# Patient Record
Sex: FEMALE | Race: WHITE | HISPANIC OR LATINO | Employment: PART TIME | URBAN - METROPOLITAN AREA
[De-identification: names, ages, dates, MRNs, and addresses within clinical notes are randomized per-mention and may not be internally consistent; named-entity substitution may affect disease eponyms.]

---

## 2017-01-20 DIAGNOSIS — M47.817 LUMBOSACRAL SPONDYLOSIS WITHOUT MYELOPATHY: Primary | ICD-10-CM

## 2017-01-20 DIAGNOSIS — M53.3 SACROILIAC JOINT PAIN: ICD-10-CM

## 2017-01-23 ENCOUNTER — OFFICE VISIT (OUTPATIENT)
Dept: FAMILY MEDICINE | Facility: CLINIC | Age: 62
End: 2017-01-23

## 2017-01-23 VITALS
SYSTOLIC BLOOD PRESSURE: 112 MMHG | RESPIRATION RATE: 18 BRPM | HEART RATE: 83 BPM | DIASTOLIC BLOOD PRESSURE: 69 MMHG | TEMPERATURE: 98.6 F | OXYGEN SATURATION: 96 %

## 2017-01-23 DIAGNOSIS — M54.5 CHRONIC BILATERAL LOW BACK PAIN, WITH SCIATICA PRESENCE UNSPECIFIED: ICD-10-CM

## 2017-01-23 DIAGNOSIS — M54.2 CERVICALGIA: ICD-10-CM

## 2017-01-23 DIAGNOSIS — I87.2 VENOUS (PERIPHERAL) INSUFFICIENCY: Primary | ICD-10-CM

## 2017-01-23 DIAGNOSIS — G89.29 CHRONIC BILATERAL LOW BACK PAIN, WITH SCIATICA PRESENCE UNSPECIFIED: ICD-10-CM

## 2017-01-23 DIAGNOSIS — F51.5 BAD DREAMS: ICD-10-CM

## 2017-01-23 ASSESSMENT — ENCOUNTER SYMPTOMS
FEVER: 0
GASTROINTESTINAL NEGATIVE: 1
FATIGUE: 0
RESPIRATORY NEGATIVE: 1
SLEEP DISTURBANCE: 1
JOINT SWELLING: 1
UNEXPECTED WEIGHT CHANGE: 0
ARTHRALGIAS: 1
WEAKNESS: 0
BACK PAIN: 1
DYSPHORIC MOOD: 1
ACTIVITY CHANGE: 0
NERVOUS/ANXIOUS: 1
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
## 2017-01-23 NOTE — MR AVS SNAPSHOT
After Visit Summary   1/23/2017    Radha Baca    MRN: [id number]           Patient Information     Date Of Birth          1955        Visit Information        Provider Department      1/23/2017 10:00 AM Janna Moreno MD [facility]        Today's Diagnoses     Venous (peripheral) insufficiency    -  1     Chronic bilateral low back pain, with sciatica presence unspecified         Cervicalgia           Care Instructions    Here is the plan from today's visit    1. Venous (peripheral) insufficiency    Wear stockings during the day take them off at night    Elevate legs above heart for 1 hour a day  - order for DME; Equipment being ordered: Grade 1 (light) compression stockings, below the knee  Dispense: 1 Units; Refill: 1    2. Chronic bilateral low back pain, with sciatica presence unspecified    Weight loss would help with this back pain     No concerns with issues of the  discs  or stenosis    It's okay to go forward with Dr. Sheffield's plans       3. Cervicalgia      Please call or return to clinic if your symptoms don't go away.    Follow up plan  Please make a clinic appointment for follow up with me (JANNA MORENO) in  1 month  for follow up .    Thank you for coming to [facility] today.  Lab Testing:  **If you had lab testing today and your results are reassuring or normal they will be mailed to you or sent through [employer] within 7 days.   **If the lab tests need quick action we will call you with the results.  The phone number we will call with results is # [phone] (home) . If this is not the best number please call our clinic and change the number.  Medication Refills:  If you need any refills please call your pharmacy and they will contact us.   If you need to  your refill at a new pharmacy, please contact the new pharmacy directly. The new pharmacy will help you get your medications transferred faster.   Scheduling:  If you have any  concerns about today's visit or wish to schedule another appointment please call our office during normal business hours [phone] (8-5:00 M-F)  If a referral was made to a [facility] and you don't get a call from central scheduling please call [phone].  If a Mammogram was ordered for you at [facility] call [phone] to schedule or change your appointment.  If you had an XRay/CT/Ultrasound/MRI ordered the number is [phone] to schedule or change your radiology appointment.   Medical Concerns:  If you have urgent medical concerns please call [phone] at any time of the day.  If you have a medical emergency please call [phone].        Follow-ups after your visit        Your next 10 appointments already scheduled     Apr 10, 2017  8:00 AM   LAB with  LAB   [facility] ([facility])    [address] [place] [id number] [phone]           Patient must bring picture ID.  Patient should be prepared to give a urine specimen  Please do not eat 10-12 hours before your appointment if you are coming in fasting for labs on lipids, cholesterol, or glucose (sugar).  Pregnant women should follow their Care Team instructions. Water with medications is okay. Do not drink coffee or other fluids.   If you have concerns about taking  your medications, please ask at office or if scheduling via [employer], send a message by clicking on Secure Messaging, Message Your Care Team.            Apr 12, 2017  8:15 AM   (Arrive by 7:45 AM)   RETURN DIABETES with Demar Hickey MD   [facility] ([facility])    [address] [place] [id number] [phone]              Who to contact     Please call your clinic at [phone] to:    Ask questions about your health    Make or cancel appointments    Discuss your medicines    Learn about your test results    Speak to your  doctor   If you have compliments or concerns about an experience at your clinic, or if you wish to file a complaint, please contact [facility] Patient Relations at [phone] or email us at [email or web address]         Additional Information About Your Visit        MyChart Information     [employer] gives you secure access to your electronic health record. If you see a primary care provider, you can also send messages to your care team and make appointments. If you have questions, please call your primary care clinic.  If you do not have a primary care provider, please call [phone] and they will assist you.      [employer] is an electronic gateway that provides easy, online access to your medical records. With [employer], you can request a clinic appointment, read your test results, renew a prescription or communicate with your care team.     To access your existing account, please contact your [facility] or call [phone] for assistance.        Care EveryWhere ID     This is your Care EveryWhere ID. This could be used by other organizations to access your [place] medical records  [id number]        Your Vitals Were     Pulse Temperature Respirations Pulse Oximetry          83 98.6  F (37  C) (Oral) 18 96%         Blood Pressure from Last 3 Encounters:   01/23/17 112/69   12/08/16 129/74   10/18/16 108/70    Weight from Last 3 Encounters:   12/08/16 250 lb 1.6 oz (113.445 kg)   10/18/16 250 lb 9.6 oz (113.671 kg)   08/10/16 246 lb (111.585 kg)              Today, you had the following     No orders found for display         Today's Medication Changes          These changes are accurate as of: 1/23/17 11:08 AM.  If you have any questions, ask your nurse or doctor.               Start taking these medicines.        Dose/Directions    order for DME   Used for:  Venous (peripheral) insufficiency   Started by:  Mohan Moreno MD        Equipment  being ordered: Grade 1 (light) compression stockings, below the knee   Quantity:  1 Units   Refills:  1            Where to get your medicines      Some of these will need a paper prescription and others can be bought over the counter.  Ask your nurse if you have questions.     Bring a paper prescription for each of these medications    - order for DME             Primary Care Provider Office Phone # Fax #    Mohan Moreno [phone]       [facility] [address]   [facility] [address]        Thank you!     Thank you for choosing [facility]  for your care. Our goal is always to provide you with excellent care. Hearing back from our patients is one way we can continue to improve our services. Please take a few minutes to complete the written survey that you may receive in the mail after your visit with us. Thank you!             Your Updated Medication List - Protect others around you: Learn how to safely use, store and throw away your medicines at www.disposemymeds.org.          This list is accurate as of: 1/23/17 11:08 AM.  Always use your most recent med list.                   Brand Name Dispense Instructions for use    Albuterol Sulfate 108 (90 BASE) MCG/ACT Aepb     1 each    Inhale 2 puffs into the lungs every 6 hours as needed       aspirin 81 MG tablet     90 tablet    1 tab daily       atorvastatin 20 MG tablet    LIPITOR    90 tablet    Take 1 tablet (20 mg) by mouth daily       TIAGO CONTOUR test strip   Generic drug:  blood glucose monitoring     270 each    Use to test blood sugar 3 times daily or as directed.       benzonatate 100 MG capsule    TESSALON    42 capsule    Take 1 capsule (100 mg) by mouth 3 times daily as needed for cough       blood glucose calibration NORMAL solution     1 Bottle    Use to calibrate blood glucose monitor as needed as directed.       blood glucose monitoring lancets     6 Box    Test 4-6 times daily 90 day supply  refills x 3       calcium carbonate-vitamin D 500-400 MG-UNIT Tabs per tablet     90 tablet    Take 1 tablet by mouth daily       cholecalciferol 1000 UNIT tablet    vitamin D    100 tablet    Take 1 tablet by mouth daily.       EYE-ZAKIYA PLUS LUTEIN PO          insulin glargine 100 UNIT/ML injection    LANTUS SOLOSTAR    70 mL    Inject 70 Units Subcutaneous every morning       insulin pen needle 31G X 8 MM    B-D U/F    360 each    Use  4 daily short 31 G X 8MM       latanoprost 0.005 % ophthalmic solution    XALATAN     Place 1 drop into both eyes At Bedtime       liraglutide 18 MG/3ML soln    VICTOZA PEN    9 mL    Inject 1.8 mg Subcutaneous daily       LISINOPRIL PO      Take 20 mg by mouth       lisinopril-hydrochlorothiazide 20-12.5 MG per tablet    PRINZIDE/ZESTORETIC    90 tablet    Take 1 tablet by mouth daily       metFORMIN 500 MG tablet    GLUCOPHAGE    360 tablet    Take 2 tablets (1,000 mg) by mouth 2 times daily (with meals)       MULTIvitamin  S Caps     90 capsule    Take 1 daily       NovoLOG FLEXPEN 100 UNIT/ML injection   Generic drug:  insulin aspart     75 mL    Carb counting with meals approx 70-80 units daily       OMEGA-3 FISH OIL PO      Take 1,000 mg by mouth daily       order for DME     1 Units    Equipment being ordered: Grade 1 (light) compression stockings, below the knee       prednisoLONE acetate 1 % ophthalmic susp    PRED FORTE     Place 1 drop into both eyes 4 times daily       timolol 0.5 % ophthalmic solution    TIMOPTIC     1 drop 2 times daily       TYLENOL PO      Take by mouth as needed for mild pain or fever       vitamin  B Complex Caps      Take 1 tablet by mouth daily       VITAMIN C PO      Take 2,000 mg by mouth 2 times daily

## 2017-01-23 NOTE — PATIENT INSTRUCTIONS
Here is the plan from today's visit    1. Venous (peripheral) insufficiency    Wear stockings during the day take them off at night    Elevate legs above heart for 1 hour a day  - order for DME; Equipment being ordered: Grade 1 (light) compression stockings, below the knee  Dispense: 1 Units; Refill: 1    2. Chronic bilateral low back pain, with sciatica presence unspecified    Weight loss would help with this back pain     No concerns with issues of the  discs  or stenosis    It's okay to go forward with Dr. Sheffield's plans       3. Cervicalgia      Please call or return to clinic if your symptoms don't go away.    Follow up plan  Please make a clinic appointment for follow up with me (JANNA RANGEL) in  1 month  for follow up .    Thank you for coming to [facility] today.  Lab Testing:  **If you had lab testing today and your results are reassuring or normal they will be mailed to you or sent through [employer] within 7 days.   **If the lab tests need quick action we will call you with the results.  The phone number we will call with results is # [phone] (home) . If this is not the best number please call our clinic and change the number.  Medication Refills:  If you need any refills please call your pharmacy and they will contact us.   If you need to  your refill at a new pharmacy, please contact the new pharmacy directly. The new pharmacy will help you get your medications transferred faster.   Scheduling:  If you have any concerns about today's visit or wish to schedule another appointment please call our office during normal business hours [phone] (8-5:00 M-F)  If a referral was made to a [facility] and you don't get a call from central scheduling please call [phone].  If a Mammogram was ordered for you at [facility] call [phone] to schedule or change your appointment.  If you had an XRay/CT/Ultrasound/MRI ordered the number is [phone] to  schedule or change your radiology appointment.   Medical Concerns:  If you have urgent medical concerns please call [phone] at any time of the day.  If you have a medical emergency please call [phone].

## 2017-02-01 ENCOUNTER — TELEPHONE (OUTPATIENT)
Dept: FAMILY MEDICINE | Facility: CLINIC | Age: 62
End: 2017-02-01

## 2017-02-01 ENCOUNTER — OFFICE VISIT (OUTPATIENT)
Dept: FAMILY MEDICINE | Facility: CLINIC | Age: 62
End: 2017-02-01

## 2017-02-01 VITALS
TEMPERATURE: 98.9 F | RESPIRATION RATE: 20 BRPM | OXYGEN SATURATION: 96 % | HEART RATE: 84 BPM | DIASTOLIC BLOOD PRESSURE: 74 MMHG | SYSTOLIC BLOOD PRESSURE: 126 MMHG
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
## 2017-02-01 ASSESSMENT — ENCOUNTER SYMPTOMS
CONSTITUTIONAL NEGATIVE: 1
MUSCULOSKELETAL NEGATIVE: 1

## 2017-02-01 NOTE — TELEPHONE ENCOUNTER
[facility] phone call message- general phone call:    Reason for call: Patient would like nurse call regarding cat bite on index finger and top part of hand.  States they are a diabetic and finger is swollen and throbbing.    Return call needed: Yes    OK to leave a message on voice mail? Yes    Primary language: English      needed? No    Call taken on February 1, 2017 at 11:16 AM by Darlyn Masterson

## 2017-02-01 NOTE — PROGRESS NOTES
Preceptor Attestation:   Patient seen and discussed with the resident. Assessment and plan reviewed with resident and agreed upon.   Supervising Physician:  Kris Paz MD  [facility]

## 2017-02-01 NOTE — TELEPHONE ENCOUNTER
Returned call to patient, states she has appointment scheduled with Dr. Guan at 3:00. RN it was good patient had scheduled due to cat bite, symptoms, and history of diabetes. Patient's lat tetanus shot was in 2013. RN advised patient to bring in animal's immunization records if possible. Patient verbalized understanding and will arrive for scheduled appointment.    Candis Dyson RN

## 2017-02-01 NOTE — PROGRESS NOTES
HPI:       Radha Baca is a 61 year old who presents for the following  Patient presents with:  Cat Bite: left hand happened about 11am 4 puncture marks was bit by her sisters cat, cat is up to date on shots     patient had cat bite her right hand this morning around  11 AMD , 3 small bites that are now swollen, not hurting. patient is diabetic and was worried that it might cause infection. Cat's shots are up to date and has been seen by a vet regularly. There is no observation of cat acting crazy, the cat might have been provoked since it was sleeping time for the cat. patiet bled a little but then it stopped.right now minimally painful but not swllen. patient had her tetanus in 2013 as well some other shots that she cannot remember. Per EMR TDAP was done in 9/26/2013.    Problem, Medication and Allergy Lists were reviewed and are current.  Patient is an established patient of this clinic.         Review of Systems:   Review of Systems   Constitutional: Negative.    Musculoskeletal: Negative.              Physical Exam:     Immunization History   Administered Date(s) Administered     Influenza (H1N1) 12/15/2009     Influenza (High Dose) 3 valent vaccine 10/13/2015     Influenza (IIV3) 10/16/2006, 10/16/2007, 10/28/2008, 09/21/2009, 10/13/2010, 09/28/2011, 09/19/2012, 09/26/2013     Influenza Vaccine, 3 YRS +, IM (QUADRIVALENT W/PRESERVATIVES) 11/03/2014, 10/18/2016     Mantoux 08/26/2011, 04/08/2013     Pneumococcal 23 valent 11/16/2000, 10/16/2006     TD (ADULT, 7+) 10/22/2002     TDAP (BOOSTRIX AGES 10-64) 09/26/2013         Patient Vitals for the past 24 hrs:   BP Temp Temp src Pulse Resp SpO2 Weight   02/01/17 1511 126/74 mmHg 98.9  F (37.2  C) Oral 84 20 96 % -     There is no weight on file to calculate BMI.  Vitals were reviewed and were normal     Physical Exam   Constitutional: She appears well-developed and well-nourished.   Cardiovascular: Normal rate, regular rhythm and normal heart  sounds.    Pulmonary/Chest: Effort normal and breath sounds normal.   Musculoskeletal:        Arms:  Patient has minor scratches in the left hand that ae very superficial to the skin, no signs of inflammation such as redness, warmth or swelling, very minor pain with touch of the affected area of the hand. There are no neurological deficit noted at this stage, strength is 5/5 and fine and crude touch intact in both hands.          Results:      Results from the last 24 hoursNo results found for this or any previous visit (from the past 24 hour(s)).  Assessment and Plan     1. Cat bite, initial encounter  Patient has had DTAP and the scratches are very minor, but given her history of DM2 and last HGBA1C level, will treat her scratches with antibiotics to avoid any complications.  PLAN:  - amoxicillin-clavulanate (AUGMENTIN) 875-125 MG per tablet; Take 1 tablet by mouth 2 times daily  Dispense: 10 tablet; Refill: 0  - Probiotic recommended.   Patient advised to come back to the clinic if there is swelling or if she develops fever in the next two days, to be evalauted and treated accordingly.     There are no discontinued medications.  Options for treatment and follow-up care were reviewed with the patient. Radha Baca  engaged in the decision making process and verbalized understanding of the options discussed and agreed with the final plan.    Frances Guan MD  Family Medicine PGY-1

## 2017-02-27 ENCOUNTER — OFFICE VISIT (OUTPATIENT)
Dept: FAMILY MEDICINE | Facility: CLINIC | Age: 62
End: 2017-02-27

## 2017-02-27 VITALS
WEIGHT: 245.6 LBS | DIASTOLIC BLOOD PRESSURE: 62 MMHG | TEMPERATURE: 98.3 F | HEART RATE: 74 BPM | OXYGEN SATURATION: 99 % | BODY MASS INDEX: 42.16 KG/M2 | SYSTOLIC BLOOD PRESSURE: 116 MMHG | RESPIRATION RATE: 20 BRPM

## 2017-02-27 DIAGNOSIS — I10 ESSENTIAL HYPERTENSION: ICD-10-CM

## 2017-02-27 DIAGNOSIS — G89.29 CHRONIC BILATERAL LOW BACK PAIN WITH RIGHT-SIDED SCIATICA: ICD-10-CM

## 2017-02-27 DIAGNOSIS — M54.2 CERVICALGIA: ICD-10-CM

## 2017-02-27 DIAGNOSIS — I87.2 VENOUS (PERIPHERAL) INSUFFICIENCY: ICD-10-CM

## 2017-02-27 DIAGNOSIS — M54.41 CHRONIC BILATERAL LOW BACK PAIN WITH RIGHT-SIDED SCIATICA: ICD-10-CM

## 2017-02-27 DIAGNOSIS — E11.8 TYPE 2 DIABETES MELLITUS WITH COMPLICATION, WITH LONG-TERM CURRENT USE OF INSULIN (H): ICD-10-CM

## 2017-02-27 DIAGNOSIS — J06.9 VIRAL UPPER RESPIRATORY TRACT INFECTION: Primary | ICD-10-CM

## 2017-02-27 DIAGNOSIS — Z79.4 TYPE 2 DIABETES MELLITUS WITH COMPLICATION, WITH LONG-TERM CURRENT USE OF INSULIN (H): ICD-10-CM

## 2017-02-27 ASSESSMENT — ENCOUNTER SYMPTOMS
COUGH: 1
WHEEZING: 0
ARTHRALGIAS: 1
SHORTNESS OF BREATH: 0
JOINT SWELLING: 1
UNEXPECTED WEIGHT CHANGE: 0
GASTROINTESTINAL NEGATIVE: 1
BACK PAIN: 1
SLEEP DISTURBANCE: 1
ACTIVITY CHANGE: 0
FEVER: 0
WEAKNESS: 0
NERVOUS/ANXIOUS: 1
FATIGUE: 0
DYSPHORIC MOOD: 1
CHEST TIGHTNESS: 0

## 2017-02-27 NOTE — MR AVS SNAPSHOT
After Visit Summary   2/27/2017    Radha Baca    MRN: [id number]           Patient Information     Date Of Birth          1955        Visit Information        Provider Department      2/27/2017 10:20 AM Mohan Moreno MD [facility]        Today's Diagnoses     Viral upper respiratory tract infection    -  1    Venous (peripheral) insufficiency        Cervicalgia        Chronic bilateral low back pain with right-sided sciatica        Essential hypertension        Type 2 diabetes mellitus with complication, with long-term current use of insulin (H)          Care Instructions    1.  Use robitussin at night for cough as needed    2.  Keep up on fluids    3.  Continue exercises.    4.  Wear stockings as before.        Follow-ups after your visit        Follow-up notes from your care team     Return in about 3 months (around 5/27/2017).      Your next 10 appointments already scheduled     Apr 10, 2017  8:00 AM CDT   LAB with  LAB   [facility] ([facility])    [address] [place] [id number] [phone]           Patient must bring picture ID.  Patient should be prepared to give a urine specimen  Please do not eat 10-12 hours before your appointment if you are coming in fasting for labs on lipids, cholesterol, or glucose (sugar).  Pregnant women should follow their Care Team instructions. Water with medications is okay. Do not drink coffee or other fluids.   If you have concerns about taking  your medications, please ask at office or if scheduling via [employer]Ge[employer], send a message by clicking on Secure Messaging, Message Your Care Team.            Apr 12, 2017  8:15 AM CDT   (Arrive by 7:45 AM)   RETURN DIABETES with Demar Hickey MD   [facility] ([facility])    [address] [place] [id number] [phone]              Who to contact      Please call your clinic at [phone] to:    Ask questions about your health    Make or cancel appointments    Discuss your medicines    Learn about your test results    Speak to your doctor   If you have compliments or concerns about an experience at your clinic, or if you wish to file a complaint, please contact [facility] Patient Relations at [phone] or email us at [email or web address]         Additional Information About Your Visit        stiQRdhart Information     [employer] gives you secure access to your electronic health record. If you see a primary care provider, you can also send messages to your care team and make appointments. If you have questions, please call your primary care clinic.  If you do not have a primary care provider, please call [phone] and they will assist you.      Mesitis is an electronic gateway that provides easy, online access to your medical records. With [employer], you can request a clinic appointment, read your test results, renew a prescription or communicate with your care team.     To access your existing account, please contact your [facility] or call [phone] for assistance.        Care EveryWhere ID     This is your Care EveryWhere ID. This could be used by other organizations to access your [facility] medical records  [id number]        Your Vitals Were     Pulse Temperature Respirations Pulse Oximetry BMI (Body Mass Index)       74 98.3  F (36.8  C) (Oral) 20 99% 42.16 kg/m2        Blood Pressure from Last 3 Encounters:   02/27/17 116/62   02/01/17 126/74   01/23/17 112/69    Weight from Last 3 Encounters:   02/27/17 245 lb 9.6 oz (111.4 kg)   12/08/16 250 lb 1.6 oz (113.4 kg)   10/18/16 250 lb 9.6 oz (113.7 kg)              Today, you had the following     No orders found for display       Primary Care Provider Office Phone # Fax #    Mohan Moreno [phone]       [facility]  [address] [facility] [address]  [facility] [address]        Thank you!     Thank you for choosing [facility]  for your care. Our goal is always to provide you with excellent care. Hearing back from our patients is one way we can continue to improve our services. Please take a few minutes to complete the written survey that you may receive in the mail after your visit with us. Thank you!             Your Updated Medication List - Protect others around you: Learn how to safely use, store and throw away your medicines at www.disposemymeds.org.          This list is accurate as of: 2/27/17 10:53 AM.  Always use your most recent med list.                   Brand Name Dispense Instructions for use    Albuterol Sulfate 108 (90 BASE) MCG/ACT Aepb     1 each    Inhale 2 puffs into the lungs every 6 hours as needed       amoxicillin-clavulanate 875-125 MG per tablet    AUGMENTIN    10 tablet    Take 1 tablet by mouth 2 times daily       aspirin 81 MG tablet     90 tablet    1 tab daily       atorvastatin 20 MG tablet    LIPITOR    90 tablet    Take 1 tablet (20 mg) by mouth daily       TIAGO CONTOUR test strip   Generic drug:  blood glucose monitoring     270 each    Use to test blood sugar 3 times daily or as directed.       benzonatate 100 MG capsule    TESSALON    42 capsule    Take 1 capsule (100 mg) by mouth 3 times daily as needed for cough       blood glucose calibration NORMAL solution     1 Bottle    Use to calibrate blood glucose monitor as needed as directed.       blood glucose monitoring lancets     6 Box    Test 4-6 times daily 90 day supply refills x 3       calcium carbonate-vitamin D 500-400 MG-UNIT Tabs per tablet     90 tablet    Take 1 tablet by mouth daily       cholecalciferol 1000 UNIT tablet    vitamin D    100 tablet    Take 1 tablet by mouth daily.       EYE-ZAKIYA PLUS LUTEIN PO          insulin glargine 100 UNIT/ML injection    LANTUS SOLOSTAR    70 mL    Inject 70 Units Subcutaneous  every morning       insulin pen needle 31G X 8 MM    B-D U/F    360 each    Use  4 daily short 31 G X 8MM       latanoprost 0.005 % ophthalmic solution    XALATAN     Place 1 drop into both eyes At Bedtime       liraglutide 18 MG/3ML soln    VICTOZA PEN    9 mL    Inject 1.8 mg Subcutaneous daily       LISINOPRIL PO      Take 20 mg by mouth       lisinopril-hydrochlorothiazide 20-12.5 MG per tablet    PRINZIDE/ZESTORETIC    90 tablet    Take 1 tablet by mouth daily       metFORMIN 500 MG tablet    GLUCOPHAGE    360 tablet    Take 2 tablets (1,000 mg) by mouth 2 times daily (with meals)       MULTIvitamin  S Caps     90 capsule    Take 1 daily       NovoLOG FLEXPEN 100 UNIT/ML injection   Generic drug:  insulin aspart     75 mL    Carb counting with meals approx 70-80 units daily       OMEGA-3 FISH OIL PO      Take 1,000 mg by mouth daily       order for DME     1 Units    Equipment being ordered: Grade 1 (light) compression stockings, below the knee       prednisoLONE acetate 1 % ophthalmic susp    PRED FORTE     Place 1 drop into both eyes 4 times daily       timolol 0.5 % ophthalmic solution    TIMOPTIC     1 drop 2 times daily       TYLENOL PO      Take by mouth as needed for mild pain or fever       vitamin  B Complex Caps      Take 1 tablet by mouth daily       VITAMIN C PO      Take 2,000 mg by mouth 2 times daily

## 2017-02-27 NOTE — PROGRESS NOTES
"      HPI:       Radha Baca is a 61 year old who presents for the following  Patient presents with:  RECHECK: Follow up,bilaterial Knee pain,Pain increased behind knees    CC today is of a \"cold\" for the past week.  Dry cough, decreased hearing.  No throat pain or SOB.  Using robitussin at night to help her sleep.  No fever or chills.    Wearing compression stockings sporadically.  Trying to limit salt intake.  Thinks edema is about the same.    BGs less well controlled over the past week.  Due to see her new endocrinologist soon.    Continues with LBP radiating down lateral thigh to lateral R knee.  Unchanged.      Somewhat less stress than last visit.  But is concerned about a family court date looming in June.         Problem, Medication and Allergy Lists were reviewed and are current.  Patient is an established patient of this clinic.         Review of Systems:   Review of Systems   Constitutional: Negative for activity change, fatigue, fever and unexpected weight change.   HENT: Positive for congestion and hearing loss.    Respiratory: Positive for cough. Negative for chest tightness, shortness of breath and wheezing.    Cardiovascular: Positive for leg swelling. Negative for chest pain.   Gastrointestinal: Negative.    Musculoskeletal: Positive for arthralgias, back pain, gait problem (uses cane) and joint swelling (ankles, lower legs).   Neurological: Negative for weakness.   Psychiatric/Behavioral: Positive for dysphoric mood and sleep disturbance. The patient is nervous/anxious.              Physical Exam:   Patient Vitals for the past 24 hrs:   BP Temp Temp src Pulse Resp SpO2 Weight   02/27/17 1025 116/62 98.3  F (36.8  C) Oral 74 20 99 % 245 lb 9.6 oz (111.4 kg)     Body mass index is 42.16 kg/(m^2).  Vitals were reviewed and were normal     Physical Exam   Constitutional: No distress.   Obese   HENT:   Head: Normocephalic and atraumatic.   Right Ear: Tympanic membrane and external ear normal. "   Left Ear: Tympanic membrane and external ear normal.   Nose: Nose normal.   Mouth/Throat: Oropharynx is clear and moist. No oropharyngeal exudate.   Neck: Neck supple. No thyromegaly present.   Cardiovascular: Normal rate, regular rhythm, normal heart sounds and normal pulses.  Exam reveals no gallop.    No murmur heard.  Pulmonary/Chest: Effort normal. No respiratory distress. She has no wheezes. She has no rales.   Musculoskeletal: She exhibits no edema or tenderness.   Knees cool with bilateral midline well healed scars.     Lymphadenopathy:     She has no cervical adenopathy.   Neurological: She is alert.   Skin: She is not diaphoretic.   + ashley discoloration in stocking distribution   Psychiatric: Her behavior is normal.   Vitals reviewed.        Results:     Results from last visit:  Orders Only on 10/17/2016   Component Date Value Ref Range Status     Hemoglobin 10/17/2016 12.4  11.7 - 15.7 g/dL Final     Hemoglobin A1C 10/17/2016 9.8* 4.3 - 6.0 % Final     Glucose 10/17/2016 181* 70 - 99 mg/dL Final     Creatinine 10/17/2016 0.76  0.52 - 1.04 mg/dL Final     GFR Estimate 10/17/2016 77  >60 mL/min/1.7m2 Final    Non  GFR Calc     GFR Estimate If Black 10/17/2016   >60 mL/min/1.7m2 Final                    Value:>90   GFR Calc       TSH 10/17/2016 2.39  0.40 - 4.00 mU/L Final     ALT 10/17/2016 54* 0 - 50 U/L Final     CK Total 10/17/2016 109  30 - 225 U/L Final     Potassium 10/17/2016 4.5  3.4 - 5.3 mmol/L Final     Sodium 10/17/2016 137  133 - 144 mmol/L Final     LDL Cholesterol Direct 10/17/2016 75  <100 mg/dL Final    Desirable:       <100 mg/dl     Creatinine Urine 10/17/2016 88  mg/dL Final     Albumin Urine mg/L 10/17/2016 27  mg/L Final     Albumin Urine mg/g Cr 10/17/2016 30.87* 0 - 25 mg/g Cr Final     Vitamin B12 10/17/2016 1088* 193 - 986 pg/mL Final    Interp: 247-911 = Normal     Assessment and Plan     Radha was seen today for recheck.    Diagnoses and all  orders for this visit:    Viral upper respiratory tract infection - no sign of bacterial illness.  Conservative measures.    Venous (peripheral) insufficiency - under control.  Continue compression stockings and limit salt intake    Cervicalgia - gradual improvement.  Continue ROM exercises.    Chronic bilateral low back pain with right-sided sciatica - chronic and unchanged.  Continue exercises and pool therapy.    Essential hypertension - good control    Type 2 diabetes mellitus with complication, with long-term current use of insulin (H) - suboptimal control, managed in endocrinology.      There are no discontinued medications.  Options for treatment and follow-up care were reviewed with the patient. Radha Baca  engaged in the decision making process and verbalized understanding of the options discussed and agreed with the final plan.    Mohan Moreno MD

## 2017-03-14 DIAGNOSIS — Z96.60 S/P JOINT REPLACEMENT: Primary | ICD-10-CM

## 2017-03-14 RX ORDER — AMOXICILLIN 500 MG/1
CAPSULE ORAL
Qty: 4 CAPSULE | Refills: 3 | Status: SHIPPED | OUTPATIENT
Start: 2017-03-14 | End: 2018-01-10

## 2017-03-17 ENCOUNTER — TELEPHONE (OUTPATIENT)
Dept: FAMILY MEDICINE | Facility: CLINIC | Age: 62
End: 2017-03-17

## 2017-03-17 NOTE — TELEPHONE ENCOUNTER
"Returned call to patient, states she went on a knitting retreat last week on Friday Reports that all participants have some variation of flu like symptoms, one who has tested positive fr influenza. States she started with headache Saturday and now has \"head cold and cough.\" Reports taking OTC robitussin which is intermittently helpful.     RN advised that influenza test is a nasal swab with results within 5-10 minutes. Patient verbalized understanding.    Candis Dyson RN    "

## 2017-03-17 NOTE — TELEPHONE ENCOUNTER
[facility] phone call message- general phone call:    Reason for call: patient is requesting call from triage nurse. Patient is scheduled influenza test appt on Monday and patient would like to know what the test entails.     Return call needed: Yes    OK to leave a message on voice mail? Yes    Primary language: English      needed? No    Call taken on March 17, 2017 at 3:31 PM by Tresa Mcneil

## 2017-04-10 ENCOUNTER — OFFICE VISIT (OUTPATIENT)
Dept: FAMILY MEDICINE | Facility: CLINIC | Age: 62
End: 2017-04-10

## 2017-04-10 VITALS
WEIGHT: 245 LBS | TEMPERATURE: 98.9 F | DIASTOLIC BLOOD PRESSURE: 72 MMHG | SYSTOLIC BLOOD PRESSURE: 118 MMHG | BODY MASS INDEX: 42.05 KG/M2 | RESPIRATION RATE: 18 BRPM | HEART RATE: 90 BPM | OXYGEN SATURATION: 96 %

## 2017-04-10 DIAGNOSIS — K59.00 CONSTIPATION, UNSPECIFIED CONSTIPATION TYPE: ICD-10-CM

## 2017-04-10 DIAGNOSIS — I87.2 VENOUS (PERIPHERAL) INSUFFICIENCY: Primary | ICD-10-CM

## 2017-04-10 DIAGNOSIS — L03.032 ACUTE PARONYCHIA OF TOE OF LEFT FOOT: ICD-10-CM

## 2017-04-10 DIAGNOSIS — Z79.4 TYPE 2 DIABETES MELLITUS WITH COMPLICATION, WITH LONG-TERM CURRENT USE OF INSULIN (H): ICD-10-CM

## 2017-04-10 DIAGNOSIS — G89.29 CHRONIC BILATERAL LOW BACK PAIN WITH RIGHT-SIDED SCIATICA: ICD-10-CM

## 2017-04-10 DIAGNOSIS — I10 ESSENTIAL HYPERTENSION: ICD-10-CM

## 2017-04-10 DIAGNOSIS — E78.5 DYSLIPIDEMIA: ICD-10-CM

## 2017-04-10 DIAGNOSIS — M54.41 CHRONIC BILATERAL LOW BACK PAIN WITH RIGHT-SIDED SCIATICA: ICD-10-CM

## 2017-04-10 DIAGNOSIS — E11.8 TYPE 2 DIABETES MELLITUS WITH COMPLICATION, WITH LONG-TERM CURRENT USE OF INSULIN (H): ICD-10-CM

## 2017-04-10 DIAGNOSIS — M20.42 HAMMER TOE OF LEFT FOOT: ICD-10-CM

## 2017-04-10 LAB
ALT SERPL W P-5'-P-CCNC: 55 U/L (ref 0–50)
CALCIUM SERPL-MCNC: 9.4 MG/DL (ref 8.5–10.1)
CK SERPL-CCNC: 79 U/L (ref 30–225)
CREAT SERPL-MCNC: 0.62 MG/DL (ref 0.52–1.04)
CREAT UR-MCNC: 96 MG/DL
GFR SERPL CREATININE-BSD FRML MDRD: NORMAL ML/MIN/1.7M2
GLUCOSE SERPL-MCNC: 203 MG/DL (ref 70–99)
HBA1C MFR BLD: 10.2 % (ref 4.3–6)
HGB BLD-MCNC: 12.3 G/DL (ref 11.7–15.7)
LDLC SERPL DIRECT ASSAY-MCNC: 44 MG/DL
MICROALBUMIN UR-MCNC: 15 MG/L
MICROALBUMIN/CREAT UR: 15.48 MG/G CR (ref 0–25)
PHOSPHATE SERPL-MCNC: 3.4 MG/DL (ref 2.5–4.5)
POTASSIUM SERPL-SCNC: 4.6 MMOL/L (ref 3.4–5.3)
SODIUM SERPL-SCNC: 135 MMOL/L (ref 133–144)
TSH SERPL DL<=0.05 MIU/L-ACNC: 2.13 MU/L (ref 0.4–4)

## 2017-04-10 ASSESSMENT — ENCOUNTER SYMPTOMS
FEVER: 0
WEAKNESS: 0
RESPIRATORY NEGATIVE: 1
ACTIVITY CHANGE: 0
ARTHRALGIAS: 1
BACK PAIN: 1
GASTROINTESTINAL NEGATIVE: 1
JOINT SWELLING: 1
NERVOUS/ANXIOUS: 1
UNEXPECTED WEIGHT CHANGE: 0
FATIGUE: 0

## 2017-04-10 NOTE — PROGRESS NOTES
"      HPI:       Radha Baca is a 61 year old who presents for the following  Patient presents with:  RECHECK: leg pain        Concern: Follow up for leg pain   Description of the problem :Pt presents today with follow up for leg and knee pain     When did it start?: 5-6 years ago    Intensity: mild    Progression of Symptoms:  same    Therapies Tried Surgery    What has worked?  Compression socks, thinks that it has helped somewhat , but feels like weather has a lot to do with her pain    Also has experienced some L great toe bleeding. She trimmed nail a few weeks ago and then had \"extra skin\" along the lateral side of the nail.  She pulled it off and it bled a lot.  Since then she has kept it covered with a Bandaid.  But it is healing slowly.  No fever.    Patient also mentioned that she has experienced some digestion issues, with abdominal pain associated with mild constipation, following a soft stool or sometimes diarrhea. Has stated that she drinks plenty of water and has a well balanced diet with fiber. Has tried some OTC medications, with little relief.                Problem, Medication and Allergy Lists were reviewed and are current.  Patient is an established patient of this clinic.         Review of Systems:   Review of Systems   Constitutional: Negative for activity change, fatigue, fever and unexpected weight change.   Respiratory: Negative.    Cardiovascular: Positive for leg swelling. Negative for chest pain.   Gastrointestinal: Negative.    Musculoskeletal: Positive for arthralgias, back pain, gait problem (uses cane) and joint swelling (ankles, lower legs).   Neurological: Negative for weakness.   Psychiatric/Behavioral: The patient is nervous/anxious.              Physical Exam:   Patient Vitals for the past 24 hrs:   BP Temp Temp src Pulse Resp SpO2 Weight   04/10/17 1001 118/72 98.9  F (37.2  C) Oral 90 18 96 % 245 lb (111.1 kg)     Body mass index is 42.05 kg/(m^2).  Vitals were " reviewed and were normal     Physical Exam   Constitutional: She appears well-developed and well-nourished. No distress.   Neck: Normal range of motion. Neck supple. No thyromegaly present.   Cardiovascular: Normal rate, regular rhythm and normal heart sounds.    No murmur heard.  Pulmonary/Chest: Effort normal and breath sounds normal. She has no wheezes. She has no rales. She exhibits no tenderness.   Musculoskeletal:        Right knee: Tenderness found.   Lower extremeties trace edema, mild purple-jayant discoloration of left pre tibial and labial area. Dry skin and skin cracking. Left great toe there is onychomycosis, on the lateral left paronychial area there is a vertical skin fissure with scant necrotic tissue. 1+ pulsing on dorsalis pedis on the left, hair noted on great toe.  + hammer toe 2nd on L.   Lymphadenopathy:     She has no cervical adenopathy.         Results:       Assessment and Plan     Radha was seen today for recheck.    Diagnoses and all orders for this visit:    Venous (peripheral) insufficiency - improved on pressure stockings.  Continue    Acute paronychia of toe of left foot - healing slowly.  Pt instructed not to pick at the wound; soak BID in soapy water and keep covered with bandage while wearing shoes.  Referral to podiatry to manage this and onychomycosis.  -     PODIATRY/FOOT & ANKLE SURGERY REFERRAL - INTERNAL    Hammer toe of left foot - podiatry to evaluate  -     PODIATRY/FOOT & ANKLE SURGERY REFERRAL - INTERNAL    Essential hypertension - at goal.    Chronic bilateral low back pain with right-sided sciatica - chronic, stable.  Continue exercises.    Constipation, unspecified constipation type - increase fiber and fluid intake.  Pt does not desire fiber supplement at this time.      There are no discontinued medications.  Options for treatment and follow-up care were reviewed with the patient. Radha Baca  engaged in the decision making process and verbalized  understanding of the options discussed and agreed with the final plan.    Mohan Moreno MD

## 2017-04-10 NOTE — PATIENT INSTRUCTIONS
Here is the plan from today's visit    1. Venous (peripheral) insufficiency, Acute paronychia of toe of left foot, Hammer toe of left foot    - PODIATRY/FOOT & ANKLE SURGERY REFERRAL - INTERNAL      2. Essential hypertension      3. Chronic bilateral low back pain with right-sided sciatica    4. Constipation   - Increase fluid intake  - Recommend that you take more fiber everyday      Please call or return to clinic if your symptoms don't go away.    Follow up plan  Please make a clinic appointment for follow up with me (JANNA RANGEL) in 2  months for follow up.    Thank you for coming to [facility] today.  Lab Testing:  **If you had lab testing today and your results are reassuring or normal they will be mailed to you or sent through [employer] within 7 days.   **If the lab tests need quick action we will call you with the results.  The phone number we will call with results is # [phone] (home) . If this is not the best number please call our clinic and change the number.  Medication Refills:  If you need any refills please call your pharmacy and they will contact us.   If you need to  your refill at a new pharmacy, please contact the new pharmacy directly. The new pharmacy will help you get your medications transferred faster.   Scheduling:  If you have any concerns about today's visit or wish to schedule another appointment please call our office during normal business hours [phone] (8-5:00 M-F)  If a referral was made to a [facility] and you don't get a call from central scheduling please call [phone].  If a Mammogram was ordered for you at [facility] call [phone] to schedule or change your appointment.  If you had an XRay/CT/Ultrasound/MRI ordered the number is [phone] to schedule or change your radiology appointment.   Medical Concerns:  If you have urgent medical concerns please call [phone] at any time of the day.  If you have a medical  emergency please call 911.

## 2017-04-10 NOTE — MR AVS SNAPSHOT
After Visit Summary   4/10/2017    Radha Baca    MRN: [id number]           Patient Information     Date Of Birth          1955        Visit Information        Provider Department      4/10/2017 10:00 AM Janna Moreno MD [facility]        Today's Diagnoses     Venous (peripheral) insufficiency    -  1    Acute paronychia of toe of left foot        Hammer toe of left foot        Essential hypertension        Chronic bilateral low back pain with right-sided sciatica        Constipation, unspecified constipation type          Care Instructions    Here is the plan from today's visit    1. Venous (peripheral) insufficiency, Acute paronychia of toe of left foot, Hammer toe of left foot    - PODIATRY/FOOT & ANKLE SURGERY REFERRAL - INTERNAL      2. Essential hypertension      3. Chronic bilateral low back pain with right-sided sciatica    4. Constipation   - Increase fluid intake  - Recommend that you take more fiber everyday      Please call or return to clinic if your symptoms don't go away.    Follow up plan  Please make a clinic appointment for follow up with me (JANNA MORENO) in 2  months for follow up.    Thank you for coming to [facility] today.  Lab Testing:  **If you had lab testing today and your results are reassuring or normal they will be mailed to you or sent through [employer] within 7 days.   **If the lab tests need quick action we will call you with the results.  The phone number we will call with results is # [phone] (home) . If this is not the best number please call our clinic and change the number.  Medication Refills:  If you need any refills please call your pharmacy and they will contact us.   If you need to  your refill at a new pharmacy, please contact the new pharmacy directly. The new pharmacy will help you get your medications transferred faster.   Scheduling:  If you have any concerns about today's visit or wish to schedule  another appointment please call our office during normal business hours [phone] (8-5:00 M-F)  If a referral was made to a [facility] and you don't get a call from central scheduling please call [phone].  If a Mammogram was ordered for you at [facility] call [phone] to schedule or change your appointment.  If you had an XRay/CT/Ultrasound/MRI ordered the number is [phone] to schedule or change your radiology appointment.   Medical Concerns:  If you have urgent medical concerns please call [phone] at any time of the day.  If you have a medical emergency please call 002.          Follow-ups after your visit        Additional Services     PODIATRY/FOOT & ANKLE SURGERY REFERRAL - INTERNAL       Dr. Rivera    Your provider has referred you to: JUAN JOSEP: [facility]: [address] [place] [id number] Patient Scheduling Line: [phone] option 1 (scheduling and directions)    Please be aware that coverage of these services is subject to the terms and limitations of your health insurance plan.  Call member services at your health plan with any benefit or coverage questions.      Please bring the following to your appointment:  >>   Any x-rays, CTs or MRIs which have been performed.  Contact the facility where they were done to arrange for  prior to your scheduled appointment.    >>   List of current medications   >>   This referral request   >>   Any documents/labs given to you for this referral                  Your next 10 appointments already scheduled     Apr 12, 2017  8:15 AM CDT   (Arrive by 7:45 AM)   RETURN DIABETES with Demar Hickey MD   [facility] ([facility])    [address] [place] [address] [phone]              Who to contact     Please call your clinic at [phone] to:    Ask questions about your health    Make or cancel appointments    Discuss  your medicines    Learn about your test results    Speak to your doctor   If you have compliments or concerns about an experience at your clinic, or if you wish to file a complaint, please contact [facility] Patient Relations at [phone] or email us at [email or web address]         Additional Information About Your Visit        MyChart Information     [employer] gives you secure access to your electronic health record. If you see a primary care provider, you can also send messages to your care team and make appointments. If you have questions, please call your primary care clinic.  If you do not have a primary care provider, please call [phone] and they will assist you.      [employer] is an electronic gateway that provides easy, online access to your medical records. With [employer], you can request a clinic appointment, read your test results, renew a prescription or communicate with your care team.     To access your existing account, please contact your [facility] or call [phone] for assistance.        Care EveryWhere ID     This is your Care EveryWhere ID. This could be used by other organizations to access your [place] medical records  [id number]        Your Vitals Were     Pulse Temperature Respirations Pulse Oximetry Breastfeeding? BMI (Body Mass Index)    90 98.9  F (37.2  C) (Oral) 18 96% No 42.05 kg/m2       Blood Pressure from Last 3 Encounters:   04/10/17 118/72   02/27/17 116/62   02/01/17 126/74    Weight from Last 3 Encounters:   04/10/17 245 lb (111.1 kg)   02/27/17 245 lb 9.6 oz (111.4 kg)   12/08/16 250 lb 1.6 oz (113.4 kg)              We Performed the Following     PODIATRY/FOOT & ANKLE SURGERY REFERRAL - INTERNAL        Primary Care Provider Office Phone # Fax #    Mohan Moreno [phone]       [facility] [address]   [facility] [address]        Thank you!     Thank you for choosing  [facility]  for your care. Our goal is always to provide you with excellent care. Hearing back from our patients is one way we can continue to improve our services. Please take a few minutes to complete the written survey that you may receive in the mail after your visit with us. Thank you!             Your Updated Medication List - Protect others around you: Learn how to safely use, store and throw away your medicines at www.disposemymeds.org.          This list is accurate as of: 4/10/17 10:28 AM.  Always use your most recent med list.                   Brand Name Dispense Instructions for use    Albuterol Sulfate 108 (90 BASE) MCG/ACT Aepb     1 each    Inhale 2 puffs into the lungs every 6 hours as needed       amoxicillin 500 MG capsule    AMOXIL    4 capsule    Take 4 capsules (2 grams) 1 hour before dental work.       amoxicillin-clavulanate 875-125 MG per tablet    AUGMENTIN    10 tablet    Take 1 tablet by mouth 2 times daily       aspirin 81 MG tablet     90 tablet    1 tab daily       atorvastatin 20 MG tablet    LIPITOR    90 tablet    Take 1 tablet (20 mg) by mouth daily       TIAGO CONTOUR test strip   Generic drug:  blood glucose monitoring     270 each    Use to test blood sugar 3 times daily or as directed.       benzonatate 100 MG capsule    TESSALON    42 capsule    Take 1 capsule (100 mg) by mouth 3 times daily as needed for cough       blood glucose calibration NORMAL solution     1 Bottle    Use to calibrate blood glucose monitor as needed as directed.       blood glucose monitoring lancets     6 Box    Test 4-6 times daily 90 day supply refills x 3       calcium carbonate-vitamin D 500-400 MG-UNIT Tabs per tablet     90 tablet    Take 1 tablet by mouth daily       cholecalciferol 1000 UNIT tablet    vitamin D    100 tablet    Take 1 tablet by mouth daily.       EYE-ZAKIYA PLUS LUTEIN PO          insulin glargine 100 UNIT/ML injection    LANTUS SOLOSTAR    70 mL    Inject 70  Units Subcutaneous every morning       insulin pen needle 31G X 8 MM    B-D U/F    360 each    Use  4 daily short 31 G X 8MM       latanoprost 0.005 % ophthalmic solution    XALATAN     Place 1 drop into both eyes At Bedtime       liraglutide 18 MG/3ML soln    VICTOZA PEN    9 mL    Inject 1.8 mg Subcutaneous daily       LISINOPRIL PO      Take 20 mg by mouth       lisinopril-hydrochlorothiazide 20-12.5 MG per tablet    PRINZIDE/ZESTORETIC    90 tablet    Take 1 tablet by mouth daily       metFORMIN 500 MG tablet    GLUCOPHAGE    360 tablet    Take 2 tablets (1,000 mg) by mouth 2 times daily (with meals)       MULTIvitamin  S Caps     90 capsule    Take 1 daily       NovoLOG FLEXPEN 100 UNIT/ML injection   Generic drug:  insulin aspart     75 mL    Carb counting with meals approx 70-80 units daily       OMEGA-3 FISH OIL PO      Take 1,000 mg by mouth daily       order for DME     1 Units    Equipment being ordered: Grade 1 (light) compression stockings, below the knee       prednisoLONE acetate 1 % ophthalmic susp    PRED FORTE     Place 1 drop into both eyes 4 times daily       timolol 0.5 % ophthalmic solution    TIMOPTIC     1 drop 2 times daily       TYLENOL PO      Take by mouth as needed for mild pain or fever       vitamin  B Complex Caps      Take 1 tablet by mouth daily       VITAMIN C PO      Take 2,000 mg by mouth 2 times daily

## 2017-04-12 ENCOUNTER — OFFICE VISIT (OUTPATIENT)
Dept: ENDOCRINOLOGY | Facility: CLINIC | Age: 62
End: 2017-04-12

## 2017-04-12 VITALS
BODY MASS INDEX: 42.97 KG/M2 | HEIGHT: 64 IN | WEIGHT: 251.7 LBS | SYSTOLIC BLOOD PRESSURE: 122 MMHG | DIASTOLIC BLOOD PRESSURE: 70 MMHG | HEART RATE: 83 BPM

## 2017-04-12 DIAGNOSIS — E11.8 TYPE 2 DIABETES MELLITUS WITH COMPLICATION, WITH LONG-TERM CURRENT USE OF INSULIN (H): Primary | ICD-10-CM

## 2017-04-12 DIAGNOSIS — Z79.4 TYPE 2 DIABETES MELLITUS WITH COMPLICATION, WITH LONG-TERM CURRENT USE OF INSULIN (H): Primary | ICD-10-CM

## 2017-04-12 ASSESSMENT — PAIN SCALES - GENERAL: PAINLEVEL: NO PAIN (0)

## 2017-04-12 NOTE — NURSING NOTE
Chief Complaint   Patient presents with     RECHECK     F/U TYPE II DM     Daniela Gomez, [facility]  Endocrinology & Diabetes 3G

## 2017-04-12 NOTE — MR AVS SNAPSHOT
After Visit Summary   4/12/2017    Radha Baca    MRN: [id number]           Patient Information     Date Of Birth          1955        Visit Information        Provider Department      4/12/2017 8:15 AM Demar Hickey MD [facility]        Care Instructions    Foods such as: Bread, Pizza, Pasta, Rice, Flour based products, and potatoes have high carb in them - recommend reducing the portion of these foods and supplementing with vegetables and lean proteins - chicken, turkey, eggs.     Increasing regular exercise will help your weight loss effort.     Reconsider taking Trulicity.       To expedite your medication refill(s), please contact your pharmacy and have them fax a refill request to: [phone].  *Please allow 3 business days for routine medication refills.  *Please allow 5 business days for controlled substance medication refills.  --------------------  For scheduling appointments (including lab work), please request an appointment through [employer], or call: [phone].    For questions for your provider or the endocrine nurse, please send a [employer] message.  For after-hours urgent issues, please dial [phone], and ask to speak with the Endocrinologist On-Call.  --------------------  Please Note: If you are active on [employer], all future test results will be sent by [employer] message only and will no longer be sent by mail. You may also receive communication directly from your physician.          Follow-ups after your visit        Follow-up notes from your care team     Return in about 6 months (around 10/12/2017).      Your next 10 appointments already scheduled     Jul 19, 2017 11:00 AM CDT   (Arrive by 10:45 AM)   RETURN DIABETES with BRONSON Mann [facility] ([facility])    [address] [place] [id number] [phone]            Oct 18, 2017  1:00 PM CDT   (Arrive  "by 12:45 PM)   RETURN DIABETES with Demar Hickey MD   [facility] End[facility]in[facility] ([facility])    [address] [facility]  [address] [facility] [address] [phone]              Who to contact     Please call your clinic at [phone] to:    Ask questions about your health    Make or cancel appointments    Discuss your medicines    Learn about your test results    Speak to your doctor   If you have compliments or concerns about an experience at your clinic, or if you wish to file a complaint, please contact [facility] Patient Relations at [phone] or email us at [email or web address]         Additional Information About Your Visit        [employer] Information     [email or web address] gives you secure access to your electronic health record. If you see a primary care provider, you can also send messages to your care team and make appointments. If you have questions, please call your primary care clinic.  If you do not have a primary care provider, please call [phone] and they will assist you.      [email or web address] is an electronic gateway that provides easy, online access to your medical records. With [email or web address], you can request a clinic appointment, read your test results, renew a prescription or communicate with your care team.     To access your existing account, please contact your [facility] or call [phone] for assistance.        Care EveryWhere ID     This is your Care EveryWhere ID. This could be used by other organizations to access your [place] medical records  [id number]        Your Vitals Were     Pulse Height BMI (Body Mass Index)             83 1.626 m (5' 4\") 43.2 kg/m2          Blood Pressure from Last 3 Encounters:   04/12/17 122/70   04/10/17 118/72   02/27/17 116/62    Weight from Last 3 Encounters:   04/12/17 114.2 kg (251 lb 11.2 oz)   04/10/17 111.1 kg (245 lb)   02/27/17 111.4 kg (245 lb 9.6 " oz)              Today, you had the following     No orders found for display         Today's Medication Changes          These changes are accurate as of: 4/12/17  9:03 AM.  If you have any questions, ask your nurse or doctor.               Stop taking these medicines if you haven't already. Please contact your care team if you have questions.     amoxicillin-clavulanate 875-125 MG per tablet   Commonly known as:  AUGMENTIN   Stopped by:  Demar Hickey MD           benzonatate 100 MG capsule   Commonly known as:  TESSALON   Stopped by:  Demar Hickey MD           liraglutide 18 MG/3ML soln   Commonly known as:  VICTOZA PEN   Stopped by:  Demar Hickey MD           LISINOPRIL PO   Stopped by:  Demar Hickey MD           prednisoLONE acetate 1 % ophthalmic susp   Commonly known as:  PRED FORTE   Stopped by:  Demar Hickey MD                    Primary Care Provider Office Phone # Fax #    Mohan Moreno [phone]       [facility] [address] [address]        Thank you!     Thank you for choosing [facility]  for your care. Our goal is always to provide you with excellent care. Hearing back from our patients is one way we can continue to improve our services. Please take a few minutes to complete the written survey that you may receive in the mail after your visit with us. Thank you!             Your Updated Medication List - Protect others around you: Learn how to safely use, store and throw away your medicines at www.disposemymeds.org.          This list is accurate as of: 4/12/17  9:03 AM.  Always use your most recent med list.                   Brand Name Dispense Instructions for use    Albuterol Sulfate 108 (90 BASE) MCG/ACT Aepb     1 each    Inhale 2 puffs into the lungs every 6 hours as needed       amoxicillin 500 MG capsule    AMOXIL    4 capsule    Take 4 capsules (2  grams) 1 hour before dental work.       aspirin 81 MG tablet     90 tablet    1 tab daily       atorvastatin 20 MG tablet    LIPITOR    90 tablet    Take 1 tablet (20 mg) by mouth daily       TIAGO CONTOUR test strip   Generic drug:  blood glucose monitoring     270 each    Use to test blood sugar 3 times daily or as directed.       blood glucose calibration NORMAL solution     1 Bottle    Use to calibrate blood glucose monitor as needed as directed.       blood glucose monitoring lancets     6 Box    Test 4-6 times daily 90 day supply refills x 3       calcium carbonate-vitamin D 500-400 MG-UNIT Tabs per tablet     90 tablet    Take 1 tablet by mouth daily       cholecalciferol 1000 UNIT tablet    vitamin D    100 tablet    Take 1 tablet by mouth daily.       EYE-ZAKIYA PLUS LUTEIN PO          insulin glargine 100 UNIT/ML injection    LANTUS SOLOSTAR    70 mL    Inject 70 Units Subcutaneous every morning       insulin pen needle 31G X 8 MM    B-D U/F    360 each    Use  4 daily short 31 G X 8MM       latanoprost 0.005 % ophthalmic solution    XALATAN     Place 1 drop into both eyes At Bedtime       lisinopril-hydrochlorothiazide 20-12.5 MG per tablet    PRINZIDE/ZESTORETIC    90 tablet    Take 1 tablet by mouth daily       metFORMIN 500 MG tablet    GLUCOPHAGE    360 tablet    Take 2 tablets (1,000 mg) by mouth 2 times daily (with meals)       MULTIvitamin  S Caps     90 capsule    Take 1 daily       NovoLOG FLEXPEN 100 UNIT/ML injection   Generic drug:  insulin aspart     75 mL    Carb counting with meals approx 70-80 units daily       OMEGA-3 FISH OIL PO      Take 1,000 mg by mouth daily       order for DME     1 Units    Equipment being ordered: Grade 1 (light) compression stockings, below the knee       timolol 0.5 % ophthalmic solution    TIMOPTIC     Place 1 drop into both eyes 2 times daily       TYLENOL PO      Take by mouth as needed for mild pain or fever       vitamin  B Complex Caps      Take 1 tablet by  mouth daily       VITAMIN C PO      Take 2,000 mg by mouth 2 times daily

## 2017-04-12 NOTE — PATIENT INSTRUCTIONS
Foods such as: Bread, Pizza, Pasta, Rice, Flour based products, and potatoes have high carb in them - recommend reducing the portion of these foods and supplementing with vegetables and lean proteins - chicken, turkey, eggs.     Increasing regular exercise will help your weight loss effort.     Reconsider taking Trulicity.       To expedite your medication refill(s), please contact your pharmacy and have them fax a refill request to: [phone].  *Please allow 3 business days for routine medication refills.  *Please allow 5 business days for controlled substance medication refills.  --------------------  For scheduling appointments (including lab work), please request an appointment through [employer], or call: [phone].    For questions for your provider or the endocrine nurse, please send a [employer] message.  For after-hours urgent issues, please dial [phone], and ask to speak with the Endocrinologist On-Call.  --------------------  Please Note: If you are active on [employer], all future test results will be sent by [employer] message only and will no longer be sent by mail. You may also receive communication directly from your physician.

## 2017-04-12 NOTE — PROGRESS NOTES
Endocrinology Clinic Visit      April 12, 2017              Chief Complaint: RECHECK (F/U TYPE II DM)       Subjective:         HPI: Radha Baca is a 61 year old year old female who  has a past medical history of Arthritis; Arthritis of knee (4/4/2013); Arthritis of shoulder region, right (4/18/2014); Arthritis of shoulder region, right (4/18/2014); Finger pain (4/2/2015); Headache(784.0); Hypercholesteremia; Hypertension; Low back pain; Menarche (age 9+); Pain in knee joint; Right bundle branch block; SNHL (sensorineural hearing loss); and Umbilical hernia without mention of obstruction or gangrene (8/2014). who is here a follow up.     History of Diabetes  Type 2    Complications    1. Retinopathy: Advanced but stable retinopathy.    2.  Nephropathy:  Lab Results   Component Value Date    MICROL 15 04/10/2017     3. Neuropathy   Last monofilament testing: intact 10/2016    4. Hypoglycemia none.    5. Macrovascular:     Treatment  Diabetes Medication(s)     Biguanides Sig    metFORMIN (GLUCOPHAGE) 500 MG tablet Take 2 tablets (1,000 mg) by mouth 2 times daily (with meals)    Insulin Sig    insulin glargine (LANTUS SOLOSTAR) 100 UNIT/ML PEN Inject 70 Units Subcutaneous every morning    NOVOLOG FLEXPEN 100 UNIT/ML soln Carb counting with meals approx 70-80 units daily      NovoLog insulin before meals using 1 unit for each 5 grams of carbohydrate and typically taking 20-25 units.   .         Prevention  Lipid  LDL Cholesterol Calculated   Date Value Ref Range Status   04/18/2016 55 <100 mg/dL Final     Comment:     Desirable:       <100 mg/dl   01/09/2014 86 0 - 129 mg/dL Final     Comment:     LDL Cholesterol is the primary guide to therapy: LDL-cholesterol goal in high   risk patients is <100 mg/dL and in very high risk patients is <70 mg/dL.     LDL Cholesterol Direct   Date Value Ref Range Status   04/10/2017 44 <100 mg/dL Final     Comment:     Desirable:       <100 mg/dl   10/17/2016 75 <100 mg/dL  Final     Comment:     Desirable:       <100 mg/dl       Medications     HMG CoA Reductase Inhibitors    atorvastatin (LIPITOR) 20 MG tablet    Salicylates    aspirin 81 MG tablet          Renal  Medication (Note: This includes the hypertensive combination subclass to make sure to show all ACEI/ARB's.)     Antihypertensive Combinations Sig    lisinopril-hydrochlorothiazide (PRINZIDE,ZESTORETIC) 20-12.5 MG per tablet Take 1 tablet by mouth daily            Weight  Wt Readings from Last 4 Encounters:   04/12/17 114.2 kg (251 lb 11.2 oz)   04/10/17 111.1 kg (245 lb)   02/27/17 111.4 kg (245 lb 9.6 oz)   12/08/16 113.4 kg (250 lb 1.6 oz)       Meter Download Summary:  She tests blood sugars three times a day  Екатерина's morning readings is 187, before lunch 211 and before supper 274    Exercise nONE      Weight trend  Wt Readings from Last 4 Encounters:   04/12/17 114.2 kg (251 lb 11.2 oz)   04/10/17 111.1 kg (245 lb)   02/27/17 111.4 kg (245 lb 9.6 oz)   12/08/16 113.4 kg (250 lb 1.6 oz)       A1c today is   Lab Results   Component Value Date    A1C 10.2 04/10/2017    A1C 9.8 10/17/2016    A1C 10.6 04/18/2016    A1C 9.9 07/20/2015    A1C 9.4 01/09/2014           HABITS:  She does not use tobacco or alcohol.      SOCIAL HISTORY:  Dionicio is  and lives with her  in [place].  She is presently unemployed and has given up looking for a job.  She has major family issues in that her father is suing her mother.  This has been very stressful for Dionicio.        Allergies   Allergen Reactions     Nkda [No Known Drug Allergies]        Current Outpatient Prescriptions   Medication Sig Dispense Refill     order for DME Equipment being ordered: Grade 1 (light) compression stockings, below the knee 1 Units 1     TIAGO CONTOUR test strip Use to test blood sugar 3 times daily or as directed. 270 each 3     metFORMIN (GLUCOPHAGE) 500 MG tablet Take 2 tablets (1,000 mg) by mouth 2 times daily (with meals) 360 tablet 1      atorvastatin (LIPITOR) 20 MG tablet Take 1 tablet (20 mg) by mouth daily 90 tablet 3     insulin glargine (LANTUS SOLOSTAR) 100 UNIT/ML PEN Inject 70 Units Subcutaneous every morning 70 mL 3     NOVOLOG FLEXPEN 100 UNIT/ML soln Carb counting with meals approx 70-80 units daily 75 mL 3     Acetaminophen (TYLENOL PO) Take by mouth as needed for mild pain or fever       Omega-3 Fatty Acids (OMEGA-3 FISH OIL PO) Take 1,000 mg by mouth daily       Albuterol Sulfate 108 (90 BASE) MCG/ACT AEPB Inhale 2 puffs into the lungs every 6 hours as needed 1 each 0     lisinopril-hydrochlorothiazide (PRINZIDE,ZESTORETIC) 20-12.5 MG per tablet Take 1 tablet by mouth daily 90 tablet 3     blood glucose calibration (TIAGO CONTOUR) NORMAL solution Use to calibrate blood glucose monitor as needed as directed. 1 Bottle 3     Multiple Vitamins-Minerals (EYE-ZAKIYA PLUS LUTEIN PO)        insulin pen needle (B-D U/F) 31G X 8 MM Use  4 daily short 31 G X 8MM 360 each 3     blood glucose monitoring (TIAGO MICROLET) lancets Test 4-6 times daily 90 day supply refills x 3 6 Box 3     latanoprost (XALATAN) 0.005 % ophthalmic solution Place 1 drop into both eyes At Bedtime       aspirin 81 MG tablet 1 tab daily 90 tablet 3     Multiple Vitamin (MULTIVITAMIN  S) CAPS Take 1 daily 90 capsule 3     calcium carbonate-vitamin D 500-400 MG-UNIT TABS tablt Take 1 tablet by mouth daily 90 tablet 3     timolol (TIMOPTIC) 0.5 % ophthalmic solution Place 1 drop into both eyes 2 times daily        cholecalciferol (VITAMIN D) 1000 UNIT tablet Take 1 tablet by mouth daily. 100 tablet 3     Ascorbic Acid (VITAMIN C PO) Take 2,000 mg by mouth 2 times daily        B Complex Vitamins (VITAMIN  B COMPLEX) CAPS Take 1 tablet by mouth daily        amoxicillin (AMOXIL) 500 MG capsule Take 4 capsules (2 grams) 1 hour before dental work. (Patient not taking: Reported on 4/12/2017) 4 capsule 3       Review of Systems     Constitutional: Negative for fever and unexpected  weight change. Appetite nomal   Head: no headache   Eye: no vision change/ loss of peripheral vision.   ENT: No throat congestion.   Respiratory: no cough   Cardiovascular: no chest pain or tachycardia  Gastrointestinal: Negative for vomiting, abdominal pain and diarrhea.  Genitourinary: No bladder problems.  Musculoskeletal: Negative for myalgias. No weakness.  Neurological: Negative for seizures and headaches.  Psychiatric/Behavioral: Negative for behavioral problem and dysphoric mood.    Past Medical History:   Diagnosis Date     Arthritis      Arthritis of knee 4/4/2013     Arthritis of shoulder region, right 4/18/2014     Arthritis of shoulder region, right 4/18/2014     Finger pain 4/2/2015     Headache(784.0)     decreased with mouth guard use.     Hypercholesteremia      Hypertension      Low back pain      Menarche age 10+    cycles q mo x 4-5 d     Pain in knee joint     LEFT     Right bundle branch block     per H/P     SNHL (sensorineural hearing loss)      Umbilical hernia without mention of obstruction or gangrene 8/2014       Past Surgical History:   Procedure Laterality Date     ARTHROPLASTY KNEE  4/4/2013    Left Total Knee Arthroplasty;  Surgeon: Lou Byrne MD;  Location: [facility]     ARTHROPLASTY MINIMALLY INVASIVE KNEE  8/22/2011    R knee :CAITLYN JACOBO, Left age 14     DENTAL SURGERY      root canals, wisdom teeth     EXAM UNDER ANESTHESIA, MANIPULATE JOINT (LOCATION)  10/5/2012    Procedure: EXAM UNDER ANESTHESIA, MANIPULATE JOINT (LOCATION);  Right Knee Mini Open Lysis Of Adhesions, Left Knee Steroid Injection  ;  Surgeon: Lou Byrne MD;  Location: [facility]      OR OCULAR DEVICE INTRAOP DETACHED RETINA  2009    R     HC PHAKIC IOL - IMPLANT FROM SURGEON      right     KNEE SURGERY  1969    left     LASER YAG CAPSULOTOMY  12/2016    left     VITRECTOMY PARSPLANA  2010       Family History   Problem Relation Age of Onset     DIABETES Father 55     DM II     DIABETES Sister  45     DM II     DIABETES Brother 50     xs 2     Hypertension Sister 41     also B and M     CANCER Maternal Aunt      multiple myeloma     Thyroid Disease Sister 45     graves     Hypertension Brother      ? age     Hypertension Mother 79     CEREBROVASCULAR DISEASE No family hx of      Breast Cancer No family hx of      Cancer - colorectal No family hx of      Prostate Cancer No family hx of      Alcohol/Drug No family hx of        Social History     Social History     Marital status:      Spouse name: N/A     Number of children: N/A     Years of education: N/A     Occupational History     Human Resources      between jobs now     Social History Main Topics     Smoking status: Former Smoker     Smokeless tobacco: Never Used      Comment: quit 35 years ago     Alcohol use No     Drug use: No     Sexual activity: Yes     Partners: Male     Birth control/ protection: Post-menopausal     Other Topics Concern     Blood Transfusions Yes     2 units 2011     Caffeine Concern No     2s     Occupational Exposure No     Hobby Hazards No     Sleep Concern No     Stress Concern No     rehab for R knee after replacement     Weight Concern Yes     working on wt loss     Special Diet Yes     Diabetic     Back Care No     Exercise No     water aerobics 35-40' 3-4 d & strength 3d/wk     Bike Helmet No     Seat Belt No     Social History Narrative    How much exercise per week? 3-4x swimming, aerobics, treadmill, bicycle    How much calcium per day? Supplement       How much caffeine per day? 2 cups coffee    How much vitamin D per day? Supplement    Do you/your family wear seatbelts?  Yes    Do you/your family use safety helmets? No    Do you/your family use sunscreen? No    Do you/your family keep firearms in the home? No    Do you/your family have a smoke detector(s)? Yes    Do you feel safe in your home? Yes    Has anyone ever touched you in an unwanted manner? No     Explain     See LEA Berman 11/26/2014  "      Objective:   /70  Pulse 83  Ht 1.626 m (5' 4\")  Wt 114.2 kg (251 lb 11.2 oz)  BMI 43.2 kg/m2  Constitutional: Appears well-developed and well-nourished. Active.   EYES: anicteric, normal extra-ocular movements, no lid lag or retraction   HEENT: Mouth/Throat: Mucous membrane is moist. Oropharynx is clear. No adenopathy. Normal thyroid   Cardiovascular: RRR, S1, S2 normal. No m/g/r   Pulmonary/Chest: CTAB. No wheezing or rales   Abdominal: +BS. Nontender to palpation. No organomegaly present.  Neurological: Alert. Normal affect. CNII-XII intact. Muscle strength 5/5. Sensory is intact.  Extremities: No clubbing, cyanosis or inflammation   Skin: normal texture, color  Feet: normal monofilament 10/16.   Lymphatic: no cervical lymphadenopathy.  Psychological: appropriate mood     In House Labs:   Lab Results   Component Value Date    A1C 10.2 04/10/2017    A1C 9.8 10/17/2016    A1C 10.6 04/18/2016    A1C 9.9 07/20/2015    A1C 9.4 01/09/2014       TSH   Date Value Ref Range Status   04/10/2017 2.13 0.40 - 4.00 mU/L Final   10/17/2016 2.39 0.40 - 4.00 mU/L Final   09/02/2014 1.39 0.40 - 4.00 mU/L Final     Comment:     Effective 7/30/2014, the reference range for this assay has changed to reflect   new instrumentation/methodology.     01/09/2014 2.43 0.4 - 5.0 mU/L Final   05/14/2012 2.43 0.4 - 5.0 mU/L Final       Creatinine   Date Value Ref Range Status   04/10/2017 0.62 0.52 - 1.04 mg/dL Final   ]    Recent Labs   Lab Test  04/10/17   0834  10/17/16   0831  04/18/16   0943   01/09/14   0819  05/13/13   0756   CHOL   --    --   126   --   153  137   HDL   --    --   43*   --   37*  34*   LDL  44  75  55   < >  86  54   TRIG   --    --   140   --   152*  248*   CHOLHDLRATIO   --    --    --    --   4.2  4.0    < > = values in this interval not displayed.       No results found for: [id number], [id number], [id number]  Component      Latest Ref Rng & Units 4/10/2017   Creatinine Urine      mg/dL 96   Albumin " "Urine mg/L      mg/L 15   Albumin Urine mg/g Cr      0 - 25 mg/g Cr 15.48   Creatinine      0.52 - 1.04 mg/dL 0.62   GFR Estimate      >60 mL/min/1.7m2 >90 . . .   GFR Estimate If Black      >60 mL/min/1.7m2 >90 . . .   Hemoglobin A1C      4.3 - 6.0 % 10.2 (H)   Glucose      70 - 99 mg/dL 203 (H)   LDL Cholesterol Direct      <100 mg/dL 44   TSH      0.40 - 4.00 mU/L 2.13   Potassium      3.4 - 5.3 mmol/L 4.6   Sodium      133 - 144 mmol/L 135   ALT      0 - 50 U/L 55 (H)   CK Total      30 - 225 U/L 79   Hemoglobin      11.7 - 15.7 g/dL 12.3   Calcium      8.5 - 10.1 mg/dL 9.4   Phosphorus      2.5 - 4.5 mg/dL 3.4       Assessment/Treatment Plan:      Dionicio Baca is a 61-year-old woman seen in follow-up of type 2 diabetes mellitus and obesity.        IMPRESSION:   1.  Type 2 diabetes mellitus -uncontrolled.     a.  Advanced but stable retinopathy.     b.  Nephropathy.     c.  Peripheral neuropathy.     She was recommended to be started on trulicity but did not do so because of concerns for side effects (she has IBS like symptoms).  Detailed explanation of side effects and advantages, mechanism of action was discussed.  She was still a bit reluctant to use it as she wanted to use \"natural\" products only.       2.  Obesity with weight gain.   3.  Dyslipidemia: LDL improvement at 44  4.  Hypertension which is treated and controlled.   5.  Osteopenia.   6.  Glaucoma which is under treatment.   7.  Major life stress related to family issues.      PLAN:    Increase Lantus insulin to 70 units in the morning.      Add Trulicity 0.75 mg by weekly injection.   Continue atorvastatin 20 mg daily.      Patient Instructions   Foods such as: Bread, Pizza, Pasta, Rice, Flour based products, and potatoes have high carb in them - recommend reducing the portion of these foods and supplementing with vegetables and lean proteins - chicken, turkey, eggs.     Increasing regular exercise will help your weight loss effort.     Reconsider " taking Trulicity.       To expedite your medication refill(s), please contact your pharmacy and have them fax a refill request to: [phone].  *Please allow 3 business days for routine medication refills.  *Please allow 5 business days for controlled substance medication refills.  --------------------  For scheduling appointments (including lab work), please request an appointment through [employer], or call: [phone].    For questions for your provider or the endocrine nurse, please send a [employer] message.  For after-hours urgent issues, please dial [phone], and ask to speak with the Endocrinologist On-Call.  --------------------  Please Note: If you are active on [employer], all future test results will be sent by [employer] message only and will no longer be sent by mail. You may also receive communication directly from your physician.        3 month with PA, 6 month with me.     Further plans  Follow-up DEXA scan  Labs in six months    Demar Hickey MD  [id number]  Endocrinology Service

## 2017-04-12 NOTE — LETTER
4/12/2017       RE: Radha Baca  [address] [facility] [address]     Dear Colleague,    Thank you for referring your patient, Radha Baca, to the [facility] ENDOCRINOLOGY at [facility]. Please see a copy of my visit note below.    Endocrinology Clinic Visit      April 12, 2017              Chief Complaint: RECHECK (F/U TYPE II DM)       Subjective:         HPI: Radha Baca is a 61 year old year old female who  has a past medical history of Arthritis; Arthritis of knee (4/4/2013); Arthritis of shoulder region, right (4/18/2014); Arthritis of shoulder region, right (4/18/2014); Finger pain (4/2/2015); Headache(784.0); Hypercholesteremia; Hypertension; Low back pain; Menarche (age 9+); Pain in knee joint; Right bundle branch block; SNHL (sensorineural hearing loss); and Umbilical hernia without mention of obstruction or gangrene (8/2014). who is here a follow up.     History of Diabetes  Type 2    Complications    1. Retinopathy: Advanced but stable retinopathy.    2.  Nephropathy:  Lab Results   Component Value Date    MICROL 15 04/10/2017     3. Neuropathy   Last monofilament testing: intact 10/2016    4. Hypoglycemia none.    5. Macrovascular:     Treatment  Diabetes Medication(s)     Biguanides Sig    metFORMIN (GLUCOPHAGE) 500 MG tablet Take 2 tablets (1,000 mg) by mouth 2 times daily (with meals)    Insulin Sig    insulin glargine (LANTUS SOLOSTAR) 100 UNIT/ML PEN Inject 70 Units Subcutaneous every morning    NOVOLOG FLEXPEN 100 UNIT/ML soln Carb counting with meals approx 70-80 units daily      NovoLog insulin before meals using 1 unit for each 5 grams of carbohydrate and typically taking 20-25 units.   .         Prevention  Lipid  LDL Cholesterol Calculated   Date Value Ref Range Status   04/18/2016 55 <100 mg/dL Final     Comment:     Desirable:       <100 mg/dl   01/09/2014 86 0 - 129 mg/dL Final     Comment:     LDL Cholesterol is  the primary guide to therapy: LDL-cholesterol goal in high   risk patients is <100 mg/dL and in very high risk patients is <70 mg/dL.     LDL Cholesterol Direct   Date Value Ref Range Status   04/10/2017 44 <100 mg/dL Final     Comment:     Desirable:       <100 mg/dl   10/17/2016 75 <100 mg/dL Final     Comment:     Desirable:       <100 mg/dl       Medications     HMG CoA Reductase Inhibitors    atorvastatin (LIPITOR) 20 MG tablet    Salicylates    aspirin 81 MG tablet          Renal  Medication (Note: This includes the hypertensive combination subclass to make sure to show all ACEI/ARB's.)     Antihypertensive Combinations Sig    lisinopril-hydrochlorothiazide (PRINZIDE,ZESTORETIC) 20-12.5 MG per tablet Take 1 tablet by mouth daily            Weight  Wt Readings from Last 4 Encounters:   04/12/17 114.2 kg (251 lb 11.2 oz)   04/10/17 111.1 kg (245 lb)   02/27/17 111.4 kg (245 lb 9.6 oz)   12/08/16 113.4 kg (250 lb 1.6 oz)       Meter Download Summary:  She tests blood sugars three times a day  Екатерина's morning readings is 187, before lunch 211 and before supper 274    Exercise nONE      Weight trend  Wt Readings from Last 4 Encounters:   04/12/17 114.2 kg (251 lb 11.2 oz)   04/10/17 111.1 kg (245 lb)   02/27/17 111.4 kg (245 lb 9.6 oz)   12/08/16 113.4 kg (250 lb 1.6 oz)       A1c today is   Lab Results   Component Value Date    A1C 10.2 04/10/2017    A1C 9.8 10/17/2016    A1C 10.6 04/18/2016    A1C 9.9 07/20/2015    A1C 9.4 01/09/2014           HABITS:  She does not use tobacco or alcohol.      SOCIAL HISTORY:  Dionicio is  and lives with her  in [place].  She is presently unemployed and has given up looking for a job.  She has major family issues in that her father is suing her mother.  This has been very stressful for Dionicio.        Allergies   Allergen Reactions     Nkda [No Known Drug Allergies]        Current Outpatient Prescriptions   Medication Sig Dispense Refill     order for DME Equipment being  ordered: Grade 1 (light) compression stockings, below the knee 1 Units 1     TIAGO CONTOUR test strip Use to test blood sugar 3 times daily or as directed. 270 each 3     metFORMIN (GLUCOPHAGE) 500 MG tablet Take 2 tablets (1,000 mg) by mouth 2 times daily (with meals) 360 tablet 1     atorvastatin (LIPITOR) 20 MG tablet Take 1 tablet (20 mg) by mouth daily 90 tablet 3     insulin glargine (LANTUS SOLOSTAR) 100 UNIT/ML PEN Inject 70 Units Subcutaneous every morning 70 mL 3     NOVOLOG FLEXPEN 100 UNIT/ML soln Carb counting with meals approx 70-80 units daily 75 mL 3     Acetaminophen (TYLENOL PO) Take by mouth as needed for mild pain or fever       Omega-3 Fatty Acids (OMEGA-3 FISH OIL PO) Take 1,000 mg by mouth daily       Albuterol Sulfate 108 (90 BASE) MCG/ACT AEPB Inhale 2 puffs into the lungs every 6 hours as needed 1 each 0     lisinopril-hydrochlorothiazide (PRINZIDE,ZESTORETIC) 20-12.5 MG per tablet Take 1 tablet by mouth daily 90 tablet 3     blood glucose calibration (TIAGO CONTOUR) NORMAL solution Use to calibrate blood glucose monitor as needed as directed. 1 Bottle 3     Multiple Vitamins-Minerals (EYE-ZAKIYA PLUS LUTEIN PO)        insulin pen needle (B-D U/F) 31G X 8 MM Use  4 daily short 31 G X 8MM 360 each 3     blood glucose monitoring (TIAGO MICROLET) lancets Test 4-6 times daily 90 day supply refills x 3 6 Box 3     latanoprost (XALATAN) 0.005 % ophthalmic solution Place 1 drop into both eyes At Bedtime       aspirin 81 MG tablet 1 tab daily 90 tablet 3     Multiple Vitamin (MULTIVITAMIN  S) CAPS Take 1 daily 90 capsule 3     calcium carbonate-vitamin D 500-400 MG-UNIT TABS tablt Take 1 tablet by mouth daily 90 tablet 3     timolol (TIMOPTIC) 0.5 % ophthalmic solution Place 1 drop into both eyes 2 times daily        cholecalciferol (VITAMIN D) 1000 UNIT tablet Take 1 tablet by mouth daily. 100 tablet 3     Ascorbic Acid (VITAMIN C PO) Take 2,000 mg by mouth 2 times daily        B Complex Vitamins  (VITAMIN  B COMPLEX) CAPS Take 1 tablet by mouth daily        amoxicillin (AMOXIL) 500 MG capsule Take 4 capsules (2 grams) 1 hour before dental work. (Patient not taking: Reported on 4/12/2017) 4 capsule 3       Review of Systems     Constitutional: Negative for fever and unexpected weight change. Appetite nomal   Head: no headache   Eye: no vision change/ loss of peripheral vision.   ENT: No throat congestion.   Respiratory: no cough   Cardiovascular: no chest pain or tachycardia  Gastrointestinal: Negative for vomiting, abdominal pain and diarrhea.  Genitourinary: No bladder problems.  Musculoskeletal: Negative for myalgias. No weakness.  Neurological: Negative for seizures and headaches.  Psychiatric/Behavioral: Negative for behavioral problem and dysphoric mood.    Past Medical History:   Diagnosis Date     Arthritis      Arthritis of knee 4/4/2013     Arthritis of shoulder region, right 4/18/2014     Arthritis of shoulder region, right 4/18/2014     Finger pain 4/2/2015     Headache(784.0)     decreased with mouth guard use.     Hypercholesteremia      Hypertension      Low back pain      Menarche age 10+    cycles q mo x 4-5 d     Pain in knee joint     LEFT     Right bundle branch block     per H/P     SNHL (sensorineural hearing loss)      Umbilical hernia without mention of obstruction or gangrene 8/2014       Past Surgical History:   Procedure Laterality Date     ARTHROPLASTY KNEE  4/4/2013    Left Total Knee Arthroplasty;  Surgeon: Lou Byrne MD;  [facility]     ARTHROPLASTY MINIMALLY INVASIVE KNEE  8/22/2011    R knee :CAITLYN JACOBO, Left age 14     DENTAL SURGERY      root canals, wisdom teeth     EXAM UNDER ANESTHESIA, MANIPULATE JOINT (LOCATION)  10/5/2012    Procedure: EXAM UNDER ANESTHESIA, MANIPULATE JOINT (LOCATION);  Right Knee Mini Open Lysis Of Adhesions, Left Knee Steroid Injection  ;  Surgeon: Lou Byrne MD;  Location[facility]      OR OCULAR DEVICE INTRAOP  DETACHED RETINA  2009    R     HC PHAKIC IOL - IMPLANT FROM SURGEON      right     KNEE SURGERY  1969    left     LASER YAG CAPSULOTOMY  12/2016    left     VITRECTOMY PARSPLANA  2010       Family History   Problem Relation Age of Onset     DIABETES Father 55     DM II     DIABETES Sister 45     DM II     DIABETES Brother 50     xs 2     Hypertension Sister 41     also B and M     CANCER Maternal Aunt      multiple myeloma     Thyroid Disease Sister 45     graves     Hypertension Brother      ? age     Hypertension Mother 79     CEREBROVASCULAR DISEASE No family hx of      Breast Cancer No family hx of      Cancer - colorectal No family hx of      Prostate Cancer No family hx of      Alcohol/Drug No family hx of        Social History     Social History     Marital status:      Spouse name: N/A     Number of children: N/A     Years of education: N/A     Occupational History     Human Resources      between jobs now     Social History Main Topics     Smoking status: Former Smoker     Smokeless tobacco: Never Used      Comment: quit 35 years ago     Alcohol use No     Drug use: No     Sexual activity: Yes     Partners: Male     Birth control/ protection: Post-menopausal     Other Topics Concern     Blood Transfusions Yes     2 units 2011     Caffeine Concern No     2s     Occupational Exposure No     Hobby Hazards No     Sleep Concern No     Stress Concern No     rehab for R knee after replacement     Weight Concern Yes     working on wt loss     Special Diet Yes     Diabetic     Back Care No     Exercise No     water aerobics 35-40' 3-4 d & strength 3d/wk     Bike Helmet No     Seat Belt No     Social History Narrative    How much exercise per week? 3-4x swimming, aerobics, treadmill, bicycle    How much calcium per day? Supplement       How much caffeine per day? 2 cups coffee    How much vitamin D per day? Supplement    Do you/your family wear seatbelts?  Yes    Do you/your family use safety helmets? No    Do  "you/your family use sunscreen? No    Do you/your family keep firearms in the home? No    Do you/your family have a smoke detector(s)? Yes    Do you feel safe in your home? Yes    Has anyone ever touched you in an unwanted manner? No     Explain     See LEA Berman 11/26/2014       Objective:   /70  Pulse 83  Ht 1.626 m (5' 4\")  Wt 114.2 kg (251 lb 11.2 oz)  BMI 43.2 kg/m2  Constitutional: Appears well-developed and well-nourished. Active.   EYES: anicteric, normal extra-ocular movements, no lid lag or retraction   HEENT: Mouth/Throat: Mucous membrane is moist. Oropharynx is clear. No adenopathy. Normal thyroid   Cardiovascular: RRR, S1, S2 normal. No m/g/r   Pulmonary/Chest: CTAB. No wheezing or rales   Abdominal: +BS. Nontender to palpation. No organomegaly present.  Neurological: Alert. Normal affect. CNII-XII intact. Muscle strength 5/5. Sensory is intact.  Extremities: No clubbing, cyanosis or inflammation   Skin: normal texture, color  Feet: normal monofilament 10/16.   Lymphatic: no cervical lymphadenopathy.  Psychological: appropriate mood     In House Labs:   Lab Results   Component Value Date    A1C 10.2 04/10/2017    A1C 9.8 10/17/2016    A1C 10.6 04/18/2016    A1C 9.9 07/20/2015    A1C 9.4 01/09/2014       TSH   Date Value Ref Range Status   04/10/2017 2.13 0.40 - 4.00 mU/L Final   10/17/2016 2.39 0.40 - 4.00 mU/L Final   09/02/2014 1.39 0.40 - 4.00 mU/L Final     Comment:     Effective 7/30/2014, the reference range for this assay has changed to reflect   new instrumentation/methodology.     01/09/2014 2.43 0.4 - 5.0 mU/L Final   05/14/2012 2.43 0.4 - 5.0 mU/L Final       Creatinine   Date Value Ref Range Status   04/10/2017 0.62 0.52 - 1.04 mg/dL Final   ]    Recent Labs   Lab Test  04/10/17   0834  10/17/16   0831  04/18/16   0943   01/09/14   0819  05/13/13   0756   CHOL   --    --   126   --   153  137   HDL   --    --   43*   --   37*  34*   LDL  44  75  55   < >  86  54   TRIG   --    --   " "140   --   152*  248*   CHOLHDLRATIO   --    --    --    --   4.2  4.0    < > = values in this interval not displayed.       No results found for: [id number], [id number], [id number]  Component      Latest Ref Rng & Units 4/10/2017   Creatinine Urine      mg/dL 96   Albumin Urine mg/L      mg/L 15   Albumin Urine mg/g Cr      0 - 25 mg/g Cr 15.48   Creatinine      0.52 - 1.04 mg/dL 0.62   GFR Estimate      >60 mL/min/1.7m2 >90 . . .   GFR Estimate If Black      >60 mL/min/1.7m2 >90 . . .   Hemoglobin A1C      4.3 - 6.0 % 10.2 (H)   Glucose      70 - 99 mg/dL 203 (H)   LDL Cholesterol Direct      <100 mg/dL 44   TSH      0.40 - 4.00 mU/L 2.13   Potassium      3.4 - 5.3 mmol/L 4.6   Sodium      133 - 144 mmol/L 135   ALT      0 - 50 U/L 55 (H)   CK Total      30 - 225 U/L 79   Hemoglobin      11.7 - 15.7 g/dL 12.3   Calcium      8.5 - 10.1 mg/dL 9.4   Phosphorus      2.5 - 4.5 mg/dL 3.4       Assessment/Treatment Plan:      Dionicio Baca is a 61-year-old woman seen in follow-up of type 2 diabetes mellitus and obesity.        IMPRESSION:   1.  Type 2 diabetes mellitus -uncontrolled.     a.  Advanced but stable retinopathy.     b.  Nephropathy.     c.  Peripheral neuropathy.     She was recommended to be started on trulicity but did not do so because of concerns for side effects (she has IBS like symptoms).  Detailed explanation of side effects and advantages, mechanism of action was discussed.  She was still a bit reluctant to use it as she wanted to use \"natural\" products only.       2.  Obesity with weight gain.   3.  Dyslipidemia: LDL improvement at 44  4.  Hypertension which is treated and controlled.   5.  Osteopenia.   6.  Glaucoma which is under treatment.   7.  Major life stress related to family issues.      PLAN:    Increase Lantus insulin to 70 units in the morning.      Add Trulicity 0.75 mg by weekly injection.   Continue atorvastatin 20 mg daily.      Patient Instructions   Foods such as: Bread, Pizza, " Pasta, Rice, Flour based products, and potatoes have high carb in them - recommend reducing the portion of these foods and supplementing with vegetables and lean proteins - chicken, turkey, eggs.     Increasing regular exercise will help your weight loss effort.     Reconsider taking Trulicity.       To expedite your medication refill(s), please contact your pharmacy and have them fax a refill request to: [phone].  *Please allow 3 business days for routine medication refills.  *Please allow 5 business days for controlled substance medication refills.  --------------------  For scheduling appointments (including lab work), please request an appointment through [facility], or call: [phone].    For questions for your provider or the endocrine nurse, please send a [facility]rise message.  For after-hours urgent issues, please dial [phone], and ask to speak with the Endocrinologist On-Call.  --------------------  Please Note: If you are active on [facility]rise, all future test results will be sent by Sunrise message only and will no longer be sent by mail. You may also receive communication directly from your physician.        3 month with PA, 6 month with me.     Further plans  Follow-up DEXA scan  Labs in six months    Demar Hickey MD  1987  Endocrinology Service

## 2017-05-24 DIAGNOSIS — I10 ESSENTIAL HYPERTENSION: ICD-10-CM

## 2017-05-24 DIAGNOSIS — E11.9 DIABETES MELLITUS, TYPE 2 (H): ICD-10-CM

## 2017-05-24 RX ORDER — LISINOPRIL AND HYDROCHLOROTHIAZIDE 12.5; 2 MG/1; MG/1
1 TABLET ORAL DAILY
Qty: 90 TABLET | Refills: 3 | Status: SHIPPED | OUTPATIENT
Start: 2017-05-24 | End: 2018-06-01

## 2017-07-18 ASSESSMENT — ENCOUNTER SYMPTOMS
SYNCOPE: 0
POLYPHAGIA: 1
RECTAL BLEEDING: 1
NUMBNESS: 1
TASTE DISTURBANCE: 0
PALPITATIONS: 0
DECREASED APPETITE: 0
NIGHT SWEATS: 0
NECK PAIN: 1
SPEECH CHANGE: 0
SMELL DISTURBANCE: 0
WEIGHT GAIN: 1
ORTHOPNEA: 0
WEAKNESS: 0
DYSURIA: 0
TINGLING: 1
LIGHT-HEADEDNESS: 0
LEG SWELLING: 0
DIZZINESS: 0
PANIC: 0
DEPRESSION: 1
HEADACHES: 1
STIFFNESS: 1
HYPOTENSION: 0
HEMATURIA: 0
POLYDIPSIA: 0
INCREASED ENERGY: 0
BOWEL INCONTINENCE: 0
INSOMNIA: 0
TREMORS: 0
JAUNDICE: 0
MYALGIAS: 1
FATIGUE: 0
DISTURBANCES IN COORDINATION: 0
EYE REDNESS: 1
HOARSE VOICE: 0
CHILLS: 0
ALTERED TEMPERATURE REGULATION: 0
DIFFICULTY URINATING: 0
SINUS CONGESTION: 0
LOSS OF CONSCIOUSNESS: 0
JOINT SWELLING: 1
MUSCLE CRAMPS: 1
TACHYCARDIA: 0
BLOATING: 1
DECREASED CONCENTRATION: 1
TROUBLE SWALLOWING: 0
CONSTIPATION: 1
BACK PAIN: 1
EYE PAIN: 0
HEARTBURN: 0
NAUSEA: 1
SLEEP DISTURBANCES DUE TO BREATHING: 0
VOMITING: 1
HYPERTENSION: 0
EYE WATERING: 0
DIARRHEA: 1
SEIZURES: 0
ABDOMINAL PAIN: 1
NERVOUS/ANXIOUS: 1
SINUS PAIN: 0
NECK MASS: 0
HALLUCINATIONS: 0
LEG PAIN: 0
CLAUDICATION: 0
PARALYSIS: 0
EYE IRRITATION: 0
MUSCLE WEAKNESS: 1
WEIGHT LOSS: 0
FEVER: 0
EXERCISE INTOLERANCE: 0
ARTHRALGIAS: 1
DOUBLE VISION: 0
MEMORY LOSS: 0
FLANK PAIN: 0
RECTAL PAIN: 1

## 2017-07-19 ENCOUNTER — OFFICE VISIT (OUTPATIENT)
Dept: ENDOCRINOLOGY | Facility: CLINIC | Age: 62
End: 2017-07-19

## 2017-07-19 VITALS — HEIGHT: 64 IN | WEIGHT: 248 LBS | BODY MASS INDEX: 42.34 KG/M2

## 2017-07-19 DIAGNOSIS — Z79.4 TYPE 2 DIABETES MELLITUS WITH COMPLICATION, WITH LONG-TERM CURRENT USE OF INSULIN (H): ICD-10-CM

## 2017-07-19 DIAGNOSIS — E11.8 TYPE 2 DIABETES MELLITUS WITH COMPLICATION, WITH LONG-TERM CURRENT USE OF INSULIN (H): ICD-10-CM

## 2017-07-19 LAB — HBA1C MFR BLD: 9.7 % (ref 4.3–6)

## 2017-07-19 RX ORDER — CYCLOSPORINE 0.5 MG/ML
EMULSION OPHTHALMIC
COMMUNITY
Start: 2017-07-16

## 2017-07-19 ASSESSMENT — PAIN SCALES - GENERAL: PAINLEVEL: NO PAIN (0)

## 2017-07-19 NOTE — MR AVS SNAPSHOT
After Visit Summary   7/19/2017    Radha Baca    MRN: [id number]           Patient Information     Date Of Birth          1955        Visit Information        Provider Department      7/19/2017 11:00 AM Marcella Maria PA-C [facility] Endocrinology        Today's Diagnoses     Type 2 diabetes mellitus with complication, with long-term current use of insulin (H)           Follow-ups after your visit        Your next 10 appointments already scheduled     Oct 18, 2017  1:00 PM CDT   (Arrive by 12:45 PM)   RETURN DIABETES with MD MIKAYLA Singh [facility] Endocrinology ([facility] [place])    [address] [address] [phone]              Who to contact     Please call your clinic at [phone] to:    Ask questions about your health    Make or cancel appointments    Discuss your medicines    Learn about your test results    Speak to your doctor   If you have compliments or concerns about an experience at your clinic, or if you wish to file a complaint, please contact [facility] Patient Relations at [phone] or email us at [email or web address]         Additional Information About Your Visit        MyChart Information     [employer] gives you secure access to your electronic health record. If you see a primary care provider, you can also send messages to your care team and make appointments. If you have questions, please call your primary care clinic.  If you do not have a primary care provider, please call [phone] and they will assist you.      i-marker is an electronic gateway that provides easy, online access to your medical records. With i[employer]marker, you can request a clinic appointment, read your test results, renew a prescription or communicate with your care team.     To access your existing account, please contact your [facility] Clinic or call  "[phone] for assistance.        Care EveryWhere ID     This is your Care EveryWhere ID. This could be used by other organizations to access your [place] medical records  [id number]        Your Vitals Were     Height BMI (Body Mass Index)                1.626 m (5' 4\") 42.57 kg/m2           Blood Pressure from Last 3 Encounters:   07/19/17 (P) 113/71   04/12/17 122/70   04/10/17 118/72    Weight from Last 3 Encounters:   07/19/17 112.5 kg (248 lb)   04/12/17 114.2 kg (251 lb 11.2 oz)   04/10/17 111.1 kg (245 lb)              We Performed the Following     Hemoglobin A1c POCT          Today's Medication Changes          These changes are accurate as of: 7/19/17  3:10 PM.  If you have any questions, ask your nurse or doctor.               These medicines have changed or have updated prescriptions.        Dose/Directions    LANTUS SOLOSTAR 100 UNIT/ML injection   This may have changed:    - how much to take  - how to take this  - when to take this  - additional instructions   Used for:  Type 2 diabetes mellitus with complication, with long-term current use of insulin (H)   Generic drug:  insulin glargine   Changed by:  Marcella Maria PA-C        Inject 70 units SQ each am.   Quantity:  70 mL   Refills:  3                Primary Care Provider Office Phone # Fax #    Mohan Moreno [phone]       [facility] [address] [address]        Equal Access to Services     YESY NAJERA AH: Hadii mahogany koenigo Sohazel, waaxda luqadaha, qaybta kaalmada adeegyada, waxay idiin hayaan adekristine fam. So [facility] [phone].    ATENCIÓN: Si habla español, tiene a pitts disposición servicios gratuitos de asistencia lingüística. Llame al [phone].    We comply with applicable federal civil rights laws and [place] laws. We do not discriminate on the basis of race, color, national origin, age, disability sex, sexual orientation or gender identity.            Thank you!  "    Thank you for choosing [facility] ENDOCRINOLOGY  for your care. Our goal is always to provide you with excellent care. Hearing back from our patients is one way we can continue to improve our services. Please take a few minutes to complete the written survey that you may receive in the mail after your visit with us. Thank you!             Your Updated Medication List - Protect others around you: Learn how to safely use, store and throw away your medicines at www.disposemymeds.org.          This list is accurate as of: 7/19/17  3:10 PM.  Always use your most recent med list.                   Brand Name Dispense Instructions for use Diagnosis    Albuterol Sulfate 108 (90 BASE) MCG/ACT Aepb     1 each    Inhale 2 puffs into the lungs every 6 hours as needed    Viral URI with cough       amoxicillin 500 MG capsule    AMOXIL    4 capsule    Take 4 capsules (2 grams) 1 hour before dental work.    S/P joint replacement       aspirin 81 MG tablet     90 tablet    1 tab daily    Type 2 diabetes mellitus (H)       atorvastatin 20 MG tablet    LIPITOR    90 tablet    Take 1 tablet (20 mg) by mouth daily    Type 2 diabetes mellitus with complication, with long-term current use of insulin (H), Dyslipidemia       TIAGO CONTOUR test strip   Generic drug:  blood glucose monitoring     270 each    Use to test blood sugar 3 times daily or as directed.    Type 2 diabetes mellitus with complication (H)       blood glucose calibration NORMAL solution     1 Bottle    Use to calibrate blood glucose monitor as needed as directed.    DM type 2 (diabetes mellitus, type 2) (H)       blood glucose monitoring lancets     6 Box    Test 4-6 times daily 90 day supply refills x 3    Type 2 diabetes mellitus with complication (H)       calcium carbonate-vitamin D 500-400 MG-UNIT Tabs per tablet     90 tablet    Take 1 tablet by mouth daily    Type 2 diabetes mellitus (H)       cholecalciferol 1000 UNIT tablet    vitamin D    100 tablet    Take 1  tablet by mouth daily.        EYE-ZAKIYA PLUS LUTEIN PO           insulin pen needle 31G X 8 MM    B-D U/F    400 each    Use  4 daily short 31 G X 8MM    Diabetes mellitus, type 2 (H)       LANTUS SOLOSTAR 100 UNIT/ML injection   Generic drug:  insulin glargine     70 mL    Inject 70 units SQ each am.    Type 2 diabetes mellitus with complication, with long-term current use of insulin (H)       latanoprost 0.005 % ophthalmic solution    XALATAN     Place 1 drop into both eyes At Bedtime        lisinopril-hydrochlorothiazide 20-12.5 MG per tablet    PRINZIDE/ZESTORETIC    90 tablet    Take 1 tablet by mouth daily    Essential hypertension       metFORMIN 500 MG tablet    GLUCOPHAGE    360 tablet    Take 2 tablets (1,000 mg) by mouth 2 times daily (with meals)    Diabetes mellitus, type 2 (H)       MULTIvitamin  S Caps     90 capsule    Take 1 daily    Type 2 diabetes mellitus (H)       NovoLOG FLEXPEN 100 UNIT/ML injection   Generic drug:  insulin aspart     75 mL    Carb counting with meals approx 70-80 units daily    Type 2 diabetes mellitus with complication (H)       OMEGA-3 FISH OIL PO      Take 1,000 mg by mouth daily        order for DME     1 Units    Equipment being ordered: Grade 1 (light) compression stockings, below the knee    Venous (peripheral) insufficiency       RESTASIS 0.05 % ophthalmic emulsion   Generic drug:  cycloSPORINE           timolol 0.5 % ophthalmic solution    TIMOPTIC     Place 1 drop into both eyes 2 times daily        TYLENOL PO      Take by mouth as needed for mild pain or fever        vitamin  B Complex Caps      Take 1 tablet by mouth daily        VITAMIN C PO      Take 2,000 mg by mouth 2 times daily

## 2017-07-19 NOTE — NURSING NOTE
Chief Complaint   Patient presents with     RECHECK     F/U TYPE II DM     Daniela Gomez, CMA  Endocrinology & Diabetes 3G    Capillary Fingerstick performed for an Hemoglobin A1C test

## 2017-07-19 NOTE — LETTER
7/19/2017       RE: Radha Baca  [address] [id number]     Dear Colleague,    Thank you for referring your patient, Radha Baca, to the [facility] ENDOCRINOLOGY at [facility]. Please see a copy of my visit note below.    HPI  Radha Baca is a 61 year old female with type 2 diabetes mellitus here today for a follow up visit.  She was last seen in our clinic by Dr. Hickey in 4/2017.  Her diabetes mellitus is complicated by retinopathy, nephropathy and neuropathy.  Her hx is also significant for HTN, hyperlipidemia and obesity.  For her diabetes, she is currently taking Metformin 1000 mg BID, Lantus 60 units SQ each am and Novolog 1unit/5 gms CHO with meals.  Pt's A1C is 9.7 % today.  We downloaded her glucose meter today and her blood sugar values are in the high 100 - mid 200 range.  No hypoglycemia.  On ROS today, she reports family stress.  She is going to the gym 3 days per week and exercises in the pool.  She has been having right leg/knee pain.  She denies blurred vision, n/v, SOB at rest, cough, chest pain, abd pain or diarrhea.  No dysuria, hematuria or foot ulcers.  Some numbness in both feet.    Diabetes Care  Retinopathy:yes; pt seen within the past month.  Nephropathy:yes; urine microalbuminuria + in Oct 2016 and negative in 4/2017. She is taking Lisinopril/HCTZ daily.  Neuropathy:yes.  Foot Exam:no ulcers.  Taking aspirin:yes.  Lipids:LDL 44 in 4/2017.  Pt is taking Lipitor.    ROS  Please see under HPI.    Allergies  Allergies   Allergen Reactions     Nkda [No Known Drug Allergies]        Medications  Current Outpatient Prescriptions   Medication Sig Dispense Refill     cycloSPORINE (RESTASIS) 0.05 % ophthalmic emulsion        insulin glargine (LANTUS SOLOSTAR) 100 UNIT/ML injection Inject 70 units SQ each am. 70 mL 3     insulin pen needle (B-D U/F) 31G X 8 MM Use  4 daily short 31 G X 8MM 400 each 3     metFORMIN  (GLUCOPHAGE) 500 MG tablet Take 2 tablets (1,000 mg) by mouth 2 times daily (with meals) 360 tablet 1     lisinopril-hydrochlorothiazide (PRINZIDE/ZESTORETIC) 20-12.5 MG per tablet Take 1 tablet by mouth daily 90 tablet 3     amoxicillin (AMOXIL) 500 MG capsule Take 4 capsules (2 grams) 1 hour before dental work. 4 capsule 3     order for DME Equipment being ordered: Grade 1 (light) compression stockings, below the knee 1 Units 1     TIAGO CONTOUR test strip Use to test blood sugar 3 times daily or as directed. 270 each 3     atorvastatin (LIPITOR) 20 MG tablet Take 1 tablet (20 mg) by mouth daily 90 tablet 3     NOVOLOG FLEXPEN 100 UNIT/ML soln Carb counting with meals approx 70-80 units daily 75 mL 3     Acetaminophen (TYLENOL PO) Take by mouth as needed for mild pain or fever       Omega-3 Fatty Acids (OMEGA-3 FISH OIL PO) Take 1,000 mg by mouth daily       Albuterol Sulfate 108 (90 BASE) MCG/ACT AEPB Inhale 2 puffs into the lungs every 6 hours as needed 1 each 0     blood glucose calibration (TIAGO CONTOUR) NORMAL solution Use to calibrate blood glucose monitor as needed as directed. 1 Bottle 3     Multiple Vitamins-Minerals (EYE-ZAKIYA PLUS LUTEIN PO)        blood glucose monitoring (TIAGO MICROLET) lancets Test 4-6 times daily 90 day supply refills x 3 6 Box 3     latanoprost (XALATAN) 0.005 % ophthalmic solution Place 1 drop into both eyes At Bedtime       aspirin 81 MG tablet 1 tab daily 90 tablet 3     Multiple Vitamin (MULTIVITAMIN  S) CAPS Take 1 daily 90 capsule 3     calcium carbonate-vitamin D 500-400 MG-UNIT TABS tablt Take 1 tablet by mouth daily 90 tablet 3     timolol (TIMOPTIC) 0.5 % ophthalmic solution Place 1 drop into both eyes 2 times daily        cholecalciferol (VITAMIN D) 1000 UNIT tablet Take 1 tablet by mouth daily. 100 tablet 3     Ascorbic Acid (VITAMIN C PO) Take 2,000 mg by mouth 2 times daily        B Complex Vitamins (VITAMIN  B COMPLEX) CAPS Take 1 tablet by mouth daily         [DISCONTINUED] insulin glargine (LANTUS SOLOSTAR) 100 UNIT/ML PEN Inject 70 Units Subcutaneous every morning 70 mL 3       Family History  family history includes CANCER in her maternal aunt; DIABETES (age of onset: 45) in her sister; DIABETES (age of onset: 50) in her brother; DIABETES (age of onset: 55) in her father; Hypertension in her brother; Hypertension (age of onset: 41) in her sister; Hypertension (age of onset: 79) in her mother; Thyroid Disease (age of onset: 45) in her sister. There is no history of CEREBROVASCULAR DISEASE, Breast Cancer, Cancer - colorectal, Prostate Cancer, or Alcohol/Drug.    Social History  Smoke: none.  ETOH: rare.    Past Medical History  Past Medical History:   Diagnosis Date     Arthritis      Arthritis of knee 4/4/2013     Arthritis of shoulder region, right 4/18/2014     Arthritis of shoulder region, right 4/18/2014     Finger pain 4/2/2015     Headache(784.0)     decreased with mouth guard use.     Hypercholesteremia      Hypertension      Low back pain      Menarche age 10+    cycles q mo x 4-5 d     Pain in knee joint     LEFT     Right bundle branch block     per H/P     SNHL (sensorineural hearing loss)      Umbilical hernia without mention of obstruction or gangrene 8/2014     Past Surgical History:   Procedure Laterality Date     ARTHROPLASTY KNEE  4/4/2013    Left Total Knee Arthroplasty;  Surgeon: Lou Bryne MD;  [facility]     ARTHROPLASTY MINIMALLY INVASIVE KNEE  8/22/2011    R knee :ODALIS CAITLYN SILVERMAN, Left age 14     DENTAL SURGERY      root canals, wisdom teeth     EXAM UNDER ANESTHESIA, MANIPULATE JOINT (LOCATION)  10/5/2012    Procedure: EXAM UNDER ANESTHESIA, MANIPULATE JOINT (LOCATION);  Right Knee Mini Open Lysis Of Adhesions, Left Knee Steroid Injection  ;  Surgeon: Lou Byrne MD;  [facility]      OR OCULAR DEVICE INTRAOP DETACHED RETINA  2009    R     HC PHAKIC IOL - IMPLANT FROM SURGEON      right     KNEE SURGERY  1969     "left     LASER YAG CAPSULOTOMY  12/2016    left     VITRECTOMY PARSPLANA  2010       Physical Exam  BP (P) 113/71  Pulse (P) 81  Ht 1.626 m (5' 4\")  Wt 112.5 kg (248 lb)  BMI 42.57 kg/m2  Body mass index is 42.57 kg/(m^2).    GENERAL : In no apparent distress.  SKIN: No rash.  EYES: PERRLA.  Fundi not examined.  MOUTH: Moist.  NECK: No goiter.  RESP: Lungs clear to auscultation.  CARDIAC: RRR.  ABDOMEN: Nontender.      NEURO: awake, alert, responds appropriately to questions.     EXTREMITIES:Trace pretibial edema.  FEET:  No ulcers.    RESULTS  Creatinine   Date Value Ref Range Status   04/10/2017 0.62 0.52 - 1.04 mg/dL Final     GFR Estimate   Date Value Ref Range Status   04/10/2017 >90  Non  GFR Calc   >60 mL/min/1.7m2 Final     Hemoglobin A1C   Date Value Ref Range Status   04/10/2017 10.2 (H) 4.3 - 6.0 % Final     Potassium   Date Value Ref Range Status   04/10/2017 4.6 3.4 - 5.3 mmol/L Final     ALT   Date Value Ref Range Status   04/10/2017 55 (H) 0 - 50 U/L Final     AST   Date Value Ref Range Status   04/14/2008 39 0 - 45 U/L Final     TSH   Date Value Ref Range Status   04/10/2017 2.13 0.40 - 4.00 mU/L Final       Cholesterol   Date Value Ref Range Status   04/18/2016 126 <200 mg/dL Final   01/09/2014 153 0 - 200 mg/dL Final     Comment:     LDL Cholesterol is the primary guide to therapy.   The NCEP recommends further evaluation of: patients with cholesterol greater   than 200 mg/dL if additional risk factors are present, cholesterol greater   than   240 mg/dL, triglycerides greater than 150 mg/dL, or HDL less than 40 mg/dL.     HDL Cholesterol   Date Value Ref Range Status   04/18/2016 43 (L) >49 mg/dL Final   01/09/2014 37 (L) 50 - 110 mg/dL Final     LDL Cholesterol Calculated   Date Value Ref Range Status   04/18/2016 55 <100 mg/dL Final     Comment:     Desirable:       <100 mg/dl   01/09/2014 86 0 - 129 mg/dL Final     Comment:     LDL Cholesterol is the primary guide to therapy: " LDL-cholesterol goal in high   risk patients is <100 mg/dL and in very high risk patients is <70 mg/dL.     LDL Cholesterol Direct   Date Value Ref Range Status   04/10/2017 44 <100 mg/dL Final     Comment:     Desirable:       <100 mg/dl   10/17/2016 75 <100 mg/dL Final     Comment:     Desirable:       <100 mg/dl     Triglycerides   Date Value Ref Range Status   04/18/2016 140 <150 mg/dL Final   01/09/2014 152 (H) 0 - 150 mg/dL Final     Cholesterol/HDL Ratio   Date Value Ref Range Status   01/09/2014 4.2 0.0 - 5.0 Final   05/13/2013 4.0 0.0 - 5.0 Final       Lab Results   Component Value Date    A1C 10.2 04/10/2017    A1C 9.8 10/17/2016    A1C 10.6 04/18/2016    A1C 9.9 07/20/2015    A1C 9.4 01/09/2014     A1C 9.7 % today.    ASSESSMENT/PLAN:    1.  TYPE 2 DIABETES MELLITUS:  Uncontrolled type 2 diabetes mellitus complicated by retinopathy, nephropathy and neuropathy.  I asked Dionicio to increase her Lantus 70 units SQ each am and continue Novolog 1 unit/5 gms CHO with meals and current Metformin dose.  She had been taking her Novolog after meals and I asked her to be sure to take her Novolog at the onset of the meals.  She is taking ASA daily.    2.  RETINOPATHY: Pt seen by Oph within the past year.    3.  NEPHROPATHY: Pt's creat was 0.62 with GFR > 90 mL/min in 4/2017.  Pt is taking Lisinopril/HCTZ daily.    4.  NEUROPATHY: Stable.    5.  OBESITY: Pt is not interested in using a GLP-1 at this time.  She will continue to exercise and make healthy food choices.    6.  Return to Endocrine Clinic to see Dr. Lowery in oct 2017.  I asked pt to send me blood sugar readings via [employer] in a few weeks.    Again, thank you for allowing me to participate in the care of your patient.      Sincerely,    Marcella Maria PA-C

## 2017-07-19 NOTE — PROGRESS NOTES
HPI  Radha Baca is a 61 year old female with type 2 diabetes mellitus here today for a follow up visit.  She was last seen in our clinic by Dr. Hickey in 4/2017.  Her diabetes mellitus is complicated by retinopathy, nephropathy and neuropathy.  Her hx is also significant for HTN, hyperlipidemia and obesity.  For her diabetes, she is currently taking Metformin 1000 mg BID, Lantus 60 units SQ each am and Novolog 1unit/5 gms CHO with meals.  Pt's A1C is 9.7 % today.  We downloaded her glucose meter today and her blood sugar values are in the high 100 - mid 200 range.  No hypoglycemia.  On ROS today, she reports family stress.  She is going to the gym 3 days per week and exercises in the pool.  She has been having right leg/knee pain.  She denies blurred vision, n/v, SOB at rest, cough, chest pain, abd pain or diarrhea.  No dysuria, hematuria or foot ulcers.  Some numbness in both feet.    Diabetes Care  Retinopathy:yes; pt seen within the past month.  Nephropathy:yes; urine microalbuminuria + in Oct 2016 and negative in 4/2017. She is taking Lisinopril/HCTZ daily.  Neuropathy:yes.  Foot Exam:no ulcers.  Taking aspirin:yes.  Lipids:LDL 44 in 4/2017.  Pt is taking Lipitor.    ROS  Please see under HPI.    Allergies  Allergies   Allergen Reactions     Nkda [No Known Drug Allergies]        Medications  Current Outpatient Prescriptions   Medication Sig Dispense Refill     cycloSPORINE (RESTASIS) 0.05 % ophthalmic emulsion        insulin glargine (LANTUS SOLOSTAR) 100 UNIT/ML injection Inject 70 units SQ each am. 70 mL 3     insulin pen needle (B-D U/F) 31G X 8 MM Use  4 daily short 31 G X 8MM 400 each 3     metFORMIN (GLUCOPHAGE) 500 MG tablet Take 2 tablets (1,000 mg) by mouth 2 times daily (with meals) 360 tablet 1     lisinopril-hydrochlorothiazide (PRINZIDE/ZESTORETIC) 20-12.5 MG per tablet Take 1 tablet by mouth daily 90 tablet 3     amoxicillin (AMOXIL) 500 MG capsule Take 4 capsules (2 grams) 1 hour before  dental work. 4 capsule 3     order for DME Equipment being ordered: Grade 1 (light) compression stockings, below the knee 1 Units 1     TIAGO CONTOUR test strip Use to test blood sugar 3 times daily or as directed. 270 each 3     atorvastatin (LIPITOR) 20 MG tablet Take 1 tablet (20 mg) by mouth daily 90 tablet 3     NOVOLOG FLEXPEN 100 UNIT/ML soln Carb counting with meals approx 70-80 units daily 75 mL 3     Acetaminophen (TYLENOL PO) Take by mouth as needed for mild pain or fever       Omega-3 Fatty Acids (OMEGA-3 FISH OIL PO) Take 1,000 mg by mouth daily       Albuterol Sulfate 108 (90 BASE) MCG/ACT AEPB Inhale 2 puffs into the lungs every 6 hours as needed 1 each 0     blood glucose calibration (TIAGO CONTOUR) NORMAL solution Use to calibrate blood glucose monitor as needed as directed. 1 Bottle 3     Multiple Vitamins-Minerals (EYE-ZAKIYA PLUS LUTEIN PO)        blood glucose monitoring (TIAGO MICROLET) lancets Test 4-6 times daily 90 day supply refills x 3 6 Box 3     latanoprost (XALATAN) 0.005 % ophthalmic solution Place 1 drop into both eyes At Bedtime       aspirin 81 MG tablet 1 tab daily 90 tablet 3     Multiple Vitamin (MULTIVITAMIN  S) CAPS Take 1 daily 90 capsule 3     calcium carbonate-vitamin D 500-400 MG-UNIT TABS tablt Take 1 tablet by mouth daily 90 tablet 3     timolol (TIMOPTIC) 0.5 % ophthalmic solution Place 1 drop into both eyes 2 times daily        cholecalciferol (VITAMIN D) 1000 UNIT tablet Take 1 tablet by mouth daily. 100 tablet 3     Ascorbic Acid (VITAMIN C PO) Take 2,000 mg by mouth 2 times daily        B Complex Vitamins (VITAMIN  B COMPLEX) CAPS Take 1 tablet by mouth daily        [DISCONTINUED] insulin glargine (LANTUS SOLOSTAR) 100 UNIT/ML PEN Inject 70 Units Subcutaneous every morning 70 mL 3       Family History  family history includes CANCER in her maternal aunt; DIABETES (age of onset: 45) in her sister; DIABETES (age of onset: 50) in her brother; DIABETES (age of onset: 55)  "in her father; Hypertension in her brother; Hypertension (age of onset: 41) in her sister; Hypertension (age of onset: 79) in her mother; Thyroid Disease (age of onset: 45) in her sister. There is no history of CEREBROVASCULAR DISEASE, Breast Cancer, Cancer - colorectal, Prostate Cancer, or Alcohol/Drug.    Social History  Smoke: none.  ETOH: rare.    Past Medical History  Past Medical History:   Diagnosis Date     Arthritis      Arthritis of knee 4/4/2013     Arthritis of shoulder region, right 4/18/2014     Arthritis of shoulder region, right 4/18/2014     Finger pain 4/2/2015     Headache(784.0)     decreased with mouth guard use.     Hypercholesteremia      Hypertension      Low back pain      Menarche age 10+    cycles q mo x 4-5 d     Pain in knee joint     LEFT     Right bundle branch block     per H/P     SNHL (sensorineural hearing loss)      Umbilical hernia without mention of obstruction or gangrene 8/2014     Past Surgical History:   Procedure Laterality Date     ARTHROPLASTY KNEE  4/4/2013    Left Total Knee Arthroplasty;  Surgeon: Lou Byrne MD;  [facility]     ARTHROPLASTY MINIMALLY INVASIVE KNEE  8/22/2011    R knee :CAITLYN JACOBOT, Left age 14     DENTAL SURGERY      root canals, wisdom teeth     EXAM UNDER ANESTHESIA, MANIPULATE JOINT (LOCATION)  10/5/2012    Procedure: EXAM UNDER ANESTHESIA, MANIPULATE JOINT (LOCATION);  Right Knee Mini Open Lysis Of Adhesions, Left Knee Steroid Injection  ;  Surgeon: Lou Byrne MD;  [facility]      OR OCULAR DEVICE INTRAOP DETACHED RETINA  2009    R     HC PHAKIC IOL - IMPLANT FROM SURGEON      right     KNEE SURGERY  1969    left     LASER YAG CAPSULOTOMY  12/2016    left     VITRECTOMY PARSPLANA  2010       Physical Exam  BP (P) 113/71  Pulse (P) 81  Ht 1.626 m (5' 4\")  Wt 112.5 kg (248 lb)  BMI 42.57 kg/m2  Body mass index is 42.57 kg/(m^2).    GENERAL : In no apparent distress.  SKIN: No rash.  EYES: PERRLA.  Fundi not " examined.  MOUTH: Moist.  NECK: No goiter.  RESP: Lungs clear to auscultation.  CARDIAC: RRR.  ABDOMEN: Nontender.      NEURO: awake, alert, responds appropriately to questions.     EXTREMITIES:Trace pretibial edema.  FEET:  No ulcers.    RESULTS  Creatinine   Date Value Ref Range Status   04/10/2017 0.62 0.52 - 1.04 mg/dL Final     GFR Estimate   Date Value Ref Range Status   04/10/2017 >90  Non  GFR Calc   >60 mL/min/1.7m2 Final     Hemoglobin A1C   Date Value Ref Range Status   04/10/2017 10.2 (H) 4.3 - 6.0 % Final     Potassium   Date Value Ref Range Status   04/10/2017 4.6 3.4 - 5.3 mmol/L Final     ALT   Date Value Ref Range Status   04/10/2017 55 (H) 0 - 50 U/L Final     AST   Date Value Ref Range Status   04/14/2008 39 0 - 45 U/L Final     TSH   Date Value Ref Range Status   04/10/2017 2.13 0.40 - 4.00 mU/L Final       Cholesterol   Date Value Ref Range Status   04/18/2016 126 <200 mg/dL Final   01/09/2014 153 0 - 200 mg/dL Final     Comment:     LDL Cholesterol is the primary guide to therapy.   The NCEP recommends further evaluation of: patients with cholesterol greater   than 200 mg/dL if additional risk factors are present, cholesterol greater   than   240 mg/dL, triglycerides greater than 150 mg/dL, or HDL less than 40 mg/dL.     HDL Cholesterol   Date Value Ref Range Status   04/18/2016 43 (L) >49 mg/dL Final   01/09/2014 37 (L) 50 - 110 mg/dL Final     LDL Cholesterol Calculated   Date Value Ref Range Status   04/18/2016 55 <100 mg/dL Final     Comment:     Desirable:       <100 mg/dl   01/09/2014 86 0 - 129 mg/dL Final     Comment:     LDL Cholesterol is the primary guide to therapy: LDL-cholesterol goal in high   risk patients is <100 mg/dL and in very high risk patients is <70 mg/dL.     LDL Cholesterol Direct   Date Value Ref Range Status   04/10/2017 44 <100 mg/dL Final     Comment:     Desirable:       <100 mg/dl   10/17/2016 75 <100 mg/dL Final     Comment:     Desirable:        <100 mg/dl     Triglycerides   Date Value Ref Range Status   04/18/2016 140 <150 mg/dL Final   01/09/2014 152 (H) 0 - 150 mg/dL Final     Cholesterol/HDL Ratio   Date Value Ref Range Status   01/09/2014 4.2 0.0 - 5.0 Final   05/13/2013 4.0 0.0 - 5.0 Final       Lab Results   Component Value Date    A1C 10.2 04/10/2017    A1C 9.8 10/17/2016    A1C 10.6 04/18/2016    A1C 9.9 07/20/2015    A1C 9.4 01/09/2014     A1C 9.7 % today.    ASSESSMENT/PLAN:    1.  TYPE 2 DIABETES MELLITUS:  Uncontrolled type 2 diabetes mellitus complicated by retinopathy, nephropathy and neuropathy.  I asked Dionicio to increase her Lantus 70 units SQ each am and continue Novolog 1 unit/5 gms CHO with meals and current Metformin dose.  She had been taking her Novolog after meals and I asked her to be sure to take her Novolog at the onset of the meals.  She is taking ASA daily.    2.  RETINOPATHY: Pt seen by Oph within the past year.    3.  NEPHROPATHY: Pt's creat was 0.62 with GFR > 90 mL/min in 4/2017.  Pt is taking Lisinopril/HCTZ daily.    4.  NEUROPATHY: Stable.    5.  OBESITY: Pt is not interested in using a GLP-1 at this time.  She will continue to exercise and make healthy food choices.    6.  Return to Endocrine Clinic to see Dr. Lowery in oct 2017.  I asked pt to send me blood sugar readings via [employer] in a few weeks.

## 2017-07-28 ENCOUNTER — TELEPHONE (OUTPATIENT)
Dept: ORTHOPEDICS | Facility: CLINIC | Age: 62
End: 2017-07-28

## 2017-09-26 ENCOUNTER — TRANSFERRED RECORDS (OUTPATIENT)
Dept: HEALTH INFORMATION MANAGEMENT | Facility: CLINIC | Age: 62
End: 2017-09-26

## 2017-10-05 DIAGNOSIS — E11.9 DM TYPE 2 (DIABETES MELLITUS, TYPE 2) (H): ICD-10-CM

## 2017-10-16 DIAGNOSIS — E11.8 TYPE 2 DIABETES MELLITUS WITH COMPLICATION, WITH LONG-TERM CURRENT USE OF INSULIN (H): ICD-10-CM

## 2017-10-16 DIAGNOSIS — Z79.4 TYPE 2 DIABETES MELLITUS WITH COMPLICATION, WITH LONG-TERM CURRENT USE OF INSULIN (H): ICD-10-CM

## 2017-10-16 LAB
ALP SERPL-CCNC: 96 U/L (ref 40–150)
ALT SERPL W P-5'-P-CCNC: 49 U/L (ref 0–50)
ANION GAP SERPL CALCULATED.3IONS-SCNC: 4 MMOL/L (ref 3–14)
BUN SERPL-MCNC: 12 MG/DL (ref 7–30)
CALCIUM SERPL-MCNC: 9.2 MG/DL (ref 8.5–10.1)
CHLORIDE SERPL-SCNC: 101 MMOL/L (ref 94–109)
CK SERPL-CCNC: 107 U/L (ref 30–225)
CO2 SERPL-SCNC: 29 MMOL/L (ref 20–32)
CREAT SERPL-MCNC: 0.59 MG/DL (ref 0.52–1.04)
CREAT UR-MCNC: 89 MG/DL
ERYTHROCYTE [DISTWIDTH] IN BLOOD BY AUTOMATED COUNT: 14.1 % (ref 10–15)
GFR SERPL CREATININE-BSD FRML MDRD: >90 ML/MIN/1.7M2
GLUCOSE SERPL-MCNC: 221 MG/DL (ref 70–99)
HCT VFR BLD AUTO: 37.9 % (ref 35–47)
HGB BLD-MCNC: 12.1 G/DL (ref 11.7–15.7)
LDLC SERPL DIRECT ASSAY-MCNC: 61 MG/DL
MCH RBC QN AUTO: 29.5 PG (ref 26.5–33)
MCHC RBC AUTO-ENTMCNC: 31.9 G/DL (ref 31.5–36.5)
MCV RBC AUTO: 92 FL (ref 78–100)
MICROALBUMIN UR-MCNC: 20 MG/L
MICROALBUMIN/CREAT UR: 22.55 MG/G CR (ref 0–25)
PHOSPHATE SERPL-MCNC: 3.3 MG/DL (ref 2.5–4.5)
PLATELET # BLD AUTO: 258 10E9/L (ref 150–450)
POTASSIUM SERPL-SCNC: 4.9 MMOL/L (ref 3.4–5.3)
RBC # BLD AUTO: 4.1 10E12/L (ref 3.8–5.2)
SODIUM SERPL-SCNC: 134 MMOL/L (ref 133–144)
TSH SERPL DL<=0.005 MIU/L-ACNC: 3.05 MU/L (ref 0.4–4)
WBC # BLD AUTO: 9.9 10E9/L (ref 4–11)

## 2017-10-18 ENCOUNTER — OFFICE VISIT (OUTPATIENT)
Dept: ENDOCRINOLOGY | Facility: CLINIC | Age: 62
End: 2017-10-18

## 2017-10-18 VITALS
SYSTOLIC BLOOD PRESSURE: 118 MMHG | HEART RATE: 98 BPM | WEIGHT: 252.5 LBS | HEIGHT: 64 IN | BODY MASS INDEX: 43.11 KG/M2 | DIASTOLIC BLOOD PRESSURE: 72 MMHG

## 2017-10-18 DIAGNOSIS — Z79.4 TYPE 2 DIABETES MELLITUS WITH COMPLICATION, WITH LONG-TERM CURRENT USE OF INSULIN (H): ICD-10-CM

## 2017-10-18 DIAGNOSIS — E66.01 MORBID OBESITY (H): Primary | ICD-10-CM

## 2017-10-18 DIAGNOSIS — E11.8 TYPE 2 DIABETES MELLITUS WITH COMPLICATION, WITH LONG-TERM CURRENT USE OF INSULIN (H): ICD-10-CM

## 2017-10-18 LAB — HBA1C MFR BLD: 9.5 % (ref 4.3–6)

## 2017-10-18 ASSESSMENT — PAIN SCALES - GENERAL: PAINLEVEL: NO PAIN (0)

## 2017-10-18 NOTE — LETTER
10/18/2017       RE: Radha Baca  [address] [facility] [address]     Dear Colleague,    Thank you for referring your patient, Radha Baca, to the [facility] ENDOCRINOLOGY at [facility]. Please see a copy of my visit note below.    Endocrinology Clinic Visit  October 18, 2017     Chief Complaint: RECHECK (RETURN TYPE 2 )       Subjective:         HPI: Radha Baca is a 62 year old year old female who  has a past medical history of Arthritis; Arthritis of knee (4/4/2013); Arthritis of shoulder region, right (4/18/2014); Arthritis of shoulder region, right (4/18/2014); Finger pain (4/2/2015); Headache(784.0); Hypercholesteremia; Hypertension; Low back pain; Menarche (age 9+); Pain in knee joint; Right bundle branch block; SNHL (sensorineural hearing loss); and Umbilical hernia without mention of obstruction or gangrene (8/2014). who is here a follow up.     History of Diabetes  Type 2    Complications    1. Retinopathy: Advanced but stable retinopathy.    2.  Nephropathy:  Lab Results   Component Value Date    MICROL 15 04/10/2017     3. Neuropathy   Last monofilament testing: intact 10/2016    4. Hypoglycemia none.    5. Macrovascular:     Treatment  Diabetes Medication(s)     Biguanides Sig    metFORMIN (GLUCOPHAGE) 500 MG tablet Take 2 tablets (1,000 mg) by mouth 2 times daily (with meals)    Insulin Sig    insulin glargine (LANTUS SOLOSTAR) 100 UNIT/ML injection Inject 70 units SQ each am.    NOVOLOG FLEXPEN 100 UNIT/ML soln Carb counting with meals approx 70-80 units daily      NovoLog insulin before meals using 1 unit for each 5 grams of carbohydrate and typically taking 20-25 units.   .     Prevention  Lipid  LDL Cholesterol Calculated   Date Value Ref Range Status   04/18/2016 55 <100 mg/dL Final     Comment:     Desirable:       <100 mg/dl   01/09/2014 86 0 - 129 mg/dL Final     Comment:     LDL Cholesterol is the primary guide to  therapy: LDL-cholesterol goal in high   risk patients is <100 mg/dL and in very high risk patients is <70 mg/dL.     LDL Cholesterol Direct   Date Value Ref Range Status   10/16/2017 61 <100 mg/dL Final     Comment:     Desirable:       <100 mg/dl   04/10/2017 44 <100 mg/dL Final     Comment:     Desirable:       <100 mg/dl       Medications     HMG CoA Reductase Inhibitors    atorvastatin (LIPITOR) 20 MG tablet    Salicylates    aspirin 81 MG tablet          Renal  Medication (Note: This includes the hypertensive combination subclass to make sure to show all ACEI/ARB's.)     Antihypertensive Combinations Sig    lisinopril-hydrochlorothiazide (PRINZIDE/ZESTORETIC) 20-12.5 MG per tablet Take 1 tablet by mouth daily            Weight  Wt Readings from Last 4 Encounters:   10/18/17 114.5 kg (252 lb 8 oz)   07/19/17 112.5 kg (248 lb)   04/12/17 114.2 kg (251 lb 11.2 oz)   04/10/17 111.1 kg (245 lb)       Meter Download Summary:  She tests blood sugars three times a day  High BG before HS. Checks BID AM and HS. HS >250 on most occasions. Drops to high 100s in am.     Diet admits to being emotional eater.   Exercise nONE  Pain in right leg bothers her. Water exercises is limited.     Weight trend  Wt Readings from Last 4 Encounters:   10/18/17 114.5 kg (252 lb 8 oz)   07/19/17 112.5 kg (248 lb)   04/12/17 114.2 kg (251 lb 11.2 oz)   04/10/17 111.1 kg (245 lb)       A1c today is 9.5  Lab Results   Component Value Date    A1C 10.2 04/10/2017    A1C 9.8 10/17/2016    A1C 10.6 04/18/2016    A1C 9.9 07/20/2015    A1C 9.4 01/09/2014           HABITS:  She does not use tobacco or alcohol.      SOCIAL HISTORY:  Dionicio is  and lives with her  in [place].  She is presently unemployed and has given up looking for a job.  She has major family issues in that her father is suing her mother.  This has been very stressful for Dionicio.        Allergies   Allergen Reactions     Nkda [No Known Drug Allergies]        Current  Outpatient Prescriptions   Medication Sig Dispense Refill     blood glucose calibration (TIAGO CONTOUR) NORMAL solution Use to calibrate blood glucose monitor as needed as directed. 1 Bottle 11     cycloSPORINE (RESTASIS) 0.05 % ophthalmic emulsion        insulin glargine (LANTUS SOLOSTAR) 100 UNIT/ML injection Inject 70 units SQ each am. 70 mL 3     insulin pen needle (B-D U/F) 31G X 8 MM Use  4 daily short 31 G X 8MM 400 each 3     metFORMIN (GLUCOPHAGE) 500 MG tablet Take 2 tablets (1,000 mg) by mouth 2 times daily (with meals) 360 tablet 1     lisinopril-hydrochlorothiazide (PRINZIDE/ZESTORETIC) 20-12.5 MG per tablet Take 1 tablet by mouth daily 90 tablet 3     amoxicillin (AMOXIL) 500 MG capsule Take 4 capsules (2 grams) 1 hour before dental work. 4 capsule 3     order for DME Equipment being ordered: Grade 1 (light) compression stockings, below the knee 1 Units 1     TIAGO CONTOUR test strip Use to test blood sugar 3 times daily or as directed. 270 each 3     atorvastatin (LIPITOR) 20 MG tablet Take 1 tablet (20 mg) by mouth daily 90 tablet 3     NOVOLOG FLEXPEN 100 UNIT/ML soln Carb counting with meals approx 70-80 units daily 75 mL 3     Acetaminophen (TYLENOL PO) Take by mouth as needed for mild pain or fever       Omega-3 Fatty Acids (OMEGA-3 FISH OIL PO) Take 1,000 mg by mouth daily       Albuterol Sulfate 108 (90 BASE) MCG/ACT AEPB Inhale 2 puffs into the lungs every 6 hours as needed 1 each 0     Multiple Vitamins-Minerals (EYE-ZAKIYA PLUS LUTEIN PO)        blood glucose monitoring (TIAGO MICROLET) lancets Test 4-6 times daily 90 day supply refills x 3 6 Box 3     latanoprost (XALATAN) 0.005 % ophthalmic solution Place 1 drop into both eyes At Bedtime       aspirin 81 MG tablet 1 tab daily 90 tablet 3     Multiple Vitamin (MULTIVITAMIN  S) CAPS Take 1 daily 90 capsule 3     calcium carbonate-vitamin D 500-400 MG-UNIT TABS tablt Take 1 tablet by mouth daily 90 tablet 3     timolol (TIMOPTIC) 0.5 %  ophthalmic solution Place 1 drop into both eyes 2 times daily        cholecalciferol (VITAMIN D) 1000 UNIT tablet Take 1 tablet by mouth daily. 100 tablet 3     Ascorbic Acid (VITAMIN C PO) Take 2,000 mg by mouth 2 times daily        B Complex Vitamins (VITAMIN  B COMPLEX) CAPS Take 1 tablet by mouth daily          Review of Systems     Constitutional: Negative for fever and unexpected weight change. Appetite nomal   Head: no headache   Eye: no vision change/ loss of peripheral vision.   ENT: No throat congestion.   Respiratory: no cough   Cardiovascular: no chest pain or tachycardia  Gastrointestinal: Hemorrhoidal bleed and diarrhea, for which she made appt with her doctors.   Genitourinary: No bladder problems.  Musculoskeletal: Negative for myalgias. No weakness.  Neurological: Negative for seizures and headaches.  Psychiatric/Behavioral: Negative for behavioral problem and dysphoric mood.    Past Medical History:   Diagnosis Date     Arthritis      Arthritis of knee 4/4/2013     Arthritis of shoulder region, right 4/18/2014     Arthritis of shoulder region, right 4/18/2014     Finger pain 4/2/2015     Headache(784.0)     decreased with mouth guard use.     Hypercholesteremia      Hypertension      Low back pain      Menarche age 10+    cycles q mo x 4-5 d     Pain in knee joint     LEFT     Right bundle branch block     per H/P     SNHL (sensorineural hearing loss)      Umbilical hernia without mention of obstruction or gangrene 8/2014       Past Surgical History:   Procedure Laterality Date     ARTHROPLASTY KNEE  4/4/2013    Left Total Knee Arthroplasty;  Surgeon: Lou Byrne MD;  [facility]     ARTHROPLASTY MINIMALLY INVASIVE KNEE  8/22/2011    R knee :CAITLYN JACOBO, Left age 14     DENTAL SURGERY      root canals, wisdom teeth     EXAM UNDER ANESTHESIA, MANIPULATE JOINT (LOCATION)  10/5/2012    Procedure: EXAM UNDER ANESTHESIA, MANIPULATE JOINT (LOCATION);  Right Knee Mini Open Lysis Of  Adhesions, Left Knee Steroid Injection  ;  Surgeon: Lou Byrne MD;  Location: [facility]     [facility] OR OCULAR DEVICE INTRAOP DETACHED RETINA  2009    R     [facility] PHAKIC IOL - IMPLANT FROM SURGEON      right     KNEE SURGERY  1969    left     LASER YAG CAPSULOTOMY  12/2016    left     VITRECTOMY PARSPLANA  2010       Family History   Problem Relation Age of Onset     DIABETES Father 55     DM II     DIABETES Sister 45     DM II     DIABETES Brother 50     xs 2     Hypertension Sister 41     also B and M     CANCER Maternal Aunt      multiple myeloma     Thyroid Disease Sister 45     graves     Hypertension Brother      ? age     Hypertension Mother 79     CEREBROVASCULAR DISEASE No family hx of      Breast Cancer No family hx of      Cancer - colorectal No family hx of      Prostate Cancer No family hx of      Alcohol/Drug No family hx of        Social History     Social History     Marital status:      Spouse name: N/A     Number of children: N/A     Years of education: N/A     Occupational History     Human Resources      between jobs now     Social History Main Topics     Smoking status: Former Smoker     Smokeless tobacco: Never Used      Comment: quit 35 years ago     Alcohol use No     Drug use: No     Sexual activity: Yes     Partners: Male     Birth control/ protection: Post-menopausal     Other Topics Concern     Blood Transfusions Yes     2 units 2011     Caffeine Concern No     2s     Occupational Exposure No     Hobby Hazards No     Sleep Concern No     Stress Concern No     rehab for R knee after replacement     Weight Concern Yes     working on wt loss     Special Diet Yes     Diabetic     Back Care No     Exercise No     water aerobics 35-40' 3-4 d & strength 3d/wk     Bike Helmet No     Seat Belt No     Social History Narrative    How much exercise per week? 3-4x swimming, aerobics, treadmill, bicycle    How much calcium per day? Supplement       How much caffeine per day? 2 cups coffee    How  "much vitamin D per day? Supplement    Do you/your family wear seatbelts?  Yes    Do you/your family use safety helmets? No    Do you/your family use sunscreen? No    Do you/your family keep firearms in the home? No    Do you/your family have a smoke detector(s)? Yes    Do you feel safe in your home? Yes    Has anyone ever touched you in an unwanted manner? No     Explain     See LEA Berman 11/26/2014       Objective:   /72  Pulse 98  Ht 1.626 m (5' 4\")  Wt 114.5 kg (252 lb 8 oz)  BMI 43.34 kg/m2  Constitutional: Appears well-developed and well-nourished. Active.   EYES: anicteric, normal extra-ocular movements, no lid lag or retraction   HEENT: Mouth/Throat: Mucous membrane is moist. Oropharynx is clear. No adenopathy. Normal thyroid   Cardiovascular: RRR, S1, S2 normal. No m/g/r   Pulmonary/Chest: CTAB. No wheezing or rales   Abdominal: +BS. Nontender to palpation. No organomegaly present.  Neurological: Alert. Normal affect. CNII-XII intact. Muscle strength 5/5. Sensory is intact.  Extremities: No clubbing, cyanosis or inflammation   Skin: normal texture, color  Feet: normal monofilament 10/16.   Lymphatic: no cervical lymphadenopathy.  Psychological: appropriate mood     In House Labs:   Lab Results   Component Value Date    A1C 10.2 04/10/2017    A1C 9.8 10/17/2016    A1C 10.6 04/18/2016    A1C 9.9 07/20/2015    A1C 9.4 01/09/2014       TSH   Date Value Ref Range Status   10/16/2017 3.05 0.40 - 4.00 mU/L Final   04/10/2017 2.13 0.40 - 4.00 mU/L Final   10/17/2016 2.39 0.40 - 4.00 mU/L Final   09/02/2014 1.39 0.40 - 4.00 mU/L Final     Comment:     Effective 7/30/2014, the reference range for this assay has changed to reflect   new instrumentation/methodology.     01/09/2014 2.43 0.4 - 5.0 mU/L Final       Creatinine   Date Value Ref Range Status   10/16/2017 0.59 0.52 - 1.04 mg/dL Final   ]    Recent Labs   Lab Test  04/10/17   0834  10/17/16   0831  04/18/16   0943   01/09/14   0819  05/13/13   0756 "   CHOL   --    --   126   --   153  137   HDL   --    --   43*   --   37*  34*   LDL  44  75  55   < >  86  54   TRIG   --    --   140   --   152*  248*   CHOLHDLRATIO   --    --    --    --   4.2  4.0    < > = values in this interval not displayed.     ENDO DIABETES Latest Ref Rng & Units 10/16/2017   HEMOGLOBIN A1C 4.3 - 6.0 %    HEMOGLOBIN A1C, POC 4.3 - 6 %    HGB 11.7 - 15.7 g/dL 12.1   GLUCOSE 70 - 99 mg/dL 221 (H)   CHOLESTEROL <200 mg/dL    LDL CHOLESTEROL, CALCULATED <100 mg/dL    LDL CHOLESTEROL DIRECT <100 mg/dL 61   ALBUMIN URINE MG/L mg/L 20   ALBUMIN URINE MG/G CR 0 - 25 mg/g Cr 22.55   CREATININE 0.52 - 1.04 mg/dL 0.59   POTASSIUM 3.4 - 5.3 mmol/L 4.9   CK TOTAL 30 - 225 U/L 107   ALT 0 - 50 U/L 49   AST 0 - 45 U/L    WBC 4.0 - 11.0 10e9/L 9.9   RBC 3.8 - 5.2 10e12/L 4.10   HGB 11.7 - 15.7 g/dL 12.1   HCT 35.0 - 47.0 % 37.9    - 450 10e9/L 258     ENDO DIABETES Latest Ref Rng & Units 10/18/2017   HEMOGLOBIN A1C 4.3 - 6.0 %    HEMOGLOBIN A1C, POC 4.3 - 6 % 9.5 (A)       Assessment/Treatment Plan:      Dionicio Baca is a 62 year-old woman seen in follow-up of type 2 diabetes mellitus and obesity.    IMPRESSION:   1.  Type 2 diabetes mellitus -uncontrolled.     a.  Advanced but stable retinopathy.     b.  Nephropathy.     c.  Peripheral neuropathy.     She was recommended to be started on trulicity but did not do so because of concerns for side effects (she has IBS like symptoms).   Increase Novolog to 1:4 ratio  Continue Lantus 70 units daily      2.  Obesity with weight gain.   Weight management referral was made today      3.  Dyslipidemia: LDL improvement at 44  4.  Hypertension which is treated and controlled.   5.  Osteopenia. Follow-up DEXA scan  6.  Glaucoma which is under treatment.   7.  Major life stress related to family issues.             Patient Instructions   Increase Novolog to 1:4 gm of carb.     Continue Lantus 70 units SQ.     Labs before you next visit.     Dr. [place]  [facility] [phone]    To expedite your medication refill(s), please contact your pharmacy and have them fax a refill request to: [phone].  *Please allow 3 business days for routine medication refills.  *Please allow 5 business days for controlled substance medication refills.  --------------------  For scheduling appointments (including lab work), please request an appointment through [employer], or call: [phone].    For questions for your provider or the endocrine nurse, please send a [employer] message.  For after-hours urgent issues, please dial [phone], and ask to speak with the Endocrinologist On-Call.  --------------------  Please Note: If you are active on [employer], all future test results will be sent by [employer] message only and will no longer be sent by mail. You may also receive communication directly from your physician.        3 month with Mare, 6 month with me.       Demar Hickey MD  [phone]  Endocrinology Service

## 2017-10-18 NOTE — PATIENT INSTRUCTIONS
Increase Novolog to 1:4 gm of carb.     Continue Lantus 70 units SQ.     Labs before you next visit.     Dr. Vinnie Pastrana [facility] [phone]    To expedite your medication refill(s), please contact your pharmacy and have them fax a refill request to: [phone].  *Please allow 3 business days for routine medication refills.  *Please allow 5 business days for controlled substance medication refills.  --------------------  For scheduling appointments (including lab work), please request an appointment through [employer], or call: [phone].    For questions for your provider or the endocrine nurse, please send a [employer] message.  For after-hours urgent issues, please dial [phone], and ask to speak with the Endocrinologist On-Call.  --------------------  Please Note: If you are active on [employer], all future test results will be sent by [employer] message only and will no longer be sent by mail. You may also receive communication directly from your physician.

## 2017-10-18 NOTE — PROGRESS NOTES
Endocrinology Clinic Visit  October 18, 2017     Chief Complaint: RECHECK (RETURN TYPE 2 )       Subjective:         HPI: Radha Baca is a 62 year old year old female who  has a past medical history of Arthritis; Arthritis of knee (4/4/2013); Arthritis of shoulder region, right (4/18/2014); Arthritis of shoulder region, right (4/18/2014); Finger pain (4/2/2015); Headache(784.0); Hypercholesteremia; Hypertension; Low back pain; Menarche (age 9+); Pain in knee joint; Right bundle branch block; SNHL (sensorineural hearing loss); and Umbilical hernia without mention of obstruction or gangrene (8/2014). who is here a follow up.     History of Diabetes  Type 2    Complications    1. Retinopathy: Advanced but stable retinopathy.    2.  Nephropathy:  Lab Results   Component Value Date    MICROL 15 04/10/2017     3. Neuropathy   Last monofilament testing: intact 10/2016    4. Hypoglycemia none.    5. Macrovascular:     Treatment  Diabetes Medication(s)     Biguanides Sig    metFORMIN (GLUCOPHAGE) 500 MG tablet Take 2 tablets (1,000 mg) by mouth 2 times daily (with meals)    Insulin Sig    insulin glargine (LANTUS SOLOSTAR) 100 UNIT/ML injection Inject 70 units SQ each am.    NOVOLOG FLEXPEN 100 UNIT/ML soln Carb counting with meals approx 70-80 units daily      NovoLog insulin before meals using 1 unit for each 5 grams of carbohydrate and typically taking 20-25 units.   .     Prevention  Lipid  LDL Cholesterol Calculated   Date Value Ref Range Status   04/18/2016 55 <100 mg/dL Final     Comment:     Desirable:       <100 mg/dl   01/09/2014 86 0 - 129 mg/dL Final     Comment:     LDL Cholesterol is the primary guide to therapy: LDL-cholesterol goal in high   risk patients is <100 mg/dL and in very high risk patients is <70 mg/dL.     LDL Cholesterol Direct   Date Value Ref Range Status   10/16/2017 60 <100 mg/dL Final     Comment:     Desirable:       <100 mg/dl   04/10/2017 44 <100 mg/dL Final     Comment:      Desirable:       <100 mg/dl       Medications     HMG CoA Reductase Inhibitors    atorvastatin (LIPITOR) 20 MG tablet    Salicylates    aspirin 81 MG tablet          Renal  Medication (Note: This includes the hypertensive combination subclass to make sure to show all ACEI/ARB's.)     Antihypertensive Combinations Sig    lisinopril-hydrochlorothiazide (PRINZIDE/ZESTORETIC) 20-12.5 MG per tablet Take 1 tablet by mouth daily            Weight  Wt Readings from Last 4 Encounters:   10/18/17 114.5 kg (252 lb 8 oz)   07/19/17 112.5 kg (248 lb)   04/12/17 114.2 kg (251 lb 11.2 oz)   04/10/17 111.1 kg (245 lb)       Meter Download Summary:  She tests blood sugars three times a day  High BG before HS. Checks BID AM and HS. HS >250 on most occasions. Drops to high 100s in am.     Diet admits to being emotional eater.   Exercise nONE  Pain in right leg bothers her. Water exercises is limited.     Weight trend  Wt Readings from Last 4 Encounters:   10/18/17 114.5 kg (252 lb 8 oz)   07/19/17 112.5 kg (248 lb)   04/12/17 114.2 kg (251 lb 11.2 oz)   04/10/17 111.1 kg (245 lb)       A1c today is 9.5  Lab Results   Component Value Date    A1C 10.2 04/10/2017    A1C 9.8 10/17/2016    A1C 10.6 04/18/2016    A1C 9.9 07/20/2015    A1C 9.4 01/09/2014           HABITS:  She does not use tobacco or alcohol.      SOCIAL HISTORY:  Dionicio is  and lives with her  in [place].  She is presently unemployed and has given up looking for a job.  She has major family issues in that her father is suing her mother.  This has been very stressful for Dionicio.        Allergies   Allergen Reactions     Nkda [No Known Drug Allergies]        Current Outpatient Prescriptions   Medication Sig Dispense Refill     blood glucose calibration (TIAGO CONTOUR) NORMAL solution Use to calibrate blood glucose monitor as needed as directed. 1 Bottle 11     cycloSPORINE (RESTASIS) 0.05 % ophthalmic emulsion        insulin glargine (LANTUS SOLOSTAR) 100 UNIT/ML  injection Inject 70 units SQ each am. 70 mL 3     insulin pen needle (B-D U/F) 31G X 8 MM Use  4 daily short 31 G X 8MM 400 each 3     metFORMIN (GLUCOPHAGE) 500 MG tablet Take 2 tablets (1,000 mg) by mouth 2 times daily (with meals) 360 tablet 1     lisinopril-hydrochlorothiazide (PRINZIDE/ZESTORETIC) 20-12.5 MG per tablet Take 1 tablet by mouth daily 90 tablet 3     amoxicillin (AMOXIL) 500 MG capsule Take 4 capsules (2 grams) 1 hour before dental work. 4 capsule 3     order for DME Equipment being ordered: Grade 1 (light) compression stockings, below the knee 1 Units 1     TIAGO CONTOUR test strip Use to test blood sugar 3 times daily or as directed. 270 each 3     atorvastatin (LIPITOR) 20 MG tablet Take 1 tablet (20 mg) by mouth daily 90 tablet 3     NOVOLOG FLEXPEN 100 UNIT/ML soln Carb counting with meals approx 70-80 units daily 75 mL 3     Acetaminophen (TYLENOL PO) Take by mouth as needed for mild pain or fever       Omega-3 Fatty Acids (OMEGA-3 FISH OIL PO) Take 1,000 mg by mouth daily       Albuterol Sulfate 108 (90 BASE) MCG/ACT AEPB Inhale 2 puffs into the lungs every 6 hours as needed 1 each 0     Multiple Vitamins-Minerals (EYE-ZAKIYA PLUS LUTEIN PO)        blood glucose monitoring (TIAGO MICROLET) lancets Test 4-6 times daily 90 day supply refills x 3 6 Box 3     latanoprost (XALATAN) 0.005 % ophthalmic solution Place 1 drop into both eyes At Bedtime       aspirin 81 MG tablet 1 tab daily 90 tablet 3     Multiple Vitamin (MULTIVITAMIN  S) CAPS Take 1 daily 90 capsule 3     calcium carbonate-vitamin D 500-400 MG-UNIT TABS tablt Take 1 tablet by mouth daily 90 tablet 3     timolol (TIMOPTIC) 0.5 % ophthalmic solution Place 1 drop into both eyes 2 times daily        cholecalciferol (VITAMIN D) 1000 UNIT tablet Take 1 tablet by mouth daily. 100 tablet 3     Ascorbic Acid (VITAMIN C PO) Take 2,000 mg by mouth 2 times daily        B Complex Vitamins (VITAMIN  B COMPLEX) CAPS Take 1 tablet by mouth daily           Review of Systems     Constitutional: Negative for fever and unexpected weight change. Appetite nomal   Head: no headache   Eye: no vision change/ loss of peripheral vision.   ENT: No throat congestion.   Respiratory: no cough   Cardiovascular: no chest pain or tachycardia  Gastrointestinal: Hemorrhoidal bleed and diarrhea, for which she made appt with her doctors.   Genitourinary: No bladder problems.  Musculoskeletal: Negative for myalgias. No weakness.  Neurological: Negative for seizures and headaches.  Psychiatric/Behavioral: Negative for behavioral problem and dysphoric mood.    Past Medical History:   Diagnosis Date     Arthritis      Arthritis of knee 4/4/2013     Arthritis of shoulder region, right 4/18/2014     Arthritis of shoulder region, right 4/18/2014     Finger pain 4/2/2015     Headache(784.0)     decreased with mouth guard use.     Hypercholesteremia      Hypertension      Low back pain      Menarche age 10+    cycles q mo x 4-5 d     Pain in knee joint     LEFT     Right bundle branch block     per H/P     SNHL (sensorineural hearing loss)      Umbilical hernia without mention of obstruction or gangrene 8/2014       Past Surgical History:   Procedure Laterality Date     ARTHROPLASTY KNEE  4/4/2013    Left Total Knee Arthroplasty;  Surgeon: Lou Byrne MD;  [facility]     ARTHROPLASTY MINIMALLY INVASIVE KNEE  8/22/2011    R knee :JAMARIMAGUE CAITLYN SILVERMAN, Left age 14     DENTAL SURGERY      root canals, wisdom teeth     EXAM UNDER ANESTHESIA, MANIPULATE JOINT (LOCATION)  10/5/2012    Procedure: EXAM UNDER ANESTHESIA, MANIPULATE JOINT (LOCATION);  Right Knee Mini Open Lysis Of Adhesions, Left Knee Steroid Injection  ;  Surgeon: Lou Byrne MD;  Location[facility]      OR OCULAR DEVICE INTRAOP DETACHED RETINA  2009    R     HC PHAKIC IOL - IMPLANT FROM SURGEON      right     KNEE SURGERY  1969    left     LASER YAG CAPSULOTOMY  12/2016    left     VITRECTOMY PARSPLANA  2010        Family History   Problem Relation Age of Onset     DIABETES Father 55     DM II     DIABETES Sister 45     DM II     DIABETES Brother 50     xs 2     Hypertension Sister 41     also B and M     CANCER Maternal Aunt      multiple myeloma     Thyroid Disease Sister 45     graves     Hypertension Brother      ? age     Hypertension Mother 79     CEREBROVASCULAR DISEASE No family hx of      Breast Cancer No family hx of      Cancer - colorectal No family hx of      Prostate Cancer No family hx of      Alcohol/Drug No family hx of        Social History     Social History     Marital status:      Spouse name: N/A     Number of children: N/A     Years of education: N/A     Occupational History     Human Resources      between jobs now     Social History Main Topics     Smoking status: Former Smoker     Smokeless tobacco: Never Used      Comment: quit 35 years ago     Alcohol use No     Drug use: No     Sexual activity: Yes     Partners: Male     Birth control/ protection: Post-menopausal     Other Topics Concern     Blood Transfusions Yes     2 units 2011     Caffeine Concern No     2s     Occupational Exposure No     Hobby Hazards No     Sleep Concern No     Stress Concern No     rehab for R knee after replacement     Weight Concern Yes     working on wt loss     Special Diet Yes     Diabetic     Back Care No     Exercise No     water aerobics 35-40' 3-4 d & strength 3d/wk     Bike Helmet No     Seat Belt No     Social History Narrative    How much exercise per week? 3-4x swimming, aerobics, treadmill, bicycle    How much calcium per day? Supplement       How much caffeine per day? 2 cups coffee    How much vitamin D per day? Supplement    Do you/your family wear seatbelts?  Yes    Do you/your family use safety helmets? No    Do you/your family use sunscreen? No    Do you/your family keep firearms in the home? No    Do you/your family have a smoke detector(s)? Yes    Do you feel safe in your home? Yes    Has  "anyone ever touched you in an unwanted manner? No     Explain     See LEA Berman 11/26/2014       Objective:   /72  Pulse 98  Ht 1.626 m (5' 4\")  Wt 114.5 kg (252 lb 8 oz)  BMI 43.34 kg/m2  Constitutional: Appears well-developed and well-nourished. Active.   EYES: anicteric, normal extra-ocular movements, no lid lag or retraction   HEENT: Mouth/Throat: Mucous membrane is moist. Oropharynx is clear. No adenopathy. Normal thyroid   Cardiovascular: RRR, S1, S2 normal. No m/g/r   Pulmonary/Chest: CTAB. No wheezing or rales   Abdominal: +BS. Nontender to palpation. No organomegaly present.  Neurological: Alert. Normal affect. CNII-XII intact. Muscle strength 5/5. Sensory is intact.  Extremities: No clubbing, cyanosis or inflammation   Skin: normal texture, color  Feet: normal monofilament 10/16.   Lymphatic: no cervical lymphadenopathy.  Psychological: appropriate mood     In House Labs:   Lab Results   Component Value Date    A1C 10.2 04/10/2017    A1C 9.8 10/17/2016    A1C 10.6 04/18/2016    A1C 9.9 07/20/2015    A1C 9.4 01/09/2014       TSH   Date Value Ref Range Status   10/16/2017 3.05 0.40 - 4.00 mU/L Final   04/10/2017 2.13 0.40 - 4.00 mU/L Final   10/17/2016 2.39 0.40 - 4.00 mU/L Final   09/02/2014 1.39 0.40 - 4.00 mU/L Final     Comment:     Effective 7/30/2014, the reference range for this assay has changed to reflect   new instrumentation/methodology.     01/09/2014 2.43 0.4 - 5.0 mU/L Final       Creatinine   Date Value Ref Range Status   10/16/2017 0.59 0.52 - 1.04 mg/dL Final   ]    Recent Labs   Lab Test  04/10/17   0834  10/17/16   0831  04/18/16   0943   01/09/14   0819  05/13/13   0756   CHOL   --    --   126   --   153  137   HDL   --    --   43*   --   37*  34*   LDL  44  75  55   < >  86  54   TRIG   --    --   140   --   152*  248*   CHOLHDLRATIO   --    --    --    --   4.2  4.0    < > = values in this interval not displayed.     ENDO DIABETES Latest Ref Rng & Units 10/16/2017   HEMOGLOBIN " A1C 4.3 - 6.0 %    HEMOGLOBIN A1C, POC 4.3 - 6 %    HGB 11.7 - 15.7 g/dL 12.1   GLUCOSE 70 - 99 mg/dL 221 (H)   CHOLESTEROL <200 mg/dL    LDL CHOLESTEROL, CALCULATED <100 mg/dL    LDL CHOLESTEROL DIRECT <100 mg/dL 61   ALBUMIN URINE MG/L mg/L 20   ALBUMIN URINE MG/G CR 0 - 25 mg/g Cr 22.55   CREATININE 0.52 - 1.04 mg/dL 0.59   POTASSIUM 3.4 - 5.3 mmol/L 4.9   CK TOTAL 30 - 225 U/L 107   ALT 0 - 50 U/L 49   AST 0 - 45 U/L    WBC 4.0 - 11.0 10e9/L 9.9   RBC 3.8 - 5.2 10e12/L 4.10   HGB 11.7 - 15.7 g/dL 12.1   HCT 35.0 - 47.0 % 37.9    - 450 10e9/L 258     ENDO DIABETES Latest Ref Rng & Units 10/18/2017   HEMOGLOBIN A1C 4.3 - 6.0 %    HEMOGLOBIN A1C, POC 4.3 - 6 % 9.5 (A)       Assessment/Treatment Plan:      Dionicio Baca is a 62 year-old woman seen in follow-up of type 2 diabetes mellitus and obesity.    IMPRESSION:   1.  Type 2 diabetes mellitus -uncontrolled.     a.  Advanced but stable retinopathy.     b.  Nephropathy.     c.  Peripheral neuropathy.     She was recommended to be started on trulicity but did not do so because of concerns for side effects (she has IBS like symptoms).   Increase Novolog to 1:4 ratio  Continue Lantus 70 units daily      2.  Obesity with weight gain.   Weight management referral was made today      3.  Dyslipidemia: LDL improvement at 44  4.  Hypertension which is treated and controlled.   5.  Osteopenia. Follow-up DEXA scan  6.  Glaucoma which is under treatment.   7.  Major life stress related to family issues.             Patient Instructions   Increase Novolog to 1:4 gm of carb.     Continue Lantus 70 units SQ.     Labs before you next visit.     [facility] [phone]    To expedite your medication refill(s), please contact your pharmacy and have them fax a refill request to: [phone].  *Please allow 3 business days for routine medication refills.  *Please allow 5 business days for controlled substance medication  refills.  --------------------  For scheduling appointments (including lab work), please request an appointment through [employer], or call: [phone].    For questions for your provider or the endocrine nurse, please send a [employer] message.  For after-hours urgent issues, please dial [phone], and ask to speak with the Endocrinologist On-Call.  --------------------  Please Note: If you are active on [employer], all future test results will be sent by [employer] message only and will no longer be sent by mail. You may also receive communication directly from your physician.        3 month with Mare, 6 month with me.       Demar Hickey MD  [id number]  Endocrinology Service

## 2017-10-18 NOTE — MR AVS SNAPSHOT
After Visit Summary   10/18/2017    Radha Baca    MRN: [id number]           Patient Information     Date Of Birth          1955        Visit Information        Provider Department      10/18/2017 1:00 PM Demar Hickey MD [facility]        Today's Diagnoses     Morbid obesity (H)    -  1    Type 2 diabetes mellitus with complication, with long-term current use of insulin (H)          Care Instructions    Increase Novolog to 1:4 gm of carb.     Continue Lantus 70 units SQ.     Labs before you next visit.     Dr. Vinnie Pastr[facility] [phone]    To expedite your medication refill(s), please contact your pharmacy and have them fax a refill request to: [phone].  *Please allow 3 business days for routine medication refills.  *Please allow 5 business days for controlled substance medication refills.  --------------------  For scheduling appointments (including lab work), please request an appointment through [employer], or call: [phone].    For questions for your provider or the endocrine nurse, please send a [employer] message.  For after-hours urgent issues, please dial [phone], and ask to speak with the Endocrinologist On-Call.  --------------------  Please Note: If you are active on [employer], all future test results will be sent by [employer] message only and will no longer be sent by mail. You may also receive communication directly from your physician.            Follow-ups after your visit        Additional Services     BARIATRIC ADULT REFERRAL       Your provider has referred you to: PREFERRED PROVIDERS: VINNIE    Please be aware that coverage of these services is subject to the terms and limitations of your health insurance plan.  Call member services at your health plan with any benefit or coverage questions.      Please bring the following with you to your appointment:      (1) List of current medications   (2) This  referral request   (3) Any documents/labs given to you for this referral                  Follow-up notes from your care team     Return in about 6 months (around 4/18/2018).      Your next 10 appointments already scheduled     Jan 18, 2018 10:00 AM CST   (Arrive by 9:45 AM)   RETURN DIABETES with Marcella Maria PA-C   [facility] ([facility])    [address] [place] [id number] [phone]            Apr 18, 2018  1:00 PM CDT   (Arrive by 12:45 PM)   RETURN DIABETES with Demar Hickey MD   [facility] ([facility])    [address] [place] [id number] [phone]              Future tests that were ordered for you today     Open Future Orders        Priority Expected Expires Ordered    TSH Routine 4/8/2018 5/16/2018 10/18/2017    T4 free Routine 4/8/2018 5/16/2018 10/18/2017    Albumin Random Urine Quantitative with Creat Ratio Routine 4/16/2018 10/18/2018 10/18/2017    ALT Routine 4/16/2018 10/18/2018 10/18/2017    Hemoglobin A1c Routine 4/16/2018 10/18/2018 10/18/2017    Comprehensive metabolic panel Routine 4/16/2018 10/18/2018 10/18/2017    Hematocrit Routine 4/16/2018 10/18/2018 10/18/2017            Who to contact     Please call your clinic at [phone] to:    Ask questions about your health    Make or cancel appointments    Discuss your medicines    Learn about your test results    Speak to your doctor   If you have compliments or concerns about an experience at your clinic, or if you wish to file a complaint, please contact [facility] Patient Relations at [phone] or email us at [email or web address]         Additional Information About Your Visit        MyChart Information     [employer] gives you secure access to your electronic health record. If you see a primary care provider, you can also send messages to your  "care team and make appointments. If you have questions, please call your primary care clinic.  If you do not have a primary care provider, please call [phone] and they will assist you.      [employer] is an electronic gateway that provides easy, online access to your medical records. With [employer], you can request a clinic appointment, read your test results, renew a prescription or communicate with your care team.     To access your existing account, please contact your [facility] or call [phone] for assistance.        Care EveryWhere ID     This is your Care EveryWhere ID. This could be used by other organizations to access your [facility] medical records  [id number]        Your Vitals Were     Pulse Height BMI (Body Mass Index)             98 1.626 m (5' 4\") 43.34 kg/m2          Blood Pressure from Last 3 Encounters:   10/18/17 118/72   07/19/17 (P) 113/71   04/12/17 122/70    Weight from Last 3 Encounters:   10/18/17 114.5 kg (252 lb 8 oz)   07/19/17 112.5 kg (248 lb)   04/12/17 114.2 kg (251 lb 11.2 oz)              We Performed the Following     BARIATRIC ADULT REFERRAL        Primary Care Provider Office Phone # Fax #    Mohan Moreno [phone]       [address] [address]        Equal Access to Services     YESY NAJERA : Hadii mahogany koenigo Sohazel, waaxda luqadaha, qaybta kaalmada speedy, mindy fam. So [facility] [phone].    ATENCIÓN: Si habla español, tiene a pitts disposición servicios gratuitos de asistencia lingüística. Llame al [phone].    We comply with applicable federal civil rights laws and [place] laws. We do not discriminate on the basis of race, color, national origin, age, disability, sex, sexual orientation, or gender identity.            Thank you!     Thank you for choosing [facility]  for your care. Our goal is always to provide you with excellent care. Hearing " back from our patients is one way we can continue to improve our services. Please take a few minutes to complete the written survey that you may receive in the mail after your visit with us. Thank you!             Your Updated Medication List - Protect others around you: Learn how to safely use, store and throw away your medicines at www.disposemymeds.org.          This list is accurate as of: 10/18/17  1:53 PM.  Always use your most recent med list.                   Brand Name Dispense Instructions for use Diagnosis    Albuterol Sulfate 108 (90 BASE) MCG/ACT Aepb     1 each    Inhale 2 puffs into the lungs every 6 hours as needed    Viral URI with cough       amoxicillin 500 MG capsule    AMOXIL    4 capsule    Take 4 capsules (2 grams) 1 hour before dental work.    S/P joint replacement       aspirin 81 MG tablet     90 tablet    1 tab daily    Type 2 diabetes mellitus (H)       atorvastatin 20 MG tablet    LIPITOR    90 tablet    Take 1 tablet (20 mg) by mouth daily    Type 2 diabetes mellitus with complication, with long-term current use of insulin (H), Dyslipidemia       TIAGO CONTOUR test strip   Generic drug:  blood glucose monitoring     270 each    Use to test blood sugar 3 times daily or as directed.    Type 2 diabetes mellitus with complication (H)       blood glucose calibration NORMAL solution     1 Bottle    Use to calibrate blood glucose monitor as needed as directed.    DM type 2 (diabetes mellitus, type 2) (H)       blood glucose monitoring lancets     6 Box    Test 4-6 times daily 90 day supply refills x 3    Type 2 diabetes mellitus with complication (H)       calcium carbonate-vitamin D 500-400 MG-UNIT Tabs per tablet     90 tablet    Take 1 tablet by mouth daily    Type 2 diabetes mellitus (H)       cholecalciferol 1000 UNIT tablet    vitamin D    100 tablet    Take 1 tablet by mouth daily.        EYE-ZAKIYA PLUS LUTEIN PO           insulin pen needle 31G X 8 MM    B-D U/F    400 each    Use  4  daily short 31 G X 8MM    Diabetes mellitus, type 2 (H)       LANTUS SOLOSTAR 100 UNIT/ML injection   Generic drug:  insulin glargine     70 mL    Inject 70 units SQ each am.    Type 2 diabetes mellitus with complication, with long-term current use of insulin (H)       latanoprost 0.005 % ophthalmic solution    XALATAN     Place 1 drop into both eyes At Bedtime        lisinopril-hydrochlorothiazide 20-12.5 MG per tablet    PRINZIDE/ZESTORETIC    90 tablet    Take 1 tablet by mouth daily    Essential hypertension       metFORMIN 500 MG tablet    GLUCOPHAGE    360 tablet    Take 2 tablets (1,000 mg) by mouth 2 times daily (with meals)    Diabetes mellitus, type 2 (H)       MULTIvitamin  S Caps     90 capsule    Take 1 daily    Type 2 diabetes mellitus (H)       NovoLOG FLEXPEN 100 UNIT/ML injection   Generic drug:  insulin aspart     75 mL    Carb counting with meals approx 70-80 units daily    Type 2 diabetes mellitus with complication (H)       OMEGA-3 FISH OIL PO      Take 1,000 mg by mouth daily        order for DME     1 Units    Equipment being ordered: Grade 1 (light) compression stockings, below the knee    Venous (peripheral) insufficiency       RESTASIS 0.05 % ophthalmic emulsion   Generic drug:  cycloSPORINE           timolol 0.5 % ophthalmic solution    TIMOPTIC     Place 1 drop into both eyes 2 times daily        TYLENOL PO      Take by mouth as needed for mild pain or fever        vitamin  B Complex Caps      Take 1 tablet by mouth daily        VITAMIN C PO      Take 2,000 mg by mouth 2 times daily

## 2017-11-15 ENCOUNTER — OFFICE VISIT (OUTPATIENT)
Dept: FAMILY MEDICINE | Facility: CLINIC | Age: 62
End: 2017-11-15

## 2017-11-15 VITALS
DIASTOLIC BLOOD PRESSURE: 68 MMHG | OXYGEN SATURATION: 94 % | HEART RATE: 101 BPM | SYSTOLIC BLOOD PRESSURE: 125 MMHG | RESPIRATION RATE: 18 BRPM | TEMPERATURE: 98.5 F

## 2017-11-15 DIAGNOSIS — I10 ESSENTIAL HYPERTENSION: ICD-10-CM

## 2017-11-15 DIAGNOSIS — Z79.4 TYPE 2 DIABETES MELLITUS WITH COMPLICATION, WITH LONG-TERM CURRENT USE OF INSULIN (H): ICD-10-CM

## 2017-11-15 DIAGNOSIS — I87.2 VENOUS (PERIPHERAL) INSUFFICIENCY: Primary | ICD-10-CM

## 2017-11-15 DIAGNOSIS — R19.8 ALTERED BOWEL FUNCTION: ICD-10-CM

## 2017-11-15 DIAGNOSIS — E11.8 TYPE 2 DIABETES MELLITUS WITH COMPLICATION, WITH LONG-TERM CURRENT USE OF INSULIN (H): ICD-10-CM

## 2017-11-15 DIAGNOSIS — Z00.00 HEALTH CARE MAINTENANCE: ICD-10-CM

## 2017-11-15 ASSESSMENT — ENCOUNTER SYMPTOMS
BACK PAIN: 1
RESPIRATORY NEGATIVE: 1
UNEXPECTED WEIGHT CHANGE: 0
FEVER: 0
ARTHRALGIAS: 1
ACTIVITY CHANGE: 0
FATIGUE: 0
GASTROINTESTINAL NEGATIVE: 1
WEAKNESS: 0
NERVOUS/ANXIOUS: 1

## 2017-11-15 NOTE — MR AVS SNAPSHOT
After Visit Summary   11/15/2017    Radha Baca    MRN: [id number]           Patient Information     Date Of Birth          1955        Visit Information        Provider Department      11/15/2017 2:00 PM Mohan Moreno MD [facility]        Today's Diagnoses     Venous (peripheral) insufficiency    -  1    Health care maintenance        Essential hypertension        Type 2 diabetes mellitus with complication, with long-term current use of insulin (H)        Altered bowel function          Care Instructions    Try fiber supplement once/day.    Continue other medications.    Continue stockings -- GOOD JOB!          Follow-ups after your visit        Follow-up notes from your care team     Return in about 3 months (around 2/15/2018).      Your next 10 appointments already scheduled     Nov 22, 2017  9:30 AM CST   Annual Visit with EZEQUIEL [facility]   [facility] ([facility])    [facility] [address]  [address]  [address]  [facility] [id number] [phone]            Nov 22, 2017 10:00 AM CST   MA SCREENING DIGITAL BILATERAL with URBCMA1   [facility] ([facility])    [address] [place] [address], [address]  [facility] [id number] [phone]           Do not use any powder, lotion or deodorant under your arms or on your breast. If you do, we will ask you to remove it before your exam.  Wear comfortable, two-piece clothing.  If you have any allergies, tell your care team.  Bring any previous mammograms from other facilities or have them mailed to the breast center.            Jan 18, 2018 10:00 AM CST   (Arrive by 9:45 AM)   RETURN DIABETES with Marcella Maria PA-C   [facility] ([facility])    [address]  3rd Floor  [facility] [address] [phone]            Apr 18, 2018  1:00 PM CDT   (Arrive by 12:45 PM)   RETURN DIABETES  with Demar Hickey MD   [facility] ([facility])    [address] [address] [phone]              Who to contact     Please call your clinic at [phone] to:    Ask questions about your health    Make or cancel appointments    Discuss your medicines    Learn about your test results    Speak to your doctor   If you have compliments or concerns about an experience at your clinic, or if you wish to file a complaint, please contact [facility] Patient Relations at [phone] or email us at [email or web address]         Additional Information About Your Visit        [employer] Information     [employer] gives you secure access to your electronic health record. If you see a primary care provider, you can also send messages to your care team and make appointments. If you have questions, please call your primary care clinic.  If you do not have a primary care provider, please call [phone] and they will assist you.      [employer] is an electronic gateway that provides easy, online access to your medical records. With [employer], you can request a clinic appointment, read your test results, renew a prescription or communicate with your care team.     To access your existing account, please contact your [facility] Clinic or call [phone] for assistance.        Care EveryWhere ID     This is your Care EveryWhere ID. This could be used by other organizations to access your [place] medical records  [id number]        Your Vitals Were     Pulse Temperature Respirations Pulse Oximetry Breastfeeding?       101 98.5  F (36.9  C) (Oral) 18 94% No        Blood Pressure from Last 3 Encounters:   11/15/17 125/68   10/18/17 118/72   07/19/17 (P) 113/71    Weight from Last 3 Encounters:   10/18/17 252 lb 8 oz (114.5 kg)   07/19/17 248 lb (112.5 kg)   04/12/17 251 lb 11.2 oz (114.2 kg)               We Performed the Following     Hepatitis C antibody          Today's Medication Changes          These changes are accurate as of: 11/15/17  2:53 PM.  If you have any questions, ask your nurse or doctor.               Start taking these medicines.        Dose/Directions    zoster vaccine live (PF) injection   Commonly known as:  ZOSTAVAX   Used for:  Health care maintenance   Started by:  Mohan Moreno MD        Dose:  1 each   Inject 0.65 mLs Subcutaneous once for 1 dose   Quantity:  0.65 mL   Refills:  0            Where to get your medicines      These medications were sent to [facility] [id number] [facility] [address] [facility] [address] [facility]  [address], [facility] [address]     Phone:  [phone]     zoster vaccine live (PF) injection                Primary Care Provider Office Phone # Fax #    Mohan Moreno [phone]       [address] [email or web address] [address]  [facility] [address]        Equal Access to Services     YESY NAJERA AH: Hadii mahogany kingsley hadasho Soomaali, waaxda luqadaha, qaybta kaalmada adeegyada, waxay michellein hayfabion wong quan . So [facility] [phone].    ATENCIÓN: Si habla español, tiene a pitts disposición servicios gratuitos de asistencia lingüística. [facility] [phone].    We comply with applicable federal civil rights laws and [place] laws. We do not discriminate on the basis of race, color, national origin, age, disability, sex, sexual orientation, or gender identity.            Thank you!     Thank you for choosing [facility]  for your care. Our goal is always to provide you with excellent care. Hearing back from our patients is one way we can continue to improve our services. Please take a few minutes to complete the written survey that you may receive in the mail after your visit with us. Thank you!             Your Updated Medication List - Protect others around you: Learn how to safely use, store and  throw away your medicines at www.disposemymeds.org.          This list is accurate as of: 11/15/17  2:53 PM.  Always use your most recent med list.                   Brand Name Dispense Instructions for use Diagnosis    Albuterol Sulfate 108 (90 BASE) MCG/ACT Aepb     1 each    Inhale 2 puffs into the lungs every 6 hours as needed    Viral URI with cough       amoxicillin 500 MG capsule    AMOXIL    4 capsule    Take 4 capsules (2 grams) 1 hour before dental work.    S/P joint replacement       aspirin 81 MG tablet     90 tablet    1 tab daily    Type 2 diabetes mellitus (H)       atorvastatin 20 MG tablet    LIPITOR    90 tablet    Take 1 tablet (20 mg) by mouth daily    Type 2 diabetes mellitus with complication, with long-term current use of insulin (H), Dyslipidemia       TIAGO CONTOUR test strip   Generic drug:  blood glucose monitoring     270 each    Use to test blood sugar 3 times daily or as directed.    Type 2 diabetes mellitus with complication (H)       blood glucose calibration NORMAL solution     1 Bottle    Use to calibrate blood glucose monitor as needed as directed.    DM type 2 (diabetes mellitus, type 2) (H)       blood glucose monitoring lancets     6 Box    Test 4-6 times daily 90 day supply refills x 3    Type 2 diabetes mellitus with complication (H)       calcium carbonate-vitamin D 500-400 MG-UNIT Tabs per tablet     90 tablet    Take 1 tablet by mouth daily    Type 2 diabetes mellitus (H)       cholecalciferol 1000 UNIT tablet    vitamin D3    100 tablet    Take 1 tablet by mouth daily.        EYE-ZAKIYA PLUS LUTEIN PO           insulin pen needle 31G X 8 MM    B-D U/F    400 each    Use  4 daily short 31 G X 8MM    Diabetes mellitus, type 2 (H)       LANTUS SOLOSTAR 100 UNIT/ML injection   Generic drug:  insulin glargine     70 mL    Inject 70 units SQ each am.    Type 2 diabetes mellitus with complication, with long-term current use of insulin (H)       latanoprost 0.005 % ophthalmic  solution    XALATAN     Place 1 drop into both eyes At Bedtime        lisinopril-hydrochlorothiazide 20-12.5 MG per tablet    PRINZIDE/ZESTORETIC    90 tablet    Take 1 tablet by mouth daily    Essential hypertension       metFORMIN 500 MG tablet    GLUCOPHAGE    360 tablet    Take 2 tablets (1,000 mg) by mouth 2 times daily (with meals)    Diabetes mellitus, type 2 (H)       MULTIvitamin  S Caps     90 capsule    Take 1 daily    Type 2 diabetes mellitus (H)       NovoLOG FLEXPEN 100 UNIT/ML injection   Generic drug:  insulin aspart     75 mL    Carb counting with meals approx 70-80 units daily    Type 2 diabetes mellitus with complication (H)       OMEGA-3 FISH OIL PO      Take 1,000 mg by mouth daily        order for DME     1 Units    Equipment being ordered: Grade 1 (light) compression stockings, below the knee    Venous (peripheral) insufficiency       RESTASIS 0.05 % ophthalmic emulsion   Generic drug:  cycloSPORINE           timolol 0.5 % ophthalmic solution    TIMOPTIC     Place 1 drop into both eyes 2 times daily        TYLENOL PO      Take by mouth as needed for mild pain or fever        vitamin  B Complex Caps      Take 1 tablet by mouth daily        VITAMIN C PO      Take 2,000 mg by mouth 2 times daily        zoster vaccine live (PF) injection    ZOSTAVAX    0.65 mL    Inject 0.65 mLs Subcutaneous once for 1 dose    Health care maintenance

## 2017-11-15 NOTE — PROGRESS NOTES
HPI:       Radha Baca is a 62 year old who presents for the following  No chief complaint on file.    Lots of concerns.  Among them:    Legs - she is wearing compression stockings inconsistently.  They control swelling effectively.      Bowels - for several months has had alternating constipation and diarrhea.  No fever or bloody stool.  No pain.  Has h/o hemorrhoids but these have not been active.  She cannot pinpoint an exacerbating factor other than the ingestion of eggs.  She has been under a lot of stress lately.  No appetite change.  No vomiting.    Zoster vaccine - inquiring about it.             Problem, Medication and Allergy Lists were reviewed and are current.  Patient is an established patient of this clinic.         Review of Systems:   Review of Systems   Constitutional: Negative for activity change, fatigue, fever and unexpected weight change.   Respiratory: Negative.    Cardiovascular: Positive for leg swelling. Negative for chest pain.   Gastrointestinal: Negative.    Musculoskeletal: Positive for arthralgias, back pain and gait problem (uses cane).   Neurological: Negative for weakness.   Psychiatric/Behavioral: The patient is nervous/anxious.              Physical Exam:   Patient Vitals for the past 24 hrs:   BP Temp Temp src Pulse Resp SpO2   11/15/17 1414 125/68 98.5  F (36.9  C) Oral 101 18 94 %     There is no height or weight on file to calculate BMI.  Vitals were reviewed and were normal     Physical Exam   Constitutional: No distress.   Obese   HENT:   Head: Normocephalic and atraumatic.   Neck: Neck supple. No thyromegaly present.   Cardiovascular: Normal rate, regular rhythm, normal heart sounds and normal pulses.  Exam reveals no gallop.    No murmur heard.  Pulmonary/Chest: Effort normal. No respiratory distress. She has no wheezes. She has no rales.   Abdominal: Soft. Bowel sounds are normal. She exhibits no distension and no mass. There is no tenderness.    Musculoskeletal: She exhibits edema (trace bilaterally). She exhibits no tenderness.        Right ankle: She exhibits swelling.        Left ankle: She exhibits swelling.   Knees cool with bilateral midline well healed scars.     Lymphadenopathy:     She has no cervical adenopathy.   Neurological: She is alert.   Skin: She is not diaphoretic.   + ashley discoloration in stocking distribution   Psychiatric: She has a normal mood and affect. Her behavior is normal.   Vitals reviewed.        Results:     Results from last visit:  Office Visit on 10/18/2017   Component Date Value Ref Range Status     Hemoglobin A1C 10/18/2017 9.5* 4.3 - 6 % Final     Assessment and Plan     Diagnoses and all orders for this visit:    Venous (peripheral) insufficiency - doing well on compression stockings; continue    Health care maintenance  -     Hepatitis C antibody  -     zoster vaccine live, PF, (ZOSTAVAX) injection; Inject 0.65 mLs Subcutaneous once for 1 dose  -     ADMIN VACCINE, INITIAL  -     ZOSTER VACC LIVE SUBQ NJX    Essential hypertension - at goal    Type 2 diabetes mellitus with complication, with long-term current use of insulin (H) - managed by endocrinology    Altered bowel function - most likely functional secondary to stress.  No sign of infection or neoplasm.  Will try fiber first.      There are no discontinued medications.  Options for treatment and follow-up care were reviewed with the patient. Radha Baca  engaged in the decision making process and verbalized understanding of the options discussed and agreed with the final plan.    Mohan Moreno MD

## 2017-11-16 LAB — HCV AB SERPL QL IA: NONREACTIVE

## 2017-11-19 ASSESSMENT — ENCOUNTER SYMPTOMS
JOINT SWELLING: 0
PALPITATIONS: 0
BLOOD IN STOOL: 0
DECREASED CONCENTRATION: 0
BOWEL INCONTINENCE: 0
SLEEP DISTURBANCES DUE TO BREATHING: 0
ARTHRALGIAS: 1
FATIGUE: 1
HALLUCINATIONS: 0
STIFFNESS: 1
LIGHT-HEADEDNESS: 0
POOR WOUND HEALING: 0
ALTERED TEMPERATURE REGULATION: 1
MYALGIAS: 1
PANIC: 1
VOMITING: 0
NAUSEA: 0
POLYDIPSIA: 0
WEIGHT LOSS: 0
MUSCLE CRAMPS: 1
DIARRHEA: 1
RECTAL PAIN: 1
NECK PAIN: 1
CONSTIPATION: 1
EXERCISE INTOLERANCE: 0
MUSCLE WEAKNESS: 1
CHILLS: 0
BLOATING: 1
HEARTBURN: 1
NIGHT SWEATS: 0
SKIN CHANGES: 1
BACK PAIN: 1
POLYPHAGIA: 0
INCREASED ENERGY: 0
DECREASED APPETITE: 0
ORTHOPNEA: 0
LEG PAIN: 0
SYNCOPE: 0
HYPOTENSION: 0
FEVER: 0
ABDOMINAL PAIN: 1
INSOMNIA: 1
DEPRESSION: 1
NERVOUS/ANXIOUS: 1
HYPERTENSION: 1
WEIGHT GAIN: 1

## 2017-11-22 ENCOUNTER — RADIANT APPOINTMENT (OUTPATIENT)
Dept: MAMMOGRAPHY | Facility: CLINIC | Age: 62
End: 2017-11-22
Attending: FAMILY MEDICINE
Payer: COMMERCIAL

## 2017-11-22 ENCOUNTER — OFFICE VISIT (OUTPATIENT)
Dept: OBGYN | Facility: CLINIC | Age: 62
End: 2017-11-22
Attending: FAMILY MEDICINE
Payer: COMMERCIAL

## 2017-11-22 VITALS
WEIGHT: 251 LBS | BODY MASS INDEX: 42.85 KG/M2 | HEIGHT: 64 IN | SYSTOLIC BLOOD PRESSURE: 116 MMHG | DIASTOLIC BLOOD PRESSURE: 72 MMHG | HEART RATE: 78 BPM

## 2017-11-22 DIAGNOSIS — Z00.00 VISIT FOR PREVENTIVE HEALTH EXAMINATION: Primary | ICD-10-CM

## 2017-11-22 DIAGNOSIS — E11.9 DIABETES MELLITUS, TYPE 2 (H): ICD-10-CM

## 2017-11-22 DIAGNOSIS — Z12.31 VISIT FOR SCREENING MAMMOGRAM: ICD-10-CM

## 2017-11-22 PROCEDURE — 99212 OFFICE O/P EST SF 10 MIN: CPT | Mod: ZF

## 2017-11-22 PROCEDURE — G0202 SCR MAMMO BI INCL CAD: HCPCS

## 2017-11-22 NOTE — MR AVS SNAPSHOT
After Visit Summary   11/22/2017    Radha Baca    MRN: [id number]           Patient Information     Date Of Birth          1955        Visit Information        Provider Department      11/22/2017 9:30 AM Mayra Leon APRN CNP [facility]        Today's Diagnoses     Visit for preventive health examination    -  1      Care Instructions      PREVENTIVE HEALTH RECOMMENDATIONS:   Most women need a yearly breast and pelvic exam.    A PAP screen, a test done DURING a pelvic exam, is NO longer recommended yearly.    March 2013, screening guidelines recommended by ACOG for PAP screen are:    1) First pap at age 21.    2) Pap every 3 years until age 30.    3) After age 30, pap every 3 years or Pap with HR HPV screen every 5 years until age 65.  4) Women do NOT need a vaginal Pap screen after a hysterectomy (surgical removal of the uterus) when they have not had cancer.    Exceptions:  1) Yearly pap if HIV+ or immunosuppressed secondary to organ transplant  2) SUNNY II-III continue routine screening for 20 years.    I encourage you continue looking for opportunities to choose a healthy lifestyle:       * Choose to eat a heart healthy diet. Check out the FOOD PLATE guidelines at: http://www.choosemyplate.gov/ for helpful hints on weight and cholesterol management.  Balance your caloric intake with exercise to maintain a BMI in the 22 to 26 range. For bone health: Eat calcium-rich foods like yogurt, broccoli or take chewable calcium pills (500 to 600 mg) twice a day with food.       * Exercise for at least an average of 30 minutes a day, 5 days of the week. This will help you control your weight, release stress, and help prevent disease.      * Take a Vitamin D3 supplement daily fall through spring and during summer unless you [id number]' full body sun exposure to skin without sunscreen.      * DO wear sunscreen to prevent skin cancer after the first 15-30 minutes.       * Identify stressors in your life, find ways to release the stress, and, make time for yourself. PLEASE ask for help if mood changes last longer than two weeks.     * Limit alcohol to one drink per day.  No smoking.  Avoid second hand smoke. If you smoke, ask for help to stop.       *  If you are in a sexual relationship, talk with your partner about possible infection risks and take action to protect yourself from exposure to a sexual infection.    Please request an infection screen for STIs (sexually transmitted infections) if you are less than age 26 OR believe that you may be at risk.     Get a flu shot each year. Get a tetanus shot every 10 years. EVERYONE needs a pertussis (Whooping cough) booster.    See your dentist twice a year for an exam and preventive care cleaning.     Consider the following screen tests:    1) cholesterol test every 5 years.     2) yearly mammogram after age 40 unless you have identified risks.    3) colonoscopy every 10 years after age 50 unless you have identified risks.    4) diabetes blood test screening if you are at risk for diabetes.      Additional information that you may also find helpful:  The Internet now gives us access to LOTS of information -- some of it helpful, research documented and also plenty of harmful, anecdotal information that may not pertain to your situtaion. Consider visiting the following websites for accurate health information:    www.vitamindcouncil.org/ : Info and ongoing research re Vitamin D    www.fairview.org : Up to date and easily searchable information on multiple topics.    www.medlineplus.gov : medication info, interactive tutorials, watch real surgeries online    www.cdc.gov : public health info, travel advisories, epidemics (H1N1)    www.sarah/std.org: current research re diagnosis, treatment and prevention of sexually contacted infections.    www.health.[facility].[place].us : [place] dept of heatlh, public health issues in [place], N1N1    www.familydoctor.org  : good info from the Academy of Family Physicians                Follow-ups after your visit        Follow-up notes from your care team     Return in 1 year (on 11/22/2018) for Preventative Health Visit.      Your next 10 appointments already scheduled     Jan 18, 2018 10:00 AM CST   (Arrive by 9:45 AM)   RETURN DIABETES with BRONSON Mann [facility] ([facility])    [address] [place] [address] [phone]            Apr 18, 2018  1:00 PM CDT   (Arrive by 12:45 PM)   RETURN DIABETES with MD MIKAYLA Singh [facility] ([facility])    [address] [place] [address] [phone]              Who to contact     Please call your clinic at [phone] to:    Ask questions about your health    Make or cancel appointments    Discuss your medicines    Learn about your test results    Speak to your doctor   If you have compliments or concerns about an experience at your clinic, or if you wish to file a complaint, please contact [facility] Patient Relations at [phone] or email us at [email or web address]         Additional Information About Your Visit        [employer] Information     [employer] gives you secure access to your electronic health record. If you see a primary care provider, you can also send messages to your care team and make appointments. If you have questions, please call your primary care clinic.  If you do not have a primary care provider, please call [phone] and they will assist you.      [employer] is an electronic gateway that provides easy, online access to your medical records. With [employer], you can request a clinic appointment, read your test results, renew a prescription or communicate with your care team.     To access your existing account, please contact your [facility]  "Clinic or call [phone] for assistance.        Care EveryWhere ID     This is your Care EveryWhere ID. This could be used by other organizations to access your [place] medical records  [id number]        Your Vitals Were     Pulse Height BMI (Body Mass Index)             78 1.626 m (5' 4.02\") 43.06 kg/m2          Blood Pressure from Last 3 Encounters:   11/22/17 116/72   11/15/17 125/68   10/18/17 118/72    Weight from Last 3 Encounters:   11/22/17 113.9 kg (251 lb)   10/18/17 114.5 kg (252 lb 8 oz)   07/19/17 112.5 kg (248 lb)              Today, you had the following     No orders found for display       Primary Care Provider Office Phone # Fax #    Mohan Moreno [phone]       [address] [place] [address]        Equal Access to Services     YESY NAJERA : Hadii mahogany koenigo Sohazel, waaxda luqadaha, qaybta kaalmada adekristineyada, mindy quan . So [facility] [phone].    ATENCIÓN: Si habla español, tiene a pitts disposición servicios gratuitos de asistencia lingüística. Lavell al [phone].    We comply with applicable federal civil rights laws and [place] laws. We do not discriminate on the basis of race, color, national origin, age, disability, sex, sexual orientation, or gender identity.            Thank you!     Thank you for choosing [facility]  for your care. Our goal is always to provide you with excellent care. Hearing back from our patients is one way we can continue to improve our services. Please take a few minutes to complete the written survey that you may receive in the mail after your visit with us. Thank you!             Your Updated Medication List - Protect others around you: Learn how to safely use, store and throw away your medicines at www.disposemymeds.org.          This list is accurate as of: 11/22/17 11:59 PM.  Always use your most recent med list.                   Brand Name Dispense Instructions " for use Diagnosis    Albuterol Sulfate 108 (90 BASE) MCG/ACT Aepb     1 each    Inhale 2 puffs into the lungs every 6 hours as needed    Viral URI with cough       amoxicillin 500 MG capsule    AMOXIL    4 capsule    Take 4 capsules (2 grams) 1 hour before dental work.    S/P joint replacement       aspirin 81 MG tablet     90 tablet    1 tab daily    Type 2 diabetes mellitus (H)       atorvastatin 20 MG tablet    LIPITOR    90 tablet    Take 1 tablet (20 mg) by mouth daily    Type 2 diabetes mellitus with complication, with long-term current use of insulin (H), Dyslipidemia       TIAGO CONTOUR test strip   Generic drug:  blood glucose monitoring     270 each    Use to test blood sugar 3 times daily or as directed.    Type 2 diabetes mellitus with complication (H)       blood glucose calibration NORMAL solution     1 Bottle    Use to calibrate blood glucose monitor as needed as directed.    DM type 2 (diabetes mellitus, type 2) (H)       blood glucose monitoring lancets     6 Box    Test 4-6 times daily 90 day supply refills x 3    Type 2 diabetes mellitus with complication (H)       calcium carbonate-vitamin D 500-400 MG-UNIT Tabs per tablet     90 tablet    Take 1 tablet by mouth daily    Type 2 diabetes mellitus (H)       cholecalciferol 1000 UNIT tablet    vitamin D3    100 tablet    Take 1 tablet by mouth daily.        EYE-ZAKIYA PLUS LUTEIN PO           insulin pen needle 31G X 8 MM    B-D U/F    400 each    Use  4 daily short 31 G X 8MM    Diabetes mellitus, type 2 (H)       LANTUS SOLOSTAR 100 UNIT/ML injection   Generic drug:  insulin glargine     70 mL    Inject 70 units SQ each am.    Type 2 diabetes mellitus with complication, with long-term current use of insulin (H)       latanoprost 0.005 % ophthalmic solution    XALATAN     Place 1 drop into both eyes At Bedtime        lisinopril-hydrochlorothiazide 20-12.5 MG per tablet    PRINZIDE/ZESTORETIC    90 tablet    Take 1 tablet by mouth daily    Essential  hypertension       MULTIvitamin  S Caps     90 capsule    Take 1 daily    Type 2 diabetes mellitus (H)       NovoLOG FLEXPEN 100 UNIT/ML injection   Generic drug:  insulin aspart     75 mL    Carb counting with meals approx 70-80 units daily    Type 2 diabetes mellitus with complication (H)       OMEGA-3 FISH OIL PO      Take 1,000 mg by mouth daily        order for DME     1 Units    Equipment being ordered: Grade 1 (light) compression stockings, below the knee    Venous (peripheral) insufficiency       RESTASIS 0.05 % ophthalmic emulsion   Generic drug:  cycloSPORINE           timolol 0.5 % ophthalmic solution    TIMOPTIC     Place 1 drop into both eyes 2 times daily        TYLENOL PO      Take by mouth as needed for mild pain or fever        vitamin  B Complex Caps      Take 1 tablet by mouth daily        VITAMIN C PO      Take 2,000 mg by mouth 2 times daily
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
## 2017-11-24 DIAGNOSIS — E11.9 DIABETES MELLITUS, TYPE 2 (H): ICD-10-CM

## 2017-11-24 NOTE — TELEPHONE ENCOUNTER
metformin     Last Written Prescription Date:  5/24/17  Last Fill Quantity: 360,   # refills: 1  Last Office Visit : 10/18/17  Future Office visit:  1/18/18  Creatinine   Date Value Ref Range Status   10/16/2017 0.59 0.52 - 1.04 mg/dL Final   ]

## 2017-12-11 NOTE — PATIENT INSTRUCTIONS

## 2017-12-15 DIAGNOSIS — E11.8 TYPE 2 DIABETES MELLITUS WITH COMPLICATION (H): ICD-10-CM

## 2017-12-15 DIAGNOSIS — E11.8 TYPE 2 DIABETES MELLITUS WITH COMPLICATION, WITH LONG-TERM CURRENT USE OF INSULIN (H): ICD-10-CM

## 2017-12-15 DIAGNOSIS — Z79.4 TYPE 2 DIABETES MELLITUS WITH COMPLICATION, WITH LONG-TERM CURRENT USE OF INSULIN (H): ICD-10-CM

## 2017-12-15 RX ORDER — INSULIN ASPART 100 [IU]/ML
INJECTION, SOLUTION INTRAVENOUS; SUBCUTANEOUS
Qty: 75 ML | Refills: 3 | Status: SHIPPED | OUTPATIENT
Start: 2017-12-15 | End: 2019-02-21

## 2017-12-29 DIAGNOSIS — E11.8 TYPE 2 DIABETES MELLITUS WITH COMPLICATION (H): ICD-10-CM

## 2018-01-07 DIAGNOSIS — E78.5 DYSLIPIDEMIA: ICD-10-CM

## 2018-01-07 DIAGNOSIS — E11.8 TYPE 2 DIABETES MELLITUS WITH COMPLICATION, WITH LONG-TERM CURRENT USE OF INSULIN (H): ICD-10-CM

## 2018-01-07 DIAGNOSIS — Z79.4 TYPE 2 DIABETES MELLITUS WITH COMPLICATION, WITH LONG-TERM CURRENT USE OF INSULIN (H): ICD-10-CM

## 2018-01-07 RX ORDER — ATORVASTATIN CALCIUM 20 MG/1
20 TABLET, FILM COATED ORAL DAILY
Qty: 90 TABLET | Refills: 3 | Status: SHIPPED | OUTPATIENT
Start: 2018-01-07 | End: 2018-12-15

## 2018-01-10 DIAGNOSIS — Z96.60 S/P JOINT REPLACEMENT: ICD-10-CM

## 2018-01-10 RX ORDER — AMOXICILLIN 500 MG/1
CAPSULE ORAL
Qty: 8 CAPSULE | Refills: 3 | Status: SHIPPED | OUTPATIENT
Start: 2018-01-10 | End: 2018-12-20

## 2018-01-15 NOTE — TELEPHONE ENCOUNTER
APPT INFO    Date /Time: 1/30/18 4:30PM   Reason for Appt: L Hand Issues and R Thumb   Ref Provider/Clinic: self   Are there internal records? Yes/No?  IF YES, list clinic names: Yes -     [facility] -- Dr. Kramer  [facility] -- Dr. Rosas  Imaging in pacs   Are there outside records? Yes/No? no   Patient Contact (Y/N) & Call Details: No - internal records   Action: Chart reviewed

## 2018-01-18 ENCOUNTER — OFFICE VISIT (OUTPATIENT)
Dept: ENDOCRINOLOGY | Facility: CLINIC | Age: 63
End: 2018-01-18
Payer: COMMERCIAL

## 2018-01-18 VITALS
DIASTOLIC BLOOD PRESSURE: 73 MMHG | WEIGHT: 250.3 LBS | SYSTOLIC BLOOD PRESSURE: 119 MMHG | BODY MASS INDEX: 42.73 KG/M2 | HEART RATE: 76 BPM | HEIGHT: 64 IN

## 2018-01-18 DIAGNOSIS — Z79.4 TYPE 2 DIABETES MELLITUS WITH DIABETIC NEUROPATHY, WITH LONG-TERM CURRENT USE OF INSULIN (H): Primary | ICD-10-CM

## 2018-01-18 DIAGNOSIS — E11.40 TYPE 2 DIABETES MELLITUS WITH DIABETIC NEUROPATHY, WITH LONG-TERM CURRENT USE OF INSULIN (H): Primary | ICD-10-CM

## 2018-01-18 LAB — HBA1C MFR BLD: 9.6 % (ref 4.3–6)

## 2018-01-18 NOTE — PATIENT INSTRUCTIONS
[facility] [phone]      To expedite your medication refill(s), please contact your pharmacy and have them fax a refill request to: [phone].  *Please allow 3 business days for routine medication refills.  *Please allow 5 business days for controlled substance medication refills.  --------------------  For scheduling appointments (including lab work), please request an appointment through Trak, or call: [phone].    For questions for your provider or the endocrine nurse, please send a Trak message.  For after-hours urgent issues, please dial [phone], and ask to speak with the Endocrinologist On-Call.  --------------------  Please Note: If you are active on Trak, all future test results will be sent by Trak message only and will no longer be sent by mail. You may also receive communication directly from your physician.

## 2018-01-18 NOTE — NURSING NOTE
"Chief Complaint   Patient presents with     RECHECK     DIABETES TYPE 2 F/U        Initial /73 (BP Location: Right arm, Patient Position: Sitting, Cuff Size: Adult Large)  Pulse 76  Ht 1.626 m (5' 4\")  Wt 113.5 kg (250 lb 4.8 oz)  BMI 42.96 kg/m2 Estimated body mass index is 42.96 kg/(m^2) as calculated from the following:    Height as of this encounter: 1.626 m (5' 4\").    Weight as of this encounter: 113.5 kg (250 lb 4.8 oz).  Medication Reconciliation: complete         Performed A1C test - patient tolerated well.    Geetha Nice, [facility]       "

## 2018-01-18 NOTE — LETTER
1/18/2018     RE: Radha Baca  [address] [address]     Dear Colleague,    Thank you for referring your patient, Radha Baca, to the [facility] ENDOCRINOLOGY at [facility]. Please see a copy of my visit note below.    HPI  Radha Baca is a 62 year old female with type 2 diabetes mellitus here today for a follow up visit.  She was last seen in our clinic by Dr. Hickey in 10/2017.  Her diabetes mellitus is complicated by retinopathy, nephropathy and neuropathy.  Her hx is also significant for HTN, hyperlipidemia and obesity.  For her diabetes, she is currently taking Metformin 1000 mg BID, Lantus 70 units SQ each am and Novolog 1unit/4 gms CHO with meals.  Pt's A1C is 9.6 % today. Her previous A1C was 9.5 %.  Dionicio admits to missing insulin doses approximately every other day.  She has no interest in using a GLP-1 or any other diabetic medications.  We downloaded her glucose meter today and her blood sugar values are in the 134-225 range.  Her blood sugars later in the day have been in the 200-300 range.  No hypoglycemia.  On ROS today, she reports family stress.  Her mother is now living with her and her .  She is still going to the gym for swim therapy, but less.  Dionicio reports right hand pain and she state she will be seen for this next week.  She has been having right leg/knee pain.  She denies blurred vision, n/v, SOB at rest, cough, chest pain, abd pain or diarrhea.  No dysuria, hematuria or foot ulcers.  Some numbness in both feet.    Diabetes Care  Retinopathy:yes; pt seen last year.  Nephropathy:yes; urine microalbuminuria + in Oct 2016 and negative in 10/2017. She is taking Lisinopril/HCTZ daily.  Neuropathy:yes.  Foot Exam:no ulcers.  Taking aspirin:yes.  Lipids:LDL 61 in 10/2017.  Pt is taking Lipitor.    ROS  Please see under HPI.    Allergies  Allergies   Allergen Reactions     Nkda [No Known Drug Allergies]         Medications  Current Outpatient Prescriptions   Medication Sig Dispense Refill     amoxicillin (AMOXIL) 500 MG capsule Take 4 capsules (2 grams) 1 hour before dental work. 8 capsule 3     atorvastatin (LIPITOR) 20 MG tablet Take 1 tablet (20 mg) by mouth daily 90 tablet 3     TIAGO CONTOUR test strip Use to test blood sugar 3 times daily or as directed. 300 each 3     NOVOLOG FLEXPEN 100 UNIT/ML soln Carb counting with meals approx 70-80 units daily 75 mL 3     insulin glargine (LANTUS SOLOSTAR) 100 UNIT/ML injection Inject 70 units SQ each am. 70 mL 3     metFORMIN (GLUCOPHAGE) 500 MG tablet Take 2 tablets (1,000 mg) by mouth 2 times daily (with meals) 360 tablet 3     blood glucose calibration (TIAGO CONTOUR) NORMAL solution Use to calibrate blood glucose monitor as needed as directed. 1 Bottle 11     cycloSPORINE (RESTASIS) 0.05 % ophthalmic emulsion        insulin pen needle (B-D U/F) 31G X 8 MM Use  4 daily short 31 G X 8MM 400 each 3     lisinopril-hydrochlorothiazide (PRINZIDE/ZESTORETIC) 20-12.5 MG per tablet Take 1 tablet by mouth daily 90 tablet 3     order for DME Equipment being ordered: Grade 1 (light) compression stockings, below the knee 1 Units 1     Acetaminophen (TYLENOL PO) Take by mouth as needed for mild pain or fever       Omega-3 Fatty Acids (OMEGA-3 FISH OIL PO) Take 1,000 mg by mouth daily       Albuterol Sulfate 108 (90 BASE) MCG/ACT AEPB Inhale 2 puffs into the lungs every 6 hours as needed 1 each 0     Multiple Vitamins-Minerals (EYE-ZAKIYA PLUS LUTEIN PO)        blood glucose monitoring (TIAGO MICROLET) lancets Test 4-6 times daily 90 day supply refills x 3 6 Box 3     latanoprost (XALATAN) 0.005 % ophthalmic solution Place 1 drop into both eyes At Bedtime       aspirin 81 MG tablet 1 tab daily 90 tablet 3     Multiple Vitamin (MULTIVITAMIN  S) CAPS Take 1 daily 90 capsule 3     calcium carbonate-vitamin D 500-400 MG-UNIT TABS tablt Take 1 tablet by mouth daily 90 tablet 3      timolol (TIMOPTIC) 0.5 % ophthalmic solution Place 1 drop into both eyes 2 times daily        cholecalciferol (VITAMIN D) 1000 UNIT tablet Take 1 tablet by mouth daily. 100 tablet 3     Ascorbic Acid (VITAMIN C PO) Take 2,000 mg by mouth 2 times daily        B Complex Vitamins (VITAMIN  B COMPLEX) CAPS Take 1 tablet by mouth daily          Family History  family history includes CANCER in her maternal aunt; DIABETES (age of onset: 45) in her sister; DIABETES (age of onset: 50) in her brother; DIABETES (age of onset: 55) in her father; Hypertension in her brother; Hypertension (age of onset: 41) in her sister; Hypertension (age of onset: 79) in her mother; Thyroid Disease (age of onset: 45) in her sister. There is no history of CEREBROVASCULAR DISEASE, Breast Cancer, Cancer - colorectal, Prostate Cancer, or Alcohol/Drug.    Social History  Smoke: none.  ETOH: rare.    Past Medical History  Past Medical History:   Diagnosis Date     Arthritis      Arthritis of knee 4/4/2013     Arthritis of shoulder region, right 4/18/2014     Arthritis of shoulder region, right 4/18/2014     Finger pain 4/2/2015     Headache(784.0)     decreased with mouth guard use.     Hypercholesteremia      Hypertension      Low back pain      Menarche age 10+    cycles q mo x 4-5 d     Pain in knee joint     LEFT     Right bundle branch block     per H/P     SNHL (sensorineural hearing loss)      Umbilical hernia without mention of obstruction or gangrene 8/2014     Past Surgical History:   Procedure Laterality Date     ARTHROPLASTY KNEE  4/4/2013    Left Total Knee Arthroplasty;  Surgeon: Lou Byrne MD;  [facility]     ARTHROPLASTY MINIMALLY INVASIVE KNEE  8/22/2011    R knee :CAITLYN JACOBO, Left age 14     DENTAL SURGERY      root canals, wisdom teeth     EXAM UNDER ANESTHESIA, MANIPULATE JOINT (LOCATION)  10/5/2012    Procedure: EXAM UNDER ANESTHESIA, MANIPULATE JOINT (LOCATION);  Right Knee Mini Open Lysis Of Adhesions,  "Left Knee Steroid Injection  ;  Surgeon: Lou Byrne MD;  [facility]     [facility] OR OCULAR DEVICE INTRAOP DETACHED RETINA  2009    R     [facility] PHAKIC IOL - IMPLANT FROM SURGEON      right     KNEE SURGERY  1969    left     LASER YAG CAPSULOTOMY  12/2016    left     VITRECTOMY PARSPLANA  2010       Physical Exam  /73 (BP Location: Right arm, Patient Position: Sitting, Cuff Size: Adult Large)  Pulse 76  Ht 1.626 m (5' 4\")  Wt 113.5 kg (250 lb 4.8 oz)  BMI 42.96 kg/m2  Body mass index is 42.96 kg/(m^2).    GENERAL : In no apparent distress.  SKIN: No rash.  EYES: PERRLA.  Fundi not examined.  MOUTH: Moist.  NECK: No goiter.  RESP: Lungs clear to auscultation.  CARDIAC: RRR.  ABDOMEN: Large and nontender.      NEURO: awake, alert, responds appropriately to questions.     EXTREMITIES:Trace pretibial edema.  FEET:  No ulcers.    RESULTS  Creatinine   Date Value Ref Range Status   10/16/2017 0.59 0.52 - 1.04 mg/dL Final     GFR Estimate   Date Value Ref Range Status   10/16/2017 >90 >60 mL/min/1.7m2 Final     Comment:     Non  GFR Calc     Hemoglobin A1C   Date Value Ref Range Status   04/10/2017 10.2 (H) 4.3 - 6.0 % Final     Potassium   Date Value Ref Range Status   10/16/2017 4.9 3.4 - 5.3 mmol/L Final     ALT   Date Value Ref Range Status   10/16/2017 49 0 - 50 U/L Final     AST   Date Value Ref Range Status   04/14/2008 39 0 - 45 U/L Final     TSH   Date Value Ref Range Status   10/16/2017 3.05 0.40 - 4.00 mU/L Final       Cholesterol   Date Value Ref Range Status   04/18/2016 126 <200 mg/dL Final   01/09/2014 153 0 - 200 mg/dL Final     Comment:     LDL Cholesterol is the primary guide to therapy.   The NCEP recommends further evaluation of: patients with cholesterol greater   than 200 mg/dL if additional risk factors are present, cholesterol greater   than   240 mg/dL, triglycerides greater than 150 mg/dL, or HDL less than 40 mg/dL.     HDL Cholesterol   Date Value Ref Range Status "   04/18/2016 43 (L) >49 mg/dL Final   01/09/2014 37 (L) 50 - 110 mg/dL Final     LDL Cholesterol Calculated   Date Value Ref Range Status   04/18/2016 55 <100 mg/dL Final     Comment:     Desirable:       <100 mg/dl   01/09/2014 86 0 - 129 mg/dL Final     Comment:     LDL Cholesterol is the primary guide to therapy: LDL-cholesterol goal in high   risk patients is <100 mg/dL and in very high risk patients is <70 mg/dL.     LDL Cholesterol Direct   Date Value Ref Range Status   10/16/2017 61 <100 mg/dL Final     Comment:     Desirable:       <100 mg/dl   04/10/2017 44 <100 mg/dL Final     Comment:     Desirable:       <100 mg/dl     Triglycerides   Date Value Ref Range Status   04/18/2016 140 <150 mg/dL Final   01/09/2014 152 (H) 0 - 150 mg/dL Final     Cholesterol/HDL Ratio   Date Value Ref Range Status   01/09/2014 4.2 0.0 - 5.0 Final   05/13/2013 4.0 0.0 - 5.0 Final       Lab Results   Component Value Date    A1C 10.2 04/10/2017    A1C 9.8 10/17/2016    A1C 10.6 04/18/2016    A1C 9.9 07/20/2015    A1C 9.4 01/09/2014     A1C   9.5 %  10/18/2017  A1C   9.6 % today.      ASSESSMENT/PLAN:    1.  TYPE 2 DIABETES MELLITUS:  Uncontrolled type 2 diabetes mellitus complicated by retinopathy, nephropathy and neuropathy.  Again I discussed adding a GLP-1 such as Trulicity once a week with patient today, but she has no interest in taking a GLP-1 or any other new diabetic medications.  She also refused to have her insulin doses increased today.  She is willing to be seen in [facility] and we provided her with the [facility] phone number to schedule an appointment.  Reminded her to take her insulin as prescribed daily and to take her insulin at the time of the meal or snack.   be sure to take her Novolog at the onset of the meals.  She is taking ASA daily.    2.  RETINOPATHY: Pt seen by [facility] last year.    3.  NEPHROPATHY: Pt's creat was 0.59 with GFR > 90 mL/min in 10/2017.  Pt is taking Lisinopril/HCTZ daily.    4.  NEUROPATHY:  Farhan.    5.  OBESITY: Pt is not interested in using a GLP-1 at this time as above.  She plans to attend the gym more frequently, make healthy food choices and schedule an appointment with the [facility] staff.    6.  Return to Endocrine Clinic to see Dr. Lowery in April 2018.    Again, thank you for allowing me to participate in the care of your patient.      Sincerely,    Marcella Maria PA-C

## 2018-01-18 NOTE — MR AVS SNAPSHOT
After Visit Summary   1/18/2018    Radha Baca    MRN: [id number]           Patient Information     Date Of Birth          1955        Visit Information        Provider Department      1/18/2018 10:00 AM Marcella Maria PA-C [facility]        Care Instructions    Weight [facility] [phone]      To expedite your medication refill(s), please contact your pharmacy and have them fax a refill request to: [phone].  *Please allow 3 business days for routine medication refills.  *Please allow 5 business days for controlled substance medication refills.  --------------------  For scheduling appointments (including lab work), please request an appointment through [employer], or call: [phone].    For questions for your provider or the endocrine nurse, please send a [employer] message.  For after-hours urgent issues, please dial [phone], and ask to speak with the Endocrinologist On-Call.  --------------------  Please Note: If you are active on [employer], all future test results will be sent by [employer] message only and will no longer be sent by mail. You may also receive communication directly from your physician.            Follow-ups after your visit        Your next 10 appointments already scheduled     Jan 30, 2018  4:30 PM CST   (Arrive by 4:15 PM)   NEW HAND with Manisha Galicia MD   [facility] ([facility])    [address] [place] [address] [id number] [phone]            Mar 20, 2018  3:15 PM CDT   (Arrive by 3:00 PM)   RETURN KNEE with Lou Byrne MD   [facility] ([facility])    [address] [place] [address] [id number] [phone]            Apr 18, 2018  1:00 PM CDT   (Arrive by 12:45 PM)   RETURN DIABETES with Demar Hickey MD   [facility] ([facility])    [address] [place] [address]  "[address] [facility] [id number] [phone]              Who to contact     Please call your clinic at [phone] to:    Ask questions about your health    Make or cancel appointments    Discuss your medicines    Learn about your test results    Speak to your doctor   If you have compliments or concerns about an experience at your clinic, or if you wish to file a complaint, please contact [facility] Patient Relations at [phone] or email us at [email or web address]         Additional Information About Your Visit        L[employer]ty [employer] Information     [employer] gives you secure access to your electronic health record. If you see a primary care provider, you can also send messages to your care team and make appointments. If you have questions, please call your primary care clinic.  If you do not have a primary care provider, please call [phone] and they will assist you.      [employer] is an electronic gateway that provides easy, online access to your medical records. With [employer], you can request a clinic appointment, read your test results, renew a prescription or communicate with your care team.     To access your existing account, please contact your [facility] Clinic or call [phone] for assistance.        Care EveryWhere ID     This is your Care EveryWhere ID. This could be used by other organizations to access your [place] medical records  [id number]        Your Vitals Were     Pulse Height BMI (Body Mass Index)             76 1.626 m (5' 4\") 42.96 kg/m2          Blood Pressure from Last 3 Encounters:   01/18/18 119/73   11/22/17 116/72   11/15/17 125/68    Weight from Last 3 Encounters:   01/18/18 113.5 kg (250 lb 4.8 oz)   11/22/17 113.9 kg (251 lb)   10/18/17 114.5 kg (252 lb 8 oz)              Today, you had the following     No orders found for display       Primary Care Provider Office Phone # Fax #    Mohan Moreno MD " [phone]       [address]   [facility] [address]        Equal Access to Services     YESY NAJERA : Hadii aad ku hadgilivan Ceci, naida maddiemary joha, leland doryromelia penniebrody, mindy michellein hayaan penniekristine alves laAdiliadianelys fam. So [facility] [phone].    ATENCIÓN: Si habla español, tiene a pitts disposición servicios gratuitos de asistencia lingüística. Priceame al [phone].    We comply with applicable federal civil rights laws and [place] laws. We do not discriminate on the basis of race, color, national origin, age, disability, sex, sexual orientation, or gender identity.            Thank you!     Thank you for choosing [facility]  for your care. Our goal is always to provide you with excellent care. Hearing back from our patients is one way we can continue to improve our services. Please take a few minutes to complete the written survey that you may receive in the mail after your visit with us. Thank you!             Your Updated Medication List - Protect others around you: Learn how to safely use, store and throw away your medicines at www.disposemymeds.org.          This list is accurate as of: 1/18/18 10:22 AM.  Always use your most recent med list.                   Brand Name Dispense Instructions for use Diagnosis    Albuterol Sulfate 108 (90 BASE) MCG/ACT Aepb     1 each    Inhale 2 puffs into the lungs every 6 hours as needed    Viral URI with cough       amoxicillin 500 MG capsule    AMOXIL    8 capsule    Take 4 capsules (2 grams) 1 hour before dental work.    S/P joint replacement       aspirin 81 MG tablet     90 tablet    1 tab daily    Type 2 diabetes mellitus (H)       atorvastatin 20 MG tablet    LIPITOR    90 tablet    Take 1 tablet (20 mg) by mouth daily    Dyslipidemia       TIAGO CONTOUR test strip   Generic drug:  blood glucose monitoring     300 each    Use to test blood sugar 3 times daily or as directed.    Type 2 diabetes mellitus with complication (H)        blood glucose calibration NORMAL solution     1 Bottle    Use to calibrate blood glucose monitor as needed as directed.    DM type 2 (diabetes mellitus, type 2) (H)       blood glucose monitoring lancets     6 Box    Test 4-6 times daily 90 day supply refills x 3    Type 2 diabetes mellitus with complication (H)       calcium carbonate-vitamin D 500-400 MG-UNIT Tabs per tablet     90 tablet    Take 1 tablet by mouth daily    Type 2 diabetes mellitus (H)       cholecalciferol 1000 UNIT tablet    vitamin D3    100 tablet    Take 1 tablet by mouth daily.        EYE-ZAKIYA PLUS LUTEIN PO           insulin glargine 100 UNIT/ML injection    LANTUS SOLOSTAR    70 mL    Inject 70 units SQ each am.    Type 2 diabetes mellitus with complication, with long-term current use of insulin (H)       insulin pen needle 31G X 8 MM    B-D U/F    400 each    Use  4 daily short 31 G X 8MM    Diabetes mellitus, type 2 (H)       latanoprost 0.005 % ophthalmic solution    XALATAN     Place 1 drop into both eyes At Bedtime        lisinopril-hydrochlorothiazide 20-12.5 MG per tablet    PRINZIDE/ZESTORETIC    90 tablet    Take 1 tablet by mouth daily    Essential hypertension       metFORMIN 500 MG tablet    GLUCOPHAGE    360 tablet    Take 2 tablets (1,000 mg) by mouth 2 times daily (with meals)    Diabetes mellitus, type 2 (H)       MULTIvitamin  S Caps     90 capsule    Take 1 daily    Type 2 diabetes mellitus (H)       NovoLOG FLEXPEN 100 UNIT/ML injection   Generic drug:  insulin aspart     75 mL    Carb counting with meals approx 70-80 units daily    Type 2 diabetes mellitus with complication (H)       OMEGA-3 FISH OIL PO      Take 1,000 mg by mouth daily        order for DME     1 Units    Equipment being ordered: Grade 1 (light) compression stockings, below the knee    Venous (peripheral) insufficiency       RESTASIS 0.05 % ophthalmic emulsion   Generic drug:  cycloSPORINE           timolol 0.5 % ophthalmic solution    TIMOPTIC     Place  1 drop into both eyes 2 times daily        TYLENOL PO      Take by mouth as needed for mild pain or fever        vitamin  B Complex Caps      Take 1 tablet by mouth daily        VITAMIN C PO      Take 2,000 mg by mouth 2 times daily

## 2018-01-18 NOTE — PROGRESS NOTES
HPI  Radha Baca is a 62 year old female with type 2 diabetes mellitus here today for a follow up visit.  She was last seen in our clinic by Dr. Hickey in 10/2017.  Her diabetes mellitus is complicated by retinopathy, nephropathy and neuropathy.  Her hx is also significant for HTN, hyperlipidemia and obesity.  For her diabetes, she is currently taking Metformin 1000 mg BID, Lantus 70 units SQ each am and Novolog 1unit/4 gms CHO with meals.  Pt's A1C is 9.6 % today. Her previous A1C was 9.5 %.  Dionicio admits to missing insulin doses approximately every other day.  She has no interest in using a GLP-1 or any other diabetic medications.  We downloaded her glucose meter today and her blood sugar values are in the 134-225 range.  Her blood sugars later in the day have been in the 200-300 range.  No hypoglycemia.  On ROS today, she reports family stress.  Her mother is now living with her and her .  She is still going to the gym for swim therapy, but less.  Dionicio reports right hand pain and she state she will be seen for this next week.  She has been having right leg/knee pain.  She denies blurred vision, n/v, SOB at rest, cough, chest pain, abd pain or diarrhea.  No dysuria, hematuria or foot ulcers.  Some numbness in both feet.    Diabetes Care  Retinopathy:yes; pt seen last year.  Nephropathy:yes; urine microalbuminuria + in Oct 2016 and negative in 10/2017. She is taking Lisinopril/HCTZ daily.  Neuropathy:yes.  Foot Exam:no ulcers.  Taking aspirin:yes.  Lipids:LDL 61 in 10/2017.  Pt is taking Lipitor.    ROS  Please see under HPI.    Allergies  Allergies   Allergen Reactions     Nkda [No Known Drug Allergies]        Medications  Current Outpatient Prescriptions   Medication Sig Dispense Refill     amoxicillin (AMOXIL) 500 MG capsule Take 4 capsules (2 grams) 1 hour before dental work. 8 capsule 3     atorvastatin (LIPITOR) 20 MG tablet Take 1 tablet (20 mg) by mouth daily 90 tablet 3     TIAGO CONTOUR test  strip Use to test blood sugar 3 times daily or as directed. 300 each 3     NOVOLOG FLEXPEN 100 UNIT/ML soln Carb counting with meals approx 70-80 units daily 75 mL 3     insulin glargine (LANTUS SOLOSTAR) 100 UNIT/ML injection Inject 70 units SQ each am. 70 mL 3     metFORMIN (GLUCOPHAGE) 500 MG tablet Take 2 tablets (1,000 mg) by mouth 2 times daily (with meals) 360 tablet 3     blood glucose calibration (TIAGO CONTOUR) NORMAL solution Use to calibrate blood glucose monitor as needed as directed. 1 Bottle 11     cycloSPORINE (RESTASIS) 0.05 % ophthalmic emulsion        insulin pen needle (B-D U/F) 31G X 8 MM Use  4 daily short 31 G X 8MM 400 each 3     lisinopril-hydrochlorothiazide (PRINZIDE/ZESTORETIC) 20-12.5 MG per tablet Take 1 tablet by mouth daily 90 tablet 3     order for DME Equipment being ordered: Grade 1 (light) compression stockings, below the knee 1 Units 1     Acetaminophen (TYLENOL PO) Take by mouth as needed for mild pain or fever       Omega-3 Fatty Acids (OMEGA-3 FISH OIL PO) Take 1,000 mg by mouth daily       Albuterol Sulfate 108 (90 BASE) MCG/ACT AEPB Inhale 2 puffs into the lungs every 6 hours as needed 1 each 0     Multiple Vitamins-Minerals (EYE-ZAKIYA PLUS LUTEIN PO)        blood glucose monitoring (TIAGO MICROLET) lancets Test 4-6 times daily 90 day supply refills x 3 6 Box 3     latanoprost (XALATAN) 0.005 % ophthalmic solution Place 1 drop into both eyes At Bedtime       aspirin 81 MG tablet 1 tab daily 90 tablet 3     Multiple Vitamin (MULTIVITAMIN  S) CAPS Take 1 daily 90 capsule 3     calcium carbonate-vitamin D 500-400 MG-UNIT TABS tablt Take 1 tablet by mouth daily 90 tablet 3     timolol (TIMOPTIC) 0.5 % ophthalmic solution Place 1 drop into both eyes 2 times daily        cholecalciferol (VITAMIN D) 1000 UNIT tablet Take 1 tablet by mouth daily. 100 tablet 3     Ascorbic Acid (VITAMIN C PO) Take 2,000 mg by mouth 2 times daily        B Complex Vitamins (VITAMIN  B COMPLEX) CAPS Take  1 tablet by mouth daily          Family History  family history includes CANCER in her maternal aunt; DIABETES (age of onset: 45) in her sister; DIABETES (age of onset: 50) in her brother; DIABETES (age of onset: 55) in her father; Hypertension in her brother; Hypertension (age of onset: 41) in her sister; Hypertension (age of onset: 79) in her mother; Thyroid Disease (age of onset: 45) in her sister. There is no history of CEREBROVASCULAR DISEASE, Breast Cancer, Cancer - colorectal, Prostate Cancer, or Alcohol/Drug.    Social History  Smoke: none.  ETOH: rare.    Past Medical History  Past Medical History:   Diagnosis Date     Arthritis      Arthritis of knee 4/4/2013     Arthritis of shoulder region, right 4/18/2014     Arthritis of shoulder region, right 4/18/2014     Finger pain 4/2/2015     Headache(784.0)     decreased with mouth guard use.     Hypercholesteremia      Hypertension      Low back pain      Menarche age 10+    cycles q mo x 4-5 d     Pain in knee joint     LEFT     Right bundle branch block     per H/P     SNHL (sensorineural hearing loss)      Umbilical hernia without mention of obstruction or gangrene 8/2014     Past Surgical History:   Procedure Laterality Date     ARTHROPLASTY KNEE  4/4/2013    Left Total Knee Arthroplasty;  Surgeon: Lou Byrne MD;  [facility]     ARTHROPLASTY MINIMALLY INVASIVE KNEE  8/22/2011    R knee :CAITLYN JACOBO, Left age 14     DENTAL SURGERY      root canals, wisdom teeth     EXAM UNDER ANESTHESIA, MANIPULATE JOINT (LOCATION)  10/5/2012    Procedure: EXAM UNDER ANESTHESIA, MANIPULATE JOINT (LOCATION);  Right Knee Mini Open Lysis Of Adhesions, Left Knee Steroid Injection  ;  Surgeon: Lou Byrne MD;  [facility]      OR OCULAR DEVICE INTRAOP DETACHED RETINA  2009    R     HC PHAKIC IOL - IMPLANT FROM SURGEON      right     KNEE SURGERY  1969    left     LASER YAG CAPSULOTOMY  12/2016    left     VITRECTOMY PARSPLANA  2010  "      Physical Exam  /73 (BP Location: Right arm, Patient Position: Sitting, Cuff Size: Adult Large)  Pulse 76  Ht 1.626 m (5' 4\")  Wt 113.5 kg (250 lb 4.8 oz)  BMI 42.96 kg/m2  Body mass index is 42.96 kg/(m^2).    GENERAL : In no apparent distress.  SKIN: No rash.  EYES: PERRLA.  Fundi not examined.  MOUTH: Moist.  NECK: No goiter.  RESP: Lungs clear to auscultation.  CARDIAC: RRR.  ABDOMEN: Large and nontender.      NEURO: awake, alert, responds appropriately to questions.     EXTREMITIES:Trace pretibial edema.  FEET:  No ulcers.    RESULTS  Creatinine   Date Value Ref Range Status   10/16/2017 0.59 0.52 - 1.04 mg/dL Final     GFR Estimate   Date Value Ref Range Status   10/16/2017 >90 >60 mL/min/1.7m2 Final     Comment:     Non  GFR Calc     Hemoglobin A1C   Date Value Ref Range Status   04/10/2017 10.2 (H) 4.3 - 6.0 % Final     Potassium   Date Value Ref Range Status   10/16/2017 4.9 3.4 - 5.3 mmol/L Final     ALT   Date Value Ref Range Status   10/16/2017 49 0 - 50 U/L Final     AST   Date Value Ref Range Status   04/14/2008 39 0 - 45 U/L Final     TSH   Date Value Ref Range Status   10/16/2017 3.05 0.40 - 4.00 mU/L Final       Cholesterol   Date Value Ref Range Status   04/18/2016 126 <200 mg/dL Final   01/09/2014 153 0 - 200 mg/dL Final     Comment:     LDL Cholesterol is the primary guide to therapy.   The NCEP recommends further evaluation of: patients with cholesterol greater   than 200 mg/dL if additional risk factors are present, cholesterol greater   than   240 mg/dL, triglycerides greater than 150 mg/dL, or HDL less than 40 mg/dL.     HDL Cholesterol   Date Value Ref Range Status   04/18/2016 43 (L) >49 mg/dL Final   01/09/2014 37 (L) 50 - 110 mg/dL Final     LDL Cholesterol Calculated   Date Value Ref Range Status   04/18/2016 55 <100 mg/dL Final     Comment:     Desirable:       <100 mg/dl   01/09/2014 86 0 - 129 mg/dL Final     Comment:     LDL Cholesterol is the primary " guide to therapy: LDL-cholesterol goal in high   risk patients is <100 mg/dL and in very high risk patients is <70 mg/dL.     LDL Cholesterol Direct   Date Value Ref Range Status   10/16/2017 61 <100 mg/dL Final     Comment:     Desirable:       <100 mg/dl   04/10/2017 44 <100 mg/dL Final     Comment:     Desirable:       <100 mg/dl     Triglycerides   Date Value Ref Range Status   04/18/2016 140 <150 mg/dL Final   01/09/2014 152 (H) 0 - 150 mg/dL Final     Cholesterol/HDL Ratio   Date Value Ref Range Status   01/09/2014 4.2 0.0 - 5.0 Final   05/13/2013 4.0 0.0 - 5.0 Final       Lab Results   Component Value Date    A1C 10.2 04/10/2017    A1C 9.8 10/17/2016    A1C 10.6 04/18/2016    A1C 9.9 07/20/2015    A1C 9.4 01/09/2014     A1C   9.5 %  10/18/2017  A1C   9.6 % today.      ASSESSMENT/PLAN:    1.  TYPE 2 DIABETES MELLITUS:  Uncontrolled type 2 diabetes mellitus complicated by retinopathy, nephropathy and neuropathy.  Again I discussed adding a GLP-1 such as Trulicity once a week with patient today, but she has no interest in taking a GLP-1 or any other new diabetic medications.  She also refused to have her insulin doses increased today.  She is willing to be seen in [facility] and we provided her with the [facility] phone number to schedule an appointment.  Reminded her to take her insulin as prescribed daily and to take her insulin at the time of the meal or snack.   be sure to take her Novolog at the onset of the meals.  She is taking ASA daily.    2.  RETINOPATHY: Pt seen by [facility] last year.    3.  NEPHROPATHY: Pt's creat was 0.59 with GFR > 90 mL/min in 10/2017.  Pt is taking Lisinopril/HCTZ daily.    4.  NEUROPATHY: Stable.    5.  OBESITY: Pt is not interested in using a GLP-1 at this time as above.  She plans to attend the gym more frequently, make healthy food choices and schedule an appointment with the [facility] staff.    6.  Return to Endocrine Clinic to see Dr. Lowery in April 2018.

## 2018-01-23 DIAGNOSIS — M79.641 PAIN OF RIGHT HAND: Primary | ICD-10-CM

## 2018-01-23 ASSESSMENT — ENCOUNTER SYMPTOMS
BACK PAIN: 1
NIGHT SWEATS: 0
WEAKNESS: 0
SYNCOPE: 0
WEIGHT GAIN: 1
SLEEP DISTURBANCES DUE TO BREATHING: 0
NECK PAIN: 1
NUMBNESS: 1
LIGHT-HEADEDNESS: 0
BOWEL INCONTINENCE: 0
PALPITATIONS: 0
HOARSE VOICE: 0
POLYPHAGIA: 1
EYE IRRITATION: 1
CHILLS: 0
LOSS OF CONSCIOUSNESS: 0
PARALYSIS: 0
MUSCLE WEAKNESS: 1
DECREASED CONCENTRATION: 0
ORTHOPNEA: 0
BLOATING: 1
SINUS CONGESTION: 0
HYPOTENSION: 0
HALLUCINATIONS: 0
ALTERED TEMPERATURE REGULATION: 0
SPEECH CHANGE: 0
FATIGUE: 1
POOR WOUND HEALING: 0
SORE THROAT: 0
SINUS PAIN: 0
ARTHRALGIAS: 1
LEG PAIN: 0
SKIN CHANGES: 0
DIARRHEA: 1
FEVER: 0
CONSTIPATION: 1
NAIL CHANGES: 1
DISTURBANCES IN COORDINATION: 0
HYPERTENSION: 1
NAUSEA: 0
EYE REDNESS: 0
HEADACHES: 0
MUSCLE CRAMPS: 1
ABDOMINAL PAIN: 1
MEMORY LOSS: 0
TASTE DISTURBANCE: 0
SMELL DISTURBANCE: 0
TREMORS: 0
DIZZINESS: 1
POLYDIPSIA: 0
INCREASED ENERGY: 1
PANIC: 0
BLOOD IN STOOL: 0
DECREASED APPETITE: 0
TROUBLE SWALLOWING: 0
VOMITING: 0
DOUBLE VISION: 0
NERVOUS/ANXIOUS: 1
EYE PAIN: 0
EXERCISE INTOLERANCE: 0
DEPRESSION: 1
JAUNDICE: 0
HEARTBURN: 0
WEIGHT LOSS: 0
JOINT SWELLING: 1
MYALGIAS: 1
SEIZURES: 0
INSOMNIA: 1
STIFFNESS: 1
RECTAL PAIN: 0
NECK MASS: 0
EYE WATERING: 0
TINGLING: 1

## 2018-01-24 ENCOUNTER — RADIANT APPOINTMENT (OUTPATIENT)
Dept: GENERAL RADIOLOGY | Facility: CLINIC | Age: 63
End: 2018-01-24
Attending: ORTHOPAEDIC SURGERY
Payer: COMMERCIAL

## 2018-01-24 ENCOUNTER — OFFICE VISIT (OUTPATIENT)
Dept: ORTHOPEDICS | Facility: CLINIC | Age: 63
End: 2018-01-24
Payer: COMMERCIAL

## 2018-01-24 VITALS — HEIGHT: 64 IN | BODY MASS INDEX: 43.09 KG/M2 | WEIGHT: 252.4 LBS

## 2018-01-24 DIAGNOSIS — M19.049 CMC ARTHRITIS: Primary | ICD-10-CM

## 2018-01-24 DIAGNOSIS — M79.641 PAIN OF RIGHT HAND: ICD-10-CM

## 2018-01-24 PROBLEM — M18.11 PRIMARY OSTEOARTHRITIS OF FIRST CARPOMETACARPAL JOINT OF RIGHT HAND: Status: ACTIVE | Noted: 2018-01-24

## 2018-01-24 NOTE — NURSING NOTE
"Reason For Visit:   Chief Complaint   Patient presents with     Hand Pain     pt states she is having trouble grasping and squeezing and pinching things with her right hand pt states it's been happening for a couple of weeks.       Primary MD: Mohan Moreno  Ref. MD: Self    ?  No    Age: 62 year old    Occupation:None.  Currently working? No.  Work status?  unemployed.  Smoker: No  Request smoking cessation information: No      Ht 1.62 m (5' 3.78\")  Wt 114.5 kg (252 lb 6.4 oz)  BMI 43.62 kg/m2      Pain Assessment  Patient Currently in Pain: Yes  0-10 Pain Scale: 7  Primary Pain Location: Hand  Pain Orientation: Right    Hand Dominance Evaluation  Hand Dominance: Right          QuickDASH Assessment  QuickDASH Main 1/23/2018   1.Open a tight or new jar. Moderate difficulty   2. Do heavy household chores (e.g., wash walls, floors) Moderate difficulty   3. Carry a shopping bag or briefcase. Moderate difficulty   4. Wash your back. Moderate difficulty   5. Use a knife to cut food. Mild difficulty   6. Recreational activities in which you take some force or impact through your arm, shoulder or hand (e.g., golf, hammering, tennis, etc.). Moderate difficulty   7. During the past week, to what extent has your arm, shoulder or hand problem interfered with your normal social activities with family, friends, neighbours or groups? Moderately   8. During the past week, were you limited in your work or other regular daily activities as a result of your arm, shoulder or hand problem? Moderately limited   9. Arm, shoulder or hand pain. Moderate   10.Tingling (pins and needles) in your arm,shoulder or hand. Mild   11. During the past week, how much difficulty have you had sleeping because of the pain in your arm, shoulder or hand? ([place] number) Moderate difficulty   Quickdash Ability Score 45.45   1. Open a tight or new jar. -   2. Do heavy household chores (e.g., wash walls, floors). -   3. Carry a shopping bag or " briefcase. -   4. Wash your back. -   5. Use a knife to cut food. -   6. Recreational activities in which you take some force or impact through your arm, shoulder or hand (e.g., golf, hammering, tennis, etc.). -   7. During the past week, to what extent has your arm, shoulder or hand problem interfered with your normal social activities with family, friends, neighbours or groups? -   8. During the past week, were you limited in your work or other regular daily activities as a result of your arm, shoulder or hand problem? -   9. Arm, shoulder or hand pain. -   10. Tingling (pins and needles) in your arm,shoulder or hand. -   11. During the past week, how much difficulty have you had sleeping because of the pain in your arm, shoulder or hand? ([place] number) -   SUM: -          Allergies   Allergen Reactions     Nkda [No Known Drug Allergies]        Dennis Engel CMA

## 2018-01-24 NOTE — LETTER
1/24/2018       RE: Radha Baca  [address] [id number]     Dear Colleague,    Thank you for referring your patient, Radha Baca, to the [facility] at [facility]. Please see a copy of my visit note below.        [facility], Orthopaedic Surgery Consultation    Radha Baca MRN# [id number]   Age: 62 year old YOB: 1955     Requesting physician: Referred Self     Date of Service: Jan 24, 2018    Chief Complaint:   Chief Complaint   Patient presents with     Hand Pain     pt states she is having trouble grasping and squeezing and pinching things with her right hand pt states it's been happening for a couple of weeks.       History of Present Illness: Radha Baca is a 62 year old, right handed female who presents today for further evaluation of right basilar thumb pain. Patient reports pain began partially one-month ago after she was using her right upper extremity to cut a large amount of paper. Since that time she has had pain at the base of her thumb which has been progressive. She has been using ibuprofen as well as performing her own aquatic therapy for her thumb. She has been taking ibuprofen, which she finds helpful. She has difficulty with turning keys and opening jars. Patient denies any previous injury or discomfort.    Review of Systems: A 14-point review of systems was obtained on intake reviewed. It is included at the bottom of this note.     Past Medical History:   Diagnosis Date     Arthritis      Arthritis of knee 4/4/2013     Arthritis of shoulder region, right 4/18/2014     Arthritis of shoulder region, right 4/18/2014     Back injury      Depressive disorder      Diabetes (H)      Finger pain 4/2/2015     Headache(784.0)     decreased with mouth guard use.     Hypercholesteremia      Hypertension      Low back pain      Menarche age 10+    cycles q mo x 4-5 d     Neck  injuries      Pain in knee joint     LEFT     Right bundle branch block     per H/P     SNHL (sensorineural hearing loss)      Umbilical hernia without mention of obstruction or gangrene 8/2014         Past Surgical History:   Procedure Laterality Date     ARTHROPLASTY KNEE  4/4/2013    Left Total Knee Arthroplasty;  Surgeon: Lou Byrne MD;  [facility]     ARTHROPLASTY MINIMALLY INVASIVE KNEE  8/22/2011    R knee :CAITLYN JACOBO, Left age 14     DENTAL SURGERY      root canals, wisdom teeth     EXAM UNDER ANESTHESIA, MANIPULATE JOINT (LOCATION)  10/5/2012    Procedure: EXAM UNDER ANESTHESIA, MANIPULATE JOINT (LOCATION);  Right Knee Mini Open Lysis Of Adhesions, Left Knee Steroid Injection  ;  Surgeon: Lou Byrne MD;  [facility]     HC OR OCULAR DEVICE INTRAOP DETACHED RETINA  2009    R     HC PHAKIC IOL - IMPLANT FROM SURGEON      right     KNEE SURGERY  1969    left     LASER YAG CAPSULOTOMY  12/2016    left     VITRECTOMY PARSPLANA  2010         Current Outpatient Prescriptions:      amoxicillin (AMOXIL) 500 MG capsule, Take 4 capsules (2 grams) 1 hour before dental work., Disp: 8 capsule, Rfl: 3     atorvastatin (LIPITOR) 20 MG tablet, Take 1 tablet (20 mg) by mouth daily, Disp: 90 tablet, Rfl: 3     TIAGO CONTOUR test strip, Use to test blood sugar 3 times daily or as directed., Disp: 300 each, Rfl: 3     NOVOLOG FLEXPEN 100 UNIT/ML soln, Carb counting with meals approx 70-80 units daily, Disp: 75 mL, Rfl: 3     insulin glargine (LANTUS SOLOSTAR) 100 UNIT/ML injection, Inject 70 units SQ each am., Disp: 70 mL, Rfl: 3     metFORMIN (GLUCOPHAGE) 500 MG tablet, Take 2 tablets (1,000 mg) by mouth 2 times daily (with meals), Disp: 360 tablet, Rfl: 3     blood glucose calibration (TIAGO CONTOUR) NORMAL solution, Use to calibrate blood glucose monitor as needed as directed., Disp: 1 Bottle, Rfl: 11     cycloSPORINE (RESTASIS) 0.05 % ophthalmic emulsion, , Disp: , Rfl:      insulin pen  needle (B-D U/F) 31G X 8 MM, Use  4 daily short 31 G X 8MM, Disp: 400 each, Rfl: 3     lisinopril-hydrochlorothiazide (PRINZIDE/ZESTORETIC) 20-12.5 MG per tablet, Take 1 tablet by mouth daily, Disp: 90 tablet, Rfl: 3     order for DME, Equipment being ordered: Grade 1 (light) compression stockings, below the knee, Disp: 1 Units, Rfl: 1     Acetaminophen (TYLENOL PO), Take by mouth as needed for mild pain or fever, Disp: , Rfl:      Omega-3 Fatty Acids (OMEGA-3 FISH OIL PO), Take 1,000 mg by mouth daily, Disp: , Rfl:      Albuterol Sulfate 108 (90 BASE) MCG/ACT AEPB, Inhale 2 puffs into the lungs every 6 hours as needed, Disp: 1 each, Rfl: 0     Multiple Vitamins-Minerals (EYE-ZAKIYA PLUS LUTEIN PO), , Disp: , Rfl:      blood glucose monitoring ([employer] MICROLET) lancets, Test 4-6 times daily 90 day supply refills x 3, Disp: 6 Box, Rfl: 3     latanoprost (XALATAN) 0.005 % ophthalmic solution, Place 1 drop into both eyes At Bedtime, Disp: , Rfl:      aspirin 81 MG tablet, 1 tab daily, Disp: 90 tablet, Rfl: 3     Multiple Vitamin (MULTIVITAMIN  S) CAPS, Take 1 daily, Disp: 90 capsule, Rfl: 3     calcium carbonate-vitamin D 500-400 MG-UNIT TABS tablt, Take 1 tablet by mouth daily, Disp: 90 tablet, Rfl: 3     timolol (TIMOPTIC) 0.5 % ophthalmic solution, Place 1 drop into both eyes 2 times daily , Disp: , Rfl:      cholecalciferol (VITAMIN D) 1000 UNIT tablet, Take 1 tablet by mouth daily., Disp: 100 tablet, Rfl: 3     Ascorbic Acid (VITAMIN C PO), Take 2,000 mg by mouth 2 times daily , Disp: , Rfl:      B Complex Vitamins (VITAMIN  B COMPLEX) CAPS, Take 1 tablet by mouth daily , Disp: , Rfl:     Allergies   Allergen Reactions     Nkda [No Known Drug Allergies]        Social History     Social History     Marital status:      Spouse name: N/A     Number of children: N/A     Years of education: N/A     Occupational History     Human Resources      between jobs now     Social History Main Topics     Smoking status:  Former Smoker     Smokeless tobacco: Never Used      Comment: quit 35 years ago     Alcohol use No     Drug use: No     Sexual activity: Yes     Partners: Male     Birth control/ protection: Post-menopausal     Other Topics Concern     Blood Transfusions Yes     2 units 2011     Caffeine Concern No     2s     Occupational Exposure No     Hobby Hazards No     Sleep Concern No     Stress Concern No     rehab for R knee after replacement     Weight Concern Yes     working on wt loss     Special Diet Yes     Diabetic     Back Care No     Exercise No     water aerobics 35-40' 3-4 d & strength 3d/wk     Bike Helmet No     Seat Belt No     Social History Narrative    How much exercise per week? 3-4x swimming, aerobics    How much calcium per day? Supplement       How much caffeine per day? 2 cups coffee/ 1-2 can of diet soda    How much vitamin D per day? Supplement    Do you/your family wear seatbelts?  Yes    Do you/your family use safety helmets? No    Do you/your family use sunscreen? No    Do you/your family keep firearms in the home? No    Do you/your family have a smoke detector(s)? Yes    Do you feel safe in your home? Yes    Has anyone ever touched you in an unwanted manner? No     Explain     See LEA Berman 11/26/2014    Reviewed Raul DEGROOT MA 11/22/2017       Family History   Problem Relation Age of Onset     DIABETES Father 55     DM II     DIABETES Sister 45     DM II     DIABETES Brother 50     xs 2     Hypertension Sister 41     also B and M     CANCER Maternal Aunt      multiple myeloma     Thyroid Disease Sister 45     graves     Hypertension Brother      ? age     Hypertension Mother 79     DIABETES Brother      Hypertension Brother      DIABETES Brother      Hypertension Brother      Breast Cancer Cousin      Thyroid Disease Sister      CEREBROVASCULAR DISEASE No family hx of      Breast Cancer No family hx of      Cancer - colorectal No family hx of      Prostate Cancer No family hx of      Alcohol/Drug No  "family hx of        Physical examination:  Height 1.62 m (5' 3.78\"), weight 114.5 kg (252 lb 6.4 oz), not currently breastfeeding.  Well-developed, well-nourished and in no acute distress.  Alert and oriented to surroundings.  Right upper extremity is evaluated today. Patient has tenderness palpation over the base of the thumb at the CMC joint. Patient also has moderate tenderness palpation over the MP joint without instability or hyperextension. Patient has a positive CMC grind test. She does not have any tender to palpation over the radial styloid but has an equivocal Finkelstein's test. She has full range of motion in her hand and wrist otherwise. Distally CMS is intact firing EPL/FPL/Intrinsics/Flex/Ext. Fingers wwp. Normal sensation to light touch throughout.     Radiographs: Three views of the right hand were obtained and reviewed. These demonstrate mild-to-moderate degenerative changes at the first CMC joint including radial subluxation and radial osteophyte formation..     Assessment: 62 year old female with right first CMC degenerative arthritis    Plan:    Discussed with patient conservative versus surgical options. At this time recommend patient pursue conservative measures including continued over-the-counter anti-inflammatories with the addition of formal hand therapy as well as custom molded splints. Discussed with the patient that if these do not provide significant benefit she may consider a corticosteroid injection. Lastly patient may consider surgical intervention but we recommend full exploration of conservative measures first. Seen and discussed with Dr. Espinal who's in agreement with assessment and plan. She was referred to hand therapy.  AV Velez MD  PGY-4 Orthopaedic Surgery      I, Demetri Espinal, saw and evaluated this patient with the resident and agree with the resident s findings and plan of care as documented in the resident s note.       Again, thank you for allowing me to " participate in the care of your patient.      Sincerely,    Demetri Espinal MD

## 2018-01-24 NOTE — PROGRESS NOTES
[facility], Orthopaedic Surgery Consultation    Radha Baca MRN# [id number]   Age: 62 year old YOB: 1955     Requesting physician: Referred Self     Date of Service: Jan 24, 2018    Chief Complaint:   Chief Complaint   Patient presents with     Hand Pain     pt states she is having trouble grasping and squeezing and pinching things with her right hand pt states it's been happening for a couple of weeks.       History of Present Illness: Radha Baca is a 62 year old, right handed female who presents today for further evaluation of right basilar thumb pain. Patient reports pain began partially one-month ago after she was using her right upper extremity to cut a large amount of paper. Since that time she has had pain at the base of her thumb which has been progressive. She has been using ibuprofen as well as performing her own aquatic therapy for her thumb. She has been taking ibuprofen, which she finds helpful. She has difficulty with turning keys and opening jars. Patient denies any previous injury or discomfort.    Review of Systems: A 14-point review of systems was obtained on intake reviewed. It is included at the bottom of this note.     Past Medical History:   Diagnosis Date     Arthritis      Arthritis of knee 4/4/2013     Arthritis of shoulder region, right 4/18/2014     Arthritis of shoulder region, right 4/18/2014     Back injury      Depressive disorder      Diabetes (H)      Finger pain 4/2/2015     Headache(784.0)     decreased with mouth guard use.     Hypercholesteremia      Hypertension      Low back pain      Menarche age 9+    cycles q mo x 4-5 d     Neck injuries      Pain in knee joint     LEFT     Right bundle branch block     per H/P     SNHL (sensorineural hearing loss)      Umbilical hernia without mention of obstruction or gangrene 8/2014         Past Surgical History:   Procedure Laterality Date     ARTHROPLASTY KNEE  4/4/2013    Left Total Knee  Arthroplasty;  Surgeon: Lou Byrne MD;  [facility]     ARTHROPLASTY MINIMALLY INVASIVE KNEE  8/22/2011    R knee :CAITLYN JACOBO, Left age 14     DENTAL SURGERY      root canals, wisdom teeth     EXAM UNDER ANESTHESIA, MANIPULATE JOINT (LOCATION)  10/5/2012    Procedure: EXAM UNDER ANESTHESIA, MANIPULATE JOINT (LOCATION);  Right Knee Mini Open Lysis Of Adhesions, Left Knee Steroid Injection  ;  Surgeon: Lou Byrne MD;  Location: [facility]     [facility] OR OCULAR DEVICE INTRAOP DETACHED RETINA  2009    R     [facility] PHAKIC IOL - IMPLANT FROM SURGEON      right     KNEE SURGERY  1969    left     LASER YAG CAPSULOTOMY  12/2016    left     VITRECTOMY PARSPLANA  2010         Current Outpatient Prescriptions:      amoxicillin (AMOXIL) 500 MG capsule, Take 4 capsules (2 grams) 1 hour before dental work., Disp: 8 capsule, Rfl: 3     atorvastatin (LIPITOR) 20 MG tablet, Take 1 tablet (20 mg) by mouth daily, Disp: 90 tablet, Rfl: 3     TIAGO CONTOUR test strip, Use to test blood sugar 3 times daily or as directed., Disp: 300 each, Rfl: 3     NOVOLOG FLEXPEN 100 UNIT/ML soln, Carb counting with meals approx 70-80 units daily, Disp: 75 mL, Rfl: 3     insulin glargine (LANTUS SOLOSTAR) 100 UNIT/ML injection, Inject 70 units SQ each am., Disp: 70 mL, Rfl: 3     metFORMIN (GLUCOPHAGE) 500 MG tablet, Take 2 tablets (1,000 mg) by mouth 2 times daily (with meals), Disp: 360 tablet, Rfl: 3     blood glucose calibration (TIAGO CONTOUR) NORMAL solution, Use to calibrate blood glucose monitor as needed as directed., Disp: 1 Bottle, Rfl: 11     cycloSPORINE (RESTASIS) 0.05 % ophthalmic emulsion, , Disp: , Rfl:      insulin pen needle (B-D U/F) 31G X 8 MM, Use  4 daily short 31 G X 8MM, Disp: 400 each, Rfl: 3     lisinopril-hydrochlorothiazide (PRINZIDE/ZESTORETIC) 20-12.5 MG per tablet, Take 1 tablet by mouth daily, Disp: 90 tablet, Rfl: 3     order for DME, Equipment being ordered: Grade 1 (light) compression stockings,  below the knee, Disp: 1 Units, Rfl: 1     Acetaminophen (TYLENOL PO), Take by mouth as needed for mild pain or fever, Disp: , Rfl:      Omega-3 Fatty Acids (OMEGA-3 FISH OIL PO), Take 1,000 mg by mouth daily, Disp: , Rfl:      Albuterol Sulfate 108 (90 BASE) MCG/ACT AEPB, Inhale 2 puffs into the lungs every 6 hours as needed, Disp: 1 each, Rfl: 0     Multiple Vitamins-Minerals (EYE-ZAKIYA PLUS LUTEIN PO), , Disp: , Rfl:      blood glucose monitoring (LocalBanya MICROLET) lancets, Test 4-6 times daily 90 day supply refills x 3, Disp: 6 Box, Rfl: 3     latanoprost (XALATAN) 0.005 % ophthalmic solution, Place 1 drop into both eyes At Bedtime, Disp: , Rfl:      aspirin 81 MG tablet, 1 tab daily, Disp: 90 tablet, Rfl: 3     Multiple Vitamin (MULTIVITAMIN  S) CAPS, Take 1 daily, Disp: 90 capsule, Rfl: 3     calcium carbonate-vitamin D 500-400 MG-UNIT TABS tablt, Take 1 tablet by mouth daily, Disp: 90 tablet, Rfl: 3     timolol (TIMOPTIC) 0.5 % ophthalmic solution, Place 1 drop into both eyes 2 times daily , Disp: , Rfl:      cholecalciferol (VITAMIN D) 1000 UNIT tablet, Take 1 tablet by mouth daily., Disp: 100 tablet, Rfl: 3     Ascorbic Acid (VITAMIN C PO), Take 2,000 mg by mouth 2 times daily , Disp: , Rfl:      B Complex Vitamins (VITAMIN  B COMPLEX) CAPS, Take 1 tablet by mouth daily , Disp: , Rfl:     Allergies   Allergen Reactions     Nkda [No Known Drug Allergies]        Social History     Social History     Marital status:      Spouse name: N/A     Number of children: N/A     Years of education: N/A     Occupational History     Human Resources      between jobs now     Social History Main Topics     Smoking status: Former Smoker     Smokeless tobacco: Never Used      Comment: quit 35 years ago     Alcohol use No     Drug use: No     Sexual activity: Yes     Partners: Male     Birth control/ protection: Post-menopausal     Other Topics Concern     Blood Transfusions Yes     2 units 2011     Caffeine Concern No      "2s     Occupational Exposure No     Hobby Hazards No     Sleep Concern No     Stress Concern No     rehab for R knee after replacement     Weight Concern Yes     working on wt loss     Special Diet Yes     Diabetic     Back Care No     Exercise No     water aerobics 35-40' 3-4 d & strength 3d/wk     Bike Helmet No     Seat Belt No     Social History Narrative    How much exercise per week? 3-4x swimming, aerobics    How much calcium per day? Supplement       How much caffeine per day? 2 cups coffee/ 1-2 can of diet soda    How much vitamin D per day? Supplement    Do you/your family wear seatbelts?  Yes    Do you/your family use safety helmets? No    Do you/your family use sunscreen? No    Do you/your family keep firearms in the home? No    Do you/your family have a smoke detector(s)? Yes    Do you feel safe in your home? Yes    Has anyone ever touched you in an unwanted manner? No     Explain     See LEA Berman 11/26/2014    Reviewed Raul DEGROOT MA 11/22/2017       Family History   Problem Relation Age of Onset     DIABETES Father 55     DM II     DIABETES Sister 45     DM II     DIABETES Brother 50     xs 2     Hypertension Sister 41     also B and M     CANCER Maternal Aunt      multiple myeloma     Thyroid Disease Sister 45     graves     Hypertension Brother      ? age     Hypertension Mother 79     DIABETES Brother      Hypertension Brother      DIABETES Brother      Hypertension Brother      Breast Cancer Cousin      Thyroid Disease Sister      CEREBROVASCULAR DISEASE No family hx of      Breast Cancer No family hx of      Cancer - colorectal No family hx of      Prostate Cancer No family hx of      Alcohol/Drug No family hx of        Physical examination:  Height 1.62 m (5' 3.78\"), weight 114.5 kg (252 lb 6.4 oz), not currently breastfeeding.  Well-developed, well-nourished and in no acute distress.  Alert and oriented to surroundings.  Right upper extremity is evaluated today. Patient has tenderness palpation " over the base of the thumb at the CMC joint. Patient also has moderate tenderness palpation over the MP joint without instability or hyperextension. Patient has a positive CMC grind test. She does not have any tender to palpation over the radial styloid but has an equivocal Finkelstein's test. She has full range of motion in her hand and wrist otherwise. Distally CMS is intact firing EPL/FPL/Intrinsics/Flex/Ext. Fingers wwp. Normal sensation to light touch throughout.     Radiographs: Three views of the right hand were obtained and reviewed. These demonstrate mild-to-moderate degenerative changes at the first CMC joint including radial subluxation and radial osteophyte formation..     Assessment: 62 year old female with right first CMC degenerative arthritis    Plan:    Discussed with patient conservative versus surgical options. At this time recommend patient pursue conservative measures including continued over-the-counter anti-inflammatories with the addition of formal hand therapy as well as custom molded splints. Discussed with the patient that if these do not provide significant benefit she may consider a corticosteroid injection. Lastly patient may consider surgical intervention but we recommend full exploration of conservative measures first. Seen and discussed with Dr. Espinal who's in agreement with assessment and plan. She was referred to hand therapy.  AV Velez MD  PGY-4 Orthopaedic Surgery      I, Demetri Espinal, saw and evaluated this patient with the resident and agree with the resident s findings and plan of care as documented in the resident s note.       Answers for HPI/ROS submitted by the patient on 1/23/2018   General Symptoms: Yes  Skin Symptoms: Yes  HENT Symptoms: Yes  EYE SYMPTOMS: Yes  HEART SYMPTOMS: Yes  LUNG SYMPTOMS: No  INTESTINAL SYMPTOMS: Yes  URINARY SYMPTOMS: No  GYNECOLOGIC SYMPTOMS: No  BREAST SYMPTOMS: No  SKELETAL SYMPTOMS: Yes  BLOOD SYMPTOMS: No  NERVOUS SYSTEM  SYMPTOMS: Yes  MENTAL HEALTH SYMPTOMS: Yes  Fever: No  Loss of appetite: No  Weight loss: No  Weight gain: Yes  Fatigue: Yes  Night sweats: No  Chills: No  Increased stress: Yes  Excessive hunger: Yes  Excessive thirst: No  Feeling hot or cold when others believe the temperature is normal: No  Loss of height: Yes  Post-operative complications: No  Surgical site pain: Yes  Hallucinations: No  Change in or Loss of Energy: Yes  Hyperactivity: No  Confusion: No  Changes in hair: No  Changes in moles/birth marks: No  Itching: Yes  Rashes: No  Changes in nails: Yes  Acne: No  Hair in places you don't want it: No  Change in facial hair: Yes  Warts: No  Non-healing sores: No  Scarring: No  Flaking of skin: Yes  Color changes of hands/feet in cold : No  Sun sensitivity: No  Skin thickening: Yes  Ear pain: No  Ear discharge: No  Hearing loss: Yes  Tinnitus: Yes  Nosebleeds: No  Congestion: No  Sinus pain: No  Trouble swallowing: No   Voice hoarseness: No  Mouth sores: No  Sore throat: No  Tooth pain: Yes  Gum tenderness: Yes  Bleeding gums: Yes  Change in taste: No  Change in sense of smell: No  Dry mouth: Yes  Hearing aid used: No  Neck lump: No  Eye pain: No  Vision loss: No  Dry eyes: Yes  Watery eyes: No  Eye bulging: No  Double vision: No  Flashing of lights: Yes  Spots: Yes  Floaters: Yes  Redness: No  Crossed eyes: No  Tunnel Vision: No  Yellowing of eyes: No  Eye irritation: Yes  Chest pain or pressure: Yes  Fast or irregular heartbeat: No  Pain in legs with walking: No  Trouble breathing while lying down: No  Fingers or toes appear blue: No  High blood pressure: Yes  Low blood pressure: No  Fainting: No  Murmurs: No  Pacemaker: No  Varicose veins: No  Edema or swelling: Yes  Wake up at night with shortness of breath: No  Light-headedness: No  Exercise intolerance: No  Heart burn or indigestion: No  Nausea: No  Vomiting: No  Abdominal pain: Yes  Bloating: Yes  Constipation: Yes  Diarrhea: Yes  Blood in stool:  No  Black stools: No  Rectal or Anal pain: No  Fecal incontinence: No  Yellowing of skin or eyes: No  Vomit with blood: No  Change in stools: No  Back pain: Yes  Muscle aches: Yes  Neck pain: Yes  Swollen joints: Yes  Joint pain: Yes  Bone pain: Yes  Muscle cramps: Yes  Muscle weakness: Yes  Joint stiffness: Yes  Bone fracture: No  Trouble with coordination: No  Dizziness or trouble with balance: Yes  Fainting or black-out spells: No  Memory loss: No  Headache: No  Seizures: No  Speech problems: No  Tingling: Yes  Tremor: No  Weakness: No  Difficulty walking: Yes  Paralysis: No  Numbness: Yes  Nervous or Anxious: Yes  Depression: Yes  Trouble sleeping: Yes  Trouble thinking or concentrating: No  Mood changes: Yes  Panic attacks: No

## 2018-01-24 NOTE — MR AVS SNAPSHOT
After Visit Summary   1/24/2018    Radha Baca    MRN: [id number]           Patient Information     Date Of Birth          1955        Visit Information        Provider Department      1/24/2018 1:00 PM Demetri Espinal MD [facility]        Today's Diagnoses     CMC arthritis    -  1       Follow-ups after your visit        Additional Services     HAND THERAPY       Hand Therapy Referral    CMC arthritis protocol                  Your next 10 appointments already scheduled     Jan 25, 2018  9:00 AM CST   (Arrive by 8:45 AM)   ALINE Hand with Judi Avelar OT   [facility] ([facility])    [address] [place] [id number]   [phone]            Feb 02, 2018  2:30 PM CST   ALINE Hand with Judi Avelar OT   [facility] ([facility])    [address] [place] [id number]   [phone]            Mar 20, 2018  3:15 PM CDT   (Arrive by 3:00 PM)   RETURN KNEE with Lou Byrne MD   [facility] ([facility])    [address] [place] [id number] [phone]            Apr 18, 2018  1:00 PM CDT   (Arrive by 12:45 PM)   RETURN DIABETES with Demar Hickey MD   [facility]in[facility] ([facility])    [address] [place] [id number] [phone]              Who to contact     Please call your clinic at [phone] to:    Ask questions about your health    Make or cancel appointments    Discuss your medicines    Learn about your test results    Speak to your doctor   If you have compliments or concerns about an experience at your clinic, or if you wish to file a complaint, please contact [facility] Patient Relations at [phone] or email us at [email or web address]          "Additional Information About Your Visit        [facility] Information     [employer] gives you secure access to your electronic health record. If you see a primary care provider, you can also send messages to your care team and make appointments. If you have questions, please call your primary care clinic.  If you do not have a primary care provider, please call [phone] and they will assist you.      [employer] is an electronic gateway that provides easy, online access to your medical records. With [employer], you can request a clinic appointment, read your test results, renew a prescription or communicate with your care team.     To access your existing account, please contact your [facility] or call [phone] for assistance.        Care EveryWhere ID     This is your Care EveryWhere ID. This could be used by other organizations to access your [facility] medical records  [id number]        Your Vitals Were     Height BMI (Body Mass Index)                1.62 m (5' 3.78\") 43.62 kg/m2           Blood Pressure from Last 3 Encounters:   01/18/18 119/73   11/22/17 116/72   11/15/17 125/68    Weight from Last 3 Encounters:   01/24/18 114.5 kg (252 lb 6.4 oz)   01/18/18 113.5 kg (250 lb 4.8 oz)   11/22/17 113.9 kg (251 lb)              We Performed the Following     HAND THERAPY        Primary Care Provider Office Phone # Fax #    Mohan Moreno [phone]       [address] [address]        Equal Access to Services     YESY NAJERA : Hadii mahogany koenigo Sohazel, waaxda luqadaha, qaybta kaalmada speedy, waxhetal oanh quan . So [facility] [phone].    ATENCIÓN: Si habla español, tiene a pitts disposición servicios gratuitos de asistencia lingüística. Llame al [phone].    We comply with applicable federal civil rights laws and [place] laws. We do not discriminate on the basis of race, color, national origin, age, disability, sex, " sexual orientation, or gender identity.            Thank you!     Thank you for choosing [facility]  for your care. Our goal is always to provide you with excellent care. Hearing back from our patients is one way we can continue to improve our services. Please take a few minutes to complete the written survey that you may receive in the mail after your visit with us. Thank you!             Your Updated Medication List - Protect others around you: Learn how to safely use, store and throw away your medicines at www.disposemymeds.org.          This list is accurate as of 1/24/18  1:45 PM.  Always use your most recent med list.                   Brand Name Dispense Instructions for use Diagnosis    Albuterol Sulfate 108 (90 BASE) MCG/ACT Aepb     1 each    Inhale 2 puffs into the lungs every 6 hours as needed    Viral URI with cough       amoxicillin 500 MG capsule    AMOXIL    8 capsule    Take 4 capsules (2 grams) 1 hour before dental work.    S/P joint replacement       aspirin 81 MG tablet     90 tablet    1 tab daily    Type 2 diabetes mellitus (H)       atorvastatin 20 MG tablet    LIPITOR    90 tablet    Take 1 tablet (20 mg) by mouth daily    Dyslipidemia       TIAGO CONTOUR test strip   Generic drug:  blood glucose monitoring     300 each    Use to test blood sugar 3 times daily or as directed.    Type 2 diabetes mellitus with complication (H)       blood glucose calibration NORMAL solution     1 Bottle    Use to calibrate blood glucose monitor as needed as directed.    DM type 2 (diabetes mellitus, type 2) (H)       blood glucose monitoring lancets     6 Box    Test 4-6 times daily 90 day supply refills x 3    Type 2 diabetes mellitus with complication (H)       calcium carbonate-vitamin D 500-400 MG-UNIT Tabs per tablet     90 tablet    Take 1 tablet by mouth daily    Type 2 diabetes mellitus (H)       cholecalciferol 1000 UNIT tablet    vitamin D3    100 tablet    Take 1 tablet by mouth  daily.        EYE-ZAKIYA PLUS LUTEIN PO           insulin glargine 100 UNIT/ML injection    LANTUS SOLOSTAR    70 mL    Inject 70 units SQ each am.    Type 2 diabetes mellitus with complication, with long-term current use of insulin (H)       insulin pen needle 31G X 8 MM    B-D U/F    400 each    Use  4 daily short 31 G X 8MM    Diabetes mellitus, type 2 (H)       latanoprost 0.005 % ophthalmic solution    XALATAN     Place 1 drop into both eyes At Bedtime        lisinopril-hydrochlorothiazide 20-12.5 MG per tablet    PRINZIDE/ZESTORETIC    90 tablet    Take 1 tablet by mouth daily    Essential hypertension       metFORMIN 500 MG tablet    GLUCOPHAGE    360 tablet    Take 2 tablets (1,000 mg) by mouth 2 times daily (with meals)    Diabetes mellitus, type 2 (H)       MULTIvitamin  S Caps     90 capsule    Take 1 daily    Type 2 diabetes mellitus (H)       NovoLOG FLEXPEN 100 UNIT/ML injection   Generic drug:  insulin aspart     75 mL    Carb counting with meals approx 70-80 units daily    Type 2 diabetes mellitus with complication (H)       OMEGA-3 FISH OIL PO      Take 1,000 mg by mouth daily        order for DME     1 Units    Equipment being ordered: Grade 1 (light) compression stockings, below the knee    Venous (peripheral) insufficiency       RESTASIS 0.05 % ophthalmic emulsion   Generic drug:  cycloSPORINE           timolol 0.5 % ophthalmic solution    TIMOPTIC     Place 1 drop into both eyes 2 times daily        TYLENOL PO      Take by mouth as needed for mild pain or fever        vitamin  B Complex Caps      Take 1 tablet by mouth daily        VITAMIN C PO      Take 2,000 mg by mouth 2 times daily

## 2018-01-25 ENCOUNTER — THERAPY VISIT (OUTPATIENT)
Dept: OCCUPATIONAL THERAPY | Facility: CLINIC | Age: 63
End: 2018-01-25
Payer: COMMERCIAL

## 2018-01-25 DIAGNOSIS — M79.644 PAIN OF RIGHT THUMB: Primary | ICD-10-CM

## 2018-01-25 DIAGNOSIS — M18.11 PRIMARY OSTEOARTHRITIS OF FIRST CARPOMETACARPAL JOINT OF RIGHT HAND: ICD-10-CM

## 2018-01-25 PROBLEM — M18.51: Status: ACTIVE | Noted: 2018-01-25

## 2018-01-25 PROCEDURE — 97165 OT EVAL LOW COMPLEX 30 MIN: CPT | Mod: GO | Performed by: OCCUPATIONAL THERAPIST

## 2018-01-25 PROCEDURE — 97535 SELF CARE MNGMENT TRAINING: CPT | Mod: GO | Performed by: OCCUPATIONAL THERAPIST

## 2018-01-25 PROCEDURE — 97760 ORTHOTIC MGMT&TRAING 1ST ENC: CPT | Mod: GO | Performed by: OCCUPATIONAL THERAPIST

## 2018-01-25 PROCEDURE — 97140 MANUAL THERAPY 1/> REGIONS: CPT | Mod: GO | Performed by: OCCUPATIONAL THERAPIST

## 2018-01-25 NOTE — MR AVS SNAPSHOT
After Visit Summary   1/25/2018    Radha Baca    MRN: [id number]           Patient Information     Date Of Birth          1955        Visit Information        Provider Department      1/25/2018 9:00 AM Judi Avelar OT [facility]        Today's Diagnoses     Pain of right thumb    -  1    Primary osteoarthritis of first carpometacarpal joint of right hand           Follow-ups after your visit        Your next 10 appointments already scheduled     Feb 02, 2018 11:30 AM CST   ALINE Hand with Judi Avelar OT   [facility] ([facility])    [address] [place] [id number] [phone]            Feb 07, 2018 10:30 AM CST   ALINE Hand with Judi Avelar OT   [facility] ([facility])    [address] [place] [id number] [phone]            Feb 15, 2018  9:30 AM CST   ALINE Hand with Judi Avelar OT   [facility] ([facility])    [address] [place] [id number] [phone]            Mar 20, 2018  3:15 PM CDT   (Arrive by 3:00 PM)   RETURN KNEE with Lou Byrne MD   [facility] ([facility])    [address] [place] [id number] [phone]            Apr 18, 2018  1:00 PM CDT   (Arrive by 12:45 PM)   RETURN DIABETES with Demar Hickey MD   [facility] ([facility])    [address] [place] [id number] [phone]              Who to contact     If you have questions or need follow up information about today's clinic visit or your schedule please contact [facility] directly at [phone].  Normal or non-critical lab and imaging results will be communicated to you by MyChart, letter or phone within 4 business days after  the clinic has received the results. If you do not hear from us within 7 days, please contact the clinic through [employer] or phone. If you have a critical or abnormal lab result, we will notify you by phone as soon as possible.  Submit refill requests through In[employer] or call your pharmacy and they will forward the refill request to us. Please allow 3 business days for your refill to be completed.          Additional Information About Your Visit        [employer] Information     [employer] gives you secure access to your electronic health record. If you see a primary care provider, you can also send messages to your care team and make appointments. If you have questions, please call your primary care clinic.  If you do not have a primary care provider, please call [phone] and they will assist you.        Care EveryWhere ID     This is your Care EveryWhere ID. This could be used by other organizations to access your [place] medical records  [id number]         Blood Pressure from Last 3 Encounters:   01/18/18 119/73   11/22/17 116/72   11/15/17 125/68    Weight from Last 3 Encounters:   01/24/18 114.5 kg (252 lb 6.4 oz)   01/18/18 113.5 kg (250 lb 4.8 oz)   11/22/17 113.9 kg (251 lb)              We Performed the Following     HC OT EVAL, LOW COMPLEXITY     MANUAL THER TECH,1+REGIONS,EA 15 MIN     ORTHOTIC MGMT AND TRAINING, EACH 15 MIN     SELF CARE MNGMENT TRAINING        Primary Care Provider Office Phone # Fax #    Mohan Moreno [phone]       [address] [address]        Equal Access to Services     YESY NAJERA : Hadii aad ku hadasho Soomaali, waaxda luqadaha, qaybta kaalmada speedy, mindy fam. So [facility] [phone].    ATENCIÓN: Si habla español, tiene a pitts disposición servicios gratuitos de asistencia lingüística. Llame al [phone].    We comply with applicable federal civil rights laws and [place] laws. We do not discriminate  on the basis of race, color, national origin, age, disability, sex, sexual orientation, or gender identity.            Thank you!     Thank you for choosing [facility]  for your care. Our goal is always to provide you with excellent care. Hearing back from our patients is one way we can continue to improve our services. Please take a few minutes to complete the written survey that you may receive in the mail after your visit with us. Thank you!             Your Updated Medication List - Protect others around you: Learn how to safely use, store and throw away your medicines at www.disposemymeds.org.          This list is accurate as of 1/25/18 12:13 PM.  Always use your most recent med list.                   Brand Name Dispense Instructions for use Diagnosis    Albuterol Sulfate 108 (90 BASE) MCG/ACT Aepb     1 each    Inhale 2 puffs into the lungs every 6 hours as needed    Viral URI with cough       amoxicillin 500 MG capsule    AMOXIL    8 capsule    Take 4 capsules (2 grams) 1 hour before dental work.    S/P joint replacement       aspirin 81 MG tablet     90 tablet    1 tab daily    Type 2 diabetes mellitus (H)       atorvastatin 20 MG tablet    LIPITOR    90 tablet    Take 1 tablet (20 mg) by mouth daily    Dyslipidemia       TIAGO CONTOUR test strip   Generic drug:  blood glucose monitoring     300 each    Use to test blood sugar 3 times daily or as directed.    Type 2 diabetes mellitus with complication (H)       blood glucose calibration NORMAL solution     1 Bottle    Use to calibrate blood glucose monitor as needed as directed.    DM type 2 (diabetes mellitus, type 2) (H)       blood glucose monitoring lancets     6 Box    Test 4-6 times daily 90 day supply refills x 3    Type 2 diabetes mellitus with complication (H)       calcium carbonate-vitamin D 500-400 MG-UNIT Tabs per tablet     90 tablet    Take 1 tablet by mouth daily    Type 2 diabetes mellitus (H)       cholecalciferol 1000 UNIT  tablet    vitamin D3    100 tablet    Take 1 tablet by mouth daily.        EYE-ZAKIYA PLUS LUTEIN PO           insulin glargine 100 UNIT/ML injection    LANTUS SOLOSTAR    70 mL    Inject 70 units SQ each am.    Type 2 diabetes mellitus with complication, with long-term current use of insulin (H)       insulin pen needle 31G X 8 MM    B-D U/F    400 each    Use  4 daily short 31 G X 8MM    Diabetes mellitus, type 2 (H)       latanoprost 0.005 % ophthalmic solution    XALATAN     Place 1 drop into both eyes At Bedtime        lisinopril-hydrochlorothiazide 20-12.5 MG per tablet    PRINZIDE/ZESTORETIC    90 tablet    Take 1 tablet by mouth daily    Essential hypertension       metFORMIN 500 MG tablet    GLUCOPHAGE    360 tablet    Take 2 tablets (1,000 mg) by mouth 2 times daily (with meals)    Diabetes mellitus, type 2 (H)       MULTIvitamin  S Caps     90 capsule    Take 1 daily    Type 2 diabetes mellitus (H)       NovoLOG FLEXPEN 100 UNIT/ML injection   Generic drug:  insulin aspart     75 mL    Carb counting with meals approx 70-80 units daily    Type 2 diabetes mellitus with complication (H)       OMEGA-3 FISH OIL PO      Take 1,000 mg by mouth daily        order for DME     1 Units    Equipment being ordered: Grade 1 (light) compression stockings, below the knee    Venous (peripheral) insufficiency       RESTASIS 0.05 % ophthalmic emulsion   Generic drug:  cycloSPORINE           timolol 0.5 % ophthalmic solution    TIMOPTIC     Place 1 drop into both eyes 2 times daily        TYLENOL PO      Take by mouth as needed for mild pain or fever        vitamin  B Complex Caps      Take 1 tablet by mouth daily        VITAMIN C PO      Take 2,000 mg by mouth 2 times daily

## 2018-01-25 NOTE — PROGRESS NOTES
Hand Therapy Initial Evaluation    Current Date:  1/25/2018    Diagnosis:   Right thumb pain  DOI:  12/15/17  Referring MD:Demetri Espinal MD  Next MD visit: PRN      Initial Subjective:  Radha Baca is a 62 year old right hand dominant female.  Patient reports symptoms of pain, stiffness/loss of motion, weakness/loss of strength and edema of the right thumb which occurred due to was cutting paper with a long scissors, and noted some pain about the next day. It was noted more with opening jars, lifting the bags and now I am writing differently, even with my left hand. I can't hold things as strong. Since onset symptoms are Gradually getting worse..  Special tests:  x-ray.  Previous treatment: none.    General health as reported by patient is fair.  Pertinent medical history includes:diabetes, overweight, high blood pressure, depression , menopausal, pain at rest/night  Red flags:  none  Medical allergies:none.  Surgical history: See EMR  Medication history: anti-inflammatory, pain, high blood pressure, insulin.      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 Not able to put in eyedrops    Functional Outcome Measure:  See flowsheet    Objective:  PAIN 1/25/2018     Location  right thumb      Description aching, dull, stabbing, sharp, shooting, nagging and tender       Radiates Yes     Worse d/n daytime     Frequency activity dependant     Exacerbated by See above list     Relieves  heat, NSAIDs, otc medications, rest and the pool and stretching     At rest 0-10/10 3/10     On use 0-10/10 7/10     At worst.0-10/10 8/10     Sleep disruption? no     Progression Not getting better       ROM:  Thumb 2018   AROM(PROM) Right Left   MP 35 38   IP 70 70   RAbd 40 45   PAbd 42 45   Retropulsion 2 3   Kapandji Opposition Scale (0-10/10) 6+ 8     Thumb ROM IMD  Intermetacarpal Distance Measure in mm-digital caliper  2018 Rabd PABD    RIGHT LEFT RIGHT LEFT   IMD RATER #1 59.20 60.17 61.74 60.54   PAIN (NRS) #1 1  2        Thumb Observation/Appearance:  Key: + = present/ - = not observed    2018     Right Left   Shoulder deformity present over CMC -    Dorsal subluxation evident at CMC mild    Edema over the CMC joint +    Noted collapse of MP into hyperextension during pinch +    1st DI MMT (0-5/5) 4/5 4+/5          Radial Wrist Zone: Provocative Tests: 2018    Pain Report:  - none    + mild    ++ moderate    +++ severe  Right Left   CMC grind test +    CMC joint line palpation ++    Extension Stress Test +    ADDuction Stress Test -    CMC Joint AP laxity -    Finkelstein's Test -    Dequervain's: APL test -    EPB test:  -    Intersection: APL/EPB & ECRB/L test -    STT Test (OA of same) -          STRENGTH:   (Measured in pounds)  Pain Free /Pinch Test only   2018     Trials Right Left Right Left Right Left    1  2  3  Av 56           3 pt Pinch 2018    Trials Right Left Right Left   1  2  3  Av 11       Lateral Pinch 2018    Trials Right Left Right Left   1  2  3  Av 12         Assessment:  Patient presents with symptoms consistent with diagnosis of 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    Patient s limitations or Problem List includes:  Pain, Decreased ROM/motion, weakness, decreased stability of the of the right  thumb CMC joint, decreased  and pinch strength of the thumband   Pain, Decreased  ROM/motion, Decreased stability and Tightness in musculature which interferes with patients ability to perform  self care, dressing and home maintenance as compared to previous level of function.    Rehab Potential:  Good - Return to full activity, some limitations    Patient will benefit from skilled Occupational Therapy to increase ROM, flexibility,  strength, pinch strength and stability of thumb and decrease pain and edema to return to previous activity level and resume normal daily tasks and to reach their rehab potential.    Barriers to Learning:  No barrier    Communication Issues:  Patient appears to be able to clearly communicate and understand verbal and written communication and follow directions correctly.  Chart Review: Chart Review, Brief history including review of medical and/or therapy records relating to the presenting problem and Simple history review with patient    Identified Performance Deficits: dressing, feeding, care of others, home establishment and management, meal preparation and cleanup and leisure activities    Assessment of Occupational Performance:  5 or more Performance Deficits    Treatment Explanation:  The following has been discussed with the patient:  RX ordered/plan of care  Anticipated outcomes  Possible risks and side effects    Treatment Plan:    Frequency:  1 X week, once daily  Duration:  for 6 weeks    Modalities:  Paraffin  Therapeutic Exercise:  AROM, Place and Hold, Isotonics and Isometrics  Neuromuscular re-education:  Kinesthetic Training, Proprioceptive Training, Kinesiotaping and Stabilization  Manual Techniques:  Joint mobilization and Myofascial release    Education/Joint Protection: anatomy of CMC , joint protection principles, adaptive equipment as needed.  Static hand based thumb spica orthosis, IP free    Discharge Plan:  Achieve all LTG  [place] in home treatment program.  Reach maximal therapeutic benefit.  Please refer to the daily flowsheet for  treatment today and total treatment time.    Home Program:  CMC Thumb Stabilization Home Program Flow Chart:  X = date added to HEP  DATE added to HEP: 2018    Orthotics:  Counter force thumb spica orthotic (specify type) R HBTSS     Web space release w clip  Adductor mm release  Contract/relax Web space release w clip    Repositioning:    Self CMC Mobs:   Self Traction in line  On chest    Skull Rock, Jog,   Professor  position, etc.  Other     Self Traction in line          Muscles Re-Education:    C position  Place and Hold pinch     Strengtheninst Dorsal Interossei Isometric C  Hard C with index abduction     Piano Isometric     Avoid painful pinching and gripping     Warmth     Adaptive Equipment recommendations (specify)     Incorporate joint protection into daily activities       Next visit:   Progress thumb stability and move toward a CMC stab orthosis, with attention to MCP hyperextension

## 2018-01-27 ENCOUNTER — HEALTH MAINTENANCE LETTER (OUTPATIENT)
Age: 63
End: 2018-01-27

## 2018-01-30 ENCOUNTER — PRE VISIT (OUTPATIENT)
Dept: ORTHOPEDICS | Facility: CLINIC | Age: 63
End: 2018-01-30

## 2018-02-02 ENCOUNTER — THERAPY VISIT (OUTPATIENT)
Dept: OCCUPATIONAL THERAPY | Facility: CLINIC | Age: 63
End: 2018-02-02
Payer: COMMERCIAL

## 2018-02-02 DIAGNOSIS — M79.644 PAIN OF RIGHT THUMB: Primary | ICD-10-CM

## 2018-02-02 DIAGNOSIS — M18.11 PRIMARY OSTEOARTHRITIS OF FIRST CARPOMETACARPAL JOINT OF RIGHT HAND: ICD-10-CM

## 2018-02-02 PROCEDURE — 97763 ORTHC/PROSTC MGMT SBSQ ENC: CPT | Mod: GO | Performed by: OCCUPATIONAL THERAPIST

## 2018-02-02 PROCEDURE — 97140 MANUAL THERAPY 1/> REGIONS: CPT | Mod: GO | Performed by: OCCUPATIONAL THERAPIST

## 2018-02-02 PROCEDURE — 97110 THERAPEUTIC EXERCISES: CPT | Mod: GO | Performed by: OCCUPATIONAL THERAPIST

## 2018-02-07 ENCOUNTER — THERAPY VISIT (OUTPATIENT)
Dept: OCCUPATIONAL THERAPY | Facility: CLINIC | Age: 63
End: 2018-02-07
Payer: COMMERCIAL

## 2018-02-07 DIAGNOSIS — M18.11 PRIMARY OSTEOARTHRITIS OF FIRST CARPOMETACARPAL JOINT OF RIGHT HAND: ICD-10-CM

## 2018-02-07 DIAGNOSIS — M79.644 PAIN OF RIGHT THUMB: Primary | ICD-10-CM

## 2018-02-07 PROCEDURE — 97140 MANUAL THERAPY 1/> REGIONS: CPT | Mod: GO | Performed by: OCCUPATIONAL THERAPIST

## 2018-02-07 PROCEDURE — 97535 SELF CARE MNGMENT TRAINING: CPT | Mod: GO | Performed by: OCCUPATIONAL THERAPIST

## 2018-02-15 ENCOUNTER — THERAPY VISIT (OUTPATIENT)
Dept: OCCUPATIONAL THERAPY | Facility: CLINIC | Age: 63
End: 2018-02-15
Payer: COMMERCIAL

## 2018-02-15 DIAGNOSIS — M79.644 PAIN OF RIGHT THUMB: Primary | ICD-10-CM

## 2018-02-15 DIAGNOSIS — M18.11 PRIMARY OSTEOARTHRITIS OF FIRST CARPOMETACARPAL JOINT OF RIGHT HAND: ICD-10-CM

## 2018-02-15 PROCEDURE — 97018 PARAFFIN BATH THERAPY: CPT | Mod: GO | Performed by: OCCUPATIONAL THERAPIST

## 2018-02-15 PROCEDURE — 97110 THERAPEUTIC EXERCISES: CPT | Mod: GO | Performed by: OCCUPATIONAL THERAPIST

## 2018-02-15 PROCEDURE — 97140 MANUAL THERAPY 1/> REGIONS: CPT | Mod: GO | Performed by: OCCUPATIONAL THERAPIST

## 2018-03-12 ENCOUNTER — PRE VISIT (OUTPATIENT)
Dept: ORTHOPEDICS | Facility: CLINIC | Age: 63
End: 2018-03-12

## 2018-03-12 DIAGNOSIS — M25.561 CHRONIC PAIN OF BOTH KNEES: Primary | ICD-10-CM

## 2018-03-12 DIAGNOSIS — M25.562 CHRONIC PAIN OF BOTH KNEES: Primary | ICD-10-CM

## 2018-03-12 DIAGNOSIS — G89.29 CHRONIC PAIN OF BOTH KNEES: Primary | ICD-10-CM

## 2018-03-12 NOTE — TELEPHONE ENCOUNTER
Patient is s/p left TKA on 10/05/2012 and right open lysis of adhesion on 04/4/2013 and right TKA by Dr. Dashawn Mckeon on 08/2011.    Patient was last seen on 5/17/2016.    Patient is coming to clinic for further discussion regarding plan of care.      Orders are under review until the day of clinic visit as the MD needs to be consulted.     Patient visit type and questionnaires have been reviewed and are correct for this appointment? Yes    Aline Garcia ATC

## 2018-03-15 ENCOUNTER — THERAPY VISIT (OUTPATIENT)
Dept: OCCUPATIONAL THERAPY | Facility: CLINIC | Age: 63
End: 2018-03-15
Payer: COMMERCIAL

## 2018-03-15 DIAGNOSIS — M79.644 PAIN OF RIGHT THUMB: Primary | ICD-10-CM

## 2018-03-15 DIAGNOSIS — M18.11 PRIMARY OSTEOARTHRITIS OF FIRST CARPOMETACARPAL JOINT OF RIGHT HAND: ICD-10-CM

## 2018-03-15 PROCEDURE — 97110 THERAPEUTIC EXERCISES: CPT | Mod: GO | Performed by: OCCUPATIONAL THERAPIST

## 2018-03-15 PROCEDURE — 97140 MANUAL THERAPY 1/> REGIONS: CPT | Mod: GO | Performed by: OCCUPATIONAL THERAPIST

## 2018-03-15 NOTE — PROGRESS NOTES
Hand Therapy PROGRESS Note    Current Date:  3/15/2018  Reporting period: 1/25/18 to current date      Diagnosis:   Right thumb pain  DOI:  12/15/17  Referring MD:Demetri Espinal MD  Next MD visit: PRN      Initial Subjective:  Radha Baca is a 62 year old right hand dominant female.  Patient reports symptoms of pain, stiffness/loss of motion, weakness/loss of strength and edema of the right thumb which occurred due to was cutting paper with a long scissors, and noted some pain about the next day. It was noted more with opening jars, lifting the bags and now I am writing differently, even with my left hand. I can't hold things as strong.   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 Not able to put in eyedrops    S:  Subjective changes as noted by patient: less intense pain but still some pain in the thumb. It does not radiate like before. There still is some sharp pain.    Functional changes noted by patient: Improvement in Self Care Tasks (dressing, eating), Household Chores and putting in my mom's eye drops  Response to previous treatment:  good  Patient has noted adverse reaction to:   None        Functional Outcome Measure:  See flowsheet    Objective:  PAIN 1/25/2018 3/15/18    Location  right thumb      Description aching, dull, stabbing, sharp, shooting, nagging and tender   Pain in the thumb, but not radiating up my arm.    Radiates Yes     Worse d/n daytime     Frequency activity dependant     Exacerbated by See above list     Relieves heat,  NSAIDs, otc medications, rest and the pool and stretching Heat and cold at times    At rest 0-10/10 3/10 0/10    On use 0-10/10 7/10     At worst.0-10/10 8/10 5/10 but less often, only occasional    Sleep disruption? no     Progression Not getting better       ROM:  Thumb 2018 2018 3/15/18    AROM(PROM) Right Left right    MP 35 38     IP 70 70     RAbd 40 45 52    PAbd 42 45 52    Retropulsion 2 3     Kapandji Opposition Scale (0-10/10) 6+ 8       Thumb ROM IMD  Intermetacarpal Distance Measure in mm-digital caliper  2018 Rabd PABD    RIGHT LEFT RIGHT LEFT   IMD RATER #1 59.20 60.17 61.74 60.54   PAIN (NRS) #1 1  2    IMD Rater #1 62.11  62.67    Pain NRS #1 0  0             Patients  Global Impression of Change (PGIC) Scale  Date NEXT   1=No change    2=Almost the same    3=A little better    4=Somewhat better    5=Moderately better    6=Better    7=A great deal better              Thumb Observation/Appearance:  Key: + = present/ - = not observed    2018  3/15    Right Left right   Shoulder deformity present over CMC -     Dorsal subluxation evident at CMC mild     Edema over the CMC joint +     Noted collapse of MP into hyperextension during pinch +     1st DI MMT (0-5/5) 4/5 4+/5           Radial Wrist Zone: Provocative Tests: 2018 3/15/18   Pain Report:  - none    + mild    ++ moderate    +++ severe  Right right   CMC grind test +    CMC joint line palpation ++    Extension Stress Test +    ADDuction Stress Test -    CMC Joint AP laxity -    Finkelstein's Test -    Dequervain's: APL test -    EPB test:  -    Intersection: APL/EPB & ECRB/L test -    STT Test (OA of same) -          STRENGTH:   (Measured in pounds)  Pain Free /Pinch Test only   2018 3/15/18    Trials Right Left Right Left Right Left    1  2  3  Av 56 55          3 pt Pinch 2018 3/15   Trials Right Left Right Left   1  2  3  Av 11 8      Lateral Pinch 2018 3/15   Trials Right Left Right Left    1  2  3  Av 12 11        Assessment:  Response to therapy has been improvement to:  ROM of Thumb:  Radial Abduction and Palmar Abduction  Strength:   and pinch  Pain:  frequency is less, intensity of pain is decreased, duration of pain is decreased and less tender over affected area  Self Care Skills:  Improved, can now put eyedrops in Mom's eyes without pain to squeeze.     Overall Assessment:  Patient's symptoms are resolving.  Patient would benefit from continued therapy to achieve rehab potential  STG/LTG:  STGoals have been reviewed and progress or achievement has occurred;  see goal sheet for details and updates.  I have re-evaluated this patient and find that the nature, scope, duration and intensity of the therapy is appropriate for the medical condition of the patient.        Treatment Plan:  P:  Frequency/Duration:  Recommend continuing with the current treatment plan. 3 X a month, once daily  for 2 months    Recommendations for Continued Therapy  Treatment Plan:  Modalities:  Paraffin  Therapeutic Exercise:  AROM, Place and Hold, Isotonics and Isometrics  Neuromuscular re-education:  Kinesthetic Training, Proprioceptive Training, Kinesiotaping and Stabilization  Manual Techniques:  Joint mobilization and Myofascial release  Static hand based thumb spica orthosis, IP free, and CMC stabilization orthosis  Education/Joint Protection: anatomy of CMC , joint protection principles, adaptive equipment as needed.      Discharge Plan:  Achieve all LTG  [place] in home treatment program.  Reach maximal therapeutic benefit.  Please refer to the daily flowsheet for treatment today and total treatment time.    Home Program:  CMC Thumb Stabilization Home Program Flow Chart:  X = date added to HEP  DATE added to HEP: 2018 3/15   Orthotics:  Counter force thumb spica orthotic (specify type) R HBTSS  R HBTS at night and CMC for day use   Web space release w clip  Adductor mm release  Contract/relax Web  space release w clip cont   Repositioning:    Self CMC Mobs:   Self Traction in line  On chest    Skull Rock, Jog,   Professor  position, etc.  Other     Self Traction in line       Skull rock and self traction     Muscles Re-Education:    C position  Place and Hold pinch  C with RB    Strengtheninst Dorsal Interossei Isometric C  Hard C with index abduction  FDI with RB 3 sets of ten   Piano Isometric     Avoid painful pinching and gripping     Warmth     Adaptive Equipment recommendations (specify)  Got the ove' glove and some oxo ware   Incorporate joint protection into daily activities       Next visit:   Progress thumb stability and move toward a CMC stab orthosis, with attention to MCP hyperextension

## 2018-03-15 NOTE — MR AVS SNAPSHOT
After Visit Summary   3/15/2018    Radha Baca    MRN: [id number]           Patient Information     Date Of Birth          1955        Visit Information        Provider Department      3/15/2018 10:00 AM Judi Avelar OT [facility]        Today's Diagnoses     Pain of right thumb    -  1    Primary osteoarthritis of first carpometacarpal joint of right hand           Follow-ups after your visit        Your next 10 appointments already scheduled     Mar 20, 2018  3:15 PM CDT   (Arrive by 3:00 PM)   RETURN KNEE with Lou Byrne MD   [facility] ([facility])    [address] [place] [address]  [address] [facility] [address]   [phone]            Apr 04, 2018  1:30 PM CDT   ALINE Hand with Judi Avelar OT   [facility] ([facility])    [address] [place] [address] [phone]            Apr 18, 2018  1:00 PM CDT   (Arrive by 12:45 PM)   RETURN DIABETES with Demar Hickey MD   [facility] ([facility])    [address] [place] [address]  [address] [facility] [address] [phone]            Apr 25, 2018 10:00 AM CDT   ALINE Hand with Judi Avelar OT   [facility] ([facility])    [address] [place] [id number] [phone]              Who to contact     If you have questions or need follow up information about today's clinic visit or your schedule please contact [facility] directly at [phone].  Normal or non-critical lab and imaging results will be communicated to you by MyChart, letter or phone within 4 business days after the clinic has received the results. If you do not hear from us within 7 days, please contact the clinic through MyChart or phone. If you have a critical or abnormal lab result, we will notify you by phone as  soon as possible.  Submit refill requests through [employer] or call your pharmacy and they will forward the refill request to us. Please allow 3 business days for your refill to be completed.          Additional Information About Your Visit        Sponduuhart Information     S[employer] gives you secure access to your electronic health record. If you see a primary care provider, you can also send messages to your care team and make appointments. If you have questions, please call your primary care clinic.  If you do not have a primary care provider, please call [phone] and they will assist you.        Care EveryWhere ID     This is your Care EveryWhere ID. This could be used by other organizations to access your [facility] medical records  [id number]         Blood Pressure from Last 3 Encounters:   01/18/18 119/73   11/22/17 116/72   11/15/17 125/68    Weight from Last 3 Encounters:   01/24/18 114.5 kg (252 lb 6.4 oz)   01/18/18 113.5 kg (250 lb 4.8 oz)   11/22/17 113.9 kg (251 lb)              We Performed the Following     MANUAL THER TECH,1+REGIONS,EA 15 MIN     THERAPEUTIC EXERCISES        Primary Care Provider Office Phone # Fax #    Mohan Moreno [phone]       [address] [address]        Equal Access to Services     YESY NAJERA : Hadii mahogany kingsley hadasho Soomaali, waaxda luqadaha, qaybta kaalmada adeegyada, mindy fam. So [facility] [phone].    ATENCIÓN: Si habla español, tiene a pitts disposición servicios gratuitos de asistencia lingüística. Llame al [phone].    We comply with applicable federal civil rights laws and [place] laws. We do not discriminate on the basis of race, color, national origin, age, disability, sex, sexual orientation, or gender identity.            Thank you!     Thank you for choosing  [facility]  for your care. Our goal is always to provide you with excellent care. Hearing back from our patients is one  way we can continue to improve our services. Please take a few minutes to complete the written survey that you may receive in the mail after your visit with us. Thank you!             Your Updated Medication List - Protect others around you: Learn how to safely use, store and throw away your medicines at www.disposemymeds.org.          This list is accurate as of 3/15/18 11:01 AM.  Always use your most recent med list.                   Brand Name Dispense Instructions for use Diagnosis    Albuterol Sulfate 108 (90 BASE) MCG/ACT Aepb     1 each    Inhale 2 puffs into the lungs every 6 hours as needed    Viral URI with cough       amoxicillin 500 MG capsule    AMOXIL    8 capsule    Take 4 capsules (2 grams) 1 hour before dental work.    S/P joint replacement       aspirin 81 MG tablet     90 tablet    1 tab daily    Type 2 diabetes mellitus (H)       atorvastatin 20 MG tablet    LIPITOR    90 tablet    Take 1 tablet (20 mg) by mouth daily    Dyslipidemia       TIAGO CONTOUR test strip   Generic drug:  blood glucose monitoring     300 each    Use to test blood sugar 3 times daily or as directed.    Type 2 diabetes mellitus with complication (H)       blood glucose calibration NORMAL solution     1 Bottle    Use to calibrate blood glucose monitor as needed as directed.    DM type 2 (diabetes mellitus, type 2) (H)       blood glucose monitoring lancets     6 Box    Test 4-6 times daily 90 day supply refills x 3    Type 2 diabetes mellitus with complication (H)       calcium carbonate-vitamin D 500-400 MG-UNIT Tabs per tablet     90 tablet    Take 1 tablet by mouth daily    Type 2 diabetes mellitus (H)       cholecalciferol 1000 UNIT tablet    vitamin D3    100 tablet    Take 1 tablet by mouth daily.        EYE-ZAKIYA PLUS LUTEIN PO           insulin glargine 100 UNIT/ML injection    LANTUS SOLOSTAR    70 mL    Inject 70 units SQ each am.    Type 2 diabetes mellitus with complication, with long-term current use of insulin  (H)       insulin pen needle 31G X 8 MM    B-D U/F    400 each    Use  4 daily short 31 G X 8MM    Diabetes mellitus, type 2 (H)       latanoprost 0.005 % ophthalmic solution    XALATAN     Place 1 drop into both eyes At Bedtime        lisinopril-hydrochlorothiazide 20-12.5 MG per tablet    PRINZIDE/ZESTORETIC    90 tablet    Take 1 tablet by mouth daily    Essential hypertension       metFORMIN 500 MG tablet    GLUCOPHAGE    360 tablet    Take 2 tablets (1,000 mg) by mouth 2 times daily (with meals)    Diabetes mellitus, type 2 (H)       MULTIvitamin  S Caps     90 capsule    Take 1 daily    Type 2 diabetes mellitus (H)       NovoLOG FLEXPEN 100 UNIT/ML injection   Generic drug:  insulin aspart     75 mL    Carb counting with meals approx 70-80 units daily    Type 2 diabetes mellitus with complication (H)       OMEGA-3 FISH OIL PO      Take 1,000 mg by mouth daily        order for DME     1 Units    Equipment being ordered: Grade 1 (light) compression stockings, below the knee    Venous (peripheral) insufficiency       RESTASIS 0.05 % ophthalmic emulsion   Generic drug:  cycloSPORINE           timolol 0.5 % ophthalmic solution    TIMOPTIC     Place 1 drop into both eyes 2 times daily        TYLENOL PO      Take by mouth as needed for mild pain or fever        vitamin  B Complex Caps      Take 1 tablet by mouth daily        VITAMIN C PO      Take 2,000 mg by mouth 2 times daily

## 2018-03-20 ENCOUNTER — RADIANT APPOINTMENT (OUTPATIENT)
Dept: GENERAL RADIOLOGY | Facility: CLINIC | Age: 63
End: 2018-03-20
Attending: ORTHOPAEDIC SURGERY
Payer: COMMERCIAL

## 2018-03-20 ENCOUNTER — OFFICE VISIT (OUTPATIENT)
Dept: ORTHOPEDICS | Facility: CLINIC | Age: 63
End: 2018-03-20
Payer: COMMERCIAL

## 2018-03-20 DIAGNOSIS — G89.29 CHRONIC PAIN OF BOTH KNEES: ICD-10-CM

## 2018-03-20 DIAGNOSIS — Z98.890 S/P KNEE SURGERY: ICD-10-CM

## 2018-03-20 DIAGNOSIS — M25.562 CHRONIC PAIN OF BOTH KNEES: ICD-10-CM

## 2018-03-20 DIAGNOSIS — M25.561 CHRONIC PAIN OF BOTH KNEES: ICD-10-CM

## 2018-03-20 DIAGNOSIS — M54.32 LEFT SIDED SCIATICA: Primary | ICD-10-CM

## 2018-03-20 ASSESSMENT — ENCOUNTER SYMPTOMS
BLOOD IN STOOL: 0
VOMITING: 0
HOARSE VOICE: 0
EXERCISE INTOLERANCE: 1
NAUSEA: 0
TREMORS: 0
POLYDIPSIA: 0
HALLUCINATIONS: 0
HYPERTENSION: 1
ORTHOPNEA: 0
DISTURBANCES IN COORDINATION: 0
ABDOMINAL PAIN: 0
RECTAL PAIN: 1
LOSS OF CONSCIOUSNESS: 0
MUSCLE CRAMPS: 1
SYNCOPE: 0
POLYPHAGIA: 0
EYE WATERING: 0
INCREASED ENERGY: 1
LIGHT-HEADEDNESS: 0
ALTERED TEMPERATURE REGULATION: 0
DOUBLE VISION: 0
LEG PAIN: 1
HEARTBURN: 0
NUMBNESS: 0
PANIC: 1
MEMORY LOSS: 0
ARTHRALGIAS: 1
STIFFNESS: 1
DECREASED CONCENTRATION: 0
SEIZURES: 0
SINUS CONGESTION: 0
POOR WOUND HEALING: 0
PARALYSIS: 0
EYE PAIN: 1
SMELL DISTURBANCE: 0
CHILLS: 0
JAUNDICE: 0
BOWEL INCONTINENCE: 0
WEAKNESS: 1
SINUS PAIN: 0
NIGHT SWEATS: 0
FATIGUE: 1
NAIL CHANGES: 1
DIARRHEA: 1
DIZZINESS: 1
EYE IRRITATION: 0
PALPITATIONS: 0
NERVOUS/ANXIOUS: 1
TROUBLE SWALLOWING: 0
WEIGHT GAIN: 1
TASTE DISTURBANCE: 0
DEPRESSION: 1
JOINT SWELLING: 1
BLOATING: 0
SORE THROAT: 0
SKIN CHANGES: 0
EYE REDNESS: 0
MUSCLE WEAKNESS: 1
HEADACHES: 0
NECK MASS: 0
SLEEP DISTURBANCES DUE TO BREATHING: 0
HYPOTENSION: 0
DECREASED APPETITE: 0
NECK PAIN: 1
WEIGHT LOSS: 0
BACK PAIN: 1
INSOMNIA: 1
MYALGIAS: 1
FEVER: 0
TINGLING: 0

## 2018-03-20 NOTE — MR AVS SNAPSHOT
After Visit Summary   3/20/2018    Radha Baca    MRN: [id number]           Patient Information     Date Of Birth          1955        Visit Information        Provider Department      3/20/2018 3:15 PM Lou Byrne MD [facility]        Today's Diagnoses     Left sided sciatica    -  1    S/P knee surgery           Follow-ups after your visit        Additional Services     ORTHOPEDICS ADULT REFERRAL       Your provider has referred you to: Dr. Dubois  Left sided sciatica, she has an MRI 2014    Please be aware that coverage of these services is subject to the terms and limitations of your health insurance plan.  Call member services at your health plan with any benefit or coverage questions.      Please bring the following to your appointment:    >>   Any x-rays, CTs or MRIs which have been performed.  Contact the facility where they were done to arrange for  prior to your scheduled appointment.    >>   List of current medications   >>   This referral request   >>   Any documents/labs given to you for this referral                  Your next 10 appointments already scheduled     Apr 04, 2018  1:30 PM CDT   ALINE Hand with Judi Avelar OT   [facility] ([facility])    [address] [place] [address]   [phone]            Apr 06, 2018 10:00 AM CDT   (Arrive by 9:30 AM)   NEW SPINE with Juancarlos Dubois MD   [facility] ([facility])    [address] [place] [address] [phone]            Apr 18, 2018  1:00 PM CDT   (Arrive by 12:45 PM)   RETURN DIABETES with Demar Hickey MD   [facility] ([facility])    [address] [place] [address]   [phone]            Apr 25, 2018 10:00 AM CDT   ALINE Hand with Judi Avelar OT   [facility] ([facility] [place] [facility])    [address]  4th Floor  [facility] [address] [phone]              Who to contact     Please call your clinic at [phone] to:    Ask questions about your health    Make or cancel appointments    Discuss your medicines    Learn about your test results    Speak to your doctor            Additional Information About Your Visit        PostHelpersharFreshPay Information     [employer] gives you secure access to your electronic health record. If you see a primary care provider, you can also send messages to your care team and make appointments. If you have questions, please call your primary care clinic.  If you do not have a primary care provider, please call [phone] and they will assist you.      Axial is an electronic gateway that provides easy, online access to your medical records. With [employer], you can request a clinic appointment, read your test results, renew a prescription or communicate with your care team.     To access your existing account, please contact your [facility] or call [phone] for assistance.        Care EveryWhere ID     This is your Care EveryWhere ID. This could be used by other organizations to access your [facility] medical records  [id number]         Blood Pressure from Last 3 Encounters:   01/18/18 119/73   11/22/17 116/72   11/15/17 125/68    Weight from Last 3 Encounters:   01/24/18 114.5 kg (252 lb 6.4 oz)   01/18/18 113.5 kg (250 lb 4.8 oz)   11/22/17 113.9 kg (251 lb)              We Performed the Following     ORTHOPEDICS ADULT REFERRAL        Primary Care Provider Office Phone # Fax #    Mohan Moreno [phone]       [address] [address]        Equal Access to Services     YESY NAJERA : ernesto Cruz qaybta kaalmada adeegyada, waxay idiin hayaan adeeg kharash la'aan ah. So [facility] [phone].    ATENCIÓN: Si  rosalina quan, tiene a pitts disposición servicios gratuitos de asistencia lingüística. Lavell zamarripa [phone].    We comply with applicable federal civil rights laws and [place] laws. We do not discriminate on the basis of race, color, national origin, age, disability, sex, sexual orientation, or gender identity.            Thank you!     Thank you for choosing [facility]  for your care. Our goal is always to provide you with excellent care. Hearing back from our patients is one way we can continue to improve our services. Please take a few minutes to complete the written survey that you may receive in the mail after your visit with us. Thank you!             Your Updated Medication List - Protect others around you: Learn how to safely use, store and throw away your medicines at www.disposemymeds.org.          This list is accurate as of 3/20/18 11:59 PM.  Always use your most recent med list.                   Brand Name Dispense Instructions for use Diagnosis    Albuterol Sulfate 108 (90 BASE) MCG/ACT Aepb     1 each    Inhale 2 puffs into the lungs every 6 hours as needed    Viral URI with cough       amoxicillin 500 MG capsule    AMOXIL    8 capsule    Take 4 capsules (2 grams) 1 hour before dental work.    S/P joint replacement       aspirin 81 MG tablet     90 tablet    1 tab daily    Type 2 diabetes mellitus (H)       atorvastatin 20 MG tablet    LIPITOR    90 tablet    Take 1 tablet (20 mg) by mouth daily    Dyslipidemia       TIAGO CONTOUR test strip   Generic drug:  blood glucose monitoring     300 each    Use to test blood sugar 3 times daily or as directed.    Type 2 diabetes mellitus with complication (H)       blood glucose calibration NORMAL solution     1 Bottle    Use to calibrate blood glucose monitor as needed as directed.    DM type 2 (diabetes mellitus, type 2) (H)       blood glucose monitoring lancets     6 Box    Test 4-6 times daily 90 day supply refills x 3    Type 2  diabetes mellitus with complication (H)       calcium carbonate-vitamin D 500-400 MG-UNIT Tabs per tablet     90 tablet    Take 1 tablet by mouth daily    Type 2 diabetes mellitus (H)       cholecalciferol 1000 UNIT tablet    vitamin D3    100 tablet    Take 1 tablet by mouth daily.        EYE-ZAKIYA PLUS LUTEIN PO           insulin glargine 100 UNIT/ML injection    LANTUS SOLOSTAR    70 mL    Inject 70 units SQ each am.    Type 2 diabetes mellitus with complication, with long-term current use of insulin (H)       insulin pen needle 31G X 8 MM    B-D U/F    400 each    Use  4 daily short 31 G X 8MM    Diabetes mellitus, type 2 (H)       latanoprost 0.005 % ophthalmic solution    XALATAN     Place 1 drop into both eyes At Bedtime        lisinopril-hydrochlorothiazide 20-12.5 MG per tablet    PRINZIDE/ZESTORETIC    90 tablet    Take 1 tablet by mouth daily    Essential hypertension       metFORMIN 500 MG tablet    GLUCOPHAGE    360 tablet    Take 2 tablets (1,000 mg) by mouth 2 times daily (with meals)    Diabetes mellitus, type 2 (H)       MULTIvitamin  S Caps     90 capsule    Take 1 daily    Type 2 diabetes mellitus (H)       NovoLOG FLEXPEN 100 UNIT/ML injection   Generic drug:  insulin aspart     75 mL    Carb counting with meals approx 70-80 units daily    Type 2 diabetes mellitus with complication (H)       OMEGA-3 FISH OIL PO      Take 1,000 mg by mouth daily        order for DME     1 Units    Equipment being ordered: Grade 1 (light) compression stockings, below the knee    Venous (peripheral) insufficiency       RESTASIS 0.05 % ophthalmic emulsion   Generic drug:  cycloSPORINE           timolol 0.5 % ophthalmic solution    TIMOPTIC     Place 1 drop into both eyes 2 times daily        TYLENOL PO      Take by mouth as needed for mild pain or fever        vitamin  B Complex Caps      Take 1 tablet by mouth daily        VITAMIN C PO      Take 2,000 mg by mouth 2 times daily
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
## 2018-03-20 NOTE — NURSING NOTE
Reason For Visit:   Chief Complaint   Patient presents with     RECHECK     bilateral knee pain       Primary MD: Mohan Moreno  Referring MD: Self Referred    ?  No    Occupation: None.  Currently working? No.  Work status?  NA.    Date of injury: None  Type of injury: NA.    Date of surgery: 4/4/13  Type of surgery: Total knee arthoplasty left      Date of surgery: 10/5/12  Type of surgery:   1.  Right knee mini open lysis of adhesions.   2.  Right knee closed manipulation following #1.   3.  Left knee intra-articular steroid injection.     Date of surgery: 8/2011  Type of surgery: Right knee surgery Dr. Dashawn Mckeon    Smoker: No    There were no vitals taken for this visit.    Pain Assessment  Patient Currently in Pain: Yes  0-10 Pain Scale: 7  Primary Pain Location: Knee  Pain Orientation: Right, Left  Pain Descriptors: Aching  Alleviating Factors: Ice, Heat (Pool therapy)  Aggravating Factors:  (Any Activity)    Dominique Quinteros CMA  3/20/2018

## 2018-04-04 ENCOUNTER — TELEPHONE (OUTPATIENT)
Dept: ORTHOPEDICS | Facility: CLINIC | Age: 63
End: 2018-04-04

## 2018-04-04 NOTE — TELEPHONE ENCOUNTER
Patient upset about how an appointment was canceled today in [facility].  She arrived for her appt.  And it had been canceled.  The [facility] manager, Rosalba, will call her.

## 2018-04-05 DIAGNOSIS — M54.42 LEFT-SIDED LOW BACK PAIN WITH LEFT-SIDED SCIATICA: Primary | ICD-10-CM

## 2018-04-05 DIAGNOSIS — M54.50 LUMBAGO: Primary | ICD-10-CM

## 2018-04-11 ENCOUNTER — RADIANT APPOINTMENT (OUTPATIENT)
Dept: MRI IMAGING | Facility: CLINIC | Age: 63
End: 2018-04-11
Attending: ORTHOPAEDIC SURGERY
Payer: COMMERCIAL

## 2018-04-11 DIAGNOSIS — M54.42 LEFT-SIDED LOW BACK PAIN WITH LEFT-SIDED SCIATICA: ICD-10-CM

## 2018-04-12 DIAGNOSIS — M54.50 LUMBAGO: Primary | ICD-10-CM

## 2018-04-16 ASSESSMENT — ENCOUNTER SYMPTOMS
CONSTIPATION: 0
ARTHRALGIAS: 1
CHILLS: 0
INSOMNIA: 1
POOR WOUND HEALING: 0
VOMITING: 0
INCREASED ENERGY: 1
DECREASED CONCENTRATION: 0
BACK PAIN: 1
DECREASED APPETITE: 0
NUMBNESS: 0
NECK PAIN: 1
NIGHT SWEATS: 0
MUSCLE CRAMPS: 1
RECTAL PAIN: 0
ALTERED TEMPERATURE REGULATION: 0
MUSCLE WEAKNESS: 1
WEAKNESS: 0
MYALGIAS: 1
DISTURBANCES IN COORDINATION: 0
FEVER: 0
SPEECH CHANGE: 0
STIFFNESS: 1
POLYDIPSIA: 0
NERVOUS/ANXIOUS: 1
PANIC: 1
JOINT SWELLING: 0
BLOOD IN STOOL: 0
TREMORS: 0
WEIGHT LOSS: 0
PARALYSIS: 0
JAUNDICE: 0
HEARTBURN: 0
HALLUCINATIONS: 0
NAIL CHANGES: 1
LOSS OF CONSCIOUSNESS: 0
TINGLING: 0
DIARRHEA: 1
ABDOMINAL PAIN: 0
MEMORY LOSS: 0
BOWEL INCONTINENCE: 0
SKIN CHANGES: 0
NAUSEA: 0
FATIGUE: 1
HEADACHES: 0
DIZZINESS: 0
WEIGHT GAIN: 1
DEPRESSION: 1
POLYPHAGIA: 0

## 2018-04-17 ENCOUNTER — OFFICE VISIT (OUTPATIENT)
Dept: ORTHOPEDICS | Facility: CLINIC | Age: 63
End: 2018-04-17
Attending: ORTHOPAEDIC SURGERY
Payer: COMMERCIAL

## 2018-04-17 ENCOUNTER — RADIANT APPOINTMENT (OUTPATIENT)
Dept: GENERAL RADIOLOGY | Facility: CLINIC | Age: 63
End: 2018-04-17
Attending: ORTHOPAEDIC SURGERY
Payer: COMMERCIAL

## 2018-04-17 VITALS — HEIGHT: 64 IN | BODY MASS INDEX: 42.49 KG/M2 | WEIGHT: 248.9 LBS

## 2018-04-17 DIAGNOSIS — M54.42 CHRONIC LEFT-SIDED LOW BACK PAIN WITH LEFT-SIDED SCIATICA: Primary | ICD-10-CM

## 2018-04-17 DIAGNOSIS — M54.50 LUMBAGO: ICD-10-CM

## 2018-04-17 DIAGNOSIS — Z79.4 TYPE 2 DIABETES MELLITUS WITH COMPLICATION, WITH LONG-TERM CURRENT USE OF INSULIN (H): ICD-10-CM

## 2018-04-17 DIAGNOSIS — E11.8 TYPE 2 DIABETES MELLITUS WITH COMPLICATION, WITH LONG-TERM CURRENT USE OF INSULIN (H): ICD-10-CM

## 2018-04-17 DIAGNOSIS — G89.29 CHRONIC LEFT-SIDED LOW BACK PAIN WITH LEFT-SIDED SCIATICA: Primary | ICD-10-CM

## 2018-04-17 LAB
ALBUMIN SERPL-MCNC: 3.3 G/DL (ref 3.4–5)
ALP SERPL-CCNC: 91 U/L (ref 40–150)
ALT SERPL W P-5'-P-CCNC: 36 U/L (ref 0–50)
ANION GAP SERPL CALCULATED.3IONS-SCNC: 8 MMOL/L (ref 3–14)
AST SERPL W P-5'-P-CCNC: 22 U/L (ref 0–45)
BILIRUB SERPL-MCNC: 0.4 MG/DL (ref 0.2–1.3)
BUN SERPL-MCNC: 13 MG/DL (ref 7–30)
CALCIUM SERPL-MCNC: 9.1 MG/DL (ref 8.5–10.1)
CHLORIDE SERPL-SCNC: 103 MMOL/L (ref 94–109)
CO2 SERPL-SCNC: 26 MMOL/L (ref 20–32)
CREAT SERPL-MCNC: 0.61 MG/DL (ref 0.52–1.04)
CREAT UR-MCNC: 105 MG/DL
GFR SERPL CREATININE-BSD FRML MDRD: >90 ML/MIN/1.7M2
GLUCOSE SERPL-MCNC: 251 MG/DL (ref 70–99)
HBA1C MFR BLD: 9.9 % (ref 0–6.4)
HCT VFR BLD AUTO: 36.4 % (ref 35–47)
MICROALBUMIN UR-MCNC: 30 MG/L
MICROALBUMIN/CREAT UR: 29.05 MG/G CR (ref 0–25)
POTASSIUM SERPL-SCNC: 4.4 MMOL/L (ref 3.4–5.3)
PROT SERPL-MCNC: 7 G/DL (ref 6.8–8.8)
SODIUM SERPL-SCNC: 136 MMOL/L (ref 133–144)
T4 FREE SERPL-MCNC: 1.1 NG/DL (ref 0.76–1.46)
TSH SERPL DL<=0.005 MIU/L-ACNC: 1.99 MU/L (ref 0.4–4)

## 2018-04-17 RX ORDER — ERGOCALCIFEROL 1.25 MG/1
CAPSULE ORAL
COMMUNITY
End: 2018-06-12

## 2018-04-17 NOTE — NURSING NOTE
"Reason For Visit:   Chief Complaint   Patient presents with     Back Pain     pt states she was sent by , she has had bag issues for awhile, since her knee surgeries the pain has gotton worse in her right glut and the pain shoots down right leg and the pain on her left side of her back there is always pain.       Primary MD: Mohan Moreno  Ref. MD:     ?  No  Occupation:None.  Currently working? No.  Work status?  unemployed.  Smoker: No  Request smoking cessation information: No    Ht 1.626 m (5' 4\")  Wt 112.9 kg (248 lb 14.4 oz)  BMI 42.72 kg/m2    Pain Assessment  Patient Currently in Pain: Yes  0-10 Pain Scale: 8  Primary Pain Location: Back  Pain Orientation: Left, Lower  Alleviating Factors:  (No alleviating factors)  Aggravating Factors: Stairs, Walking, Sitting (long periods of sitting or walking or standing)    Oswestry (LELE) Questionnaire    OSWESTRY DISABILITY INDEX 4/16/2018   Count 9   Sum 21   Oswestry Score (%) 46.67   Some recent data might be hidden          Visual Analog Pain Scale  Back Pain Scale 0-10: 4  Right leg pain: 5.5  Left leg pain: 1.5    Promis 10 Assessment    PROMIS 10 3/20/2018   In general, would you say your health is: Fair   In general, would you say your quality of life is: Good   In general, how would you rate your physical health? Fair   In general, how would you rate your mental health, including your mood and your ability to think? Fair   In general, how would you rate your satisfaction with your social activities and relationships? Good   In general, please rate how well you carry out your usual social activities and roles Fair   To what extent are you able to carry out your everyday physical activities such as walking, climbing stairs, carrying groceries, or moving a chair? A little   How often have you been bothered by emotional problems such as feeling anxious, depressed or irritable? Often   How would you rate your fatigue on average? " Moderate   How would you rate your pain on average?   0 = No Pain  to  10 = Worst Imaginable Pain 7   In general, would you say your health is: 2   In general, would you say your quality of life is: 3   In general, how would you rate your physical health? 2   In general, how would you rate your mental health, including your mood and your ability to think? 2   In general, how would you rate your satisfaction with your social activities and relationships? 3   In general, please rate how well you carry out your usual social activities and roles. (This includes activities at home, at work and in your community, and responsibilities as a parent, child, spouse, employee, friend, etc.) 2   To what extent are you able to carry out your everyday physical activities such as walking, climbing stairs, carrying groceries, or moving a chair? 2   In the past 7 days, how often have you been bothered by emotional problems such as feeling anxious, depressed, or irritable? 4   In the past 7 days, how would you rate your fatigue on average? 3   In the past 7 days, how would you rate your pain on average, where 0 means no pain, and 10 means worst imaginable pain? 7   Global Mental Health Score 10   Global Physical Health Score 9   PROMIS TOTAL - SUBSCORES 19   Some recent data might be hidden                Dennis Engel CMA

## 2018-04-17 NOTE — MR AVS SNAPSHOT
After Visit Summary   4/17/2018    Radha Baca    MRN: [id number]           Patient Information     Date Of Birth          1955        Visit Information        Provider Department      4/17/2018 10:20 AM Juancarlos Dubois MD [facility]        Today's Diagnoses     Chronic left-sided low back pain with left-sided sciatica    -  1       Follow-ups after your visit        Additional Services     [facility]       Please call [phone] to make an appointment. Clinic is located: [facility] [address] [place] [address] [id number] [place] [address]      Please complete the following questions:    Procedure/Referral: Referral Only -  Comprehensive Evaluation and Management    What is your diagnosis for the patient's pain? Lumbar radiculopathy    What are your specific questions for the pain specialist? none    Are there any red flags that may impact the assessment or management of the patient? None            PHYSICAL THERAPY REFERRAL (External-Prints)       Physical Therapy Referral                  Your next 10 appointments already scheduled     Apr 18, 2018  1:00 PM CDT   (Arrive by 12:45 PM)   RETURN DIABETES with Demar Hickey MD   [facility] [place] [facility]    [address] [place] [address] [id number] [phone]            Apr 18, 2018  3:30 PM CDT   ALINE Hand with Judi Avelar OT   [facility] ([facility]    [address] [place] [id number] [phone]            Apr 25, 2018 10:00 AM CDT   ALINE Hand with Judi Avelar OT   [facility] ([facility]    [address] [place] [id number] [phone]              Who to contact     Please call your clinic at [phone] to:    Ask questions about your  "health    Make or cancel appointments    Discuss your medicines    Learn about your test results    Speak to your doctor            Additional Information About Your Visit        [employer] Information     [employer] gives you secure access to your electronic health record. If you see a primary care provider, you can also send messages to your care team and make appointments. If you have questions, please call your primary care clinic.  If you do not have a primary care provider, please call [phone] and they will assist you.      [employer] is an electronic gateway that provides easy, online access to your medical records. With [employer], you can request a clinic appointment, read your test results, renew a prescription or communicate with your care team.     To access your existing account, please contact your [facility] or call [phone] for assistance.        Care EveryWhere ID     This is your Care EveryWhere ID. This could be used by other organizations to access your [employer] medical records  [id number]        Your Vitals Were     Height BMI (Body Mass Index)                1.626 m (5' 4\") 42.72 kg/m2           Blood Pressure from Last 3 Encounters:   01/18/18 119/73   11/22/17 116/72   11/15/17 125/68    Weight from Last 3 Encounters:   04/17/18 112.9 kg (248 lb 14.4 oz)   01/24/18 114.5 kg (252 lb 6.4 oz)   01/18/18 113.5 kg (250 lb 4.8 oz)              We Performed the Following     [facility]ERRAL     PHYSICAL THERAPY REFERRAL (External-Prints)        Primary Care Provider Office Phone # Fax #    Mohan Moreno [phone]       [address] [address]        Equal Access to Services     YESY NAJERA : Richard Ornelas, ernesto peres, mindy mackey. So [facility] [phone].    ATENCIÓN: Si habla español, tiene a pitts disposición servicios " conner de asistencia lingüística. Lavell zamarripa [phone].    We comply with applicable federal civil rights laws and [place] laws. We do not discriminate on the basis of race, color, national origin, age, disability, sex, sexual orientation, or gender identity.            Thank you!     Thank you for choosing [facility]  for your care. Our goal is always to provide you with excellent care. Hearing back from our patients is one way we can continue to improve our services. Please take a few minutes to complete the written survey that you may receive in the mail after your visit with us. Thank you!             Your Updated Medication List - Protect others around you: Learn how to safely use, store and throw away your medicines at www.disposemymeds.org.          This list is accurate as of 4/17/18 11:14 AM.  Always use your most recent med list.                   Brand Name Dispense Instructions for use Diagnosis    Albuterol Sulfate 108 (90 Base) MCG/ACT Aepb     1 each    Inhale 2 puffs into the lungs every 6 hours as needed    Viral URI with cough       amoxicillin 500 MG capsule    AMOXIL    8 capsule    Take 4 capsules (2 grams) 1 hour before dental work.    S/P joint replacement       aspirin 81 MG tablet     90 tablet    1 tab daily    Type 2 diabetes mellitus (H)       atorvastatin 20 MG tablet    LIPITOR    90 tablet    Take 1 tablet (20 mg) by mouth daily    Dyslipidemia       TIAGO CONTOUR test strip   Generic drug:  blood glucose monitoring     300 each    Use to test blood sugar 3 times daily or as directed.    Type 2 diabetes mellitus with complication (H)       blood glucose calibration Normal solution     1 Bottle    Use to calibrate blood glucose monitor as needed as directed.    DM type 2 (diabetes mellitus, type 2) (H)       blood glucose monitoring lancets     6 Box    Test 4-6 times daily 90 day supply refills x 3    Type 2 diabetes mellitus with complication (H)       calcium  carbonate-vitamin D 500-400 MG-UNIT Tabs per tablet     90 tablet    Take 1 tablet by mouth daily    Type 2 diabetes mellitus (H)       cholecalciferol 1000 UNIT tablet    vitamin D3    100 tablet    Take 1 tablet by mouth daily.        ergocalciferol 17121 units capsule    ERGOCALCIFEROL          EYE-ZAKIYA PLUS LUTEIN PO           insulin glargine 100 UNIT/ML injection    LANTUS SOLOSTAR    70 mL    Inject 70 units SQ each am.    Type 2 diabetes mellitus with complication, with long-term current use of insulin (H)       insulin pen needle 31G X 8 MM    B-D U/F    400 each    Use  4 daily short 31 G X 8MM    Diabetes mellitus, type 2 (H)       latanoprost 0.005 % ophthalmic solution    XALATAN     Place 1 drop into both eyes At Bedtime        lisinopril-hydrochlorothiazide 20-12.5 MG per tablet    PRINZIDE/ZESTORETIC    90 tablet    Take 1 tablet by mouth daily    Essential hypertension       metFORMIN 500 MG tablet    GLUCOPHAGE    360 tablet    Take 2 tablets (1,000 mg) by mouth 2 times daily (with meals)    Diabetes mellitus, type 2 (H)       MULTIvitamin  S Caps     90 capsule    Take 1 daily    Type 2 diabetes mellitus (H)       NovoLOG FLEXPEN 100 UNIT/ML injection   Generic drug:  insulin aspart     75 mL    Carb counting with meals approx 70-80 units daily    Type 2 diabetes mellitus with complication (H)       OMEGA-3 FISH OIL PO      Take 1,000 mg by mouth daily        order for DME     1 Units    Equipment being ordered: Grade 1 (light) compression stockings, below the knee    Venous (peripheral) insufficiency       RESTASIS 0.05 % ophthalmic emulsion   Generic drug:  cycloSPORINE           timolol 0.5 % ophthalmic solution    TIMOPTIC     Place 1 drop into both eyes 2 times daily        TYLENOL PO      Take by mouth as needed for mild pain or fever        vitamin  B Complex Caps      Take 1 tablet by mouth daily        VITAMIN C PO      Take 2,000 mg by mouth 2 times daily

## 2018-04-17 NOTE — PROGRESS NOTES
Spine Surgery Consultation    REFERRING PHYSICIAN: Lou Byrne   PRIMARY CARE PHYSICIAN: Mohan Moreno           Chief Complaint:   Back Pain (pt states she was sent by , she has had bag issues for awhile, since her knee surgeries the pain has gotton worse in her right glut and the pain shoots down right leg and the pain on her left side of her back there is always pain.)      History of Present Illness:  Symptom Profile Including: location of symptoms, onset, severity, exacerbating/alleviating factors, previous treatments:        Radha Baca is a 62 year old female who presents for evaluation of back and claudicatory right greater than left radicular symptoms in an L5 and S1 distribution which have been present for a year but steadily worsened.  It is a burning and tingling type pain.  The symptoms are worse when she is up and walking they are improved somewhat if she rests or lies down.  He is previously tried physical therapy.  She did the therapy as a part of her overall lower extremity conditioning program after multiple lower extremity procedures.  She has been taking some Tylenol and anti-inflammatories and this helped somewhat but does not completely alleviate the symptoms.  She has not yet done injections and she has not tried dedicated lumbar physical therapy.         Past Medical History:     Past Medical History:   Diagnosis Date     Arthritis      Arthritis of knee 4/4/2013     Arthritis of shoulder region, right 4/18/2014     Arthritis of shoulder region, right 4/18/2014     Back injury      Depressive disorder      Diabetes (H)      Finger pain 4/2/2015     Headache(784.0)     decreased with mouth guard use.     Hypercholesteremia      Hypertension      Low back pain      Menarche age 9+    cycles q mo x 4-5 d     Neck injuries      Pain in knee joint     LEFT     Right bundle branch block     per H/P     SNHL (sensorineural hearing loss)      Umbilical hernia without mention  of obstruction or gangrene 8/2014            Past Surgical History:     Past Surgical History:   Procedure Laterality Date     ARTHROPLASTY KNEE  4/4/2013    Left Total Knee Arthroplasty;  Surgeon: Lou Byrne MD;  [facility]     ARTHROPLASTY MINIMALLY INVASIVE KNEE  8/22/2011    R knee :CAITLYN JACOBO, Left age 14     DENTAL SURGERY      root canals, wisdom teeth     EXAM UNDER ANESTHESIA, MANIPULATE JOINT (LOCATION)  10/5/2012    Procedure: EXAM UNDER ANESTHESIA, MANIPULATE JOINT (LOCATION);  Right Knee Mini Open Lysis Of Adhesions, Left Knee Steroid Injection  ;  Surgeon: Lou Byrne MD;  [facility]     [facility] OR OCULAR DEVICE INTRAOP DETACHED RETINA  2009    R     [facility] PHAKIC IOL - IMPLANT FROM SURGEON      right     KNEE SURGERY  1969    left     LASER YAG CAPSULOTOMY  12/2016    left     VITRECTOMY PARSPLANA  2010            Social History:     Social History   Substance Use Topics     Smoking status: Former Smoker     Smokeless tobacco: Never Used      Comment: quit 35 years ago     Alcohol use No            Family History:     Family History   Problem Relation Age of Onset     DIABETES Father 55     DM II     DIABETES Sister 45     DM II     DIABETES Brother 50     xs 2     Hypertension Sister 41     also B and M     CANCER Maternal Aunt      multiple myeloma     Thyroid Disease Sister 45     graves     Hypertension Brother      ? age     Hypertension Mother 79     DIABETES Brother      Hypertension Brother      DIABETES Brother      Hypertension Brother      Breast Cancer Cousin      Thyroid Disease Sister      CEREBROVASCULAR DISEASE No family hx of      Breast Cancer No family hx of      Cancer - colorectal No family hx of      Prostate Cancer No family hx of      Alcohol/Drug No family hx of             Allergies:     Allergies   Allergen Reactions     Nkda [No Known Drug Allergies]             Medications:     Current Outpatient Prescriptions   Medication     ergocalciferol  "(ERGOCALCIFEROL) 83487 units capsule     amoxicillin (AMOXIL) 500 MG capsule     atorvastatin (LIPITOR) 20 MG tablet     TIAGO CONTOUR test strip     NOVOLOG FLEXPEN 100 UNIT/ML soln     insulin glargine (LANTUS SOLOSTAR) 100 UNIT/ML injection     metFORMIN (GLUCOPHAGE) 500 MG tablet     blood glucose calibration (TIAGO CONTOUR) NORMAL solution     cycloSPORINE (RESTASIS) 0.05 % ophthalmic emulsion     insulin pen needle (B-D U/F) 31G X 8 MM     lisinopril-hydrochlorothiazide (PRINZIDE/ZESTORETIC) 20-12.5 MG per tablet     order for DME     Acetaminophen (TYLENOL PO)     Omega-3 Fatty Acids (OMEGA-3 FISH OIL PO)     Albuterol Sulfate 108 (90 BASE) MCG/ACT AEPB     Multiple Vitamins-Minerals (EYE-ZAKIYA PLUS LUTEIN PO)     blood glucose monitoring (TIAGO MICROLET) lancets     latanoprost (XALATAN) 0.005 % ophthalmic solution     aspirin 81 MG tablet     Multiple Vitamin (MULTIVITAMIN  S) CAPS     calcium carbonate-vitamin D 500-400 MG-UNIT TABS tablt     timolol (TIMOPTIC) 0.5 % ophthalmic solution     cholecalciferol (VITAMIN D) 1000 UNIT tablet     Ascorbic Acid (VITAMIN C PO)     B Complex Vitamins (VITAMIN  B COMPLEX) CAPS     No current facility-administered medications for this visit.              Review of Systems:     A 10 point ROS was performed and reviewed. Specific responses to these questions are noted at the end of the document.         Physical Exam:   Vitals: Ht 1.626 m (5' 4\")  Wt 112.9 kg (248 lb 14.4 oz)  BMI 42.72 kg/m2  Constitutional: awake, alert, cooperative, no apparent distress, appears stated age.    Eyes: The sclera are white.  Ears, Nose, Throat: The trachea is midline.  Psychiatric: The patient has a normal affect.  Respiratory: breathing non-labored  Cardiovascular: The extremities are warm and perfused.  Skin: no obvious rashes or lesions.  Musculoskeletal, Neurologic, and Spine:      Lumbar Spine:    Appearance - No gross stepoffs or deformities    Motor -     L2-3: Hip flexion 5/5 L " and 5/5 R strength          L3/4:  Knee extension L 5/5 and R 5/5 strength         L4/5:  Foot dorsiflexion R 5/5 L 5/5 and       EHL dorsiflexion R 4/5 L 4/5 strength         S1:  Plantarflexion/Peroneal Muscles  L 5/5 and R 5/5 strength    Sensation: intact to light touch L3-S1 distribution BLE      Neurologic:      REFLEXES Left Right                  Patella 1+ 1+   Ankle jerk 1+ 1+   Babinski No upgoing great toe No upgoing great toe   Clonus 0 beats 0 beats     Hip Exam:  No pain with hip log roll and no tenderness over the greater trochanters.    Alignment:  Patient stands with a neutral standing sagittal balance.         Imaging:   We ordered and independently reviewed new radiographs at this clinic visit. The results were discussed with the patient.  Findings include:    April 11, 2018 lumbar MRI: Moderate to severe lateral recess stenosis at L5-S1 due to severe spondylosis and broad-based disc bulge.  Left greater than right L5-S1 foraminal stenosis.  Small central disc herniation at L4-5 but no apparent severe neurologic compression.    April 17, 2018 AP lateral lumbar flexion-extension radiographs: Moderate diffuse sclerosis worst at L5-S1 where there is disc space height loss and bilateral foraminal stenosis.  No dynamic instability.             Assessment and Plan:   Assessment:  62 year old female with claudicatory back and right leg symptoms and an L5 and S1 distribution and L5-S1 lateral recess stenosis.     Plan:  1. We discussed conservative and surgical options for this problem.  From a surgical standpoint I think should be a candidate for an L5-S1 decompression.  However she is currently morbidly obese.  I explained that with morbid obesity the risk of wound complications is much higher than normal.  Furthermore, she feels like she is not yet exhausted conservative care.  2. I provided a referral to the pain clinic for eval and treat with the recommendation for an L5-S1 interlaminar epidural  steroid injection.  3. I am going to give her a physical therapy prescription to work on lower extremity strengthening core strengthening and attempted weight loss.  4. She is going to see how this goes.  If she does not have improvement I encouraged her to let me know.  Ideally if she could should try to lose some weight before any planned surgery and she understands that this would really help decrease her wound complication risk.  She is going to think about things and will follow-up on an as-needed basis depending on how it goes after the injections and therapy.    Respectfully,  Juancarlos Dubois MD  Spine Surgery  [facility]      Answers for HPI/ROS submitted by the patient on 4/16/2018   General Symptoms: Yes  Skin Symptoms: Yes  HENT Symptoms: No  EYE SYMPTOMS: No  HEART SYMPTOMS: No  LUNG SYMPTOMS: No  INTESTINAL SYMPTOMS: Yes  URINARY SYMPTOMS: No  GYNECOLOGIC SYMPTOMS: No  BREAST SYMPTOMS: No  SKELETAL SYMPTOMS: Yes  BLOOD SYMPTOMS: No  NERVOUS SYSTEM SYMPTOMS: Yes  MENTAL HEALTH SYMPTOMS: Yes  Fever: No  Loss of appetite: No  Weight loss: No  Weight gain: Yes  Fatigue: Yes  Night sweats: No  Chills: No  Increased stress: Yes  Excessive hunger: No  Excessive thirst: No  Feeling hot or cold when others believe the temperature is normal: No  Loss of height: Yes  Post-operative complications: No  Surgical site pain: No  Hallucinations: No  Change in or Loss of Energy: Yes  Hyperactivity: No  Confusion: No  Changes in hair: Yes  Changes in moles/birth marks: No  Itching: No  Rashes: No  Changes in nails: Yes  Acne: No  Hair in places you don't want it: No  Change in facial hair: No  Warts: No  Non-healing sores: No  Scarring: No  Flaking of skin: No  Color changes of hands/feet in cold : No  Sun sensitivity: No  Skin thickening: No  Heart burn or indigestion: No  Nausea: No  Vomiting: No  Abdominal pain: No  Constipation: No  Diarrhea: Yes  Blood in stool: No  Black stools: No  Rectal or  Anal pain: No  Fecal incontinence: No  Yellowing of skin or eyes: No  Vomit with blood: No  Change in stools: Yes  Back pain: Yes  Muscle aches: Yes  Neck pain: Yes  Swollen joints: No  Joint pain: Yes  Bone pain: Yes  Muscle cramps: Yes  Muscle weakness: Yes  Joint stiffness: Yes  Bone fracture: No  Trouble with coordination: No  Dizziness or trouble with balance: No  Fainting or black-out spells: No  Memory loss: No  Headache: No  Speech problems: No  Tingling: No  Tremor: No  Weakness: No  Difficulty walking: No  Paralysis: No  Numbness: No  Nervous or Anxious: Yes  Depression: Yes  Trouble sleeping: Yes  Trouble thinking or concentrating: No  Mood changes: No  Panic attacks: Yes

## 2018-04-17 NOTE — LETTER
4/17/2018       RE: Radha Baca  [address] [id number]     Dear Colleague,    Thank you for referring your patient, Radha Baca, to the [facility] at [facility]. Please see a copy of my visit note below.    Spine Surgery Consultation    REFERRING PHYSICIAN: Lou Byrne   PRIMARY CARE PHYSICIAN: Mohan Moreno           Chief Complaint:   Back Pain (pt states she was sent by , she has had bag issues for awhile, since her knee surgeries the pain has gotton worse in her right glut and the pain shoots down right leg and the pain on her left side of her back there is always pain.)      History of Present Illness:  Symptom Profile Including: location of symptoms, onset, severity, exacerbating/alleviating factors, previous treatments:        Radha Baca is a 62 year old female who presents for evaluation of back and claudicatory right greater than left radicular symptoms in an L5 and S1 distribution which have been present for a year but steadily worsened.  It is a burning and tingling type pain.  The symptoms are worse when she is up and walking they are improved somewhat if she rests or lies down.  He is previously tried physical therapy.  She did the therapy as a part of her overall lower extremity conditioning program after multiple lower extremity procedures.  She has been taking some Tylenol and anti-inflammatories and this helped somewhat but does not completely alleviate the symptoms.  She has not yet done injections and she has not tried dedicated lumbar physical therapy.         Past Medical History:     Past Medical History:   Diagnosis Date     Arthritis      Arthritis of knee 4/4/2013     Arthritis of shoulder region, right 4/18/2014     Arthritis of shoulder region, right 4/18/2014     Back injury      Depressive disorder      Diabetes (H)      Finger pain 4/2/2015     Headache(784.0)      decreased with mouth guard use.     Hypercholesteremia      Hypertension      Low back pain      Menarche age 9+    cycles q mo x 4-5 d     Neck injuries      Pain in knee joint     LEFT     Right bundle branch block     per H/P     SNHL (sensorineural hearing loss)      Umbilical hernia without mention of obstruction or gangrene 8/2014            Past Surgical History:     Past Surgical History:   Procedure Laterality Date     ARTHROPLASTY KNEE  4/4/2013    Left Total Knee Arthroplasty;  Surgeon: Lou Byrne MD;  [facility]     ARTHROPLASTY MINIMALLY INVASIVE KNEE  8/22/2011    R knee :CAITLYN JACOBO, Left age 14     DENTAL SURGERY      root canals, wisdom teeth     EXAM UNDER ANESTHESIA, MANIPULATE JOINT (LOCATION)  10/5/2012    Procedure: EXAM UNDER ANESTHESIA, MANIPULATE JOINT (LOCATION);  Right Knee Mini Open Lysis Of Adhesions, Left Knee Steroid Injection  ;  Surgeon: Lou Byrne MD;  [facility]     [facility] OR OCULAR DEVICE INTRAOP DETACHED RETINA  2009    R     HC PHAKIC IOL - IMPLANT FROM SURGEON      right     KNEE SURGERY  1969    left     LASER YAG CAPSULOTOMY  12/2016    left     VITRECTOMY PARSPLANA  2010            Social History:     Social History   Substance Use Topics     Smoking status: Former Smoker     Smokeless tobacco: Never Used      Comment: quit 35 years ago     Alcohol use No            Family History:     Family History   Problem Relation Age of Onset     DIABETES Father 55     DM II     DIABETES Sister 45     DM II     DIABETES Brother 50     xs 2     Hypertension Sister 41     also B and M     CANCER Maternal Aunt      multiple myeloma     Thyroid Disease Sister 45     graves     Hypertension Brother      ? age     Hypertension Mother 79     DIABETES Brother      Hypertension Brother      DIABETES Brother      Hypertension Brother      Breast Cancer Cousin      Thyroid Disease Sister      CEREBROVASCULAR DISEASE No family hx of      Breast Cancer No family  "hx of      Cancer - colorectal No family hx of      Prostate Cancer No family hx of      Alcohol/Drug No family hx of             Allergies:     Allergies   Allergen Reactions     Nkda [No Known Drug Allergies]             Medications:     Current Outpatient Prescriptions   Medication     ergocalciferol (ERGOCALCIFEROL) 24708 units capsule     amoxicillin (AMOXIL) 500 MG capsule     atorvastatin (LIPITOR) 20 MG tablet     TIAGO CONTOUR test strip     NOVOLOG FLEXPEN 100 UNIT/ML soln     insulin glargine (LANTUS SOLOSTAR) 100 UNIT/ML injection     metFORMIN (GLUCOPHAGE) 500 MG tablet     blood glucose calibration (TIAGO CONTOUR) NORMAL solution     cycloSPORINE (RESTASIS) 0.05 % ophthalmic emulsion     insulin pen needle (B-D U/F) 31G X 8 MM     lisinopril-hydrochlorothiazide (PRINZIDE/ZESTORETIC) 20-12.5 MG per tablet     order for DME     Acetaminophen (TYLENOL PO)     Omega-3 Fatty Acids (OMEGA-3 FISH OIL PO)     Albuterol Sulfate 108 (90 BASE) MCG/ACT AEPB     Multiple Vitamins-Minerals (EYE-ZAKIYA PLUS LUTEIN PO)     blood glucose monitoring (TIAGO MICROLET) lancets     latanoprost (XALATAN) 0.005 % ophthalmic solution     aspirin 81 MG tablet     Multiple Vitamin (MULTIVITAMIN  S) CAPS     calcium carbonate-vitamin D 500-400 MG-UNIT TABS tablt     timolol (TIMOPTIC) 0.5 % ophthalmic solution     cholecalciferol (VITAMIN D) 1000 UNIT tablet     Ascorbic Acid (VITAMIN C PO)     B Complex Vitamins (VITAMIN  B COMPLEX) CAPS     No current facility-administered medications for this visit.              Review of Systems:     A 10 point ROS was performed and reviewed. Specific responses to these questions are noted at the end of the document.         Physical Exam:   Vitals: Ht 1.626 m (5' 4\")  Wt 112.9 kg (248 lb 14.4 oz)  BMI 42.72 kg/m2  Constitutional: awake, alert, cooperative, no apparent distress, appears stated age.    Eyes: The sclera are white.  Ears, Nose, Throat: The trachea is midline.  Psychiatric: The " patient has a normal affect.  Respiratory: breathing non-labored  Cardiovascular: The extremities are warm and perfused.  Skin: no obvious rashes or lesions.  Musculoskeletal, Neurologic, and Spine:      Lumbar Spine:    Appearance - No gross stepoffs or deformities    Motor -     L2-3: Hip flexion 5/5 L and 5/5 R strength          L3/4:  Knee extension L 5/5 and R 5/5 strength         L4/5:  Foot dorsiflexion R 5/5 L 5/5 and       EHL dorsiflexion R 4/5 L 4/5 strength         S1:  Plantarflexion/Peroneal Muscles  L 5/5 and R 5/5 strength    Sensation: intact to light touch L3-S1 distribution BLE      Neurologic:      REFLEXES Left Right                  Patella 1+ 1+   Ankle jerk 1+ 1+   Babinski No upgoing great toe No upgoing great toe   Clonus 0 beats 0 beats     Hip Exam:  No pain with hip log roll and no tenderness over the greater trochanters.    Alignment:  Patient stands with a neutral standing sagittal balance.         Imaging:   We ordered and independently reviewed new radiographs at this clinic visit. The results were discussed with the patient.  Findings include:    April 11, 2018 lumbar MRI: Moderate to severe lateral recess stenosis at L5-S1 due to severe spondylosis and broad-based disc bulge.  Left greater than right L5-S1 foraminal stenosis.  Small central disc herniation at L4-5 but no apparent severe neurologic compression.    April 17, 2018 AP lateral lumbar flexion-extension radiographs: Moderate diffuse sclerosis worst at L5-S1 where there is disc space height loss and bilateral foraminal stenosis.  No dynamic instability.             Assessment and Plan:   Assessment:  62 year old female with claudicatory back and right leg symptoms and an L5 and S1 distribution and L5-S1 lateral recess stenosis.     Plan:  1. We discussed conservative and surgical options for this problem.  From a surgical standpoint I think should be a candidate for an L5-S1 decompression.  However she is currently  morbidly obese.  I explained that with morbid obesity the risk of wound complications is much higher than normal.  Furthermore, she feels like she is not yet exhausted conservative care.  2. I provided a referral to the pain clinic for eval and treat with the recommendation for an L5-S1 interlaminar epidural steroid injection.  3. I am going to give her a physical therapy prescription to work on lower extremity strengthening core strengthening and attempted weight loss.  4. She is going to see how this goes.  If she does not have improvement I encouraged her to let me know.  Ideally if she could should try to lose some weight before any planned surgery and she understands that this would really help decrease her wound complication risk.  She is going to think about things and will follow-up on an as-needed basis depending on how it goes after the injections and therapy.    Respectfully,  Juancarlos Dubois MD  Spine Surgery  [facility]

## 2018-04-18 ENCOUNTER — TELEPHONE (OUTPATIENT)
Dept: ENDOCRINOLOGY | Facility: CLINIC | Age: 63
End: 2018-04-18

## 2018-04-18 DIAGNOSIS — M85.80 OSTEOPENIA, UNSPECIFIED LOCATION: Primary | ICD-10-CM

## 2018-04-18 NOTE — TELEPHONE ENCOUNTER
Discussed with patient.     Having back pain   Comfort food.  Planning for steroid injection.     1. Continue Glargine 70 units daily.   2. Novolog sliding scale during the day time plus meal bolus.   3. Continue metformin.     2-3 month FU with Mare Maria. [ will ask our staff to call in AM - patient with her mother in a clinic today]     Demar Hickey MD  [id number]  Endocrinology Service

## 2018-04-23 ENCOUNTER — TELEPHONE (OUTPATIENT)
Dept: ENDOCRINOLOGY | Facility: CLINIC | Age: 63
End: 2018-04-23

## 2018-04-23 DIAGNOSIS — E11.8 TYPE 2 DIABETES MELLITUS WITH COMPLICATION (H): ICD-10-CM

## 2018-04-23 NOTE — TELEPHONE ENCOUNTER
Pt called stating she needs Rx for microlet lancets - Prefers [facility] [address]  On [facility]   Tests 4x daily, 600 90day supply sits in [facility] [employer] [facility] Contour lancet release device   Pt doesn't like MyChart and is going to cancel it

## 2018-04-25 ENCOUNTER — THERAPY VISIT (OUTPATIENT)
Dept: OCCUPATIONAL THERAPY | Facility: CLINIC | Age: 63
End: 2018-04-25
Payer: COMMERCIAL

## 2018-04-25 DIAGNOSIS — M79.644 PAIN OF RIGHT THUMB: Primary | ICD-10-CM

## 2018-04-25 DIAGNOSIS — M18.11 PRIMARY OSTEOARTHRITIS OF FIRST CARPOMETACARPAL JOINT OF RIGHT HAND: ICD-10-CM

## 2018-04-25 PROCEDURE — 97140 MANUAL THERAPY 1/> REGIONS: CPT | Mod: GO | Performed by: OCCUPATIONAL THERAPIST

## 2018-04-25 PROCEDURE — 97110 THERAPEUTIC EXERCISES: CPT | Mod: GO | Performed by: OCCUPATIONAL THERAPIST

## 2018-04-25 PROCEDURE — 97035 APP MDLTY 1+ULTRASOUND EA 15: CPT | Mod: GO | Performed by: OCCUPATIONAL THERAPIST

## 2018-04-25 NOTE — PROGRESS NOTES
Hand Therapy PROGRESS Note    Current Date:4/25/2018  Initial Visit: 1/25/18  Total Visits: 6          Diagnosis:   Right thumb pain  DOI:  12/15/17  Referring MD:Demetri Espinal MD  Next MD visit: PRN      Initial Subjective:  Radha Baca is a 62 year old right hand dominant female.  Patient reports symptoms of pain, stiffness/loss of motion, weakness/loss of strength and edema of the right thumb which occurred due to was cutting paper with a long scissors, and noted some pain about the next day. It was noted more with opening jars, lifting the bags and now I am writing differently, even with my left hand. I can't hold things as strong.   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 Not able to put in eyedrops    S:  Subjective changes as noted by patient: having to wear the splint at night, and the small splint wearing it most of the day.  I am back to writing without pain most the time, and knitting, and no pain with eating/cutting food. I can do all my household chores. I still wear the splint at night and wear the smaller splint for knitting and daily tasks  Functional changes noted by patient: Improvement in Self Care Tasks (dressing, eating), Recreational Activities and Household Chores  Response to previous treatment:  excellent  Patient has noted adverse reaction to:   None    4/25/2018  Upper Extremity Functional Index Score:  SCORE:   Column Totals: /80: 74   (A lower score indicates greater disability.)      Objective:  PAIN 1/25/2018  3/15/18    Location  right thumb      Description aching, dull, stabbing, sharp, shooting, nagging and tender   Pain in the thumb, but not radiating up my arm.    Radiates Yes     Worse d/n daytime     Frequency activity dependant     Exacerbated by See above list     Relieves heat, NSAIDs, otc medications, rest and the pool and stretching Heat and cold at times    At rest 0-10/10 3/10 0/10    On use 0-10/10 7/10     At worst.0-10/10 8/10 5/10 but less often, only occasional    Sleep disruption? no     Progression Not getting better       ROM:  Thumb 1/25/2018 1/25/2018 3/15/18 4/25   AROM(PROM) Right Left right    MP 35 38  50   IP 70 70  75   RAbd 40 45 52 55   PAbd 42 45 52 58   Retropulsion 2 3     Kapandji Opposition Scale (0-10/10) 6+ 8  10 mild pain     Thumb ROM IMD  Intermetacarpal Distance Measure in mm-digital caliper  1/25/2018 Rabd PABD    RIGHT LEFT RIGHT LEFT   IMD RATER #1 59.20 60.17 61.74 60.54   PAIN (NRS) #1 1  2    IMD Rater #1 62.11  62.67    Pain NRS #1 0  0             Patients  Global Impression of Change (PGIC) Scale  Date 4/25/2018     1=No change    2=Almost the same    3=A little better    4=Somewhat better    5=Moderately better    6=Better    7=A great deal better x             Thumb Observation/Appearance:  Key: + = present/ - = not observed    1/25/2018     Right Left   Shoulder deformity present over CMC -    Dorsal subluxation evident at CMC mild    Edema over the CMC joint +    Noted collapse of MP into hyperextension during pinch +    1st DI MMT (0-5/5) 4/5 4+/5          Radial Wrist Zone: Provocative Tests: 1/25/2018 4/25   Pain Report:  - none    + mild    ++ moderate    +++ severe  Right right   CMC grind test + + milder   CMC joint line palpation ++ + less than initially   Extension Stress Test +    ADDuction Stress Test -    CMC Joint AP laxity -    Finkelstein's Test -    Dequervain's: APL test -    EPB test:  -    Intersection: APL/EPB & ECRB/L test -    STT Test (OA of  same) -          STRENGTH:   (Measured in pounds)  Pain Free /Pinch Test only   2018 3/15/18 4/25   Trials Right Left Right Left Right Left    1  2  3  Av 56 55  50        3 pt Pinch 2018 3/15 4/25   Trials Right Left Right Left right   1  2  3  Av 11 8  10     Lateral Pinch 2018 3/15 4/25   Trials Right Left Right Left right   1  2  3  Av 12 11  10     Assessment:  Response to therapy has been improvement to:  ROM of Thumb:  Radial Abduction and Palmar Abduction  Strength:   and pinch  Pain:  frequency is less, intensity of pain is decreased, duration of pain is decreased and less tender over affected area    Overall Assessment:  Patient's symptoms are resolving.  Patient is becoming more independent in home exercise program  STG/LTG:  STGoals have been reviewed and progress or achievement has occurred;  see goal sheet for details and updates.  LTGoals have been reviewed and progress or achievement has occurred:  see goal sheet for details and updates.  I have re-evaluated this patient and find that the nature, scope, duration and intensity of the therapy is appropriate for the medical condition of the patient.        Treatment Plan:  P:  Discharge to home program.    Treatment Plan:  Modalities:  Paraffin  Therapeutic Exercise:  AROM, Place and Hold, Isotonics and Isometrics  Neuromuscular re-education:  Kinesthetic Training, Proprioceptive Training, Kinesiotaping and Stabilization  Manual Techniques:  Joint mobilization and Myofascial release  Static hand based thumb spica orthosis, IP free, and CMC stabilization orthosis  Education/Joint Protection: anatomy of CMC , joint protection principles, adaptive equipment as needed.      Discharge Plan:  Achieve all LTG  Simpson in home treatment program.  Reach maximal therapeutic benefit.  Please refer to the daily flowsheet for treatment today and total treatment time.    Home Program:  CMC Thumb Stabilization Home Program  Flow Chart:  X = date added to HEP  DATE added to HEP: 2018 3/15 4/25   Orthotics:  Counter force thumb spica orthotic (specify type) R HBTSS  R HBTS at night and CMC for day use same   Web space release w clip  Adductor mm release  Contract/relax Web space release w clip cont    Repositioning:    Self CMC Mobs:   Self Traction in line  On chest    Skull Rock, Jog,   Professor  position, etc.  Other     Self Traction in line       Skull rock and self traction   same   Muscles Re-Education:    C position  Place and Hold pinch  C with RB  same   Strengtheninst Dorsal Interossei Isometric C  Hard C with index abduction  FDI with RB 3 sets of ten same   Piano Isometric      Avoid painful pinching and gripping      Warmth      Adaptive Equipment recommendations (specify)  Got the ove' glove and some oxo ware Using tools as needed   Incorporate joint protection into daily activities

## 2018-04-25 NOTE — MR AVS SNAPSHOT
After Visit Summary   4/25/2018    Radha Baca    MRN: [id number]           Patient Information     Date Of Birth          1955        Visit Information        Provider Department      4/25/2018 10:00 AM Judi Avelar OT M [facility]        Today's Diagnoses     Pain of right thumb    -  1    Primary osteoarthritis of first carpometacarpal joint of right hand           Follow-ups after your visit        Who to contact     If you have questions or need follow up information about today's clinic visit or your schedule please contact [facility] directly at [phone].  Normal or non-critical lab and imaging results will be communicated to you by [employer], letter or phone within 4 business days after the clinic has received the results. If you do not hear from us within 7 days, please contact the clinic through [employer] or phone. If you have a critical or abnormal lab result, we will notify you by phone as soon as possible.  Submit refill requests through [facility] or call your pharmacy and they will forward the refill request to us. Please allow 3 business days for your refill to be completed.          Additional Information About Your Visit        MyChart Information     [facility] gives you secure access to your electronic health record. If you see a primary care provider, you can also send messages to your care team and make appointments. If you have questions, please call your primary care clinic.  If you do not have a primary care provider, please call [phone] and they will assist you.        Care EveryWhere ID     This is your Care EveryWhere ID. This could be used by other organizations to access your [place] medical records  [id number]         Blood Pressure from Last 3 Encounters:   01/18/18 119/73   11/22/17 116/72   11/15/17 125/68    Weight from Last 3 Encounters:   04/17/18 112.9 kg (248 lb 14.4 oz)   01/24/18 114.5 kg (252 lb 6.4 oz)   01/18/18  113.5 kg (250 lb 4.8 oz)              We Performed the Following     ALINE PROGRESS NOTES REPORT     MANUAL THER TECH,1+REGIONS,EA 15 MIN     THERAPEUTIC EXERCISES     ULTRASOUND THERAPY        Primary Care Provider Office Phone # Fax #    Mohan Moreno [phone]       [address] [address]        Equal Access to Services     YESY NAJERA : Hadii aad ku hadasho Soomaali, waaxda luqadaha, qaybta kaalmada adeegyada, waxay idiin hayaan adeeg kharash la'aan . So [facility] [phone].    ATENCIÓN: Si habla español, tiene a pitts disposición servicios gratuitos de asistencia lingüística. Llame al [phone].    We comply with applicable federal civil rights laws and [place] laws. We do not discriminate on the basis of race, color, national origin, age, disability, sex, sexual orientation, or gender identity.            Thank you!     Thank you for choosing [facility]  for your care. Our goal is always to provide you with excellent care. Hearing back from our patients is one way we can continue to improve our services. Please take a few minutes to complete the written survey that you may receive in the mail after your visit with us. Thank you!             Your Updated Medication List - Protect others around you: Learn how to safely use, store and throw away your medicines at www.disposemymeds.org.          This list is accurate as of 4/25/18 10:53 AM.  Always use your most recent med list.                   Brand Name Dispense Instructions for use Diagnosis    Albuterol Sulfate 108 (90 Base) MCG/ACT Aepb     1 each    Inhale 2 puffs into the lungs every 6 hours as needed    Viral URI with cough       amoxicillin 500 MG capsule    AMOXIL    8 capsule    Take 4 capsules (2 grams) 1 hour before dental work.    S/P joint replacement       aspirin 81 MG tablet     90 tablet    1 tab daily    Type 2 diabetes mellitus (H)       atorvastatin 20 MG tablet    LIPITOR    90 tablet     Take 1 tablet (20 mg) by mouth daily    Dyslipidemia       TIAGO CONTOUR test strip   Generic drug:  blood glucose monitoring     300 each    Use to test blood sugar 3 times daily or as directed.    Type 2 diabetes mellitus with complication (H)       blood glucose calibration Normal solution     1 Bottle    Use to calibrate blood glucose monitor as needed as directed.    DM type 2 (diabetes mellitus, type 2) (H)       blood glucose monitoring lancets     600 each    Test 4-6 times daily 90 day supply refills x 3    Type 2 diabetes mellitus with complication (H)       calcium carbonate-vitamin D 500-400 MG-UNIT Tabs per tablet     90 tablet    Take 1 tablet by mouth daily    Type 2 diabetes mellitus (H)       cholecalciferol 1000 UNIT tablet    vitamin D3    100 tablet    Take 1 tablet by mouth daily.        ergocalciferol 48307 units capsule    ERGOCALCIFEROL          EYE-ZAKIYA PLUS LUTEIN PO           insulin glargine 100 UNIT/ML injection    LANTUS SOLOSTAR    70 mL    Inject 70 units SQ each am.    Type 2 diabetes mellitus with complication, with long-term current use of insulin (H)       insulin pen needle 31G X 8 MM    B-D U/F    400 each    Use  4 daily short 31 G X 8MM    Diabetes mellitus, type 2 (H)       latanoprost 0.005 % ophthalmic solution    XALATAN     Place 1 drop into both eyes At Bedtime        lisinopril-hydrochlorothiazide 20-12.5 MG per tablet    PRINZIDE/ZESTORETIC    90 tablet    Take 1 tablet by mouth daily    Essential hypertension       metFORMIN 500 MG tablet    GLUCOPHAGE    360 tablet    Take 2 tablets (1,000 mg) by mouth 2 times daily (with meals)    Diabetes mellitus, type 2 (H)       MULTIvitamin  S Caps     90 capsule    Take 1 daily    Type 2 diabetes mellitus (H)       NovoLOG FLEXPEN 100 UNIT/ML injection   Generic drug:  insulin aspart     75 mL    Carb counting with meals approx 70-80 units daily    Type 2 diabetes mellitus with complication (H)       OMEGA-3 FISH OIL PO       Take 1,000 mg by mouth daily        order for DME     1 Units    Equipment being ordered: Grade 1 (light) compression stockings, below the knee    Venous (peripheral) insufficiency       RESTASIS 0.05 % ophthalmic emulsion   Generic drug:  cycloSPORINE           timolol 0.5 % ophthalmic solution    TIMOPTIC     Place 1 drop into both eyes 2 times daily        TYLENOL PO      Take by mouth as needed for mild pain or fever        vitamin  B Complex Caps      Take 1 tablet by mouth daily        VITAMIN C PO      Take 2,000 mg by mouth 2 times daily

## 2018-04-30 ENCOUNTER — TELEPHONE (OUTPATIENT)
Dept: ENDOCRINOLOGY | Facility: CLINIC | Age: 63
End: 2018-04-30

## 2018-04-30 ENCOUNTER — OFFICE VISIT (OUTPATIENT)
Dept: FAMILY MEDICINE | Facility: CLINIC | Age: 63
End: 2018-04-30
Payer: COMMERCIAL

## 2018-04-30 VITALS — WEIGHT: 248.8 LBS | BODY MASS INDEX: 42.71 KG/M2 | HEART RATE: 98 BPM | OXYGEN SATURATION: 94 % | TEMPERATURE: 100 F

## 2018-04-30 DIAGNOSIS — J06.9 VIRAL UPPER RESPIRATORY TRACT INFECTION: Primary | ICD-10-CM

## 2018-04-30 DIAGNOSIS — Z79.4 TYPE 2 DIABETES MELLITUS WITH COMPLICATION, WITH LONG-TERM CURRENT USE OF INSULIN (H): Primary | ICD-10-CM

## 2018-04-30 DIAGNOSIS — E11.8 TYPE 2 DIABETES MELLITUS WITH COMPLICATION, WITH LONG-TERM CURRENT USE OF INSULIN (H): Primary | ICD-10-CM

## 2018-04-30 RX ORDER — BENZONATATE 200 MG/1
200 CAPSULE ORAL 3 TIMES DAILY PRN
Qty: 21 CAPSULE | Refills: 0 | Status: SHIPPED | OUTPATIENT
Start: 2018-04-30 | End: 2018-06-12

## 2018-04-30 RX ORDER — OXYMETAZOLINE HYDROCHLORIDE 0.05 G/100ML
2-3 SPRAY NASAL 2 TIMES DAILY PRN
Qty: 1 BOTTLE | Refills: 0 | Status: SHIPPED | OUTPATIENT
Start: 2018-04-30 | End: 2018-06-12

## 2018-04-30 NOTE — PATIENT INSTRUCTIONS
Here is the plan from today's visit    1. Viral upper respiratory tract infection  - oxymetazoline (AFRIN NASAL SPRAY) 0.05 % spray; Spray 2-3 sprays into both nostrils 2 times daily as needed for congestion  Dispense: 1 Bottle; Refill: 0  - benzonatate (TESSALON) 200 MG capsule; Take 1 capsule (200 mg) by mouth 3 times daily as needed for cough  Dispense: 21 capsule; Refill: 0    Please call or return to clinic if your symptoms don't go away.    Follow up plan  Follow up if you are not improving in the next 3-4 days.    Thank you for coming to [facility] today.  Lab Testing:  **If you had lab testing today and your results are reassuring or normal they will be mailed to you or sent through [employer] within 7 days.   **If the lab tests need quick action we will call you with the results.  The phone number we will call with results is # [phone] (home) . If this is not the best number please call our clinic and change the number.  Medication Refills:  If you need any refills please call your pharmacy and they will contact us.   If you need to  your refill at a new pharmacy, please contact the new pharmacy directly. The new pharmacy will help you get your medications transferred faster.   Scheduling:  If you have any concerns about today's visit or wish to schedule another appointment please call our office during normal business hours [phone] (8-5:00 M-F)  If a referral was made to a [facility] and you don't get a call from central scheduling please call [phone].  If a Mammogram was ordered for you at [facility] call [phone] to schedule or change your appointment.  If you had an XRay/CT/Ultrasound/MRI ordered the number is [phone] to schedule or change your radiology appointment.   Medical Concerns:  If you have urgent medical concerns please call [phone] at any time of the day.    Diana Dave MD

## 2018-04-30 NOTE — MR AVS SNAPSHOT
After Visit Summary   4/30/2018    Radha Baca    MRN: [id number]           Patient Information     Date Of Birth          1955        Visit Information        Provider Department      4/30/2018 1:40 PM Diana Dave MD [facility]        Today's Diagnoses     Viral upper respiratory tract infection    -  1      Care Instructions    Here is the plan from today's visit    1. Viral upper respiratory tract infection  - oxymetazoline (AFRIN NASAL SPRAY) 0.05 % spray; Spray 2-3 sprays into both nostrils 2 times daily as needed for congestion  Dispense: 1 Bottle; Refill: 0  - benzonatate (TESSALON) 200 MG capsule; Take 1 capsule (200 mg) by mouth 3 times daily as needed for cough  Dispense: 21 capsule; Refill: 0    Please call or return to clinic if your symptoms don't go away.    Follow up plan  Follow up if you are not improving in the next 3-4 days.    Thank you for coming to [facility] today.  Lab Testing:  **If you had lab testing today and your results are reassuring or normal they will be mailed to you or sent through [facility] within 7 days.   **If the lab tests need quick action we will call you with the results.  The phone number we will call with results is # [phone] (home) . If this is not the best number please call our clinic and change the number.  Medication Refills:  If you need any refills please call your pharmacy and they will contact us.   If you need to  your refill at a new pharmacy, please contact the new pharmacy directly. The new pharmacy will help you get your medications transferred faster.   Scheduling:  If you have any concerns about today's visit or wish to schedule another appointment please call our office during normal business hours [phone] (8-5:00 M-F)  If a referral was made to a [facility] and you don't get a call from central scheduling please call [phone].  If a Mammogram was  ordered for you at [facility] call [phone] to schedule or change your appointment.  If you had an XRay/CT/Ultrasound/MRI ordered the number is [phone] to schedule or change your radiology appointment.   Medical Concerns:  If you have urgent medical concerns please call [phone] at any time of the day.    Diana Dave MD            Follow-ups after your visit        Who to contact     Please call your clinic at [phone] to:    Ask questions about your health    Make or cancel appointments    Discuss your medicines    Learn about your test results    Speak to your doctor            Additional Information About Your Visit        S B E Information     S B E gives you secure access to your electronic health record. If you see a primary care provider, you can also send messages to your care team and make appointments. If you have questions, please call your primary care clinic.  If you do not have a primary care provider, please call [phone] and they will assist you.      S B E is an electronic gateway that provides easy, online access to your medical records. With S B E, you can request a clinic appointment, read your test results, renew a prescription or communicate with your care team.     To access your existing account, please contact your [facility] or call [phone] for assistance.        Care EveryWhere ID     This is your Care EveryWhere ID. This could be used by other organizations to access your [facility] medical records  [id number]        Your Vitals Were     Pulse Temperature Pulse Oximetry BMI (Body Mass Index)          98 100  F (37.8  C) (Oral) 94% 42.71 kg/m2         Blood Pressure from Last 3 Encounters:   01/18/18 119/73   11/22/17 116/72   11/15/17 125/68    Weight from Last 3 Encounters:   04/30/18 248 lb 12.8 oz (112.9 kg)   04/17/18 248 lb 14.4 oz (112.9 kg)   01/24/18 252 lb 6.4 oz (114.5 kg)              Today, you had  the following     No orders found for display         Today's Medication Changes          These changes are accurate as of 4/30/18  2:36 PM.  If you have any questions, ask your nurse or doctor.               Start taking these medicines.        Dose/Directions    benzonatate 200 MG capsule   Commonly known as:  TESSALON   Used for:  Viral upper respiratory tract infection   Started by:  Diana Dave MD        Dose:  200 mg   Take 1 capsule (200 mg) by mouth 3 times daily as needed for cough   Quantity:  21 capsule   Refills:  0       oxymetazoline 0.05 % spray   Commonly known as:  AFRIN NASAL SPRAY   Used for:  Viral upper respiratory tract infection   Started by:  Diana Dave MD        Dose:  2-3 spray   Spray 2-3 sprays into both nostrils 2 times daily as needed for congestion   Quantity:  1 Bottle   Refills:  0            Where to get your medicines      These medications were sent to [facility] [address] [facility] [address] AT [facility] [address] [facility]  [address] [facility] [address]     Phone:  [phone]     benzonatate 200 MG capsule    oxymetazoline 0.05 % spray                Primary Care Provider Office Phone # Fax #    Mohan Moreno [phone]       [address] [address]        Equal Access to Services     YESY NAJERA AH: Richard koenigo Sostaciaali, waaxda luqadaha, qaybta kaalmada adeegyada, mindy fam. So [facility] [phone].    ATENCIÓN: Si habla español, tiene a pitts disposición servicios gratuitos de asistencia lingüística. Llame al [phone].    We comply with applicable federal civil rights laws and [place] laws. We do not discriminate on the basis of race, color, national origin, age, disability, sex, sexual orientation, or gender identity.            Thank you!     Thank you for choosing [facility]  for your care. Our goal is always to provide  you with excellent care. Hearing back from our patients is one way we can continue to improve our services. Please take a few minutes to complete the written survey that you may receive in the mail after your visit with us. Thank you!             Your Updated Medication List - Protect others around you: Learn how to safely use, store and throw away your medicines at www.disposemymeds.org.          This list is accurate as of 4/30/18  2:36 PM.  Always use your most recent med list.                   Brand Name Dispense Instructions for use Diagnosis    Albuterol Sulfate 108 (90 Base) MCG/ACT Aepb     1 each    Inhale 2 puffs into the lungs every 6 hours as needed    Viral URI with cough       amoxicillin 500 MG capsule    AMOXIL    8 capsule    Take 4 capsules (2 grams) 1 hour before dental work.    S/P joint replacement       aspirin 81 MG tablet     90 tablet    1 tab daily    Type 2 diabetes mellitus (H)       atorvastatin 20 MG tablet    LIPITOR    90 tablet    Take 1 tablet (20 mg) by mouth daily    Dyslipidemia       TIAGO CONTOUR test strip   Generic drug:  blood glucose monitoring     300 each    Use to test blood sugar 3 times daily or as directed.    Type 2 diabetes mellitus with complication (H)       benzonatate 200 MG capsule    TESSALON    21 capsule    Take 1 capsule (200 mg) by mouth 3 times daily as needed for cough    Viral upper respiratory tract infection       blood glucose calibration Normal solution     1 Bottle    Use to calibrate blood glucose monitor as needed as directed.    DM type 2 (diabetes mellitus, type 2) (H)       blood glucose monitoring lancets     600 each    Test 4-6 times daily 90 day supply refills x 3    Type 2 diabetes mellitus with complication (H)       calcium carbonate-vitamin D 500-400 MG-UNIT Tabs per tablet     90 tablet    Take 1 tablet by mouth daily    Type 2 diabetes mellitus (H)       cholecalciferol 1000 UNIT tablet    vitamin D3    100 tablet    Take 1 tablet  by mouth daily.        ergocalciferol 94407 units capsule    ERGOCALCIFEROL          EYE-ZAKIYA PLUS LUTEIN PO           insulin glargine 100 UNIT/ML injection    LANTUS SOLOSTAR    70 mL    Inject 70 units SQ each am.    Type 2 diabetes mellitus with complication, with long-term current use of insulin (H)       insulin pen needle 31G X 8 MM    B-D U/F    400 each    Use  4 daily short 31 G X 8MM    Diabetes mellitus, type 2 (H)       latanoprost 0.005 % ophthalmic solution    XALATAN     Place 1 drop into both eyes At Bedtime        lisinopril-hydrochlorothiazide 20-12.5 MG per tablet    PRINZIDE/ZESTORETIC    90 tablet    Take 1 tablet by mouth daily    Essential hypertension       metFORMIN 500 MG tablet    GLUCOPHAGE    360 tablet    Take 2 tablets (1,000 mg) by mouth 2 times daily (with meals)    Diabetes mellitus, type 2 (H)       MULTIvitamin  S Caps     90 capsule    Take 1 daily    Type 2 diabetes mellitus (H)       NovoLOG FLEXPEN 100 UNIT/ML injection   Generic drug:  insulin aspart     75 mL    Carb counting with meals approx 70-80 units daily    Type 2 diabetes mellitus with complication (H)       OMEGA-3 FISH OIL PO      Take 1,000 mg by mouth daily        order for DME     1 Units    Equipment being ordered: Grade 1 (light) compression stockings, below the knee    Venous (peripheral) insufficiency       oxymetazoline 0.05 % spray    AFRIN NASAL SPRAY    1 Bottle    Spray 2-3 sprays into both nostrils 2 times daily as needed for congestion    Viral upper respiratory tract infection       RESTASIS 0.05 % ophthalmic emulsion   Generic drug:  cycloSPORINE           timolol 0.5 % ophthalmic solution    TIMOPTIC     Place 1 drop into both eyes 2 times daily        TYLENOL PO      Take by mouth as needed for mild pain or fever        vitamin  B Complex Caps      Take 1 tablet by mouth daily        VITAMIN C PO      Take 2,000 mg by mouth 2 times daily

## 2018-04-30 NOTE — PROGRESS NOTES
HPI:       Radha Baca is a 62 year old who presents for the following  Patient presents with:  Cough: fever, chills, cold, achy x 3 days    Acute Illness   Concerns: Fever, sore throat, cough  When did it start? 4 days ago  Is it getting better, worse or staying the same? unchanged    Fatigue/Achiness?: YES has been using a cane since she feels weak    Fever?:  YES 102F on Saturday and on Sunday 100F, this morning no fever    Chills/Sweats?:  YES     Headache (location?): YES left frontal    Sinus Pressure?: YES left side by nose    Eye redness/Discharge?: No     Ear Pain?:  YES not pain but scratchy and noise in ear    Runny nose?: No     Congestion?:  YES and sneezing yellow/green mucous    Sore Throat?:  YES feels scratchy  Respiratory    Cough?:  YES-productive of yellow sputum, productive of green sputum    Wheeze?:  YES wheezing at night  GI/    Decreased Appetite?: Yes only eating soup and crackers    Nausea?:No     Vomiting?: No     Diarrhea?:  No     Dysuria/Frequency?.:No       Any Illness Exposure?:  YES  with similar symptoms that has resolved    Therapies Tried and outcome: Robitussin DM, Nyquil, Advil Cold and Sinus with no relief    Blood sugars have been slightly higher today they were 209 fasting this morning and usually 140-165 in the morning.     Problem, Medication and Allergy Lists were reviewed and are current.  Patient is   an established patient of this clinic.,   Past Medical History:   Diagnosis Date     Arthritis      Arthritis of knee 4/4/2013     Arthritis of shoulder region, right 4/18/2014     Arthritis of shoulder region, right 4/18/2014     Back injury      Depressive disorder      Diabetes (H)      Finger pain 4/2/2015     Headache(784.0)     decreased with mouth guard use.     Hypercholesteremia      Hypertension      Low back pain      Menarche age 9+    cycles q mo x 4-5 d     Neck injuries      Pain in knee joint     LEFT     Right bundle branch block      per H/P     SNHL (sensorineural hearing loss)      Umbilical hernia without mention of obstruction or gangrene 8/2014   ,   Family History     Problem (# of Occurrences) Relation (Name,Age of Onset)    Breast Cancer (1) Cousin (Tomina)    CANCER (1) Maternal Aunt: multiple myeloma    DIABETES (5) Father (Fernandez Sr., 55): DM II, Sister (45): DM II, Brother (50): xs 2, Brother (Bonilla), Brother (Fernandez Jr.)    Hypertension (5) Sister (Leora, 41): also B and M, Brother (Bonilla / Fernandez Mc): ? age, Mother (Callum, 79), Brother (Bonilla), Brother (Fernandez Jr)    Thyroid Disease (2) Sister (45): graves, Sister (Davin)       Negative family history of: CEREBROVASCULAR DISEASE, Breast Cancer, Cancer - colorectal, Prostate Cancer, Alcohol/Drug       and   Social History     Social History     Marital status:      Spouse name: N/A     Number of children: N/A     Years of education: N/A     Occupational History     Human Resources      between jobs now     Social History Main Topics     Smoking status: Former Smoker     Smokeless tobacco: Never Used      Comment: quit 35 years ago     Alcohol use No     Drug use: No     Sexual activity: Yes     Partners: Male     Birth control/ protection: Post-menopausal            Review of Systems:   Review of Systems   Please see HPI       Physical Exam:     Patient Vitals for the past 24 hrs:   Temp Temp src Pulse SpO2 Weight   04/30/18 1357 100  F (37.8  C) Oral 98 94 % 248 lb 12.8 oz (112.9 kg)     Body mass index is 42.71 kg/(m^2).  Vitals were reviewed and were normal     Physical Exam   Constitutional: She appears well-developed and well-nourished. No distress.   HENT:   Head: Normocephalic and atraumatic.   Right Ear: Tympanic membrane is not injected and not bulging. A middle ear effusion is present.   Left Ear: Tympanic membrane is not injected and not bulging. A middle ear effusion is present.   Mouth/Throat: Uvula is midline and mucous membranes are normal. Posterior  oropharyngeal erythema (mild) present. No oropharyngeal exudate.   Eyes: Conjunctivae are normal. Pupils are equal, round, and reactive to light. No scleral icterus.   Neck: Neck supple.   Cardiovascular: Normal rate, regular rhythm and normal heart sounds.    No murmur heard.  Pulmonary/Chest: Effort normal and breath sounds normal. No respiratory distress. She has no wheezes. She has no rales.   Abdominal: Soft. Bowel sounds are normal. She exhibits no distension. There is no tenderness.   Lymphadenopathy:     She has no cervical adenopathy.   Skin: She is not diaphoretic.   Psychiatric: She has a normal mood and affect. Her behavior is normal.       Results:      Results from the last 24 hoursNo results found for this or any previous visit (from the past 24 hour(s)).  Assessment and Plan     Radha was seen today for cough and finger.    Diagnoses and all orders for this visit:    Viral upper respiratory tract infection  Most likely upper respiratory infection and discussed supportive care with rest, hydration, tylenol and ibuprofen, afrin nasal spray and tessalon prn cough. Vital signs are stable and she is currently afebrile. Advised to return if no improvement in symptoms in 2-3 days or sooner for new or worsening symptoms. Please call or go to the ED if worsening fever, new onset of shortness of breath or chest discomfort.    -     oxymetazoline (AFRIN NASAL SPRAY) 0.05 % spray; Spray 2-3 sprays into both nostrils 2 times daily as needed for congestion  -     benzonatate (TESSALON) 200 MG capsule; Take 1 capsule (200 mg) by mouth 3 times daily as needed for cough    There are no discontinued medications.  Options for treatment and follow-up care were reviewed with the patient. Radha Baca  engaged in the decision making process and verbalized understanding of the options discussed and agreed with the final plan.    Diana Dave MD

## 2018-04-30 NOTE — PROGRESS NOTES
Preceptor Attestation:   Patient seen, evaluated and discussed with the resident. I have verified the content of the note, which accurately reflects my assessment of the patient and the plan of care.   Supervising Physician:  Krysta Stephenson MD

## 2018-05-01 NOTE — TELEPHONE ENCOUNTER
Radha called to ask regarding lantus dosing while acutely ill     She reports having an upper respiratory illness for the last 3 days with fevers, cough, congestion. She is eating less than normal.    Her blood sugars have been in good range, she denies any low blood sugars, and her highest was 210 this am. She reports AM blood sugars are the highest from coughing all night, and they improve as the day goes on. She does have a correction scale which she can use to help with high blood sugars.     Given her blood sugars are overall in good range without significant highs or lows, I advised her that she can continue to take her current insulin regimen including lantus and novolog carb coverage and correction. She is to call again if she has lows or highs that are not correcting with her scale. She understands the plan.     Etelvina Esqueda MD  Fellow in Diabetes, Endocrinology, and Metabolism  PGY4  Pager [phone]

## 2018-05-02 ENCOUNTER — APPOINTMENT (OUTPATIENT)
Dept: GENERAL RADIOLOGY | Facility: CLINIC | Age: 63
End: 2018-05-02
Attending: EMERGENCY MEDICINE
Payer: COMMERCIAL

## 2018-05-02 ENCOUNTER — HOSPITAL ENCOUNTER (EMERGENCY)
Facility: CLINIC | Age: 63
Discharge: HOME | End: 2018-05-02
Attending: EMERGENCY MEDICINE | Admitting: EMERGENCY MEDICINE
Payer: COMMERCIAL

## 2018-05-02 ENCOUNTER — TELEPHONE (OUTPATIENT)
Dept: FAMILY MEDICINE | Facility: CLINIC | Age: 63
End: 2018-05-02

## 2018-05-02 VITALS
SYSTOLIC BLOOD PRESSURE: 150 MMHG | WEIGHT: 246.31 LBS | RESPIRATION RATE: 16 BRPM | OXYGEN SATURATION: 96 % | BODY MASS INDEX: 42.28 KG/M2 | TEMPERATURE: 99.6 F | HEART RATE: 91 BPM | DIASTOLIC BLOOD PRESSURE: 65 MMHG

## 2018-05-02 DIAGNOSIS — J06.9 VIRAL UPPER RESPIRATORY TRACT INFECTION: ICD-10-CM

## 2018-05-02 DIAGNOSIS — H65.01 RIGHT ACUTE SEROUS OTITIS MEDIA, RECURRENCE NOT SPECIFIED: ICD-10-CM

## 2018-05-02 LAB
GLUCOSE BLDC GLUCOMTR-MCNC: 156 MG/DL (ref 70–99)
GLUCOSE BLDC GLUCOMTR-MCNC: 278 MG/DL (ref 70–99)

## 2018-05-02 PROCEDURE — 99283 EMERGENCY DEPT VISIT LOW MDM: CPT | Mod: Z6 | Performed by: EMERGENCY MEDICINE

## 2018-05-02 PROCEDURE — 00000146 ZZHCL STATISTIC GLUCOSE BY METER IP

## 2018-05-02 PROCEDURE — 71046 X-RAY EXAM CHEST 2 VIEWS: CPT

## 2018-05-02 PROCEDURE — 25000132 ZZH RX MED GY IP 250 OP 250 PS 637: Performed by: EMERGENCY MEDICINE

## 2018-05-02 PROCEDURE — 99283 EMERGENCY DEPT VISIT LOW MDM: CPT | Mod: 25 | Performed by: EMERGENCY MEDICINE

## 2018-05-02 RX ORDER — AMOXICILLIN 500 MG/1
500 CAPSULE ORAL 3 TIMES DAILY
Qty: 21 CAPSULE | Refills: 0 | Status: SHIPPED | OUTPATIENT
Start: 2018-05-02 | End: 2018-05-09

## 2018-05-02 RX ORDER — FLUCONAZOLE 150 MG/1
TABLET ORAL
Qty: 2 TABLET | Refills: 0 | Status: SHIPPED | OUTPATIENT
Start: 2018-05-02 | End: 2018-05-05

## 2018-05-02 RX ORDER — ACETAMINOPHEN 500 MG
1000 TABLET ORAL ONCE
Status: COMPLETED | OUTPATIENT
Start: 2018-05-02 | End: 2018-05-02

## 2018-05-02 RX ADMIN — ACETAMINOPHEN 1000 MG: 500 TABLET, FILM COATED ORAL at 14:13

## 2018-05-02 ASSESSMENT — ENCOUNTER SYMPTOMS
FEVER: 1
COUGH: 1
SHORTNESS OF BREATH: 1
MYALGIAS: 1
NAUSEA: 1
VOMITING: 0

## 2018-05-02 NOTE — TELEPHONE ENCOUNTER
Returned call to patient.  She reports having a fever 100 right now.  She has not improved at all with the antibiotic, she feels worse.  Reports still coughing up green/brown/yellow sputum.  She had no sleep last night due to the coughing.   Reports increased SOB and was clearly SOB while talking to me.  She also reports back pain.     BG this morning was 300.  She did call in to Endocrine to advise on insulin needs.  She is wondering what she should do, reports that Dr. Dave advised her that is she wasn't getting better or getting worse to go to the ED.  Advised patient to go to the ED.  She does have someone to drive her.    Va Dorsey RN

## 2018-05-02 NOTE — ED AVS SNAPSHOT
[facility], Emergency Department    [address]    [facility] [address]    Phone:  [phone]    Fax:  [phone]                                       Radha Baca   MRN: [id number]    Department:  [facility], Emergency Department   Date of Visit:  5/2/2018           Patient Information     Date Of Birth          1955        Your diagnoses for this visit were:     Viral upper respiratory tract infection     Right acute serous otitis media, recurrence not specified        You were seen by Lou Mishra MD.        Discharge Instructions         Otitis Media (Middle-Ear Infection) in Adults  Otitis media is another name for a middle-ear infection. It means an infection behind your eardrum. This kind of ear infection can happen after any condition that keeps fluid from draining from the middle ear. These conditions include allergies, a cold, a sore throat, or a respiratory infection.  Middle-ear infections are common in children, but they can also happen in adults. An ear infection in an adult may mean a more serious problem than in a child. So you may need additional tests. If you have an ear infection, you should see your health care provider for treatment.  What are the types of middle-ear infections?  Infections can affect the middle ear in several ways. They are:    Acute otitis media. This middle-ear infection occurs suddenly. It causes swelling and redness. Fluid and mucus become trapped inside the ear. You can have a fever and ear pain.    Otitis media with effusion. Fluid (effusion) and mucus build up in the middle ear after the infection goes away. You may feel like your middle ear is full. This can continue for months and may affect your hearing.    Chronic otitis media with effusion. Fluid (effusion) remains in the middle ear for a long time. Or it builds up again and again, even though there is no infection. This type of middle-ear infection may be hard to treat.  It may also affect your hearing.  Who is more likely to get a middle-ear infection?  You are more likely to get an ear infection if you:    Smoke or are around someone who smokes    Have seasonal or year-round allergy symptoms    Have a cold or other upper respiratory infection  What causes a middle-ear infection?  The middle ear connects to the throat by a canal called the eustachian tube. This tube helps even out the pressure between the outer ear and the inner ear. A cold or allergy can irritate the tube or cause the area around it to swell. This can keep fluid from draining from the middle ear. The fluid builds up behind the eardrum. Bacteria and viruses can grow in this fluid. The bacteria and viruses cause the middle-ear infection.  What are the symptoms of a middle-ear infection?  Common symptoms of a middle-ear infection in adults are:    Pain in 1 or both ears    Drainage from the ear    Muffled hearing    Sore throat   You may also have a fever. Rarely, your balance can be affected.  These symptoms may be the same as for other conditions. It s important to talk with your health care provider if you think you have a middle-ear infection. If you have a high fever, severe pain behind your ear, or paralysis in your face, see your provider as soon as you can.  How is a middle-ear infection diagnosed?  Your health care provider will take a medical history and do a physical exam. He or she will look at the outer ear and eardrum with an otoscope. The otoscope is a lighted tool that lets your provider see inside the ear. A pneumatic otoscope blows a puff of air into the ear to check how well your eardrum moves. If you eardrum doesn t move well, it may mean you have fluid behind it.  Your provider may also do a test called tympanometry. This test tells how well the middle ear is working. It can find any changes in pressure in the middle ear. Your provider may test your hearing with a tuning fork.  How is a  middle-ear infection treated?  A middle-ear infection may be treated with:    Antibiotics, taken by mouth or as ear drops    Medication for pain    Decongestants, antihistamines, or nasal steroids  Your health care provider may also have you try autoinsufflation. This helps adjust the air pressure in your ear. For this, you pinch your nose and gently exhale. This forces air back through the eustachian tube.  The exact treatment for your ear infection will depend on the type of infection you have. In general, if your symptoms don t get better in 48 to 72 hours, contact your health care provider.  Middle-ear infections can cause long-term problems if not treated. They can lead to:    Infection in other parts of the head    Permanent hearing loss    Paralysis of a nerve in your face  If you have a middle-ear infection that doesn t get better, you may need to see an ear, nose, and throat specialist (otolaryngologist). You may need a CT scan or MRI to check for head and neck cancer.  Ear tubes  Sometimes fluid stays in the middle ear even after you take antibiotics and the infection goes away. In this case, your health care provider may suggest that a small tube be placed in your ear. The tube is put at the opening of the eardrum. The tube keeps fluid from building up and relieves pressure in the middle ear. It can also help you hear better. This surgery is called myringotomy. It is not often done in adults.  The tubes usually fall out on their own after 6 months to a year.    5428-9827 [employer]. [address] [place] [address]. All rights reserved. This information is not intended as a substitute for professional medical care. Always follow your healthcare professional's instructions.          Viral Upper Respiratory Illness (Adult)  You have a viral upper respiratory illness (URI), which is another term for the common cold. This illness is contagious during the first few days. It is spread  through the air by coughing and sneezing. It may also be spread by direct contact (touching the sick person and then touching your own eyes, nose, or mouth). Frequent handwashing will decrease risk of spread. Most viral illnesses go away within 7 to 10 days with rest and simple home remedies. Sometimes the illness may last for several weeks. Antibiotics will not kill a virus, and they are generally not prescribed for this condition.    Home care    If symptoms are severe, rest at home for the first 2 to 3 days. When you resume activity, don't let yourself get too tired.    Avoid being exposed to cigarette smoke (yours or others ).    You may use acetaminophen or ibuprofen to control pain and fever, unless another medicine was prescribed. If you have chronic liver or kidney disease, have ever had a stomach ulcer or gastrointestinal bleeding, or are taking blood-thinning medicines, talk with your healthcare provider before using these medicines. Aspirin should never be given to anyone under 18 years of age who is ill with a viral infection or fever. It may cause severe liver or brain damage.    Your appetite may be poor, so a light diet is fine. Avoid dehydration by drinking 6 to 8 glasses of fluids per day (water, soft drinks, juices, tea, or soup). Extra fluids will help loosen secretions in the nose and lungs.    Over-the-counter cold medicines will not shorten the length of time you re sick, but they may be helpful for the following symptoms: cough, sore throat, and nasal and sinus congestion. (Note: Do not use decongestants if you have high blood pressure.)  Follow-up care  Follow up with your healthcare provider, or as advised.  When to seek medical advice  Call your healthcare provider right away if any of these occur:    Cough with lots of colored sputum (mucus)    Severe headache; face, neck, or ear pain    Difficulty swallowing due to throat pain    Fever of 100.4 F (38 C) or higher, or as directed by your  healthcare provider  Call 911  Call 911 if any of these occur:    Chest pain, shortness of breath, wheezing, or difficulty breathing    Coughing up blood    Inability to swallow due to throat pain  Date Last Reviewed: 9/13/2015 2000-2017 [employer]. [address] [place] [id number]. All rights reserved. This information is not intended as a substitute for professional medical care. Always follow your healthcare professional's instructions.      Please make an appointment to follow up with Your Primary Care Provider in 7 days if not improving, but get checked sooner if getting worse.      24 Hour Appointment Hotline       To make an appointment at any [facility], call [phone] ([phone]). If you don't have a family doctor or clinic, we will help you find one. [facility] are conveniently located to serve the needs of you and your family.             Review of your medicines      START taking        Dose / Directions Last dose taken    fluconazole 150 MG tablet   Commonly known as:  DIFLUCAN   Quantity:  2 tablet        Take one tablet now, and one tablet in three days   Refills:  0          CONTINUE these medicines which may have CHANGED, or have new prescriptions. If we are uncertain of the size of tablets/capsules you have at home, strength may be listed as something that might have changed.        Dose / Directions Last dose taken    * amoxicillin 500 MG capsule   Commonly known as:  AMOXIL   What changed:  Another medication with the same name was added. Make sure you understand how and when to take each.   Quantity:  8 capsule        Take 4 capsules (2 grams) 1 hour before dental work.   Refills:  3        * amoxicillin 500 MG capsule   Commonly known as:  AMOXIL   Dose:  500 mg   What changed:  You were already taking a medication with the same name, and this prescription was added. Make sure you understand how and when to take each.   Quantity:  21 capsule         Take 1 capsule (500 mg) by mouth 3 times daily for 7 days   Refills:  0        * Notice:  This list has 2 medication(s) that are the same as other medications prescribed for you. Read the directions carefully, and ask your doctor or other care provider to review them with you.      Our records show that you are taking the medicines listed below. If these are incorrect, please call your family doctor or clinic.        Dose / Directions Last dose taken    Albuterol Sulfate 108 (90 Base) MCG/ACT Aepb   Dose:  2 puff   Quantity:  1 each        Inhale 2 puffs into the lungs every 6 hours as needed   Refills:  0        aspirin 81 MG tablet   Quantity:  90 tablet        1 tab daily   Refills:  3        atorvastatin 20 MG tablet   Commonly known as:  LIPITOR   Dose:  20 mg   Quantity:  90 tablet        Take 1 tablet (20 mg) by mouth daily   Refills:  3        TIAGO CONTOUR test strip   Quantity:  300 each   Generic drug:  blood glucose monitoring        Use to test blood sugar 3 times daily or as directed.   Refills:  3        benzonatate 200 MG capsule   Commonly known as:  TESSALON   Dose:  200 mg   Quantity:  21 capsule        Take 1 capsule (200 mg) by mouth 3 times daily as needed for cough   Refills:  0        blood glucose calibration Normal solution   Quantity:  1 Bottle        Use to calibrate blood glucose monitor as needed as directed.   Refills:  11        blood glucose monitoring lancets   Quantity:  600 each        Test 4-6 times daily 90 day supply refills x 3   Refills:  3        calcium carbonate-vitamin D 500-400 MG-UNIT Tabs per tablet   Dose:  1 tablet   Quantity:  90 tablet        Take 1 tablet by mouth daily   Refills:  3        cholecalciferol 1000 UNIT tablet   Commonly known as:  vitamin D3   Dose:  1000 Units   Quantity:  100 tablet        Take 1 tablet by mouth daily.   Refills:  3        ergocalciferol 13129 units capsule   Commonly known as:  ERGOCALCIFEROL        Refills:  0        EYE-ZAKIYA  PLUS LUTEIN PO        Refills:  0        insulin glargine 100 UNIT/ML injection   Commonly known as:  LANTUS SOLOSTAR   Quantity:  70 mL        Inject 70 units SQ each am.   Refills:  3        insulin pen needle 31G X 8 MM   Commonly known as:  B-D U/F   Quantity:  400 each        Use  4 daily short 31 G X 8MM   Refills:  3        latanoprost 0.005 % ophthalmic solution   Commonly known as:  XALATAN   Dose:  1 drop        Place 1 drop into both eyes At Bedtime   Refills:  0        lisinopril-hydrochlorothiazide 20-12.5 MG per tablet   Commonly known as:  PRINZIDE/ZESTORETIC   Dose:  1 tablet   Quantity:  90 tablet        Take 1 tablet by mouth daily   Refills:  3        metFORMIN 500 MG tablet   Commonly known as:  GLUCOPHAGE   Dose:  1000 mg   Quantity:  360 tablet        Take 2 tablets (1,000 mg) by mouth 2 times daily (with meals)   Refills:  3        MULTIvitamin  S Caps   Quantity:  90 capsule        Take 1 daily   Refills:  3        NovoLOG FLEXPEN 100 UNIT/ML injection   Quantity:  75 mL   Generic drug:  insulin aspart        Carb counting with meals approx 70-80 units daily   Refills:  3        OMEGA-3 FISH OIL PO   Dose:  1000 mg        Take 1,000 mg by mouth daily   Refills:  0        order for DME   Quantity:  1 Units        Equipment being ordered: Grade 1 (light) compression stockings, below the knee   Refills:  1        oxymetazoline 0.05 % spray   Commonly known as:  AFRIN NASAL SPRAY   Dose:  2-3 spray   Quantity:  1 Bottle        Spray 2-3 sprays into both nostrils 2 times daily as needed for congestion   Refills:  0        RESTASIS 0.05 % ophthalmic emulsion   Generic drug:  cycloSPORINE        Refills:  0        timolol 0.5 % ophthalmic solution   Commonly known as:  TIMOPTIC   Dose:  1 drop        Place 1 drop into both eyes 2 times daily   Refills:  0        TYLENOL PO        Take by mouth as needed for mild pain or fever   Refills:  0        vitamin  B Complex Caps   Dose:  1 tablet        Take  1 tablet by mouth daily   Refills:  0        VITAMIN C PO   Dose:  2000 mg        Take 2,000 mg by mouth 2 times daily   Refills:  0                Prescriptions were sent or printed at these locations (2 Prescriptions)                   Other Prescriptions                Printed at Department/Unit printer (2 of 2)         amoxicillin (AMOXIL) 500 MG capsule               fluconazole (DIFLUCAN) 150 MG tablet                Procedures and tests performed during your visit     Procedure/Test Number of Times Performed    Glucose by meter 2    Glucose monitor nursing POCT 2    XR Chest 2 Views 1      Orders Needing Specimen Collection     None      Pending Results     No orders found from 4/30/2018 to 5/3/2018.            Pending Culture Results     No orders found from 4/30/2018 to 5/3/2018.            Pending Results Instructions     If you had any lab results that were not finalized at the time of your Discharge, you can call the ED Lab Result RN at [phone]. You will be contacted by this team for any positive Lab results or changes in treatment. The nurses are available 7 days a week from 10A to 6:30P.  You can leave a message 24 hours per day and they will return your call.        Thank you for choosing [facility]       Thank you for choosing [facility] for your care. Our goal is always to provide you with excellent care. Hearing back from our patients is one way we can continue to improve our services. Please take a few minutes to complete the written survey that you may receive in the mail after you visit with us. Thank you!        [facility] Information     [facility] gives you secure access to your electronic health record. If you see a primary care provider, you can also send messages to your care team and make appointments. If you have questions, please call your primary care clinic.  If you do not have a primary care provider, please call [phone] and they will assist you.        Care EveryWhere ID     This is  your Care EveryWhere ID. This could be used by other organizations to access your [facility] medical records  [id number]        Equal Access to Services     YESY NAJERA : Richard Ornelas, ernesto peres, leland ruff, mindy fam. So [facility] [phone].    ATENCIÓN: Si habla español, tiene a pitts disposición servicios gratuitos de asistencia lingüística. Llame al [phone].    We comply with applicable federal civil rights laws and [place] laws. We do not discriminate on the basis of race, color, national origin, age, disability, sex, sexual orientation, or gender identity.            After Visit Summary       This is your record. Keep this with you and show to your community pharmacist(s) and doctor(s) at your next visit.

## 2018-05-02 NOTE — ED PROVIDER NOTES
History     Chief Complaint   Patient presents with     Cough     cough, fever since Saturday, wheezing     HPI  Radha Baca is a 62 year old female with a history of diabetes, HTN, and chronic back pain who presents for evaluation of fevers, shortness of breath, and bilateral ear pain. Patient complains since Saturday, over the past four days, she has had low-grade fevers ranging between   F, cough, rhinorrhea, sore throat causing pain when swallowing, and bilateral ear pain. She also complains of generalized myalgias and shortness of breath that has been progressively worsening since her symptoms started to the point where she has had difficulty sleeping. She reports she has had nausea and states she had one episode of dry heaving on Saturday, but denies vomiting. She was seen and evaluated in her primary clinic on Monday, two days ago, at which time she was diagnosed with a URI and was prescribed Afrin and Tessalon Perles. She has been using these medications without improvement. She denies chest pain. She did receive the influenza and pneumonia immunizations this year. She notes her blood sugars have been running high compared to baseline, and she has been correcting for these.     I have reviewed the Medications, Allergies, Past Medical and Surgical History, and Social History in the [employer] system.  Past Medical History:   Diagnosis Date     Arthritis      Arthritis of knee 4/4/2013     Arthritis of shoulder region, right 4/18/2014     Arthritis of shoulder region, right 4/18/2014     Back injury      Depressive disorder      Diabetes (H)      Finger pain 4/2/2015     Headache(784.0)     decreased with mouth guard use.     Hypercholesteremia      Hypertension      Low back pain      Menarche age 9+    cycles q mo x 4-5 d     Neck injuries      Pain in knee joint     LEFT     Right bundle branch block     per H/P     SNHL (sensorineural hearing loss)      Umbilical hernia without mention of  obstruction or gangrene 8/2014       Past Surgical History:   Procedure Laterality Date     ARTHROPLASTY KNEE  4/4/2013    Left Total Knee Arthroplasty;  Surgeon: Lou Byrne MD;  [facility]     ARTHROPLASTY MINIMALLY INVASIVE KNEE  8/22/2011    R knee :CAITLYN JACOBO, Left age 14     DENTAL SURGERY      root canals, wisdom teeth     EXAM UNDER ANESTHESIA, MANIPULATE JOINT (LOCATION)  10/5/2012    Procedure: EXAM UNDER ANESTHESIA, MANIPULATE JOINT (LOCATION);  Right Knee Mini Open Lysis Of Adhesions, Left Knee Steroid Injection  ;  Surgeon: Lou Byrne MD;  Location: [facility]     HC OR OCULAR DEVICE INTRAOP DETACHED RETINA  2009    R     HC PHAKIC IOL - IMPLANT FROM SURGEON      right     KNEE SURGERY  1969    left     LASER YAG CAPSULOTOMY  12/2016    left     VITRECTOMY PARSPLANA  2010       Family History   Problem Relation Age of Onset     DIABETES Father 55     DM II     DIABETES Sister 45     DM II     DIABETES Brother 50     xs 2     Hypertension Sister 41     also B and M     CANCER Maternal Aunt      multiple myeloma     Thyroid Disease Sister 45     graves     Hypertension Brother      ? age     Hypertension Mother 79     DIABETES Brother      Hypertension Brother      DIABETES Brother      Hypertension Brother      Breast Cancer Cousin      Thyroid Disease Sister      CEREBROVASCULAR DISEASE No family hx of      Breast Cancer No family hx of      Cancer - colorectal No family hx of      Prostate Cancer No family hx of      Alcohol/Drug No family hx of        Social History   Substance Use Topics     Smoking status: Former Smoker     Smokeless tobacco: Never Used      Comment: quit 35 years ago     Alcohol use No       No current facility-administered medications for this encounter.      Current Outpatient Prescriptions   Medication     Acetaminophen (TYLENOL PO)     amoxicillin (AMOXIL) 500 MG capsule     Ascorbic Acid (VITAMIN C PO)     aspirin 81 MG tablet     atorvastatin  (LIPITOR) 20 MG tablet     B Complex Vitamins (VITAMIN  B COMPLEX) CAPS     benzonatate (TESSALON) 200 MG capsule     calcium carbonate-vitamin D 500-400 MG-UNIT TABS tablt     cholecalciferol (VITAMIN D) 1000 UNIT tablet     cycloSPORINE (RESTASIS) 0.05 % ophthalmic emulsion     fluconazole (DIFLUCAN) 150 MG tablet     insulin glargine (LANTUS SOLOSTAR) 100 UNIT/ML injection     latanoprost (XALATAN) 0.005 % ophthalmic solution     lisinopril-hydrochlorothiazide (PRINZIDE/ZESTORETIC) 20-12.5 MG per tablet     metFORMIN (GLUCOPHAGE) 500 MG tablet     Multiple Vitamin (MULTIVITAMIN  S) CAPS     Multiple Vitamins-Minerals (EYE-ZAKIYA PLUS LUTEIN PO)     NOVOLOG FLEXPEN 100 UNIT/ML soln     Omega-3 Fatty Acids (OMEGA-3 FISH OIL PO)     oxymetazoline (AFRIN NASAL SPRAY) 0.05 % spray     timolol (TIMOPTIC) 0.5 % ophthalmic solution     Albuterol Sulfate 108 (90 BASE) MCG/ACT AEPB     amoxicillin (AMOXIL) 500 MG capsule     TIAGO CONTOUR test strip     blood glucose calibration (TIAGO CONTOUR) NORMAL solution     blood glucose monitoring (TIAGO MICROLET) lancets     ergocalciferol (ERGOCALCIFEROL) 08185 units capsule     insulin pen needle (B-D U/F) 31G X 8 MM     order for DME        Allergies   Allergen Reactions     Nkda [No Known Drug Allergies]        Review of Systems   Constitutional: Positive for fever (low-grade between ).   Respiratory: Positive for cough and shortness of breath.    Cardiovascular: Negative for chest pain.   Gastrointestinal: Positive for nausea. Negative for vomiting.   Musculoskeletal: Positive for myalgias.   All other systems reviewed and are negative.      Physical Exam   BP: 150/65  Pulse: 91  Temp: 99.6  F (37.6  C)  Resp: 16  Weight: 111.7 kg (246 lb 5 oz)  SpO2: 96 %      Physical Exam  GEN:  Well developed, no acute distress  HEENT:  EOMI, Mucous membranes are moist. The right TM has erythema with bulging and fluid behind the TM.  The left TM has no erythema or bulging but appears  to have fluid.  Posterior pharynx has erythema but no exudate or swelling.  Uvula is midline.  Cardio:  RRR, no murmur, radial pulses equal bilaterally  PULM:  Lungs clear, good air movement, no wheezes, rales  Abd:  Soft, normal bowel sounds, no focal tenderness  Musculoskeletal:  normal range of motion, no lower extremity swelling or calf tenderness  Neuro:  Alert and oriented X3, Follows commands, moving all extremities spontaneously   Skin:  Warm, dry    ED Course     ED Course     Procedures       1:27 PM  The patient was seen and examined by Dr. Mishra in Room 18.       Critical Care time:  none  Chest x-ray was reviewed by me and results are shown here:  Results for orders placed or performed during the hospital encounter of 05/02/18   XR Chest 2 Views    Narrative    XR CHEST 2 VW 5/2/2018 2:37 PM    HISTORY: Cough.    COMPARISON: April 27, 2016.      Impression    IMPRESSION: The lungs are clear. No focal pulmonary opacities. Heart  and mediastinum are unremarkable. No acute cardiopulmonary  abnormalities.    LUCÍA STERLING MD        Patient's glucose was checked and was 278.  She treated with this with her own insulin and the recheck was 156.  She was given Tylenol for her discomfort in the ED.    Labs Ordered and Resulted from Time of ED Arrival Up to the Time of Departure from the ED   GLUCOSE BY METER - Abnormal; Notable for the following:        Result Value    Glucose 278 (*)     All other components within normal limits   GLUCOSE BY METER - Abnormal; Notable for the following:     Glucose 156 (*)     All other components within normal limits   GLUCOSE MONITOR NURSING POCT   GLUCOSE MONITOR NURSING POCT           Assessments & Plan (with Medical Decision Making)   Patient presents with URI symptoms with fever and ear pain.  Examination of the right ear does reveal findings suggestive of an acute otitis media.  Patient was given prescriptions for amoxicillin and preventative Diflucan.  She is concerned  about getting a yeast infection while on antibiotics.  Patient is reporting shortness of breath in the setting of having URI symptoms and fevers.  I doubt that this represents an acute PE.  There is no sign of acute coronary syndrome.  She was advised to drink plenty of fluids to take ibuprofen and Tylenol as needed take the full course of antibiotics and follow-up with her primary care physician if not feeling better in the next week.  She was advised to seek further medical evaluation if she has any worsening symptoms.    I have reviewed the nursing notes.    I have reviewed the findings, diagnosis, plan and need for follow up with the patient.    New Prescriptions    AMOXICILLIN (AMOXIL) 500 MG CAPSULE    Take 1 capsule (500 mg) by mouth 3 times daily for 7 days    FLUCONAZOLE (DIFLUCAN) 150 MG TABLET    Take one tablet now, and one tablet in three days       Final diagnoses:   Viral upper respiratory tract infection   Right acute serous otitis media, recurrence not specified   I, Estela Degroot, am serving as a trained medical scribe to document services personally performed by June Mishra MD, based on the provider's statements to me.   IJune MD, was physically present and have reviewed and verified the accuracy of this note documented by Estela Degroot.      5/2/2018   [facility], [place], EMERGENCY DEPARTMENT     Lou Mishra MD  05/02/18 1631

## 2018-05-02 NOTE — DISCHARGE INSTRUCTIONS
Otitis Media (Middle-Ear Infection) in Adults  Otitis media is another name for a middle-ear infection. It means an infection behind your eardrum. This kind of ear infection can happen after any condition that keeps fluid from draining from the middle ear. These conditions include allergies, a cold, a sore throat, or a respiratory infection.  Middle-ear infections are common in children, but they can also happen in adults. An ear infection in an adult may mean a more serious problem than in a child. So you may need additional tests. If you have an ear infection, you should see your health care provider for treatment.  What are the types of middle-ear infections?  Infections can affect the middle ear in several ways. They are:    Acute otitis media. This middle-ear infection occurs suddenly. It causes swelling and redness. Fluid and mucus become trapped inside the ear. You can have a fever and ear pain.    Otitis media with effusion. Fluid (effusion) and mucus build up in the middle ear after the infection goes away. You may feel like your middle ear is full. This can continue for months and may affect your hearing.    Chronic otitis media with effusion. Fluid (effusion) remains in the middle ear for a long time. Or it builds up again and again, even though there is no infection. This type of middle-ear infection may be hard to treat. It may also affect your hearing.  Who is more likely to get a middle-ear infection?  You are more likely to get an ear infection if you:    Smoke or are around someone who smokes    Have seasonal or year-round allergy symptoms    Have a cold or other upper respiratory infection  What causes a middle-ear infection?  The middle ear connects to the throat by a canal called the eustachian tube. This tube helps even out the pressure between the outer ear and the inner ear. A cold or allergy can irritate the tube or cause the area around it to swell. This can keep fluid from draining from  the middle ear. The fluid builds up behind the eardrum. Bacteria and viruses can grow in this fluid. The bacteria and viruses cause the middle-ear infection.  What are the symptoms of a middle-ear infection?  Common symptoms of a middle-ear infection in adults are:    Pain in 1 or both ears    Drainage from the ear    Muffled hearing    Sore throat   You may also have a fever. Rarely, your balance can be affected.  These symptoms may be the same as for other conditions. It s important to talk with your health care provider if you think you have a middle-ear infection. If you have a high fever, severe pain behind your ear, or paralysis in your face, see your provider as soon as you can.  How is a middle-ear infection diagnosed?  Your health care provider will take a medical history and do a physical exam. He or she will look at the outer ear and eardrum with an otoscope. The otoscope is a lighted tool that lets your provider see inside the ear. A pneumatic otoscope blows a puff of air into the ear to check how well your eardrum moves. If you eardrum doesn t move well, it may mean you have fluid behind it.  Your provider may also do a test called tympanometry. This test tells how well the middle ear is working. It can find any changes in pressure in the middle ear. Your provider may test your hearing with a tuning fork.  How is a middle-ear infection treated?  A middle-ear infection may be treated with:    Antibiotics, taken by mouth or as ear drops    Medication for pain    Decongestants, antihistamines, or nasal steroids  Your health care provider may also have you try autoinsufflation. This helps adjust the air pressure in your ear. For this, you pinch your nose and gently exhale. This forces air back through the eustachian tube.  The exact treatment for your ear infection will depend on the type of infection you have. In general, if your symptoms don t get better in 48 to 72 hours, contact your health care  provider.  Middle-ear infections can cause long-term problems if not treated. They can lead to:    Infection in other parts of the head    Permanent hearing loss    Paralysis of a nerve in your face  If you have a middle-ear infection that doesn t get better, you may need to see an ear, nose, and throat specialist (otolaryngologist). You may need a CT scan or MRI to check for head and neck cancer.  Ear tubes  Sometimes fluid stays in the middle ear even after you take antibiotics and the infection goes away. In this case, your health care provider may suggest that a small tube be placed in your ear. The tube is put at the opening of the eardrum. The tube keeps fluid from building up and relieves pressure in the middle ear. It can also help you hear better. This surgery is called myringotomy. It is not often done in adults.  The tubes usually fall out on their own after 6 months to a year.    6965-5955 [employer]. [address] [place] [address]. All rights reserved. This information is not intended as a substitute for professional medical care. Always follow your healthcare professional's instructions.          Viral Upper Respiratory Illness (Adult)  You have a viral upper respiratory illness (URI), which is another term for the common cold. This illness is contagious during the first few days. It is spread through the air by coughing and sneezing. It may also be spread by direct contact (touching the sick person and then touching your own eyes, nose, or mouth). Frequent handwashing will decrease risk of spread. Most viral illnesses go away within 7 to 10 days with rest and simple home remedies. Sometimes the illness may last for several weeks. Antibiotics will not kill a virus, and they are generally not prescribed for this condition.    Home care    If symptoms are severe, rest at home for the first 2 to 3 days. When you resume activity, don't let yourself get too tired.    Avoid being  exposed to cigarette smoke (yours or others ).    You may use acetaminophen or ibuprofen to control pain and fever, unless another medicine was prescribed. If you have chronic liver or kidney disease, have ever had a stomach ulcer or gastrointestinal bleeding, or are taking blood-thinning medicines, talk with your healthcare provider before using these medicines. Aspirin should never be given to anyone under 18 years of age who is ill with a viral infection or fever. It may cause severe liver or brain damage.    Your appetite may be poor, so a light diet is fine. Avoid dehydration by drinking 6 to 8 glasses of fluids per day (water, soft drinks, juices, tea, or soup). Extra fluids will help loosen secretions in the nose and lungs.    Over-the-counter cold medicines will not shorten the length of time you re sick, but they may be helpful for the following symptoms: cough, sore throat, and nasal and sinus congestion. (Note: Do not use decongestants if you have high blood pressure.)  Follow-up care  Follow up with your healthcare provider, or as advised.  When to seek medical advice  Call your healthcare provider right away if any of these occur:    Cough with lots of colored sputum (mucus)    Severe headache; face, neck, or ear pain    Difficulty swallowing due to throat pain    Fever of 100.4 F (38 C) or higher, or as directed by your healthcare provider  Call 911  Call 911 if any of these occur:    Chest pain, shortness of breath, wheezing, or difficulty breathing    Coughing up blood    Inability to swallow due to throat pain  Date Last Reviewed: 9/13/2015 2000-2017 [employer]. [address] [place] [address], [place] [address]. All rights reserved. This information is not intended as a substitute for professional medical care. Always follow your healthcare professional's instructions.      Please make an appointment to follow up with Your Primary Care Provider in 7 days if not improving, but get checked  sooner if getting worse.

## 2018-05-02 NOTE — ED AVS SNAPSHOT
[facility], [place], Emergency Department    [address]    [facility] [address]    Phone:  [phone]    Fax:  [phone]                                       Radha Baca   MRN: [id number]    Department:  [facility], Emergency Department   Date of Visit:  5/2/2018           After Visit Summary Signature Page     I have received my discharge instructions, and my questions have been answered. I have discussed any challenges I see with this plan with the nurse or doctor.    ..........................................................................................................................................  Patient/Patient Representative Signature      ..........................................................................................................................................  Patient Representative Print Name and Relationship to Patient    ..................................................               ................................................  Date                                            Time    ..........................................................................................................................................  Reviewed by Signature/Title    ...................................................              ..............................................  Date                                                            Time

## 2018-05-02 NOTE — TELEPHONE ENCOUNTER
[facility] phone call message-patient reporting a symptom:     Symptom: URI, cough, sore throat, ear pain and SOB     Same Day Visit Offered: Yes, declined    Additional comments: Pt states she was seen last Monday with Dr Dave and treated for her URI. Pt states she is not feeling any better. Still coughing, not sleeping has SOB when laying down, ear pain and sore throat. She would like to speak with a nurse or a Dr to know what to do next. Please call to discuss.    OK to leave message on voice mail? Yes    Primary language: English      needed? No    Call taken on May 2, 2018 at 10:06 AM by Janet Cuello

## 2018-05-09 ENCOUNTER — TELEPHONE (OUTPATIENT)
Dept: FAMILY MEDICINE | Facility: CLINIC | Age: 63
End: 2018-05-09

## 2018-05-09 NOTE — TELEPHONE ENCOUNTER
Called pt.  She has been sick since 4/28.  Still having cough with yellowish-orange sputum, persistent ear pain, fever to 99.4 degrees this afternoon, shortness of breath.  She is finishing up amoxicillin course that she has been on since 5/2.  Pt is a diabetic.    Given the lack of improvement I advised her to come in tomorrow for acute appt.  I will have  call her in the morning to set it up.  She will need re-exam, possible blood work (hasn't had any yet this illness), and perhaps re-imaging of her lungs.

## 2018-05-09 NOTE — TELEPHONE ENCOUNTER
The patient called with ongoing URI sx of fever, inner ear infection that she was seen in the ER for. She was given antibiotics to take but did not resolve the problems she was having.  Please call the patient as she only wants to speak with Dr Moreno.

## 2018-05-10 NOTE — TELEPHONE ENCOUNTER
I called the patient and offered a double book at 3pm today and she declined as she already has other plans for her day.  She is scheduled to see Micha borrego in the am tomorrow 05/11/18.  She was happy I could get her in and willing to see another faculty.

## 2018-05-11 ENCOUNTER — OFFICE VISIT (OUTPATIENT)
Dept: FAMILY MEDICINE | Facility: CLINIC | Age: 63
End: 2018-05-11
Payer: COMMERCIAL

## 2018-05-11 VITALS
TEMPERATURE: 98.8 F | WEIGHT: 241.2 LBS | SYSTOLIC BLOOD PRESSURE: 95 MMHG | OXYGEN SATURATION: 94 % | DIASTOLIC BLOOD PRESSURE: 61 MMHG | RESPIRATION RATE: 16 BRPM | BODY MASS INDEX: 41.4 KG/M2 | HEART RATE: 71 BPM

## 2018-05-11 DIAGNOSIS — J06.9 VIRAL URI WITH COUGH: ICD-10-CM

## 2018-05-11 RX ORDER — CODEINE PHOSPHATE AND GUAIFENESIN 10; 100 MG/5ML; MG/5ML
1 SOLUTION ORAL EVERY 4 HOURS PRN
Qty: 236 ML | Refills: 0 | Status: SHIPPED | OUTPATIENT
Start: 2018-05-11 | End: 2018-11-16

## 2018-05-11 NOTE — MR AVS SNAPSHOT
After Visit Summary   5/11/2018    Radha Baca    MRN: [id number]           Patient Information     Date Of Birth          1955        Visit Information        Provider Department      5/11/2018 10:20 AM Ruth Begum MD [facility]        Today's Diagnoses     Viral URI with cough           Follow-ups after your visit        Your next 10 appointments already scheduled     Jun 06, 2018  3:40 PM CDT   Return Visit with [facility] ([facility])    [address],  [address]  [place] [address]   [phone]              Who to contact     Please call your clinic at [phone] to:    Ask questions about your health    Make or cancel appointments    Discuss your medicines    Learn about your test results    Speak to your doctor            Additional Information About Your Visit        MyChart Information     [employer] gives you secure access to your electronic health record. If you see a primary care provider, you can also send messages to your care team and make appointments. If you have questions, please call your primary care clinic.  If you do not have a primary care provider, please call [phone] and they will assist you.      [employer] is an electronic gateway that provides easy, online access to your medical records. With [employer], you can request a clinic appointment, read your test results, renew a prescription or communicate with your care team.     To access your existing account, please contact your [facility] or call [phone] for assistance.        Care EveryWhere ID     This is your Care EveryWhere ID. This could be used by other organizations to access your [place] medical records  [id number]        Your Vitals Were     Pulse Temperature Respirations Pulse Oximetry Breastfeeding? BMI (Body Mass Index)    71 98.8  F (37.1  C) (Oral) 16 94% No 41.4 kg/m2        Blood Pressure from Last 3 Encounters:   05/11/18 95/61   05/02/18 150/65   01/18/18 119/73    Weight from Last 3 Encounters:   05/11/18 241 lb 3.2 oz (109.4 kg)   05/02/18 246 lb 5 oz (111.7 kg)   04/30/18 248 lb 12.8 oz (112.9 kg)              Today, you had the following     No orders found for display         Today's Medication Changes          These changes are accurate as of 5/11/18 11:15 AM.  If you have any questions, ask your nurse or doctor.               Start taking these medicines.        Dose/Directions    guaiFENesin-codeine 100-10 MG/5ML Soln solution   Commonly known as:  ROBITUSSIN AC   Used for:  Viral URI with cough   Started by:  Ruth Begum MD        Dose:  1 tsp.   Take 5 mLs by mouth every 4 hours as needed for cough   Quantity:  236 mL   Refills:  0            Where to get your medicines      These medications were sent to [facility] [address] [facility] [address] AT [facility] [address] [facility]  [address] [facility] [address]     Phone:  [phone]     Albuterol Sulfate 108 (90 Base) MCG/ACT Aepb         Some of these will need a paper prescription and others can be bought over the counter.  Ask your nurse if you have questions.     Bring a paper prescription for each of these medications     guaiFENesin-codeine 100-10 MG/5ML Soln solution                Primary Care Provider Office Phone # Fax #    Mohan Moreno [phone]       [address] [address]        Equal Access to Services     ROSALINE NAJERA AH: Hadliss avila Sohazel, waaxda luqadaha, qaybta kaalmindy banks. So [facility] [phone].    ATENCIÓN: Si habla español, tiene a pitts disposición servicios gratuitos de asistencia lingüística. Llame al [phone].    We comply with applicable federal civil rights laws and [place] laws. We do not discriminate on the basis of race, color, national  origin, age, disability, sex, sexual orientation, or gender identity.            Thank you!     Thank you for choosing [facility]  for your care. Our goal is always to provide you with excellent care. Hearing back from our patients is one way we can continue to improve our services. Please take a few minutes to complete the written survey that you may receive in the mail after your visit with us. Thank you!             Your Updated Medication List - Protect others around you: Learn how to safely use, store and throw away your medicines at www.disposemymeds.org.          This list is accurate as of 5/11/18 11:15 AM.  Always use your most recent med list.                   Brand Name Dispense Instructions for use Diagnosis    Albuterol Sulfate 108 (90 Base) MCG/ACT Aepb     1 each    Inhale 2 puffs into the lungs every 6 hours as needed    Viral URI with cough       amoxicillin 500 MG capsule    AMOXIL    8 capsule    Take 4 capsules (2 grams) 1 hour before dental work.    S/P joint replacement       aspirin 81 MG tablet     90 tablet    1 tab daily    Type 2 diabetes mellitus (H)       atorvastatin 20 MG tablet    LIPITOR    90 tablet    Take 1 tablet (20 mg) by mouth daily    Dyslipidemia       TIAGO CONTOUR test strip   Generic drug:  blood glucose monitoring     300 each    Use to test blood sugar 3 times daily or as directed.    Type 2 diabetes mellitus with complication (H)       benzonatate 200 MG capsule    TESSALON    21 capsule    Take 1 capsule (200 mg) by mouth 3 times daily as needed for cough    Viral upper respiratory tract infection       blood glucose calibration Normal solution     1 Bottle    Use to calibrate blood glucose monitor as needed as directed.    DM type 2 (diabetes mellitus, type 2) (H)       blood glucose monitoring lancets     600 each    Test 4-6 times daily 90 day supply refills x 3    Type 2 diabetes mellitus with complication (H)       calcium carbonate-vitamin  D 500-400 MG-UNIT Tabs per tablet     90 tablet    Take 1 tablet by mouth daily    Type 2 diabetes mellitus (H)       cholecalciferol 1000 UNIT tablet    vitamin D3    100 tablet    Take 1 tablet by mouth daily.        ergocalciferol 88640 units capsule    ERGOCALCIFEROL          EYE-ZAKIYA PLUS LUTEIN PO           guaiFENesin-codeine 100-10 MG/5ML Soln solution    ROBITUSSIN AC    236 mL    Take 5 mLs by mouth every 4 hours as needed for cough    Viral URI with cough       insulin glargine 100 UNIT/ML injection    LANTUS SOLOSTAR    70 mL    Inject 70 units SQ each am.    Type 2 diabetes mellitus with complication, with long-term current use of insulin (H)       insulin pen needle 31G X 8 MM    B-D U/F    400 each    Use  4 daily short 31 G X 8MM    Diabetes mellitus, type 2 (H)       latanoprost 0.005 % ophthalmic solution    XALATAN     Place 1 drop into both eyes At Bedtime        lisinopril-hydrochlorothiazide 20-12.5 MG per tablet    PRINZIDE/ZESTORETIC    90 tablet    Take 1 tablet by mouth daily    Essential hypertension       metFORMIN 500 MG tablet    GLUCOPHAGE    360 tablet    Take 2 tablets (1,000 mg) by mouth 2 times daily (with meals)    Diabetes mellitus, type 2 (H)       MULTIvitamin  S Caps     90 capsule    Take 1 daily    Type 2 diabetes mellitus (H)       NovoLOG FLEXPEN 100 UNIT/ML injection   Generic drug:  insulin aspart     75 mL    Carb counting with meals approx 70-80 units daily    Type 2 diabetes mellitus with complication (H)       OMEGA-3 FISH OIL PO      Take 1,000 mg by mouth daily        order for DME     1 Units    Equipment being ordered: Grade 1 (light) compression stockings, below the knee    Venous (peripheral) insufficiency       oxymetazoline 0.05 % spray    AFRIN NASAL SPRAY    1 Bottle    Spray 2-3 sprays into both nostrils 2 times daily as needed for congestion    Viral upper respiratory tract infection       RESTASIS 0.05 % ophthalmic emulsion   Generic drug:  cycloSPORINE            timolol 0.5 % ophthalmic solution    TIMOPTIC     Place 1 drop into both eyes 2 times daily        TYLENOL PO      Take by mouth as needed for mild pain or fever        vitamin  B Complex Caps      Take 1 tablet by mouth daily        VITAMIN C PO      Take 2,000 mg by mouth 2 times daily

## 2018-05-11 NOTE — PROGRESS NOTES
SUBJECTIVE:  The patient is here with probably about 2 weeks of illness.  She said it started on 04/28, and the main symptom has been coughing.  It started out light, then became darker and at one point was actually bright orange, and now it is becoming clearer, but still has occasional darker sputum with it.  She has been having trouble sleeping because of the cough, and she will feel short of breath at times.  Her throat is also very painful from all the counting, and especially she notices it when she is trying to eat.  She has also had some pain in her ears, and that has been continuing.  She has been seen first in our clinic about this, was prescribed some aspirin and Tessalon Perles.  Then she went to the emergency room where she was prescribed amoxicillin and Diflucan; this was on 05/02.  A chest x-ray was done, which was normal.  Amoxicillin was for a right otitis and Diflucan because she tends to get yeast infections from antibiotics.  She has also been trying Robitussin-DM and NyQuil for the cough and cough drops, which really have not been healing much.  She also had temperatures initially starting out in the 101 range, and the temperature has been slowly going down over time, so she is basically afebrile more recently, although the temperature tends to go a little bit higher when she is active.  Looking back in her chart, she had been given albuterol inhaler back in 2016 for a viral URI with cough that seemed to be prolonged, and from what she remembers, she thinks that was helpful.  She does have diabetes, and her sugars have been running higher throughout this illness.  She has her diabetes followed elsewhere, and she has been in touch with them about to be doing with these higher readings.  She mentioned that, in general, she has not had any trouble with her lungs except when she was postop from, I think she said, knee surgery, she ended up going on BiPAP for several days.  I think her main  frustration is that this has been going on so long and that she has been losing sleep over this.      OBJECTIVE:  On exam, the patient is in no acute distress.  Vital signs actually were stable.  Her O2 sat was 94%, which she has definitely had in the past.  Her TMs actually looked very good.  No sign of infection at all and good light reflex.  Excellent on the right, maybe a little dull on the left, so that is a big change from what had been seen at earlier exams.  Her throat is slightly erythematous, but no exudates.  There was no cervical adenopathy.  Heart had a regular rate and rhythm without murmurs.  Lungs were clear.  No E to A changes.  There were some scattered, very end-expiratory squeaks heard.      IMPRESSION:  Prolonged upper respiratory infection, most likely viral.      PLAN:  I discussed that she had amoxicillin that would have taken care of any bacterial infection such as there might have been in her ear, but I am not worried that there is any kind of resistant bacteria causing her problems.  Again, I think it is the virus that seems to be actually coming to an end.  I renewed her albuterol, which seems to have been helpful in the past for similar situations, and I think she probably does have some element of bronchospasm involved.  She can use that 2 puffs every 4 hours as needed.  Then I also prescribed Robitussin A-C to definitely use at night to get some sleep.  Depending on how drowsy it makes her, she can also try to use it during the daytime.  The patient seemed to feel comfortable with this plan.        Visit length was 25 minutes, more than 50% spent in counseling about symptoms and plan.

## 2018-05-31 ENCOUNTER — TELEPHONE (OUTPATIENT)
Dept: ENDOCRINOLOGY | Facility: CLINIC | Age: 63
End: 2018-05-31

## 2018-05-31 DIAGNOSIS — I10 ESSENTIAL HYPERTENSION: ICD-10-CM

## 2018-05-31 NOTE — TELEPHONE ENCOUNTER
[facility]    Phone Message    May a detailed message be left on voicemail: yes    Reason for Call: Medication Refill Request    Has the patient contacted the pharmacy for the refill? Yes   Name of medication being requested: lisinopril-hydrochlorothiazide (PRINZIDE/ZESTORETIC) 20-12.5 MG per tablet   Provider who prescribed the medication: TAMIA Hickey   Pharmacy:     [facility] [address] [place] [address] [address] AT [facility] [address]    Date medication is needed: ASAP patient will be out by Sunday       Action Taken: Message routed to:  [facility] BRINA

## 2018-06-01 RX ORDER — LISINOPRIL AND HYDROCHLOROTHIAZIDE 12.5; 2 MG/1; MG/1
1 TABLET ORAL DAILY
Qty: 90 TABLET | Refills: 3 | Status: SHIPPED | OUTPATIENT
Start: 2018-06-01 | End: 2019-05-20

## 2018-06-01 NOTE — TELEPHONE ENCOUNTER
[facility]    Phone Message    May a detailed message be left on voicemail: yes    Reason for Call: Other: Pt called again in regards to this medication. Pt will be out on Sunday. Pt stated she submitted this request before memorial day to the pharmacy.      Action Taken: Message routed to:  [facility] endo clinic

## 2018-06-06 ENCOUNTER — OFFICE VISIT (OUTPATIENT)
Dept: FAMILY MEDICINE | Facility: CLINIC | Age: 63
End: 2018-06-06
Payer: COMMERCIAL

## 2018-06-06 VITALS
RESPIRATION RATE: 18 BRPM | TEMPERATURE: 98.6 F | SYSTOLIC BLOOD PRESSURE: 110 MMHG | HEART RATE: 86 BPM | OXYGEN SATURATION: 97 % | DIASTOLIC BLOOD PRESSURE: 70 MMHG

## 2018-06-06 DIAGNOSIS — H66.90 ACUTE OTITIS MEDIA, UNSPECIFIED OTITIS MEDIA TYPE: ICD-10-CM

## 2018-06-06 DIAGNOSIS — J40 BRONCHITIS: Primary | ICD-10-CM

## 2018-06-06 DIAGNOSIS — I10 ESSENTIAL HYPERTENSION: ICD-10-CM

## 2018-06-06 DIAGNOSIS — I87.2 VENOUS (PERIPHERAL) INSUFFICIENCY: ICD-10-CM

## 2018-06-06 NOTE — MR AVS SNAPSHOT
After Visit Summary   6/6/2018    Radha Baca    MRN: [id number]           Patient Information     Date Of Birth          1955        Visit Information        Provider Department      6/6/2018 3:40 PM Mohan Moreno MD [facility]        Today's Diagnoses     Bronchitis    -  1    Venous (peripheral) insufficiency        Essential hypertension        Acute otitis media, unspecified otitis media type          Care Instructions    1.  Stop inhaler.    2.  Keep well hydrated.    3.  Stay on the regular dose of your usual medicines.    4.  Continue stockings.          Follow-ups after your visit        Who to contact     Please call your clinic at [phone] to:    Ask questions about your health    Make or cancel appointments    Discuss your medicines    Learn about your test results    Speak to your doctor            Additional Information About Your Visit        MyChart Information     [employer] gives you secure access to your electronic health record. If you see a primary care provider, you can also send messages to your care team and make appointments. If you have questions, please call your primary care clinic.  If you do not have a primary care provider, please call [phone] and they will assist you.      [employer] is an electronic gateway that provides easy, online access to your medical records. With [employer], you can request a clinic appointment, read your test results, renew a prescription or communicate with your care team.     To access your existing account, please contact your [facility] or call [phone] for assistance.        Care EveryWhere ID     This is your Care EveryWhere ID. This could be used by other organizations to access your [place] medical records  [id number]        Your Vitals Were     Pulse Temperature Respirations Pulse Oximetry          86 98.6  F (37  C) (Oral) 18 97%         Blood Pressure from Last  3 Encounters:   06/06/18 110/70   05/11/18 95/61   05/02/18 150/65    Weight from Last 3 Encounters:   05/11/18 241 lb 3.2 oz (109.4 kg)   05/02/18 246 lb 5 oz (111.7 kg)   04/30/18 248 lb 12.8 oz (112.9 kg)              Today, you had the following     No orders found for display       Primary Care Provider Office Phone # Fax #    Mohan Moreno [phone]       [address] [address]        Equal Access to Services     [facility]: Hadii aad ku hadasho Soomaali, waaxda luqadaha, qaybta kaalmada speedy, mindy quan . So [facility] [phone].    ATENCIÓN: Si habla español, tiene a pitts disposición servicios gratuitos de asistencia lingüística. [facility] [phone].    We comply with applicable federal civil rights laws and [place] laws. We do not discriminate on the basis of race, color, national origin, age, disability, sex, sexual orientation, or gender identity.            Thank you!     Thank you for choosing [facility]  for your care. Our goal is always to provide you with excellent care. Hearing back from our patients is one way we can continue to improve our services. Please take a few minutes to complete the written survey that you may receive in the mail after your visit with us. Thank you!             Your Updated Medication List - Protect others around you: Learn how to safely use, store and throw away your medicines at www.disposemymeds.org.          This list is accurate as of 6/6/18  4:47 PM.  Always use your most recent med list.                   Brand Name Dispense Instructions for use Diagnosis    Albuterol Sulfate 108 (90 Base) MCG/ACT Aepb     1 each    Inhale 2 puffs into the lungs every 6 hours as needed    Viral URI with cough       amoxicillin 500 MG capsule    AMOXIL    8 capsule    Take 4 capsules (2 grams) 1 hour before dental work.    S/P joint replacement       aspirin 81 MG tablet     90 tablet     1 tab daily    Type 2 diabetes mellitus (H)       atorvastatin 20 MG tablet    LIPITOR    90 tablet    Take 1 tablet (20 mg) by mouth daily    Dyslipidemia       TIAGO CONTOUR test strip   Generic drug:  blood glucose monitoring     300 each    Use to test blood sugar 3 times daily or as directed.    Type 2 diabetes mellitus with complication (H)       benzonatate 200 MG capsule    TESSALON    21 capsule    Take 1 capsule (200 mg) by mouth 3 times daily as needed for cough    Viral upper respiratory tract infection       blood glucose calibration Normal solution     1 Bottle    Use to calibrate blood glucose monitor as needed as directed.    DM type 2 (diabetes mellitus, type 2) (H)       blood glucose monitoring lancets     600 each    Test 4-6 times daily 90 day supply refills x 3    Type 2 diabetes mellitus with complication (H)       calcium carbonate-vitamin D 500-400 MG-UNIT Tabs per tablet     90 tablet    Take 1 tablet by mouth daily    Type 2 diabetes mellitus (H)       cholecalciferol 1000 UNIT tablet    vitamin D3    100 tablet    Take 1 tablet by mouth daily.        ergocalciferol 79577 units capsule    ERGOCALCIFEROL          EYE-ZAKIYA PLUS LUTEIN PO           guaiFENesin-codeine 100-10 MG/5ML Soln solution    ROBITUSSIN AC    236 mL    Take 5 mLs by mouth every 4 hours as needed for cough    Viral URI with cough       insulin glargine 100 UNIT/ML injection    LANTUS SOLOSTAR    70 mL    Inject 70 units SQ each am.    Type 2 diabetes mellitus with complication, with long-term current use of insulin (H)       insulin pen needle 31G X 8 MM    B-D U/F    400 each    Use  4 daily short 31 G X 8MM    Diabetes mellitus, type 2 (H)       latanoprost 0.005 % ophthalmic solution    XALATAN     Place 1 drop into both eyes At Bedtime        lisinopril-hydrochlorothiazide 20-12.5 MG per tablet    PRINZIDE/ZESTORETIC    90 tablet    Take 1 tablet by mouth daily    Essential hypertension       metFORMIN 500 MG  tablet    GLUCOPHAGE    360 tablet    Take 2 tablets (1,000 mg) by mouth 2 times daily (with meals)    Diabetes mellitus, type 2 (H)       MULTIvitamin  S Caps     90 capsule    Take 1 daily    Type 2 diabetes mellitus (H)       NovoLOG FLEXPEN 100 UNIT/ML injection   Generic drug:  insulin aspart     75 mL    Carb counting with meals approx 70-80 units daily    Type 2 diabetes mellitus with complication (H)       OMEGA-3 FISH OIL PO      Take 1,000 mg by mouth daily        order for DME     1 Units    Equipment being ordered: Grade 1 (light) compression stockings, below the knee    Venous (peripheral) insufficiency       oxymetazoline 0.05 % spray    AFRIN NASAL SPRAY    1 Bottle    Spray 2-3 sprays into both nostrils 2 times daily as needed for congestion    Viral upper respiratory tract infection       RESTASIS 0.05 % ophthalmic emulsion   Generic drug:  cycloSPORINE           timolol 0.5 % ophthalmic solution    TIMOPTIC     Place 1 drop into both eyes 2 times daily        TYLENOL PO      Take by mouth as needed for mild pain or fever        vitamin  B Complex Caps      Take 1 tablet by mouth daily        VITAMIN C PO      Take 2,000 mg by mouth 2 times daily

## 2018-06-06 NOTE — PROGRESS NOTES
"      HPI:       Radha Baca is a 62 year old who presents for the following  Patient presents with:  RECHECK: Upper Respratory Infection/ Leg   Ear Problem: Left ear/plugged/pain/intermit        Concern:      1 . Follow up -Upper Respratory        2. Leg Edema -        3. Ear- left ear pain/ plugged/intermit            {  :602566}   {+++++++  Additional Concerns  (FM):487790}  Problem, Medication and Allergy Lists were {Reviewed Lists:741355::\"reviewed and are current.\"}  Patient is {New/Established:446179::\"an established patient of this clinic.\"}         Review of Systems:   Review of Systems          Physical Exam:   Patient Vitals for the past 24 hrs:   BP Temp Temp src Pulse Resp SpO2 Weight   06/06/18 1602 110/70 98.6  F (37  C) Oral 86 18 97 % -     There is no height or weight on file to calculate BMI.  {SMI VITALS OPTIONS:910634::\"Vitals were reviewed and were normal\"}     Physical Exam  {  Detailed Ortho and mental status exam:769512}    Results:     {Result Choices:883400}  Assessment and Plan     {Assessment/Plan Pick List :456526}  There are no discontinued medications.  Options for treatment and follow-up care were reviewed with the patient. Radha Baca  engaged in the decision making process and verbalized understanding of the options discussed and agreed with the final plan.    Mohan Moreno MD      "

## 2018-06-06 NOTE — PATIENT INSTRUCTIONS
1.  Stop inhaler.    2.  Keep well hydrated.    3.  Stay on the regular dose of your usual medicines.    4.  Continue stockings.

## 2018-06-07 ASSESSMENT — ENCOUNTER SYMPTOMS
FEVER: 0
WHEEZING: 0
COUGH: 1
FATIGUE: 0
DIAPHORESIS: 0
CHEST TIGHTNESS: 0
SHORTNESS OF BREATH: 0

## 2018-06-07 NOTE — PROGRESS NOTES
HPI:       62 year old patient who presents with:    She presents for follow-up of persistent coughing paroxysms.  Overall she is much improved from her last clinic visit.  No fever.  Energy level is normal.  She has 1-2 episodes of coughing during the day.  Sleeping okay.  Other than the coughing she is essentially back to her baseline.  No ear pain or discharge.    Problem, Medication and Allergy Lists were reviewed and are current.  Patient is an established patient of this clinic.         Review of Systems:     Review of Systems   Constitutional: Negative for diaphoresis, fatigue and fever.   HENT: Negative for ear discharge and ear pain.    Respiratory: Positive for cough. Negative for chest tightness, shortness of breath and wheezing.    Cardiovascular: Negative for chest pain.          Physical Exam:     /70  Pulse 86  Temp 98.6  F (37  C) (Oral)  Resp 18  SpO2 97%    There is no height or weight on file to calculate BMI.  Vitals reviewed.    Physical Exam   Constitutional: She appears well-developed. No distress.   HENT:   Right Ear: Tympanic membrane and external ear normal.   Left Ear: Tympanic membrane and external ear normal.   Nose: Nose normal.   Mouth/Throat: No oropharyngeal exudate.   Cardiovascular: Normal rate, regular rhythm and normal heart sounds.    No murmur heard.  Pulmonary/Chest: Effort normal and breath sounds normal. No respiratory distress. She has no wheezes. She has no rales.           Results:       Assessment and Plan     Radha was seen today for recheck and ear problem.    Diagnoses and all orders for this visit:    Bronchitis -this illness appears to be a post viral bronchitis that is gradually resolving.  There is no evidence of atypical pneumonia or other active infection.  Patient was reassured.    Venous (peripheral) insufficiency -she was encouraged to continue with compression stockings during the day.    Essential hypertension -at goal.    Acute otitis  media, unspecified otitis media type -resolved.        Options for treatment and follow-up care were reviewed with the patient. Radha Baca engaged in the decision making process and verbalized understanding of the options discussed and agreed with the final plan.    Mohan Moreno MD

## 2018-06-11 ENCOUNTER — TELEPHONE (OUTPATIENT)
Dept: FAMILY MEDICINE | Facility: CLINIC | Age: 63
End: 2018-06-11

## 2018-06-11 NOTE — TELEPHONE ENCOUNTER
Returned call to patient.  She is wanting to update her medication list and reports discrepancies on her AVS.  She wants to correct her dose of Calcium,  She takes Caltrate 600 mg with 400 iu of Vitamin D.  She also wanted it noted that she takes a regular dose of Vitamin C of 2000 mg daily and then increases to 4000 mg during cold and flu season.   Corrected Calcium dose as patient reported and updated Vitamin C with a note.    Patient would like to have the following discontinued in her chart:  ergocalciferol (ERGOCALCIFEROL) 78821 units capsule (reports she have never taken this)  Tessalon  Oxymetazoline, nasal spray  Albuterol Sulfate (PCP told her to stop taking at last visit)    She states the only vitamin D she is taking is what is contained in her Caltrate and an additional D3 50 mcg per day, OTC.    Routed to PCP to advise.    Va Dorsey RN

## 2018-06-11 NOTE — TELEPHONE ENCOUNTER
[facility] phone call message- medication clarification/question:    Full Medication Name: ergocalciferol (ERGOCALCIFEROL) 50080 units capsule      Question: Patient would like to discuss about the above medication to be taken off from her chart since she never got it and never took it. Please give her a call to discuss about this.        OK to leave a message on voice mail? Yes    Primary language: English      needed? No    Call taken on June 11, 2018 at 10:43 AM by Janet Cuello

## 2018-07-13 ENCOUNTER — TELEPHONE (OUTPATIENT)
Dept: ORTHOPEDICS | Facility: CLINIC | Age: 63
End: 2018-07-13

## 2018-07-13 ENCOUNTER — TELEPHONE (OUTPATIENT)
Dept: ENDOCRINOLOGY | Facility: CLINIC | Age: 63
End: 2018-07-13

## 2018-07-13 DIAGNOSIS — Z79.4 TYPE 2 DIABETES MELLITUS WITH COMPLICATION, WITH LONG-TERM CURRENT USE OF INSULIN (H): Primary | ICD-10-CM

## 2018-07-13 DIAGNOSIS — E11.8 TYPE 2 DIABETES MELLITUS WITH COMPLICATION, WITH LONG-TERM CURRENT USE OF INSULIN (H): Primary | ICD-10-CM

## 2018-07-13 NOTE — TELEPHONE ENCOUNTER
[facility] Call Center    Phone Message    May a detailed message be left on voicemail: yes    Reason for Call: Other: Radha would like to continue the conversation she had with Dr. Arriola about her MRI completed here at [facility] on 4.11.18.  Specifically, she would like to discuss the referral to the [facility] clinic.  She was last seen on 4.17.18 in clinc with Dr. arriola.     Action Taken: Message routed to:  [facility] ump ortho

## 2018-07-13 NOTE — TELEPHONE ENCOUNTER
Health Call Center    Phone Message    May a detailed message be left on voicemail: yes    Reason for Call: Other: Wants to speak to the nurse regarding her upcoming appointment and then the doctor personally.     Action Taken: Other: Wants to speak to the nurse regarding her upcoming appointment and then the doctor personally.

## 2018-07-16 NOTE — TELEPHONE ENCOUNTER
Pt was phoned back regarding her medical records, appt with Pain Clinic, and [facility]  Pt was given the numbers for contacting the three different places, and she will call us back if she has any other questions or concerns.

## 2018-07-17 NOTE — TELEPHONE ENCOUNTER
" Radha is asking to be seen in August  due to high blood sugars. I offered her tomorrow  where there was a cancel but she cannot make that. Is it ok to use a KEIAR or new spot to get her in ? She is used to  Dr Willingham \"quote \"Being at my disposal\"  . I did inform her that  we cannot schedule like that and his care should not be expected by other providers. 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 Any spots we can offer in August ? Give me a couple for her to choose.    "

## 2018-07-18 ENCOUNTER — TELEPHONE (OUTPATIENT)
Dept: DERMATOLOGY | Facility: CLINIC | Age: 63
End: 2018-07-18

## 2018-07-18 NOTE — TELEPHONE ENCOUNTER
I spoke to Radha Baca and she is scheduled for 8/7/2018 with Dr. Swain. Patient understood and had no further questions or concerns.

## 2018-07-18 NOTE — TELEPHONE ENCOUNTER
Health Call Center    Phone Message    May a detailed message be left on voicemail: yes    Reason for Call: Other: Pt has questions about scheduling in derm to be seen for her fingertips fissure.  Pt was discussing with her hand therapist, Judi Burnett and they thought Pt should look into scheduling in Derm. Pt states that she is a diabetic. She has had these deep fissures on her finger tips for 1 year now and they are not healing well. Pt has tried everything to over the counter creams, gloves, oil, etc... Pt's finger is still cracking. Pt wants to know if the Derm clinic would be a good option for Pt to be seen for this. Pt can be reached tomorrow morning on her cell otherwise Pt will be out until Friday.    Action Taken: Message routed to:  [facility]

## 2018-07-20 ENCOUNTER — TELEPHONE (OUTPATIENT)
Dept: ENDOCRINOLOGY | Facility: CLINIC | Age: 63
End: 2018-07-20

## 2018-07-20 NOTE — TELEPHONE ENCOUNTER
Radha called again chente huston on cancels to see  Dr Hickey.  I offered her 2 days  next week which did not work with her  schedule. I will continue to watch  for cancels and offer her what is open. She has turned down three appointments within  1 week.

## 2018-08-06 ENCOUNTER — TELEPHONE (OUTPATIENT)
Dept: ENDOCRINOLOGY | Facility: CLINIC | Age: 63
End: 2018-08-06

## 2018-08-06 NOTE — TELEPHONE ENCOUNTER
Took only 14 units lantus this morning  And was going to get a new pen for the other 56 units but forgot. Should she take the rest now or wait till tomorrow and take full dose in the morning ?

## 2018-08-06 NOTE — TELEPHONE ENCOUNTER
[facility] Call Center    Phone Message    May a detailed message be left on voicemail: yes    Reason for Call: Other: Patient is calling in about her LANTIS. She said she took 14 units this morning, was going to get a new pen to do the rest, and forgot until just now. She is wondering if she should make up the other 56 units, or half of that, or some kind of amount... OR if she should wait until tomorrow morning. Please call back to discuss. Thank you!     Action Taken: Message routed to:  [facility] Endocrinology

## 2018-08-07 ENCOUNTER — OFFICE VISIT (OUTPATIENT)
Dept: DERMATOLOGY | Facility: CLINIC | Age: 63
End: 2018-08-07
Payer: COMMERCIAL

## 2018-08-07 DIAGNOSIS — L30.9 DERMATITIS: Primary | ICD-10-CM

## 2018-08-07 RX ORDER — TRIAMCINOLONE ACETONIDE 1 MG/G
OINTMENT TOPICAL 2 TIMES DAILY
Qty: 80 G | Refills: 0 | Status: SHIPPED | OUTPATIENT
Start: 2018-08-07 | End: 2018-08-21

## 2018-08-07 ASSESSMENT — PAIN SCALES - GENERAL: PAINLEVEL: NO PAIN (0)

## 2018-08-07 NOTE — PATIENT INSTRUCTIONS
Recommendations for dry skin and dermatitis   1. Bathe or shower daily in lukewarm water  2. Use a gentle non-soap detergent cleanser  - Soaps are alkaline (which can irritate sensitive skin) and remove natural moisturizing factors   - Recommended products, in no particular order, include:   - Bars:    - Aveeno Moisturizing Bar    - Cetaphil Gentle Cleansing Bar    - Dove Sensitive Skin Unscented Beauty Bar    - Olay Ultra Moisture Bar   - Liquid Cleansers:    - Aquanil Cleanser    - CeraVe Hydrating Cleanser    - Cetaphil Gentle Skin Cleanser  - Avoid scented soaps or bath additives unless your doctor tells you otherwise  - Focus on washing the face, underarms, and underwear areas; other sites usually do not need frequent washing  3. Rinse off thoroughly, then pat dry until skin is slightly damp  4. Apply moisturizer to damp skin within 3-5 minutes of exiting the bath/shower  - Recommended products, in no particular order, include:   - Lotions (thinner/lighter, but may be less effective)    - AmLactin Cerapeutic Restoring Body Lotion    - CeraVe Facial Moisturizing Lotion (AM and/or PM)    - Lubriderm Advanced Therapy Lotion   - Creams (thicker, likely the best balance of effectiveness and feel)    - AmLactin Ultra Hydrating Body Cream    - Aveeno Eczema Therapy Moisturizing Cream    - Aveeno Eczema Therapy Itch Relief Balm    - CeraVe Itch Relief Moisturizing Cream   - Ointments (thickest)    - Vaseline  5. If prescribed a topical steroid medication, this may be applied before or after the moisturizer (whichever order you prefer)  6. Reapply moisturizer one or two additional times throughout the day when dry skin is present; once this improves, reduce to daily or every other day as needed to prevent recurrence  7. If dry skin or dermatitis is present on the hands, keep moisturizer near the sink and apply after washing and drying your hands  8. A humidifier may be helpful during the winter months (when ambient  humidity is very low)

## 2018-08-07 NOTE — MR AVS SNAPSHOT
After Visit Summary   8/7/2018    Radha Baca    MRN: [id number]           Patient Information     Date Of Birth          1955        Visit Information        Provider Department      8/7/2018 1:40 PM Mingo Swain MD [facility] Dermatology        Today's Diagnoses     Dermatitis    -  1      Care Instructions    Recommendations for dry skin and dermatitis   1. Bathe or shower daily in lukewarm water  2. Use a gentle non-soap detergent cleanser  - Soaps are alkaline (which can irritate sensitive skin) and remove natural moisturizing factors   - Recommended products, in no particular order, include:   - Bars:    - Aveeno Moisturizing Bar    - Cetaphil Gentle Cleansing Bar    - Dove Sensitive Skin Unscented Beauty Bar    - Olay Ultra Moisture Bar   - Liquid Cleansers:    - Aquanil Cleanser    - CeraVe Hydrating Cleanser    - Cetaphil Gentle Skin Cleanser  - Avoid scented soaps or bath additives unless your doctor tells you otherwise  - Focus on washing the face, underarms, and underwear areas; other sites usually do not need frequent washing  3. Rinse off thoroughly, then pat dry until skin is slightly damp  4. Apply moisturizer to damp skin within 3-5 minutes of exiting the bath/shower  - Recommended products, in no particular order, include:   - Lotions (thinner/lighter, but may be less effective)    - AmLactin Cerapeutic Restoring Body Lotion    - CeraVe Facial Moisturizing Lotion (AM and/or PM)    - Lubriderm Advanced Therapy Lotion   - Creams (thicker, likely the best balance of effectiveness and feel)    - AmLactin Ultra Hydrating Body Cream    - Aveeno Eczema Therapy Moisturizing Cream    - Aveeno Eczema Therapy Itch Relief Balm    - CeraVe Itch Relief Moisturizing Cream   - Ointments (thickest)    - Vaseline  5. If prescribed a topical steroid medication, this may be applied before or after the moisturizer (whichever order you prefer)  6. Reapply moisturizer one or two  additional times throughout the day when dry skin is present; once this improves, reduce to daily or every other day as needed to prevent recurrence  7. If dry skin or dermatitis is present on the hands, keep moisturizer near the sink and apply after washing and drying your hands  8. A humidifier may be helpful during the winter months (when ambient humidity is very low)          Follow-ups after your visit        Follow-up notes from your care team     Return in about 3 months (around 11/7/2018).      Your next 10 appointments already scheduled     Oct 23, 2018  1:00 PM CDT   DX HIP/PELVIS/SPINE with UCDX1   [facility] [place] [facility] ([facility])    [address] [place] [id number] [phone]           Please do not take any of the following 24 hours prior to the day of your exam: vitamins, calcium tablets, antacids.  If possible, please wear clothes without metal (snaps, zippers). A sweatsuit works well.            Oct 31, 2018  2:00 PM CDT   (Arrive by 1:45 PM)   RETURN DIABETES with Demar Hickey MD   [facility] ([facility])    [address] [place] [address] [phone]            Nov 06, 2018  1:40 PM CST   (Arrive by 1:25 PM)   Return Visit with Mingo Swain MD   [facility] D[facility]at[facility] ([facility])    [address] [place] [id number] [phone]              Who to contact     Please call your clinic at [phone] to:    Ask questions about your health    Make or cancel appointments    Discuss your medicines    Learn about your test results    Speak to your doctor            Additional Information About Your Visit        MyChart Information     [employer] gives you secure access to your electronic health record. If you see a primary care provider, you can also send messages to your care team and make appointments.  If you have questions, please call your primary care clinic.  If you do not have a primary care provider, please call [phone] and they will assist you.      [employer] is an electronic gateway that provides easy, online access to your medical records. With [employer], you can request a clinic appointment, read your test results, renew a prescription or communicate with your care team.     To access your existing account, please contact your [facility] or call [phone] for assistance.        Care EveryWhere ID     This is your Care EveryWhere ID. This could be used by other organizations to access your [facility] medical records  [id number]         Blood Pressure from Last 3 Encounters:   06/06/18 110/70   05/11/18 95/61   05/02/18 150/65    Weight from Last 3 Encounters:   05/11/18 109.4 kg (241 lb 3.2 oz)   05/02/18 111.7 kg (246 lb 5 oz)   04/30/18 112.9 kg (248 lb 12.8 oz)              Today, you had the following     No orders found for display         Today's Medication Changes          These changes are accurate as of 8/7/18  2:26 PM.  If you have any questions, ask your nurse or doctor.               Start taking these medicines.        Dose/Directions    triamcinolone 0.1 % ointment   Commonly known as:  KENALOG   Used for:  Dermatitis   Started by:  Mingo Swain MD        Apply topically 2 times daily for 14 days   Quantity:  80 g   Refills:  0            Where to get your medicines      These medications were sent to [facility] [address] [facility] [address] [place], [place] [address]E [address] AT [facility] [address] [facility]  [address] [facility] [address]     Phone:  [phone]     triamcinolone 0.1 % ointment                Primary Care Provider Office Phone # Fax #    Mohan oMreno [phone]       [address]   [facility] [address]        Equal Access to Services     YESY NAJERA AH: ernesto Cruz,  leland doryromelia penniemindy skinner michellein hayaan adeeg kharash la'aan ah. So [facility] [phone].    ATENCIÓN: Si rosalina quan, tiene a pitts disposición servicios gratuitos de asistencia lingüística. Lavell al [phone].    We comply with applicable federal civil rights laws and [place] laws. We do not discriminate on the basis of race, color, national origin, age, disability, sex, sexual orientation, or gender identity.            Thank you!     Thank you for choosing [facility] DERMATOLOGY  for your care. Our goal is always to provide you with excellent care. Hearing back from our patients is one way we can continue to improve our services. Please take a few minutes to complete the written survey that you may receive in the mail after your visit with us. Thank you!             Your Updated Medication List - Protect others around you: Learn how to safely use, store and throw away your medicines at www.disposemymeds.org.          This list is accurate as of 8/7/18  2:26 PM.  Always use your most recent med list.                   Brand Name Dispense Instructions for use Diagnosis    amoxicillin 500 MG capsule    AMOXIL    8 capsule    Take 4 capsules (2 grams) 1 hour before dental work.    S/P joint replacement       aspirin 81 MG tablet     90 tablet    1 tab daily    Type 2 diabetes mellitus (H)       atorvastatin 20 MG tablet    LIPITOR    90 tablet    Take 1 tablet (20 mg) by mouth daily    Dyslipidemia       TIAGO CONTOUR test strip   Generic drug:  blood glucose monitoring     300 each    Use to test blood sugar 3 times daily or as directed.    Type 2 diabetes mellitus with complication (H)       blood glucose calibration Normal solution     1 Bottle    Use to calibrate blood glucose monitor as needed as directed.    DM type 2 (diabetes mellitus, type 2) (H)       blood glucose monitoring lancets     600 each    Test 4-6 times daily 90 day supply refills x 3    Type 2 diabetes mellitus with complication (H)        calcium-vitamin D 600-400 MG-UNIT per tablet    CALTRATE     Take 1 tablet by mouth 2 times daily        cholecalciferol 1000 UNIT tablet    vitamin D3    100 tablet    Take 1 tablet by mouth daily.        EYE-ZAKIYA PLUS LUTEIN PO           guaiFENesin-codeine 100-10 MG/5ML Soln solution    ROBITUSSIN AC    236 mL    Take 5 mLs by mouth every 4 hours as needed for cough    Viral URI with cough       insulin glargine 100 UNIT/ML injection    LANTUS SOLOSTAR    70 mL    Inject 70 units SQ each am.    Type 2 diabetes mellitus with complication, with long-term current use of insulin (H)       insulin pen needle 31G X 8 MM    B-D U/F    400 each    Use  4 daily short 31 G X 8MM    Diabetes mellitus, type 2 (H)       latanoprost 0.005 % ophthalmic solution    XALATAN     Place 1 drop into both eyes At Bedtime        lisinopril-hydrochlorothiazide 20-12.5 MG per tablet    PRINZIDE/ZESTORETIC    90 tablet    Take 1 tablet by mouth daily    Essential hypertension       metFORMIN 500 MG tablet    GLUCOPHAGE    360 tablet    Take 2 tablets (1,000 mg) by mouth 2 times daily (with meals)    Diabetes mellitus, type 2 (H)       MULTIvitamin  S Caps     90 capsule    Take 1 daily    Type 2 diabetes mellitus (H)       NovoLOG FLEXPEN 100 UNIT/ML injection   Generic drug:  insulin aspart     75 mL    Carb counting with meals approx 70-80 units daily    Type 2 diabetes mellitus with complication (H)       OMEGA-3 FISH OIL PO      Take 1,000 mg by mouth daily        order for DME     1 Units    Equipment being ordered: Grade 1 (light) compression stockings, below the knee    Venous (peripheral) insufficiency       RESTASIS 0.05 % ophthalmic emulsion   Generic drug:  cycloSPORINE           timolol 0.5 % ophthalmic solution    TIMOPTIC     Place 1 drop into both eyes 2 times daily        triamcinolone 0.1 % ointment    KENALOG    80 g    Apply topically 2 times daily for 14 days    Dermatitis       TYLENOL PO      Take by mouth as needed  for mild pain or fever        vitamin  B Complex Caps      Take 1 tablet by mouth daily        VITAMIN C PO      Take 2,000 mg by mouth 2 times daily

## 2018-08-07 NOTE — PROGRESS NOTES
[facility] Dermatology Note      Dermatology Problem List:  1. Fissures at the fingertips since 2017  2. Diabetes mellitus    Encounter Date: Aug 7, 2018    CC:   No chief complaint on file.        History of Present Illness:  Ms. Radha Baca is a 62 year old female who presents for evaluation of deep fissures of the fingertips.     She has had these deep fissures on her finger tips for 1 year now and they are not healing well. Pt has tried everything to over the counter creams, gloves, oil, Vicks Vapo-Rub.    She wears gloves when she washes dishes and cleans. She uses Dove hand soap and Ivory, Dial or Lever bath soaps. She uses Olson laundry detergents. She does not use bleach.    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 She takes a shower every day.    There are no symptoms, including burning, tingling, itch or pain.     Left middle finger, right first and second digits    She denies a family history.    She denies any blistering.     Past Medical History:   Patient Active Problem List   Diagnosis     Dyslipidemia     BMI >40     SNHL (sensorineural hearing loss)     Glaucoma     Umbilical hernia -- w/o symptoms->expectant mgt     Encounter for routine gynecological examination     Menopause present -- age 39     Health care maintenance     Type 2 diabetes mellitus with complication (H)     Essential hypertension     Venous (peripheral) insufficiency     Chronic bilateral low back pain with right-sided sciatica     Other secondary osteoarthritis of first carpometacarpal joint of right hand     Past Medical History:   Diagnosis Date     Arthritis      Arthritis of knee 4/4/2013     Arthritis of shoulder region, right 4/18/2014     Arthritis of shoulder region, right 4/18/2014     Back injury      Depressive disorder      Diabetes (H)      Finger pain 4/2/2015     Headache(784.0)     decreased with mouth guard use.     Hypercholesteremia       Hypertension      Low back pain      Menarche age 9+    cycles q mo x 4-5 d     Neck injuries      Pain in knee joint     LEFT     Right bundle branch block     per H/P     SNHL (sensorineural hearing loss)      Umbilical hernia without mention of obstruction or gangrene 8/2014     Past Surgical History:   Procedure Laterality Date     ARTHROPLASTY KNEE  4/4/2013    Left Total Knee Arthroplasty;  Surgeon: Lou Byrne MD;  [facility]: [facility]     ARTHROPLASTY MINIMALLY INVASIVE KNEE  8/22/2011    R knee :ODALIS CAITLYN SILVERMAN, Left age 14     DENTAL SURGERY      root canals, wisdom teeth     EXAM UNDER ANESTHESIA, MANIPULATE JOINT (LOCATION)  10/5/2012    Procedure: EXAM UNDER ANESTHESIA, MANIPULATE JOINT (LOCATION);  Right Knee Mini Open Lysis Of Adhesions, Left Knee Steroid Injection  ;  Surgeon: Lou Byrne MD;  Location: [facility]     [facility] OR OCULAR DEVICE INTRAOP DETACHED RETINA  2009    R     [facility] PHAKIC IOL - IMPLANT FROM SURGEON      right     KNEE SURGERY  1969    left     LASER YAG CAPSULOTOMY  12/2016    left     VITRECTOMY PARSPLANA  2010       Social History:  The patient {works:456696}. The patient {denies/admits to:909147} use of tanning beds.    Family History:  ***    Medications:  Current Outpatient Prescriptions   Medication Sig Dispense Refill     Acetaminophen (TYLENOL PO) Take by mouth as needed for mild pain or fever       amoxicillin (AMOXIL) 500 MG capsule Take 4 capsules (2 grams) 1 hour before dental work. (Patient not taking: Reported on 5/11/2018) 8 capsule 3     Ascorbic Acid (VITAMIN C PO) Take 2,000 mg by mouth 2 times daily        aspirin 81 MG tablet 1 tab daily 90 tablet 3     atorvastatin (LIPITOR) 20 MG tablet Take 1 tablet (20 mg) by mouth daily 90 tablet 3     B Complex Vitamins (VITAMIN  B COMPLEX) CAPS Take 1 tablet by mouth daily        TIAGO CONTOUR test strip Use to test blood sugar 3 times daily or as directed. 300 each 3     blood glucose calibration (TIAGO CONTOUR)  NORMAL solution Use to calibrate blood glucose monitor as needed as directed. 1 Bottle 11     blood glucose monitoring (TIAGO MICROLET) lancets Test 4-6 times daily 90 day supply refills x 3 600 each 3     calcium-vitamin D (CALTRATE) 600-400 MG-UNIT per tablet Take 1 tablet by mouth 2 times daily       cholecalciferol (VITAMIN D) 1000 UNIT tablet Take 1 tablet by mouth daily. 100 tablet 3     cycloSPORINE (RESTASIS) 0.05 % ophthalmic emulsion        guaiFENesin-codeine (ROBITUSSIN AC) 100-10 MG/5ML SOLN solution Take 5 mLs by mouth every 4 hours as needed for cough 236 mL 0     insulin glargine (LANTUS SOLOSTAR) 100 UNIT/ML injection Inject 70 units SQ each am. 70 mL 3     insulin pen needle (B-D U/F) 31G X 8 MM Use  4 daily short 31 G X 8MM 400 each 3     latanoprost (XALATAN) 0.005 % ophthalmic solution Place 1 drop into both eyes At Bedtime       lisinopril-hydrochlorothiazide (PRINZIDE/ZESTORETIC) 20-12.5 MG per tablet Take 1 tablet by mouth daily 90 tablet 3     metFORMIN (GLUCOPHAGE) 500 MG tablet Take 2 tablets (1,000 mg) by mouth 2 times daily (with meals) 360 tablet 3     Multiple Vitamin (MULTIVITAMIN  S) CAPS Take 1 daily 90 capsule 3     Multiple Vitamins-Minerals (EYE-ZAKIYA PLUS LUTEIN PO)        NOVOLOG FLEXPEN 100 UNIT/ML soln Carb counting with meals approx 70-80 units daily 75 mL 3     Omega-3 Fatty Acids (OMEGA-3 FISH OIL PO) Take 1,000 mg by mouth daily       order for DME Equipment being ordered: Grade 1 (light) compression stockings, below the knee 1 Units 1     timolol (TIMOPTIC) 0.5 % ophthalmic solution Place 1 drop into both eyes 2 times daily           Allergies   Allergen Reactions     Nkda [No Known Drug Allergies]          Review of Systems:  -{ROS:245385}  -Constitutional: The patient denies fatigue, fevers, chills, unintended weight loss, and night sweats.  -HEENT: Patient denies nonhealing oral sores.  -Skin: As above in HPI. No additional skin concerns.    Physical exam:  Vitals:  There were no vitals taken for this visit.  GEN: {RFGeneralAppearance:703425}   SKIN: {Skin Exam:027681}  -{Skin Exam Derm:188164}  -{Skin Exam Derm:535868}  -{Skin Exam Derm:347705}  -No other lesions of concern on areas examined.     Impression/Plan:  1. {Diagnosesderm:879298}    {rfplan:882217}    ***    2. {Diagnosesderm:215903}    {rfplan:124909}    ***    3. {Diagnosesderm:445307}    {rfplan:579995}    ***    4. {Diagnosesderm:230645}    ***    ***    CC  *** on close of this encounter.  Follow-up {rfmultiple:411406} {follow up in days/weeks/months:786715}.       *** staffed the patient.    Staff Involved:  Resident(Alexei Jacques)/Staff(as above)

## 2018-08-07 NOTE — TELEPHONE ENCOUNTER
Monitory blood sugar closely for next few days.   Very low carb : Essentially eliminate Bread pizza pasta rice or potatoes, wheat based products for next 3 days.     Demar Hickey MD  [id number]  Endocrinology Service

## 2018-08-07 NOTE — LETTER
8/7/2018       RE: Radha Baca  [address] [facility] [address]     Dear Colleague,    Thank you for referring your patient, Radha Baca, to the [facility] DERMATOLOGY at [facility]. Please see a copy of my visit note below.    [facility] Dermatology Note      Dermatology Problem List:  1. Fissures at the fingertips since 2017  2. Diabetes mellitus    Encounter Date: Aug 7, 2018    CC:   No chief complaint on file.  Fissures at the fingertips      History of Present Illness:  Ms. Radha Baca is a 62 year old female who presents for evaluation of deep fissures of the fingertips.  She has had these deep fissures on her finger tips for 1 year now and they are not healing well. She has tried an extensive list of over the counter creams, wearing gloves at night, coconut oil and Vicks Vapo-Rub. She wears gloves when she washes dishes and cleans. She uses Dove hand soap and Ivory, Dial or Lever bath soaps. She uses Olson laundry detergents. She does not use bleach. 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 She takes a shower every day. There are no symptoms, including burning, tingling, itch or pain. She tells me the worst involvement is of the fingertips of the left middle finger, right first and second digits. She denies any blistering. She also has some dry patches at the knuckles. There is no involvement of rash elsewhere of the body. The patient denies any constitutional symptoms, lymphadenopathy, unintentional weight loss or decreased appetite.    Past Medical History:   Patient Active Problem List   Diagnosis     Dyslipidemia     BMI >40     SNHL (sensorineural hearing loss)     Glaucoma     Umbilical hernia -- w/o symptoms->expectant mgt     Encounter for routine gynecological examination     Menopause present -- age 39     Health care maintenance     Type 2 diabetes mellitus with  complication (H)     Essential hypertension     Venous (peripheral) insufficiency     Chronic bilateral low back pain with right-sided sciatica     Other secondary osteoarthritis of first carpometacarpal joint of right hand     Past Medical History:   Diagnosis Date     Arthritis      Arthritis of knee 4/4/2013     Arthritis of shoulder region, right 4/18/2014     Arthritis of shoulder region, right 4/18/2014     Back injury      Depressive disorder      Diabetes (H)      Finger pain 4/2/2015     Headache(784.0)     decreased with mouth guard use.     Hypercholesteremia      Hypertension      Low back pain      Menarche age 9+    cycles q mo x 4-5 d     Neck injuries      Pain in knee joint     LEFT     Right bundle branch block     per H/P     SNHL (sensorineural hearing loss)      Umbilical hernia without mention of obstruction or gangrene 8/2014     Past Surgical History:   Procedure Laterality Date     ARTHROPLASTY KNEE  4/4/2013    Left Total Knee Arthroplasty;  Surgeon: Lou Byrne MD;  [facility]     ARTHROPLASTY MINIMALLY INVASIVE KNEE  8/22/2011    R knee :JAMARIMAGUE CAITLYN SILVERMAN, Left age 14     DENTAL SURGERY      root canals, wisdom teeth     EXAM UNDER ANESTHESIA, MANIPULATE JOINT (LOCATION)  10/5/2012    Procedure: EXAM UNDER ANESTHESIA, MANIPULATE JOINT (LOCATION);  Right Knee Mini Open Lysis Of Adhesions, Left Knee Steroid Injection  ;  Surgeon: Lou Byrne MD;  [facility]     [facility] OR OCULAR DEVICE INTRAOP DETACHED RETINA  2009    R     HC PHAKIC IOL - IMPLANT FROM SURGEON      right     KNEE SURGERY  1969    left     LASER YAG CAPSULOTOMY  12/2016    left     VITRECTOMY PARSPLANA  2010       Social History:  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 The patient denies use of tanning beds.    Family History:  She denies a family history of atopy or autoimmune disease.    Medications:  Current Outpatient Prescriptions   Medication Sig Dispense Refill      Acetaminophen (TYLENOL PO) Take by mouth as needed for mild pain or fever       amoxicillin (AMOXIL) 500 MG capsule Take 4 capsules (2 grams) 1 hour before dental work. (Patient not taking: Reported on 5/11/2018) 8 capsule 3     Ascorbic Acid (VITAMIN C PO) Take 2,000 mg by mouth 2 times daily        aspirin 81 MG tablet 1 tab daily 90 tablet 3     atorvastatin (LIPITOR) 20 MG tablet Take 1 tablet (20 mg) by mouth daily 90 tablet 3     B Complex Vitamins (VITAMIN  B COMPLEX) CAPS Take 1 tablet by mouth daily        TIAGO CONTOUR test strip Use to test blood sugar 3 times daily or as directed. 300 each 3     blood glucose calibration (TIAGO CONTOUR) NORMAL solution Use to calibrate blood glucose monitor as needed as directed. 1 Bottle 11     blood glucose monitoring (TIAGO MICROLET) lancets Test 4-6 times daily 90 day supply refills x 3 600 each 3     calcium-vitamin D (CALTRATE) 600-400 MG-UNIT per tablet Take 1 tablet by mouth 2 times daily       cholecalciferol (VITAMIN D) 1000 UNIT tablet Take 1 tablet by mouth daily. 100 tablet 3     cycloSPORINE (RESTASIS) 0.05 % ophthalmic emulsion        guaiFENesin-codeine (ROBITUSSIN AC) 100-10 MG/5ML SOLN solution Take 5 mLs by mouth every 4 hours as needed for cough 236 mL 0     insulin glargine (LANTUS SOLOSTAR) 100 UNIT/ML injection Inject 70 units SQ each am. 70 mL 3     insulin pen needle (B-D U/F) 31G X 8 MM Use  4 daily short 31 G X 8MM 400 each 3     latanoprost (XALATAN) 0.005 % ophthalmic solution Place 1 drop into both eyes At Bedtime       lisinopril-hydrochlorothiazide (PRINZIDE/ZESTORETIC) 20-12.5 MG per tablet Take 1 tablet by mouth daily 90 tablet 3     metFORMIN (GLUCOPHAGE) 500 MG tablet Take 2 tablets (1,000 mg) by mouth 2 times daily (with meals) 360 tablet 3     Multiple Vitamin (MULTIVITAMIN  S) CAPS Take 1 daily 90 capsule 3     Multiple Vitamins-Minerals (EYE-ZAKIYA PLUS LUTEIN PO)        NOVOLOG FLEXPEN 100 UNIT/ML soln Carb counting with meals  approx 70-80 units daily 75 mL 3     Omega-3 Fatty Acids (OMEGA-3 FISH OIL PO) Take 1,000 mg by mouth daily       order for DME Equipment being ordered: Grade 1 (light) compression stockings, below the knee 1 Units 1     timolol (TIMOPTIC) 0.5 % ophthalmic solution Place 1 drop into both eyes 2 times daily           Allergies   Allergen Reactions     Nkda [No Known Drug Allergies]      Physical exam:  Vitals: There were no vitals taken for this visit.  GEN: This is a well developed, well-nourished female in no acute distress, in a pleasant mood.    SKIN: Sun-exposed skin, which includes the head/face, neck, both arms, digits, and/or nails was examined.   -At the fingertips of the left middle finger, right first and second digits, there is some mild to moderate xerosis without erythema. No fissures are evident. There are small patches of xerosis at the extensor surface of the knuckles of the the second to fourth digits on both hands. There are no other rashes elsewhere on the body.  -No other lesions of concern on areas examined.     Impression/Plan:  1. History of fissures of the fingertips    The patient has at baseline xerosis and likely has a component of allergic contact dermatitis. She endorses an extensive list of products she has used in the past. Today, there are no clear fissures, but she does have evidence of xerosis involving the fingertips and knuckle pads. We will simplify her regimen in an effort to minimize potential irritants and allergens.    Bathe with lukewarm water    Use a gentle non-soap detergent cleanser, such as CeraVe hydrating cleanser or Cetaphil Gentle Skin Cleanser    Avoided scented products    Apply moisturizer within five minutes of exiting the bath/shower    Use thick creams to the hands or ointments, including Aveeno eczema therapy cream or vaseline    Use triamcinolone 0.1% ointment BID for the next 14 days for the roughest skin of the hands and knuckles    An informational hand  out was provided to the patient about products that she should use.    CC Dr. Moreno on close of this encounter.  Follow-up in 3 months, earlier for new or changing lesions.     Dr. Swain staffed the patient.    Staff Involved:  Resident(Alexei Jacques)/Staff(as above)    Staff Physician Comments:   I saw and evaluated the patient with the resident and I agree with the assessment and plan.  I was present for the examination.    Mingo Swain MD  Dermatology Staff Physician  , Department of Dermatology    Again, thank you for allowing me to participate in the care of your patient.      Sincerely,    Mingo Swain MD

## 2018-08-07 NOTE — NURSING NOTE
Dermatology Rooming Note    Radha Baca's goals for this visit include:   Chief Complaint   Patient presents with     Derm Problem     Stacie is visiting to disuss dry cracked skin on the tips of the fingers     Connie Chase LPN

## 2018-08-08 ENCOUNTER — TELEPHONE (OUTPATIENT)
Dept: ENDOCRINOLOGY | Facility: CLINIC | Age: 63
End: 2018-08-08

## 2018-08-08 DIAGNOSIS — R73.9 HYPERGLYCEMIA: Primary | ICD-10-CM

## 2018-08-08 NOTE — PROGRESS NOTES
[facility] Dermatology Note      Dermatology Problem List:  1. Fissures at the fingertips since 2017  2. Diabetes mellitus    Encounter Date: Aug 7, 2018    CC:   No chief complaint on file.  Fissures at the fingertips      History of Present Illness:  Ms. Radha Baca is a 62 year old female who presents for evaluation of deep fissures of the fingertips.  She has had these deep fissures on her finger tips for 1 year now and they are not healing well. She has tried an extensive list of over the counter creams, wearing gloves at night, coconut oil and Vicks Vapo-Rub. She wears gloves when she washes dishes and cleans. She uses Dove hand soap and Ivory, Dial or Lever bath soaps. She uses Olson laundry detergents. She does not use bleach. 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 She takes a shower every day. There are no symptoms, including burning, tingling, itch or pain. She tells me the worst involvement is of the fingertips of the left middle finger, right first and second digits. She denies any blistering. She also has some dry patches at the knuckles. There is no involvement of rash elsewhere of the body. The patient denies any constitutional symptoms, lymphadenopathy, unintentional weight loss or decreased appetite.    Past Medical History:   Patient Active Problem List   Diagnosis     Dyslipidemia     BMI >40     SNHL (sensorineural hearing loss)     Glaucoma     Umbilical hernia -- w/o symptoms->expectant mgt     Encounter for routine gynecological examination     Menopause present -- age 39     Health care maintenance     Type 2 diabetes mellitus with complication (H)     Essential hypertension     Venous (peripheral) insufficiency     Chronic bilateral low back pain with right-sided sciatica     Other secondary osteoarthritis of first carpometacarpal joint of right hand     Past Medical History:   Diagnosis Date     Arthritis      Arthritis of  knee 4/4/2013     Arthritis of shoulder region, right 4/18/2014     Arthritis of shoulder region, right 4/18/2014     Back injury      Depressive disorder      Diabetes (H)      Finger pain 4/2/2015     Headache(784.0)     decreased with mouth guard use.     Hypercholesteremia      Hypertension      Low back pain      Menarche age 9+    cycles q mo x 4-5 d     Neck injuries      Pain in knee joint     LEFT     Right bundle branch block     per H/P     SNHL (sensorineural hearing loss)      Umbilical hernia without mention of obstruction or gangrene 8/2014     Past Surgical History:   Procedure Laterality Date     ARTHROPLASTY KNEE  4/4/2013    Left Total Knee Arthroplasty;  Surgeon: Lou Byrne MD;  [facility]     ARTHROPLASTY MINIMALLY INVASIVE KNEE  8/22/2011    R knee :CAITLYN JACOBO, Left age 14     DENTAL SURGERY      root canals, wisdom teeth     EXAM UNDER ANESTHESIA, MANIPULATE JOINT (LOCATION)  10/5/2012    Procedure: EXAM UNDER ANESTHESIA, MANIPULATE JOINT (LOCATION);  Right Knee Mini Open Lysis Of Adhesions, Left Knee Steroid Injection  ;  Surgeon: Lou Byrne MD;  [facility]     [facility] OR OCULAR DEVICE INTRAOP DETACHED RETINA  2009    R     HC PHAKIC IOL - IMPLANT FROM SURGEON      right     KNEE SURGERY  1969    left     LASER YAG CAPSULOTOMY  12/2016    left     VITRECTOMY PARSPLANA  2010       Social History:  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 The patient denies use of tanning beds.    Family History:  She denies a family history of atopy or autoimmune disease.    Medications:  Current Outpatient Prescriptions   Medication Sig Dispense Refill     Acetaminophen (TYLENOL PO) Take by mouth as needed for mild pain or fever       amoxicillin (AMOXIL) 500 MG capsule Take 4 capsules (2 grams) 1 hour before dental work. (Patient not taking: Reported on 5/11/2018) 8 capsule 3     Ascorbic Acid (VITAMIN C PO) Take 2,000 mg by mouth 2 times daily         aspirin 81 MG tablet 1 tab daily 90 tablet 3     atorvastatin (LIPITOR) 20 MG tablet Take 1 tablet (20 mg) by mouth daily 90 tablet 3     B Complex Vitamins (VITAMIN  B COMPLEX) CAPS Take 1 tablet by mouth daily        TIAGO CONTOUR test strip Use to test blood sugar 3 times daily or as directed. 300 each 3     blood glucose calibration (TIAGO CONTOUR) NORMAL solution Use to calibrate blood glucose monitor as needed as directed. 1 Bottle 11     blood glucose monitoring (TIAGO MICROLET) lancets Test 4-6 times daily 90 day supply refills x 3 600 each 3     calcium-vitamin D (CALTRATE) 600-400 MG-UNIT per tablet Take 1 tablet by mouth 2 times daily       cholecalciferol (VITAMIN D) 1000 UNIT tablet Take 1 tablet by mouth daily. 100 tablet 3     cycloSPORINE (RESTASIS) 0.05 % ophthalmic emulsion        guaiFENesin-codeine (ROBITUSSIN AC) 100-10 MG/5ML SOLN solution Take 5 mLs by mouth every 4 hours as needed for cough 236 mL 0     insulin glargine (LANTUS SOLOSTAR) 100 UNIT/ML injection Inject 70 units SQ each am. 70 mL 3     insulin pen needle (B-D U/F) 31G X 8 MM Use  4 daily short 31 G X 8MM 400 each 3     latanoprost (XALATAN) 0.005 % ophthalmic solution Place 1 drop into both eyes At Bedtime       lisinopril-hydrochlorothiazide (PRINZIDE/ZESTORETIC) 20-12.5 MG per tablet Take 1 tablet by mouth daily 90 tablet 3     metFORMIN (GLUCOPHAGE) 500 MG tablet Take 2 tablets (1,000 mg) by mouth 2 times daily (with meals) 360 tablet 3     Multiple Vitamin (MULTIVITAMIN  S) CAPS Take 1 daily 90 capsule 3     Multiple Vitamins-Minerals (EYE-ZAKIYA PLUS LUTEIN PO)        NOVOLOG FLEXPEN 100 UNIT/ML soln Carb counting with meals approx 70-80 units daily 75 mL 3     Omega-3 Fatty Acids (OMEGA-3 FISH OIL PO) Take 1,000 mg by mouth daily       order for DME Equipment being ordered: Grade 1 (light) compression stockings, below the knee 1 Units 1     timolol (TIMOPTIC) 0.5 % ophthalmic solution Place 1 drop into both eyes 2 times daily            Allergies   Allergen Reactions     Nkda [No Known Drug Allergies]          Review of Systems:  -Skin/Heme New Pt: The patient denies frequent sun exposure. The patient denies excessive scarring or problems healing except as per HPI. The patient denies excessive bleeding.  -Constitutional: The patient denies fatigue, fevers, chills, unintended weight loss, and night sweats.  -HEENT: Patient denies nonhealing oral sores.  -Skin: As above in HPI. No additional skin concerns.    Physical exam:  Vitals: There were no vitals taken for this visit.  GEN: This is a well developed, well-nourished female in no acute distress, in a pleasant mood.    SKIN: Sun-exposed skin, which includes the head/face, neck, both arms, digits, and/or nails was examined.   -At the fingertips of the left middle finger, right first and second digits, there is some mild to moderate xerosis without erythema. No fissures are evident. There are small patches of xerosis at the extensor surface of the knuckles of the the second to fourth digits on both hands. There are no other rashes elsewhere on the body.  -No other lesions of concern on areas examined.     Impression/Plan:  1. History of fissures of the fingertips    The patient has at baseline xerosis and likely has a component of allergic contact dermatitis. She endorses an extensive list of products she has used in the past. Today, there are no clear fissures, but she does have evidence of xerosis involving the fingertips and knuckle pads. We will simplify her regimen in an effort to minimize potential irritants and allergens.    Bathe with lukewarm water    Use a gentle non-soap detergent cleanser, such as CeraVe hydrating cleanser or Cetaphil Gentle Skin Cleanser    Avoided scented products    Apply moisturizer within five minutes of exiting the bath/shower    Use thick creams to the hands or ointments, including Aveeno eczema therapy cream or vaseline    Use triamcinolone 0.1%  ointment BID for the next 14 days for the roughest skin of the hands and knuckles    An informational hand out was provided to the patient about products that she should use.    CC Dr. Moreno on close of this encounter.  Follow-up in 3 months, earlier for new or changing lesions.     Dr. Swain staffed the patient.    Staff Involved:  Resident(Alexei Jacques)/Staff(as above)    Staff Physician Comments:   I saw and evaluated the patient with the resident and I agree with the assessment and plan.  I was present for the examination.    Mingo Swain MD  Dermatology Staff Physician  , Department of Dermatology

## 2018-08-08 NOTE — TELEPHONE ENCOUNTER
[facility]    Phone Message    May a detailed message be left on voicemail: yes    Reason for Call: Other: Attn: Rasheeda Leroy PT is just wondering if you received her VM and would like a call back.  She did not give any other information.  Please follow up      Action Taken: Message routed to:  [facility] Endo

## 2018-08-08 NOTE — TELEPHONE ENCOUNTER
[facility]    Phone Message    May a detailed message be left on voicemail: yes    Reason for Call: Other: PT is upset that she has not receive a call back yet about her lantus and would like a manager or clinic coordinator to call her back.       Action Taken: Message routed to:  [facility] Endo

## 2018-08-08 NOTE — TELEPHONE ENCOUNTER
I did receive the VM and was waiting for Dr Mendenhall to reply  he has ordered labs for her that she can do now or wait until October  before her next visit. . If she  Needs to talk about  Uncontrolled BS before October  He is willing to  Do a telephone call  Visit and she will let us know if needed.  She had sent a message the other day  Forgetting to take her full insulin dose  but I  Didn't get back to her. Dr Hickey did not want her to  take the forgotten insulin but to watch her diet and cho intake and  start the next day with the  Written dose.  This is actually  what she did not hearing back  from us .  I did apologize  for the situation.She will call to schedule with diabetes Educator and also weight management . Referrals were placed

## 2018-08-08 NOTE — TELEPHONE ENCOUNTER
Called and spoke with patient regarding her concern of not hearing back from the clinic staff. I passed her concerns on to Dr. Hickey.    Daniela Gomez, [facility]  Patient Care Supervisor  Endocrinology & Diabetes   Neurology, Neurosurgery, PM&R

## 2018-08-20 ENCOUNTER — TELEPHONE (OUTPATIENT)
Dept: FAMILY MEDICINE | Facility: CLINIC | Age: 63
End: 2018-08-20

## 2018-08-20 NOTE — TELEPHONE ENCOUNTER
RN called pt to relay we do recommend getting the shingrix but it needs to be given at [facility] or [facility]. Pt verbalized understanding    Wanda Funez RN

## 2018-08-20 NOTE — TELEPHONE ENCOUNTER
[facility] phone call message- general phone call:    Reason for call:  Patient is wanting to know if she needs the new shingle vaccine that is a series of two shots.       Return call needed: Yes    OK to leave a message on voice mail? Yes    Primary language: English      needed? No    Call taken on August 20, 2018 at 2:02 PM by Halima Dyson

## 2018-09-04 NOTE — TELEPHONE ENCOUNTER
RN called and left detailed VM on secure line relaying again to go to [facility] or [facility] for two step vaccine    Wanda Funez RN

## 2018-09-04 NOTE — TELEPHONE ENCOUNTER
[facility] phone call message- patient requesting to speak directly with PCP or provider.    PCP: Mohan Moreno    Reason for Call: Shingles Shot    Additional Details: Patient has called requesting to speak with PCP only    Are you willing to speak with a nurse? No    Is a call back needed? Yes    Patient informed that it may take up to 2 business days to hear back from PCP:Yes    OK to leave a message on voice mail? Yes    Primary language: English      needed? No    Call taken on September 4, 2018 at 11:43 AM by Noemi Kaur route to PCP unless willing to speak with a nurse.

## 2018-09-04 NOTE — TELEPHONE ENCOUNTER
"Patient calling again to ask PCP if she needs the new 2 part version of the shingles shot.  She had the shingles shot in our clinic \"the older version\".  She said she had requested a call back when she called a few weeks ago but has not received a call back. Please call patient on her cell to let her know if she should/needs go somewhere to get the new shingles shot: [phone]  "

## 2018-09-12 DIAGNOSIS — I87.2 VENOUS (PERIPHERAL) INSUFFICIENCY: Primary | ICD-10-CM

## 2018-10-23 ENCOUNTER — RADIANT APPOINTMENT (OUTPATIENT)
Dept: BONE DENSITY | Facility: CLINIC | Age: 63
End: 2018-10-23
Attending: INTERNAL MEDICINE
Payer: COMMERCIAL

## 2018-10-23 DIAGNOSIS — M85.80 OSTEOPENIA, UNSPECIFIED LOCATION: ICD-10-CM

## 2018-10-24 ENCOUNTER — TELEPHONE (OUTPATIENT)
Dept: ENDOCRINOLOGY | Facility: CLINIC | Age: 63
End: 2018-10-24

## 2018-10-24 NOTE — TELEPHONE ENCOUNTER
[facility]    Phone Message    May a detailed message be left on voicemail: yes    Reason for Call: Other: Dionicio said she is trying to schedule an appt with the lab prior to her appt with Dr. Hickey next week.  The lab told her they didn't know if her orders were current or ... Please give pt a call back to discuss and schedule lab appts for next Monday 10/31/18    Action Taken: Message routed to:  [facility] Endocrinology

## 2018-10-27 NOTE — PROGRESS NOTES
Dear Dionicio,   The DEXA scan showed low bone density, There is bone loss at spine and some bone gain at hip joint. I do not see compelling reason to start treatment but we will discuss during our visit. Labs are also due for that visit.   Best regards,   Demar Hickey MD  [id number]  Endocrinology Service

## 2018-10-29 DIAGNOSIS — R73.9 HYPERGLYCEMIA: ICD-10-CM

## 2018-10-29 LAB
ALT SERPL W P-5'-P-CCNC: 48 U/L (ref 0–50)
ANION GAP SERPL CALCULATED.3IONS-SCNC: 7 MMOL/L (ref 3–14)
BUN SERPL-MCNC: 15 MG/DL (ref 7–30)
CALCIUM SERPL-MCNC: 9.2 MG/DL (ref 8.5–10.1)
CHLORIDE SERPL-SCNC: 100 MMOL/L (ref 94–109)
CHOLEST SERPL-MCNC: 109 MG/DL
CK SERPL-CCNC: 105 U/L (ref 30–225)
CO2 SERPL-SCNC: 29 MMOL/L (ref 20–32)
CREAT SERPL-MCNC: 0.69 MG/DL (ref 0.52–1.04)
CREAT UR-MCNC: 89 MG/DL
GFR SERPL CREATININE-BSD FRML MDRD: 86 ML/MIN/1.7M2
GLUCOSE SERPL-MCNC: 157 MG/DL (ref 70–99)
HBA1C MFR BLD: 10.3 % (ref 0–5.6)
HCT VFR BLD AUTO: 38.9 % (ref 35–47)
HDLC SERPL-MCNC: 38 MG/DL
LDLC SERPL CALC-MCNC: 42 MG/DL
MICROALBUMIN UR-MCNC: 24 MG/L
MICROALBUMIN/CREAT UR: 26.91 MG/G CR (ref 0–25)
NONHDLC SERPL-MCNC: 71 MG/DL
POTASSIUM SERPL-SCNC: 4.8 MMOL/L (ref 3.4–5.3)
SODIUM SERPL-SCNC: 136 MMOL/L (ref 133–144)
TRIGL SERPL-MCNC: 146 MG/DL
TSH SERPL DL<=0.005 MIU/L-ACNC: 3.04 MU/L (ref 0.4–4)

## 2018-10-30 ENCOUNTER — PATIENT OUTREACH (OUTPATIENT)
Dept: CARE COORDINATION | Facility: CLINIC | Age: 63
End: 2018-10-30

## 2018-10-31 ENCOUNTER — OFFICE VISIT (OUTPATIENT)
Dept: ENDOCRINOLOGY | Facility: CLINIC | Age: 63
End: 2018-10-31
Payer: COMMERCIAL

## 2018-10-31 ENCOUNTER — PATIENT OUTREACH (OUTPATIENT)
Dept: CARE COORDINATION | Facility: CLINIC | Age: 63
End: 2018-10-31

## 2018-10-31 VITALS
WEIGHT: 251.2 LBS | HEART RATE: 91 BPM | BODY MASS INDEX: 43.12 KG/M2 | SYSTOLIC BLOOD PRESSURE: 113 MMHG | DIASTOLIC BLOOD PRESSURE: 72 MMHG

## 2018-10-31 DIAGNOSIS — Z79.4 TYPE 2 DIABETES MELLITUS WITH COMPLICATION, WITH LONG-TERM CURRENT USE OF INSULIN (H): ICD-10-CM

## 2018-10-31 DIAGNOSIS — R25.2 CRAMP OF LIMB: Primary | ICD-10-CM

## 2018-10-31 DIAGNOSIS — E11.8 TYPE 2 DIABETES MELLITUS WITH COMPLICATION, WITH LONG-TERM CURRENT USE OF INSULIN (H): ICD-10-CM

## 2018-10-31 LAB — MAGNESIUM SERPL-MCNC: 2 MG/DL (ref 1.6–2.3)

## 2018-10-31 NOTE — MR AVS SNAPSHOT
After Visit Summary   10/31/2018    Radha Baca    MRN: [id number]           Patient Information     Date Of Birth          1955        Visit Information        Provider Department      10/31/2018 2:00 PM Demar Hickey MD [facility]        Today's Diagnoses     Cramp of limb    -  1    Type 2 diabetes mellitus with complication, with long-term current use of insulin (H)          Care Instructions    Start Trulicity, follow up with diabetic educator.     Blood sugar testing before meals.     Wt management referral.     Keep appt with M[facility].             Follow-ups after your visit        Additional Services     BARIATRIC ADULT REFERRAL       Your provider has referred you to: Wt management    Please be aware that coverage of these services is subject to the terms and limitations of your health insurance plan.  Call member services at your health plan with any benefit or coverage questions.      Please bring the following with you to your appointment:      (1) List of current medications   (2) This referral request   (3) Any documents/labs given to you for this referral                  Follow-up notes from your care team     Return in about 6 months (around 4/30/2019).      Your next 10 appointments already scheduled     Nov 13, 2018 10:55 AM CST   (Arrive by 10:40 AM)   Return Visit with Mingo Swain MD   [facility] ([facility])    [address] [place] [id number]   [phone]            Feb 01, 2019  1:00 PM CST   (Arrive by 12:45 PM)   RETURN DIABETES with Marcella Maria PA-C   [facility] ([facility])    [address] [place] [id number]   [phone]            Apr 24, 2019  3:30 PM CDT   (Arrive by 3:15 PM)   RETURN DIABETES with Demar Hickey MD   [facility] ([facility])     [address] [place] [id number] [phone]              Who to contact     Please call your clinic at [phone] to:    Ask questions about your health    Make or cancel appointments    Discuss your medicines    Learn about your test results    Speak to your doctor            Additional Information About Your Visit        MyChart Information     [employer] gives you secure access to your electronic health record. If you see a primary care provider, you can also send messages to your care team and make appointments. If you have questions, please call your primary care clinic.  If you do not have a primary care provider, please call [phone] and they will assist you.      [employer] is an electronic gateway that provides easy, online access to your medical records. With [employer], you can request a clinic appointment, read your test results, renew a prescription or communicate with your care team.     To access your existing account, please contact your [facility] or call [phone] for assistance.        Care EveryWhere ID     This is your Care EveryWhere ID. This could be used by other organizations to access your [place] medical records  [id number]        Your Vitals Were     Pulse BMI (Body Mass Index)                91 43.12 kg/m2           Blood Pressure from Last 3 Encounters:   10/31/18 113/72   06/06/18 110/70   05/11/18 95/61    Weight from Last 3 Encounters:   10/31/18 113.9 kg (251 lb 3.2 oz)   05/11/18 109.4 kg (241 lb 3.2 oz)   05/02/18 111.7 kg (246 lb 5 oz)              We Performed the Following     BARIATRIC ADULT REFERRAL          Today's Medication Changes          These changes are accurate as of 10/31/18  2:55 PM.  If you have any questions, ask your nurse or doctor.               Start taking these medicines.        Dose/Directions    dulaglutide 0.75 MG/0.5ML pen   Commonly known as:  TRULICITY   Used for:  Type 2 diabetes  mellitus with complication, with long-term current use of insulin (H)   Started by:  Demar Hickey MD        Dose:  0.75 mg   Inject 0.75 mg Subcutaneous every 7 days   Quantity:  2 mL   Refills:  1            Where to get your medicines      These medications were sent to [id number] - [place], [place] - [address] AT [facility] [address]  [address], [facility] [address]     Phone:  [phone]     dulaglutide 0.75 MG/0.5ML pen                Primary Care Provider Office Phone # Fax #    Mohan Moreno [phone]       [address]  [facility] [address]        Equal Access to Services     YESY NAJERA : Richard Ornelas, waaxda luqadaha, qaybta kaalmada adeegyada, mindy fam. So [facility] [phone].    ATENCIÓN: Si habla español, tiene a pitts disposición servicios gratuitos de asistencia lingüística. Ll[facility] [phone].    We comply with applicable federal civil rights laws and [place] laws. We do not discriminate on the basis of race, color, national origin, age, disability, sex, sexual orientation, or gender identity.            Thank you!     Thank you for choosing [facility]  for your care. Our goal is always to provide you with excellent care. Hearing back from our patients is one way we can continue to improve our services. Please take a few minutes to complete the written survey that you may receive in the mail after your visit with us. Thank you!             Your Updated Medication List - Protect others around you: Learn how to safely use, store and throw away your medicines at www.disposemymeds.org.          This list is accurate as of 10/31/18  2:55 PM.  Always use your most recent med list.                   Brand Name Dispense Instructions for use Diagnosis    amoxicillin 500 MG capsule    AMOXIL    8 capsule    Take 4 capsules (2 grams) 1 hour before dental work.     S/P joint replacement       aspirin 81 MG tablet     90 tablet    1 tab daily    Type 2 diabetes mellitus (H)       atorvastatin 20 MG tablet    LIPITOR    90 tablet    Take 1 tablet (20 mg) by mouth daily    Dyslipidemia       TIAGO CONTOUR test strip   Generic drug:  blood glucose monitoring     300 each    Use to test blood sugar 3 times daily or as directed.    Type 2 diabetes mellitus with complication (H)       blood glucose calibration Normal solution     1 Bottle    Use to calibrate blood glucose monitor as needed as directed.    DM type 2 (diabetes mellitus, type 2) (H)       blood glucose monitoring lancets     600 each    Test 4-6 times daily 90 day supply refills x 3    Type 2 diabetes mellitus with complication (H)       calcium carbonate 600 mg-vitamin D 400 units 600-400 MG-UNIT per tablet    CALTRATE     Take 1 tablet by mouth 2 times daily        cholecalciferol 1000 UNIT tablet    vitamin D3    100 tablet    Take 1 tablet by mouth daily.        dulaglutide 0.75 MG/0.5ML pen    TRULICITY    2 mL    Inject 0.75 mg Subcutaneous every 7 days    Type 2 diabetes mellitus with complication, with long-term current use of insulin (H)       EYE-ZAKIYA PLUS LUTEIN PO           guaiFENesin-codeine 100-10 MG/5ML Soln solution    ROBITUSSIN AC    236 mL    Take 5 mLs by mouth every 4 hours as needed for cough    Viral URI with cough       insulin glargine 100 UNIT/ML injection    LANTUS SOLOSTAR    70 mL    Inject 70 units SQ each am.    Type 2 diabetes mellitus with complication, with long-term current use of insulin (H)       insulin pen needle 31G X 8 MM    B-D U/F    400 each    Use  4 daily short 31 G X 8MM    Diabetes mellitus, type 2 (H)       latanoprost 0.005 % ophthalmic solution    XALATAN     Place 1 drop into both eyes At Bedtime        lisinopril-hydrochlorothiazide 20-12.5 MG per tablet    PRINZIDE/ZESTORETIC    90 tablet    Take 1 tablet by mouth daily    Essential hypertension       metFORMIN  500 MG tablet    GLUCOPHAGE    360 tablet    Take 2 tablets (1,000 mg) by mouth 2 times daily (with meals)    Diabetes mellitus, type 2 (H)       MULTIvitamin  S Caps     90 capsule    Take 1 daily    Type 2 diabetes mellitus (H)       NovoLOG FLEXPEN 100 UNIT/ML injection   Generic drug:  insulin aspart     75 mL    Carb counting with meals approx 70-80 units daily    Type 2 diabetes mellitus with complication (H)       OMEGA-3 FISH OIL PO      Take 1,000 mg by mouth daily        order for DME     1 Units    Equipment being ordered: Grade 1 (light) compression stockings, below the knee    Venous (peripheral) insufficiency       RESTASIS 0.05 % ophthalmic emulsion   Generic drug:  cycloSPORINE           timolol 0.5 % ophthalmic solution    TIMOPTIC     Place 1 drop into both eyes 2 times daily        TYLENOL PO      Take by mouth as needed for mild pain or fever        vitamin  B Complex Caps      Take 1 tablet by mouth daily        VITAMIN C PO      Take 2,000 mg by mouth 2 times daily

## 2018-10-31 NOTE — LETTER
10/31/2018       RE: Radha Baca  [address] [facility] [address]     Dear Colleague,    Thank you for referring your patient, Radha Baca, to the [facility] at [facility]. Please see a copy of my visit note below.    HPI  Flow presents today for follow-up visit.  She has type 2 diabetes mellitus that is complicated by retinopathy, nephropathy and neuropathy.  Her hx is also significant for HTN, hyperlipidemia and obesity.  For her diabetes, she is currently taking Metformin 1000 mg BID, Lantus 70 units SQ each am and Novolog 1unit/4 gms CHO with meals.  Pt's A1C is 10.7 % today. Her previous A1C was 9.7 %.  Dionicio admits to missing insulin doses approximately every other day.  She did not bring in her meter today.    On ROS today, she reports family stress.  Her mother is now living with her and her .  Her father filed a lawsuit and she is paying the court fees.  She is still going to the gym for swim therapy, but less.  This has been very helpful.  She had joint meditation classes.  She has been having right leg/knee pain, back pain.  She denies blurred vision, n/v, SOB at rest, cough, chest pain, abd pain or diarrhea.  No dysuria, hematuria or foot ulcers.  Some numbness in both feet.  Bowel movements are normal except when she eats eggs that leads to diarrhea    Diabetes Care  Retinopathy:yes; pt seen last year.  Nephropathy:yes; urine microalbuminuria + in Oct 2016 and negative in 10/2017. She is taking Lisinopril/HCTZ daily.  Neuropathy:yes.  Foot Exam:no ulcers.  Taking aspirin:yes.  Lipids:LDL 61 in 10/2017.  Pt is taking Lipitor.    ROS  Please see under HPI.    Allergies  Allergies   Allergen Reactions     Nkda [No Known Drug Allergies]        Medications  Current Outpatient Prescriptions   Medication Sig Dispense Refill     Acetaminophen (TYLENOL PO) Take by mouth as needed for mild pain or fever       Ascorbic Acid  (VITAMIN C PO) Take 2,000 mg by mouth 2 times daily        aspirin 81 MG tablet 1 tab daily 90 tablet 3     atorvastatin (LIPITOR) 20 MG tablet Take 1 tablet (20 mg) by mouth daily 90 tablet 3     B Complex Vitamins (VITAMIN  B COMPLEX) CAPS Take 1 tablet by mouth daily        calcium-vitamin D (CALTRATE) 600-400 MG-UNIT per tablet Take 1 tablet by mouth 2 times daily       cholecalciferol (VITAMIN D) 1000 UNIT tablet Take 1 tablet by mouth daily. 100 tablet 3     cycloSPORINE (RESTASIS) 0.05 % ophthalmic emulsion        insulin glargine (LANTUS SOLOSTAR) 100 UNIT/ML injection Inject 70 units SQ each am. 70 mL 3     latanoprost (XALATAN) 0.005 % ophthalmic solution Place 1 drop into both eyes At Bedtime       lisinopril-hydrochlorothiazide (PRINZIDE/ZESTORETIC) 20-12.5 MG per tablet Take 1 tablet by mouth daily 90 tablet 3     metFORMIN (GLUCOPHAGE) 500 MG tablet Take 2 tablets (1,000 mg) by mouth 2 times daily (with meals) 360 tablet 3     Multiple Vitamin (MULTIVITAMIN  S) CAPS Take 1 daily 90 capsule 3     Multiple Vitamins-Minerals (EYE-ZAKIYA PLUS LUTEIN PO)        NOVOLOG FLEXPEN 100 UNIT/ML soln Carb counting with meals approx 70-80 units daily 75 mL 3     Omega-3 Fatty Acids (OMEGA-3 FISH OIL PO) Take 1,000 mg by mouth daily       timolol (TIMOPTIC) 0.5 % ophthalmic solution Place 1 drop into both eyes 2 times daily        amoxicillin (AMOXIL) 500 MG capsule Take 4 capsules (2 grams) 1 hour before dental work. (Patient not taking: Reported on 5/11/2018) 8 capsule 3     TIAGO CONTOUR test strip Use to test blood sugar 3 times daily or as directed. 300 each 3     blood glucose calibration (TIAGO CONTOUR) NORMAL solution Use to calibrate blood glucose monitor as needed as directed. 1 Bottle 11     blood glucose monitoring (TIAGO MICROLET) lancets Test 4-6 times daily 90 day supply refills x 3 600 each 3     guaiFENesin-codeine (ROBITUSSIN AC) 100-10 MG/5ML SOLN solution Take 5 mLs by mouth every 4 hours as needed  for cough (Patient not taking: Reported on 10/31/2018) 236 mL 0     insulin pen needle (B-D U/F) 31G X 8 MM Use  4 daily short 31 G X 8MM 400 each 3     order for DME Equipment being ordered: Grade 1 (light) compression stockings, below the knee 1 Units 1       Family History  family history includes Breast Cancer in her cousin; Cancer in her maternal aunt; Diabetes in her brother and brother; Diabetes (age of onset: 45) in her sister; Diabetes (age of onset: 50) in her brother; Diabetes (age of onset: 55) in her father; Hypertension in her brother, brother, and brother; Hypertension (age of onset: 41) in her sister; Hypertension (age of onset: 79) in her mother; Thyroid Disease in her sister; Thyroid Disease (age of onset: 45) in her sister. There is no history of Cerebrovascular Disease, Cancer - colorectal, Prostate Cancer, Alcohol/Drug, Melanoma, or Skin Cancer.    Social History  Smoke: none.  ETOH: rare.    Past Medical History  Past Medical History:   Diagnosis Date     Arthritis      Arthritis of knee 4/4/2013     Arthritis of shoulder region, right 4/18/2014     Arthritis of shoulder region, right 4/18/2014     Back injury      Depressive disorder      Diabetes (H)      Finger pain 4/2/2015     Headache(784.0)     decreased with mouth guard use.     Hypercholesteremia      Hypertension      Low back pain      Menarche age 10+    cycles q mo x 4-5 d     Neck injuries      Pain in knee joint     LEFT     Right bundle branch block     per H/P     SNHL (sensorineural hearing loss)      Umbilical hernia without mention of obstruction or gangrene 8/2014     Past Surgical History:   Procedure Laterality Date     ARTHROPLASTY KNEE  4/4/2013    Left Total Knee Arthroplasty;  Surgeon: Lou Byrne MD;  Location: [facility]     ARTHROPLASTY MINIMALLY INVASIVE KNEE  8/22/2011    R knee :JAMARIMAGUE CAITLYN SILVERMAN, Left age 14     DENTAL SURGERY      root canals, wisdom teeth     EXAM UNDER ANESTHESIA, MANIPULATE JOINT  (LOCATION)  10/5/2012    Procedure: EXAM UNDER ANESTHESIA, MANIPULATE JOINT (LOCATION);  Right Knee Mini Open Lysis Of Adhesions, Left Knee Steroid Injection  ;  Surgeon: Lou Byrne MD;  Location: [facility]     [facility] OR OCULAR DEVICE INTRAOP DETACHED RETINA  2009    R     [facility] PHAKIC IOL - IMPLANT FROM SURGEON      right     KNEE SURGERY  1969    left     LASER YAG CAPSULOTOMY  12/2016    left     VITRECTOMY PARSPLANA  2010       Physical Exam  /72  Pulse 91  Wt 113.9 kg (251 lb 3.2 oz)  BMI 43.12 kg/m2  Body mass index is 43.12 kg/(m^2).    GENERAL : Obese female, anxious and tearful due to overall family situation  SKIN: No rash.  EYES: PERRLA.  Fundi not examined.  MOUTH: Moist.  NECK: No goiter.  RESP: Lungs clear to auscultation.  CARDIAC: RRR.  ABDOMEN: Large and nontender.      NEURO: awake, alert, responds appropriately to questions.     EXTREMITIES:Trace pretibial edema.  FEET:  No ulcers.    RESULTS  Creatinine   Date Value Ref Range Status   10/29/2018 0.69 0.52 - 1.04 mg/dL Final     GFR Estimate   Date Value Ref Range Status   10/29/2018 86 >60 mL/min/1.7m2 Final     Comment:     Non  GFR Calc     Hemoglobin A1C   Date Value Ref Range Status   10/29/2018 10.3 (H) 0 - 5.6 % Final     Comment:     Normal <5.7% Prediabetes 5.7-6.4%  Diabetes 6.5% or higher - adopted from ADA   consensus guidelines.       Potassium   Date Value Ref Range Status   10/29/2018 4.8 3.4 - 5.3 mmol/L Final     ALT   Date Value Ref Range Status   10/29/2018 48 0 - 50 U/L Final     AST   Date Value Ref Range Status   04/17/2018 22 0 - 45 U/L Final     TSH   Date Value Ref Range Status   10/29/2018 3.04 0.40 - 4.00 mU/L Final     T4 Free   Date Value Ref Range Status   04/17/2018 1.10 0.76 - 1.46 ng/dL Final       Cholesterol   Date Value Ref Range Status   10/29/2018 109 <200 mg/dL Final   04/18/2016 126 <200 mg/dL Final     HDL Cholesterol   Date Value Ref Range Status   10/29/2018 38 (L) >49 mg/dL Final    04/18/2016 43 (L) >49 mg/dL Final     LDL Cholesterol Calculated   Date Value Ref Range Status   10/29/2018 42 <100 mg/dL Final     Comment:     Desirable:       <100 mg/dl   04/18/2016 55 <100 mg/dL Final     Comment:     Desirable:       <100 mg/dl     LDL Cholesterol Direct   Date Value Ref Range Status   10/16/2017 61 <100 mg/dL Final     Comment:     Desirable:       <100 mg/dl   04/10/2017 44 <100 mg/dL Final     Comment:     Desirable:       <100 mg/dl     Triglycerides   Date Value Ref Range Status   10/29/2018 146 <150 mg/dL Final   04/18/2016 140 <150 mg/dL Final     Cholesterol/HDL Ratio   Date Value Ref Range Status   01/09/2014 4.2 0.0 - 5.0 Final   05/13/2013 4.0 0.0 - 5.0 Final       Lab Results   Component Value Date    A1C 10.2 04/10/2017    A1C 9.8 10/17/2016    A1C 10.6 04/18/2016    A1C 9.9 07/20/2015    A1C 9.4 01/09/2014     A1C   9.5 %  10/18/2017  A1C   9.6 % today.      ASSESSMENT/PLAN:    1. Uncontrolled type 2 diabetes mellitus complicated by retinopathy, nephropathy and neuropathy.  Again I discussed adding a GLP-1 such as Trulicity once a week with patient today.  She wants to try it but is worried about its cost.  We discussed side effects and contraindications.  She did not go to weight loss clinic because she thought it was a bariatric surgical clinic.  Significant social barriers including taking care of parents.  Recently her father filed a lawsuit and she is paying fees for the court.  It has been exceedingly stressful for her.    2.  RETINOPATHY: Pt seen by [facility] last year.    3.  NEPHROPATHY: Pt's creat was 0.59 with GFR > 90 mL/min in 10/2017.  Pt is taking Lisinopril/HCTZ daily.    4.  NEUROPATHY: Stable.    5.  OBESITY: GLP-1 agonist.  I asked her to find out if there are low cost options.    6.  Low bone density with fracture score of 8.1 for major fracture and 0.9 for hip fractures.  Continue calcium and vitamin D supplements.  No parental hip fractures, no fall risk, no  prior fragility fractures, no rheumatoid arthritis or steroid use.  I reviewed the bone density results with the patient    Demar Hickey MD  [id number]  Endocrinology Service

## 2018-10-31 NOTE — PATIENT INSTRUCTIONS
Start Trulicity, follow up with diabetic educator.     Blood sugar testing before meals.     Wt management referral.     Keep appt with Mare.

## 2018-10-31 NOTE — PROGRESS NOTES
HPI  Flow presents today for follow-up visit.  She has type 2 diabetes mellitus that is complicated by retinopathy, nephropathy and neuropathy.  Her hx is also significant for HTN, hyperlipidemia and obesity.  For her diabetes, she is currently taking Metformin 1000 mg BID, Lantus 70 units SQ each am and Novolog 1unit/4 gms CHO with meals.  Pt's A1C is 10.7 % today. Her previous A1C was 9.7 %.  Dionicio admits to missing insulin doses approximately every other day.  She did not bring in her meter today.    On ROS today, she reports family stress.  Her mother is now living with her and her .  Her father filed a lawsuit and she is paying the court fees.  She is still going to the gym for swim therapy, but less.  This has been very helpful.  She had joint meditation classes.  She has been having right leg/knee pain, back pain.  She denies blurred vision, n/v, SOB at rest, cough, chest pain, abd pain or diarrhea.  No dysuria, hematuria or foot ulcers.  Some numbness in both feet.  Bowel movements are normal except when she eats eggs that leads to diarrhea    Diabetes Care  Retinopathy:yes; pt seen last year.  Nephropathy:yes; urine microalbuminuria + in Oct 2016 and negative in 10/2017. She is taking Lisinopril/HCTZ daily.  Neuropathy:yes.  Foot Exam:no ulcers.  Taking aspirin:yes.  Lipids:LDL 61 in 10/2017.  Pt is taking Lipitor.    ROS  Please see under HPI.    Allergies  Allergies   Allergen Reactions     Nkda [No Known Drug Allergies]        Medications  Current Outpatient Prescriptions   Medication Sig Dispense Refill     Acetaminophen (TYLENOL PO) Take by mouth as needed for mild pain or fever       Ascorbic Acid (VITAMIN C PO) Take 2,000 mg by mouth 2 times daily        aspirin 81 MG tablet 1 tab daily 90 tablet 3     atorvastatin (LIPITOR) 20 MG tablet Take 1 tablet (20 mg) by mouth daily 90 tablet 3     B Complex Vitamins (VITAMIN  B COMPLEX) CAPS Take 1 tablet by mouth daily        calcium-vitamin D  (CALTRATE) 600-400 MG-UNIT per tablet Take 1 tablet by mouth 2 times daily       cholecalciferol (VITAMIN D) 1000 UNIT tablet Take 1 tablet by mouth daily. 100 tablet 3     cycloSPORINE (RESTASIS) 0.05 % ophthalmic emulsion        insulin glargine (LANTUS SOLOSTAR) 100 UNIT/ML injection Inject 70 units SQ each am. 70 mL 3     latanoprost (XALATAN) 0.005 % ophthalmic solution Place 1 drop into both eyes At Bedtime       lisinopril-hydrochlorothiazide (PRINZIDE/ZESTORETIC) 20-12.5 MG per tablet Take 1 tablet by mouth daily 90 tablet 3     metFORMIN (GLUCOPHAGE) 500 MG tablet Take 2 tablets (1,000 mg) by mouth 2 times daily (with meals) 360 tablet 3     Multiple Vitamin (MULTIVITAMIN  S) CAPS Take 1 daily 90 capsule 3     Multiple Vitamins-Minerals (EYE-ZAKIYA PLUS LUTEIN PO)        NOVOLOG FLEXPEN 100 UNIT/ML soln Carb counting with meals approx 70-80 units daily 75 mL 3     Omega-3 Fatty Acids (OMEGA-3 FISH OIL PO) Take 1,000 mg by mouth daily       timolol (TIMOPTIC) 0.5 % ophthalmic solution Place 1 drop into both eyes 2 times daily        amoxicillin (AMOXIL) 500 MG capsule Take 4 capsules (2 grams) 1 hour before dental work. (Patient not taking: Reported on 5/11/2018) 8 capsule 3     TIAGO CONTOUR test strip Use to test blood sugar 3 times daily or as directed. 300 each 3     blood glucose calibration (TIAGO CONTOUR) NORMAL solution Use to calibrate blood glucose monitor as needed as directed. 1 Bottle 11     blood glucose monitoring ([employer] MICROLET) lancets Test 4-6 times daily 90 day supply refills x 3 600 each 3     guaiFENesin-codeine (ROBITUSSIN AC) 100-10 MG/5ML SOLN solution Take 5 mLs by mouth every 4 hours as needed for cough (Patient not taking: Reported on 10/31/2018) 236 mL 0     insulin pen needle (B-D U/F) 31G X 8 MM Use  4 daily short 31 G X 8MM 400 each 3     order for DME Equipment being ordered: Grade 1 (light) compression stockings, below the knee 1 Units 1       Family History  family history  includes Breast Cancer in her cousin; Cancer in her maternal aunt; Diabetes in her brother and brother; Diabetes (age of onset: 45) in her sister; Diabetes (age of onset: 50) in her brother; Diabetes (age of onset: 55) in her father; Hypertension in her brother, brother, and brother; Hypertension (age of onset: 41) in her sister; Hypertension (age of onset: 79) in her mother; Thyroid Disease in her sister; Thyroid Disease (age of onset: 45) in her sister. There is no history of Cerebrovascular Disease, Cancer - colorectal, Prostate Cancer, Alcohol/Drug, Melanoma, or Skin Cancer.    Social History  Smoke: none.  ETOH: rare.    Past Medical History  Past Medical History:   Diagnosis Date     Arthritis      Arthritis of knee 4/4/2013     Arthritis of shoulder region, right 4/18/2014     Arthritis of shoulder region, right 4/18/2014     Back injury      Depressive disorder      Diabetes (H)      Finger pain 4/2/2015     Headache(784.0)     decreased with mouth guard use.     Hypercholesteremia      Hypertension      Low back pain      Menarche age 10+    cycles q mo x 4-5 d     Neck injuries      Pain in knee joint     LEFT     Right bundle branch block     per H/P     SNHL (sensorineural hearing loss)      Umbilical hernia without mention of obstruction or gangrene 8/2014     Past Surgical History:   Procedure Laterality Date     ARTHROPLASTY KNEE  4/4/2013    Left Total Knee Arthroplasty;  Surgeon: Lou Byrne MD;  [facility]     ARTHROPLASTY MINIMALLY INVASIVE KNEE  8/22/2011    R knee :CAITLYN JACOBO, Left age 14     DENTAL SURGERY      root canals, wisdom teeth     EXAM UNDER ANESTHESIA, MANIPULATE JOINT (LOCATION)  10/5/2012    Procedure: EXAM UNDER ANESTHESIA, MANIPULATE JOINT (LOCATION);  Right Knee Mini Open Lysis Of Adhesions, Left Knee Steroid Injection  ;  Surgeon: Lou Byrne MD;  [facility]      OR OCULAR DEVICE INTRAOP DETACHED RETINA  2009    R     HC PHAKIC IOL -  IMPLANT FROM SURGEON      right     KNEE SURGERY  1969    left     LASER YAG CAPSULOTOMY  12/2016    left     VITRECTOMY PARSPLANA  2010       Physical Exam  /72  Pulse 91  Wt 113.9 kg (251 lb 3.2 oz)  BMI 43.12 kg/m2  Body mass index is 43.12 kg/(m^2).    GENERAL : Obese female, anxious and tearful due to overall family situation  SKIN: No rash.  EYES: PERRLA.  Fundi not examined.  MOUTH: Moist.  NECK: No goiter.  RESP: Lungs clear to auscultation.  CARDIAC: RRR.  ABDOMEN: Large and nontender.      NEURO: awake, alert, responds appropriately to questions.     EXTREMITIES:Trace pretibial edema.  FEET:  No ulcers.    RESULTS  Creatinine   Date Value Ref Range Status   10/29/2018 0.69 0.52 - 1.04 mg/dL Final     GFR Estimate   Date Value Ref Range Status   10/29/2018 86 >60 mL/min/1.7m2 Final     Comment:     Non  GFR Calc     Hemoglobin A1C   Date Value Ref Range Status   10/29/2018 10.3 (H) 0 - 5.6 % Final     Comment:     Normal <5.7% Prediabetes 5.7-6.4%  Diabetes 6.5% or higher - adopted from ADA   consensus guidelines.       Potassium   Date Value Ref Range Status   10/29/2018 4.8 3.4 - 5.3 mmol/L Final     ALT   Date Value Ref Range Status   10/29/2018 48 0 - 50 U/L Final     AST   Date Value Ref Range Status   04/17/2018 22 0 - 45 U/L Final     TSH   Date Value Ref Range Status   10/29/2018 3.04 0.40 - 4.00 mU/L Final     T4 Free   Date Value Ref Range Status   04/17/2018 1.10 0.76 - 1.46 ng/dL Final       Cholesterol   Date Value Ref Range Status   10/29/2018 109 <200 mg/dL Final   04/18/2016 126 <200 mg/dL Final     HDL Cholesterol   Date Value Ref Range Status   10/29/2018 38 (L) >49 mg/dL Final   04/18/2016 43 (L) >49 mg/dL Final     LDL Cholesterol Calculated   Date Value Ref Range Status   10/29/2018 42 <100 mg/dL Final     Comment:     Desirable:       <100 mg/dl   04/18/2016 55 <100 mg/dL Final     Comment:     Desirable:       <100 mg/dl     LDL Cholesterol Direct   Date Value  Ref Range Status   10/16/2017 61 <100 mg/dL Final     Comment:     Desirable:       <100 mg/dl   04/10/2017 44 <100 mg/dL Final     Comment:     Desirable:       <100 mg/dl     Triglycerides   Date Value Ref Range Status   10/29/2018 146 <150 mg/dL Final   04/18/2016 140 <150 mg/dL Final     Cholesterol/HDL Ratio   Date Value Ref Range Status   01/09/2014 4.2 0.0 - 5.0 Final   05/13/2013 4.0 0.0 - 5.0 Final       Lab Results   Component Value Date    A1C 10.2 04/10/2017    A1C 9.8 10/17/2016    A1C 10.6 04/18/2016    A1C 9.9 07/20/2015    A1C 9.4 01/09/2014     A1C   9.5 %  10/18/2017  A1C   9.6 % today.      ASSESSMENT/PLAN:    1. Uncontrolled type 2 diabetes mellitus complicated by retinopathy, nephropathy and neuropathy.  Again I discussed adding a GLP-1 such as Trulicity once a week with patient today.  She wants to try it but is worried about its cost.  We discussed side effects and contraindications.  She did not go to weight loss clinic because she thought it was a bariatric surgical clinic.  Significant social barriers including taking care of parents.  Recently her father filed a lawsuit and she is paying fees for the court.  It has been exceedingly stressful for her.    2.  RETINOPATHY: Pt seen by [facility] last year.    3.  NEPHROPATHY: Pt's creat was 0.59 with GFR > 90 mL/min in 10/2017.  Pt is taking Lisinopril/HCTZ daily.    4.  NEUROPATHY: Stable.    5.  OBESITY: GLP-1 agonist.  I asked her to find out if there are low cost options.    6.  Low bone density with fracture score of 8.1 for major fracture and 0.9 for hip fractures.  Continue calcium and vitamin D supplements.  No parental hip fractures, no fall risk, no prior fragility fractures, no rheumatoid arthritis or steroid use.  I reviewed the bone density results with the patient    Demar Hickey MD  [id number]  Endocrinology Service

## 2018-10-31 NOTE — NURSING NOTE
Chief Complaint   Patient presents with     RECHECK     Type II Diabetes     Florinda Cazares, CMA

## 2018-11-01 DIAGNOSIS — E11.9 DM TYPE 2 (DIABETES MELLITUS, TYPE 2) (H): ICD-10-CM

## 2018-11-02 ENCOUNTER — TELEPHONE (OUTPATIENT)
Dept: ENDOCRINOLOGY | Facility: CLINIC | Age: 63
End: 2018-11-02

## 2018-11-02 NOTE — TELEPHONE ENCOUNTER
[facility] Call Center    Phone Message    May a detailed message be left on voicemail: yes    Reason for Call: Other: Patient is requesting a refill on her [employer] Contour NORMAL control solution ASAP, she requested this over a week ago from the pharmacy. She is going out of town this coming week & needs this before then.     [facility] [id number] [facility] [address] AT [facility] [address]          Action Taken: Message routed to:  Other: Med Refills Team

## 2018-11-02 NOTE — TELEPHONE ENCOUNTER
[facility] Call Center    Phone Message    May a detailed message be left on voicemail: yes    Reason for Call: Other: Patient was in earlier this week & when the MA attempted to download her meter there was an error. She is wondering if she needs to come in soon for this to be done. Please contact her via her mobile number.      Action Taken: Message routed to:  [facility] ([facility]): Endocrinology

## 2018-11-13 ENCOUNTER — OFFICE VISIT (OUTPATIENT)
Dept: DERMATOLOGY | Facility: CLINIC | Age: 63
End: 2018-11-13
Payer: COMMERCIAL

## 2018-11-13 DIAGNOSIS — L85.3 XEROSIS OF SKIN: ICD-10-CM

## 2018-11-13 DIAGNOSIS — L30.9 DERMATITIS: Primary | ICD-10-CM

## 2018-11-13 RX ORDER — TRIAMCINOLONE ACETONIDE 1 MG/G
OINTMENT TOPICAL 2 TIMES DAILY
Qty: 80 G | Refills: 11 | Status: SHIPPED | OUTPATIENT
Start: 2018-11-13 | End: 2022-03-31

## 2018-11-13 ASSESSMENT — PAIN SCALES - GENERAL: PAINLEVEL: NO PAIN (0)

## 2018-11-13 NOTE — LETTER
"11/13/2018       RE: Radha Baca  [address] [address]     Dear Colleague,    Thank you for referring your patient, Radha Baca, to the [facility] DERMATOLOGY at [facility]. Please see a copy of my visit note below.        [facility] Dermatology Note      Dermatology Problem List:  1. Fissures at the fingertips since 2017  2. Diabetes mellitus    Encounter Date: Nov 13, 2018    CC:  Chief Complaint   Patient presents with     Derm Problem     Dionicio is here for a follow up with her hands.        History of Present Illness:  Ms. Radha Baca is a 63 year old female who presents as a follow-up for fissures at the fingertips. The patient was last seen 8/7/2018 when she started triamcinolone 0.1% ointment BID for two weeks and dry skin care was discussed. Today, she reports that her hands got better but have been getting dry recently. She has been using Lubriderm and Aveno moisturizers with relief. She has switched to dove and she reports that if she uses any stronger soaps, she wears gloves. The triamcinolone provided relief and she has started using it again as her hands have gotten drier in the cold air, but she is concerned about using the triamcinolone for more than 14 days. She wears gloves when she goes to bed. She denies any itching. She also voices concern about wrinkles on her hands that \"look like I just got out of the shower.\" The patient voices no other concerns.     Past Medical History:   Patient Active Problem List   Diagnosis     Dyslipidemia     BMI >40     SNHL (sensorineural hearing loss)     Glaucoma     Umbilical hernia -- w/o symptoms->expectant mgt     Encounter for routine gynecological examination     Menopause present -- age 39     Health care maintenance     Type 2 diabetes mellitus with complication (H)     Essential hypertension     Venous (peripheral) insufficiency     Chronic bilateral " low back pain with right-sided sciatica     Other secondary osteoarthritis of first carpometacarpal joint of right hand     Past Medical History:   Diagnosis Date     Arthritis      Arthritis of knee 4/4/2013     Arthritis of shoulder region, right 4/18/2014     Arthritis of shoulder region, right 4/18/2014     Back injury      Depressive disorder      Diabetes (H)      Finger pain 4/2/2015     Headache(784.0)     decreased with mouth guard use.     Hypercholesteremia      Hypertension      Low back pain      Menarche age 10+    cycles q mo x 4-5 d     Neck injuries      Pain in knee joint     LEFT     Right bundle branch block     per H/P     SNHL (sensorineural hearing loss)      Umbilical hernia without mention of obstruction or gangrene 8/2014     Past Surgical History:   Procedure Laterality Date     ARTHROPLASTY KNEE  4/4/2013    Left Total Knee Arthroplasty;  Surgeon: Lou Byrne MD;  [facility]     ARTHROPLASTY MINIMALLY INVASIVE KNEE  8/22/2011    R knee :CAITLYN JACOBO, Left age 14     DENTAL SURGERY      root canals, wisdom teeth     EXAM UNDER ANESTHESIA, MANIPULATE JOINT (LOCATION)  10/5/2012    Procedure: EXAM UNDER ANESTHESIA, MANIPULATE JOINT (LOCATION);  Right Knee Mini Open Lysis Of Adhesions, Left Knee Steroid Injection  ;  Surgeon: Lou Byrne MD;  [facility]      OR OCULAR DEVICE INTRAOP DETACHED RETINA  2009    R     HC PHAKIC IOL - IMPLANT FROM SURGEON      right     KNEE SURGERY  1969    left     LASER YAG CAPSULOTOMY  12/2016    left     VITRECTOMY PARSPLANA  2010       Social History:  Patient  reports that she has quit smoking. She has never used smokeless tobacco. She reports that she does not drink alcohol or use illicit drugs.    Family History:  Family History   Problem Relation Age of Onset     Diabetes Father 55     DM II     Diabetes Sister 45     DM II     Diabetes Brother 50     xs 2     Hypertension Sister 41     also B and M     Cancer Maternal  Aunt      multiple myeloma     Thyroid Disease Sister 45     graves     Hypertension Brother      ? age     Hypertension Mother 79     Diabetes Brother      Hypertension Brother      Diabetes Brother      Hypertension Brother      Breast Cancer Cousin      Thyroid Disease Sister      Cerebrovascular Disease No family hx of      Cancer - colorectal No family hx of      Prostate Cancer No family hx of      Alcohol/Drug No family hx of      Melanoma No family hx of      Skin Cancer No family hx of        Medications:  Current Outpatient Prescriptions   Medication Sig Dispense Refill     Acetaminophen (TYLENOL PO) Take by mouth as needed for mild pain or fever       Ascorbic Acid (VITAMIN C PO) Take 2,000 mg by mouth 2 times daily        aspirin 81 MG tablet 1 tab daily 90 tablet 3     atorvastatin (LIPITOR) 20 MG tablet Take 1 tablet (20 mg) by mouth daily 90 tablet 3     B Complex Vitamins (VITAMIN  B COMPLEX) CAPS Take 1 tablet by mouth daily        TIAGO CONTOUR test strip Use to test blood sugar 3 times daily or as directed. 300 each 3     blood glucose calibration (TIAGO CONTOUR) Normal solution Use to calibrate blood glucose monitor as needed as directed. 1 Bottle 11     blood glucose monitoring ([employer] MICROLET) lancets Test 4-6 times daily 90 day supply refills x 3 600 each 3     calcium-vitamin D (CALTRATE) 600-400 MG-UNIT per tablet Take 1 tablet by mouth 2 times daily       cholecalciferol (VITAMIN D) 1000 UNIT tablet Take 1 tablet by mouth daily. 100 tablet 3     cycloSPORINE (RESTASIS) 0.05 % ophthalmic emulsion        dulaglutide (TRULICITY) 0.75 MG/0.5ML pen Inject 0.75 mg Subcutaneous every 7 days 2 mL 1     insulin glargine (LANTUS SOLOSTAR) 100 UNIT/ML injection Inject 70 units SQ each am. 70 mL 3     insulin pen needle (B-D U/F) 31G X 8 MM Use  4 daily short 31 G X 8MM 400 each 3     latanoprost (XALATAN) 0.005 % ophthalmic solution Place 1 drop into both eyes At Bedtime        lisinopril-hydrochlorothiazide (PRINZIDE/ZESTORETIC) 20-12.5 MG per tablet Take 1 tablet by mouth daily 90 tablet 3     metFORMIN (GLUCOPHAGE) 500 MG tablet Take 2 tablets (1,000 mg) by mouth 2 times daily (with meals) 360 tablet 3     Multiple Vitamin (MULTIVITAMIN  S) CAPS Take 1 daily 90 capsule 3     Multiple Vitamins-Minerals (EYE-ZAKIYA PLUS LUTEIN PO)        NOVOLOG FLEXPEN 100 UNIT/ML soln Carb counting with meals approx 70-80 units daily 75 mL 3     Omega-3 Fatty Acids (OMEGA-3 FISH OIL PO) Take 1,000 mg by mouth daily       order for DME Equipment being ordered: Grade 1 (light) compression stockings, below the knee 1 Units 1     timolol (TIMOPTIC) 0.5 % ophthalmic solution Place 1 drop into both eyes 2 times daily        amoxicillin (AMOXIL) 500 MG capsule Take 4 capsules (2 grams) 1 hour before dental work. (Patient not taking: Reported on 5/11/2018) 8 capsule 3     guaiFENesin-codeine (ROBITUSSIN AC) 100-10 MG/5ML SOLN solution Take 5 mLs by mouth every 4 hours as needed for cough (Patient not taking: Reported on 10/31/2018) 236 mL 0       Allergies   Allergen Reactions     Nkda [No Known Drug Allergies]      Physical exam:  Vitals: There were no vitals taken for this visit.  GEN: This is a well developed, well-nourished female in no acute distress, in a pleasant mood.    SKIN: Focused examination of the bilateral hands was performed.  -mild xerosis on bilateral fingertips  -No other lesions of concern on areas examined.       Impression/Plan:  1. Suspected irritant dermatitis, trigger as yet not identified - resulting in fingertip fissures    Recommended Urea 40% cream    Continue triamcinolone 0.1% BID PRN on dry, red, or itchy areas    Continue current skin care regimen with Lubriderm, Aveno, triamcinolone, and Dove    Follow-up in 1 year, earlier for new or changing lesions.     Staff Involved:  Scribe/Staff    Scribe Disclosure  I, Dominic Najjar, am serving as a scribe to document services personally  performed by Dr. Mingo Swain MD, based on data collection and the provider's statements to me.      Staff attestation:  The documentation recorded by the scribe accurately reflects the services I personally performed and the decisions I personally made. I have made edits where needed.    Mingo Swain MD  Staff Dermatologist and Dermatopathologist  , Department of Dermatology

## 2018-11-13 NOTE — NURSING NOTE
Dermatology Rooming Note    Radha Baca's goals for this visit include:   Chief Complaint   Patient presents with     Derm Problem     Dionicio is here for a follow up with her hands.      Nichelle Yip LPN

## 2018-11-13 NOTE — PATIENT INSTRUCTIONS
[employer] Urea 40% Foot Cream 4 oz - Best Callus Remover - Moisturizes & Rehydrates Thick, Cracked, Rough, Dead & Dry Skin - For Feet, Elbows and Hands + Free Pumice Stone - 100% Money Back Guarantee   by [employer]    Continue triamcinolone as needed on dry, red, or itchy areas

## 2018-11-13 NOTE — MR AVS SNAPSHOT
After Visit Summary   11/13/2018    Radha Baca    MRN: [id number]           Patient Information     Date Of Birth          1955        Visit Information        Provider Department      11/13/2018 10:55 AM Mingo Swain MD [facility]        Today's Diagnoses     Dermatitis    -  1    Xerosis of skin          Care Instructions    [employer] Urea 40% Foot Cream 4 oz - Best Callus Remover - Moisturizes & Rehydrates Thick, Cracked, Rough, Dead & Dry Skin - For Feet, Elbows and Hands + Free Pumice Stone - 100% Money Back Guarantee   by [employer]    Continue triamcinolone as needed on dry, red, or itchy areas          Follow-ups after your visit        Your next 10 appointments already scheduled     Nov 20, 2018  3:20 PM CST   (Arrive by 3:05 PM)   New Weight Management Visit with Xi Easley MD   [facility] ([facility])    [address] [place] [id number] [phone]            Nov 28, 2018 10:00 AM CST   Annual Visit with EZEQUIEL Kennedy [facility]   [facility] ([facility])    [facility] [address]  [address] [facility] [address]  [address]  [facility] [place] [address]   [phone]            Feb 01, 2019  1:00 PM CST   (Arrive by 12:45 PM)   RETURN DIABETES with Marcella Maria PA-C   [facility] End[facility]in[facility] ([facility])    [address] [id number] [phone]            Apr 24, 2019  3:30 PM CDT   (Arrive by 3:15 PM)   RETURN DIABETES with Demar Hickey MD   [facility] ([facility])    [address] [place] [id number] [phone]              Who to contact     Please call your clinic at [phone] to:    Ask questions about your health    Make or cancel appointments    Discuss your medicines    Learn  about your test results    Speak to your doctor            Additional Information About Your Visit        [employer] Information     [employer] gives you secure access to your electronic health record. If you see a primary care provider, you can also send messages to your care team and make appointments. If you have questions, please call your primary care clinic.  If you do not have a primary care provider, please call [phone] and they will assist you.      [employer] is an electronic gateway that provides easy, online access to your medical records. With [employer], you can request a clinic appointment, read your test results, renew a prescription or communicate with your care team.     To access your existing account, please contact your [facility] or call [phone] for assistance.        Care EveryWhere ID     This is your Care EveryWhere ID. This could be used by other organizations to access your [facility] medical records  [id number]         Blood Pressure from Last 3 Encounters:   10/31/18 113/72   06/06/18 110/70   05/11/18 95/61    Weight from Last 3 Encounters:   10/31/18 113.9 kg (251 lb 3.2 oz)   05/11/18 109.4 kg (241 lb 3.2 oz)   05/02/18 111.7 kg (246 lb 5 oz)              Today, you had the following     No orders found for display         Today's Medication Changes          These changes are accurate as of 11/13/18 11:20 AM.  If you have any questions, ask your nurse or doctor.               Start taking these medicines.        Dose/Directions    triamcinolone 0.1 % ointment   Commonly known as:  KENALOG   Used for:  Dermatitis   Started by:  Mingo Swain MD        Apply topically 2 times daily   Quantity:  80 g   Refills:  11            Where to get your medicines      These medications were sent to [facility] [address] [place], [place] - [address] AT [facility] [address]  [address], [facility] [address]     Phone:  [phone]      triamcinolone 0.1 % ointment                Primary Care Provider Office Phone # Fax #    Mohan Moreno [phone]       [address] [address]        Equal Access to Services     YESY NAJERA : Richard kingsley margarita Ornelas, waaxda luqadaha, qaybta kaalmada adebrody, mindy alves laAdiliadianelys fam. So [facility] [phone].    ATENCIÓN: Si habla español, tiene a pitts disposición servicios gratuitos de asistencia lingüística. Llame al [phone].    We comply with applicable federal civil rights laws and [place] laws. We do not discriminate on the basis of race, color, national origin, age, disability, sex, sexual orientation, or gender identity.            Thank you!     Thank you for choosing [facility]  for your care. Our goal is always to provide you with excellent care. Hearing back from our patients is one way we can continue to improve our services. Please take a few minutes to complete the written survey that you may receive in the mail after your visit with us. Thank you!             Your Updated Medication List - Protect others around you: Learn how to safely use, store and throw away your medicines at www.disposemymeds.org.          This list is accurate as of 11/13/18 11:20 AM.  Always use your most recent med list.                   Brand Name Dispense Instructions for use Diagnosis    amoxicillin 500 MG capsule    AMOXIL    8 capsule    Take 4 capsules (2 grams) 1 hour before dental work.    S/P joint replacement       aspirin 81 MG tablet     90 tablet    1 tab daily    Type 2 diabetes mellitus (H)       atorvastatin 20 MG tablet    LIPITOR    90 tablet    Take 1 tablet (20 mg) by mouth daily    Dyslipidemia       TIAGO CONTOUR test strip   Generic drug:  blood glucose monitoring     300 each    Use to test blood sugar 3 times daily or as directed.    Type 2 diabetes mellitus with complication (H)       blood glucose calibration Normal  solution     1 Bottle    Use to calibrate blood glucose monitor as needed as directed.    DM type 2 (diabetes mellitus, type 2) (H)       blood glucose monitoring lancets     600 each    Test 4-6 times daily 90 day supply refills x 3    Type 2 diabetes mellitus with complication (H)       calcium carbonate 600 mg-vitamin D 400 units 600-400 MG-UNIT per tablet    CALTRATE     Take 1 tablet by mouth 2 times daily        cholecalciferol 1000 UNIT tablet    vitamin D3    100 tablet    Take 1 tablet by mouth daily.        dulaglutide 0.75 MG/0.5ML pen    TRULICITY    2 mL    Inject 0.75 mg Subcutaneous every 7 days    Type 2 diabetes mellitus with complication, with long-term current use of insulin (H)       EYE-ZAKIYA PLUS LUTEIN PO           guaiFENesin-codeine 100-10 MG/5ML Soln solution    ROBITUSSIN AC    236 mL    Take 5 mLs by mouth every 4 hours as needed for cough    Viral URI with cough       insulin glargine 100 UNIT/ML injection    LANTUS SOLOSTAR    70 mL    Inject 70 units SQ each am.    Type 2 diabetes mellitus with complication, with long-term current use of insulin (H)       insulin pen needle 31G X 8 MM    B-D U/F    400 each    Use  4 daily short 31 G X 8MM    Diabetes mellitus, type 2 (H)       latanoprost 0.005 % ophthalmic solution    XALATAN     Place 1 drop into both eyes At Bedtime        lisinopril-hydrochlorothiazide 20-12.5 MG per tablet    PRINZIDE/ZESTORETIC    90 tablet    Take 1 tablet by mouth daily    Essential hypertension       metFORMIN 500 MG tablet    GLUCOPHAGE    360 tablet    Take 2 tablets (1,000 mg) by mouth 2 times daily (with meals)    Diabetes mellitus, type 2 (H)       MULTIvitamin  S Caps     90 capsule    Take 1 daily    Type 2 diabetes mellitus (H)       NovoLOG FLEXPEN 100 UNIT/ML injection   Generic drug:  insulin aspart     75 mL    Carb counting with meals approx 70-80 units daily    Type 2 diabetes mellitus with complication (H)       OMEGA-3 FISH OIL PO      Take  1,000 mg by mouth daily        order for DME     1 Units    Equipment being ordered: Grade 1 (light) compression stockings, below the knee    Venous (peripheral) insufficiency       RESTASIS 0.05 % ophthalmic emulsion   Generic drug:  cycloSPORINE           timolol 0.5 % ophthalmic solution    TIMOPTIC     Place 1 drop into both eyes 2 times daily        triamcinolone 0.1 % ointment    KENALOG    80 g    Apply topically 2 times daily    Dermatitis       TYLENOL PO      Take by mouth as needed for mild pain or fever        vitamin  B Complex Caps      Take 1 tablet by mouth daily        VITAMIN C PO      Take 2,000 mg by mouth 2 times daily

## 2018-11-13 NOTE — PROGRESS NOTES
"[facility] Dermatology Note      Dermatology Problem List:  1. Fissures at the fingertips since 2017  2. Diabetes mellitus    Encounter Date: Nov 13, 2018    CC:  Chief Complaint   Patient presents with     Derm Problem     Dionicio is here for a follow up with her hands.        History of Present Illness:  Ms. Radha Baca is a 63 year old female who presents as a follow-up for fissures at the fingertips. The patient was last seen 8/7/2018 when she started triamcinolone 0.1% ointment BID for two weeks and dry skin care was discussed. Today, she reports that her hands got better but have been getting dry recently. She has been using Lubriderm and Aveno moisturizers with relief. She has switched to dove and she reports that if she uses any stronger soaps, she wears gloves. The triamcinolone provided relief and she has started using it again as her hands have gotten drier in the cold air, but she is concerned about using the triamcinolone for more than 14 days. She wears gloves when she goes to bed. She denies any itching. She also voices concern about wrinkles on her hands that \"look like I just got out of the shower.\" The patient voices no other concerns.     Past Medical History:   Patient Active Problem List   Diagnosis     Dyslipidemia     BMI >40     SNHL (sensorineural hearing loss)     Glaucoma     Umbilical hernia -- w/o symptoms->expectant mgt     Encounter for routine gynecological examination     Menopause present -- age 39     Health care maintenance     Type 2 diabetes mellitus with complication (H)     Essential hypertension     Venous (peripheral) insufficiency     Chronic bilateral low back pain with right-sided sciatica     Other secondary osteoarthritis of first carpometacarpal joint of right hand     Past Medical History:   Diagnosis Date     Arthritis      Arthritis of knee 4/4/2013     Arthritis of shoulder region, right 4/18/2014     Arthritis of shoulder region, right " 4/18/2014     Back injury      Depressive disorder      Diabetes (H)      Finger pain 4/2/2015     Headache(784.0)     decreased with mouth guard use.     Hypercholesteremia      Hypertension      Low back pain      Menarche age 9+    cycles q mo x 4-5 d     Neck injuries      Pain in knee joint     LEFT     Right bundle branch block     per H/P     SNHL (sensorineural hearing loss)      Umbilical hernia without mention of obstruction or gangrene 8/2014     Past Surgical History:   Procedure Laterality Date     ARTHROPLASTY KNEE  4/4/2013    Left Total Knee Arthroplasty;  Surgeon: Lou Byrne MD;  [facility]     ARTHROPLASTY MINIMALLY INVASIVE KNEE  8/22/2011    R knee :CAITLYN JACOBO, Left age 14     DENTAL SURGERY      root canals, wisdom teeth     EXAM UNDER ANESTHESIA, MANIPULATE JOINT (LOCATION)  10/5/2012    Procedure: EXAM UNDER ANESTHESIA, MANIPULATE JOINT (LOCATION);  Right Knee Mini Open Lysis Of Adhesions, Left Knee Steroid Injection  ;  Surgeon: Lou Byrne MD;  Location: [facility]     [facility] OR OCULAR DEVICE INTRAOP DETACHED RETINA  2009    R     [facility] PHAKIC IOL - IMPLANT FROM SURGEON      right     KNEE SURGERY  1969    left     LASER YAG CAPSULOTOMY  12/2016    left     VITRECTOMY PARSPLANA  2010       Social History:  Patient  reports that she has quit smoking. She has never used smokeless tobacco. She reports that she does not drink alcohol or use illicit drugs.    Family History:  Family History   Problem Relation Age of Onset     Diabetes Father 55     DM II     Diabetes Sister 45     DM II     Diabetes Brother 50     xs 2     Hypertension Sister 41     also B and M     Cancer Maternal Aunt      multiple myeloma     Thyroid Disease Sister 45     graves     Hypertension Brother      ? age     Hypertension Mother 79     Diabetes Brother      Hypertension Brother      Diabetes Brother      Hypertension Brother      Breast Cancer Cousin      Thyroid Disease Sister       Cerebrovascular Disease No family hx of      Cancer - colorectal No family hx of      Prostate Cancer No family hx of      Alcohol/Drug No family hx of      Melanoma No family hx of      Skin Cancer No family hx of        Medications:  Current Outpatient Prescriptions   Medication Sig Dispense Refill     Acetaminophen (TYLENOL PO) Take by mouth as needed for mild pain or fever       Ascorbic Acid (VITAMIN C PO) Take 2,000 mg by mouth 2 times daily        aspirin 81 MG tablet 1 tab daily 90 tablet 3     atorvastatin (LIPITOR) 20 MG tablet Take 1 tablet (20 mg) by mouth daily 90 tablet 3     B Complex Vitamins (VITAMIN  B COMPLEX) CAPS Take 1 tablet by mouth daily        TIAGO CONTOUR test strip Use to test blood sugar 3 times daily or as directed. 300 each 3     blood glucose calibration ([employer] CONTOUR) Normal solution Use to calibrate blood glucose monitor as needed as directed. 1 Bottle 11     blood glucose monitoring ([employer] MICROLET) lancets Test 4-6 times daily 90 day supply refills x 3 600 each 3     calcium-vitamin D (CALTRATE) 600-400 MG-UNIT per tablet Take 1 tablet by mouth 2 times daily       cholecalciferol (VITAMIN D) 1000 UNIT tablet Take 1 tablet by mouth daily. 100 tablet 3     cycloSPORINE (RESTASIS) 0.05 % ophthalmic emulsion        dulaglutide (TRULICITY) 0.75 MG/0.5ML pen Inject 0.75 mg Subcutaneous every 7 days 2 mL 1     insulin glargine (LANTUS SOLOSTAR) 100 UNIT/ML injection Inject 70 units SQ each am. 70 mL 3     insulin pen needle (B-D U/F) 31G X 8 MM Use  4 daily short 31 G X 8MM 400 each 3     latanoprost (XALATAN) 0.005 % ophthalmic solution Place 1 drop into both eyes At Bedtime       lisinopril-hydrochlorothiazide (PRINZIDE/ZESTORETIC) 20-12.5 MG per tablet Take 1 tablet by mouth daily 90 tablet 3     metFORMIN (GLUCOPHAGE) 500 MG tablet Take 2 tablets (1,000 mg) by mouth 2 times daily (with meals) 360 tablet 3     Multiple Vitamin (MULTIVITAMIN  S) CAPS Take 1 daily 90 capsule 3      Multiple Vitamins-Minerals (EYE-ZAKIYA PLUS LUTEIN PO)        NOVOLOG FLEXPEN 100 UNIT/ML soln Carb counting with meals approx 70-80 units daily 75 mL 3     Omega-3 Fatty Acids (OMEGA-3 FISH OIL PO) Take 1,000 mg by mouth daily       order for DME Equipment being ordered: Grade 1 (light) compression stockings, below the knee 1 Units 1     timolol (TIMOPTIC) 0.5 % ophthalmic solution Place 1 drop into both eyes 2 times daily        amoxicillin (AMOXIL) 500 MG capsule Take 4 capsules (2 grams) 1 hour before dental work. (Patient not taking: Reported on 5/11/2018) 8 capsule 3     guaiFENesin-codeine (ROBITUSSIN AC) 100-10 MG/5ML SOLN solution Take 5 mLs by mouth every 4 hours as needed for cough (Patient not taking: Reported on 10/31/2018) 236 mL 0       Allergies   Allergen Reactions     Nkda [No Known Drug Allergies]        Review of Systems:  -Constitutional: The patient denies fatigue, fevers, chills, unintended weight loss, and night sweats.  -HEENT: Patient denies nonhealing oral sores.  -Skin: As above in HPI. No additional skin concerns.    Physical exam:  Vitals: There were no vitals taken for this visit.  GEN: This is a well developed, well-nourished female in no acute distress, in a pleasant mood.    SKIN: Focused examination of the bilateral hands was performed.  -mild xerosis on bilateral fingertips  -No other lesions of concern on areas examined.       Impression/Plan:  1. Suspected irritant dermatitis, trigger as yet not identified - resulting in fingertip fissures    Recommended Urea 40% cream    Continue triamcinolone 0.1% BID PRN on dry, red, or itchy areas    Continue current skin care regimen with Lubriderm, Aveno, triamcinolone, and Dove    Follow-up in 1 year, earlier for new or changing lesions.     Staff Involved:  Scribe/Staff    Scribe Disclosure  I, Dominic Najjar, am serving as a scribe to document services personally performed by Dr. Mingo Swain MD, based on data collection and the  provider's statements to me.      Staff attestation:  The documentation recorded by the scribe accurately reflects the services I personally performed and the decisions I personally made. I have made edits where needed.    Mingo Swain MD  Staff Dermatologist and Dermatopathologist  , Department of Dermatology

## 2018-11-16 ENCOUNTER — OFFICE VISIT (OUTPATIENT)
Dept: INTERNAL MEDICINE | Facility: CLINIC | Age: 63
End: 2018-11-16
Attending: INTERNAL MEDICINE
Payer: COMMERCIAL

## 2018-11-16 ENCOUNTER — TELEPHONE (OUTPATIENT)
Dept: OBGYN | Facility: CLINIC | Age: 63
End: 2018-11-16

## 2018-11-16 VITALS
BODY MASS INDEX: 43.02 KG/M2 | WEIGHT: 252 LBS | SYSTOLIC BLOOD PRESSURE: 107 MMHG | HEIGHT: 64 IN | DIASTOLIC BLOOD PRESSURE: 67 MMHG | HEART RATE: 92 BPM

## 2018-11-16 DIAGNOSIS — N61.0 MASTITIS, LEFT, ACUTE: ICD-10-CM

## 2018-11-16 DIAGNOSIS — N64.4 NIPPLE PAIN: Primary | ICD-10-CM

## 2018-11-16 DIAGNOSIS — Z12.31 ENCOUNTER FOR SCREENING MAMMOGRAM FOR BREAST CANCER: ICD-10-CM

## 2018-11-16 PROCEDURE — G0463 HOSPITAL OUTPT CLINIC VISIT: HCPCS | Mod: ZF

## 2018-11-16 RX ORDER — DICLOXACILLIN SODIUM 500 MG
500 CAPSULE ORAL 4 TIMES DAILY
Qty: 28 CAPSULE | Refills: 0 | Status: SHIPPED | OUTPATIENT
Start: 2018-11-16 | End: 2018-11-23

## 2018-11-16 NOTE — MR AVS SNAPSHOT
After Visit Summary   11/16/2018    Radha Baca    MRN: [id number]           Patient Information     Date Of Birth          1955        Visit Information        Provider Department      11/16/2018 12:45 PM Va Lopez MD [facility]         Today's Diagnoses     Nipple pain    -  1    Encounter for screening mammogram for breast cancer        Mastitis, left, acute           Follow-ups after your visit        Your next 10 appointments already scheduled     Nov 28, 2018 10:00 AM CST   Annual Visit with EZEQUIEL [facility]   [facility] ([facility])    [facility] [address]  [address]  [address]  [facility] [id number] [phone]            Feb 01, 2019  1:00 PM CST   (Arrive by 12:45 PM)   RETURN DIABETES with Marcella Maria PA-C   [facility] ([facility])    [address] [place] [address]  [address]rd [facility] [id number] [phone]            Feb 26, 2019  2:00 PM CST   (Arrive by 1:45 PM)   New Patient Visit with Mohan Rosenberg MD   [facility] ([facility])    [address] [place] [address]  [address] [facility] [id number] [phone]            Mar 22, 2019 10:40 AM CDT   (Arrive by 10:25 AM)   Return Weight Management Visit with Xi Easley MD   [facility] ([facility])    [address] [place] [address]  [address] [facility] [id number] [phone]            Apr 24, 2019  3:30 PM CDT   (Arrive by 3:15 PM)   RETURN DIABETES with Demar Hickey MD   [facility] ([facility])    [address] [place] [address]  3[address] [facility] [id number] [phone]              Who to contact     Please call your clinic at [phone] to:    Ask questions about your health    Make or cancel  "appointments    Discuss your medicines    Learn about your test results    Speak to your doctor            Additional Information About Your Visit        [employer] Relevant Information     [employer] gives you secure access to your electronic health record. If you see a primary care provider, you can also send messages to your care team and make appointments. If you have questions, please call your primary care clinic.  If you do not have a primary care provider, please call [phone] and they will assist you.      [employer] is an electronic gateway that provides easy, online access to your medical records. With [employer], you can request a clinic appointment, read your test results, renew a prescription or communicate with your care team.     To access your existing account, please contact your [facility] or call [phone] for assistance.        Care EveryWhere ID     This is your Care EveryWhere ID. This could be used by other organizations to access your [employer] medical records  [id number]        Your Vitals Were     Pulse Height BMI (Body Mass Index)             92 1.625 m (5' 3.98\") 43.29 kg/m2          Blood Pressure from Last 3 Encounters:   11/20/18 142/64   11/16/18 107/67   10/31/18 113/72    Weight from Last 3 Encounters:   11/20/18 115.8 kg (255 lb 6.4 oz)   11/16/18 114.3 kg (252 lb)   10/31/18 113.9 kg (251 lb 3.2 oz)                 Today's Medication Changes          These changes are accurate as of 11/16/18 11:59 PM.  If you have any questions, ask your nurse or doctor.               Start taking these medicines.        Dose/Directions    dicloxacillin 500 MG capsule   Commonly known as:  DYNAPEN   Used for:  Nipple pain, Mastitis, left, acute   Started by:  Va Lopez MD        Dose:  500 mg   Take 1 capsule (500 mg) by mouth 4 times daily for 7 days   Quantity:  28 capsule   Refills:  0            Where to get your medicines      These medications were sent " to [id number] - [facility] [address] AT [facility] [address]  [address], [facility] [address]     Phone:  [phone]     dicloxacillin 500 MG capsule                Primary Care Provider Office Phone # Fax #    Mohan Moreno [phone]       [address]   [facility] [address]        Equal Access to Services     YESY NAJERA : Hadii aad ku hadasho Soomaali, waaxda luqadaha, qaybta kaalmada adeegyada, waxay idiin hayaan adeeg kharahilario lajonel fam. So [facility] [phone].    ATENCIÓN: Si domenicala avelina, tiene a pitts disposición servicios gratuitos de asistencia lingüística. [facility] [phone].    We comply with applicable federal civil rights laws and [place] laws. We do not discriminate on the basis of race, color, national origin, age, disability, sex, sexual orientation, or gender identity.            Thank you!     Thank you for choosing [facility]   for your care. Our goal is always to provide you with excellent care. Hearing back from our patients is one way we can continue to improve our services. Please take a few minutes to complete the written survey that you may receive in the mail after your visit with us. Thank you!             Your Updated Medication List - Protect others around you: Learn how to safely use, store and throw away your medicines at www.disposemymeds.org.          This list is accurate as of 11/16/18 11:59 PM.  Always use your most recent med list.                   Brand Name Dispense Instructions for use Diagnosis    amoxicillin 500 MG capsule    AMOXIL    8 capsule    Take 4 capsules (2 grams) 1 hour before dental work.    S/P joint replacement       aspirin 81 MG tablet     90 tablet    1 tab daily    Type 2 diabetes mellitus (H)       atorvastatin 20 MG tablet    LIPITOR    90 tablet    Take 1 tablet (20 mg) by mouth daily    Dyslipidemia       TIAGO CONTOUR test strip   Generic drug:   blood glucose monitoring     300 each    Use to test blood sugar 3 times daily or as directed.    Type 2 diabetes mellitus with complication (H)       blood glucose calibration solution     1 Bottle    Use to calibrate blood glucose monitor as needed as directed.    DM type 2 (diabetes mellitus, type 2) (H)       blood glucose monitoring lancets     600 each    Test 4-6 times daily 90 day supply refills x 3    Type 2 diabetes mellitus with complication (H)       calcium carbonate 600 mg-vitamin D 400 units 600-400 MG-UNIT per tablet    CALTRATE     Take 1 tablet by mouth 2 times daily        cholecalciferol 1000 UNIT tablet    vitamin D3    100 tablet    Take 1 tablet by mouth daily.        dicloxacillin 500 MG capsule    DYNAPEN    28 capsule    Take 1 capsule (500 mg) by mouth 4 times daily for 7 days    Nipple pain, Mastitis, left, acute       dulaglutide 0.75 MG/0.5ML pen    TRULICITY    2 mL    Inject 0.75 mg Subcutaneous every 7 days    Type 2 diabetes mellitus with complication, with long-term current use of insulin (H)       EYE-ZAKIYA PLUS LUTEIN PO           insulin glargine 100 UNIT/ML injection    LANTUS SOLOSTAR    70 mL    Inject 70 units SQ each am.    Type 2 diabetes mellitus with complication, with long-term current use of insulin (H)       insulin pen needle 31G X 8 MM miscellaneous    B-D U/F    400 each    Use  4 daily short 31 G X 8MM    Diabetes mellitus, type 2 (H)       latanoprost 0.005 % ophthalmic solution    XALATAN     Place 1 drop into both eyes At Bedtime        lisinopril-hydrochlorothiazide 20-12.5 MG per tablet    PRINZIDE/ZESTORETIC    90 tablet    Take 1 tablet by mouth daily    Essential hypertension       metFORMIN 500 MG tablet    GLUCOPHAGE    360 tablet    Take 2 tablets (1,000 mg) by mouth 2 times daily (with meals)    Diabetes mellitus, type 2 (H)       MULTIvitamin  S capsule     90 capsule    Take 1 daily    Type 2 diabetes mellitus (H)       NovoLOG FLEXPEN 100 UNIT/ML  injection   Generic drug:  insulin aspart     75 mL    Carb counting with meals approx 70-80 units daily    Type 2 diabetes mellitus with complication (H)       OMEGA-3 FISH OIL PO      Take 1,000 mg by mouth daily        order for DME     1 Units    Equipment being ordered: Grade 1 (light) compression stockings, below the knee    Venous (peripheral) insufficiency       RESTASIS 0.05 % ophthalmic emulsion   Generic drug:  cycloSPORINE           timolol 0.5 % ophthalmic solution    TIMOPTIC     Place 1 drop into both eyes 2 times daily        triamcinolone 0.1 % ointment    KENALOG    80 g    Apply topically 2 times daily    Dermatitis       TYLENOL PO      Take by mouth as needed for mild pain or fever        vitamin  B Complex Caps      Take 1 tablet by mouth daily        VITAMIN C PO      Take 2,000 mg by mouth 2 times daily
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
## 2018-11-16 NOTE — TELEPHONE ENCOUNTER
Received message on triage voicemail from [facility] stating that for the past week she has been having a 'patch' on her left areola that is red, warm to the touch, sensitive, there is a hard lump in area as well. The pain started to radiate up breast today. She recently plucked hair from that area and is unsure if this is related.    Tried to reach [facility] but received personal voicemail.  Left message stating that she should be seen in clinic sooner rather than later. Can call our scheduling line to check availability today or can see primary care provider. If neither of these options work, can go to urgent care.

## 2018-11-19 ENCOUNTER — RADIANT APPOINTMENT (OUTPATIENT)
Dept: MAMMOGRAPHY | Facility: CLINIC | Age: 63
End: 2018-11-19
Attending: INTERNAL MEDICINE
Payer: COMMERCIAL

## 2018-11-19 DIAGNOSIS — N64.4 NIPPLE PAIN: ICD-10-CM

## 2018-11-20 ENCOUNTER — OFFICE VISIT (OUTPATIENT)
Dept: ENDOCRINOLOGY | Facility: CLINIC | Age: 63
End: 2018-11-20
Attending: INTERNAL MEDICINE
Payer: COMMERCIAL

## 2018-11-20 ENCOUNTER — TELEPHONE (OUTPATIENT)
Dept: ENDOCRINOLOGY | Facility: CLINIC | Age: 63
End: 2018-11-20

## 2018-11-20 VITALS
HEIGHT: 64 IN | WEIGHT: 255.4 LBS | DIASTOLIC BLOOD PRESSURE: 64 MMHG | HEART RATE: 81 BPM | OXYGEN SATURATION: 98 % | BODY MASS INDEX: 43.6 KG/M2 | SYSTOLIC BLOOD PRESSURE: 142 MMHG

## 2018-11-20 DIAGNOSIS — K62.5 BRBPR (BRIGHT RED BLOOD PER RECTUM): ICD-10-CM

## 2018-11-20 DIAGNOSIS — R19.7 DIARRHEA, UNSPECIFIED TYPE: ICD-10-CM

## 2018-11-20 DIAGNOSIS — K59.00 CONSTIPATION, UNSPECIFIED CONSTIPATION TYPE: ICD-10-CM

## 2018-11-20 DIAGNOSIS — E66.01 MORBID OBESITY (H): Primary | ICD-10-CM

## 2018-11-20 ASSESSMENT — PAIN SCALES - GENERAL: PAINLEVEL: NO PAIN (0)

## 2018-11-20 NOTE — PROGRESS NOTES
"    New Medical Weight Management Consult    PATIENT:  Radha Baca  MRN:         [id number]  :         1955  JAZMINE:         2018    Dear Mohan Moreno,    I had the pleasure of seeing your patient, Radha Baca. Full intake/assessment done to determine barriers to weight loss success and develop a treatment plan.  Radha Baca is a 63 year old female interested in treatment of medical problems associated with weight. Her weight today is 255 lbs 6.4 oz, Body mass index is 43.87 kg/(m^2)., and she has the following co-morbidities:     2018   I have the following co-morbidities associated with obesity: Type II Diabetes, High Blood Pressure, High Cholesterol, Weight Bearing Joint Pain       Patient Goals Reviewed With Patient 2018   I am interested in attaining a healthier weight to diminish current health problems related to co-morbid conditions: Yes   I am interested in attaining a healthier weight in order to prevent future health problems: Yes       Referring Provider 2018   Please name the provider who referred you to Medical Weight Management.  If you do not know, please answer: \"I Don't Know\". dr balderrama    diabetes doc       Wt Readings from Last 4 Encounters:   18 115.8 kg (255 lb 6.4 oz)   18 114.3 kg (252 lb)   10/31/18 113.9 kg (251 lb 3.2 oz)   18 109.4 kg (241 lb 3.2 oz)       Weight History Reviewed With Patient 2018   How concerned are you about your weight? Somewhat Concerned   Would you describe your weight gain as gradual? Yes   I became overweight: As a Child   The following factors have contributed to my weight gain:  Starting on Medication (Steroids), A Health Crisis/Stress, Eating Wrong Types of Food, Eating Too Much, Genetic (Runs in the Family)   I have tried the following methods to lose weight: Watching Portions or Calories, Exercise, Weight Watchers   I have the following family history of obesity/being " overweight:  Many of my relatives are overweight   Has anyone in your family had weight loss surgery? No       No flowsheet data found.    Eating Habits Reviewed With Patient 11/20/2018   Generally, my meals include foods like these: bread, pasta, rice, potatoes, corn, crackers, sweet dessert, pop, or juice. A Few Times a Week   Generally, my meals include foods like these: fried meats, brats, burgers, french fries, pizza, cheese, chips, or ice cream. A Few Times a Week   Eat fast food (like [employer], [employer], [employer]). A Few Times a Week   Eat at a buffet or sit-down restaurant. A Few Times a Week   Eat most of my meals in front of the TV or computer. A Few Times a Week   Often skip meals, eat at random times, have no regular eating times. A Few Times a Week   Rarely sit down for a meal but snack or graze throughout.  Half of the Week   Eat extra snacks between meals. Half of the Week   Eat most of my food at the end of the day. A Few Times a Week   Eat in the middle of the night or wake up at night to eat. Never   Eat extra snacks to prevent or correct low blood sugar. Everyday   Eat to prevent acid reflux or stomach pain. Never   Worry about not having enough food to eat. Never   Have you been to the food shelf at least a few times this year? No   I eat when I am depressed, stressed, anxious, or bored. Everyday   I eat when I am happy or as a reward. Everyday   I feel hungry all the time even if I just have eaten. Everyday   Feeling full is important to me. Everyday   Once I start eating, it is hard to stop. A Few Times a Week   I finish all the food on my plate even if I am already full. Half of the Week   I can't resist eating delicious food or walk past the good food/smell. Almost Everyday   I eat/snack without noticing that I am eating. Almost Everyday   I eat when I am preparing the meal. A Few Times a Week   I eat more than usual when I see others eating. A Few Times a Week   I think about food all  day. Never   What foods, if any, do you crave? Chips/Crackers   Please list any other foods you crave? only bonilla covered almonds   I feel out of control when eating. Monthly   I eat a large amount of food, like a loaf of bread, a box of cookies, a pint/quart of ice cream, all at once. Never   I eat a large amount of food even when I am not hungry. Monthly   I eat rapidly. Never   I eat alone because I feel embarrassed and do not want others to see how much I have eaten. Never   I eat until I am uncomfortably full. Monthly   I feel bad, disgusted, or guilty after I overeat. Monthly   I make myself vomit what I have eaten or use laxatives to get rid of food. Never       No flowsheet data found.    PAST MEDICAL HISTORY:  Past Medical History:   Diagnosis Date     Arthritis      Arthritis of knee 4/4/2013     Arthritis of shoulder region, right 4/18/2014     Arthritis of shoulder region, right 4/18/2014     Back injury      Depressive disorder      Diabetes (H)      Finger pain 4/2/2015     Headache(784.0)     decreased with mouth guard use.     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 Less Than 50%   What is your marital status? /In a Relationship   If in a relationship, is your significant other overweight? Yes   Do you have children? No   If you have children, are they overweight? N/A       Mental Health History Reviewed With Patient 11/20/2018   Have you ever been physically or sexually abused? No   How often in the past 2 weeks have you felt little interest or pleasure in  doing things? Nearly Everyday   Over the past 2 weeks how often have you felt down, depressed, or hopeless? Nearly Everyday       No flowsheet data found.    MEDICATIONS:   Current Outpatient Prescriptions   Medication Sig Dispense Refill     Acetaminophen (TYLENOL PO) Take by mouth as needed for mild pain or fever       amoxicillin (AMOXIL) 500 MG capsule Take 4 capsules (2 grams) 1 hour before dental work. (Patient not taking: Reported on 5/11/2018) 8 capsule 3     Ascorbic Acid (VITAMIN C PO) Take 2,000 mg by mouth 2 times daily        aspirin 81 MG tablet 1 tab daily 90 tablet 3     atorvastatin (LIPITOR) 20 MG tablet Take 1 tablet (20 mg) by mouth daily 90 tablet 3     B Complex Vitamins (VITAMIN  B COMPLEX) CAPS Take 1 tablet by mouth daily        TIAGO CONTOUR test strip Use to test blood sugar 3 times daily or as directed. 300 each 3     blood glucose calibration (TIAGO CONTOUR) Normal solution Use to calibrate blood glucose monitor as needed as directed. 1 Bottle 11     blood glucose monitoring (TIAGO MICROLET) lancets Test 4-6 times daily 90 day supply refills x 3 600 each 3     calcium-vitamin D (CALTRATE) 600-400 MG-UNIT per tablet Take 1 tablet by mouth 2 times daily       cholecalciferol (VITAMIN D) 1000 UNIT tablet Take 1 tablet by mouth daily. 100 tablet 3     cycloSPORINE (RESTASIS) 0.05 % ophthalmic emulsion        dicloxacillin (DYNAPEN) 500 MG capsule Take 1 capsule (500 mg) by mouth 4 times daily for 7 days 28 capsule 0     dulaglutide (TRULICITY) 0.75 MG/0.5ML pen Inject 0.75 mg Subcutaneous every 7 days 2 mL 1     insulin glargine (LANTUS SOLOSTAR) 100 UNIT/ML injection Inject 70 units SQ each am. 70 mL 3     insulin pen needle (B-D U/F) 31G X 8 MM Use  4 daily short 31 G X 8MM 400 each 3     latanoprost (XALATAN) 0.005 % ophthalmic solution Place 1 drop into both eyes At Bedtime       lisinopril-hydrochlorothiazide (PRINZIDE/ZESTORETIC) 20-12.5 MG per tablet Take 1 tablet by mouth daily 90  "tablet 3     metFORMIN (GLUCOPHAGE) 500 MG tablet Take 2 tablets (1,000 mg) by mouth 2 times daily (with meals) 360 tablet 3     Multiple Vitamin (MULTIVITAMIN  S) CAPS Take 1 daily 90 capsule 3     Multiple Vitamins-Minerals (EYE-ZAKIYA PLUS LUTEIN PO)        NOVOLOG FLEXPEN 100 UNIT/ML soln Carb counting with meals approx 70-80 units daily 75 mL 3     Omega-3 Fatty Acids (OMEGA-3 FISH OIL PO) Take 1,000 mg by mouth daily       order for DME Equipment being ordered: Grade 1 (light) compression stockings, below the knee 1 Units 1     timolol (TIMOPTIC) 0.5 % ophthalmic solution Place 1 drop into both eyes 2 times daily        triamcinolone (KENALOG) 0.1 % ointment Apply topically 2 times daily 80 g 11       ALLERGIES:   Allergies   Allergen Reactions     Nkda [No Known Drug Allergies]        PHYSICAL EXAM:  /64 (BP Location: Left arm, Patient Position: Chair, Cuff Size: Adult Large)  Pulse 81  Ht 1.625 m (5' 3.98\")  Wt 115.8 kg (255 lb 6.4 oz)  SpO2 98%  BMI 43.87 kg/m2   A & O x 3  HEENT: NCAT, mucous membranes moist  Respirations unlabored  Location of obesity: central    ASSESSMENT:  Radha is a patient with early onset morbid obesity with significant element of familial/genetic influence and with current health consequences. She does need aggressive weight loss plan due to BMI>40 and serious co-morbid conditions, including uncontrolled type 2 diabetes. Radha Baca endorses binging, eats a high carb diet, eats a high fat diet, eats fast food once or more per week, eats most meals in front of TV, mostly eats during the evening, has a disorganized meal pattern and eats to prevent low blood sugars (even though A1c is 10%?).    Her problem is complicated by an endocrine disorder, gender and short stature, poor lifestyle choices and perceptions of low blood sugars    Her ability to lose weight is impacted by lack of education on nutrition and dietary needs and above.    (Note, Questionnaire was not " entirely completed due to technical issues. The patient was not able to do it in [facility] in advance due to problems within [employer])    Patient is extremely stressed out and overwhelmed and frustrated to tears taking care of her mother who has memory and vision issues. There are also legal issues in her family. She is the only family member taking care of her mother and does not have any time or energy left for self care. She has chaotic eating habits as a result of all of this and her diabetes is poorly controlled (I recommended formal neuropsychiatric testing for her mother to assist with appropriate placement).    PLAN:    Eat breakfast daily  Decrease portion sizes  Decrease eating out  No meals in front of TV screen  Purge house of food triggers  No meal skipping  Keep food diary  Dietician visit of education  Volumetrics eating plan  Calorie/low fat diet  Meal planning  Increase activity  Seek structured exercise program    Diabetes   Ancillary testing:  N/A. Frequent home glucose monitoring with report to nurse as normal weight decreases.   Food Plan:  Low carbohydrate and Meal replacements: shakes and/or frozen dinners.  Activity Plan:  [facility].  Supplementary:  Continue therapy.  Medication:  TBD, has not yet started GLP-1 agonist and she does not want to take any more medications at this time.    Watch for low blood sugars as you reduce your carbohydrate intake and as you lose weight, your insulin doses will need to be reduced    Consider naltrexone 50 mg daily, take 1-2 hours before worst cravings    Start taking the trulicity that Dr. Hickey prescribed as it can help with both blood sugars and can support weight loss    1,200 calorie meal plan to lose 1 lbs weekly without exercise based on REE calculated of 1,676    Eat breakfast daily  Decrease portion sizes  Decrease eating out  No meals in front of TV screen  Purge house of food triggers  No meal  skipping  Dietician visit for education  Decrease caloric beverages    Use meal replacements such as Manisha's meals, Lean Cuisines, Healthy Choice, Smart Ones, Weight Watchers Meals, and Slim Fast and Glucerna shakes and supplement with fresh fruits and vegetables  Please drink a lot of water daily. Most people typically need about 2 liters of water daily and more if they are exercising, have a large weight, or have a fever or illness. Add Crystal Light for flavoring if desired. But no pop with calories in it.  Please keep a food journal of what you eat, calories in what you eat, and mood and bring the journal with you to your next appointment.  Consider using applications for smart phones such as [employer], mYwindow, [employer], LoseIt, Tap&Track, and RelaxM.  Focus on wet volumetrics, meaning, eat more foods that are high in water and fiber such as fruits and vegetables in order to get that full feeling. These are also good for your overall health as well.  Check out Dr. Harper Nguyễn from [facility] - she has cookbooks with low calorie volumetric recipes  You can try Let's Dish to help you prepare meals for you and your family. Often times, the caloric and nutrition data and serving sizes are available for this food. This can be a time saving maneuver. The website can give you more information http://www.Eayun/  [employer] has Let's Dish & fresh low calorie salads  Check out Hello Fresh at https://www.Gentis.CloudCrowd/food-boxes/classic-box/  Try Cooking Light recipes for low calorie meal preparation and planning  Other food plan options you can search for on the internet and check out include: María LOPEZ, [facility]  Please continue psychologist to discuss how mood, depression and/or anxiety impact your eating.  Continue pool therapy as you are doing.    Referred to GI for BRBPR, diarrhea, constipation  Referred to Bariatric Weight Loss group meetings    RTC:    16 weeks with me, 4-8 weeks with  ROSELYN Lindsay, dietician    TIME: 60 min spent on evaluation, management, counseling, education, & motivational interviewing with greater than 50% of the total time was spent on counseling and coordinating care    Sincerely,    FELIX ELLIS MD, MPH  Endocrinologist

## 2018-11-20 NOTE — LETTER
Patient:  Radha Baca  :   1955  MRN:     [id number]        Ms.Florence HARITHA Baca   [facility] [id number]        2018    To whom it may concern.     This is to inform that my patient Radha Baca has uncontrolled diabetes mellitus and needs insulin, testing supplies, insulin pens and glucose tablets with her at all times in order to manage her blood sugars and prevent serious events.     Please call with any concerns or questions.     Regards,   Demar Hickey MD  [id number]  Endocrinology Service    Phone : [phone]

## 2018-11-20 NOTE — LETTER
"2018       RE: Radha Baca  [address] [facility] [address]     Dear Colleague,    Thank you for referring your patient, Radha Baca, to the [facility] MEDICAL WEIGHT MANAGEMENT at [facility]. Please see a copy of my visit note below.        New Medical Weight Management Consult    PATIENT:  Radha Baca  MRN:         [id number]  :         1955  JAZMINE:         2018    Dear Mohan Moreno,    I had the pleasure of seeing your patient, Radha Baca. Full intake/assessment done to determine barriers to weight loss success and develop a treatment plan.  Radha Baca is a 63 year old female interested in treatment of medical problems associated with weight. Her weight today is 255 lbs 6.4 oz, Body mass index is 43.87 kg/(m^2)., and she has the following co-morbidities:     2018   I have the following co-morbidities associated with obesity: Type II Diabetes, High Blood Pressure, High Cholesterol, Weight Bearing Joint Pain       Patient Goals Reviewed With Patient 2018   I am interested in attaining a healthier weight to diminish current health problems related to co-morbid conditions: Yes   I am interested in attaining a healthier weight in order to prevent future health problems: Yes       Referring Provider 2018   Please name the provider who referred you to Medical Weight Management.  If you do not know, please answer: \"I Don't Know\". dr balderrama    diabetes doc       Wt Readings from Last 4 Encounters:   18 115.8 kg (255 lb 6.4 oz)   18 114.3 kg (252 lb)   10/31/18 113.9 kg (251 lb 3.2 oz)   18 109.4 kg (241 lb 3.2 oz)       Weight History Reviewed With Patient 2018   How concerned are you about your weight? Somewhat Concerned   Would you describe your weight gain as gradual? Yes   I became overweight: As a Child   The following factors have contributed to my weight " gain:  Starting on Medication (Steroids), A Health Crisis/Stress, Eating Wrong Types of Food, Eating Too Much, Genetic (Runs in the Family)   I have tried the following methods to lose weight: Watching Portions or Calories, Exercise, Weight Watchers   I have the following family history of obesity/being overweight:  Many of my relatives are overweight   Has anyone in your family had weight loss surgery? No       No flowsheet data found.    Eating Habits Reviewed With Patient 11/20/2018   Generally, my meals include foods like these: bread, pasta, rice, potatoes, corn, crackers, sweet dessert, pop, or juice. A Few Times a Week   Generally, my meals include foods like these: fried meats, brats, burgers, french fries, pizza, cheese, chips, or ice cream. A Few Times a Week   Eat fast food (like McDonalds, [employer] Arik, Aerom[employer] Bell). A Few Times a Week   Eat at a buffet or sit-down restaurant. A Few Times a Week   Eat most of my meals in front of the TV or computer. A Few Times a Week   Often skip meals, eat at random times, have no regular eating times. A Few Times a Week   Rarely sit down for a meal but snack or graze throughout.  Half of the Week   Eat extra snacks between meals. Half of the Week   Eat most of my food at the end of the day. A Few Times a Week   Eat in the middle of the night or wake up at night to eat. Never   Eat extra snacks to prevent or correct low blood sugar. Everyday   Eat to prevent acid reflux or stomach pain. Never   Worry about not having enough food to eat. Never   Have you been to the food shelf at least a few times this year? No   I eat when I am depressed, stressed, anxious, or bored. Everyday   I eat when I am happy or as a reward. Everyday   I feel hungry all the time even if I just have eaten. Everyday   Feeling full is important to me. Everyday   Once I start eating, it is hard to stop. A Few Times a Week   I finish all the food on my plate even if I am already full. Half of the Week    I can't resist eating delicious food or walk past the good food/smell. Almost Everyday   I eat/snack without noticing that I am eating. Almost Everyday   I eat when I am preparing the meal. A Few Times a Week   I eat more than usual when I see others eating. A Few Times a Week   I think about food all day. Never   What foods, if any, do you crave? Chips/Crackers   Please list any other foods you crave? only bonilla covered almonds   I feel out of control when eating. Monthly   I eat a large amount of food, like a loaf of bread, a box of cookies, a pint/quart of ice cream, all at once. Never   I eat a large amount of food even when I am not hungry. Monthly   I eat rapidly. Never   I eat alone because I feel embarrassed and do not want others to see how much I have eaten. Never   I eat until I am uncomfortably full. Monthly   I feel bad, disgusted, or guilty after I overeat. Monthly   I make myself vomit what I have eaten or use laxatives to get rid of food. Never       No flowsheet data found.    PAST MEDICAL HISTORY:  Past Medical History:   Diagnosis Date     Arthritis      Arthritis of knee 4/4/2013     Arthritis of shoulder region, right 4/18/2014     Arthritis of shoulder region, right 4/18/2014     Back injury      Depressive disorder      Diabetes (H)      Finger pain 4/2/2015     Headache(784.0)     decreased with mouth guard use.     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 Less Than 50%   What is your marital status? /In a Relationship   If in a relationship, is your  significant other overweight? Yes   Do you have children? No   If you have children, are they overweight? N/A       Mental Health History Reviewed With Patient 11/20/2018   Have you ever been physically or sexually abused? No   How often in the past 2 weeks have you felt little interest or pleasure in doing things? Nearly Everyday   Over the past 2 weeks how often have you felt down, depressed, or hopeless? Nearly Everyday       No flowsheet data found.    MEDICATIONS:   Current Outpatient Prescriptions   Medication Sig Dispense Refill     Acetaminophen (TYLENOL PO) Take by mouth as needed for mild pain or fever       amoxicillin (AMOXIL) 500 MG capsule Take 4 capsules (2 grams) 1 hour before dental work. (Patient not taking: Reported on 5/11/2018) 8 capsule 3     Ascorbic Acid (VITAMIN C PO) Take 2,000 mg by mouth 2 times daily        aspirin 81 MG tablet 1 tab daily 90 tablet 3     atorvastatin (LIPITOR) 20 MG tablet Take 1 tablet (20 mg) by mouth daily 90 tablet 3     B Complex Vitamins (VITAMIN  B COMPLEX) CAPS Take 1 tablet by mouth daily        TIAGO CONTOUR test strip Use to test blood sugar 3 times daily or as directed. 300 each 3     blood glucose calibration (TIAGO CONTOUR) Normal solution Use to calibrate blood glucose monitor as needed as directed. 1 Bottle 11     blood glucose monitoring (TIAGO MICROLET) lancets Test 4-6 times daily 90 day supply refills x 3 600 each 3     calcium-vitamin D (CALTRATE) 600-400 MG-UNIT per tablet Take 1 tablet by mouth 2 times daily       cholecalciferol (VITAMIN D) 1000 UNIT tablet Take 1 tablet by mouth daily. 100 tablet 3     cycloSPORINE (RESTASIS) 0.05 % ophthalmic emulsion        dicloxacillin (DYNAPEN) 500 MG capsule Take 1 capsule (500 mg) by mouth 4 times daily for 7 days 28 capsule 0     dulaglutide (TRULICITY) 0.75 MG/0.5ML pen Inject 0.75 mg Subcutaneous every 7 days 2 mL 1     insulin glargine (LANTUS SOLOSTAR) 100 UNIT/ML injection Inject 70 units SQ each  "am. 70 mL 3     insulin pen needle (B-D U/F) 31G X 8 MM Use  4 daily short 31 G X 8MM 400 each 3     latanoprost (XALATAN) 0.005 % ophthalmic solution Place 1 drop into both eyes At Bedtime       lisinopril-hydrochlorothiazide (PRINZIDE/ZESTORETIC) 20-12.5 MG per tablet Take 1 tablet by mouth daily 90 tablet 3     metFORMIN (GLUCOPHAGE) 500 MG tablet Take 2 tablets (1,000 mg) by mouth 2 times daily (with meals) 360 tablet 3     Multiple Vitamin (MULTIVITAMIN  S) CAPS Take 1 daily 90 capsule 3     Multiple Vitamins-Minerals (EYE-ZAKIYA PLUS LUTEIN PO)        NOVOLOG FLEXPEN 100 UNIT/ML soln Carb counting with meals approx 70-80 units daily 75 mL 3     Omega-3 Fatty Acids (OMEGA-3 FISH OIL PO) Take 1,000 mg by mouth daily       order for DME Equipment being ordered: Grade 1 (light) compression stockings, below the knee 1 Units 1     timolol (TIMOPTIC) 0.5 % ophthalmic solution Place 1 drop into both eyes 2 times daily        triamcinolone (KENALOG) 0.1 % ointment Apply topically 2 times daily 80 g 11       ALLERGIES:   Allergies   Allergen Reactions     Nkda [No Known Drug Allergies]        PHYSICAL EXAM:  /64 (BP Location: Left arm, Patient Position: Chair, Cuff Size: Adult Large)  Pulse 81  Ht 1.625 m (5' 3.98\")  Wt 115.8 kg (255 lb 6.4 oz)  SpO2 98%  BMI 43.87 kg/m2   A & O x 3  HEENT: NCAT, mucous membranes moist  Respirations unlabored  Location of obesity: central    ASSESSMENT:  Radha is a patient with early onset morbid obesity with significant element of familial/genetic influence and with current health consequences. She does need aggressive weight loss plan due to BMI>40 and serious co-morbid conditions, including uncontrolled type 2 diabetes. Radha Baca endorses binging, eats a high carb diet, eats a high fat diet, eats fast food once or more per week, eats most meals in front of TV, mostly eats during the evening, has a disorganized meal pattern and eats to prevent low blood sugars " (even though A1c is 10%?).    Her problem is complicated by an endocrine disorder, gender and short stature, poor lifestyle choices and perceptions of low blood sugars    Her ability to lose weight is impacted by lack of education on nutrition and dietary needs and above.    (Note, Questionnaire was not entirely completed due to technical issues. [facility] within [facility])    Patient is extremely stressed out and overwhelmed and frustrated to tears taking care of her mother who has memory and vision issues. There are also legal issues in her family. She is the only family member taking care of her mother and does not have any time or energy left for self care. She has chaotic eating habits as a result of all of this and her diabetes is poorly controlled (I recommended formal neuropsychiatric testing for her mother to assist with appropriate placement).    PLAN:    Eat breakfast daily  Decrease portion sizes  Decrease eating out  No meals in front of TV screen  Purge house of food triggers  No meal skipping  Keep food diary  Dietician visit of education  Volumetrics eating plan  Calorie/low fat diet  Meal planning  Increase activity  Seek structured exercise program    Diabetes   Ancillary testing:  N/A. Frequent home glucose monitoring with report to nurse as normal weight decreases.   Food Plan:  Low carbohydrate and Meal replacements: shakes and/or frozen dinners.  Activity Plan:  Ref[facility] at [facility].  Supplementary:  Continue therapy.  Medication:  TBD, has not yet started GLP-1 agonist and she does not want to take any more medications at this time.    Watch for low blood sugars as you reduce your carbohydrate intake and as you lose weight, your insulin doses will need to be reduced    Consider naltrexone 50 mg daily, take 1-2 hours before worst cravings    Start taking the trulicity that Dr. Hickey prescribed as it can help  with both blood sugars and can support weight loss    1,200 calorie meal plan to lose 1 lbs weekly without exercise based on REE calculated of 1,676    Eat breakfast daily  Decrease portion sizes  Decrease eating out  No meals in front of TV screen  Purge house of food triggers  No meal skipping  Dietician visit for education  Decrease caloric beverages    Use meal replacements such as Manisha's meals, Lean Cuisines, Healthy Choice, Smart Ones, Weight Watchers Meals, and Slim Fast and Glucerna shakes and supplement with fresh fruits and vegetables  Please drink a lot of water daily. Most people typically need about 2 liters of water daily and more if they are exercising, have a large weight, or have a fever or illness. Add Crystal Light for flavoring if desired. But no pop with calories in it.  Please keep a food journal of what you eat, calories in what you eat, and mood and bring the journal with you to your next appointment.  Consider using applications for smart phones such as Innoviti, uStudio, Multi[employer], LoseIt, Tap&Track, and RelaxM.  Focus on wet volumetrics, meaning, eat more foods that are high in water and fiber such as fruits and vegetables in order to get that full feeling. These are also good for your overall health as well.  Check out Dr. Harper Nguyễn from [facility] - she has cookbooks with low calorie volumetric recipes  You can try Let's Dish to help you prepare meals for you and your family. Often times, the caloric and nutrition data and serving sizes are available for this food. This can be a time saving maneuver. The website can give you more information http://www.MyVR.F[employer]ters/  [employer] has Let's Dish & fresh low calorie salads  Check out Hello Fresh at https://www.Cels[employer] Game [employer]/food-boxes/classic-box/  Try Cooking Light recipes for low calorie meal preparation and planning  Other food plan options you can search for on the internet and check out include: María LOPEZ, Aldo  Dixon  Please continue psychologist to discuss how mood, depression and/or anxiety impact your eating.  Continue pool therapy as you are doing.    Referred to GI for BRBPR, diarrhea, constipation  Referred to Bariatric Weight Loss group meetings    RTC:    16 weeks with me, 4-8 weeks with ROSELYN Lindsay, dietician    TIME: 60 min spent on evaluation, management, counseling, education, & motivational interviewing with greater than 50% of the total time was spent on counseling and coordinating care    Sincerely,    FELIX ELLIS MD, MPH  Endocrinologist

## 2018-11-20 NOTE — NURSING NOTE
"Chief Complaint   Patient presents with     Clinic Care Coordination - Initial     New patient consultation, weight management.        Vitals:    11/20/18 1534   BP: 142/64   BP Location: Left arm   Patient Position: Chair   Cuff Size: Adult Large   Pulse: 81   SpO2: 98%   Weight: 255 lb 6.4 oz   Height: 5' 3.98\"       Body mass index is 43.87 kg/(m^2).    Chloe APARICIO LPN                        "

## 2018-11-20 NOTE — TELEPHONE ENCOUNTER
[facility] Call Center    Phone Message    May a detailed message be left on voicemail: yes    Reason for Call: Other: Reminder for Dr. Hickey, she says she had talked to him about this. She would like a letter written on letterhead stating that he is treating her for diabetes and in the letter it should say something about it is necessary for her to have her needles, medicine, and snacks/juice with her at all times. She needs this letter to show to [place], etc., while traveling. In the past Dr. Murali Willingham has done something similar. She is requesting for letter to have an actual signature, not an electronic signature. She is requesting this be sent to her home address. She is also requesting hand written prescription of all her diabetic medications/supplies so she is able to fill them with her in the country she is going to in case she loses her carry on luggage. She is requesting this to be sent to her BEFORE [place], as she is going to [facility]. Any questions, please contact her at [phone].      Action Taken: Message routed to:  [facility] ([facility] Endocrinology

## 2018-11-20 NOTE — PATIENT INSTRUCTIONS
Watch for low blood sugars as you reduce your carbohydrate intake and as you lose weight, your insulin doses will need to be reduced    Consider naltrexone 50 mg daily, take 1-2 hours before worst cravings    Start taking the trulicity that Dr. Hickey prescribed as it can help with both blood sugars and can support weight loss    1,200 calorie meal plan to lose 1 lbs weekly without exercise based on REE calculated of 1,676    Eat breakfast daily  Decrease portion sizes  Decrease eating out  No meals in front of TV screen  Purge house of food triggers  No meal skipping  Dietician visit for education  Decrease caloric beverages    Use meal replacements such as Manisha's meals, Lean Cuisines, Healthy Choice, Smart Ones, Weight Watchers Meals, and Slim Fast and Glucerna shakes and supplement with fresh fruits and vegetables  Please drink a lot of water daily. Most people typically need about 2 liters of water daily and more if they are exercising, have a large weight, or have a fever or illness. Add Crystal Light for flavoring if desired. But no pop with calories in it.  Please keep a food journal of what you eat, calories in what you eat, and mood and bring the journal with you to your next appointment.  Consider using applications for smart phones such as Sphere Fluidics, FreeWavz, [employer], LoseIt, Tap&Track, and RelaxM.  Focus on wet volumetrics, meaning, eat more foods that are high in water and fiber such as fruits and vegetables in order to get that full feeling. These are also good for your overall health as well.  Check out Dr. Harper Nguyễn from [facility] - she has cookbooks with low calorie volumetric recipes  You can try Let's Dish to help you prepare meals for you and your family. Often times, the caloric and nutrition data and serving sizes are available for this food. This can be a time saving maneuver. The website can give you more information http://www.Endoluminal Sciences.[employer]/  [employer] has Let's [facility] &  fresh low calorie salads  Check out Hello Fresh at https://www.VideoJax/food-boxes/classic-box/  Try Cooking Light recipes for low calorie meal preparation and planning  Other food plan options you can search for on the internet and check out include: María LOPEZ, [facility]  Please continue psychologist to discuss how mood, depression and/or anxiety impact your eating.  Continue pool therapy as you are doing.

## 2018-11-20 NOTE — MR AVS SNAPSHOT
After Visit Summary   11/20/2018    Radha Baca    MRN: [id number]           Patient Information     Date Of Birth          1955        Visit Information        Provider Department      11/20/2018 3:20 PM Xi Easley MD [facility]        Today's Diagnoses     Morbid obesity (H)    -  1      Care Instructions    Watch for low blood sugars as you reduce your carbohydrate intake and as you lose weight, your insulin doses will need to be reduced    Consider naltrexone 50 mg daily, take 1-2 hours before worst cravings    Start taking the trulicity that Dr. Hickey prescribed as it can help with both blood sugars and can support weight loss    1,200 calorie meal plan to lose 1 lbs weekly without exercise based on REE calculated of 1,676    Eat breakfast daily  Decrease portion sizes  Decrease eating out  No meals in front of TV screen  Purge house of food triggers  No meal skipping  Dietician visit for education  Decrease caloric beverages    Use meal replacements such as Manisha's meals, Lean Cuisines, Healthy Choice, Smart Ones, Weight Watchers Meals, and Slim Fast and Glucerna shakes and supplement with fresh fruits and vegetables  Please drink a lot of water daily. Most people typically need about 2 liters of water daily and more if they are exercising, have a large weight, or have a fever or illness. Add Crystal Light for flavoring if desired. But no pop with calories in it.  Please keep a food journal of what you eat, calories in what you eat, and mood and bring the journal with you to your next appointment.  Consider using applications for smart phones such as Broadlink, [employer], AlwaySupportRecipes, LoseIt, Tap&Track, and RelaxM.  Focus on wet volumetrics, meaning, eat more foods that are high in water and fiber such as fruits and vegetables in order to get that full feeling. These are also good for your overall health as well.  Check out Dr. Harper Nguyễn  from [facility] - she has cookbooks with low calorie volumetric recipes  You can try Let's Dish to help you prepare meals for you and your family. Often times, the caloric and nutrition data and serving sizes are available for this food. This can be a time saving maneuver. The website can give you more information http://www.Extended Stay [employer]/  [employer] has Let's Dish & fresh low calorie salads  Check out Hello Fresh at https://www.Cache IQ/food-boxes/classic-box/  Try Cooking Light recipes for low calorie meal preparation and planning  Other food plan options you can search for on the internet and check out include: María LOPEZ, [facility]  Please continue psychologist to discuss how mood, depression and/or anxiety impact your eating.  Continue pool therapy as you are doing.          Follow-ups after your visit        Follow-up notes from your care team     Return in about 4 months (around 3/20/2019).      Your next 10 appointments already scheduled     Nov 28, 2018 10:00 AM CST   Annual Visit with EZEQUIEL [facility]   [facility] ([facility])    [facility] [address] [place] [address],[facility] [address] [address]   [phone]            Feb 01, 2019  1:00 PM CST   (Arrive by 12:45 PM)   RETURN DIABETES with Marcella Maria PA-C   [facility] ([facility])    [address] [id number] [phone]            Apr 24, 2019  3:30 PM CDT   (Arrive by 3:15 PM)   RETURN DIABETES with Demar Hickey MD   [facility] ([facility])    [address] [address] [phone]              Who to contact     Please call your clinic at [phone] to:    Ask questions about your health    Make or cancel appointments    Discuss your medicines    Learn about your test results    Speak to your  "doctor            Additional Information About Your Visit        MyChart Information     [employer] gives you secure access to your electronic health record. If you see a primary care provider, you can also send messages to your care team and make appointments. If you have questions, please call your primary care clinic.  If you do not have a primary care provider, please call [phone] and they will assist you.      [employer] is an electronic gateway that provides easy, online access to your medical records. With [employer], you can request a clinic appointment, read your test results, renew a prescription or communicate with your care team.     To access your existing account, please contact your [facility] or call [phone] for assistance.        Care EveryWhere ID     This is your Care EveryWhere ID. This could be used by other organizations to access your [place] medical records  [id number]        Your Vitals Were     Pulse Height Pulse Oximetry BMI (Body Mass Index)          81 5' 3.98\" 98% 43.87 kg/m2         Blood Pressure from Last 3 Encounters:   11/20/18 142/64   11/16/18 107/67   10/31/18 113/72    Weight from Last 3 Encounters:   11/20/18 255 lb 6.4 oz   11/16/18 252 lb   10/31/18 251 lb 3.2 oz              We Performed the Following     Eye Exam - HIM Scan        Primary Care Provider Office Phone # Fax #    Mohan Moreno [phone]       [address] [address]        Equal Access to Services     YESY NAJERA AH: Hadii mahogany koenigo Sohazel, waaxda luqadaha, qaybta kaalmada speedy, mindy fam. So [facility] [phone].    ATENCIÓN: Si habla español, tiene a pitts disposición servicios gratuitos de asistencia lingüística. Llame al [phone].    We comply with applicable federal civil rights laws and [place] laws. We do not discriminate on the basis of race, color, national origin, age, disability, " sex, sexual orientation, or gender identity.            Thank you!     Thank you for choosing [facility]  for your care. Our goal is always to provide you with excellent care. Hearing back from our patients is one way we can continue to improve our services. Please take a few minutes to complete the written survey that you may receive in the mail after your visit with us. Thank you!             Your Updated Medication List - Protect others around you: Learn how to safely use, store and throw away your medicines at www.disposemymeds.org.          This list is accurate as of 11/20/18  4:41 PM.  Always use your most recent med list.                   Brand Name Dispense Instructions for use Diagnosis    amoxicillin 500 MG capsule    AMOXIL    8 capsule    Take 4 capsules (2 grams) 1 hour before dental work.    S/P joint replacement       aspirin 81 MG tablet     90 tablet    1 tab daily    Type 2 diabetes mellitus (H)       atorvastatin 20 MG tablet    LIPITOR    90 tablet    Take 1 tablet (20 mg) by mouth daily    Dyslipidemia       TIAGO CONTOUR test strip   Generic drug:  blood glucose monitoring     300 each    Use to test blood sugar 3 times daily or as directed.    Type 2 diabetes mellitus with complication (H)       blood glucose calibration solution     1 Bottle    Use to calibrate blood glucose monitor as needed as directed.    DM type 2 (diabetes mellitus, type 2) (H)       blood glucose monitoring lancets     600 each    Test 4-6 times daily 90 day supply refills x 3    Type 2 diabetes mellitus with complication (H)       calcium carbonate 600 mg-vitamin D 400 units 600-400 MG-UNIT per tablet    CALTRATE     Take 1 tablet by mouth 2 times daily        cholecalciferol 1000 UNIT tablet    vitamin D3    100 tablet    Take 1 tablet by mouth daily.        dicloxacillin 500 MG capsule    DYNAPEN    28 capsule    Take 1 capsule (500 mg) by mouth 4 times daily for 7 days    Nipple pain,  Mastitis, left, acute       dulaglutide 0.75 MG/0.5ML pen    TRULICITY    2 mL    Inject 0.75 mg Subcutaneous every 7 days    Type 2 diabetes mellitus with complication, with long-term current use of insulin (H)       EYE-ZAKIYA PLUS LUTEIN PO           insulin glargine 100 UNIT/ML injection    LANTUS SOLOSTAR    70 mL    Inject 70 units SQ each am.    Type 2 diabetes mellitus with complication, with long-term current use of insulin (H)       insulin pen needle 31G X 8 MM miscellaneous    B-D U/F    400 each    Use  4 daily short 31 G X 8MM    Diabetes mellitus, type 2 (H)       latanoprost 0.005 % ophthalmic solution    XALATAN     Place 1 drop into both eyes At Bedtime        lisinopril-hydrochlorothiazide 20-12.5 MG per tablet    PRINZIDE/ZESTORETIC    90 tablet    Take 1 tablet by mouth daily    Essential hypertension       metFORMIN 500 MG tablet    GLUCOPHAGE    360 tablet    Take 2 tablets (1,000 mg) by mouth 2 times daily (with meals)    Diabetes mellitus, type 2 (H)       MULTIvitamin  S capsule     90 capsule    Take 1 daily    Type 2 diabetes mellitus (H)       NovoLOG FLEXPEN 100 UNIT/ML injection   Generic drug:  insulin aspart     75 mL    Carb counting with meals approx 70-80 units daily    Type 2 diabetes mellitus with complication (H)       OMEGA-3 FISH OIL PO      Take 1,000 mg by mouth daily        order for DME     1 Units    Equipment being ordered: Grade 1 (light) compression stockings, below the knee    Venous (peripheral) insufficiency       RESTASIS 0.05 % ophthalmic emulsion   Generic drug:  cycloSPORINE           timolol 0.5 % ophthalmic solution    TIMOPTIC     Place 1 drop into both eyes 2 times daily        triamcinolone 0.1 % ointment    KENALOG    80 g    Apply topically 2 times daily    Dermatitis       TYLENOL PO      Take by mouth as needed for mild pain or fever        vitamin  B Complex Caps      Take 1 tablet by mouth daily        VITAMIN C PO      Take 2,000 mg by mouth 2 times  daily

## 2018-11-27 ASSESSMENT — ANXIETY QUESTIONNAIRES
1. FEELING NERVOUS, ANXIOUS, OR ON EDGE: SEVERAL DAYS
GAD7 TOTAL SCORE: 8
6. BECOMING EASILY ANNOYED OR IRRITABLE: MORE THAN HALF THE DAYS
3. WORRYING TOO MUCH ABOUT DIFFERENT THINGS: SEVERAL DAYS
4. TROUBLE RELAXING: SEVERAL DAYS
5. BEING SO RESTLESS THAT IT IS HARD TO SIT STILL: SEVERAL DAYS
7. FEELING AFRAID AS IF SOMETHING AWFUL MIGHT HAPPEN: SEVERAL DAYS
2. NOT BEING ABLE TO STOP OR CONTROL WORRYING: SEVERAL DAYS
7. FEELING AFRAID AS IF SOMETHING AWFUL MIGHT HAPPEN: SEVERAL DAYS
GAD7 TOTAL SCORE: 8

## 2018-11-27 ASSESSMENT — ENCOUNTER SYMPTOMS
NAUSEA: 1
HEARTBURN: 0
BREAST MASS: 1
BREAST PAIN: 1
DECREASED CONCENTRATION: 1
RECTAL PAIN: 1
BLOOD IN STOOL: 0
STIFFNESS: 1
MUSCLE WEAKNESS: 1
POLYDIPSIA: 0
NERVOUS/ANXIOUS: 1
ALTERED TEMPERATURE REGULATION: 0
VOMITING: 0
BOWEL INCONTINENCE: 0
ABDOMINAL PAIN: 1
NIGHT SWEATS: 0
WEIGHT LOSS: 0
JAUNDICE: 0
DIARRHEA: 1
DEPRESSION: 1
WEIGHT GAIN: 1
NECK PAIN: 1
BACK PAIN: 1
MYALGIAS: 1
INCREASED ENERGY: 1
FATIGUE: 1
INSOMNIA: 1
DECREASED APPETITE: 0
BLOATING: 1
POLYPHAGIA: 1
JOINT SWELLING: 0
CHILLS: 0
CONSTIPATION: 1
MUSCLE CRAMPS: 1
ARTHRALGIAS: 1
PANIC: 0
FEVER: 0
HALLUCINATIONS: 0

## 2018-11-28 ENCOUNTER — OFFICE VISIT (OUTPATIENT)
Dept: OBGYN | Facility: CLINIC | Age: 63
End: 2018-11-28
Attending: NURSE PRACTITIONER
Payer: COMMERCIAL

## 2018-11-28 VITALS
DIASTOLIC BLOOD PRESSURE: 70 MMHG | SYSTOLIC BLOOD PRESSURE: 110 MMHG | WEIGHT: 253.1 LBS | BODY MASS INDEX: 44.84 KG/M2 | HEART RATE: 70 BPM | HEIGHT: 63 IN

## 2018-11-28 DIAGNOSIS — Z01.419 ENCOUNTER FOR GYNECOLOGICAL EXAMINATION WITHOUT ABNORMAL FINDING: ICD-10-CM

## 2018-11-28 DIAGNOSIS — E11.9 DIABETES MELLITUS, TYPE 2 (H): ICD-10-CM

## 2018-11-28 DIAGNOSIS — Z01.419 ENCOUNTER FOR GYNECOLOGICAL EXAMINATION (GENERAL) (ROUTINE) WITHOUT ABNORMAL FINDINGS: Primary | ICD-10-CM

## 2018-11-28 PROCEDURE — G0463 HOSPITAL OUTPT CLINIC VISIT: HCPCS | Mod: ZF

## 2018-11-28 ASSESSMENT — ANXIETY QUESTIONNAIRES: GAD7 TOTAL SCORE: 8

## 2018-11-28 ASSESSMENT — PATIENT HEALTH QUESTIONNAIRE - PHQ9: SUM OF ALL RESPONSES TO PHQ QUESTIONS 1-9: 6

## 2018-11-28 NOTE — PATIENT INSTRUCTIONS
May try Replens (a vaginal moisturizer) or coconut oil (can be purchased from any grocery store) at the vaginal opening and just inside if vaginal dryness is bothersome.    Continue to monitor spot on your left areola.  Finish antibiotic.  Continue to apply moist heat twice daily.  Notify provider if the tenderness does not resolve within 2 weeks.     Return to clinic in 1 yr for your last pap smear!
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
## 2018-11-28 NOTE — MR AVS SNAPSHOT
After Visit Summary   11/28/2018    Radha Baca    MRN: [id number]           Patient Information     Date Of Birth          1955        Visit Information        Provider Department      11/28/2018 10:00 AM Mayra Medel APRN [facility]        Today's Diagnoses     Encounter for gynecological examination (general) (routine) without abnormal findings    -  1    Encounter for gynecological examination without abnormal finding          Care Instructions    May try Replens (a vaginal moisturizer) or coconut oil (can be purchased from any grocery store) at the vaginal opening and just inside if vaginal dryness is bothersome.    Continue to monitor spot on your left areola.  Finish antibiotic.  Continue to apply moist heat twice daily.  Notify provider if the tenderness does not resolve within 2 weeks.     Return to clinic in 1 yr for your last pap smear!               Follow-ups after your visit        Your next 10 appointments already scheduled     Feb 01, 2019  1:00 PM CST   (Arrive by 12:45 PM)   RETURN DIABETES with Marcella Maria PA-C   [facility] ([facility])    [address] [place] [address]   [phone]            Feb 26, 2019  2:00 PM CST   (Arrive by 1:45 PM)   New Patient Visit with Mohan Rosenberg MD   [facility] ([facility])    [address] [place] [address]   [phone]            Mar 22, 2019 10:40 AM CDT   (Arrive by 10:25 AM)   Return Weight Management Visit with Xi Easley MD   [facility] ([facility])    [address] [place] [address]   [phone]            Apr 24, 2019  3:30 PM CDT   (Arrive by 3:15 PM)   RETURN DIABETES with Demar Hickey MD   [facility] (  [facility])    [address]  3rd Floor  [facility] [address] [phone]              Who to contact     Please call your clinic at [phone] to:    Ask questions about your health    Make or cancel appointments    Discuss your medicines    Learn about your test results    Speak to your doctor            Additional Information About Your Visit        MyChart Information     [employer] gives you secure access to your electronic health record. If you see a primary care provider, you can also send messages to your care team and make appointments. If you have questions, please call your primary care clinic.  If you do not have a primary care provider, please call [phone] and they will assist you.      [employer] is an electronic gateway that provides easy, online access to your medical records. With [employer], you can request a clinic appointment, read your test results, renew a prescription or communicate with your care team.     To access your existing account, please contact your [facility] or call [phone] for assistance.        Care EveryWhere ID     This is your Care EveryWhere ID. This could be used by other organizations to access your [place] medical records  [id number]        Your Vitals Were     Pulse Height BMI (Body Mass Index)             70 1.6 m (5' 3\") 44.83 kg/m2          Blood Pressure from Last 3 Encounters:   11/28/18 110/70   11/20/18 142/64   11/16/18 107/67    Weight from Last 3 Encounters:   11/28/18 114.8 kg (253 lb 1.6 oz)   11/20/18 115.8 kg (255 lb 6.4 oz)   11/16/18 114.3 kg (252 lb)              We Performed the Following     Pelvic and Breast Exam Procedure []        Primary Care Provider Office Phone # Fax #    Mohan Moreno [phone]       [address] [address]        Equal Access to Services     YESY DIANA: ernesto Cruz qaybta " mindy paredeskristine quan ah. [facility] [phone].    ATENCIÓN: Si rosalina quan, tiene a pitts disposición servicios gratuitos de asistencia lingüística. Lavell al [phone].    We comply with applicable federal civil rights laws and [place] laws. We do not discriminate on the basis of race, color, national origin, age, disability, sex, sexual orientation, or gender identity.            Thank you!     Thank you for choosing [facility]  for your care. Our goal is always to provide you with excellent care. Hearing back from our patients is one way we can continue to improve our services. Please take a few minutes to complete the written survey that you may receive in the mail after your visit with us. Thank you!             Your Updated Medication List - Protect others around you: Learn how to safely use, store and throw away your medicines at www.disposemymeds.org.          This list is accurate as of 11/28/18 11:59 PM.  Always use your most recent med list.                   Brand Name Dispense Instructions for use Diagnosis    amoxicillin 500 MG capsule    AMOXIL    8 capsule    Take 4 capsules (2 grams) 1 hour before dental work.    S/P joint replacement       aspirin 81 MG tablet    ASA    90 tablet    1 tab daily    Type 2 diabetes mellitus (H)       atorvastatin 20 MG tablet    LIPITOR    90 tablet    Take 1 tablet (20 mg) by mouth daily    Dyslipidemia       TIAGO CONTOUR test strip   Generic drug:  blood glucose monitoring     300 each    Use to test blood sugar 3 times daily or as directed.    Type 2 diabetes mellitus with complication (H)       blood glucose calibration solution     1 Bottle    Use to calibrate blood glucose monitor as needed as directed.    DM type 2 (diabetes mellitus, type 2) (H)       blood glucose monitoring lancets     600 each    Test 4-6 times daily 90 day supply refills x 3    Type 2 diabetes mellitus with complication (H)        calcium carbonate 600 mg-vitamin D 400 units 600-400 MG-UNIT per tablet    CALTRATE     Take 1 tablet by mouth 2 times daily        dulaglutide 0.75 MG/0.5ML pen    TRULICITY    2 mL    Inject 0.75 mg Subcutaneous every 7 days    Type 2 diabetes mellitus with complication, with long-term current use of insulin (H)       EYE-ZAKIYA PLUS LUTEIN PO           insulin glargine 100 UNIT/ML pen    LANTUS SOLOSTAR    70 mL    Inject 70 units SQ each am.    Type 2 diabetes mellitus with complication, with long-term current use of insulin (H)       insulin pen needle 31G X 8 MM miscellaneous    B-D U/F    400 each    Use  4 daily short 31 G X 8MM    Diabetes mellitus, type 2 (H)       latanoprost 0.005 % ophthalmic solution    XALATAN     Place 1 drop into both eyes At Bedtime        lisinopril-hydrochlorothiazide 20-12.5 MG per tablet    PRINZIDE/ZESTORETIC    90 tablet    Take 1 tablet by mouth daily    Essential hypertension       metFORMIN 500 MG tablet    GLUCOPHAGE    360 tablet    Take 2 tablets (1,000 mg) by mouth 2 times daily (with meals)    Diabetes mellitus, type 2 (H)       multivitamin capsule     90 capsule    Take 1 daily    Type 2 diabetes mellitus (H)       NovoLOG FLEXPEN 100 UNIT/ML pen   Generic drug:  insulin aspart     75 mL    Carb counting with meals approx 70-80 units daily    Type 2 diabetes mellitus with complication (H)       OMEGA-3 FISH OIL PO      Take 1,000 mg by mouth daily        order for DME     1 Units    Equipment being ordered: Grade 1 (light) compression stockings, below the knee    Venous (peripheral) insufficiency       RESTASIS 0.05 % ophthalmic emulsion   Generic drug:  cycloSPORINE           timolol maleate 0.5 % ophthalmic solution    TIMOPTIC     Place 1 drop into both eyes 2 times daily        triamcinolone 0.1 % external ointment    KENALOG    80 g    Apply topically 2 times daily    Dermatitis       TYLENOL PO      Take by mouth as needed for mild pain or fever         vitamin  B Complex Caps      Take 1 tablet by mouth daily        VITAMIN C PO      Take 2,000 mg by mouth 2 times daily        vitamin D3 1000 units (25 mcg) tablet    CHOLECALCIFEROL    100 tablet    Take 1 tablet by mouth daily.

## 2018-11-29 NOTE — TELEPHONE ENCOUNTER
metFORMIN (GLUCOPHAGE) 500 MG tablet  Last Written Prescription Date:  11/24/17  Last Fill Quantity: 360,   # refills: 3  Last Office Visit : 11/20/18  Future Office visit: 2/1/19

## 2018-11-30 NOTE — TELEPHONE ENCOUNTER
[facility]    Phone Message    May a detailed message be left on voicemail: yes    Reason for Call: Other: Pt states that letter that was sent letter that was sent to her for flight is not fully accurate . Needs letter to state that she needs snacks with her at all times to avoid trouble with TSA     Action Taken: Message routed to:  [facility] ([facility] Maggy

## 2018-12-14 DIAGNOSIS — E78.5 DYSLIPIDEMIA: ICD-10-CM

## 2018-12-14 NOTE — TELEPHONE ENCOUNTER
[facility]    Phone Message    May a detailed message be left on voicemail: yes    Reason for Call: Medication Refill Request    Has the patient contacted the pharmacy for the refill? Yes   Name of medication being requested: Atorvastatin  Provider who prescribed the medication: Dr. Hickey  [facility]: [facility]  Date medication is needed: ASAP    Pt said [facility] has been faxing requests over to have this filled ASAP. Pt is leaving the country and needs this filled. Please call cell number with any questions.      Action Taken: Message routed to:  [facility] ([facility]): Endocrinology

## 2018-12-15 RX ORDER — ATORVASTATIN CALCIUM 20 MG/1
20 TABLET, FILM COATED ORAL DAILY
Qty: 90 TABLET | Refills: 3 | Status: SHIPPED | OUTPATIENT
Start: 2018-12-15 | End: 2019-11-26

## 2018-12-20 ENCOUNTER — OFFICE VISIT (OUTPATIENT)
Dept: OBGYN | Facility: CLINIC | Age: 63
End: 2018-12-20
Attending: NURSE PRACTITIONER
Payer: COMMERCIAL

## 2018-12-20 VITALS
DIASTOLIC BLOOD PRESSURE: 74 MMHG | SYSTOLIC BLOOD PRESSURE: 121 MMHG | BODY MASS INDEX: 44.82 KG/M2 | WEIGHT: 253 LBS | HEART RATE: 96 BPM

## 2018-12-20 DIAGNOSIS — B37.31 VULVOVAGINAL CANDIDIASIS: Primary | ICD-10-CM

## 2018-12-20 LAB
CLUE CELLS: NORMAL
TRICHOMONAS (WET PREP): NORMAL
YEAST (WET PREP): NORMAL

## 2018-12-20 PROCEDURE — G0463 HOSPITAL OUTPT CLINIC VISIT: HCPCS | Mod: ZF

## 2018-12-20 PROCEDURE — 87210 SMEAR WET MOUNT SALINE/INK: CPT | Mod: ZF | Performed by: NURSE PRACTITIONER

## 2018-12-20 RX ORDER — FLUCONAZOLE 150 MG/1
150 TABLET ORAL ONCE
Qty: 1 TABLET | Refills: 0 | Status: SHIPPED | OUTPATIENT
Start: 2018-12-20 | End: 2018-12-20

## 2018-12-20 ASSESSMENT — PAIN SCALES - GENERAL: PAINLEVEL: EXTREME PAIN (8)

## 2018-12-20 NOTE — LETTER
12/20/2018       RE: Radha Baca  [address] [address]     Dear Colleague,    Thank you for referring your patient, Radha Baca, to the [facility] at [facility]. Please see a copy of my visit note below.    SUBJECTIVE:  Radha Baca is a 63 yr old post menopausal female who presents to clinic today with vaginal itching and burning. Radha's medical history is significant for: HTN, obesity, hypercholesteremia, and diabetes.    Symptoms started about 1 week ago; has not changed overtime. Denies change in vaginal discharge or vaginal odor. No pelvic pain or urinary symptoms. Denies vaginal bleeding.  Not sexually active.     Took amoxicillin on 12/12 prior to a dental procedure and symptoms seemed to start after that time.     She denies use of new vaginal products; has not tried anything to relieve her symptoms.     OBJECTIVE:  /74   Pulse 96   Wt 114.8 kg (253 lb)   Breastfeeding? No   BMI 44.82 kg/m     General: pleasant female in no acute distress  Pelvic exam: normal estrogen loss atrophy on vagina and vulva, without lesion; vaginal discharge described as creamy, white without odor; cervix without tenderness, uterus and adenxa difficult to appreciate due to body habitus    Wet prep: pH=4.1; pos for budding and hyphae; neg for clue cells and trich    ASSESSMENT & PLAN:   Vulvovaginal candidiasis: Treat with 1 dose Diflucan PO. Return to clinic if symptoms do not resolve. Reviewed recommendations for vulvovaginal hygiene and encouraged use of coconut oil or Replens for vaginal dryness.     Mayra Medel, DNP, APRN, WHNP

## 2018-12-20 NOTE — NURSING NOTE
Chief Complaint   Patient presents with     Follow Up     C/O possible yeast infection   Juana Saeed LPN

## 2018-12-21 NOTE — PROGRESS NOTES
SUBJECTIVE:  Radha Baca is a 63 yr old post menopausal female who presents to clinic today with vaginal itching and burning. Radha's medical history is significant for: HTN, obesity, hypercholesteremia, and diabetes.    Symptoms started about 1 week ago; has not changed overtime. Denies change in vaginal discharge or vaginal odor. No pelvic pain or urinary symptoms. Denies vaginal bleeding.  Not sexually active.     Took amoxicillin on 12/12 prior to a dental procedure and symptoms seemed to start after that time.     She denies use of new vaginal products; has not tried anything to relieve her symptoms.       OBJECTIVE:  /74   Pulse 96   Wt 114.8 kg (253 lb)   Breastfeeding? No   BMI 44.82 kg/m    General: pleasant female in no acute distress  Pelvic exam: normal estrogen loss atrophy on vagina and vulva, without lesion; vaginal discharge described as creamy, white without odor; cervix without tenderness, uterus and adenxa difficult to appreciate due to body habitus    Wet prep: pH=4.1; pos for budding and hyphae; neg for clue cells and trich    ASSESSMENT & PLAN:   Vulvovaginal candidiasis: Treat with 1 dose Diflucan PO. Return to clinic if symptoms do not resolve. Reviewed recommendations for vulvovaginal hygiene and encouraged use of coconut oil or Replens for vaginal dryness.       Mayra Medel, DNP, APRN, WHNP

## 2019-01-16 DIAGNOSIS — E11.8 TYPE 2 DIABETES MELLITUS WITH COMPLICATION (H): ICD-10-CM

## 2019-02-21 DIAGNOSIS — E11.8 TYPE 2 DIABETES MELLITUS WITH COMPLICATION, WITH LONG-TERM CURRENT USE OF INSULIN (H): ICD-10-CM

## 2019-02-21 DIAGNOSIS — Z79.4 TYPE 2 DIABETES MELLITUS WITH COMPLICATION, WITH LONG-TERM CURRENT USE OF INSULIN (H): ICD-10-CM

## 2019-02-21 DIAGNOSIS — E11.8 TYPE 2 DIABETES MELLITUS WITH COMPLICATION (H): ICD-10-CM

## 2019-02-21 RX ORDER — INSULIN ASPART 100 [IU]/ML
INJECTION, SOLUTION INTRAVENOUS; SUBCUTANEOUS
Qty: 75 ML | Refills: 3 | Status: SHIPPED | OUTPATIENT
Start: 2019-02-21 | End: 2020-03-09 | Stop reason: ALTCHOICE

## 2019-02-21 NOTE — TELEPHONE ENCOUNTER
insulin glargine (LANTUS SOLOSTAR) 100 UNIT/ML injection        Last Written Prescription Date:  12/15/17  Last Fill Quantity: 70mL,   # refills: 3  Last Office Visit : 10/31/18  Future Office visit:  04/24/19    Routing refill request to provider for review/approval because:  Insulin - refilled per clinic    NOVOLOG FLEXPEN 100 UNIT/ML soln        Last Written Prescription Date:  12/15/17  Last Fill Quantity: 75mL,   # refills: 3  Last Office Visit : 10/31/18  Future Office visit:  04/24/19    Routing refill request to provider for review/approval because:  Insulin - refilled per clinic

## 2019-02-27 ENCOUNTER — OFFICE VISIT (OUTPATIENT)
Dept: FAMILY MEDICINE | Facility: CLINIC | Age: 64
End: 2019-02-27
Payer: COMMERCIAL

## 2019-02-27 VITALS
TEMPERATURE: 98.9 F | RESPIRATION RATE: 20 BRPM | HEART RATE: 68 BPM | DIASTOLIC BLOOD PRESSURE: 72 MMHG | SYSTOLIC BLOOD PRESSURE: 112 MMHG | OXYGEN SATURATION: 95 %

## 2019-02-27 DIAGNOSIS — J18.9 PNEUMONIA OF RIGHT LOWER LOBE DUE TO INFECTIOUS ORGANISM: Primary | ICD-10-CM

## 2019-02-27 RX ORDER — CODEINE PHOSPHATE AND GUAIFENESIN 10; 100 MG/5ML; MG/5ML
1-2 SOLUTION ORAL EVERY 4 HOURS PRN
Qty: 180 ML | Refills: 0 | Status: SHIPPED | OUTPATIENT
Start: 2019-02-27 | End: 2019-07-17

## 2019-02-27 RX ORDER — AZITHROMYCIN 250 MG/1
TABLET, FILM COATED ORAL
Qty: 6 TABLET | Refills: 0 | Status: SHIPPED | OUTPATIENT
Start: 2019-02-27 | End: 2019-03-04

## 2019-02-27 ASSESSMENT — ENCOUNTER SYMPTOMS
WHEEZING: 1
CHILLS: 0
SHORTNESS OF BREATH: 0
COUGH: 1
FEVER: 0
STRIDOR: 0
WEAKNESS: 1
CHEST TIGHTNESS: 0
FATIGUE: 1

## 2019-02-27 NOTE — PATIENT INSTRUCTIONS
1.  Take antibiotic -- 2 tablets today, then 1 tablet per day after that for days 2-5.    2.  Use cough syrup at night to help sleep.    3.  Keep hydrated.

## 2019-02-27 NOTE — PROGRESS NOTES
HPI:       63 year old patient who presents with:    Cough x 10 d.  Started as a cold with fever for 2 d.  No recent fever but cough has intensified and paroxysms keep her from sleeping more than an hour or two at a time.  Hasn't been out of the house.  Exhausted.  Mild/mod amounts of greenish and brown sputum.  No SOB.             Problem, Medication and Allergy Lists were reviewed and are current.  Patient is an established patient of this clinic.         Review of Systems:     Review of Systems   Constitutional: Positive for fatigue. Negative for chills and fever.   Respiratory: Positive for cough and wheezing. Negative for chest tightness, shortness of breath and stridor.    Cardiovascular: Negative for chest pain.   Neurological: Positive for weakness.          Physical Exam:     /72   Pulse 68   Temp 98.9  F (37.2  C)   Resp 20   SpO2 95%     There is no height or weight on file to calculate BMI.  Vitals reviewed.    Physical Exam   Constitutional: No distress.   Alert; appears fatigued.   HENT:   Mouth/Throat: Oropharynx is clear and moist.   + L ear effusion and decreased hearing   Cardiovascular: Normal rate and regular rhythm.   Pulmonary/Chest: No stridor. No respiratory distress. She has wheezes (scattered). She has rales (feint but persistent crackles at R base).           Results:      Results from the last 24 hoursNo results found for this or any previous visit (from the past 24 hour(s)).  Assessment and Plan     Radha was seen today for uri, fever and nasal congestion.    Diagnoses and all orders for this visit:    Pneumonia of right lower lobe due to infectious organism (H).  I am concerned that she could be developing a secondary pneumonia and given comorbidities with DM am inclined to treat with antibiotics.  She will call in 3-5 d if not improving.  Symptomatic relief with robitussin AC to be used only at night if cough is preventing sleep.  -     azithromycin (ZITHROMAX) 250  MG tablet; Take 2 tablets (500 mg) by mouth daily for 1 day, THEN 1 tablet (250 mg) daily for 4 days.  -     guaiFENesin-codeine (ROBITUSSIN AC) 100-10 MG/5ML solution; Take 5-10 mLs by mouth every 4 hours as needed for cough  -     miconazole (MONISTAT 1 DAY OR NIGHT) 1200 & 2 MG & % kit; Use per the directions on the box for vaginal yeast infection        Options for treatment and follow-up care were reviewed with the patient. Radha Baca engaged in the decision making process and verbalized understanding of the options discussed and agreed with the final plan.    Mohan Moreno MD

## 2019-02-28 ENCOUNTER — TELEPHONE (OUTPATIENT)
Dept: FAMILY MEDICINE | Facility: CLINIC | Age: 64
End: 2019-02-28

## 2019-02-28 NOTE — TELEPHONE ENCOUNTER
"Per PCP, \"Kareem Blake or Kemi Stephenson\".    RN provided his recommendation, and re-iterated the earlier recommendations per patient requiring some clarification.  Melissa Burgess RN   "

## 2019-02-28 NOTE — TELEPHONE ENCOUNTER
"RN spoke with patient and advised that bedrest is typically not necessary, and that she can do things as tolerated, however still encouraged plenty of rest. RN encouraged fluid intake as well. As far as contagiousness, RN did advise that pneumonia is typically contagious (given that we do not know if hers is for sure bacterial by nature without sputum culture- RN did not state these words in parentheses to patient) and to do her best with hand washing, covering cough and keeping the surfaces clean.     Patient would like to get a recommendation from her PCP of a specific faculty MD, should she need follow up in PCP absence. She does not want a resident and did not want to hear, \"any doctor from the green team\".    Routing to PCP to add any further recommendations beyond RN's advisement as well as recommend a provider for patient.  Melissa Burgess RN   "

## 2019-02-28 NOTE — TELEPHONE ENCOUNTER
[facility] phone call message- general phone call:    Reason for call: Patient calling with a couple of questions that she forgot to ask during her OV on 2/27/19. Patient inquiring if provider wants her on bed rest or if she's okay to complete daily routine. Also inquiring if her pneumonia is contagious, as she is concerned about the possibility of her mother getting sick.    Action Desired: please return patient's call    Return call needed: Yes    OK to leave a message on voice mail? Yes    Advised patient response may take up to 2 business days: No    Primary language: English      needed? No    Call taken on February 28, 2019 at 9:24 AM by Zayda Sanchez to [facility] TRIAGE

## 2019-03-05 ENCOUNTER — TELEPHONE (OUTPATIENT)
Dept: ENDOCRINOLOGY | Facility: CLINIC | Age: 64
End: 2019-03-05

## 2019-03-05 DIAGNOSIS — E11.8 TYPE 2 DIABETES MELLITUS WITH COMPLICATION, WITH LONG-TERM CURRENT USE OF INSULIN (H): Primary | ICD-10-CM

## 2019-03-05 DIAGNOSIS — Z79.4 TYPE 2 DIABETES MELLITUS WITH COMPLICATION, WITH LONG-TERM CURRENT USE OF INSULIN (H): Primary | ICD-10-CM

## 2019-03-05 NOTE — TELEPHONE ENCOUNTER
[facility] Call Center    Phone Message    May a detailed message be left on voicemail: yes    Reason for Call: Other: PT wanted to schedule labs but orders were not in her chart.  Please follow up with the PT.      Action Taken: Message routed to:  [facility] endo

## 2019-03-06 DIAGNOSIS — Z96.659 S/P TKR (TOTAL KNEE REPLACEMENT): Primary | ICD-10-CM

## 2019-03-06 RX ORDER — AMOXICILLIN 500 MG/1
TABLET, FILM COATED ORAL
Qty: 4 TABLET | Refills: 4 | Status: SHIPPED | OUTPATIENT
Start: 2019-03-06 | End: 2019-11-12

## 2019-03-13 ENCOUNTER — OFFICE VISIT (OUTPATIENT)
Dept: FAMILY MEDICINE | Facility: CLINIC | Age: 64
End: 2019-03-13
Payer: COMMERCIAL

## 2019-03-13 VITALS
DIASTOLIC BLOOD PRESSURE: 71 MMHG | TEMPERATURE: 98.7 F | HEART RATE: 92 BPM | OXYGEN SATURATION: 95 % | SYSTOLIC BLOOD PRESSURE: 112 MMHG | RESPIRATION RATE: 18 BRPM

## 2019-03-13 DIAGNOSIS — J40 BRONCHITIS: ICD-10-CM

## 2019-03-13 DIAGNOSIS — J18.9 PNEUMONIA OF RIGHT LOWER LOBE DUE TO INFECTIOUS ORGANISM: Primary | ICD-10-CM

## 2019-03-13 ASSESSMENT — ENCOUNTER SYMPTOMS
STRIDOR: 0
WHEEZING: 0
FATIGUE: 1
DIAPHORESIS: 0
COUGH: 1
SHORTNESS OF BREATH: 0
CHEST TIGHTNESS: 0
CHILLS: 0
FEVER: 0
CHOKING: 0

## 2019-03-13 NOTE — PROGRESS NOTES
HPI:       63 year old patient who presents with:    Recheck of pneumonia and bronchitis.  Completed course of azithromycin.  Cough and malaise improved but not resolved.  No fevers.           Problem, Medication and Allergy Lists were reviewed and are current.  Patient is an established patient of this clinic.         Review of Systems:     Review of Systems   Constitutional: Positive for fatigue. Negative for chills, diaphoresis and fever.   Respiratory: Positive for cough. Negative for choking, chest tightness, shortness of breath, wheezing and stridor.    Cardiovascular: Negative for chest pain.          Physical Exam:     /71   Pulse 92   Temp 98.7  F (37.1  C)   Resp 18   SpO2 95%   Breastfeeding? No     There is no height or weight on file to calculate BMI.  Vitals reviewed.    Physical Exam   Constitutional: She appears well-developed and well-nourished. No distress.   Pulmonary/Chest: Breath sounds normal. No stridor. No respiratory distress. She has no wheezes. She has no rales.   Skin: She is not diaphoretic.           Results:       Assessment and Plan     Radha was seen today for recheck.    Diagnoses and all orders for this visit:    Pneumonia of right lower lobe due to infectious organism (H).  Rales have resolved.  Observe.    Bronchitis.  Improving gradually.  She was encouraged to get up and increase activity daily.          Options for treatment and follow-up care were reviewed with the patient. Radha MG Phuong engaged in the decision making process and verbalized understanding of the options discussed and agreed with the final plan.    Mohan Moreno MD

## 2019-04-05 ENCOUNTER — TELEPHONE (OUTPATIENT)
Dept: ENDOCRINOLOGY | Facility: CLINIC | Age: 64
End: 2019-04-05

## 2019-04-05 NOTE — TELEPHONE ENCOUNTER
MD Easley/MD Penn patient:    Reason for call:  Other   Patient called regarding (reason for call): call back  Additional comments: Patient is requesting a call back from a nurse. She said that at her last appointment with MD Easley she was unable to finish her Health History questionnaire, and wants to complete that. She wants a copy sent to her through the [employer], but is also requesting a call directly to her from a nurse.    Phone number to reach patient:  Cell number on file:    Telephone Information:   Mobile [phone]       Best Time:  Anytime    Can we leave a detailed message on this number?  YES

## 2019-04-08 NOTE — TELEPHONE ENCOUNTER
"Spoke with patient and explained that she would be able to finish her tablet at her next appointment with Dr Penn on 05/06/2019. Patient's only concern was she was rushed to finish it at her last appointment and she just wanted the opportunity to \"review\" some of her answers.  She will do this at her next visit.   Patient agrees with this plan.    "

## 2019-04-10 NOTE — TELEPHONE ENCOUNTER
FUTURE VISIT INFORMATION      FUTURE VISIT INFORMATION:    Date: 4/30/19    Time: 1PM    [place] [facility] [place]ERRAL INFORMATION:    Referring provider:  Dr. Xi Easley    Referring providers clinic:   Weight Management    Reason for visit/diagnosis:  BRBPR, constipation - unspecified type, diarrhea - unspecified type    NOTES STATUS DETAILS   OFFICE NOTE from referring provider  Internal 11/20/18   OFFICE NOTE from other specialist   Internal 4/10/17   DISCHARGE SUMMARY FROM HOSPITAL Internal 4/7/13   DISCHARGE REPORT FROM ED N/A    OPERATIVE REPORT  N/A    PFC REPORT N/A    MEDICATION LIST Internal    LABS     FIT/STOOL TESTING N/A    PERTINENT LABS Internal    PATHOLOGY REPORTS RELATED TO DIAGNOSIS N/A    DIAGNOSTIC PROCEDURES     COLONOSCOPY Received 1/14/15 (Images in Media tab)   ENDOSCOPY (EGD) N/A    ERCP N/A    EUS N/A    FLEX SIGMOIDOSCOPY N/A    IMAGING & REPORT      CT, MRI, US, XR N/A

## 2019-04-18 DIAGNOSIS — E11.9 DIABETES MELLITUS, TYPE 2 (H): ICD-10-CM

## 2019-04-26 ENCOUNTER — TELEPHONE (OUTPATIENT)
Dept: GASTROENTEROLOGY | Facility: CLINIC | Age: 64
End: 2019-04-26

## 2019-04-26 NOTE — TELEPHONE ENCOUNTER
Called and left message for patient reminding of appointment scheduled on 4/30/19 at 1pm with [facility]. Patient to arrive 15 min early. To reschedule or cancel patient to call [phone].    CLAUDINE Amezquita

## 2019-04-29 ASSESSMENT — ENCOUNTER SYMPTOMS
HEADACHES: 1
NERVOUS/ANXIOUS: 1
MUSCLE CRAMPS: 1
PANIC: 1
WEIGHT LOSS: 1
WEAKNESS: 1
POSTURAL DYSPNEA: 1
ABDOMINAL PAIN: 1
POOR WOUND HEALING: 0
SHORTNESS OF BREATH: 0
HEMOPTYSIS: 0
SINUS PAIN: 1
DECREASED LIBIDO: 0
HEARTBURN: 1
SMELL DISTURBANCE: 1
JAUNDICE: 0
INSOMNIA: 1
TROUBLE SWALLOWING: 1
MUSCLE WEAKNESS: 1
MEMORY LOSS: 0
SYNCOPE: 0
SPEECH CHANGE: 0
HYPERTENSION: 1
SKIN CHANGES: 0
PARALYSIS: 0
MYALGIAS: 1
SPUTUM PRODUCTION: 1
NUMBNESS: 1
WHEEZING: 1
WEIGHT GAIN: 1
LOSS OF CONSCIOUSNESS: 0
DIARRHEA: 1
ORTHOPNEA: 1
EYE REDNESS: 0
HALLUCINATIONS: 0
DYSPNEA ON EXERTION: 1
DEPRESSION: 1
EXERCISE INTOLERANCE: 0
DIFFICULTY URINATING: 0
ALTERED TEMPERATURE REGULATION: 0
TREMORS: 0
STIFFNESS: 1
NAIL CHANGES: 1
SORE THROAT: 1
NIGHT SWEATS: 0
DECREASED CONCENTRATION: 0
NECK MASS: 0
PALPITATIONS: 0
ARTHRALGIAS: 1
RECTAL PAIN: 1
HOT FLASHES: 0
EYE WATERING: 0
HEMATURIA: 0
JOINT SWELLING: 1
SINUS CONGESTION: 1
DIZZINESS: 0
FEVER: 1
FATIGUE: 1
DYSURIA: 0
INCREASED ENERGY: 1
BACK PAIN: 1
NECK PAIN: 1
CONSTIPATION: 1
BLOATING: 1
DISTURBANCES IN COORDINATION: 0
CHILLS: 1
EYE PAIN: 1
SLEEP DISTURBANCES DUE TO BREATHING: 0
EYE IRRITATION: 1
LEG PAIN: 1
LIGHT-HEADEDNESS: 0
SEIZURES: 0
COUGH DISTURBING SLEEP: 1
VOMITING: 1
COUGH: 1
POLYPHAGIA: 1
POLYDIPSIA: 1
TINGLING: 1
HOARSE VOICE: 1
TASTE DISTURBANCE: 0
NAUSEA: 1
DECREASED APPETITE: 1
SNORES LOUDLY: 1
DOUBLE VISION: 0
HYPOTENSION: 0
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
## 2019-04-30 ENCOUNTER — OFFICE VISIT (OUTPATIENT)
Dept: GASTROENTEROLOGY | Facility: CLINIC | Age: 64
End: 2019-04-30
Payer: COMMERCIAL

## 2019-04-30 ENCOUNTER — PRE VISIT (OUTPATIENT)
Dept: GASTROENTEROLOGY | Facility: CLINIC | Age: 64
End: 2019-04-30

## 2019-04-30 VITALS
BODY MASS INDEX: 44.29 KG/M2 | DIASTOLIC BLOOD PRESSURE: 65 MMHG | OXYGEN SATURATION: 94 % | SYSTOLIC BLOOD PRESSURE: 138 MMHG | HEART RATE: 87 BPM | TEMPERATURE: 99 F | WEIGHT: 250 LBS

## 2019-04-30 DIAGNOSIS — K59.09 OTHER CONSTIPATION: Primary | ICD-10-CM

## 2019-04-30 RX ORDER — POLYETHYLENE GLYCOL 3350 17 G/17G
1 POWDER, FOR SOLUTION ORAL DAILY
Qty: 60 PACKET | Refills: 3 | Status: SHIPPED | OUTPATIENT
Start: 2019-04-30 | End: 2019-07-17

## 2019-04-30 ASSESSMENT — PAIN SCALES - GENERAL: PAINLEVEL: NO PAIN (0)

## 2019-04-30 NOTE — NURSING NOTE
Chief Complaint   Patient presents with     Consult     New consult DX bright red blood per rectum, constipation, unspecified type, diarrhea     Vitals:    04/30/19 1303   BP: 138/65   Pulse: 87   Temp: 99  F (37.2  C)   TempSrc: Oral   SpO2: 94%   Weight: 113.4 kg (250 lb)     Body mass index is 44.29 kg/m .  Miguelito Gonzalez, CMA

## 2019-04-30 NOTE — PATIENT INSTRUCTIONS
- Start taking Miralax 1 packet daily, up titrate to 2 or 3 times daily with goal of 1 soft BM daily   Container    Follow up in 3 months with Ms. Arce       For questions regarding your care Monday through Friday, contact the RN GI care coordinator,  Call   [phone] . Your call will be  returned same day, or if consultation is needed with the provider, it may be following business day - or you may send a My Chart message.    For medication refills (prescribed by the GI clinic), contact your pharmacy.    For appointment rescheduling/cancellation, contact [phone]     After hours, or if you have an immediate GI concern and cannot wait for a return call, contact the GI Fellow at [phone] and select option #4.
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
## 2019-05-05 ASSESSMENT — ENCOUNTER SYMPTOMS
JOINT SWELLING: 1
HOARSE VOICE: 0
BLOOD IN STOOL: 0
EXERCISE INTOLERANCE: 0
NAIL CHANGES: 1
RECTAL PAIN: 1
WHEEZING: 1
LEG PAIN: 1
VOMITING: 1
SINUS PAIN: 1
DIARRHEA: 1
FATIGUE: 1
DYSPNEA ON EXERTION: 0
NUMBNESS: 1
PALPITATIONS: 0
EYE REDNESS: 1
STIFFNESS: 1
DECREASED APPETITE: 0
POSTURAL DYSPNEA: 1
LOSS OF CONSCIOUSNESS: 0
WEIGHT LOSS: 0
HYPOTENSION: 0
HEMOPTYSIS: 0
NECK MASS: 0
WEIGHT GAIN: 1
POLYPHAGIA: 1
FEVER: 0
MEMORY LOSS: 0
CONSTIPATION: 1
POOR WOUND HEALING: 0
DECREASED CONCENTRATION: 1
BLOATING: 1
HYPERTENSION: 1
PARALYSIS: 0
SPUTUM PRODUCTION: 1
DECREASED LIBIDO: 1
INCREASED ENERGY: 0
BOWEL INCONTINENCE: 0
EYE IRRITATION: 1
BRUISES/BLEEDS EASILY: 0
SYNCOPE: 0
WEAKNESS: 1
COUGH: 1
BACK PAIN: 1
EYE PAIN: 1
TASTE DISTURBANCE: 0
COUGH DISTURBING SLEEP: 1
HEADACHES: 1
DEPRESSION: 1
ABDOMINAL PAIN: 1
SEIZURES: 0
NERVOUS/ANXIOUS: 1
ALTERED TEMPERATURE REGULATION: 0
SLEEP DISTURBANCES DUE TO BREATHING: 0
SHORTNESS OF BREATH: 1
NECK PAIN: 1
HALLUCINATIONS: 0
DIZZINESS: 1
CHILLS: 0
SINUS CONGESTION: 1
DYSURIA: 0
HEARTBURN: 1
SKIN CHANGES: 0
DOUBLE VISION: 0
SMELL DISTURBANCE: 0
LIGHT-HEADEDNESS: 1
EYE WATERING: 0
TINGLING: 1
POLYDIPSIA: 0
HEMATURIA: 0
INSOMNIA: 1
SPEECH CHANGE: 0
SWOLLEN GLANDS: 0
DIFFICULTY URINATING: 0
MUSCLE WEAKNESS: 1
SNORES LOUDLY: 1
ORTHOPNEA: 1
NAUSEA: 1
ARTHRALGIAS: 1
HOT FLASHES: 0
SORE THROAT: 1
NIGHT SWEATS: 0
TREMORS: 0
DISTURBANCES IN COORDINATION: 0
MUSCLE CRAMPS: 1
JAUNDICE: 0
PANIC: 1
MYALGIAS: 1
TROUBLE SWALLOWING: 0
FLANK PAIN: 1

## 2019-05-06 ENCOUNTER — OFFICE VISIT (OUTPATIENT)
Dept: ENDOCRINOLOGY | Facility: CLINIC | Age: 64
End: 2019-05-06
Payer: COMMERCIAL

## 2019-05-06 VITALS
DIASTOLIC BLOOD PRESSURE: 56 MMHG | WEIGHT: 250.3 LBS | SYSTOLIC BLOOD PRESSURE: 124 MMHG | HEIGHT: 64 IN | OXYGEN SATURATION: 96 % | BODY MASS INDEX: 42.73 KG/M2 | HEART RATE: 81 BPM

## 2019-05-06 DIAGNOSIS — E66.01 MORBID OBESITY (H): Primary | ICD-10-CM

## 2019-05-06 DIAGNOSIS — Z79.4 TYPE 2 DIABETES MELLITUS WITH COMPLICATION, WITH LONG-TERM CURRENT USE OF INSULIN (H): ICD-10-CM

## 2019-05-06 DIAGNOSIS — E11.8 TYPE 2 DIABETES MELLITUS WITH COMPLICATION, WITH LONG-TERM CURRENT USE OF INSULIN (H): ICD-10-CM

## 2019-05-06 ASSESSMENT — MIFFLIN-ST. JEOR: SCORE: 1670.03

## 2019-05-06 NOTE — NURSING NOTE
"  Chief Complaint   Patient presents with     Weight Problem     RMWM     Vitals:    05/06/19 1048   BP: 124/56   Pulse: 81   SpO2: 96%   Weight: 113.5 kg (250 lb 4.8 oz)   Height: 1.625 m (5' 3.98\")     Body mass index is 42.99 kg/m .  Miguelito Gonzalez CMA    "

## 2019-05-06 NOTE — PATIENT INSTRUCTIONS
"- use \"carb manager\" siria for tacking carbs  - aim for <150 grams per day. Even better less than 120 grams per day  - increase walking as tolerated.     Philip Penn MD  Endocrinology, Diabetes and Metabolism  [facility]    "

## 2019-05-06 NOTE — LETTER
"2019       RE: Radha Baca  [address] [address]     Dear Colleague,    Thank you for referring your patient, Radha Baca, to the [facility] MEDICAL WEIGHT MANAGEMENT at [facility]. Please see a copy of my visit note below.    Return Medical Weight Management Note     Radha Baca  MRN:  [id number]  :  1955  JAZMINE: 19    Dear Mohan Moreno,    I had the pleasure of seeing your patient Radha Baca.  She is a 63 year old female who I am continuing to see for treatment of obesity related to:       2018   I have the following co-morbidities associated with obesity: Type II Diabetes, High Blood Pressure, High Cholesterol, Weight Bearing Joint Pain       INTERVAL HISTORY:  This is her first visit with me. She saw Dr. Easley in consultation for weight management in 2018.   She was advised then to start Trulicity for diabetes and weight loss. She did not start that due to fear of side effects.   Has lots of stomach issues including Constipation. Fluttering in stomach  Depression  Hair loss    Diet: avoiding tempting food (pop corn, chips), trying to add vegetables  Morning: raisin bran cereal with milk, or oatmeal, +/- toast or juice  Uses myfitnesspal but not consistent. Finds it overwhelming. Prefers to count and track carbs.     Starting meditation class.     Exercise: going to the [facility]. Using the machines + swimming. Started on bike    For her diabetes, she sees Dr. saez in Endocrine clinic. She is on Lantus, Novolog, Metformin. Last A1c 10.3 2018.     CURRENT WEIGHT:   250 lbs 4.8 oz    Wt Readings from Last 4 Encounters:   19 113.5 kg (250 lb 4.8 oz)   19 113.4 kg (250 lb)   18 114.8 kg (253 lb)   18 114.8 kg (253 lb 1.6 oz)       Height:  5' 3.98\"  Body Mass Index:  Body mass index is 42.99 kg/m .  Vitals:  B/P: Data Unavailable, P: Data Unavailable, R: " Data Unavailable     Initial consult weight was 255 lb on 11/20/2018.  Weight change since last seen on 11/20/2019 is down 5 pounds.   Total loss is 5 pounds.    Diet and Activity Changes Since Last Visit Reviewed With Patient 5/1/2019   I have made the following changes to my diet since my last visit: drinking more water   With regards to my diet, I am still struggling with: portions   I have made the following changes to my activity/exercise since my last visit: added NuStep to routine; continue doing water exercises; alternate days; 3-4x per week   With regards to my activity/exercise, I am still struggling with: pain       ROS    MEDICATIONS:   Current Outpatient Medications   Medication     Acetaminophen (TYLENOL PO)     amoxicillin (AMOXIL) 500 MG tablet     Ascorbic Acid (VITAMIN C PO)     aspirin 81 MG tablet     atorvastatin (LIPITOR) 20 MG tablet     B Complex Vitamins (VITAMIN  B COMPLEX) CAPS     TIAGO CONTOUR test strip     blood glucose calibration (TIAGO CONTOUR) Normal solution     blood glucose monitoring (TIAGO MICROLET) lancets     calcium-vitamin D (CALTRATE) 600-400 MG-UNIT per tablet     cholecalciferol (VITAMIN D) 1000 UNIT tablet     cycloSPORINE (RESTASIS) 0.05 % ophthalmic emulsion     insulin glargine (LANTUS SOLOSTAR PEN) 100 UNIT/ML pen     insulin pen needle (B-D U/F) 31G X 8 MM miscellaneous     latanoprost (XALATAN) 0.005 % ophthalmic solution     lisinopril-hydrochlorothiazide (PRINZIDE/ZESTORETIC) 20-12.5 MG per tablet     metFORMIN (GLUCOPHAGE) 500 MG tablet     Multiple Vitamin (MULTIVITAMIN  S) CAPS     Multiple Vitamins-Minerals (EYE-ZAKIYA PLUS LUTEIN PO)     NOVOLOG FLEXPEN 100 UNIT/ML soln     Omega-3 Fatty Acids (OMEGA-3 FISH OIL PO)     order for DME     timolol (TIMOPTIC) 0.5 % ophthalmic solution     triamcinolone (KENALOG) 0.1 % ointment     guaiFENesin-codeine (ROBITUSSIN AC) 100-10 MG/5ML solution     miconazole (MONISTAT 1 DAY OR NIGHT) 1200 & 2 MG & % kit      "polyethylene glycol (MIRALAX/GLYCOLAX) packet     No current facility-administered medications for this visit.        Weight Loss Medication History Reviewed With Patient 5/1/2019   Which weight loss medications are you currently taking on a regular basis?  None   If you are not taking a weight loss medication that was prescribed to you, please indicate why: I am on too many medications and not ready to add another medication   Are you having any side effects from the weight loss medication that we have prescribed you? No       ASSESSMENT:   Morbid obesity  Type 2 diabetes    Complicated family dynamics and lots of stress at home.   Tried to re-focus the patient to visit-related issues.    PLAN:   - Small goals  Patient does not want more meds    Advised:   - use \"carb manager\" siria for tacking carbs  - aim for <150 grams per day. Even better less than 120 grams per day  - increase walking as tolerated.       FOLLOW-UP:    3 months    Time: 30 min spent on evaluation, management, counseling, education, & motivational interviewing with greater than 50 % of the total time was spent on counseling and coordinating care    Again, thank you for allowing me to participate in the care of your patient.      Sincerely,    Philip Penn MD      "

## 2019-05-06 NOTE — TELEPHONE ENCOUNTER
RECORDS RECEIVED FROM: bilateral foot pain, no hx of surgery on feet, no imaging. Former pt of Dr. Rivera per pt   DATE RECEIVED: May 14, 2019    NOTES STATUS DETAILS   OFFICE NOTE from referring provider Internal Dr. Rivera 12/8/14   OFFICE NOTE from other specialist N/A    DISCHARGE SUMMARY from hospital N/A    DISCHARGE REPORT from the ER N/A    OPERATIVE REPORT N/A    MEDICATION LIST Internal    IMPLANT RECORD/STICKER N/A    LABS     CBC/DIFF N/A    CULTURES N/A    INJECTIONS DONE IN RADIOLOGY N/A    MRI N/A    CT SCAN N/A    XRAYS (IMAGES & REPORTS) Internal Right foot: 12/8/14   TUMOR     PATHOLOGY  Slides & report N/A

## 2019-05-06 NOTE — PROGRESS NOTES
"Return Medical Weight Management Note     Radha Baca  MRN:  [id number]  :  1955  JAZMINE: 18    Dear Mohan Moreno,    I had the pleasure of seeing your patient Radha Baca.  She is a 63 year old female who I am continuing to see for treatment of obesity related to:       2018   I have the following co-morbidities associated with obesity: Type II Diabetes, High Blood Pressure, High Cholesterol, Weight Bearing Joint Pain       INTERVAL HISTORY:  This is her first visit with me. She saw Dr. Easley in consultation for weight management in 2018.   She was advised then to start Trulicity for diabetes and weight loss. She did not start that due to fear of side effects.   Has lots of stomach issues including Constipation. Fluttering in stomach  Depression  Hair loss    Diet: avoiding tempting food (pop corn, chips), trying to add vegetables  Morning: raisin bran cereal with milk, or oatmeal, +/- toast or juice  Uses myfitnesspal but not consistent. Finds it overwhelming. Prefers to count and track carbs.     Starting meditation class.     Exercise: going to the [employer]. Using the machines + swimming. Started on bike    For her diabetes, she sees Dr. saez in Endocrine clinic. She is on Lantus, Novolog, Metformin. Last A1c 10.3 October 2018.     CURRENT WEIGHT:   250 lbs 4.8 oz    Wt Readings from Last 4 Encounters:   19 113.5 kg (250 lb 4.8 oz)   19 113.4 kg (250 lb)   18 114.8 kg (253 lb)   18 114.8 kg (253 lb 1.6 oz)       Height:  5' 3.98\"  Body Mass Index:  Body mass index is 42.99 kg/m .  Vitals:  B/P: Data Unavailable, P: Data Unavailable, R: Data Unavailable     Initial consult weight was 255 lb on 2018.  Weight change since last seen on 2019 is down 5 pounds.   Total loss is 5 pounds.    Diet and Activity Changes Since Last Visit Reviewed With Patient 2019   I have made the following changes to my diet since my last visit: " drinking more water   With regards to my diet, I am still struggling with: portions   I have made the following changes to my activity/exercise since my last visit: added NuStep to routine; continue doing water exercises; alternate days; 3-4x per week   With regards to my activity/exercise, I am still struggling with: pain       ROS    MEDICATIONS:   Current Outpatient Medications   Medication     Acetaminophen (TYLENOL PO)     amoxicillin (AMOXIL) 500 MG tablet     Ascorbic Acid (VITAMIN C PO)     aspirin 81 MG tablet     atorvastatin (LIPITOR) 20 MG tablet     B Complex Vitamins (VITAMIN  B COMPLEX) CAPS     TIAGO CONTOUR test strip     blood glucose calibration (TIAGO CONTOUR) Normal solution     blood glucose monitoring (TIAGO MICROLET) lancets     calcium-vitamin D (CALTRATE) 600-400 MG-UNIT per tablet     cholecalciferol (VITAMIN D) 1000 UNIT tablet     cycloSPORINE (RESTASIS) 0.05 % ophthalmic emulsion     insulin glargine (LANTUS SOLOSTAR PEN) 100 UNIT/ML pen     insulin pen needle (B-D U/F) 31G X 8 MM miscellaneous     latanoprost (XALATAN) 0.005 % ophthalmic solution     lisinopril-hydrochlorothiazide (PRINZIDE/ZESTORETIC) 20-12.5 MG per tablet     metFORMIN (GLUCOPHAGE) 500 MG tablet     Multiple Vitamin (MULTIVITAMIN  S) CAPS     Multiple Vitamins-Minerals (EYE-ZAKIYA PLUS LUTEIN PO)     NOVOLOG FLEXPEN 100 UNIT/ML soln     Omega-3 Fatty Acids (OMEGA-3 FISH OIL PO)     order for DME     timolol (TIMOPTIC) 0.5 % ophthalmic solution     triamcinolone (KENALOG) 0.1 % ointment     guaiFENesin-codeine (ROBITUSSIN AC) 100-10 MG/5ML solution     miconazole (MONISTAT 1 DAY OR NIGHT) 1200 & 2 MG & % kit     polyethylene glycol (MIRALAX/GLYCOLAX) packet     No current facility-administered medications for this visit.        Weight Loss Medication History Reviewed With Patient 5/1/2019   Which weight loss medications are you currently taking on a regular basis?  None   If you are not taking a weight loss medication  "that was prescribed to you, please indicate why: I am on too many medications and not ready to add another medication   Are you having any side effects from the weight loss medication that we have prescribed you? No       ASSESSMENT:   Morbid obesity  Type 2 diabetes    Complicated family dynamics and lots of stress at home.   Tried to re-focus the patient to visit-related issues.    PLAN:   - Small goals  Patient does not want more meds    Advised:   - use \"carb manager\" siria for tacking carbs  - aim for <150 grams per day. Even better less than 120 grams per day  - increase walking as tolerated.       FOLLOW-UP:    3 months    Time: 30 min spent on evaluation, management, counseling, education, & motivational interviewing with greater than 50 % of the total time was spent on counseling and coordinating care    Sincerely,    Philip Penn MD  "

## 2019-05-14 ENCOUNTER — PRE VISIT (OUTPATIENT)
Dept: ORTHOPEDICS | Facility: CLINIC | Age: 64
End: 2019-05-14

## 2019-05-14 ENCOUNTER — OFFICE VISIT (OUTPATIENT)
Dept: ORTHOPEDICS | Facility: CLINIC | Age: 64
End: 2019-05-14
Payer: COMMERCIAL

## 2019-05-14 DIAGNOSIS — E11.8 TYPE 2 DIABETES MELLITUS WITH COMPLICATION, WITH LONG-TERM CURRENT USE OF INSULIN (H): Primary | ICD-10-CM

## 2019-05-14 DIAGNOSIS — B35.1 OM (ONYCHOMYCOSIS): ICD-10-CM

## 2019-05-14 DIAGNOSIS — E11.42 DIABETIC POLYNEUROPATHY ASSOCIATED WITH TYPE 2 DIABETES MELLITUS (H): ICD-10-CM

## 2019-05-14 DIAGNOSIS — L85.3 XEROSIS CUTIS: ICD-10-CM

## 2019-05-14 DIAGNOSIS — Z79.4 TYPE 2 DIABETES MELLITUS WITH COMPLICATION, WITH LONG-TERM CURRENT USE OF INSULIN (H): Primary | ICD-10-CM

## 2019-05-14 DIAGNOSIS — I87.2 VENOUS (PERIPHERAL) INSUFFICIENCY: ICD-10-CM

## 2019-05-14 NOTE — LETTER
5/14/2019       RE: Radha Baca  [address] [facility] [address]     Dear Colleague,    Thank you for referring your patient, Radha Baca, to the [facility]. Please see a copy of my visit note below.    Date of Service: 5/14/2019    Chief Complaint   Patient presents with     Consult     Diabetic foot care. Toe nail trimming. Patient stated that she has seen Dr. Rivera in the past. Patient also stated that she misplaced her shoes and insert and that it has been a few years. Low back sciatica pain. New symptom: tingling. Dry skin, patient uses OTC urea.          HPI: Radha is a 63 year old female who presents today for further evaluation of multiple complaints. Relates that the right heel is dry and cracked. Relates that her toes are becoming more hammered and painful. She is having trouble trimming her nails. She would like a new pair of diabetic shoes. Relates that she has had an increase in her A1c values over the last few months.     PMH:   Past Medical History:   Diagnosis Date     Abnormal Pap smear      Anxiety      Arthritis      Arthritis of knee 4/4/2013     Arthritis of shoulder region, right 4/18/2014     Arthritis of shoulder region, right 4/18/2014     Back injury      Breast disorder      Chronic constipation      Chronic diarrhea      Depressive disorder      Diabetes (H)      Fecal incontinence      Finger pain 4/2/2015     Fracture broke L 5th pinky finger due to fall  1/2015     Glaucoma (increased eye pressure)      Head injury 8/6/2016     Headache(784.0)     decreased with mouth guard use.     History of blood transfusion 8/2011 & 4/2013     History of diabetes mellitus      Hypercholesteremia      Hypertension      Low back pain      Menarche age 9+    cycles q mo x 4-5 d     Neck injuries      Nonsenile cataract IO implants: L-8/2013; R-1/2011     Pain in knee joint     LEFT     Right  bundle branch block     per H/P     SNHL (sensorineural hearing loss)      Umbilical hernia without mention of obstruction or gangrene 8/2014     Vision disorder Detached Retina 10/2009       PSxH:   Past Surgical History:   Procedure Laterality Date     ARTHROPLASTY KNEE  4/4/2013    Left Total Knee Arthroplasty;  Surgeon: Lou Byrne MD;  [facility]     ARTHROPLASTY MINIMALLY INVASIVE KNEE  8/22/2011    R knee :CAITLYN JACOBO, Left age 14     COLONOSCOPY  1/14/2015     DENTAL SURGERY      root canals, wisdom teeth     EXAM UNDER ANESTHESIA, MANIPULATE JOINT (LOCATION)  10/5/2012    Procedure: EXAM UNDER ANESTHESIA, MANIPULATE JOINT (LOCATION);  Right Knee Mini Open Lysis Of Adhesions, Left Knee Steroid Injection  ;  Surgeon: Lou Byrne MD;  [facility]     [facility] OR OCULAR DEVICE INTRAOP DETACHED RETINA  2009    R     HC PHAKIC IOL - IMPLANT FROM SURGEON      right     KNEE SURGERY  1969    left     LASER YAG CAPSULOTOMY  12/2016    left     VITRECTOMY PARSPLANA  2010       Allergies: Nkda [no known drug allergies]    SH:   Social History     Socioeconomic History     Marital status:      Spouse name: Not on file     Number of children: Not on file     Years of education: Not on file     Highest education level: Not on file   Occupational History     Occupation: Human Resources     Comment: between jobs now   Social Needs     Financial resource strain: Not on file     Food insecurity:     Worry: Not on file     Inability: Not on file     Transportation needs:     Medical: Not on file     Non-medical: Not on file   Tobacco Use     Smoking status: Former Smoker     Smokeless tobacco: Never Used     Tobacco comment: quit 35 years ago   Substance and Sexual Activity     Alcohol use: No     Drug use: No     Sexual activity: Yes     Partners: Male     Birth control/protection: Post-menopausal   Lifestyle     Physical activity:     Days per week: Not on file     Minutes per session:  Not on file     Stress: Not on file   Relationships     Social connections:     Talks on phone: Not on file     Gets together: Not on file     Attends Caodaism service: Not on file     Active member of club or organization: Not on file     Attends meetings of clubs or organizations: Not on file     Relationship status: Not on file     Intimate partner violence:     Fear of current or ex partner: Not on file     Emotionally abused: Not on file     Physically abused: Not on file     Forced sexual activity: Not on file   Other Topics Concern      Service Not Asked     Blood Transfusions Yes     Comment: 2 units 2011     Caffeine Concern No     Comment: 2s     Occupational Exposure No     Hobby Hazards No     Sleep Concern No     Stress Concern No     Comment: rehab for R knee after replacement     Weight Concern Yes     Comment: working on wt loss     Special Diet Yes     Comment: Diabetic     Back Care No     Exercise No     Comment: water aerobics 35-40' 3-4 d & strength 3d/wk     Bike Helmet No     Seat Belt No     Self-Exams Not Asked     Parent/sibling w/ CABG, MI or angioplasty before 65F 55M? Not Asked   Social History Narrative    How much exercise per week? 3-4x swimming, aerobics    How much calcium per day? Supplement       How much caffeine per day? 2 cups coffee/ 1-2 can of diet soda    How much vitamin D per day? Supplement    Do you/your family wear seatbelts?  Yes    Do you/your family use safety helmets? No    Do you/your family use sunscreen? No    Do you/your family keep firearms in the home? No    Do you/your family have a smoke detector(s)? Yes    Do you feel safe in your home? Yes    Has anyone ever touched you in an unwanted manner? No     Explain     See LEA Berman 11/26/2014    Reviewed Raul DEGROOT MA 11/22/2017       FH:   Family History   Problem Relation Age of Onset     Diabetes Father 55        DM II     Diabetes Sister 45        DM II     Diabetes Brother 50        xs 2      Hypertension Sister 41        also B and M     Cancer Maternal Aunt         multiple myeloma     Thyroid Disease Sister 45        graves     Hypertension Brother         ? age     Hypertension Mother 79     Osteoporosis Mother      Diabetes Brother      Hypertension Brother      Diabetes Brother      Hypertension Brother      Breast Cancer Cousin      Thyroid Disease Sister         Graves     Cerebrovascular Disease Maternal Grandmother      Cerebrovascular Disease No family hx of      Cancer - colorectal No family hx of      Prostate Cancer No family hx of      Alcohol/Drug No family hx of      Melanoma No family hx of      Skin Cancer No family hx of        Objective:  Data Unavailable Data Unavailable Data Unavailable Data Unavailable Data Unavailable 0 lbs 0 oz    PT and DP pulses are not palpable bilaterally. CRT is 4 seconds. Diminished pedal hair.   Gross sensation is diminished, as well as protective sensation bilaterally.   Equinus is noted bilaterally. No pain with active or passive ROM of the ankle, MTJ, 1st ray, or halluces bilaterally,.   Nails thickened, brittle, discolored, with subungual debris bilaterally. No open lesions are noted. Xerosis noted BL.     Assessment: Dm2 with neuropathy.  Onychomycosis. She would like treatment for this. She has had liver enzymes that were the higher end of normal. Because of the thickness of her nails, she has a lower cure rate. I do no think the risk of increased liver enzymes outweighs the success rate.   Xerosis    Plan:  - Pt seen and evaluated.  - Nails trimmed x 10.  - Rx for new diabetic shoes.   - Start [employer] for diabetics for the xerosis.  - See again in 4 months.    Again, thank you for allowing me to participate in the care of your patient.      Sincerely,    Jerrell Austin DPM

## 2019-05-14 NOTE — NURSING NOTE
Reason For Visit:   Chief Complaint   Patient presents with     Consult     Diabetic foot care. Toe nail trimming. Patient stated that she has seen Dr. Rivera in the past. Patient also stated that she misplaced her shoes and insert and that it has been a few years. Low back sciatica pain. New symptom: tingling. Dry skin, patient uses OTC urea.         Pain Assessment  Patient Currently in Pain: Yes  Primary Pain Location: (Bilateral heels, low back. )  Pain Descriptors: Tingling        Allergies   Allergen Reactions     Nkda [No Known Drug Allergies]            Екатерина Lundy LPN

## 2019-05-14 NOTE — PROGRESS NOTES
Date of Service: 5/14/2019    Chief Complaint   Patient presents with     Consult     Diabetic foot care. Toe nail trimming. Patient stated that she has seen Dr. Rivera in the past. Patient also stated that she misplaced her shoes and insert and that it has been a few years. Low back sciatica pain. New symptom: tingling. Dry skin, patient uses OTC urea.            HPI: Radha is a 63 year old female who presents today for further evaluation of multiple complaints. Relates that the right heel is dry and cracked. Relates that her toes are becoming more hammered and painful. She is having trouble trimming her nails. She would like a new pair of diabetic shoes. Relates that she has had an increase in her A1c values over the last few months.     Review of Systems: Answers for HPI/ROS submitted by the patient on 5/5/2019   General Symptoms: Yes  Skin Symptoms: Yes  HENT Symptoms: Yes  EYE SYMPTOMS: Yes  HEART SYMPTOMS: Yes  LUNG SYMPTOMS: Yes  INTESTINAL SYMPTOMS: Yes  URINARY SYMPTOMS: Yes  GYNECOLOGIC SYMPTOMS: Yes  BREAST SYMPTOMS: No  SKELETAL SYMPTOMS: Yes  BLOOD SYMPTOMS: Yes  NERVOUS SYSTEM SYMPTOMS: Yes  MENTAL HEALTH SYMPTOMS: Yes  Fever: No  Loss of appetite: No  Weight loss: No  Weight gain: Yes  Fatigue: Yes  Night sweats: No  Chills: No  Increased stress: Yes  Excessive hunger: Yes  Excessive thirst: No  Feeling hot or cold when others believe the temperature is normal: No  Loss of height: Yes  Post-operative complications: No  Surgical site pain: No  Hallucinations: No  Change in or Loss of Energy: No  Hyperactivity: No  Confusion: No  Changes in hair: Yes  Changes in moles/birth marks: No  Itching: No  Rashes: No  Changes in nails: Yes  Acne: No  Hair in places you don't want it: Yes  Change in facial hair: Yes  Warts: No  Non-healing sores: No  Scarring: No  Flaking of skin: No  Color changes of hands/feet in cold : No  Sun sensitivity: No  Skin thickening: No  Ear pain: Yes  Ear discharge: No  Hearing  loss: Yes  Tinnitus: Yes  Nosebleeds: No  Congestion: Yes  Sinus pain: Yes  Trouble swallowing: No   Voice hoarseness: No  Mouth sores: No  Sore throat: Yes  Tooth pain: No  Gum tenderness: No  Bleeding gums: No  Change in taste: No  Change in sense of smell: No  Dry mouth: No  Hearing aid used: No  Neck lump: No  Eye pain: Yes  Vision loss: Yes  Dry eyes: Yes  Watery eyes: No  Eye bulging: No  Double vision: No  Flashing of lights: No  Spots: No  Floaters: Yes  Redness: Yes  Crossed eyes: No  Tunnel Vision: No  Yellowing of eyes: No  Eye irritation: Yes  Cough: Yes  Sputum or phlegm: Yes  Coughing up blood: No  Difficulty breating or shortness of breath: Yes  Snoring: Yes  Wheezing: Yes  Difficulty breathing on exertion: No  Nighttime Cough: Yes  Difficulty breathing when lying flat: Yes  Chest pain or pressure: Yes  Fast or irregular heartbeat: No  Pain in legs with walking: Yes  Trouble breathing while lying down: Yes  Fingers or toes appear blue: No  High blood pressure: Yes  Low blood pressure: No  Fainting: No  Murmurs: No  Pacemaker: No  Varicose veins: Yes  Edema or swelling: Yes  Wake up at night with shortness of breath: No  Light-headedness: Yes  Exercise intolerance: No  Heart burn or indigestion: Yes  Nausea: Yes  Vomiting: Yes  Abdominal pain: Yes  Bloating: Yes  Constipation: Yes  Diarrhea: Yes  Blood in stool: No  Black stools: No  Rectal or Anal pain: Yes  Fecal incontinence: No  Yellowing of skin or eyes: No  Vomit with blood: No  Change in stools: Yes  Trouble holding urine or incontinence: No  Pain or burning: No  Trouble starting or stopping: No  Increased frequency of urination: Yes  Blood in urine: No  Decreased frequency of urination: No  Frequent nighttime urination: Yes  Flank pain: Yes  Difficulty emptying bladder: No  Back pain: Yes  Muscle aches: Yes  Neck pain: Yes  Swollen joints: Yes  Joint pain: Yes  Bone pain: Yes  Muscle cramps: Yes  Muscle weakness: Yes  Joint stiffness: Yes  Bone  fracture: No  Anemia: No  Swollen glands: No  Easy bleeding or bruising: No  Trouble with coordination: No  Dizziness or trouble with balance: Yes  Fainting or black-out spells: No  Memory loss: No  Headache: Yes  Seizures: No  Speech problems: No  Tingling: Yes  Tremor: No  Weakness: Yes  Difficulty walking: Yes  Paralysis: No  Numbness: Yes  Bleeding or spotting between periods: No  Heavy or painful periods: No  Irregular periods: No  Vaginal discharge: No  Hot flashes: No  Vaginal dryness: Yes  Genital ulcers: No  Reduced libido: Yes  Painful intercourse: Yes  Difficulty with sexual arousal: No  Post-menopausal bleeding: No  Nervous or Anxious: Yes  Depression: Yes  Trouble sleeping: Yes  Trouble thinking or concentrating: Yes  Mood changes: Yes  Panic attacks: Yes      PMH:   Past Medical History:   Diagnosis Date     Abnormal Pap smear      Anxiety      Arthritis      Arthritis of knee 4/4/2013     Arthritis of shoulder region, right 4/18/2014     Arthritis of shoulder region, right 4/18/2014     Back injury      Breast disorder      Chronic constipation      Chronic diarrhea      Depressive disorder      Diabetes (H)      Fecal incontinence      Finger pain 4/2/2015     Fracture broke L 5th pinky finger due to fall  1/2015     Glaucoma (increased eye pressure)      Head injury 8/6/2016     Headache(784.0)     decreased with mouth guard use.     History of blood transfusion 8/2011 & 4/2013     History of diabetes mellitus      Hypercholesteremia      Hypertension      Low back pain      Menarche age 9+    cycles q mo x 4-5 d     Neck injuries      Nonsenile cataract IO implants: L-8/2013; R-1/2011     Pain in knee joint     LEFT     Right bundle branch block     per H/P     SNHL (sensorineural hearing loss)      Umbilical hernia without mention of obstruction or gangrene 8/2014     Vision disorder Detached Retina 10/2009       PSxH:   Past Surgical History:   Procedure Laterality Date     ARTHROPLASTY KNEE   4/4/2013    Left Total Knee Arthroplasty;  Surgeon: Lou Byrne MD;  [facility]     ARTHROPLASTY MINIMALLY INVASIVE KNEE  8/22/2011    R knee :JAMARILERCAITLYN, Left age 14     COLONOSCOPY  1/14/2015     DENTAL SURGERY      root canals, wisdom teeth     EXAM UNDER ANESTHESIA, MANIPULATE JOINT (LOCATION)  10/5/2012    Procedure: EXAM UNDER ANESTHESIA, MANIPULATE JOINT (LOCATION);  Right Knee Mini Open Lysis Of Adhesions, Left Knee Steroid Injection  ;  Surgeon: Lou Byrne MD;  [facility]     [facility] OR OCULAR DEVICE INTRAOP DETACHED RETINA  2009    R     HC PHAKIC IOL - IMPLANT FROM SURGEON      right     KNEE SURGERY  1969    left     LASER YAG CAPSULOTOMY  12/2016    left     VITRECTOMY PARSPLANA  2010       Allergies: Nkda [no known drug allergies]    SH:   Social History     Socioeconomic History     Marital status:      Spouse name: Not on file     Number of children: Not on file     Years of education: Not on file     Highest education level: Not on file   Occupational History     Occupation: Human Resources     Comment: between jobs now   Social Needs     Financial resource strain: Not on file     Food insecurity:     Worry: Not on file     Inability: Not on file     Transportation needs:     Medical: Not on file     Non-medical: Not on file   Tobacco Use     Smoking status: Former Smoker     Smokeless tobacco: Never Used     Tobacco comment: quit 35 years ago   Substance and Sexual Activity     Alcohol use: No     Drug use: No     Sexual activity: Yes     Partners: Male     Birth control/protection: Post-menopausal   Lifestyle     Physical activity:     Days per week: Not on file     Minutes per session: Not on file     Stress: Not on file   Relationships     Social connections:     Talks on phone: Not on file     Gets together: Not on file     Attends Episcopal service: Not on file     Active member of club or organization: Not on file     Attends meetings of clubs or  organizations: Not on file     Relationship status: Not on file     Intimate partner violence:     Fear of current or ex partner: Not on file     Emotionally abused: Not on file     Physically abused: Not on file     Forced sexual activity: Not on file   Other Topics Concern      Service Not Asked     Blood Transfusions Yes     Comment: 2 units 2011     Caffeine Concern No     Comment: 2s     Occupational Exposure No     Hobby Hazards No     Sleep Concern No     Stress Concern No     Comment: rehab for R knee after replacement     Weight Concern Yes     Comment: working on wt loss     Special Diet Yes     Comment: Diabetic     Back Care No     Exercise No     Comment: water aerobics 35-40' 3-4 d & strength 3d/wk     Bike Helmet No     Seat Belt No     Self-Exams Not Asked     Parent/sibling w/ CABG, MI or angioplasty before 65F 55M? Not Asked   Social History Narrative    How much exercise per week? 3-4x swimming, aerobics    How much calcium per day? Supplement       How much caffeine per day? 2 cups coffee/ 1-2 can of diet soda    How much vitamin D per day? Supplement    Do you/your family wear seatbelts?  Yes    Do you/your family use safety helmets? No    Do you/your family use sunscreen? No    Do you/your family keep firearms in the home? No    Do you/your family have a smoke detector(s)? Yes    Do you feel safe in your home? Yes    Has anyone ever touched you in an unwanted manner? No     Explain     See LEA Berman 11/26/2014    Reviewed Raul DEGROOT MA 11/22/2017       FH:   Family History   Problem Relation Age of Onset     Diabetes Father 55        DM II     Diabetes Sister 45        DM II     Diabetes Brother 50        xs 2     Hypertension Sister 41        also B and M     Cancer Maternal Aunt         multiple myeloma     Thyroid Disease Sister 45        graves     Hypertension Brother         ? age     Hypertension Mother 79     Osteoporosis Mother      Diabetes Brother      Hypertension Brother       Diabetes Brother      Hypertension Brother      Breast Cancer Cousin      Thyroid Disease Sister         Graves     Cerebrovascular Disease Maternal Grandmother      Cerebrovascular Disease No family hx of      Cancer - colorectal No family hx of      Prostate Cancer No family hx of      Alcohol/Drug No family hx of      Melanoma No family hx of      Skin Cancer No family hx of        Objective:  Data Unavailable Data Unavailable Data Unavailable Data Unavailable Data Unavailable 0 lbs 0 oz    PT and DP pulses are not palpable bilaterally. CRT is 4 seconds. Diminished pedal hair.   Gross sensation is diminished, as well as protective sensation bilaterally.   Equinus is noted bilaterally. No pain with active or passive ROM of the ankle, MTJ, 1st ray, or halluces bilaterally,.   Nails thickened, brittle, discolored, with subungual debris bilaterally. No open lesions are noted. Xerosis noted BL.     Assessment: Dm2 with neuropathy.  Onychomycosis. She would like treatment for this. She has had liver enzymes that were the higher end of normal. Because of the thickness of her nails, she has a lower cure rate. I do no think the risk of increased liver enzymes outweighs the success rate.   Xerosis    Plan:  - Pt seen and evaluated.  - Nails trimmed x 10.  - Rx for new diabetic shoes.   - Start [employer] for diabetics for the xerosis.  - See again in 4 months.

## 2019-05-14 NOTE — PROGRESS NOTES
I performed a history and physical examination of the above patient and discussed the management with Dr. Rosenberg on 4/30/2019. I reviewed the note and there are no changes to the past medical, family or social history.  A complete 10 point review of systems was obtained. Please see the HPI for pertinent positives and negatives. All other systems were reviewed and were found to be negative.     I agree with the documented findings and plan of care as outlined.    Lea Hobbs MD  GI Attending  Pager: [phone]

## 2019-05-16 ENCOUNTER — TRANSFERRED RECORDS (OUTPATIENT)
Dept: HEALTH INFORMATION MANAGEMENT | Facility: CLINIC | Age: 64
End: 2019-05-16

## 2019-05-18 DIAGNOSIS — I10 ESSENTIAL HYPERTENSION: ICD-10-CM

## 2019-05-20 RX ORDER — LISINOPRIL AND HYDROCHLOROTHIAZIDE 12.5; 2 MG/1; MG/1
1 TABLET ORAL DAILY
Qty: 90 TABLET | Refills: 1 | Status: SHIPPED | OUTPATIENT
Start: 2019-05-20 | End: 2019-11-25

## 2019-06-20 DIAGNOSIS — Q66.6 PES PLANOVALGUS: ICD-10-CM

## 2019-06-20 DIAGNOSIS — Z79.4 TYPE 2 DIABETES MELLITUS WITH COMPLICATION, WITH LONG-TERM CURRENT USE OF INSULIN (H): Primary | ICD-10-CM

## 2019-06-20 DIAGNOSIS — E11.8 TYPE 2 DIABETES MELLITUS WITH COMPLICATION, WITH LONG-TERM CURRENT USE OF INSULIN (H): Primary | ICD-10-CM

## 2019-07-10 ENCOUNTER — TELEPHONE (OUTPATIENT)
Dept: GASTROENTEROLOGY | Facility: CLINIC | Age: 64
End: 2019-07-10

## 2019-07-15 ASSESSMENT — ENCOUNTER SYMPTOMS
HEMATURIA: 0
ABDOMINAL PAIN: 1
MUSCLE WEAKNESS: 1
POLYDIPSIA: 0
WEIGHT GAIN: 0
BACK PAIN: 1
ARTHRALGIAS: 1
CONSTIPATION: 1
COUGH: 0
HEARTBURN: 1
FLANK PAIN: 0
WEAKNESS: 1
DOUBLE VISION: 0
NAIL CHANGES: 1
INCREASED ENERGY: 1
SINUS CONGESTION: 0
PALPITATIONS: 0
SHORTNESS OF BREATH: 1
NERVOUS/ANXIOUS: 1
VOMITING: 1
SMELL DISTURBANCE: 0
HEMOPTYSIS: 0
FATIGUE: 1
EYE WATERING: 0
DECREASED APPETITE: 0
TREMORS: 0
MUSCLE CRAMPS: 1
SKIN CHANGES: 0
HEADACHES: 1
LIGHT-HEADEDNESS: 0
NAUSEA: 1
LEG PAIN: 1
WEIGHT LOSS: 0
TASTE DISTURBANCE: 0
TROUBLE SWALLOWING: 0
BLOOD IN STOOL: 0
DEPRESSION: 1
DISTURBANCES IN COORDINATION: 0
SPEECH CHANGE: 0
SLEEP DISTURBANCES DUE TO BREATHING: 0
CHILLS: 0
DIZZINESS: 0
SEIZURES: 0
DYSPNEA ON EXERTION: 0
PARALYSIS: 0
EYE IRRITATION: 0
SYNCOPE: 0
EYE REDNESS: 0
LOSS OF CONSCIOUSNESS: 0
HOT FLASHES: 0
NECK PAIN: 1
SNORES LOUDLY: 1
DECREASED CONCENTRATION: 1
EXERCISE INTOLERANCE: 0
HYPERTENSION: 1
ORTHOPNEA: 1
HALLUCINATIONS: 0
ALTERED TEMPERATURE REGULATION: 0
STIFFNESS: 1
SINUS PAIN: 1
MYALGIAS: 1
DIFFICULTY URINATING: 0
RECTAL PAIN: 1
EYE PAIN: 0
NIGHT SWEATS: 0
MEMORY LOSS: 0
HYPOTENSION: 0
POSTURAL DYSPNEA: 1
DIARRHEA: 1
DYSURIA: 0
SPUTUM PRODUCTION: 0
DECREASED LIBIDO: 0
FEVER: 0
POOR WOUND HEALING: 0
JAUNDICE: 0
BOWEL INCONTINENCE: 1
BLOATING: 1
SORE THROAT: 0
WHEEZING: 0
HOARSE VOICE: 0
INSOMNIA: 1
PANIC: 1
POLYPHAGIA: 1
NUMBNESS: 1
COUGH DISTURBING SLEEP: 0
NECK MASS: 0
JOINT SWELLING: 1
TINGLING: 1

## 2019-07-16 ENCOUNTER — TELEPHONE (OUTPATIENT)
Dept: OBGYN | Facility: CLINIC | Age: 64
End: 2019-07-16

## 2019-07-16 DIAGNOSIS — E11.8 TYPE 2 DIABETES MELLITUS WITH COMPLICATION (H): ICD-10-CM

## 2019-07-16 NOTE — TELEPHONE ENCOUNTER
Patient called to request result of Pap done in 2018. No pap completed, but wet prep was done in December 2018. Last Pap and HPV completed in 2014.    Called patient but reached voicemail. Left message to call triage.

## 2019-07-16 NOTE — TELEPHONE ENCOUNTER
----- Message from La Benedict sent at 7/15/2019  4:53 PM CDT -----  Regarding: FW: results  Contact: [phone]      ----- Message -----  From: Zuly Pal  Sent: 7/15/2019   4:06 PM  To: [facility]  Subject: results                                          Helelaina pt had a pap done either 11/28/18 or 12/20/18- and stated she is unsure on if she got results back, she would like a call back.  Thanks!

## 2019-07-17 ENCOUNTER — OFFICE VISIT (OUTPATIENT)
Dept: GASTROENTEROLOGY | Facility: CLINIC | Age: 64
End: 2019-07-17
Payer: COMMERCIAL

## 2019-07-17 VITALS
OXYGEN SATURATION: 94 % | BODY MASS INDEX: 43.14 KG/M2 | HEIGHT: 64 IN | DIASTOLIC BLOOD PRESSURE: 48 MMHG | SYSTOLIC BLOOD PRESSURE: 122 MMHG | WEIGHT: 252.7 LBS | HEART RATE: 86 BPM

## 2019-07-17 DIAGNOSIS — K59.00 CONSTIPATION, UNSPECIFIED CONSTIPATION TYPE: Primary | ICD-10-CM

## 2019-07-17 ASSESSMENT — PAIN SCALES - GENERAL: PAINLEVEL: NO PAIN (0)

## 2019-07-17 ASSESSMENT — MIFFLIN-ST. JEOR: SCORE: 1680.92

## 2019-07-17 NOTE — PROGRESS NOTES
GI CLINIC VISIT    CC/REFERRING MD:  Mohan Rosenberg  REASON FOR CONSULTATION: follow-up     ASSESSMENT/PLAN:  1. Constipation  63 year old female with PMH of obesity, DM, arthritis, HTN, HLD, umbilical hernia presenting to GI clinic for constipation.  TSH and calcium within normal limits.  Symptoms likely related to irritable bowel syndrome with constipation versus slow transit constipation in the setting of diabetes mellitus.  Patient did not start MiraLAX after last visit as she wanted to treat constipation naturally.  Has tried dietary modifications including adding in more vegetables and foods with fiber however still has irregular stool pattern with skipped days in between bowel movements, which can be followed by hard stools and then episodes of loose stools.  We discussed plan to start soluble fiber supplementation with Metamucil or Citrucel with plan to increase to effect.  If she skips multiple days without a bowel movement, can use as needed MiraLAX.  Should also continue to aggressively treat/control diabetes. Can discuss possible dietary triggers and the low FODMAP diet in addition to weight loss with our dietitian.  Patient was instructed to track bowel movements and symptoms on a blank calender until then. No evidence of pelvic floor dysfunction on rectal exam done at prior visit, however patient notes difficulty with straining and having a bowel movement- could consider referral for evaluation for pelvic floor dysfunction pending response to other interventions.    In regards to small amount of bright red blood per rectum associated to bowel movements, will continue to monitor for improvement with improvement in stool consistency.  If continued blood at follow-up visit, will consider referral to colorectal surgery for endoscopy versus colonoscopy.    -- Soluble fiber supplementation on a daily basis can help regulate the consistency of your stools. Metamucil, Citrucel or Benefiber are all  examples. You can start with 1 teaspoons per day and if tolerated, may increase up to 2-3 tablespoons per day. You may experience some bloating with initiation of fiber supplementation that will improve over the first month.  A good fiber trial to evaluate the effect is 3-6 months.  -- meet with our dietitian Va Garcia   -- track bowel movements on blank calender using [place] Stool Chart   -- Can use Miralax as needed for a bowel movement (1 capful)     Cancer Screening: Repeat colonoscopy in 2020      [facility] 3 months     Thank you for this consultation.  It was a pleasure to participate in the care of this patient; please contact us with any further questions.       Pili Arce PA-C  Division of Gastroenterology, Hepatology & Nutrition  [facility]        HPI  Radha Baca is a 63 year old female with PMH of obesity, DM, arthritis, HTN, HLD, umbilical hernia presenting to GI clinic for follow-up for constipation. She has previously been seen by Dr. Rosenberg and Dr. Hobbs and this is my first encounter with the patient.     At her initial visit, noted life-long constipation worsened in the past 5-6 years. Bowel pattern: BM every 2-3 days with abdominal pain diffusely, but worse around umbilical hernia, when constipated that improves with BMs. starts with hard pellet stool [place] 1 that turns into [place] 4-7 for another 3-4 episodes, which are associated with perianal irritation and occasional BRBPR on toilet paper. Has needed to use manual maneuvers with her finger in past to help have BM. +incomplete evacuation. Will sit on toilet for prolonged period of time. Has tried dietary changes such as eating more fruits/veggies with no change, eggs trigger bowel movements. Past medications include: Dulcolax (no improvement). Had been using rare Milk of magnesia which helps, exercise improves symptoms. Has had 1 episdose of incontinence, some underwear staining. No prior childbirth.      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      Prior work-up:   10/2018 hematocrit normal  10/2017 CBC normal  A1c 10 2018  TSH wnl 2018    After last visit, was instructed to take Miralax on a daily basis. Today she reports she did not start Miralax, wanted to get better at doing things naturally (more fruits, vegetables, reduce bread and crackers, Increased raisin bran- seems to help a lot). Eggs are a natural laxative. Bowel pattern depends on diet, can be hard and difficult to pass with small amount of bright red blood per rectum. Can then become more loose and end in diarrhea. Has been going on average every other day, can skip days in between bowel movements. Denies urgency, has increased gas, \"rectal spasms\". Has pain on left side, and middle abdomen going to hernia \"twinging pain\".       ROS:    No fevers or chills  No weight loss  No blurry vision, double vision or change in vision  No sore throat  No lymphadenopathy  No headache, paraesthesias, or weakness in a limb  No shortness of breath or wheezing  No chest pain or pressure  + arthralgias or myalgias  + rashes or skin changes  No odynophagia or dysphagia  No BRBPR, hematochezia, melena  No dysuria, frequency or urgency  No hot/cold intolerance or polyria  No anxiety or depression    PROBLEM LIST  Patient Active Problem List    Diagnosis Date Noted     Other secondary osteoarthritis of first carpometacarpal joint of right hand 01/25/2018     Priority: Medium     Venous (peripheral) insufficiency 02/27/2017     Priority: Medium     Chronic bilateral low back pain with right-sided sciatica 02/27/2017     Priority: Medium     Type 2 diabetes mellitus with complication (H) 04/19/2016     Priority: Medium     Essential hypertension 04/19/2016     Priority: Medium     Encounter for routine gynecological examination 11/18/2015     Priority: Medium     11/2019: screen pap due  11/26/14: Pap->NIL; HR HPV->negative     "     Health care maintenance 01/14/2015     Priority: Medium     Colonoscopy 1/15:  1 sessile polyp removed.  Next colonoscopy 2020.       BMI >40 11/26/2014     Priority: Medium     12/9/2016: BMI 42.93  12/6/2011: BMI 42  Down from 50!       Menopause present -- age 39 11/26/2014     Priority: Medium     Umbilical hernia -- w/o symptoms->expectant mgt 08/27/2014     Priority: Medium     Problem list name updated by automated process. Provider to review       Glaucoma 04/09/2013     Priority: Medium     SNHL (sensorineural hearing loss) 12/17/2012     Priority: Medium     Dyslipidemia 08/27/2011     Priority: Medium       PERTINENT PAST MEDICAL HISTORY:  Past Medical History:   Diagnosis Date     Abnormal Pap smear      Anxiety      Arthritis      Arthritis of knee 4/4/2013     Arthritis of shoulder region, right 4/18/2014     Arthritis of shoulder region, right 4/18/2014     Back injury      Breast disorder      Chronic constipation      Chronic diarrhea      Depressive disorder      Diabetes (H)      Fecal incontinence      Finger pain 4/2/2015     Fracture broke L 5th pinky finger due to fall  1/2015     Glaucoma (increased eye pressure)      Head injury 8/6/2016     Headache(784.0)     decreased with mouth guard use.     History of blood transfusion 8/2011 & 4/2013     History of diabetes mellitus      Hypercholesteremia      Hypertension      Low back pain      Menarche age 9+    cycles q mo x 4-5 d     Neck injuries      Nonsenile cataract IO implants: L-8/2013; R-1/2011     Pain in knee joint     LEFT     Right bundle branch block     per H/P     SNHL (sensorineural hearing loss)      Umbilical hernia without mention of obstruction or gangrene 8/2014     Vision disorder Detached Retina 10/2009       PREVIOUS SURGERIES:  Past Surgical History:   Procedure Laterality Date     ARTHROPLASTY KNEE  4/4/2013    Left Total Knee Arthroplasty;  Surgeon: Lou Byrne MD;  [facility]     ARTHROPLASTY  MINIMALLY INVASIVE KNEE  8/22/2011    R knee :CAITLYN JACOBO, Left age 14     COLONOSCOPY  1/14/2015     DENTAL SURGERY      root canals, wisdom teeth     EXAM UNDER ANESTHESIA, MANIPULATE JOINT (LOCATION)  10/5/2012    Procedure: EXAM UNDER ANESTHESIA, MANIPULATE JOINT (LOCATION);  Right Knee Mini Open Lysis Of Adhesions, Left Knee Steroid Injection  ;  Surgeon: Lou Byrne MD;  [facility]     [facility] OR OCULAR DEVICE INTRAOP DETACHED RETINA  2009    R     [facility] PHAKIC IOL - IMPLANT FROM SURGEON      right     KNEE SURGERY  1969    left     LASER YAG CAPSULOTOMY  12/2016    left     VITRECTOMY PARSPLANA  2010       PREVIOUS ENDOSCOPY:  1/14/15 Colon - 2 mm AC polyp (TA) and diverticulosis    ALLERGIES:     Allergies   Allergen Reactions     Nkda [No Known Drug Allergies]        PERTINENT MEDICATIONS:    Current Outpatient Medications:      Acetaminophen (TYLENOL PO), Take by mouth as needed for mild pain or fever, Disp: , Rfl:      amoxicillin (AMOXIL) 500 MG tablet, Take 4 tablets by mouth one hour before dental Appointment., Disp: 4 tablet, Rfl: 4     Ascorbic Acid (VITAMIN C PO), Take 2,000 mg by mouth 2 times daily , Disp: , Rfl:      aspirin 81 MG tablet, 1 tab daily, Disp: 90 tablet, Rfl: 3     atorvastatin (LIPITOR) 20 MG tablet, Take 1 tablet (20 mg) by mouth daily, Disp: 90 tablet, Rfl: 3     B Complex Vitamins (VITAMIN  B COMPLEX) CAPS, Take 1 tablet by mouth daily , Disp: , Rfl:      TIAGO CONTOUR test strip, Use to test blood sugar 3 times daily or as directed., Disp: 300 each, Rfl: 3     blood glucose calibration (TIAGO CONTOUR) Normal solution, Use to calibrate blood glucose monitor as needed as directed., Disp: 1 Bottle, Rfl: 11     blood glucose monitoring (TIAGO MICROLET) lancets, Test 4-6 times daily 90 day supply refills x 3, Disp: 600 each, Rfl: 0     calcium-vitamin D (CALTRATE) 600-400 MG-UNIT per tablet, Take 1 tablet by mouth 2 times daily, Disp: , Rfl:      cholecalciferol  (VITAMIN D) 1000 UNIT tablet, Take 1 tablet by mouth daily., Disp: 100 tablet, Rfl: 3     cycloSPORINE (RESTASIS) 0.05 % ophthalmic emulsion, , Disp: , Rfl:      guaiFENesin-codeine (ROBITUSSIN AC) 100-10 MG/5ML solution, Take 5-10 mLs by mouth every 4 hours as needed for cough (Patient not taking: Reported on 4/30/2019), Disp: 180 mL, Rfl: 0     insulin glargine (LANTUS SOLOSTAR PEN) 100 UNIT/ML pen, Inject 70 units SQ each am., Disp: 70 mL, Rfl: 3     insulin pen needle (B-D U/F) 31G X 8 MM miscellaneous, Use  4 daily short 31 G X 8MM, Disp: 400 each, Rfl: 3     latanoprost (XALATAN) 0.005 % ophthalmic solution, Place 1 drop into both eyes At Bedtime, Disp: , Rfl:      lisinopril-hydrochlorothiazide (PRINZIDE/ZESTORETIC) 20-12.5 MG tablet, Take 1 tablet by mouth daily, Disp: 90 tablet, Rfl: 1     metFORMIN (GLUCOPHAGE) 500 MG tablet, Take 2 tablets (1,000 mg) by mouth 2 times daily (with meals), Disp: 360 tablet, Rfl: 3     miconazole (MONISTAT 1 DAY OR NIGHT) 1200 & 2 MG & % kit, Use per the directions on the box for vaginal yeast infection (Patient not taking: Reported on 4/30/2019), Disp: 1 kit, Rfl: 1     Multiple Vitamin (MULTIVITAMIN  S) CAPS, Take 1 daily, Disp: 90 capsule, Rfl: 3     Multiple Vitamins-Minerals (EYE-ZAKIYA PLUS LUTEIN PO), , Disp: , Rfl:      NOVOLOG FLEXPEN 100 UNIT/ML soln, Carb counting with meals approx 70-80 units daily, Disp: 75 mL, Rfl: 3     Omega-3 Fatty Acids (OMEGA-3 FISH OIL PO), Take 1,000 mg by mouth daily, Disp: , Rfl:      order for DME, Equipment being ordered: Grade 1 (light) compression stockings, below the knee, Disp: 1 Units, Rfl: 1     polyethylene glycol (MIRALAX/GLYCOLAX) packet, Take 17 g by mouth daily (Patient not taking: Reported on 5/6/2019), Disp: 60 packet, Rfl: 3     timolol (TIMOPTIC) 0.5 % ophthalmic solution, Place 1 drop into both eyes 2 times daily , Disp: , Rfl:      triamcinolone (KENALOG) 0.1 % ointment, Apply topically 2 times daily, Disp: 80 g, Rfl:  11    SOCIAL HISTORY:  Social History     Socioeconomic History     Marital status:      Spouse name: Not on file     Number of children: Not on file     Years of education: Not on file     Highest education level: Not on file   Occupational History     Occupation: Human Resources     Comment: between jobs now   Social Needs     Financial resource strain: Not on file     Food insecurity:     Worry: Not on file     Inability: Not on file     Transportation needs:     Medical: Not on file     Non-medical: Not on file   Tobacco Use     Smoking status: Former Smoker     Smokeless tobacco: Never Used     Tobacco comment: quit 35 years ago   Substance and Sexual Activity     Alcohol use: No     Drug use: No     Sexual activity: Yes     Partners: Male     Birth control/protection: Post-menopausal   Lifestyle     Physical activity:     Days per week: Not on file     Minutes per session: Not on file     Stress: Not on file   Relationships     Social connections:     Talks on phone: Not on file     Gets together: Not on file     Attends Jainism service: Not on file     Active member of club or organization: Not on file     Attends meetings of clubs or organizations: Not on file     Relationship status: Not on file     Intimate partner violence:     Fear of current or ex partner: Not on file     Emotionally abused: Not on file     Physically abused: Not on file     Forced sexual activity: Not on file   Other Topics Concern      Service Not Asked     Blood Transfusions Yes     Comment: 2 units 2011     Caffeine Concern No     Comment: 2s     Occupational Exposure No     Hobby Hazards No     Sleep Concern No     Stress Concern No     Comment: rehab for R knee after replacement     Weight Concern Yes     Comment: working on wt loss     Special Diet Yes     Comment: Diabetic     Back Care No     Exercise No     Comment: water aerobics 35-40' 3-4 d & strength 3d/wk     Bike Helmet No     Seat Belt No     Self-Exams  "Not Asked     Parent/sibling w/ CABG, MI or angioplasty before 65F 55M? Not Asked   Social History Narrative    How much exercise per week? 3-4x swimming, aerobics    How much calcium per day? Supplement       How much caffeine per day? 2 cups coffee/ 1-2 can of diet soda    How much vitamin D per day? Supplement    Do you/your family wear seatbelts?  Yes    Do you/your family use safety helmets? No    Do you/your family use sunscreen? No    Do you/your family keep firearms in the home? No    Do you/your family have a smoke detector(s)? Yes    Do you feel safe in your home? Yes    Has anyone ever touched you in an unwanted manner? No     Explain     See LEA Berman 11/26/2014    Reviewed Raul DEGROOT MA 11/22/2017       FAMILY HISTORY:  Family History   Problem Relation Age of Onset     Diabetes Father 55        DM II     Diabetes Sister 45        DM II     Diabetes Brother 50        xs 2     Hypertension Sister 41        also B and M     Cancer Maternal Aunt         multiple myeloma     Thyroid Disease Sister 45        graves     Hypertension Brother         ? age     Hypertension Mother 79     Osteoporosis Mother      Diabetes Brother      Hypertension Brother      Diabetes Brother      Hypertension Brother      Breast Cancer Cousin      Thyroid Disease Sister         Graves     Cerebrovascular Disease Maternal Grandmother      Cerebrovascular Disease No family hx of      Cancer - colorectal No family hx of      Prostate Cancer No family hx of      Alcohol/Drug No family hx of      Melanoma No family hx of      Skin Cancer No family hx of        Past/family/social history reviewed and no changes    PHYSICAL EXAMINATION:  Constitutional: aaox3, cooperative, pleasant, not dyspneic/diaphoretic, no acute distress  Vitals reviewed: /48   Pulse 86   Ht 1.625 m (5' 3.98\")   Wt 114.6 kg (252 lb 11.2 oz)   SpO2 94%   BMI 43.40 kg/m    Wt:   Wt Readings from Last 2 Encounters:   05/06/19 113.5 kg (250 lb 4.8 oz) "   04/30/19 113.4 kg (250 lb)      Eyes: Sclera anicteric/injected  Ears/nose/mouth/throat: Normal oropharynx without ulcers or exudate, mucus membranes moist, hearing intact  CV: No edema  Respiratory: Unlabored breathing  Abd: obese, Non-distended  Skin: warm, perfused, no jaundice  Psych: Normal affect  MSK: Normal gait      PERTINENT STUDIES:  Office Visit on 12/20/2018   Component Date Value Ref Range Status     Trichomonas Wet Prep 12/20/2018 Neg   Final     Clue cells 12/20/2018 Neg   Final     Yeast Wet Prep 12/20/2018 Pos   Final       Answers for HPI/ROS submitted by the patient on 7/15/2019   General Symptoms: Yes  Skin Symptoms: Yes  HENT Symptoms: Yes  EYE SYMPTOMS: Yes  HEART SYMPTOMS: Yes  LUNG SYMPTOMS: Yes  INTESTINAL SYMPTOMS: Yes  URINARY SYMPTOMS: Yes  GYNECOLOGIC SYMPTOMS: Yes  BREAST SYMPTOMS: No  SKELETAL SYMPTOMS: Yes  BLOOD SYMPTOMS: No  NERVOUS SYSTEM SYMPTOMS: Yes  MENTAL HEALTH SYMPTOMS: Yes  Fever: No  Loss of appetite: No  Weight loss: No  Weight gain: No  Fatigue: Yes  Night sweats: No  Chills: No  Increased stress: Yes  Excessive hunger: Yes  Excessive thirst: No  Feeling hot or cold when others believe the temperature is normal: No  Loss of height: No  Post-operative complications: No  Surgical site pain: Yes  Hallucinations: No  Change in or Loss of Energy: Yes  Hyperactivity: No  Confusion: No  Changes in hair: Yes  Changes in moles/birth marks: No  Rashes: No  Changes in nails: Yes  Acne: No  Change in facial hair: Yes  Warts: No  Non-healing sores: No  Scarring: No  Flaking of skin: No  Color changes of hands/feet in cold : No  Sun sensitivity: No  Skin thickening: No  Ear pain: No  Ear discharge: No  Hearing loss: Yes  Tinnitus: Yes  Nosebleeds: No  Congestion: No  Sinus pain: Yes  Trouble swallowing: No   Voice hoarseness: No  Mouth sores: No  Sore throat: No  Tooth pain: No  Gum tenderness: Yes  Bleeding gums: Yes  Change in taste: No  Change in sense of smell: No  Dry mouth:  No  Hearing aid used: No  Neck lump: No  Eye pain: No  Vision loss: No  Dry eyes: Yes  Watery eyes: No  Eye bulging: No  Double vision: No  Flashing of lights: No  Spots: No  Floaters: Yes  Redness: No  Crossed eyes: No  Tunnel Vision: No  Yellowing of eyes: No  Eye irritation: No  Cough: No  Sputum or phlegm: No  Coughing up blood: No  Difficulty breating or shortness of breath: Yes  Snoring: Yes  Wheezing: No  Difficulty breathing on exertion: No  Nighttime Cough: No  Difficulty breathing when lying flat: Yes  Chest pain or pressure: No  Fast or irregular heartbeat: No  Pain in legs with walking: Yes  Trouble breathing while lying down: Yes  Fingers or toes appear blue: No  High blood pressure: Yes  Low blood pressure: No  Fainting: No  Murmurs: No  Pacemaker: No  Varicose veins: Yes  Edema or swelling: Yes  Wake up at night with shortness of breath: No  Light-headedness: No  Exercise intolerance: No  Heart burn or indigestion: Yes  Nausea: Yes  Vomiting: Yes  Abdominal pain: Yes  Bloating: Yes  Constipation: Yes  Diarrhea: Yes  Blood in stool: No  Black stools: No  Rectal or Anal pain: Yes  Fecal incontinence: Yes  Yellowing of skin or eyes: No  Vomit with blood: No  Change in stools: Yes  Trouble holding urine or incontinence: No  Pain or burning: No  Trouble starting or stopping: No  Increased frequency of urination: Yes  Blood in urine: No  Decreased frequency of urination: No  Frequent nighttime urination: Yes  Flank pain: No  Difficulty emptying bladder: No  Back pain: Yes  Muscle aches: Yes  Neck pain: Yes  Swollen joints: Yes  Joint pain: Yes  Bone pain: Yes  Muscle cramps: Yes  Muscle weakness: Yes  Joint stiffness: Yes  Bone fracture: No  Trouble with coordination: No  Dizziness or trouble with balance: No  Fainting or black-out spells: No  Memory loss: No  Headache: Yes  Seizures: No  Speech problems: No  Tingling: Yes  Tremor: No  Weakness: Yes  Difficulty walking: No  Paralysis: No  Numbness:  Yes  Bleeding or spotting between periods: No  Heavy or painful periods: No  Vaginal discharge: No  Hot flashes: No  Vaginal dryness: Yes  Genital ulcers: No  Reduced libido: No  Painful intercourse: No  Difficulty with sexual arousal: No  Post-menopausal bleeding: No  Nervous or Anxious: Yes  Depression: Yes  Trouble sleeping: Yes  Trouble thinking or concentrating: Yes  Mood changes: Yes  Panic attacks: Yes

## 2019-07-17 NOTE — NURSING NOTE
"Chief Complaint   Patient presents with     RECHECK     3 months follow up       Vitals:    07/17/19 1111   BP: 122/48   Pulse: 86   SpO2: 94%   Weight: 114.6 kg (252 lb 11.2 oz)   Height: 1.625 m (5' 3.98\")       Body mass index is 43.4 kg/m .    Georgette Gerardo CMA    "

## 2019-07-17 NOTE — PATIENT INSTRUCTIONS
It was a pleasure taking care of you today.  I've included a brief summary of our discussion and care plan from today's visit below.  Please review this information with your primary care provider.  ______________________________________________________________________    My recommendations are summarized as follows:    -- Soluble fiber supplementation on a daily basis can help regulate the consistency of your stools. Metamucil, Citrucel or Benefiber are all examples. You can start with 1 teaspoons per day and if tolerated, may increase up to 2-3 tablespoons per day. You may experience some bloating with initiation of fiber supplementation that will improve over the first month.  A good fiber trial to evaluate the effect is 3-6 months.    -- meet with our dietitian Va Garcia     -- track bowel movements on blank calender using [place] Stool Chart     -- Can use Miralax as needed for a bowel movement (1 capful)    Return to GI Clinic in 3 months to review your progress.    ______________________________________________________________________    Who do I call with any questions after my visit?  Please be in touch if there are any further questions that arise following today's visit.  There are multiple ways to contact your gastroenterology care team.        During business hours, you may reach a Gastroenterology nurse at [phone]      To schedule or reschedule an appointment, please call [phone].       You can always send a secure message through [employer].  [employer] messages are answered by your nurse or doctor typically within 24 hours.  Please allow extra time on weekends and holidays.        For urgent/emergent questions after business hours, you may reach the on-call GI Fellow by contacting the [facility] at [phone].     How will I get the results of any tests ordered?    You will receive all of your results.  If you have signed up for MyChart, any tests ordered at your visit  will be available to you after your physician reviews them.  Typically this takes 1-2 weeks.  If there are urgent results that require a change in your care plan, your physician or nurse will call you to discuss the next steps.      What is [employer]?  [employer] is a secure way for you to access all of your healthcare records from the [facility].  It is a web based computer program, so you can sign on to it from any location.  It also allows you to send secure messages to your care team.  I recommend signing up for [employer] access if you have not already done so and are comfortable with using a computer.      How to I schedule a follow-up visit?  If you did not schedule a follow-up visit today, please call [phone] to schedule a follow-up office visit.        Sincerely,    Pili Arce PA-C  Division of Gastroenterology, Hepatology & Nutrition  [facility]

## 2019-07-17 NOTE — LETTER
7/17/2019       RE: Radha Baca  [address] [facility] [address]     Dear Colleague,    Thank you for referring your patient, Radha Baca, to the [facility] at [facility]. Please see a copy of my visit note below.    GI CLINIC VISIT    CC/REFERRING MD:  Mohan Rosenberg  REASON FOR CONSULTATION: follow-up     ASSESSMENT/PLAN:  1. Constipation  63 year old female with PMH of obesity, DM, arthritis, HTN, HLD, umbilical hernia presenting to GI clinic for constipation.  TSH and calcium within normal limits.  Symptoms likely related to irritable bowel syndrome with constipation versus slow transit constipation in the setting of diabetes mellitus.  Patient did not start MiraLAX after last visit as she wanted to treat constipation naturally.  Has tried dietary modifications including adding in more vegetables and foods with fiber however still has irregular stool pattern with skipped days in between bowel movements, which can be followed by hard stools and then episodes of loose stools.  We discussed plan to start soluble fiber supplementation with Metamucil or Citrucel with plan to increase to effect.  If she skips multiple days without a bowel movement, can use as needed MiraLAX.  Should also continue to aggressively treat/control diabetes. Can discuss possible dietary triggers and the low FODMAP diet in addition to weight loss with our dietitian.  Patient was instructed to track bowel movements and symptoms on a blank calender until then. No evidence of pelvic floor dysfunction on rectal exam done at prior visit, however patient notes difficulty with straining and having a bowel movement- could consider referral for evaluation for pelvic floor dysfunction pending response to other interventions.    In regards to small amount of bright red blood per rectum associated to bowel movements, will continue to monitor for  improvement with improvement in stool consistency.  If continued blood at follow-up visit, will consider referral to colorectal surgery for endoscopy versus colonoscopy.    -- Soluble fiber supplementation on a daily basis can help regulate the consistency of your stools. Metamucil, Citrucel or Benefiber are all examples. You can start with 1 teaspoons per day and if tolerated, may increase up to 2-3 tablespoons per day. You may experience some bloating with initiation of fiber supplementation that will improve over the first month.  A good fiber trial to evaluate the effect is 3-6 months.  -- meet with our dietitian Va Garcia   -- track bowel movements on blank calender using [place] Stool Chart   -- Can use Miralax as needed for a bowel movement (1 capful)     Cancer Screening: Repeat colonoscopy in 2020      [facility] 3 months     Thank you for this consultation.  It was a pleasure to participate in the care of this patient; please contact us with any further questions.     Pili Arce PA-C  Division of Gastroenterology, Hepatology & Nutrition  [facility]    HPI  Radha Baca is a 63 year old female with PMH of obesity, DM, arthritis, HTN, HLD, umbilical hernia presenting to GI clinic for  follow-up for constipation. She has previously been seen by Dr. Rosenberg and Dr. Hobbs and this is my first encounter with the patient.     At her initial visit, noted life-long constipation worsened in the past 5-6 years. Bowel pattern: BM every 2-3 days with abdominal pain diffusely, but worse around umbilical hernia, when constipated that improves with BMs. starts with hard pellet stool Scioto 1 that turns into Scioto 4-7 for another 3-4 episodes, which are associated with perianal irritation and occasional BRBPR on toilet paper. Has needed to use manual maneuvers with her finger in past to help have BM. +incomplete evacuation. Will sit on toilet for prolonged period of time. Has tried dietary  "changes such as eating more fruits/veggies with no change, eggs trigger bowel movements. Past medications include: Dulcolax (no improvement). Had been using rare Milk of magnesia which helps, exercise improves symptoms. Has had 1 episdose of incontinence, some underwear staining. No prior childbirth.      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      Prior work-up:   10/2018 hematocrit normal  10/2017 CBC normal  A1c 10 2018  TSH wnl 2018    After last visit, was instructed to take Miralax on a daily basis. Today she reports she did not start Miralax, wanted to get better at doing things naturally (more fruits, vegetables, reduce bread and crackers, Increased raisin bran- seems to help a lot). Eggs are a natural laxative. Bowel pattern depends on diet, can be hard and difficult to pass with small amount of bright red blood per rectum. Can then become more loose and end in diarrhea. Has been going on average every other day, can skip days in between bowel movements. Denies urgency, has increased gas, \"rectal spasms\". Has pain on left side, and middle abdomen going to hernia \"twinging pain\".     ROS:    No fevers or chills  No weight loss  No blurry vision, double vision or change in vision  No sore throat  No lymphadenopathy  No headache, paraesthesias, or weakness in a limb  No shortness of breath or wheezing  No chest pain or pressure  + arthralgias or myalgias  + rashes or skin changes  No odynophagia or dysphagia  No BRBPR, hematochezia, melena  No dysuria, frequency or urgency  No hot/cold intolerance or polyria  No anxiety or depression    PROBLEM LIST  Patient Active Problem List    Diagnosis Date Noted     Other secondary osteoarthritis of first carpometacarpal joint of right hand 01/25/2018     Priority: Medium     Venous (peripheral) insufficiency 02/27/2017     Priority: Medium     Chronic bilateral low back pain with right-sided sciatica 02/27/2017     " Priority: Medium     Type 2 diabetes mellitus with complication (H) 04/19/2016     Priority: Medium     Essential hypertension 04/19/2016     Priority: Medium     Encounter for routine gynecological examination 11/18/2015     Priority: Medium     11/2019: screen pap due  11/26/14: Pap->NIL; HR HPV->negative         Health care maintenance 01/14/2015     Priority: Medium     Colonoscopy 1/15:  1 sessile polyp removed.  Next colonoscopy 2020.       BMI >40 11/26/2014     Priority: Medium     12/9/2016: BMI 42.93  12/6/2011: BMI 42  Down from 50!       Menopause present -- age 39 11/26/2014     Priority: Medium     Umbilical hernia -- w/o symptoms->expectant mgt 08/27/2014     Priority: Medium     Problem list name updated by automated process. Provider to review       Glaucoma 04/09/2013     Priority: Medium     SNHL (sensorineural hearing loss) 12/17/2012     Priority: Medium     Dyslipidemia 08/27/2011     Priority: Medium       PERTINENT PAST MEDICAL HISTORY:  Past Medical History:   Diagnosis Date     Abnormal Pap smear      Anxiety      Arthritis      Arthritis of knee 4/4/2013     Arthritis of shoulder region, right 4/18/2014     Arthritis of shoulder region, right 4/18/2014     Back injury      Breast disorder      Chronic constipation      Chronic diarrhea      Depressive disorder      Diabetes (H)      Fecal incontinence      Finger pain 4/2/2015     Fracture broke L 5th pinky finger due to fall  1/2015     Glaucoma (increased eye pressure)      Head injury 8/6/2016     Headache(784.0)     decreased with mouth guard use.     History of blood transfusion 8/2011 & 4/2013     History of diabetes mellitus      Hypercholesteremia      Hypertension      Low back pain      Menarche age 9+    cycles q mo x 4-5 d     Neck injuries      Nonsenile cataract IO implants: L-8/2013; R-1/2011     Pain in knee joint     LEFT     Right bundle branch block     per H/P     SNHL (sensorineural hearing loss)      Umbilical hernia  without mention of obstruction or gangrene 8/2014     Vision disorder Detached Retina 10/2009       PREVIOUS SURGERIES:  Past Surgical History:   Procedure Laterality Date     ARTHROPLASTY KNEE  4/4/2013    Left Total Knee Arthroplasty;  Surgeon: Lou Byrne MD;  [facility]     ARTHROPLASTY MINIMALLY INVASIVE KNEE  8/22/2011    R knee :CAITLYN JACOBO, Left age 14     COLONOSCOPY  1/14/2015     DENTAL SURGERY      root canals, wisdom teeth     EXAM UNDER ANESTHESIA, MANIPULATE JOINT (LOCATION)  10/5/2012    Procedure: EXAM UNDER ANESTHESIA, MANIPULATE JOINT (LOCATION);  Right Knee Mini Open Lysis Of Adhesions, Left Knee Steroid Injection  ;  Surgeon: Lou Byrne MD;  Location: [facility]     [facility] OR OCULAR DEVICE INTRAOP DETACHED RETINA  2009    R     [facility] PHAKIC IOL - IMPLANT FROM SURGEON      right     KNEE SURGERY  1969    left     LASER YAG CAPSULOTOMY  12/2016    left     VITRECTOMY PARSPLANA  2010       PREVIOUS ENDOSCOPY:  1/14/15 Colon - 2 mm AC polyp (TA) and diverticulosis    ALLERGIES:  Allergies   Allergen Reactions     Nkda [No Known Drug Allergies]        PERTINENT MEDICATIONS:  Current Outpatient Medications:      Acetaminophen (TYLENOL PO), Take by mouth as needed for mild pain or fever, Disp: , Rfl:      amoxicillin (AMOXIL) 500 MG tablet, Take 4 tablets by mouth one hour before dental Appointment., Disp: 4 tablet, Rfl: 4     Ascorbic Acid (VITAMIN C PO), Take 2,000 mg by mouth 2 times daily , Disp: , Rfl:      aspirin 81 MG tablet, 1 tab daily, Disp: 90 tablet, Rfl: 3     atorvastatin (LIPITOR) 20 MG tablet, Take 1 tablet (20 mg) by mouth daily, Disp: 90 tablet, Rfl: 3     B Complex Vitamins (VITAMIN  B COMPLEX) CAPS, Take 1 tablet by mouth daily , Disp: , Rfl:      TIAGO CONTOUR test strip, Use to test blood sugar 3 times daily or as directed., Disp: 300 each, Rfl: 3     blood glucose calibration (TIAGO CONTOUR) Normal solution, Use to calibrate blood glucose monitor as needed as  directed., Disp: 1 Bottle, Rfl: 11     blood glucose monitoring (TIAGO MICROLET) lancets, Test 4-6 times daily 90 day supply refills x 3, Disp: 600 each, Rfl: 0     calcium-vitamin D (CALTRATE) 600-400 MG-UNIT per tablet, Take 1 tablet by mouth 2 times daily, Disp: , Rfl:      cholecalciferol (VITAMIN D) 1000 UNIT tablet, Take 1 tablet by mouth daily., Disp: 100 tablet, Rfl: 3     cycloSPORINE (RESTASIS) 0.05 % ophthalmic emulsion, , Disp: , Rfl:      guaiFENesin-codeine (ROBITUSSIN AC) 100-10 MG/5ML solution, Take 5-10 mLs by mouth every 4 hours as needed for cough (Patient not taking: Reported on 4/30/2019), Disp: 180 mL, Rfl: 0     insulin glargine (LANTUS SOLOSTAR PEN) 100 UNIT/ML pen, Inject 70 units SQ each am., Disp: 70 mL, Rfl: 3     insulin pen needle (B-D U/F) 31G X 8 MM miscellaneous, Use  4 daily short 31 G X 8MM, Disp: 400 each, Rfl: 3     latanoprost (XALATAN) 0.005 % ophthalmic solution, Place 1 drop into both eyes At Bedtime, Disp: , Rfl:      lisinopril-hydrochlorothiazide (PRINZIDE/ZESTORETIC) 20-12.5 MG tablet, Take 1 tablet by mouth daily, Disp: 90 tablet, Rfl: 1     metFORMIN (GLUCOPHAGE) 500 MG tablet, Take 2 tablets (1,000 mg) by mouth 2 times daily (with meals), Disp: 360 tablet, Rfl: 3     miconazole (MONISTAT 1 DAY OR NIGHT) 1200 & 2 MG & % kit, Use per the directions on the box for vaginal yeast infection (Patient not taking: Reported on 4/30/2019), Disp: 1 kit, Rfl: 1     Multiple Vitamin (MULTIVITAMIN  S) CAPS, Take 1 daily, Disp: 90 capsule, Rfl: 3     Multiple Vitamins-Minerals (EYE-ZAKIYA PLUS LUTEIN PO), , Disp: , Rfl:      NOVOLOG FLEXPEN 100 UNIT/ML soln, Carb counting with meals approx 70-80 units daily, Disp: 75 mL, Rfl: 3     Omega-3 Fatty Acids (OMEGA-3 FISH OIL PO), Take 1,000 mg by mouth daily, Disp: , Rfl:      order for DME, Equipment being ordered: Grade 1 (light) compression stockings, below the knee, Disp: 1 Units, Rfl: 1     polyethylene glycol (MIRALAX/GLYCOLAX) packet,  Take 17 g by mouth daily (Patient not taking: Reported on 5/6/2019), Disp: 60 packet, Rfl: 3     timolol (TIMOPTIC) 0.5 % ophthalmic solution, Place 1 drop into both eyes 2 times daily , Disp: , Rfl:      triamcinolone (KENALOG) 0.1 % ointment, Apply topically 2 times daily, Disp: 80 g, Rfl: 11    SOCIAL HISTORY:  Social History     Socioeconomic History     Marital status:      Spouse name: Not on file     Number of children: Not on file     Years of education: Not on file     Highest education level: Not on file   Occupational History     Occupation: Human Resources     Comment: between jobs now   Social Needs     Financial resource strain: Not on file     Food insecurity:     Worry: Not on file     Inability: Not on file     Transportation needs:     Medical: Not on file     Non-medical: Not on file   Tobacco Use     Smoking status: Former Smoker     Smokeless tobacco: Never Used     Tobacco comment: quit 35 years ago   Substance and Sexual Activity     Alcohol use: No     Drug use: No     Sexual activity: Yes     Partners: Male     Birth control/protection: Post-menopausal   Lifestyle     Physical activity:     Days per week: Not on file     Minutes per session: Not on file     Stress: Not on file   Relationships     Social connections:     Talks on phone: Not on file     Gets together: Not on file     Attends Confucianism service: Not on file     Active member of club or organization: Not on file     Attends meetings of clubs or organizations: Not on file     Relationship status: Not on file     Intimate partner violence:     Fear of current or ex partner: Not on file     Emotionally abused: Not on file     Physically abused: Not on file     Forced sexual activity: Not on file   Other Topics Concern      Service Not Asked     Blood Transfusions Yes     Comment: 2 units 2011     Caffeine Concern No     Comment: 2s     Occupational Exposure No     Hobby Hazards No     Sleep Concern No     Stress  Concern No     Comment: rehab for R knee after replacement     Weight Concern Yes     Comment: working on wt loss     Special Diet Yes     Comment: Diabetic     Back Care No     Exercise No     Comment: water aerobics 35-40' 3-4 d & strength 3d/wk     Bike Helmet No     Seat Belt No     Self-Exams Not Asked     Parent/sibling w/ CABG, MI or angioplasty before 65F 55M? Not Asked   Social History Narrative    How much exercise per week? 3-4x swimming, aerobics    How much calcium per day? Supplement       How much caffeine per day? 2 cups coffee/ 1-2 can of diet soda    How much vitamin D per day? Supplement    Do you/your family wear seatbelts?  Yes    Do you/your family use safety helmets? No    Do you/your family use sunscreen? No    Do you/your family keep firearms in the home? No    Do you/your family have a smoke detector(s)? Yes    Do you feel safe in your home? Yes    Has anyone ever touched you in an unwanted manner? No     Explain     See LEA Berman 11/26/2014    Reviewed Raul DEGROOT MA 11/22/2017       FAMILY HISTORY:  Family History   Problem Relation Age of Onset     Diabetes Father 55        DM II     Diabetes Sister 45        DM II     Diabetes Brother 50        xs 2     Hypertension Sister 41        also B and M     Cancer Maternal Aunt         multiple myeloma     Thyroid Disease Sister 45        graves     Hypertension Brother         ? age     Hypertension Mother 79     Osteoporosis Mother      Diabetes Brother      Hypertension Brother      Diabetes Brother      Hypertension Brother      Breast Cancer Cousin      Thyroid Disease Sister         Graves     Cerebrovascular Disease Maternal Grandmother      Cerebrovascular Disease No family hx of      Cancer - colorectal No family hx of      Prostate Cancer No family hx of      Alcohol/Drug No family hx of      Melanoma No family hx of      Skin Cancer No family hx of        Past/family/social history reviewed and no changes    PHYSICAL  "EXAMINATION:  Constitutional: aaox3, cooperative, pleasant, not dyspneic/diaphoretic, no acute distress  Vitals reviewed: /48   Pulse 86   Ht 1.625 m (5' 3.98\")   Wt 114.6 kg (252 lb 11.2 oz)   SpO2 94%   BMI 43.40 kg/m     Wt:   Wt Readings from Last 2 Encounters:   05/06/19 113.5 kg (250 lb 4.8 oz)   04/30/19 113.4 kg (250 lb)      Eyes: Sclera anicteric/injected  Ears/nose/mouth/throat: Normal oropharynx without ulcers or exudate, mucus membranes moist, hearing intact  CV: No edema  Respiratory: Unlabored breathing  Abd: obese, Non-distended  Skin: warm, perfused, no jaundice  Psych: Normal affect  MSK: Normal gait    PERTINENT STUDIES:  Office Visit on 12/20/2018   Component Date Value Ref Range Status     Trichomonas Wet Prep 12/20/2018 Neg   Final     Clue cells 12/20/2018 Neg   Final     Yeast Wet Prep 12/20/2018 Pos   Final       Again, thank you for allowing me to participate in the care of your patient.      Sincerely,    Pili Arce PA-C      "

## 2019-07-17 NOTE — TELEPHONE ENCOUNTER
Patient returned call and nurse discussed the recommendation for final pap and hpv in 2019 (November). Pt expressed understanding and agrees with plan. Has no further questions at this time

## 2019-08-06 DIAGNOSIS — E11.8 TYPE 2 DIABETES MELLITUS WITH COMPLICATION, WITH LONG-TERM CURRENT USE OF INSULIN (H): ICD-10-CM

## 2019-08-06 DIAGNOSIS — Z79.4 TYPE 2 DIABETES MELLITUS WITH COMPLICATION, WITH LONG-TERM CURRENT USE OF INSULIN (H): ICD-10-CM

## 2019-08-06 LAB
ALBUMIN SERPL-MCNC: 3.6 G/DL (ref 3.4–5)
ALT SERPL W P-5'-P-CCNC: 42 U/L (ref 0–50)
ANION GAP SERPL CALCULATED.3IONS-SCNC: 8 MMOL/L (ref 3–14)
BUN SERPL-MCNC: 14 MG/DL (ref 7–30)
CALCIUM SERPL-MCNC: 9.2 MG/DL (ref 8.5–10.1)
CHLORIDE SERPL-SCNC: 103 MMOL/L (ref 94–109)
CHOLEST SERPL-MCNC: 104 MG/DL
CK SERPL-CCNC: 109 U/L (ref 30–225)
CO2 SERPL-SCNC: 26 MMOL/L (ref 20–32)
CREAT SERPL-MCNC: 0.65 MG/DL (ref 0.52–1.04)
CREAT UR-MCNC: 144 MG/DL
GFR SERPL CREATININE-BSD FRML MDRD: >90 ML/MIN/{1.73_M2}
GLUCOSE SERPL-MCNC: 225 MG/DL (ref 70–99)
HBA1C MFR BLD: 10.2 % (ref 0–5.6)
HCT VFR BLD AUTO: 37.2 % (ref 35–47)
HDLC SERPL-MCNC: 40 MG/DL
LDLC SERPL CALC-MCNC: 34 MG/DL
MICROALBUMIN UR-MCNC: 40 MG/L
MICROALBUMIN/CREAT UR: 28.06 MG/G CR (ref 0–25)
NONHDLC SERPL-MCNC: 64 MG/DL
PHOSPHATE SERPL-MCNC: 2.8 MG/DL (ref 2.5–4.5)
POTASSIUM SERPL-SCNC: 4.3 MMOL/L (ref 3.4–5.3)
SODIUM SERPL-SCNC: 136 MMOL/L (ref 133–144)
TRIGL SERPL-MCNC: 148 MG/DL
TSH SERPL DL<=0.005 MIU/L-ACNC: 2.56 MU/L (ref 0.4–4)

## 2019-08-07 ENCOUNTER — OFFICE VISIT (OUTPATIENT)
Dept: ENDOCRINOLOGY | Facility: CLINIC | Age: 64
End: 2019-08-07
Payer: COMMERCIAL

## 2019-08-07 DIAGNOSIS — E11.8 TYPE 2 DIABETES MELLITUS WITH COMPLICATION, WITH LONG-TERM CURRENT USE OF INSULIN (H): Primary | ICD-10-CM

## 2019-08-07 DIAGNOSIS — Z79.4 TYPE 2 DIABETES MELLITUS WITH COMPLICATION, WITH LONG-TERM CURRENT USE OF INSULIN (H): Primary | ICD-10-CM

## 2019-08-07 ASSESSMENT — PAIN SCALES - GENERAL: PAINLEVEL: EXTREME PAIN (9)

## 2019-08-07 ASSESSMENT — MIFFLIN-ST. JEOR: SCORE: 1659.47

## 2019-08-07 NOTE — PATIENT INSTRUCTIONS
Start Trulicity 0.75 mg every week for 4 weeks.   After 4 weeks, increase trulicity 1.5 mg every week.
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
## 2019-08-27 ENCOUNTER — TRANSFERRED RECORDS (OUTPATIENT)
Dept: HEALTH INFORMATION MANAGEMENT | Facility: CLINIC | Age: 64
End: 2019-08-27

## 2019-09-04 ENCOUNTER — OFFICE VISIT (OUTPATIENT)
Dept: FAMILY MEDICINE | Facility: CLINIC | Age: 64
End: 2019-09-04
Payer: COMMERCIAL

## 2019-09-04 VITALS
BODY MASS INDEX: 43.09 KG/M2 | DIASTOLIC BLOOD PRESSURE: 72 MMHG | OXYGEN SATURATION: 96 % | WEIGHT: 252.4 LBS | TEMPERATURE: 98.9 F | HEIGHT: 64 IN | SYSTOLIC BLOOD PRESSURE: 133 MMHG | HEART RATE: 83 BPM

## 2019-09-04 DIAGNOSIS — G89.29 CHRONIC BILATERAL LOW BACK PAIN WITH RIGHT-SIDED SCIATICA: ICD-10-CM

## 2019-09-04 DIAGNOSIS — M54.41 CHRONIC BILATERAL LOW BACK PAIN WITH RIGHT-SIDED SCIATICA: ICD-10-CM

## 2019-09-04 DIAGNOSIS — H92.02 LEFT EAR PAIN: Primary | ICD-10-CM

## 2019-09-04 DIAGNOSIS — I87.2 VENOUS (PERIPHERAL) INSUFFICIENCY: ICD-10-CM

## 2019-09-04 DIAGNOSIS — R06.83 SNORING: ICD-10-CM

## 2019-09-04 ASSESSMENT — ENCOUNTER SYMPTOMS
RESPIRATORY NEGATIVE: 1
WEAKNESS: 0
UNEXPECTED WEIGHT CHANGE: 0
GASTROINTESTINAL NEGATIVE: 1
ACTIVITY CHANGE: 0
NERVOUS/ANXIOUS: 1
FEVER: 0
ARTHRALGIAS: 1
BACK PAIN: 1
SLEEP DISTURBANCE: 1
FATIGUE: 0

## 2019-09-04 ASSESSMENT — MIFFLIN-ST. JEOR: SCORE: 1671.94

## 2019-09-04 NOTE — PROGRESS NOTES
HPI:       63 year old patient who presents with:    Follow-up of multiple chronic conditions and some new concerns.  She is having some left ear pain for the past week.  Uncertain whether this is an infection or not.  There is been no drainage.  No hearing loss or tinnitus.    She still has lots of pain chronically in her back and lower extremities.  She has been through pain clinics and extensive courses of physical therapy.    She continues to be seen for her diabetes by endocrinology.  States that this is going okay.    She is requesting another pair of compression stockings.  She has been wearing hers faithfully and reports that they have been doing a pretty good job of keeping the edema out of her lower extremities.    She continues to have prominent daily dysphoria.  She states that she rarely goes a day without crying.  There is still severe psychosocial stress from ongoing relationship problems between her parents.    She reports that her  states she is snoring with much more prominently and loudly.  When she asked him if she had any apneic episodes he was not sure.  She does feel daytime sleepiness, occasionally taking naps, falling asleep while meditating in yoga class, and occasionally having to pull over while driving in the middle of the day because she feels drowsy.          Problem, Medication and Allergy Lists were reviewed and are current.  Patient is an established patient of this clinic.         Review of Systems:     Review of Systems   Constitutional: Negative for activity change, fatigue, fever and unexpected weight change.   Respiratory: Negative.    Cardiovascular: Positive for leg swelling. Negative for chest pain.   Gastrointestinal: Negative.    Musculoskeletal: Positive for arthralgias, back pain and gait problem (uses cane).   Neurological: Negative for weakness.   Psychiatric/Behavioral: Positive for sleep disturbance. The patient is nervous/anxious.           Physical  "Exam:     /72   Pulse 83   Temp 98.9  F (37.2  C) (Oral)   Ht 1.613 m (5' 3.5\")   Wt 114.5 kg (252 lb 6.4 oz)   SpO2 96%   BMI 44.01 kg/m      Body mass index is 44.01 kg/m .  Vitals reviewed.    Physical Exam   Constitutional: She appears well-developed. No distress.   HENT:   Right Ear: Tympanic membrane and external ear normal.   Left Ear: Tympanic membrane and external ear normal.   Nose: Nose normal.   Mouth/Throat: No oropharyngeal exudate.   Cardiovascular: Normal rate, regular rhythm and normal heart sounds.   No murmur heard.  Pulmonary/Chest: Effort normal and breath sounds normal. No respiratory distress. She has no wheezes. She has no rales.   Musculoskeletal: She exhibits edema (trace).   Lymphadenopathy:     She has no cervical adenopathy.           Results:     Results from last visit:  Orders Only on 08/06/2019   Component Date Value Ref Range Status     Creatinine Urine 08/06/2019 144  mg/dL Final     Albumin Urine mg/L 08/06/2019 40  mg/L Final     Albumin Urine mg/g Cr 08/06/2019 28.06* 0 - 25 mg/g Cr Final     TSH 08/06/2019 2.56  0.40 - 4.00 mU/L Final     Sodium 08/06/2019 136  133 - 144 mmol/L Final     Potassium 08/06/2019 4.3  3.4 - 5.3 mmol/L Final     Chloride 08/06/2019 103  94 - 109 mmol/L Final     Carbon Dioxide 08/06/2019 26  20 - 32 mmol/L Final     Anion Gap 08/06/2019 8  3 - 14 mmol/L Final     Glucose 08/06/2019 225* 70 - 99 mg/dL Final     Urea Nitrogen 08/06/2019 14  7 - 30 mg/dL Final     Creatinine 08/06/2019 0.65  0.52 - 1.04 mg/dL Final     GFR Estimate 08/06/2019 >90  >60 mL/min/[1.73_m2] Final    Comment: Non  GFR Calc  Starting 12/18/2018, serum creatinine based estimated GFR (eGFR) will be   calculated using the Chronic Kidney Disease Epidemiology Collaboration   (CKD-EPI) equation.       GFR Estimate If Black 08/06/2019 >90  >60 mL/min/[1.73_m2] Final    Comment:  GFR Calc  Starting 12/18/2018, serum creatinine based estimated " GFR (eGFR) will be   calculated using the Chronic Kidney Disease Epidemiology Collaboration   (CKD-EPI) equation.       Calcium 08/06/2019 9.2  8.5 - 10.1 mg/dL Final     Phosphorus 08/06/2019 2.8  2.5 - 4.5 mg/dL Final     Albumin 08/06/2019 3.6  3.4 - 5.0 g/dL Final     Cholesterol 08/06/2019 104  <200 mg/dL Final     Triglycerides 08/06/2019 148  <150 mg/dL Final     HDL Cholesterol 08/06/2019 40* >49 mg/dL Final     LDL Cholesterol Calculated 08/06/2019 34  <100 mg/dL Final    Desirable:       <100 mg/dl     Non HDL Cholesterol 08/06/2019 64  <130 mg/dL Final     Hemoglobin A1C 08/06/2019 10.2* 0 - 5.6 % Final    Comment: Normal <5.7% Prediabetes 5.7-6.4%  Diabetes 6.5% or higher - adopted from ADA   consensus guidelines.       Hematocrit 08/06/2019 37.2  35.0 - 47.0 % Final     CK Total 08/06/2019 109  30 - 225 U/L Final     ALT 08/06/2019 42  0 - 50 U/L Final     Assessment and Plan     Radha was seen today for recheck.    Diagnoses and all orders for this visit:    Left ear pain.  I do not see evidence of an infection.  This could be left-sided TMJ pain.  Observe.    Snoring.  She is at high risk of GHISLAINE and the worsening snoring is of concern.  We will schedule a sleep study.  -     SLEEP EVALUATION & MANAGEMENT REFERRAL - [facility] [place] [phone] (Age 15 and up); Future    Chronic bilateral low back pain with right-sided sciatica.  This is a chronic problem and is unchanged.  I do not feel further work-up is necessary at this time.    Venous (peripheral) insufficiency.  She is achieving good edema control with her stockings and I will order her a couple more pairs.  -     Compression Stockings (TEDS, JOBST) Print Out        Options for treatment and follow-up care were reviewed with the patient. Radha Baca engaged in the decision making process and verbalized understanding of the options discussed and agreed with the final plan.    Mohan Moreno MD

## 2019-09-16 ENCOUNTER — OFFICE VISIT (OUTPATIENT)
Dept: ORTHOPEDICS | Facility: CLINIC | Age: 64
End: 2019-09-16
Payer: COMMERCIAL

## 2019-09-16 DIAGNOSIS — Z79.4 TYPE 2 DIABETES MELLITUS WITH COMPLICATION, WITH LONG-TERM CURRENT USE OF INSULIN (H): Primary | ICD-10-CM

## 2019-09-16 DIAGNOSIS — I87.2 VENOUS (PERIPHERAL) INSUFFICIENCY: ICD-10-CM

## 2019-09-16 DIAGNOSIS — E11.8 TYPE 2 DIABETES MELLITUS WITH COMPLICATION, WITH LONG-TERM CURRENT USE OF INSULIN (H): Primary | ICD-10-CM

## 2019-09-16 DIAGNOSIS — B35.1 OM (ONYCHOMYCOSIS): ICD-10-CM

## 2019-09-16 NOTE — LETTER
9/16/2019       RE: Radha Baca  [address] [id number]     Dear Colleague,    Thank you for referring your patient, Radha Baca, to the [facility] at [facility]. Please see a copy of my visit note below.    Chief Complaint:   Chief Complaint   Patient presents with     Follow Up     4 month follow up. Pain, right ankle. Patient stated that she didnt fall but she felt something. Patient stated that she has been using ice and elevation. Patient stated that she does not like her diabetic shoes.         Allergies   Allergen Reactions     Nkda [No Known Drug Allergies]        Subjective: Radha is a 63 year old female who presents to the clinic today for a diabetic foot exam and management. Relates that she has no new LE complaints today. She does have diabetic shoes.     Objective  PT and DP pulses are not palpable bilaterally. CRT is 4 seconds. Diminished pedal hair.   Gross sensation is diminished, as well as protective sensation bilaterally.   Equinus is noted bilaterally. No pain with active or passive ROM of the ankle, MTJ, 1st ray, or halluces bilaterally,.   Nails thickened, brittle, discolored, with subungual debris bilaterally. No open lesions are noted. Xerosis noted BL.      Assessment: Dm2 with neuropathy.  Onychomycosis. She would like treatment for this. She has had liver enzymes that were the higher end of normal. Because of the thickness of her nails, she has a lower cure rate. I do no think the risk of increased liver enzymes outweighs the success rate.   Xerosis     Plan:  - Pt seen and evaluated.  - Nails trimmed x 10.  - See again in 4 months.    Again, thank you for allowing me to participate in the care of your patient.      Sincerely,    Jerrell Austin DPM

## 2019-09-16 NOTE — NURSING NOTE
Reason For Visit:   Chief Complaint   Patient presents with     Follow Up     4 month follow up. Pain, right ankle. Patient stated that she didnt fall but she felt something. Patient stated that she has been using ice and elevation. Patient stated that she does not like her diabetic shoes.        Pain Assessment  Patient Currently in Pain: Yes  Primary Pain Location: Ankle(Right ankle)  Pain Descriptors: Tender        Allergies   Allergen Reactions     Nkda [No Known Drug Allergies]            Екатерина Lundy LPN

## 2019-09-18 NOTE — PROGRESS NOTES
Chief Complaint:   Chief Complaint   Patient presents with     Follow Up     4 month follow up. Pain, right ankle. Patient stated that she didnt fall but she felt something. Patient stated that she has been using ice and elevation. Patient stated that she does not like her diabetic shoes.           Allergies   Allergen Reactions     Nkda [No Known Drug Allergies]          Subjective: Radha is a 63 year old female who presents to the clinic today for a diabetic foot exam and management. Relates that she has no new LE complaints today. She does have diabetic shoes.     Objective  PT and DP pulses are not palpable bilaterally. CRT is 4 seconds. Diminished pedal hair.   Gross sensation is diminished, as well as protective sensation bilaterally.   Equinus is noted bilaterally. No pain with active or passive ROM of the ankle, MTJ, 1st ray, or halluces bilaterally,.   Nails thickened, brittle, discolored, with subungual debris bilaterally. No open lesions are noted. Xerosis noted BL.      Assessment: Dm2 with neuropathy.  Onychomycosis. She would like treatment for this. She has had liver enzymes that were the higher end of normal. Because of the thickness of her nails, she has a lower cure rate. I do no think the risk of increased liver enzymes outweighs the success rate.   Xerosis     Plan:  - Pt seen and evaluated.  - Nails trimmed x 10.  - See again in 4 months.

## 2019-09-21 DIAGNOSIS — E11.8 TYPE 2 DIABETES MELLITUS WITH COMPLICATION, WITH LONG-TERM CURRENT USE OF INSULIN (H): Primary | ICD-10-CM

## 2019-09-21 DIAGNOSIS — Z79.4 TYPE 2 DIABETES MELLITUS WITH COMPLICATION, WITH LONG-TERM CURRENT USE OF INSULIN (H): Primary | ICD-10-CM

## 2019-09-23 ENCOUNTER — TELEPHONE (OUTPATIENT)
Dept: ENDOCRINOLOGY | Facility: CLINIC | Age: 64
End: 2019-09-23

## 2019-09-23 NOTE — TELEPHONE ENCOUNTER
----- Message from Demar Hickey MD sent at 9/21/2019 12:23 PM CDT -----  Regarding: RE: labs with f/u ?   She can do the labs before the December visit.   Demar Hickey MD  Endocrinology Service    ----- Message -----  From: Rasheeda Izaguirre RN  Sent: 9/19/2019   1:40 PM  To: Demar Hickey MD  Subject: labs with f/u ?                                  Next appointment December with no labs  ordered. She is asking if you will want labs done  before the visit so she can get  set up . Last ones in  August  to be seen every 3 months.

## 2019-09-23 NOTE — TELEPHONE ENCOUNTER
Radha will do the standing order placed by Dr Hickey before her December f/u  In Endocrine. Rasheeda Izaguirre RN on 9/23/2019 at 3:40 PM

## 2019-09-30 ENCOUNTER — PRE VISIT (OUTPATIENT)
Dept: SLEEP MEDICINE | Facility: CLINIC | Age: 64
End: 2019-09-30

## 2019-09-30 NOTE — TELEPHONE ENCOUNTER
"  1.  Reason for the visit:  Per Dr. Moreno notes: She reports that her  states she is snoring with much more prominently and loudly.  When she asked him if she had any apneic episodes he was not sure.  She does feel daytime sleepiness, occasionally taking naps, falling asleep while meditating in yoga class, and occasionally having to pull over while driving in the middle of the day because she feels drowsy.   2.  Referring provider and clinic name:  Dr. Moreno [facility]  3.  Previous Sleep Doctor or Pulmonlogist (clinic name)?  None noted  4.  Records, Procedures, Imaging, and Labs (see below)  No records to obtain        All NOTES from previous office visits that pertain to why they are being seen in the Sleep Center    Previous Sleep Studies, Chest CT, Echos and reports that pertain to why they are seeing Sleep Center    All Sleep records that have been done in the last 2 years that pertain to why they are seeing Sleep Center            Are they being seen for continuation of care for Cpap/Bipap/Avap/Trilogy/Dental Device? none    If yes to above Who and Where was Device issued/currently getting supplies from? na    Are you currently on \"Supplemental Oxygen\" during the day or night?   Na                                                                                                                                                      Please remind pt to bring Cpap machine and ask to arrive 15 minutes early to appointment due traffic and congestion                                                 5. Pt Sleep Center Packet received Message left asking pt to arrive 30 minutes early to appointment if no packet received.        Yes: \"please make sure that you bring this to your appointment completed, either the doctor will not see you until this completed or you may be asked to reschedule your appointment.\"     No: mail or email to the pt and explain, \"please make sure that you bring this to your appointment " "completed, either the doctor will not see you until this completed or you may be asked to reschedule your appointment.\"     ~If pt coming early to fill packet out, ask that they come 30 minutes prior to their appointment~     6. Has the pt's medication list been updated and preferred pharmacy added?     7. Has the allergy list been reviewed?    \"Thank you for choosing [facility] and we look forward to seeing you at your upcoming appointment\"     "

## 2019-10-03 ENCOUNTER — HEALTH MAINTENANCE LETTER (OUTPATIENT)
Age: 64
End: 2019-10-03

## 2019-10-07 ENCOUNTER — TELEPHONE (OUTPATIENT)
Dept: GASTROENTEROLOGY | Facility: CLINIC | Age: 64
End: 2019-10-07

## 2019-10-07 NOTE — TELEPHONE ENCOUNTER
Called and left message for patient reminding of appointment scheduled on 10/9/19 at 0900 with [facility]. Patient to arrive 15 min early. To reschedule or cancel patient to call [phone].    CLAUDINE Amezquita

## 2019-10-09 ENCOUNTER — OFFICE VISIT (OUTPATIENT)
Dept: GASTROENTEROLOGY | Facility: CLINIC | Age: 64
End: 2019-10-09
Payer: COMMERCIAL

## 2019-10-09 VITALS
BODY MASS INDEX: 44.01 KG/M2 | HEIGHT: 64 IN | OXYGEN SATURATION: 91 % | HEART RATE: 79 BPM | DIASTOLIC BLOOD PRESSURE: 49 MMHG | SYSTOLIC BLOOD PRESSURE: 113 MMHG

## 2019-10-09 DIAGNOSIS — K59.00 CONSTIPATION, UNSPECIFIED CONSTIPATION TYPE: Primary | ICD-10-CM

## 2019-10-09 ASSESSMENT — PAIN SCALES - GENERAL: PAINLEVEL: MILD PAIN (3)

## 2019-10-09 NOTE — PROGRESS NOTES
GI CLINIC VISIT    CC/REFERRING MD:  Mohan Rosenberg  REASON FOR CONSULTATION: follow-up     ASSESSMENT/PLAN:  1. Constipation  63 year old female with PMH of obesity, DM, arthritis, HTN, HLD, umbilical hernia presenting to GI clinic for constipation.  TSH and calcium within normal limits.  Symptoms likely related to irritable bowel syndrome with constipation versus slow transit constipation in the setting of diabetes mellitus.     After last visit, patient did short trial of our supplementation with increased bloating, therefore she discontinued after 2 weeks.  Has not been on any medications for bowel movements and reports continued constipation with occasional overflow diarrhea.  We again discussed possible causes of her constipation.  Would recommend that she starts laxative with MiraLAX on a daily basis.  Can take 1/2-1 capful daily.  Recommend increase dose if she skips days without bowel movement.  Struggle with stool consistency despite MiraLAX would recommend restarting soluble fiber supplementation.  She could start at lower dose, explained that bloating is common side effect of fiber but can be avoided if fiber is increased slowly.    Patient also notes increased abdominal gas and bloating.  Discussed lifestyle modifications for aerophagia which patient is trying.  Explained that she may also benefit from meeting with our dietitian to discuss the low FODMAP diet.  Can continue to track her bowel movements and possible triggers for symptoms.    Patient also notes that symptoms may be worsened in the setting of increased stress.  May benefit from meeting with our GI health psychologist.    No evidence of pelvic floor dysfunction on rectal exam done at prior visit, however patient notes difficulty with straining and having a bowel movement- could consider referral for evaluation for pelvic floor dysfunction pending response to other interventions.    In regards to small amount of bright red blood per  rectum associated to bowel movements, will continue to monitor for improvement with improvement in stool consistency.  If continued blood at follow-up visit, will consider referral to colorectal surgery for anoscopy versus colonoscopy (otherwise she is due for colonoscopy next year).     Plan:  -- would recommend continuing to avoid swallowing extra air through carbonated beverages, staws, gum or hard candy.   -- may benefit from meeting with our dietitian to discuss low FODMAP (low gas) diet- Va Garcia   -- would recommend continuing to track bowel movements   -- Miralax can be used to treat constipation and works by increasing the amount of water in the stool. Start with 1/2- 1 caps in 8 oz of water. You may increase or decrease dose as needed    -- if you skip 1-2 days without bowel movement take additional capful   If persistent symptoms despite Miralax, would recommend re-trying Benefiber:   -- can re-start Benefiber: would recommend starting with 2 teaspoons (or 1/2 half serving) of fiber. Can increase up to 1 tablespoon   -- could consider meeting with GI health psychologist- Sary Escobar      Cancer Screening: Repeat colonoscopy in 2020    A total of 25 face-to-face minutes was spent with this patient, >50% of which was counseling regarding the above delineated issues.    RTC 3 months     Thank you for this consultation.  It was a pleasure to participate in the care of this patient; please contact us with any further questions.     Pili Arce PA-C  Division of Gastroenterology, Hepatology & Nutrition  [facility]      HPI  Radha Baca is a 63 year old female with PMH of obesity, DM, arthritis, HTN, HLD, umbilical hernia presenting to GI clinic for follow-up for constipation. She has previously been seen by Dr. Rosenberg and Dr. Hobbs and this is my first encounter with the patient.     At her initial visit, noted life-long constipation worsened in the past 5-6 years. Bowel  "pattern: BM every 2-3 days with abdominal pain diffusely, but worse around umbilical hernia, when constipated that improves with BMs. starts with hard pellet stool [place] 1 that turns into [place] 4-7 for another 3-4 episodes, which are associated with perianal irritation and occasional BRBPR on toilet paper. Has needed to use manual maneuvers with her finger in past to help have BM. +incomplete evacuation. Will sit on toilet for prolonged period of time. Has tried dietary changes such as eating more fruits/veggies with no change, eggs trigger bowel movements. Past medications include: Dulcolax (no improvement). Had been using rare Milk of magnesia which helps, exercise improves symptoms. Has had 1 episdose of incontinence, some underwear staining. No prior childbirth.      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      Prior work-up:   10/2018 hematocrit normal  10/2017 CBC normal  A1c 10 2018  TSH wnl 2018    After last visit, was instructed to take Miralax on a daily basis. Today she reports she did not start Miralax, wanted to get better at doing things naturally (more fruits, vegetables, reduce bread and crackers, Increased raisin bran- seems to help a lot). Eggs are a natural laxative. Bowel pattern depends on diet, can be hard and difficult to pass with small amount of bright red blood per rectum. Can then become more loose and end in diarrhea. Has been going on average every other day, can skip days in between bowel movements. Denies urgency, has increased gas, \"rectal spasms\". Has pain on left side, and middle abdomen going to hernia \"twinging pain\".     Interval History 10/9/19:   Tried benefiber for two weeks. First week she took it every day, felt more bloating and uncomfortable and felt \"crabby\". Then tried every two days but continued to be be bloated. She will not relieve herself for a couple of days, then will be type 1 (a couple of times), then " "Donovan Stool Scale 3-4. Then will be Donovan Scale 6 and then 7 bowel movements. Did feel empty, however felt her anus \"spasming\". Can feel her colon spasming. States that this pattern is typical however will usually end with Donovan Scale 5-6. Episodes can be triggered by eggs. Is wondering is stress may trigger episode.     In the last month, has had increased gas. Has tried to decrease intake carbonated beverages- has cut back on carbonated beverages in the evening. Trying to increase fruits. Notes that abdomen can be hard or distended. Is trying to exercise more- with pool or walking. Stools can be explosive with gas.     A couple of episodes of hemorrhoidal bleeding- only with really bad symptoms. Trying not to strain with bowel movements.     Meditation, knitting [place]     Had a week  or so Donovan Scale 3-4 consistently        PROBLEM LIST  Patient Active Problem List    Diagnosis Date Noted     Other secondary osteoarthritis of first carpometacarpal joint of right hand 01/25/2018     Priority: Medium     Venous (peripheral) insufficiency 02/27/2017     Priority: Medium     Chronic bilateral low back pain with right-sided sciatica 02/27/2017     Priority: Medium     Type 2 diabetes mellitus with complication (H) 04/19/2016     Priority: Medium     Essential hypertension 04/19/2016     Priority: Medium     Encounter for routine gynecological examination 11/18/2015     Priority: Medium     11/2019: screen pap due  11/26/14: Pap->NIL; HR HPV->negative         Health care maintenance 01/14/2015     Priority: Medium     Colonoscopy 1/15:  1 sessile polyp removed.  Next colonoscopy 2020.       BMI >40 11/26/2014     Priority: Medium     12/9/2016: BMI 42.93  12/6/2011: BMI 42  Down from 50!       Menopause present -- age 39 11/26/2014     Priority: Medium     Umbilical hernia -- w/o symptoms->expectant mgt 08/27/2014     Priority: Medium     Problem list name updated by automated process. Provider to review       " Glaucoma 04/09/2013     Priority: Medium     SNHL (sensorineural hearing loss) 12/17/2012     Priority: Medium     Dyslipidemia 08/27/2011     Priority: Medium       PERTINENT PAST MEDICAL HISTORY:  Past Medical History:   Diagnosis Date     Abnormal Pap smear      Anxiety      Arthritis      Arthritis of knee 4/4/2013     Arthritis of shoulder region, right 4/18/2014     Arthritis of shoulder region, right 4/18/2014     Back injury      Breast disorder      Chronic constipation      Chronic diarrhea      Depressive disorder      Diabetes (H)      Fecal incontinence      Finger pain 4/2/2015     Fracture broke L 5th pinky finger due to fall  1/2015     Glaucoma (increased eye pressure)      Head injury 8/6/2016     Headache(784.0)     decreased with mouth guard use.     History of blood transfusion 8/2011 & 4/2013     History of diabetes mellitus      Hypercholesteremia      Hypertension      Low back pain      Menarche age 9+    cycles q mo x 4-5 d     Neck injuries      Nonsenile cataract IO implants: L-8/2013; R-1/2011     Pain in knee joint     LEFT     Right bundle branch block     per H/P     SNHL (sensorineural hearing loss)      Umbilical hernia without mention of obstruction or gangrene 8/2014     Vision disorder Detached Retina 10/2009       PREVIOUS SURGERIES:  Past Surgical History:   Procedure Laterality Date     ARTHROPLASTY KNEE  4/4/2013    Left Total Knee Arthroplasty;  Surgeon: Lou Byrne MD;  [facility]     ARTHROPLASTY MINIMALLY INVASIVE KNEE  8/22/2011    R knee :ODALIS CAITLYN ANDRA, Left age 14     COLONOSCOPY  1/14/2015     DENTAL SURGERY      root canals, wisdom teeth     EXAM UNDER ANESTHESIA, MANIPULATE JOINT (LOCATION)  10/5/2012    Procedure: EXAM UNDER ANESTHESIA, MANIPULATE JOINT (LOCATION);  Right Knee Mini Open Lysis Of Adhesions, Left Knee Steroid Injection  ;  Surgeon: Lou Byrne MD;  Location[facility]      OR OCULAR DEVICE INTRAOP DETACHED RETINA  2009     R     HC PHAKIC IOL - IMPLANT FROM SURGEON      right     KNEE SURGERY  1969    left     LASER YAG CAPSULOTOMY  12/2016    left     VITRECTOMY PARSPLANA  2010       PREVIOUS ENDOSCOPY:  1/14/15 Colon - 2 mm AC polyp (TA) and diverticulosis    ALLERGIES:     Allergies   Allergen Reactions     Nkda [No Known Drug Allergies]        PERTINENT MEDICATIONS:    Current Outpatient Medications:      Acetaminophen (TYLENOL PO), Take by mouth as needed for mild pain or fever, Disp: , Rfl:      amoxicillin (AMOXIL) 500 MG tablet, Take 4 tablets by mouth one hour before dental Appointment. (Patient not taking: Reported on 7/17/2019), Disp: 4 tablet, Rfl: 4     Ascorbic Acid (VITAMIN C PO), Take 2,000 mg by mouth 2 times daily , Disp: , Rfl:      aspirin 81 MG tablet, 1 tab daily, Disp: 90 tablet, Rfl: 3     atorvastatin (LIPITOR) 20 MG tablet, Take 1 tablet (20 mg) by mouth daily, Disp: 90 tablet, Rfl: 3     B Complex Vitamins (VITAMIN  B COMPLEX) CAPS, Take 1 tablet by mouth daily , Disp: , Rfl:      TIAGO CONTOUR test strip, Use to test blood sugar 3 times daily or as directed., Disp: 300 each, Rfl: 3     blood glucose calibration (TIAGO CONTOUR) Normal solution, Use to calibrate blood glucose monitor as needed as directed., Disp: 1 Bottle, Rfl: 11     blood glucose monitoring (TIAGO MICROLET) lancets, Test 4-6 times daily 90 day supply refills x 3, Disp: 600 each, Rfl: 0     calcium-vitamin D (CALTRATE) 600-400 MG-UNIT per tablet, Take 1 tablet by mouth 2 times daily, Disp: , Rfl:      cholecalciferol (VITAMIN D) 1000 UNIT tablet, Take 1 tablet by mouth daily., Disp: 100 tablet, Rfl: 3     cycloSPORINE (RESTASIS) 0.05 % ophthalmic emulsion, , Disp: , Rfl:      dulaglutide (TRULICITY) 0.75 MG/0.5ML pen, Inject 0.75 mg Subcutaneous every 7 days, Disp: 2 mL, Rfl: 0     dulaglutide (TRULICITY) 1.5 MG/0.5ML pen, Inject 1.5 mg Subcutaneous every 7 days, Disp: 2 mL, Rfl: 3     insulin glargine (LANTUS SOLOSTAR PEN) 100 UNIT/ML pen,  Inject 70 units SQ each am., Disp: 70 mL, Rfl: 3     insulin pen needle (B-D U/F) 31G X 8 MM miscellaneous, Use  4 daily short 31 G X 8MM, Disp: 400 each, Rfl: 3     latanoprost (XALATAN) 0.005 % ophthalmic solution, Place 1 drop into both eyes At Bedtime, Disp: , Rfl:      lisinopril-hydrochlorothiazide (PRINZIDE/ZESTORETIC) 20-12.5 MG tablet, Take 1 tablet by mouth daily, Disp: 90 tablet, Rfl: 1     metFORMIN (GLUCOPHAGE) 500 MG tablet, Take 2 tablets (1,000 mg) by mouth 2 times daily (with meals), Disp: 360 tablet, Rfl: 3     Multiple Vitamin (MULTIVITAMIN  S) CAPS, Take 1 daily, Disp: 90 capsule, Rfl: 3     Multiple Vitamins-Minerals (EYE-ZAKIYA PLUS LUTEIN PO), , Disp: , Rfl:      NOVOLOG FLEXPEN 100 UNIT/ML soln, Carb counting with meals approx 70-80 units daily, Disp: 75 mL, Rfl: 3     Omega-3 Fatty Acids (OMEGA-3 FISH OIL PO), Take 1,000 mg by mouth daily, Disp: , Rfl:      order for DME, Equipment being ordered: Grade 1 (light) compression stockings, below the knee, Disp: 1 Units, Rfl: 1     timolol (TIMOPTIC) 0.5 % ophthalmic solution, Place 1 drop into both eyes 2 times daily , Disp: , Rfl:      triamcinolone (KENALOG) 0.1 % ointment, Apply topically 2 times daily, Disp: 80 g, Rfl: 11    SOCIAL HISTORY:  Social History     Socioeconomic History     Marital status:      Spouse name: Not on file     Number of children: Not on file     Years of education: Not on file     Highest education level: Not on file   Occupational History     Occupation: Human Resources     Comment: between jobs now   Social Needs     Financial resource strain: Not on file     Food insecurity:     Worry: Not on file     Inability: Not on file     Transportation needs:     Medical: Not on file     Non-medical: Not on file   Tobacco Use     Smoking status: Former Smoker     Smokeless tobacco: Never Used     Tobacco comment: quit 35 years ago   Substance and Sexual Activity     Alcohol use: No     Drug use: No     Sexual activity:  Yes     Partners: Male     Birth control/protection: Post-menopausal   Lifestyle     Physical activity:     Days per week: Not on file     Minutes per session: Not on file     Stress: Not on file   Relationships     Social connections:     Talks on phone: Not on file     Gets together: Not on file     Attends Worship service: Not on file     Active member of club or organization: Not on file     Attends meetings of clubs or organizations: Not on file     Relationship status: Not on file     Intimate partner violence:     Fear of current or ex partner: Not on file     Emotionally abused: Not on file     Physically abused: Not on file     Forced sexual activity: Not on file   Other Topics Concern      Service Not Asked     Blood Transfusions Yes     Comment: 2 units 2011     Caffeine Concern No     Comment: 2s     Occupational Exposure No     Hobby Hazards No     Sleep Concern No     Stress Concern No     Comment: rehab for R knee after replacement     Weight Concern Yes     Comment: working on wt loss     Special Diet Yes     Comment: Diabetic     Back Care No     Exercise No     Comment: water aerobics 35-40' 3-4 d & strength 3d/wk     Bike Helmet No     Seat Belt No     Self-Exams Not Asked     Parent/sibling w/ CABG, MI or angioplasty before 65F 55M? Not Asked   Social History Narrative    How much exercise per week? 3-4x swimming, aerobics    How much calcium per day? Supplement       How much caffeine per day? 2 cups coffee/ 1-2 can of diet soda    How much vitamin D per day? Supplement    Do you/your family wear seatbelts?  Yes    Do you/your family use safety helmets? No    Do you/your family use sunscreen? No    Do you/your family keep firearms in the home? No    Do you/your family have a smoke detector(s)? Yes    Do you feel safe in your home? Yes    Has anyone ever touched you in an unwanted manner? No     Explain     See LEA Berman 11/26/2014    Reviewed Raul DEGROOT MA 11/22/2017       FAMILY  "HISTORY:  Family History   Problem Relation Age of Onset     Diabetes Father 55        DM II     Diabetes Sister 45        DM II     Diabetes Brother 50        xs 2     Hypertension Sister 41        also B and M     Cancer Maternal Aunt         multiple myeloma     Thyroid Disease Sister 45        graves     Hypertension Brother         ? age     Hypertension Mother 79     Osteoporosis Mother      Diabetes Brother      Hypertension Brother      Diabetes Brother      Hypertension Brother      Breast Cancer Cousin      Thyroid Disease Sister         Graves     Cerebrovascular Disease Maternal Grandmother      Cerebrovascular Disease No family hx of      Cancer - colorectal No family hx of      Prostate Cancer No family hx of      Alcohol/Drug No family hx of      Melanoma No family hx of      Skin Cancer No family hx of        Past/family/social history reviewed and no changes    PHYSICAL EXAMINATION:  Constitutional: aaox3, cooperative, pleasant, not dyspneic/diaphoretic, no acute distress  Vitals reviewed: /49   Pulse 79   Ht 1.613 m (5' 3.5\")   SpO2 91%   BMI 44.01 kg/m    Wt:   Wt Readings from Last 2 Encounters:   09/04/19 114.5 kg (252 lb 6.4 oz)   08/07/19 (P) 114 kg (251 lb 6.4 oz)      Eyes: Sclera anicteric/injected  Ears/nose/mouth/throat: Normal oropharynx without ulcers or exudate, mucus membranes moist, hearing intact  CV: No edema  Respiratory: Unlabored breathing  Abd: obese, Non-distended  Skin: warm, perfused, no jaundice  Psych: Normal affect  MSK: Normal gait      PERTINENT STUDIES:  Office Visit on 12/20/2018   Component Date Value Ref Range Status     Trichomonas Wet Prep 12/20/2018 Neg   Final     Clue cells 12/20/2018 Neg   Final     Yeast Wet Prep 12/20/2018 Pos   Final       "

## 2019-10-09 NOTE — PATIENT INSTRUCTIONS
It was a pleasure taking care of you today.  I've included a brief summary of our discussion and care plan from today's visit below.  Please review this information with your primary care provider.  ______________________________________________________________________    My recommendations are summarized as follows:    -- would recommend continuing to avoid swallowing extra air through carbonated beverages, staws, gum or hard candy.     -- may benefit from meeting with our dietitian to discuss low FODMAP (low gas) diet- Va Garcia     -- would recommend continuing to track bowel movements     -- Miralax can be used to treat constipation and works by increasing the amount of water in the stool. Start with 1/2- 1 caps in 8 oz of water. You may increase or decrease dose as needed    -- if you skip 1-2 days without bowel movement take additional capful     If persistent symptoms despite Miralax, would recommend re-trying Benefiber:     -- can re-start Benefiber: would recommend starting with 2 teaspoons (or 1/2 half serving) of fiber. Can increase up to 1 tablespoon     -- could consider meeting with GI health psychologist- Sary Escobar     Return to GI Clinic in 2-3 months to review your progress.    ______________________________________________________________________    Who do I call with any questions after my visit?  Please be in touch if there are any further questions that arise following today's visit.  There are multiple ways to contact your gastroenterology care team.        During business hours, you may reach a Gastroenterology nurse at [phone]      To schedule or reschedule an appointment, please call [phone].       You can always send a secure message through [employer].  [employer] messages are answered by your nurse or doctor typically within 24 hours.  Please allow extra time on weekends and holidays.        For urgent/emergent questions after business hours, you may reach the on-call GI Fellow  by contacting the [facility]  at [phone].     How will I get the results of any tests ordered?    You will receive all of your results.  If you have signed up for [employer], any tests ordered at your visit will be available to you after your physician reviews them.  Typically this takes 1-2 weeks.  If there are urgent results that require a change in your care plan, your physician or nurse will call you to discuss the next steps.      What is [employer]?  [employer] is a secure way for you to access all of your healthcare records from the [facility].  It is a web based computer program, so you can sign on to it from any location.  It also allows you to send secure messages to your care team.  I recommend signing up for [employer] access if you have not already done so and are comfortable with using a computer.      How to I schedule a follow-up visit?  If you did not schedule a follow-up visit today, please call [phone] to schedule a follow-up office visit.        Sincerely,    Pili Arce PA-C  Division of Gastroenterology, Hepatology & Nutrition  [facility]

## 2019-10-09 NOTE — LETTER
10/9/2019       RE: Radha Baca  [address] [id number]     Dear Colleague,    Thank you for referring your patient, Radha Baca, to the [facility] GASTROENTEROLOGY AND IBD CLINIC at [facility]. Please see a copy of my visit note below.    GI CLINIC VISIT    CC/REFERRING MD:  Mohan Rosenberg  REASON FOR CONSULTATION: follow-up     ASSESSMENT/PLAN:  1. Constipation  63 year old female with PMH of obesity, DM, arthritis, HTN, HLD, umbilical hernia presenting to GI clinic for constipation.  TSH and calcium within normal limits.  Symptoms likely related to irritable bowel syndrome with constipation versus slow transit constipation in the setting of diabetes mellitus.     After last visit, patient did short trial of our supplementation with increased bloating, therefore she discontinued after 2 weeks.  Has not been on any medications for bowel movements and reports continued constipation with occasional overflow diarrhea.  We again discussed possible causes of her constipation.  Would recommend that she starts laxative with MiraLAX on a daily basis.  Can take 1/2-1 capful daily.  Recommend increase dose if she skips days without bowel movement.  Struggle with stool consistency despite MiraLAX would recommend restarting soluble fiber supplementation.  She could start at lower dose, explained that bloating is common side effect of fiber but can be avoided if fiber is increased slowly.    Patient also notes increased abdominal gas and bloating.  Discussed lifestyle modifications for aerophagia which patient is trying.  Explained that she may also benefit from meeting with our dietitian to discuss the low FODMAP diet.  Can continue to track her bowel movements and possible triggers for symptoms.    Patient also notes that symptoms may be worsened in the setting of increased stress.  May benefit from meeting with our GI health psychologist.    No  evidence of pelvic floor dysfunction on rectal exam done at prior visit, however patient notes difficulty with straining and having a bowel movement- could consider referral for evaluation for pelvic floor dysfunction pending response to other interventions.    In regards to small amount of bright red blood per rectum associated to bowel movements, will continue to monitor for improvement with improvement in stool consistency.  If continued blood at follow-up visit, will consider referral to colorectal surgery for anoscopy versus colonoscopy (otherwise she is due for colonoscopy next year).     Plan:  -- would recommend continuing to avoid swallowing extra air through carbonated beverages, staws, gum or hard candy.   -- may benefit from meeting with our dietitian to discuss low FODMAP (low gas) diet- Va Garcia   -- would recommend continuing to track bowel movements   -- Miralax can be used to treat constipation and works by increasing the amount of water in the stool. Start with 1/2- 1 caps in 8 oz of water. You may increase or decrease dose as needed    -- if you skip 1-2 days without bowel movement take additional capful   If persistent symptoms despite Miralax, would recommend re-trying Benefiber:   -- can re-start Benefiber: would recommend starting with 2 teaspoons (or 1/2 half serving) of fiber. Can increase up to 1 tablespoon   -- could consider meeting with GI health psychologist- Sary Escobar      Cancer Screening: Repeat colonoscopy in 2020    A total of 25 face-to-face minutes was spent with this patient, >50% of which was counseling regarding the above delineated issues.    RTC 3 months     Thank you for this consultation.  It was a pleasure to participate in the care of this patient; please contact us with any further questions.     Pili Arce PA-C  Division of Gastroenterology, Hepatology & Nutrition  [facility]      HPI  Radha Baca is a 63 year old female with PMH of  "obesity, DM, arthritis, HTN, HLD, umbilical hernia presenting to GI clinic for follow-up for constipation. She has previously been seen by Dr. Rosenberg and Dr. Hobbs and this is my first encounter with the patient.     At her initial visit, noted life-long constipation worsened in the past 5-6 years. Bowel pattern: BM every 2-3 days with abdominal pain diffusely, but worse around umbilical hernia, when constipated that improves with BMs. starts with hard pellet stool War 1 that turns into War 4-7 for another 3-4 episodes, which are associated with perianal irritation and occasional BRBPR on toilet paper. Has needed to use manual maneuvers with her finger in past to help have BM. +incomplete evacuation. Will sit on toilet for prolonged period of time. Has tried dietary changes such as eating more fruits/veggies with no change, eggs trigger bowel movements. Past medications include: Dulcolax (no improvement). Had been using rare Milk of magnesia which helps, exercise improves symptoms. Has had 1 episdose of incontinence, some underwear staining. No prior childbirth.      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      Prior work-up:   10/2018 hematocrit normal  10/2017 CBC normal  A1c 10 2018  TSH wnl 2018    After last visit, was instructed to take Miralax on a daily basis. Today she reports she did not start Miralax, wanted to get better at doing things naturally (more fruits, vegetables, reduce bread and crackers, Increased raisin bran- seems to help a lot). Eggs are a natural laxative. Bowel pattern depends on diet, can be hard and difficult to pass with small amount of bright red blood per rectum. Can then become more loose and end in diarrhea. Has been going on average every other day, can skip days in between bowel movements. Denies urgency, has increased gas, \"rectal spasms\". Has pain on left side, and middle abdomen going to hernia \"twinging pain\". " "    Interval History 10/9/19:   Tried benefiber for two weeks. First week she took it every day, felt more bloating and uncomfortable and felt \"crabby\". Then tried every two days but continued to be be bloated. She will not relieve herself for a couple of days, then will be type 1 (a couple of times), then Troy Stool Scale 3-4. Then will be Troy Scale 6 and then 7 bowel movements. Did feel empty, however felt her anus \"spasming\". Can feel her colon spasming. States that this pattern is typical however will usually end with Troy Scale 5-6. Episodes can be triggered by eggs. Is wondering is stress may trigger episode.     In the last month, has had increased gas. Has tried to decrease intake carbonated beverages- has cut back on carbonated beverages in the evening. Trying to increase fruits. Notes that abdomen can be hard or distended. Is trying to exercise more- with pool or walking. Stools can be explosive with gas.     A couple of episodes of hemorrhoidal bleeding- only with really bad symptoms. Trying not to strain with bowel movements.     Meditation, knitting [place]     Had a week  or so Troy Scale 3-4 consistently        PROBLEM LIST  Patient Active Problem List    Diagnosis Date Noted     Other secondary osteoarthritis of first carpometacarpal joint of right hand 01/25/2018     Priority: Medium     Venous (peripheral) insufficiency 02/27/2017     Priority: Medium     Chronic bilateral low back pain with right-sided sciatica 02/27/2017     Priority: Medium     Type 2 diabetes mellitus with complication (H) 04/19/2016     Priority: Medium     Essential hypertension 04/19/2016     Priority: Medium     Encounter for routine gynecological examination 11/18/2015     Priority: Medium     11/2019: screen pap due  11/26/14: Pap->NIL; HR HPV->negative         Health care maintenance 01/14/2015     Priority: Medium     Colonoscopy 1/15:  1 sessile polyp removed.  Next colonoscopy 2020.       BMI >40 " 11/26/2014     Priority: Medium     12/9/2016: BMI 42.93  12/6/2011: BMI 42  Down from 50!       Menopause present -- age 39 11/26/2014     Priority: Medium     Umbilical hernia -- w/o symptoms->expectant mgt 08/27/2014     Priority: Medium     Problem list name updated by automated process. Provider to review       Glaucoma 04/09/2013     Priority: Medium     SNHL (sensorineural hearing loss) 12/17/2012     Priority: Medium     Dyslipidemia 08/27/2011     Priority: Medium       PERTINENT PAST MEDICAL HISTORY:  Past Medical History:   Diagnosis Date     Abnormal Pap smear      Anxiety      Arthritis      Arthritis of knee 4/4/2013     Arthritis of shoulder region, right 4/18/2014     Arthritis of shoulder region, right 4/18/2014     Back injury      Breast disorder      Chronic constipation      Chronic diarrhea      Depressive disorder      Diabetes (H)      Fecal incontinence      Finger pain 4/2/2015     Fracture broke L 5th pinky finger due to fall  1/2015     Glaucoma (increased eye pressure)      Head injury 8/6/2016     Headache(784.0)     decreased with mouth guard use.     History of blood transfusion 8/2011 & 4/2013     History of diabetes mellitus      Hypercholesteremia      Hypertension      Low back pain      Menarche age 9+    cycles q mo x 4-5 d     Neck injuries      Nonsenile cataract IO implants: L-8/2013; R-1/2011     Pain in knee joint     LEFT     Right bundle branch block     per H/P     SNHL (sensorineural hearing loss)      Umbilical hernia without mention of obstruction or gangrene 8/2014     Vision disorder Detached Retina 10/2009       PREVIOUS SURGERIES:  Past Surgical History:   Procedure Laterality Date     ARTHROPLASTY KNEE  4/4/2013    Left Total Knee Arthroplasty;  Surgeon: Lou Byrne MD;  [facility]     ARTHROPLASTY MINIMALLY INVASIVE KNEE  8/22/2011    R knee :CAITLYN JACOBO, Left age 14     COLONOSCOPY  1/14/2015     DENTAL SURGERY      root canals, wisdom  teeth     EXAM UNDER ANESTHESIA, MANIPULATE JOINT (LOCATION)  10/5/2012    Procedure: EXAM UNDER ANESTHESIA, MANIPULATE JOINT (LOCATION);  Right Knee Mini Open Lysis Of Adhesions, Left Knee Steroid Injection  ;  Surgeon: Lou Byrne MD;  [facility]      OR OCULAR DEVICE INTRAOP DETACHED RETINA  2009    R     HC PHAKIC IOL - IMPLANT FROM SURGEON      right     KNEE SURGERY  1969    left     LASER YAG CAPSULOTOMY  12/2016    left     VITRECTOMY PARSPLANA  2010       PREVIOUS ENDOSCOPY:  1/14/15 Colon - 2 mm AC polyp (TA) and diverticulosis    ALLERGIES:     Allergies   Allergen Reactions     Nkda [No Known Drug Allergies]        PERTINENT MEDICATIONS:    Current Outpatient Medications:      Acetaminophen (TYLENOL PO), Take by mouth as needed for mild pain or fever, Disp: , Rfl:      amoxicillin (AMOXIL) 500 MG tablet, Take 4 tablets by mouth one hour before dental Appointment. (Patient not taking: Reported on 7/17/2019), Disp: 4 tablet, Rfl: 4     Ascorbic Acid (VITAMIN C PO), Take 2,000 mg by mouth 2 times daily , Disp: , Rfl:      aspirin 81 MG tablet, 1 tab daily, Disp: 90 tablet, Rfl: 3     atorvastatin (LIPITOR) 20 MG tablet, Take 1 tablet (20 mg) by mouth daily, Disp: 90 tablet, Rfl: 3     B Complex Vitamins (VITAMIN  B COMPLEX) CAPS, Take 1 tablet by mouth daily , Disp: , Rfl:      TIAGO CONTOUR test strip, Use to test blood sugar 3 times daily or as directed., Disp: 300 each, Rfl: 3     blood glucose calibration (TIAGO CONTOUR) Normal solution, Use to calibrate blood glucose monitor as needed as directed., Disp: 1 Bottle, Rfl: 11     blood glucose monitoring (TIAGO MICROLET) lancets, Test 4-6 times daily 90 day supply refills x 3, Disp: 600 each, Rfl: 0     calcium-vitamin D (CALTRATE) 600-400 MG-UNIT per tablet, Take 1 tablet by mouth 2 times daily, Disp: , Rfl:      cholecalciferol (VITAMIN D) 1000 UNIT tablet, Take 1 tablet by mouth daily., Disp: 100 tablet, Rfl: 3     cycloSPORINE  (RESTASIS) 0.05 % ophthalmic emulsion, , Disp: , Rfl:      dulaglutide (TRULICITY) 0.75 MG/0.5ML pen, Inject 0.75 mg Subcutaneous every 7 days, Disp: 2 mL, Rfl: 0     dulaglutide (TRULICITY) 1.5 MG/0.5ML pen, Inject 1.5 mg Subcutaneous every 7 days, Disp: 2 mL, Rfl: 3     insulin glargine (LANTUS SOLOSTAR PEN) 100 UNIT/ML pen, Inject 70 units SQ each am., Disp: 70 mL, Rfl: 3     insulin pen needle (B-D U/F) 31G X 8 MM miscellaneous, Use  4 daily short 31 G X 8MM, Disp: 400 each, Rfl: 3     latanoprost (XALATAN) 0.005 % ophthalmic solution, Place 1 drop into both eyes At Bedtime, Disp: , Rfl:      lisinopril-hydrochlorothiazide (PRINZIDE/ZESTORETIC) 20-12.5 MG tablet, Take 1 tablet by mouth daily, Disp: 90 tablet, Rfl: 1     metFORMIN (GLUCOPHAGE) 500 MG tablet, Take 2 tablets (1,000 mg) by mouth 2 times daily (with meals), Disp: 360 tablet, Rfl: 3     Multiple Vitamin (MULTIVITAMIN  S) CAPS, Take 1 daily, Disp: 90 capsule, Rfl: 3     Multiple Vitamins-Minerals (EYE-ZAKIYA PLUS LUTEIN PO), , Disp: , Rfl:      NOVOLOG FLEXPEN 100 UNIT/ML soln, Carb counting with meals approx 70-80 units daily, Disp: 75 mL, Rfl: 3     Omega-3 Fatty Acids (OMEGA-3 FISH OIL PO), Take 1,000 mg by mouth daily, Disp: , Rfl:      order for DME, Equipment being ordered: Grade 1 (light) compression stockings, below the knee, Disp: 1 Units, Rfl: 1     timolol (TIMOPTIC) 0.5 % ophthalmic solution, Place 1 drop into both eyes 2 times daily , Disp: , Rfl:      triamcinolone (KENALOG) 0.1 % ointment, Apply topically 2 times daily, Disp: 80 g, Rfl: 11    SOCIAL HISTORY:  Social History     Socioeconomic History     Marital status:      Spouse name: Not on file     Number of children: Not on file     Years of education: Not on file     Highest education level: Not on file   Occupational History     Occupation: Human Resources     Comment: between jobs now   Social Needs     Financial resource strain: Not on file     Food insecurity:     Worry:  Not on file     Inability: Not on file     Transportation needs:     Medical: Not on file     Non-medical: Not on file   Tobacco Use     Smoking status: Former Smoker     Smokeless tobacco: Never Used     Tobacco comment: quit 35 years ago   Substance and Sexual Activity     Alcohol use: No     Drug use: No     Sexual activity: Yes     Partners: Male     Birth control/protection: Post-menopausal   Lifestyle     Physical activity:     Days per week: Not on file     Minutes per session: Not on file     Stress: Not on file   Relationships     Social connections:     Talks on phone: Not on file     Gets together: Not on file     Attends Mosque service: Not on file     Active member of club or organization: Not on file     Attends meetings of clubs or organizations: Not on file     Relationship status: Not on file     Intimate partner violence:     Fear of current or ex partner: Not on file     Emotionally abused: Not on file     Physically abused: Not on file     Forced sexual activity: Not on file   Other Topics Concern      Service Not Asked     Blood Transfusions Yes     Comment: 2 units 2011     Caffeine Concern No     Comment: 2s     Occupational Exposure No     Hobby Hazards No     Sleep Concern No     Stress Concern No     Comment: rehab for R knee after replacement     Weight Concern Yes     Comment: working on wt loss     Special Diet Yes     Comment: Diabetic     Back Care No     Exercise No     Comment: water aerobics 35-40' 3-4 d & strength 3d/wk     Bike Helmet No     Seat Belt No     Self-Exams Not Asked     Parent/sibling w/ CABG, MI or angioplasty before 65F 55M? Not Asked   Social History Narrative    How much exercise per week? 3-4x swimming, aerobics    How much calcium per day? Supplement       How much caffeine per day? 2 cups coffee/ 1-2 can of diet soda    How much vitamin D per day? Supplement    Do you/your family wear seatbelts?  Yes    Do you/your family use safety helmets? No    Do  "you/your family use sunscreen? No    Do you/your family keep firearms in the home? No    Do you/your family have a smoke detector(s)? Yes    Do you feel safe in your home? Yes    Has anyone ever touched you in an unwanted manner? No     Explain     See LEA Berman 11/26/2014    Reviewed Raul DEGROOT MA 11/22/2017       FAMILY HISTORY:  Family History   Problem Relation Age of Onset     Diabetes Father 55        DM II     Diabetes Sister 45        DM II     Diabetes Brother 50        xs 2     Hypertension Sister 41        also B and M     Cancer Maternal Aunt         multiple myeloma     Thyroid Disease Sister 45        graves     Hypertension Brother         ? age     Hypertension Mother 79     Osteoporosis Mother      Diabetes Brother      Hypertension Brother      Diabetes Brother      Hypertension Brother      Breast Cancer Cousin      Thyroid Disease Sister         Graves     Cerebrovascular Disease Maternal Grandmother      Cerebrovascular Disease No family hx of      Cancer - colorectal No family hx of      Prostate Cancer No family hx of      Alcohol/Drug No family hx of      Melanoma No family hx of      Skin Cancer No family hx of        Past/family/social history reviewed and no changes    PHYSICAL EXAMINATION:  Constitutional: aaox3, cooperative, pleasant, not dyspneic/diaphoretic, no acute distress  Vitals reviewed: /49   Pulse 79   Ht 1.613 m (5' 3.5\")   SpO2 91%   BMI 44.01 kg/m     Wt:   Wt Readings from Last 2 Encounters:   09/04/19 114.5 kg (252 lb 6.4 oz)   08/07/19 (P) 114 kg (251 lb 6.4 oz)      Eyes: Sclera anicteric/injected  Ears/nose/mouth/throat: Normal oropharynx without ulcers or exudate, mucus membranes moist, hearing intact  CV: No edema  Respiratory: Unlabored breathing  Abd: obese, Non-distended  Skin: warm, perfused, no jaundice  Psych: Normal affect  MSK: Normal gait    PERTINENT STUDIES:  Office Visit on 12/20/2018   Component Date Value Ref Range Status     Trichomonas Wet Prep " 12/20/2018 Neg   Final     Clue cells 12/20/2018 Neg   Final     Yeast Wet Prep 12/20/2018 Pos   Final       Again, thank you for allowing me to participate in the care of your patient.      Sincerely,    Pili Arce PA-C

## 2019-10-09 NOTE — NURSING NOTE
"Chief Complaint   Patient presents with     RECHECK     follow up       Vitals:    10/09/19 0916   BP: 113/49   Pulse: 79   SpO2: 91%   Height: 1.613 m (5' 3.5\")       Body mass index is 44.01 kg/m .    Georgette Gerardo CMA    "

## 2019-10-10 ENCOUNTER — OFFICE VISIT (OUTPATIENT)
Dept: SLEEP MEDICINE | Facility: CLINIC | Age: 64
End: 2019-10-10
Attending: FAMILY MEDICINE
Payer: COMMERCIAL

## 2019-10-10 VITALS
RESPIRATION RATE: 18 BRPM | HEIGHT: 64 IN | WEIGHT: 245 LBS | HEART RATE: 73 BPM | BODY MASS INDEX: 41.83 KG/M2 | DIASTOLIC BLOOD PRESSURE: 58 MMHG | SYSTOLIC BLOOD PRESSURE: 103 MMHG | OXYGEN SATURATION: 93 %

## 2019-10-10 DIAGNOSIS — Z91.89 RISK FACTORS FOR OBSTRUCTIVE SLEEP APNEA: ICD-10-CM

## 2019-10-10 DIAGNOSIS — E66.813 CLASS 3 SEVERE OBESITY DUE TO EXCESS CALORIES WITH SERIOUS COMORBIDITY AND BODY MASS INDEX (BMI) OF 40.0 TO 44.9 IN ADULT (H): ICD-10-CM

## 2019-10-10 DIAGNOSIS — E66.01 CLASS 3 SEVERE OBESITY DUE TO EXCESS CALORIES WITH SERIOUS COMORBIDITY AND BODY MASS INDEX (BMI) OF 40.0 TO 44.9 IN ADULT (H): ICD-10-CM

## 2019-10-10 DIAGNOSIS — R53.83 FATIGUE, UNSPECIFIED TYPE: ICD-10-CM

## 2019-10-10 DIAGNOSIS — R06.83 SNORING: Primary | ICD-10-CM

## 2019-10-10 PROCEDURE — 99244 OFF/OP CNSLTJ NEW/EST MOD 40: CPT | Performed by: INTERNAL MEDICINE

## 2019-10-10 ASSESSMENT — MIFFLIN-ST. JEOR: SCORE: 1638.37

## 2019-10-10 NOTE — PROGRESS NOTES
Chief complaint: Consult requested by Denton Moreno MD for the evaluation of snoring and poor sleep quality    History of Present Illness: 63-year-old female with history of diabetes, obesity, arthritis pain that is disrupting sleep.  She is here with increased snoring and decreased sleep quality.  She wakes up feeling irritable most days.  She is been napping more often up to 3 days a week  She has not been told about observed apnea at home.  However, she has been told about apnea post anesthesia while on her back.  She believes she usually sleeps on her sides but she is think she could be going onto her back at night.  She is not drinking any alcohol or taking any narcotics.  She does admit to stress affecting her ability to fall asleep and sometimes maintain sleep.  She also has muscle cramps on occasion but now clear restless legs.  She drinks 1-2 caffeinated beverages in the morning.    She does have a headaches and usually uses a mouthguard for bruxism however she cannot find it lately.  There is no history of acting out dreams or sleepwalking however she does have some sleep talking.  She is also woken herself up with snoring.  She has a dry mouth in the morning and occasional difficulty breathing through her nose.  She is also experienced sleep paralysis in the past.    Total score - [place]: 8 (10/10/2019 10:00 AM) (Less than 10 normal)    MIHAELA Total Score: 19 (normal 0-7, mild 8-14, moderate 15-21, severe 22-28)    STOP-BANG  Loud Snore   1  Excessively Tired/Sleepy   1  Observed apnea   0  Hypertension   1  BMI> 35 kg/m2   1  Age >50   1  Neck >16 in/40cm   1  Male Gender   0  Total =   6  (0-2 low, 3-4 intermediate, 5-8 high risk of GHISLAINE)      Past Medical History:   Diagnosis Date     Abnormal Pap smear      Anxiety      Arthritis      Arthritis of knee 4/4/2013     Arthritis of shoulder region, right 4/18/2014     Back injury      Breast disorder      Chronic constipation      Chronic diarrhea       Depressive disorder      Diabetes (H)      Fecal incontinence      Finger pain 4/2/2015     Fracture broke L 5th pinky finger due to fall  1/2015     Glaucoma (increased eye pressure)      Head injury 8/6/2016     Headache(784.0)     decreased with mouth guard use.     History of blood transfusion 8/2011 & 4/2013     History of diabetes mellitus      Hypercholesteremia      Hypertension      Low back pain      Menarche age 9+    cycles q mo x 4-5 d     Neck injuries      Nonsenile cataract IO implants: L-8/2013; R-1/2011     Pain in knee joint     LEFT     Right bundle branch block     per H/P     SNHL (sensorineural hearing loss)      Umbilical hernia without mention of obstruction or gangrene 8/2014     Vision disorder Detached Retina 10/2009       Allergies   Allergen Reactions     Nkda [No Known Drug Allergies]        Current Outpatient Medications   Medication     Acetaminophen (TYLENOL PO)     Ascorbic Acid (VITAMIN C PO)     aspirin 81 MG tablet     atorvastatin (LIPITOR) 20 MG tablet     B Complex Vitamins (VITAMIN  B COMPLEX) CAPS     TIAGO CONTOUR test strip     blood glucose calibration (TIAGO CONTOUR) Normal solution     blood glucose monitoring (TIAGO MICROLET) lancets     calcium-vitamin D (CALTRATE) 600-400 MG-UNIT per tablet     cholecalciferol (VITAMIN D) 1000 UNIT tablet     cycloSPORINE (RESTASIS) 0.05 % ophthalmic emulsion     insulin glargine (LANTUS SOLOSTAR PEN) 100 UNIT/ML pen     insulin pen needle (B-D U/F) 31G X 8 MM miscellaneous     latanoprost (XALATAN) 0.005 % ophthalmic solution     lisinopril-hydrochlorothiazide (PRINZIDE/ZESTORETIC) 20-12.5 MG tablet     metFORMIN (GLUCOPHAGE) 500 MG tablet     Multiple Vitamin (MULTIVITAMIN  S) CAPS     Multiple Vitamins-Minerals (EYE-ZAKIYA PLUS LUTEIN PO)     NOVOLOG FLEXPEN 100 UNIT/ML soln     Omega-3 Fatty Acids (OMEGA-3 FISH OIL PO)     order for DME     timolol (TIMOPTIC) 0.5 % ophthalmic solution     triamcinolone (KENALOG) 0.1 % ointment      amoxicillin (AMOXIL) 500 MG tablet     dulaglutide (TRULICITY) 0.75 MG/0.5ML pen     dulaglutide (TRULICITY) 1.5 MG/0.5ML pen     No current facility-administered medications for this visit.        Social History     Socioeconomic History     Marital status:      Spouse name: Not on file     Number of children: Not on file     Years of education: Not on file     Highest education level: Not on file   Occupational History     Occupation: Human Resources     Comment: between jobs now   Social Needs     Financial resource strain: Not on file     Food insecurity:     Worry: Not on file     Inability: Not on file     Transportation needs:     Medical: Not on file     Non-medical: Not on file   Tobacco Use     Smoking status: Former Smoker     Packs/day: 0.00     Smokeless tobacco: Never Used     Tobacco comment: quit mid 1980s   Substance and Sexual Activity     Alcohol use: No     Drug use: No     Sexual activity: Yes     Partners: Male     Birth control/protection: Post-menopausal   Lifestyle     Physical activity:     Days per week: Not on file     Minutes per session: Not on file     Stress: Not on file   Relationships     Social connections:     Talks on phone: Not on file     Gets together: Not on file     Attends Jew service: Not on file     Active member of club or organization: Not on file     Attends meetings of clubs or organizations: Not on file     Relationship status: Not on file     Intimate partner violence:     Fear of current or ex partner: Not on file     Emotionally abused: Not on file     Physically abused: Not on file     Forced sexual activity: Not on file   Other Topics Concern      Service Not Asked     Blood Transfusions Yes     Comment: 2 units 2011     Caffeine Concern No     Comment: 2s     Occupational Exposure No     Hobby Hazards No     Sleep Concern No     Stress Concern No     Comment: rehab for R knee after replacement     Weight Concern Yes     Comment: working  on wt loss     Special Diet Yes     Comment: Diabetic     Back Care No     Exercise No     Comment: water aerobics 35-40' 3-4 d & strength 3d/wk     Bike Helmet No     Seat Belt No     Self-Exams Not Asked     Parent/sibling w/ CABG, MI or angioplasty before 65F 55M? Not Asked   Social History Narrative    How much exercise per week? 3-4x swimming, aerobics    How much calcium per day? Supplement       How much caffeine per day? 2 cups coffee/ 1-2 can of diet soda    How much vitamin D per day? Supplement    Do you/your family wear seatbelts?  Yes    Do you/your family use safety helmets? No    Do you/your family use sunscreen? No    Do you/your family keep firearms in the home? No    Do you/your family have a smoke detector(s)? Yes    Do you feel safe in your home? Yes    Has anyone ever touched you in an unwanted manner? No     Explain     See LEA Berman 11/26/2014    Reviewed Raul DEGROOT MA 11/22/2017       Family History   Problem Relation Age of Onset     Diabetes Father 55        DM II     Diabetes Brother 50        xs 2     Hypertension Sister 41        also B and M     Cancer Maternal Aunt         multiple myeloma     Hypertension Mother 79     Osteoporosis Mother      Memory loss Mother      Glaucoma Mother      Diabetes Brother      Hypertension Brother      Heart Murmur Brother      Glaucoma Brother      Breast Cancer Cousin      Thyroid Disease Sister         Graves     Cerebrovascular Disease Maternal Grandmother      Other - See Comments Sister         16 minths head injury     Other - See Comments Brother         MVA age 36     Cancer - colorectal No family hx of      Prostate Cancer No family hx of      Alcohol/Drug No family hx of      Melanoma No family hx of      Skin Cancer No family hx of        Review of Systems: Positve for neuropathy, diarrhea, constipation, depression, anxiety, and per HPI, otherwise comprehensive review of systems is negative.    EXAM: Pleasant, alert, no distress  /58    "Pulse 73   Resp 18   Ht 1.613 m (5' 3.5\")   Wt 111.1 kg (245 lb)   SpO2 93%   BMI 42.72 kg/m    HEENT: Normocephalic/atraumatic, pupils are equal, reactive to light, no scleral icterus  Oropharynx: Mallampati 4, tonsillar stage unable to visualize, low drooping soft palate and collapsible posterior pharynx  Neck supple no lymphadenopathy, neck circumference 17.5 inches  Chest: Clear to auscultation bilaterally, no rales or wheezes  Cardiac: Regular rate and rhythm, S1-S2 normal  Abdomen: Obese, soft and nontender, bowel sounds present  Extremities: Warm, well perfused, no cyanosis clubbing or edema  Psychiatric: Mood and affect appear normal  Neurologic: No gross focal deficits      ASSESSMENT: 63-year-old female with history of hypertension, diabetes, obesity, arthritis pain disrupting sleep, increased snoring and weight gain, daytime irritability and sleepiness requiring napping.  STOP BANG score of 6 suggests high risk for obstructive sleep apnea.  Given her symptoms and comorbidities it would be important to treat significant sleep apnea should she have it.     PLAN: Discussed the pathophysiology of obstructive sleep apnea, testing options and treatment options.  We discussed the potential benefits of treating sleep apnea.  Patient is a reasonably good candidate from home sleep apnea testing.  We will proceed.  I will be contacting patient via my chart with those results.  If you are unable to get good quality data with a home test consider in lab testing.  I think patient would be a better candidate for CPAP therapy given her narrow airway oral appliance may be inadequate.  Patient should work on weight control.  She is counseled to avoid alcohol.  Please see patient instructions for further details of counseling provided.    Joanne Pisano M.D.  Pulmonary/Critical Care/Sleep Medicine    [facility]   [address], [address]   [address]   [place] [address]   Appointments: [phone]    The above note was dictated using voice recognition software and may include typographical errors. Please contact the author for any clarifications.

## 2019-10-10 NOTE — PATIENT INSTRUCTIONS
".MY TREATMENT INFORMATION FOR SLEEP APNEA-  Radha Baca    DOCTOR : Joanne Pisano MD    Am I having a sleep study at a sleep center?  Make sure you have an appointment for the study before you leave!    Am I having a home sleep study?  Watch this video:  [email or web address]  Please verify your insurance coverage with your insurance carrier    Frequently asked questions:  1. What is Obstructive Sleep Apnea (GHISLAINE)? GHISLAINE is the most common type of sleep apnea. Apnea means, \"without breath.\"  Apnea is most often caused by narrowing or collapse of the upper airway as muscles relax during sleep.   Almost everyone has occasional apneas. Most people with sleep apnea have had brief interruptions at night frequently for many years.  The severity of sleep apnea is related to how frequent and severe the events are.   2. What are the consequences of GHISLAINE? Symptoms include: feeling sleepy during the day, snoring loudly, gasping or stopping of breathing, trouble sleeping, and occasionally morning headaches or heartburn at night.  Sleepiness can be serious and even increase the risk of falling asleep while driving. Other health consequences may include development of high blood pressure and other cardiovascular disease in persons who are susceptible. Untreated GHISLAINE  can contribute to heart disease, stroke and diabetes.   3. What are the treatment options? In most situations, sleep apnea is a lifelong disease that must be managed with daily therapy. Medications are not effective for sleep apnea and surgery is generally not considered until other therapies have been tried. Your treatment is your choice . Continuous Positive Airway (CPAP) works right away and is the therapy that is effective in nearly everyone. An oral device to hold your jaw forward is usually the next most reliable option. Other options include postioning devices (to keep you off your " back), weight loss, and surgery including a tongue pacing device. There is more detail about some of these options below.    Important tips for using CPAP and similar devices   Know your equipment:  CPAP is continuous positive airway pressure that prevents obstructive sleep apnea by keeping the throat from collapsing while you are sleeping. In most cases, the device is  smart  and can slowly self-adjusts if your throat collapses and keeps a record every day of how well you are treated-this information is available to you and your care team.  BPAP is bilevel positive airway pressure that keeps your throat open and also assists each breath with a pressure boost to maintain adequate breathing.  Special kinds of BPAP are used in patients who have inadequate breathing from lung or heart disease. In most cases, the device is  smart  and can slowly self-adjusts to assist breathing. Like CPAP, the device keeps a record of how well you are treated.  Your mask is your connection to the device. You get to choose what feels most comfortable and the staff will help to make sure if fits. Here: are some examples of the different masks that are available:       Key points to remember on your journey with sleep apnea:  1. Sleep study.  PAP devices often need to be adjusted during a sleep study to show that they are effective and adjusted right.  2. Good tips to remember: Try wearing just the mask during a quiet time during the day so your body adapts to wearing it. A humidifier is recommended for comfort in most cases to prevent drying of your nose and throat. Allergy medication from your provider may help you if you are having nasal congestion.  3. Getting settled-in. It takes more than one night for most of us to get used to wearing a mask. Try wearing just the mask during a quiet time during the day so your body adapts to wearing it. A humidifier is recommended for comfort in most cases. Our team will work with you carefully on  the first day and will be in contact within 4 days and again at 2 and 4 weeks for advice and remote device adjustments. Your therapy is evaluated by the device each day.   4. Use it every night. The more you are able to sleep naturally for 7-8 hours, the more likely you will have good sleep and to prevent health risks or symptoms from sleep apnea. Even if you use it 4 hours it helps. Occasionally all of us are unable to use a medical therapy, in sleep apnea, it is not dangerous to miss one night.   5. Communicate. Call our skilled team on the number provided on the first day if your visit for problems that make it difficult to wear the device. Over 2 out of 3 patients can learn to wear the device long-term with help from our team. Remember to call our team or your sleep providers if you are unable to wear the device as we may have other solutions for those who cannot adapt to mask CPAP therapy. It is recommended that you sleep your sleep provider within the first 3 months and yearly after that if you are not having problems.   6. Use it for your health. We encourage use of CPAP masks during daytime quiet periods to allow your face and brain to adapt to the sensation of CPAP so that it will be a more natural sensation to awaken to at night or during naps. This can be very useful during the first few weeks or months of adapting to CPAP though it does not help medically to wear CPAP during wakefulness and  should not be used as a strategy just to meet guidelines.  7. Take care of your equipment. Make sure you clean your mask and tubing using directions every day and that your filter and mask are replaced as recommended or if they are not working.     BESIDES CPAP, WHAT OTHER THERAPIES ARE THERE?    Positioning Device  Positioning devices are generally used when sleep apnea is mild and only occurs on your back.This example shows a pillow that straps around the waist. It may be appropriate for those whose sleep study  shows milder sleep apnea that occurs primarily when lying flat on one's back. Preliminary studies have shown benefit but effectiveness at home may need to be verified by a home sleep test. These devices are generally not covered by medical insurance.  Examples of devices that maintain sleeping on the back to prevent snoring and mild sleep apnea.    Belt type body positioner  Http://[employer].Explore Engage/    Electronic reminder  Http://nightshifttherapy.com/  Http://www.[employer]ore [employer].au/      Oral Appliance  What is oral appliance therapy?  An oral appliance device fits on your teeth at night like a retainer used after having braces. The device is made by a specialized dentist and requires several visits over 1-2 months before a manufactured device is made to fit your teeth and is adjusted to prevent your sleep apnea. Once an oral device is working properly, snoring should be improved. A home sleep test may be recommended at that time if to determine whether the sleep apnea is adequately treated.       Some things to remember:  -Oral devices are often, but not always, covered by your medical insurance. Be sure to check with your insurance provider.   -If you are referred for oral therapy, you will be given a list of specialized dentists to consider or you may choose to visit the Web site of the American Academy of Dental Sleep Medicine  -Oral devices are less likely to work if you have severe sleep apnea or are extremely overweight.     More detailed information  An oral appliance is a small acrylic device that fits over the upper and lower teeth  (similar to a retainer or a mouth guard). This device slightly moves jaw forward, which moves the base of the tongue forward, opens the airway, improves breathing for effective treat snoring and obstructive sleep apnea in perhaps 7 out of 10 people .  The best working devices are custom-made by a dental device  after a mold is made of the teeth 1, 2, 3.  When is an oral  appliance indicated?  Oral appliance therapy is recommended as a first-line treatment for patients with primary snoring, mild sleep apnea, and for patients with moderate sleep apnea who prefer appliance therapy to use of CPAP4, 5. Severity of sleep apnea is determined by sleep testing and is based on the number of respiratory events per hour of sleep.   How successful is oral appliance therapy?  The success rate of oral appliance therapy in patients with mild sleep apnea is 75-80% while in patients with moderate sleep apnea it is 50-70%. The chance of success in patients with severe sleep apnea is 40-50%. The research also shows that oral appliances have a beneficial effect on the cardiovascular health of GHISLAINE patients at the same magnitude as CPAP therapy7.  Oral appliances should be a second-line treatment in cases of severe sleep apnea, but if not completely successful then a combination therapy utilizing CPAP plus oral appliance therapy may be effective. Oral appliances tend to be effective in a broad range of patients although studies show that the patients who have the highest success are females, younger patients, those with milder disease, and less severe obesity. 3, 6.   Finding a dentist that practices dental sleep medicine  Specific training is available through the American Academy of Dental Sleep Medicine for dentists interested in working in the field of sleep. To find a dentist who is educated in the field of sleep and the use of oral appliances, near you, visit the Web site of the American Academy of Dental Sleep Medicine.    References  1. Dany et al. Objectively measured vs self-reported compliance during oral appliance therapy for sleep-disordered breathing. Chest 2013; 144(5): 5461-9655.  2. Jamin et al. Objective measurement of compliance during oral appliance therapy for sleep-disordered breathing. Thorax 2013; 68(1): 91-96.  3. Balbir et al. Mandibular advancement devices in  620 men and women with GHISLAINE and snoring: tolerability and predictors of treatment success. Chest 2004; 125: 9930-5620.  4. Nilsa et al. Oral appliances for snoring and GHISLAINE: a review. Sleep 2006; 29: 244-262.  5. Leonid et al. Oral appliance treatment for GHISLAINE: an update. J Clin Sleep Med 2014; 10(2): 215-227.  6. Andrea et al. Predictors of OSAH treatment outcome. J Dent Res 2007; 86: 7557-3219.      Weight Loss:    Weight loss is a long-term strategy that may improve sleep apnea in some patients.    Weight management is a personal decision and the decision should be based on your interest and the potential benefits.  If you are interested in exploring weight loss strategies, the following discussion covers the impact on weight loss on sleep apnea and the approaches that may be successful.    Being overweight does not necessarily mean you will have health consequences.  Those who have BMI over 35 or over 27 with existing medical conditions carries greater risk.   Weight loss decreases severity of sleep apnea in most people with obesity. For those with mild obesity who have developed snoring with weight gain, even 15-30 pound weight loss can improve and occasionally eliminate sleep apnea.  Structured and life-long dietary and health habits are necessary to lose weight and keep healthier weight levels.     Though there may be significant health benefits from weight loss, long-term weight loss is very difficult to achieve- studies show success with dietary management in less than 10% of people. In addition, substantial weight loss may require years of dietary control and may be difficult if patients have severe obesity. In these cases, surgical management may be considered.  Finally, older individuals who have tolerated obesity without health complications may be less likely to benefit from weight loss strategies.        Your BMI is Body mass index is 42.72 kg/m .  Weight management is a personal decision.  If  you are interested in exploring weight loss strategies, the following discussion covers the approaches that may be successful. Body mass index (BMI) is one way to tell whether you are at a healthy weight, overweight, or obese. It measures your weight in relation to your height.  A BMI of 18.5 to 24.9 is in the healthy range. A person with a BMI of 25 to 29.9 is considered overweight, and someone with a BMI of 30 or greater is considered obese. More than two-thirds of American adults are considered overweight or obese.  Being overweight or obese increases the risk for further weight gain. Excess weight may lead to heart disease and diabetes.  Creating and following plans for healthy eating and physical activity may help you improve your health.  Weight control is part of healthy lifestyle and includes exercise, emotional health, and healthy eating habits. Careful eating habits lifelong are the mainstay of weight control. Though there are significant health benefits from weight loss, long-term weight loss with diet alone may be very difficult to achieve- studies show long-term success with dietary management in less than 10% of people. Attaining a healthy weight may be especially difficult to achieve in those with severe obesity. In some cases, medications, devices and surgical management might be considered.  What can you do?  If you are overweight or obese and are interested in methods for weight loss, you should discuss this with your provider.     Consider reducing daily calorie intake by 500 calories.     Keep a food journal.     Avoiding skipping meals, consider cutting portions instead.    Diet combined with exercise helps maintain muscle while optimizing fat loss. Strength training is particularly important for building and maintaining muscle mass. Exercise helps reduce stress, increase energy, and improves fitness. Increasing exercise without diet control, however, may not burn enough calories to loose  weight.       Start walking three days a week 10-20 minutes at a time    Work towards walking thirty minutes five days a week     Eventually, increase the speed of your walking for 1-2 minutes at time    In addition, we recommend that you review healthy lifestyles and methods for weight loss available through the National Institutes of Health patient information sites:  http://win.niddk.nih.gov/publications/index.htm    And look into health and wellness programs that may be available through your health insurance provider, employer, local community center, or mathew club.    Weight management plan: Patient was referred to their PCP to discuss a diet and exercise plan.      Surgery:    Surgery for obstructive sleep apnea is considered generally only when other therapies fail to work. Surgery may be discussed with you if you are having a difficult time tolerating CPAP and or when there is an abnormal structure that requires surgical correction.  Nose and throat surgeries often enlarge the airway to prevent collapse.  Most of these surgeries create pain for 1-2 weeks and up to half of the most common surgeries are not effective throughout life.  You should carefully discuss the benefits and drawbacks to surgery with your sleep provider and surgeon to determine if it is the best solution for you.   More information  Surgery for GHISLAINE is directed at areas that are responsible for narrowing or complete obstruction of the airway during sleep.  There are a wide range of procedures available to enlarge and/or stabilize the airway to prevent blockage of breathing in the three major areas where it can occur: the palate, tongue, and nasal regions.  Successful surgical treatment depends on the accurate identification of the factors responsible for obstructive sleep apnea in each person.  A personalized approach is required because there is no single treatment that works well for everyone.  Because of anatomic variation,  consultation with an examination by a sleep surgeon is a critical first step in determining what surgical options are best for each patient.  In some cases, examination during sedation may be recommended in order to guide the selection of procedures.  Patients will be counseled about risks and benefits as well as the typical recovery course after surgery. Surgery is typically not a cure for a person s GHISLAINE.  However, surgery will often significantly improve one s GHISLAINE severity (termed  success rate ).  Even in the absence of a cure, surgery will decrease the cardiovascular risk associated with OSA7; improve overall quality of life8 (sleepiness, functionality, sleep quality, etc).      Palate Procedures:  Patients with GHISLAINE often have narrowing of their airway in the region of their tonsils and uvula.  The goals of palate procedures are to widen the airway in this region as well as to help the tissues resist collapse.  Modern palate procedure techniques focus on tissue conservation and soft tissue rearrangement, rather than tissue removal.  Often the uvula is preserved in this procedure. Residual sleep apnea is common in patient after pharyngoplasty with an average reduction in sleep apnea events of 33%2.      Tongue Procedures:  ExamWhile patients are awake, the muscles that surround the throat are active and keep this region open for breathing. These muscles relax during sleep, allowing the tongue and other structures to collapse and block breathing.  There are several different tongue procedures available.  Selection of a tongue base procedure depends on characteristics seen on physical exam.  Generally, procedures are aimed at removing bulky tissues in this area or preventing the back of the tongue from falling back during sleep.  Success rates for tongue surgery range from 50-62%3.    Hypoglossal Nerve Stimulation:  Hypoglossal nerve stimulation has recently received approval from the [place] Food and Drug  Administration for the treatment of obstructive sleep apnea.  This is based on research showing that the system was safe and effective in treating sleep apnea6.  Results showed that the median AHI score decreased 68%, from 29.3 to 9.0. This therapy uses an implant system that senses breathing patterns and delivers mild stimulation to airway muscles, which keeps the airway open during sleep.  The system consists of three fully implanted components: a small generator (similar in size to a pacemaker), a breathing sensor, and a stimulation lead.  Using a small handheld remote, a patient turns the therapy on before bed and off upon awakening.    Candidates for this device must be greater than 22 years of age, have moderate to severe GHISLAINE (AHI between 20-65), BMI less than 32, have tried CPAP/oral appliance without success, and have appropriate upper airway anatomy (determined by a sleep endoscopy performed by Dr. Dotson).    Hypoglossal Nerve Stimulation Pathway:    The sleep surgeon s office will work with the patient through the insurance prior-authorization process (including communications and appeals).    Nasal Procedures:  Nasal obstruction can interfere with nasal breathing during the day and night.  Studies have shown that relief of nasal obstruction can improve the ability of some patients to tolerate positive airway pressure therapy for obstructive sleep apnea1.  Treatment options include medications such as nasal saline, topical corticosteroid and antihistamine sprays, and oral medications such as antihistamines or decongestants. Non-surgical treatments can include external nasal dilators for selected patients. If these are not successful by themselves, surgery can improve the nasal airway either alone or in combination with these other options.      Combination Procedures:  Combination of surgical procedures and other treatments may be recommended, particularly if patients have more than one area of narrowing or  persistent positional disease.  The success rate of combination surgery ranges from 66-80%2,3.    References  1. Thomas ROBERTS. The Role of the Nose in Snoring and Obstructive Sleep Apnoea: An Update.  Eur Arch Otorhinolaryngol. 2011; 268: 1365-73.  2.  Cristela SM; Tomasa JA; Ismael JR; Pallanch JF; Teena MB; Rg SG; Toya PICKERING. Surgical modifications of the upper airway for obstructive sleep apnea in adults: a systematic review and meta-analysis. SLEEP 2010;33(10):8930-5336. Larissa VASQUEZ. Hypopharyngeal surgery in obstructive sleep apnea: an evidence-based medicine review.  Arch Otolaryngol Head Neck Surg. 2006 Feb;132(2):206-13.  3. Humberto YH1, Mark Y, Michael SHARRON. The efficacy of anatomically based multilevel surgery for obstructive sleep apnea. Otolaryngol Head Neck Surg. 2003 Oct;129(4):327-35.  4. Larissa VASQUEZ, Goldberg A. Hypopharyngeal Surgery in Obstructive Sleep Apnea: An Evidence-Based Medicine Review. Arch Otolaryngol Head Neck Surg. 2006 Feb;132(2):206-13.  5. Henryo PJ et al. Upper-Airway Stimulation for Obstructive Sleep Apnea.  N Engl J Med. 2014 Jan 9;370(2):139-49.  6. Shruthi Y et al. Increased Incidence of Cardiovascular Disease in Middle-aged Men with Obstructive Sleep Apnea. Am J Respir Crit Care Med; 2002 166: 159-165  7. Kendall EM et al. Studying Life Effects and Effectiveness of Palatopharyngoplasty (SLEEP) study: Subjective Outcomes of Isolated Uvulopalatopharyngoplasty. Otolaryngol Head Neck Surg. 2011; 144: 623-631.

## 2019-10-23 ENCOUNTER — TELEPHONE (OUTPATIENT)
Dept: ENDOCRINOLOGY | Facility: CLINIC | Age: 64
End: 2019-10-23

## 2019-10-23 NOTE — TELEPHONE ENCOUNTER
Pt called and left message stating she was concerned about new provider replacing Dr iHckey and is asking to speak with RN, Rasheeda. Pt states she will call again at 2-2:30 to speak with Rasheeda.   Hanh Pickens RN on 10/23/2019 at 8:13 AM

## 2019-10-25 ENCOUNTER — TELEPHONE (OUTPATIENT)
Dept: FAMILY MEDICINE | Facility: CLINIC | Age: 64
End: 2019-10-25

## 2019-10-25 NOTE — TELEPHONE ENCOUNTER
[facility] phone call message- general phone call:    Reason for call: Patient would like the RN to call her regarding her immunizations and what she needs.    Action Desired: Please call her. She also asked to have her current list mailed to her, so I will do that on Monday.    Return call needed: Yes    OK to leave a message on voice mail? Yes    Advised patient response may take up to 2 business days: Yes    Primary language: English      needed? No    Call taken on October 25, 2019 at 4:27 PM by Rasheeda Sanchez [facility] TRIAGE

## 2019-10-28 NOTE — TELEPHONE ENCOUNTER
RN attempted reaching patient to discuss, she was unavailable. Left message for her to return call to clinic. It appears she would be eligible for getting a pneumonia vaccine again since her previous vaccination was before the age of 65.     If she calls back, may transfer her to RN.  Melissa Burgess RN

## 2019-10-29 NOTE — TELEPHONE ENCOUNTER
Patient returned call, RN advised her that she is eligible for Pneumonia vaccination now that she is over 65. Patient requested a copy of immunizations mailed to her. RN mailed it for her.  Melissa Bugress RN

## 2019-11-08 ENCOUNTER — TELEPHONE (OUTPATIENT)
Dept: ENDOCRINOLOGY | Facility: CLINIC | Age: 64
End: 2019-11-08

## 2019-11-08 DIAGNOSIS — E11.8 TYPE 2 DIABETES MELLITUS WITH COMPLICATION, WITH LONG-TERM CURRENT USE OF INSULIN (H): Primary | ICD-10-CM

## 2019-11-08 DIAGNOSIS — Z79.4 TYPE 2 DIABETES MELLITUS WITH COMPLICATION, WITH LONG-TERM CURRENT USE OF INSULIN (H): Primary | ICD-10-CM

## 2019-11-08 RX ORDER — BLOOD-GLUCOSE CONTROL, NORMAL
EACH MISCELLANEOUS
Qty: 1 BOTTLE | Refills: 3 | Status: SHIPPED | OUTPATIENT
Start: 2019-11-08 | End: 2020-03-03 | Stop reason: ALTCHOICE

## 2019-11-08 RX ORDER — BLOOD-GLUCOSE CONTROL, LOW
EACH MISCELLANEOUS
Qty: 1 BOTTLE | Refills: 3 | Status: SHIPPED | OUTPATIENT
Start: 2019-11-08 | End: 2019-11-11

## 2019-11-08 NOTE — TELEPHONE ENCOUNTER
Can you contact [facility] to reschedule possible phone visit with Edelmira since he is cancelling the 12/4/19 clinic .Otherwise schedule RTN with Calixto Barton  for ongoing care Also needs help scheduling a lab visit . Rasheeda Izaguirre RN on 11/8/2019 at 10:07 AM

## 2019-11-11 ENCOUNTER — OFFICE VISIT (OUTPATIENT)
Dept: SLEEP MEDICINE | Facility: CLINIC | Age: 64
End: 2019-11-11
Payer: COMMERCIAL

## 2019-11-11 ENCOUNTER — TELEPHONE (OUTPATIENT)
Dept: FAMILY MEDICINE | Facility: CLINIC | Age: 64
End: 2019-11-11

## 2019-11-11 DIAGNOSIS — E66.01 CLASS 3 SEVERE OBESITY DUE TO EXCESS CALORIES WITH SERIOUS COMORBIDITY AND BODY MASS INDEX (BMI) OF 40.0 TO 44.9 IN ADULT (H): ICD-10-CM

## 2019-11-11 DIAGNOSIS — R06.83 SNORING: ICD-10-CM

## 2019-11-11 DIAGNOSIS — E66.813 CLASS 3 SEVERE OBESITY DUE TO EXCESS CALORIES WITH SERIOUS COMORBIDITY AND BODY MASS INDEX (BMI) OF 40.0 TO 44.9 IN ADULT (H): ICD-10-CM

## 2019-11-11 DIAGNOSIS — Z79.4 TYPE 2 DIABETES MELLITUS WITH COMPLICATION, WITH LONG-TERM CURRENT USE OF INSULIN (H): ICD-10-CM

## 2019-11-11 DIAGNOSIS — E11.8 TYPE 2 DIABETES MELLITUS WITH COMPLICATION, WITH LONG-TERM CURRENT USE OF INSULIN (H): ICD-10-CM

## 2019-11-11 DIAGNOSIS — R53.83 FATIGUE, UNSPECIFIED TYPE: ICD-10-CM

## 2019-11-11 DIAGNOSIS — Z00.00 HEALTH CARE MAINTENANCE: Primary | ICD-10-CM

## 2019-11-11 PROCEDURE — G0399 HOME SLEEP TEST/TYPE 3 PORTA: HCPCS | Performed by: INTERNAL MEDICINE

## 2019-11-11 RX ORDER — BLOOD-GLUCOSE CONTROL, LOW
EACH MISCELLANEOUS
Qty: 1 BOTTLE | Refills: 3 | Status: SHIPPED | OUTPATIENT
Start: 2019-11-11 | End: 2020-03-03 | Stop reason: ALTCHOICE
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
## 2019-11-11 NOTE — TELEPHONE ENCOUNTER
[facility] Family [facility] phone call message- general phone call:    Reason for call: Patient calling to inquire if she needs to schedule an appointment to obtain a referral for a colonoscopy. Patient also inquired about appointment for pneumococcal vaccine. Declined scheduling nurse only visit for vaccine, because if appointment needed for referral, will get vaccine during that visit.    Action Desired: Patient requesting to speak with nurse. Please return patient's call to discuss inquiries noted above.    Return call needed: Yes    OK to leave a message on voice mail? Yes    Advised patient response may take up to 2 business days: No    Primary language: English      needed? No    Call taken on November 11, 2019 at 3:23 PM by Zayda Sanchez to [facility] TRIAGE

## 2019-11-12 ENCOUNTER — ANCILLARY PROCEDURE (OUTPATIENT)
Dept: MAMMOGRAPHY | Facility: CLINIC | Age: 64
End: 2019-11-12
Payer: COMMERCIAL

## 2019-11-12 ENCOUNTER — DOCUMENTATION ONLY (OUTPATIENT)
Dept: SLEEP MEDICINE | Facility: CLINIC | Age: 64
End: 2019-11-12
Payer: COMMERCIAL

## 2019-11-12 ENCOUNTER — TELEPHONE (OUTPATIENT)
Dept: GASTROENTEROLOGY | Facility: CLINIC | Age: 64
End: 2019-11-12

## 2019-11-12 ENCOUNTER — OFFICE VISIT (OUTPATIENT)
Dept: OBGYN | Facility: CLINIC | Age: 64
End: 2019-11-12
Attending: NURSE PRACTITIONER
Payer: COMMERCIAL

## 2019-11-12 VITALS
WEIGHT: 248.3 LBS | BODY MASS INDEX: 42.39 KG/M2 | SYSTOLIC BLOOD PRESSURE: 108 MMHG | HEIGHT: 64 IN | DIASTOLIC BLOOD PRESSURE: 69 MMHG | HEART RATE: 87 BPM

## 2019-11-12 DIAGNOSIS — Z01.419 ENCOUNTER FOR GYNECOLOGICAL EXAMINATION WITHOUT ABNORMAL FINDING: Primary | ICD-10-CM

## 2019-11-12 DIAGNOSIS — Z12.4 SCREENING FOR MALIGNANT NEOPLASM OF CERVIX: ICD-10-CM

## 2019-11-12 DIAGNOSIS — Z12.31 ENCOUNTER FOR SCREENING MAMMOGRAM FOR BREAST CANCER: ICD-10-CM

## 2019-11-12 DIAGNOSIS — Z12.31 VISIT FOR SCREENING MAMMOGRAM: ICD-10-CM

## 2019-11-12 PROCEDURE — G0145 SCR C/V CYTO,THINLAYER,RESCR: HCPCS | Performed by: NURSE PRACTITIONER

## 2019-11-12 PROCEDURE — 87624 HPV HI-RISK TYP POOLED RSLT: CPT | Performed by: NURSE PRACTITIONER

## 2019-11-12 ASSESSMENT — ANXIETY QUESTIONNAIRES
6. BECOMING EASILY ANNOYED OR IRRITABLE: SEVERAL DAYS
2. NOT BEING ABLE TO STOP OR CONTROL WORRYING: SEVERAL DAYS
1. FEELING NERVOUS, ANXIOUS, OR ON EDGE: SEVERAL DAYS
5. BEING SO RESTLESS THAT IT IS HARD TO SIT STILL: NOT AT ALL
3. WORRYING TOO MUCH ABOUT DIFFERENT THINGS: SEVERAL DAYS
7. FEELING AFRAID AS IF SOMETHING AWFUL MIGHT HAPPEN: SEVERAL DAYS
GAD7 TOTAL SCORE: 6

## 2019-11-12 ASSESSMENT — PATIENT HEALTH QUESTIONNAIRE - PHQ9
SUM OF ALL RESPONSES TO PHQ QUESTIONS 1-9: 12
5. POOR APPETITE OR OVEREATING: SEVERAL DAYS

## 2019-11-12 ASSESSMENT — MIFFLIN-ST. JEOR: SCORE: 1648.34

## 2019-11-12 NOTE — LETTER
11/12/2019       RE: Radha Baca  [address] [address]     Dear Colleague,    Thank you for referring your patient, Radha Baca, to the [facility] at [facility]. Please see a copy of my visit note below.    Radha's medical history is significant for: HTN, obesity, depressive disorder, hypercholesteremia, and diabete s  Annual Breast and Pelvic Exam.   PCP: Dr. Mohan Moreno    Menopause at age 37    11/26/2014 NIL, HPV negative --> next due 11/2019. If normal, this will be her last pap smear.     Mental health:    Social: still taking care of mom and ; still has fmily issues and everyone is astranged; [employer] helps with her mom  - lost LUCIA yesterday with pulmonary fibrosis    Fam: br ov col    GYN: menopause sx?  LMP -age 37  Sexual function  Pap hx  STI/PID    ROS:   Breast  Gyn      Last pap 2014 NIL; due today!  Tdap?  Flu?  Colonoscopy 2015  mammo-11/2018; due!  Shingrix?  Pneumonia?  Lipids, glucose done recently    Again, thank you for allowing me to participate in the care of your patient.      Sincerely,    EZEQUIEL Estes CNP

## 2019-11-12 NOTE — TELEPHONE ENCOUNTER
RN left detailed message on secure line that the referral has been placed and she should be getting a call to set up the appointment. RN informed her that she can do a nurse only visit for her vaccine and provided the clinic number both to schedule that, and for any additional questions she might have.  Melissa Burgess RN

## 2019-11-12 NOTE — LETTER
11/12/2019    RE: Radha Baca  [address] [address]       Radha's medical history is significant for: HTN, obesity, depressive disorder, hypercholesteremia, and diabete s  Annual Breast and Pelvic Exam.   PCP: Dr. Mohan Moreno    Menopause at age 37    11/26/2014 NIL, HPV negative --> next due 11/2019. If normal, this will be her last pap smear.     Mental health:    Social: still taking care of mom and ; still has fmily issues and everyone is astranged; [employer] helps with her mom  - lost LUCIA yesterday with pulmonary fibrosis    Fam: br ov col    GYN: menopause sx?  LMP -age 37  Sexual function  Pap hx  STI/PID    ROS:   Breast  Gyn      Last pap 2014 NIL; due today!  Tdap?  Flu?  Colonoscopy 2015  mammo-11/2018; due!  Shingrix?  Pneumonia?  Lipids, glucose done recently    EZEQUIEL Estes CNP

## 2019-11-13 ENCOUNTER — TELEPHONE (OUTPATIENT)
Dept: GASTROENTEROLOGY | Facility: CLINIC | Age: 64
End: 2019-11-13

## 2019-11-13 ASSESSMENT — ANXIETY QUESTIONNAIRES: GAD7 TOTAL SCORE: 6

## 2019-11-13 NOTE — PROGRESS NOTES
Pt returned HST device. It was downloaded and forwarded data to the clinical specialist for scoring.    Paul Pink  Sleep Clinic Specialist - [place]

## 2019-11-13 NOTE — PROCEDURES
"HOME SLEEP STUDY INTERPRETATION    Patient: Radha Baca  MRN: [id number]  YOB: 1955  Study Date: 11/11/2019  Referring Provider: Sarah Moreno MD  Ordering Provider: Joanne Pisano MD     Indications for Home Study: Radha Baca is a 64 year old female with a history of hypertension, diabetes, arthritis, depression/anxiety who presents with symptoms suggestive of obstructive sleep apnea.    Estimated body mass index is 43.29 kg/m  as calculated from the following:    Height as of 11/12/19: 1.613 m (5' 3.5\").    Weight as of 11/12/19: 112.6 kg (248 lb 4.8 oz).  Total score - [place]: 8 (10/10/2019 10:00 AM)  StopBang Total Score: 6 (10/10/2019 10:00 AM)    Data: A full night home sleep study was performed recording the standard physiologic parameters including body position, movement, sound, nasal pressure, thermal oral airflow, chest and abdominal movements with respiratory inductance plethysmography, and oxygen saturation by pulse oximetry. Pulse rate was estimated by oximetry recording. This study was considered adequate based on > 4 hours of quality oximetry and respiratory recording. As specified by the AASM Manual for the Scoring of Sleep and Associated events, version 2.3, Rule VIII.D 1B, 4% oxygen desaturation scoring for hypopneas is used as a standard of care on all home sleep apnea testing.    Analysis Time:  384 minutes    Respiration:   Sleep Associated Hypoxemia: sustained hypoxemia was present. Baseline oxygen saturation was 92%.  Time with saturation less than or equal to 88% was 80 minutes. The lowest oxygen saturation was 62%.   Snoring: Snoring was present.  Respiratory events: The home study revealed a presence of 148 obstructive apneas and 0 mixed and central apneas. There were 152 hypopneas resulting in a combined apnea/hypopnea index [AHI] of 46.8 events per hour.  AHI was 63.6 per hour supine, NA per hour prone, 23.6 per hour on left side, and 56.4 per " hour on right side.   Pattern: Excluding events noted above, respiratory rate and pattern was Normal.    Position: Percent of time spent: supine - 24%, prone - 0%, on left - 34%, on right - 42%.    Heart Rate: By pulse oximetry normal rate was noted.     Assessment:   Severe obstructive sleep apnea with AHI 46.8 events per hour.  Sleep associated hypoxemia was present.    Recommendations:  Consider auto-CPAP at 5-15 cmH2O.  Suggest optimizing sleep hygiene and avoiding sleep deprivation.  Weight management.    Diagnosis Code(s): Obstructive Sleep Apnea G47.33, Hypoxemia G47.36    Joanne Pisano MD, November 13, 2019   Diplomate, American Board of Internal Medicine, Sleep Medicine

## 2019-11-13 NOTE — PROGRESS NOTES
This HSAT was performed using a Noxturnal T3 device which recorded snore, sound, movement activity, body position, nasal pressure, oronasal thermal airflow, pulse, oximetry and both chest and abdominal respiratory effort. HSAT data was restricted to the time patient states they were in bed.     HSAT was scored using 1B 4% hypopnea rule.     AHI: 46.8. Snoring was reported as loud.  Time with SpO2 below 89% was 80.0 minutes.   Overall signal quality was good     Pt will follow up with sleep provider to determine appropriate therapy.     Ordering Norris LOPEZ Tereza, MD Charles O. BA, [facility], RST System Clinical Specialist 09/13/2019

## 2019-11-14 ENCOUNTER — TELEPHONE (OUTPATIENT)
Dept: FAMILY MEDICINE | Facility: CLINIC | Age: 64
End: 2019-11-14

## 2019-11-14 ENCOUNTER — TELEPHONE (OUTPATIENT)
Dept: GASTROENTEROLOGY | Facility: CLINIC | Age: 64
End: 2019-11-14

## 2019-11-14 DIAGNOSIS — Z11.3 SCREEN FOR STD (SEXUALLY TRANSMITTED DISEASE): Primary | ICD-10-CM

## 2019-11-14 NOTE — TELEPHONE ENCOUNTER
[facility] phone call message- general phone call: Patient requesting a call back from ALFONSO Fontana.     Reason for call: Patient calling in to ask a question to RN. What is the process to test HIV? Do I need an appointment or can Dr. Moreno order it. The reason why I needed this test this is, patient OBGYN suggested that HIV is due.    Action Desired: Call back from ALFONSO fontana    Return call needed: Yes    OK to leave a message on voice mail? Yes    Advised patient response may take up to 2 business days: Yes    Primary language: English      needed? No    Call taken on November 14, 2019 at 4:00 PM by Ashia Abraham    Route to [facility] TRIAGE

## 2019-11-14 NOTE — TELEPHONE ENCOUNTER
RN called and advised that this is a lab test and I can see if her PCP can order this without a visit. If this is the case, I can have her come in just for the lab visit and nurse visit for her pneumonia shot on the same day. Ok to leave confidential message on her mobile number.    Routing to PCP to order HIV lab work if appropriate.   Melissa Burgess RN

## 2019-11-15 DIAGNOSIS — G47.33 OSA (OBSTRUCTIVE SLEEP APNEA): Primary | ICD-10-CM

## 2019-11-18 LAB
COPATH REPORT: NORMAL
PAP: NORMAL

## 2019-11-19 LAB
FINAL DIAGNOSIS: NORMAL
HPV HR 12 DNA CVX QL NAA+PROBE: NEGATIVE
HPV16 DNA SPEC QL NAA+PROBE: NEGATIVE
HPV18 DNA SPEC QL NAA+PROBE: NEGATIVE
SPECIMEN DESCRIPTION: NORMAL
SPECIMEN SOURCE CVX/VAG CYTO: NORMAL

## 2019-11-19 NOTE — TELEPHONE ENCOUNTER
RN notified patient of order being placed and assisted her to schedule her nurse visit and lab visit for tomorrow.  Melissa Burgess RN

## 2019-11-19 NOTE — TELEPHONE ENCOUNTER
Patient calling to check status of HIV lab order and pneumonia vaccine. Please contact patient once order has been placed. Patient would like to have lab work and immunization completed on the same day.

## 2019-11-20 ENCOUNTER — ALLIED HEALTH/NURSE VISIT (OUTPATIENT)
Dept: FAMILY MEDICINE | Facility: CLINIC | Age: 64
End: 2019-11-20
Payer: COMMERCIAL

## 2019-11-20 DIAGNOSIS — Z11.3 SCREEN FOR STD (SEXUALLY TRANSMITTED DISEASE): ICD-10-CM

## 2019-11-20 DIAGNOSIS — Z23 NEED FOR PNEUMOCOCCAL VACCINATION: Primary | ICD-10-CM

## 2019-11-21 ENCOUNTER — DOCUMENTATION ONLY (OUTPATIENT)
Dept: SLEEP MEDICINE | Facility: CLINIC | Age: 64
End: 2019-11-21
Payer: COMMERCIAL

## 2019-11-21 ENCOUNTER — TRANSFERRED RECORDS (OUTPATIENT)
Dept: HEALTH INFORMATION MANAGEMENT | Facility: CLINIC | Age: 64
End: 2019-11-21

## 2019-11-21 LAB — HIV 1+2 AB+HIV1 P24 AG SERPL QL IA: NONREACTIVE

## 2019-11-21 NOTE — PROGRESS NOTES
Patient was offered choice of vendor and chose [facility].  Patient Radha Baca was set up at [facility] on November 21, 2019. Patient received a Resmed AirSense 10 Auto. Pressures were set at 5-15 cm H2O.   Patient s ramp is 5 cm H2O for Auto and FLEX/EPR is 2.  Patient received a Resmed Mask name: N30  Nasal mask size Medium, heated tubing and heated humidifier.  Patient is enrolled in the STM Program and does need to meet compliance. Patient has a follow up on 1/31/19 with Dr. Pisano.    Aline Beavers

## 2019-11-25 ENCOUNTER — DOCUMENTATION ONLY (OUTPATIENT)
Dept: SLEEP MEDICINE | Facility: CLINIC | Age: 64
End: 2019-11-25

## 2019-11-25 DIAGNOSIS — I10 ESSENTIAL HYPERTENSION: ICD-10-CM

## 2019-11-25 NOTE — PROGRESS NOTES
3 DAY STM VISIT    Diagnostic AHI:  46.8 HST    Patient contacted for 3 day STM visit  Message left for patient to return call    Replacement device: No  STM ordered by provider: Yes     Device type: Auto-CPAP  PAP settings from order::  CPAP min 5 cm  H20       CPAP max 15 cm  H20  Mask type:    Nasal Mask     Device settings from machine      Min CPAP 5.0            Max CPAP 15.0      Assessment: Nightly usage, most nights over four hours   Action plan: Patient to have 14 day STM visit. Patient has a follow up visit scheduled:   yes within 31-90 days of set up.    Total time spent on remote patient monitoring data analysis and patient contact today:   10 minutes

## 2019-11-26 DIAGNOSIS — E78.5 DYSLIPIDEMIA: ICD-10-CM

## 2019-11-26 RX ORDER — ATORVASTATIN CALCIUM 20 MG/1
TABLET, FILM COATED ORAL
Qty: 90 TABLET | Refills: 0 | Status: SHIPPED | OUTPATIENT
Start: 2019-11-26 | End: 2020-03-09

## 2019-11-26 RX ORDER — LISINOPRIL AND HYDROCHLOROTHIAZIDE 12.5; 2 MG/1; MG/1
1 TABLET ORAL DAILY
Qty: 90 TABLET | Refills: 3 | Status: SHIPPED | OUTPATIENT
Start: 2019-11-26 | End: 2020-03-09

## 2019-11-26 NOTE — TELEPHONE ENCOUNTER
atorvastatin (LIPITOR) 20 MG tablet      Last Written Prescription Date:  12/15/18  Last Fill Quantity: 90,   # refills: 3  Last Office Visit : 8/7/19  Future Office visit:  none    Refill to pharmacy.

## 2019-11-27 NOTE — PROGRESS NOTES
Patient returned call.    Subjective measures:   Pt states that she is having issues with the mask. She feels like it's leaking. The medium size was making it hard to breathe and so she tried the large size and that worked a little better.  She does feel like the headgear is too big and slips a lot as well. She will continue to try for a few more days and let us know if she wants to try something else.     Assessment: Pt meeting objective benchmarks.  Patient failing following subjective benchmarks: mask discomfort     Action plan:pt to have 14 day [facility] visit.    Total time spent on remote patient monitoring data analysis and patient contact today:   5 minutes

## 2019-12-05 ENCOUNTER — DOCUMENTATION ONLY (OUTPATIENT)
Dept: SLEEP MEDICINE | Facility: CLINIC | Age: 64
End: 2019-12-05
Payer: COMMERCIAL

## 2019-12-06 ENCOUNTER — DOCUMENTATION ONLY (OUTPATIENT)
Dept: SLEEP MEDICINE | Facility: CLINIC | Age: 64
End: 2019-12-06

## 2019-12-06 NOTE — PROGRESS NOTES
Patient came to [place] for mask fitting appointment on December 6, 2019. Patient requested to switch masks because general discomfort . Patient tried on the following masks: N20 Patient selected a Resmed Mask name: N20 MED Nasal mask size Medium        12/6/19: LYDIA CAME IN FOR A 30 DAY MASK EXCHANGE, WE REVIEWED HER DATA WHERE HER AHI IS WELL TREATED BUT SHE DOES STRUGGLE WITH LEAK. I ATTEMPTED TO REVIEW DATA IN AIRVIEW WITH HER BUT SHE BECAME VERY OVERWHELMED WITH HOW THE DATA WAS PRESENTED. SHE KEPT ASKING WHAT EACH LINE SIGNIFIED, I TRIED TO REASSURE BY LETTING HER KNOW THAT IS JUST HOW THE DATA IS DISPLAYED AND IT IS NOT SOMETHING THAT DIRECTLY AFFECTS HER. SHE BECAME VERY UPSET BY THIS REPLY AND STILL WANTED ME TO GO OVER EACH LINE OF INFORMATION. WE THEN TRIED ON THE N20 MED MASK, SHE BECAME VERY OVERWHELMED AND CRIED. I SPENT THE REST OF THE APT TALKING TO LYDIA AND REASSURING HER SHE IS DOING VERY WELL ON CPAP. REVIEWED HER LOWERED AHI COMPARED TO HER DIAGNOSTIC. SHE DID FEEL REASSURED BY THIS AND LEFT THE APPOINTMENT FEELING GOOD ABOUT HER CPAP AND AWARE SHE CAN CONTACT ME AT ANYTIME.

## 2019-12-06 NOTE — PROGRESS NOTES
14  DAY STM VISIT    Diagnostic AHI:  46.8 HST    Subjective measures:   Pt states that she had a mask fitting yesterday and got a new mask.  She thinks the new one is much better. She finds that is feels different and it's not as noisy.     Assessment: Pt meeting objective benchmarks.  Patient meeting subjective benchmarks.     Action plan: pt to have 30 day STM visit.      Device type: Auto-CPAP    PAP settings: CPAP min 5.0 cm  H20       CPAP max 15.0 cm  H20      95th% pressure 14.1 cm  H20     Mask type:  Nasal Mask    Objective measures: 14 day rolling measures      Compliance  92 %      Leak  34.06 lpm  last  upload      AHI 1.43   last  upload      Average number of minutes 374      Objective measure goal  Compliance   Goal >70%  Leak   Goal < 24 lpm  AHI  Goal < 5  Usage  Goal >240      Total time spent on accessing, reviewing and interpreting remote patient PAP therapy data:   10 minutes      Total time spent with direct patient communication :   4 minutes

## 2019-12-13 ENCOUNTER — TELEPHONE (OUTPATIENT)
Dept: ENDOCRINOLOGY | Facility: CLINIC | Age: 64
End: 2019-12-13

## 2019-12-13 NOTE — TELEPHONE ENCOUNTER
[facility] Call Center    Phone Message    May a detailed message be left on voicemail: yes    Reason for Call: [facility] at [facility]00am and has a few questions for Dr. Hickey and is requesting a call back from a nurse of Dr. Hickey.     1. Pt is not sure when she should stop taking aspirin (not clear on instructions). She did also leave a message for the [facility] as well about this.     2. What is Dr. Hickey's recommendation regarding adjustment for morning insulin on 12/27?     3. Pt instructed to stop eating a low fiber diet after 1:00pm on 12/26 and wondering if Dr. Hickey has any concerns about that for her blood sugars. The procedure is the following morning at 8:00am.     Please call pt back to discuss at [phone]. Okay to leave detailed message if unable to reach her.     Action Taken: [facility]

## 2019-12-17 ENCOUNTER — TELEPHONE (OUTPATIENT)
Dept: GASTROENTEROLOGY | Facility: OUTPATIENT CENTER | Age: 64
End: 2019-12-17

## 2019-12-17 NOTE — TELEPHONE ENCOUNTER
Radha  has a colonoscopy coming up 12/27/19  8 AM and would like to  know how many days to hold the aspirin  and how to dose insulin  the day before with prep  and    and  the morning of.

## 2019-12-19 ENCOUNTER — TELEPHONE (OUTPATIENT)
Dept: GASTROENTEROLOGY | Facility: OUTPATIENT CENTER | Age: 64
End: 2019-12-19

## 2019-12-19 ENCOUNTER — OFFICE VISIT (OUTPATIENT)
Dept: FAMILY MEDICINE | Facility: CLINIC | Age: 64
End: 2019-12-19
Payer: COMMERCIAL

## 2019-12-19 VITALS
OXYGEN SATURATION: 98 % | SYSTOLIC BLOOD PRESSURE: 124 MMHG | DIASTOLIC BLOOD PRESSURE: 72 MMHG | TEMPERATURE: 98.6 F | RESPIRATION RATE: 16 BRPM | HEART RATE: 70 BPM

## 2019-12-19 DIAGNOSIS — T21.22XA PARTIAL THICKNESS BURN OF ABDOMINAL WALL, INITIAL ENCOUNTER: Primary | ICD-10-CM

## 2019-12-19 DIAGNOSIS — E11.8 TYPE 2 DIABETES MELLITUS WITH COMPLICATION (H): ICD-10-CM

## 2019-12-19 DIAGNOSIS — Z12.11 ENCOUNTER FOR SCREENING COLONOSCOPY: Primary | ICD-10-CM

## 2019-12-19 ASSESSMENT — ENCOUNTER SYMPTOMS
BACK PAIN: 1
FEVER: 0

## 2019-12-19 NOTE — TELEPHONE ENCOUNTER
She can continue all medications. On the morning of the colonoscopy, she can take all her morning medications after the procedure once she is able to take oral medications.     Demar Hickey MD  Endocrinology Service

## 2019-12-19 NOTE — TELEPHONE ENCOUNTER
Patient taking any blood thinners ? No     Heart disease ? Denies     Lung disease ? Denies       Sleep apnea ? Cpap    Diabetic ? Type 2. Advised patient to contact her provider about insulin dosing    Kidney disease ? Denies     Electronic implanted medical devices ? Denies     Are you taking any narcotic pain medication ?   No     PTSD ? N/a     Prep instructions reviewed with patient ? Instructions, policy,MAC sedation plan reviewed. Advised patient to have someone stay with them post exam.    Pharmacy : [facility]    Indication for procedure :Health care maintenance [Z00.00    Referring provider :Mohan Moreno MD     Arrival Time : 7 AM     : Yes   Patient states she will need assistance with positioning for exam due to previous  knee surgery

## 2019-12-19 NOTE — PROGRESS NOTES
"       HPI       Radha Baca is a 64 year old  who presents for No chief complaint on file.      Rash/Lesion  Onset: ***    Description:   Location: ***  Color: ***  Character: {DESCRIPT:116869}  Itching (Pruritis): { :981857::\"no\"}  Pain?:{ :983634::\"no\"}    Progression of Symptoms:  {.:364662}    Accompanying Signs & Symptoms:  Fever: { :235243::\"no\"}  Body aches or joint pain:  { :393050::\"no\"}  Sore throat symptoms:{ :795634::\"no\"}  Recent cold symptoms: { :181203::\"no\"}    History:   Previous similar rash: { :505597::\"no\"}    Precipitating factors:   Exposure to similar rash: { :843334::\"no\"}  New exposures: {{ :506707::\"no\"}  Recent travel: { :138281::\"no\"}  New Medication: { :828277::\"no\"}    What makes it better?:  ***  Therapies Tried and outcome:  { :86749::\"Nothing\"}    {:491688}   +++++++  {Additional Concerns  (FM):234823}    Problem, Medication and Allergy Lists were {Reviewed Lists:956645}.    Patient is {New/Established:445051::\"an established patient of this clinic.\"}.         Review of Systems:   Review of Systems         Physical Exam:   There were no vitals filed for this visit.  There is no height or weight on file to calculate BMI.  {[facility] VITALS OPTIONS:246500::\"Vitals were reviewed and were normal\"}     Physical Exam  {  Detailed Ortho and mental status exam:228903}    Results:   {UMP FM result choices :714414}    Assessment and Plan        {Assessment/Plan Pick List :000582}       There are no discontinued medications.    Options for treatment and follow-up care were reviewed with the patient. Radha Baca  engaged in the decision making process and verbalized understanding of the options discussed and agreed with the final plan.    Donita Moreno MD  "

## 2019-12-19 NOTE — TELEPHONE ENCOUNTER
Spoke w/ Pt: Confirms understanding of recommendations for insulin from Dr Hickey.  Asking if she should continue the metformin ?   Sent to provider for review and recommendation.   Hanh Pickens RN on 12/19/2019 at 12:43 PM

## 2019-12-19 NOTE — TELEPHONE ENCOUNTER
Insulin Regimen:   December 26: Reduce Lantus to 50 units.   December 27: Take Lantus 50 units after the procedure once you start eating.   December 28: Continue Lantus 70 units daily     Through out this time, you can use Novolog as you have been doing for carb coverage. If sugars run high, use novolog for correction ( 1 unit per 25 mg/dl over 150)     Demar Hickey MD  Endocrinology Service
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
## 2019-12-19 NOTE — PATIENT INSTRUCTIONS
Patient Education     Second-Degree Burn  A burn occurs when skin is exposed to too much heat, sun, or harsh chemicals. A second-degree burn (partial-thickness burn) is deeper than a first-degree burn (superficial burn). It usually causes a blister to form. The blister may remain intact and gradually go away on its own. Or it may break open. The goal of treatment is to relieve pain and stop infection while the burn heals.  Home care  Use pain medicine as directed. If no pain medicine was prescribed, you may use over-the-counter medicine to control pain. If you have chronic liver or kidney disease, talk with your healthcare provider before using acetaminophen or ibuprofen. Also talk with your provider if you've had a stomach ulcer or GI bleeding.  General care    On the first day, you may put a cool compress on the wound to ease pain. A cool compress is a small towel soaked in cool water.    If you were sent home with the blister intact, don't break the blister. The risk for infection is greater if the blister breaks. If a bandage was applied, change it once a day, unless told otherwise. If the bandage becomes wet or soiled, change it as soon as you can.    Sometimes an infection may occur even with proper treatment. Check the burn daily for the signs of infection listed below.    Eat more calories and protein until your wound is healed.    Wear a hat, sunscreen, and long sleeves while in the sun to protect the skin.    Don't pick or scratch at the wound. Use over-the-counter medicines like diphenhydramine for itching.    Avoid tight-fitting clothes.  To change a bandage:    Wash your hands.    Take off the old bandage. If the bandage sticks, soak it off under warm running water.    Once the bandage is off, gently wash the burn area with mild soap and warm water to remove any cream, ointment, ooze, or scab. You may do this in a sink, under a tub faucet, or in the shower. Rinse off the soap and gently pat dry with a  clean towel.    Check for signs of infection listed below.    Put any prescribed antibiotic cream or ointment on the wound.    Cover the burn with nonstick gauze. Then wrap it with the bandage material.  Follow-up care  Follow up with your healthcare provider, or as advised.  When to seek medical advice  Call your healthcare provider right away if you have any of these signs of infection:    Fever of 100.4 F (38 C) or higher, or as directed by your healthcare provider    Pain that gets worse    Redness or swelling that gets worse    Pus comes from the burn    Red streaks in your skin coming from the burn    Wound doesn't appear to be healing    Nausea or vomiting   Date Last Reviewed: 1/1/2017 2000-2018 [employer]. [address] [place] [address], [place] [address]. All rights reserved. This information is not intended as a substitute for professional medical care. Always follow your healthcare professional's instructions.

## 2019-12-20 ENCOUNTER — TELEPHONE (OUTPATIENT)
Dept: FAMILY MEDICINE | Facility: CLINIC | Age: 64
End: 2019-12-20

## 2019-12-20 NOTE — TELEPHONE ENCOUNTER
Radha was notified that the morning of the colonoscopy to hold her medications until the procedure is over and she is eating. Verbal understanding given. Rasheeda Izaguirre RN on 12/19/2019 at 6:34 PM

## 2019-12-20 NOTE — TELEPHONE ENCOUNTER
"Schedule follow up with Endocrinology per . Scheduled patient for 01/06/2020 @ 10:30a.m with Juana Klein as  last day is 12/31/19. I contacted patient with appointment information and she stated, \"I do not need [facility] intervening with my Endocrine\".\" I appreciate the concern\". I called the endocrine clinic back and cancelled the appointment.     Latoya Rogers  Care Coordinator    "

## 2019-12-22 DIAGNOSIS — E11.9 DIABETES MELLITUS, TYPE 2 (H): ICD-10-CM

## 2019-12-23 DIAGNOSIS — E11.9 DIABETES MELLITUS, TYPE 2 (H): ICD-10-CM

## 2019-12-23 NOTE — TELEPHONE ENCOUNTER
[facility] Call Center    Phone Message    May a detailed message be left on voicemail: yes    Reason for Call: Medication Question or concern regarding medication   Prescription Clarification   Name of Medication: metFORMIN HCl 500 MG Prescribing Provider: RENAE   Pharmacy: [id number] - [place], [place] - [address] AT [facility] [address]   What on the order needs clarification? Pt says that as she was going through her medications for the wk, she realized that she will be out of this by Thursday. Pt says her pharmacy is close on Wednesday but will be open tomorrow until 5pm. Pt would like this rx asap.     Action Taken: Message routed to:  [facility] ([facility] ishaan

## 2019-12-24 NOTE — TELEPHONE ENCOUNTER
"metFORMIN (GLUCOPHAGE) 500 MG tablet      Last Written Prescription Date:  11/29/18  Last Fill Quantity: 360,   # refills: 3  Last Office Visit :8/7/19  Future Office visit:  None  \" 3 month follow-up\"    Scheduling has been notified to contact the pt for appointment.         "

## 2019-12-27 ENCOUNTER — DOCUMENTATION ONLY (OUTPATIENT)
Dept: GASTROENTEROLOGY | Facility: OUTPATIENT CENTER | Age: 64
End: 2019-12-27
Payer: COMMERCIAL

## 2019-12-27 ENCOUNTER — TRANSFERRED RECORDS (OUTPATIENT)
Dept: HEALTH INFORMATION MANAGEMENT | Facility: CLINIC | Age: 64
End: 2019-12-27

## 2019-12-30 ENCOUNTER — DOCUMENTATION ONLY (OUTPATIENT)
Dept: SLEEP MEDICINE | Facility: CLINIC | Age: 64
End: 2019-12-30
Payer: COMMERCIAL

## 2019-12-30 LAB — COPATH REPORT: NORMAL

## 2019-12-30 NOTE — PROGRESS NOTES
30 DAY STM VISIT    Diagnostic AHI:  46.8 HST    Subjective measures:   Patient doing okay with CPAP, having troubles with her mask.  Patient having a lot of back pain so she doesn't know if CPAP is helping or not     Assessment: Pt not meeting objective benchmarks for leak Patient failing following subjective benchmarks: not feeling benefit from therapy, back pain. Patient will come in if she continues to have mask issues.   Action plan: pt to have 6 month STM visit  Patient has scheduled a follow up visit with Dr. Pisano on 1/31/2019.   Device type: Auto-CPAP  PAP settings: CPAP min 5.0 cm  H20     CPAP max 15.0 cm  H20    95th% pressure 14 cm  H20   Mask type:  Nasal Mask  Objective measures: 14 day rolling measures      Compliance  92 %      Leak  37.44 lpm  last  upload      AHI 1.51   last  upload      Average number of minutes 435      Objective measure goal  Compliance   Goal >70%  Leak   Goal < 24 lpm  AHI  Goal < 5  Usage  Goal >240         Total time spent on accessing, reviewing and interpreting remote patient PAP therapy data:   8 minutes      Total time spent with direct patient communication :   20 minutes    Total time spent on  remote patient monitoring analysis for last 30 days:   67 min

## 2020-01-13 NOTE — PROGRESS NOTES
Chief Complaint   Patient presents with     Follow Up     4 month follow up.           Allergies   Allergen Reactions     Nkda [No Known Drug Allergies]          Subjective: Radha is a 63 year old female who presents to the clinic today for a diabetic foot exam and management. Relates that she has no new LE complaints today. She does have diabetic shoes.      Objective  PT and DP pulses are not palpable bilaterally. CRT is 4 seconds. Diminished pedal hair.   Gross sensation is diminished, as well as protective sensation bilaterally.   Equinus is noted bilaterally. No pain with active or passive ROM of the ankle, MTJ, 1st ray, or halluces bilaterally,.   Nails thickened, brittle, discolored, with subungual debris bilaterally. No open lesions are noted. Xerosis noted BL.      Assessment: Dm2 with neuropathy.  Onychomycosis. She would like treatment for this. She has had liver enzymes that were the higher end of normal. Because of the thickness of her nails, she has a lower cure rate. I do no think the risk of increased liver enzymes outweighs the success rate.   Xerosis     Plan:  - Pt seen and evaluated.  - Nails trimmed x 10.  - See again in 4 months.

## 2020-01-16 ENCOUNTER — DOCUMENTATION ONLY (OUTPATIENT)
Dept: SLEEP MEDICINE | Facility: CLINIC | Age: 65
End: 2020-01-16

## 2020-01-16 NOTE — PROGRESS NOTES
LYDIA CALLED AND HAD SOME QUESTIONS ABOUT HOW SHE IS DOING ON CPAP. HER AHI IS VERY LOW AND I LET HER KNOW THAT IS GREAT, HOWEVER HER LEAK IS STILL MODERATE TO HIGH. SHE EXPRESSED CONCERN WITH DRY EYES AND I SUGGESTED SHE COME IN TO WORK WITH ME ON MASKS AND SHE IS NEXT ELIGIBLE IN [place] WE CAN SEND THE PREFERRED MASK STYLE OUT VIA MAIL, SHE IS GOING TO REVIEW HER SCHEDULE AND CALL ME BACK.

## 2020-01-20 ENCOUNTER — OFFICE VISIT (OUTPATIENT)
Dept: ORTHOPEDICS | Facility: CLINIC | Age: 65
End: 2020-01-20
Payer: COMMERCIAL

## 2020-01-20 DIAGNOSIS — E11.8 TYPE 2 DIABETES MELLITUS WITH COMPLICATION, WITH LONG-TERM CURRENT USE OF INSULIN (H): Primary | ICD-10-CM

## 2020-01-20 DIAGNOSIS — Z79.4 TYPE 2 DIABETES MELLITUS WITH COMPLICATION, WITH LONG-TERM CURRENT USE OF INSULIN (H): Primary | ICD-10-CM

## 2020-01-20 DIAGNOSIS — B35.1 OM (ONYCHOMYCOSIS): ICD-10-CM

## 2020-01-20 NOTE — NURSING NOTE
Reason For Visit:   Chief Complaint   Patient presents with     Follow Up     4 month follow up.        Pain Assessment  Patient Currently in Pain: Yes  Primary Pain Location: (Low back and right lower leg)        Allergies   Allergen Reactions     Nkda [No Known Drug Allergies]            Екатерина Lundy LPN

## 2020-01-20 NOTE — LETTER
1/20/2020       RE: Radha Baca  [address] [facility] [id number]     Dear Colleague,    Thank you for referring your patient, Radha Baca, to the [facility] at [facility]. Please see a copy of my visit note below.    Chief Complaint   Patient presents with     Follow Up     4 month follow up.           Allergies   Allergen Reactions     Nkda [No Known Drug Allergies]          Subjective: Radha is a 63 year old female who presents to the clinic today for a diabetic foot exam and management. Relates that she has no new LE complaints today. She does have diabetic shoes.      Objective  PT and DP pulses are not palpable bilaterally. CRT is 4 seconds. Diminished pedal hair.   Gross sensation is diminished, as well as protective sensation bilaterally.   Equinus is noted bilaterally. No pain with active or passive ROM of the ankle, MTJ, 1st ray, or halluces bilaterally,.   Nails thickened, brittle, discolored, with subungual debris bilaterally. No open lesions are noted. Xerosis noted BL.      Assessment: Dm2 with neuropathy.  Onychomycosis. She would like treatment for this. She has had liver enzymes that were the higher end of normal. Because of the thickness of her nails, she has a lower cure rate. I do no think the risk of increased liver enzymes outweighs the success rate.   Xerosis     Plan:  - Pt seen and evaluated.  - Nails trimmed x 10.  - See again in 4 months.    Again, thank you for allowing me to participate in the care of your patient.      Sincerely,    Jerrell Austin DPM

## 2020-01-23 ENCOUNTER — OFFICE VISIT (OUTPATIENT)
Dept: FAMILY MEDICINE | Facility: CLINIC | Age: 65
End: 2020-01-23
Payer: COMMERCIAL

## 2020-01-23 VITALS
DIASTOLIC BLOOD PRESSURE: 72 MMHG | HEART RATE: 86 BPM | SYSTOLIC BLOOD PRESSURE: 124 MMHG | OXYGEN SATURATION: 97 % | RESPIRATION RATE: 16 BRPM | TEMPERATURE: 98.4 F

## 2020-01-23 DIAGNOSIS — Z87.828: ICD-10-CM

## 2020-01-23 DIAGNOSIS — E11.8 TYPE 2 DIABETES MELLITUS WITH COMPLICATION (H): Primary | ICD-10-CM
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
## 2020-01-27 ENCOUNTER — DOCUMENTATION ONLY (OUTPATIENT)
Dept: SLEEP MEDICINE | Facility: CLINIC | Age: 65
End: 2020-01-27

## 2020-01-27 NOTE — PROGRESS NOTES
STM Recheck Visit     Subjective measures:   Pt called in because she had questions about her compliance. She is starting to feel a little better.  She is struggling with back pain in the morning because she feels like she isn't moving around as much at night anymore.  She also states that she isn't getting up as often to go to the bathroom.  She also feels like when she first puts the mask on, that it's hard to take a deep breathe. I suggested that we increase her starting pressure and she is open to that but sees her provider on Friday and would like to discuss it with her first.    Assessment: Pt meeting objective benchmarks.  Patient meeting subjective benchmarks.   Action plan: pt to have 6 month [facility] visit  Patient has a follow up visit with Dr. Pisano on 1/31/20.   Device type: Auto-CPAP  PAP settings: CPAP min 5 cm  H20     CPAP max 15 cm  H20    95th% pressure 14.6 cm  H20   Objective measures: 14 day rolling measures      Compliance  92 %      Leak  24.48 lpm  last  upload      AHI 1.15   last  upload      Average number of minutes 402    Diagnostic AHI: 46.8    Objective measure goal  Compliance   Goal >70%  Leak   Goal < 10%  AHI  Goal < 5  Usage  Goal >240    Total time spent on accessing, reviewing and interpreting remote patient PAP therapy data:   11 minutes    Total time spent with direct patient communication :   13 minutes

## 2020-01-31 ENCOUNTER — OFFICE VISIT (OUTPATIENT)
Dept: SLEEP MEDICINE | Facility: CLINIC | Age: 65
End: 2020-01-31
Payer: COMMERCIAL

## 2020-01-31 VITALS
BODY MASS INDEX: 42.68 KG/M2 | HEART RATE: 93 BPM | OXYGEN SATURATION: 95 % | RESPIRATION RATE: 18 BRPM | SYSTOLIC BLOOD PRESSURE: 122 MMHG | HEIGHT: 64 IN | DIASTOLIC BLOOD PRESSURE: 62 MMHG | WEIGHT: 250 LBS

## 2020-01-31 DIAGNOSIS — E66.09 EXOGENOUS OBESITY: Chronic | ICD-10-CM

## 2020-01-31 DIAGNOSIS — G47.33 OSA (OBSTRUCTIVE SLEEP APNEA): Primary | ICD-10-CM

## 2020-01-31 PROCEDURE — 99214 OFFICE O/P EST MOD 30 MIN: CPT | Performed by: INTERNAL MEDICINE

## 2020-01-31 ASSESSMENT — MIFFLIN-ST. JEOR: SCORE: 1656.05

## 2020-01-31 NOTE — NURSING NOTE
Pressure change done here in clinic. Changed the min to a 9    Diana Chase [facility]    01/31/2020, 11:54 AM  Settings requested by Diana Chase   Set Mode to AutoSet   Set Min Pressure to 9.0 cmH2O   Set Max Pressure to 15.0 cmH2O   Set Response to Standard   Set EPR to Fulltime   Set EPR level to 2   Set Ramp enable to Auto   Set Ramp time to 20 min   Set Start pressure to 5.0 cmH2O    Previous Settings   Set EPR level to 2   Set Mode to AutoSet   Set Min Pressure to 5.0 cmH2O   Set Max Pressure to 15.0 cmH2O   Set Response to Standard   Set EPR to Fulltime   Set Ramp enable to Auto   Set Ramp time to 20 min   Set Start pressure to 5.0 cmH2O
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
## 2020-01-31 NOTE — PATIENT INSTRUCTIONS
1.  Continue CPAP every night, for all hours that you are sleeping.  If you nap use CPAP.  As always, try to get at least 8 hours of sleep or more each day, and avoid sleep deprivation. Avoid alcohol.    2.  Reasons that you might need a change to your pressure therapy would be weight gain or loss, waking having inadvertently removed your CPAP overnight, having previously felt refreshed by sleep with CPAP use and now waking un-refreshed, and return of daytime sleepiness. Also, the development of new medical problems can sometimes affect breathing at night-heart failure, stroke, medications such as narcotics.    3.  Please bring CPAP with you if you are hospitalized.  If anticipating surgery be sure to discuss with your surgeon that you have sleep apnea and use PAP therapy.      4.  Maintain your equipment as recommended which includes routine cleaning and replacement of supplies.  Call DME for any questions regarding supplies or maintenance.    [facility], [facility] [phone]    5.  Do not drive on engage in potentially dangerous activities if feeling sleepy.    6.  Please see me again in 12 months and bring your machine and card to your follow-up visit.                    Tips for your CPAP and BIPAP use-    Mask fitting tips  Mask fitting exercise:    To improve your mask seal and your mobility at night, put mask on and secure in place.  Lie down in bed with full pressure and roll to one side, adjust headgear while in that position to eliminate any leaks. Repeat process rolling to other side.     The mask seal does not have to be perfect:   CPAP machines are designed to make up for small leaks. However, you will not tolerate leaks blowing in your eyes so you will need to adjust.   Any leak should only be near or at the bottom of the mask.  We expect your mask to leak slightly at night.    Do not over-tighten the headgear straps, tighter IS NOT better, we expect  minimal leak.    First try re-positioning the mask or headgear before tightening the headgear straps.  Mask leaks are expected due to changing sleeping positions. Try pulling the mask away from your skin allowing the cushion to re-inflate will minimize the leak.  If you struggle for a good fit, try turning the CPAP off and then readjust the mask by pulling it away from your face and then turning back on the CPAP.        Humidifier tips  Humidifiers can be adjusted to increase or decrease the amount of moisture according to your comfort level. You may need to adjust this frequently at first, but then might only change it with seasonal weather changes.     Try INCREASING the humidity if:  You experience a dry, irritated nasal passage or throat.  You have a runny, drippy nose or sneezing fits after using CPAP.  You experience nasal congestion during or after CPAP use.    Try DECREASING the humidity if:  You have excessive condensation or  rain out  in the tubing or mask.  Otherwise keep the tubing warm during the night by running it underneath the blankets or pillow.      Clinic visit after initial CPAP and BIPAP set-up   Bring your equipment with you to your 4 week follow up clinic visit.  We will be extracting your data from the machine.        Travel  Always take your equipment with you.  If you fly with your equipment bring it on with you as a carry on.  Medical equipment does not count as a carry on.    If you travel international the machines take 110-240.  The only adapter needed is the adapter that will fit into the receptacle (outlet).    You may also want to bring an extension cord as many hotel rooms have limited outlets at the bedside.  Do not travel with water in your humidifier chamber.     Cleaning and Maintenance Guidelines    Equipment Frequency Cleaning Method   Mask First Day    Daily      Weekly Soak mask in hot soapy water for 30 minutes, rinse and air dry.  Wipe nasal cushion with a hot soapy  (Ivory, baby shampoo) cloth and rinse.  Baby wipes may also be used.  Do not use anti-bacterial soaps,Ainsley  liquid soap, rubbing alcohol, bleach or ammonia.  Wash frame in hot soapy water (Ivory, baby shampoo) rinse and let air dry   Headgear Biweekly Wash in hot soapy water, rinse and air dry   Reusable Gray Filter Weekly Wash in hot soapy water, rinse, put in towel squeeze moisture out, let air dry   Disposable White Filter Check Weekly Replace when brown or gray in color; at least every 2 to 3 months   Humidifier Chamber Daily    Weekly Empty distilled water from humidifier and let air dry    Hand wash in hot soapy water, rinse and air dry   Tubing Weekly Wash in hot soapy water, rinse and let air dry   Mask, Tubing and Humidifier Chamber As needed Disinfect: Soak in 1 part distilled white vinegar to 3 parts hot water for 30 minutes, rinse well and air dry  Not the material headgear        MASK AND SUPPLY REORDERING and EQUIPMENT NEEDS through your DME and per your insurance  Reminder: Most insurance companies will allow for a new mask, headgear, tubing, and reusable gray filter every six months.  Disposable white ultra-fine filters are covered monthly.      HOME AND SAFETY INSTRUCTIONS    Do not use frayed or cracked electrical cords, multi plug adaptors, or switched receptacles    Do not immerse electrical equipment into water    Assure that electrical cords do not become a tripping hazard      Your BMI is Body mass index is 43.59 kg/m .  Weight management is a personal decision.  If you are interested in exploring weight loss strategies, the following discussion covers the approaches that may be successful. Body mass index (BMI) is one way to tell whether you are at a healthy weight, overweight, or obese. It measures your weight in relation to your height.  A BMI of 18.5 to 24.9 is in the healthy range. A person with a BMI of 25 to 29.9 is considered overweight, and someone with a BMI of 30 or greater is  considered obese. More than two-thirds of American adults are considered overweight or obese.  Being overweight or obese increases the risk for further weight gain. Excess weight may lead to heart disease and diabetes.  Creating and following plans for healthy eating and physical activity may help you improve your health.  Weight control is part of healthy lifestyle and includes exercise, emotional health, and healthy eating habits. Careful eating habits lifelong are the mainstay of weight control. Though there are significant health benefits from weight loss, long-term weight loss with diet alone may be very difficult to achieve- studies show long-term success with dietary management in less than 10% of people. Attaining a healthy weight may be especially difficult to achieve in those with severe obesity. In some cases, medications, devices and surgical management might be considered.  What can you do?  If you are overweight or obese and are interested in methods for weight loss, you should discuss this with your provider.     Consider reducing daily calorie intake by 500 calories.     Keep a food journal.     Avoiding skipping meals, consider cutting portions instead.    Diet combined with exercise helps maintain muscle while optimizing fat loss. Strength training is particularly important for building and maintaining muscle mass. Exercise helps reduce stress, increase energy, and improves fitness. Increasing exercise without diet control, however, may not burn enough calories to loose weight.       Start walking three days a week 10-20 minutes at a time    Work towards walking thirty minutes five days a week     Eventually, increase the speed of your walking for 1-2 minutes at time    In addition, we recommend that you review healthy lifestyles and methods for weight loss available through the National Institutes of Health patient information sites:  http://win.niddk.nih.gov/publications/index.htm    And look  into health and wellness programs that may be available through your health insurance provider, employer, local community center, or mathew club.    Weight management plan: Patient was referred to their PCP to discuss a diet and exercise plan.

## 2020-02-08 ENCOUNTER — HEALTH MAINTENANCE LETTER (OUTPATIENT)
Age: 65
End: 2020-02-08

## 2020-03-02 DIAGNOSIS — Z79.4 TYPE 2 DIABETES MELLITUS WITH COMPLICATION, WITH LONG-TERM CURRENT USE OF INSULIN (H): ICD-10-CM

## 2020-03-02 DIAGNOSIS — E11.8 TYPE 2 DIABETES MELLITUS WITH COMPLICATION, WITH LONG-TERM CURRENT USE OF INSULIN (H): ICD-10-CM

## 2020-03-02 LAB
ALBUMIN SERPL-MCNC: 3.5 G/DL (ref 3.4–5)
ALT SERPL W P-5'-P-CCNC: 48 U/L (ref 0–50)
ANION GAP SERPL CALCULATED.3IONS-SCNC: 6 MMOL/L (ref 3–14)
BUN SERPL-MCNC: 14 MG/DL (ref 7–30)
CALCIUM SERPL-MCNC: 9.3 MG/DL (ref 8.5–10.1)
CHLORIDE SERPL-SCNC: 101 MMOL/L (ref 94–109)
CHOLEST SERPL-MCNC: 103 MG/DL
CK SERPL-CCNC: 117 U/L (ref 30–225)
CO2 SERPL-SCNC: 26 MMOL/L (ref 20–32)
CREAT SERPL-MCNC: 0.63 MG/DL (ref 0.52–1.04)
CREAT UR-MCNC: 48 MG/DL
GFR SERPL CREATININE-BSD FRML MDRD: >90 ML/MIN/{1.73_M2}
GLUCOSE SERPL-MCNC: 259 MG/DL (ref 70–99)
HBA1C MFR BLD: 10.2 % (ref 0–5.6)
HCT VFR BLD AUTO: 35.8 % (ref 35–47)
HDLC SERPL-MCNC: 42 MG/DL
LDLC SERPL CALC-MCNC: 32 MG/DL
MICROALBUMIN UR-MCNC: 15 MG/L
MICROALBUMIN/CREAT UR: 30.17 MG/G CR (ref 0–25)
NONHDLC SERPL-MCNC: 61 MG/DL
PHOSPHATE SERPL-MCNC: 3.3 MG/DL (ref 2.5–4.5)
POTASSIUM SERPL-SCNC: 4.6 MMOL/L (ref 3.4–5.3)
SODIUM SERPL-SCNC: 133 MMOL/L (ref 133–144)
TRIGL SERPL-MCNC: 148 MG/DL
TSH SERPL DL<=0.005 MIU/L-ACNC: 1.91 MU/L (ref 0.4–4)

## 2020-03-02 ASSESSMENT — ENCOUNTER SYMPTOMS
TROUBLE SWALLOWING: 0
ALTERED TEMPERATURE REGULATION: 0
NECK PAIN: 1
SPEECH CHANGE: 0
SLEEP DISTURBANCES DUE TO BREATHING: 0
PARALYSIS: 0
TASTE DISTURBANCE: 0
INCREASED ENERGY: 1
HOARSE VOICE: 0
SORE THROAT: 0
TREMORS: 0
CONSTIPATION: 1
SINUS PAIN: 0
WEAKNESS: 0
NIGHT SWEATS: 0
PALPITATIONS: 0
STIFFNESS: 1
NAIL CHANGES: 0
HALLUCINATIONS: 0
DIZZINESS: 0
JAUNDICE: 0
TINGLING: 1
DEPRESSION: 1
NUMBNESS: 1
POLYDIPSIA: 0
FEVER: 0
EYE WATERING: 1
PANIC: 1
SMELL DISTURBANCE: 0
NECK MASS: 0
DECREASED CONCENTRATION: 0
DIARRHEA: 1
DECREASED APPETITE: 0
HYPERTENSION: 1
DOUBLE VISION: 0
ARTHRALGIAS: 1
NERVOUS/ANXIOUS: 1
BOWEL INCONTINENCE: 0
LEG PAIN: 1
LIGHT-HEADEDNESS: 0
HYPOTENSION: 0
ABDOMINAL PAIN: 0
MUSCLE WEAKNESS: 1
POOR WOUND HEALING: 0
LOSS OF CONSCIOUSNESS: 0
EYE REDNESS: 0
EYE PAIN: 0
MYALGIAS: 1
HEADACHES: 1
BLOOD IN STOOL: 0
EXERCISE INTOLERANCE: 0
EYE IRRITATION: 1
CHILLS: 0
ORTHOPNEA: 0
SINUS CONGESTION: 0
RECTAL PAIN: 1
JOINT SWELLING: 0
WEIGHT GAIN: 1
SYNCOPE: 0
SEIZURES: 0
DISTURBANCES IN COORDINATION: 0
BACK PAIN: 1
POLYPHAGIA: 1
HEARTBURN: 0
MUSCLE CRAMPS: 1
WEIGHT LOSS: 0
VOMITING: 0
INSOMNIA: 0
SKIN CHANGES: 0
NAUSEA: 0
MEMORY LOSS: 0
FATIGUE: 1
BLOATING: 1

## 2020-03-03 ENCOUNTER — OFFICE VISIT (OUTPATIENT)
Dept: ENDOCRINOLOGY | Facility: CLINIC | Age: 65
End: 2020-03-03
Payer: COMMERCIAL

## 2020-03-03 VITALS
BODY MASS INDEX: 43.16 KG/M2 | SYSTOLIC BLOOD PRESSURE: 123 MMHG | HEIGHT: 64 IN | WEIGHT: 252.8 LBS | HEART RATE: 90 BPM | DIASTOLIC BLOOD PRESSURE: 71 MMHG

## 2020-03-03 DIAGNOSIS — Z79.4 INSULIN LONG-TERM USE (H): ICD-10-CM

## 2020-03-03 DIAGNOSIS — E78.5 DYSLIPIDEMIA: ICD-10-CM

## 2020-03-03 DIAGNOSIS — E11.8 TYPE 2 DIABETES MELLITUS WITH COMPLICATION (H): ICD-10-CM

## 2020-03-03 DIAGNOSIS — Z79.4 TYPE 2 DIABETES MELLITUS WITH COMPLICATION, WITH LONG-TERM CURRENT USE OF INSULIN (H): ICD-10-CM

## 2020-03-03 DIAGNOSIS — E66.09 EXOGENOUS OBESITY: Primary | Chronic | ICD-10-CM

## 2020-03-03 DIAGNOSIS — E11.8 TYPE 2 DIABETES MELLITUS WITH COMPLICATION, WITH LONG-TERM CURRENT USE OF INSULIN (H): ICD-10-CM

## 2020-03-03 ASSESSMENT — PAIN SCALES - GENERAL: PAINLEVEL: WORST PAIN (10)

## 2020-03-03 ASSESSMENT — MIFFLIN-ST. JEOR: SCORE: 1668.75

## 2020-03-03 NOTE — PATIENT INSTRUCTIONS
Continue on Lantus and Novolog  Start Trulicity injection every day  Call if blood sugars are low below 70  Send in blood sugars in about one months

## 2020-03-03 NOTE — PROGRESS NOTES
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 BMI >40 stress eating, start Trulicity as below   2. Dyslipidemia    3. Type 2 diabetes mellitus with complication (H)  With hyperglycemia A1c today is high unchanged from before on Lantus and high-dose NovoLog as well as metformin.  Stressed eating with tremendous stress at home.  Suggest to start Trulicity as discussed before reviewed again rationale behind it as blood sugars are high I am not decreasing the insulin for now   4. Insulin long-term use (H)         Plan:   Good control start Trulicity, continue Lantus 30 units once daily and NovoLog 6 units with 3 meals a day, check blood sugars 2-3 times daily and rotating fasting and send into our office in 1 month, continue metformin  Hypertension blood pressure well controlled on lisinopril hydrochlorothiazide  Dyslipidemia LDL cholesterol in good range on 20 mg of atorvastatin  Follow-up in 3 months    This was a 25 min visit of which more than 23 minutes were spent in counseling in regards to diagnosis, clinical consequences and treatment indications and options of ...    Keturah De Luna MD  Endocrinology and Diabetes  [address]  [facility] [address]  Tel [phone]    Medications:   Current Outpatient Medications   Medication Sig Dispense Refill     Acetaminophen  (TYLENOL PO) Take by mouth as needed for mild pain or fever       Ascorbic Acid (VITAMIN C PO) Take 2,000 mg by mouth 2 times daily        aspirin 81 MG tablet 1 tab daily 90 tablet 3     atorvastatin (LIPITOR) 20 MG tablet TAKE 1 TABLET(20 MG) BY MOUTH DAILY 90 tablet 0     B Complex Vitamins (VITAMIN  B COMPLEX) CAPS Take 1 tablet by mouth daily        DUNIA CONTOUR test strip Use to test blood sugar 3 times daily or as directed. 300 each 3     blood glucose (NO BRAND SPECIFIED) test strip Use to test blood sugar  4  times daily Dunia contour NEXT  Strips 360 strip 3     blood glucose calibration (DUNIA CONTOUR) Normal solution Use to calibrate blood glucose monitor as needed as directed. 1 Bottle 11     blood glucose calibration (CONTOUR NEXT CONTROL LOW VI SOLN) Low solution Use to calibrate blood glucose monitor as directed.For contour next  EZ meter  Level 2 1 Bottle 3     blood glucose calibration (CONTOUR NEXT CONTROL NORMAL VI SOLN) Normal solution Use to calibrate blood glucose monitor as needed as directed. 1 Bottle 3     blood glucose monitoring (DUNIA MICROLET) lancets Test 4-6 times daily 90 day supply refills x 3 600 each 0     calcium-vitamin D (CALTRATE) 600-400 MG-UNIT per tablet Take 1 tablet by mouth 2 times daily       cholecalciferol (VITAMIN D) 1000 UNIT tablet Take 1 tablet by mouth daily. 100 tablet 3     cycloSPORINE (RESTASIS) 0.05 % ophthalmic emulsion        dulaglutide (TRULICITY) 0.75 MG/0.5ML pen Inject 0.75 mg Subcutaneous every 7 days 2 mL 0     dulaglutide (TRULICITY) 1.5 MG/0.5ML pen Inject 1.5 mg Subcutaneous every 7 days 2 mL 3     insulin glargine (LANTUS SOLOSTAR PEN) 100 UNIT/ML pen Inject 70 units SQ each am. 70 mL 3     insulin pen needle (B-D U/F) 31G X 8 MM miscellaneous Use  4 daily short 31 G X 8MM 400 each 3     latanoprost (XALATAN) 0.005 % ophthalmic solution Place 1 drop into both eyes At Bedtime       lisinopril-hydrochlorothiazide (PRINZIDE/ZESTORETIC) 20-12.5 MG  "tablet TAKE 1 TABLET BY MOUTH DAILY 90 tablet 3     metFORMIN (GLUCOPHAGE) 500 MG tablet Take 2 tablets (1,000 mg) by mouth 2 times daily (with meals) 360 tablet 0     Multiple Vitamin (MULTIVITAMIN  S) CAPS Take 1 daily 90 capsule 3     Multiple Vitamins-Minerals (EYE-ZAKIYA PLUS LUTEIN PO)        NOVOLOG FLEXPEN 100 UNIT/ML soln Carb counting with meals approx 70-80 units daily 75 mL 3     Omega-3 Fatty Acids (OMEGA-3 FISH OIL PO) Take 1,000 mg by mouth daily       order for DME Equipment being ordered: Grade 1 (light) compression stockings, below the knee 1 Units 1     timolol (TIMOPTIC) 0.5 % ophthalmic solution Place 1 drop into both eyes 2 times daily        triamcinolone (KENALOG) 0.1 % ointment Apply topically 2 times daily 80 g 11       Social History:  Social History     Tobacco Use     Smoking status: Former Smoker     Packs/day: 0.00     Smokeless tobacco: Never Used     Tobacco comment: quit mid 1980s   Substance Use Topics     Alcohol use: No         Physical Examination:  Blood pressure 123/71, pulse 90, height 1.613 m (5' 3.5\"), weight 114.7 kg (252 lb 12.8 oz), not currently breastfeeding.  Body mass index is 44.08 kg/m .    Wt Readings from Last 4 Encounters:   03/03/20 114.7 kg (252 lb 12.8 oz)   01/31/20 113.4 kg (250 lb)   11/12/19 112.6 kg (248 lb 4.8 oz)   10/10/19 111.1 kg (245 lb)       General: Well appearing woman, upset about her social situation, tearful       Labs and Studies:     Lab Results   Component Value Date    CHLORIDE 101 03/02/2020    CO2 26 03/02/2020    CR 0.63 03/02/2020    ALKPHOS 91 04/17/2018    PTHI 38 10/17/2011    TSH 1.91 03/02/2020    T4 1.10 04/17/2018    HGB 12.1 10/16/2017       Lab Results   Component Value Date    ALT 48 03/02/2020    AST 22 04/17/2018    ALBUMIN 3.5 03/02/2020    BILITOTAL 0.4 04/17/2018       "

## 2020-03-03 NOTE — LETTER
3/3/2020       RE: Radha Baca  [address] [id number]     Dear Colleague,    Thank you for referring your patient, Radha Baca, to the [facility] ENDOCRINOLOGY at [facility]. Please see a copy of my visit note below.    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 BMI >40 stress eating, start Trulicity as below   2. Dyslipidemia    3. Type 2 diabetes mellitus with complication (H)  With hyperglycemia A1c today is high unchanged from before on Lantus and high-dose NovoLog as well as metformin.  Stressed eating with tremendous stress at home.  Suggest to start Trulicity as discussed before reviewed again rationale behind it as blood sugars are high I am not decreasing the insulin for now   4. Insulin long-term use (H)         Plan:   Good control start Trulicity, continue Lantus 30 units once daily and NovoLog 6 units with 3 meals a day, check blood sugars 2-3 times daily and rotating fasting and send into our office in 1 month, continue metformin  Hypertension blood pressure well controlled on lisinopril hydrochlorothiazide  Dyslipidemia LDL cholesterol in good range on 20 mg of atorvastatin  Follow-up in 3 months    This was a 25 min visit of which more than 23 minutes were spent in counseling in regards to diagnosis, clinical consequences  and treatment indications and options of ...    Keturah De Luna MD  Endocrinology and Diabetes  [facility]  [address]  [facility] [address]  Tel [phone]    Medications:   Current Outpatient Medications   Medication Sig Dispense Refill     Acetaminophen (TYLENOL PO) Take by mouth as needed for mild pain or fever       Ascorbic Acid (VITAMIN C PO) Take 2,000 mg by mouth 2 times daily        aspirin 81 MG tablet 1 tab daily 90 tablet 3     atorvastatin (LIPITOR) 20 MG tablet TAKE 1 TABLET(20 MG) BY MOUTH DAILY 90 tablet 0     B Complex Vitamins (VITAMIN  B COMPLEX) CAPS Take 1 tablet by mouth daily        DUNIA CONTOUR test strip Use to test blood sugar 3 times daily or as directed. 300 each 3     blood glucose (NO BRAND SPECIFIED) test strip Use to test blood sugar  4  times daily Dunia contour NEXT  Strips 360 strip 3     blood glucose calibration (DUNIA CONTOUR) Normal solution Use to calibrate blood glucose monitor as needed as directed. 1 Bottle 11     blood glucose calibration (CONTOUR NEXT CONTROL LOW VI SOLN) Low solution Use to calibrate blood glucose monitor as directed.For contour next  EZ meter  Level 2 1 Bottle 3     blood glucose calibration (CONTOUR NEXT CONTROL NORMAL VI SOLN) Normal solution Use to calibrate blood glucose monitor as needed as directed. 1 Bottle 3     blood glucose monitoring (DUNIA MICROLET) lancets Test 4-6 times daily 90 day supply refills x 3 600 each 0     calcium-vitamin D (CALTRATE) 600-400 MG-UNIT per tablet Take 1 tablet by mouth 2 times daily       cholecalciferol (VITAMIN D) 1000 UNIT tablet Take 1 tablet by mouth daily. 100 tablet 3     cycloSPORINE (RESTASIS) 0.05 % ophthalmic emulsion        dulaglutide (TRULICITY) 0.75 MG/0.5ML pen Inject 0.75 mg Subcutaneous every 7 days 2 mL 0     dulaglutide (TRULICITY) 1.5 MG/0.5ML pen Inject 1.5 mg Subcutaneous every 7 days 2 mL 3     insulin glargine (LANTUS SOLOSTAR PEN) 100 UNIT/ML pen Inject  "70 units SQ each am. 70 mL 3     insulin pen needle (B-D U/F) 31G X 8 MM miscellaneous Use  4 daily short 31 G X 8MM 400 each 3     latanoprost (XALATAN) 0.005 % ophthalmic solution Place 1 drop into both eyes At Bedtime       lisinopril-hydrochlorothiazide (PRINZIDE/ZESTORETIC) 20-12.5 MG tablet TAKE 1 TABLET BY MOUTH DAILY 90 tablet 3     metFORMIN (GLUCOPHAGE) 500 MG tablet Take 2 tablets (1,000 mg) by mouth 2 times daily (with meals) 360 tablet 0     Multiple Vitamin (MULTIVITAMIN  S) CAPS Take 1 daily 90 capsule 3     Multiple Vitamins-Minerals (EYE-ZAKIYA PLUS LUTEIN PO)        NOVOLOG FLEXPEN 100 UNIT/ML soln Carb counting with meals approx 70-80 units daily 75 mL 3     Omega-3 Fatty Acids (OMEGA-3 FISH OIL PO) Take 1,000 mg by mouth daily       order for DME Equipment being ordered: Grade 1 (light) compression stockings, below the knee 1 Units 1     timolol (TIMOPTIC) 0.5 % ophthalmic solution Place 1 drop into both eyes 2 times daily        triamcinolone (KENALOG) 0.1 % ointment Apply topically 2 times daily 80 g 11       Social History:  Social History     Tobacco Use     Smoking status: Former Smoker     Packs/day: 0.00     Smokeless tobacco: Never Used     Tobacco comment: quit mid 1980s   Substance Use Topics     Alcohol use: No         Physical Examination:  Blood pressure 123/71, pulse 90, height 1.613 m (5' 3.5\"), weight 114.7 kg (252 lb 12.8 oz), not currently breastfeeding.  Body mass index is 44.08 kg/m .    Wt Readings from Last 4 Encounters:   03/03/20 114.7 kg (252 lb 12.8 oz)   01/31/20 113.4 kg (250 lb)   11/12/19 112.6 kg (248 lb 4.8 oz)   10/10/19 111.1 kg (245 lb)       General: Well appearing woman, upset about her social situation, tearful       Labs and Studies:     Lab Results   Component Value Date    CHLORIDE 101 03/02/2020    CO2 26 03/02/2020    CR 0.63 03/02/2020    ALKPHOS 91 04/17/2018    PTHI 38 10/17/2011    TSH 1.91 03/02/2020    T4 1.10 04/17/2018    HGB 12.1 10/16/2017 "       Lab Results   Component Value Date    ALT 48 03/02/2020    AST 22 04/17/2018    ALBUMIN 3.5 03/02/2020    BILITOTAL 0.4 04/17/2018       Again, thank you for allowing me to participate in the care of your patient.      Sincerely,    Keturah De Luna MD

## 2020-03-05 RX ORDER — BLOOD-GLUCOSE METER
EACH MISCELLANEOUS
Qty: 1 KIT | Refills: 1 | Status: SHIPPED | OUTPATIENT
Start: 2020-03-05 | End: 2020-03-09 | Stop reason: ALTCHOICE

## 2020-03-05 RX ORDER — GLUCOSAMINE HCL/CHONDROITIN SU 500-400 MG
1 CAPSULE ORAL 4 TIMES DAILY
Qty: 270 EACH | Refills: 3 | Status: SHIPPED | OUTPATIENT
Start: 2020-03-05 | End: 2020-03-09 | Stop reason: ALTCHOICE

## 2020-03-05 RX ORDER — BISMUTH SUBSALICYLATE 262MG/15ML
3 SUSPENSION, ORAL (FINAL DOSE FORM) ORAL 3 TIMES DAILY
Qty: 270 EACH | Refills: 3 | Status: SHIPPED | OUTPATIENT
Start: 2020-03-05 | End: 2020-03-09 | Stop reason: ALTCHOICE

## 2020-03-06 ENCOUNTER — TELEPHONE (OUTPATIENT)
Dept: ENDOCRINOLOGY | Facility: CLINIC | Age: 65
End: 2020-03-06

## 2020-03-06 DIAGNOSIS — E11.8 TYPE 2 DIABETES MELLITUS WITH COMPLICATION (H): Primary | ICD-10-CM

## 2020-03-06 NOTE — TELEPHONE ENCOUNTER
Insurance issues, would like to speak to Nurse Vanessa on Monday after 2:30pm. Declines to speak to anyone else.   Hanh Pickens RN on 3/6/2020 at 8:11 AM

## 2020-03-08 RX ORDER — SEMAGLUTIDE 1.34 MG/ML
0.25 INJECTION, SOLUTION SUBCUTANEOUS WEEKLY
Qty: 2 PEN | Refills: 1 | Status: SHIPPED | OUTPATIENT
Start: 2020-03-08 | End: 2020-03-10

## 2020-03-09 DIAGNOSIS — Z79.4 TYPE 2 DIABETES MELLITUS WITH COMPLICATION, WITH LONG-TERM CURRENT USE OF INSULIN (H): ICD-10-CM

## 2020-03-09 DIAGNOSIS — E78.5 DYSLIPIDEMIA: ICD-10-CM

## 2020-03-09 DIAGNOSIS — E11.8 TYPE 2 DIABETES MELLITUS WITH COMPLICATION, WITH LONG-TERM CURRENT USE OF INSULIN (H): ICD-10-CM

## 2020-03-09 DIAGNOSIS — E11.9 DIABETES MELLITUS, TYPE 2 (H): ICD-10-CM

## 2020-03-09 DIAGNOSIS — I10 ESSENTIAL HYPERTENSION: ICD-10-CM

## 2020-03-09 DIAGNOSIS — E11.8 TYPE 2 DIABETES MELLITUS WITH COMPLICATION (H): Primary | ICD-10-CM

## 2020-03-11 RX ORDER — SEMAGLUTIDE 1.34 MG/ML
0.25 INJECTION, SOLUTION SUBCUTANEOUS WEEKLY
Qty: 6 PEN | Refills: 3 | Status: SHIPPED | OUTPATIENT
Start: 2020-03-11 | End: 2020-09-03 | Stop reason: DRUGHIGH

## 2020-03-11 RX ORDER — BLOOD SUGAR DIAGNOSTIC
STRIP MISCELLANEOUS
Qty: 360 STRIP | Refills: 3 | Status: SHIPPED | OUTPATIENT
Start: 2020-03-11 | End: 2022-05-27

## 2020-03-11 RX ORDER — LISINOPRIL AND HYDROCHLOROTHIAZIDE 12.5; 2 MG/1; MG/1
1 TABLET ORAL DAILY
Qty: 90 TABLET | Refills: 3 | Status: SHIPPED | OUTPATIENT
Start: 2020-03-11 | End: 2021-01-27

## 2020-03-11 RX ORDER — INSULIN LISPRO 100 [IU]/ML
INJECTION, SOLUTION INTRAVENOUS; SUBCUTANEOUS
Qty: 75 ML | Refills: 3 | Status: SHIPPED | OUTPATIENT
Start: 2020-03-11 | End: 2021-10-25

## 2020-03-11 RX ORDER — LANCETS 33 GAUGE
4 EACH MISCELLANEOUS 4 TIMES DAILY
Qty: 360 EACH | Refills: 3 | Status: SHIPPED | OUTPATIENT
Start: 2020-03-11 | End: 2023-10-16

## 2020-03-11 RX ORDER — ATORVASTATIN CALCIUM 20 MG/1
20 TABLET, FILM COATED ORAL DAILY
Qty: 90 TABLET | Refills: 3 | Status: SHIPPED | OUTPATIENT
Start: 2020-03-11 | End: 2021-01-27

## 2020-03-11 NOTE — TELEPHONE ENCOUNTER
90 day supply of medications  [facility] to [facility]  With DX , ID # and signed by MD. Rasheeda Izaguirre RN on 3/11/2020 at 3:49 PM

## 2020-03-13 ENCOUNTER — TELEPHONE (OUTPATIENT)
Dept: ENDOCRINOLOGY | Facility: CLINIC | Age: 65
End: 2020-03-13

## 2020-03-13 NOTE — TELEPHONE ENCOUNTER
----- Message from Bernadette Low MA sent at 3/13/2020 11:20 AM CDT -----  Regarding: FW: Ozempic    ----- Message -----  From: Keturah De Luna MD  Sent: 3/12/2020   7:11 AM CDT  To: [facility]  Subject: RE: Ozempic                                      Yes, I would like her to ramp up the dose like that    Keturah De Luna MD    Endocrinology and Diabetes  [place] [phone]  Pager [phone]      ----- Message -----  From: Rasheeda Izaguirre RN  Sent: 3/10/2020   3:39 PM CDT  To: Keturah De Luna MD  Subject: Ozempic                                          I can update the order but do you want her to do  Ozempic 0.25 once weekly for 4 doses then increase to 0.5  dose thereafter ?  Rasheeda HAMILTON

## 2020-03-18 ENCOUNTER — VIRTUAL VISIT (OUTPATIENT)
Dept: FAMILY MEDICINE | Facility: CLINIC | Age: 65
End: 2020-03-18
Payer: COMMERCIAL

## 2020-03-18 DIAGNOSIS — S20.212A CONTUSION OF LEFT CHEST WALL, INITIAL ENCOUNTER: Primary | ICD-10-CM

## 2020-03-18 NOTE — PROGRESS NOTES
"Family Medicine Telephone Visit Note          Patient was verbally read the following and verbal consent was obtained.    \"I understand that I may revoke this request for a phone visit at any time.  This consent will automatically  3 months from the signed date and time.\"    Name person giving consent:  Patient   Date verbal consent given:  3/18/2020  Time verbal consent given:  2:09 PM                 Patients name: [facility]  Appointment start time:  3:40 pm    HPI    Approximately 5 days ago the patient was sitting in a chair with arms, when she leaned over the left armrest of the chair and felt a \"crunch\" in her left torso.  It was not that uncomfortable at the time but then a day later she began to experience some tenderness in that area.  She sleeps on her left side and noticed it while sleeping.  It hurts when she presses on the area and occasionally when she laughs.  She has had no shortness of breath.  The area of pain is localized in the inferior costal border on the left side approximately in the anterior axillary line.     used via [employer] or similar service.  number:         Assessment/Plan:    Radha was seen today for flank pain and leg swelling.    Diagnoses and all orders for this visit:    Contusion of left chest wall, initial encounter.  Symptoms are rather mild and she is experiencing no respiratory distress.  I offered her reassurance that this injury will very likely clear up in the next few weeks and that she go about her normal activities as tolerated.  She was wondering about an x-ray and I told her that even if she were to be seen in person it would not be indicated given the clinical presentation that she has described.        Refilled medications that would be required in the next 3 months.     After visit information  Patient declined AVS       Appointment end time: 3:45 pm  This is a telephone visit that took 15 minutes.  Clinician location:  " JANNA Gagnon

## 2020-03-23 DIAGNOSIS — E11.9 DIABETES MELLITUS, TYPE 2 (H): ICD-10-CM

## 2020-03-23 RX ORDER — PEN NEEDLE, DIABETIC 31 GX5/16"
NEEDLE, DISPOSABLE MISCELLANEOUS
Qty: 400 EACH | Refills: 3 | Status: SHIPPED | OUTPATIENT
Start: 2020-03-23 | End: 2022-05-27

## 2020-03-25 ENCOUNTER — TELEPHONE (OUTPATIENT)
Dept: ENDOCRINOLOGY | Facility: CLINIC | Age: 65
End: 2020-03-25

## 2020-03-25 NOTE — TELEPHONE ENCOUNTER
Informed patient  her blood sugars have been high and the risk to continue with high blood sugars are higher than the Ozempic potential side effects, although considering her eyes   She should send in her blood sugar readings once she has been on Ozempic for 3 weeks patient fully understood and was provided with my direct number to call back with blood sugars.

## 2020-03-25 NOTE — TELEPHONE ENCOUNTER
----- Message from Keturah De Luna MD sent at 3/24/2020  6:01 AM CDT -----  Regarding: RE: Ozempic with retinopathy  Yes, please let her know, her blood sugars have been high and the risk to continue like is higher than the Ozempic potential side effects, although considering her eyes  She should send in her blood sugar readings once she has been on Ozempic for 3 weeks  Keturah  ----- Message -----  From: Rasheeda Izaguirre RN  Sent: 3/23/2020  10:06 AM CDT  To: Keturah De Luna MD  Subject: Ozempic with retinopathy                         Radha  is questioning the   warning sign by [facility] that says let your provider know if you have   eye disease. She has retinopathy and is concerned it will worsen with Ozempic. What do  you suggest ? I see temporary eye change with Ozempic .SHould she use it and call if eyes worsen or not  use it.  Rasheeda HAMILTON

## 2020-04-02 ENCOUNTER — TELEPHONE (OUTPATIENT)
Dept: ENDOCRINOLOGY | Facility: CLINIC | Age: 65
End: 2020-04-02

## 2020-04-02 NOTE — TELEPHONE ENCOUNTER
[facility] Call Center    Phone Message    May a detailed message be left on voicemail: no     Reason for Call: Keira called from [employer] regarding an auth for Ozempic. Keira will be faxing over forms today. Keira call back number /reference # [id number]    Action Taken: Message routed to:  [facility] endo    Travel Screening: Not Applicable

## 2020-04-14 ENCOUNTER — TELEPHONE (OUTPATIENT)
Dept: ENDOCRINOLOGY | Facility: CLINIC | Age: 65
End: 2020-04-14

## 2020-04-14 NOTE — TELEPHONE ENCOUNTER
[facility] Call Center    Phone Message    May a detailed message be left on voicemail: yes     Reason for Call: Medication Question or concern regarding medication   Prescription Clarification  Name of Medication: insulin glargine (LANTUS SOLOSTAR) 100 UNIT/ML pen   Prescribing Provider: Sherri Stark,    Pharmacy: n/a   What on the order needs clarification? Per Patient states had accidentally taken Novolog instead of Lantus last night after ate meal at 7:00pm. Patient states she is not sure how much to take of Lantus today, Please Advise.           Action Taken: Message routed to:  [facility] Endo    Travel Screening: Not Applicable

## 2020-04-14 NOTE — TELEPHONE ENCOUNTER
Dionicio took her lantus last night  18 units instead of the Novolog. Her usually dose of lantus is 70 units but in the AM. She took 52 units of lantus this morning to make up for it. Rasheeda Izaguirre RN on 4/14/2020 at 12:08 PM

## 2020-04-15 ENCOUNTER — TELEPHONE (OUTPATIENT)
Dept: ORTHOPEDICS | Facility: CLINIC | Age: 65
End: 2020-04-15

## 2020-04-15 NOTE — TELEPHONE ENCOUNTER
I called the patient to reschedule their appointment due to the COVID-19 crisis. Defer 6-8 weeks. LVM to call back.    Marcela Salter, ATC

## 2020-05-13 DIAGNOSIS — Z96.659 S/P TKR (TOTAL KNEE REPLACEMENT): ICD-10-CM

## 2020-05-13 RX ORDER — AMOXICILLIN 500 MG/1
CAPSULE ORAL
Qty: 4 CAPSULE | Refills: 4 | Status: SHIPPED | OUTPATIENT
Start: 2020-05-13 | End: 2021-01-28

## 2020-05-14 ENCOUNTER — TELEPHONE (OUTPATIENT)
Dept: SLEEP MEDICINE | Facility: CLINIC | Age: 65
End: 2020-05-14

## 2020-05-14 NOTE — TELEPHONE ENCOUNTER
05/14/2020 MARA SEAY-RETURNED PT CALL AND DISCUSS HOW TO ADJUST THE TUBE TEMP AND HUMIDITY SEPARATELY. WALKED THE PT THROUGH INCREASING HER TUBE TEMP AND ALSO DECREASING THE HUMIDITY LEVEL AS WHEN CHECKING AIRVIEW THE PT HAS A LOT OF AMBIENT HUMIDITY IN HER HOME. PT ALSO HAD QUESTIONS ABOUT HER CPAP MASK AND WANTING TO KNOW IS SHE INSTALLED IT CORRECTLY INTO HER MASK. THE PT HAS BEEN PUTTING HER N20 CUSHION UP SIDE DOWN. RE-EDUCATED THE PT ON HOW TO PLACE THE CUSHION INTO THE INTERFACE CORRECTLY. THE PT ALSO HAD QUESTIONS ABOUT CLEANING AND CARE REGARDING HER MASK. LET THE PT KNOW THAT IT IS OKAY TO USE UNSCENTED AND DYE FREE MAKE-UP AND OR BABY WIPES.

## 2020-05-28 ENCOUNTER — TRANSFERRED RECORDS (OUTPATIENT)
Dept: HEALTH INFORMATION MANAGEMENT | Facility: CLINIC | Age: 65
End: 2020-05-28

## 2020-06-01 ENCOUNTER — TELEPHONE (OUTPATIENT)
Dept: ENDOCRINOLOGY | Facility: CLINIC | Age: 65
End: 2020-06-01

## 2020-06-01 NOTE — TELEPHONE ENCOUNTER
[facility] Call Center    Phone Message    May a detailed message be left on voicemail: yes     Reason for Call: Other: Pt is requesting to know if her appt on 6/30 could be moved to 10:45, or if it could be moved to 7/7 at 10:15. Both appt spots are currently marked as virtual visit spots; pt states she is needing to be seen in person as she needs labs and to be examined. Please advise.    Action Taken: Message routed to:  [facility] Endocrinology    Travel Screening: Not Applicable

## 2020-06-02 NOTE — TELEPHONE ENCOUNTER
LVM for pt letting them know we are tentatively planning on patients being physically in clinic 7/7 and after. Pt can be scheduled in 1st available 30 minute slot in Dr. De Luna's schedule as RDI 7/7 on.     Please advise patient that 7/7 is still tentative and our status may change, and that all of our providers are currently seeing patients virtually with success. Labs can be completed at sites closer to them in our system prior to the appts.

## 2020-06-08 ENCOUNTER — OFFICE VISIT (OUTPATIENT)
Dept: FAMILY MEDICINE | Facility: CLINIC | Age: 65
End: 2020-06-08
Payer: COMMERCIAL

## 2020-06-08 VITALS
DIASTOLIC BLOOD PRESSURE: 63 MMHG | WEIGHT: 252 LBS | BODY MASS INDEX: 43.94 KG/M2 | OXYGEN SATURATION: 95 % | SYSTOLIC BLOOD PRESSURE: 102 MMHG | TEMPERATURE: 99 F | HEART RATE: 78 BPM

## 2020-06-08 DIAGNOSIS — M99.08 SOMATIC DYSFUNCTION OF RIB CAGE REGION: ICD-10-CM

## 2020-06-08 DIAGNOSIS — E66.01 CLASS 3 SEVERE OBESITY DUE TO EXCESS CALORIES WITH SERIOUS COMORBIDITY AND BODY MASS INDEX (BMI) OF 40.0 TO 44.9 IN ADULT (H): ICD-10-CM

## 2020-06-08 DIAGNOSIS — H90.3 SENSORINEURAL HEARING LOSS (SNHL) OF BOTH EARS: ICD-10-CM

## 2020-06-08 DIAGNOSIS — M54.6 ACUTE RIGHT-SIDED THORACIC BACK PAIN: Primary | ICD-10-CM

## 2020-06-08 DIAGNOSIS — I87.2 VENOUS (PERIPHERAL) INSUFFICIENCY: ICD-10-CM

## 2020-06-08 DIAGNOSIS — E11.9 DIABETES MELLITUS, TYPE 2 (H): ICD-10-CM

## 2020-06-08 DIAGNOSIS — E66.813 CLASS 3 SEVERE OBESITY DUE TO EXCESS CALORIES WITH SERIOUS COMORBIDITY AND BODY MASS INDEX (BMI) OF 40.0 TO 44.9 IN ADULT (H): ICD-10-CM

## 2020-06-08 DIAGNOSIS — M99.02 SOMATIC DYSFUNCTION OF THORACIC REGION: ICD-10-CM

## 2020-06-08 NOTE — PROGRESS NOTES
HPI       Radha Baca is a 64 year old  who presents for   Chief Complaint   Patient presents with     Follow Up     F/U Chronic back pain      Refill Request     Patient requesting compression socks      Back Pain      Duration: aprox 2.5 months.        Specific cause: Reaching for something, sharp pain in right side.     Description:   Location of pain: middle of back right, below shoulder blade  Character of pain: sharp and stabbing  Pain radiation:none  New numbness or weakness in legs, not attributed to pain:  No   Any new bowel or bladder incontinence?No     Intensity: Currently 1/10, At its worst 7/10    History:   Pain interferes with job/home/school:  YES home function  History of back problems: recurrent self limited episodes of low back pain in the past  Therapies tried without relief: rest and stretch    Alleviating factors:   Improved by: pool therapy and stretch      Precipitating factors:  Worsened by: Lifting and reaching    Accompanying Signs & Symptoms:  Risk of Fracture: No . No falls no trauma  Risk of Cauda Equina:No   Risk of Infection:  No   Risk of Cancer:  No     ++++++  Hearing loss-  Audiology referral needed. Bilateral hearing loss, hx of Sensineural hearing loss. Last test 4-5 years ago. Has not used hearing aids in the past     +++++++    Concern: Obesity   Description of the problem : Has not been able to participate in exercise, pool, because of COVID. Is looking forward to returning when opens. Usually goes 4-5 times a week.      Problem, Medication and Allergy Lists were reviewed and updated if needed..    Patient is an established patient of this clinic..         Review of Systems:   Review of Systems   ROS: 10 point ROS neg other than the symptoms noted above in the HPI.         Physical Exam:     Vitals:    06/08/20 1337   BP: 102/63   BP Location: Left arm   Patient Position: Sitting   Cuff Size: Adult Large   Pulse: 78   Temp: 99  F (37.2  C)   TempSrc: Oral    SpO2: 95%   Weight: 114.3 kg (252 lb)     Body mass index is 43.94 kg/m .  Vitals were reviewed and were normal      Physical Exam  General: Alert and oriented, in no acute distress.  Skin: Warm and dry, no abnormalities noted.  Eyes: Extra-ocular muscles intact, pupils equal and reactive.  ENT: Speech intact, nasal passages open, no hearing impairment noted.  CV: No cyanosis or pallor, warm and well perfused.  Respiratory: No respiratory distress, no accessory muscle use.  Neuro: Gait and station normal with cane, comprehension intact. Gross and fine motor skills intact.   Psychiatric: Mood and affect appear normal.   Extremities: Warm, able to move all four extremities at will.  Osteopathic: please see OMT note below.      Results:   No testing ordered today    Assessment and Plan        Radha was seen today for follow up and refill request.    Diagnoses and all orders for this visit:    Acute right-sided thoracic back pain  -     [facility], PT, HAND AND CHIROPRACTIC REFERRAL - [facility]; Future  Patient presents today with new right-sided thoracic back pain.  HPI and physical exam in accordance with musculoskeletal pathology.  Low suspicion for cardiopulmonary or gastrointestinal pathology causing pain.  Pain is reproducible on physical exam and muscle spasm noted at mid thoracic on the left side.  Discussed treatment including heat, cold, which patient agreed.  Also discussed topical agents, with patient declined.  Patient is interested in following up with physical therapy and this was referred as she has had much success in the past with physical therapy.  In addition, Given that this has been interfering with the patient's quality of life and ADLs, after discussion, informed consent, and medical assessment for safety, we have together decided to address this concern with Osteopathic Manipulative Treatment.    Class 3 severe obesity due to excess calories with serious comorbidity and body mass index (BMI) of 40.0 to  44.9 in adult (H)  Musculoskeletal complaints are certainly complicated by obesity.  Again discussed nutrition and portion control, limiting emotional eating, and reviewed returning to physical exercise.  Patient notes barrier at this time is COVID pandemic [facility].    Sensorineural hearing loss (SNHL) of both ears  -     AUDIOLOGY ADULT REFERRAL; Future    Venous (peripheral) insufficiency  -     Compression Sleeve/Stocking Order    DME (Durable Medical Equipment) Orders and Documentation  Orders Placed This Encounter   Procedures     Compression Sleeve/Stocking Order      The patient was assessed and it was determined the patient is in need of the following listed DME Supplies/Equipment. Please complete supporting documentation below to demonstrate medical necessity.      Compression Sleeve/Stocking(s) Supplies Documentation  The patient needs compression stockings for venous insufficiency.    Somatic dysfunction of rib cage region  Somatic dysfunction of thoracic region  -     OSTEOPATHIC MANIP,1-2 BODY REGN  Please see OMT Procedure Note below for the specifics of treatment.       Medications Discontinued During This Encounter   Medication Reason     dulaglutide (TRULICITY) 1.5 MG/0.5ML pen Medication Reconciliation Clean Up       Options for treatment and follow-up care were reviewed with the patient. Radha Baca  engaged in the decision making process and verbalized understanding of the options discussed and agreed with the final plan.    Virgilio Quevedo, DO          OMT PROCEDURE NOTE    Body Region: T-spine and/or Ribs  Somatic Dysfunction: T4-8 RLSBR, rib 5 posterior left  Treatment: Counterstrain and Facilitated Positional Release Techniques.   Outcome: Improved    The patient actively participated in OMT and was able to communicate both positive and negative feedback throughout. OMT completed without incident. Patient tolerated treatment well.  Patient reported that ROM, function, and/or pain level were improved. Advised that pain is occasionally worse during the first 24 hours after treatment and that drinking more water and taking Tylenol or Ibuprofen often help. Patient to return as needed for repeat osteopathic evaluation.     Virgilio Quevedo, DO

## 2020-06-08 NOTE — PROGRESS NOTES
Preceptor Attestation:   Patient seen and discussed with the resident. Assessment and plan reviewed with resident and agreed upon.   Supervising Physician:  DO [facility]

## 2020-06-08 NOTE — PATIENT INSTRUCTIONS
Here is the plan from today's visit    1. Venous (peripheral) insufficiency  - Compression Sleeve/Stocking Order    2. Sensorineural hearing loss (SNHL) of both ears  - AUDIOLOGY ADULT REFERRAL; Future    3. Acute left-sided thoracic back pain  -PT, HAND AND CHIROPRACTIC REFERRAL - [facility]; Future    Please call or return to clinic if your symptoms don't go away.  Thank you for coming to [facility] today.  Lab Testing:  **If you had lab testing today and your results are reassuring or normal they will be mailed to you or sent through [employer] within 7 days.   **If the lab tests need quick action we will call you with the results.  The phone number we will call with results is # [phone] (home) . If this is not the best number please call our clinic and change the number.  Medication Refills:  If you need any refills please call your pharmacy and they will contact us.   If you need to  your refill at a new pharmacy, please contact the new pharmacy directly. The new pharmacy will help you get your medications transferred faster.   Scheduling:  If you have any concerns about today's visit or wish to schedule another appointment please call our office during normal business hours [phone] (8-5:00 M-F)  If a referral was made to a [facility] and you don't get a call from central scheduling please call [phone].  Medical Concerns:  If you have urgent medical concerns please call [phone] at any time of the day.    Virgilio Quevedo, DO

## 2020-06-08 NOTE — Clinical Note
Please see Dr Quevedo's note. Can you contact patient and assist with audiology referral? I have reached out to the author of the incorrect note and requested an addendum to indicate this was an error

## 2020-06-09 ENCOUNTER — TELEPHONE (OUTPATIENT)
Dept: UROLOGY | Facility: CLINIC | Age: 65
End: 2020-06-09

## 2020-06-09 NOTE — TELEPHONE ENCOUNTER
[facility] Call Center    Phone Message    May a detailed message be left on voicemail: yes     Reason for Call: Other: Pt being referred for Dysuria Benign prostatic hyperplasia with weak urinary stream, Records and referral in epic. Please review     Action Taken: Message routed to:  [facility] claudia uro    Travel Screening: Not Applicable

## 2020-06-09 NOTE — TELEPHONE ENCOUNTER
Message left on the patient's voicemail stating to give us a call to discuss her referral. I cannot find a referral for urology. If patient needs to be seen for dysuria. Please schedule her with Hattie Santoro PA-C or Marcela Ortiz PA-C. This can be a virtual visit.      Jessica Wise MA

## 2020-06-10 NOTE — TELEPHONE ENCOUNTER
METFORMIN HCL TABS 500MG       Last Written Prescription Date:  3/11/20  Last Fill Quantity: 360,   # refills: 0  Last Office Visit : 3/3/20  Future Office visit:  6/30/20    Routing refill request to provider for review/approval because:  Last A1c abnormal  Lab Test 03/02/20  0955   01/18/18   A1C 10.2*   < >  --    HEMOGLOBINA1  --   --  9.6*

## 2020-06-10 NOTE — TELEPHONE ENCOUNTER
This encounter was created in error. Patient called [facility] to inquire why she was contacted to schedule an appointment with Urology. Patient Is supposed to be contacted for an Audiology apointment. Diagnosis listed in encounter is specific to male assigned gender, which the patient is not. Patient is concerned that there is misinformation in her chart. Please advise.

## 2020-06-11 NOTE — TELEPHONE ENCOUNTER
Reviewed chart with my documentation from June 8. Diagnosis and orders related to Audiology approperiate. No reference to urology referral, urologic concern at this time, or diagnoses noted by original writer.     Concern that original [facility] Call center staff opened and documented in wrong chart.   1. Forwarding to all persons who documented on this telephone encounter and [facility] manager to enhance closed loop communication.   2. Recommend that the patient be notified by original writer that documentation was opened in error.  3. Requesting [facility] manager to assist in submission of error reporting, per typical process and for quality improvement, as an adverse event/outcome did affect the patient.     Note: Patient is still awaiting audiology scheduling.      Virgilio Quevedo DO  6/11/2020  9:57 AM

## 2020-06-12 ENCOUNTER — TELEPHONE (OUTPATIENT)
Dept: FAMILY MEDICINE | Facility: CLINIC | Age: 65
End: 2020-06-12

## 2020-06-12 ENCOUNTER — TELEPHONE (OUTPATIENT)
Dept: PSYCHOLOGY | Facility: CLINIC | Age: 65
End: 2020-06-12

## 2020-06-12 NOTE — TELEPHONE ENCOUNTER
Patient was trying to schedule her PT. However the diagnosis linked for this is for the incorrect side. She was seen for R sided back pain. Routing to ordering provider to please correct referral diagnosis.  Melissa Burgess RN

## 2020-06-12 NOTE — TELEPHONE ENCOUNTER
Spoke with patient and she was currently out handling errands. SHe stated that she will schedule the Audiology appointment on her own. If there were any concerns or questions she was encouraged to call the clinic.    Noemi Marks CMA  [facility]

## 2020-06-12 NOTE — TELEPHONE ENCOUNTER
Faxed DME Order for Compression Stockings 6/12/2020      Left message with mother to schedule appointment with audiology.      Noemi Marks CMA  [facility]

## 2020-06-18 ENCOUNTER — THERAPY VISIT (OUTPATIENT)
Dept: PHYSICAL THERAPY | Facility: CLINIC | Age: 65
End: 2020-06-18
Attending: FAMILY MEDICINE
Payer: COMMERCIAL

## 2020-06-18 DIAGNOSIS — M54.6 ACUTE RIGHT-SIDED THORACIC BACK PAIN: ICD-10-CM

## 2020-06-18 DIAGNOSIS — M54.6 CHRONIC RIGHT-SIDED THORACIC BACK PAIN: ICD-10-CM

## 2020-06-18 DIAGNOSIS — G89.29 CHRONIC RIGHT-SIDED THORACIC BACK PAIN: ICD-10-CM

## 2020-06-18 PROCEDURE — 97110 THERAPEUTIC EXERCISES: CPT | Mod: GP | Performed by: PHYSICAL THERAPIST

## 2020-06-18 PROCEDURE — 97161 PT EVAL LOW COMPLEX 20 MIN: CPT | Mod: GP | Performed by: PHYSICAL THERAPIST

## 2020-06-18 NOTE — PROGRESS NOTES
[facility] Initial Evaluation  Subjective:  The history is provided by the patient.   Therapist Generated HPI Evaluation  Problem details: Md order 6/8/20 March 2020 - pt reports onset of R thoracic/scapular pain. Reports a sharp/stabbing pain. Mostly right side, but some has happened on the left. This has gotten more dull but can still be occasionally stabbing.   Feels some referral into chest area almost like heart burn but that also goes away. Recently has noticed some pain lower/lateral right ribcage. Some neck soreness as well.     Did have 1 session with OMT.   Does have history of LBP and sciatica chronically. Normally enjoys pool therapy but has not been able to d/t COVID. Has tried to keep up with her lower body program. .         Type of problem:  Thoracic spine.    This is a new condition.  Condition occurred with:  Insidious onset.    Patient reports pain:  Thoracic right side (under right scapula).    Radiates to: right lower/lateral ribs, some neck soreness.     Symptoms are exacerbated by other and lifting (reaching, lying on right side, twisting)  and relieved by other (stretching, massage from ).          Patient Health History  Radha Baca being seen for R thoracic pain.     Date of Onset: onset around March 2020. MD order 6/8/20.   Problem occurred: Not sure   Pain is reported as 8/10 and 9/10 on pain scale.  General health as reported by patient is fair.  Pertinent medical history includes: changes in bowel/bladder, chest pain, depression, diabetes, high blood pressure, menopausal, overweight and sleep disorder/apnea.   Red flags:  None as reported by patient.  Medical allergies: none.   Surgeries include:  Orthopedic surgery and other. Other surgery history details: Various eye surgeries L/R.    Current medications:  High blood pressure medication, pain medication and other. Other medications details: Ophthalmic eye drops; insulin; ozempic; HBO med;  cholestrol med; metformin; see filel.    Current occupation is Retired.   Primary job tasks include:  Computer work, driving, lifting/carrying, prolonged standing, pushing/pulling, repetitive tasks and other.   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                                  Objective:  Standing Alignment:    Cervical/Thoracic:  Forward head and thoracic kyphosis increased  Shoulder/UE:  Rounded shoulders                  Flexibility/Screens:   Positive screens:  ThoracicNegative screens: Cervical       Spine:      Decreased right spine flexibility:  Rhomboids                  Cervical/Thoracic Evaluation  Arom wnl thoracic: AROM horiz shoulder ADD stretch reproduces pain.  AROM:  AROM Cervical:    Flexion:            WNL - NE  Extension:       75% - R neck pain  Rotation:         Left: 90%     Right: 90%  Side Bend:      Left: 75% - R tightness     Right:  WNL - NE  AROM Thoracic:    Flexion:               Extension:          Max limit  Rotation:            Left: mod limit - mild pain     Right: mod limit - reproduce pain                               Shoulder Evaluation:  ROM:  AROM:    Flexion:  Left:  Symmetrical    Right:  Symmetrical - incr pain    Abduction:  Left: symmetrical   Right:  Symmetrical                                                                       General     ROS    Assessment/Plan:    Patient is a 64 year old female with thoracic complaints.    Patient has the following significant findings with corresponding treatment plan.                Diagnosis 1:  Right sided thoracic pain/mm spasm    Pain -  self management, education and home program  Decreased ROM/flexibility - manual therapy, therapeutic exercise, therapeutic activity and home program  Decreased strength - therapeutic exercise, therapeutic activities and home program  Decreased function - therapeutic activities and home program  Impaired posture - neuro re-education, therapeutic activities and home program    Therapy  Evaluation Codes:   1) History comprised of:   Personal factors that impact the plan of care:      None.    Comorbidity factors that impact the plan of care are:      Overweight.     Medications impacting care: None.  2) Examination of Body Systems comprised of:   Body structures and functions that impact the plan of care:      Thoracic Spine.   Activity limitations that impact the plan of care are:      Bathing, Dressing, Lifting, Sitting, Laying down and reaching.  3) Clinical presentation characteristics are:   Stable/Uncomplicated.  4) Decision-Making    Low complexity using standardized patient assessment instrument and/or measureable assessment of functional outcome.  Cumulative Therapy Evaluation is: Low complexity.    Previous and current functional limitations:  (See Goal Flow Sheet for this information)    Short term and Long term goals: (See Goal Flow Sheet for this information)     Communication ability:  Patient appears to be able to clearly communicate and understand verbal and written communication and follow directions correctly.  Treatment Explanation - The following has been discussed with the patient:   RX ordered/plan of care  Anticipated outcomes  Possible risks and side effects  This patient would benefit from PT intervention to resume normal activities.   Rehab potential is good.    Frequency:  2 X a month, once daily  Duration:  for 2 months  Discharge Plan:  Achieve all LTG.  Independent in home treatment program.  Reach maximal therapeutic benefit.    Please refer to the daily flowsheet for treatment today, total treatment time and time spent performing 1:1 timed codes.

## 2020-06-23 ENCOUNTER — TELEPHONE (OUTPATIENT)
Dept: FAMILY MEDICINE | Facility: CLINIC | Age: 65
End: 2020-06-23

## 2020-06-23 DIAGNOSIS — E11.9 DIABETES MELLITUS, TYPE 2 (H): ICD-10-CM

## 2020-06-23 NOTE — TELEPHONE ENCOUNTER
Routing to Dr. Quevedo to advise, and if this needs to be a physician appointment or MA only appointment.  Melissa Burgess RN

## 2020-06-23 NOTE — TELEPHONE ENCOUNTER
In reviewing office visit from June 8, multiple needs were addressed. Ear cleaning was not requested at that time, and was not one of the chief complaints for the office visit.  An updated audiology referral for hearing aid assessment was requested and that was placed.    I am not aware of the status /presence of the patient's cerumen, and if it is present, if it is impacted.  Therefore, would recommend physician office visit for ear check, and consideration of irrigation via washing or curette if indicated.    Virgilio Quevedo, DO

## 2020-06-23 NOTE — TELEPHONE ENCOUNTER
Patient called stating she has a hearing evaluation 7/16 and she is supposed to get her ears cleaned before that appointment, but during her last appointment Dr. Quevedo did not check her ears or clean them. She would like to know if she is to come back in to get them cleaned. Please advise

## 2020-06-26 ENCOUNTER — DOCUMENTATION ONLY (OUTPATIENT)
Dept: SLEEP MEDICINE | Facility: CLINIC | Age: 65
End: 2020-06-26
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
## 2020-06-30 ENCOUNTER — THERAPY VISIT (OUTPATIENT)
Dept: PHYSICAL THERAPY | Facility: CLINIC | Age: 65
End: 2020-06-30
Payer: COMMERCIAL

## 2020-06-30 ENCOUNTER — VIRTUAL VISIT (OUTPATIENT)
Dept: ENDOCRINOLOGY | Facility: CLINIC | Age: 65
End: 2020-06-30
Payer: COMMERCIAL

## 2020-06-30 ENCOUNTER — DOCUMENTATION ONLY (OUTPATIENT)
Dept: SLEEP MEDICINE | Facility: CLINIC | Age: 65
End: 2020-06-30

## 2020-06-30 DIAGNOSIS — E11.8 TYPE 2 DIABETES MELLITUS WITH COMPLICATION, WITH LONG-TERM CURRENT USE OF INSULIN (H): ICD-10-CM

## 2020-06-30 DIAGNOSIS — G89.29 CHRONIC RIGHT-SIDED THORACIC BACK PAIN: ICD-10-CM

## 2020-06-30 DIAGNOSIS — Z79.4 TYPE 2 DIABETES MELLITUS WITH COMPLICATION, WITH LONG-TERM CURRENT USE OF INSULIN (H): ICD-10-CM

## 2020-06-30 DIAGNOSIS — M54.6 CHRONIC RIGHT-SIDED THORACIC BACK PAIN: ICD-10-CM

## 2020-06-30 DIAGNOSIS — M85.80 OSTEOPENIA, UNSPECIFIED LOCATION: Primary | ICD-10-CM

## 2020-06-30 DIAGNOSIS — L65.9 HAIR LOSS: ICD-10-CM

## 2020-06-30 PROCEDURE — 97530 THERAPEUTIC ACTIVITIES: CPT | Mod: GP | Performed by: PHYSICAL THERAPIST

## 2020-06-30 PROCEDURE — 97140 MANUAL THERAPY 1/> REGIONS: CPT | Mod: GP | Performed by: PHYSICAL THERAPIST

## 2020-06-30 PROCEDURE — 97110 THERAPEUTIC EXERCISES: CPT | Mod: GP | Performed by: PHYSICAL THERAPIST

## 2020-06-30 NOTE — PATIENT INSTRUCTIONS
Increase Ozempic as planned to 0.5 once a week  Cont on Lantus and Novolog  Cont to work on diet and exercise, and mindfullness  Please check blood sugars, send them to our office using [employer] once a months  Follow up with me in 3 months either via phone or in person  Lab work in 3 months prior to the next visit including blood draw and a urine sample    I'm going to let you know re a better siria to count carbs and calories soon
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
## 2020-06-30 NOTE — PROGRESS NOTES
STM recheck     Diagnostic AHI:   46.8  HST    Message left for patient to return call, pt left message regarding pressure issues.     Assessment: Pt meeting objective benchmarks.       Action plan: waiting for patient to return call.       Device type: Auto-CPAP    PAP settings: CPAP min 9.0 cm  H20       CPAP max 15.0 cm  H20            95th% pressure 14.9 cm  H20     Mask type:  Nasal Mask    Objective measures: 14 day rolling measures      Compliance  100 %      Leak  18.4 lpm  last  upload      AHI 1.22   last  upload      Average number of minutes 457      Objective measure goal  Compliance   Goal >70%  Leak   Goal < 24 lpm  AHI  Goal < 5  Usage  Goal >240        Total time spent on accessing and interpreting remote patient PAP therapy data  12 minutes    Total time spent counseling, coaching  and reviewing PAP therapy data with patient  0 minutes

## 2020-07-08 ENCOUNTER — OFFICE VISIT (OUTPATIENT)
Dept: FAMILY MEDICINE | Facility: CLINIC | Age: 65
End: 2020-07-08
Payer: COMMERCIAL

## 2020-07-08 VITALS
BODY MASS INDEX: 43.8 KG/M2 | OXYGEN SATURATION: 96 % | WEIGHT: 251.2 LBS | HEART RATE: 78 BPM | TEMPERATURE: 96.9 F | SYSTOLIC BLOOD PRESSURE: 114 MMHG | DIASTOLIC BLOOD PRESSURE: 71 MMHG

## 2020-07-08 DIAGNOSIS — H90.3 SENSORINEURAL HEARING LOSS (SNHL) OF BOTH EARS: Primary | ICD-10-CM

## 2020-07-08 ASSESSMENT — ENCOUNTER SYMPTOMS
ACTIVITY CHANGE: 0
CHILLS: 0

## 2020-07-08 NOTE — PROGRESS NOTES
HPI:       64 year old patient who presents with:    Hearing loss.  She has been noticing bilateral hearing loss gradually over the past few years.  Does not feel as if her ears are plugged.  Despite this, she would like her ears checked and if there is any cerumen she would like it removed before a scheduled audiology appointment next week.    She continues to have a chronically sore back, unchanged from previous visits.           Problem, Medication and Allergy Lists were reviewed and are current.  Patient is an established patient of this clinic.         Review of Systems:     Review of Systems   Constitutional: Negative for activity change and chills.   HENT: Negative for ear pain.           Physical Exam:     /71 (BP Location: Left arm, Patient Position: Sitting, Cuff Size: Adult Large)   Pulse 78   Temp 96.9  F (36.1  C) (Tympanic)   Wt 113.9 kg (251 lb 3.2 oz)   SpO2 96%   Breastfeeding No   BMI 43.80 kg/m      Body mass index is 43.8 kg/m .  Vitals reviewed.    Physical Exam  Constitutional:       General: She is not in acute distress.     Appearance: She is well-developed.   HENT:      Right Ear: Tympanic membrane and external ear normal.      Left Ear: Tympanic membrane and external ear normal.   Cardiovascular:      Rate and Rhythm: Normal rate and regular rhythm.      Heart sounds: Normal heart sounds. No murmur.   Pulmonary:      Effort: Pulmonary effort is normal. No respiratory distress.      Breath sounds: Normal breath sounds. No wheezing or rales.     Finger rub test is positive bilaterally.        Results:       Assessment and Plan     Radha was seen today for ear problem.    Diagnoses and all orders for this visit:    Sensorineural hearing loss (SNHL) of both ears.  Positive finger rub test and history 0.2 likely age-related sensorineural hearing loss.  Ear canals are clear and she was instructed to attend her upcoming audiology appointment.        Options for treatment and  follow-up care were reviewed with the patient. Radha Baca engaged in the decision making process and verbalized understanding of the options discussed and agreed with the final plan.    Mohan Moreno MD

## 2020-07-13 ENCOUNTER — TELEPHONE (OUTPATIENT)
Dept: AUDIOLOGY | Facility: CLINIC | Age: 65
End: 2020-07-13

## 2020-07-13 NOTE — TELEPHONE ENCOUNTER
Called patient and LVM.    Informed her that 7/16 HEV has been rescheduled to 7/20, d/t a family emergency. Provided [facility] call center number for pt to call back and reschedule if necessary.    If new appointment time does work for pt, she is welcome to reschedule with any available HEV provider.

## 2020-07-14 ENCOUNTER — THERAPY VISIT (OUTPATIENT)
Dept: PHYSICAL THERAPY | Facility: CLINIC | Age: 65
End: 2020-07-14
Payer: COMMERCIAL

## 2020-07-14 DIAGNOSIS — M54.6 CHRONIC RIGHT-SIDED THORACIC BACK PAIN: ICD-10-CM

## 2020-07-14 DIAGNOSIS — G89.29 CHRONIC RIGHT-SIDED THORACIC BACK PAIN: ICD-10-CM

## 2020-07-14 PROCEDURE — 97530 THERAPEUTIC ACTIVITIES: CPT | Mod: GP | Performed by: PHYSICAL THERAPIST

## 2020-07-14 PROCEDURE — 97110 THERAPEUTIC EXERCISES: CPT | Mod: GP | Performed by: PHYSICAL THERAPIST

## 2020-07-20 ENCOUNTER — OFFICE VISIT (OUTPATIENT)
Dept: AUDIOLOGY | Facility: CLINIC | Age: 65
End: 2020-07-20
Attending: FAMILY MEDICINE
Payer: COMMERCIAL

## 2020-07-20 DIAGNOSIS — H90.3 SENSORINEURAL HEARING LOSS (SNHL) OF BOTH EARS: ICD-10-CM

## 2020-07-20 DIAGNOSIS — H93.13 TINNITUS OF BOTH EARS: Primary | ICD-10-CM

## 2020-07-20 NOTE — TELEPHONE ENCOUNTER
FUTURE VISIT INFORMATION      FUTURE VISIT INFORMATION:    Date: 7/23/20    Time: 10:30am    Location: [facility]  REFERRAL INFORMATION:    Referring provider:  Ashley Díaz    Referring providers clinic:  [facility]    Reason for visit/diagnosis  imbalance/left ear muffled/pulsing sensation in left ear    RECORDS REQUESTED FROM:       Clinic name Comments Records Status Imaging Status   [facility] OV/referral 7/20/20  OV/note 11/21/11-7/20/20 EPIC    [facility] 7/8/20 EPIC         Patients daughter contacted potassium level 6.1  Will hold aldactone and recheck potassium level in 2 weeks

## 2020-07-20 NOTE — PROGRESS NOTES
"AUDIOLOGY REPORT    SUBJECTIVE:  Radha Baca is a 64 year old female who was seen in the Audiology Clinic at the [facility] for audiologic evaluation, referred by her primary care physician. The patient has been seen previously in this clinic on 11/21/2013 for assessment and results indicated a bilateral sensorineural hearing loss. The patient reports intermittent bilateral tinnitus.  She reports that her left ear sounds \"muffled\" compared to the right ear with a pulsing in her left ear at night when she lays on her right side. She reports a history of imbalance and falls, denies any recent falls and walks with a cane. She reports she feels her hearing has decreased.  The patient denies bilateral otalgia, bilateral drainage, and bilateral aural fullness.  The patient notes difficulty with communication in a variety of listening situations.     OBJECTIVE:    Otoscopic exam indicates ears are clear of cerumen bilaterally     Pure Tone Thresholds assessed using conventional audiometry with fair to good  reliability from 250-8000 Hz bilaterally using insert earphones and circumaural headphones     RIGHT:  normal sloping to moderate sensorineural hearing loss    LEFT:    normal sloping to moderate sensorineural hearing loss    Tympanogram:    RIGHT: normal eardrum mobility    LEFT:   normal eardrum mobility    Reflexes (reported by stimulus ear):  RIGHT: Ipsilateral is present at normal levels  RIGHT: Contralateral is present at normal levels  LEFT:   Ipsilateral is present at normal levels  LEFT:   Contralateral is present at elevated levels    Speech Reception Threshold:    RIGHT: 30 dB HL    LEFT:   30 dB HL    Word Recognition Score:     RIGHT: 92% at 70 dB HL using NU-6 recorded word list.    LEFT:   100% at 70 dB HL using NU-6 recorded word list.      ASSESSMENT:     ICD-10-CM    1. Sensorineural hearing loss (SNHL) of both ears  H90.3 AUDIOLOGY ADULT REFERRAL       Compared " to patient's previous audiogram dated 11/21/2013, hearing has remained essentially stable. Today s results were discussed with the patient in detail.     PLAN:  Patient was counseled regarding hearing loss and impact on communication.  Patient is a good candidate for amplification at this time. It is recommended that the patient follow- up with ENT regarding her concerns about the left ear and her imbalance. It is recommended that she monitor her hearing loss every 2-3 years, sooner if concerns arise. A trial with amplification bilaterally was recommended. Patient was interested in meeting with ENT and learning more about hearing aids (only wanted consultation scheduled at this time), so appointments were scheduled prior to patient leaving the clinic today.  Please call this clinic with questions regarding these results or recommendations.        Bradley Valdez  Audiologist  [place] License  #[id number]

## 2020-07-23 ENCOUNTER — PRE VISIT (OUTPATIENT)
Dept: OTOLARYNGOLOGY | Facility: CLINIC | Age: 65
End: 2020-07-23

## 2020-07-23 ENCOUNTER — OFFICE VISIT (OUTPATIENT)
Dept: OTOLARYNGOLOGY | Facility: CLINIC | Age: 65
End: 2020-07-23
Payer: COMMERCIAL

## 2020-07-23 DIAGNOSIS — H90.3 SENSORINEURAL HEARING LOSS (SNHL) OF BOTH EARS: Primary | ICD-10-CM

## 2020-07-23 DIAGNOSIS — H93.13 TINNITUS, BILATERAL: ICD-10-CM

## 2020-07-23 ASSESSMENT — PAIN SCALES - GENERAL: PAINLEVEL: EXTREME PAIN (8)
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
## 2020-07-23 NOTE — LETTER
Hearing Aid Medical Clearance    Radha Baca  July 23, 2020        This patient has received a medical examination and may be considered a suitable candidate for a hearing aid.         Physician:_________Jose Iyer MD_________________________________________

## 2020-07-23 NOTE — PATIENT INSTRUCTIONS
The patient presents with a history of hearing loss and tinnitus. She will be referred for a hearing aid consultation to our Audiologists and she will be seen again in one year to review a repeat Audiogram and Tympanogram.     1. You were seen in the ENT Clinic today by Dr. Iyer .  If you have any questions or concerns after your appointment, please call   - Option 1: ENT Clinic: [phone]   - Option 2: Xi ('s  Nurse): [phone]    2.   Plan to return to clinic in 1 year with audio    3. Hearing Aid Consult  Georgette Bragg LPN  [facility] - Otolaryngology

## 2020-07-23 NOTE — LETTER
7/23/2020       RE: Radha Baca  [address] [facility] [address]     Dear Colleague,    Thank you for referring your patient, Radha Baca, to the [facility] at [facility]. Please see a copy of my visit note below.    The patient presents with a history of hearing loss and tinnitus.  The patient reports a progressive hearing loss in both ears over at least the past two years.  The patient denies ear infections, otalgia, otorrhea or dizziness.  The patient denies sinusitis, rhinitis, facial pain, nasal obstruction or purulent nasal discharge. The patient denies chronic or recurrent tonsillitis, chronic or recurrent pharyngitis. The patient's previous Audiogram and Tympanogram demonstrates bilateral sensorineural hearing loss that is symmetric and moderate to severe. Her word recognition scores are 92% in the right ear and 100% in the left ear. Her tympanograms demonstrate negative pressure in the right ear.     This patient is seen in consultation at the request of Dr. Mohan Moreno.     All other systems were reviewed and they are either negative or they are not directly pertinent to this Otolaryngology examination.      Past Medical History:    Past Medical History:   Diagnosis Date     Abnormal Pap smear      Anxiety      Arthritis      Arthritis of knee 4/4/2013     Arthritis of shoulder region, right 4/18/2014     Back injury      Breast disorder      Chronic constipation      Chronic diarrhea      Depressive disorder      Diabetes (H)      Fecal incontinence      Finger pain 4/2/2015     Fracture broke L 5th pinky finger due to fall  1/2015     Glaucoma (increased eye pressure)      Head injury 8/6/2016     Headache(784.0)     decreased with mouth guard use.     History of blood transfusion 8/2011 & 4/2013     History of diabetes mellitus      Hypercholesteremia      Hypertension      Low back pain      Menarche age 9+     cycles q mo x 4-5 d     Neck injuries      Nonsenile cataract IO implants: L-8/2013; R-1/2011     Pain in knee joint     LEFT     Right bundle branch block     per H/P     SNHL (sensorineural hearing loss)      Umbilical hernia without mention of obstruction or gangrene 8/2014     Vision disorder Detached Retina 10/2009       Past Surgical History:    Past Surgical History:   Procedure Laterality Date     ARTHROPLASTY KNEE  4/4/2013    Left Total Knee Arthroplasty;  Surgeon: Lou Byrne MD;  [facility]     ARTHROPLASTY MINIMALLY INVASIVE KNEE  8/22/2011    R knee :ODALIS CAITLYN SILVERMAN, Left age 14     COLONOSCOPY  1/14/2015     DENTAL SURGERY      root canals, wisdom teeth     EXAM UNDER ANESTHESIA, MANIPULATE JOINT (LOCATION)  10/5/2012    Procedure: EXAM UNDER ANESTHESIA, MANIPULATE JOINT (LOCATION);  Right Knee Mini Open Lysis Of Adhesions, Left Knee Steroid Injection  ;  Surgeon: Lou Byrne MD;  Location: [facility]     [facility] OR OCULAR DEVICE INTRAOP DETACHED RETINA  2009    R     [facility] PHAKIC IOL - IMPLANT FROM SURGEON      right     KNEE SURGERY  1969    left     LASER YAG CAPSULOTOMY  12/2016    left     VITRECTOMY PARSPLANA  2010       Medications:      Current Outpatient Medications:      Acetaminophen (TYLENOL PO), Take by mouth as needed for mild pain or fever, Disp: , Rfl:      amoxicillin (AMOXIL) 500 MG capsule, TAKE 4 CAPSULES BY MOUTH ONE HOUR BEFORE DENTAL APPOINTMENT, Disp: 4 capsule, Rfl: 4     Ascorbic Acid (VITAMIN C PO), Take 2,000 mg by mouth 2 times daily , Disp: , Rfl:      aspirin 81 MG tablet, 1 tab daily, Disp: 90 tablet, Rfl: 3     atorvastatin (LIPITOR) 20 MG tablet, Take 1 tablet (20 mg) by mouth daily DX E78.5 ID # [id number], Disp: 90 tablet, Rfl: 3     B Complex Vitamins (VITAMIN  B COMPLEX) CAPS, Take 1 tablet by mouth daily , Disp: , Rfl:      blood glucose (ONETOUCH VERIO IQ) test strip, Use to test blood sugar 4 times daily or as directed DX E11.8 ID # [id number] has  meter already, Disp: 360 strip, Rfl: 3     calcium-vitamin D (CALTRATE) 600-400 MG-UNIT per tablet, Take 1 tablet by mouth 2 times daily, Disp: , Rfl:      cholecalciferol (VITAMIN D) 1000 UNIT tablet, Take 1 tablet by mouth daily., Disp: 100 tablet, Rfl: 3     cycloSPORINE (RESTASIS) 0.05 % ophthalmic emulsion, , Disp: , Rfl:      HUMALOG KWIKPEN 100 UNIT/ML soln, Carb counting with meals approx 70-80 units daily subcutaneously DX E 11.8 ID # [id number] needs refrigeration, Disp: 75 mL, Rfl: 3     insulin glargine (LANTUS SOLOSTAR) 100 UNIT/ML pen, Inject 70 units SQ each am. DX E11.8 ID # [id number] needs refrigeration, Disp: 75 mL, Rfl: 3     insulin pen needle (B-D U/F) 31G X 8 MM miscellaneous, Use  4 daily short 31 G X 8MM, Disp: 400 each, Rfl: 3     latanoprost (XALATAN) 0.005 % ophthalmic solution, Place 1 drop into both eyes At Bedtime, Disp: , Rfl:      lisinopril-hydrochlorothiazide (ZESTORETIC) 20-12.5 MG tablet, Take 1 tablet by mouth daily DX  I10 ID # [id number], Disp: 90 tablet, Rfl: 3     metFORMIN (GLUCOPHAGE) 500 MG tablet, Take 2 tablets (1,000 mg) by mouth 2 times daily (with meals), Disp: 360 tablet, Rfl: 3     Multiple Vitamin (MULTIVITAMIN  S) CAPS, Take 1 daily, Disp: 90 capsule, Rfl: 3     Multiple Vitamins-Minerals (EYE-ZAKIYA PLUS LUTEIN PO), , Disp: , Rfl:      Omega-3 Fatty Acids (OMEGA-3 FISH OIL PO), Take 1,000 mg by mouth daily, Disp: , Rfl:      OneTouch Delica Lancets 33G MISC, 4 Box 4 times daily Test  Blood glucose 4 times daily  DX E11.8  ID # [id number], Disp: 360 each, Rfl: 3     order for DME, Equipment being ordered: Grade 1 (light) compression stockings, below the knee, Disp: 1 Units, Rfl: 1     Semaglutide,0.25 or 0.5MG/DOS, (OZEMPIC, 0.25 OR 0.5 MG/DOSE,) 2 MG/1.5ML SOPN, Inject 0.25 mg Subcutaneous once a week X 4 weeks then 0.5 mg  Once weekly thereafter NEEDS REFRIGERATION E11.8 # [id number], Disp: 6 pen, Rfl: 3     timolol (TIMOPTIC) 0.5 % ophthalmic solution, Place 1 drop into  both eyes 2 times daily , Disp: , Rfl:      triamcinolone (KENALOG) 0.1 % ointment, Apply topically 2 times daily, Disp: 80 g, Rfl: 11    Allergies:    Nkda [no known drug allergies]    Physical Examination:    The patient is a well developed, well nourished female in no apparent distress.  She is normocephalic, atraumatic with pupils equally round and reactive to light.    Ear Examination: Ear canals clear, tympanic membranes and middle ear spaces normal  Neurological Examination: Facial nerve function intact and symmetric  Integumentary Examination: No lesions on the skin of the head and neck  Neck Examination: No masses or lesions, no lymphadenopathy  Endocrine Examination: Normal thyroid examination    Assessment and Plan:    The patient presents with a history of hearing loss and tinnitus. She will be referred for a hearing aid consultation to our Audiologists and she will be seen again in one year to review a repeat Audiogram and Tympanogram.     CC: Dr. Mohan Moreno    Again, thank you for allowing me to participate in the care of your patient.      Sincerely,    Jose Iyer MD

## 2020-07-23 NOTE — NURSING NOTE
Chief Complaint   Patient presents with     Consult     imbalance , muffled left ear      Silvino Quintanilla LPN

## 2020-07-28 ENCOUNTER — OFFICE VISIT (OUTPATIENT)
Dept: ORTHOPEDICS | Facility: CLINIC | Age: 65
End: 2020-07-28
Payer: COMMERCIAL

## 2020-07-28 DIAGNOSIS — E11.8 TYPE 2 DIABETES MELLITUS WITH COMPLICATION, WITH LONG-TERM CURRENT USE OF INSULIN (H): ICD-10-CM

## 2020-07-28 DIAGNOSIS — B35.1 OM (ONYCHOMYCOSIS): ICD-10-CM

## 2020-07-28 DIAGNOSIS — I73.89 OTHER SPECIFIED PERIPHERAL VASCULAR DISEASES (H): ICD-10-CM

## 2020-07-28 DIAGNOSIS — R25.2 LEG CRAMPING: Primary | ICD-10-CM

## 2020-07-28 DIAGNOSIS — Z79.4 TYPE 2 DIABETES MELLITUS WITH COMPLICATION, WITH LONG-TERM CURRENT USE OF INSULIN (H): ICD-10-CM

## 2020-07-28 NOTE — PROGRESS NOTES
Reason For Visit:   Chief Complaint   Patient presents with     Follow Up     Toe nail trimming. Patient stated that she has some other concerns she would like to address.        Pain Assessment  Patient Currently in Pain: Yes  Primary Pain Location: Ankle(Bilateral)  Other Pain Locations: muscle cramps in legs and feet.        Allergies   Allergen Reactions     Nkda [No Known Drug Allergies]            Екатерина Lundy LPN    Chief Complaint   Patient presents with     Follow Up     Toe nail trimming. Patient stated that she has some other concerns she would like to address.           Allergies   Allergen Reactions     Nkda [No Known Drug Allergies]          Subjective: Radha is a 63 year old female who presents to the clinic today for a diabetic foot exam and management. Relates that she has cramping in the legs that starts in the lower back and moves down hr legs. She relates that sometimes the cramping is so severe, it wakes her up. She does not have cramping with walking, however she does get tightness in her calves with walking. She relates that she was previously being worked up with Dr. Dubois for her lumbar spine. In 2018, she was dx'ed with lumbar radiculopathy and was rx'ed PT. If that did not work, he recommended JEANE. She did not follow up with the JEANE as she said injection therapy did not work on her knee.      Objective  PT and DP pulses are not palpable bilaterally. CRT is 4 seconds. Diminished pedal hair. Feet appear well perfused.   Gross sensation is diminished, as well as protective sensation bilaterally.   Equinus is noted bilaterally. No pain with active or passive ROM of the ankle, MTJ, 1st ray, or halluces bilaterally,.   Nails thickened, brittle, discolored, with subungual debris bilaterally. No open lesions are noted. Xerosis noted BL.      Assessment: Dm2 with neuropathy.  Onychomycosis.   PVD   Leg cramping - I think this is likely related to her spine. They start with back pain and move  down her sciatic nerve. However, because she has non-palpable pulses, will get vascular exams.   Xerosis     Plan:  - Pt seen and evaluated.  - Nails trimmed x 10.  - RONDA and venous duplex exams. - Will call with results.   - See again in 4 months.

## 2020-07-28 NOTE — LETTER
7/28/2020         RE: Radha Baca  [address] [facility] [address]        Dear Colleague,    Thank you for referring your patient, Radha Baca, to the [facility]TH[facility]. Please see a copy of my visit note below.    Reason For Visit:   Chief Complaint   Patient presents with     Follow Up     Toe nail trimming. Patient stated that she has some other concerns she would like to address.        Pain Assessment  Patient Currently in Pain: Yes  Primary Pain Location: Ankle(Bilateral)  Other Pain Locations: muscle cramps in legs and feet.        Allergies   Allergen Reactions     Nkda [No Known Drug Allergies]            Екатерина Lundy LPN    Chief Complaint   Patient presents with     Follow Up     Toe nail trimming. Patient stated that she has some other concerns she would like to address.           Allergies   Allergen Reactions     Nkda [No Known Drug Allergies]          Subjective: Radha is a 63 year old female who presents to the clinic today for a diabetic foot exam and management. Relates that she has cramping in the legs that starts in the lower back and moves down hr legs. She relates that sometimes the cramping is so severe, it wakes her up. She does not have cramping with walking, however she does get tightness in her calves with walking. She relates that she was previously being worked up with Dr. Dubois for her lumbar spine. In 2018, she was dx'ed with lumbar radiculopathy and was rx'ed PT. If that did not work, he recommended JEANE. She did not follow up with the JEANE as she said injection therapy did not work on her knee.      Objective  PT and DP pulses are not palpable bilaterally. CRT is 4 seconds. Diminished pedal hair. Feet appear well perfused.   Gross sensation is diminished, as well as protective sensation bilaterally.   Equinus is noted bilaterally. No pain with active or passive ROM of the ankle, MTJ, 1st ray, or halluces bilaterally,.    Nails thickened, brittle, discolored, with subungual debris bilaterally. No open lesions are noted. Xerosis noted BL.      Assessment: Dm2 with neuropathy.  Onychomycosis.   PVD   Leg cramping - I think this is likely related to her spine. They start with back pain and move down her sciatic nerve. However, because she has non-palpable pulses, will get vascular exams.   Xerosis     Plan:  - Pt seen and evaluated.  - Nails trimmed x 10.  - RONDA and venous duplex exams. - Will call with results.   - See again in 4 months.    Again, thank you for allowing me to participate in the care of your patient.        Sincerely,        Jerrell Austin DPM

## 2020-08-06 ENCOUNTER — ANCILLARY PROCEDURE (OUTPATIENT)
Dept: ULTRASOUND IMAGING | Facility: CLINIC | Age: 65
End: 2020-08-06
Attending: PODIATRIST
Payer: COMMERCIAL

## 2020-08-06 DIAGNOSIS — R25.2 LEG CRAMPING: ICD-10-CM

## 2020-08-10 ENCOUNTER — TELEPHONE (OUTPATIENT)
Dept: ORTHOPEDICS | Facility: CLINIC | Age: 65
End: 2020-08-10

## 2020-08-10 NOTE — TELEPHONE ENCOUNTER
Called pt and left VM with terrance results. Spasm likely from back. Call if she would like to see someone in dept regarding that.

## 2020-08-13 ENCOUNTER — TELEPHONE (OUTPATIENT)
Dept: AUDIOLOGY | Facility: CLINIC | Age: 65
End: 2020-08-13

## 2020-08-13 DIAGNOSIS — Z96.653 STATUS POST TOTAL BILATERAL KNEE REPLACEMENT: Primary | ICD-10-CM

## 2020-08-14 ENCOUNTER — TELEPHONE (OUTPATIENT)
Dept: AUDIOLOGY | Facility: CLINIC | Age: 65
End: 2020-08-14

## 2020-08-25 ENCOUNTER — TELEPHONE (OUTPATIENT)
Dept: ORTHOPEDICS | Facility: CLINIC | Age: 65
End: 2020-08-25

## 2020-08-25 ENCOUNTER — OFFICE VISIT (OUTPATIENT)
Dept: ORTHOPEDICS | Facility: CLINIC | Age: 65
End: 2020-08-25
Payer: COMMERCIAL

## 2020-08-25 ENCOUNTER — ANCILLARY PROCEDURE (OUTPATIENT)
Dept: GENERAL RADIOLOGY | Facility: CLINIC | Age: 65
End: 2020-08-25
Attending: ORTHOPAEDIC SURGERY
Payer: COMMERCIAL

## 2020-08-25 VITALS — BODY MASS INDEX: 43.85 KG/M2 | HEIGHT: 63 IN | WEIGHT: 247.5 LBS

## 2020-08-25 DIAGNOSIS — Z96.653 STATUS POST TOTAL BILATERAL KNEE REPLACEMENT: ICD-10-CM

## 2020-08-25 DIAGNOSIS — M54.50 LUMBAR PAIN: Primary | ICD-10-CM

## 2020-08-25 DIAGNOSIS — Z98.890 S/P KNEE SURGERY: Primary | ICD-10-CM

## 2020-08-25 ASSESSMENT — MIFFLIN-ST. JEOR: SCORE: 1635.39

## 2020-08-25 NOTE — TELEPHONE ENCOUNTER
[facility] Call Center    Phone Message    May a detailed message be left on voicemail: yes     Reason for Call: Other: Pt would like a call back asap as she was not told about the Xrays and also does not understand why they are on different floors as she has somewhere to be after her appt and does not want her appt running late due to the push back from xrays. Please reach out to pt as soon as possible     Action Taken: Message routed to:  [facility] Ortho    Travel Screening: Not Applicable

## 2020-08-25 NOTE — LETTER
8/25/2020         RE: Radha Baca  [address] [facility] [address]        Dear Colleague,    Thank you for referring your patient, Radha Baca, to the [facility]THOP[facility]. Please see a copy of my visit note below.    Patient is a 64-year-old female who I have known for many years.  She is status post a right total knee by Dr. Mckeon in 211.  She had severe arthrofibrosis and underwent a secondary operation by me in October 2012.    She went on to have a left total knee arthroplasty by myself in April 2013.  Despite this we have never been able to achieve significant motion in her knees.  However she has learned to live with this.    Her life is complicated by caring for her mother which is further complicated by a strained relationship.  She lives with her  and feels that she has a caring and solid marriage.    The current complaints are somewhat vague, she feels that she is having a harder and harder time getting around and she is really here for more of a checkup for her knees.   I last saw her in March of 218 at which time we advised a visit with Dr. Dubois.  A lumbar MRI as well as a visit with Dr. Dubois in 2014 was accomplished, and although she has significant pathology in her back continued conservative measures were discussed.  She understands that she does have problems with her back.  Her main problems right now are with her right limb and she does report what sounds to be sciatic type symptoms that are limiting her ability to get around.  She reports that when she tries to walk greater than a block her back and right leg pain is what limits her.    In regards to her knees she continues to have stiff knees.  When I saw her in March of 218 her left knee range of motion was 0-1 18 and her right knee range of motion was 0-90.    Patient remains heavy.  Her current weight is 247 pounds which computes to a BMI of 44.  This is despite multiple attempts at  trying to reduce her weight.  However she does keep active and prior to COVID was swimming 3-4 times a week which she feels has saved her.  She also feels that she is getting around better.    She does have diabetes.  Her last A1c was in March 2020 and was 10.2.  Looking through her labs she was supposed to have this rechecked in June which did not have been a due to the confusion of COVID and getting back to the doctor for an onsite visit.    Physical exam reveals a woman who moves around the room without an antalgic gait but has difficulty getting on and off the table.    Examination of her bilateral knees reveal range of motion left 0 5-90, right 3 to 82 degrees.  This is with a firm endpoint.  Patella on both knees is rather mobile.    There is no redness warmth or swelling in the knee.    Patient has satisfactory hip range of motion without pain.    CMS checks distally are intact.    Imaging was reviewed.  It does not show any signs of loosening.  Component positioning is satisfactory in both knees    Assessment: I am not sure that I have much more to offer her in regards to her knees.  However she has lost motion in both knees and this possibly is related to the fact that she is not able to get back to the pool due to possible closures with COVID.    We could place her back in physical therapy as she would be able to have some pool workouts with formal physical therapy rather than a public pool situation which is close due to COVID.    I also discussed her situation with Dr. Dubois and felt that a visit back to him just for her.      She is comfortable with this approach.    Room time 25 minutes consultation time 20    Lou Byrne MD  Professor Orthopedic Surgery  [facility]

## 2020-08-25 NOTE — PROGRESS NOTES
Dr. Byrne approached Dr. Dubois with patient's MRI of last year.  Dr. Dubois order patient to have lumbar CT and MRI. Start lumbar PT and L5 S1 TFESI injection. Then follow up with Dr. Dubois in 4-6 weeks after those are completed virtually.  RN called patient and relayed these plans to her. Spoke at length with her. RN gave her all the numbers to call. She will also call insurance to make sure they are covered. She expressed understanding.    Rickey Woodard RN

## 2020-08-25 NOTE — TELEPHONE ENCOUNTER
Called pt multiple times regarding her question. ATC left a VM informing that the 6 ft XR will be focusing on the lower body from hips to feet and it will be done in the first floor. The other XR is mainly focused on her knees and that will be done in the 4th floor before clinic visit. Asked pt to give us a call with further questions or concerns.              -FARHAD Shankar- Orthopedics

## 2020-08-25 NOTE — NURSING NOTE
"Reason For Visit:   Chief Complaint   Patient presents with     RECHECK     F/U Jamil. Knee pain, cramping in thighs and calves       Primary MD: Mohan Moreno  Ref. MD: est    ?  No  Occupation none.  Currently working? No  Work status?  Retired.    Date of injury: no  Type of injury: no.    Date of surgery: 4/4/2013  Type of surgery: Left TKA.    10/5/2012  Right knee mini open lysis of adhesions.     Right knee closed manipulation following #1.     Left knee intra-articular steroid injection.     8/2011 Right knee surgery Dr. Dashawn Mckeon    Smoker: No  Request smoking cessation information: No    Ht 1.598 m (5' 2.91\")   Wt 112.3 kg (247 lb 8 oz)   BMI 43.96 kg/m      Pain Assessment  Patient Currently in Pain: Yes  0-10 Pain Scale: 7  Primary Pain Location: Knee(bilateral)              Jacquleyn Nirmalo, DHAVALN  "

## 2020-08-28 ENCOUNTER — TELEPHONE (OUTPATIENT)
Dept: ORTHOPEDICS | Facility: CLINIC | Age: 65
End: 2020-08-28

## 2020-08-28 NOTE — TELEPHONE ENCOUNTER
[facility] Call Center    Phone Message    May a detailed message be left on voicemail: yes     Reason for Call: Other: CPT CODES     Action Taken: Message routed to:  [facility] Lou Byrne     Travel Screening: Not Applicable                                                                     Patient had 2 X-rays done on 8/25. She is trying to reach Lou's nurse Aline. She needs the CPT Codes so she can verify with insurance. Would like a call back

## 2020-08-29 NOTE — PROGRESS NOTES
Patient is a 64-year-old female who I have known for many years.  She is status post a right total knee by Dr. Mckeon in 211.  She had severe arthrofibrosis and underwent a secondary operation by me in October 2012.    She went on to have a left total knee arthroplasty by myself in April 2013.  Despite this we have never been able to achieve significant motion in her knees.  However she has learned to live with this.    Her life is complicated by caring for her mother which is further complicated by a strained relationship.  She lives with her  and feels that she has a caring and solid marriage.    The current complaints are somewhat vague, she feels that she is having a harder and harder time getting around and she is really here for more of a checkup for her knees.   I last saw her in March of 218 at which time we advised a visit with Dr. Dubois.  A lumbar MRI as well as a visit with Dr. Dubois in 2014 was accomplished, and although she has significant pathology in her back continued conservative measures were discussed.  She understands that she does have problems with her back.  Her main problems right now are with her right limb and she does report what sounds to be sciatic type symptoms that are limiting her ability to get around.  She reports that when she tries to walk greater than a block her back and right leg pain is what limits her.    In regards to her knees she continues to have stiff knees.  When I saw her in March of 218 her left knee range of motion was 0-1 18 and her right knee range of motion was 0-90.    Patient remains heavy.  Her current weight is 247 pounds which computes to a BMI of 44.  This is despite multiple attempts at trying to reduce her weight.  However she does keep active and prior to COVID was swimming 3-4 times a week which she feels has saved her.  She also feels that she is getting around better.    She does have diabetes.  Her last A1c was in March 2020 and was 10.2.   Looking through her labs she was supposed to have this rechecked in June which did not have been a due to the confusion of COVID and getting back to the doctor for an onsite visit.    Physical exam reveals a woman who moves around the room without an antalgic gait but has difficulty getting on and off the table.    Examination of her bilateral knees reveal range of motion left 0 5-90, right 3 to 82 degrees.  This is with a firm endpoint.  Patella on both knees is rather mobile.    There is no redness warmth or swelling in the knee.    Patient has satisfactory hip range of motion without pain.    CMS checks distally are intact.    Imaging was reviewed.  It does not show any signs of loosening.  Component positioning is satisfactory in both knees    Assessment: I am not sure that I have much more to offer her in regards to her knees.  However she has lost motion in both knees and this possibly is related to the fact that she is not able to get back to the pool due to possible closures with COVID.    We could place her back in physical therapy as she would be able to have some pool workouts with formal physical therapy rather than a public pool situation which is close due to COVID.    I also discussed her situation with Dr. Dubois and felt that a visit back to him just for her.      She is comfortable with this approach.    Room time 25 minutes consultation time 20    Lou Byrne MD  Professor Orthopedic Surgery  [facility]

## 2020-09-02 ENCOUNTER — TELEPHONE (OUTPATIENT)
Dept: ENDOCRINOLOGY | Facility: CLINIC | Age: 65
End: 2020-09-02

## 2020-09-03 DIAGNOSIS — Z96.653 STATUS POST TOTAL BILATERAL KNEE REPLACEMENT: ICD-10-CM

## 2020-09-03 DIAGNOSIS — M54.50 LUMBAR PAIN: Primary | ICD-10-CM

## 2020-09-04 ENCOUNTER — THERAPY VISIT (OUTPATIENT)
Dept: PHYSICAL THERAPY | Facility: CLINIC | Age: 65
End: 2020-09-04
Payer: COMMERCIAL

## 2020-09-04 DIAGNOSIS — M54.41 CHRONIC BILATERAL LOW BACK PAIN WITH RIGHT-SIDED SCIATICA: Primary | ICD-10-CM

## 2020-09-04 DIAGNOSIS — G89.29 CHRONIC BILATERAL LOW BACK PAIN WITH RIGHT-SIDED SCIATICA: Primary | ICD-10-CM

## 2020-09-04 DIAGNOSIS — E11.8 TYPE 2 DIABETES MELLITUS WITH COMPLICATION (H): ICD-10-CM

## 2020-09-04 PROCEDURE — 97110 THERAPEUTIC EXERCISES: CPT | Mod: GP | Performed by: PHYSICAL THERAPIST

## 2020-09-04 PROCEDURE — 97162 PT EVAL MOD COMPLEX 30 MIN: CPT | Mod: GP | Performed by: PHYSICAL THERAPIST

## 2020-09-04 RX ORDER — SEMAGLUTIDE 1.34 MG/ML
1 INJECTION, SOLUTION SUBCUTANEOUS
Qty: 4 PEN | Refills: 11 | Status: SHIPPED | OUTPATIENT
Start: 2020-09-04 | End: 2020-10-14

## 2020-09-04 NOTE — PROGRESS NOTES
[facility] Initial Evaluation  SUBJECTIVE  Radha Baca is a 64 year old female patient presenting to Physical Therapy with the following Primary Symptoms: Right > left sided LBP. She denies having related symptoms spreading distally or proximally from the site of current complaints.      Red flags (present when bold):  Painful cough/sneeze, saddle anaesthesia, severe night pain, peripheral motor deficit, recent bowel/bladder change, balance deficits, gait deficits.     History of symptoms: Patient notes a chronic history of LBP that she has trialed both dry land and aquatic therapy for. She was also being treated earlier this summer for thoracic pain with [facility]. She was recently referred for more pool therapy and an injection after consulting with Dr. Byrne recently. Dr. Dubois will do the injection and wrote the order for dry land PT of the back. This pain is pretty constant, with many uncomfortable positions and not many positions of comfort. Notices intermittent cramping in the bilateral calves and quads especially when sleeping. Denies symptoms of neurogenic claudication. Positive shopping cart sign. Notices that her right knee after the TKA has been even worse, and it sometimes gives her issues more than the back. Also has some concurrent thoracic pain. These pains are described as constant. Pain is rated 4/10 at rest, and 9/10 at worst. Patient describes pain as sharp.     Previous treatment has included pool exercises 3-4x/week, self stretching, Tylenol. Patient notes that prior treatment has resulted in some relief. Pool therapy is best for her back, but the Tylenol just takes the edge off. Recently, back pains are getting worse     Current Symptoms are worsened by: humidity, prolonged sitting, prolonged standing.     Current Symptoms are relieved by self-stretching routine, aquatic exercise.     Patient states that current complaints are affecting sleep pattern, with many  wakings per night when rolling.     Recent surgical procedure: none. History of bilateral TKAs. Patient had order to undergo a L5-S1 TFESI with Dr. Dubois. No date scheduled yet.    Recent imaging studies have not been performed.     General health as reported by patient is: fair. Pertinent medical history: depression, diabetes, HTN, sleep apnea, chronic pain.     She denies any significant current illness or recent hospital admissions. She denies any regonal implanted devices.     Current occupational status: Semi-retired caregiver. Occupational duties include: computer work, driving, lifting/carrying, sitting, standing, pushing/pulling.     Previous functional status: fully functional prior to pain onset/injury.    PATIENT GOALS: To be able to start on a program to help control her LBP better.      OBJECTIVE    STATIC POSTURE: Sits with slouched posture and loss of lumbar lordosis. This is maintained in standing  -This patient was able to remain comfortably seated throughout exam.      TRANSFERS/TRANSITIONAL MOVEMENTS: With reduced speed, limited core stability to allow for stable rolling and sit<>supine without using momentum.      ROM:  Flexion floor   Extension 25% limited*   Sidebend left Knees*   Sidebend right Knees*   Rotation left 50% limited   Rotation right 50% limited   *Denotes pain      SEGMENTAL MOBILITY:  -PA spring test: Patient was Hypomobile through segments L1-L2 to L5-S1. Pain mildly provoked with PA pressure of all segments.    PALPATION: Profoundly TTP at right superior glute max along the lumbogluteal jxn. Some mild tenderness when proximal piriformis is palpated bilaterally near sacral origin.      HIP SCREEN:  -ROM: Hypomobile into all planes of motion  -Other relevant findings: End range hip flexion with some mild provocation of her right sided back pain      Lumbar Special Tests   Left Right   Angel test/Modified Angel test     Straight Leg Raise neg Pos for tension @ 30   Active  Straight Leg Raise     Hamstring 90-90 -15 -15   Piriformis Test     Long Axis Distraction     FADIR     JACQUELYN     Posterior Capsule Compression neg neg   Slump Test neg Pos for mild tension sign   NT=Not Tested        REPEATED MOTIONS:  Motion Response (worse/no worse/better)   FIS    FIS x10 Worse during, no worse after   EIS    EIS x10    DAVID    DAVID x10    Prone lying Worse during, no worse after   Prone on elbows Worse during, no worse after   Prone press-up    Prone press-up x10 Worse during, no worse after       Therapist Assessment: Radha Baca is a 64 year old female patient presenting to Physical Therapy with LBP. Patient demonstrates loss of lumbar ROM, impaired tolerance to gravity-resisted positions, mild neural tension, and some myofascial trigger points among the gluteals. Signs and symptoms are consistent with chronic LBP. Skilled PT services are necessary in order to reduce impairments and improve independent function.    Assessment/Plan:    Patient is a 64 year old female with lumbar complaints.    Patient has the following significant findings with corresponding treatment plan.                Diagnosis 1:  LBP  Pain -  manual therapy, self management, education, directional preference exercise and home program  Decreased ROM/flexibility - manual therapy, therapeutic exercise and home program  Decreased joint mobility - manual therapy, therapeutic exercise and home program  Decreased strength - therapeutic exercise, therapeutic activities and home program  Impaired muscle performance - neuro re-education and home program  Decreased function - therapeutic activities and home program  Impaired posture - neuro re-education and home program    Therapy Evaluation Codes:   1) History comprised of:   Personal factors that impact the plan of care:      Past/current experiences and Time since onset of symptoms.    Comorbidity factors that impact the plan of care are:      Diabetes, Depression and  Sleep disorder/apnea.     Medications impacting care: See chart.  2) Examination of Body Systems comprised of:   Body structures and functions that impact the plan of care:      Lumbar spine.   Activity limitations that impact the plan of care are:      Bending, Driving, Lifting, Sitting, Squatting/kneeling, Walking and Sleeping.  3) Clinical presentation characteristics are:   Evolving/Changing.  4) Decision-Making    Moderate complexity using standardized patient assessment instrument and/or measureable assessment of functional outcome.  Cumulative Therapy Evaluation is: Moderate complexity.    Previous and current functional limitations:  (See Goal Flow Sheet for this information)    Short term and Long term goals: (See Goal Flow Sheet for this information)     Communication ability:  Patient appears to be able to clearly communicate and understand verbal and written communication and follow directions correctly.  Treatment Explanation - The following has been discussed with the patient:   RX ordered/plan of care  Anticipated outcomes  Possible risks and side effects  This patient would benefit from PT intervention to resume normal activities.   Rehab potential is guarded due to chronicity of symptoms and past limited success with dry land therapy.    Frequency:  1 X week, once daily  Duration:  for 4 weeks tapering to 1 X every other week over 4-6 weeks  Discharge Plan:  Achieve all LTG.  Independent in home treatment program.  Reach maximal therapeutic benefit.    Please refer to the daily flowsheet for treatment today, total treatment time and time spent performing 1:1 timed codes.

## 2020-09-09 ENCOUNTER — TELEPHONE (OUTPATIENT)
Dept: ENDOCRINOLOGY | Facility: CLINIC | Age: 65
End: 2020-09-09

## 2020-09-09 DIAGNOSIS — Z11.59 ENCOUNTER FOR SCREENING FOR OTHER VIRAL DISEASES: Primary | ICD-10-CM

## 2020-09-09 NOTE — TELEPHONE ENCOUNTER
Dionicio wants her medications to go to the preferred [facility]  not [facility]. Rasheeda Izaguirre RN on 9/9/2020 at 8:27 AM

## 2020-09-10 ENCOUNTER — THERAPY VISIT (OUTPATIENT)
Dept: PHYSICAL THERAPY | Facility: CLINIC | Age: 65
End: 2020-09-10
Payer: COMMERCIAL

## 2020-09-10 DIAGNOSIS — G89.29 CHRONIC BILATERAL LOW BACK PAIN WITH RIGHT-SIDED SCIATICA: ICD-10-CM

## 2020-09-10 DIAGNOSIS — M54.41 CHRONIC BILATERAL LOW BACK PAIN WITH RIGHT-SIDED SCIATICA: ICD-10-CM

## 2020-09-10 PROCEDURE — 97110 THERAPEUTIC EXERCISES: CPT | Mod: GP | Performed by: PHYSICAL THERAPIST

## 2020-09-10 PROCEDURE — 97140 MANUAL THERAPY 1/> REGIONS: CPT | Mod: GP | Performed by: PHYSICAL THERAPIST

## 2020-09-10 PROCEDURE — 97012 MECHANICAL TRACTION THERAPY: CPT | Mod: GP | Performed by: PHYSICAL THERAPIST

## 2020-09-11 ENCOUNTER — TELEPHONE (OUTPATIENT)
Dept: ORTHOPEDICS | Facility: CLINIC | Age: 65
End: 2020-09-11

## 2020-09-11 NOTE — TELEPHONE ENCOUNTER
Returned patient's call. Discussed Dr. Byrne's recommendation for pool therapy. Patient is checking into [facility]. She has returned to the [facility] to the pool 3-4 times a week. She is also considering regular physical therapy to work on knee ROM. She will update us with her plan.

## 2020-09-21 ENCOUNTER — THERAPY VISIT (OUTPATIENT)
Dept: PHYSICAL THERAPY | Facility: CLINIC | Age: 65
End: 2020-09-21
Payer: COMMERCIAL

## 2020-09-21 DIAGNOSIS — G89.29 CHRONIC BILATERAL LOW BACK PAIN WITH RIGHT-SIDED SCIATICA: ICD-10-CM

## 2020-09-21 DIAGNOSIS — M54.41 CHRONIC BILATERAL LOW BACK PAIN WITH RIGHT-SIDED SCIATICA: ICD-10-CM

## 2020-09-21 PROCEDURE — 97110 THERAPEUTIC EXERCISES: CPT | Mod: GP | Performed by: PHYSICAL THERAPIST

## 2020-09-21 PROCEDURE — 97140 MANUAL THERAPY 1/> REGIONS: CPT | Mod: GP | Performed by: PHYSICAL THERAPIST

## 2020-09-24 ENCOUNTER — ANCILLARY PROCEDURE (OUTPATIENT)
Dept: MRI IMAGING | Facility: CLINIC | Age: 65
End: 2020-09-24
Attending: ORTHOPAEDIC SURGERY
Payer: COMMERCIAL

## 2020-09-24 ENCOUNTER — ANCILLARY PROCEDURE (OUTPATIENT)
Dept: CT IMAGING | Facility: CLINIC | Age: 65
End: 2020-09-24
Attending: ORTHOPAEDIC SURGERY
Payer: COMMERCIAL

## 2020-09-24 DIAGNOSIS — M54.50 LUMBAR PAIN: ICD-10-CM

## 2020-09-25 ENCOUNTER — TELEPHONE (OUTPATIENT)
Dept: ENDOCRINOLOGY | Facility: CLINIC | Age: 65
End: 2020-09-25

## 2020-09-25 NOTE — TELEPHONE ENCOUNTER
Pt called stating she will be in clinic for lab draw Monday 09/28 at 0830 and would like to come to clinic to download meter. Sent to Endo Team: Please contact Pt if anyone will be in clinic that can help her.   Hanh Pickens RN on 9/25/2020 at 11:03 AM

## 2020-09-28 ENCOUNTER — ALLIED HEALTH/NURSE VISIT (OUTPATIENT)
Dept: ENDOCRINOLOGY | Facility: CLINIC | Age: 65
End: 2020-09-28
Payer: COMMERCIAL

## 2020-09-28 ENCOUNTER — THERAPY VISIT (OUTPATIENT)
Dept: PHYSICAL THERAPY | Facility: CLINIC | Age: 65
End: 2020-09-28
Payer: COMMERCIAL

## 2020-09-28 ENCOUNTER — TELEPHONE (OUTPATIENT)
Dept: ORTHOPEDICS | Facility: CLINIC | Age: 65
End: 2020-09-28

## 2020-09-28 VITALS — WEIGHT: 251.7 LBS | BODY MASS INDEX: 44.71 KG/M2

## 2020-09-28 DIAGNOSIS — M54.41 CHRONIC BILATERAL LOW BACK PAIN WITH RIGHT-SIDED SCIATICA: ICD-10-CM

## 2020-09-28 DIAGNOSIS — E11.8 TYPE 2 DIABETES MELLITUS WITH COMPLICATION, WITH LONG-TERM CURRENT USE OF INSULIN (H): ICD-10-CM

## 2020-09-28 DIAGNOSIS — M85.80 OSTEOPENIA, UNSPECIFIED LOCATION: ICD-10-CM

## 2020-09-28 DIAGNOSIS — L65.9 HAIR LOSS: ICD-10-CM

## 2020-09-28 DIAGNOSIS — E11.8 TYPE 2 DIABETES MELLITUS WITH COMPLICATION (H): Primary | ICD-10-CM

## 2020-09-28 DIAGNOSIS — Z79.4 TYPE 2 DIABETES MELLITUS WITH COMPLICATION, WITH LONG-TERM CURRENT USE OF INSULIN (H): ICD-10-CM

## 2020-09-28 DIAGNOSIS — G89.29 CHRONIC BILATERAL LOW BACK PAIN WITH RIGHT-SIDED SCIATICA: ICD-10-CM

## 2020-09-28 LAB
ALBUMIN SERPL-MCNC: 3.7 G/DL (ref 3.4–5)
ALP SERPL-CCNC: 92 U/L (ref 40–150)
BUN SERPL-MCNC: 13 MG/DL (ref 7–30)
CALCIUM SERPL-MCNC: 9.8 MG/DL (ref 8.5–10.1)
CHOLEST SERPL-MCNC: 103 MG/DL
CREAT SERPL-MCNC: 0.66 MG/DL (ref 0.52–1.04)
CREAT UR-MCNC: 50 MG/DL
ERYTHROCYTE [DISTWIDTH] IN BLOOD BY AUTOMATED COUNT: 14.6 % (ref 10–15)
GFR SERPL CREATININE-BSD FRML MDRD: >90 ML/MIN/{1.73_M2}
GLUCOSE SERPL-MCNC: 174 MG/DL (ref 70–99)
HBA1C MFR BLD: 9 % (ref 0–5.6)
HCT VFR BLD AUTO: 36.6 % (ref 35–47)
HDLC SERPL-MCNC: 42 MG/DL
HGB BLD-MCNC: 11.8 G/DL (ref 11.7–15.7)
LDLC SERPL CALC-MCNC: 29 MG/DL
MCH RBC QN AUTO: 29.6 PG (ref 26.5–33)
MCHC RBC AUTO-ENTMCNC: 32.2 G/DL (ref 31.5–36.5)
MCV RBC AUTO: 92 FL (ref 78–100)
MICROALBUMIN UR-MCNC: 10 MG/L
MICROALBUMIN/CREAT UR: 20.52 MG/G CR (ref 0–25)
NONHDLC SERPL-MCNC: 62 MG/DL
PLATELET # BLD AUTO: 257 10E9/L (ref 150–450)
POTASSIUM SERPL-SCNC: 4.5 MMOL/L (ref 3.4–5.3)
RBC # BLD AUTO: 3.98 10E12/L (ref 3.8–5.2)
TRIGL SERPL-MCNC: 165 MG/DL
TSH SERPL DL<=0.005 MIU/L-ACNC: 2.22 MU/L (ref 0.4–4)
VIT B12 SERPL-MCNC: 1014 PG/ML (ref 193–986)
WBC # BLD AUTO: 10.2 10E9/L (ref 4–11)

## 2020-09-28 PROCEDURE — 97530 THERAPEUTIC ACTIVITIES: CPT | Mod: GP | Performed by: PHYSICAL THERAPIST

## 2020-09-28 PROCEDURE — 97140 MANUAL THERAPY 1/> REGIONS: CPT | Mod: GP | Performed by: PHYSICAL THERAPIST

## 2020-09-28 PROCEDURE — 97110 THERAPEUTIC EXERCISES: CPT | Mod: GP | Performed by: PHYSICAL THERAPIST

## 2020-09-28 NOTE — TELEPHONE ENCOUNTER
----- Message from Juancarlos Dubois MD sent at 9/24/2020  3:55 PM CDT -----  Can you offer this patient follow up with me to review her imaging results?  Thanks.  Can be virtual if she prefers.

## 2020-09-29 ENCOUNTER — DOCUMENTATION ONLY (OUTPATIENT)
Dept: SLEEP MEDICINE | Facility: CLINIC | Age: 65
End: 2020-09-29

## 2020-09-29 DIAGNOSIS — G47.33 OSA (OBSTRUCTIVE SLEEP APNEA): Primary | ICD-10-CM

## 2020-09-29 LAB
DEPRECATED CALCIDIOL+CALCIFEROL SERPL-MC: <69 UG/L (ref 20–75)
VITAMIN D2 SERPL-MCNC: <5 UG/L
VITAMIN D3 SERPL-MCNC: 64 UG/L

## 2020-09-29 NOTE — PROGRESS NOTES
09/29/2020: PT REPORTS AIR HUNGER, IS MAXING OUT PRESSURE, HOWEVER AHI IS LOW. SENT MESSAGE/RX TO  TO ADVISE ON POSSIBLE PRESSURE CHANGE.

## 2020-09-30 ENCOUNTER — DOCUMENTATION ONLY (OUTPATIENT)
Dept: SLEEP MEDICINE | Facility: CLINIC | Age: 65
End: 2020-09-30

## 2020-09-30 NOTE — PROGRESS NOTES
PRESSURE CHANGE COMPLETED VIA AIRVIEW AT THE RECOMMENDATION OF . PT HAD REPORTED AIR HUNGER/SHORTNESS OF BREATHE. PT WAS ADVISED TO FOLLOW UP WITH A PROVIDER ASAP IF THEY CONTINUE TO HAVE ANY BREATHING ISSUES.

## 2020-10-01 ENCOUNTER — TELEPHONE (OUTPATIENT)
Dept: ORTHOPEDICS | Facility: CLINIC | Age: 65
End: 2020-10-01
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
## 2020-10-06 ENCOUNTER — VIRTUAL VISIT (OUTPATIENT)
Dept: ENDOCRINOLOGY | Facility: CLINIC | Age: 65
End: 2020-10-06
Payer: COMMERCIAL

## 2020-10-06 DIAGNOSIS — Z79.4 TYPE 2 DIABETES MELLITUS WITH COMPLICATION, WITH LONG-TERM CURRENT USE OF INSULIN (H): ICD-10-CM

## 2020-10-06 DIAGNOSIS — E11.8 TYPE 2 DIABETES MELLITUS WITH COMPLICATION, WITH LONG-TERM CURRENT USE OF INSULIN (H): ICD-10-CM

## 2020-10-06 DIAGNOSIS — E11.8 TYPE 2 DIABETES MELLITUS WITH COMPLICATION (H): ICD-10-CM

## 2020-10-06 PROCEDURE — 99214 OFFICE O/P EST MOD 30 MIN: CPT | Mod: 95 | Performed by: INTERNAL MEDICINE

## 2020-10-06 NOTE — PATIENT INSTRUCTIONS
Increase Lantus 80 units in the morning  Humalog  continue with 1 units for each 5 grams of carbohydrates  Try to take Humalog before eating  Continue Ozempic 0.5 once daily    After receiving back injections with a steroid  - increase Lantus by 10 units x 3 days  - increase Humalog: if above 250: + 8 units with each meal                                    If blood sugar before meal is 200-250 + 5 units with each meal     If blood glucose below 200 continue with usual Humalog dose     -
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
## 2020-10-12 ENCOUNTER — OFFICE VISIT (OUTPATIENT)
Dept: FAMILY MEDICINE | Facility: CLINIC | Age: 65
End: 2020-10-12
Payer: COMMERCIAL

## 2020-10-12 VITALS
TEMPERATURE: 98.9 F | OXYGEN SATURATION: 97 % | SYSTOLIC BLOOD PRESSURE: 117 MMHG | HEART RATE: 84 BPM | DIASTOLIC BLOOD PRESSURE: 74 MMHG

## 2020-10-12 DIAGNOSIS — L25.9 CONTACT DERMATITIS, UNSPECIFIED CONTACT DERMATITIS TYPE, UNSPECIFIED TRIGGER: Primary | ICD-10-CM

## 2020-10-12 PROCEDURE — 99213 OFFICE O/P EST LOW 20 MIN: CPT | Mod: GC | Performed by: STUDENT IN AN ORGANIZED HEALTH CARE EDUCATION/TRAINING PROGRAM

## 2020-10-12 ASSESSMENT — ENCOUNTER SYMPTOMS
SORE THROAT: 0
RHINORRHEA: 0
FEVER: 0

## 2020-10-12 NOTE — PROGRESS NOTES
Dionicio is a 64 year old  who presents for   Bug bite on left ankle    Assessment and Plan       Dionicio is an obese 64 year old female with T2DM who presents with likely a contact dermatitis from either a bug bite or some other irritant. The skin lesion is likely not cellulitis because she has no fever, limited erythema, no pain with palpation, and no increased warmth to the touch.    1. Contact dermatitis  -Continue to use Aloe vera to control itching & pain  -Can take antihistamine such as Allegra if needed or topical hydrocortisone cream  -Follow-up with clinic if the lesion gets worse or becomes painful.    Jose Bradshaw, MS3  [employer]    Resident/Fellow Attestation   I, Wayne Palomares, was present with the medical student who participated in the service and in the documentation of the note.  I have verified the history and personally performed the physical exam and medical decision making.  I agree with the assessment and plan of care as documented in the note.      Wayne Palomares, DO  PGY2         HPI       Dionicio is an obese 64 year old female with past medical history of T2DM who presents with a small lesion on her left leg that she noticed after feeling a sharp pain while at an outdoor concert. The small lesion became itchy later that night and has remained itchy for the past three days. During that time a red area formed adjacent to the small lesion, but has not been warm or painful. Dionicio has had no fever or other systemic symptoms.      Problem, Medication and Allergy Lists were reviewed and updated if needed..  Patient is an established patient of this clinic..  Health Maintenance Due   Topic Date Due     ADVANCE CARE PLANNING  1955     HEPATITIS B IMMUNIZATION (1 of 3 - Risk 3-dose series) 10/16/1973     MEDICARE ANNUAL WELLNESS VISIT  10/16/2019     INFLUENZA VACCINE (1) 09/01/2020     DIABETIC FOOT EXAM  09/16/2020     EYE EXAM  09/27/2020     PHQ-9  11/12/2020           Review of Systems:   Review of Systems   Constitutional: Negative for fever.   HENT: Negative for congestion, postnasal drip, rhinorrhea and sore throat.    Cardiovascular: Positive for leg swelling.   Skin:        Small lesion on right leg, some erythema. Not warm to the touch. Lesion not painful, but very itchy.   Neurological:        Some paresthesias in lower extremities     12 point review of symptoms was otherwise negative except as noted in HPI         Physical Exam:     Physical Exam  Constitutional:       Appearance: Normal appearance. She is obese.   HENT:      Head: Normocephalic and atraumatic.      Right Ear: External ear normal.      Left Ear: External ear normal.      Nose: No congestion or rhinorrhea.   Eyes:      Extraocular Movements: Extraocular movements intact.      Conjunctiva/sclera: Conjunctivae normal.   Musculoskeletal:      Right lower leg: Edema present.      Left lower leg: Edema present.   Skin:     Comments: Small skin lesion on inner left ankle. Some erythema. No warmth, no pain with palpation. No ulcers on the feet. Small cut from clipping nails on left big toe.   Neurological:      Mental Status: She is alert and oriented to person, place, and time.      Sensory: No sensory deficit (sharp and dull sensation intact at distal toes).      Deep Tendon Reflexes: Reflexes normal.      Comments: Vibration sensation intact at lateral malleolus   Psychiatric:         Mood and Affect: Mood normal.         Behavior: Behavior normal.         Thought Content: Thought content normal.         Judgment: Judgment normal.           Results:     No testing ordered today.    Options for treatment and follow-up care were reviewed with the patient. Radha Baca  engaged in the decision making process and verbalized understanding of the options discussed and agreed with the final plan.    Wayne Palomares DO

## 2020-10-13 ENCOUNTER — VIRTUAL VISIT (OUTPATIENT)
Dept: ORTHOPEDICS | Facility: CLINIC | Age: 65
End: 2020-10-13
Payer: COMMERCIAL

## 2020-10-13 DIAGNOSIS — M54.50 LUMBAR PAIN: Primary | ICD-10-CM

## 2020-10-13 PROCEDURE — 99213 OFFICE O/P EST LOW 20 MIN: CPT | Mod: 95 | Performed by: ORTHOPAEDIC SURGERY

## 2020-10-13 NOTE — LETTER
"    10/13/2020         RE: Radha Baca  [address] [id number]        Dear Colleague,    Thank you for referring your patient, Radha Baca, to the [facility] [place]. Please see a copy of my visit note below.    Radha Baca is a 64 year old female who is being evaluated via a billable telephone visit.      The patient has been notified of following:     \"This telephone visit will be conducted via a call between you and your physician/provider. We have found that certain health care needs can be provided without the need for a physical exam.  This service lets us provide the care you need with a short phone conversation.  If a prescription is necessary we can send it directly to your pharmacy.  If lab work is needed we can place an order for that and you can then stop by our lab to have the test done at a later time.    Telephone visits are billed at different rates depending on your insurance coverage. During this emergency period, for some insurers they may be billed the same as an in-person visit.  Please reach out to your insurance provider with any questions.    If during the course of the call the physician/provider feels a telephone visit is not appropriate, you will not be charged for this service.\"    Patient has given verbal consent for Telephone visit?  Yes    What phone number would you like to be contacted at? Home    How would you like to obtain your [place]? [place]    Phone call duration: 19 minutes    Juancarlos Dubois MD    I spoke with Dionicio today and she is having some back pain and still having some moderate back pain and difficulties around her knee replacement, no clear radicular complaints.  She had an updated MRI and CT scan showing lumbar spondylosis, mild neurologic stenosis.      I spoke with her today, nearly 20 minutes, and she had a wide range of questions about social issues, back pain, and knee pain issues. "      Her imaging is largely unchanged from prior years.    We have offered her an injection but she has a number of concerns about about the needle and potential complications of the injection.  Her pain symptoms are somewhat diffuse at this time and given the mild nature of the neurologic compression on her MRI I expressed that I do not feel strongly she has to pursue an injection.    She had a number of intelligent questions about chiropractic and acupuncture oral care and other nonoperative options and answered these to best of my ability.    She also noted she was previously following with a pain doctor she liked but that  Had left the clinic and she does not currently have pain follow-up.  I agreed to place a new referral for this.    Given her current symptoms and imaging findings we agreed to pursue nonoperative management at this time.  If she has additional questions she will reach out to me.  I told her the main benefit of surgery would be improvement in radicular leg complaints, and does not clear that she has radicular pain at this time so we agreed not to pursue any surgical interventions.    Sincerely,        Juancarlos Dubois MD

## 2020-10-13 NOTE — PROGRESS NOTES
"Radha Baca is a 64 year old female who is being evaluated via a billable telephone visit.      The patient has been notified of following:     \"This telephone visit will be conducted via a call between you and your physician/provider. We have found that certain health care needs can be provided without the need for a physical exam.  This service lets us provide the care you need with a short phone conversation.  If a prescription is necessary we can send it directly to your pharmacy.  If lab work is needed we can place an order for that and you can then stop by our lab to have the test done at a later time.    Telephone visits are billed at different rates depending on your insurance coverage. During this emergency period, for some insurers they may be billed the same as an in-person visit.  Please reach out to your insurance provider with any questions.    If during the course of the call the physician/provider feels a telephone visit is not appropriate, you will not be charged for this service.\"    Patient has given verbal consent for Telephone visit?  Yes    What phone number would you like to be contacted at? Home    How would you like to obtain your [place]? [place]    Phone call duration: 19 minutes    Juancarlos Dubois MD    I spoke with Dionicio today and she is having some back pain and still having some moderate back pain and difficulties around her knee replacement, no clear radicular complaints.  She had an updated MRI and CT scan showing lumbar spondylosis, mild neurologic stenosis.      I spoke with her today, nearly 20 minutes, and she had a wide range of questions about social issues, back pain, and knee pain issues.      Her imaging is largely unchanged from prior years.    We have offered her an injection but she has a number of concerns about about the needle and potential complications of the injection.  Her pain symptoms are somewhat diffuse at this time and given the mild nature of the " neurologic compression on her MRI I expressed that I do not feel strongly she has to pursue an injection.    She had a number of intelligent questions about chiropractic and acupuncture oral care and other nonoperative options and answered these to best of my ability.    She also noted she was previously following with a pain doctor she liked but that  Had left the clinic and she does not currently have pain follow-up.  I agreed to place a new referral for this.    Given her current symptoms and imaging findings we agreed to pursue nonoperative management at this time.  If she has additional questions she will reach out to me.  I told her the main benefit of surgery would be improvement in radicular leg complaints, and does not clear that she has radicular pain at this time so we agreed not to pursue any surgical interventions.

## 2020-10-13 NOTE — NURSING NOTE
Reason For Visit:   Chief Complaint   Patient presents with     RECHECK     review imaging results from earlier in september lumbar MRI and CT       There were no vitals taken for this visit.         Omid Clinton ATC

## 2020-10-14 RX ORDER — SEMAGLUTIDE 1.34 MG/ML
0.5 INJECTION, SOLUTION SUBCUTANEOUS
Qty: 4 PEN | Refills: 11
Start: 2020-10-14 | End: 2021-01-06

## 2020-10-14 NOTE — PATIENT INSTRUCTIONS
Continue to use aloe vera as needed for itching.  Can also use antihistamine, such as allegra, to control itching.  If the redness worsens, or you develop pain or fever, please call clinic.    Stay well!    Dr. Palomares

## 2020-10-15 ENCOUNTER — TELEPHONE (OUTPATIENT)
Dept: SLEEP MEDICINE | Facility: CLINIC | Age: 65
End: 2020-10-15

## 2020-10-15 NOTE — TELEPHONE ENCOUNTER
PT HAD QUESTIONS ABOUT THE LAST TIME SHE RECEIVED HEADGEAR. PT STATED THAT SHE DID NOT RECEIVE ANY WITH THE RECENT ORDER. LOOKING AT OUR RECORDS THE PT RECEIVED HEADGEAR 06/15/2020 AND IS NOT ELIGIBLE UNTIL 12/15/2020. PT UNDERSTOOD AND STATED THAT SHE MUST NOT HAVE WRITTEN ON THE HEADGEAR AS USUAL.

## 2020-11-07 ENCOUNTER — HEALTH MAINTENANCE LETTER (OUTPATIENT)
Age: 65
End: 2020-11-07

## 2020-11-18 DIAGNOSIS — E11.8 TYPE 2 DIABETES MELLITUS WITH COMPLICATION, WITH LONG-TERM CURRENT USE OF INSULIN (H): Primary | ICD-10-CM

## 2020-11-18 DIAGNOSIS — Z79.4 TYPE 2 DIABETES MELLITUS WITH COMPLICATION, WITH LONG-TERM CURRENT USE OF INSULIN (H): Primary | ICD-10-CM

## 2020-11-18 RX ORDER — SEMAGLUTIDE 1.34 MG/ML
INJECTION, SOLUTION SUBCUTANEOUS
Qty: 4.5 ML | Refills: 3 | Status: SHIPPED | OUTPATIENT
Start: 2020-11-18 | End: 2021-04-26

## 2020-11-19 ENCOUNTER — ANCILLARY PROCEDURE (OUTPATIENT)
Dept: MAMMOGRAPHY | Facility: CLINIC | Age: 65
End: 2020-11-19
Payer: COMMERCIAL

## 2020-11-19 ENCOUNTER — OFFICE VISIT (OUTPATIENT)
Dept: OBGYN | Facility: CLINIC | Age: 65
End: 2020-11-19
Attending: FAMILY MEDICINE
Payer: COMMERCIAL

## 2020-11-19 VITALS
BODY MASS INDEX: 43.36 KG/M2 | HEART RATE: 84 BPM | HEIGHT: 63 IN | DIASTOLIC BLOOD PRESSURE: 76 MMHG | SYSTOLIC BLOOD PRESSURE: 137 MMHG | WEIGHT: 244.7 LBS

## 2020-11-19 DIAGNOSIS — Z01.419 ENCOUNTER FOR GYNECOLOGICAL EXAMINATION WITHOUT ABNORMAL FINDING: Primary | ICD-10-CM

## 2020-11-19 DIAGNOSIS — Z12.31 VISIT FOR SCREENING MAMMOGRAM: ICD-10-CM

## 2020-11-19 PROCEDURE — 77067 SCR MAMMO BI INCL CAD: CPT | Performed by: RADIOLOGY

## 2020-11-19 PROCEDURE — G0101 CA SCREEN;PELVIC/BREAST EXAM: HCPCS | Performed by: NURSE PRACTITIONER

## 2020-11-19 PROCEDURE — 77063 BREAST TOMOSYNTHESIS BI: CPT | Performed by: RADIOLOGY

## 2020-11-19 ASSESSMENT — MIFFLIN-ST. JEOR: SCORE: 1617.65

## 2020-11-19 NOTE — LETTER
"2020       RE: Radha Baca   [facility] [address]     Dear Colleague,    Thank you for referring your patient, Radha Baca, to the [facility] [place] at [facility]. Please see a copy of my visit note below.      Progress Note    SUBJECTIVE:  Radha Baca is an 65 year old  , who requests a breast and pelvic exam.  Radha's medical history is significant for: HTN, obesity, depressive disorder, hypercholesteremia, and diabetes    Patient is followed by OMKAR Moreno for primary care.    Concerns today include: none    ObGyn Care:   - Menopause at age 37; denies any vaginal bleeding   - Last pap smear: 2019 NIL, HPV negative               - No known hx of abnormal pap smears; pt has completed adequate screening and may stop pap smears, in accordance with ASCCP guidelines  - Not currently sexually active; denies sexual concerns. Pt and her  are intimate in other ways.  - Last mammogram: 2019 BI RADS 1 (negative); pt is scheduled for a mammogram following this appointment.    Mental health: reports high stress level as she is still taking care of her mother and ; still has family issues and \"everyone is estranged\"; she is the primary caregiver for her mother who is currently living with her sister.  Seeks care from psychologist.  Goes to a [employer] group and is taking a meditation class.  her and her mother have been doing yamileth chi.  Finds it helpful to do water aerobics, and is feeling down that gyms will be closing tomorrow due to COVID-19. Reports good relationship with her .     ROS: negative for breast or pelvic concerns/ symptoms other than vaginal dryness. Pt has not tried anything to relieve this.     HISTORY:  Prescription Medications as of 2020       Rx Number Disp Refills Start End Last Dispensed Date Next Fill Date Owning Pharmacy    Acetaminophen " (TYLENOL PO)            Sig: Take by mouth as needed for mild pain or fever    Class: Historical    Route: Oral    amoxicillin (AMOXIL) 500 MG capsule  4 capsule 4 2020    [id number] - [place], [place] - [address] AT [facility] OF [facility] [address]    Sig: TAKE 4 CAPSULES BY MOUTH ONE HOUR BEFORE DENTAL APPOINTMENT    Class: E-Prescribe    Ascorbic Acid (VITAMIN C PO)            Sig: Take 2,000 mg by mouth 2 times daily     Class: Historical    Route: Oral    aspirin 81 MG tablet  90 tablet 3 10/24/2014    [id number] - [facility] [address] AT [facility] OF [facility] [address]    Si tab daily    Class: E-Prescribe    atorvastatin (LIPITOR) 20 MG tablet  90 tablet 3 3/11/2020    [facility] [address]    Sig: Take 1 tablet (20 mg) by mouth daily DX E78.5 ID # [id number]    Class: E-Prescribe    Route: Oral    B Complex Vitamins (VITAMIN  B COMPLEX) CAPS            Sig: Take 1 tablet by mouth daily     Class: Historical    Route: Oral    blood glucose (ONETOUCH VERIO IQ) test strip  360 strip 3 3/11/2020    [facility] [address]    Sig: Use to test blood sugar 4 times daily or as directed DX E11.8 ID # [id number] has meter already    Class: E-Prescribe    calcium-vitamin D (CALTRATE) 600-400 MG-UNIT per tablet    2018        Sig: Take 1 tablet by mouth 2 times daily    Class: Historical    Route: Oral    cholecalciferol (VITAMIN D) 1000 UNIT tablet  100 tablet 3 2013        Sig: Take 1 tablet by mouth daily.    Class: Historical    Route: Oral    cycloSPORINE (RESTASIS) 0.05 % ophthalmic emulsion    2017        Class: Historical    HUMALOG KWIKPEN 100 UNIT/ML soln  75 mL 3 3/11/2020    [facility] [address]    Sig: Carb counting with meals approx 70-80 units daily subcutaneously DX E 11.8 ID # [id number] needs  refrigeration    Class: E-Prescribe    insulin glargine (LANTUS SOLOSTAR) 100 UNIT/ML pen  75 mL 3 10/6/2020    [facility] [address]    Sig: Inject 80 units SQ each am. DX E11.8 ID # [id number] needs refrigeration    Class: E-Prescribe    Notes to Pharmacy: If Lantus is not covered by insurance, may substitute Basaglar at same dose and frequency.      insulin pen needle (B-D U/F) 31G X 8 MM miscellaneous  400 each 3 3/23/2020    [facility] [address]    Sig: Use  4 daily short 31 G X 8MM    Class: E-Prescribe    Cosign for Ordering: Accepted by Keturah De Luna MD on 3/23/2020 12:10 PM    latanoprost (XALATAN) 0.005 % ophthalmic solution            Sig: Place 1 drop into both eyes At Bedtime    Class: Historical    Route: Both Eyes    lisinopril-hydrochlorothiazide (ZESTORETIC) 20-12.5 MG tablet  90 tablet 3 3/11/2020    [facility] [address]    Sig: Take 1 tablet by mouth daily DX  I10 ID # [id number]    Class: E-Prescribe    Route: Oral    metFORMIN (GLUCOPHAGE) 500 MG tablet  360 tablet 3 6/23/2020    [facility] [address]    Sig: Take 2 tablets (1,000 mg) by mouth 2 times daily (with meals)    Class: E-Prescribe    Route: Oral    Cosign for Ordering: Accepted by Keturah De Luna MD on 6/23/2020  1:57 PM    Multiple Vitamin (MULTIVITAMIN  S) CAPS  90 capsule 3 8/12/2014    [facility][id number] - [place], [place] - [address] AT [facility] OF [address]    Sig: Take 1 daily    Class: E-Prescribe    Multiple Vitamins-Minerals (EYE-ZAKIYA PLUS LUTEIN PO)            Class: Historical    Route: Oral    Omega-3 Fatty Acids (OMEGA-3 FISH OIL PO)            Sig: Take 1,000 mg by mouth daily    Class: Historical    Route: Oral    OneTouch Delica Lancets 33G MISC  360 each 3 3/11/2020    [facility]  [address]    Si Box 4 times daily Test  Blood glucose 4 times daily  DX E11.8  ID # [id number]    Class: E-Prescribe    Route: Does not apply    order for DME  1 Units 1 2017    [id number] - [place], [place] - [address] AT [facility] OF [facility] & [address]    Sig: Equipment being ordered: Grade 1 (light) compression stockings, below the knee    Class: Local Print    semaglutide (OZEMPIC, 1 MG/DOSE,) 2 MG/1.5ML pen  4 pen 11 10/14/2020    [facility] [address]    Sig: Inject 0.5 mg Subcutaneous every 7 days    Class: No Print Out    Route: Subcutaneous    Semaglutide,0.25 or 0.5MG/DOS, (OZEMPIC, 0.25 OR 0.5 MG/DOSE,) 2 MG/1.5ML SOPN  4.5 mL 3 2020    [facility] [address]    Sig: Inject  0.5 mg  Subcutaneous every 7 days    Class: E-Prescribe    Cosign for Ordering: Accepted by Keturah De Luna MD on 2020  8:02 AM    timolol (TIMOPTIC) 0.5 % ophthalmic solution            Sig: Place 1 drop into both eyes 2 times daily     Class: Historical    Route: Both Eyes    triamcinolone (KENALOG) 0.1 % ointment  80 g 11 2018    [id number] - [place], [place] - [address] AT [address] & [address]    Sig: Apply topically 2 times daily    Class: E-Prescribe    Notes to Pharmacy: Hold please - patient will call for refill    Route: Topical        Allergies   Allergen Reactions     Nkda [No Known Drug Allergies]      Immunization History   Administered Date(s) Administered     Flu, Unspecified 1998, 2011     Influenza (H1N1) 12/15/2009     Influenza (High Dose) 3 valent vaccine 10/13/2015, 10/17/2019     Influenza (IIV3) PF 10/18/1999, 2000, 10/22/2002, 2003, 10/19/2004, 10/16/2006, 10/16/2007, 10/28/2008, 2009, 10/13/2010, 2011, 2012, 2013     Influenza Vaccine IM > 6 months Valent IIV4 2017,  2018     Influenza Vaccine, 6+MO IM (QUADRIVALENT W/PRESERVATIVES) 2014, 10/18/2016     Mantoux Tuberculin Skin Test 2011, 2013     Pneumo Conj 13-V (2010&after) 2019     Pneumococcal 23 valent 2000, 10/16/2006     TD (ADULT, 7+) 10/22/2002     TDAP Vaccine (Boostrix) 2013     Zoster vaccine recombinant adjuvanted (SHINGRIX) 2019, 10/21/2019     Zoster vaccine, live 11/15/2017       OB History    Para Term  AB Living   3 0 0 0 3 0   SAB TAB Ectopic Multiple Live Births   3 0 0 0 0     Past Medical History:   Diagnosis Date     Abnormal Pap smear      Anxiety      Arthritis      Arthritis of knee 2013     Arthritis of shoulder region, right 2014     Back injury      Breast disorder      Chronic constipation      Chronic diarrhea      Depressive disorder      Diabetes (H)      Fecal incontinence      Finger pain 2015     Fracture broke L 5th pinky finger due to fall  2015     Glaucoma (increased eye pressure)      Head injury 2016     Headache(784.0)     decreased with mouth guard use.     History of blood transfusion 2011 & 2013     History of diabetes mellitus      Hypercholesteremia      Hypertension      Low back pain      Menarche age 9+    cycles q mo x 4-5 d     Neck injuries      Nonsenile cataract IO implants: L-2013; R-2011     Pain in knee joint     LEFT     Right bundle branch block     per H/P     SNHL (sensorineural hearing loss)      Umbilical hernia without mention of obstruction or gangrene 2014     Vision disorder Detached Retina 10/2009     Past Surgical History:   Procedure Laterality Date     ARTHROPLASTY KNEE  2013    Left Total Knee Arthroplasty;  Surgeon: Lou Byrne MD;  [facility]     ARTHROPLASTY MINIMALLY INVASIVE KNEE  2011    R knee :ODALIS CAITLYN ANDRA, Left age 14     COLONOSCOPY  2015     DENTAL SURGERY      root canals, wisdom teeth     EXAM UNDER ANESTHESIA,  MANIPULATE JOINT (LOCATION)  10/5/2012    Procedure: EXAM UNDER ANESTHESIA, MANIPULATE JOINT (LOCATION);  Right Knee Mini Open Lysis Of Adhesions, Left Knee Steroid Injection  ;  Surgeon: Lou Byrne MD;  [facility]: [facility]     [facility] OR OCULAR DEVICE INTRAOP DETACHED RETINA  2009    R     [facility] PHAKIC IOL - IMPLANT FROM SURGEON      right     KNEE SURGERY  1969    left     LASER YAG CAPSULOTOMY  12/2016    left     VITRECTOMY PARSPLANA  2010     Family History   Problem Relation Age of Onset     Diabetes Father 55        DM II     Diabetes Brother 50        xs 2     Hypertension Sister 41        also B and M     Cancer Maternal Aunt         multiple myeloma     Hypertension Mother 79     Osteoporosis Mother      Memory loss Mother      Glaucoma Mother      Diabetes Brother      Hypertension Brother      Heart Murmur Brother      Glaucoma Brother      Breast Cancer Cousin      Thyroid Disease Sister         Graves     Cerebrovascular Disease Maternal Grandmother      Other - See Comments Sister         16 minths head injury     Other - See Comments Brother         MVA age 36     Cancer - colorectal No family hx of      Prostate Cancer No family hx of      Alcohol/Drug No family hx of      Melanoma No family hx of      Skin Cancer No family hx of      Social History     Socioeconomic History     Marital status:      Spouse name: None     Number of children: None     Years of education: None     Highest education level: None   Occupational History     Occupation: Human Resources     Comment: between jobs now   Social Needs     Financial resource strain: None     Food insecurity     Worry: None     Inability: None     Transportation needs     Medical: None     Non-medical: None   Tobacco Use     Smoking status: Former Smoker     Packs/day: 0.00     Smokeless tobacco: Never Used     Tobacco comment: quit mid 1980s   Substance and Sexual Activity     Alcohol use: No     Drug use: No     Sexual activity: Yes      "Partners: Male     Birth control/protection: Post-menopausal   Lifestyle     Physical activity     Days per week: None     Minutes per session: None     Stress: None   Relationships     Social connections     Talks on phone: None     Gets together: None     Attends [employer] service: None     Active member of club or organization: None     Attends meetings of clubs or organizations: None     Relationship status: None     Intimate partner violence     Fear of current or ex partner: None     Emotionally abused: None     Physically abused: None     Forced sexual activity: None   Other Topics Concern      Service Not Asked     Blood Transfusions Yes     Comment: 2 units 2011     Caffeine Concern No     Comment: 2s     Occupational Exposure No     Hobby Hazards No     Sleep Concern No     Stress Concern No     Comment: rehab for R knee after replacement     Weight Concern Yes     Comment: working on wt loss     Special Diet Yes     Comment: Diabetic     Back Care No     Exercise No     Comment: water aerobics 35-40' 3-4 d & strength 3d/wk     Bike Helmet No     Seat Belt No     Self-Exams Not Asked     Parent/sibling w/ CABG, MI or angioplasty before 65F 55M? Not Asked   Social History Narrative    How much exercise per week? 3-4x swimming, aerobics    How much calcium per day? Supplement       How much caffeine per day? 2 cups coffee/ 1-2 can of diet soda    How much vitamin D per day? Supplement    Do you/your family wear seatbelts?  Yes    Do you/your family use safety helmets? No    Do you/your family use sunscreen? No    Do you/your family keep firearms in the home? No    Do you/your family have a smoke detector(s)? Yes    Do you feel safe in your home? Yes    Has anyone ever touched you in an unwanted manner? No     Explain     See LEA Berman 11/26/2014    Reviewed Raul DEGROOT MA 11/22/2017       ROS    EXAM:  Blood pressure 137/76, pulse 84, height 1.598 m (5' 2.91\"), weight 111 kg (244 lb 11.2 oz), not " currently breastfeeding. Body mass index is 43.47 kg/m .  General appearance: Pleasant female in no acute distress.     BREAST EXAM:  Breast: Without visible skin changes. No dimpling or lesions seen.   Breasts supple, non-tender with palpation, no dominant mass, nodularity, or nipple discharge noted bilaterally. Axillary nodes negative.      PELVIC EXAM:  EG/BUS: Normal genital architecture without lesions, erythema or abnormal secretions; Bartholin's, Urethra, [place]'s normal   Urethral meatus: normal   Urethra: no masses, tenderness, or scarring     ASSESSMENT:  Encounter Diagnosis   Name Primary?     Encounter for gynecological examination without abnormal finding Yes      65 year old Female Pelvic and Breast Exam    PLAN:   Orders Placed This Encounter   Procedures     Pelvic and Breast Exam Procedure []   No further pap smear indicated.  Completed mammogram, scheduled for today.  Continue care with PCP and psychologist.  Return in one year/PRN for preventive care or problems/concerns.     Verbalized understanding and agreement with visit plan.    Mayra Medel, DNP, APRN, WHNP
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
## 2020-11-19 NOTE — PROGRESS NOTES
Annual Exam   Pelvic + Breast    Concerns today:     Menstrual History:   Menopause at age 37  Vaginal bleeding?      PAP: 11/2019 - NIL + HPV negative   **may stop pap smears following ASCCP guidelines.     Sexual History:   Social History:     Mental Health:     Diet/Exercise   Lipid Panel: 9/2020    Mammogram: 11/12/2019 BI RADS 1 (negative), scheduled today

## 2020-12-01 ENCOUNTER — OFFICE VISIT (OUTPATIENT)
Dept: ORTHOPEDICS | Facility: CLINIC | Age: 65
End: 2020-12-01
Payer: COMMERCIAL

## 2020-12-01 DIAGNOSIS — E11.49 TYPE II OR UNSPECIFIED TYPE DIABETES MELLITUS WITH NEUROLOGICAL MANIFESTATIONS, UNCONTROLLED(250.62) (H): ICD-10-CM

## 2020-12-01 DIAGNOSIS — B35.1 OM (ONYCHOMYCOSIS): Primary | ICD-10-CM

## 2020-12-01 DIAGNOSIS — E11.65 TYPE II OR UNSPECIFIED TYPE DIABETES MELLITUS WITH NEUROLOGICAL MANIFESTATIONS, UNCONTROLLED(250.62) (H): ICD-10-CM

## 2020-12-01 PROCEDURE — 99213 OFFICE O/P EST LOW 20 MIN: CPT | Performed by: PODIATRIST

## 2020-12-01 ASSESSMENT — PATIENT HEALTH QUESTIONNAIRE - PHQ9: SUM OF ALL RESPONSES TO PHQ QUESTIONS 1-9: 6

## 2020-12-01 NOTE — LETTER
12/1/2020         RE: Radha Baca  [address] [facility] [address] [facility] [address]        Dear Colleague,    Thank you for referring your patient, Radha Baca, to the [facility] [place]. Please see a copy of my visit note below.    Chief Complaint   Patient presents with     Follow Up     4 month follow up.           Allergies   Allergen Reactions     Nkda [No Known Drug Allergies]          Subjective: Radha is a 63 year old female who presents to the clinic today for a diabetic foot exam and management. Relates that she has no new LE complaints today. She does have diabetic shoes.      Objective  Hemoglobin A1C   Date Value Ref Range Status   09/28/2020 9.0 (H) 0 - 5.6 % Final     Comment:     Normal <5.7% Prediabetes 5.7-6.4%  Diabetes 6.5% or higher - adopted from ADA   consensus guidelines.     03/02/2020 10.2 (H) 0 - 5.6 % Final     Comment:     Normal <5.7% Prediabetes 5.7-6.4%  Diabetes 6.5% or higher - adopted from ADA   consensus guidelines.     08/06/2019 10.2 (H) 0 - 5.6 % Final     Comment:     Normal <5.7% Prediabetes 5.7-6.4%  Diabetes 6.5% or higher - adopted from ADA   consensus guidelines.     01/18/2018 9.6 (A) 4.3 - 6 % Final   10/18/2017 9.5 (A) 4.3 - 6 % Final   07/19/2017 9.7 (A) 4.3 - 6 % Final         PT and DP pulses are not palpable bilaterally. CRT is 4 seconds. Diminished pedal hair.   Gross sensation is diminished, as well as protective sensation bilaterally.   Equinus is noted bilaterally. No pain with active or passive ROM of the ankle, MTJ, 1st ray, or halluces bilaterally,.   Nails thickened, brittle, discolored, with subungual debris bilaterally. No open lesions are noted. Xerosis noted BL.      Assessment: Dm2 with neuropathy.  Onychomycosis. She would like treatment for this. She has had liver enzymes that were the higher end of normal. Because of the thickness of her nails, she has a lower cure rate. I do no think the risk of  increased liver enzymes outweighs the success rate.   Xerosis     Plan:  - Pt seen and evaluated.  - Nails debrided x 10.  - See again in 4 months.      Jerrell Austin DPM
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
## 2020-12-01 NOTE — NURSING NOTE
Reason For Visit:   Chief Complaint   Patient presents with     Follow Up     4 month follow up.        Pain Assessment  Patient Currently in Pain: Yes  Primary Pain Location: Foot(Right great toe.)        Allergies   Allergen Reactions     Nkda [No Known Drug Allergies]            Екатерина Lundy LPN

## 2020-12-01 NOTE — PROGRESS NOTES
Chief Complaint   Patient presents with     Follow Up     4 month follow up.           Allergies   Allergen Reactions     Nkda [No Known Drug Allergies]          Subjective: Radha is a 63 year old female who presents to the clinic today for a diabetic foot exam and management. Relates that she has no new LE complaints today. She does have diabetic shoes.      Objective  Hemoglobin A1C   Date Value Ref Range Status   09/28/2020 9.0 (H) 0 - 5.6 % Final     Comment:     Normal <5.7% Prediabetes 5.7-6.4%  Diabetes 6.5% or higher - adopted from ADA   consensus guidelines.     03/02/2020 10.2 (H) 0 - 5.6 % Final     Comment:     Normal <5.7% Prediabetes 5.7-6.4%  Diabetes 6.5% or higher - adopted from ADA   consensus guidelines.     08/06/2019 10.2 (H) 0 - 5.6 % Final     Comment:     Normal <5.7% Prediabetes 5.7-6.4%  Diabetes 6.5% or higher - adopted from ADA   consensus guidelines.     01/18/2018 9.6 (A) 4.3 - 6 % Final   10/18/2017 9.5 (A) 4.3 - 6 % Final   07/19/2017 9.7 (A) 4.3 - 6 % Final         PT and DP pulses are not palpable bilaterally. CRT is 4 seconds. Diminished pedal hair.   Gross sensation is diminished, as well as protective sensation bilaterally.   Equinus is noted bilaterally. No pain with active or passive ROM of the ankle, MTJ, 1st ray, or halluces bilaterally,.   Nails thickened, brittle, discolored, with subungual debris bilaterally. No open lesions are noted. Xerosis noted BL.      Assessment: Dm2 with neuropathy.  Onychomycosis. She would like treatment for this. She has had liver enzymes that were the higher end of normal. Because of the thickness of her nails, she has a lower cure rate. I do no think the risk of increased liver enzymes outweighs the success rate.   Xerosis     Plan:  - Pt seen and evaluated.  - Nails debrided x 10.  - See again in 4 months.

## 2020-12-03 ENCOUNTER — OFFICE VISIT (OUTPATIENT)
Dept: ANESTHESIOLOGY | Facility: CLINIC | Age: 65
End: 2020-12-03
Attending: ORTHOPAEDIC SURGERY
Payer: COMMERCIAL

## 2020-12-03 ENCOUNTER — TELEPHONE (OUTPATIENT)
Dept: ANESTHESIOLOGY | Facility: CLINIC | Age: 65
End: 2020-12-03

## 2020-12-03 VITALS
HEIGHT: 63 IN | BODY MASS INDEX: 43.48 KG/M2 | SYSTOLIC BLOOD PRESSURE: 122 MMHG | DIASTOLIC BLOOD PRESSURE: 54 MMHG | RESPIRATION RATE: 16 BRPM | HEART RATE: 101 BPM

## 2020-12-03 DIAGNOSIS — M79.18 MYOFASCIAL PAIN: ICD-10-CM

## 2020-12-03 DIAGNOSIS — M19.049 HAND ARTHRITIS: Primary | ICD-10-CM

## 2020-12-03 DIAGNOSIS — M54.50 LUMBAR PAIN: ICD-10-CM

## 2020-12-03 DIAGNOSIS — M47.816 LUMBAR SPONDYLOSIS: ICD-10-CM

## 2020-12-03 PROCEDURE — 99204 OFFICE O/P NEW MOD 45 MIN: CPT | Performed by: ANESTHESIOLOGY

## 2020-12-03 ASSESSMENT — PAIN SCALES - GENERAL: PAINLEVEL: EXTREME PAIN (8)

## 2020-12-03 NOTE — TELEPHONE ENCOUNTER
Prior Authorization Approval        Authorization Effective Date: 11/3/2020  Authorization Expiration Date: 12/3/2021  Medication: diclofenac (VOLTAREN) 1 % topical gel-PA APPROVED   Approved Dose/Quantity:   Reference #: CASE # [id number]   Insurance Company: [employer] - Phone [phone] Fax [phone]  Expected CoPay:       CoPay Card Available:      Foundation Assistance Needed:    Which Pharmacy is filling the prescription (Not needed for infusion/clinic administered): [id number] - [facility] [address] AT [facility] [address]  Pharmacy Notified: Yes- **Instructed pharmacy to notify patient when script is ready to /ship.**  Patient Notified: Yes

## 2020-12-03 NOTE — PATIENT INSTRUCTIONS
Medications:    Diclofenac gel- Apply 2 g topically 4 times daily.    Please provide the clinic with a minium of 1 week notice, on all prescription refills.     Referrals:     Pain Physical Therapy Referral placed-   Call [phone] to schedule your appointment.     Acupuncture referral placed with the [place] [facility]-   Call to schedule [phone].  -Please call your insurance provider to find out about acupuncture coverage, being that not all policies cover acupuncture services.    Chiropractic referral placed with the [place] for [facility]-   Call to schedule [phone].  -Please call your insurance provider to find out about Chiropractic services, being that not all policies cover this therapy.       Recommended Follow up:      3-6 months with Dr. Wilcox.        Please call [phone], option #1 to schedule your clinic appointment if you don't already have an appointment scheduled.    To speak with a nurse, schedule/reschedule/cancel a clinic appointment, or request a medication refill call: [phone], option #1.    You can also reach us by [employer]: [email or web address]

## 2020-12-03 NOTE — TELEPHONE ENCOUNTER
Prior Authorization Retail Medication Request    Medication/Dose: diclofenac (VOLTAREN) 1 % topical gel  ICD code (if different than what is on RX):    Previously Tried and Failed:    Rationale:      Insurance Name:    Insurance ID:        Pharmacy Information (if different than what is on RX)  Name:    Phone:

## 2020-12-03 NOTE — PROGRESS NOTES
VISIT RECOMMENDATIONS:  1. Trial 1% diclofenac gel QID to bilateral hands for bilateral hand pain secondary to osteoarthritis    Referral placed for chiropractic evaluation and treatment for low back pain    Referral placed for acupuncture evaluation and treatment for low back pain    Referral placed for pain PT for evaluation of central and peripheral sensitization and myofascial pain and treatments involving fear avoidance, pacing, mobilization, and other indicated modalities.    Additional medications and interventions can be considered in the future, however the patient wishes to pursue conservative management at this time    COMPREHENSIVE PAIN CLINIC INITIAL EVALUATION    I had the pleasure of meeting Ms. Radha Baca on 12/3/2020 in the [facility] in consult for Dr. Dubois with regards to her low back pain.    Subjective:  The patient is a 65 year old female with past medical history of HTN, obesity, GHISLAINE, depression, daibetes who presents for evaluation of low back pain.  The patient's pain began 10 years ago and has been slowly progressing since that time.  She reports that her pain is located primarily in her lower back with radiation down the posterior thighs, primarily on the right side. The patient describes the pain as a sharp burning sensation.  She reports that the pain is made worse by standing upright or prolonged sitting.  Her pain is improved with leaning forward and water therapy.  In addition, she reports intermittent calf cramping bilaterally and tightness in her quadriceps as well as mild tingling associated with the pain, which she feels is due to her knee replacement surgery. She rates her average pain score at 4-5/10, but it  as severe as 9/10.     She endorses some problems with balance and constipation. She denies new problems with bowel or bladder incontinence, any night sweats or unexplained fevers, or any sudden or unexpected weight loss.     Radha Baca has  been seen at a pain clinic in the past, over 3 years ago.    Patient reported symptoms:  Patient Supplied Answers To the [facility] Pain Questionnaire  [facility] -  Patient Entered Questionnaire/Answers 11/27/2018   What number best describes your pain right now:  0 = No pain  to  10 = Worst pain imaginable 7   Which of the following worsen your pain? lying down, standing, sitting, walking, relaxation   Which of the following improve or reduce your pain?  lying down, standing, sitting, walking, exercise, relaxation   What number best describes your average pain for the past week:  0 = No pain  to  10 = Worst pain imaginable 8   What number best describes your LOWEST pain in past 24 hours:  0 = No pain  to  10 = Worst pain imaginable 5   What number best describes your WORST pain in past 24 hours:  0 = No pain  to  10 = Worst pain imaginable 7   When is your pain worst? Constant   What non-medicine treatments have you already had for your pain? physical therapy, exercise, other   Have you tried treating your pain with medication?  No   Are you currently taking medications for your pain? Yes     Current treatments:  PT, pool therapy, acetaminophen occasionally    Previous medication treatments included:  Anti-convulsants: not tried  Muscle relaxors: not tried  Anti-depressants: not tried  Acetaminophen/NSAIDs: Acetaminophen currently  Topicals: not tried  Headache abortives: not tried  Headache prophylactics: not tried  Opioids: not tried    Other treatments have included:  Physical therapy: Pool therapy, beneficial  Pain Psychology: not tried  Chiropractic: not tried  Acupuncture: not tried  TENs Unit: not tried  Injections: not tried  Surgeries: not tried    Past Medical History:  Medical history reviewed.   Past Medical History:   Diagnosis Date     Abnormal Pap smear      Anxiety      Arthritis      Arthritis of knee 4/4/2013     Arthritis of shoulder region, right 4/18/2014     Back injury      Breast disorder      Chronic  constipation      Chronic diarrhea      Depressive disorder      Diabetes (H)      Fecal incontinence      Finger pain 4/2/2015     Fracture broke L 5th pinky finger due to fall  1/2015     Glaucoma (increased eye pressure)      Head injury 8/6/2016     Headache(784.0)     decreased with mouth guard use.     History of blood transfusion 8/2011 & 4/2013     History of diabetes mellitus      Hypercholesteremia      Hypertension      Low back pain      Menarche age 9+    cycles q mo x 4-5 d     Neck injuries      Nonsenile cataract IO implants: L-8/2013; R-1/2011     Pain in knee joint     LEFT     Right bundle branch block     per H/P     SNHL (sensorineural hearing loss)      Umbilical hernia without mention of obstruction or gangrene 8/2014     Vision disorder Detached Retina 10/2009      Patient Active Problem List   Diagnosis     Dyslipidemia     BMI >40     SNHL (sensorineural hearing loss)     Glaucoma     Umbilical hernia -- w/o symptoms->expectant mgt     Encounter for routine gynecological examination     Menopause present -- age 39     Health care maintenance     Type 2 diabetes mellitus with complication (H)     Essential hypertension     Venous (peripheral) insufficiency     Chronic bilateral low back pain with right-sided sciatica     Other secondary osteoarthritis of first carpometacarpal joint of right hand     Insulin long-term use (H)     Class 3 severe obesity due to excess calories with serious comorbidity and body mass index (BMI) of 40.0 to 44.9 in adult (H)     Right-sided thoracic back pain     Past Surgical History:  Pertinent surgical history reviewed.   Past Surgical History:   Procedure Laterality Date     ARTHROPLASTY KNEE  4/4/2013    Left Total Knee Arthroplasty;  Surgeon: Lou Byrne MD;  [facility]     ARTHROPLASTY MINIMALLY INVASIVE KNEE  8/22/2011    R knee :CAITLYN JACOBO, Left age 14     COLONOSCOPY  1/14/2015     DENTAL SURGERY      root canals, wisdom teeth      EXAM UNDER ANESTHESIA, MANIPULATE JOINT (LOCATION)  10/5/2012    Procedure: EXAM UNDER ANESTHESIA, MANIPULATE JOINT (LOCATION);  Right Knee Mini Open Lysis Of Adhesions, Left Knee Steroid Injection  ;  Surgeon: Lou Byrne MD;  Location: [facility]     [facility] OR OCULAR DEVICE INTRAOP DETACHED RETINA  2009    R     [facility] PHAKIC IOL - IMPLANT FROM SURGEON      right     KNEE SURGERY  1969    left     LASER YAG CAPSULOTOMY  12/2016    left     VITRECTOMY PARSPLANA  2010      Medications: Pertinent medications reviewed and updated  Current Outpatient Medications   Medication Sig Dispense Refill     Acetaminophen (TYLENOL PO) Take by mouth as needed for mild pain or fever       amoxicillin (AMOXIL) 500 MG capsule TAKE 4 CAPSULES BY MOUTH ONE HOUR BEFORE DENTAL APPOINTMENT 4 capsule 4     Ascorbic Acid (VITAMIN C PO) Take 2,000 mg by mouth 2 times daily        aspirin 81 MG tablet 1 tab daily 90 tablet 3     atorvastatin (LIPITOR) 20 MG tablet Take 1 tablet (20 mg) by mouth daily DX E78.5 ID # [id number] 90 tablet 3     B Complex Vitamins (VITAMIN  B COMPLEX) CAPS Take 1 tablet by mouth daily        blood glucose (ONETOUCH VERIO IQ) test strip Use to test blood sugar 4 times daily or as directed DX E11.8 ID # [id number] has meter already 360 strip 3     calcium-vitamin D (CALTRATE) 600-400 MG-UNIT per tablet Take 1 tablet by mouth 2 times daily       cholecalciferol (VITAMIN D) 1000 UNIT tablet Take 1 tablet by mouth daily. 100 tablet 3     cycloSPORINE (RESTASIS) 0.05 % ophthalmic emulsion        HUMALOG KWIKPEN 100 UNIT/ML soln Carb counting with meals approx 70-80 units daily subcutaneously DX E 11.8 ID # [id number] needs refrigeration 75 mL 3     insulin glargine (LANTUS SOLOSTAR) 100 UNIT/ML pen Inject 80 units SQ each am. DX E11.8 ID # [id number] needs refrigeration 75 mL 3     insulin pen needle (B-D U/F) 31G X 8 MM miscellaneous Use  4 daily short 31 G X 8MM 400 each 3     latanoprost (XALATAN) 0.005 % ophthalmic solution  Place 1 drop into both eyes At Bedtime       lisinopril-hydrochlorothiazide (ZESTORETIC) 20-12.5 MG tablet Take 1 tablet by mouth daily DX  I10 ID # [id number] 90 tablet 3     metFORMIN (GLUCOPHAGE) 500 MG tablet Take 2 tablets (1,000 mg) by mouth 2 times daily (with meals) 360 tablet 3     Multiple Vitamin (MULTIVITAMIN  S) CAPS Take 1 daily 90 capsule 3     Multiple Vitamins-Minerals (EYE-ZAKIYA PLUS LUTEIN PO)        Omega-3 Fatty Acids (OMEGA-3 FISH OIL PO) Take 1,000 mg by mouth daily       OneTouch Delica Lancets 33G MISC 4 Box 4 times daily Test  Blood glucose 4 times daily  DX E11.8  ID # [id number] 360 each 3     order for DME Equipment being ordered: Grade 1 (light) compression stockings, below the knee 1 Units 1     Semaglutide,0.25 or 0.5MG/DOS, (OZEMPIC, 0.25 OR 0.5 MG/DOSE,) 2 MG/1.5ML SOPN Inject  0.5 mg  Subcutaneous every 7 days 4.5 mL 3     timolol (TIMOPTIC) 0.5 % ophthalmic solution Place 1 drop into both eyes 2 times daily        triamcinolone (KENALOG) 0.1 % ointment Apply topically 2 times daily 80 g 11     semaglutide (OZEMPIC, 1 MG/DOSE,) 2 MG/1.5ML pen Inject 0.5 mg Subcutaneous every 7 days 4 pen 11     [place] and [place] Prescription Monitoring Program reviewed     Allergies: Pertinent allergies reviewed     Allergies   Allergen Reactions     Nkda [No Known Drug Allergies]      Family History:   family history includes Breast Cancer in her cousin; Cancer in her maternal aunt; Cerebrovascular Disease in her maternal grandmother; Diabetes in her brother; Diabetes (age of onset: 50) in her brother; Diabetes (age of onset: 55) in her father; Glaucoma in her brother and mother; Heart Murmur in her brother; Hypertension in her brother; Hypertension (age of onset: 41) in her sister; Hypertension (age of onset: 79) in her mother; Memory loss in her mother; Osteoporosis in her mother; Other - See Comments in her brother and sister; Thyroid Disease in her sister.    Social history:   Social History      Socioeconomic History     Marital status:      Spouse name: Not on file     Number of children: Not on file     Years of education: Not on file     Highest education level: Not on file   Occupational History     Occupation: Human Resources     Comment: between jobs now   Social Needs     Financial resource strain: Not on file     Food insecurity     Worry: Not on file     Inability: Not on file     Transportation needs     Medical: Not on file     Non-medical: Not on file   Tobacco Use     Smoking status: Former Smoker     Packs/day: 0.00     Smokeless tobacco: Never Used     Tobacco comment: quit mid 1980s   Substance and Sexual Activity     Alcohol use: No     Drug use: No     Sexual activity: Yes     Partners: Male     Birth control/protection: Post-menopausal   Lifestyle     Physical activity     Days per week: Not on file     Minutes per session: Not on file     Stress: Not on file   Relationships     Social connections     Talks on phone: Not on file     Gets together: Not on file     Attends Baptism service: Not on file     Active member of club or organization: Not on file     Attends meetings of clubs or organizations: Not on file     Relationship status: Not on file     Intimate partner violence     Fear of current or ex partner: Not on file     Emotionally abused: Not on file     Physically abused: Not on file     Forced sexual activity: Not on file   Other Topics Concern      Service Not Asked     Blood Transfusions Yes     Comment: 2 units 2011     Caffeine Concern No     Comment: 2s     Occupational Exposure No     Hobby Hazards No     Sleep Concern No     Stress Concern No     Comment: rehab for R knee after replacement     Weight Concern Yes     Comment: working on wt loss     Special Diet Yes     Comment: Diabetic     Back Care No     Exercise No     Comment: water aerobics 35-40' 3-4 d & strength 3d/wk     Bike Helmet No     Seat Belt No     Self-Exams Not Asked      "Parent/sibling w/ CABG, MI or angioplasty before 65F 55M? Not Asked   Social History Narrative    How much exercise per week? 3-4x swimming, aerobics    How much calcium per day? Supplement       How much caffeine per day? 2 cups coffee/ 1-2 can of diet soda    How much vitamin D per day? Supplement    Do you/your family wear seatbelts?  Yes    Do you/your family use safety helmets? No    Do you/your family use sunscreen? No    Do you/your family keep firearms in the home? No    Do you/your family have a smoke detector(s)? Yes    Do you feel safe in your home? Yes    Has anyone ever touched you in an unwanted manner? No     Explain     See LEA Berman 11/26/2014    Reviewed Raul DEGROOT MA 11/22/2017     Social History     Social History Narrative    How much exercise per week? 3-4x swimming, aerobics    How much calcium per day? Supplement       How much caffeine per day? 2 cups coffee/ 1-2 can of diet soda    How much vitamin D per day? Supplement    Do you/your family wear seatbelts?  Yes    Do you/your family use safety helmets? No    Do you/your family use sunscreen? No    Do you/your family keep firearms in the home? No    Do you/your family have a smoke detector(s)? Yes    Do you feel safe in your home? Yes    Has anyone ever touched you in an unwanted manner? No     Explain     See LEA Berman 11/26/2014    Reviewed Raul DEGROOT MA 11/22/2017     She is currently working as a semi-retired caregiver.  Smoking: former. Alcohol: none. Street drugs: none.     Review of Systems:  ROS   The 14 system ROS was reviewed from the intake questionnaire; results listed at end of note.    Physical Exam:  /54   Pulse 101   Resp 16   Ht 1.598 m (5' 2.9\")   BMI 43.48 kg/m      Physical Exam   Constitutional: She is oriented to person, place, and time.  She appears well-developed and well-nourished. She is not in acute distress.   HENT:     Head: Normocephalic and atraumatic.     Eyes: Pupils are equal, round, and reactive to " light. EOM are normal. No scleral icterus.     Neck: Normal range of motion. Neck supple.   Cardiovascular: Normal rate and regular rhythm.   Pulmonary/Chest:  NWOB. No respiratory distress.   Abdominal: deferred  Genitourinary: deferred  Neurological: She is alert and oriented to person, place, and time. CN II-XII grossly intact, coordination grossly normal.  Romberg test negative.   Skin: Skin is warm and dry. She is not diaphoretic.   Psychiatric: She has a normal mood and affect. Her behavior is normal. Judgment and thought content normal.  MSK: Gait is unsteady and stiff. TTP in lower and middle medial back, as well as in sciatic regions bilaterally. Pain with lumbar rotation and extension.  Lower Extremity Manual Muscle Testing    Motor (0 to 5 scale):        Right         Left    Hip flexion (L1/2)            5              5    Knee extension (L2,3,4)  5              5    Ankle dorsiflexion (L4/5)             5              5    Long toe extension (L5)              5              5    Ankle plantar flexion(S1)            5              5    Reflexes (0 to 4 scale):    Right    Left    Patellar (L4)              1           1    Achilles (S1)              1           1    Clonus:    Bilateral absent    Sensation:    Light touch: intact    Imaging: Images personally reviewed, remarkable for multiple areas of severe facet joint degeneration but without any areas noted of severe central or neuroforaminal narrowing or impingement  MRI Lumbar Spine 9/24/20  1. Stable mild to moderate spondylosis of the lumbar spine, most  prominent at L3-4 and L4-5. At L3-4 there is a disc bulge which  results in lateral recess narrowing and abuts the descending L4 nerve  roots.  2. Central disc extrusion with annular fissure at the L4-5 level.    CT Lumbar Spine 8/25/20  Lumbar spine spondylosis, grossly unchanged since  4/11/2018. No overt neural impingement. Most significantly, there is  central disc extrusion at L4-5 with mild  to moderate spinal canal  narrowing and multilevel mild-to-moderate neural foraminal narrowing,  most notably moderate bilaterally at L5-S1.       EMG:  None    Laboratory Results:  None    Assessment:    The patient is a 65 year old female with 6 year old female with past medical history of HTN, obesity, GHISLAINE, depression, daibetes who presents for evaluation of low back pain.  Based upon her reported symptoms, history of treatments, examination, and available imaging, her low back pain is likely secondary to moderate to severe facet joint degeneration and myofascial pain of the surrounding region.  We discussed treatment strategies and she wishes to pursue conservative management at this time.  As there is no evidence of severe stenosis on imaging and no symptoms or findings consistent with severe stenosis, it is reasonable to proceed with conservative therapy.  We discussed interventional options including epidural steroid injections and medial branch rhizotomy, but she wishes to defer these things at this time.  She does not wish to trial any additional systemic medications at this time, but is amenable to a trial of diclofenac for bilateral hand pain related to arthritis.    Visit Diagnoses:  Lumbar spondylosis  Disc bulge  Myofascial pain  Arthritis of bilateral hands    Plan:  Work up:    None at this time    Referrals:    Therapeutic referrals as listed below    Medications:    Acetaminophen as needed    Trial diclofenac 1% QID to bilateral hands    If the patient wishes to trial additional medications, systemic NSAID therapy and trials of muscle relaxant medications could both be considered    Therapies:    Referral placed for chiropractic evaluation and treatment for low back pain    Referral placed for acupuncture evaluation and treatment for low back pain    Referral placed for pain PT for evaluation of central and peripheral sensitization and myofascial pain and treatments involving fear avoidance,  pacing, mobilization, and other indicated modalities.    Interventions:    Patient declines at this time, however epidural steroid injection and facet rhizotomy would both be reasonable for consideration in the future if the patient is amenable to them.    Follow up: Discussed additional options including injections, medications, and potential surgery that we can discuss if the aforementioned therapies do not result in improvement.    Thank you for the consult.    Total time spent was 40 minutes, and more than 50% of face to face time was spent in counseling and/or coordination of care regarding principles of multidisciplinary care, medication management, and treatment options.    Jose Santacruz MD    Department of Anesthesiology  Pain Management Division

## 2020-12-03 NOTE — TELEPHONE ENCOUNTER
Central Prior Authorization Team   [phone]    PA Initiation    Medication: diclofenac (VOLTAREN) 1 % topical gel  Insurance Company: [employer] - Phone [phone] Fax [phone]  Pharmacy Filling the Rx: [id number] - [place], [place] - [address] AT [facility] OF [address]  Filling Pharmacy Phone: [phone]  Filling Pharmacy Fax: [phone]  Start Date: 12/3/2020

## 2020-12-03 NOTE — LETTER
12/3/2020       RE: Radha Baca  [address] [facility] [address]     Dear Colleague,    Thank you for referring your patient, Radha Baca, to the [facility] FOR COMPREHENSIVE PAIN MANAGEMENT [place] at [facility]. Please see a copy of my visit note below.    VISIT RECOMMENDATIONS:  1. Trial 1% diclofenac gel QID to bilateral hands for bilateral hand pain secondary to osteoarthritis    Referral placed for chiropractic evaluation and treatment for low back pain    Referral placed for acupuncture evaluation and treatment for low back pain    Referral placed for pain PT for evaluation of central and peripheral sensitization and myofascial pain and treatments involving fear avoidance, pacing, mobilization, and other indicated modalities.    Additional medications and interventions can be considered in the future, however the patient wishes to pursue conservative management at this time    COMPREHENSIVE PAIN CLINIC INITIAL EVALUATION    I had the pleasure of meeting Ms. Radha Baca on 12/3/2020 in the [facility] in consult for Dr. Dubois with regards to her low back pain.    Subjective:  The patient is a 65 year old female with past medical history of HTN, obesity, GHISLAINE, depression, daibetes who presents for evaluation of low back pain.  The patient's pain began 10 years ago and has been slowly progressing since that time.  She reports that her pain is located primarily in her lower back with radiation down the posterior thighs, primarily on the right side. The patient describes the pain as a sharp burning sensation.  She reports that the pain is made worse by standing upright or prolonged sitting.  Her pain is improved with leaning forward and water therapy.  In addition, she reports intermittent calf cramping bilaterally and tightness in her quadriceps as well as mild tingling associated with the pain, which she  feels is due to her knee replacement surgery. She rates her average pain score at 4-5/10, but it  as severe as 9/10.     She endorses some problems with balance and constipation. She denies new problems with bowel or bladder incontinence, any night sweats or unexplained fevers, or any sudden or unexpected weight loss.     Radha Baca has been seen at a pain clinic in the past, over 3 years ago.    Patient reported symptoms:  Patient Supplied Answers To the [facility] Pain Questionnaire  [facility] -  Patient Entered Questionnaire/Answers 11/27/2018   What number best describes your pain right now:  0 = No pain  to  10 = Worst pain imaginable 7   Which of the following worsen your pain? lying down, standing, sitting, walking, relaxation   Which of the following improve or reduce your pain?  lying down, standing, sitting, walking, exercise, relaxation   What number best describes your average pain for the past week:  0 = No pain  to  10 = Worst pain imaginable 8   What number best describes your LOWEST pain in past 24 hours:  0 = No pain  to  10 = Worst pain imaginable 5   What number best describes your WORST pain in past 24 hours:  0 = No pain  to  10 = Worst pain imaginable 7   When is your pain worst? Constant   What non-medicine treatments have you already had for your pain? physical therapy, exercise, other   Have you tried treating your pain with medication?  No   Are you currently taking medications for your pain? Yes     Current treatments:  PT, pool therapy, acetaminophen occasionally    Previous medication treatments included:  Anti-convulsants: not tried  Muscle relaxors: not tried  Anti-depressants: not tried  Acetaminophen/NSAIDs: Acetaminophen currently  Topicals: not tried  Headache abortives: not tried  Headache prophylactics: not tried  Opioids: not tried    Other treatments have included:  Physical therapy: Pool therapy, beneficial  Pain Psychology: not tried  Chiropractic: not tried  Acupuncture: not  tried  TENs Unit: not tried  Injections: not tried  Surgeries: not tried    Past Medical History:  Medical history reviewed.   Past Medical History:   Diagnosis Date     Abnormal Pap smear      Anxiety      Arthritis      Arthritis of knee 4/4/2013     Arthritis of shoulder region, right 4/18/2014     Back injury      Breast disorder      Chronic constipation      Chronic diarrhea      Depressive disorder      Diabetes (H)      Fecal incontinence      Finger pain 4/2/2015     Fracture broke L 5th pinky finger due to fall  1/2015     Glaucoma (increased eye pressure)      Head injury 8/6/2016     Headache(784.0)     decreased with mouth guard use.     History of blood transfusion 8/2011 & 4/2013     History of diabetes mellitus      Hypercholesteremia      Hypertension      Low back pain      Menarche age 9+    cycles q mo x 4-5 d     Neck injuries      Nonsenile cataract IO implants: L-8/2013; R-1/2011     Pain in knee joint     LEFT     Right bundle branch block     per H/P     SNHL (sensorineural hearing loss)      Umbilical hernia without mention of obstruction or gangrene 8/2014     Vision disorder Detached Retina 10/2009      Patient Active Problem List   Diagnosis     Dyslipidemia     BMI >40     SNHL (sensorineural hearing loss)     Glaucoma     Umbilical hernia -- w/o symptoms->expectant mgt     Encounter for routine gynecological examination     Menopause present -- age 39     Health care maintenance     Type 2 diabetes mellitus with complication (H)     Essential hypertension     Venous (peripheral) insufficiency     Chronic bilateral low back pain with right-sided sciatica     Other secondary osteoarthritis of first carpometacarpal joint of right hand     Insulin long-term use (H)     Class 3 severe obesity due to excess calories with serious comorbidity and body mass index (BMI) of 40.0 to 44.9 in adult (H)     Right-sided thoracic back pain     Past Surgical History:  Pertinent surgical history  reviewed.   Past Surgical History:   Procedure Laterality Date     ARTHROPLASTY KNEE  4/4/2013    Left Total Knee Arthroplasty;  Surgeon: Lou Byrne MD;  [facility]     ARTHROPLASTY MINIMALLY INVASIVE KNEE  8/22/2011    R knee :CAITLYN JACOBO, Left age 14     COLONOSCOPY  1/14/2015     DENTAL SURGERY      root canals, wisdom teeth     EXAM UNDER ANESTHESIA, MANIPULATE JOINT (LOCATION)  10/5/2012    Procedure: EXAM UNDER ANESTHESIA, MANIPULATE JOINT (LOCATION);  Right Knee Mini Open Lysis Of Adhesions, Left Knee Steroid Injection  ;  Surgeon: Lou Byrne MD;  Location: [facility]     [facility] OR OCULAR DEVICE INTRAOP DETACHED RETINA  2009    R     [facility] PHAKIC IOL - IMPLANT FROM SURGEON      right     KNEE SURGERY  1969    left     LASER YAG CAPSULOTOMY  12/2016    left     VITRECTOMY PARSPLANA  2010      Medications: Pertinent medications reviewed and updated  Current Outpatient Medications   Medication Sig Dispense Refill     Acetaminophen (TYLENOL PO) Take by mouth as needed for mild pain or fever       amoxicillin (AMOXIL) 500 MG capsule TAKE 4 CAPSULES BY MOUTH ONE HOUR BEFORE DENTAL APPOINTMENT 4 capsule 4     Ascorbic Acid (VITAMIN C PO) Take 2,000 mg by mouth 2 times daily        aspirin 81 MG tablet 1 tab daily 90 tablet 3     atorvastatin (LIPITOR) 20 MG tablet Take 1 tablet (20 mg) by mouth daily DX E78.5 ID # [id number] 90 tablet 3     B Complex Vitamins (VITAMIN  B COMPLEX) CAPS Take 1 tablet by mouth daily        blood glucose (ONETOUCH VERIO IQ) test strip Use to test blood sugar 4 times daily or as directed DX E11.8 ID # [id number] has meter already 360 strip 3     calcium-vitamin D (CALTRATE) 600-400 MG-UNIT per tablet Take 1 tablet by mouth 2 times daily       cholecalciferol (VITAMIN D) 1000 UNIT tablet Take 1 tablet by mouth daily. 100 tablet 3     cycloSPORINE (RESTASIS) 0.05 % ophthalmic emulsion        HUMALOG KWIKPEN 100 UNIT/ML soln Carb counting with meals approx 70-80 units daily  subcutaneously DX E 11.8 ID # [id number] needs refrigeration 75 mL 3     insulin glargine (LANTUS SOLOSTAR) 100 UNIT/ML pen Inject 80 units SQ each am. DX E11.8 ID # [id number] needs refrigeration 75 mL 3     insulin pen needle (B-D U/F) 31G X 8 MM miscellaneous Use  4 daily short 31 G X 8MM 400 each 3     latanoprost (XALATAN) 0.005 % ophthalmic solution Place 1 drop into both eyes At Bedtime       lisinopril-hydrochlorothiazide (ZESTORETIC) 20-12.5 MG tablet Take 1 tablet by mouth daily DX  I10 ID # [id number] 90 tablet 3     metFORMIN (GLUCOPHAGE) 500 MG tablet Take 2 tablets (1,000 mg) by mouth 2 times daily (with meals) 360 tablet 3     Multiple Vitamin (MULTIVITAMIN  S) CAPS Take 1 daily 90 capsule 3     Multiple Vitamins-Minerals (EYE-ZAKIYA PLUS LUTEIN PO)        Omega-3 Fatty Acids (OMEGA-3 FISH OIL PO) Take 1,000 mg by mouth daily       OneTouch Delica Lancets 33G MISC 4 Box 4 times daily Test  Blood glucose 4 times daily  DX E11.8  ID # [id number] 360 each 3     order for DME Equipment being ordered: Grade 1 (light) compression stockings, below the knee 1 Units 1     Semaglutide,0.25 or 0.5MG/DOS, (OZEMPIC, 0.25 OR 0.5 MG/DOSE,) 2 MG/1.5ML SOPN Inject  0.5 mg  Subcutaneous every 7 days 4.5 mL 3     timolol (TIMOPTIC) 0.5 % ophthalmic solution Place 1 drop into both eyes 2 times daily        triamcinolone (KENALOG) 0.1 % ointment Apply topically 2 times daily 80 g 11     semaglutide (OZEMPIC, 1 MG/DOSE,) 2 MG/1.5ML pen Inject 0.5 mg Subcutaneous every 7 days 4 pen 11     [place] and [place] Prescription Monitoring Program reviewed     Allergies: Pertinent allergies reviewed     Allergies   Allergen Reactions     Nkda [No Known Drug Allergies]      Family History:   family history includes Breast Cancer in her cousin; Cancer in her maternal aunt; Cerebrovascular Disease in her maternal grandmother; Diabetes in her brother; Diabetes (age of onset: 50) in her brother; Diabetes (age of onset: 55) in her father; Glaucoma in her  brother and mother; Heart Murmur in her brother; Hypertension in her brother; Hypertension (age of onset: 41) in her sister; Hypertension (age of onset: 79) in her mother; Memory loss in her mother; Osteoporosis in her mother; Other - See Comments in her brother and sister; Thyroid Disease in her sister.    Social history:   Social History     Socioeconomic History     Marital status:      Spouse name: Not on file     Number of children: Not on file     Years of education: Not on file     Highest education level: Not on file   Occupational History     Occupation: Human Resources     Comment: between jobs now   Social Needs     Financial resource strain: Not on file     Food insecurity     Worry: Not on file     Inability: Not on file     Transportation needs     Medical: Not on file     Non-medical: Not on file   Tobacco Use     Smoking status: Former Smoker     Packs/day: 0.00     Smokeless tobacco: Never Used     Tobacco comment: quit mid 1980s   Substance and Sexual Activity     Alcohol use: No     Drug use: No     Sexual activity: Yes     Partners: Male     Birth control/protection: Post-menopausal   Lifestyle     Physical activity     Days per week: Not on file     Minutes per session: Not on file     Stress: Not on file   Relationships     Social connections     Talks on phone: Not on file     Gets together: Not on file     Attends Buddhism service: Not on file     Active member of club or organization: Not on file     Attends meetings of clubs or organizations: Not on file     Relationship status: Not on file     Intimate partner violence     Fear of current or ex partner: Not on file     Emotionally abused: Not on file     Physically abused: Not on file     Forced sexual activity: Not on file   Other Topics Concern      Service Not Asked     Blood Transfusions Yes     Comment: 2 units 2011     Caffeine Concern No     Comment: 2s     Occupational Exposure No     Hobby Hazards No     Sleep  Concern No     Stress Concern No     Comment: rehab for R knee after replacement     Weight Concern Yes     Comment: working on wt loss     Special Diet Yes     Comment: Diabetic     Back Care No     Exercise No     Comment: water aerobics 35-40' 3-4 d & strength 3d/wk     Bike Helmet No     Seat Belt No     Self-Exams Not Asked     Parent/sibling w/ CABG, MI or angioplasty before 65F 55M? Not Asked   Social History Narrative    How much exercise per week? 3-4x swimming, aerobics    How much calcium per day? Supplement       How much caffeine per day? 2 cups coffee/ 1-2 can of diet soda    How much vitamin D per day? Supplement    Do you/your family wear seatbelts?  Yes    Do you/your family use safety helmets? No    Do you/your family use sunscreen? No    Do you/your family keep firearms in the home? No    Do you/your family have a smoke detector(s)? Yes    Do you feel safe in your home? Yes    Has anyone ever touched you in an unwanted manner? No     Explain     See LEA Berman 11/26/2014    Reviewed Raul DEGROOT MA 11/22/2017     Social History     Social History Narrative    How much exercise per week? 3-4x swimming, aerobics    How much calcium per day? Supplement       How much caffeine per day? 2 cups coffee/ 1-2 can of diet soda    How much vitamin D per day? Supplement    Do you/your family wear seatbelts?  Yes    Do you/your family use safety helmets? No    Do you/your family use sunscreen? No    Do you/your family keep firearms in the home? No    Do you/your family have a smoke detector(s)? Yes    Do you feel safe in your home? Yes    Has anyone ever touched you in an unwanted manner? No     Explain     See LEA Berman 11/26/2014    Reviewed Raul DEGROOT MA 11/22/2017     She is currently working as a semi-retired caregiver.  Smoking: former. Alcohol: none. Street drugs: none.     Review of Systems:  ROS   The 14 system ROS was reviewed from the intake questionnaire; results listed at end of note.    Physical Exam:  BP  "122/54   Pulse 101   Resp 16   Ht 1.598 m (5' 2.9\")   BMI 43.48 kg/m      Physical Exam   Constitutional: She is oriented to person, place, and time.  She appears well-developed and well-nourished. She is not in acute distress.   HENT:     Head: Normocephalic and atraumatic.     Eyes: Pupils are equal, round, and reactive to light. EOM are normal. No scleral icterus.     Neck: Normal range of motion. Neck supple.   Cardiovascular: Normal rate and regular rhythm.   Pulmonary/Chest:  NWOB. No respiratory distress.   Abdominal: deferred  Genitourinary: deferred  Neurological: She is alert and oriented to person, place, and time. CN II-XII grossly intact, coordination grossly normal.  Romberg test negative.   Skin: Skin is warm and dry. She is not diaphoretic.   Psychiatric: She has a normal mood and affect. Her behavior is normal. Judgment and thought content normal.  MSK: Gait is unsteady and stiff. TTP in lower and middle medial back, as well as in sciatic regions bilaterally. Pain with lumbar rotation and extension.  Lower Extremity Manual Muscle Testing    Motor (0 to 5 scale):        Right         Left    Hip flexion (L1/2)            5              5    Knee extension (L2,3,4)  5              5    Ankle dorsiflexion (L4/5)             5              5    Long toe extension (L5)              5              5    Ankle plantar flexion(S1)            5              5    Reflexes (0 to 4 scale):    Right    Left    Patellar (L4)              1           1    Achilles (S1)              1           1    Clonus:    Bilateral absent    Sensation:    Light touch: intact    Imaging: Images personally reviewed, remarkable for multiple areas of severe facet joint degeneration but without any areas noted of severe central or neuroforaminal narrowing or impingement  MRI Lumbar Spine 9/24/20  1. Stable mild to moderate spondylosis of the lumbar spine, most  prominent at L3-4 and L4-5. At L3-4 there is a disc bulge " which  results in lateral recess narrowing and abuts the descending L4 nerve  roots.  2. Central disc extrusion with annular fissure at the L4-5 level.    CT Lumbar Spine 8/25/20  Lumbar spine spondylosis, grossly unchanged since  4/11/2018. No overt neural impingement. Most significantly, there is  central disc extrusion at L4-5 with mild to moderate spinal canal  narrowing and multilevel mild-to-moderate neural foraminal narrowing,  most notably moderate bilaterally at L5-S1.       EMG:  None    Laboratory Results:  None    Assessment:    The patient is a 65 year old female with 6 year old female with past medical history of HTN, obesity, GHISLAINE, depression, daibetes who presents for evaluation of low back pain.  Based upon her reported symptoms, history of treatments, examination, and available imaging, her low back pain is likely secondary to moderate to severe facet joint degeneration and myofascial pain of the surrounding region.  We discussed treatment strategies and she wishes to pursue conservative management at this time.  As there is no evidence of severe stenosis on imaging and no symptoms or findings consistent with severe stenosis, it is reasonable to proceed with conservative therapy.  We discussed interventional options including epidural steroid injections and medial branch rhizotomy, but she wishes to defer these things at this time.  She does not wish to trial any additional systemic medications at this time, but is amenable to a trial of diclofenac for bilateral hand pain related to arthritis.    Visit Diagnoses:  Lumbar spondylosis  Disc bulge  Myofascial pain  Arthritis of bilateral hands    Plan:  Work up:    None at this time    Referrals:    Therapeutic referrals as listed below    Medications:    Acetaminophen as needed    Trial diclofenac 1% QID to bilateral hands    If the patient wishes to trial additional medications, systemic NSAID therapy and trials of muscle relaxant medications could  both be considered    Therapies:    Referral placed for chiropractic evaluation and treatment for low back pain    Referral placed for acupuncture evaluation and treatment for low back pain    Referral placed for pain PT for evaluation of central and peripheral sensitization and myofascial pain and treatments involving fear avoidance, pacing, mobilization, and other indicated modalities.    Interventions:    Patient declines at this time, however epidural steroid injection and facet rhizotomy would both be reasonable for consideration in the future if the patient is amenable to them.    Follow up: Discussed additional options including injections, medications, and potential surgery that we can discuss if the aforementioned therapies do not result in improvement.    Thank you for the consult.    Total time spent was 40 minutes, and more than 50% of face to face time was spent in counseling and/or coordination of care regarding principles of multidisciplinary care, medication management, and treatment options.    Jose Santacruz MD    Department of Anesthesiology  Pain Management Division      AVS reviewed with the pt.   Lorena Coffman LPN

## 2020-12-04 ENCOUNTER — ANCILLARY PROCEDURE (OUTPATIENT)
Dept: BONE DENSITY | Facility: CLINIC | Age: 65
End: 2020-12-04
Attending: INTERNAL MEDICINE
Payer: COMMERCIAL

## 2020-12-04 DIAGNOSIS — Z79.4 TYPE 2 DIABETES MELLITUS WITH COMPLICATION, WITH LONG-TERM CURRENT USE OF INSULIN (H): ICD-10-CM

## 2020-12-04 DIAGNOSIS — E11.8 TYPE 2 DIABETES MELLITUS WITH COMPLICATION (H): ICD-10-CM

## 2020-12-04 DIAGNOSIS — E11.8 TYPE 2 DIABETES MELLITUS WITH COMPLICATION, WITH LONG-TERM CURRENT USE OF INSULIN (H): ICD-10-CM

## 2020-12-04 PROCEDURE — 77080 DXA BONE DENSITY AXIAL: CPT

## 2020-12-09 ENCOUNTER — TELEPHONE (OUTPATIENT)
Dept: ANESTHESIOLOGY | Facility: CLINIC | Age: 65
End: 2020-12-09

## 2020-12-09 NOTE — TELEPHONE ENCOUNTER
[facility] Call Center    Phone Message    May a detailed message be left on voicemail: yes     Reason for Call: Other: Pt calling to ask about the order that she needs to do the therapies that Dr Wilcox referred her to do. In addition, she will need CPT codes and facility codes. Please call pt to discuss further     Action Taken: Message routed to:  [facility] Clinic    Travel Screening: Not Applicable

## 2020-12-10 NOTE — TELEPHONE ENCOUNTER
I called and spoke with the pt and informed her of the CPT codes that are attached with the referrals that Dr. Wilcox placed.     I provided the codes as well as the phone numbers for the pt to contact those clinics to schedule an appointment.    Pt stated that she would like for Dr. Wilcox to recommend someone outside of the [facility] system if needed if the pt's insurance is not covered.    I informed the pt that I would update the provider and that she could call back if she needs that recommendation.    Pt stated verbal understanding and had no further questions or concerns.    Melissa Blue, CMA

## 2020-12-18 ENCOUNTER — TELEPHONE (OUTPATIENT)
Dept: SLEEP MEDICINE | Facility: CLINIC | Age: 65
End: 2020-12-18

## 2020-12-18 NOTE — TELEPHONE ENCOUNTER
Message left informing patient that she does not need to bring machine in for DL. DL in synopsis.    Kerrie Buck MA

## 2020-12-18 NOTE — TELEPHONE ENCOUNTER
Reason for call:  Other   Patient called regarding (reason for call):   call back    Additional comments:   PT is willing to come to [place] or go to the [facility] to have her CPAP read prior to her appt on 1/7/21 if this would be helpful for her appt and the Dr.    Phone number to reach patient:  Cell number on file:    Telephone Information:   Mobile [phone]       Best Time:  any    Can we leave a detailed message on this number?  YES    Travel screening: Not Applicable

## 2020-12-21 ENCOUNTER — VIRTUAL VISIT (OUTPATIENT)
Dept: EDUCATION SERVICES | Facility: CLINIC | Age: 65
End: 2020-12-21
Payer: COMMERCIAL

## 2020-12-21 ENCOUNTER — TELEPHONE (OUTPATIENT)
Dept: SLEEP MEDICINE | Facility: CLINIC | Age: 65
End: 2020-12-21

## 2020-12-21 DIAGNOSIS — E11.8 TYPE 2 DIABETES MELLITUS WITH COMPLICATION (H): Primary | ICD-10-CM

## 2020-12-21 DIAGNOSIS — G47.33 OBSTRUCTIVE SLEEP APNEA (ADULT) (PEDIATRIC): Primary | ICD-10-CM

## 2020-12-21 PROCEDURE — 99207 PR NO BILLABLE SERVICE THIS VISIT: CPT | Performed by: REGISTERED NURSE

## 2020-12-21 NOTE — TELEPHONE ENCOUNTER
RETURNED PT CALL REGARDING CPAP SUPPLIES RX AND WHEN THEY . PT WANTED TO KNOW THE PROCESS OF WHEN CPAP SUPPLIES RX  AND THE PROCESS OF OBTAINING A NEW ONE. ALSO THE PT HAD QUESTIONS ABOUT MEDICARE REQUIREMENTS FOR THE FUTURE.

## 2020-12-31 ENCOUNTER — TELEPHONE (OUTPATIENT)
Dept: ENDOCRINOLOGY | Facility: CLINIC | Age: 65
End: 2020-12-31

## 2020-12-31 NOTE — TELEPHONE ENCOUNTER
[facility] Call Center    Phone Message    May a detailed message be left on voicemail: yes     Reason for Call: Appointment Intake    Referring Provider Name: Self   Diagnosis and/or Symptoms: Please call patient to help schedule an appointment so patient can drop off her meter to upload readings before her appointment with Dr. De Luna on 01/19/2020.     Patient would like her appointment to be schedule on 01/18/2021 after her 8am lab appointment. Patient would like this to be around 8:30am or 9am.     Not in guidelines so I wasn't sure who to schedule this appointment with. Please call patient. Thank you.    Action Taken: Message routed to:  [facility] Endo    Travel Screening: Not Applicable

## 2021-01-05 ENCOUNTER — TELEPHONE (OUTPATIENT)
Dept: SLEEP MEDICINE | Facility: CLINIC | Age: 66
End: 2021-01-05

## 2021-01-05 NOTE — TELEPHONE ENCOUNTER
RETURNED  PT CALL AND SHE WANTED INFORMATION ON THE CPAP PILLOW THAT WE CARRY (CONTOUR) AND PRICING.

## 2021-01-06 NOTE — PROGRESS NOTES
Radha Baca is a 65 year old female who is being evaluated via a billable video visit.      How would you like to obtain your AVS? MyChart  If the video visit is dropped, the invitation should be resent by: Send to e-mail at: [email or web address]  Will anyone else be joining your video visit? No      Video Start Time: 11:05 AM  Assessment & Plan   Problem List Items Addressed This Visit     Chronic bilateral low back pain with right-sided sciatica    GHISLAINE (obstructive sleep apnea) - Primary    Relevant Orders    Comprehensive DME (Completed)      Severe sleep apnea optimally treated by CPAP download.  Getting good clinical benefit with use of CPAP.  Sleep can be disrupted by chronic pain issues.  She is managing anxiety and pain as best she can.  Anticipates returning to the pool soon which will also be helpful.  No pressure changes required for her CPAP.  She may benefit from switching masks styles to minimize leak into her eyes and to make the device more secure when changing body positions.  Orders generated for supplies.  Follow-up in 1 year.      35 minutes spent on the date of the encounter doing chart review, history and exam, documentation and further activities as noted above           Return in about 1 year (around 1/7/2022).    Joanne Pisano MD  [facility] SLEEP [facility]     Radha Baca is a 65 year old who presents to clinic today for the following health issues obstructive sleep apnea    HPI   65-year-old female with hypertension, diabetes, arthritis, depression and anxiety, severe obstructive sleep apnea.  She had a pressure change earlier this summer for air hunger which was effective.  She continues to suffer back pains and right-sided thoracic pain on occasion.  She also notes a sense of chest pressure and shortness of breath that is triggered by stress and sometimes by exertion.  These are intermittent and are not consistently present.  She has  "plans to discuss them with her primary care.  There is no associated nausea or diaphoresis.  She is mostly a side sleeper.  She does have occasional leak into her eyes.  She uses lubricating eyedrops as she suffers dry eyes during the day as well.  She practices yoga meditation.  She has questions about getting light therapy for her mood.  She hopes to get back into the pool next week which has been helpful for her aches and pains.  She denies restless legs, sleepwalking, sleep talking or dream enactment behavior.    Total score - [place]: 3 (1/7/2021  3:00 PM) (Less than 10 normal)    Insomnia Severity Score   6 (normal 0-7, mild 8-14, moderate 15-21, severe 22-28)          Objective    Vitals - Patient Reported  Weight (Patient Reported): 111.1 kg (245 lb)  Height (Patient Reported): 160 cm (5' 3\")  BMI (Based on Pt Reported Ht/Wt): 43.4  Pain Score: No Pain (0)        Physical Exam   GENERAL: Healthy, alert and no distress  EYES: Eyes grossly normal to inspection.  No discharge or erythema, or obvious scleral/conjunctival abnormalities.  RESP: No audible wheeze, cough, or visible cyanosis.  No visible retractions or increased work of breathing.    SKIN: Visible skin clear. No significant rash, abnormal pigmentation or lesions.  NEURO: Cranial nerves grossly intact.  Mentation and speech appropriate for age.  PSYCH: Mentation appears normal, affect normal/bright, judgement and insight intact, normal speech and appearance well-groomed.    ResMed   Auto-PAP 12.0 - 16.0 cmH2O 30 day usage data:    100% of days with > 4 hours of use. 0/30 days with no use.   Average use 459 minutes per day.   95%ile Leak 25.28 L/min.   CPAP 95% pressure 16 cm.   AHI 0.92 events per hour.     Home sleep apnea test 11/11/2019   Weight 248, BMI 43.2  AHI 46.8, with associated hypoxemia        Video-Visit Details    Type of service:  Video Visit    Video End Time:11:36    Originating Location (pt. Location): Home    Distant Location " (provider location):  [facility] SLEEP [facility]     Platform used for Video Visit: Nadiya

## 2021-01-06 NOTE — PATIENT INSTRUCTIONS
For general sleep health questions: http://sleepeducation.org    For tips about PAP and COVID -19:  [email or web address]://[email or web address]/patients/patient-[email or web address]-19-[email or web address]-home-pap-therapy.pdf        Continue PAP therapy every night, for all hours that you are sleeping.  If you nap use CPAP.  As always, try to get at least 8 hours of sleep or more each day, keep a regular sleep schedule, and avoid sleep deprivation. Avoid alcohol.    Reasons that you might need a change to your pressure therapy would be weight gain or loss, waking having inadvertently removed your PAP overnight, having previously felt refreshed by sleep with CPAP use and now waking un-refreshed, and return of daytime sleepiness. Also, the development of new medical problems  (such as heart failure, stroke, medications such as narcotics) can sometimes affect breathing at night and change your PAP therapy needs.    Please bring PAP with you if you are hospitalized.  If anticipating surgery be sure to discuss with your surgeon that you have sleep apnea and use PAP therapy.      Maintain your equipment as recommended which includes routine cleaning and replacement of supplies.      Call DME for any questions regarding supplies or maintenance.    [facility], [facility] between [facility] [phone]      Do not drive on engage in potentially dangerous activities if feeling sleepy.    Please follow up in sleep clinic again in 12 months and bring your machine and card to your follow-up visit.          Tips for your PAP use-    Mask fitting tips  Mask fitting exercise:    To improve your mask seal and your mobility at night, put mask on and secure in place.  Lie down in bed with full pressure and roll to one side, adjust headgear while in that position to eliminate any leaks. Repeat process rolling to other side.     The mask seal does not have to be perfect:   CPAP machines are designed to make up for small leaks. However, you  will not tolerate leaks blowing in your eyes so you will need to adjust.   Any leak should only be near or at the bottom of the mask.  We expect your mask to leak slightly at night.    Do not over-tighten the headgear straps, tighter IS NOT better, we expect minimal leak.    First try re-positioning the mask or headgear before tightening the headgear straps.  Mask leaks are expected due to changing sleeping positions. Try pulling the mask away from your skin allowing the cushion to re-inflate will minimize the leak.  If you struggle for a good fit, try turning the CPAP off and then readjust the mask by pulling it away from your face and then turning back on the CPAP.        Humidifier tips  Humidifiers can be adjusted to increase or decrease the amount of moisture according to your comfort level. You may need to adjust this frequently at first, but then might only change it with seasonal weather changes.     Try INCREASING the humidity if:  You experience a dry, irritated nasal passage or throat.  You have a runny, drippy nose or sneezing fits after using CPAP.  You experience nasal congestion during or after CPAP use.    Try DECREASING the humidity if:  You have excessive condensation or  rain out  in the tubing or mask.  Otherwise keep the tubing warm during the night by running it underneath the blankets or pillow.      Clinic visit after initial PAP set-up   Bring your equipment with you to your 4 week follow up clinic visit.  We will be extracting your data from the machine.        Travel  Always take your equipment with you.  If you fly with your equipment bring it on with you as a carry on.  Medical equipment does not count as a carry on.    If you travel international the machines take 110-240.  The only adapter needed is the adapter that will fit into the receptacle (outlet).    You may also want to bring an extension cord as many hotel rooms have limited outlets at the bedside.  Do not travel with water in  your humidifier chamber.     Cleaning and Maintenance Guidelines    Equipment Frequency Cleaning Method   Mask First Day    Daily      Weekly Soak mask in hot soapy water for 30 minutes, rinse and air dry.  Wipe nasal cushion with a hot soapy (Ivory, baby shampoo) cloth and rinse.  Baby wipes may also be used.  Do not use anti-bacterial soaps,Ainsley  liquid soap, rubbing alcohol, bleach or ammonia.  Wash frame in hot soapy water (Ivory, baby shampoo) rinse and let air dry   Headgear Biweekly Wash in hot soapy water, rinse and air dry   Reusable Gray Filter Weekly Wash in hot soapy water, rinse, put in towel squeeze moisture out, let air dry   Disposable White Filter Check Weekly Replace when brown or gray in color; at least every 2 to 3 months   Humidifier Chamber Daily    Weekly Empty distilled water from humidifier and let air dry    Hand wash in hot soapy water, rinse and air dry   Tubing Weekly Wash in hot soapy water, rinse and let air dry   Mask, Tubing and Humidifier Chamber As needed Disinfect: Soak in 1 part distilled white vinegar to 3 parts hot water for 30 minutes, rinse well and air dry  Not the material headgear        MASK AND SUPPLY REORDERING and EQUIPMENT NEEDS through your DME and per your insurance  Reminder: Most insurance companies will allow for a new mask, headgear, tubing, and reusable gray filter every six months.  Disposable white ultra-fine filters are covered monthly.      HOME AND SAFETY INSTRUCTIONS    Do not use frayed or cracked electrical cords, multi plug adaptors, or switched receptacles    Do not immerse electrical equipment into water    Assure that electrical cords do not become a tripping hazard      Your BMI is There is no height or weight on file to calculate BMI.  Weight management is a personal decision.  If you are interested in exploring weight loss strategies, the following discussion covers the approaches that may be successful. Body mass index (BMI) is one way to tell  whether you are at a healthy weight, overweight, or obese. It measures your weight in relation to your height.  A BMI of 18.5 to 24.9 is in the healthy range. A person with a BMI of 25 to 29.9 is considered overweight, and someone with a BMI of 30 or greater is considered obese. More than two-thirds of American adults are considered overweight or obese.  Being overweight or obese increases the risk for further weight gain. Excess weight may lead to heart disease and diabetes.  Creating and following plans for healthy eating and physical activity may help you improve your health.  Weight control is part of healthy lifestyle and includes exercise, emotional health, and healthy eating habits. Careful eating habits lifelong are the mainstay of weight control. Though there are significant health benefits from weight loss, long-term weight loss with diet alone may be very difficult to achieve- studies show long-term success with dietary management in less than 10% of people. Attaining a healthy weight may be especially difficult to achieve in those with severe obesity. In some cases, medications, devices and surgical management might be considered.  What can you do?  If you are overweight or obese and are interested in methods for weight loss, you should discuss this with your provider.     Consider reducing daily calorie intake by 500 calories.     Keep a food journal.     Avoiding skipping meals, consider cutting portions instead.    Diet combined with exercise helps maintain muscle while optimizing fat loss. Strength training is particularly important for building and maintaining muscle mass. Exercise helps reduce stress, increase energy, and improves fitness. Increasing exercise without diet control, however, may not burn enough calories to loose weight.       Start walking three days a week 10-20 minutes at a time    Work towards walking thirty minutes five days a week     Eventually, increase the speed of your  walking for 1-2 minutes at time    In addition, we recommend that you review healthy lifestyles and methods for weight loss available through the National Institutes of Health patient information sites:  http://win.niddk.nih.gov/publications/index.htm    And look into health and wellness programs that may be available through your health insurance provider, employer, local community center, or mathew club.    Weight management plan: Patient was referred to their PCP to discuss a diet and exercise plan.

## 2021-01-07 ENCOUNTER — VIRTUAL VISIT (OUTPATIENT)
Dept: SLEEP MEDICINE | Facility: CLINIC | Age: 66
End: 2021-01-07
Payer: COMMERCIAL

## 2021-01-07 DIAGNOSIS — M54.41 CHRONIC BILATERAL LOW BACK PAIN WITH RIGHT-SIDED SCIATICA: ICD-10-CM

## 2021-01-07 DIAGNOSIS — G89.29 CHRONIC BILATERAL LOW BACK PAIN WITH RIGHT-SIDED SCIATICA: ICD-10-CM

## 2021-01-07 DIAGNOSIS — G47.33 OSA (OBSTRUCTIVE SLEEP APNEA): Primary | ICD-10-CM

## 2021-01-07 PROCEDURE — 99214 OFFICE O/P EST MOD 30 MIN: CPT | Mod: 95 | Performed by: INTERNAL MEDICINE

## 2021-01-11 ENCOUNTER — TELEPHONE (OUTPATIENT)
Dept: SLEEP MEDICINE | Facility: CLINIC | Age: 66
End: 2021-01-11

## 2021-01-11 NOTE — TELEPHONE ENCOUNTER
CALLED PT AND LVM FOR THE PT TO CALL [facility] TO DISCUSS CPAP MASK [phone].

## 2021-01-13 ENCOUNTER — MYC MEDICAL ADVICE (OUTPATIENT)
Dept: ENDOCRINOLOGY | Facility: CLINIC | Age: 66
End: 2021-01-13

## 2021-01-13 DIAGNOSIS — Z79.4 TYPE 2 DIABETES MELLITUS WITH COMPLICATION, WITH LONG-TERM CURRENT USE OF INSULIN (H): Primary | ICD-10-CM

## 2021-01-13 DIAGNOSIS — E11.8 TYPE 2 DIABETES MELLITUS WITH COMPLICATION, WITH LONG-TERM CURRENT USE OF INSULIN (H): Primary | ICD-10-CM

## 2021-01-15 ENCOUNTER — HEALTH MAINTENANCE LETTER (OUTPATIENT)
Age: 66
End: 2021-01-15

## 2021-01-18 ENCOUNTER — APPOINTMENT (OUTPATIENT)
Dept: ENDOCRINOLOGY | Facility: CLINIC | Age: 66
End: 2021-01-18
Payer: COMMERCIAL

## 2021-01-18 ENCOUNTER — TELEPHONE (OUTPATIENT)
Dept: SLEEP MEDICINE | Facility: CLINIC | Age: 66
End: 2021-01-18

## 2021-01-18 DIAGNOSIS — Z79.4 TYPE 2 DIABETES MELLITUS WITH COMPLICATION, WITH LONG-TERM CURRENT USE OF INSULIN (H): ICD-10-CM

## 2021-01-18 DIAGNOSIS — E11.8 TYPE 2 DIABETES MELLITUS WITH COMPLICATION, WITH LONG-TERM CURRENT USE OF INSULIN (H): ICD-10-CM

## 2021-01-18 LAB
BUN SERPL-MCNC: 11 MG/DL (ref 7–30)
CHOLEST SERPL-MCNC: 88 MG/DL
CREAT SERPL-MCNC: 0.64 MG/DL (ref 0.52–1.04)
CREAT UR-MCNC: 23 MG/DL
GFR SERPL CREATININE-BSD FRML MDRD: >90 ML/MIN/{1.73_M2}
GLUCOSE SERPL-MCNC: 120 MG/DL (ref 70–99)
HBA1C MFR BLD: 8.4 % (ref 0–5.6)
HDLC SERPL-MCNC: 41 MG/DL
LDLC SERPL CALC-MCNC: 18 MG/DL
MAGNESIUM SERPL-MCNC: 2 MG/DL (ref 1.6–2.3)
MICROALBUMIN UR-MCNC: 9 MG/L
MICROALBUMIN/CREAT UR: 39.13 MG/G CR (ref 0–25)
NONHDLC SERPL-MCNC: 46 MG/DL
POTASSIUM SERPL-SCNC: 4.3 MMOL/L (ref 3.4–5.3)
TRIGL SERPL-MCNC: 141 MG/DL
TSH SERPL DL<=0.005 MIU/L-ACNC: 3.09 MU/L (ref 0.4–4)

## 2021-01-18 PROCEDURE — 82043 UR ALBUMIN QUANTITATIVE: CPT | Performed by: PATHOLOGY

## 2021-01-18 PROCEDURE — 83036 HEMOGLOBIN GLYCOSYLATED A1C: CPT | Performed by: PATHOLOGY

## 2021-01-18 PROCEDURE — 83735 ASSAY OF MAGNESIUM: CPT | Performed by: PATHOLOGY

## 2021-01-18 PROCEDURE — 84132 ASSAY OF SERUM POTASSIUM: CPT | Performed by: PATHOLOGY

## 2021-01-18 PROCEDURE — 84443 ASSAY THYROID STIM HORMONE: CPT | Performed by: PATHOLOGY

## 2021-01-18 PROCEDURE — 36415 COLL VENOUS BLD VENIPUNCTURE: CPT | Performed by: PATHOLOGY

## 2021-01-18 PROCEDURE — 84520 ASSAY OF UREA NITROGEN: CPT | Performed by: PATHOLOGY

## 2021-01-18 PROCEDURE — 82947 ASSAY GLUCOSE BLOOD QUANT: CPT | Performed by: PATHOLOGY

## 2021-01-18 PROCEDURE — 80061 LIPID PANEL: CPT | Performed by: PATHOLOGY

## 2021-01-18 PROCEDURE — 82565 ASSAY OF CREATININE: CPT | Performed by: PATHOLOGY
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
## 2021-01-18 NOTE — PATIENT INSTRUCTIONS
We appreciate your assistance in coordinating your healthcare.     Please upload your insulin pump, blood sugar meter and/or continuous glucose monitor at home 1-2 days before your next diabetes-related appointment.   This will allow your provider to review your  data before your scheduled virtual visit.    To ask a question to your Endocrine care team, please send them a LoanHero message, or reach them by phone at [phone]     To expedite your medication refill(s), please contact your pharmacy and have them   fax a refill request to: [phone].  *Please allow 3 business days for routine medication refills.  *Please allow 5 business days for controlled substance medication refills.    For after-hours urgent Endocrine issues, that do not require 911, please dial [phone], and ask to speak with the Endocrinologist On-Call        [email or web address]    Follow up with Michelle regarding glucose sensor    Focus taking Humalog with dinner   Continue with taking Lantus daily 80 Units  Continue with Ozempic and Metformin    See primary care for memory issues if you remain concerned    Consider adding Metamucil for bowel movements    Go back to the pool if possible    Follow up in 3 months

## 2021-01-19 ENCOUNTER — VIRTUAL VISIT (OUTPATIENT)
Dept: ENDOCRINOLOGY | Facility: CLINIC | Age: 66
End: 2021-01-19
Payer: COMMERCIAL

## 2021-01-19 DIAGNOSIS — Z79.4 TYPE 2 DIABETES MELLITUS WITH COMPLICATION, WITH LONG-TERM CURRENT USE OF INSULIN (H): Primary | ICD-10-CM

## 2021-01-19 DIAGNOSIS — E11.8 TYPE 2 DIABETES MELLITUS WITH COMPLICATION, WITH LONG-TERM CURRENT USE OF INSULIN (H): Primary | ICD-10-CM

## 2021-01-19 PROCEDURE — 99214 OFFICE O/P EST MOD 30 MIN: CPT | Mod: 95 | Performed by: INTERNAL MEDICINE

## 2021-01-19 RX ORDER — SEMAGLUTIDE 1.34 MG/ML
INJECTION, SOLUTION SUBCUTANEOUS
COMMUNITY
Start: 2020-09-04 | End: 2021-01-28
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
## 2021-01-27 DIAGNOSIS — I10 ESSENTIAL HYPERTENSION: ICD-10-CM

## 2021-01-27 DIAGNOSIS — E78.5 DYSLIPIDEMIA: ICD-10-CM

## 2021-01-27 DIAGNOSIS — E11.8 TYPE 2 DIABETES MELLITUS WITH COMPLICATION (H): Primary | ICD-10-CM

## 2021-01-27 RX ORDER — LISINOPRIL AND HYDROCHLOROTHIAZIDE 12.5; 2 MG/1; MG/1
1 TABLET ORAL DAILY
Qty: 90 TABLET | Refills: 3 | Status: SHIPPED | OUTPATIENT
Start: 2021-01-27 | End: 2021-04-26

## 2021-01-27 RX ORDER — ATORVASTATIN CALCIUM 20 MG/1
20 TABLET, FILM COATED ORAL DAILY
Qty: 90 TABLET | Refills: 3 | Status: SHIPPED | OUTPATIENT
Start: 2021-01-27 | End: 2021-04-26

## 2021-01-28 ENCOUNTER — OFFICE VISIT (OUTPATIENT)
Dept: FAMILY MEDICINE | Facility: CLINIC | Age: 66
End: 2021-01-28
Payer: COMMERCIAL

## 2021-01-28 VITALS
WEIGHT: 251.4 LBS | TEMPERATURE: 98.9 F | DIASTOLIC BLOOD PRESSURE: 76 MMHG | OXYGEN SATURATION: 96 % | HEART RATE: 85 BPM | RESPIRATION RATE: 16 BRPM | SYSTOLIC BLOOD PRESSURE: 124 MMHG | BODY MASS INDEX: 44.68 KG/M2

## 2021-01-28 DIAGNOSIS — R10.31 RLQ ABDOMINAL PAIN: Primary | ICD-10-CM

## 2021-01-28 DIAGNOSIS — Z20.822 ENCOUNTER FOR LABORATORY TESTING FOR COVID-19 VIRUS: ICD-10-CM

## 2021-01-28 LAB
ALBUMIN SERPL-MCNC: 4.3 MG/DL (ref 3.5–4.7)
ALP SERPL-CCNC: 75 U/L (ref 31.7–110.5)
ALT SERPL-CCNC: 46.6 U/L (ref 0–45)
AST SERPL-CCNC: 40.9 U/L (ref 0–45)
BILIRUB SERPL-MCNC: <0.4 MG/DL (ref 0.2–1.3)
BILIRUBIN UR: NEGATIVE MG/DL
BLOOD UR: NEGATIVE MG/DL
BUN SERPL-MCNC: 13.2 MG/DL (ref 7–19)
CALCIUM SERPL-MCNC: 9.8 MG/DL (ref 8.5–10.1)
CHLORIDE SERPLBLD-SCNC: 98.8 MMOL/L (ref 98–110)
CO2 SERPL-SCNC: 25.4 MMOL/L (ref 20–32)
CREAT SERPL-MCNC: 0.6 MG/DL (ref 0.5–1)
GFR SERPL CREATININE-BSD FRML MDRD: >90 ML/MIN/1.7 M2
GLUCOSE SERPL-MCNC: 80.6 MG'DL (ref 70–99)
GLUCOSE URINE: NEGATIVE
KETONES UR QL: NEGATIVE MG/DL
LEUKOCYTE ESTERASE UR: NEGATIVE
NITRITE UR QL STRIP: NEGATIVE MG/DL
PH UR STRIP: 6.5 [PH] (ref 4.5–8)
POTASSIUM SERPL-SCNC: 4.3 MMOL/L (ref 3.3–4.5)
PROT SERPL-MCNC: 6.9 G/DL (ref 6.8–8.8)
PROTEIN UR: NEGATIVE MG/DL
SODIUM SERPL-SCNC: 137.7 MMOL/L (ref 132.6–141.4)
SP GR UR STRIP: 1.01 (ref 1–1.03)
UROBILINOGEN UR STRIP-ACNC: NORMAL E.U./DL

## 2021-01-28 PROCEDURE — U0003 INFECTIOUS AGENT DETECTION BY NUCLEIC ACID (DNA OR RNA); SEVERE ACUTE RESPIRATORY SYNDROME CORONAVIRUS 2 (SARS-COV-2) (CORONAVIRUS DISEASE [COVID-19]), AMPLIFIED PROBE TECHNIQUE, MAKING USE OF HIGH THROUGHPUT TECHNOLOGIES AS DESCRIBED BY CMS-2020-01-R: HCPCS | Performed by: FAMILY MEDICINE

## 2021-01-28 PROCEDURE — 81003 URINALYSIS AUTO W/O SCOPE: CPT | Performed by: FAMILY MEDICINE

## 2021-01-28 PROCEDURE — 36415 COLL VENOUS BLD VENIPUNCTURE: CPT | Performed by: FAMILY MEDICINE

## 2021-01-28 PROCEDURE — 99215 OFFICE O/P EST HI 40 MIN: CPT | Mod: CS | Performed by: FAMILY MEDICINE

## 2021-01-28 PROCEDURE — 80053 COMPREHEN METABOLIC PANEL: CPT | Performed by: FAMILY MEDICINE

## 2021-01-28 PROCEDURE — U0005 INFEC AGEN DETEC AMPLI PROBE: HCPCS | Performed by: FAMILY MEDICINE

## 2021-01-28 RX ORDER — TIMOLOL MALEATE 5 MG/ML
1 SOLUTION/ DROPS OPHTHALMIC 2 TIMES DAILY
COMMUNITY
End: 2021-11-23

## 2021-01-28 ASSESSMENT — PATIENT HEALTH QUESTIONNAIRE - PHQ9: SUM OF ALL RESPONSES TO PHQ QUESTIONS 1-9: 4

## 2021-01-28 NOTE — PROGRESS NOTES
HPI       Radha Baca is a 65 year old  female who presents for the new concern(s) of abdominal pain and COVID test request.      Abdominal pain --        Patient Active Problem List   Diagnosis     Dyslipidemia     BMI >40     SNHL (sensorineural hearing loss)     Glaucoma     Umbilical hernia -- w/o symptoms->expectant mgt     Encounter for routine gynecological examination     Menopause present -- age 39     Health care maintenance     Type 2 diabetes mellitus with complication (H)     Essential hypertension     Venous (peripheral) insufficiency     Chronic bilateral low back pain with right-sided sciatica     Other secondary osteoarthritis of first carpometacarpal joint of right hand     Insulin long-term use (H)     Class 3 severe obesity due to excess calories with serious comorbidity and body mass index (BMI) of 40.0 to 44.9 in adult (H)     Right-sided thoracic back pain     GHISLAINE (obstructive sleep apnea)       Current Outpatient Medications   Medication Sig Dispense Refill     Acetaminophen (TYLENOL PO) Take by mouth as needed for mild pain or fever       amoxicillin (AMOXIL) 500 MG capsule TAKE 4 CAPSULES BY MOUTH ONE HOUR BEFORE DENTAL APPOINTMENT 4 capsule 4     Ascorbic Acid (VITAMIN C PO) Take 2,000 mg by mouth 2 times daily        aspirin 81 MG tablet 1 tab daily 90 tablet 3     atorvastatin (LIPITOR) 20 MG tablet Take 1 tablet (20 mg) by mouth daily DX E78.5 ID # [id number] 90 tablet 3     B Complex Vitamins (VITAMIN  B COMPLEX) CAPS Take 1 tablet by mouth daily        blood glucose (NO BRAND SPECIFIED) test strip        blood glucose (ONETOUCH VERIO IQ) test strip Use to test blood sugar 4 times daily or as directed DX E11.8 ID # [id number] has meter already 360 strip 3     blood glucose calibration (ONETOUCH VERIO HIGH) High solution Use to calibrate blood glucose monitor as needed as directed. LEVEL 3 solution 1 Bottle 3     blood glucose calibration (ONETOUCH VERIO) solution U TO  CALIBRATE BLOOD GLUCOSE MONITOR PRN UTD       calcium-vitamin D (CALTRATE) 600-400 MG-UNIT per tablet Take 1 tablet by mouth 2 times daily       cholecalciferol (VITAMIN D) 1000 UNIT tablet Take 1 tablet by mouth daily. 100 tablet 3     cycloSPORINE (RESTASIS) 0.05 % ophthalmic emulsion        HUMALOG KWIKPEN 100 UNIT/ML soln Carb counting with meals approx 70-80 units daily subcutaneously DX E 11.8 ID # [id number] needs refrigeration 75 mL 3     insulin glargine (LANTUS SOLOSTAR) 100 UNIT/ML pen Inject 80 units SQ each am. DX E11.8 ID # [id number] needs refrigeration 75 mL 3     insulin pen needle (B-D U/F) 31G X 8 MM miscellaneous Use  4 daily short 31 G X 8MM 400 each 3     latanoprost (XALATAN) 0.005 % ophthalmic solution Place 1 drop into both eyes At Bedtime       lisinopril-hydrochlorothiazide (ZESTORETIC) 20-12.5 MG tablet Take 1 tablet by mouth daily DX  I10 ID # [id number] 90 tablet 3     metFORMIN (GLUCOPHAGE) 500 MG tablet Take 2 tablets (1,000 mg) by mouth 2 times daily (with meals) 360 tablet 3     Multiple Vitamin (MULTIVITAMIN  S) CAPS Take 1 daily 90 capsule 3     Multiple Vitamins-Minerals (EYE-ZAKIYA PLUS LUTEIN PO)        Omega-3 Fatty Acids (OMEGA-3 FISH OIL PO) Take 1,000 mg by mouth daily       OneTouch Delica Lancets 33G MISC 4 Box 4 times daily Test  Blood glucose 4 times daily  DX E11.8  ID # [id number] 360 each 3     order for DME Equipment being ordered: Grade 1 (light) compression stockings, below the knee 1 Units 1     OZEMPIC, 1 MG/DOSE, 2 MG/1.5ML pen        Semaglutide,0.25 or 0.5MG/DOS, (OZEMPIC, 0.25 OR 0.5 MG/DOSE,) 2 MG/1.5ML SOPN Inject  0.5 mg  Subcutaneous every 7 days 4.5 mL 3     timolol (TIMOPTIC) 0.5 % ophthalmic solution Place 1 drop into both eyes 2 times daily        triamcinolone (KENALOG) 0.1 % ointment Apply topically 2 times daily 80 g 11          Allergies   Allergen Reactions     Nkda [No Known Drug Allergies]        {Result Choices:255031}         Review of Systems:   {ROS  "normal:016288::\"CONSTITUTIONAL: no fatigue, no unexpected change in weight\",\"SKIN: no worrisome rashes, no worrisome moles, no worrisome lesions\",\"EYES: no acute vision problems or changes\",\"ENT: no ear problems, no mouth problems, no throat problems\",\"RESP: no significant cough, no shortness of breath\",\"CV: no chest pain, no palpitations, no new or worsening peripheral edema\",\"GI: no nausea, no vomiting, no constipation, no diarrhea\"}            Physical Exam:     There were no vitals filed for this visit.  There is no height or weight on file to calculate BMI.  {[facility] Vitals:888380::\"Vitals were reviewed and were normal\"}  {EXAM:640336}       Orders Only on 01/18/2021   Component Date Value Ref Range Status     Magnesium 01/18/2021 2.0  1.6 - 2.3 mg/dL Final     Potassium 01/18/2021 4.3  3.4 - 5.3 mmol/L Final     Urea Nitrogen 01/18/2021 11  7 - 30 mg/dL Final     TSH 01/18/2021 3.09  0.40 - 4.00 mU/L Final     Cholesterol 01/18/2021 88  <200 mg/dL Final     Triglycerides 01/18/2021 141  <150 mg/dL Final     HDL Cholesterol 01/18/2021 41* >49 mg/dL Final     LDL Cholesterol Calculated 01/18/2021 18  <100 mg/dL Final    Desirable:       <100 mg/dl     Non HDL Cholesterol 01/18/2021 46  <130 mg/dL Final     Glucose 01/18/2021 120* 70 - 99 mg/dL Final     Creatinine 01/18/2021 0.64  0.52 - 1.04 mg/dL Final     GFR Estimate 01/18/2021 >90  >60 mL/min/[1.73_m2] Final    Comment: Non  GFR Calc  Starting 12/18/2018, serum creatinine based estimated GFR (eGFR) will be   calculated using the Chronic Kidney Disease Epidemiology Collaboration   (CKD-EPI) equation.       GFR Estimate If Black 01/18/2021 >90  >60 mL/min/[1.73_m2] Final    Comment:  GFR Calc  Starting 12/18/2018, serum creatinine based estimated GFR (eGFR) will be   calculated using the Chronic Kidney Disease Epidemiology Collaboration   (CKD-EPI) equation.       Creatinine Urine 01/18/2021 23  mg/dL Final     Albumin Urine mg/L " 01/18/2021 9  mg/L Final     Albumin Urine mg/g Cr 01/18/2021 39.13* 0 - 25 mg/g Cr Final     Hemoglobin A1C 01/18/2021 8.4* 0 - 5.6 % Final    Comment: Normal <5.7% Prediabetes 5.7-6.4%  Diabetes 6.5% or higher - adopted from ADA   consensus guidelines.           Assessment and Plan     {Diag Picklist:470977}    There are no discontinued medications.    Options for treatment and follow-up care were reviewed with the patient . Radha Baca  engaged in the decision making process and verbalized understanding of the options discussed and agreed with the final plan.    Shannan Knutson, MS4

## 2021-01-28 NOTE — PROGRESS NOTES
Assessment & Plan     RLQ abdominal pain  RLQ pain with fever  R/o appy, r/o diverticulitis (ascending colon diverticuolosis), r/o kidney stones, ovarian cyst    Labs  Imaging    Fluids  Miralax x 2 weeks    - CT Abdomen Pelvis w/o & w Contrast; Future  - CBC with Diff Plt ([facility])  - Comprehensive Metabolic Panel ([facility])  - Urinalysis, Micro If (UA) ([facility])        Encounter for laboratory testing for COVID-19 virus  Low risk based on symptoms, but will check PCR    - Symptomatic COVID-19 Virus (Coronavirus) by PCR; Future  - Symptomatic COVID-19 Virus (Coronavirus) by PCR        >40 minutes spent on the date of the encounter doing chart review, history and exam, documentation and further activities as noted above      Chriss Parra MD  [facility] MELISSA Greenberg is a 65 year old who presents to clinic today for the following health issues     HPI     1. RLQ pain / fever  5 day h/o RLQ pain, warmth  Fever 99.8 (Sunday) --> 100.8 (Mon, Tues)   Chills Monday  Fever better on Wed    Constipation recently  This is an old problem  Blood on hard stool on Monday  Normal BM on Tuesday    H/o polyps on Colonoscopy 12/2019  H/o divertiulosis in ascending and descending colon  Has seen GI for other issues (constipation/rectal bleeding d/t hemorrhoids)    Worried that she has ruptured her colon    Denies uti symptoms, hematuria, shortness of breath, chest pain        2. COVID  Friends / family suggest COVID testing since she has been ill              Objective    /76   Pulse 85   Temp 98.9  F (37.2  C) (Oral)   Resp 16   Wt 114 kg (251 lb 6.4 oz)   SpO2 96%   BMI 44.68 kg/m    Body mass index is 44.68 kg/m .     Physical Exam   GENERAL: healthy, alert and no distress  HENT: ear canals and TM's normal, nose and mouth without ulcers or lesions  RESP: lungs clear to auscultation - no rales, rhonchi or wheezes  CV: regular rate and rhythm, normal S1 S2, no S3 or S4, no murmur,  click or rub, no peripheral edema and peripheral pulses strong  ABDOMEN: soft, tenderness RLQ, no guarding, no psoas stretch pain, and bowel sounds normal  MS: no gross musculoskeletal defects noted, no edema    Results for orders placed or performed in visit on 01/28/21 (from the past 24 hour(s))   Comprehensive Metabolic Panel ([facility])   Result Value Ref Range    Calcium 9.8 8.5 - 10.1 mg/dL    Chloride 98.8 98.0 - 110.0 mmol/L    Carbon Dioxide 25.4 20.0 - 32.0 mmol/L    Creatinine 0.6 0.5 - 1.0 mg/dL    Glucose 80.6 70.0 - 99.0 mg'dL    Potassium 4.3 3.3 - 4.5 mmol/L    Sodium 137.7 132.6 - 141.4 mmol/L    Protein Total 6.9 6.8 - 8.8 g/dL    GFR Estimate >90 >60.0 mL/min/1.7 m2    GFR Estimate If Black >90 >60.0 mL/min/1.7 m2    Albumin 4.3 3.5 - 4.7 mg/dL    Alkaline Phosphatase 75.0 31.7 - 110.5 U/L    ALT 46.6 (H) 0.0 - 45.0 U/L    AST 40.9 0.0 - 45.0 U/L    Bilirubin Total <0.4 0.2 - 1.3 mg/dL    Urea Nitrogen 13.2 7.0 - 19.0 mg/dL   Urinalysis, Micro If (UA) ([facility])   Result Value Ref Range    Specific Gravity Urine 1.015 1.005 - 1.030    pH Urine 6.5 4.5 - 8.0    Leukocyte Esterase UR Negative NEGATIVE    Nitrite Urine Negative NEGATIVE mg/dL    Protein UR Negative NEGATIVE mg/dL    Glucose Urine Negative NEGATIVE    Ketones Urine Negative NEGATIVE mg/dL    Urobilinogen mg/dL 0.2 E.U./dL 0.2 E.U./dL E.U./dL    Bilirubin UR Negative NEGATIVE mg/dL    Blood UR Negative NEGATIVE mg/dL     *Note: Due to a large number of results and/or encounters for the requested time period, some results have not been displayed. A complete set of results can be found in Results Review.

## 2021-01-29 ENCOUNTER — ANCILLARY PROCEDURE (OUTPATIENT)
Dept: CT IMAGING | Facility: CLINIC | Age: 66
End: 2021-01-29
Attending: FAMILY MEDICINE
Payer: COMMERCIAL

## 2021-01-29 DIAGNOSIS — R10.31 RLQ ABDOMINAL PAIN: ICD-10-CM

## 2021-01-29 LAB
LABORATORY COMMENT REPORT: NORMAL
RADIOLOGIST FLAGS: NORMAL
SARS-COV-2 RNA RESP QL NAA+PROBE: NEGATIVE
SARS-COV-2 RNA RESP QL NAA+PROBE: NORMAL
SPECIMEN SOURCE: NORMAL
SPECIMEN SOURCE: NORMAL

## 2021-01-29 PROCEDURE — 74177 CT ABD & PELVIS W/CONTRAST: CPT | Performed by: STUDENT IN AN ORGANIZED HEALTH CARE EDUCATION/TRAINING PROGRAM

## 2021-01-29 RX ORDER — ATORVASTATIN CALCIUM 20 MG/1
TABLET, FILM COATED ORAL
Qty: 90 TABLET | Refills: 3 | OUTPATIENT
Start: 2021-01-29

## 2021-01-29 RX ORDER — IOPAMIDOL 755 MG/ML
135 INJECTION, SOLUTION INTRAVASCULAR ONCE
Status: COMPLETED | OUTPATIENT
Start: 2021-01-29 | End: 2021-01-29

## 2021-01-29 RX ORDER — LISINOPRIL AND HYDROCHLOROTHIAZIDE 12.5; 2 MG/1; MG/1
TABLET ORAL
Qty: 90 TABLET | Refills: 3 | OUTPATIENT
Start: 2021-01-29

## 2021-01-29 RX ADMIN — IOPAMIDOL 135 ML: 755 INJECTION, SOLUTION INTRAVASCULAR at 14:01

## 2021-02-10 ENCOUNTER — VIRTUAL VISIT (OUTPATIENT)
Dept: EDUCATION SERVICES | Facility: CLINIC | Age: 66
End: 2021-02-10
Payer: COMMERCIAL

## 2021-02-10 DIAGNOSIS — E11.8 TYPE 2 DIABETES MELLITUS WITH COMPLICATION (H): Primary | ICD-10-CM

## 2021-02-10 PROCEDURE — G0108 DIAB MANAGE TRN  PER INDIV: HCPCS | Mod: GT | Performed by: REGISTERED NURSE

## 2021-02-10 NOTE — PROGRESS NOTES
"Video-Visit Details    Type of service:  Video Visit  Patient consented to video visit  Video Start Time:  1558  Video End Time:   1652  Originating Location (pt. Location): Home  Distant Location (provider location):  [facility] DIABETES EDUCATION [place]   Platform used for Video Visit: [employer]    DIABETES EDUCATION NOTE:    Radha Baca presents today for education related to Type 2 diabetes    Patient is being treated with:  insulin  She is accompanied by self    PATIENT CONCERNS RELATED TO DIABETES SELF MANAGEMENT:     We have discussed in the past the advantages of using a continuous glucose monitor.  She is interested, however she has contacted her insurance company to find out about coverage and was unable to get any answers.      PROGRESS TOWARD GOALS:    She states that she is pleased that her A1C has dropped some in the past few months.  She states \"I'm doing my best--I'm checking four times a day and counting carbs.  I'm covering all my food, and correcting before I go to bed.\"  States that her fasting glucose is still between 150-200.      ASSESSMENT:  Current Diabetes Management per Patient:    Patient's most recent   Lab Results   Component Value Date    A1C 8.4 01/18/2021    is not meeting goal of <7.0    Activity: She does water aerobics on a regular basis and this involves being submerged in water.  She is concerned about the ability of either the Shalom or Dexcom sensor to withstand being submerged in water.      Nutrition:   She states that she is trying to eat healthier.  She states that occasionally she has a craving for some food, and she usually indulges this by having a small amount.  She states that she has learned from experience that if she doesn't indulge these cravings, she often will then over-indulgence.       Hypoglycemia:   Patient is at risk of hypoglycemia? Yes    If yes, point at which symptoms are experienced:  She has some blunting of her awareness of " hypoglycemia and doesn't usually feel symptoms until she is under 70.  She states that she has about 2 episodes of hypoglycemia per month, and states that this usually happens when she forgets whether or not she's taken her rapid acting (RA) insulin and double doses.      Healthy Coping and Stress Management:  She has a very complicated family situation she is dealing with.  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  She reports that stress is a constant in her life, and she worries about the effect that this has on her health.  She states that it affects her in terms of her eating patterns, as well as just remembering to check glucose and give her RA insulin for all food.                         EDUCATION and INSTRUCTION PROVIDED AT THIS VISIT:        Radha Baca meets the following criteria for a continuous glucose monitoring (CGM) system.      Has a diagnosis of diabetes,: This patient has a diagnosis of Type 2 diabetes requiring multiple daily injections of insulin  Uses a personal blood glucose meter and checks glucose four or more times daily and has for the past 90 days as documented in glucose reports I have personally reviewed   Treated with insulin with multiple daily injections (MDI)  or a constant subcutaneous infusion (CSI) pump:  This patient is injecting insulin a minimum of three times daily.   Has additional conditions that require closer monitoring than can be obtained by blood glucose testing.  This patient  requires frequent adjustments of the insulin treatment regimen, based on therapeutic CGM test results, has severe excursions of blood glucose  and has day to day variations in work, activity and meal schedules that confound the degree of regimentation needed  to self manage glycemia with blood glucose testing  Has completed comprehensive diabetes education and training specific to  the use of a CGM device.    Reviewed both the Shalmo and the Dexcom CGMs.  Did not review the Guardian Connect, as it requires an I-phone to receive transmission, which [employer] doesn't have.      Explained the capabilities of personal CGM  1.  What it measures  2.  Expected accuracy  3.  Limitations  4.  Need to continue to do blood glucose checks  5.  Need to verify sensor readings with blood glucose measurement prior to taking any action to correct or treat blood glucose.     Explained the differences between the two available sensors on the market currently, including how frequently sensor needs to be changed, differences in calibration requirements, integration vs non integrated and common problems encountered, including adhesion, which is important to her.      Also explained approximate costs for hardware and monthly sensor costs, and reviewed the application process, having provider sign letter of medical necessity (LMN), and training involved.  I will send links to her regarding both the Shalom 2 sensor and the Dexcom sensor and will contact the reps from both companies to help determine coverage.      I offered to have [employer] come in to the clinic to actually wear one of these CGMs so she can get a better sense of how they preform when wet.  She had many good questions about how this operate, which she states I was able to answer for her, and states that she now has a better understanding of the process.      Any diabetes medication dose changes were made via the CDE Protocol and Collaborative Practice Agreement with  Physicians.  A copy of this encounter was provided to patient's referring provider.       Patient-stated goal written and given to Radha Baca.  Verbalized and demonstrated understanding of instructions.     PLAN:  See patient instructions  AVS printed and given to patient    FOLLOW-UP:      I will be in contact with [employer] after we get more information about coverage so she can make an  informed decision.  We will set up some time to train.      Any diabetes medication dose changes were made via the [facility] Protocol [facility]ative [facility]  A copy of this encounter was provided to patient's referring provider.

## 2021-02-18 ENCOUNTER — TELEPHONE (OUTPATIENT)
Dept: FAMILY MEDICINE | Facility: CLINIC | Age: 66
End: 2021-02-18

## 2021-02-18 NOTE — TELEPHONE ENCOUNTER
Provider initiated phone call  MyChart results sent but had not heard back    CT scan  1. Prob R side diverticulitis  2. Enlarged lymph node - consider repeat CT scan in 3 months      Doing much better  Some occas pulling sensation in abdomen  Using miralax daily  BM softer. Still every other day but much improved.      Plan  1. Continue miralax. May use less often or may increase to BID  2. Appt with Dr ANDRA Moreno in 2-3 months to consider repeat CT scan  3. If RLQ pain returns, RTC sooner for possible antibiotics    PHarper

## 2021-02-23 ENCOUNTER — TRANSFERRED RECORDS (OUTPATIENT)
Dept: HEALTH INFORMATION MANAGEMENT | Facility: CLINIC | Age: 66
End: 2021-02-23

## 2021-02-23 LAB — RETINOPATHY: POSITIVE

## 2021-03-22 ENCOUNTER — OFFICE VISIT (OUTPATIENT)
Dept: ORTHOPEDICS | Facility: CLINIC | Age: 66
End: 2021-03-22
Payer: COMMERCIAL

## 2021-03-22 DIAGNOSIS — M77.52 TENDINITIS OF LEFT FOOT: ICD-10-CM

## 2021-03-22 DIAGNOSIS — B35.1 OM (ONYCHOMYCOSIS): Primary | ICD-10-CM

## 2021-03-22 DIAGNOSIS — E11.65 TYPE II OR UNSPECIFIED TYPE DIABETES MELLITUS WITH NEUROLOGICAL MANIFESTATIONS, UNCONTROLLED(250.62) (H): ICD-10-CM

## 2021-03-22 DIAGNOSIS — E11.49 TYPE II OR UNSPECIFIED TYPE DIABETES MELLITUS WITH NEUROLOGICAL MANIFESTATIONS, UNCONTROLLED(250.62) (H): ICD-10-CM

## 2021-03-22 PROCEDURE — 99214 OFFICE O/P EST MOD 30 MIN: CPT | Performed by: PODIATRIST

## 2021-03-22 NOTE — PROGRESS NOTES
Chief Complaint   Patient presents with     RECHECK     3-4 months followup both feet            Allergies   Allergen Reactions     Nkda [No Known Drug Allergies]          Subjective: Radha is a 63 year old female who presents to the clinic today for a diabetic foot exam and management. Relates that she has no new LE complaints today. She does have diabetic shoes.  She does have some pain on the top of the left hallux. She does have diclofenac gel for her hands.      Objective  Hemoglobin A1C   Date Value Ref Range Status   01/18/2021 8.4 (H) 0 - 5.6 % Final     Comment:     Normal <5.7% Prediabetes 5.7-6.4%  Diabetes 6.5% or higher - adopted from ADA   consensus guidelines.     09/28/2020 9.0 (H) 0 - 5.6 % Final     Comment:     Normal <5.7% Prediabetes 5.7-6.4%  Diabetes 6.5% or higher - adopted from ADA   consensus guidelines.     03/02/2020 10.2 (H) 0 - 5.6 % Final     Comment:     Normal <5.7% Prediabetes 5.7-6.4%  Diabetes 6.5% or higher - adopted from ADA   consensus guidelines.     01/18/2018 9.6 (A) 4.3 - 6 % Final   10/18/2017 9.5 (A) 4.3 - 6 % Final   07/19/2017 9.7 (A) 4.3 - 6 % Final     Last Comprehensive Metabolic Panel:  Sodium   Date Value Ref Range Status   01/28/2021 137.7 132.6 - 141.4 mmol/L Final     Potassium   Date Value Ref Range Status   01/28/2021 4.3 3.3 - 4.5 mmol/L Final     Chloride   Date Value Ref Range Status   01/28/2021 98.8 98.0 - 110.0 mmol/L Final     Carbon Dioxide   Date Value Ref Range Status   01/28/2021 25.4 20.0 - 32.0 mmol/L Final     Anion Gap   Date Value Ref Range Status   03/02/2020 6 3 - 14 mmol/L Final     Glucose   Date Value Ref Range Status   01/28/2021 80.6 70.0 - 99.0 mg'dL Final     Urea Nitrogen   Date Value Ref Range Status   01/28/2021 13.2 7.0 - 19.0 mg/dL Final     Creatinine   Date Value Ref Range Status   01/28/2021 0.6 0.5 - 1.0 mg/dL Final     GFR Estimate   Date Value Ref Range Status   01/28/2021 >90 >60.0 mL/min/1.7 m2 Final     Calcium   Date  Value Ref Range Status   01/28/2021 9.8 8.5 - 10.1 mg/dL Final     Bilirubin Total   Date Value Ref Range Status   01/28/2021 <0.4 0.2 - 1.3 mg/dL Final     Alkaline Phosphatase   Date Value Ref Range Status   01/28/2021 75.0 31.7 - 110.5 U/L Final     ALT   Date Value Ref Range Status   01/28/2021 46.6 (H) 0.0 - 45.0 U/L Final     AST   Date Value Ref Range Status   01/28/2021 40.9 0.0 - 45.0 U/L Final                 PT and DP pulses are not palpable bilaterally. CRT is 4 seconds. Diminished pedal hair.   Gross sensation is diminished, as well as protective sensation bilaterally.   Equinus is noted bilaterally. Pain noted in the course of the EHL on the left foot.    Nails thickened, brittle, discolored, with subungual debris bilaterally. No open lesions are noted. Xerosis noted BL.      Assessment: DM2 with neuropathy.  Onychomycosis. She would like treatment for this. We discussed this with her again. She has had liver enzymes that were the higher end of normal on the last CMP. Because of the thickness of her nails, she has a lower cure rate. I do no think the risk of increased liver enzymes outweighs the success rate.   Xerosis  EHL tendonitis.      Plan:  - Pt seen and evaluated.  - Nails debrided x 10.  - Can use diclofenac on the left foot.   - See again in 4 months.

## 2021-03-22 NOTE — NURSING NOTE
Reason For Visit:   Chief Complaint   Patient presents with     RECHECK     3-4 months followup both feet       There were no vitals taken for this visit.    Pain Assessment  Patient Currently in Pain: Yes  0-10 Pain Scale: 2  Primary Pain Location: Foot(mainly left foot)    Kanwal Hankins ATC

## 2021-03-22 NOTE — LETTER
3/22/2021         RE: Radha Baca  [address] [id number]        Dear Colleague,    Thank you for referring your patient, Radha Baca, to the [facility] [place]. Please see a copy of my visit note below.    Chief Complaint   Patient presents with     RECHECK     3-4 months followup both feet            Allergies   Allergen Reactions     Nkda [No Known Drug Allergies]          Subjective: Radha is a 63 year old female who presents to the clinic today for a diabetic foot exam and management. Relates that she has no new LE complaints today. She does have diabetic shoes.  She does have some pain on the top of the left hallux. She does have diclofenac gel for her hands.      Objective  Hemoglobin A1C   Date Value Ref Range Status   01/18/2021 8.4 (H) 0 - 5.6 % Final     Comment:     Normal <5.7% Prediabetes 5.7-6.4%  Diabetes 6.5% or higher - adopted from ADA   consensus guidelines.     09/28/2020 9.0 (H) 0 - 5.6 % Final     Comment:     Normal <5.7% Prediabetes 5.7-6.4%  Diabetes 6.5% or higher - adopted from ADA   consensus guidelines.     03/02/2020 10.2 (H) 0 - 5.6 % Final     Comment:     Normal <5.7% Prediabetes 5.7-6.4%  Diabetes 6.5% or higher - adopted from ADA   consensus guidelines.     01/18/2018 9.6 (A) 4.3 - 6 % Final   10/18/2017 9.5 (A) 4.3 - 6 % Final   07/19/2017 9.7 (A) 4.3 - 6 % Final     Last Comprehensive Metabolic Panel:  Sodium   Date Value Ref Range Status   01/28/2021 137.7 132.6 - 141.4 mmol/L Final     Potassium   Date Value Ref Range Status   01/28/2021 4.3 3.3 - 4.5 mmol/L Final     Chloride   Date Value Ref Range Status   01/28/2021 98.8 98.0 - 110.0 mmol/L Final     Carbon Dioxide   Date Value Ref Range Status   01/28/2021 25.4 20.0 - 32.0 mmol/L Final     Anion Gap   Date Value Ref Range Status   03/02/2020 6 3 - 14 mmol/L Final     Glucose   Date Value Ref Range Status   01/28/2021 80.6 70.0 - 99.0 mg'dL Final      Urea Nitrogen   Date Value Ref Range Status   01/28/2021 13.2 7.0 - 19.0 mg/dL Final     Creatinine   Date Value Ref Range Status   01/28/2021 0.6 0.5 - 1.0 mg/dL Final     GFR Estimate   Date Value Ref Range Status   01/28/2021 >90 >60.0 mL/min/1.7 m2 Final     Calcium   Date Value Ref Range Status   01/28/2021 9.8 8.5 - 10.1 mg/dL Final     Bilirubin Total   Date Value Ref Range Status   01/28/2021 <0.4 0.2 - 1.3 mg/dL Final     Alkaline Phosphatase   Date Value Ref Range Status   01/28/2021 75.0 31.7 - 110.5 U/L Final     ALT   Date Value Ref Range Status   01/28/2021 46.6 (H) 0.0 - 45.0 U/L Final     AST   Date Value Ref Range Status   01/28/2021 40.9 0.0 - 45.0 U/L Final                 PT and DP pulses are not palpable bilaterally. CRT is 4 seconds. Diminished pedal hair.   Gross sensation is diminished, as well as protective sensation bilaterally.   Equinus is noted bilaterally. Pain noted in the course of the EHL on the left foot.    Nails thickened, brittle, discolored, with subungual debris bilaterally. No open lesions are noted. Xerosis noted BL.      Assessment: DM2 with neuropathy.  Onychomycosis. She would like treatment for this. We discussed this with her again. She has had liver enzymes that were the higher end of normal on the last CMP. Because of the thickness of her nails, she has a lower cure rate. I do no think the risk of increased liver enzymes outweighs the success rate.   Xerosis  EHL tendonitis.      Plan:  - Pt seen and evaluated.  - Nails debrided x 10.  - Can use diclofenac on the left foot.   - See again in 4 months.      Jerrell Austin, MARCELA

## 2021-03-23 ENCOUNTER — OFFICE VISIT (OUTPATIENT)
Dept: DERMATOLOGY | Facility: CLINIC | Age: 66
End: 2021-03-23
Payer: COMMERCIAL

## 2021-03-23 DIAGNOSIS — L65.9 LOSS OF HAIR: Primary | ICD-10-CM

## 2021-03-23 DIAGNOSIS — L65.9 LOSS OF HAIR: ICD-10-CM

## 2021-03-23 DIAGNOSIS — L28.0 LICHEN SIMPLEX CHRONICUS: ICD-10-CM

## 2021-03-23 DIAGNOSIS — D18.01 CHERRY ANGIOMA: ICD-10-CM

## 2021-03-23 LAB
FERRITIN SERPL-MCNC: 66 NG/ML (ref 8–252)
IRON SATN MFR SERPL: 19 % (ref 15–46)
IRON SERPL-MCNC: 63 UG/DL (ref 35–180)
TIBC SERPL-MCNC: 340 UG/DL (ref 240–430)
TRANSFERRIN SERPL-MCNC: 239 MG/DL (ref 210–360)

## 2021-03-23 PROCEDURE — 82728 ASSAY OF FERRITIN: CPT | Performed by: PATHOLOGY

## 2021-03-23 PROCEDURE — 99000 SPECIMEN HANDLING OFFICE-LAB: CPT | Performed by: PATHOLOGY

## 2021-03-23 PROCEDURE — 36415 COLL VENOUS BLD VENIPUNCTURE: CPT | Performed by: PATHOLOGY

## 2021-03-23 PROCEDURE — 84270 ASSAY OF SEX HORMONE GLOBUL: CPT | Mod: 90 | Performed by: PATHOLOGY

## 2021-03-23 PROCEDURE — 84403 ASSAY OF TOTAL TESTOSTERONE: CPT | Mod: 90 | Performed by: PATHOLOGY

## 2021-03-23 PROCEDURE — 82627 DEHYDROEPIANDROSTERONE: CPT | Mod: 90 | Performed by: PATHOLOGY

## 2021-03-23 PROCEDURE — 83540 ASSAY OF IRON: CPT | Performed by: PATHOLOGY

## 2021-03-23 PROCEDURE — 84466 ASSAY OF TRANSFERRIN: CPT | Mod: 90 | Performed by: PATHOLOGY

## 2021-03-23 PROCEDURE — 99214 OFFICE O/P EST MOD 30 MIN: CPT | Mod: GC | Performed by: DERMATOLOGY

## 2021-03-23 ASSESSMENT — PAIN SCALES - GENERAL: PAINLEVEL: NO PAIN (0)

## 2021-03-23 NOTE — PATIENT INSTRUCTIONS
For your hair loss, there are a couple types of hair loss that could be contributing. They are called telogen effluvium (this is often stress related). Telogen effluvium can become chronic but this is uncommon. 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 Today we'd recommend getting some labs to make sure that your iron levels and some of your hormones are ok. We would also recommend to NOT take biotin as this can affect other important lab tests.     We would recommend starting Rogaine (minoxidil) foam. Below is information on how to use this. This will not have immediate effects, but will take several months to see some improvement. The goal is to stop future hair thinning and to help the hair look slightly more full (but not back to your original thickness).       We'll have you back for another appointment in a few months to address your other skin concerns. The spots on your arms that we looked at today are called cherry angiomas, and they are benign and NOT a skin cancer.     Topical Rogaine (Minoxidil) for Pattern Hair Loss      Minoxidil is an FDA approved over the counter topical for the treatment of hair loss and thinning hair in men and women.     Initially a 2% solution was available however this required application twice daily. A 5% solution is also now approved, only requiring application once per day.     Available Products:     Rogaine 5% solution: Packaged for men however can be used by men or women. Use dropper and apply directly to scalp at bedtime. This product can cause an allergy because of presence of propylene glycol. Stop this product if you develop a rash or itching and contact your physician.     Rogaine 5% foam: Packaged for men and women: Apply foam directly to the scalp once daily. This is less greasy compared to the solution. This formula is preferred for those who had a reaction to the solution product. If you  develop rash or itching, stop the product and contact your physician.     What if I stop minoxidil topical?     After stopping minoxidil the hair will return to the usual pattern of thinning. Using the product 3-4 times per week is better than not using product at all.       Can I use generic minoxidil?     Yes, look for 5% minoxidil.     What are the side effects?    The most common side effect is rash or itching of the scalp. This can occur if a contact allergy develops with propylene glycol. A small group of patients noticed the appearance of facial hair if the product runs onto the face or with prolonged use.     Last updated: 6/26/2016

## 2021-03-23 NOTE — LETTER
3/23/2021       RE: Radha Baca  [address] [facility] [address]     Dear Colleague,    Thank you for referring your patient, Radha Baca, to the [facility] [place] [facility]. Please see a copy of my visit note below.    [facility] Dermatology Note  Encounter Date: Mar 23, 2021  Office Visit     Dermatology Problem List:  1. ***    ____________________________________________    Assessment & Plan:     # ***.  {kkplans:982244}   - ***     # ***.   {kkplans:640312}   - ***     Procedures Performed:   {kkprocedurenotes:924741}  {kkprocedurenotes:432566}    Follow-up: {kkfollowup:998046}    {kkstaffinvolved:268983}    ***  ____________________________________________    CC: Hair Loss (Hair loss - overall thinning. No itch, burn or pain.)    HPI:  Ms. Radha Baca is a(n) 65 year old female who presents today {kknew/return:599389} for ***    Patient is otherwise feeling well, without additional skin concerns.    Labs Reviewed:  ***N/A    Physical Exam:  Vitals: There were no vitals taken for this visit.  SKIN: {kkSkinExam:647703}  - ***  - {Skin Exam Derm:048568}.   - {Skin Exam Derm:282512}.   - {Skin Exam Derm:286990}.   - No other lesions of concern on areas examined.     Medications:  Current Outpatient Medications   Medication     Acetaminophen (TYLENOL PO)     Ascorbic Acid (VITAMIN C PO)     aspirin 81 MG tablet     atorvastatin (LIPITOR) 20 MG tablet     B Complex Vitamins (VITAMIN  B COMPLEX) CAPS     blood glucose (ONETOUCH VERIO IQ) test strip     blood glucose calibration (ONETOUCH VERIO HIGH) High solution     blood glucose calibration (ONETOUCH VERIO) solution     calcium-vitamin D (CALTRATE) 600-400 MG-UNIT per tablet     cholecalciferol (VITAMIN D) 1000 UNIT tablet     cycloSPORINE (RESTASIS) 0.05 % ophthalmic emulsion     HUMALOG KWIKPEN 100 UNIT/ML soln     insulin  glargine (LANTUS SOLOSTAR) 100 UNIT/ML pen     insulin pen needle (B-D U/F) 31G X 8 MM miscellaneous     latanoprost (XALATAN) 0.005 % ophthalmic solution     lisinopril-hydrochlorothiazide (ZESTORETIC) 20-12.5 MG tablet     metFORMIN (GLUCOPHAGE) 500 MG tablet     Multiple Vitamin (MULTIVITAMIN  S) CAPS     Multiple Vitamins-Minerals (EYE-ZAKIYA PLUS LUTEIN PO)     Omega-3 Fatty Acids (OMEGA-3 FISH OIL PO)     OneTouch Delica Lancets 33G MISC     order for DME     Semaglutide,0.25 or 0.5MG/DOS, (OZEMPIC, 0.25 OR 0.5 MG/DOSE,) 2 MG/1.5ML SOPN     timolol (TIMOPTIC) 0.5 % ophthalmic solution     timolol maleate (TIMOPTIC) 0.5 % ophthalmic solution     triamcinolone (KENALOG) 0.1 % ointment     No current facility-administered medications for this visit.       Past Medical History:   Patient Active Problem List   Diagnosis     Dyslipidemia     BMI >40     SNHL (sensorineural hearing loss)     Glaucoma     Umbilical hernia -- w/o symptoms->expectant mgt     Encounter for routine gynecological examination     Menopause present -- age 39     Health care maintenance     Type 2 diabetes mellitus with complication (H)     Essential hypertension     Venous (peripheral) insufficiency     Chronic bilateral low back pain with right-sided sciatica     Other secondary osteoarthritis of first carpometacarpal joint of right hand     Insulin long-term use (H)     Class 3 severe obesity due to excess calories with serious comorbidity and body mass index (BMI) of 40.0 to 44.9 in adult (H)     Right-sided thoracic back pain     GHISLAINE (obstructive sleep apnea)     Past Medical History:   Diagnosis Date     Abnormal Pap smear      Anxiety      Arthritis      Arthritis of knee 4/4/2013     Arthritis of shoulder region, right 4/18/2014     Back injury      Breast disorder      Chronic constipation      Chronic diarrhea      Depressive disorder      Diabetes (H)      Fecal incontinence      Finger pain 4/2/2015     Fracture broke L 5th pinky  finger due to fall  1/2015     Glaucoma (increased eye pressure)      Head injury 8/6/2016     Headache(784.0)     decreased with mouth guard use.     History of blood transfusion 8/2011 & 4/2013     History of diabetes mellitus      Hypercholesteremia      Hypertension      Low back pain      Menarche age 10+    cycles q mo x 4-5 d     Neck injuries      Nonsenile cataract IO implants: L-8/2013; R-1/2011     Pain in knee joint     LEFT     Right bundle branch block     per H/P     SNHL (sensorineural hearing loss)      Umbilical hernia without mention of obstruction or gangrene 8/2014     Vision disorder Detached Retina 10/2009       CC Referred Self, MD  No address on file on close of this encounter.      [facility] Dermatology Note  Encounter Date: Mar 23, 2021  Office Visit     Dermatology Problem List:  1. ***    ____________________________________________    Assessment & Plan:     # ***.  {kkplans:681206}   - ***     # ***.   {kkplans:846397}   - ***     Procedures Performed:   {kkprocedurenotes:191874}  {kkprocedurenotes:915123}    Follow-up: {kkfollowup:978207}    {kkstaffinvolved:666695}    ***  ____________________________________________    CC: Hair Loss (Hair loss - overall thinning. No itch, burn or pain.)    HPI:  Ms. Radha Baca is a(n) 65 year old female who presents today {kknew/return:677224} for hair loss. She has noticed that she is getting balder and balder over time. Her hair used to be very thick and it's getting much thinner. She was thinking of taking biotin but she wasn't sure if it was worth it or if she needs a different hair growth serum. No scalp symptoms including itching, burning or tingling. She is noticing more and more hair left in the tub. Her Dad and uncles still have hair; it might be receeding a little bit but they are in their 90's. Her mom is starting to get thin hair but she thinks it starting to be part of her age. She is wondering if her high  blood pressure medications. She is taking B vitamins and calcium and vitamin D. No hair loss in eyebrows and eyelashes. She notes she doesn't have much hair on her legs anymore. No scale or dandruff. No round bald spots. No history of autoimmune conditions          She has also noticed lots of little brown and red spots on her arms and legs. The little red ones are bright red.     She also wanted to talk about shiny legs with little hair on them.     This year in particular they have started to crack a little more in the last couple of weeks. She uses her medicine and knows how to use it.      Patient is otherwise feeling well, without additional skin concerns.    Labs Reviewed:  ***N/A    Physical Exam:  Vitals: There were no vitals taken for this visit.  SKIN: {kkSkinExam:884573}  - ***  - {Skin Exam Derm:489012}.   - {Skin Exam Derm:493066}.   - {Skin Exam Derm:454967}.   - No other lesions of concern on areas examined.     Medications:  Current Outpatient Medications   Medication     Acetaminophen (TYLENOL PO)     Ascorbic Acid (VITAMIN C PO)     aspirin 81 MG tablet     atorvastatin (LIPITOR) 20 MG tablet     B Complex Vitamins (VITAMIN  B COMPLEX) CAPS     blood glucose (ONETOUCH VERIO IQ) test strip     blood glucose calibration (ONETOUCH VERIO HIGH) High solution     blood glucose calibration (ONETOUCH VERIO) solution     calcium-vitamin D (CALTRATE) 600-400 MG-UNIT per tablet     cholecalciferol (VITAMIN D) 1000 UNIT tablet     cycloSPORINE (RESTASIS) 0.05 % ophthalmic emulsion     HUMALOG KWIKPEN 100 UNIT/ML soln     insulin glargine (LANTUS SOLOSTAR) 100 UNIT/ML pen     insulin pen needle (B-D U/F) 31G X 8 MM miscellaneous     latanoprost (XALATAN) 0.005 % ophthalmic solution     lisinopril-hydrochlorothiazide (ZESTORETIC) 20-12.5 MG tablet     metFORMIN (GLUCOPHAGE) 500 MG tablet     Multiple Vitamin (MULTIVITAMIN  S) CAPS     Multiple Vitamins-Minerals (EYE-ZAKIYA PLUS LUTEIN PO)     Omega-3 Fatty Acids  (OMEGA-3 FISH OIL PO)     OneTouch Delica Lancets 33G MISC     order for DME     Semaglutide,0.25 or 0.5MG/DOS, (OZEMPIC, 0.25 OR 0.5 MG/DOSE,) 2 MG/1.5ML SOPN     timolol (TIMOPTIC) 0.5 % ophthalmic solution     timolol maleate (TIMOPTIC) 0.5 % ophthalmic solution     triamcinolone (KENALOG) 0.1 % ointment     No current facility-administered medications for this visit.       Past Medical History:   Patient Active Problem List   Diagnosis     Dyslipidemia     BMI >40     SNHL (sensorineural hearing loss)     Glaucoma     Umbilical hernia -- w/o symptoms->expectant mgt     Encounter for routine gynecological examination     Menopause present -- age 39     Health care maintenance     Type 2 diabetes mellitus with complication (H)     Essential hypertension     Venous (peripheral) insufficiency     Chronic bilateral low back pain with right-sided sciatica     Other secondary osteoarthritis of first carpometacarpal joint of right hand     Insulin long-term use (H)     Class 3 severe obesity due to excess calories with serious comorbidity and body mass index (BMI) of 40.0 to 44.9 in adult (H)     Right-sided thoracic back pain     GHISLAINE (obstructive sleep apnea)     Past Medical History:   Diagnosis Date     Abnormal Pap smear      Anxiety      Arthritis      Arthritis of knee 4/4/2013     Arthritis of shoulder region, right 4/18/2014     Back injury      Breast disorder      Chronic constipation      Chronic diarrhea      Depressive disorder      Diabetes (H)      Fecal incontinence      Finger pain 4/2/2015     Fracture broke L 5th pinky finger due to fall  1/2015     Glaucoma (increased eye pressure)      Head injury 8/6/2016     Headache(784.0)     decreased with mouth guard use.     History of blood transfusion 8/2011 & 4/2013     History of diabetes mellitus      Hypercholesteremia      Hypertension      Low back pain      Menarche age 10+    cycles q mo x 4-5 d     Neck injuries      Nonsenile cataract IO implants:  L-8/2013; R-1/2011     Pain in knee joint     LEFT     Right bundle branch block     per H/P     SNHL (sensorineural hearing loss)      Umbilical hernia without mention of obstruction or gangrene 8/2014     Vision disorder Detached Retina 10/2009       CC Referred Self, MD  No address on file on close of this encounter.

## 2021-03-23 NOTE — NURSING NOTE
Dermatology Rooming Note    Radha Baca's goals for this visit include:   Chief Complaint   Patient presents with     Hair Loss     Hair loss - overall thinning. No itch, burn or pain.     Kaity Neves, CMA

## 2021-03-23 NOTE — PROGRESS NOTES
[facility] Dermatology Note  Encounter Date: Mar 23, 2021  Office Visit     Dermatology Problem List:  1. 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 Fissure at fingertips  - Current Rx: urea cream, triamcinolone ointment   ____________________________________________    Assessment & Plan:     1. 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 Pattern most consistent with androgenetic alopecia. She has had a very stressful life the last few years so telogen effluvium could be playing a role here. Today we will start with labs and topical minoxidil. Also discussed expectations today for preventing/slowing further hair loss and that some patients will have a response to make the hair more full, but some will not.   - Recommended minoxidil 5% foam twice daily and provided handout with more details   - Advised against biotin supplements   - Iron panel, DHEAS, free and total testosterone pending 03/23/21, TSH wnl 01/2021  - Continue Vitamin D supplements per PCP/endocrinology   - Do not suspect that her blood pressure or diabetes medications are contributing to her hair loss   - Future considerations include scalp biopsy     2. Lichen simplex chronicus, posterior lateral neck    - OK to use triamcinolone 0.1% ointment BID prn on the back of the neck here     3. Numerous additional skin concerns, including cherry angiomas   - Reassured of benign etiology of her cherry angiomas. Offered another f/u visit so we can fully address her other concerns with a full body skin check at a future visit.     Procedures Performed:   None.     Follow-up: 3 month(s) in-person, or earlier for new or changing lesions to address the remaining of her skin concerns    Staff and Resident:     Patient was seen and discussed with attending  physician, Dr. Swain.     Kaity Temple MD/MPH  Internal Medicine/Dermatology  PGY-4    Staff Physician Comments:   I saw and evaluated the patient with the resident and I agree with the assessment and plan.  I was present for the examination. I have made edits if needed.    Mingo Swain MD  Staff Dermatologist and Dermatopathologist  , Department of Dermatology    ____________________________________________    CC: Hair Loss (Hair loss - overall thinning. No itch, burn or pain.)    HPI:  Ms. Radha Baca is a(n) 65 year old female who presents today as a new patient for hair loss. She has noticed that she is getting balder and balder over time. Her hair used to be very thick and it's getting much thinner. She was thinking of taking biotin but she wasn't sure if it was worth it or if she needs a different hair growth serum. No scalp symptoms including itching, burning or tingling. She is noticing more and more hair left in the tub. Her Dad and uncles still have hair; it might be receeding a little bit but they are in their 90's. Her mom is starting to get thin hair but she thinks it starting to be part of her age. She is wondering if her high blood pressure medications. She is taking B vitamins and calcium and vitamin D. No hair loss in eyebrows and eyelashes. She notes she doesn't have much hair on her legs anymore. No scale or dandruff. No round bald spots. No history of autoimmune conditions in her family.     The patient has numerous other skin concerns today and is wondering if they can all be covered on visit.  She has noticed lots of little red and brown spots on her arms and legs.  She is also wanted to talk about her shiny legs which have little hair on them.. Finally she has a history of dermatitis of the fingertips and while her finger tips have cracked a little bit more than usual she is using her topical medicine and this is working well for them.  Today offered to  focus on what she is most concerned about which was her hair loss.  She also has some itching on her lateral neck and is wondering if she can use her triamcinolone cream here.  She was agreeable to come back in a couple of months to address her other concerns.    Patient is otherwise feeling well, without additional skin concerns.    Labs Reviewed:  Prior labs including CBC, TSH and vitamin D levels reviewed today.    Physical Exam:  Vitals: There were no vitals taken for this visit.  SKIN: Focused examination of the scalp, face, forearms and hands was performed.  - Diffuse thinning at the crown and vertex of the scalp  - Hair pull test negative  - No scale or erythema of the scalp noted today  - Eyelashes and eyebrows appear full today  - Left lateral neck with ill-defined thin scaly plaque  - Several small bright red papules on the arms along with scattered light brown macules  - No other lesions of concern on areas examined.     Medications:  Current Outpatient Medications   Medication     Acetaminophen (TYLENOL PO)     Ascorbic Acid (VITAMIN C PO)     aspirin 81 MG tablet     atorvastatin (LIPITOR) 20 MG tablet     B Complex Vitamins (VITAMIN  B COMPLEX) CAPS     blood glucose (ONETOUCH VERIO IQ) test strip     blood glucose calibration (ONETOUCH VERIO HIGH) High solution     blood glucose calibration (ONETOUCH VERIO) solution     calcium-vitamin D (CALTRATE) 600-400 MG-UNIT per tablet     cholecalciferol (VITAMIN D) 1000 UNIT tablet     cycloSPORINE (RESTASIS) 0.05 % ophthalmic emulsion     HUMALOG KWIKPEN 100 UNIT/ML soln     insulin glargine (LANTUS SOLOSTAR) 100 UNIT/ML pen     insulin pen needle (B-D U/F) 31G X 8 MM miscellaneous     latanoprost (XALATAN) 0.005 % ophthalmic solution     lisinopril-hydrochlorothiazide (ZESTORETIC) 20-12.5 MG tablet     metFORMIN (GLUCOPHAGE) 500 MG tablet     Multiple Vitamin (MULTIVITAMIN  S) CAPS     Multiple Vitamins-Minerals (EYE-ZAKIYA PLUS LUTEIN PO)     Omega-3 Fatty  Acids (OMEGA-3 FISH OIL PO)     OneTouch Delica Lancets 33G MISC     order for DME     Semaglutide,0.25 or 0.5MG/DOS, (OZEMPIC, 0.25 OR 0.5 MG/DOSE,) 2 MG/1.5ML SOPN     timolol (TIMOPTIC) 0.5 % ophthalmic solution     timolol maleate (TIMOPTIC) 0.5 % ophthalmic solution     triamcinolone (KENALOG) 0.1 % ointment     No current facility-administered medications for this visit.       Past Medical History:   Patient Active Problem List   Diagnosis     Dyslipidemia     BMI >40     SNHL (sensorineural hearing loss)     Glaucoma     Umbilical hernia -- w/o symptoms->expectant mgt     Encounter for routine gynecological examination     Menopause present -- age 39     Health care maintenance     Type 2 diabetes mellitus with complication (H)     Essential hypertension     Venous (peripheral) insufficiency     Chronic bilateral low back pain with right-sided sciatica     Other secondary osteoarthritis of first carpometacarpal joint of right hand     Insulin long-term use (H)     Class 3 severe obesity due to excess calories with serious comorbidity and body mass index (BMI) of 40.0 to 44.9 in adult (H)     Right-sided thoracic back pain     GHISLAINE (obstructive sleep apnea)     Past Medical History:   Diagnosis Date     Abnormal Pap smear      Anxiety      Arthritis      Arthritis of knee 4/4/2013     Arthritis of shoulder region, right 4/18/2014     Back injury      Breast disorder      Chronic constipation      Chronic diarrhea      Depressive disorder      Diabetes (H)      Fecal incontinence      Finger pain 4/2/2015     Fracture broke L 5th pinky finger due to fall  1/2015     Glaucoma (increased eye pressure)      Head injury 8/6/2016     Headache(784.0)     decreased with mouth guard use.     History of blood transfusion 8/2011 & 4/2013     History of diabetes mellitus      Hypercholesteremia      Hypertension      Low back pain      Menarche age 10+    cycles q mo x 4-5 d     Neck injuries      Nonsenile cataract IO  implants: L-8/2013; R-1/2011     Pain in knee joint     LEFT     Right bundle branch block     per H/P     SNHL (sensorineural hearing loss)      Umbilical hernia without mention of obstruction or gangrene 8/2014     Vision disorder Detached Retina 10/2009       CC Referred Self, MD  No address on file on close of this encounter.

## 2021-03-24 LAB — DHEA-S SERPL-MCNC: 36 UG/DL (ref 35–430)

## 2021-03-25 LAB
SHBG SERPL-SCNC: 22 NMOL/L (ref 30–135)
TESTOST FREE SERPL-MCNC: 0.4 NG/DL (ref 0.06–0.38)
TESTOST SERPL-MCNC: 18 NG/DL (ref 8–60)

## 2021-03-27 ENCOUNTER — HEALTH MAINTENANCE LETTER (OUTPATIENT)
Age: 66
End: 2021-03-27

## 2021-04-09 NOTE — PROGRESS NOTES
dm 2  José Luis Joya, LEA    Outcome for 04/12/21 9:26 AM :Glucose sent via Email    Dionicio is a 65 year old who is being evaluated via a billable video visit.      How would you like to obtain your AVS? MyChart  If the video visit is dropped, the invitation should be resent by: Send to e-mail at: [email or web address]  Will anyone else be joining your video visit? No

## 2021-04-09 NOTE — PATIENT INSTRUCTIONS
- Followup in 3 months with glucose meter readings.    We appreciate your assistance in coordinating your healthcare.     Please upload your insulin pump, blood sugar meter and/or continuous glucose monitor at home 1-2 days before your next diabetes-related appointment.   This will allow your provider to review your  data before your scheduled virtual visit.    To ask a question to your Endocrine care team, please send them a [employer] message, or reach them by phone at [phone]     To expedite your medication refill(s), please contact your pharmacy and have them   fax a refill request to: [phone].  *Please allow 3 business days for routine medication refills.  *Please allow 5 business days for controlled substance medication refills.    For after-hours urgent Endocrine issues, that do not require 421, please dial [phone], and ask to speak with the Endocrinologist On-Call

## 2021-04-12 ENCOUNTER — APPOINTMENT (OUTPATIENT)
Dept: ENDOCRINOLOGY | Facility: CLINIC | Age: 66
End: 2021-04-12
Payer: MEDICARE

## 2021-04-12 DIAGNOSIS — Z79.4 TYPE 2 DIABETES MELLITUS WITH COMPLICATION, WITH LONG-TERM CURRENT USE OF INSULIN (H): ICD-10-CM

## 2021-04-12 DIAGNOSIS — E11.8 TYPE 2 DIABETES MELLITUS WITH COMPLICATION, WITH LONG-TERM CURRENT USE OF INSULIN (H): ICD-10-CM

## 2021-04-12 DIAGNOSIS — E11.8 TYPE 2 DIABETES MELLITUS WITH COMPLICATION (H): ICD-10-CM

## 2021-04-12 LAB
BUN SERPL-MCNC: 17 MG/DL (ref 7–30)
CHOLEST SERPL-MCNC: 118 MG/DL
CREAT SERPL-MCNC: 0.74 MG/DL (ref 0.52–1.04)
CREAT UR-MCNC: 45 MG/DL
GFR SERPL CREATININE-BSD FRML MDRD: 84 ML/MIN/{1.73_M2}
GLUCOSE SERPL-MCNC: 193 MG/DL (ref 70–99)
HBA1C MFR BLD: 8.9 % (ref 0–5.6)
HDLC SERPL-MCNC: 38 MG/DL
LDLC SERPL CALC-MCNC: 21 MG/DL
MICROALBUMIN UR-MCNC: 7 MG/L
MICROALBUMIN/CREAT UR: 16.18 MG/G CR (ref 0–25)
NONHDLC SERPL-MCNC: 80 MG/DL
POTASSIUM SERPL-SCNC: 4.4 MMOL/L (ref 3.4–5.3)
TRIGL SERPL-MCNC: 295 MG/DL
TSH SERPL DL<=0.005 MIU/L-ACNC: 2.86 MU/L (ref 0.4–4)

## 2021-04-12 PROCEDURE — 82565 ASSAY OF CREATININE: CPT | Performed by: PATHOLOGY

## 2021-04-12 PROCEDURE — 82947 ASSAY GLUCOSE BLOOD QUANT: CPT | Performed by: PATHOLOGY

## 2021-04-12 PROCEDURE — 83036 HEMOGLOBIN GLYCOSYLATED A1C: CPT | Performed by: PATHOLOGY

## 2021-04-12 PROCEDURE — 84443 ASSAY THYROID STIM HORMONE: CPT | Performed by: PATHOLOGY

## 2021-04-12 PROCEDURE — 84132 ASSAY OF SERUM POTASSIUM: CPT | Performed by: PATHOLOGY

## 2021-04-12 PROCEDURE — 82043 UR ALBUMIN QUANTITATIVE: CPT | Performed by: PATHOLOGY

## 2021-04-12 PROCEDURE — 84520 ASSAY OF UREA NITROGEN: CPT | Performed by: PATHOLOGY

## 2021-04-12 PROCEDURE — 80061 LIPID PANEL: CPT | Performed by: PATHOLOGY

## 2021-04-12 PROCEDURE — 36415 COLL VENOUS BLD VENIPUNCTURE: CPT | Performed by: PATHOLOGY

## 2021-04-12 NOTE — PROGRESS NOTES
Endocrinology Clinic Diabetes Followup  Service Date: 04/13/21    Radha Baca : [id number]   YOB: 1955  Primary care provider: Mohan Moreno   Fellow: Guru Basilio MD  Attending: MD Aurelio    Assessment and Plan:    1. Type *** diabetes mellitus  HbA1c today is ***    2. Diabetic complications  ***    RTC in ***     Patient is seen and staffed with .**    Guru Basilio MD  PGY 5 - Endocrinology Fellow    ==========================================================================================    HPI:    Radha Baca is a 65 year old female who is called today for evaluation of type 2 diabetes. Has PMH for HTN, arthritis, chronic diarrhea.    1. Type 2 DM:    HbA1c is 8.9% 4/12/2021 from 8.4% Jan 2021.  Current Treatment: ***    HPI: Diabetes:  Has type *** Diagnosed in ***      Glucometer summary:   Average blood glucose checks per day: ***  Average: ***  Before breakfast:   Before lunch:    Before dinner:   Bedtime:  Hypoglycemia:      Diet:   Breakfast: ***  Lunch: ***   Dinner: ***  Snacks: ***  Alcohol or sugared beverages: ***  Prednisone: ***    Exercise :  ***    2. Diabetic Complications:    - Retinopathy: ***. Last eye exam was on *** .   - Nephropathy: *** GFR 84ml, Crt 0.74 on 4/12/2021  - Neuropathy:  ***. Last monofilament testing: ***  - CAD: ***  - Lipids: ***  - Tobacco: ***  - HTN: ***  - Aspirin ***  - TSH - 2.86 4/2021    Review of Systems:   10 point ROS reviewed and was negative unless noted in HPI    Past Medical/Surgical History:   Reviewed in chart  Past Medical History:   Diagnosis Date     Abnormal Pap smear      Anxiety      Arthritis      Arthritis of knee 4/4/2013     Arthritis of shoulder region, right 4/18/2014     Back injury      Breast disorder      Chronic constipation      Chronic diarrhea      Depressive disorder      Diabetes (H)      Fecal incontinence      Finger pain 4/2/2015     Fracture broke L 5th pinky finger due to fall   "1/2015     Glaucoma (increased eye pressure)      Head injury 8/6/2016     Headache(784.0)     decreased with mouth guard use.     History of blood transfusion 8/2011 & 4/2013     History of diabetes mellitus      Hypercholesteremia      Hypertension      Low back pain      Menarche age 10+    cycles q mo x 4-5 d     Neck injuries      Nonsenile cataract IO implants: L-8/2013; R-1/2011     Pain in knee joint     LEFT     Right bundle branch block     per H/P     SNHL (sensorineural hearing loss)      Umbilical hernia without mention of obstruction or gangrene 8/2014     Vision disorder Detached Retina 10/2009     Past Surgical History:   Procedure Laterality Date     ARTHROPLASTY KNEE  4/4/2013    Left Total Knee Arthroplasty;  Surgeon: Lou Byrne MD;  [facility]     ARTHROPLASTY MINIMALLY INVASIVE KNEE  8/22/2011    R knee :CAITLYN JACOBO, Left age 14     COLONOSCOPY  1/14/2015     DENTAL SURGERY      root canals, wisdom teeth     EXAM UNDER ANESTHESIA, MANIPULATE JOINT (LOCATION)  10/5/2012    Procedure: EXAM UNDER ANESTHESIA, MANIPULATE JOINT (LOCATION);  Right Knee Mini Open Lysis Of Adhesions, Left Knee Steroid Injection  ;  Surgeon: Lou Byrne MD;  [facility]     [facility] OR OCULAR DEVICE INTRAOP DETACHED RETINA  2009    R     HC PHAKIC IOL - IMPLANT FROM SURGEON      right     KNEE SURGERY  1969    left     LASER YAG CAPSULOTOMY  12/2016    left     VITRECTOMY PARSPLANA  2010       Allergies:  Reviewed in chart    Current Medications:   Reviewed in chart    Family History:  Reviewed in chart    Social History:  Reviewed in chart    Physical Examination:  Vital signs:                         Estimated body mass index is 44.68 kg/m  as calculated from the following:    Height as of 12/3/20: 1.598 m (5' 2.9\").    Weight as of 1/28/21: 114 kg (251 lb 6.4 oz).        Previous Weights:   Wt Readings from Last 3 Encounters:   01/28/21 114 kg (251 lb 6.4 oz)   11/19/20 111 kg (244 lb 11.2 " oz)   09/28/20 114.2 kg (251 lb 11.2 oz)                    BMI:  There is no height or weight on file to calculate BMI.    Gen: No acute distress, ***comfortable appearing, ***conversant  HEENT: No noted icterus, no palor, mucosa moist ***dry   Eyes - EOMI, ***no erythema, no discharge, ***no lid lag, ***no stare  Neck/thyroid: ***No visible enlargement, *** not tender to touch, on palpation *** no thyroid swelling  CV: S1S2 present, No thrill or heave  Pulm: Bilateral air entry is present or ***reduced breath sounds in both bases. ***No wheeze or rhonchi.  ABDOMEN: Positive bowel sounds, soft, ***non-tender, *** skin purple stretch marks  Ext: No ***LE edema  Skin: No *** hyperpigmentation in elbows, mucosa, palate, palmar creases  Neuro: awake, alert, ***moves arms and legs, ***muscle bulk appears intact, ***DTRs 2/4,*** no tremor of the outstretched hand  Psych: Mood and affect ***congruent   Monofilament testing - Right foot*** intact, left foot***intact  Nails - Clean***, ***Onychomycosis    Endocrine Labs:    Results for LYDIA MARTIN (MRN [id number]) as of 4/13/2021 08:42   Ref. Range 8/6/2019 09:38 3/2/2020 09:55 9/28/2020 09:00 1/18/2021 08:49 4/12/2021 08:41   Hemoglobin A1C Latest Ref Range: 0 - 5.6 % 10.2 (H) 10.2 (H) 9.0 (H) 8.4 (H) 8.9 (H)     ENDO THYROID [facility]S-[facility] Latest Ref Rng & Units 4/12/2021 1/18/2021   TSH 0.40 - 4.00 mU/L 2.86 3.09

## 2021-04-13 ENCOUNTER — VIRTUAL VISIT (OUTPATIENT)
Dept: ENDOCRINOLOGY | Facility: CLINIC | Age: 66
End: 2021-04-13
Payer: MEDICARE

## 2021-04-13 DIAGNOSIS — E78.5 DYSLIPIDEMIA: ICD-10-CM

## 2021-04-13 DIAGNOSIS — E11.8 TYPE 2 DIABETES MELLITUS WITH COMPLICATION (H): ICD-10-CM

## 2021-04-13 DIAGNOSIS — E66.09 EXOGENOUS OBESITY: Primary | Chronic | ICD-10-CM

## 2021-04-13 PROCEDURE — 99214 OFFICE O/P EST MOD 30 MIN: CPT | Mod: 95 | Performed by: INTERNAL MEDICINE

## 2021-04-13 NOTE — PROGRESS NOTES
I, Keturah De Luna, personally evaluated this patient during a virtual visit. I discussed the patient with the fellow and agree with the assessment and plan of care as documented in the fellow's note. I personally reviewed vital signs, mediations, labs and imaging.    Key Findings: A1c of 8.9%    Keturah De Luna MD  Endocrinology and Diabetes    Pager [phone]

## 2021-04-13 NOTE — PROGRESS NOTES
Virtual Visit: Time spent 45 minutes    Endocrinology Clinic Diabetes Followup  Service Date: 04/13/21    Radha Baca : [id number]   YOB: 1955  Primary care provider: Mohan Moreno   Fellow: Guru Basilio MD  Attending: MD Aurelio    Assessment and Plan:    1. 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  HbA1c today is 8.9% from 8.4% Jan 2021. BG pattern shows mostly fasting hyperglycemia and elevated during daytime.     Glucose control is impacted by her loss of multiple [employer] family members due to COVID,  losing job few months ago, her depression needing therapy, recent GI issues? Diverticulitis/constipation, emotional eating habits.    She is not interested in changing her DM medications today. Discussed about discontinuing ozempic and starting Jardiance, she is not interested in changing her DM regimen now.    Plan:    Continue   - Ozemipc 0.5 mg weekly subcutaneous  injections on Sunday  - Lantus 80 units subcutaneous once daily  - Humalog 20 - 22 units with each meal, eats 2-3 meals/day  - Metformin 1000mg po BID  - Glucose check fasting, 2 hours post prandial and bedtime  - Glucose meter for next visit      2. Diabetic complications:  - Retinopathy: Outside Hospital - mild NPDR and DM macular edema which is mild and recommended observation. Last eye exam was on Feb 23rd 2021.  - Nephropathy:  Urine albumin 16, GFR 84ml, Crt 0.74 on 4/12/2021  - Neuropathy: Has tingling sensation, not interested in starting any medication. Last monofilament testing: Due during next physical visit  - CAD/CVA: denies  - Lipids: LDL 21mg Apr 2021, TGL 295mg - Lipitor 20mg - - HTN: 124/76mmHg on lisinopril/HCTZ  - Aspirin - 81mg daily  - TSH - 2.86 4/2021    RTC in 3 months  Patient is staffed with Dr.Trost Guru Basilio MD  PGY 5 - Endocrinology Fellow      I, Keturah De Luna, personally evaluated this patient during a  virtual visit. I discussed the patient with the fellow and agree with the assessment and plan of care as documented in the fellow's note. I personally reviewed vital signs, mediations, labs and imaging.    Key Findings: A1c of 8.9%    30 minutes spent on the date of the encounter doing chart review, review of test results, interpretation of tests, patient visit and documentation. This time does not include case review with the fellow    Keturah De Luna MD  Endocrinology and Diabetes    Pager [phone]        Keturah De Luna MD  Endocrinology and Diabetes    Pager [phone]      ==========================================================================================    HPI:    Radha Baca is a 65 year old female who is called today for 3 month followup of type 2 diabetes. Has PMH for HTN, arthritis, chronic diarrhea.    1. Type 2 DM:    Diabetes: Has type 2 DM, diagnosed more than 15 years ago.    HbA1c is 8.9% 4/12/2021 from 8.4% Jan 2021. She attributes her worsening A1c to multiple recent stressors.     Jan 29 2021 - had CT scan - early uncomplicated diverticulitis,  fat containing umbilical hernia, few lymphnodes ? Reactive. She has constipation, 1BM every 3 days, she  feels bloated most of the time, less appetite. She had some of these issues prior to starting Ozempic but thinks these symptoms got worse. She is waiting to see a GI doctor.    Her  lost job few months ago, that added her stress level, now waiting to see a therapist for her depression. She is eldest in family and lost many family members to COVID and has lot of family stress.     Current Treatment:     - Ozemipc 0.5 mg weekly on Sunday,causing nausea which is tolerable but also constipation which give her a lot of problems  - Lantus 80 units once daily  - Humalog 20 - 22 units with each meal, 2-3 meals/day,  CR 5, usually takes 17-20 with meals, counts carbs  - Metformin 1000mg po BID, she is on  this for long time, not sure if this is causing the side effects.    Denies side effects to lantus or humalog. Denies hypoglycemia, lowest 152mg this morning.    Glucometer summary:   Mar 30 - Apr 12 2021  Average blood glucose checks per day: ~2/day  Average: 225mg with SD 47mg.  Highest 332mg, lowest 153mg  Before breakfast: Mostly 180-200mg  Hypoglycemia:  Denies.    Diet:   2-3 meals/day. Depends on emotional stress, eating snacks - healthy and sometimes unhealthy depending on her stress level  Prednisone: denies taking    2. Diabetic Complications:    - Retinopathy: Outside Hospital - mild NPDR and DM macular edema which is mild and recommended observation. Last eye exam was on Feb 23rd 2021.  - Nephropathy:  Urine albumin 16, GFR 84ml, Crt 0.74 on 4/12/2021  - Neuropathy: Had tingling . Last monofilament testing: during next physical visit  - CAD: denies  - Lipids: Lipitor 20mg, LDL 21mg Apr 2021, TGL 295mg  - HTN: 124/76mmHg on lisinopril/HCTZ  - Aspirin - 81mg daily  - TSH - 2.86 4/2021    Review of Systems:   10 point ROS reviewed and was negative unless noted in HPI    Past Medical/Surgical History:   Reviewed in chart  Past Medical History:   Diagnosis Date     Abnormal Pap smear      Anxiety      Arthritis      Arthritis of knee 4/4/2013     Arthritis of shoulder region, right 4/18/2014     Back injury      Breast disorder      Chronic constipation      Chronic diarrhea      Depressive disorder      Diabetes (H)      Fecal incontinence      Finger pain 4/2/2015     Fracture broke L 5th pinky finger due to fall  1/2015     Glaucoma (increased eye pressure)      Head injury 8/6/2016     Headache(784.0)     decreased with mouth guard use.     History of blood transfusion 8/2011 & 4/2013     History of diabetes mellitus      Hypercholesteremia      Hypertension      Low back pain      Menarche age 10+    cycles q mo x 4-5 d     Neck injuries      Nonsenile cataract IO implants: L-8/2013; R-1/2011     Pain in  "knee joint     LEFT     Right bundle branch block     per H/P     SNHL (sensorineural hearing loss)      Umbilical hernia without mention of obstruction or gangrene 8/2014     Vision disorder Detached Retina 10/2009     Past Surgical History:   Procedure Laterality Date     ARTHROPLASTY KNEE  4/4/2013    Left Total Knee Arthroplasty;  Surgeon: Lou Byrne MD;  [facility]     ARTHROPLASTY MINIMALLY INVASIVE KNEE  8/22/2011    R knee :CAITLYN JACOBO, Left age 14     COLONOSCOPY  1/14/2015     DENTAL SURGERY      root canals, wisdom teeth     EXAM UNDER ANESTHESIA, MANIPULATE JOINT (LOCATION)  10/5/2012    Procedure: EXAM UNDER ANESTHESIA, MANIPULATE JOINT (LOCATION);  Right Knee Mini Open Lysis Of Adhesions, Left Knee Steroid Injection  ;  Surgeon: Lou Byrne MD;  [facility]     [facility] OR OCULAR DEVICE INTRAOP DETACHED RETINA  2009    R     HC PHAKIC IOL - IMPLANT FROM SURGEON      right     KNEE SURGERY  1969    left     LASER YAG CAPSULOTOMY  12/2016    left     VITRECTOMY PARSPLANA  2010       Allergies:  Reviewed in chart    Current Medications:   Reviewed in chart    Family History:  Reviewed in chart    Social History:  Reviewed in chart    Physical Examination:  Vital signs:                         Estimated body mass index is 44.68 kg/m  as calculated from the following:    Height as of 12/3/20: 1.598 m (5' 2.9\").    Weight as of 1/28/21: 114 kg (251 lb 6.4 oz).    Previous Weights:   Wt Readings from Last 3 Encounters:   01/28/21 114 kg (251 lb 6.4 oz)   11/19/20 111 kg (244 lb 11.2 oz)   09/28/20 114.2 kg (251 lb 11.2 oz)                    BMI:  There is no height or weight on file to calculate BMI.    Gen: conversant, very emotional with family stress    Endocrine Labs:    Results for LYDIA MARTIN (MRN [id number]) as of 4/13/2021 08:42   Ref. Range 8/6/2019 09:38 3/2/2020 09:55 9/28/2020 09:00 1/18/2021 08:49 4/12/2021 08:41   Hemoglobin A1C Latest Ref Range: 0 - 5.6 % " 10.2 (H) 10.2 (H) 9.0 (H) 8.4 (H) 8.9 (H)     Results for LYDIA MARTIN (MRN [id number]) as of 4/13/2021 12:29   Ref. Range 4/12/2021 08:41 4/12/2021 09:07   Potassium Latest Ref Range: 3.4 - 5.3 mmol/L 4.4    Urea Nitrogen Latest Ref Range: 7 - 30 mg/dL 17    Creatinine Latest Ref Range: 0.52 - 1.04 mg/dL 0.74    GFR Estimate Latest Ref Range: >60 mL/min/1.73_m2 84    GFR Estimate If Black Latest Ref Range: >60 mL/min/1.73_m2 >90    Hemoglobin A1C Latest Ref Range: 0 - 5.6 % 8.9 (H)    Albumin Urine mg/g Cr Latest Ref Range: 0 - 25 mg/g Cr  16.18   Albumin Urine mg/L Latest Units: mg/L  7   Cholesterol Latest Ref Range: <200 mg/dL 118    Creatinine Urine Latest Units: mg/dL  45   HDL Cholesterol Latest Ref Range: >49 mg/dL 38 (L)    LDL Cholesterol Calculated Latest Ref Range: <100 mg/dL 21    Non HDL Cholesterol Latest Ref Range: <130 mg/dL 80    Triglycerides Latest Ref Range: <150 mg/dL 295 (H)    TSH Latest Ref Range: 0.40 - 4.00 mU/L 2.86    Glucose Latest Ref Range: 70 - 99 mg/dL 193 (H)

## 2021-04-13 NOTE — LETTER
4/13/2021       RE: Radha Baca  [address] [address]     Dear Colleague,    Thank you for referring your patient, Radha Baca, to the [facility] ENDOCRINOLOGY [facility]INIC [place] at [facility]. Please see a copy of my visit note below.     dm 2  José Luis LEA Joya    Outcome for 04/12/21 9:26 AM :Glucose sent via Email    Dionicio is a 65 year old who is being evaluated via a billable video visit.      How would you like to obtain your AVS? MyChart  If the video visit is dropped, the invitation should be resent by: Send to e-mail at: [email or web address]  Will anyone else be joining your video visit? No      Virtual Visit: Time spent 45 minutes    Endocrinology Clinic Diabetes Followup  Service Date: 04/13/21    Radha Baca : [id number]   YOB: 1955  Primary care provider: Mohan Moreno   Fellow: Guru Basilio MD  Attending: MD Aurelio    Assessment and Plan:    1. 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  HbA1c today is 8.9% from 8.4% Jan 2021. BG pattern shows mostly fasting hyperglycemia and elevated during daytime.     Glucose control is impacted by her loss of multiple family members due to COVID,  losing job few months ago, her depression needing therapy, recent GI issues? Diverticulitis/constipation, emotional eating habits.    She is not interested in changing her DM medications today. Discussed about discontinuing ozempic and starting Jardiance, she is not interested in changing her DM regimen now.    Plan:    Continue   - Ozemipc 0.5 mg weekly subcutaneous  injections on Sunday  - Lantus 80 units subcutaneous once daily  - Humalog 20 - 22 units with each meal, eats 2-3 meals/day  - Metformin 1000mg po BID  - Glucose check fasting, 2 hours post prandial and bedtime  - Glucose meter for next visit      2. Diabetic  complications:  - Retinopathy: Outside Hospital - mild NPDR and DM macular edema which is mild and recommended observation. Last eye exam was on Feb 23rd 2021.  - Nephropathy:  Urine albumin 16, GFR 84ml, Crt 0.74 on 4/12/2021  - Neuropathy: Has tingling sensation, not interested in starting any medication. Last monofilament testing: Due during next physical visit  - CAD/CVA: denies  - Lipids: LDL 21mg Apr 2021, TGL 295mg - Lipitor 20mg - Due to her DM retinopathy and elevated triglycerides, she will benefit from fibrate therapy/TGL lowering therapy and this will be addressed in next visit when her active issues/stressors are resolved.  - HTN: 124/76mmHg on lisinopril/HCTZ  - Aspirin - 81mg daily  - TSH - 2.86 4/2021    RTC in 3 months  Patient is staffed with Dr.Trost Guru Basilio MD  PGY 5 - Endocrinology Fellow      I, Keturah De Luna, personally evaluated this patient during a virtual visit. I discussed the patient with the fellow and agree with the assessment and plan of care as documented in the fellow's note. I personally reviewed vital signs, mediations, labs and imaging.    Key Findings: A1c of 8.9%    Keturah De Luna MD  Endocrinology and Diabetes    Pager [phone]      ==========================================================================================    HPI:    Radha Baca is a 65 year old female who is called today for 3 month followup of type 2 diabetes. Has PMH for HTN, arthritis, chronic diarrhea.    1. Type 2 DM:    Diabetes: Has type 2 DM, diagnosed more than 15 years ago.    HbA1c is 8.9% 4/12/2021 from 8.4% Jan 2021. She attributes her worsening A1c to multiple recent stressors.     Jan 29 2021 - had CT scan - early uncomplicated diverticulitis,  fat containing umbilical hernia, few lymphnodes ? Reactive. She has constipation, 1BM every 3 days, she  feels bloated most of the time, less appetite. She had some of these issues prior to starting Ozempic but thinks  these symptoms got worse. She is waiting to see a GI doctor.    Her  lost job few months ago, that added her stress level, now waiting to see a therapist for her depression. She is eldest in family and lost many family members to COVID and has lot of family stress.     Current Treatment:     - Ozemipc 0.5 mg weekly on Sunday,causing nausea which is tolerable but also constipation which give her a lot of problems  - Lantus 80 units once daily  - Humalog 20 - 22 units with each meal, 2-3 meals/day,  CR 5, usually takes 17-20 with meals, counts carbs  - Metformin 1000mg po BID, she is on this for long time, not sure if this is causing the side effects.    Denies side effects to lantus or humalog. Denies hypoglycemia, lowest 152mg this morning.    Glucometer summary:   Mar 30 - Apr 12 2021  Average blood glucose checks per day: ~2/day  Average: 225mg with SD 47mg.  Highest 332mg, lowest 153mg  Before breakfast: Mostly 180-200mg  Hypoglycemia:  Denies.    Diet:   2-3 meals/day. Depends on emotional stress, eating snacks - healthy and sometimes unhealthy depending on her stress level  Prednisone: denies taking    2. Diabetic Complications:    - Retinopathy: Outside Hospital - mild NPDR and DM macular edema which is mild and recommended observation. Last eye exam was on Feb 23rd 2021.  - Nephropathy:  Urine albumin 16, GFR 84ml, Crt 0.74 on 4/12/2021  - Neuropathy: Had tingling . Last monofilament testing: during next physical visit  - CAD: denies  - Lipids: Lipitor 20mg, LDL 21mg Apr 2021, TGL 295mg  - HTN: 124/76mmHg on lisinopril/HCTZ  - Aspirin - 81mg daily  - TSH - 2.86 4/2021    Review of Systems:   10 point ROS reviewed and was negative unless noted in HPI    Past Medical/Surgical History:   Reviewed in chart  Past Medical History:   Diagnosis Date     Abnormal Pap smear      Anxiety      Arthritis      Arthritis of knee 4/4/2013     Arthritis of shoulder region, right 4/18/2014     Back injury      Breast  disorder      Chronic constipation      Chronic diarrhea      Depressive disorder      Diabetes (H)      Fecal incontinence      Finger pain 4/2/2015     Fracture broke L 5th pinky finger due to fall  1/2015     Glaucoma (increased eye pressure)      Head injury 8/6/2016     Headache(784.0)     decreased with mouth guard use.     History of blood transfusion 8/2011 & 4/2013     History of diabetes mellitus      Hypercholesteremia      Hypertension      Low back pain      Menarche age 10+    cycles q mo x 4-5 d     Neck injuries      Nonsenile cataract IO implants: L-8/2013; R-1/2011     Pain in knee joint     LEFT     Right bundle branch block     per H/P     SNHL (sensorineural hearing loss)      Umbilical hernia without mention of obstruction or gangrene 8/2014     Vision disorder Detached Retina 10/2009     Past Surgical History:   Procedure Laterality Date     ARTHROPLASTY KNEE  4/4/2013    Left Total Knee Arthroplasty;  Surgeon: Lou Byrne MD;  [facility]     ARTHROPLASTY MINIMALLY INVASIVE KNEE  8/22/2011    R knee :CAITLYN JACOBO, Left age 14     COLONOSCOPY  1/14/2015     DENTAL SURGERY      root canals, wisdom teeth     EXAM UNDER ANESTHESIA, MANIPULATE JOINT (LOCATION)  10/5/2012    Procedure: EXAM UNDER ANESTHESIA, MANIPULATE JOINT (LOCATION);  Right Knee Mini Open Lysis Of Adhesions, Left Knee Steroid Injection  ;  Surgeon: Lou Byrne MD;  [facility]     [facility] OR OCULAR DEVICE INTRAOP DETACHED RETINA  2009    R     HC PHAKIC IOL - IMPLANT FROM SURGEON      right     KNEE SURGERY  1969    left     LASER YAG CAPSULOTOMY  12/2016    left     VITRECTOMY PARSPLANA  2010       Allergies:  Reviewed in chart    Current Medications:   Reviewed in chart    Family History:  Reviewed in chart    Social History:  Reviewed in chart    Physical Examination:  Vital signs:                         Estimated body mass index is 44.68 kg/m  as calculated from the following:    Height as of  "12/3/20: 1.598 m (5' 2.9\").    Weight as of 1/28/21: 114 kg (251 lb 6.4 oz).    Previous Weights:   Wt Readings from Last 3 Encounters:   01/28/21 114 kg (251 lb 6.4 oz)   11/19/20 111 kg (244 lb 11.2 oz)   09/28/20 114.2 kg (251 lb 11.2 oz)                    BMI:  There is no height or weight on file to calculate BMI.    Gen: conversant, very emotional with family stress    Endocrine Labs:    Results for LYDIA MARTIN (MRN [id number]) as of 4/13/2021 08:42   Ref. Range 8/6/2019 09:38 3/2/2020 09:55 9/28/2020 09:00 1/18/2021 08:49 4/12/2021 08:41   Hemoglobin A1C Latest Ref Range: 0 - 5.6 % 10.2 (H) 10.2 (H) 9.0 (H) 8.4 (H) 8.9 (H)     Results for LYDIA MARTIN (MRN [id number]) as of 4/13/2021 12:29   Ref. Range 4/12/2021 08:41 4/12/2021 09:07   Potassium Latest Ref Range: 3.4 - 5.3 mmol/L 4.4    Urea Nitrogen Latest Ref Range: 7 - 30 mg/dL 17    Creatinine Latest Ref Range: 0.52 - 1.04 mg/dL 0.74    GFR Estimate Latest Ref Range: >60 mL/min/1.73_m2 84    GFR Estimate If Black Latest Ref Range: >60 mL/min/1.73_m2 >90    Hemoglobin A1C Latest Ref Range: 0 - 5.6 % 8.9 (H)    Albumin Urine mg/g Cr Latest Ref Range: 0 - 25 mg/g Cr  16.18   Albumin Urine mg/L Latest Units: mg/L  7   Cholesterol Latest Ref Range: <200 mg/dL 118    Creatinine Urine Latest Units: mg/dL  45   HDL Cholesterol Latest Ref Range: >49 mg/dL 38 (L)    LDL Cholesterol Calculated Latest Ref Range: <100 mg/dL 21    Non HDL Cholesterol Latest Ref Range: <130 mg/dL 80    Triglycerides Latest Ref Range: <150 mg/dL 295 (H)    TSH Latest Ref Range: 0.40 - 4.00 mU/L 2.86    Glucose Latest Ref Range: 70 - 99 mg/dL 193 (H)        "

## 2021-04-23 DIAGNOSIS — I10 ESSENTIAL HYPERTENSION: ICD-10-CM

## 2021-04-23 DIAGNOSIS — E66.09 EXOGENOUS OBESITY: ICD-10-CM

## 2021-04-23 DIAGNOSIS — E11.8 TYPE 2 DIABETES MELLITUS WITH COMPLICATION (H): Primary | ICD-10-CM

## 2021-04-26 DIAGNOSIS — I10 ESSENTIAL HYPERTENSION: ICD-10-CM

## 2021-04-26 DIAGNOSIS — E78.5 DYSLIPIDEMIA: ICD-10-CM

## 2021-04-26 DIAGNOSIS — Z79.4 TYPE 2 DIABETES MELLITUS WITH COMPLICATION, WITH LONG-TERM CURRENT USE OF INSULIN (H): ICD-10-CM

## 2021-04-26 DIAGNOSIS — E11.8 TYPE 2 DIABETES MELLITUS WITH COMPLICATION, WITH LONG-TERM CURRENT USE OF INSULIN (H): ICD-10-CM

## 2021-04-26 RX ORDER — ATORVASTATIN CALCIUM 20 MG/1
20 TABLET, FILM COATED ORAL DAILY
Qty: 90 TABLET | Refills: 3 | Status: SHIPPED | OUTPATIENT
Start: 2021-04-26 | End: 2022-03-23

## 2021-04-26 RX ORDER — LISINOPRIL AND HYDROCHLOROTHIAZIDE 12.5; 2 MG/1; MG/1
1 TABLET ORAL DAILY
Qty: 90 TABLET | Refills: 3 | Status: SHIPPED | OUTPATIENT
Start: 2021-04-26 | End: 2022-03-23

## 2021-04-26 RX ORDER — SEMAGLUTIDE 1.34 MG/ML
INJECTION, SOLUTION SUBCUTANEOUS
Qty: 4.5 ML | Refills: 3 | Status: SHIPPED | OUTPATIENT
Start: 2021-04-26 | End: 2022-03-21

## 2021-05-05 ENCOUNTER — OFFICE VISIT (OUTPATIENT)
Dept: FAMILY MEDICINE | Facility: CLINIC | Age: 66
End: 2021-05-05
Payer: MEDICARE

## 2021-05-05 VITALS
TEMPERATURE: 98.9 F | OXYGEN SATURATION: 96 % | HEART RATE: 93 BPM | DIASTOLIC BLOOD PRESSURE: 70 MMHG | SYSTOLIC BLOOD PRESSURE: 108 MMHG | WEIGHT: 250.4 LBS | BODY MASS INDEX: 44.5 KG/M2

## 2021-05-05 DIAGNOSIS — R59.0 ABDOMINAL LYMPHADENOPATHY: Primary | ICD-10-CM

## 2021-05-05 DIAGNOSIS — K57.32 DIVERTICULITIS OF COLON: ICD-10-CM

## 2021-05-05 PROCEDURE — 99213 OFFICE O/P EST LOW 20 MIN: CPT | Performed by: FAMILY MEDICINE

## 2021-05-05 RX ORDER — POLYETHYLENE GLYCOL 3350 17 G/17G
1 POWDER, FOR SOLUTION ORAL DAILY PRN
COMMUNITY

## 2021-05-05 NOTE — PROGRESS NOTES
65+ Care Package      Date of last Wellness Visit:11/22/2017     Matters  Code Status: Prior   [unfilled]   ADVANCED CARE DIRECTIVES not on file / {POLST STATUS:526485}  { Link to document Serious Illness Conversation  TIP: Document goals, fears/worries,trade-offs}    What brings you jose?  What is most important in your life?  What about your health or healthcare is impeding the above?  Goals:    :No flowsheet data found.     Medications  Current Outpatient Medications   Medication Sig Dispense Refill     Acetaminophen (TYLENOL PO) Take by mouth as needed for mild pain or fever       Ascorbic Acid (VITAMIN C PO) Take 2,000 mg by mouth 2 times daily        aspirin 81 MG tablet 1 tab daily 90 tablet 3     atorvastatin (LIPITOR) 20 MG tablet Take 1 tablet (20 mg) by mouth daily DX E78.5 ID # [id number] 90 tablet 3     B Complex Vitamins (VITAMIN  B COMPLEX) CAPS Take 1 tablet by mouth daily        blood glucose (ONETOUCH VERIO IQ) test strip Use to test blood sugar 4 times daily or as directed DX E11.8 ID # [id number] has meter already 360 strip 3     blood glucose calibration (ONETOUCH VERIO HIGH) High solution Use to calibrate blood glucose monitor as needed as directed. LEVEL 3 solution 1 Bottle 3     blood glucose calibration (ONETOUCH VERIO) solution U TO CALIBRATE BLOOD GLUCOSE MONITOR PRN UTD       calcium-vitamin D (CALTRATE) 600-400 MG-UNIT per tablet Take 1 tablet by mouth 2 times daily       cholecalciferol (VITAMIN D) 1000 UNIT tablet Take 1 tablet by mouth daily. 100 tablet 3     cycloSPORINE (RESTASIS) 0.05 % ophthalmic emulsion        HUMALOG KWIKPEN 100 UNIT/ML soln Carb counting with meals approx 70-80 units daily subcutaneously DX E 11.8 ID # [id number] needs refrigeration 75 mL 3     insulin glargine (LANTUS SOLOSTAR) 100 UNIT/ML pen Inject 80 units SQ each am. DX E11.8 ID # [id number] needs refrigeration 75 mL 3     insulin pen needle (B-D U/F) 31G X 8 MM miscellaneous Use  4 daily short 31 G X 8MM 400  each 3     latanoprost (XALATAN) 0.005 % ophthalmic solution Place 1 drop into both eyes At Bedtime       lisinopril-hydrochlorothiazide (ZESTORETIC) 20-12.5 MG tablet Take 1 tablet by mouth daily DX  I10 ID # [id number] 90 tablet 3     metFORMIN (GLUCOPHAGE) 500 MG tablet Take 2 tablets (1,000 mg) by mouth 2 times daily (with meals) 360 tablet 3     Multiple Vitamin (MULTIVITAMIN  S) CAPS Take 1 daily 90 capsule 3     Multiple Vitamins-Minerals (EYE-ZAKIYA PLUS LUTEIN PO)        Omega-3 Fatty Acids (OMEGA-3 FISH OIL PO) Take 1,000 mg by mouth daily       OneTouch Delica Lancets 33G MISC 4 Box 4 times daily Test  Blood glucose 4 times daily  DX E11.8  ID # [id number] 360 each 3     order for DME Equipment being ordered: Grade 1 (light) compression stockings, below the knee 1 Units 1     Semaglutide,0.25 or 0.5MG/DOS, (OZEMPIC, 0.25 OR 0.5 MG/DOSE,) 2 MG/1.5ML SOPN Inject  0.5 mg  Subcutaneous every 7 days 4.5 mL 3     timolol (TIMOPTIC) 0.5 % ophthalmic solution Place 1 drop into both eyes 2 times daily        timolol maleate (TIMOPTIC) 0.5 % ophthalmic solution 1 drop 2 times daily       triamcinolone (KENALOG) 0.1 % ointment Apply topically 2 times daily 80 g 11     Medications of Risk:   Currently taking Opioids? {YES/NO:60}  Currently taking Benzodiazepines?  {YES/NO:60}  Currently taking Antipsychotics? {YES/NO:60}  Number of medications on med list: 26      Mentation  Does the patient have a cognitive impairment diagnosis on the problem list? No  Does the patient have a depression diagnosis on the problem list? No  PHQ-2 Score:   PHQ-2 ( 1999 [employer]) 1/18/2021 10/12/2020   Q1: Little interest or pleasure in doing things 0 0   Q2: Feeling down, depressed or hopeless 0 1   PHQ-2 Score 0 1   Q1: Little interest or pleasure in doing things - -   Q2: Feeling down, depressed or hopeless - -   PHQ-2 Score - -      PHQ9x3   PHQ-9 SCORE 11/12/2019 12/1/2020 1/28/2021   PHQ-9 Total Score - - -   PHQ-9 Total Score 12 6 4      Mini Cog: No flowsheet data found.    Patient Active Problem List   Diagnosis     Dyslipidemia     BMI >40     SNHL (sensorineural hearing loss)     Glaucoma     Umbilical hernia -- w/o symptoms->expectant mgt     Encounter for routine gynecological examination     Menopause present -- age 39     Health care maintenance     Type 2 diabetes mellitus with complication (H)     Essential hypertension     Venous (peripheral) insufficiency     Chronic bilateral low back pain with right-sided sciatica     Other secondary osteoarthritis of first carpometacarpal joint of right hand     Insulin long-term use (H)     Class 3 severe obesity due to excess calories with serious comorbidity and body mass index (BMI) of 40.0 to 44.9 in adult (H)     Right-sided thoracic back pain     GHISLAINE (obstructive sleep apnea)       Mobility   Are there mobility concerns for this patient?: ***  Adaptive Equipment/DME that patient uses: Cane  Refreshable SmartLink to last Falls Risk assessment score and date:   No data recorded     Interventions today: {TIP Select from 4 Ms below, not all categories need intervention each visit :820419}      Matters:   o {Grace Matters:325617}    Medications:   o {Grace Meds:840258}    Mentation:  o {Grace Mentation:202342}108/70     Mobility:   o {Grace Mobility:220622}    Care plan considerations:  General Risk Score: 2   Values used to calculate this score:    Points  Metrics       1        Age: 65       0        Hospital Admissions: 0       0        ED Visits: 0       0        Has Chronic Obstructive Pulmonary Disease: No       1        Has Diabetes: Yes       0        Has Congestive Heart Failure: No       0        Has liver disease: No       0        Has Depression: No       0        Current PCP: Mohan Moreno MD       0        Has Medicaid: No     Patient Health status: {health status:767996}  Care plan   No Patient Care Coordination Note on file.

## 2021-05-05 NOTE — PATIENT INSTRUCTIONS
Repeat the abdominal CT scan.    Continue other medications as before.    Thank you for coming to [facility] today.  Lab Testing:  **If you had lab testing today and your results are reassuring or normal they will be mailed to you or sent through [employer] within 7 days.   **If the lab tests need quick action we will call you with the results.  **If you are having labs done on a different day, please call [phone] to schedule at [facility] or [phone] for other [facility] locations.   The phone number we will call with results is # [phone] (home) . If this is not the best number please call our clinic and change the number.  Medication Refills:  If you need any refills please call your pharmacy and they will contact us.   If you need to  your refill at a new pharmacy, please contact the new pharmacy directly. The new pharmacy will help you get your medications transferred faster.   Scheduling:  If you have any concerns about today's visit or wish to schedule another appointment please call our office during normal business hours [phone] (8-5:00 M-F)  If a referral was made to a [facility] and you don't get a call from central scheduling please call [phone].  If a Mammogram was ordered for you at [facility] call [phone] to schedule or change your appointment.  If you had an EKG/XRay/CT/Ultrasound/MRI ordered the number is [phone] to schedule or change your radiology appointment.   Medical Concerns:  If you have urgent medical concerns please call [phone] at any time of the day.    Mohan Moreno MD

## 2021-05-05 NOTE — PROGRESS NOTES
Radha was seen today for recheck.    Diagnoses and all orders for this visit:    Abdominal lymphadenopathy.  There was prominent lymphadenopathy noted in the pelvic region on CT in January and well they could have been reactive, follow-up CT is indicated to exclude malignancy.  -     CT Abdomen Pelvis w Contrast; Future    Diverticulitis of colon.  Now asymptomatic.  She will gradually introduce seeds into her diet as desired and continue MiraLAX as well.  -     CT Abdomen Pelvis w Contrast; Future      Total time of visit: 29 minutes for previsit planning, extensive review of records, patient interview, discussion of plan, answering of numerous questions posed by the patient to me, and charting.        Marco Greenberg is a 65 year old who presents for the following health issues: Follow-up of diverticulitis    HPI   The pain for which she presented in January and led to a diagnosis of diverticulitis has resolved.  She still has migratory and fleeting pains in the upper abdomen that she believes are mostly related to gas.  She takes MiraLAX and is having bowel movements every 2 to 3 days.  No fever or diarrhea.          Review of Systems         Objective    /70 (BP Location: Left arm, Patient Position: Sitting, Cuff Size: Adult Large)   Pulse 93   Temp 98.9  F (37.2  C) (Oral)   Wt 113.6 kg (250 lb 6.4 oz)   SpO2 96%   Breastfeeding No   BMI 44.50 kg/m    Body mass index is 44.5 kg/m .  Physical Exam  Constitutional:       General: She is not in acute distress.     Appearance: She is obese. She is not ill-appearing.   Abdominal:      General: Abdomen is flat. Bowel sounds are normal. There is no distension.      Palpations: Abdomen is soft. There is no mass.      Tenderness: There is no abdominal tenderness.      Hernia: A hernia (umbilical, soft, NT) is present.   Neurological:      Mental Status: She is alert.            Reviewed results from abdominal pelvic CT in January.

## 2021-05-12 ENCOUNTER — ANCILLARY PROCEDURE (OUTPATIENT)
Dept: CT IMAGING | Facility: CLINIC | Age: 66
End: 2021-05-12
Attending: FAMILY MEDICINE
Payer: MEDICARE

## 2021-05-12 DIAGNOSIS — K57.32 DIVERTICULITIS OF COLON: ICD-10-CM

## 2021-05-12 DIAGNOSIS — R59.0 ABDOMINAL LYMPHADENOPATHY: ICD-10-CM

## 2021-05-12 LAB
CREAT BLD-MCNC: 0.7 MG/DL (ref 0.52–1.04)
GFR SERPL CREATININE-BSD FRML MDRD: 84 ML/MIN/{1.73_M2}

## 2021-05-12 PROCEDURE — G1004 CDSM NDSC: HCPCS | Performed by: RADIOLOGY

## 2021-05-12 PROCEDURE — 74177 CT ABD & PELVIS W/CONTRAST: CPT | Mod: ME | Performed by: RADIOLOGY

## 2021-05-12 RX ORDER — IOPAMIDOL 755 MG/ML
135 INJECTION, SOLUTION INTRAVASCULAR ONCE
Status: COMPLETED | OUTPATIENT
Start: 2021-05-12 | End: 2021-05-12

## 2021-05-12 RX ADMIN — IOPAMIDOL 135 ML: 755 INJECTION, SOLUTION INTRAVASCULAR at 08:25
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
## 2021-06-10 ENCOUNTER — MYC MEDICAL ADVICE (OUTPATIENT)
Dept: FAMILY MEDICINE | Facility: CLINIC | Age: 66
End: 2021-06-10

## 2021-06-10 DIAGNOSIS — R19.7 DIARRHEA, UNSPECIFIED TYPE: Primary | ICD-10-CM

## 2021-06-16 ENCOUNTER — OFFICE VISIT (OUTPATIENT)
Dept: ORTHOPEDICS | Facility: CLINIC | Age: 66
End: 2021-06-16
Payer: MEDICARE

## 2021-06-16 DIAGNOSIS — B35.1 OM (ONYCHOMYCOSIS): Primary | ICD-10-CM

## 2021-06-16 DIAGNOSIS — E11.49 TYPE II OR UNSPECIFIED TYPE DIABETES MELLITUS WITH NEUROLOGICAL MANIFESTATIONS, UNCONTROLLED(250.62) (H): ICD-10-CM

## 2021-06-16 DIAGNOSIS — E11.65 TYPE II OR UNSPECIFIED TYPE DIABETES MELLITUS WITH NEUROLOGICAL MANIFESTATIONS, UNCONTROLLED(250.62) (H): ICD-10-CM

## 2021-06-16 PROCEDURE — 99213 OFFICE O/P EST LOW 20 MIN: CPT | Performed by: PODIATRIST

## 2021-06-16 NOTE — PROGRESS NOTES
Chief Complaint   Patient presents with     Follow Up     3-4 month follow up.             Allergies   Allergen Reactions     Nkda [No Known Drug Allergies]          Subjective: Radha is a 65 year old female who presents to the clinic today for a diabetic foot exam and management. Relates that she has no new LE complaints today. She does have diabetic shoes. EHL pain resolved.      Objective  Hemoglobin A1C   Date Value Ref Range Status   04/12/2021 8.9 (H) 0 - 5.6 % Final     Comment:     Normal <5.7% Prediabetes 5.7-6.4%  Diabetes 6.5% or higher - adopted from ADA   consensus guidelines.     01/18/2021 8.4 (H) 0 - 5.6 % Final     Comment:     Normal <5.7% Prediabetes 5.7-6.4%  Diabetes 6.5% or higher - adopted from ADA   consensus guidelines.     09/28/2020 9.0 (H) 0 - 5.6 % Final     Comment:     Normal <5.7% Prediabetes 5.7-6.4%  Diabetes 6.5% or higher - adopted from ADA   consensus guidelines.     01/18/2018 9.6 (A) 4.3 - 6 % Final   10/18/2017 9.5 (A) 4.3 - 6 % Final   07/19/2017 9.7 (A) 4.3 - 6 % Final     Last Comprehensive Metabolic Panel:  Sodium   Date Value Ref Range Status   01/28/2021 137.7 132.6 - 141.4 mmol/L Final     Potassium   Date Value Ref Range Status   04/12/2021 4.4 3.4 - 5.3 mmol/L Final     Chloride   Date Value Ref Range Status   01/28/2021 98.8 98.0 - 110.0 mmol/L Final     Carbon Dioxide   Date Value Ref Range Status   01/28/2021 25.4 20.0 - 32.0 mmol/L Final     Anion Gap   Date Value Ref Range Status   03/02/2020 6 3 - 14 mmol/L Final     Glucose   Date Value Ref Range Status   04/12/2021 193 (H) 70 - 99 mg/dL Final     Urea Nitrogen   Date Value Ref Range Status   04/12/2021 17 7 - 30 mg/dL Final     Creatinine   Date Value Ref Range Status   04/12/2021 0.74 0.52 - 1.04 mg/dL Final     GFR Estimate   Date Value Ref Range Status   05/12/2021 84 >60 mL/min/[1.73_m2] Final     Calcium   Date Value Ref Range Status   01/28/2021 9.8 8.5 - 10.1 mg/dL Final     Bilirubin Total   Date  Value Ref Range Status   01/28/2021 <0.4 0.2 - 1.3 mg/dL Final     Alkaline Phosphatase   Date Value Ref Range Status   01/28/2021 75.0 31.7 - 110.5 U/L Final     ALT   Date Value Ref Range Status   01/28/2021 46.6 (H) 0.0 - 45.0 U/L Final     AST   Date Value Ref Range Status   01/28/2021 40.9 0.0 - 45.0 U/L Final                 PT and DP pulses are not palpable bilaterally. CRT is 4 seconds. Diminished pedal hair.   Gross sensation is diminished, as well as protective sensation bilaterally.   Equinus is noted bilaterally.   Nails thickened, brittle, discolored, with subungual debris bilaterally. No open lesions are noted. Xerosis noted BL.      Assessment: DM2 with neuropathy.  Onychomycosis.   Xerosis       Plan:  - Pt seen and evaluated.  - Nails debrided x 10.  - See again in 4 months.

## 2021-06-16 NOTE — LETTER
6/16/2021         RE: Radha Baca  [address] [facility] [id number]        Dear Colleague,    Thank you for referring your patient, Radha Baca, to the [facility] [place]. Please see a copy of my visit note below.    Chief Complaint   Patient presents with     Follow Up     3-4 month follow up.             Allergies   Allergen Reactions     Nkda [No Known Drug Allergies]          Subjective: Radha is a 65 year old female who presents to the clinic today for a diabetic foot exam and management. Relates that she has no new LE complaints today. She does have diabetic shoes. EHL pain resolved.      Objective  Hemoglobin A1C   Date Value Ref Range Status   04/12/2021 8.9 (H) 0 - 5.6 % Final     Comment:     Normal <5.7% Prediabetes 5.7-6.4%  Diabetes 6.5% or higher - adopted from ADA   consensus guidelines.     01/18/2021 8.4 (H) 0 - 5.6 % Final     Comment:     Normal <5.7% Prediabetes 5.7-6.4%  Diabetes 6.5% or higher - adopted from ADA   consensus guidelines.     09/28/2020 9.0 (H) 0 - 5.6 % Final     Comment:     Normal <5.7% Prediabetes 5.7-6.4%  Diabetes 6.5% or higher - adopted from ADA   consensus guidelines.     01/18/2018 9.6 (A) 4.3 - 6 % Final   10/18/2017 9.5 (A) 4.3 - 6 % Final   07/19/2017 9.7 (A) 4.3 - 6 % Final     Last Comprehensive Metabolic Panel:  Sodium   Date Value Ref Range Status   01/28/2021 137.7 132.6 - 141.4 mmol/L Final     Potassium   Date Value Ref Range Status   04/12/2021 4.4 3.4 - 5.3 mmol/L Final     Chloride   Date Value Ref Range Status   01/28/2021 98.8 98.0 - 110.0 mmol/L Final     Carbon Dioxide   Date Value Ref Range Status   01/28/2021 25.4 20.0 - 32.0 mmol/L Final     Anion Gap   Date Value Ref Range Status   03/02/2020 6 3 - 14 mmol/L Final     Glucose   Date Value Ref Range Status   04/12/2021 193 (H) 70 - 99 mg/dL Final     Urea Nitrogen   Date Value Ref Range Status   04/12/2021 17 7 - 30 mg/dL Final      Creatinine   Date Value Ref Range Status   04/12/2021 0.74 0.52 - 1.04 mg/dL Final     GFR Estimate   Date Value Ref Range Status   05/12/2021 84 >60 mL/min/[1.73_m2] Final     Calcium   Date Value Ref Range Status   01/28/2021 9.8 8.5 - 10.1 mg/dL Final     Bilirubin Total   Date Value Ref Range Status   01/28/2021 <0.4 0.2 - 1.3 mg/dL Final     Alkaline Phosphatase   Date Value Ref Range Status   01/28/2021 75.0 31.7 - 110.5 U/L Final     ALT   Date Value Ref Range Status   01/28/2021 46.6 (H) 0.0 - 45.0 U/L Final     AST   Date Value Ref Range Status   01/28/2021 40.9 0.0 - 45.0 U/L Final                 PT and DP pulses are not palpable bilaterally. CRT is 4 seconds. Diminished pedal hair.   Gross sensation is diminished, as well as protective sensation bilaterally.   Equinus is noted bilaterally.   Nails thickened, brittle, discolored, with subungual debris bilaterally. No open lesions are noted. Xerosis noted BL.      Assessment: DM2 with neuropathy.  Onychomycosis.   Xerosis       Plan:  - Pt seen and evaluated.  - Nails debrided x 10.  - See again in 4 months.    Jerrell Austin DPM

## 2021-06-16 NOTE — NURSING NOTE
Reason For Visit:   Chief Complaint   Patient presents with     Follow Up     3-4 month follow up.        Pain Assessment  Patient Currently in Pain: Denies        Allergies   Allergen Reactions     Nkda [No Known Drug Allergies]            Екатерина Lundy LPN

## 2021-06-21 NOTE — TELEPHONE ENCOUNTER
REFERRAL INFORMATION:    Referring Provider:  Dr. Mohan Moreno     Referring Clinic:  [facility]     Reason for Visit/Diagnosis: IBD     FUTURE VISIT INFORMATION:    Appointment Date: 7/27/2021    Appointment Time: 8 AM      NOTES STATUS DETAILS   OFFICE NOTE from Referring Provider Internal 5/5/2021 Office visit with Dr. Moreno     OFFICE NOTE from Other Specialist Internal 1/28/2021 Office visit with Dr. Chriss Parra ([facility])     10/9/19, 7/17/19 Office visit with Pili Arce PA-C ([facility])    4/30/19 office visit with Dr. Mohan Rosenberg ([facility])    11/20/18 Office visit with Dr. Xi Easley ([facility])       HOSPITAL DISCHARGE SUMMARY/  ED VISITS N/A    OPERATIVE REPORT N/A    MEDICATION LIST Internal         ENDOSCOPY  N/A    COLONOSCOPY Received 12/27/19, 1/14/15 ([facility])    ERCP N/A    EUS N/A    STOOL TESTING N/A    PERTINENT LABS Internal    PATHOLOGY REPORTS (RELATED) N/A    IMAGING (CT, MRI, EGD, MRCP, Small Bowel Follow Through/SBT, MR/CT Enterography) Internal CT Abdomen Pelvis: 5/12/2021, 1/29/2021 7/1/2021 2:32pm Fax request sent to [facility] for med recs. Aimee   7/21/2021 4:22pm Received fax reply from [facility]; not a [facility] Pt. Closing encounter now. Aimee

## 2021-06-22 ENCOUNTER — TRANSFERRED RECORDS (OUTPATIENT)
Dept: HEALTH INFORMATION MANAGEMENT | Facility: CLINIC | Age: 66
End: 2021-06-22

## 2021-06-29 ENCOUNTER — TELEPHONE (OUTPATIENT)
Dept: SLEEP MEDICINE | Facility: CLINIC | Age: 66
End: 2021-06-29

## 2021-06-29 NOTE — TELEPHONE ENCOUNTER
RECEIVED A VOICE MAIL FROM THE PT ASKING WHAT IS NEEDED WHEN NEW TO MEDICARE. RETURNED PT CALL AND SHE STATED THAT SHE REMEMBER BEING TOLD THAT SHE MIGHT NEED A NEW PSG AND A NEW MACHINE BECAUSE OF NOW BEING ON MEDICARE. LET THE PT KNOW THAT HER CURRENT HST THROUGH  WILL ALLOW THE PT TO RECEIVE SUPPLIES BUT NOT A REPLACEMENT MACHINE. LET THE PT KNOW THAT IF WANTING A REPLACEMENT MACHINE WILL NEED TO HAVE A NEW PSG COMPLETED. ALSO LET THE PT KNOW SHE WILL NEED A FU VISIT WITH THE SLEEP PROVIDER AND NEW RX WRITTEN. PT STATED THAT SHE WILL REACH OUT TO THE SLEEP PROVIDER TO DISCUSS. GAVE THE PT THE MAIN SCHEDULING NUMBER [phone].

## 2021-07-07 DIAGNOSIS — E11.8 TYPE 2 DIABETES MELLITUS WITH COMPLICATION, WITH LONG-TERM CURRENT USE OF INSULIN (H): ICD-10-CM

## 2021-07-07 DIAGNOSIS — E11.8 TYPE 2 DIABETES MELLITUS WITH COMPLICATION (H): ICD-10-CM

## 2021-07-07 DIAGNOSIS — E11.9 DIABETES MELLITUS, TYPE 2 (H): ICD-10-CM

## 2021-07-07 DIAGNOSIS — Z79.4 TYPE 2 DIABETES MELLITUS WITH COMPLICATION, WITH LONG-TERM CURRENT USE OF INSULIN (H): ICD-10-CM

## 2021-07-07 NOTE — TELEPHONE ENCOUNTER
[facility] Call Center    Phone Message    May a detailed message be left on voicemail: yes     Reason for Call: Medication Refill Request    Has the patient contacted the pharmacy for the refill? Yes   Name of medication being requested: metFORMIN (GLUCOPHAGE) 500 MG tablet & insulin glargine (LANTUS SOLOSTAR) 100 UNIT/ML pen  Provider who prescribed the medication: Calixto  Pharmacy: [facility] - [address], [address]  Date medication is needed: Pt is running low on metformin.      Action Taken: Message routed to:  [facility] endo    Travel Screening: Not Applicable                                                                    ;

## 2021-07-07 NOTE — PROGRESS NOTES
Dionicio is a 65 year old who is being evaluated via a billable video visit.      How would you like to obtain your AVS? MyChart  If the video visit is dropped, the invitation should be resent by: Text to cell phone: [phone]  Will anyone else be joining your video visit? No        Video-Visit Details    Type of service:  Video Visit    Video Start Time: 1:31 PM    Video End Time:2:12 PM    Originating Location (pt. Location): Home    Distant Location (provider location):  [facility] Office    Platform used for Video Visit: Nadiya     Chief complaint: Follow-up sleep apnea, needs supplies    History of Present Illness: 65-year-old female with history of hypertension, diabetes, glaucoma, severe obstructive sleep apnea.  She recently changed insurance to Medicare and has questions about that process.  Her current machine is only a couple of years old and it is working well.  She does need new supplies her headgear is stretched out.  She is also noticing that sometimes she is at very dry mouth that she thinks she is opening up her mouth at night.  She has tried a couple different masks.  Sometimes she has difficulty initiating sleep she admits that she is under a fair amount of the stressors with family relationships and care of her mother who is essentially homeless at this time.  She also has occasional very vivid and disturbing nightmares.  She will wake up in get up and go do something distracting and then she may go back to bed and go straight back into the nightmare.    Since her last visit she has been more intentional about taking shorter naps and found that this has been helpful.  She also practices some mindfulness daily.  She has a support group that continues to meet.    [place] Sleepiness Scale  Total score - [place]: 3 (7/7/2021  3:04 PM)   (Less than 10 normal)    Insomnia Severity Scale   MIHAELA 12   (normal 0-7, mild 8-14, moderate 15-21, severe 22-28)    Past Medical  History:   Diagnosis Date     Abnormal Pap smear      Anxiety      Arthritis      Arthritis of knee 4/4/2013     Arthritis of shoulder region, right 4/18/2014     Back injury      Breast disorder      Chronic constipation      Chronic diarrhea      Depressive disorder      Diabetes (H)      Fecal incontinence      Finger pain 4/2/2015     Fracture broke L 5th pinky finger due to fall  1/2015     Glaucoma (increased eye pressure)      Head injury 8/6/2016     Headache(784.0)     decreased with mouth guard use.     History of blood transfusion 8/2011 & 4/2013     History of diabetes mellitus      Hypercholesteremia      Hypertension      Low back pain      Menarche age 9+    cycles q mo x 4-5 d     Neck injuries      Nonsenile cataract IO implants: L-8/2013; R-1/2011     Pain in knee joint     LEFT     Right bundle branch block     per H/P     SNHL (sensorineural hearing loss)      Umbilical hernia without mention of obstruction or gangrene 8/2014     Vision disorder Detached Retina 10/2009       Allergies   Allergen Reactions     Nkda [No Known Drug Allergies]        Current Outpatient Medications   Medication     Acetaminophen (TYLENOL PO)     Ascorbic Acid (VITAMIN C PO)     aspirin 81 MG tablet     atorvastatin (LIPITOR) 20 MG tablet     B Complex Vitamins (VITAMIN  B COMPLEX) CAPS     blood glucose (ONETOUCH VERIO IQ) test strip     blood glucose calibration (ONETOUCH VERIO HIGH) High solution     blood glucose calibration (ONETOUCH VERIO) solution     calcium-vitamin D (CALTRATE) 600-400 MG-UNIT per tablet     cholecalciferol (VITAMIN D) 1000 UNIT tablet     cycloSPORINE (RESTASIS) 0.05 % ophthalmic emulsion     HUMALOG KWIKPEN 100 UNIT/ML soln     insulin glargine (LANTUS SOLOSTAR) 100 UNIT/ML pen     insulin pen needle (B-D U/F) 31G X 8 MM miscellaneous     latanoprost (XALATAN) 0.005 % ophthalmic solution     lisinopril-hydrochlorothiazide (ZESTORETIC) 20-12.5 MG tablet     metFORMIN (GLUCOPHAGE) 500 MG  tablet     Multiple Vitamin (MULTIVITAMIN  S) CAPS     Multiple Vitamins-Minerals (EYE-ZAKIYA PLUS LUTEIN PO)     Omega-3 Fatty Acids (OMEGA-3 FISH OIL PO)     OneTouch Delica Lancets 33G MISC     order for DME     polyethylene glycol (MIRALAX) 17 GM/Dose powder     Semaglutide,0.25 or 0.5MG/DOS, (OZEMPIC, 0.25 OR 0.5 MG/DOSE,) 2 MG/1.5ML SOPN     timolol (TIMOPTIC) 0.5 % ophthalmic solution     timolol maleate (TIMOPTIC) 0.5 % ophthalmic solution     triamcinolone (KENALOG) 0.1 % ointment     No current facility-administered medications for this visit.        Social History     Socioeconomic History     Marital status:      Spouse name: Not on file     Number of children: Not on file     Years of education: Not on file     Highest education level: Not on file   Occupational History     Occupation: Human Resources     Comment: between jobs now   Social Needs     Financial resource strain: Not on file     Food insecurity     Worry: Not on file     Inability: Not on file     Transportation needs     Medical: Not on file     Non-medical: Not on file   Tobacco Use     Smoking status: Former Smoker     Packs/day: 0.00     Smokeless tobacco: Never Used     Tobacco comment: quit mid 1980s   Substance and Sexual Activity     Alcohol use: No     Drug use: No     Sexual activity: Yes     Partners: Male     Birth control/protection: Post-menopausal   Lifestyle     Physical activity     Days per week: Not on file     Minutes per session: Not on file     Stress: Not on file   Relationships     Social connections     Talks on phone: Not on file     Gets together: Not on file     Attends Holiness service: Not on file     Active member of club or organization: Not on file     Attends meetings of clubs or organizations: Not on file     Relationship status: Not on file     Intimate partner violence     Fear of current or ex partner: Not on file     Emotionally abused: Not on file     Physically abused: Not on file     Forced  sexual activity: Not on file   Other Topics Concern      Service Not Asked     Blood Transfusions Yes     Comment: 2 units 2011     Caffeine Concern No     Comment: 2s     Occupational Exposure No     Hobby Hazards No     Sleep Concern No     Stress Concern No     Comment: rehab for R knee after replacement     Weight Concern Yes     Comment: working on wt loss     Special Diet Yes     Comment: Diabetic     Back Care No     Exercise No     Comment: water aerobics 35-40' 3-4 d & strength 3d/wk     Bike Helmet No     Seat Belt No     Self-Exams Not Asked     Parent/sibling w/ CABG, MI or angioplasty before 65F 55M? Not Asked   Social History Narrative    How much exercise per week? 3-4x swimming, aerobics    How much calcium per day? Supplement       How much caffeine per day? 2 cups coffee/ 1-2 can of diet soda    How much vitamin D per day? Supplement    Do you/your family wear seatbelts?  Yes    Do you/your family use safety helmets? No    Do you/your family use sunscreen? No    Do you/your family keep firearms in the home? No    Do you/your family have a smoke detector(s)? Yes    Do you feel safe in your home? Yes    Has anyone ever touched you in an unwanted manner? No     Explain     See LEA Berman 11/26/2014    Reviewed Raul DEGROOT MA 11/22/2017       Family History   Problem Relation Age of Onset     Diabetes Father 55        DM II     Diabetes Brother 50        xs 2     Hypertension Sister 41        also B and M     Cancer Maternal Aunt         multiple myeloma     Hypertension Mother 79     Osteoporosis Mother      Memory loss Mother      Glaucoma Mother      Diabetes Brother      Hypertension Brother      Heart Murmur Brother      Glaucoma Brother      Breast Cancer Cousin      Thyroid Disease Sister         Graves     Cerebrovascular Disease Maternal Grandmother      Other - See Comments Sister         16 minths head injury     Other - See Comments Brother         MVA age 36     Cancer - colorectal No  family hx of      Prostate Cancer No family hx of      Alcohol/Drug No family hx of      Melanoma No family hx of      Skin Cancer No family hx of            EXAM:  There were no vitals taken for this visit.  GENERAL: Healthy, alert and no distress  EYES: Eyes grossly normal to inspection.  No discharge or erythema, or obvious scleral/conjunctival abnormalities.  RESP: No audible wheeze, cough, or visible cyanosis.  No visible retractions or increased work of breathing.    SKIN: Visible skin clear. No significant rash, abnormal pigmentation or lesions.  NEURO: Cranial nerves grossly intact.  Mentation and speech appropriate for age.  PSYCH: Mentation appears normal, affect normal, judgement and insight intact, normal speech and appearance well-groomed.       Home sleep apnea test 11/11/2019   Weight 248, BMI 43.2  AHI 46.8, with associated hypoxemia    PAP download last 30 days:  ResMed   Auto-PAP 12.0 - 16.0 cmH2O 30 day usage data:    100% of days with > 4 hours of use. 0/30 days with no use.   Average use 477 minutes per day.   95%ile Leak 27.33 L/min.   CPAP 95% pressure 15.9 cm.   AHI 1.36 events per hour.     ASSESSMENT:  65-year-old female with severe obstructive sleep apnea with some mild initiation and maintenance insomnia with nightmares.  Major comorbidities of diabetes, hypertension, glaucoma.  Ongoing treatment of severe obstructive sleep apnea is medically required.  She is meeting compliance goals getting excellent clinical benefit.    PLAN:  Prescription generated to keep her supplies up-to-date.  Recommend trying a chinstrap.  Also recommended trying to optimize nasal breathing at night with nasal saline sprays, a trial of nasal corticosteroid such as fluticasone or mometasone, or nonsedating antihistamine daily such as loratadine 10 mg.  We discussed the importance of positive imagery before bed.  Offered referral for cognitive behavioral therapy for insomnia however at this point she is managing  her symptoms reasonably well.  We discussed some other ways to help manage her symptoms.  She will let me know if she decides she like to proceed with a formal referral.  Because she is Medicare she would need to have a in lab diagnostic study performed before getting a replacement machine from her current DME.  Will review this in a couple of years at this point there is no indication to repeat study.  She is agreeable with this plan.  Follow-up in 1 year.      48 minutes spent on the date of the encounter doing chart review, history and exam, documentation and further activities per the note    Joanne Pisano M.D.  Pulmonary/Critical Care/Sleep Medicine    [facility]   Fl[address], [address]   [address] [place] [address]. [place] [address]   Appointments: [phone]    The above note was dictated using voice recognition software and may include typographical errors. Please contact the author for any clarifications.

## 2021-07-08 ENCOUNTER — VIRTUAL VISIT (OUTPATIENT)
Dept: SLEEP MEDICINE | Facility: CLINIC | Age: 66
End: 2021-07-08
Payer: MEDICARE

## 2021-07-08 DIAGNOSIS — F51.5 NIGHTMARES: ICD-10-CM

## 2021-07-08 DIAGNOSIS — G47.33 OSA (OBSTRUCTIVE SLEEP APNEA): Primary | ICD-10-CM

## 2021-07-08 DIAGNOSIS — G47.00 INSOMNIA, UNSPECIFIED TYPE: ICD-10-CM

## 2021-07-08 PROCEDURE — 99215 OFFICE O/P EST HI 40 MIN: CPT | Mod: 95 | Performed by: INTERNAL MEDICINE

## 2021-07-08 NOTE — PATIENT INSTRUCTIONS
For general sleep health questions:   http://sleepeducation.org            Continue PAP therapy every night, for all hours that you are sleeping (including naps.)  As always, try to get at least 8 hours of sleep or more each day, keep a regular sleep schedule, and avoid sleep deprivation. Avoid alcohol.    Reasons that you might need a change to your pressure therapy would be weight gain or loss, waking having inadvertently removed your PAP overnight, having previously felt refreshed by sleep with CPAP use and now waking un-refreshed, and return of daytime sleepiness. Also, the development of new medical problems  (such as heart failure, stroke, medications such as narcotics) can sometimes affect breathing at night and change your PAP therapy needs.    Please bring PAP with you if you are hospitalized.  If anticipating surgery be sure to discuss with your surgeon that you have sleep apnea and use PAP therapy.      Maintain your equipment as recommended which includes routine cleaning and replacement of supplies.      Call DME for any questions regarding supplies or maintenance.    [facility], [facility] [phone]      Do not drive on engage in potentially dangerous activities if feeling sleepy.    Please follow up in sleep clinic again in 12 months.        Tips for your PAP use-    Mask fitting tips  Mask fitting exercise:    To improve your mask seal and your mobility at night, put mask on and secure in place.  Lie down in bed with full pressure and roll to one side, adjust headgear while in that position to eliminate any leaks. Repeat process rolling to other side.     The mask seal does not have to be perfect:   CPAP machines are designed to make up for small leaks. However, you will not tolerate leaks blowing in your eyes so you will need to adjust.   Any leak should only be near or at the bottom of the mask.  We expect your mask to leak slightly at night.    Do  not over-tighten the headgear straps, tighter IS NOT better, we expect minimal leak.    First try re-positioning the mask or headgear before tightening the headgear straps.  Mask leaks are expected due to changing sleeping positions. Try pulling the mask away from your skin allowing the cushion to re-inflate will minimize the leak.  If you struggle for a good fit, try turning the CPAP off and then readjust the mask by pulling it away from your face and then turning back on the CPAP.        Humidifier tips  Humidifiers can be adjusted to increase or decrease the amount of moisture according to your comfort level. You may need to adjust this frequently at first, but then might only change it with seasonal weather changes.     Try INCREASING the humidity if:  You experience a dry, irritated nasal passage or throat.  You have a runny, drippy nose or sneezing fits after using CPAP.  You experience nasal congestion during or after CPAP use.    Try DECREASING the humidity if:  You have excessive condensation or  rain out  in the tubing or mask.  Otherwise keep the tubing warm during the night by running it underneath the blankets or pillow.      Clinic visit after initial PAP set-up   Bring your equipment with you to your 5-8 week follow up clinic visit.  We will be extracting your data from the machine if not available from the cloud based modem.        Travel  Always take your equipment with you when you travel.  If you fly with your equipment bring it on with you as a carry on.  Medical equipment does not count as a carry on.    If you travel international the machines take 110-240v.  The only adapter needed is the adapter that will fit into the receptacle (outlet).    You may also want to bring an extension cord as many hotel rooms have limited outlets at the bedside.  Do not travel with water in your humidifier chamber.     Cleaning and Maintenance Guidelines    Equipment Frequency Cleaning Method   Mask First  Day    Daily      Weekly Soak mask in hot soapy water for 30 minutes, rinse and air dry.  Wipe nasal cushion with a hot soapy (Ivory, baby shampoo) cloth and rinse.  Baby wipes may also be used.  Do not use anti-bacterial soaps,Ainsley  liquid soap, rubbing alcohol, bleach or ammonia.  Wash frame in hot soapy water (Ivory, baby shampoo) rinse and let air dry   Headgear Biweekly Wash in hot soapy water, rinse and air dry   Reusable Gray Filter Weekly Wash in hot soapy water, rinse, put in towel squeeze moisture out, let air dry   Disposable White Filter Check Weekly Replace when brown or gray in color; at least every 2 to 3 months   Humidifier Chamber Daily    Weekly Empty distilled water from humidifier and let air dry    Hand wash in hot soapy water, rinse and air dry   Tubing Weekly Wash in hot soapy water, rinse and let air dry   Mask, Tubing and Humidifier Chamber As needed Disinfect: Soak in 1 part distilled white vinegar to 3 parts hot water for 30 minutes, rinse well and air dry  Not the material headgear        MASK AND SUPPLY REORDERING and EQUIPMENT NEEDS through your DME and per your insurance  Reminder: Most insurance companies will allow for a new mask, headgear, tubing, and reusable gray filter every six months.  Disposable white ultra-fine filters are covered monthly.      HOME AND SAFETY INSTRUCTIONS    Do not use frayed or cracked electrical cords, multi plug adaptors, or switched receptacles    Do not immerse electrical equipment into water    Assure that electrical cords do not become a tripping hazard

## 2021-07-09 NOTE — NURSING NOTE
One year follow up PAP reminder sent via [employer].           CLAUDINE Waggoner  [facility]

## 2021-07-12 ENCOUNTER — LAB (OUTPATIENT)
Dept: LAB | Facility: CLINIC | Age: 66
End: 2021-07-12
Payer: MEDICARE

## 2021-07-12 ENCOUNTER — ALLIED HEALTH/NURSE VISIT (OUTPATIENT)
Dept: ENDOCRINOLOGY | Facility: CLINIC | Age: 66
End: 2021-07-12
Payer: MEDICARE

## 2021-07-12 DIAGNOSIS — E11.8 TYPE 2 DIABETES MELLITUS WITH COMPLICATION (H): ICD-10-CM

## 2021-07-12 DIAGNOSIS — I10 ESSENTIAL HYPERTENSION: ICD-10-CM

## 2021-07-12 DIAGNOSIS — E11.8 TYPE 2 DIABETES MELLITUS WITH COMPLICATION (H): Primary | ICD-10-CM

## 2021-07-12 DIAGNOSIS — E66.09 EXOGENOUS OBESITY: ICD-10-CM

## 2021-07-12 LAB
BUN SERPL-MCNC: 14 MG/DL (ref 7–30)
CHOLEST SERPL-MCNC: 94 MG/DL
CREAT SERPL-MCNC: 0.71 MG/DL (ref 0.52–1.04)
FASTING STATUS PATIENT QL REPORTED: YES
GFR SERPL CREATININE-BSD FRML MDRD: 89 ML/MIN/1.73M2
HBA1C MFR BLD: 8.8 % (ref 0–5.6)
HDLC SERPL-MCNC: 36 MG/DL
LDLC SERPL CALC-MCNC: 9 MG/DL
NONHDLC SERPL-MCNC: 58 MG/DL
POTASSIUM BLD-SCNC: 4.2 MMOL/L (ref 3.4–5.3)
TRIGL SERPL-MCNC: 247 MG/DL

## 2021-07-12 PROCEDURE — 36415 COLL VENOUS BLD VENIPUNCTURE: CPT | Performed by: PATHOLOGY

## 2021-07-12 PROCEDURE — 83036 HEMOGLOBIN GLYCOSYLATED A1C: CPT | Performed by: PATHOLOGY

## 2021-07-12 PROCEDURE — 84132 ASSAY OF SERUM POTASSIUM: CPT | Performed by: PATHOLOGY

## 2021-07-12 PROCEDURE — 82565 ASSAY OF CREATININE: CPT | Performed by: PATHOLOGY

## 2021-07-12 PROCEDURE — 80061 LIPID PANEL: CPT | Performed by: PATHOLOGY

## 2021-07-12 PROCEDURE — 99207 PR NO BILLABLE SERVICE THIS VISIT: CPT

## 2021-07-12 PROCEDURE — 84520 ASSAY OF UREA NITROGEN: CPT | Performed by: PATHOLOGY

## 2021-07-12 NOTE — PROGRESS NOTES
DM.  José Luis Joya CMA      Outcome for 07/12/21 1:06 PM :Glucose sent via Email  Dionicio is a 65 year old who is being evaluated via a billable video visit.      How would you like to obtain your AVS? MyChart  If the video visit is dropped, the invitation should be resent by: Send to e-mail at: [email or web address]  Will anyone else be joining your video visit? No      Endocrinology and Diabetes Clinic      Radha Baca is a 65 year old female who is being evaluated via a billable video visit.        Video Start Time: 10:10am  Video End Time: 10:45am    Radha Baca complains of  chief complaint      Assessment and Plan:    1. 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  HbA1c today is 8.8%.   Patient is on Lantus Humalog Ozempic and Metformin.  Reviewed to continue to adjust Humalog to increase to 23 units if blood sugars before meals are above 200.  Reviewed lifestyle modification including diet and exercise.  Patient still is limited to go to the pool due to Covid restrictions which is her favorite and best tolerated exercise.      Plan:   continue  - Humalog 20 - 22 units with each meal, eats 2-3 meals/day  - Ozemipc 0.5 mg weekly subcutaneous  injections on Sunday  - Lantus 80 units subcutaneous once daily  - Metformin 1000mg po BID  - Glucose check fasting, before and 2 hours post prandial and bedtime in a rotating fashion    2. Diabetic complications:  - Retinopathy: Has eye surgery, has diabetic retinopathy - Nephropathy:  Urine albumin 16, GFR 84ml, Crt 0.74 on 4/12/2021  - Neuropathy: Has tingling sensation, not interested in starting any medication.    - CAD/CVA: denies CP  - Lipids: Triglycerides slightly improved, reviewed to increase fish oil, and also improve blood sugar control   Blood pressure well controlled on lisinopril hydrochlorothiazide  - Aspirin - 81mg daily  - TSH - 2.86 4/2021    40 minutes spent on the date  of the encounter doing chart review, review of test results, interpretation of tests, patient visit and documentation      Keturah De Luna MD  Endocrinology and Diabetes  Telephone contact:  [facility] [place] [phone]  [facility] [place] [phone]          ==========================================================================================          Interval History:  - 3m follow up for T2DM    1. Type 2 DM:    Diabetes: Has type 2 DM, diagnosed more than 15 years ago.    HbA1c is 8.9% 4/12/2021 from 8.4% Jan 2021. She attributes her worsening A1c to multiple recent stressors.     Jan 29 2021 - had CT scan - early uncomplicated diverticulitis,  fat containing umbilical hernia, few lymphnodes ? Reactive. She has constipation, 1BM every 3 days, she  feels bloated most of the time, less appetite. She had some of these issues prior to starting Ozempic but thinks these symptoms got worse. She is waiting to see a GI doctor.      Is going to have laser eye surgery next week    Current Treatment:     - Ozemipc 0.5 mg weekly on Sunday,causing nausea which is tolerable but also constipation which give her a lot of problems  - Lantus 80 units once daily  - Humalog 20 - 22 units with each meal, 2-3 meals/day,  CR 5, usually takes 17-20 with meals, counts carbs  - Metformin 1000mg po BID, she is on this for long time, not sure if this is causing the side effects.    Hypoglycemia  No    Glucometer summary:         Diet:   2-3 meals/day. Depends on emotional stress, eating snacks - healthy and sometimes unhealthy depending on her stress level  Prednisone: denies taking    Exercise  Home exercises, cannot go to the gym      2. Diabetic Complications:  - Retinopathy: Outside Hospital - mild NPDR and DM macular edema which is mild and recommended observation. Last eye exam was on Feb 23rd 2021.  - Nephropathy:  Urine albumin 16, GFR 84ml, Crt 0.74 on 4/12/2021  - Neuropathy: Had  tingling .    3. Cardiovascular risk factors:  - CAD: denies CP, SOB  - Lipids: Lipitor 20mg,   - HTN:  on lisinopril/HCTZ well-controlled  - Aspirin - 81mg daily    4. Hypothyroidism:  - TSH - 2.86 4/2021      Past Medical/Surgical History:   Reviewed in chart  Past Medical History:   Diagnosis Date     Abnormal Pap smear      Anxiety      Arthritis      Arthritis of knee 4/4/2013     Arthritis of shoulder region, right 4/18/2014     Back injury      Breast disorder      Chronic constipation      Chronic diarrhea      Depressive disorder      Diabetes (H)      Fecal incontinence      Finger pain 4/2/2015     Fracture broke L 5th pinky finger due to fall  1/2015     Glaucoma (increased eye pressure)      Head injury 8/6/2016     Headache(784.0)     decreased with mouth guard use.     History of blood transfusion 8/2011 & 4/2013     History of diabetes mellitus      Hypercholesteremia      Hypertension      Low back pain      Menarche age 9+    cycles q mo x 4-5 d     Neck injuries      Nonsenile cataract IO implants: L-8/2013; R-1/2011     Pain in knee joint     LEFT     Right bundle branch block     per H/P     SNHL (sensorineural hearing loss)      Umbilical hernia without mention of obstruction or gangrene 8/2014     Vision disorder Detached Retina 10/2009     Past Surgical History:   Procedure Laterality Date     ARTHROPLASTY KNEE  4/4/2013    Left Total Knee Arthroplasty;  Surgeon: Lou Byrne MD;  [facility]     ARTHROPLASTY MINIMALLY INVASIVE KNEE  8/22/2011    R knee :ODALISCAITLYN, Left age 14     COLONOSCOPY  1/14/2015     DENTAL SURGERY      root canals, wisdom teeth     EXAM UNDER ANESTHESIA, MANIPULATE JOINT (LOCATION)  10/5/2012    Procedure: EXAM UNDER ANESTHESIA, MANIPULATE JOINT (LOCATION);  Right Knee Mini Open Lysis Of Adhesions, Left Knee Steroid Injection  ;  Surgeon: Lou Byrne MD;  [facility]      OR OCULAR DEVICE INTRAOP DETACHED RETINA  2009    R       "PHAKIC IOL - IMPLANT FROM SURGEON      right     KNEE SURGERY  1969    left     LASER YAG CAPSULOTOMY  12/2016    left     VITRECTOMY PARSPLANA  2010       Allergies:  Reviewed in chart    Current Medications:   Reviewed in chart    Family History:  Reviewed in chart    Social History:  Reviewed in chart    Physical Examination:  Vital signs:                         Estimated body mass index is 44.5 kg/m  as calculated from the following:    Height as of 12/3/20: 1.598 m (5' 2.9\").    Weight as of 5/5/21: 113.6 kg (250 lb 6.4 oz).    Previous Weights:   Wt Readings from Last 3 Encounters:   05/05/21 113.6 kg (250 lb 6.4 oz)   01/28/21 114 kg (251 lb 6.4 oz)   11/19/20 111 kg (244 lb 11.2 oz)                    BMI:  There is no height or weight on file to calculate BMI.     Reported vitals:  There were no vitals taken for this visit.   healthy, alert and no distress  PSYCH: Alert and oriented times 3; coherent speech, normal   rate and volume, able to articulate logical thoughts, able   to abstract reason, no tangential thoughts, no hallucinations   or delusions  Her affect is normal and pleasant  RESP: No cough, no audible wheezing, able to talk in full sentences  Remainder of exam unable to be completed due to telephone visits       Endocrine Labs:  Lab Results   Component Value Date    .7 01/28/2021    CHLORIDE 98.8 01/28/2021    CO2 25.4 01/28/2021     (H) 04/12/2021    CR 0.71 07/12/2021    CR 0.74 04/12/2021    CR 0.6 01/28/2021    CR 0.64 01/18/2021    CR 0.66 09/28/2020    COLIN 9.8 01/28/2021    MAG 2.0 01/18/2021    ALBUMIN 3.7 09/28/2020    ALKPHOS 75.0 01/28/2021    LDL 9 07/12/2021    HDL 36 (L) 07/12/2021    TRIG 247 (H) 07/12/2021     Lab Results   Component Value Date    MICROL 7 04/12/2021    MICROL 9 01/18/2021    MICROL 10 09/28/2020    MICROL 15 03/02/2020    MICROL 40 08/06/2019     Lab Results   Component Value Date    A1C 8.8 (H) 07/12/2021    A1C 8.9 (H) 04/12/2021    A1C 8.4 (H) " 01/18/2021    A1C 9.0 (H) 09/28/2020    A1C 10.2 (H) 03/02/2020       Lab Results   Component Value Date    HGB 11.8 09/28/2020           HPI:  Radha Baca is a elderly  female with type 2 diabetes diagnosed [id number]. Has PMH for HTN, arthritis, chronic constipation

## 2021-07-13 ENCOUNTER — VIRTUAL VISIT (OUTPATIENT)
Dept: ENDOCRINOLOGY | Facility: CLINIC | Age: 66
End: 2021-07-13
Payer: MEDICARE

## 2021-07-13 DIAGNOSIS — E11.8 TYPE 2 DIABETES MELLITUS WITH COMPLICATION (H): Primary | ICD-10-CM

## 2021-07-13 PROCEDURE — 99214 OFFICE O/P EST MOD 30 MIN: CPT | Mod: 95 | Performed by: INTERNAL MEDICINE

## 2021-07-13 NOTE — LETTER
7/13/2021       RE: Radha Baca  [address] [address]     Dear Colleague,    Thank you for referring your patient, Radha Baca, to the [facility] [place] at [facility]. Please see a copy of my visit note below.    DM.  José Luis Joya CMA      Outcome for 07/12/21 1:06 PM :Glucose sent via Email  Dionicio is a 65 year old who is being evaluated via a billable video visit.      How would you like to obtain your AVS? MyChart  If the video visit is dropped, the invitation should be resent by: Send to e-mail at: [email or web address]  Will anyone else be joining your video visit? No      Endocrinology and Diabetes Clinic      Radha Baca is a 65 year old female who is being evaluated via a billable video visit.        Video Start Time: 10:10am  Video End Time: 10:45am    Radha Baca complains of  chief complaint      Assessment and Plan:    1. 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  HbA1c today is 8.8%.   Patient is on Lantus Humalog Ozempic and Metformin.  Reviewed to continue to adjust Humalog to increase to 23 units if blood sugars before meals are above 200.  Reviewed lifestyle modification including diet and exercise.  Patient still is limited to go to the pool due to Covid restrictions which is her favorite and best tolerated exercise.      Plan:   continue  - Humalog 20 - 22 units with each meal, eats 2-3 meals/day  - Ozemipc 0.5 mg weekly subcutaneous  injections on Sunday  - Lantus 80 units subcutaneous once daily  - Metformin 1000mg po BID  - Glucose check fasting, before and 2 hours post prandial and bedtime in a rotating fashion    2. Diabetic complications:  - Retinopathy: Has eye surgery, has diabetic retinopathy - Nephropathy:  Urine albumin 16, GFR 84ml, Crt 0.74 on 4/12/2021  - Neuropathy: Has tingling  sensation, not interested in starting any medication.    - CAD/CVA: denies CP  - Lipids: Triglycerides slightly improved, reviewed to increase fish oil, and also improve blood sugar control   Blood pressure well controlled on lisinopril hydrochlorothiazide  - Aspirin - 81mg daily  - TSH - 2.86 4/2021    40 minutes spent on the date of the encounter doing chart review, review of test results, interpretation of tests, patient visit and documentation      Keturah De Luna MD  Endocrinology and Diabetes  Telephone contact:  [facility] [phone]  [facility] [place] [phone]          ==========================================================================================          Interval History:  - 3m follow up for T2DM    1. Type 2 DM:    Diabetes: Has type 2 DM, diagnosed more than 15 years ago.    HbA1c is 8.9% 4/12/2021 from 8.4% Jan 2021. She attributes her worsening A1c to multiple recent stressors.     Jan 29 2021 - had CT scan - early uncomplicated diverticulitis,  fat containing umbilical hernia, few lymphnodes ? Reactive. She has constipation, 1BM every 3 days, she  feels bloated most of the time, less appetite. She had some of these issues prior to starting Ozempic but thinks these symptoms got worse. She is waiting to see a GI doctor.      Is going to have laser eye surgery next week    Current Treatment:     - Ozemipc 0.5 mg weekly on Sunday,causing nausea which is tolerable but also constipation which give her a lot of problems  - Lantus 80 units once daily  - Humalog 20 - 22 units with each meal, 2-3 meals/day,  CR 5, usually takes 17-20 with meals, counts carbs  - Metformin 1000mg po BID, she is on this for long time, not sure if this is causing the side effects.    Hypoglycemia  No    Glucometer summary:         Diet:   2-3 meals/day. Depends on emotional stress, eating snacks - healthy and sometimes unhealthy depending on her stress  level  Prednisone: denies taking    Exercise  Home exercises, cannot go to the gym      2. Diabetic Complications:  - Retinopathy: Outside Hospital - mild NPDR and DM macular edema which is mild and recommended observation. Last eye exam was on Feb 23rd 2021.  - Nephropathy:  Urine albumin 16, GFR 84ml, Crt 0.74 on 4/12/2021  - Neuropathy: Had tingling .    3. Cardiovascular risk factors:  - CAD: denies CP, SOB  - Lipids: Lipitor 20mg,   - HTN:  on lisinopril/HCTZ well-controlled  - Aspirin - 81mg daily    4. Hypothyroidism:  - TSH - 2.86 4/2021      Past Medical/Surgical History:   Reviewed in chart  Past Medical History:   Diagnosis Date     Abnormal Pap smear      Anxiety      Arthritis      Arthritis of knee 4/4/2013     Arthritis of shoulder region, right 4/18/2014     Back injury      Breast disorder      Chronic constipation      Chronic diarrhea      Depressive disorder      Diabetes (H)      Fecal incontinence      Finger pain 4/2/2015     Fracture broke L 5th pinky finger due to fall  1/2015     Glaucoma (increased eye pressure)      Head injury 8/6/2016     Headache(784.0)     decreased with mouth guard use.     History of blood transfusion 8/2011 & 4/2013     History of diabetes mellitus      Hypercholesteremia      Hypertension      Low back pain      Menarche age 9+    cycles q mo x 4-5 d     Neck injuries      Nonsenile cataract IO implants: L-8/2013; R-1/2011     Pain in knee joint     LEFT     Right bundle branch block     per H/P     SNHL (sensorineural hearing loss)      Umbilical hernia without mention of obstruction or gangrene 8/2014     Vision disorder Detached Retina 10/2009     Past Surgical History:   Procedure Laterality Date     ARTHROPLASTY KNEE  4/4/2013    Left Total Knee Arthroplasty;  Surgeon: Lou Byrne MD;  [facility]     ARTHROPLASTY MINIMALLY INVASIVE KNEE  8/22/2011    R knee :ODALIS CAITLYN ANDRA, Left age 14     COLONOSCOPY  1/14/2015     DENTAL SURGERY       "root canals, wisdom teeth     EXAM UNDER ANESTHESIA, MANIPULATE JOINT (LOCATION)  10/5/2012    Procedure: EXAM UNDER ANESTHESIA, MANIPULATE JOINT (LOCATION);  Right Knee Mini Open Lysis Of Adhesions, Left Knee Steroid Injection  ;  Surgeon: Lou Byrne MD;  [facility]      OR OCULAR DEVICE INTRAOP DETACHED RETINA  2009    R     HC PHAKIC IOL - IMPLANT FROM SURGEON      right     KNEE SURGERY  1969    left     LASER YAG CAPSULOTOMY  12/2016    left     VITRECTOMY PARSPLANA  2010       Allergies:  Reviewed in chart    Current Medications:   Reviewed in chart    Family History:  Reviewed in chart    Social History:  Reviewed in chart    Physical Examination:  Vital signs:                         Estimated body mass index is 44.5 kg/m  as calculated from the following:    Height as of 12/3/20: 1.598 m (5' 2.9\").    Weight as of 5/5/21: 113.6 kg (250 lb 6.4 oz).    Previous Weights:   Wt Readings from Last 3 Encounters:   05/05/21 113.6 kg (250 lb 6.4 oz)   01/28/21 114 kg (251 lb 6.4 oz)   11/19/20 111 kg (244 lb 11.2 oz)                    BMI:  There is no height or weight on file to calculate BMI.     Reported vitals:  There were no vitals taken for this visit.   healthy, alert and no distress  PSYCH: Alert and oriented times 3; coherent speech, normal   rate and volume, able to articulate logical thoughts, able   to abstract reason, no tangential thoughts, no hallucinations   or delusions  Her affect is normal and pleasant  RESP: No cough, no audible wheezing, able to talk in full sentences  Remainder of exam unable to be completed due to telephone visits       Endocrine Labs:  Lab Results   Component Value Date    .7 01/28/2021    CHLORIDE 98.8 01/28/2021    CO2 25.4 01/28/2021     (H) 04/12/2021    CR 0.71 07/12/2021    CR 0.74 04/12/2021    CR 0.6 01/28/2021    CR 0.64 01/18/2021    CR 0.66 09/28/2020    COLIN 9.8 01/28/2021    MAG 2.0 01/18/2021    ALBUMIN 3.7 09/28/2020    ALKPHOS " 75.0 01/28/2021    LDL 9 07/12/2021    HDL 36 (L) 07/12/2021    TRIG 247 (H) 07/12/2021     Lab Results   Component Value Date    MICROL 7 04/12/2021    MICROL 9 01/18/2021    MICROL 10 09/28/2020    MICROL 15 03/02/2020    MICROL 40 08/06/2019     Lab Results   Component Value Date    A1C 8.8 (H) 07/12/2021    A1C 8.9 (H) 04/12/2021    A1C 8.4 (H) 01/18/2021    A1C 9.0 (H) 09/28/2020    A1C 10.2 (H) 03/02/2020       Lab Results   Component Value Date    HGB 11.8 09/28/2020           HPI:  Radha Baca is a elderly  female with type 2 diabetes diagnosed [id number]. Has PMH for HTN, arthritis, chronic constipation

## 2021-07-13 NOTE — PATIENT INSTRUCTIONS
Increase Novolog if blood glucose is above 200 mg/dl  by 3 units, if having large meal take 25 units    increase Fish oil to twice daily    Continue Lantus 80 units daily, Ozempic 0.5 mg weekly, Metformin

## 2021-07-14 NOTE — PATIENT INSTRUCTIONS
1. You were seen in the ENT Clinic today by Dr. Iyer.  If you have any questions or concerns after your appointment, please call   - Option 1: ENT Clinic: [phone]   - Option 2: Georgette (Dr. Iyer's Nurse): [phone]         Cherry(Dr. Iyer's Nurse): [phone]    2.   Plan to return to clinic in 1 year with hearing test    Georgette Bragg LPN  [facility] - Otolaryngology    The patient presents with a history of hearing loss and tinnitus. Based upon her recent Audiogram and Tympanogram, the patient will be seen again in one year to review another Audiogram and Tympanogram.

## 2021-07-23 ENCOUNTER — OFFICE VISIT (OUTPATIENT)
Dept: AUDIOLOGY | Facility: CLINIC | Age: 66
End: 2021-07-23
Attending: OTOLARYNGOLOGY
Payer: MEDICARE

## 2021-07-23 ENCOUNTER — OFFICE VISIT (OUTPATIENT)
Dept: OTOLARYNGOLOGY | Facility: CLINIC | Age: 66
End: 2021-07-23
Payer: MEDICARE

## 2021-07-23 DIAGNOSIS — H93.13 TINNITUS, BILATERAL: ICD-10-CM

## 2021-07-23 DIAGNOSIS — H90.3 SENSORINEURAL HEARING LOSS (SNHL) OF BOTH EARS: ICD-10-CM

## 2021-07-23 DIAGNOSIS — H90.3 SENSORINEURAL HEARING LOSS (SNHL) OF BOTH EARS: Primary | ICD-10-CM

## 2021-07-23 PROCEDURE — 92550 TYMPANOMETRY & REFLEX THRESH: CPT | Performed by: AUDIOLOGIST

## 2021-07-23 PROCEDURE — 99213 OFFICE O/P EST LOW 20 MIN: CPT | Performed by: OTOLARYNGOLOGY

## 2021-07-23 PROCEDURE — 92557 COMPREHENSIVE HEARING TEST: CPT | Performed by: AUDIOLOGIST

## 2021-07-23 ASSESSMENT — PAIN SCALES - GENERAL: PAINLEVEL: EXTREME PAIN (9)

## 2021-07-23 NOTE — PROGRESS NOTES
The patient presents with a history of hearing loss and tinnitus.  The patient reports a progressive hearing loss in both ears over at least the past few years.  The patient denies ear infections, otalgia, otorrhea or dizziness.  The patient denies sinusitis, rhinitis, facial pain, nasal obstruction or purulent nasal discharge. The patient denies chronic or recurrent tonsillitis, chronic or recurrent pharyngitis. The patient's previous Audiogram and Tympanogram demonstrates bilateral sensorineural hearing loss that is symmetric and moderate to severe. Her word recognition scores are 100% in both ears. Her tympanograms are normal. Her hearing is unchanged from her previous Audiogram and Tympanogram.       All other systems were reviewed and they are either negative or they are not directly pertinent to this Otolaryngology examination.      Past Medical History:    Past Medical History:   Diagnosis Date     Abnormal Pap smear      Anxiety      Arthritis      Arthritis of knee 4/4/2013     Arthritis of shoulder region, right 4/18/2014     Back injury      Breast disorder      Chronic constipation      Chronic diarrhea      Depressive disorder      Diabetes (H)      Fecal incontinence      Finger pain 4/2/2015     Fracture broke L 5th pinky finger due to fall  1/2015     Glaucoma (increased eye pressure)      Head injury 8/6/2016     Headache(784.0)     decreased with mouth guard use.     History of blood transfusion 8/2011 & 4/2013     History of diabetes mellitus      Hypercholesteremia      Hypertension      Low back pain      Menarche age 9+    cycles q mo x 4-5 d     Neck injuries      Nonsenile cataract IO implants: L-8/2013; R-1/2011     Pain in knee joint     LEFT     Right bundle branch block     per H/P     SNHL (sensorineural hearing loss)      Umbilical hernia without mention of obstruction or gangrene 8/2014     Vision disorder Detached Retina 10/2009       Past Surgical History:    Past Surgical History:    Procedure Laterality Date     ARTHROPLASTY KNEE  4/4/2013    Left Total Knee Arthroplasty;  Surgeon: Lou Byrne MD;  [facility]     ARTHROPLASTY MINIMALLY INVASIVE KNEE  8/22/2011    R knee :CAITLYN JACOBO, Left age 14     COLONOSCOPY  1/14/2015     DENTAL SURGERY      root canals, wisdom teeth     EXAM UNDER ANESTHESIA, MANIPULATE JOINT (LOCATION)  10/5/2012    Procedure: EXAM UNDER ANESTHESIA, MANIPULATE JOINT (LOCATION);  Right Knee Mini Open Lysis Of Adhesions, Left Knee Steroid Injection  ;  Surgeon: Lou Byrne MD;  [facility]     [facility] OR OCULAR DEVICE INTRAOP DETACHED RETINA  2009    R     HC PHAKIC IOL - IMPLANT FROM SURGEON      right     KNEE SURGERY  1969    left     LASER YAG CAPSULOTOMY  12/2016    left     VITRECTOMY PARSPLANA  2010       Medications:      Current Outpatient Medications:      Acetaminophen (TYLENOL PO), Take by mouth as needed for mild pain or fever, Disp: , Rfl:      Ascorbic Acid (VITAMIN C PO), Take 2,000 mg by mouth 2 times daily , Disp: , Rfl:      aspirin 81 MG tablet, 1 tab daily, Disp: 90 tablet, Rfl: 3     atorvastatin (LIPITOR) 20 MG tablet, Take 1 tablet (20 mg) by mouth daily DX E78.5 ID # [id number], Disp: 90 tablet, Rfl: 3     B Complex Vitamins (VITAMIN  B COMPLEX) CAPS, Take 1 tablet by mouth daily , Disp: , Rfl:      blood glucose (ONETOUCH VERIO IQ) test strip, Use to test blood sugar 4 times daily or as directed DX E11.8 ID # [id number] has meter already, Disp: 360 strip, Rfl: 3     blood glucose calibration (ONETOUCH VERIO HIGH) High solution, Use to calibrate blood glucose monitor as needed as directed. LEVEL 3 solution, Disp: 1 Bottle, Rfl: 3     blood glucose calibration (ONETOUCH VERIO) solution, U TO CALIBRATE BLOOD GLUCOSE MONITOR PRN UTD, Disp: , Rfl:      calcium-vitamin D (CALTRATE) 600-400 MG-UNIT per tablet, Take 1 tablet by mouth 2 times daily, Disp: , Rfl:      cholecalciferol (VITAMIN D) 1000 UNIT tablet, Take 1 tablet by  mouth daily., Disp: 100 tablet, Rfl: 3     cycloSPORINE (RESTASIS) 0.05 % ophthalmic emulsion, , Disp: , Rfl:      HUMALOG KWIKPEN 100 UNIT/ML soln, Carb counting with meals approx 70-80 units daily subcutaneously DX E 11.8 ID # [id number] needs refrigeration, Disp: 75 mL, Rfl: 3     insulin glargine (LANTUS SOLOSTAR) 100 UNIT/ML pen, Inject 80 units SQ each am., Disp: 90 mL, Rfl: 3     insulin pen needle (B-D U/F) 31G X 8 MM miscellaneous, Use  4 daily short 31 G X 8MM, Disp: 400 each, Rfl: 3     latanoprost (XALATAN) 0.005 % ophthalmic solution, Place 1 drop into both eyes At Bedtime, Disp: , Rfl:      lisinopril-hydrochlorothiazide (ZESTORETIC) 20-12.5 MG tablet, Take 1 tablet by mouth daily DX  I10 ID # [id number], Disp: 90 tablet, Rfl: 3     metFORMIN (GLUCOPHAGE) 500 MG tablet, Take 2 tablets (1,000 mg) by mouth 2 times daily (with meals), Disp: 360 tablet, Rfl: 3     Multiple Vitamin (MULTIVITAMIN  S) CAPS, Take 1 daily, Disp: 90 capsule, Rfl: 3     Multiple Vitamins-Minerals (EYE-ZAKIYA PLUS LUTEIN PO), , Disp: , Rfl:      Omega-3 Fatty Acids (OMEGA-3 FISH OIL PO), Take 1,000 mg by mouth daily, Disp: , Rfl:      OneTouch Delica Lancets 33G MISC, 4 Box 4 times daily Test  Blood glucose 4 times daily  DX E11.8  ID # [id number], Disp: 360 each, Rfl: 3     order for DME, Equipment being ordered: Grade 1 (light) compression stockings, below the knee, Disp: 1 Units, Rfl: 1     polyethylene glycol (MIRALAX) 17 GM/Dose powder, Take 1 capful by mouth daily as needed for constipation, Disp: , Rfl:      Semaglutide,0.25 or 0.5MG/DOS, (OZEMPIC, 0.25 OR 0.5 MG/DOSE,) 2 MG/1.5ML SOPN, Inject  0.5 mg  Subcutaneous every 7 days, Disp: 4.5 mL, Rfl: 3     timolol (TIMOPTIC) 0.5 % ophthalmic solution, Place 1 drop into both eyes 2 times daily , Disp: , Rfl:      timolol maleate (TIMOPTIC) 0.5 % ophthalmic solution, 1 drop 2 times daily, Disp: , Rfl:      triamcinolone (KENALOG) 0.1 % ointment, Apply topically 2 times daily, Disp: 80  g, Rfl: 11    Allergies:    Nkda [no known drug allergies]    Physical Examination:    The patient is a well developed, well nourished female in no apparent distress.  She is normocephalic, atraumatic with pupils equally round and reactive to light.    Ear Examination: Ear canals clear, tympanic membranes and middle ear spaces normal  Neurological Examination: Facial nerve function intact and symmetric  Integumentary Examination: No lesions on the skin of the head and neck  Neck Examination: No masses or lesions, no lymphadenopathy  Endocrine Examination: Normal thyroid examination    Assessment and Plan:    The patient presents with a history of hearing loss and tinnitus. Based upon her recent Audiogram and Tympanogram, the patient will be seen again in one year to review another Audiogram and Tympanogram.     This visit was regarded as medically necessary.    CC: Dr. Mohan Moreno

## 2021-07-23 NOTE — PROGRESS NOTES
AUDIOLOGY REPORT    SUMMARY: Audiology visit completed. See audiogram for results.      RECOMMENDATIONS: Follow-up with ENT. Patient is a good candidate for amplification; schedule HAC if desires.     Adriane Agee. CCC-A  Licensed Audiologist   [place] #[id number]

## 2021-07-23 NOTE — LETTER
7/23/2021       RE: Radha Baca  [address] [address]     Dear Colleague,    Thank you for referring your patient, Radha Baca, to the [facility] [place] [facility]. Please see a copy of my visit note below.    The patient presents with a history of hearing loss and tinnitus.  The patient reports a progressive hearing loss in both ears over at least the past few years.  The patient denies ear infections, otalgia, otorrhea or dizziness.  The patient denies sinusitis, rhinitis, facial pain, nasal obstruction or purulent nasal discharge. The patient denies chronic or recurrent tonsillitis, chronic or recurrent pharyngitis. The patient's previous Audiogram and Tympanogram demonstrates bilateral sensorineural hearing loss that is symmetric and moderate to severe. Her word recognition scores are 100% in both ears. Her tympanograms are normal. Her hearing is unchanged from her previous Audiogram and Tympanogram.       All other systems were reviewed and they are either negative or they are not directly pertinent to this Otolaryngology examination.      Past Medical History:    Past Medical History:   Diagnosis Date     Abnormal Pap smear      Anxiety      Arthritis      Arthritis of knee 4/4/2013     Arthritis of shoulder region, right 4/18/2014     Back injury      Breast disorder      Chronic constipation      Chronic diarrhea      Depressive disorder      Diabetes (H)      Fecal incontinence      Finger pain 4/2/2015     Fracture broke L 5th pinky finger due to fall  1/2015     Glaucoma (increased eye pressure)      Head injury 8/6/2016     Headache(784.0)     decreased with mouth guard use.     History of blood transfusion 8/2011 & 4/2013     History of diabetes mellitus      Hypercholesteremia      Hypertension      Low back pain      Menarche age 9+    cycles q mo x 4-5 d     Neck  injuries      Nonsenile cataract IO implants: L-8/2013; R-1/2011     Pain in knee joint     LEFT     Right bundle branch block     per H/P     SNHL (sensorineural hearing loss)      Umbilical hernia without mention of obstruction or gangrene 8/2014     Vision disorder Detached Retina 10/2009       Past Surgical History:    Past Surgical History:   Procedure Laterality Date     ARTHROPLASTY KNEE  4/4/2013    Left Total Knee Arthroplasty;  Surgeon: Lou Byrne MD;  [facility]     ARTHROPLASTY MINIMALLY INVASIVE KNEE  8/22/2011    R knee :CAITLYN JACOBO, Left age 14     COLONOSCOPY  1/14/2015     DENTAL SURGERY      root canals, wisdom teeth     EXAM UNDER ANESTHESIA, MANIPULATE JOINT (LOCATION)  10/5/2012    Procedure: EXAM UNDER ANESTHESIA, MANIPULATE JOINT (LOCATION);  Right Knee Mini Open Lysis Of Adhesions, Left Knee Steroid Injection  ;  Surgeon: Lou Byrne MD;  [facility]     [facility] OR OCULAR DEVICE INTRAOP DETACHED RETINA  2009    R     HC PHAKIC IOL - IMPLANT FROM SURGEON      right     KNEE SURGERY  1969    left     LASER YAG CAPSULOTOMY  12/2016    left     VITRECTOMY PARSPLANA  2010       Medications:      Current Outpatient Medications:      Acetaminophen (TYLENOL PO), Take by mouth as needed for mild pain or fever, Disp: , Rfl:      Ascorbic Acid (VITAMIN C PO), Take 2,000 mg by mouth 2 times daily , Disp: , Rfl:      aspirin 81 MG tablet, 1 tab daily, Disp: 90 tablet, Rfl: 3     atorvastatin (LIPITOR) 20 MG tablet, Take 1 tablet (20 mg) by mouth daily DX E78.5 ID # [id number], Disp: 90 tablet, Rfl: 3     B Complex Vitamins (VITAMIN  B COMPLEX) CAPS, Take 1 tablet by mouth daily , Disp: , Rfl:      blood glucose (ONETOUCH VERIO IQ) test strip, Use to test blood sugar 4 times daily or as directed DX E11.8 ID # [id number] has meter already, Disp: 360 strip, Rfl: 3     blood glucose calibration (ONETOUCH VERIO HIGH) High solution, Use to calibrate blood glucose monitor as needed as  directed. LEVEL 3 solution, Disp: 1 Bottle, Rfl: 3     blood glucose calibration (ONETOUCH VERIO) solution, U TO CALIBRATE BLOOD GLUCOSE MONITOR PRN UTD, Disp: , Rfl:      calcium-vitamin D (CALTRATE) 600-400 MG-UNIT per tablet, Take 1 tablet by mouth 2 times daily, Disp: , Rfl:      cholecalciferol (VITAMIN D) 1000 UNIT tablet, Take 1 tablet by mouth daily., Disp: 100 tablet, Rfl: 3     cycloSPORINE (RESTASIS) 0.05 % ophthalmic emulsion, , Disp: , Rfl:      HUMALOG KWIKPEN 100 UNIT/ML soln, Carb counting with meals approx 70-80 units daily subcutaneously DX E 11.8 ID # [id number] needs refrigeration, Disp: 75 mL, Rfl: 3     insulin glargine (LANTUS SOLOSTAR) 100 UNIT/ML pen, Inject 80 units SQ each am., Disp: 90 mL, Rfl: 3     insulin pen needle (B-D U/F) 31G X 8 MM miscellaneous, Use  4 daily short 31 G X 8MM, Disp: 400 each, Rfl: 3     latanoprost (XALATAN) 0.005 % ophthalmic solution, Place 1 drop into both eyes At Bedtime, Disp: , Rfl:      lisinopril-hydrochlorothiazide (ZESTORETIC) 20-12.5 MG tablet, Take 1 tablet by mouth daily DX  I10 ID # [id number], Disp: 90 tablet, Rfl: 3     metFORMIN (GLUCOPHAGE) 500 MG tablet, Take 2 tablets (1,000 mg) by mouth 2 times daily (with meals), Disp: 360 tablet, Rfl: 3     Multiple Vitamin (MULTIVITAMIN  S) CAPS, Take 1 daily, Disp: 90 capsule, Rfl: 3     Multiple Vitamins-Minerals (EYE-ZAKIYA PLUS LUTEIN PO), , Disp: , Rfl:      Omega-3 Fatty Acids (OMEGA-3 FISH OIL PO), Take 1,000 mg by mouth daily, Disp: , Rfl:      OneTouch Delica Lancets 33G MISC, 4 Box 4 times daily Test  Blood glucose 4 times daily  DX E11.8  ID # [id number], Disp: 360 each, Rfl: 3     order for DME, Equipment being ordered: Grade 1 (light) compression stockings, below the knee, Disp: 1 Units, Rfl: 1     polyethylene glycol (MIRALAX) 17 GM/Dose powder, Take 1 capful by mouth daily as needed for constipation, Disp: , Rfl:      Semaglutide,0.25 or 0.5MG/DOS, (OZEMPIC, 0.25 OR 0.5 MG/DOSE,) 2 MG/1.5ML SOPN,  Inject  0.5 mg  Subcutaneous every 7 days, Disp: 4.5 mL, Rfl: 3     timolol (TIMOPTIC) 0.5 % ophthalmic solution, Place 1 drop into both eyes 2 times daily , Disp: , Rfl:      timolol maleate (TIMOPTIC) 0.5 % ophthalmic solution, 1 drop 2 times daily, Disp: , Rfl:      triamcinolone (KENALOG) 0.1 % ointment, Apply topically 2 times daily, Disp: 80 g, Rfl: 11    Allergies:    Nkda [no known drug allergies]    Physical Examination:    The patient is a well developed, well nourished female in no apparent distress.  She is normocephalic, atraumatic with pupils equally round and reactive to light.    Ear Examination: Ear canals clear, tympanic membranes and middle ear spaces normal  Neurological Examination: Facial nerve function intact and symmetric  Integumentary Examination: No lesions on the skin of the head and neck  Neck Examination: No masses or lesions, no lymphadenopathy  Endocrine Examination: Normal thyroid examination    Assessment and Plan:    The patient presents with a history of hearing loss and tinnitus. Based upon her recent Audiogram and Tympanogram, the patient will be seen again in one year to review another Audiogram and Tympanogram.     CC: Dr. Mohan Moreno      Again, thank you for allowing me to participate in the care of your patient.      Sincerely,    Jose Iyer MD

## 2021-07-23 NOTE — NURSING NOTE
Chief Complaint   Patient presents with     RECHECK     0ne year follow up        Silvino Quintanilla LPN

## 2021-07-26 ASSESSMENT — ENCOUNTER SYMPTOMS
TASTE DISTURBANCE: 0
RECTAL PAIN: 1
TROUBLE SWALLOWING: 0
DIZZINESS: 0
COUGH: 0
WEAKNESS: 1
SINUS PAIN: 0
NECK MASS: 0
POSTURAL DYSPNEA: 1
JAUNDICE: 0
TINGLING: 1
EXERCISE INTOLERANCE: 0
SYNCOPE: 0
WHEEZING: 0
HEMOPTYSIS: 0
MUSCLE WEAKNESS: 1
BLOOD IN STOOL: 1
TREMORS: 0
ORTHOPNEA: 0
HOARSE VOICE: 0
SHORTNESS OF BREATH: 0
DOUBLE VISION: 0
CONSTIPATION: 1
SEIZURES: 0
COUGH DISTURBING SLEEP: 0
EYE WATERING: 0
LIGHT-HEADEDNESS: 0
PANIC: 0
BLOATING: 1
DYSPNEA ON EXERTION: 0
NAUSEA: 1
SPEECH CHANGE: 0
PALPITATIONS: 0
VOMITING: 1
HYPERTENSION: 1
LEG PAIN: 0
BACK PAIN: 1
EYE IRRITATION: 1
DECREASED CONCENTRATION: 0
NERVOUS/ANXIOUS: 1
SPUTUM PRODUCTION: 0
SNORES LOUDLY: 1
ARTHRALGIAS: 1
POOR WOUND HEALING: 0
MEMORY LOSS: 0
NUMBNESS: 0
SMELL DISTURBANCE: 0
ABDOMINAL PAIN: 1
DIARRHEA: 1
DISTURBANCES IN COORDINATION: 0
HEADACHES: 1
SKIN CHANGES: 0
PARALYSIS: 0
NECK PAIN: 1
MYALGIAS: 1
SORE THROAT: 0
LOSS OF CONSCIOUSNESS: 0
EYE PAIN: 0
SLEEP DISTURBANCES DUE TO BREATHING: 0
MUSCLE CRAMPS: 1
NAIL CHANGES: 1
BOWEL INCONTINENCE: 0
SINUS CONGESTION: 0
HYPOTENSION: 0
JOINT SWELLING: 1
INSOMNIA: 1
STIFFNESS: 1
DEPRESSION: 1
HEARTBURN: 1

## 2021-07-27 ENCOUNTER — OFFICE VISIT (OUTPATIENT)
Dept: GASTROENTEROLOGY | Facility: CLINIC | Age: 66
End: 2021-07-27
Attending: FAMILY MEDICINE
Payer: MEDICARE

## 2021-07-27 ENCOUNTER — TELEPHONE (OUTPATIENT)
Dept: GASTROENTEROLOGY | Facility: CLINIC | Age: 66
End: 2021-07-27

## 2021-07-27 ENCOUNTER — PRE VISIT (OUTPATIENT)
Dept: GASTROENTEROLOGY | Facility: CLINIC | Age: 66
End: 2021-07-27

## 2021-07-27 VITALS
HEART RATE: 86 BPM | WEIGHT: 251.9 LBS | HEIGHT: 64 IN | OXYGEN SATURATION: 98 % | BODY MASS INDEX: 43.01 KG/M2 | DIASTOLIC BLOOD PRESSURE: 60 MMHG | SYSTOLIC BLOOD PRESSURE: 129 MMHG

## 2021-07-27 DIAGNOSIS — R19.7 DIARRHEA, UNSPECIFIED TYPE: ICD-10-CM

## 2021-07-27 PROCEDURE — 99215 OFFICE O/P EST HI 40 MIN: CPT | Mod: GC | Performed by: INTERNAL MEDICINE

## 2021-07-27 ASSESSMENT — PAIN SCALES - GENERAL: PAINLEVEL: NO PAIN (0)

## 2021-07-27 ASSESSMENT — MIFFLIN-ST. JEOR: SCORE: 1667.61

## 2021-07-27 NOTE — NURSING NOTE
"Chief Complaint   Patient presents with     Consult     Consultation Inflammatory Bowel Disease       Vitals:    07/27/21 0749   BP: 129/60   BP Location: Left arm   Patient Position: Chair   Cuff Size: Adult Large   Pulse: 86   SpO2: 98%   Weight: 114.3 kg (251 lb 14.4 oz)   Height: 1.626 m (5' 4\")       Body mass index is 43.24 kg/m .                     "

## 2021-07-27 NOTE — PATIENT INSTRUCTIONS
- Recommend daily Miralax  - If still having a bowel movement every other day, increase miralax to twice a day    Follow-up up with physician assistant Pili Arce in 3 months.
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
## 2021-07-27 NOTE — TELEPHONE ENCOUNTER
[facility] Call Center    Phone Message    May a detailed message be left on voicemail: yes     Reason for Call: Other:     Dionic[employer] calling this afternoon requesting that a copy of her AVS be printed and mailed to her please.    Also wants to know if Dr. Brunson is covered by medicare?    Also would like to know if these visits are medically necessary?     Action Taken: Message routed to:  [facility] gastro    Travel Screening: Not Applicable

## 2021-07-27 NOTE — PROGRESS NOTES
I performed a history and physical examination of this patient and discussed the management with Dr. Mahoney on 7/27/2021. I reviewed the note and there are no changes to the past medical, family or social history.  A complete 10 point review of systems was obtained. Please see the HPI for pertinent positives and negatives. All other systems were reviewed and were found to be negative. I agree with the documented findings and plan of care as outlined.    Has predominant constipation. Has not tried fiber and only uses miralax 1-2 days a week. Does feel that stress is making it worse.     Colonoscopy in 2019 with diverticulosis and single adenoma.     Diverticulitis in Jan 2021. Mild. Some likely reactive pelvic lymph nodes. Seems stable since 2014 per 5/12/21 CT read.     Quentin Brunson MD  GI Attending  Pager: [phone]

## 2021-07-30 NOTE — TELEPHONE ENCOUNTER
Discussed with patient that our clinic does not make determination and [facility]. Pt had referral   Also that Dr. Brunson does see medicaire pts.  Pt has problem with her printer so will mail avs.

## 2021-08-20 ENCOUNTER — OFFICE VISIT (OUTPATIENT)
Dept: AUDIOLOGY | Facility: CLINIC | Age: 66
End: 2021-08-20
Payer: MEDICARE

## 2021-08-20 DIAGNOSIS — H90.3 SENSORINEURAL HEARING LOSS (SNHL) OF BOTH EARS: Primary | ICD-10-CM

## 2021-08-20 PROCEDURE — 92591 PR HEARING AID EXAM BINAURAL: CPT | Mod: GY | Performed by: AUDIOLOGIST

## 2021-08-20 NOTE — PROGRESS NOTES
AUDIOLOGY REPORT    SUBJECTIVE: Radha Baca is a 65 year old female was seen in the Audiology Clinic at  [facility] on 8/20/21 to discuss concerns with hearing and functional communication difficulties. The patient was accompanied by their . Radha has been seen previously on 07/23/21, and results revealed a normal sloping to moderate sensorineural hearing loss bilaterally.   Radha notes difficulty with communication in a variety of listening situations. She reports she has not worn hearing aids in the past. She does use bluetooth headphones with her phen and is interested in phone connection and rechargeable battereis. She uses a Android phone. It is noted that patient signed an ABN and agrees to move forward with the consult.     OBJECTIVE:  Patient is a hearing aid candidate. Patient would like to move forward with a hearing aid evaluation today. Therefore, the patient was presented with different options for amplification to help aid in communication. Discussed styles, levels of technology and monaural vs. binaural fitting.     The hearing aid(s) mutually chosen were:  Binaural: Phonak Audeo P50  COLOR: brown  BATTERY SIZE: rechargeable  EARMOLD/TIPS: medium open  CANAL/ LENGTH: 1      ASSESSMENT:   Reviewed purchase information and warranty information with patient. The 45 day trial period was explained to patient. The patient was given a copy of the [place] Department of [employer] consumer brochure on purchasing hearing instruments. Patient risk factors have been provided to the patient in writing prior to the sale of the hearing aid per FDA regulation. The risk factors are also available in the User Instructional Booklet to be presented on the day of the hearing aid fitting. Hearing aid(s) ordered. Hearing aid evaluation completed.    PLAN: Radha is scheduled to return in 2-3 weeks for a hearing aid fitting and programming. Purchase agreement will  be completed on that date. Please contact this clinic with any questions or concerns.        Rupert Azevedo, [facility]  Licensed Audiologist  [place] #[id number]

## 2021-08-30 ENCOUNTER — TELEPHONE (OUTPATIENT)
Dept: ENDOCRINOLOGY | Facility: CLINIC | Age: 66
End: 2021-08-30

## 2021-08-30 NOTE — TELEPHONE ENCOUNTER
[employer] called to set up appointments in the Fall with Dr De Luna in Endocrine.  She is scheduled for 11/2/21 10 AM Dr De Luna  and 11/1 21 8 AM labs and 9 AM  Nurse visit to download meter for 11/2/21 appointment .  Rasheeda Izaguirre RN on 8/30/2021 at 8:37 AM

## 2021-09-05 ENCOUNTER — HEALTH MAINTENANCE LETTER (OUTPATIENT)
Age: 66
End: 2021-09-05

## 2021-09-29 ENCOUNTER — OFFICE VISIT (OUTPATIENT)
Dept: ORTHOPEDICS | Facility: CLINIC | Age: 66
End: 2021-09-29
Payer: MEDICARE

## 2021-09-29 DIAGNOSIS — Z53.9 ERRONEOUS ENCOUNTER--DISREGARD: Primary | ICD-10-CM

## 2021-09-29 DIAGNOSIS — B35.1 OM (ONYCHOMYCOSIS): Primary | ICD-10-CM

## 2021-09-29 DIAGNOSIS — M21.42 PES PLANUS OF BOTH FEET: ICD-10-CM

## 2021-09-29 DIAGNOSIS — Z96.653 STATUS POST TOTAL BILATERAL KNEE REPLACEMENT: Primary | ICD-10-CM

## 2021-09-29 DIAGNOSIS — M21.41 PES PLANUS OF BOTH FEET: ICD-10-CM

## 2021-09-29 DIAGNOSIS — E11.49 TYPE II OR UNSPECIFIED TYPE DIABETES MELLITUS WITH NEUROLOGICAL MANIFESTATIONS, UNCONTROLLED(250.62) (H): ICD-10-CM

## 2021-09-29 DIAGNOSIS — E11.65 TYPE II OR UNSPECIFIED TYPE DIABETES MELLITUS WITH NEUROLOGICAL MANIFESTATIONS, UNCONTROLLED(250.62) (H): ICD-10-CM

## 2021-09-29 PROCEDURE — 99213 OFFICE O/P EST LOW 20 MIN: CPT | Performed by: PODIATRIST

## 2021-09-29 RX ORDER — AMOXICILLIN 500 MG/1
TABLET, FILM COATED ORAL
Qty: 4 TABLET | Refills: 4 | Status: SHIPPED | OUTPATIENT
Start: 2021-09-29 | End: 2022-10-04

## 2021-09-29 NOTE — TELEPHONE ENCOUNTER
Patient requests refill of dental prophylactic antibiotics for dental appointments. Sent to patient's pharmacy electronically per standing orders.

## 2021-09-29 NOTE — LETTER
9/29/2021         RE: Radha Baca  [address] [facility] [address]        Dear Colleague,    Thank you for referring your patient, Radha Baca, to the [facility] [place]. Please see a copy of my visit note below.    Chief Complaint   Patient presents with     Follow Up     3 month follow up.             Allergies   Allergen Reactions     Nkda [No Known Drug Allergies]          Subjective: Radha is a 65 year old female who presents to the clinic today for a diabetic foot exam and management. Relates that she has no new LE complaints today. She does have diabetic shoes.      Objective  Hemoglobin A1C   Date Value Ref Range Status   07/12/2021 8.8 (H) 0.0 - 5.6 % Final     Comment:     Normal <5.7%   Prediabetes 5.7-6.4%    Diabetes 6.5% or higher     Note: Adopted from ADA consensus guidelines.   04/12/2021 8.9 (H) 0 - 5.6 % Final     Comment:     Normal <5.7% Prediabetes 5.7-6.4%  Diabetes 6.5% or higher - adopted from ADA   consensus guidelines.     01/18/2021 8.4 (H) 0 - 5.6 % Final     Comment:     Normal <5.7% Prediabetes 5.7-6.4%  Diabetes 6.5% or higher - adopted from ADA   consensus guidelines.     09/28/2020 9.0 (H) 0 - 5.6 % Final     Comment:     Normal <5.7% Prediabetes 5.7-6.4%  Diabetes 6.5% or higher - adopted from ADA   consensus guidelines.     01/18/2018 9.6 (A) 4.3 - 6 % Final   10/18/2017 9.5 (A) 4.3 - 6 % Final   07/19/2017 9.7 (A) 4.3 - 6 % Final         PT and DP pulses are not palpable bilaterally. CRT is 4 seconds. Diminished pedal hair.   Gross sensation is diminished, as well as protective sensation bilaterally.   Equinus is noted bilaterally.   Nails thickened, brittle, discolored, with subungual debris bilaterally. No open lesions are noted. Xerosis noted BL.      Assessment: DM2 with neuropathy.  Onychomycosis.   Xerosis     Plan:  - Pt seen and evaluated.  - Nails debrided x 10.  - See again in 4  months.    Sincerely,        Jerrell Austin DPM

## 2021-10-01 ENCOUNTER — TELEPHONE (OUTPATIENT)
Dept: AUDIOLOGY | Facility: CLINIC | Age: 66
End: 2021-10-01

## 2021-10-01 NOTE — TELEPHONE ENCOUNTER
[facility] Call Center    Phone Message    May a detailed message be left on voicemail: yes     Reason for Call: Other: Pt would like Dr Shaw to call her back to discuss something about her mother and her hearing options, Please call Pt back to discuss    Thank you,    Action Taken: Message routed to:  [facility] ([facility] Audiology    Travel Screening: Not Applicable

## 2021-10-05 NOTE — TELEPHONE ENCOUNTER
Tried again 3 hours later, no answer.         Rupert Azevedo, [facility]  Licensed Audiologist  [place] #[id number]     Patent

## 2021-10-05 NOTE — TELEPHONE ENCOUNTER
Called back and left message that I tried to reach them. Encouraged them to call back at their convenience.        Rupert Azevedo, [facility]  Licensed Audiologist  [place] #[id number]

## 2021-10-14 ENCOUNTER — OFFICE VISIT (OUTPATIENT)
Dept: AUDIOLOGY | Facility: CLINIC | Age: 66
End: 2021-10-14
Payer: MEDICARE

## 2021-10-14 DIAGNOSIS — H90.3 SENSORINEURAL HEARING LOSS (SNHL) OF BOTH EARS: Primary | ICD-10-CM

## 2021-10-14 PROCEDURE — V5011 HEARING AID FITTING/CHECKING: HCPCS | Performed by: AUDIOLOGIST

## 2021-10-14 PROCEDURE — V5020 CONFORMITY EVALUATION: HCPCS | Performed by: AUDIOLOGIST

## 2021-10-14 PROCEDURE — V5261 HEARING AID, DIGIT, BIN, BTE: HCPCS | Performed by: AUDIOLOGIST

## 2021-10-14 PROCEDURE — V5160 DISPENSING FEE BINAURAL: HCPCS | Performed by: AUDIOLOGIST

## 2021-10-14 NOTE — PROGRESS NOTES
AUDIOLOGY REPORT    SUBJECTIVE: Radha Baca is a 65 year old female who was seen in the Audiology Clinic at the [facility] for a fitting of Phonak Audeo P50-R PERICO hearing aids. Previous test results completed on 07/23/21, and results revealed a normal sloping to moderate sensorineural hearing loss bilaterally. Radha is accompanied by her  Gabriel, today.    OBJECTIVE: The hearing aid conformity evaluation was completed.The hearing aids were placed and they provided a good fit. Simulated Real-ear-probe-microphone measurements were completed on the Avisena system and were a good match to NAL-NL1 target with soft sounds audible, moderate sounds comfortable, and loud sounds below discomfort. UCLs are verified through maximum power output measures and demonstrate appropriate limiting of loud inputs. Radha was oriented to proper hearing aid use, care, cleaning (no water, dry brush), batteries (size recahrgeable, insertion/removal, toxicity, low-battery signal), aid insertion/removal, user booklet, warranty information, storage cases, and other hearing aid details. The patient confirmed understanding of hearing aid use and care, and showed proper insertion of hearing aid and batteries while in the office today.Radha reported good volume and sound quality today.   Hearing aids were programmed as follows:  Program 1:Auto only, No VC  Patient's phone will be connected at her next appointment. How to change wax filters will also be reviewed.     EAR(S) FIT: Bilateral  HEARING AID MODEL NAME: Phonak Audeo P50-R PERICO   HEARING AID STYLE: -in-the-ear behind-the-ear  EARMOLDS/TIP: small open new style  SERIAL NUMBERS: Right: [id number] Left:: [id number]  WARRANTY END DATE: 11/17/2024    ASSESSMENT:  Phonak Audeo P50-R PERICO  hearing aid(s) were fit today. Verification measures were performed. Radha signed the Hearing Aid Purchase Agreement and was given a copy, as well as  details on her hearing aids. Patient was counseled that exact out of pocket amounts cannot be determined for hearing aid claims being sent to insurance. Any insurance coverage information presented to the patient is an estimate only, and is not a guarantee of payment. Patient has been advised to check with their own insurance.    PLAN:Radha will return for follow-up in 2-3 weeks for a hearing aid review appointment. Please call this clinic with questions regarding today s appointment.      Adriane Azevedo., [facility]  Licensed Audiologist  [place] #[id number]

## 2021-10-14 NOTE — PATIENT INSTRUCTIONS
Activity as tolerated.  Try to get a walk out of your house in every day.    Call if new fever, worsening cough, shortness of breath.  
No

## 2021-10-25 ENCOUNTER — VIRTUAL VISIT (OUTPATIENT)
Dept: GASTROENTEROLOGY | Facility: CLINIC | Age: 66
End: 2021-10-25
Payer: MEDICARE

## 2021-10-25 VITALS — BODY MASS INDEX: 42.23 KG/M2 | WEIGHT: 246 LBS

## 2021-10-25 DIAGNOSIS — E11.8 TYPE 2 DIABETES MELLITUS WITH COMPLICATION (H): ICD-10-CM

## 2021-10-25 DIAGNOSIS — K58.1 IRRITABLE BOWEL SYNDROME WITH CONSTIPATION: Primary | ICD-10-CM

## 2021-10-25 PROCEDURE — 99215 OFFICE O/P EST HI 40 MIN: CPT | Mod: 95 | Performed by: PHYSICIAN ASSISTANT

## 2021-10-25 RX ORDER — INSULIN LISPRO 100 [IU]/ML
INJECTION, SOLUTION INTRAVENOUS; SUBCUTANEOUS
Qty: 75 ML | Refills: 3 | Status: SHIPPED | OUTPATIENT
Start: 2021-10-25 | End: 2022-08-26

## 2021-10-25 NOTE — PROGRESS NOTES
"Radha Baca is a 66 year old female who is being evaluated via a billable video visit.      The patient has been notified of following:     \"This video visit will be conducted via a call between you and your physician/provider. We have found that certain health care needs can be provided without the need for an in-person physical exam.  This service lets us provide the care you need with a video conversation.  If a prescription is necessary we can send it directly to your pharmacy.  If lab work is needed we can place an order for that and you can then stop by our lab to have the test done at a later time.    If during the course of the call the physician/provider feels a video visit is not appropriate, you will not be charged for this service.\"     Patient confirmed that they are in [place] for today's visit yes.    Video-Visit Details  Type of service:  Video Visit    Video Start Time: 11:05 AM  Video End Time:  11:51 AM    Originating Location (pt. Location): [place]    Distant Location (provider location):  [facility] [place]     Platform used: [facility]        GI CLINIC VISIT    CC/REFERRING MD:  Referred Self  REASON FOR CONSULTATION: follow-up for IBS-C    ASSESSMENT/PLAN:  1. Constipation, abdominal discomfort  Colonoscopy in 2019 with polyp, symptoms likely due to IBS-C, may have been worsened after episode of diverticulitis in 1/20201. Would recommend the following:   -- for history of diverticulitis: avoid red meat, NSAIDs, work on getting good exercise  -- fiber is the best medication we have available to improve stool consistency and regularity. This can also help your gut microbiome. Start with low dose fiber (would recommend Citrucel if possible, otherwise metamucil or benefiber). Do one teaspoon for 1-2 weeks, then two teaspoons for 1-2 weeks, then 1 tablespoon. Some people feel good at one tablespoon, otherwise you may need to increase this up to twice a day. " "  -- track your symptoms using the [place] Stool chart  -- use Miralax- try taking this every day, and you can adjust the dose as needed as you ramp up the fiber     Addendum: also discussed meeting with Gi dietitian (discuss IBS, fiber sources, food triggers/potentially modified low FODMAP diet).     RTC 3 months as needed     Thank you for this consultation.  It was a pleasure to participate in the care of this patient; please contact us with any further questions.     49 Minutes was spent on the date of the encounter during chart review, history and exam, documentation, and further activities as noted     Pili Arce PA-C  Division of Gastroenterology, Hepatology & Nutrition  [facility]        HPI  Radha Baca is a 66 year old woman presenting for constipation. She has previously been seen by me in 10/2019, and was most recently seen by Dr. Brunson 8/27/2011. Please see previous documentation for additional details.     Patient reports that in 1/2021 she had a severe flare of symptoms for which she had a CT scan consistent with diverticulitis (see CT scan below).     She reports that bowel movements are variable and can be loose, or little balls. More medium brown/dark color. Can be \"snake\" with a jagged edge. She can skip days without bowel movements. It will start with \"constipation\" with pushing and hard stools, and then will have looser stools and will end the day with diarrhea. (is taking Miralax 5 times a week). She is increasing fruits and vegetables. Has had incontinence before.     She does like to eat sunflower seeds, will get bread with nuts. She is trying to cut back on salt.     Does report increased stress recently- but this did not worsen symptoms.     Family history: Brothers with surgery for diverticulitis     PROBLEM LIST  Patient Active Problem List    Diagnosis Date Noted     Insomnia, unspecified type 07/08/2021     Priority: Medium     Nightmares 07/08/2021     " Priority: Medium     GHISLAINE (obstructive sleep apnea) 12/21/2020     Priority: Medium     Home sleep apnea test 11/11/2019   Weight 248, BMI 43.2  AHI 46.8, with associated hypoxemia       Right-sided thoracic back pain 06/18/2020     Priority: Medium     Class 3 severe obesity due to excess calories with serious comorbidity and body mass index (BMI) of 40.0 to 44.9 in adult (H) 06/08/2020     Priority: Medium     Insulin long-term use (H) 03/03/2020     Priority: Medium     Other secondary osteoarthritis of first carpometacarpal joint of right hand 01/25/2018     Priority: Medium     Venous (peripheral) insufficiency 02/27/2017     Priority: Medium     Chronic bilateral low back pain with right-sided sciatica 02/27/2017     Priority: Medium     Type 2 diabetes mellitus with complication (H) 04/19/2016     Priority: Medium     Essential hypertension 04/19/2016     Priority: Medium     Encounter for routine gynecological examination 11/18/2015     Priority: Medium     11/2019: screen pap due  11/26/14: Pap->NIL; HR HPV->negative         Health care maintenance 01/14/2015     Priority: Medium     Colonoscopy 1/15:  1 sessile polyp removed.  Next colonoscopy 2020.       BMI >40 11/26/2014     Priority: Medium     12/9/2016: BMI 42.93  12/6/2011: BMI 42  Down from 50!       Menopause present -- age 39 11/26/2014     Priority: Medium     Umbilical hernia -- w/o symptoms->expectant mgt 08/27/2014     Priority: Medium     Problem list name updated by automated process. Provider to review       Glaucoma 04/09/2013     Priority: Medium     SNHL (sensorineural hearing loss) 12/17/2012     Priority: Medium     Dyslipidemia 08/27/2011     Priority: Medium       PERTINENT PAST MEDICAL HISTORY:  Past Medical History:   Diagnosis Date     Abnormal Pap smear      Anxiety      Arthritis      Arthritis of knee 4/4/2013     Arthritis of shoulder region, right 4/18/2014     Back injury      Breast disorder      Chronic constipation       Chronic diarrhea      Depressive disorder      Diabetes (H)      Fecal incontinence      Finger pain 4/2/2015     Fracture broke L 5th pinky finger due to fall  1/2015     Glaucoma (increased eye pressure)      Head injury 8/6/2016     Headache(784.0)     decreased with mouth guard use.     History of blood transfusion 8/2011 & 4/2013     History of diabetes mellitus      Hypercholesteremia      Hypertension      Low back pain      Menarche age 9+    cycles q mo x 4-5 d     Neck injuries      Nonsenile cataract IO implants: L-8/2013; R-1/2011     Pain in knee joint     LEFT     Right bundle branch block     per H/P     Sleep apnea     Info on file     SNHL (sensorineural hearing loss)      Tinnitus     I have reported ringing in ears. not sure of dates     Umbilical hernia without mention of obstruction or gangrene 8/2014     Vision disorder Detached Retina 10/2009       PREVIOUS SURGERIES:  Past Surgical History:   Procedure Laterality Date     ARTHROPLASTY KNEE  4/4/2013    Left Total Knee Arthroplasty;  Surgeon: Lou Byrne MD;  [facility]     ARTHROPLASTY MINIMALLY INVASIVE KNEE  8/22/2011    R knee :CAITLYN JACOBO, Left age 14     COLONOSCOPY  1/14/2015     DENTAL SURGERY      root canals, wisdom teeth     EXAM UNDER ANESTHESIA, MANIPULATE JOINT (LOCATION)  10/5/2012    Procedure: EXAM UNDER ANESTHESIA, MANIPULATE JOINT (LOCATION);  Right Knee Mini Open Lysis Of Adhesions, Left Knee Steroid Injection  ;  Surgeon: Lou Byrne MD;  Location: [facility]      OR OCULAR DEVICE INTRAOP DETACHED RETINA  2009    R     HC PHAKIC IOL - IMPLANT FROM SURGEON      right     KNEE SURGERY  1969    left     LASER YAG CAPSULOTOMY  12/2016    left     VITRECTOMY PARSPLANA  2010       ALLERGIES:     Allergies   Allergen Reactions     Nkda [No Known Drug Allergies]        PERTINENT MEDICATIONS:    Current Outpatient Medications:      Acetaminophen (TYLENOL PO), Take by mouth as needed for mild pain or  fever, Disp: , Rfl:      amoxicillin (AMOXIL) 500 MG tablet, Take 4 tablets (2000 mg) by mouth 1 hour before dental procedures/cleanings., Disp: 4 tablet, Rfl: 4     Ascorbic Acid (VITAMIN C PO), Take 2,000 mg by mouth 2 times daily , Disp: , Rfl:      aspirin 81 MG tablet, 1 tab daily, Disp: 90 tablet, Rfl: 3     atorvastatin (LIPITOR) 20 MG tablet, Take 1 tablet (20 mg) by mouth daily DX E78.5 ID # [id number], Disp: 90 tablet, Rfl: 3     B Complex Vitamins (VITAMIN  B COMPLEX) CAPS, Take 1 tablet by mouth daily , Disp: , Rfl:      blood glucose (ONETOUCH VERIO IQ) test strip, Use to test blood sugar 4 times daily or as directed DX E11.8 ID # [id number] has meter already, Disp: 360 strip, Rfl: 3     blood glucose calibration (ONETOUCH VERIO HIGH) High solution, Use to calibrate blood glucose monitor as needed as directed. LEVEL 3 solution, Disp: 1 Bottle, Rfl: 3     blood glucose calibration (ONETOUCH VERIO) solution, U TO CALIBRATE BLOOD GLUCOSE MONITOR PRN UTD, Disp: , Rfl:      calcium-vitamin D (CALTRATE) 600-400 MG-UNIT per tablet, Take 1 tablet by mouth 2 times daily, Disp: , Rfl:      cholecalciferol (VITAMIN D) 1000 UNIT tablet, Take 1 tablet by mouth daily., Disp: 100 tablet, Rfl: 3     cycloSPORINE (RESTASIS) 0.05 % ophthalmic emulsion, , Disp: , Rfl:      insulin glargine (LANTUS SOLOSTAR) 100 UNIT/ML pen, Inject 80 units SQ each am., Disp: 90 mL, Rfl: 3     insulin pen needle (B-D U/F) 31G X 8 MM miscellaneous, Use  4 daily short 31 G X 8MM, Disp: 400 each, Rfl: 3     latanoprost (XALATAN) 0.005 % ophthalmic solution, Place 1 drop into both eyes At Bedtime, Disp: , Rfl:      lisinopril-hydrochlorothiazide (ZESTORETIC) 20-12.5 MG tablet, Take 1 tablet by mouth daily DX  I10 ID # [id number], Disp: 90 tablet, Rfl: 3     metFORMIN (GLUCOPHAGE) 500 MG tablet, Take 2 tablets (1,000 mg) by mouth 2 times daily (with meals), Disp: 360 tablet, Rfl: 3     Multiple Vitamin (MULTIVITAMIN  S) CAPS, Take 1 daily, Disp: 90  capsule, Rfl: 3     Multiple Vitamins-Minerals (EYE-ZAKIYA PLUS LUTEIN PO), , Disp: , Rfl:      Omega-3 Fatty Acids (OMEGA-3 FISH OIL PO), Take 1,000 mg by mouth daily, Disp: , Rfl:      OneTouch Delica Lancets 33G MISC, 4 Box 4 times daily Test  Blood glucose 4 times daily  DX E11.8  ID # [id number], Disp: 360 each, Rfl: 3     order for DME, Equipment being ordered: Grade 1 (light) compression stockings, below the knee, Disp: 1 Units, Rfl: 1     polyethylene glycol (MIRALAX) 17 GM/Dose powder, Take 1 capful by mouth daily as needed for constipation, Disp: , Rfl:      Semaglutide,0.25 or 0.5MG/DOS, (OZEMPIC, 0.25 OR 0.5 MG/DOSE,) 2 MG/1.5ML SOPN, Inject  0.5 mg  Subcutaneous every 7 days, Disp: 4.5 mL, Rfl: 3     timolol (TIMOPTIC) 0.5 % ophthalmic solution, Place 1 drop into both eyes 2 times daily , Disp: , Rfl:      timolol maleate (TIMOPTIC) 0.5 % ophthalmic solution, 1 drop 2 times daily, Disp: , Rfl:      triamcinolone (KENALOG) 0.1 % ointment, Apply topically 2 times daily, Disp: 80 g, Rfl: 11     HUMALOG KWIKPEN 100 UNIT/ML soln, Carb counting with meals approx 70-80 units daily subcutaneously DX E 11.8 ID # [id number] needs refrigeration, Disp: 75 mL, Rfl: 3    SOCIAL HISTORY:  Social History     Socioeconomic History     Marital status:      Spouse name: Not on file     Number of children: Not on file     Years of education: Not on file     Highest education level: Not on file   Occupational History     Occupation: Human Resources     Comment: between jobs now   Tobacco Use     Smoking status: Former Smoker     Packs/day: 0.00     Years: 0.00     Pack years: 0.00     Smokeless tobacco: Never Used     Tobacco comment: quit mid 1980s   Substance and Sexual Activity     Alcohol use: No     Drug use: No     Sexual activity: Yes     Partners: Male     Birth control/protection: Post-menopausal   Other Topics Concern      Service Not Asked     Blood Transfusions Yes     Comment: 2 units 2011     Caffeine  Concern No     Comment: 2s     Occupational Exposure No     Hobby Hazards No     Sleep Concern No     Stress Concern No     Comment: rehab for R knee after replacement     Weight Concern Yes     Comment: working on wt loss     Special Diet Yes     Comment: Diabetic     Back Care No     Exercise No     Comment: water aerobics 35-40' 3-4 d & strength 3d/wk     Bike Helmet No     Seat Belt No     Self-Exams Not Asked     Parent/sibling w/ CABG, MI or angioplasty before 65F 55M? Not Asked   Social History Narrative    How much exercise per week? 3-4x swimming, aerobics    How much calcium per day? Supplement       How much caffeine per day? 2 cups coffee/ 1-2 can of diet soda    How much vitamin D per day? Supplement    Do you/your family wear seatbelts?  Yes    Do you/your family use safety helmets? No    Do you/your family use sunscreen? No    Do you/your family keep firearms in the home? No    Do you/your family have a smoke detector(s)? Yes    Do you feel safe in your home? Yes    Has anyone ever touched you in an unwanted manner? No     Explain     See LEA Berman 11/26/2014    Reviewed Raul DEGROOT MA 11/22/2017     Social Determinants of Health     Financial Resource Strain:      Difficulty of Paying Living Expenses:    Food Insecurity:      Worried About Running Out of Food in the Last Year:      Ran Out of Food in the Last Year:    Transportation Needs:      Lack of Transportation (Medical):      Lack of Transportation (Non-Medical):    Physical Activity:      Days of Exercise per Week:      Minutes of Exercise per Session:    Stress:      Feeling of Stress :    Social Connections:      Frequency of Communication with Friends and Family:      Frequency of Social Gatherings with Friends and Family:      Attends Synagogue Services:      Active Member of Clubs or Organizations:      Attends Club or Organization Meetings:      Marital Status:    Intimate Partner Violence: Not At Risk     Fear of Current or Ex-Partner: No      Emotionally Abused: No     Physically Abused: No     Sexually Abused: No       FAMILY HISTORY:  Family History   Problem Relation Age of Onset     Diabetes Father 55        DM II     Diabetes Brother 50        xs 2     Hypertension Sister 41        also B and M     Cancer Maternal Aunt         multiple myeloma     Hypertension Mother 79     Osteoporosis Mother      Memory loss Mother      Glaucoma Mother      Diabetes Brother      Hypertension Brother      Heart Murmur Brother      Glaucoma Brother      Hypertension Brother      Breast Cancer Cousin      Thyroid Disease Sister         Graves     Diabetes Sister         Graves     Cerebrovascular Disease Maternal Grandmother      Other - See Comments Sister         16 minths head injury     Other - See Comments Brother         MVA age 36     Cancer - colorectal No family hx of      Prostate Cancer No family hx of      Alcohol/Drug No family hx of      Melanoma No family hx of      Skin Cancer No family hx of        Past/family/social history reviewed and no changes    PHYSICAL EXAMINATION:  Constitutional: aaox3, cooperative, pleasant, not dyspneic/diaphoretic, no acute distress  Vitals reviewed: Wt 111.6 kg (246 lb)   BMI 42.23 kg/m    Wt:   Wt Readings from Last 2 Encounters:   10/25/21 111.6 kg (246 lb)   07/27/21 114.3 kg (251 lb 14.4 oz)   General appearance: Healthy appearing adult, in no acute distress  Eyes: Sclera anicteric  Ears, nose, mouth and throat: No obvious external lesions of ears and nose, Hearing intact  Neck: Symmetric, No obvious external lesions  Respiratory: Normal respiration, no use of accessory muscles   Skin: No rashes or jaundice   Psychiatric: Oriented to person, place and time, Appropriate mood and affect.     PERTINENT STUDIES:  Lab on 07/12/2021   Component Date Value Ref Range Status     Hemoglobin A1C 07/12/2021 8.8* 0.0 - 5.6 % Final     Cholesterol 07/12/2021 94  <200 mg/dL Final     Triglycerides 07/12/2021 247* <150 mg/dL  Final     Direct Measure HDL 07/12/2021 36* >=50 mg/dL Final     LDL Cholesterol Calculated 07/12/2021 9  <=100 mg/dL Final     Non HDL Cholesterol 07/12/2021 58  <130 mg/dL Final     Patient Fasting > 8hrs? 07/12/2021 Yes   Final     Creatinine 07/12/2021 0.71  0.52 - 1.04 mg/dL Final     GFR Estimate 07/12/2021 89  >60 mL/min/1.73m2 Final     Potassium 07/12/2021 4.2  3.4 - 5.3 mmol/L Final     Urea Nitrogen 07/12/2021 14  7 - 30 mg/dL Final     CT 5/12/2021: extensive colonic diverticulosis   CT 1/29/2021: Mild pericolonic fat stranding adjacent to a diverticulum in the  proximal cecum, which most likely represents a mild and/or early  uncomplicated diverticulitis.  2. Few mildly enlarged lymph nodes in the pelvis, with the largest  measuring up to 13 mm in the short axis adjacent to the left iliac  vasculature and left pelvic sidewall . These may be reactive, but are  technically indeterminate. Consider obtaining prior imaging for  comparison or a short-term follow-up CT in 3 months.  3. Cholelithiasis without CT evidence of cholecystitis.  4. Fat-containing periumbilical hernia.  5. Normal appendix.

## 2021-10-25 NOTE — NURSING NOTE
Chief Complaint   Patient presents with     Follow Up       Vitals:    10/25/21 1010   Weight: 111.6 kg (246 lb)       Body mass index is 42.23 kg/m .    Georgette Aguirre CMA

## 2021-10-25 NOTE — LETTER
10/25/2021         RE: Radha Baca  [birth year][address] [facility] [address]        Dear Colleague,    Thank you for referring your patient, Radha Baca, to the [facility] [place]. Please see a copy of my visit note below.    GI CLINIC VISIT    CC/REFERRING MD:  Referred Self  REASON FOR CONSULTATION: follow-up for IBS-C    ASSESSMENT/PLAN:  1. Constipation, abdominal discomfort  Colonoscopy in 2019 with polyp, symptoms likely due to IBS-C, may have been worsened after episode of diverticulitis in 1/20201. Would recommend the following:   -- for history of diverticulitis: avoid red meat, NSAIDs, work on getting good exercise  -- fiber is the best medication we have available to improve stool consistency and regularity. This can also help your gut microbiome. Start with low dose fiber (would recommend Citrucel if possible, otherwise metamucil or benefiber). Do one teaspoon for 1-2 weeks, then two teaspoons for 1-2 weeks, then 1 tablespoon. Some people feel good at one tablespoon, otherwise you may need to increase this up to twice a day.   -- track your symptoms using the [place] Stool chart  -- use Miralax- try taking this every day, and you can adjust the dose as needed as you ramp up the fiber     Addendum: also discussed meeting with Gi dietitian (discuss IBS, fiber sources, food triggers/potentially modified low FODMAP diet).     RTC 3 months as needed     Thank you for this consultation.  It was a pleasure to participate in the care of this patient; please contact us with any further questions.     49 Minutes was spent on the date of the encounter during chart review, history and exam, documentation, and further activities as noted     Pili Arce PA-C  Division of Gastroenterology, Hepatology & Nutrition  [facility]        HPI  Radha Baca is a 66 year old woman presenting for constipation. She has previously been seen by  "me in 10/2019, and was most recently seen by Dr. Brunson 8/27/2011. Please see previous documentation for additional details.     Patient reports that in 1/2021 she had a severe flare of symptoms for which she had a CT scan consistent with diverticulitis (see CT scan below).     She reports that bowel movements are variable and can be loose, or little balls. More medium brown/dark color. Can be \"snake\" with a jagged edge. She can skip days without bowel movements. It will start with \"constipation\" with pushing and hard stools, and then will have looser stools and will end the day with diarrhea. (is taking Miralax 5 times a week). She is increasing fruits and vegetables. Has had incontinence before.     She does like to eat sunflower seeds, will get bread with nuts. She is trying to cut back on salt.     Does report increased stress recently- but this did not worsen symptoms.     Family history: Brothers with surgery for diverticulitis     PROBLEM LIST  Patient Active Problem List    Diagnosis Date Noted     Insomnia, unspecified type 07/08/2021     Priority: Medium     Nightmares 07/08/2021     Priority: Medium     GHISLAINE (obstructive sleep apnea) 12/21/2020     Priority: Medium     Home sleep apnea test 11/11/2019   Weight 248, BMI 43.2  AHI 46.8, with associated hypoxemia       Right-sided thoracic back pain 06/18/2020     Priority: Medium     Class 3 severe obesity due to excess calories with serious comorbidity and body mass index (BMI) of 40.0 to 44.9 in adult (H) 06/08/2020     Priority: Medium     Insulin long-term use (H) 03/03/2020     Priority: Medium     Other secondary osteoarthritis of first carpometacarpal joint of right hand 01/25/2018     Priority: Medium     Venous (peripheral) insufficiency 02/27/2017     Priority: Medium     Chronic bilateral low back pain with right-sided sciatica 02/27/2017     Priority: Medium     Type 2 diabetes mellitus with complication (H) 04/19/2016     Priority: Medium     " Essential hypertension 04/19/2016     Priority: Medium     Encounter for routine gynecological examination 11/18/2015     Priority: Medium     11/2019: screen pap due  11/26/14: Pap->NIL; HR HPV->negative         Health care maintenance 01/14/2015     Priority: Medium     Colonoscopy 1/15:  1 sessile polyp removed.  Next colonoscopy 2020.       BMI >40 11/26/2014     Priority: Medium     12/9/2016: BMI 42.93  12/6/2011: BMI 42  Down from 50!       Menopause present -- age 39 11/26/2014     Priority: Medium     Umbilical hernia -- w/o symptoms->expectant mgt 08/27/2014     Priority: Medium     Problem list name updated by automated process. Provider to review       Glaucoma 04/09/2013     Priority: Medium     SNHL (sensorineural hearing loss) 12/17/2012     Priority: Medium     Dyslipidemia 08/27/2011     Priority: Medium       PERTINENT PAST MEDICAL HISTORY:  Past Medical History:   Diagnosis Date     Abnormal Pap smear      Anxiety      Arthritis      Arthritis of knee 4/4/2013     Arthritis of shoulder region, right 4/18/2014     Back injury      Breast disorder      Chronic constipation      Chronic diarrhea      Depressive disorder      Diabetes (H)      Fecal incontinence      Finger pain 4/2/2015     Fracture broke L 5th pinky finger due to fall  1/2015     Glaucoma (increased eye pressure)      Head injury 8/6/2016     Headache(784.0)     decreased with mouth guard use.     History of blood transfusion 8/2011 & 4/2013     History of diabetes mellitus      Hypercholesteremia      Hypertension      Low back pain      Menarche age 9+    cycles q mo x 4-5 d     Neck injuries      Nonsenile cataract IO implants: L-8/2013; R-1/2011     Pain in knee joint     LEFT     Right bundle branch block     per H/P     Sleep apnea     Info on file     SNHL (sensorineural hearing loss)      Tinnitus     I have reported ringing in ears. not sure of dates     Umbilical hernia without mention of obstruction or gangrene 8/2014      Vision disorder Detached Retina 10/2009       PREVIOUS SURGERIES:  Past Surgical History:   Procedure Laterality Date     ARTHROPLASTY KNEE  4/4/2013    Left Total Knee Arthroplasty;  Surgeon: Lou Byrne MD;  [facility]     ARTHROPLASTY MINIMALLY INVASIVE KNEE  8/22/2011    R knee :CAITLYN JACOBO, Left age 14     COLONOSCOPY  1/14/2015     DENTAL SURGERY      root canals, wisdom teeth     EXAM UNDER ANESTHESIA, MANIPULATE JOINT (LOCATION)  10/5/2012    Procedure: EXAM UNDER ANESTHESIA, MANIPULATE JOINT (LOCATION);  Right Knee Mini Open Lysis Of Adhesions, Left Knee Steroid Injection  ;  Surgeon: Lou Byrne MD;  Location[facility]     [facility] OR OCULAR DEVICE INTRAOP DETACHED RETINA  2009    R     [facility] PHAKIC IOL - IMPLANT FROM SURGEON      right     KNEE SURGERY  1969    left     LASER YAG CAPSULOTOMY  12/2016    left     VITRECTOMY PARSPLANA  2010       ALLERGIES:     Allergies   Allergen Reactions     Nkda [No Known Drug Allergies]        PERTINENT MEDICATIONS:    Current Outpatient Medications:      Acetaminophen (TYLENOL PO), Take by mouth as needed for mild pain or fever, Disp: , Rfl:      amoxicillin (AMOXIL) 500 MG tablet, Take 4 tablets (2000 mg) by mouth 1 hour before dental procedures/cleanings., Disp: 4 tablet, Rfl: 4     Ascorbic Acid (VITAMIN C PO), Take 2,000 mg by mouth 2 times daily , Disp: , Rfl:      aspirin 81 MG tablet, 1 tab daily, Disp: 90 tablet, Rfl: 3     atorvastatin (LIPITOR) 20 MG tablet, Take 1 tablet (20 mg) by mouth daily DX E78.5 ID # [id number], Disp: 90 tablet, Rfl: 3     B Complex Vitamins (VITAMIN  B COMPLEX) CAPS, Take 1 tablet by mouth daily , Disp: , Rfl:      blood glucose (ONETOUCH VERIO IQ) test strip, Use to test blood sugar 4 times daily or as directed DX E11.8 ID # [id number] has meter already, Disp: 360 strip, Rfl: 3     blood glucose calibration (ONETOUCH VERIO HIGH) High solution, Use to calibrate blood glucose monitor as needed as directed. LEVEL 3  solution, Disp: 1 Bottle, Rfl: 3     blood glucose calibration (ONETOUCH VERIO) solution, U TO CALIBRATE BLOOD GLUCOSE MONITOR PRN UTD, Disp: , Rfl:      calcium-vitamin D (CALTRATE) 600-400 MG-UNIT per tablet, Take 1 tablet by mouth 2 times daily, Disp: , Rfl:      cholecalciferol (VITAMIN D) 1000 UNIT tablet, Take 1 tablet by mouth daily., Disp: 100 tablet, Rfl: 3     cycloSPORINE (RESTASIS) 0.05 % ophthalmic emulsion, , Disp: , Rfl:      insulin glargine (LANTUS SOLOSTAR) 100 UNIT/ML pen, Inject 80 units SQ each am., Disp: 90 mL, Rfl: 3     insulin pen needle (B-D U/F) 31G X 8 MM miscellaneous, Use  4 daily short 31 G X 8MM, Disp: 400 each, Rfl: 3     latanoprost (XALATAN) 0.005 % ophthalmic solution, Place 1 drop into both eyes At Bedtime, Disp: , Rfl:      lisinopril-hydrochlorothiazide (ZESTORETIC) 20-12.5 MG tablet, Take 1 tablet by mouth daily DX  I10 ID # [id number], Disp: 90 tablet, Rfl: 3     metFORMIN (GLUCOPHAGE) 500 MG tablet, Take 2 tablets (1,000 mg) by mouth 2 times daily (with meals), Disp: 360 tablet, Rfl: 3     Multiple Vitamin (MULTIVITAMIN  S) CAPS, Take 1 daily, Disp: 90 capsule, Rfl: 3     Multiple Vitamins-Minerals (EYE-ZAKIYA PLUS LUTEIN PO), , Disp: , Rfl:      Omega-3 Fatty Acids (OMEGA-3 FISH OIL PO), Take 1,000 mg by mouth daily, Disp: , Rfl:      OneTouch Delica Lancets 33G MISC, 4 Box 4 times daily Test  Blood glucose 4 times daily  DX E11.8  ID # [id number], Disp: 360 each, Rfl: 3     order for DME, Equipment being ordered: Grade 1 (light) compression stockings, below the knee, Disp: 1 Units, Rfl: 1     polyethylene glycol (MIRALAX) 17 GM/Dose powder, Take 1 capful by mouth daily as needed for constipation, Disp: , Rfl:      Semaglutide,0.25 or 0.5MG/DOS, (OZEMPIC, 0.25 OR 0.5 MG/DOSE,) 2 MG/1.5ML SOPN, Inject  0.5 mg  Subcutaneous every 7 days, Disp: 4.5 mL, Rfl: 3     timolol (TIMOPTIC) 0.5 % ophthalmic solution, Place 1 drop into both eyes 2 times daily , Disp: , Rfl:      timolol  maleate (TIMOPTIC) 0.5 % ophthalmic solution, 1 drop 2 times daily, Disp: , Rfl:      triamcinolone (KENALOG) 0.1 % ointment, Apply topically 2 times daily, Disp: 80 g, Rfl: 11     HUMALOG KWIKPEN 100 UNIT/ML soln, Carb counting with meals approx 70-80 units daily subcutaneously DX CHRISTINA 11.8 ID # [id number] needs refrigeration, Disp: 75 mL, Rfl: 3    SOCIAL HISTORY:  Social History     Socioeconomic History     Marital status:      Spouse name: Not on file     Number of children: Not on file     Years of education: Not on file     Highest education level: Not on file   Occupational History     Occupation: Human Resources     Comment: between jobs now   Tobacco Use     Smoking status: Former Smoker     Packs/day: 0.00     Years: 0.00     Pack years: 0.00     Smokeless tobacco: Never Used     Tobacco comment: quit mid 1980s   Substance and Sexual Activity     Alcohol use: No     Drug use: No     Sexual activity: Yes     Partners: Male     Birth control/protection: Post-menopausal   Other Topics Concern      Service Not Asked     Blood Transfusions Yes     Comment: 2 units 2011     Caffeine Concern No     Comment: 2s     Occupational Exposure No     Hobby Hazards No     Sleep Concern No     Stress Concern No     Comment: rehab for R knee after replacement     Weight Concern Yes     Comment: working on wt loss     Special Diet Yes     Comment: Diabetic     Back Care No     Exercise No     Comment: water aerobics 35-40' 3-4 d & strength 3d/wk     Bike Helmet No     Seat Belt No     Self-Exams Not Asked     Parent/sibling w/ CABG, MI or angioplasty before 65F 55M? Not Asked   Social History Narrative    How much exercise per week? 3-4x swimming, aerobics    How much calcium per day? Supplement       How much caffeine per day? 2 cups coffee/ 1-2 can of diet soda    How much vitamin D per day? Supplement    Do you/your family wear seatbelts?  Yes    Do you/your family use safety helmets? No    Do you/your family  use sunscreen? No    Do you/your family keep firearms in the home? No    Do you/your family have a smoke detector(s)? Yes    Do you feel safe in your home? Yes    Has anyone ever touched you in an unwanted manner? No     Explain     See LEA Berman 11/26/2014    Reviewed Raul DEGROOT MA 11/22/2017     Social Determinants of Health     Financial Resource Strain:      Difficulty of Paying Living Expenses:    Food Insecurity:      Worried About Running Out of Food in the Last Year:      Ran Out of Food in the Last Year:    Transportation Needs:      Lack of Transportation (Medical):      Lack of Transportation (Non-Medical):    Physical Activity:      Days of Exercise per Week:      Minutes of Exercise per Session:    Stress:      Feeling of Stress :    Social Connections:      Frequency of Communication with Friends and Family:      Frequency of Social Gatherings with Friends and Family:      Attends Jain Services:      Active Member of Clubs or Organizations:      Attends Club or Organization Meetings:      Marital Status:    Intimate Partner Violence: Not At Risk     Fear of Current or Ex-Partner: No     Emotionally Abused: No     Physically Abused: No     Sexually Abused: No       FAMILY HISTORY:  Family History   Problem Relation Age of Onset     Diabetes Father 55        DM II     Diabetes Brother 50        xs 2     Hypertension Sister 41        also B and M     Cancer Maternal Aunt         multiple myeloma     Hypertension Mother 79     Osteoporosis Mother      Memory loss Mother      Glaucoma Mother      Diabetes Brother      Hypertension Brother      Heart Murmur Brother      Glaucoma Brother      Hypertension Brother      Breast Cancer Cousin      Thyroid Disease Sister         Graves     Diabetes Sister         Graves     Cerebrovascular Disease Maternal Grandmother      Other - See Comments Sister         16 minths head injury     Other - See Comments Brother         MVA age 36     Cancer - colorectal No  family hx of      Prostate Cancer No family hx of      Alcohol/Drug No family hx of      Melanoma No family hx of      Skin Cancer No family hx of        Past/family/social history reviewed and no changes    PHYSICAL EXAMINATION:  Constitutional: aaox3, cooperative, pleasant, not dyspneic/diaphoretic, no acute distress  Vitals reviewed: Wt 111.6 kg (246 lb)   BMI 42.23 kg/m    Wt:   Wt Readings from Last 2 Encounters:   10/25/21 111.6 kg (246 lb)   07/27/21 114.3 kg (251 lb 14.4 oz)   General appearance: Healthy appearing adult, in no acute distress  Eyes: Sclera anicteric  Ears, nose, mouth and throat: No obvious external lesions of ears and nose, Hearing intact  Neck: Symmetric, No obvious external lesions  Respiratory: Normal respiration, no use of accessory muscles   Skin: No rashes or jaundice   Psychiatric: Oriented to person, place and time, Appropriate mood and affect.     PERTINENT STUDIES:  Lab on 07/12/2021   Component Date Value Ref Range Status     Hemoglobin A1C 07/12/2021 8.8* 0.0 - 5.6 % Final     Cholesterol 07/12/2021 94  <200 mg/dL Final     Triglycerides 07/12/2021 247* <150 mg/dL Final     Direct Measure HDL 07/12/2021 36* >=50 mg/dL Final     LDL Cholesterol Calculated 07/12/2021 9  <=100 mg/dL Final     Non HDL Cholesterol 07/12/2021 58  <130 mg/dL Final     Patient Fasting > 8hrs? 07/12/2021 Yes   Final     Creatinine 07/12/2021 0.71  0.52 - 1.04 mg/dL Final     GFR Estimate 07/12/2021 89  >60 mL/min/1.73m2 Final     Potassium 07/12/2021 4.2  3.4 - 5.3 mmol/L Final     Urea Nitrogen 07/12/2021 14  7 - 30 mg/dL Final     CT 5/12/2021: extensive colonic diverticulosis   CT 1/29/2021: Mild pericolonic fat stranding adjacent to a diverticulum in the  proximal cecum, which most likely represents a mild and/or early  uncomplicated diverticulitis.  2. Few mildly enlarged lymph nodes in the pelvis, with the largest  measuring up to 13 mm in the short axis adjacent to the left iliac  vasculature and  left pelvic sidewall . These may be reactive, but are  technically indeterminate. Consider obtaining prior imaging for  comparison or a short-term follow-up CT in 3 months.  3. Cholelithiasis without CT evidence of cholecystitis.  4. Fat-containing periumbilical hernia.  5. Normal appendix.        Again, thank you for allowing me to participate in the care of your patient.      Sincerely,    Pili Arce PA-C

## 2021-10-26 NOTE — PATIENT INSTRUCTIONS
It was a pleasure taking care of you today.  I've included a brief summary of our discussion and care plan from today's visit below.  Please review this information with your primary care provider.  ______________________________________________________________________    My recommendations are summarized as follows:    -- for history of diverticulitis: avoid red meat, NSAIDs, work on getting good exercise  -- fiber is the best medication we have available to improve stool consistency and regularity. This can also help your gut microbiome. Start with low dose fiber (would recommend Citrucel if possible, otherwise metamucil or benefiber). Do one teaspoon for 1-2 weeks, then two teaspoons for 1-2 weeks, then 1 tablespoon. Some people feel good at one tablespoon, otherwise you may need to increase this up to twice a day.   -- track your symptoms using the [place] Stool chart  -- use Miralax- try taking this every day, and you can adjust the dose as needed as you ramp up the fiber   -- please see scheduling information provided below   -- can meet with GI dietitian (Va or Praveen)    Return to GI Clinic in 3 months if needed to review your progress.    ______________________________________________________________________    How do I schedule labs, imaging studies, or procedures that were ordered in clinic today?     Labs: To schedule lab appointment at the [facility], use my chart or call [phone]. If you have a [facility] lab closer to home where you are regularly seen you can give them a call.     Procedures: If a colonoscopy, upper endoscopy, breath test, esophageal manometry, or pH impedence was ordered today, our endoscopy team will call you to schedule this. If you have not heard from our endoscopy team within a week, please call [phone] to schedule.     Imaging Studies: If you were scheduled for a CT scan, X-ray, MRI, ultrasound, HIDA scan or other imaging study, please call [phone]  to have this scheduled.     Referral: If a referral to another specialty was ordered, expect a phone call or follow instructions above. If you have not heard from anyone regarding your referral in a week, please call our clinic to check the status.     Who do I call with any questions after my visit?  Please be in touch if there are any further questions that arise following today's visit.  There are multiple ways to contact your gastroenterology care team.        During business hours, you may reach a Gastroenterology nurse at [phone]      To schedule or reschedule an appointment, please call [phone].       You can always send a secure message through [employer].  [employer] messages are answered by your nurse or doctor typically within 24 hours.  Please allow extra time on weekends and holidays.        For urgent/emergent questions after business hours, you may reach the on-call GI Fellow by contacting the [facility]  at [phone].     How will I get the results of any tests ordered?    You will receive all of your results.  If you have signed up for [email or web address], any tests ordered at your visit will be available to you after your physician reviews them.  Typically this takes 1-2 weeks.  If there are urgent results that require a change in your care plan, your physician or nurse will call you to discuss the next steps.      What is [employer]?  [employer] is a secure way for you to access all of your healthcare records from the [facility].  It is a web based computer program, so you can sign on to it from any location.  It also allows you to send secure messages to your care team.  I recommend signing up for [employer] access if you have not already done so and are comfortable with using a computer.      How to I schedule a follow-up visit?  If you did not schedule a follow-up visit today, please call [phone] to schedule a follow-up office visit.      Sincerely,    Pili Arce  BRONSON  Division of Gastroenterology, Hepatology & Nutrition  [facility]

## 2021-10-29 NOTE — PROGRESS NOTES
Endocrinology and Diabetes Clinic      Radha Baca is a 66 year old female who is being evaluated via a billable video visit.        Video Start Time: 10:06am  Video End Time: 10:25am    Interval History:  - 3m follow up for T2DM  - concern  - GI doctor citracal fro Gi problems; - struggling with constipation and diarrhea, worried that there might be a connection to Ozempic, therefore not increasing the dose  - still taking care of her mother,   -  sick, pinched nerve,    - has been trying to take the Humalog before dinner, does not see a difference    1. Type 2 DM:  Diabetes: Has type 2 DM, diagnosed before 2005    HbA1c is mid 8% range, slightly better last check    Seen for diabetic foot exam 8/2021, nails debrided    Glucometer summary:             Current Treatment:   - Ozemipc 0.5 mg weekly on Sunday,causing nausea which is tolerable but also constipation which give her a lot of problems  - Lantus 80 units once daily  - Humalog 20  units with each meal, 2-3 meals/day, occ increases if eating more,  CR 5, counts carbs  - Metformin 1000mg po BID, she is on this for long time, not sure if this is causing the side effects.  -    Hypoglycemia  Had postprandial hypoglycemia for which she went to the ED a while ago,BGs in 50s therefore leery to increase insulin with meals      Diet:   2-3 meals/day. Depends on emotional stress, eating snacks - healthy and sometimes unhealthy depending on her stress level      Exercise  Had been back to the gym with weights also the pool, right now due to responsibilities to her mother not able to go      2. Diabetic Complications:  - Retinopathy: Outside System - mild NPDR and DM macular edema which is mild and recommended observation.  - Nephropathy:  Urine albumin 16, GFR 84ml, Crt 0.74 on 4/12/2021  - Neuropathy: Had tingling .    3. Cardiovascular risk factors:    - Lipids: Lipitor 20mg,   - HTN:  on lisinopril/HCTZ well-controlled  - Aspirin - 81mg daily    4.  Hypothyroidism:  - TSH - 2.86 4/2021      Assessment and Plan:    1. Type 2 diabetes mellitus c/b DM retinopathy: A1c slightly improved, however not at goal.  Fasting blood sugars appear improved.  Difficult to increase Humalog as patient had at times postprandial hyperglycemia and sometimes blood sugars are in the low normal range postprandially.  Suggested use of inpen for further guidance regarding Humalog dosing, however patient just has gotten a 3-month supply of Humalog pens.  Will evaluate at next visit       Plan:   continue  - Humalog 20  units with each meal, eats 2-3 meals/day, and correction  - Ozemipc 0.5 mg weekly subcutaneous  injections on Sunday  - Lantus 80 units subcutaneous once daily  - Metformin 1000mg po BID  - Glucose check fasting, before and 2 hours post prandial and bedtime in a rotating fashion  -Consider switching to in pen  Blood work prior to the next visit including lipid profile    2. Diabetic complications:  - Retinopathy: Has eye surgery, has diabetic retinopathy - Nephropathy:  Urine albumin 16, GFR 84ml, Crt 0.74 on 4/12/2021  - Neuropathy: Has tingling sensation, not interested in starting any medication.    - CAD/CVA: denies CP  - Lipids: Increased triglycerides Blood pressure well controlled on lisinopril hydrochlorothiazide  - Aspirin - 81mg daily  - TSH - 2.86 4/2021    30 minutes spent on the date of the encounter doing chart review, review of test results, interpretation of tests, patient visit and documentation      Keturah De Luna MD  Endocrinology and Diabetes  Telephone contact:  [employer] [facility] [place] [phone]  [facility] [phone]          ==========================================================================================              Past Medical/Surgical History:   Reviewed in chart  Past Medical History:   Diagnosis Date     Abnormal Pap smear      Anxiety      Arthritis      Arthritis of knee 4/4/2013      Arthritis of shoulder region, right 4/18/2014     Back injury      Breast disorder      Chronic constipation      Chronic diarrhea      Depressive disorder      Diabetes (H)      Fecal incontinence      Finger pain 4/2/2015     Fracture broke L 5th pinky finger due to fall  1/2015     Glaucoma (increased eye pressure)      Head injury 8/6/2016     Headache(784.0)     decreased with mouth guard use.     History of blood transfusion 8/2011 & 4/2013     History of diabetes mellitus      Hypercholesteremia      Hypertension      Low back pain      Menarche age 9+    cycles q mo x 4-5 d     Neck injuries      Nonsenile cataract IO implants: L-8/2013; R-1/2011     Pain in knee joint     LEFT     Right bundle branch block     per H/P     Sleep apnea     Info on file     SNHL (sensorineural hearing loss)      Tinnitus     I have reported ringing in ears. not sure of dates     Umbilical hernia without mention of obstruction or gangrene 8/2014     Vision disorder Detached Retina 10/2009     Past Surgical History:   Procedure Laterality Date     ARTHROPLASTY KNEE  4/4/2013    Left Total Knee Arthroplasty;  Surgeon: Lou Byrne MD;  [facility]     ARTHROPLASTY MINIMALLY INVASIVE KNEE  8/22/2011    R knee :CAITLYN JACOBO, Left age 14     COLONOSCOPY  1/14/2015     DENTAL SURGERY      root canals, wisdom teeth     EXAM UNDER ANESTHESIA, MANIPULATE JOINT (LOCATION)  10/5/2012    Procedure: EXAM UNDER ANESTHESIA, MANIPULATE JOINT (LOCATION);  Right Knee Mini Open Lysis Of Adhesions, Left Knee Steroid Injection  ;  Surgeon: Lou Byrne MD;  [facility]      OR OCULAR DEVICE INTRAOP DETACHED RETINA  2009    R     HC PHAKIC IOL - IMPLANT FROM SURGEON      right     KNEE SURGERY  1969    left     LASER YAG CAPSULOTOMY  12/2016    left     VITRECTOMY PARSPLANA  2010       Allergies:  Reviewed in chart    Current Medications:   Reviewed in chart    Family History:  Reviewed in chart    Social  "History:  Reviewed in chart    Physical Examination:  Vital signs:                         Estimated body mass index is 42.23 kg/m  as calculated from the following:    Height as of 7/27/21: 1.626 m (5' 4\").    Weight as of 10/25/21: 111.6 kg (246 lb).    Previous Weights:   Wt Readings from Last 3 Encounters:   10/25/21 111.6 kg (246 lb)   07/27/21 114.3 kg (251 lb 14.4 oz)   05/05/21 113.6 kg (250 lb 6.4 oz)                    BMI:  There is no height or weight on file to calculate BMI.     Reported vitals:  There were no vitals taken for this visit.   healthy, alert and no distress  PSYCH: Alert and oriented times 3; coherent speech, normal   rate and volume, able to articulate logical thoughts, able   to abstract reason, no tangential thoughts, no hallucinations   or delusions  Her affect is normal and pleasant  RESP: No cough, no audible wheezing, able to talk in full sentences  Remainder of exam unable to be completed due to telephone visits       Endocrine Labs:  Lab Results   Component Value Date    .7 01/28/2021    CHLORIDE 98.8 01/28/2021    CO2 25.4 01/28/2021     (H) 04/12/2021    CR 0.71 07/12/2021    CR 0.74 04/12/2021    CR 0.6 01/28/2021    CR 0.64 01/18/2021    CR 0.66 09/28/2020    COLIN 9.8 01/28/2021    MAG 2.0 01/18/2021    ALBUMIN 3.7 09/28/2020    ALKPHOS 75.0 01/28/2021    LDL 9 07/12/2021    HDL 36 (L) 07/12/2021    TRIG 247 (H) 07/12/2021     Lab Results   Component Value Date    MICROL 7 04/12/2021    MICROL 9 01/18/2021    MICROL 10 09/28/2020    MICROL 15 03/02/2020    MICROL 40 08/06/2019     Lab Results   Component Value Date    A1C 8.8 (H) 07/12/2021    A1C 8.9 (H) 04/12/2021    A1C 8.4 (H) 01/18/2021    A1C 9.0 (H) 09/28/2020    A1C 10.2 (H) 03/02/2020       Lab Results   Component Value Date    HGB 11.8 09/28/2020           HPI:  Radha Baca is a elderly  female with type 2 diabetes diagnosed [id number]. Has PMH for HTN, arthritis, chronic constipation    Answers " for HPI/ROS submitted by the patient on 10/31/2021  General Symptoms: Yes  Skin Symptoms: Yes  HENT Symptoms: Yes  EYE SYMPTOMS: No  HEART SYMPTOMS: Yes  LUNG SYMPTOMS: No  INTESTINAL SYMPTOMS: Yes  URINARY SYMPTOMS: No  GYNECOLOGIC SYMPTOMS: No  BREAST SYMPTOMS: No  SKELETAL SYMPTOMS: Yes  BLOOD SYMPTOMS: No  NERVOUS SYSTEM SYMPTOMS: Yes  MENTAL HEALTH SYMPTOMS: Yes  Ear pain: No  Ear discharge: No  Hearing loss: Yes  Tinnitus: Yes  Nosebleeds: No  Congestion: No  Sinus pain: No  Trouble swallowing: No   Voice hoarseness: No  Mouth sores: No  Sore throat: No  Tooth pain: No  Gum tenderness: No  Bleeding gums: No  Change in taste: No  Change in sense of smell: No  Dry mouth: Yes  Hearing aid used: Yes  Neck lump: No  Fever: No  Loss of appetite: No  Weight loss: No  Weight gain: No  Fatigue: Yes  Night sweats: No  Chills: No  Increased stress: Yes  Excessive hunger: No  Excessive thirst: No  Feeling hot or cold when others believe the temperature is normal: No  Loss of height: No  Post-operative complications: No  Surgical site pain: No  Hallucinations: No  Change in or Loss of Energy: No  Hyperactivity: No  Confusion: No  Changes in hair: Yes  Changes in moles/birth marks: No  Itching: No  Rashes: No  Changes in nails: No  Acne: No  Hair in places you don't want it: No  Change in facial hair: No  Warts: No  Non-healing sores: No  Scarring: No  Flaking of skin: No  Color changes of hands/feet in cold : No  Sun sensitivity: No  Skin thickening: No  Chest pain or pressure: No  Fast or irregular heartbeat: No  Pain in legs with walking: No  Trouble breathing while lying down: No  Fingers or toes appear blue: No  High blood pressure: No  Low blood pressure: No  Fainting: Yes  Murmurs: No  Pacemaker: No  Varicose veins: No  Edema or swelling: Yes  Wake up at night with shortness of breath: No  Light-headedness: No  Exercise intolerance: No  Nausea: No  Vomiting: No  Abdominal pain: No  Bloating: Yes  Constipation:  Yes  Diarrhea: Yes  Blood in stool: No  Black stools: No  Rectal or Anal pain: Yes  Fecal incontinence: No  Yellowing of skin or eyes: No  Vomit with blood: No  Change in stools: Yes  Back pain: No  Muscle aches: Yes  Neck pain: Yes  Swollen joints: Yes  Joint pain: Yes  Bone pain: Yes  Muscle cramps: No  Muscle weakness: No  Joint stiffness: No  Bone fracture: No  Trouble with coordination: No  Dizziness or trouble with balance: No  Fainting or black-out spells: No  Memory loss: No  Headache: No  Seizures: No  Speech problems: No  Tingling: No  Tremor: No  Weakness: No  Difficulty walking: No  Paralysis: No  Numbness: No  Nervous or Anxious: Yes  Depression: Yes  Trouble sleeping: Yes  Trouble thinking or concentrating: No  Mood changes: No  Panic attacks: Yes

## 2021-10-31 ENCOUNTER — HEALTH MAINTENANCE LETTER (OUTPATIENT)
Age: 66
End: 2021-10-31

## 2021-10-31 ASSESSMENT — ENCOUNTER SYMPTOMS
NUMBNESS: 0
SINUS CONGESTION: 0
JAUNDICE: 0
INSOMNIA: 1
LIGHT-HEADEDNESS: 0
WEIGHT LOSS: 0
CHILLS: 0
NECK MASS: 0
STIFFNESS: 0
DEPRESSION: 1
POLYPHAGIA: 0
FATIGUE: 1
BLOATING: 1
MUSCLE CRAMPS: 0
DIARRHEA: 1
DECREASED CONCENTRATION: 0
SINUS PAIN: 0
DISTURBANCES IN COORDINATION: 0
POOR WOUND HEALING: 0
VOMITING: 0
PARALYSIS: 0
BACK PAIN: 0
WEAKNESS: 0
LEG PAIN: 0
NAIL CHANGES: 0
HOARSE VOICE: 0
ORTHOPNEA: 0
JOINT SWELLING: 1
WEIGHT GAIN: 0
SKIN CHANGES: 0
FEVER: 0
TREMORS: 0
SPEECH CHANGE: 0
RECTAL PAIN: 1
SORE THROAT: 0
ABDOMINAL PAIN: 0
BOWEL INCONTINENCE: 0
NAUSEA: 0
NIGHT SWEATS: 0
TINGLING: 0
BLOOD IN STOOL: 0
EXERCISE INTOLERANCE: 0
MUSCLE WEAKNESS: 0
DECREASED APPETITE: 0
HYPERTENSION: 0
ARTHRALGIAS: 1
LOSS OF CONSCIOUSNESS: 0
MEMORY LOSS: 0
TROUBLE SWALLOWING: 0
PALPITATIONS: 0
INCREASED ENERGY: 0
CONSTIPATION: 1
SMELL DISTURBANCE: 0
HALLUCINATIONS: 0
SLEEP DISTURBANCES DUE TO BREATHING: 0
HYPOTENSION: 0
ALTERED TEMPERATURE REGULATION: 0
DIZZINESS: 0
PANIC: 1
SYNCOPE: 1
MYALGIAS: 1
TASTE DISTURBANCE: 0
NERVOUS/ANXIOUS: 1
POLYDIPSIA: 0
HEADACHES: 0
SEIZURES: 0
NECK PAIN: 1

## 2021-11-01 ENCOUNTER — APPOINTMENT (OUTPATIENT)
Dept: ENDOCRINOLOGY | Facility: CLINIC | Age: 66
End: 2021-11-01
Payer: MEDICARE

## 2021-11-01 ENCOUNTER — LAB (OUTPATIENT)
Dept: LAB | Facility: CLINIC | Age: 66
End: 2021-11-01

## 2021-11-01 DIAGNOSIS — E11.8 TYPE 2 DIABETES MELLITUS WITH COMPLICATION (H): ICD-10-CM

## 2021-11-01 LAB
CHOLEST SERPL-MCNC: 93 MG/DL
FASTING STATUS PATIENT QL REPORTED: YES
HBA1C MFR BLD: 8.6 % (ref 0–5.6)
HDLC SERPL-MCNC: 39 MG/DL
LDLC SERPL CALC-MCNC: 25 MG/DL
NONHDLC SERPL-MCNC: 54 MG/DL
TRIGL SERPL-MCNC: 143 MG/DL

## 2021-11-01 PROCEDURE — 36415 COLL VENOUS BLD VENIPUNCTURE: CPT | Performed by: PATHOLOGY

## 2021-11-01 PROCEDURE — 80061 LIPID PANEL: CPT | Performed by: PATHOLOGY

## 2021-11-01 PROCEDURE — 83036 HEMOGLOBIN GLYCOSYLATED A1C: CPT | Performed by: PATHOLOGY

## 2021-11-02 ENCOUNTER — VIRTUAL VISIT (OUTPATIENT)
Dept: ENDOCRINOLOGY | Facility: CLINIC | Age: 66
End: 2021-11-02
Payer: MEDICARE

## 2021-11-02 DIAGNOSIS — E11.65 TYPE 2 DIABETES MELLITUS WITH HYPERGLYCEMIA, WITHOUT LONG-TERM CURRENT USE OF INSULIN (H): Primary | ICD-10-CM

## 2021-11-02 PROCEDURE — 99214 OFFICE O/P EST MOD 30 MIN: CPT | Mod: 95 | Performed by: INTERNAL MEDICINE

## 2021-11-02 NOTE — LETTER
11/2/2021       RE: Radha Baca  [address] [address]     Dear Colleague,    Thank you for referring your patient, Radha Baca, to the [facility] [place] at [facility]. Please see a copy of my visit note below.    Endocrinology and Diabetes Clinic      Radha Baca is a 66 year old female who is being evaluated via a billable video visit.        Video Start Time: 10:06am  Video End Time: 10:25am    Interval History:  - 3m follow up for T2DM  - concern  - GI doctor citracal fro Gi problems; - struggling with constipation and diarrhea, worried that there might be a connection to Ozempic, therefore not increasing the dose  - still taking care of her mother,   -  sick, pinched nerve,    - has been trying to take the Humalog before dinner, does not see a difference    1. Type 2 DM:  Diabetes: Has type 2 DM, diagnosed before 2005    HbA1c is mid 8% range, slightly better last check    Seen for diabetic foot exam 8/2021, nails debrided    Glucometer summary:             Current Treatment:   - Ozemipc 0.5 mg weekly on Sunday,causing nausea which is tolerable but also constipation which give her a lot of problems  - Lantus 80 units once daily  - Humalog 20  units with each meal, 2-3 meals/day, occ increases if eating more,  CR 5, counts carbs  - Metformin 1000mg po BID, she is on this for long time, not sure if this is causing the side effects.  -    Hypoglycemia  Had postprandial hypoglycemia for which she went to the ED a while ago,BGs in 50s therefore leery to increase insulin with meals      Diet:   2-3 meals/day. Depends on emotional stress, eating snacks - healthy and sometimes unhealthy depending on her stress level      Exercise  Had been back to the gym with weights also the pool, right now due to responsibilities to her mother not able to go      2. Diabetic Complications:  -  Retinopathy: Outside System - mild NPDR and DM macular edema which is mild and recommended observation.  - Nephropathy:  Urine albumin 16, GFR 84ml, Crt 0.74 on 4/12/2021  - Neuropathy: Had tingling .    3. Cardiovascular risk factors:    - Lipids: Lipitor 20mg,   - HTN:  on lisinopril/HCTZ well-controlled  - Aspirin - 81mg daily    4. Hypothyroidism:  - TSH - 2.86 4/2021      Assessment and Plan:    1. Type 2 diabetes mellitus c/b DM retinopathy: A1c slightly improved, however not at goal.  Fasting blood sugars appear improved.  Difficult to increase Humalog as patient had at times postprandial hyperglycemia and sometimes blood sugars are in the low normal range postprandially.  Suggested use of inpen for further guidance regarding Humalog dosing, however patient just has gotten a 3-month supply of Humalog pens.  Will evaluate at next visit       Plan:   continue  - Humalog 20  units with each meal, eats 2-3 meals/day, and correction  - Ozemipc 0.5 mg weekly subcutaneous  injections on Sunday  - Lantus 80 units subcutaneous once daily  - Metformin 1000mg po BID  - Glucose check fasting, before and 2 hours post prandial and bedtime in a rotating fashion  -Consider switching to in pen  Blood work prior to the next visit including lipid profile    2. Diabetic complications:  - Retinopathy: Has eye surgery, has diabetic retinopathy - Nephropathy:  Urine albumin 16, GFR 84ml, Crt 0.74 on 4/12/2021  - Neuropathy: Has tingling sensation, not interested in starting any medication.    - CAD/CVA: denies CP  - Lipids: Increased triglycerides Blood pressure well controlled on lisinopril hydrochlorothiazide  - Aspirin - 81mg daily  - TSH - 2.86 4/2021    30 minutes spent on the date of the encounter doing chart review, review of test results, interpretation of tests, patient visit and documentation      Keturah De Luna MD  Endocrinology and Diabetes  Telephone contact:  [facility] [place]  [phone]  [facility] [place] [phone]          ==========================================================================================              Past Medical/Surgical History:   Reviewed in chart  Past Medical History:   Diagnosis Date     Abnormal Pap smear      Anxiety      Arthritis      Arthritis of knee 4/4/2013     Arthritis of shoulder region, right 4/18/2014     Back injury      Breast disorder      Chronic constipation      Chronic diarrhea      Depressive disorder      Diabetes (H)      Fecal incontinence      Finger pain 4/2/2015     Fracture broke L 5th pinky finger due to fall  1/2015     Glaucoma (increased eye pressure)      Head injury 8/6/2016     Headache(784.0)     decreased with mouth guard use.     History of blood transfusion 8/2011 & 4/2013     History of diabetes mellitus      Hypercholesteremia      Hypertension      Low back pain      Menarche age 9+    cycles q mo x 4-5 d     Neck injuries      Nonsenile cataract IO implants: L-8/2013; R-1/2011     Pain in knee joint     LEFT     Right bundle branch block     per H/P     Sleep apnea     Info on file     SNHL (sensorineural hearing loss)      Tinnitus     I have reported ringing in ears. not sure of dates     Umbilical hernia without mention of obstruction or gangrene 8/2014     Vision disorder Detached Retina 10/2009     Past Surgical History:   Procedure Laterality Date     ARTHROPLASTY KNEE  4/4/2013    Left Total Knee Arthroplasty;  Surgeon: Lou Byrne MD;  [facility]     ARTHROPLASTY MINIMALLY INVASIVE KNEE  8/22/2011    R knee :ODALISCAITLYN, Left age 14     COLONOSCOPY  1/14/2015     DENTAL SURGERY      root canals, wisdom teeth     EXAM UNDER ANESTHESIA, MANIPULATE JOINT (LOCATION)  10/5/2012    Procedure: EXAM UNDER ANESTHESIA, MANIPULATE JOINT (LOCATION);  Right Knee Mini Open Lysis Of Adhesions, Left Knee Steroid Injection  ;  Surgeon: Lou Byrne MD;  [facility]       "OR OCULAR DEVICE INTRAOP DETACHED RETINA  2009    R     HC PHAKIC IOL - IMPLANT FROM SURGEON      right     KNEE SURGERY  1969    left     LASER YAG CAPSULOTOMY  12/2016    left     VITRECTOMY PARSPLANA  2010       Allergies:  Reviewed in chart    Current Medications:   Reviewed in chart    Family History:  Reviewed in chart    Social History:  Reviewed in chart    Physical Examination:  Vital signs:                         Estimated body mass index is 42.23 kg/m  as calculated from the following:    Height as of 7/27/21: 1.626 m (5' 4\").    Weight as of 10/25/21: 111.6 kg (246 lb).    Previous Weights:   Wt Readings from Last 3 Encounters:   10/25/21 111.6 kg (246 lb)   07/27/21 114.3 kg (251 lb 14.4 oz)   05/05/21 113.6 kg (250 lb 6.4 oz)                    BMI:  There is no height or weight on file to calculate BMI.     Reported vitals:  There were no vitals taken for this visit.   healthy, alert and no distress  PSYCH: Alert and oriented times 3; coherent speech, normal   rate and volume, able to articulate logical thoughts, able   to abstract reason, no tangential thoughts, no hallucinations   or delusions  Her affect is normal and pleasant  RESP: No cough, no audible wheezing, able to talk in full sentences  Remainder of exam unable to be completed due to telephone visits       Endocrine Labs:  Lab Results   Component Value Date    .7 01/28/2021    CHLORIDE 98.8 01/28/2021    CO2 25.4 01/28/2021     (H) 04/12/2021    CR 0.71 07/12/2021    CR 0.74 04/12/2021    CR 0.6 01/28/2021    CR 0.64 01/18/2021    CR 0.66 09/28/2020    COLIN 9.8 01/28/2021    MAG 2.0 01/18/2021    ALBUMIN 3.7 09/28/2020    ALKPHOS 75.0 01/28/2021    LDL 9 07/12/2021    HDL 36 (L) 07/12/2021    TRIG 247 (H) 07/12/2021     Lab Results   Component Value Date    MICROL 7 04/12/2021    MICROL 9 01/18/2021    MICROL 10 09/28/2020    MICROL 15 03/02/2020    MICROL 40 08/06/2019     Lab Results   Component Value Date    A1C 8.8 (H) " 07/12/2021    A1C 8.9 (H) 04/12/2021    A1C 8.4 (H) 01/18/2021    A1C 9.0 (H) 09/28/2020    A1C 10.2 (H) 03/02/2020       Lab Results   Component Value Date    HGB 11.8 09/28/2020           HPI:  Radha Baca is a elderly  female with type 2 diabetes diagnosed [id number]. Has PMH for HTN, arthritis, chronic constipation    Answers for HPI/ROS submitted by the patient on 10/31/2021  General Symptoms: Yes  Skin Symptoms: Yes  HENT Symptoms: Yes  EYE SYMPTOMS: No  HEART SYMPTOMS: Yes  LUNG SYMPTOMS: No  INTESTINAL SYMPTOMS: Yes  URINARY SYMPTOMS: No  GYNECOLOGIC SYMPTOMS: No  BREAST SYMPTOMS: No  SKELETAL SYMPTOMS: Yes  BLOOD SYMPTOMS: No  NERVOUS SYSTEM SYMPTOMS: Yes  MENTAL HEALTH SYMPTOMS: Yes  Ear pain: No  Ear discharge: No  Hearing loss: Yes  Tinnitus: Yes  Nosebleeds: No  Congestion: No  Sinus pain: No  Trouble swallowing: No   Voice hoarseness: No  Mouth sores: No  Sore throat: No  Tooth pain: No  Gum tenderness: No  Bleeding gums: No  Change in taste: No  Change in sense of smell: No  Dry mouth: Yes  Hearing aid used: Yes  Neck lump: No  Fever: No  Loss of appetite: No  Weight loss: No  Weight gain: No  Fatigue: Yes  Night sweats: No  Chills: No  Increased stress: Yes  Excessive hunger: No  Excessive thirst: No  Feeling hot or cold when others believe the temperature is normal: No  Loss of height: No  Post-operative complications: No  Surgical site pain: No  Hallucinations: No  Change in or Loss of Energy: No  Hyperactivity: No  Confusion: No  Changes in hair: Yes  Changes in moles/birth marks: No  Itching: No  Rashes: No  Changes in nails: No  Acne: No  Hair in places you don't want it: No  Change in facial hair: No  Warts: No  Non-healing sores: No  Scarring: No  Flaking of skin: No  Color changes of hands/feet in cold : No  Sun sensitivity: No  Skin thickening: No  Chest pain or pressure: No  Fast or irregular heartbeat: No  Pain in legs with walking: No  Trouble breathing while lying down:  No  Fingers or toes appear blue: No  High blood pressure: No  Low blood pressure: No  Fainting: Yes  Murmurs: No  Pacemaker: No  Varicose veins: No  Edema or swelling: Yes  Wake up at night with shortness of breath: No  Light-headedness: No  Exercise intolerance: No  Nausea: No  Vomiting: No  Abdominal pain: No  Bloating: Yes  Constipation: Yes  Diarrhea: Yes  Blood in stool: No  Black stools: No  Rectal or Anal pain: Yes  Fecal incontinence: No  Yellowing of skin or eyes: No  Vomit with blood: No  Change in stools: Yes  Back pain: No  Muscle aches: Yes  Neck pain: Yes  Swollen joints: Yes  Joint pain: Yes  Bone pain: Yes  Muscle cramps: No  Muscle weakness: No  Joint stiffness: No  Bone fracture: No  Trouble with coordination: No  Dizziness or trouble with balance: No  Fainting or black-out spells: No  Memory loss: No  Headache: No  Seizures: No  Speech problems: No  Tingling: No  Tremor: No  Weakness: No  Difficulty walking: No  Paralysis: No  Numbness: No  Nervous or Anxious: Yes  Depression: Yes  Trouble sleeping: Yes  Trouble thinking or concentrating: No  Mood changes: No  Panic attacks: Yes        Outcome for 11/01/21 10:57 AM: Data emailed to provider      Dionicio is a 66 year old who is being evaluated via a billable video visit.      How would you like to obtain your AVS? MyChart  If the video visit is dropped, the invitation should be resent by: Send to e-mail at: [email or web address]  Will anyone else be joining your video visit? No  Video-Visit Details    Type of service:  Video Visit    Video End Time:.    Originating Location (pt. Location): Home    Distant Location (provider location):  [facility] [place]     Platform used for Video Visit: [employer]

## 2021-11-02 NOTE — PATIENT INSTRUCTIONS
Consider to switch to Humalog injections using an inpen,   we can switch at the next visit    Continue Humalog 20 units three times daily with meals  Continue Lantus 80 units dewitt  Continue Ozempic and Metformin    Blood work fasting prior to to next visit

## 2021-11-02 NOTE — PROGRESS NOTES
Dionicio is a 66 year old who is being evaluated via a billable video visit.      How would you like to obtain your AVS? MyChart  If the video visit is dropped, the invitation should be resent by: Send to e-mail at: [email or web address]  Will anyone else be joining your video visit? No  Video-Visit Details    Type of service:  Video Visit    Video End Time:.    Originating Location (pt. Location): Home    Distant Location (provider location):  [facility] [place]     Platform used for Video Visit: [email or web address]

## 2021-11-09 ENCOUNTER — TELEPHONE (OUTPATIENT)
Dept: ENDOCRINOLOGY | Facility: CLINIC | Age: 66
End: 2021-11-09
Payer: MEDICARE

## 2021-11-09 NOTE — TELEPHONE ENCOUNTER
LVM for pt to c/b to schedule 3 month follow up with Dr De Luna with fasting lab work done prior to the appointment.

## 2021-11-11 ENCOUNTER — OFFICE VISIT (OUTPATIENT)
Dept: AUDIOLOGY | Facility: CLINIC | Age: 66
End: 2021-11-11
Payer: MEDICARE

## 2021-11-11 DIAGNOSIS — H90.3 SENSORINEURAL HEARING LOSS (SNHL) OF BOTH EARS: Primary | ICD-10-CM

## 2021-11-11 PROCEDURE — 99207 PR ASSESSMENT FOR HEARING AID: CPT | Performed by: AUDIOLOGIST

## 2021-11-11 NOTE — PROGRESS NOTES
AUDIOLOGY REPORT    SUBJECTIVE:Radha Baca is a 66 year old female who was seen in the Audiology Clinic at the [facility] on 11/11/2021  for a follow-up check regarding the fitting of new hearing aids. She is accompanied by her  Gabriel to today's appointment. Previous results have revealed a normal sloping to moderate sensorineural hearing loss bilaterally.  The patient has been seen previously in this clinic and was fit with bilateral Phonak Audeo P50-R PERICO hearing aids on 10/14/2021.  Radha reports the hearing aids are a bit too loud. She notes sometimes she feels overwhelmed by all the sounds around her. For example, at [employer] the flute was painfully loud. She also feels background noises are too loud as she is able to hear other people's conversations. She also notices a tinny sound that is intermittent. She is unsure if this is associated with her hair rustling against the hearing aids. Her left hearing aid seems to work its way out of the ear throughout the day.     OBJECTIVE:   Based on patient report, the following changes were made; Overall gain was decreased by 3 dB to improve comfort. Gain from 3-8 kHz was decreased by 3 dB to improve tinny sound quality. Noise block reduction and soft noise reduction were increased. Following these changes, Dionicio noticed an improvement in sound quality.     A retention line was suggested however Dionicio decided she was not interested in using retention lines as her mother seems to have difficulty with them. It was discussed that she can elect to use them at any time in the future if she changes her mind. She expressed understanding.     Volume control was enabled. Dionicio can make adjustments using the rocker button on her hearing aids as well as on her phone. Dionicio's hearing aids were paired to her Nvidia phone and the Phonak siria was downloaded. Dionicio was shown how to use the siria to make volume adjustments. She expressed  understanding. Dionicio was also shown how phone calls stream to her hearing aids.     Reviewed 45 day trial period, care, cleaning (no water, dry brush), batteries (size rechargeable) insertion/removal, toxicity, low-battery signal), aid insertion/removal, volume adjustment (if applicable), user booklet, warranty information, storage cases, and other hearing aid details.     No charge visit today (in warranty hearing aid check).     ASSESSMENT: A follow-up appointment for hearing aid fitting was completed today. Changes to hearing aid was completed as outlined above.     PLAN:Radha will return for follow-up as needed, or at least every 9-12 months for cleaning and assessment of hearing aid.  . Please call this clinic with any questions regarding today s appointment.    Lucie Oleary M.A.   Audiology Doctoral Student #[id number]    I was present with the patient for the entire Audiology appointment including all procedures/testing performed by the AuD student, and agree with the student s assessment and plan as documented.      Adriane Azevedo., [facility]  Licensed Audiologist  [place] #[id number]

## 2021-11-16 ENCOUNTER — IMMUNIZATION (OUTPATIENT)
Dept: NURSING | Facility: CLINIC | Age: 66
End: 2021-11-16
Payer: MEDICARE

## 2021-11-16 PROCEDURE — 91306 COVID-19,PF,MODERNA (18+ YRS BOOSTER .25ML): CPT

## 2021-11-16 PROCEDURE — 0064A COVID-19,PF,MODERNA (18+ YRS BOOSTER .25ML): CPT

## 2021-11-17 ENCOUNTER — DOCUMENTATION ONLY (OUTPATIENT)
Dept: FAMILY MEDICINE | Facility: CLINIC | Age: 66
End: 2021-11-17
Payer: MEDICARE

## 2021-11-17 NOTE — PROGRESS NOTES
"When opening a documentation only encounter, be sure to enter in \"Chief Complaint\" Forms and in \" Comments\" Title of form, description if needed.    Dionicio is a 66 year old  female  Form received via: Fax  Form now resides in: Provider Ready    Pamela Montemayor MA                  "

## 2021-11-17 NOTE — PROGRESS NOTES
"When opening a documentation only encounter, be sure to enter in \"Chief Complaint\" Forms and in \" Comments\" Title of form, description if needed.    Dionicio is a 66 year old  female  Form received via: Fax  Form now resides in: Provider Ready    Pamela oMntemayor MA                  "

## 2021-11-22 ENCOUNTER — MEDICAL CORRESPONDENCE (OUTPATIENT)
Dept: HEALTH INFORMATION MANAGEMENT | Facility: CLINIC | Age: 66
End: 2021-11-22
Payer: MEDICARE

## 2021-11-22 ASSESSMENT — ANXIETY QUESTIONNAIRES
2. NOT BEING ABLE TO STOP OR CONTROL WORRYING: SEVERAL DAYS
4. TROUBLE RELAXING: NOT AT ALL
8. IF YOU CHECKED OFF ANY PROBLEMS, HOW DIFFICULT HAVE THESE MADE IT FOR YOU TO DO YOUR WORK, TAKE CARE OF THINGS AT HOME, OR GET ALONG WITH OTHER PEOPLE?: SOMEWHAT DIFFICULT
1. FEELING NERVOUS, ANXIOUS, OR ON EDGE: SEVERAL DAYS
GAD7 TOTAL SCORE: 4
GAD7 TOTAL SCORE: 4
5. BEING SO RESTLESS THAT IT IS HARD TO SIT STILL: NOT AT ALL
7. FEELING AFRAID AS IF SOMETHING AWFUL MIGHT HAPPEN: SEVERAL DAYS
6. BECOMING EASILY ANNOYED OR IRRITABLE: NOT AT ALL
3. WORRYING TOO MUCH ABOUT DIFFERENT THINGS: SEVERAL DAYS
7. FEELING AFRAID AS IF SOMETHING AWFUL MIGHT HAPPEN: SEVERAL DAYS
GAD7 TOTAL SCORE: 4

## 2021-11-22 ASSESSMENT — ENCOUNTER SYMPTOMS
TROUBLE SWALLOWING: 0
VOMITING: 0
BOWEL INCONTINENCE: 0
MUSCLE WEAKNESS: 0
NAIL CHANGES: 1
STIFFNESS: 1
HYPOTENSION: 0
PALPITATIONS: 0
SINUS PAIN: 0
RECTAL PAIN: 1
SINUS CONGESTION: 0
JAUNDICE: 0
EYE REDNESS: 0
DOUBLE VISION: 0
SKIN CHANGES: 0
SORE THROAT: 0
NECK PAIN: 1
ORTHOPNEA: 1
PANIC: 1
ARTHRALGIAS: 0
NAUSEA: 1
NERVOUS/ANXIOUS: 1
SLEEP DISTURBANCES DUE TO BREATHING: 0
BLOOD IN STOOL: 0
MYALGIAS: 1
EYE IRRITATION: 0
LIGHT-HEADEDNESS: 0
HOARSE VOICE: 0
DECREASED LIBIDO: 0
EYE PAIN: 0
DEPRESSION: 1
DECREASED CONCENTRATION: 0
SYNCOPE: 0
EYE WATERING: 0
DIARRHEA: 1
POOR WOUND HEALING: 0
BLOATING: 1
BACK PAIN: 1
SMELL DISTURBANCE: 0
NECK MASS: 0
MUSCLE CRAMPS: 1
TASTE DISTURBANCE: 0
INSOMNIA: 0
ABDOMINAL PAIN: 1
HYPERTENSION: 1
EXERCISE INTOLERANCE: 0
CONSTIPATION: 1
JOINT SWELLING: 1
HOT FLASHES: 0
HEARTBURN: 0

## 2021-11-23 ENCOUNTER — OFFICE VISIT (OUTPATIENT)
Dept: OBGYN | Facility: CLINIC | Age: 66
End: 2021-11-23
Attending: NURSE PRACTITIONER
Payer: MEDICARE

## 2021-11-23 ENCOUNTER — ANCILLARY PROCEDURE (OUTPATIENT)
Dept: MAMMOGRAPHY | Facility: CLINIC | Age: 66
End: 2021-11-23
Payer: MEDICARE

## 2021-11-23 VITALS
HEIGHT: 64 IN | BODY MASS INDEX: 41.48 KG/M2 | HEART RATE: 88 BPM | SYSTOLIC BLOOD PRESSURE: 136 MMHG | DIASTOLIC BLOOD PRESSURE: 76 MMHG | WEIGHT: 243 LBS

## 2021-11-23 DIAGNOSIS — Z01.419 ENCOUNTER FOR GYNECOLOGICAL EXAMINATION WITHOUT ABNORMAL FINDING: ICD-10-CM

## 2021-11-23 DIAGNOSIS — Z12.31 VISIT FOR SCREENING MAMMOGRAM: ICD-10-CM

## 2021-11-23 PROCEDURE — G0463 HOSPITAL OUTPT CLINIC VISIT: HCPCS

## 2021-11-23 PROCEDURE — 77067 SCR MAMMO BI INCL CAD: CPT | Mod: GC | Performed by: STUDENT IN AN ORGANIZED HEALTH CARE EDUCATION/TRAINING PROGRAM

## 2021-11-23 PROCEDURE — 77063 BREAST TOMOSYNTHESIS BI: CPT | Mod: GC | Performed by: STUDENT IN AN ORGANIZED HEALTH CARE EDUCATION/TRAINING PROGRAM

## 2021-11-23 PROCEDURE — G0101 CA SCREEN;PELVIC/BREAST EXAM: HCPCS | Performed by: NURSE PRACTITIONER

## 2021-11-23 RX ORDER — RIBOFLAVIN (VITAMIN B2) 100 MG
2000 TABLET ORAL
COMMUNITY
End: 2024-09-25

## 2021-11-23 RX ORDER — LANOLIN ALCOHOL/MO/W.PET/CERES
CREAM (GRAM) TOPICAL
COMMUNITY

## 2021-11-23 ASSESSMENT — MIFFLIN-ST. JEOR: SCORE: 1622.24

## 2021-11-23 ASSESSMENT — ANXIETY QUESTIONNAIRES: GAD7 TOTAL SCORE: 4

## 2021-11-23 ASSESSMENT — PAIN SCALES - GENERAL: PAINLEVEL: NO PAIN (0)

## 2021-11-23 NOTE — PROGRESS NOTES
Form has been completed by provider.     Form sent out via: Fax to [facility] at Fax Number: [phone]  Patient informed: No  Output date: November 23, 2021    Macarena Melton MA      **Please close the encounter**

## 2021-11-23 NOTE — PROGRESS NOTES
Progress Note    SUBJECTIVE:  Radha Baca is an 66 year old, , who requests a Breast and Pelvic Exam, routine gyn care.     Concerns today include:     1. Annual Gyn exam - no specific concerns today. 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 She has been able to manage this and cope ok.  See mental health section below.     Diet: Uses My [employer]itness smart application, puts entries in for 1 meal a day. Takes a calcium supplement, drinks milk or eats yogurt. Reports she has been struggling with constipation, started seeing a GI specialist.    Exercise: Likes to do exercises in the pool. Reports this feels better on her knees. When she can't get to the pool she watches [employer] exercise videos. Also reports she cleans, which feels like exercise to he.    Social: Pt reports increased family stress, she is taking care of 90 year-old mother. .    Mental Health: Overall increased stress, worries over her own health and family situation. She is talking to a therapist about all of this. She does not want to try medication at this time. She shares she has other outlets, like her  and she goes on retreats. Active in her Mu-ism.     Sexual Health: Declines being sexually active. Reports intimacy in ways other than sexual activity.    Menstrual History: Menopause. Declines any vaginal bleeding. Vaginal dryness, no itching or discharge.     Last PAP: No known hx of abnormal pap smears; pt has completed adequate screening and may stop pap smears, in accordance with ASCCP guidelines.    Lab Results   Component Value Date    PAP NIL 2019     Last   Lab Results   Component Value Date    HPV16 Negative 2019     Last   Lab Results   Component Value Date    HPV18 Negative 2019     Last   Lab Results   Component Value Date    HRHPV Negative 2019       Mammogram current: yes  Last Mammogram:  11/23/2021  IMPRESSION: ACR BI-RADS Category 1: Negative    Mammo US breast Unilateral Left    Result Date: 11/19/2018  Narrative: Examinations: MA DIAGNOSTIC BILATERAL W/ JESUS, US BREAST LEFT LIMITED 1-3 QUADRANTS, 11/19/2018 1:03 PM Comparisons: 11/22/2017, 12/8/2016, 11/18/2015, 11/26/2014 History/Family History: Pain and erythema, medial left areola for about a week.  Started antibiotics 2 days ago and has seen some improvement. Breast Density: Scattered fibroglandular densities Technique: Standard mammographic views were performed with tomosynthesis and 2D reconstruction. Findings:   Mammogram: No significant change in either breast. No mammographic abnormality associated with the left nipple or subareolar left breast. Ultrasound: Targeted, real-time ultrasound evaluation was performed by the technologist and radiologist. At the 9:00 position on the left areola at the site of the area of pain and erythema, is a hypervascular, hypoechoic area within the skin measuring up to 1.1 cm in greatest dimension.      Last Colonoscopy: Reports GI specialist is handling colonoscopies.    HISTORY:  Acetaminophen (TYLENOL PO), Take by mouth as needed for mild pain or fever  amoxicillin (AMOXIL) 500 MG tablet, Take 4 tablets (2000 mg) by mouth 1 hour before dental procedures/cleanings.  Ascorbic Acid (VITAMIN C PO), Take 2,000 mg by mouth 2 times daily   aspirin 81 MG tablet, 1 tab daily  atorvastatin (LIPITOR) 20 MG tablet, Take 1 tablet (20 mg) by mouth daily DX E78.5 ID # [id number]  B Complex Vitamins (VITAMIN  B COMPLEX) CAPS, Take 1 tablet by mouth daily   Calcium Carb-Cholecalciferol (CALCIUM 600 + D) 600-400 MG-UNIT TABS per tablet,   calcium-vitamin D (CALTRATE) 600-400 MG-UNIT per tablet, Take 1 tablet by mouth 2 times daily  cholecalciferol (VITAMIN D) 1000 UNIT tablet, Take 1 tablet by mouth daily.  cyanocobalamin (VITAMIN B-12) 1000 MCG tablet,   cycloSPORINE (RESTASIS) 0.05 % ophthalmic emulsion,    hypromellose-dextran (ARTIFICAL TEARS) 0.1-0.3 % ophthalmic solution,   insulin glargine (LANTUS SOLOSTAR) 100 UNIT/ML pen, Inject 80 units SQ each am.  latanoprost (XALATAN) 0.005 % ophthalmic solution, Place 1 drop into both eyes At Bedtime  lisinopril-hydrochlorothiazide (ZESTORETIC) 20-12.5 MG tablet, Take 1 tablet by mouth daily DX  I10 ID # [id number]  metFORMIN (GLUCOPHAGE) 500 MG tablet, Take 2 tablets (1,000 mg) by mouth 2 times daily (with meals)  Multiple Vitamin (MULTIVITAMIN  S) CAPS, Take 1 daily  Multiple Vitamins-Minerals (EYE-ZAKIYA PLUS LUTEIN PO),   Omega-3 Fatty Acids (OMEGA-3 FISH OIL PO), Take 1,000 mg by mouth daily  Semaglutide,0.25 or 0.5MG/DOS, (OZEMPIC, 0.25 OR 0.5 MG/DOSE,) 2 MG/1.5ML SOPN, Inject  0.5 mg  Subcutaneous every 7 days  timolol (TIMOPTIC) 0.5 % ophthalmic solution, Place 1 drop into both eyes 2 times daily   triamcinolone (KENALOG) 0.1 % ointment, Apply topically 2 times daily  vitamin C (ASCORBIC ACID) 100 MG tablet, Take 2,000 mg by mouth  blood glucose (ONETOUCH VERIO IQ) test strip, Use to test blood sugar 4 times daily or as directed DX E11.8 ID # [id number] has meter already  blood glucose calibration (ONETOUCH VERIO HIGH) High solution, Use to calibrate blood glucose monitor as needed as directed. LEVEL 3 solution  blood glucose calibration (ONETOUCH VERIO) solution, U TO CALIBRATE BLOOD GLUCOSE MONITOR PRN UTD  HUMALOG KWIKPEN 100 UNIT/ML soln, Carb counting with meals approx 70-80 units daily subcutaneously DX E 11.8 ID # [id number] needs refrigeration  insulin pen needle (B-D U/F) 31G X 8 MM miscellaneous, Use  4 daily short 31 G X 8MM  OneTouch Delica Lancets 33G MISC, 4 Box 4 times daily Test  Blood glucose 4 times daily  DX E11.8  ID # [id number]  order for DME, Equipment being ordered: Grade 1 (light) compression stockings, below the knee  polyethylene glycol (MIRALAX) 17 GM/Dose powder, Take 1 capful by mouth daily as needed for constipation    No current  facility-administered medications on file prior to visit.    Allergies   Allergen Reactions     Nkda [No Known Drug Allergies]      Immunization History   Administered Date(s) Administered     COVID-19,PF,Moderna 2021, 2021, 2021     COVID-19,PF,Moderna Booster 2021     FLUAD -HD 65+ QUAD ([facility] ONLY) 10/23/2020     Flu, Unspecified 1998, 2011     Influenza (H1N1) 12/15/2009     Influenza (High Dose) 3 valent vaccine 10/13/2015, 10/17/2019     Influenza (IIV3) PF 10/18/1999, 2000, 10/22/2002, 2003, 10/19/2004, 10/16/2006, 10/16/2007, 10/28/2008, 2009, 10/13/2010, 2011, 2012, 2013     Influenza Vaccine IM > 6 months Valent IIV4 (Alfuria,Fluzone) 2017, 2018     Influenza Vaccine, 6+MO IM (QUADRIVALENT W/PRESERVATIVES) 2014, 10/18/2016     Mantoux Tuberculin Skin Test 2011, 2013     Pneumo Conj 13-V (2010&after) 2019     Pneumococcal 23 valent 2000, 10/16/2006     TD (ADULT, 7+) 10/22/2002     TDAP Vaccine (Boostrix) 2013     Zoster vaccine recombinant adjuvanted (SHINGRIX) 2019, 10/21/2019     Zoster vaccine, live 11/15/2017       OB History    Para Term  AB Living   3 0 0 0 3 0   SAB IAB Ectopic Multiple Live Births   3 0 0 0 0     Past Medical History:   Diagnosis Date     Abnormal Pap smear      Anxiety      Arthritis      Arthritis of knee 2013     Arthritis of shoulder region, right 2014     Back injury      Breast disorder      Chronic constipation      Chronic diarrhea      Depressive disorder      Diabetes (H)      Fecal incontinence      Finger pain 2015     Fracture broke L 5th pinky finger due to fall  2015     Glaucoma (increased eye pressure)      Head injury 2016     Headache(784.0)     decreased with mouth guard use.     History of blood transfusion 2011 & 2013     History of diabetes mellitus      Hypercholesteremia      Hypertension       Low back pain      Menarche age 10+    cycles q mo x 4-5 d     Neck injuries      Nonsenile cataract IO implants: L-8/2013; R-1/2011     Pain in knee joint     LEFT     Right bundle branch block     per H/P     Sleep apnea     Info on file     SNHL (sensorineural hearing loss)      Tinnitus     I have reported ringing in ears. not sure of dates     Umbilical hernia without mention of obstruction or gangrene 8/2014     Vision disorder Detached Retina 10/2009     Past Surgical History:   Procedure Laterality Date     ARTHROPLASTY KNEE  4/4/2013    Left Total Knee Arthroplasty;  Surgeon: Lou Byrne MD;  [facility]     ARTHROPLASTY MINIMALLY INVASIVE KNEE  8/22/2011    R knee :CAITLYN JACOBO, Left age 14     COLONOSCOPY  1/14/2015     DENTAL SURGERY      root canals, wisdom teeth     EXAM UNDER ANESTHESIA, MANIPULATE JOINT (LOCATION)  10/5/2012    Procedure: EXAM UNDER ANESTHESIA, MANIPULATE JOINT (LOCATION);  Right Knee Mini Open Lysis Of Adhesions, Left Knee Steroid Injection  ;  Surgeon: Lou Byrne MD;  Location: [facility]     [facility] OR OCULAR DEVICE INTRAOP DETACHED RETINA  2009    R     [facility] PHAKIC IOL - IMPLANT FROM SURGEON      right     KNEE SURGERY  1969    left     LASER YAG CAPSULOTOMY  12/2016    left     VITRECTOMY PARSPLANA  2010     Family History   Problem Relation Age of Onset     Diabetes Father 55        DM II     Diabetes Brother 50        xs 2     Hypertension Sister 41        also B and M     Cancer Maternal Aunt         multiple myeloma     Hypertension Mother 79     Osteoporosis Mother      Memory loss Mother      Glaucoma Mother      Diabetes Brother      Hypertension Brother      Heart Murmur Brother      Glaucoma Brother      Hypertension Brother      Breast Cancer Cousin      Thyroid Disease Sister         Graves     Diabetes Sister         Graves     Cerebrovascular Disease Maternal Grandmother      Other - See Comments Sister         16 minths head injury     Other -  See Comments Brother         MVA age 36     Cancer - colorectal No family hx of      Prostate Cancer No family hx of      Alcohol/Drug No family hx of      Melanoma No family hx of      Skin Cancer No family hx of      Social History     Socioeconomic History     Marital status:      Spouse name: None     Number of children: None     Years of education: None     Highest education level: None   Occupational History     Occupation: Human Resources     Comment: between jobs now   Tobacco Use     Smoking status: Former Smoker     Packs/day: 0.00     Years: 0.00     Pack years: 0.00     Smokeless tobacco: Never Used     Tobacco comment: quit mid 1980s   Substance and Sexual Activity     Alcohol use: No     Drug use: No     Sexual activity: Not Currently     Partners: Male     Birth control/protection: Abstinence, Post-menopausal   Other Topics Concern      Service Not Asked     Blood Transfusions Yes     Comment: 2 units 2011     Caffeine Concern No     Comment: 2s     Occupational Exposure No     Hobby Hazards No     Sleep Concern No     Stress Concern No     Comment: rehab for R knee after replacement     Weight Concern Yes     Comment: working on wt loss     Special Diet Yes     Comment: Diabetic     Back Care No     Exercise No     Comment: water aerobics 35-40' 3-4 d & strength 3d/wk     Bike Helmet No     Seat Belt No     Self-Exams Not Asked     Parent/sibling w/ CABG, MI or angioplasty before 65F 55M? Not Asked   Social History Narrative    How much exercise per week? 3-4x swimming, aerobics    How much calcium per day? Supplement       How much caffeine per day? 2 cups coffee/ 1-2 can of diet soda    How much vitamin D per day? Supplement    Do you/your family wear seatbelts?  Yes    Do you/your family use safety helmets? No    Do you/your family use sunscreen? No    Do you/your family keep firearms in the home? No    Do you/your family have a smoke detector(s)? Yes    Do you feel safe in your  home? Yes    Has anyone ever touched you in an unwanted manner? No     Explain     See LEA Berman 11/26/2014    Reviewed Raul DEGROOT MA 11/22/2017       ROS  Breast/ pelvic ROS except for as noted above.     PHQ-9 SCORE 11/12/2019 12/1/2020 1/28/2021   PHQ-9 Total Score - - -   PHQ-9 Total Score 12 6 4     SHANDA-7 SCORE 11/27/2018 11/12/2019 11/22/2021   Total Score 8 (mild anxiety) - 4 (minimal anxiety)   Total Score 8 6 4     Answers for HPI/ROS submitted by the patient on 11/22/2021  SHANDA 7 TOTAL SCORE: 4  General Symptoms: No  Skin Symptoms: Yes  HENT Symptoms: Yes  EYE SYMPTOMS: Yes  HEART SYMPTOMS: Yes  LUNG SYMPTOMS: No  INTESTINAL SYMPTOMS: Yes  URINARY SYMPTOMS: No  GYNECOLOGIC SYMPTOMS: Yes  BREAST SYMPTOMS: No  SKELETAL SYMPTOMS: Yes  BLOOD SYMPTOMS: No  NERVOUS SYSTEM SYMPTOMS: No  MENTAL HEALTH SYMPTOMS: Yes  Ear pain: No  Ear discharge: No  Hearing loss: Yes  Tinnitus: Yes  Nosebleeds: No  Congestion: No  Sinus pain: No  Trouble swallowing: No   Voice hoarseness: No  Mouth sores: No  Sore throat: No  Tooth pain: No  Gum tenderness: No  Bleeding gums: No  Change in taste: No  Change in sense of smell: No  Dry mouth: No  Hearing aid used: Yes  Neck lump: No  Changes in hair: Yes  Changes in moles/birth marks: No  Itching: Yes  Rashes: No  Changes in nails: Yes  Acne: No  Hair in places you don't want it: No  Change in facial hair: No  Warts: No  Non-healing sores: No  Scarring: No  Flaking of skin: Yes  Color changes of hands/feet in cold : No  Sun sensitivity: No  Skin thickening: No  Eye pain: No  Vision loss: No  Dry eyes: Yes  Watery eyes: No  Eye bulging: No  Double vision: No  Flashing of lights: Yes  Spots: No  Floaters: Yes  Redness: No  Crossed eyes: No  Tunnel Vision: No  Yellowing of eyes: No  Eye irritation: No  Chest pain or pressure: Yes  Fast or irregular heartbeat: No  Trouble breathing while lying down: Yes  Fingers or toes appear blue: No  High blood pressure: Yes  Low blood pressure:  "No  Fainting: No  Murmurs: No  Pacemaker: No  Varicose veins: No  Edema or swelling: Yes  Wake up at night with shortness of breath: No  Light-headedness: No  Exercise intolerance: No  Heart burn or indigestion: No  Nausea: Yes  Vomiting: No  Abdominal pain: Yes  Bloating: Yes  Constipation: Yes  Diarrhea: Yes  Blood in stool: No  Black stools: No  Rectal or Anal pain: Yes  Fecal incontinence: No  Yellowing of skin or eyes: No  Vomit with blood: No  Change in stools: No  Bleeding or spotting between periods: No  Heavy or painful periods: No  Irregular periods: No  Vaginal discharge: No  Hot flashes: No  Vaginal dryness: Yes  Genital ulcers: No  Reduced libido: No  Painful intercourse: No  Difficulty with sexual arousal: Yes  Post-menopausal bleeding: No  Back pain: Yes  Muscle aches: Yes  Neck pain: Yes  Swollen joints: Yes  Joint pain: No  Bone pain: No  Muscle cramps: Yes  Muscle weakness: No  Joint stiffness: Yes  Bone fracture: No  Nervous or Anxious: Yes  Depression: Yes  Trouble sleeping: No  Trouble thinking or concentrating: No  Mood changes: Yes  Panic attacks: Yes    EXAM:  Blood pressure 136/76, pulse 88, height 1.626 m (5' 4\"), weight 110.2 kg (243 lb), not currently breastfeeding. Body mass index is 41.71 kg/m .  General - pleasant female in no acute distress.  Neurological - normal strength, sensation, and mental status.    Breast Exam:  Breast: Without visible skin changes. No dimpling or lesions seen. Breasts supple, non-tender with palpation, no dominant mass, nodularity, or nipple discharge noted bilaterally. Axillary nodes negative.      Pelvic Exam:  EG: Normal genital architecture without lesions, erythema or abnormal secretions   Vagina: pale, pink tissue at introitus; white discharge present  Urethra: without erythema  Anus: normal    ASSESSMENT:  Encounter Diagnosis   Name Primary?     Encounter for gynecological examination without abnormal finding         PLAN:   Normal Breast and external " genitalia exam.  Mammogram up to date; next recommended 11/2022.  Pap smears completed, based on age and ASCCP guidelines.   Encouraged use of vaginal moisturizer or coconut oil for management of vaginal dryness.   Provided support regarding increased stress. Encouraged continuation of seeing therapist and promoted methods of self-care.  Additional teaching done at this visit regarding calcium (1200 mg per day), exercise and mental health.    Return to clinic in one year. Follow-up as needed.    Mayra Medel, DNP, APRN, WHNP

## 2021-11-23 NOTE — LETTER
2021       RE: Radha Baca   [facility] [address]     Dear Colleague,    Thank you for referring your patient, Radha Baca, to the [facility] [place] at [facility]. Please see a copy of my visit note below.    Progress Note    SUBJECTIVE:  Radha Baca is an 66 year old, , who requests a Breast and Pelvic Exam, routine gyn care.     Concerns today include:     1. Annual Gyn exam - no specific concerns, other than she feels more stress in her life related to family and her own health. She reports needing to add more medications, hearing aids, and a CPAP machine at night that have all added to her stress.     Diet: Uses [employer] smart application, puts entries in for 1 meal a day. Takes a calcium supplement, drinks milk or eats yogurt. Reports she has been struggling with constipation, started seeing a GI specialist.    Exercise: Likes to do exercises in the pool. Reports this feels better on her knees. When she can't get to the pool she watches [employer] exercise videos. Also reports she cleans, which feels like exercise to he.    Social: Pt reports increased family stress, she is taking care of 90 year-old mother. .    Mental Health: Overall increased stress, worries over her own health and family situation. She is talking to a therapist about all of this. She does not want to try medication at this time. She shares she has other outlets, like her  and she goes on retreats. Active in her Buddhist.     Sexual Health: Declines being sexually active. Reports intimacy in ways other than sexual activity.    Menstrual History: Menopause. Declines any vaginal bleeding. Vaginal dryness, no itching or discharge.     Last PAP: No known hx of abnormal pap smears; pt has completed adequate screening and may stop pap smears, in accordance with ASCCP guidelines.    Lab Results    Component Value Date    PAP NIL 11/12/2019     Last   Lab Results   Component Value Date    HPV16 Negative 11/12/2019     Last   Lab Results   Component Value Date    HPV18 Negative 11/12/2019     Last   Lab Results   Component Value Date    HRHPV Negative 11/12/2019       Mammogram current: yes  Last Mammogram: 11/23/2021  IMPRESSION: ACR BI-RADS Category 1: Negative    Mammo US breast Unilateral Left    Result Date: 11/19/2018  Narrative: Examinations: MA DIAGNOSTIC BILATERAL W/ JESUS, US BREAST LEFT LIMITED 1-3 QUADRANTS, 11/19/2018 1:03 PM Comparisons: 11/22/2017, 12/8/2016, 11/18/2015, 11/26/2014 History/Family History: Pain and erythema, medial left areola for about a week.  Started antibiotics 2 days ago and has seen some improvement. Breast Density: Scattered fibroglandular densities Technique: Standard mammographic views were performed with tomosynthesis and 2D reconstruction. Findings:   Mammogram: No significant change in either breast. No mammographic abnormality associated with the left nipple or subareolar left breast. Ultrasound: Targeted, real-time ultrasound evaluation was performed by the technologist and radiologist. At the 9:00 position on the left areola at the site of the area of pain and erythema, is a hypervascular, hypoechoic area within the skin measuring up to 1.1 cm in greatest dimension.      Last Colonoscopy: Reports GI specialist is handling colonoscopies.    HISTORY:  Acetaminophen (TYLENOL PO), Take by mouth as needed for mild pain or fever  amoxicillin (AMOXIL) 500 MG tablet, Take 4 tablets (2000 mg) by mouth 1 hour before dental procedures/cleanings.  Ascorbic Acid (VITAMIN C PO), Take 2,000 mg by mouth 2 times daily   aspirin 81 MG tablet, 1 tab daily  atorvastatin (LIPITOR) 20 MG tablet, Take 1 tablet (20 mg) by mouth daily DX E78.5 ID # [id number]  B Complex Vitamins (VITAMIN  B COMPLEX) CAPS, Take 1 tablet by mouth daily   Calcium Carb-Cholecalciferol (CALCIUM 600 + D) 600-400  MG-UNIT TABS per tablet,   calcium-vitamin D (CALTRATE) 600-400 MG-UNIT per tablet, Take 1 tablet by mouth 2 times daily  cholecalciferol (VITAMIN D) 1000 UNIT tablet, Take 1 tablet by mouth daily.  cyanocobalamin (VITAMIN B-12) 1000 MCG tablet,   cycloSPORINE (RESTASIS) 0.05 % ophthalmic emulsion,   hypromellose-dextran (ARTIFICAL TEARS) 0.1-0.3 % ophthalmic solution,   insulin glargine (LANTUS SOLOSTAR) 100 UNIT/ML pen, Inject 80 units SQ each am.  latanoprost (XALATAN) 0.005 % ophthalmic solution, Place 1 drop into both eyes At Bedtime  lisinopril-hydrochlorothiazide (ZESTORETIC) 20-12.5 MG tablet, Take 1 tablet by mouth daily DX  I10 ID # [id number]  metFORMIN (GLUCOPHAGE) 500 MG tablet, Take 2 tablets (1,000 mg) by mouth 2 times daily (with meals)  Multiple Vitamin (MULTIVITAMIN  S) CAPS, Take 1 daily  Multiple Vitamins-Minerals (EYE-ZAKIYA PLUS LUTEIN PO),   Omega-3 Fatty Acids (OMEGA-3 FISH OIL PO), Take 1,000 mg by mouth daily  Semaglutide,0.25 or 0.5MG/DOS, (OZEMPIC, 0.25 OR 0.5 MG/DOSE,) 2 MG/1.5ML SOPN, Inject  0.5 mg  Subcutaneous every 7 days  timolol (TIMOPTIC) 0.5 % ophthalmic solution, Place 1 drop into both eyes 2 times daily   triamcinolone (KENALOG) 0.1 % ointment, Apply topically 2 times daily  vitamin C (ASCORBIC ACID) 100 MG tablet, Take 2,000 mg by mouth  blood glucose (ONETOUCH VERIO IQ) test strip, Use to test blood sugar 4 times daily or as directed DX E11.8 ID # [id number] has meter already  blood glucose calibration (ONETOUCH VERIO HIGH) High solution, Use to calibrate blood glucose monitor as needed as directed. LEVEL 3 solution  blood glucose calibration (ONETOUCH VERIO) solution, U TO CALIBRATE BLOOD GLUCOSE MONITOR PRN UTD  HUMALOG KWIKPEN 100 UNIT/ML soln, Carb counting with meals approx 70-80 units daily subcutaneously DX E 11.8 ID # [id number] needs refrigeration  insulin pen needle (B-D U/F) 31G X 8 MM miscellaneous, Use  4 daily short 31 G X 8MM  OneTouch Delica Lancets 33G MISC, 4 Box 4  times daily Test  Blood glucose 4 times daily  DX E11.8  ID # [id number]  order for DME, Equipment being ordered: Grade 1 (light) compression stockings, below the knee  polyethylene glycol (MIRALAX) 17 GM/Dose powder, Take 1 capful by mouth daily as needed for constipation    No current facility-administered medications on file prior to visit.    Allergies   Allergen Reactions     Nkda [No Known Drug Allergies]      Immunization History   Administered Date(s) Administered     COVID-19,PF,Moderna 2021, 2021, 2021     COVID-19,PF,Moderna Booster 2021     FLUAD -HD 65+ QUAD ([facility] ONLY) 10/23/2020     Flu, Unspecified 1998, 2011     Influenza (H1N1) 12/15/2009     Influenza (High Dose) 3 valent vaccine 10/13/2015, 10/17/2019     Influenza (IIV3) PF 10/18/1999, 2000, 10/22/2002, 2003, 10/19/2004, 10/16/2006, 10/16/2007, 10/28/2008, 2009, 10/13/2010, 2011, 2012, 2013     Influenza Vaccine IM > 6 months Valent IIV4 (Alfuria,Fluzone) 2017, 2018     Influenza Vaccine, 6+MO IM (QUADRIVALENT W/PRESERVATIVES) 2014, 10/18/2016     Mantoux Tuberculin Skin Test 2011, 2013     Pneumo Conj 13-V (2010&after) 2019     Pneumococcal 23 valent 2000, 10/16/2006     TD (ADULT, 7+) 10/22/2002     TDAP Vaccine (Boostrix) 2013     Zoster vaccine recombinant adjuvanted (SHINGRIX) 2019, 10/21/2019     Zoster vaccine, live 11/15/2017       OB History    Para Term  AB Living   3 0 0 0 3 0   SAB IAB Ectopic Multiple Live Births   3 0 0 0 0     Past Medical History:   Diagnosis Date     Abnormal Pap smear      Anxiety      Arthritis      Arthritis of knee 2013     Arthritis of shoulder region, right 2014     Back injury      Breast disorder      Chronic constipation      Chronic diarrhea      Depressive disorder      Diabetes (H)      Fecal incontinence      Finger pain 2015     Fracture broke  L 5th pinky finger due to fall  1/2015     Glaucoma (increased eye pressure)      Head injury 8/6/2016     Headache(784.0)     decreased with mouth guard use.     History of blood transfusion 8/2011 & 4/2013     History of diabetes mellitus      Hypercholesteremia      Hypertension      Low back pain      Menarche age 10+    cycles q mo x 4-5 d     Neck injuries      Nonsenile cataract IO implants: L-8/2013; R-1/2011     Pain in knee joint     LEFT     Right bundle branch block     per H/P     Sleep apnea     Info on file     SNHL (sensorineural hearing loss)      Tinnitus     I have reported ringing in ears. not sure of dates     Umbilical hernia without mention of obstruction or gangrene 8/2014     Vision disorder Detached Retina 10/2009     Past Surgical History:   Procedure Laterality Date     ARTHROPLASTY KNEE  4/4/2013    Left Total Knee Arthroplasty;  Surgeon: Lou Byrne MD;  [facility]     ARTHROPLASTY MINIMALLY INVASIVE KNEE  8/22/2011    R knee :CAITLYN JACOBO, Left age 14     COLONOSCOPY  1/14/2015     DENTAL SURGERY      root canals, wisdom teeth     EXAM UNDER ANESTHESIA, MANIPULATE JOINT (LOCATION)  10/5/2012    Procedure: EXAM UNDER ANESTHESIA, MANIPULATE JOINT (LOCATION);  Right Knee Mini Open Lysis Of Adhesions, Left Knee Steroid Injection  ;  Surgeon: Lou Byrne MD;  Location[facility]     [facility] OR OCULAR DEVICE INTRAOP DETACHED RETINA  2009    R     [facility] PHAKIC IOL - IMPLANT FROM SURGEON      right     KNEE SURGERY  1969    left     LASER YAG CAPSULOTOMY  12/2016    left     VITRECTOMY PARSPLANA  2010     Family History   Problem Relation Age of Onset     Diabetes Father 55        DM II     Diabetes Brother 50        xs 2     Hypertension Sister 41        also B and M     Cancer Maternal Aunt         multiple myeloma     Hypertension Mother 79     Osteoporosis Mother      Memory loss Mother      Glaucoma Mother      Diabetes Brother      Hypertension Brother      Heart Murmur  Brother      Glaucoma Brother      Hypertension Brother      Breast Cancer Cousin      Thyroid Disease Sister         Graves     Diabetes Sister         Graves     Cerebrovascular Disease Maternal Grandmother      Other - See Comments Sister         16 minths head injury     Other - See Comments Brother         MVA age 36     Cancer - colorectal No family hx of      Prostate Cancer No family hx of      Alcohol/Drug No family hx of      Melanoma No family hx of      Skin Cancer No family hx of      Social History     Socioeconomic History     Marital status:      Spouse name: None     Number of children: None     Years of education: None     Highest education level: None   Occupational History     Occupation: Human Resources     Comment: between jobs now   Tobacco Use     Smoking status: Former Smoker     Packs/day: 0.00     Years: 0.00     Pack years: 0.00     Smokeless tobacco: Never Used     Tobacco comment: quit mid 1980s   Substance and Sexual Activity     Alcohol use: No     Drug use: No     Sexual activity: Not Currently     Partners: Male     Birth control/protection: Abstinence, Post-menopausal   Other Topics Concern      Service Not Asked     Blood Transfusions Yes     Comment: 2 units 2011     Caffeine Concern No     Comment: 2s     Occupational Exposure No     Hobby Hazards No     Sleep Concern No     Stress Concern No     Comment: rehab for R knee after replacement     Weight Concern Yes     Comment: working on wt loss     Special Diet Yes     Comment: Diabetic     Back Care No     Exercise No     Comment: water aerobics 35-40' 3-4 d & strength 3d/wk     Bike Helmet No     Seat Belt No     Self-Exams Not Asked     Parent/sibling w/ CABG, MI or angioplasty before 65F 55M? Not Asked   Social History Narrative    How much exercise per week? 3-4x swimming, aerobics    How much calcium per day? Supplement       How much caffeine per day? 2 cups coffee/ 1-2 can of diet soda    How much vitamin D  per day? Supplement    Do you/your family wear seatbelts?  Yes    Do you/your family use safety helmets? No    Do you/your family use sunscreen? No    Do you/your family keep firearms in the home? No    Do you/your family have a smoke detector(s)? Yes    Do you feel safe in your home? Yes    Has anyone ever touched you in an unwanted manner? No     Explain     See LEA Berman 11/26/2014    Hanna DEGROOT MA 11/22/2017       ROS  Breast/ pelvic ROS except for as noted above.     PHQ-9 SCORE 11/12/2019 12/1/2020 1/28/2021   PHQ-9 Total Score - - -   PHQ-9 Total Score 12 6 4     SHANDA-7 SCORE 11/27/2018 11/12/2019 11/22/2021   Total Score 8 (mild anxiety) - 4 (minimal anxiety)   Total Score 8 6 4     Answers for HPI/ROS submitted by the patient on 11/22/2021  SHANDA 7 TOTAL SCORE: 4  General Symptoms: No  Skin Symptoms: Yes  HENT Symptoms: Yes  EYE SYMPTOMS: Yes  HEART SYMPTOMS: Yes  LUNG SYMPTOMS: No  INTESTINAL SYMPTOMS: Yes  URINARY SYMPTOMS: No  GYNECOLOGIC SYMPTOMS: Yes  BREAST SYMPTOMS: No  SKELETAL SYMPTOMS: Yes  BLOOD SYMPTOMS: No  NERVOUS SYSTEM SYMPTOMS: No  MENTAL HEALTH SYMPTOMS: Yes  Ear pain: No  Ear discharge: No  Hearing loss: Yes  Tinnitus: Yes  Nosebleeds: No  Congestion: No  Sinus pain: No  Trouble swallowing: No   Voice hoarseness: No  Mouth sores: No  Sore throat: No  Tooth pain: No  Gum tenderness: No  Bleeding gums: No  Change in taste: No  Change in sense of smell: No  Dry mouth: No  Hearing aid used: Yes  Neck lump: No  Changes in hair: Yes  Changes in moles/birth marks: No  Itching: Yes  Rashes: No  Changes in nails: Yes  Acne: No  Hair in places you don't want it: No  Change in facial hair: No  Warts: No  Non-healing sores: No  Scarring: No  Flaking of skin: Yes  Color changes of hands/feet in cold : No  Sun sensitivity: No  Skin thickening: No  Eye pain: No  Vision loss: No  Dry eyes: Yes  Watery eyes: No  Eye bulging: No  Double vision: No  Flashing of lights: Yes  Spots: No  Floaters:  "Yes  Redness: No  Crossed eyes: No  Tunnel Vision: No  Yellowing of eyes: No  Eye irritation: No  Chest pain or pressure: Yes  Fast or irregular heartbeat: No  Trouble breathing while lying down: Yes  Fingers or toes appear blue: No  High blood pressure: Yes  Low blood pressure: No  Fainting: No  Murmurs: No  Pacemaker: No  Varicose veins: No  Edema or swelling: Yes  Wake up at night with shortness of breath: No  Light-headedness: No  Exercise intolerance: No  Heart burn or indigestion: No  Nausea: Yes  Vomiting: No  Abdominal pain: Yes  Bloating: Yes  Constipation: Yes  Diarrhea: Yes  Blood in stool: No  Black stools: No  Rectal or Anal pain: Yes  Fecal incontinence: No  Yellowing of skin or eyes: No  Vomit with blood: No  Change in stools: No  Bleeding or spotting between periods: No  Heavy or painful periods: No  Irregular periods: No  Vaginal discharge: No  Hot flashes: No  Vaginal dryness: Yes  Genital ulcers: No  Reduced libido: No  Painful intercourse: No  Difficulty with sexual arousal: Yes  Post-menopausal bleeding: No  Back pain: Yes  Muscle aches: Yes  Neck pain: Yes  Swollen joints: Yes  Joint pain: No  Bone pain: No  Muscle cramps: Yes  Muscle weakness: No  Joint stiffness: Yes  Bone fracture: No  Nervous or Anxious: Yes  Depression: Yes  Trouble sleeping: No  Trouble thinking or concentrating: No  Mood changes: Yes  Panic attacks: Yes    EXAM:  Blood pressure 136/76, pulse 88, height 1.626 m (5' 4\"), weight 110.2 kg (243 lb), not currently breastfeeding. Body mass index is 41.71 kg/m .  General - pleasant female in no acute distress.  Neurological - normal strength, sensation, and mental status.    Breast Exam:  Breast: Without visible skin changes. No dimpling or lesions seen. Breasts supple, non-tender with palpation, no dominant mass, nodularity, or nipple discharge noted bilaterally. Axillary nodes negative.      Pelvic Exam:  EG: Normal genital architecture without lesions, erythema or abnormal " secretions   Vagina: pale, pink tissue at introitus; white discharge present  Urethra: without erythema  Anus: normal    ASSESSMENT:  Encounter Diagnosis   Name Primary?     Encounter for gynecological examination without abnormal finding         PLAN:   Normal Breast and external genitalia exam.  Mammogram up to date; next recommended 11/2022.  Pap smears completed, based on age and ASCCP guidelines.   Encouraged use of vaginal moisturizer or coconut oil for management of vaginal dryness.   Provided support regarding increased stress. Encouraged continuation of seeing therapist and promoted methods of self-care.  Additional teaching done at this visit regarding calcium (1200 mg per day), exercise and mental health.    Return to clinic in one year. Follow-up as needed.    Mayra Medel, DNP, APRN, WHNP

## 2021-11-23 NOTE — PROGRESS NOTES
Form has been completed by provider.     Form sent out via: Fax to  [employer] at Fax Number: [phone]  Patient informed: No  Output date: November 23, 2021    Macarena Melton MA      **Please close the encounter**

## 2021-12-08 ENCOUNTER — TELEPHONE (OUTPATIENT)
Dept: AUDIOLOGY | Facility: CLINIC | Age: 66
End: 2021-12-08
Payer: MEDICARE

## 2021-12-22 ENCOUNTER — TELEPHONE (OUTPATIENT)
Dept: ENDOCRINOLOGY | Facility: CLINIC | Age: 66
End: 2021-12-22

## 2021-12-22 ENCOUNTER — OFFICE VISIT (OUTPATIENT)
Dept: ORTHOPEDICS | Facility: CLINIC | Age: 66
End: 2021-12-22
Payer: MEDICARE

## 2021-12-22 DIAGNOSIS — I73.89 OTHER SPECIFIED PERIPHERAL VASCULAR DISEASES (H): Primary | ICD-10-CM

## 2021-12-22 DIAGNOSIS — E11.8 TYPE 2 DIABETES MELLITUS WITH COMPLICATION, WITH LONG-TERM CURRENT USE OF INSULIN (H): ICD-10-CM

## 2021-12-22 DIAGNOSIS — B35.1 OM (ONYCHOMYCOSIS): ICD-10-CM

## 2021-12-22 DIAGNOSIS — Z79.4 TYPE 2 DIABETES MELLITUS WITH COMPLICATION, WITH LONG-TERM CURRENT USE OF INSULIN (H): ICD-10-CM

## 2021-12-22 PROCEDURE — 99213 OFFICE O/P EST LOW 20 MIN: CPT | Performed by: PODIATRIST

## 2021-12-22 NOTE — LETTER
12/22/2021         RE: Radha Baca  [address] [address]        Dear Colleague,    Thank you for referring your patient, Radha Baca, to the [facility] [place]. Please see a copy of my visit note below.    Chief Complaint   Patient presents with     Follow Up     3-4 month follow up.             Allergies   Allergen Reactions     Nkda [No Known Drug Allergies]          Subjective: Radha is a 66 year old female who presents to the clinic today for a diabetic foot exam and management. Relates that she has no new LE complaints today. She does have diabetic shoes.      Objective  Hemoglobin A1C POCT   Date Value Ref Range Status   04/12/2021 8.9 (H) 0 - 5.6 % Final     Comment:     Normal <5.7% Prediabetes 5.7-6.4%  Diabetes 6.5% or higher - adopted from ADA   consensus guidelines.     01/18/2021 8.4 (H) 0 - 5.6 % Final     Comment:     Normal <5.7% Prediabetes 5.7-6.4%  Diabetes 6.5% or higher - adopted from ADA   consensus guidelines.     09/28/2020 9.0 (H) 0 - 5.6 % Final     Comment:     Normal <5.7% Prediabetes 5.7-6.4%  Diabetes 6.5% or higher - adopted from ADA   consensus guidelines.     01/18/2018 9.6 (A) 4.3 - 6 % Final   10/18/2017 9.5 (A) 4.3 - 6 % Final   07/19/2017 9.7 (A) 4.3 - 6 % Final     Hemoglobin A1C   Date Value Ref Range Status   11/01/2021 8.6 (H) 0.0 - 5.6 % Final     Comment:     Normal <5.7%   Prediabetes 5.7-6.4%    Diabetes 6.5% or higher     Note: Adopted from ADA consensus guidelines.   07/12/2021 8.8 (H) 0.0 - 5.6 % Final     Comment:     Normal <5.7%   Prediabetes 5.7-6.4%    Diabetes 6.5% or higher     Note: Adopted from ADA consensus guidelines.         PT and DP pulses are not palpable bilaterally. CRT is 4 seconds. Diminished pedal hair.   Gross sensation is diminished, as well as protective sensation bilaterally.   Equinus is noted bilaterally.   Nails thickened, brittle, discolored, with subungual  debris bilaterally. No open lesions are noted. Xerosis noted BL.      Assessment: DM2 with neuropathy.  Onychomycosis.   Xerosis       Plan:  - Pt seen and evaluated.  - Nails debrided x 10.  - See again in 4 months.        Jerrell Austin DPM

## 2021-12-22 NOTE — TELEPHONE ENCOUNTER
[facility] Call Center    Phone Message    May a detailed message be left on voicemail: yes     Reason for Call: Other: Pt would like a mychart message from endo nurse to confirm if any of her labs for 2/21 are fasting or not.       Action Taken: Message routed to:  [facility] ([facility]): [email or web address]    Travel Screening: Not Applicable

## 2021-12-22 NOTE — NURSING NOTE
Reason For Visit:   Chief Complaint   Patient presents with     Follow Up     3-4 month follow up.                 Allergies   Allergen Reactions     Nkda [No Known Drug Allergies]            Екатерина Lundy LPN

## 2021-12-22 NOTE — PROGRESS NOTES
Chief Complaint   Patient presents with     Follow Up     3-4 month follow up.             Allergies   Allergen Reactions     Nkda [No Known Drug Allergies]          Subjective: Radha is a 66 year old female who presents to the clinic today for a diabetic foot exam and management. Relates that she has no new LE complaints today. She does have diabetic shoes.      Objective  Hemoglobin A1C POCT   Date Value Ref Range Status   04/12/2021 8.9 (H) 0 - 5.6 % Final     Comment:     Normal <5.7% Prediabetes 5.7-6.4%  Diabetes 6.5% or higher - adopted from ADA   consensus guidelines.     01/18/2021 8.4 (H) 0 - 5.6 % Final     Comment:     Normal <5.7% Prediabetes 5.7-6.4%  Diabetes 6.5% or higher - adopted from ADA   consensus guidelines.     09/28/2020 9.0 (H) 0 - 5.6 % Final     Comment:     Normal <5.7% Prediabetes 5.7-6.4%  Diabetes 6.5% or higher - adopted from ADA   consensus guidelines.     01/18/2018 9.6 (A) 4.3 - 6 % Final   10/18/2017 9.5 (A) 4.3 - 6 % Final   07/19/2017 9.7 (A) 4.3 - 6 % Final     Hemoglobin A1C   Date Value Ref Range Status   11/01/2021 8.6 (H) 0.0 - 5.6 % Final     Comment:     Normal <5.7%   Prediabetes 5.7-6.4%    Diabetes 6.5% or higher     Note: Adopted from ADA consensus guidelines.   07/12/2021 8.8 (H) 0.0 - 5.6 % Final     Comment:     Normal <5.7%   Prediabetes 5.7-6.4%    Diabetes 6.5% or higher     Note: Adopted from ADA consensus guidelines.         PT and DP pulses are not palpable bilaterally. CRT is 4 seconds. Diminished pedal hair.   Gross sensation is diminished, as well as protective sensation bilaterally.   Equinus is noted bilaterally.   Nails thickened, brittle, discolored, with subungual debris bilaterally. No open lesions are noted. Xerosis noted BL.      Assessment: DM2 with neuropathy.  Onychomycosis.   Xerosis       Plan:  - Pt seen and evaluated.  - Nails debrided x 10.  - See again in 4 months.

## 2021-12-23 NOTE — TELEPHONE ENCOUNTER
Responded via [employer] that the labs ordered do require pt to be fasting.  Loi Zhao LPN on 12/23/2021 at 8:34 AM

## 2022-01-05 ASSESSMENT — SLEEP AND FATIGUE QUESTIONNAIRES
HOW LIKELY ARE YOU TO NOD OFF OR FALL ASLEEP WHEN YOU ARE A PASSENGER IN A CAR FOR AN HOUR WITHOUT A BREAK: SLIGHT CHANCE OF DOZING
HOW LIKELY ARE YOU TO NOD OFF OR FALL ASLEEP WHILE SITTING QUIETLY AFTER LUNCH WITHOUT ALCOHOL: SLIGHT CHANCE OF DOZING
HOW LIKELY ARE YOU TO NOD OFF OR FALL ASLEEP WHILE SITTING INACTIVE IN A PUBLIC PLACE: SLIGHT CHANCE OF DOZING
HOW LIKELY ARE YOU TO NOD OFF OR FALL ASLEEP WHILE SITTING AND TALKING TO SOMEONE: WOULD NEVER DOZE
HOW LIKELY ARE YOU TO NOD OFF OR FALL ASLEEP IN A CAR, WHILE STOPPED FOR A FEW MINUTES IN TRAFFIC: WOULD NEVER DOZE
HOW LIKELY ARE YOU TO NOD OFF OR FALL ASLEEP WHILE LYING DOWN TO REST IN THE AFTERNOON WHEN CIRCUMSTANCES PERMIT: MODERATE CHANCE OF DOZING
HOW LIKELY ARE YOU TO NOD OFF OR FALL ASLEEP WHILE SITTING AND READING: SLIGHT CHANCE OF DOZING
HOW LIKELY ARE YOU TO NOD OFF OR FALL ASLEEP WHILE WATCHING TV: SLIGHT CHANCE OF DOZING

## 2022-01-07 ENCOUNTER — TELEPHONE (OUTPATIENT)
Dept: SLEEP MEDICINE | Facility: CLINIC | Age: 67
End: 2022-01-07
Payer: MEDICARE

## 2022-01-07 NOTE — TELEPHONE ENCOUNTER
-RETURNED PT CALL AND THE PT IS WANTING TO MAKE SURE THAT HER CPAP DATA IS ACCESSABLE TO DR. LARA FOR AN UPCOMING APPT. WHEN CHECKING [employer] DR. MARTIN AND  SLEEP HAS ACCESS. INFORMED THE PT OF THIS.  PT IS WANTING THE AUTO ON FEATURE ACTIVATED ON HER CPAP MACHINE AND ALSO TO PU A DL. PT IS SCHEDULED AT THE [facility] SHOWROOM 01/10/2021 2:30PM. CONFIRMED LOCATION, TIME AND DATE WITH THE PT.

## 2022-01-10 ENCOUNTER — DOCUMENTATION ONLY (OUTPATIENT)
Dept: SLEEP MEDICINE | Facility: CLINIC | Age: 67
End: 2022-01-10
Payer: MEDICARE

## 2022-01-10 NOTE — PROGRESS NOTES
PT CAME TO SCHEDULED APPOINTMENT FOR THE OPTIONS PLUS SETTING TO BE ACTIVATED ON THE CPAP MACHINE. PT WAS INTERESTED IN SEEING MORE INFORMATION LIKE HER  HAS ON HIS DEVICE.  PT ALSO WANTED A COPY OF HER SLEEP STUDY AND A 90DAY AND 7 DAY DETAILS DL. GAVE PT COPIES OF ALL THAT WAS REQUESTED. PT HAD NO OTHER CONCERNS AT THIS TIME.

## 2022-01-12 ENCOUNTER — VIRTUAL VISIT (OUTPATIENT)
Dept: SLEEP MEDICINE | Facility: CLINIC | Age: 67
End: 2022-01-12
Payer: MEDICARE

## 2022-01-12 VITALS — WEIGHT: 245 LBS | HEIGHT: 64 IN | BODY MASS INDEX: 41.83 KG/M2

## 2022-01-12 DIAGNOSIS — G47.33 OSA (OBSTRUCTIVE SLEEP APNEA): Primary | ICD-10-CM

## 2022-01-12 DIAGNOSIS — G47.00 INSOMNIA, UNSPECIFIED TYPE: ICD-10-CM

## 2022-01-12 DIAGNOSIS — F51.5 NIGHTMARES: ICD-10-CM

## 2022-01-12 DIAGNOSIS — E66.09 EXOGENOUS OBESITY: ICD-10-CM

## 2022-01-12 PROCEDURE — 99214 OFFICE O/P EST MOD 30 MIN: CPT | Mod: 95 | Performed by: INTERNAL MEDICINE

## 2022-01-12 ASSESSMENT — PAIN SCALES - GENERAL: PAINLEVEL: EXTREME PAIN (8)

## 2022-01-12 ASSESSMENT — MIFFLIN-ST. JEOR: SCORE: 1631.31

## 2022-01-12 NOTE — PROGRESS NOTES
Dionicio is a 66 year old who is being evaluated via a billable video visit.      How would you like to obtain your AVS? MyChart  If the video visit is dropped, the invitation should be resent by: Send to e-mail at: [email or web address] [employer]utation  Will anyone else be joining your video visit? No   No BP taken        Video Start Time: 1:58 PM  Video-Visit Details    Type of service:  Video Visit    Video End Time:2:26 PM    Originating Location (pt. Location): Home    Distant Location (provider location):  [facility]     Platform used for Video Visit: BitAccess     Chief complaint: Follow-up sleep apnea    History of Present Illness: 66-year-old female with history of diabetes, obesity, anxiety, arthritis, severe obstructive sleep apnea.  She continues to report some issues with difficulty falling asleep.  She has a lot of stressors related to taking care of her mother and other family conflicts.  She also admits that pain can disrupt her sleep as can muscle cramps.  She will also wake up with nightmares on occasion.  Usually she can come to get up and walk this off and return to sleep quickly.  She has no problems tolerating the CPAP pressures.  She is on high pressures.  She is having some issues with her teeth movement and has been looking into oral appliances.  She will take a nap a few days a week she sets an alarm for 30 to 40 minutes.  She tries to avoid naps after 3 or 4 PM.  She continues to work on weight loss but finds it difficult given that she is got diabetes and is on insulin.  She continues to report benefit from CPAP    Erving Sleepiness Scale  Total score - Erving: 7 (1/5/2022  7:52 PM)   (Less than 10 normal)    Insomnia Severity Scale  MIHAELA Total Score: 2  (normal 0-7, mild 8-14, moderate 15-21, severe 22-28)    Past Medical History:   Diagnosis Date     Abnormal Pap smear      Anxiety      Arthritis      Arthritis of knee 4/4/2013     Arthritis of shoulder region, right 4/18/2014      Back injury      Breast disorder      Chronic constipation      Chronic diarrhea      Depressive disorder      Diabetes (H)      Fecal incontinence      Finger pain 4/2/2015     Fracture broke L 5th pinky finger due to fall  1/2015     Glaucoma (increased eye pressure)      Head injury 8/6/2016     Headache(784.0)     decreased with mouth guard use.     History of blood transfusion 8/2011 & 4/2013     History of diabetes mellitus      Hypercholesteremia      Hypertension      Low back pain      Menarche age 10+    cycles q mo x 4-5 d     Neck injuries      Nonsenile cataract IO implants: L-8/2013; R-1/2011     Pain in knee joint     LEFT     Right bundle branch block     per H/P     Sleep apnea     Info on file     SNHL (sensorineural hearing loss)      Tinnitus     I have reported ringing in ears. not sure of dates     Umbilical hernia without mention of obstruction or gangrene 8/2014     Vision disorder Detached Retina 10/2009       Allergies   Allergen Reactions     Nkda [No Known Drug Allergies]        Current Outpatient Medications   Medication     Acetaminophen (TYLENOL PO)     amoxicillin (AMOXIL) 500 MG tablet     Ascorbic Acid (VITAMIN C PO)     aspirin 81 MG tablet     atorvastatin (LIPITOR) 20 MG tablet     B Complex Vitamins (VITAMIN  B COMPLEX) CAPS     blood glucose (ONETOUCH VERIO IQ) test strip     blood glucose calibration (ONETOUCH VERIO HIGH) High solution     blood glucose calibration (ONETOUCH VERIO) solution     Calcium Carb-Cholecalciferol (CALCIUM 600 + D) 600-400 MG-UNIT TABS per tablet     calcium-vitamin D (CALTRATE) 600-400 MG-UNIT per tablet     cholecalciferol (VITAMIN D) 1000 UNIT tablet     cyanocobalamin (VITAMIN B-12) 1000 MCG tablet     cycloSPORINE (RESTASIS) 0.05 % ophthalmic emulsion     HUMALOG KWIKPEN 100 UNIT/ML soln     hypromellose-dextran (ARTIFICAL TEARS) 0.1-0.3 % ophthalmic solution     insulin glargine (LANTUS SOLOSTAR) 100 UNIT/ML pen     insulin pen needle (B-D U/F)  31G X 8 MM miscellaneous     latanoprost (XALATAN) 0.005 % ophthalmic solution     lisinopril-hydrochlorothiazide (ZESTORETIC) 20-12.5 MG tablet     metFORMIN (GLUCOPHAGE) 500 MG tablet     Multiple Vitamin (MULTIVITAMIN  S) CAPS     Multiple Vitamins-Minerals (EYE-ZAKIYA PLUS LUTEIN PO)     Omega-3 Fatty Acids (OMEGA-3 FISH OIL PO)     OneTouch Delica Lancets 33G MISC     order for DME     polyethylene glycol (MIRALAX) 17 GM/Dose powder     Semaglutide,0.25 or 0.5MG/DOS, (OZEMPIC, 0.25 OR 0.5 MG/DOSE,) 2 MG/1.5ML SOPN     timolol (TIMOPTIC) 0.5 % ophthalmic solution     triamcinolone (KENALOG) 0.1 % ointment     vitamin C (ASCORBIC ACID) 100 MG tablet     No current facility-administered medications for this visit.       Social History     Socioeconomic History     Marital status:      Spouse name: Not on file     Number of children: Not on file     Years of education: Not on file     Highest education level: Not on file   Occupational History     Occupation: Human Resources     Comment: between jobs now   Tobacco Use     Smoking status: Former Smoker     Packs/day: 0.00     Years: 0.00     Pack years: 0.00     Smokeless tobacco: Never Used     Tobacco comment: quit mid 1980s   Substance and Sexual Activity     Alcohol use: No     Drug use: No     Sexual activity: Not Currently     Partners: Male     Birth control/protection: Abstinence, Post-menopausal   Other Topics Concern      Service Not Asked     Blood Transfusions Yes     Comment: 2 units 2011     Caffeine Concern No     Comment: 2s     Occupational Exposure No     Hobby Hazards No     Sleep Concern No     Stress Concern No     Comment: rehab for R knee after replacement     Weight Concern Yes     Comment: working on wt loss     Special Diet Yes     Comment: Diabetic     Back Care No     Exercise No     Comment: water aerobics 35-40' 3-4 d & strength 3d/wk     Bike Helmet No     Seat Belt No     Self-Exams Not Asked     Parent/sibling w/ CABG,  MI or angioplasty before 65F 55M? Not Asked   Social History Narrative    How much exercise per week? 3-4x swimming, aerobics    How much calcium per day? Supplement       How much caffeine per day? 2 cups coffee/ 1-2 can of diet soda    How much vitamin D per day? Supplement    Do you/your family wear seatbelts?  Yes    Do you/your family use safety helmets? No    Do you/your family use sunscreen? No    Do you/your family keep firearms in the home? No    Do you/your family have a smoke detector(s)? Yes    Do you feel safe in your home? Yes    Has anyone ever touched you in an unwanted manner? No     Explain     See LEA Berman 11/26/2014    Reviewed Raul DEGROOT MA 11/22/2017     Social Determinants of Health     Financial Resource Strain: Not on file   Food Insecurity: Not on file   Transportation Needs: Not on file   Physical Activity: Not on file   Stress: Not on file   Social Connections: Not on file   Intimate Partner Violence: Not At Risk     Fear of Current or Ex-Partner: No     Emotionally Abused: No     Physically Abused: No     Sexually Abused: No   Housing Stability: Not on file       Family History   Problem Relation Age of Onset     Diabetes Father 55        DM II     Diabetes Brother 50        xs 2     Hypertension Sister 41        also B and M     Cancer Maternal Aunt         multiple myeloma     Hypertension Mother 79     Osteoporosis Mother      Memory loss Mother      Glaucoma Mother      Diabetes Brother      Hypertension Brother      Heart Murmur Brother      Glaucoma Brother      Hypertension Brother      Breast Cancer Cousin      Thyroid Disease Sister         Graves     Diabetes Sister         Graves     Cerebrovascular Disease Maternal Grandmother      Other - See Comments Sister         16 minths head injury     Other - See Comments Brother         MVA age 36     Cancer - colorectal No family hx of      Prostate Cancer No family hx of      Alcohol/Drug No family hx of      Melanoma No family hx of  "     Skin Cancer No family hx of            EXAM:  Ht 1.626 m (5' 4\")   Wt 111.1 kg (245 lb)   BMI 42.05 kg/m    GENERAL: Alert and no distress  EYES: Eyes grossly normal to inspection.  No discharge or erythema, or obvious scleral/conjunctival abnormalities.  RESP: No audible wheeze, cough, or visible cyanosis.  No visible retractions or increased work of breathing.    SKIN: Visible skin clear. No significant rash, abnormal pigmentation or lesions.  NEURO: Cranial nerves grossly intact.  Mentation and speech appropriate for age.  PSYCH: Mentation appears normal, affect normal, judgement and insight intact, normal speech and appearance well-groomed.       Home Sleep Apnea Test 11/11/2019   Weight 248, BMI 43.2  AHI 46.8, with associated hypoxemia    ResMed   Auto-PAP 12.0 - 16.0 cmH2O 30 day usage data:    100% of days with > 4 hours of use. 0/30 days with no use.   Average use 472 minutes per day.   95%ile Leak 29.19 L/min.   CPAP 95% pressure 16 cm.   AHI 1.15 events per hour.     ASSESSMENT:  66-year-old female with diabetes, obesity, severe obstructive sleep apnea.  She is getting excellent clinical benefit and meeting compliance goals with CPAP.  She is on high pressures.  She also has some issues with initiating sleep and nightmares.  She is managing this well with meditation and avoiding long naps.  She is also practicing positive imagery.      PLAN:  Patient is going to continue to look into oral appliances to try to address both sleep apnea and teeth movement.  She is going to try to use some Tylenol before bed regularly for a while to see if that improves her pain management.  Otherwise continuing to use CPAP all night every night.  She will contact me if she needs a referral for sleep dentistry.  We had an extensive discussion about the potential positives and negatives of oral appliances.  Continue efforts at weight management and practicing positive imagery before bed to minimize nightmares.   " follow-up in 1 year.      32 minutes spent on the date of the encounter doing chart review, history and exam, documentation and further activities per the note    Joanne Pisano M.D.  Pulmonary/Critical Care/Sleep Medicine    [facility]   [address], [address]   [address] [place] [address]   Appointments: [phone]    The above note was dictated using voice recognition software and may include typographical errors. Please contact the author for any clarifications.

## 2022-01-12 NOTE — PATIENT INSTRUCTIONS
For general sleep health questions:   http://sleepeducation.org    For tips about PAP and COVID-19:  https://www.thoracic.org/patients/patient-resources/resources/covid-19-and-home-pap-therapy.pdf    For general info about COVID-19 including vaccines:  https://Way[employer].org/covid19        Continue PAP therapy every night, for all hours that you are sleeping (including naps.)  As always, try to get at least 8 hours of sleep or more each day, keep a regular sleep schedule, and avoid sleep deprivation. Avoid alcohol.    Reasons that you might need a change to your pressure therapy would be weight gain or loss, waking having inadvertently removed your PAP overnight, having previously felt refreshed by sleep with CPAP use and now waking un-refreshed, and return of daytime sleepiness. Also, the development of new medical problems  (such as heart failure, stroke, medications such as narcotics) can sometimes affect breathing at night and change your PAP therapy needs.    Please bring PAP with you if you are hospitalized.  If anticipating surgery be sure to discuss with your surgeon that you have sleep apnea and use PAP therapy.      Maintain your equipment as recommended which includes routine cleaning and replacement of supplies.      Call DME for any questions regarding supplies or maintenance.    [facility], [facility] [phone]      Do not drive on engage in potentially dangerous activities if feeling sleepy.    Please follow up in sleep clinic again in 12 months.        Tips for your PAP use-    Mask fitting tips  Mask fitting exercise:    To improve your mask seal and your mobility at night, put mask on and secure in place.  Lie down in bed with full pressure and roll to one side, adjust headgear while in that position to eliminate any leaks. Repeat process rolling to other side.     The mask seal does not have to be perfect:   CPAP machines are designed to make up  for small leaks. However, you will not tolerate leaks blowing in your eyes so you will need to adjust.   Any leak should only be near or at the bottom of the mask.  We expect your mask to leak slightly at night.    Do not over-tighten the headgear straps, tighter IS NOT better, we expect minimal leak.    First try re-positioning the mask or headgear before tightening the headgear straps.  Mask leaks are expected due to changing sleeping positions. Try pulling the mask away from your skin allowing the cushion to re-inflate will minimize the leak.  If you struggle for a good fit, try turning the CPAP off and then readjust the mask by pulling it away from your face and then turning back on the CPAP.        Humidifier tips  Humidifiers can be adjusted to increase or decrease the amount of moisture according to your comfort level. You may need to adjust this frequently at first, but then might only change it with seasonal weather changes.     Try INCREASING the humidity if:  You experience a dry, irritated nasal passage or throat.  You have a runny, drippy nose or sneezing fits after using CPAP.  You experience nasal congestion during or after CPAP use.    Try DECREASING the humidity if:  You have excessive condensation or  rain out  in the tubing or mask.  Otherwise keep the tubing warm during the night by running it underneath the blankets or pillow.      Clinic visit after initial PAP set-up   Bring your equipment with you to your 5-8 week follow up clinic visit.  We will be extracting your data from the machine if not available from the cloud based modem.        Travel  Always take your equipment with you when you travel.  If you fly with your equipment bring it on with you as a carry on.  Medical equipment does not count as a carry on.    If you travel international the machines take 110-240v.  The only adapter needed is the adapter that will fit into the receptacle (outlet).    You may also want to bring an  extension cord as many hotel rooms have limited outlets at the bedside.  Do not travel with water in your humidifier chamber.     Cleaning and Maintenance Guidelines    Equipment Frequency Cleaning Method   Mask First Day    Daily      Weekly Soak mask in hot soapy water for 30 minutes, rinse and air dry.  Wipe nasal cushion with a hot soapy (Ivory, baby shampoo) cloth and rinse.  Baby wipes may also be used.  Do not use anti-bacterial soaps,Ainsley  liquid soap, rubbing alcohol, bleach or ammonia.  Wash frame in hot soapy water (Ivory, baby shampoo) rinse and let air dry   Headgear Biweekly Wash in hot soapy water, rinse and air dry   Reusable Gray Filter Weekly Wash in hot soapy water, rinse, put in towel squeeze moisture out, let air dry   Disposable White Filter Check Weekly Replace when brown or gray in color; at least every 2 to 3 months   Humidifier Chamber Daily    Weekly Empty distilled water from humidifier and let air dry    Hand wash in hot soapy water, rinse and air dry   Tubing Weekly Wash in hot soapy water, rinse and let air dry   Mask, Tubing and Humidifier Chamber As needed Disinfect: Soak in 1 part distilled white vinegar to 3 parts hot water for 30 minutes, rinse well and air dry  Not the material headgear        MASK AND SUPPLY REORDERING and EQUIPMENT NEEDS through your DME and per your insurance  Reminder: Most insurance companies will allow for a new mask, headgear, tubing, and reusable gray filter every six months.  Disposable white ultra-fine filters are covered monthly.      HOME AND SAFETY INSTRUCTIONS    Do not use frayed or cracked electrical cords, multi plug adaptors, or switched receptacles    Do not immerse electrical equipment into water    Assure that electrical cords do not become a tripping hazard    [facility]  Search engine: https://mms.aadsm.org/members/directory/search_bootstrap.php?org_id=ADSM&   Certified in Dental Sleep Medicine    Angel Reynoso  Degree:  JET  [address]  [address]  [place] [address]  Professional Phone: [phone]  Website: [email or web address]    Jimbo Robert  Degree: DDS  [facility]  [address]  [address]  [place] [address]  Professional Phone: [phone]: [phone]    Bhumi Mccain  [facility]  [address]  [place] [address]  Professional Phone: [phone]  Website: [email or web address]      Jean-Claude Benedict (ask if he is now accept medicare)  Degree: DDS  [address]  [address]  [place] [address]  Professional Phone: [phone]  Fax: [phone]    James Rutledge  Degree: DDS  [facility]  [address]  [place] [address]  Professional Phone: [phone]  Website: [email or web address]      [facility]  [facility]  [address]  [address]  [place] [address]  Professional Phone: [phone]  Website: [email or web address]      Mireille Calderon  Degree: DDS  [facility]  [address]  [address]  [place] [address]  Professional Phone: [phone]     Jazmin Dill  Degree: DDS, PhD  [facility]  [address] [place] [address] [address] [place] [address]   [address]   [address]   [place] [address]   Appointments: [phone]   Fax: [phone]   [facility]   [address]   [address]   [place] [address]   Appointments: [phone]   Fax: [phone]                Sukhjinder Moy  Degree: DDS  [address] [place] [address]  Professional Phone: [phone]  Fax: [phone]  Website: [email or web address]      Julio Cesar Arthur  Degree: JET  [facility]  [address] [place] [address]    Edelmira  Mulet Pradera  Degree: JET, MS Santos TMD, Oral Medicine, [facility]  [address] [place] [address]  Professional Phone: [phone]      Krysta Yoon  Degree: MS JET  [facility]  [address]  [address]  [place] [address]  Professional Phone: [phone]    Rosalba Barba  Degree: JET  [facility]  [address]  [address]  [place] [address]  [place] Office     Sonny Garcia  Degree: JET  [facility]  [address] [place] [address]  Professional Phone: [phone]  Website: [email or web address]      Kingsley Levy  Degree: JET  [facility]  [address] [place] [address]  Professional Phone: [phone]  Fax: [phone]      Omid Herron  Degree: JET [facility]  [address]  [address]  [place] [address]                 ACCEPT MEDICARE  Cristobal Buckley DDS  [address], [place] [address]  [phone]; [phone] (fax)  [employer]    Deepak Melgoza DDS, MS   [facility]   [address]   [address]   [place] [address]   Appointments: [phone]   Fax: [phone]     Dashawn Cano DDS  [address]  [place] [address]  Appointments [phone]  Fax: [phone]    ADDITIONAL PROVIDERS  Israel Hunter DDS   [facility]   [address]   [address]   [place] [address]   Appointments: [phone]   Fax: [phone]       Joshua Carrion DDS, MS   [facility]  [address]  [place] [address]   Appointments: [phone] Ext: [phone]  Fax: [phone]   [email or web address]        Your BMI is Body mass index is 42.05 kg/m .  Weight management is a personal decision.  If you are interested in exploring weight loss strategies, the following discussion covers the approaches that may be successful. Body mass index (BMI) is one way  to tell whether you are at a healthy weight, overweight, or obese. It measures your weight in relation to your height.  A BMI of 18.5 to 24.9 is in the healthy range. A person with a BMI of 25 to 29.9 is considered overweight, and someone with a BMI of 30 or greater is considered obese. More than two-thirds of American adults are considered overweight or obese.  Being overweight or obese increases the risk for further weight gain. Excess weight may lead to heart disease and diabetes.  Creating and following plans for healthy eating and physical activity may help you improve your health.  Weight control is part of healthy lifestyle and includes exercise, emotional health, and healthy eating habits. Careful eating habits lifelong are the mainstay of weight control. Though there are significant health benefits from weight loss, long-term weight loss with diet alone may be very difficult to achieve- studies show long-term success with dietary management in less than 10% of people. Attaining a healthy weight may be especially difficult to achieve in those with severe obesity. In some cases, medications, devices and surgical management might be considered.  What can you do?  If you are overweight or obese and are interested in methods for weight loss, you should discuss this with your provider.     Consider reducing daily calorie intake by 500 calories.     Keep a food journal.     Avoiding skipping meals, consider cutting portions instead.    Diet combined with exercise helps maintain muscle while optimizing fat loss. Strength training is particularly important for building and maintaining muscle mass. Exercise helps reduce stress, increase energy, and improves fitness. Increasing exercise without diet control, however, may not burn enough calories to loose weight.       Start walking three days a week 10-20 minutes at a time    Work towards walking thirty minutes five days a week     Eventually, increase the speed of  your walking for 1-2 minutes at time    And look into health and wellness programs that may be available through your health insurance provider, employer, local community center, or mathew club.    Weight management plan: Patient was referred to their PCP to discuss a diet and exercise plan.

## 2022-02-20 ENCOUNTER — HEALTH MAINTENANCE LETTER (OUTPATIENT)
Age: 67
End: 2022-02-20

## 2022-03-16 ENCOUNTER — OFFICE VISIT (OUTPATIENT)
Dept: FAMILY MEDICINE | Facility: CLINIC | Age: 67
End: 2022-03-16
Payer: MEDICARE

## 2022-03-16 VITALS
RESPIRATION RATE: 16 BRPM | TEMPERATURE: 99.1 F | HEART RATE: 94 BPM | DIASTOLIC BLOOD PRESSURE: 74 MMHG | OXYGEN SATURATION: 96 % | SYSTOLIC BLOOD PRESSURE: 125 MMHG

## 2022-03-16 DIAGNOSIS — R21 RASH OF NECK: Primary | ICD-10-CM

## 2022-03-16 DIAGNOSIS — L28.0 LICHEN SIMPLEX CHRONICUS: ICD-10-CM

## 2022-03-16 PROCEDURE — 99214 OFFICE O/P EST MOD 30 MIN: CPT | Performed by: FAMILY MEDICINE

## 2022-03-16 RX ORDER — TRIAMCINOLONE ACETONIDE 5 MG/G
CREAM TOPICAL 2 TIMES DAILY
Qty: 15 G | Refills: 3 | Status: SHIPPED | OUTPATIENT
Start: 2022-03-16 | End: 2022-03-31
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
## 2022-03-16 NOTE — PATIENT INSTRUCTIONS
Use thin layer of triamcinolone cream on neck rash two times a day.    Apply every day.  If NO improvement after 4 weeks, let me know.

## 2022-03-17 DIAGNOSIS — E11.8 TYPE 2 DIABETES MELLITUS WITH COMPLICATION, WITH LONG-TERM CURRENT USE OF INSULIN (H): ICD-10-CM

## 2022-03-17 DIAGNOSIS — Z79.4 TYPE 2 DIABETES MELLITUS WITH COMPLICATION, WITH LONG-TERM CURRENT USE OF INSULIN (H): ICD-10-CM

## 2022-03-21 NOTE — TELEPHONE ENCOUNTER
semaglutide (OZEMPIC, 0.25 OR 0.5 MG/DOSE,) 2 MG/1.5ML SOPN pen      Last Written Prescription Date:  4/26/21  Last Fill Quantity: 4.5 ml,   # refills: 3  Last Office Visit : 11/2/21  Future Office visit:  4/5/22    Routing refill request to provider for review/approval because:  Last A1c  Lab Test 11/01/21  0817 04/17/18  0929 01/18/18  0000   A1C 8.6*   < >  --    HEMOGLOBINA1  --   --  9.6*     Nothing more recent in care everywhere nor media  Future labs in queue

## 2022-03-22 DIAGNOSIS — I10 ESSENTIAL HYPERTENSION: ICD-10-CM

## 2022-03-22 DIAGNOSIS — E78.5 DYSLIPIDEMIA: ICD-10-CM

## 2022-03-22 DIAGNOSIS — E11.9 DIABETES MELLITUS, TYPE 2 (H): ICD-10-CM

## 2022-03-22 RX ORDER — LISINOPRIL AND HYDROCHLOROTHIAZIDE 12.5; 2 MG/1; MG/1
1 TABLET ORAL DAILY
Qty: 90 TABLET | Refills: 3 | Status: CANCELLED | OUTPATIENT
Start: 2022-03-22

## 2022-03-22 RX ORDER — ATORVASTATIN CALCIUM 20 MG/1
20 TABLET, FILM COATED ORAL DAILY
Qty: 90 TABLET | Refills: 3 | Status: CANCELLED | OUTPATIENT
Start: 2022-03-22

## 2022-03-23 DIAGNOSIS — E11.9 DIABETES MELLITUS, TYPE 2 (H): ICD-10-CM

## 2022-03-23 DIAGNOSIS — E78.5 DYSLIPIDEMIA: ICD-10-CM

## 2022-03-23 DIAGNOSIS — I10 ESSENTIAL HYPERTENSION: ICD-10-CM

## 2022-03-23 RX ORDER — SEMAGLUTIDE 1.34 MG/ML
INJECTION, SOLUTION SUBCUTANEOUS
Qty: 4.5 ML | Refills: 3 | Status: SHIPPED | OUTPATIENT
Start: 2022-03-23 | End: 2022-03-31

## 2022-03-23 RX ORDER — LISINOPRIL AND HYDROCHLOROTHIAZIDE 12.5; 2 MG/1; MG/1
1 TABLET ORAL DAILY
Qty: 90 TABLET | Refills: 3 | Status: SHIPPED | OUTPATIENT
Start: 2022-03-23 | End: 2023-02-21

## 2022-03-23 RX ORDER — ATORVASTATIN CALCIUM 20 MG/1
20 TABLET, FILM COATED ORAL DAILY
Qty: 90 TABLET | Refills: 3 | Status: SHIPPED | OUTPATIENT
Start: 2022-03-23 | End: 2023-02-21

## 2022-03-30 ENCOUNTER — TELEPHONE (OUTPATIENT)
Dept: WOUND CARE | Facility: CLINIC | Age: 67
End: 2022-03-30
Payer: MEDICARE

## 2022-03-30 NOTE — TELEPHONE ENCOUNTER
Called and left voicemail for patient. I informed her that her appointment for today will be cancelled due to Dr. Austin feeling ill. I would like to get her re-scheduled for Friday. Requested call back.

## 2022-03-31 NOTE — PROGRESS NOTES
Outcome for 04/04/22 8:26 AM :Glucose sent via Email NL  Outcome for 03/31/22 8:24 AM: Pieceablet message sent  CHOCO Rojas   Outcome for 04/01/22 9:01 AM: Patient has a nurse visit on 4/4/22 to upload meter  CHOCO Rojas  Outcome for 04/04/22 10:56 AM: Glooko emailed to provider  CHOCO Rojas    Endocrinology and Diabetes Clinic    Radha Baca  is being evaluated via a billable video visit.      How would you like to obtain your AVS? [email or web address]  For the video visit, send the invitation by: Send to e-mail at: [email or web address]  Will anyone else be joining your video visit? No    Video Start Time:  8:35am  Video End Time 9:02am    Follow up for T2DM    Interval History:  - 5m follow up for T2DM  - busy with taken care of mother, splits this duty with her sister        1. Type 2 DM:  Diabetes: Has type 2 DM, diagnosed before 2005    HbA1c is mid 8% range, as before    Current Treatment:   - Ozemipc 0.5 mg weekly on Sunday,causing nausea which is tolerable but also constipation which give her a lot of problems  - Lantus 80 units once daily  - Humalog up to 20  units with each meal, 2-3 meals/day, CR 5, counts carbs  - Metformin 1000mg po BID, she is on this for long time, not sure if this is causing the side effects.  -     Hypoglycemia  None recently      Diet:   2-3 meals/day.healthy and sometimes unhealthy depending on her stress level    Exercise  Various, walking outside     2. Diabetic Complications:  - Retinopathy: Outside System - mild NPDR and DM macular edema which is mild and recommended observation.  - Nephropathy:  Nl urine microalb, nl creatinine  - Neuropathy: Had tingling .  - feet podiatry does toenails q 3m    3. Cardiovascular risk factors:  - Lipids: Lipitor 20mg,   - HTN:  on lisinopril/HCTZ well-controlled 20-12.5  - Aspirin - 81mg daily    4. Hypothyroidism:  - TSH - 2.86 4/2021      Assessment and Plan:    1. Type 2 diabetes mellitus c/b DM retinopathy: A1c stable not quite at  goal. BG slightly above goal fasting and occ high at bedtime    Plan:   - increaseHumalog up to 22  units with each meal, eats 2-3 meals/day, and correction,   - Ozemipc 0.5 mg weekly subcutaneous  injections on Sunday  - if needed increase Lantus 80-> 84 units subcutaneous once daily  - Metformin 1000mg po BID  - Glucose check fasting, before and 2 hours post prandial and bedtime in a rotating fashion    Blood work prior to the next A1c, microalb    2. Diabetic complications:  - Retinopathy: has diabetic retinopathy   - Nephropathy: nl Creatinine, check microalb next visit  - Neuropathy: followed by podiatry    - Lipids: excellent LDL on Atorvastatin 20 mg  - Blood pressure well controlled on lisinopril hydrochlorothiazide  - Aspirin - 81mg daily        Keturah De Luna MD  Endocrinology and Diabetes  Telephone contact:  [facility] [place] [phone]  [facility] [place] [phone]          ==========================================================================================              Past Medical/Surgical History:   Reviewed in chart  Past Medical History:   Diagnosis Date     Abnormal Pap smear      Anxiety      Arthritis      Arthritis of knee 4/4/2013     Arthritis of shoulder region, right 4/18/2014     Back injury      Breast disorder      Chronic constipation      Chronic diarrhea      Depressive disorder      Diabetes (H)      Fecal incontinence      Finger pain 4/2/2015     Fracture broke L 5th pinky finger due to fall  1/2015     Glaucoma (increased eye pressure)      Head injury 8/6/2016     Headache(784.0)     decreased with mouth guard use.     History of blood transfusion 8/2011 & 4/2013     History of diabetes mellitus      Hypercholesteremia      Hypertension      Low back pain      Menarche age 9+    cycles q mo x 4-5 d     Neck injuries      Nonsenile cataract IO implants: L-8/2013; R-1/2011     Pain in knee joint     LEFT     Right bundle branch  "block     per H/P     Sleep apnea     Info on file     SNHL (sensorineural hearing loss)      Tinnitus     I have reported ringing in ears. not sure of dates     Umbilical hernia without mention of obstruction or gangrene 8/2014     Vision disorder Detached Retina 10/2009     Past Surgical History:   Procedure Laterality Date     ARTHROPLASTY KNEE  4/4/2013    Left Total Knee Arthroplasty;  Surgeon: Lou Byrne MD;  [facility]     ARTHROPLASTY MINIMALLY INVASIVE KNEE  8/22/2011    R knee :JAMARIMAGUECAITLYN, Left age 14     COLONOSCOPY  1/14/2015     DENTAL SURGERY      root canals, wisdom teeth     EXAM UNDER ANESTHESIA, MANIPULATE JOINT (LOCATION)  10/5/2012    Procedure: EXAM UNDER ANESTHESIA, MANIPULATE JOINT (LOCATION);  Right Knee Mini Open Lysis Of Adhesions, Left Knee Steroid Injection  ;  Surgeon: Lou Byrne MD;  [facility]     HC OR OCULAR DEVICE INTRAOP DETACHED RETINA  2009    R     HC PHAKIC IOL - IMPLANT FROM SURGEON      right     KNEE SURGERY  1969    left     LASER YAG CAPSULOTOMY  12/2016    left     VITRECTOMY PARSPLANA  2010       Allergies:  Reviewed in chart    Current Medications:   Reviewed in chart    Family History:  Reviewed in chart    Social History:  Reviewed in chart    Physical Examination:  Vital signs:                         Estimated body mass index is 42.05 kg/m  as calculated from the following:    Height as of 1/12/22: 1.626 m (5' 4\").    Weight as of 1/12/22: 111.1 kg (245 lb).    Previous Weights:   Wt Readings from Last 3 Encounters:   01/12/22 111.1 kg (245 lb)   11/23/21 110.2 kg (243 lb)   10/25/21 111.6 kg (246 lb)                    BMI:  There is no height or weight on file to calculate BMI.     Reported vitals:  There were no vitals taken for this visit.   healthy, alert and no distress  PSYCH: Alert and oriented times 3; coherent speech, normal   rate and volume, able to articulate logical thoughts, able   to abstract reason, no " tangential thoughts, no hallucinations   or delusions  Her affect is normal and pleasant  RESP: No cough, no audible wheezing, able to talk in full sentences  Remainder of exam unable to be completed due to telephone visits       Endocrine Labs:  Lab Results   Component Value Date    .7 01/28/2021    CHLORIDE 98.8 01/28/2021    CO2 25.4 01/28/2021     (H) 04/04/2022    CR 0.71 04/04/2022    CR 0.71 07/12/2021    CR 0.74 04/12/2021    CR 0.6 01/28/2021    CR 0.64 01/18/2021    COLIN 9.8 01/28/2021    MAG 2.0 01/18/2021    ALBUMIN 3.7 09/28/2020    ALKPHOS 75.0 01/28/2021    LDL 19 04/04/2022    HDL 38 (L) 04/04/2022    TRIG 192 (H) 04/04/2022    TSH 2.46 04/04/2022    TSH 2.86 04/12/2021    TSH 3.09 01/18/2021     Lab Results   Component Value Date    MICROL 7 04/12/2021    MICROL 9 01/18/2021    MICROL 10 09/28/2020    MICROL 15 03/02/2020    MICROL 40 08/06/2019     Lab Results   Component Value Date    A1C 8.6 (H) 04/04/2022    A1C 8.6 (H) 11/01/2021    A1C 8.8 (H) 07/12/2021    A1C 8.9 (H) 04/12/2021    A1C 8.4 (H) 01/18/2021       Lab Results   Component Value Date    HGB 11.8 09/28/2020           HPI:  Radha Baca is a elderly  female with type 2 diabetes diagnosed [id number]. Has PMH for HTN, arthritis, chronic constipation

## 2022-04-04 ENCOUNTER — ALLIED HEALTH/NURSE VISIT (OUTPATIENT)
Dept: ENDOCRINOLOGY | Facility: CLINIC | Age: 67
End: 2022-04-04
Payer: MEDICARE

## 2022-04-04 ENCOUNTER — LAB (OUTPATIENT)
Dept: LAB | Facility: CLINIC | Age: 67
End: 2022-04-04
Payer: MEDICARE

## 2022-04-04 DIAGNOSIS — E11.9 TYPE 2 DIABETES MELLITUS (H): Primary | ICD-10-CM

## 2022-04-04 DIAGNOSIS — E11.65 TYPE 2 DIABETES MELLITUS WITH HYPERGLYCEMIA, WITHOUT LONG-TERM CURRENT USE OF INSULIN (H): ICD-10-CM

## 2022-04-04 LAB
BUN SERPL-MCNC: 14 MG/DL (ref 7–30)
CHOLEST SERPL-MCNC: 95 MG/DL
CREAT SERPL-MCNC: 0.71 MG/DL (ref 0.52–1.04)
FASTING STATUS PATIENT QL REPORTED: YES
FASTING STATUS PATIENT QL REPORTED: YES
GFR SERPL CREATININE-BSD FRML MDRD: >90 ML/MIN/1.73M2
GLUCOSE BLD-MCNC: 161 MG/DL (ref 70–99)
HBA1C MFR BLD: 8.6 % (ref 0–5.6)
HDLC SERPL-MCNC: 38 MG/DL
LDLC SERPL CALC-MCNC: 19 MG/DL
NONHDLC SERPL-MCNC: 57 MG/DL
POTASSIUM BLD-SCNC: 4.3 MMOL/L (ref 3.4–5.3)
TRIGL SERPL-MCNC: 192 MG/DL
TSH SERPL DL<=0.005 MIU/L-ACNC: 2.46 MU/L (ref 0.4–4)

## 2022-04-04 PROCEDURE — 82565 ASSAY OF CREATININE: CPT | Performed by: PATHOLOGY

## 2022-04-04 PROCEDURE — 80061 LIPID PANEL: CPT | Performed by: PATHOLOGY

## 2022-04-04 PROCEDURE — 83036 HEMOGLOBIN GLYCOSYLATED A1C: CPT | Performed by: PATHOLOGY

## 2022-04-04 PROCEDURE — 84520 ASSAY OF UREA NITROGEN: CPT | Performed by: PATHOLOGY

## 2022-04-04 PROCEDURE — 84132 ASSAY OF SERUM POTASSIUM: CPT | Performed by: PATHOLOGY

## 2022-04-04 PROCEDURE — 84443 ASSAY THYROID STIM HORMONE: CPT | Performed by: PATHOLOGY

## 2022-04-04 PROCEDURE — 36415 COLL VENOUS BLD VENIPUNCTURE: CPT | Performed by: PATHOLOGY

## 2022-04-04 PROCEDURE — 82947 ASSAY GLUCOSE BLOOD QUANT: CPT | Performed by: PATHOLOGY

## 2022-04-04 PROCEDURE — 99207 PR NO BILLABLE SERVICE THIS VISIT: CPT

## 2022-04-05 ENCOUNTER — VIRTUAL VISIT (OUTPATIENT)
Dept: ENDOCRINOLOGY | Facility: CLINIC | Age: 67
End: 2022-04-05
Payer: MEDICARE

## 2022-04-05 DIAGNOSIS — E11.8 TYPE 2 DIABETES MELLITUS WITH COMPLICATION (H): ICD-10-CM

## 2022-04-05 DIAGNOSIS — Z79.4 TYPE 2 DIABETES MELLITUS WITH OTHER SKIN ULCER, WITH LONG-TERM CURRENT USE OF INSULIN (H): Primary | ICD-10-CM

## 2022-04-05 DIAGNOSIS — E11.622 TYPE 2 DIABETES MELLITUS WITH OTHER SKIN ULCER, WITH LONG-TERM CURRENT USE OF INSULIN (H): Primary | ICD-10-CM

## 2022-04-05 PROCEDURE — 99214 OFFICE O/P EST MOD 30 MIN: CPT | Mod: 95 | Performed by: INTERNAL MEDICINE

## 2022-04-05 NOTE — PATIENT INSTRUCTIONS
increase Humalog up to 22 units   If blood glucose are not at goal increase Lantus from 80 to 84 units daily    Continue Metformin and Ozempic

## 2022-04-05 NOTE — LETTER
4/5/2022       RE: Radha Baca  [address] [address]     Dear Colleague,    Thank you for referring your patient, Radha Baca, to the [facility]OCRINOLOG[facility] [place] [facility]. Please see a copy of my visit note below.    Outcome for 04/04/22 8:26 AM :Glucose sent via Email NL  Outcome for 03/31/22 8:24 AM: ScrollMotion message sent  CHOCO Rojas   Outcome for 04/01/22 9:01 AM: Patient has a nurse visit on 4/4/22 to upload meter  CHOCO Rojas  Outcome for 04/04/22 10:56 AM: Glooko emailed to provider  CHOCO Rojas    Endocrinology and Diabetes Clinic    Radha Baca  is being evaluated via a billable video visit.      How would you like to obtain your AVS? [employer]  For the video visit, send the invitation by: Send to e-mail at: [email or web address]  Will anyone else be joining your video visit? No    Video Start Time:  8:35am  Video End Time 9:02am    Follow up for T2DM    Interval History:  - 5m follow up for T2DM  - busy with taken care of mother, splits this duty with her sister        1. Type 2 DM:  Diabetes: Has type 2 DM, diagnosed before 2005    HbA1c is mid 8% range, as before    Current Treatment:   - Ozemipc 0.5 mg weekly on Sunday,causing nausea which is tolerable but also constipation which give her a lot of problems  - Lantus 80 units once daily  - Humalog up to 20  units with each meal, 2-3 meals/day, CR 5, counts carbs  - Metformin 1000mg po BID, she is on this for long time, not sure if this is causing the side effects.  -     Hypoglycemia  None recently      Diet:   2-3 meals/day.healthy and sometimes unhealthy depending on her stress level    Exercise  Various, walking outside     2. Diabetic Complications:  - Retinopathy: Outside System - mild NPDR and DM macular edema which is mild and recommended observation.  - Nephropathy:  Nl urine microalb, nl creatinine  -  Neuropathy: Had tingling .  - feet podiatry does toenails q 3m    3. Cardiovascular risk factors:  - Lipids: Lipitor 20mg,   - HTN:  on lisinopril/HCTZ well-controlled 20-12.5  - Aspirin - 81mg daily    4. Hypothyroidism:  - TSH - 2.86 4/2021      Assessment and Plan:    1. Type 2 diabetes mellitus c/b DM retinopathy: A1c stable not quite at goal. BG slightly above goal fasting and occ high at bedtime    Plan:   - increaseHumalog up to 22  units with each meal, eats 2-3 meals/day, and correction,   - Ozemipc 0.5 mg weekly subcutaneous  injections on Sunday  - if needed increase Lantus 80-> 84 units subcutaneous once daily  - Metformin 1000mg po BID  - Glucose check fasting, before and 2 hours post prandial and bedtime in a rotating fashion    Blood work prior to the next A1c, microalb    2. Diabetic complications:  - Retinopathy: has diabetic retinopathy   - Nephropathy: nl Creatinine, check microalb next visit  - Neuropathy: followed by podiatry    - Lipids: excellent LDL on Atorvastatin 20 mg  - Blood pressure well controlled on lisinopril hydrochlorothiazide  - Aspirin - 81mg daily        Keturah De Luna MD  Endocrinology and Diabetes  Telephone contact:  [facility] [place] [phone]  [facility] [phone]          ==========================================================================================              Past Medical/Surgical History:   Reviewed in chart  Past Medical History:   Diagnosis Date     Abnormal Pap smear      Anxiety      Arthritis      Arthritis of knee 4/4/2013     Arthritis of shoulder region, right 4/18/2014     Back injury      Breast disorder      Chronic constipation      Chronic diarrhea      Depressive disorder      Diabetes (H)      Fecal incontinence      Finger pain 4/2/2015     Fracture broke L 5th pinky finger due to fall  1/2015     Glaucoma (increased eye pressure)      Head injury 8/6/2016     Headache(784.0)      "decreased with mouth guard use.     History of blood transfusion 8/2011 & 4/2013     History of diabetes mellitus      Hypercholesteremia      Hypertension      Low back pain      Menarche age 10+    cycles q mo x 4-5 d     Neck injuries      Nonsenile cataract IO implants: L-8/2013; R-1/2011     Pain in knee joint     LEFT     Right bundle branch block     per H/P     Sleep apnea     Info on file     SNHL (sensorineural hearing loss)      Tinnitus     I have reported ringing in ears. not sure of dates     Umbilical hernia without mention of obstruction or gangrene 8/2014     Vision disorder Detached Retina 10/2009     Past Surgical History:   Procedure Laterality Date     ARTHROPLASTY KNEE  4/4/2013    Left Total Knee Arthroplasty;  Surgeon: Lou Byrne MD;  [facility]     ARTHROPLASTY MINIMALLY INVASIVE KNEE  8/22/2011    R knee :CAITLYN JACOBO, Left age 14     COLONOSCOPY  1/14/2015     DENTAL SURGERY      root canals, wisdom teeth     EXAM UNDER ANESTHESIA, MANIPULATE JOINT (LOCATION)  10/5/2012    Procedure: EXAM UNDER ANESTHESIA, MANIPULATE JOINT (LOCATION);  Right Knee Mini Open Lysis Of Adhesions, Left Knee Steroid Injection  ;  Surgeon: Lou Byrne MD;  [facility]     [facility] OR OCULAR DEVICE INTRAOP DETACHED RETINA  2009    R     HC PHAKIC IOL - IMPLANT FROM SURGEON      right     KNEE SURGERY  1969    left     LASER YAG CAPSULOTOMY  12/2016    left     VITRECTOMY PARSPLANA  2010       Allergies:  Reviewed in chart    Current Medications:   Reviewed in chart    Family History:  Reviewed in chart    Social History:  Reviewed in chart    Physical Examination:  Vital signs:                         Estimated body mass index is 42.05 kg/m  as calculated from the following:    Height as of 1/12/22: 1.626 m (5' 4\").    Weight as of 1/12/22: 111.1 kg (245 lb).    Previous Weights:   Wt Readings from Last 3 Encounters:   01/12/22 111.1 kg (245 lb)   11/23/21 110.2 kg (243 lb)   10/25/21 " 111.6 kg (246 lb)                    BMI:  There is no height or weight on file to calculate BMI.     Reported vitals:  There were no vitals taken for this visit.   healthy, alert and no distress  PSYCH: Alert and oriented times 3; coherent speech, normal   rate and volume, able to articulate logical thoughts, able   to abstract reason, no tangential thoughts, no hallucinations   or delusions  Her affect is normal and pleasant  RESP: No cough, no audible wheezing, able to talk in full sentences  Remainder of exam unable to be completed due to telephone visits       Endocrine Labs:  Lab Results   Component Value Date    .7 01/28/2021    CHLORIDE 98.8 01/28/2021    CO2 25.4 01/28/2021     (H) 04/04/2022    CR 0.71 04/04/2022    CR 0.71 07/12/2021    CR 0.74 04/12/2021    CR 0.6 01/28/2021    CR 0.64 01/18/2021    COLIN 9.8 01/28/2021    MAG 2.0 01/18/2021    ALBUMIN 3.7 09/28/2020    ALKPHOS 75.0 01/28/2021    LDL 19 04/04/2022    HDL 38 (L) 04/04/2022    TRIG 192 (H) 04/04/2022    TSH 2.46 04/04/2022    TSH 2.86 04/12/2021    TSH 3.09 01/18/2021     Lab Results   Component Value Date    MICROL 7 04/12/2021    MICROL 9 01/18/2021    MICROL 10 09/28/2020    MICROL 15 03/02/2020    MICROL 40 08/06/2019     Lab Results   Component Value Date    A1C 8.6 (H) 04/04/2022    A1C 8.6 (H) 11/01/2021    A1C 8.8 (H) 07/12/2021    A1C 8.9 (H) 04/12/2021    A1C 8.4 (H) 01/18/2021       Lab Results   Component Value Date    HGB 11.8 09/28/2020           HPI:  Radha Baca is a elderly  female with type 2 diabetes diagnosed [id number]. Has PMH for HTN, arthritis, chronic constipation    Keturah De Luna MD

## 2022-04-06 ENCOUNTER — TELEPHONE (OUTPATIENT)
Dept: ENDOCRINOLOGY | Facility: CLINIC | Age: 67
End: 2022-04-06
Payer: MEDICARE

## 2022-04-06 NOTE — TELEPHONE ENCOUNTER
Return in 3 months for f/u with Dr.Trost GREGORIO for pt to call and schedule appt.     Taylor Myhre,VF

## 2022-04-11 DIAGNOSIS — E11.65 TYPE 2 DIABETES MELLITUS WITH HYPERGLYCEMIA, WITHOUT LONG-TERM CURRENT USE OF INSULIN (H): Primary | ICD-10-CM

## 2022-04-12 ENCOUNTER — OFFICE VISIT (OUTPATIENT)
Dept: ORTHOPEDICS | Facility: CLINIC | Age: 67
End: 2022-04-12
Payer: MEDICARE

## 2022-04-12 DIAGNOSIS — E11.49 TYPE II OR UNSPECIFIED TYPE DIABETES MELLITUS WITH NEUROLOGICAL MANIFESTATIONS, UNCONTROLLED(250.62) (H): Primary | ICD-10-CM

## 2022-04-12 DIAGNOSIS — B35.1 OM (ONYCHOMYCOSIS): ICD-10-CM

## 2022-04-12 DIAGNOSIS — E11.65 TYPE II OR UNSPECIFIED TYPE DIABETES MELLITUS WITH NEUROLOGICAL MANIFESTATIONS, UNCONTROLLED(250.62) (H): Primary | ICD-10-CM

## 2022-04-12 PROCEDURE — 99213 OFFICE O/P EST LOW 20 MIN: CPT | Performed by: PODIATRIST

## 2022-04-12 RX ORDER — SEMAGLUTIDE 1.34 MG/ML
INJECTION, SOLUTION SUBCUTANEOUS
COMMUNITY
Start: 2021-04-26 | End: 2022-04-12

## 2022-04-12 NOTE — PROGRESS NOTES
Chief Complaint   Patient presents with     Follow Up     Diabetic foot care            Allergies   Allergen Reactions     Nkda [No Known Drug Allergies]          Subjective: Radha is a 66 year old female who presents to the clinic today for a diabetic foot exam and management. Relates that she has no new LE complaints today. She does have diabetic shoes.      Objective  Hemoglobin A1C POCT   Date Value Ref Range Status   04/12/2021 8.9 (H) 0 - 5.6 % Final     Comment:     Normal <5.7% Prediabetes 5.7-6.4%  Diabetes 6.5% or higher - adopted from ADA   consensus guidelines.     01/18/2021 8.4 (H) 0 - 5.6 % Final     Comment:     Normal <5.7% Prediabetes 5.7-6.4%  Diabetes 6.5% or higher - adopted from ADA   consensus guidelines.     09/28/2020 9.0 (H) 0 - 5.6 % Final     Comment:     Normal <5.7% Prediabetes 5.7-6.4%  Diabetes 6.5% or higher - adopted from ADA   consensus guidelines.     01/18/2018 9.6 (A) 4.3 - 6 % Final   10/18/2017 9.5 (A) 4.3 - 6 % Final   07/19/2017 9.7 (A) 4.3 - 6 % Final     Hemoglobin A1C   Date Value Ref Range Status   04/04/2022 8.6 (H) 0.0 - 5.6 % Final     Comment:     Normal <5.7%   Prediabetes 5.7-6.4%    Diabetes 6.5% or higher     Note: Adopted from ADA consensus guidelines.   11/01/2021 8.6 (H) 0.0 - 5.6 % Final     Comment:     Normal <5.7%   Prediabetes 5.7-6.4%    Diabetes 6.5% or higher     Note: Adopted from ADA consensus guidelines.   07/12/2021 8.8 (H) 0.0 - 5.6 % Final     Comment:     Normal <5.7%   Prediabetes 5.7-6.4%    Diabetes 6.5% or higher     Note: Adopted from ADA consensus guidelines.         PT and DP pulses are not palpable bilaterally. CRT is 4 seconds. Diminished pedal hair.   Gross sensation is diminished, as well as protective sensation bilaterally.   Equinus is noted bilaterally.   Nails thickened, brittle, discolored, with subungual debris bilaterally. No open lesions are noted. Xerosis noted BL.      Assessment: DM2 with neuropathy.  Onychomycosis.    Xerosis       Plan:  - Pt seen and evaluated.  - Nails debrided x 10.  - See again in 4 months.

## 2022-04-12 NOTE — TELEPHONE ENCOUNTER
semaglutide (OZEMPIC, 0.25 OR 0.5 MG/DOSE,) 2 MG/1.5ML SOPN pen  Last Written Prescription Date:   4/26/2021  Last Fill Quantity: ?,   # refills: ?  Last Office Visit :  4/5/2022  Future Office visit:  None    Routing refill request to provider for review/approval because:  Medication is reported/historical      Karen Taylor RN  Central Triage Red Flags/Med Refills

## 2022-04-12 NOTE — LETTER
4/12/2022         RE: Radha Baca  [address] [address]        Dear Colleague,    Thank you for referring your patient, Radha Baca, to the [facility] [place]. Please see a copy of my visit note below.    Chief Complaint   Patient presents with     Follow Up     Diabetic foot care            Allergies   Allergen Reactions     Nkda [No Known Drug Allergies]          Subjective: Radha is a 66 year old female who presents to the clinic today for a diabetic foot exam and management. Relates that she has no new LE complaints today. She does have diabetic shoes.      Objective  Hemoglobin A1C POCT   Date Value Ref Range Status   04/12/2021 8.9 (H) 0 - 5.6 % Final     Comment:     Normal <5.7% Prediabetes 5.7-6.4%  Diabetes 6.5% or higher - adopted from ADA   consensus guidelines.     01/18/2021 8.4 (H) 0 - 5.6 % Final     Comment:     Normal <5.7% Prediabetes 5.7-6.4%  Diabetes 6.5% or higher - adopted from ADA   consensus guidelines.     09/28/2020 9.0 (H) 0 - 5.6 % Final     Comment:     Normal <5.7% Prediabetes 5.7-6.4%  Diabetes 6.5% or higher - adopted from ADA   consensus guidelines.     01/18/2018 9.6 (A) 4.3 - 6 % Final   10/18/2017 9.5 (A) 4.3 - 6 % Final   07/19/2017 9.7 (A) 4.3 - 6 % Final     Hemoglobin A1C   Date Value Ref Range Status   04/04/2022 8.6 (H) 0.0 - 5.6 % Final     Comment:     Normal <5.7%   Prediabetes 5.7-6.4%    Diabetes 6.5% or higher     Note: Adopted from ADA consensus guidelines.   11/01/2021 8.6 (H) 0.0 - 5.6 % Final     Comment:     Normal <5.7%   Prediabetes 5.7-6.4%    Diabetes 6.5% or higher     Note: Adopted from ADA consensus guidelines.   07/12/2021 8.8 (H) 0.0 - 5.6 % Final     Comment:     Normal <5.7%   Prediabetes 5.7-6.4%    Diabetes 6.5% or higher     Note: Adopted from ADA consensus guidelines.     PT and DP pulses are not palpable bilaterally. CRT is 4 seconds. Diminished pedal hair.   Gross  sensation is diminished, as well as protective sensation bilaterally.   Equinus is noted bilaterally.   Nails thickened, brittle, discolored, with subungual debris bilaterally. No open lesions are noted. Xerosis noted BL.      Assessment: DM2 with neuropathy.  Onychomycosis.   Xerosis     Plan:  - Pt seen and evaluated.  - Nails debrided x 10.  - See again in 4 months.      Jerrell Austin DPM

## 2022-04-14 ENCOUNTER — TELEPHONE (OUTPATIENT)
Dept: ENDOCRINOLOGY | Facility: CLINIC | Age: 67
End: 2022-04-14
Payer: MEDICARE

## 2022-04-14 RX ORDER — SEMAGLUTIDE 1.34 MG/ML
INJECTION, SOLUTION SUBCUTANEOUS
Qty: 6 ML | Refills: 3 | Status: SHIPPED | OUTPATIENT
Start: 2022-04-14 | End: 2023-05-09

## 2022-04-14 NOTE — PROGRESS NOTES
"AUDIOLOGY REPORT    SUBJECTIVE:Radha Baca is a 66 year old female who was seen in the Audiology Clinic at the [facility] on 4/15/2022  for a follow-up check of their hearing aids.  Previous results have revealed a normal sloping to moderate sensorineural hearing loss bilaterally.  The patient has been seen previously in this clinic and was fit with bilateral Phonak Audeo P50-R PERICO hearing aids on 10/14/2021.  Radha reports she would like a review on how to use the siria, and clean the hearing aids    OBJECTIVE:   Based on patient report, the following was completed. Reviewed how to change the wax filter and change fomes. Had Dionicio try changing the wax filter in office. Reviewed how to repair the hearing aids under the blue tooth menu, and that it will only say \"right ear\". Reviewed how to use the siria in detail. Dionicio reported that she did not always like it connected as she could hear every beep for every e-mail and text coming through. Explained that she would silence her notifications for those, and that may help. Reviewed how to make her own \"custom\" program via the siria. Patient expressed understating. Hearing aids are then deep cleaned bilaterally.    No charge visit today (in warranty hearing aid check).    ASSESSMENT: A follow-up appointment for hearing aid's was completed today. Changes to hearing aid was completed as outlined above.     PLAN:Radha will return for follow-up in a few months after completing of a hearing test. Please call this clinic with any questions regarding today s appointment.        Rupert Azevedo, [facility]  Licensed Audiologist  [place] #[id number]           "

## 2022-04-14 NOTE — TELEPHONE ENCOUNTER
Pt calls asking to schedule the followin2022 0800AM lab draw   2022 0900AM Nurse visit to upload meter   AVS mailed to Pt via [employer].   CCs notified to please help schedule the above.   Pt notified.   Hanh Pickens RN on 2022 at 9:23 AM

## 2022-04-15 ENCOUNTER — OFFICE VISIT (OUTPATIENT)
Dept: AUDIOLOGY | Facility: CLINIC | Age: 67
End: 2022-04-15
Payer: MEDICARE

## 2022-04-15 DIAGNOSIS — L30.9 DERMATITIS: ICD-10-CM

## 2022-04-15 DIAGNOSIS — H90.3 SENSORINEURAL HEARING LOSS (SNHL) OF BOTH EARS: Primary | ICD-10-CM

## 2022-04-15 PROCEDURE — 99207 PR ASSESSMENT FOR HEARING AID: CPT | Performed by: AUDIOLOGIST

## 2022-04-15 RX ORDER — TRIAMCINOLONE ACETONIDE 1 MG/G
OINTMENT TOPICAL
Qty: 80 G | Refills: 11 | Status: SHIPPED | OUTPATIENT
Start: 2022-04-15 | End: 2022-04-18

## 2022-04-17 ENCOUNTER — HEALTH MAINTENANCE LETTER (OUTPATIENT)
Age: 67
End: 2022-04-17

## 2022-04-18 ENCOUNTER — TELEPHONE (OUTPATIENT)
Dept: FAMILY MEDICINE | Facility: CLINIC | Age: 67
End: 2022-04-18
Payer: MEDICARE

## 2022-04-18 DIAGNOSIS — L28.0 LICHEN SIMPLEX CHRONICUS: Primary | ICD-10-CM

## 2022-04-18 DIAGNOSIS — E11.8 TYPE 2 DIABETES MELLITUS WITH COMPLICATION (H): Primary | ICD-10-CM

## 2022-04-18 DIAGNOSIS — L30.9 DERMATITIS: ICD-10-CM

## 2022-04-18 RX ORDER — TRIAMCINOLONE ACETONIDE 5 MG/G
CREAM TOPICAL 2 TIMES DAILY
Qty: 15 G | Refills: 4 | Status: SHIPPED | OUTPATIENT
Start: 2022-04-18

## 2022-04-18 NOTE — TELEPHONE ENCOUNTER
RN received a call from ALFONSO Vanessa in endocrinology requesting that PCP re-order patient's triamcinolone cream to [facility]. Last ordered by PCP on 3/16/22, incorrectly discontinued at some point and removed from patient med list. Routing to PCP to reorder appropriate medication: triamcinolone (ARISTOCORT HP) 0.5 % external cream, and discontinue kenalog cream ordered on 4/15.     Taty Torres RN     Clothing

## 2022-05-18 ENCOUNTER — OFFICE VISIT (OUTPATIENT)
Dept: FAMILY MEDICINE | Facility: CLINIC | Age: 67
End: 2022-05-18
Payer: MEDICARE

## 2022-05-18 VITALS
BODY MASS INDEX: 42.5 KG/M2 | OXYGEN SATURATION: 97 % | DIASTOLIC BLOOD PRESSURE: 72 MMHG | WEIGHT: 247.6 LBS | HEART RATE: 89 BPM | RESPIRATION RATE: 16 BRPM | SYSTOLIC BLOOD PRESSURE: 115 MMHG | TEMPERATURE: 99.3 F

## 2022-05-18 DIAGNOSIS — R21 RASH OF NECK: Primary | ICD-10-CM

## 2022-05-18 DIAGNOSIS — F41.9 ANXIETY: ICD-10-CM

## 2022-05-18 PROCEDURE — 99214 OFFICE O/P EST MOD 30 MIN: CPT | Performed by: FAMILY MEDICINE

## 2022-05-18 ASSESSMENT — ANXIETY QUESTIONNAIRES
GAD7 TOTAL SCORE: 9
GAD7 TOTAL SCORE: 9
IF YOU CHECKED OFF ANY PROBLEMS ON THIS QUESTIONNAIRE, HOW DIFFICULT HAVE THESE PROBLEMS MADE IT FOR YOU TO DO YOUR WORK, TAKE CARE OF THINGS AT HOME, OR GET ALONG WITH OTHER PEOPLE: SOMEWHAT DIFFICULT
5. BEING SO RESTLESS THAT IT IS HARD TO SIT STILL: NOT AT ALL
2. NOT BEING ABLE TO STOP OR CONTROL WORRYING: SEVERAL DAYS
6. BECOMING EASILY ANNOYED OR IRRITABLE: NEARLY EVERY DAY
3. WORRYING TOO MUCH ABOUT DIFFERENT THINGS: SEVERAL DAYS
1. FEELING NERVOUS, ANXIOUS, OR ON EDGE: MORE THAN HALF THE DAYS
7. FEELING AFRAID AS IF SOMETHING AWFUL MIGHT HAPPEN: MORE THAN HALF THE DAYS

## 2022-05-18 ASSESSMENT — PATIENT HEALTH QUESTIONNAIRE - PHQ9
SUM OF ALL RESPONSES TO PHQ QUESTIONS 1-9: 4
5. POOR APPETITE OR OVEREATING: NOT AT ALL

## 2022-05-18 NOTE — PROGRESS NOTES
Radha was seen today for recheck.    Diagnoses and all orders for this visit:    Rash of neck this has resolved with topical steroid therapy.  We will move to once a week applications for maintenance and prophylaxis.    Anxiety.  SHANDA 7 score was 9 and she is having significant anxiety interfering with her mental health for over 50% of days.  She has significant stressors in her life, mostly related to family discord and relationships and caregiving for her elderly mother, that are unlikely to resolve.  She has been seeing a therapist since the 1980s with whom she feels quite secure.  I broached the topic of an antianxiety medication and recommended a trial of sertraline.  She would like to think about it and will get back to me if she wants a trial.  I do believe that she qualifies for a diagnosis of generalized anxiety disorder.            Marco Greenberg is a 66 year old who presents for the following health issues: Neck rash and anxiety.    HPI reports that her neck rash has gotten a lot better on triamcinolone treatment.  Still applying the ointment twice a day.  She does not think that she is scratching it anymore.    She complains extensively regarding the toll that anxiety is playing on her life.  Her therapist mentioned that her symptoms are somewhat reminiscent of PTSD.  The patient has ongoing stressors related to dysfunctional family relationships.  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          Review of Systems         Objective    /72 (BP Location: Left arm, Patient Position: Sitting, Cuff Size: Adult Large)   Pulse 89   Temp 99.3  F (37.4  C) (Oral)   Resp 16   Wt 112.3 kg (247 lb 9.6 oz)   SpO2 97%   Breastfeeding No   BMI 42.50 kg/m    Body mass index is 42.5 kg/m .  Physical Exam  Constitutional:       General: She is not in acute distress.  Skin:     Comments: The rash over the posterior superior neck noted at last visit has resolved.   Neurological:       Mental Status: She is alert.   Psychiatric:         Thought Content: Thought content normal.      Comments: Moderate rumination regarding stressors in her life.            Lab on 04/04/2022   Component Date Value Ref Range Status     Cholesterol 04/04/2022 95  <200 mg/dL Final     Triglycerides 04/04/2022 192 (A) <150 mg/dL Final     Direct Measure HDL 04/04/2022 38 (A) >=50 mg/dL Final     LDL Cholesterol Calculated 04/04/2022 19  <=100 mg/dL Final     Non HDL Cholesterol 04/04/2022 57  <130 mg/dL Final     Patient Fasting > 8hrs? 04/04/2022 Yes   Final     Hemoglobin A1C 04/04/2022 8.6 (A) 0.0 - 5.6 % Final    Normal <5.7%   Prediabetes 5.7-6.4%    Diabetes 6.5% or higher     Note: Adopted from ADA consensus guidelines.     Creatinine 04/04/2022 0.71  0.52 - 1.04 mg/dL Final     GFR Estimate 04/04/2022 >90  >60 mL/min/1.73m2 Final    Effective December 21, 2021 eGFRcr in adults is calculated using the 2021 CKD-EPI creatinine equation which includes age and gender (Nicole perez al., NEJM, DOI: 10.1056/LPCZha0561042)     Glucose 04/04/2022 161 (A) 70 - 99 mg/dL Final     Patient Fasting > 8hrs? 04/04/2022 Yes   Final     TSH 04/04/2022 2.46  0.40 - 4.00 mU/L Final     Urea Nitrogen 04/04/2022 14  7 - 30 mg/dL Final     Potassium 04/04/2022 4.3  3.4 - 5.3 mmol/L Final

## 2022-05-18 NOTE — PATIENT INSTRUCTIONS
Reduce ointment on neck to once/week.    Let me know if your want to start sertraline (zoloft).

## 2022-05-26 ENCOUNTER — MYC MEDICAL ADVICE (OUTPATIENT)
Dept: ENDOCRINOLOGY | Facility: CLINIC | Age: 67
End: 2022-05-26
Payer: MEDICARE

## 2022-05-26 DIAGNOSIS — E11.8 TYPE 2 DIABETES MELLITUS WITH COMPLICATION (H): ICD-10-CM

## 2022-05-26 DIAGNOSIS — E11.9 DIABETES MELLITUS, TYPE 2 (H): ICD-10-CM

## 2022-05-27 ENCOUNTER — TRANSFERRED RECORDS (OUTPATIENT)
Dept: HEALTH INFORMATION MANAGEMENT | Facility: CLINIC | Age: 67
End: 2022-05-27

## 2022-05-27 LAB — RETINOPATHY: POSITIVE

## 2022-05-27 RX ORDER — BLOOD SUGAR DIAGNOSTIC
STRIP MISCELLANEOUS
Qty: 360 STRIP | Refills: 1 | Status: SHIPPED | OUTPATIENT
Start: 2022-05-27 | End: 2022-05-31

## 2022-05-27 RX ORDER — PEN NEEDLE, DIABETIC 31 GX5/16"
NEEDLE, DISPOSABLE MISCELLANEOUS
Qty: 400 EACH | Refills: 1 | Status: SHIPPED | OUTPATIENT
Start: 2022-05-27 | End: 2022-05-31

## 2022-05-27 NOTE — TELEPHONE ENCOUNTER
blood glucose (ONETOUCH VERIO IQ) test strip      Last Written Prescription Date:  3-11-20  Last Fill Quantity: 26.,   # refills: 3  Last Office Visit : 4-5-22  Future Office visit:  8-23-22        insulin pen needle (B-D U/F) 31G X 8 MM miscellaneous    Last Written Prescription Date:  3-23-20  Last Fill Quantity: 400,   # refills: 3

## 2022-05-31 DIAGNOSIS — E11.9 DIABETES MELLITUS, TYPE 2 (H): ICD-10-CM

## 2022-05-31 DIAGNOSIS — E11.8 TYPE 2 DIABETES MELLITUS WITH COMPLICATION (H): ICD-10-CM

## 2022-05-31 RX ORDER — BLOOD SUGAR DIAGNOSTIC
STRIP MISCELLANEOUS
Qty: 360 STRIP | Refills: 1 | Status: SHIPPED | OUTPATIENT
Start: 2022-05-31 | End: 2022-12-27

## 2022-05-31 RX ORDER — PEN NEEDLE, DIABETIC 31 GX5/16"
NEEDLE, DISPOSABLE MISCELLANEOUS
Qty: 400 EACH | Refills: 1 | Status: SHIPPED | OUTPATIENT
Start: 2022-05-31 | End: 2023-04-20

## 2022-06-06 ENCOUNTER — MYC MEDICAL ADVICE (OUTPATIENT)
Dept: FAMILY MEDICINE | Facility: CLINIC | Age: 67
End: 2022-06-06
Payer: MEDICARE

## 2022-06-06 DIAGNOSIS — F41.1 GAD (GENERALIZED ANXIETY DISORDER): Primary | ICD-10-CM

## 2022-06-09 DIAGNOSIS — E11.8 TYPE 2 DIABETES MELLITUS WITH COMPLICATION, WITH LONG-TERM CURRENT USE OF INSULIN (H): ICD-10-CM

## 2022-06-09 DIAGNOSIS — E11.8 TYPE 2 DIABETES MELLITUS WITH COMPLICATION (H): ICD-10-CM

## 2022-06-09 DIAGNOSIS — Z79.4 TYPE 2 DIABETES MELLITUS WITH COMPLICATION, WITH LONG-TERM CURRENT USE OF INSULIN (H): ICD-10-CM

## 2022-06-09 RX ORDER — INSULIN GLARGINE 100 [IU]/ML
INJECTION, SOLUTION SUBCUTANEOUS
Qty: 90 ML | Refills: 3 | Status: SHIPPED | OUTPATIENT
Start: 2022-06-09 | End: 2023-06-20

## 2022-06-09 NOTE — TELEPHONE ENCOUNTER
insulin glargine (LANTUS SOLOSTAR) 100 UNIT/ML pen      Last Written Prescription Date:  07/07/21  Last Fill Quantity: 90mL,   # refills: 3  Last Office Visit : 04/05/22  Future Office Visit:  08/23/22    Routing refill request to provider for review/approval because:  Insulin - refilled per clinic

## 2022-06-13 ENCOUNTER — MEDICAL CORRESPONDENCE (OUTPATIENT)
Dept: HEALTH INFORMATION MANAGEMENT | Facility: CLINIC | Age: 67
End: 2022-06-13
Payer: MEDICARE

## 2022-06-14 ENCOUNTER — DOCUMENTATION ONLY (OUTPATIENT)
Dept: ENDOCRINOLOGY | Facility: CLINIC | Age: 67
End: 2022-06-14
Payer: MEDICARE

## 2022-07-13 ENCOUNTER — VIRTUAL VISIT (OUTPATIENT)
Dept: SLEEP MEDICINE | Facility: CLINIC | Age: 67
End: 2022-07-13
Payer: MEDICARE

## 2022-07-13 VITALS — HEIGHT: 64 IN | BODY MASS INDEX: 42.17 KG/M2 | WEIGHT: 247 LBS

## 2022-07-13 DIAGNOSIS — G47.33 OSA (OBSTRUCTIVE SLEEP APNEA): Primary | ICD-10-CM

## 2022-07-13 DIAGNOSIS — E66.09 EXOGENOUS OBESITY: ICD-10-CM

## 2022-07-13 DIAGNOSIS — F51.5 NIGHTMARES: ICD-10-CM

## 2022-07-13 DIAGNOSIS — G47.00 INSOMNIA, UNSPECIFIED TYPE: ICD-10-CM

## 2022-07-13 PROCEDURE — 99213 OFFICE O/P EST LOW 20 MIN: CPT | Mod: 95 | Performed by: INTERNAL MEDICINE

## 2022-07-13 ASSESSMENT — SLEEP AND FATIGUE QUESTIONNAIRES
HOW LIKELY ARE YOU TO NOD OFF OR FALL ASLEEP WHILE SITTING AND TALKING TO SOMEONE: WOULD NEVER DOZE
HOW LIKELY ARE YOU TO NOD OFF OR FALL ASLEEP WHILE SITTING QUIETLY AFTER LUNCH WITHOUT ALCOHOL: SLIGHT CHANCE OF DOZING
HOW LIKELY ARE YOU TO NOD OFF OR FALL ASLEEP WHEN YOU ARE A PASSENGER IN A CAR FOR AN HOUR WITHOUT A BREAK: SLIGHT CHANCE OF DOZING
HOW LIKELY ARE YOU TO NOD OFF OR FALL ASLEEP IN A CAR, WHILE STOPPED FOR A FEW MINUTES IN TRAFFIC: WOULD NEVER DOZE
HOW LIKELY ARE YOU TO NOD OFF OR FALL ASLEEP WHILE WATCHING TV: SLIGHT CHANCE OF DOZING
HOW LIKELY ARE YOU TO NOD OFF OR FALL ASLEEP WHILE LYING DOWN TO REST IN THE AFTERNOON WHEN CIRCUMSTANCES PERMIT: MODERATE CHANCE OF DOZING
HOW LIKELY ARE YOU TO NOD OFF OR FALL ASLEEP WHILE SITTING AND READING: SLIGHT CHANCE OF DOZING
HOW LIKELY ARE YOU TO NOD OFF OR FALL ASLEEP WHILE SITTING INACTIVE IN A PUBLIC PLACE: SLIGHT CHANCE OF DOZING

## 2022-07-13 NOTE — PROGRESS NOTES
Dionicio is a 66 year old who is being evaluated via a billable video visit.      How would you like to obtain your AVS? MyChart  If the video visit is dropped, the invitation should be resent by: Text to cell phone: [phone]  Will anyone else be joining your video visit? Jessica Rangel        Video-Visit Details    Video Start Time: 9:30 AM    Type of service:  Video Visit    Video End Time:9:45 AM    Originating Location (pt. Location): Home    Distant Location (provider location):  [facility]     Platform used for Video Visit: Nadiya     Chief complaint:follow up sleep apnea    History of Present Illness: 66-year-old female with history of diabetes, obesity, anxiety, arthritis, severe obstructive sleep apnea.  She is been having some difficulties with insomnia lately.  She notes that with recent starting of sertraline she finds a little harder to fall asleep but eventually she does.  She finds some benefit with the medication she has been managing her anxiety better.  She is a lot of stress in her life these days taking care of her elderly mother and other family dynamics issues.  She has not had any significant recurrence of nightmares or any concerns about dream enactment behavior lately.  She is currently using Invisaline oral appliances to improve the crowding of her teeth.  She is been using them for about 12 weeks she thinks.  She has a follow-up coming soon.  She does have some issues with mask leak.  She keeps her nasal mask up-to-date and uses a gentle soap.  She washes it weekly.  She washes her face before bed.  No new sleep concerns.  Her weight has been stable.    [place] Sleepiness Scale  Total score - [place]: 7 (7/13/2022  8:58 AM)   (Less than 10 normal)    Insomnia Severity Scale  MIHAELA Total Score: 3  (normal 0-7, mild 8-14, moderate 15-21, severe 22-28)    Past Medical History:   Diagnosis Date     Abnormal Pap smear      Anxiety      Arthritis      Arthritis of  knee 4/4/2013     Arthritis of shoulder region, right 4/18/2014     Back injury      Breast disorder      Chronic constipation      Chronic diarrhea      Depressive disorder      Diabetes (H)      Fecal incontinence      Finger pain 4/2/2015     Fracture broke L 5th pinky finger due to fall  1/2015     Glaucoma (increased eye pressure)      Head injury 8/6/2016     Headache(784.0)     decreased with mouth guard use.     History of blood transfusion 8/2011 & 4/2013     History of diabetes mellitus      Hypercholesteremia      Hypertension      Low back pain      Menarche age 9+    cycles q mo x 4-5 d     Neck injuries      Nonsenile cataract IO implants: L-8/2013; R-1/2011     Pain in knee joint     LEFT     Right bundle branch block     per H/P     Sleep apnea     Info on file     SNHL (sensorineural hearing loss)      Tinnitus     I have reported ringing in ears. not sure of dates     Umbilical hernia without mention of obstruction or gangrene 8/2014     Vision disorder Detached Retina 10/2009       Allergies   Allergen Reactions     Nkda [No Known Drug Allergies]        Current Outpatient Medications   Medication     Acetaminophen (TYLENOL PO)     amoxicillin (AMOXIL) 500 MG tablet     Ascorbic Acid (VITAMIN C PO)     aspirin 81 MG tablet     atorvastatin (LIPITOR) 20 MG tablet     B Complex Vitamins (VITAMIN  B COMPLEX) CAPS     blood glucose (ONETOUCH VERIO IQ) test strip     blood glucose calibration (ONETOUCH VERIO) solution     calcium-vitamin D (CALTRATE) 600-400 MG-UNIT per tablet     cholecalciferol (VITAMIN D) 1000 UNIT tablet     cyanocobalamin (VITAMIN B-12) 1000 MCG tablet     cycloSPORINE (RESTASIS) 0.05 % ophthalmic emulsion     HUMALOG KWIKPEN 100 UNIT/ML soln     hypromellose-dextran (ARTIFICAL TEARS) 0.1-0.3 % ophthalmic solution     insulin glargine (LANTUS SOLOSTAR) 100 UNIT/ML pen     insulin pen needle (B-D U/F) 31G X 8 MM miscellaneous     latanoprost (XALATAN) 0.005 % ophthalmic solution      lisinopril-hydrochlorothiazide (ZESTORETIC) 20-12.5 MG tablet     metFORMIN (GLUCOPHAGE) 500 MG tablet     Multiple Vitamin (MULTIVITAMIN  S) CAPS     Multiple Vitamins-Minerals (EYE-ZAKIYA PLUS LUTEIN PO)     Omega-3 Fatty Acids (OMEGA-3 FISH OIL PO)     OneTouch Delica Lancets 33G MISC     order for DME     polyethylene glycol (MIRALAX) 17 GM/Dose powder     semaglutide (OZEMPIC, 0.25 OR 0.5 MG/DOSE,) 2 MG/1.5ML SOPN pen     sertraline (ZOLOFT) 50 MG tablet     timolol (TIMOPTIC) 0.5 % ophthalmic solution     triamcinolone (ARISTOCORT HP) 0.5 % external cream     vitamin C (ASCORBIC ACID) 100 MG tablet     No current facility-administered medications for this visit.       Social History     Socioeconomic History     Marital status:      Spouse name: Not on file     Number of children: Not on file     Years of education: Not on file     Highest education level: Not on file   Occupational History     Occupation: Human Resources     Comment: between jobs now   Tobacco Use     Smoking status: Former Smoker     Packs/day: 0.00     Years: 0.00     Pack years: 0.00     Smokeless tobacco: Never Used     Tobacco comment: quit mid 1980s   Vaping Use     Vaping Use: Never used   Substance and Sexual Activity     Alcohol use: No     Drug use: No     Sexual activity: Not Currently     Partners: Male     Birth control/protection: Abstinence, Post-menopausal   Other Topics Concern      Service Not Asked     Blood Transfusions Yes     Comment: 2 units 2011     Caffeine Concern No     Comment: 2s     Occupational Exposure No     Hobby Hazards No     Sleep Concern No     Stress Concern No     Comment: rehab for R knee after replacement     Weight Concern Yes     Comment: working on wt loss     Special Diet Yes     Comment: Diabetic     Back Care No     Exercise No     Comment: water aerobics 35-40' 3-4 d & strength 3d/wk     Bike Helmet No     Seat Belt No     Self-Exams Not Asked     Parent/sibling w/ CABG, MI or  angioplasty before 65F 55M? Not Asked   Social History Narrative    How much exercise per week? 3-4x swimming, aerobics    How much calcium per day? Supplement       How much caffeine per day? 2 cups coffee/ 1-2 can of diet soda    How much vitamin D per day? Supplement    Do you/your family wear seatbelts?  Yes    Do you/your family use safety helmets? No    Do you/your family use sunscreen? No    Do you/your family keep firearms in the home? No    Do you/your family have a smoke detector(s)? Yes    Do you feel safe in your home? Yes    Has anyone ever touched you in an unwanted manner? No     Explain     See LEA Berman 11/26/2014    Reviewed Raul DEGROOT MA 11/22/2017     Social Determinants of Health     Financial Resource Strain: Not on file   Food Insecurity: Not on file   Transportation Needs: Not on file   Physical Activity: Not on file   Stress: Not on file   Social Connections: Not on file   Intimate Partner Violence: Not At Risk     Fear of Current or Ex-Partner: No     Emotionally Abused: No     Physically Abused: No     Sexually Abused: No   Housing Stability: Not on file       Family History   Problem Relation Age of Onset     Diabetes Father 55        DM II     Diabetes Brother 50        xs 2     Hypertension Sister 41        also B and M     Cancer Maternal Aunt         multiple myeloma     Hypertension Mother 79     Osteoporosis Mother      Memory loss Mother      Glaucoma Mother      Diabetes Brother      Hypertension Brother      Heart Murmur Brother      Glaucoma Brother      Hypertension Brother      Breast Cancer Cousin      Thyroid Disease Sister         Graves     Diabetes Sister         Graves     Cerebrovascular Disease Maternal Grandmother      Other - See Comments Sister         16 minths head injury     Other - See Comments Brother         MVA age 36     Cancer - colorectal No family hx of      Prostate Cancer No family hx of      Alcohol/Drug No family hx of      Melanoma No family hx of       "Skin Cancer No family hx of            EXAM:  Ht 1.626 m (5' 4\")   Wt 112 kg (247 lb)   BMI 42.40 kg/m    GENERAL: Alert and no distress  EYES: Eyes grossly normal to inspection.  No discharge or erythema, or obvious scleral/conjunctival abnormalities.  RESP: No audible wheeze, cough, or visible cyanosis.  No visible retractions or increased work of breathing.    SKIN: Visible skin clear. No significant rash, abnormal pigmentation or lesions.  NEURO: Cranial nerves grossly intact.  Mentation and speech appropriate for age.  PSYCH: Mentation appears normal, affect normal, judgement and insight intact, normal speech and appearance well-groomed.       Home Sleep Apnea Test 11/11/2019   Weight 248, BMI 43.2  AHI 46.8, with associated hypoxemia    ResMed Nasal Mask  Auto-PAP 12.0 - 16.0 cmH2O 30 day usage data:    100% of days with > 4 hours of use. 0/30 days with no use.   Average use 527 minutes per day.   95%ile Leak 30.81 L/min.   CPAP 95% pressure 16 cm.   AHI 0.94 events per hour.     ASSESSMENT:  66-year-old female with history of severe obstructive sleep apnea getting good clinical benefit and meeting compliance goals with CPAP.  AHI is been normalized.  She is on high pressures.  She is managing leak okay.  No significant exacerbation of nightmares with sertraline.  She is having little bit more insomnia but its not particularly bothersome.    PLAN:  No significant changes recommended at this time.  She is going to continue to try to manage her pain and insomnia with nonmedication modalities.  Continue to use CPAP all night every night and work on leak issues with [facility].  Follow-up in 1 year.  Strongly encouraged to increase efforts at weight loss.      23 minutes spent on the date of the encounter doing chart review, history and exam, documentation and further activities per the note    Joanne Pisano M.D.  Pulmonary/Critical Care/Sleep Medicine    [facility] " [facility]   Floor 1, [address]   [address] [place] [address]   Appointments: [phone]    The above note was dictated using voice recognition software and may include typographical errors. Please contact the author for any clarifications.

## 2022-07-13 NOTE — PATIENT INSTRUCTIONS
For general sleep health questions:   http://sleepeducation.org    For tips about PAP and COVID-19:  https://www.thoracic.org/patients/patient-resources/resources/covid-19-and-home-pap-therapy.pdf    For general info about COVID-19 including vaccines:  https://SCM-GL.org/covid19      Continue PAP therapy every night, for all hours that you are sleeping (including naps.)  As always, try to get at least 8 hours of sleep or more each day, keep a regular sleep schedule, and avoid sleep deprivation. Avoid alcohol.  Reasons that you might need a change to your pressure therapy would be weight gain or loss, waking having inadvertently removed your PAP overnight, having previously felt refreshed by sleep with CPAP use and now waking un-refreshed, and return of daytime sleepiness. Also, the development of new medical problems  (such as heart failure, stroke, medications such as narcotics) can sometimes affect breathing at night and change your PAP therapy needs.  Please bring PAP with you if you are hospitalized.  If anticipating surgery be sure to discuss with your surgeon that you have sleep apnea and use PAP therapy.    Maintain your equipment as recommended which includes routine cleaning and replacement of supplies.      Call DME for any questions regarding supplies or maintenance.    [facility], [facility] [phone]    Do not drive on engage in potentially dangerous activities if feeling sleepy.  Please follow up in sleep clinic again in 12 months.        Tips for your PAP use-    Mask fitting tips  Mask fitting exercise:    To improve your mask seal and your mobility at night, put mask on and secure in place.  Lie down in bed with full pressure and roll to one side, adjust headgear while in that position to eliminate any leaks. Repeat process rolling to other side.     The mask seal does not have to be perfect:   CPAP machines are designed to make up for small  leaks. However, you will not tolerate leaks blowing in your eyes so you will need to adjust.   Any leak should only be near or at the bottom of the mask.  We expect your mask to leak slightly at night.    Do not over-tighten the headgear straps, tighter IS NOT better, we expect minimal leak.    First try re-positioning the mask or headgear before tightening the headgear straps.  Mask leaks are expected due to changing sleeping positions. Try pulling the mask away from your skin allowing the cushion to re-inflate will minimize the leak.  If you struggle for a good fit, try turning the CPAP off and then readjust the mask by pulling it away from your face and then turning back on the CPAP.        Humidifier tips  Humidifiers can be adjusted to increase or decrease the amount of moisture according to your comfort level. You may need to adjust this frequently at first, but then might only change it with seasonal weather changes.     Try INCREASING the humidity if:  You experience a dry, irritated nasal passage or throat.  You have a runny, drippy nose or sneezing fits after using CPAP.  You experience nasal congestion during or after CPAP use.    Try DECREASING the humidity if:  You have excessive condensation or  rain out  in the tubing or mask.  Otherwise keep the tubing warm during the night by running it underneath the blankets or pillow.      Clinic visit after initial PAP set-up   Bring your equipment with you to your 5-8 week follow up clinic visit.  We will be extracting your data from the machine if not available from the cloud based modem.        Travel  Always take your equipment with you when you travel.  If you fly with your equipment bring it on with you as a carry on.  Medical equipment does not count as a carry on.    If you travel international the machines take 110-240v.  The only adapter needed is the adapter that will fit into the receptacle (outlet).    You may also want to bring an extension cord as  many hotel rooms have limited outlets at the bedside.  Do not travel with water in your humidifier chamber.     Cleaning and Maintenance Guidelines    Equipment Frequency Cleaning Method   Mask First Day    Daily      Weekly Soak mask in hot soapy water for 30 minutes, rinse and air dry.  Wipe nasal cushion with a hot soapy (Ivory, baby shampoo) cloth and rinse.  Baby wipes may also be used.  Do not use anti-bacterial soaps,Ainsley  liquid soap, rubbing alcohol, bleach or ammonia.  Wash frame in hot soapy water (Ivory, baby shampoo) rinse and let air dry   Headgear Biweekly Wash in hot soapy water, rinse and air dry   Reusable Gray Filter Weekly Wash in hot soapy water, rinse, put in towel squeeze moisture out, let air dry   Disposable White Filter Check Weekly Replace when brown or gray in color; at least every 2 to 3 months   Humidifier Chamber Daily    Weekly Empty distilled water from humidifier and let air dry    Hand wash in hot soapy water, rinse and air dry   Tubing Weekly Wash in hot soapy water, rinse and let air dry   Mask, Tubing and Humidifier Chamber As needed Disinfect: Soak in 1 part distilled white vinegar to 3 parts hot water for 30 minutes, rinse well and air dry  Not the material headgear        MASK AND SUPPLY REORDERING and EQUIPMENT NEEDS through your DME and per your insurance  Reminder: Most insurance companies will allow for a new mask, headgear, tubing, and reusable gray filter every six months.  Disposable white ultra-fine filters are covered monthly.      HOME AND SAFETY INSTRUCTIONS  Do not use frayed or cracked electrical cords, multi plug adaptors, or switched receptacles  Do not immerse electrical equipment into water  Assure that electrical cords do not become a tripping hazard

## 2022-07-13 NOTE — NURSING NOTE
Pt. To follow up in 1 year for cpap, sent pt. "[employer]hart message for 1 year cpap follow up.    Heriberto Teran CMA

## 2022-07-18 ASSESSMENT — ENCOUNTER SYMPTOMS
CHILLS: 0
HEADACHES: 0
BREAST MASS: 0
FREQUENCY: 0
NERVOUS/ANXIOUS: 0
DIZZINESS: 0
MYALGIAS: 1
FEVER: 0
COUGH: 0
DIARRHEA: 1
SHORTNESS OF BREATH: 0
HEARTBURN: 0
NAUSEA: 0
PALPITATIONS: 0
HEMATOCHEZIA: 0
JOINT SWELLING: 0
WEAKNESS: 0
EYE PAIN: 0
SORE THROAT: 0
HEMATURIA: 0
CONSTIPATION: 1
ARTHRALGIAS: 1
ABDOMINAL PAIN: 1
PARESTHESIAS: 0
DYSURIA: 0

## 2022-07-18 ASSESSMENT — ACTIVITIES OF DAILY LIVING (ADL): CURRENT_FUNCTION: NO ASSISTANCE NEEDED

## 2022-07-20 ENCOUNTER — OFFICE VISIT (OUTPATIENT)
Dept: FAMILY MEDICINE | Facility: CLINIC | Age: 67
End: 2022-07-20
Payer: MEDICARE

## 2022-07-20 VITALS
OXYGEN SATURATION: 98 % | SYSTOLIC BLOOD PRESSURE: 114 MMHG | BODY MASS INDEX: 42.03 KG/M2 | WEIGHT: 246.2 LBS | RESPIRATION RATE: 16 BRPM | HEIGHT: 64 IN | DIASTOLIC BLOOD PRESSURE: 74 MMHG | TEMPERATURE: 99.4 F | HEART RATE: 78 BPM

## 2022-07-20 DIAGNOSIS — F41.1 GAD (GENERALIZED ANXIETY DISORDER): ICD-10-CM

## 2022-07-20 DIAGNOSIS — F41.9 ANXIETY: ICD-10-CM

## 2022-07-20 DIAGNOSIS — Z00.00 ENCOUNTER FOR MEDICARE ANNUAL WELLNESS EXAM: Primary | ICD-10-CM

## 2022-07-20 PROCEDURE — G0009 ADMIN PNEUMOCOCCAL VACCINE: HCPCS | Performed by: FAMILY MEDICINE

## 2022-07-20 PROCEDURE — 99213 OFFICE O/P EST LOW 20 MIN: CPT | Mod: 25 | Performed by: FAMILY MEDICINE

## 2022-07-20 PROCEDURE — 90677 PCV20 VACCINE IM: CPT | Performed by: FAMILY MEDICINE

## 2022-07-20 PROCEDURE — G0439 PPPS, SUBSEQ VISIT: HCPCS | Performed by: FAMILY MEDICINE

## 2022-07-20 ASSESSMENT — ENCOUNTER SYMPTOMS
HEMATOCHEZIA: 0
CONSTIPATION: 1
HEMATURIA: 0
SORE THROAT: 0
HEADACHES: 0
FREQUENCY: 0
MYALGIAS: 1
DIARRHEA: 1
SHORTNESS OF BREATH: 0
PALPITATIONS: 0
FEVER: 0
NAUSEA: 0
ABDOMINAL PAIN: 1
DIZZINESS: 0
NERVOUS/ANXIOUS: 0
EYE PAIN: 0
BREAST MASS: 0
WEAKNESS: 0
CHILLS: 0
ARTHRALGIAS: 1
COUGH: 0
JOINT SWELLING: 0
HEARTBURN: 0
DYSURIA: 0
PARESTHESIAS: 0

## 2022-07-20 ASSESSMENT — ACTIVITIES OF DAILY LIVING (ADL): CURRENT_FUNCTION: NO ASSISTANCE NEEDED

## 2022-07-20 NOTE — PATIENT INSTRUCTIONS
SEND IN COPY OF ADVANCE DIRECTIVE / LIVING WILL.      Patient Education   Personalized Prevention Plan  You are due for the preventive services outlined below.  Your care team is available to assist you in scheduling these services.  If you have already completed any of these items, please share that information with your care team to update in your medical record.  Health Maintenance Due   Topic Date Due    Discuss Advance Care Planning  Never done    LUNG CANCER SCREENING  Never done    Pneumococcal Vaccine (3 - PPSV23 or PCV20) 11/20/2020    Basic Metabolic Panel  01/28/2022    Kidney Microalbumin Urine Test  04/12/2022    Diabetic Foot Exam  05/05/2022    A1C Lab  07/04/2022     Your Health Risk Assessment indicates you feel you are not in good health    A healthy lifestyle helps keep the body fit and the mind alert. It helps protect you from disease, helps you fight disease, and helps prevent chronic disease (disease that doesn't go away) from getting worse. This is important as you get older and begin to notice twinges in muscles and joints and a decline in the strength and stamina you once took for granted. A healthy lifestyle includes good healthcare, good nutrition, weight control, recreation, and regular exercise. Avoid harmful substances and do what you can to keep safe. Another part of a healthy lifestyle is stay mentally active and socially involved.    Good healthcare   Have a wellness visit every year.   If you have new symptoms, let us know right away. Don't wait until the next checkup.   Take medicines exactly as prescribed and keep your medicines in a safe place. Tell us if your medicine causes problems.   Healthy diet and weight control   Eat 3 or 4 small, nutritious, low-fat, high-fiber meals a day. Include a variety of fruits, vegetables, and whole-grain foods.   Make sure you get enough calcium in your diet. Calcium, vitamin D, and exercise help prevent osteoporosis (bone thinning).   If you  live alone, try eating with others when you can. That way you get a good meal and have company while you eat it.   Try to keep a healthy weight. If you eat more calories than your body uses for energy, it will be stored as fat and you will gain weight.     Recreation   Recreation is not limited to sports and team events. It includes any activity that provides relaxation, interest, enjoyment, and exercise. Recreation provides an outlet for physical, mental, and social energy. It can give a sense of worth and achievement. It can help you stay healthy.    Mental Exercise and Social Involvement  Mental and emotional health is as important as physical health. Keep in touch with friends and family. Stay as active as possible. Continue to learn and challenge yourself.   Things you can do to stay mentally active are:  Learn something new, like a foreign language or musical instrument.   Play SCRABBLE or do crossword puzzles. If you cannot find people to play these games with you at home, you can play them with others on your computer through the Internet.   Join a games club--anything from card games to chess or checkers or lawn bowling.   Start a new hobby.   Go back to school.   Volunteer.   Read.   Keep up with world events.    Understanding USDA MyPlate  The USDA has guidelines to help you make healthy food choices. These are called MyPlate. MyPlate shows the food groups that make up healthy meals using the image of a place setting. Before you eat, think about the healthiest choices for what to put on your plate or in your cup or bowl. To learn more about building a healthy plate, visit www.choosemyplate.gov.    The food groups  Fruits. Any fruit or 100% fruit juice counts as part of the Fruit Group. Fruits may be fresh, canned, frozen, or dried, and may be whole, cut-up, or pureed. Make 1/2 of your plate fruits and vegetables.  Vegetables. Any vegetable or 100% vegetable juice counts as a member of the Vegetable Group.  Vegetables may be fresh, frozen, canned, or dried. They can be served raw or cooked and may be whole, cut-up, or mashed. Make 1/2 of your plate fruits and vegetables.  Grains. All foods made from grains are part of the Grains Group. These include wheat, rice, oats, cornmeal, and barley. Grains are often used to make foods such as bread, pasta, oatmeal, cereal, tortillas, and grits. Grains should be no more than 1/4 of your plate. At least half of your grains should be whole grains.  Protein. This group includes meat, poultry, seafood, beans and peas, eggs, processed soy products (such as tofu), nuts (including nut butters), and seeds. Make protein choices no more than 1/4 of your plate. Meat and poultry choices should be lean or low fat.  Dairy. The Dairy Group includes all fluid milk products and foods made from milk that contain calcium, such as yogurt and cheese. (Foods that have little calcium, such as cream, butter, and cream cheese, are not part of this group.) Most dairy choices should be low-fat or fat-free.  Oils. Oils aren't a food group, but they do contain essential nutrients. However it's important to watch your intake of oils. These are fats that are liquid at room temperature. They include canola, corn, olive, soybean, vegetable, and sunflower oil. Foods that are mainly oil include mayonnaise, certain salad dressings, and soft margarines. You likely already get your daily oil allowance from the foods you eat.  Things to limit  Eating healthy also means limiting these things in your diet:     Salt (sodium). Many processed foods have a lot of sodium. To keep sodium intake down, eat fresh vegetables, meats, poultry, and seafood when possible. Purchase low-sodium, reduced-sodium, or no-salt-added food products at the store. And don't add salt to your meals at home. Instead, season them with herbs and spices such as dill, oregano, cumin, and paprika. Or try adding flavor with lemon or lime zest and  juice.  Saturated fat. Saturated fats are most often found in animal products such as beef, pork, and chicken. They are often solid at room temperature, such as butter. To reduce your saturated fat intake, choose leaner cuts of meat and poultry. And try healthier cooking methods such as grilling, broiling, roasting, or baking. For a simple lower-fat swap, use plain nonfat yogurt instead of mayonnaise when making potato salad or macaroni salad.  Added sugars. These are sugars added to foods. They are in foods such as ice cream, candy, soda, fruit drinks, sports drinks, energy drinks, cookies, pastries, jams, and syrups. Cut down on added sugars by sharing sweet treats with a family member or friend. You can also choose fruit for dessert, and drink water or other unsweetened beverages.     [employer] last reviewed this educational content on 6/1/2020 2000-2021 [employer]. All rights reserved. This information is not intended as a substitute for professional medical care. Always follow your healthcare professional's instructions.        Your Health Risk Assessment indicates you feel you are not in good emotional health.    Recreation   Recreation is not limited to sports and team events. It includes any activity that provides relaxation, interest, enjoyment, and exercise. Recreation provides an outlet for physical, mental, and social energy. It can give a sense of worth and achievement. It can help you stay healthy.    Mental Exercise and Social Involvement  Mental and emotional health is as important as physical health. Keep in touch with friends and family. Stay as active as possible. Continue to learn and challenge yourself.   Things you can do to stay mentally active are:  Learn something new, like a foreign language or musical instrument.   Play SCRABBLE or do crossword puzzles. If you cannot find people to play these games with you at home, you can play them with others on your computer through  the Internet.   Join a games club--anything from card games to chess or checkers or lawn bowling.   Start a new hobby.   Go back to school.   Volunteer.   Read.   Keep up with world events.

## 2022-07-20 NOTE — PROGRESS NOTES
"SUBJECTIVE:   Radha Baca is a 66 year old female who presents for Preventive Visit.      Patient has been advised of split billing requirements and indicates understanding: Yes  Are you in the first 12 months of your Medicare coverage?  No    Healthy Habits:     In general, how would you rate your overall health?  Fair    Frequency of exercise:  2-3 days/week    Duration of exercise:  15-30 minutes    Do you usually eat at least 4 servings of fruit and vegetables a day, include whole grains    & fiber and avoid regularly eating high fat or \"junk\" foods?  No    Taking medications regularly:  Yes    Medication side effects:  None    Ability to successfully perform activities of daily living:  No assistance needed    Home Safety:  No safety concerns identified    Hearing Impairment:  No hearing concerns    In the past 6 months, have you been bothered by leaking of urine?  No    In general, how would you rate your overall mental or emotional health?  Fair      PHQ-2 Total Score: 0    Additional concerns today:  No    Do you feel safe in your environment? Yes    Have you ever done Advance Care Planning? (For example, a Health Directive, POLST, or a discussion with a medical provider or your loved ones about your wishes): Patient has a health directive and will send it into the clinic.       Fall risk  Fallen 2 or more times in the past year?: No  Any fall with injury in the past year?: No    Cognitive Screening   1) Repeat 3 items (Leader, Season, Table)    2) Clock draw: NORMAL  3) 3 item recall:   Results: 3 items recalled: COGNITIVE IMPAIRMENT LESS LIKELY    Mini-CogTM Copyright JOSÉ Guerrero. Licensed by the author for use in [facility]; reprinted with permission (chevy@.[facility]). All rights reserved.      Do you have sleep apnea, excessive snoring or daytime drowsiness?: yes    Reviewed and updated as needed this visit by clinical staff   Tobacco  Allergies  Meds   Med Hx  Surg Hx  Fam Hx  Soc " Hx          Reviewed and updated as needed this visit by Provider                   Social History     Tobacco Use     Smoking status: Former Smoker     Packs/day: 0.00     Years: 0.00     Pack years: 0.00     Smokeless tobacco: Never Used     Tobacco comment: quit mid 1980s   Substance Use Topics     Alcohol use: No       Alcohol Use 7/18/2022   Prescreen: >3 drinks/day or >7 drinks/week? Not Applicable           Current providers sharing in care for this patient include:   Patient Care Team:  Mohan Moreno MD as PCP - General (Family Practice)  Chapin Rosas MD as MD (Family Medicine - Sports Medicine)  Elayne Kramer MD as MD (Orthopedics)  Murali Willingham MD as MD (INTERNAL MEDICINE - ENDOCRINOLOGY, DIABETES & METABOLISM)  James Rivera DPM (Podiatry)  Mohan Moreno MD as MD (Family Practice)  James Bailey MD as MD (Family Medicine - Sports Medicine)  oLu Byrne MD as MD (Orthopedics)  Galina Villanueva MD as MD (Family Practice)  Demetri Espinal MD as MD (Orthopedics)  Mohan Rosenberg MD as Fellow (Student in organized health care education/training program)  Jerrell Austin DPM as MD (Podiatrist Primary Podiatric Medicine)  Jerrell Austin DPM as Assigned Musculoskeletal Provider  Joanne Pisano MD as Assigned Pulmonology Provider  Keturah De Luna MD as Assigned Endocrinology Provider  Mayra Medel APRN CNP as Assigned OBGYN Provider  Keturah De Luna MD as MD (Endocrinology, Diabetes, and Metabolism)  Peri Salcido, RN as Diabetes Educator (Diabetes Education)  Mohan Moreno MD as Assigned PCP  Jose Iyer MD as Assigned Surgical Provider  Quentin Brunson MD as MD (Gastroenterology)  Quentin Brunson MD as Assigned Gastroenterology Provider    The following health maintenance items are reviewed in Epic and correct as of today:  Health Maintenance Due   Topic Date Due      ADVANCE CARE PLANNING  Never done     LUNG CANCER SCREENING  Never done     MEDICARE ANNUAL WELLNESS VISIT  10/16/2019     Pneumococcal Vaccine: 65+ Years (3 - PPSV23 or PCV20) 11/20/2020     BMP  01/28/2022     MICROALBUMIN  04/12/2022     DIABETIC FOOT EXAM  05/05/2022     A1C  07/04/2022           FHS-7:   Breast CA Risk Assessment (FHS-7) 11/23/2021   Did any of your first-degree relatives have breast or ovarian cancer? No   Did any of your relatives have bilateral breast cancer? Yes   Did any man in your family have breast cancer? No   Did any woman in your family have breast and ovarian cancer? No   Did any woman in your family have breast cancer before age 50 y? Yes   Do you have 2 or more relatives with breast and/or ovarian cancer? No   Do you have 2 or more relatives with breast and/or bowel cancer? No         Pertinent mammograms are reviewed under the imaging tab.    Review of Systems   Constitutional: Negative for chills and fever.   HENT: Positive for ear pain. Negative for congestion, hearing loss and sore throat.    Eyes: Negative for pain and visual disturbance.   Respiratory: Negative for cough and shortness of breath.    Cardiovascular: Positive for peripheral edema. Negative for chest pain and palpitations.   Gastrointestinal: Positive for abdominal pain, constipation and diarrhea. Negative for heartburn, hematochezia and nausea.   Breasts:  Negative for tenderness, breast mass and discharge.   Genitourinary: Negative for dysuria, frequency, genital sores, hematuria, pelvic pain, urgency, vaginal bleeding and vaginal discharge.   Musculoskeletal: Positive for arthralgias and myalgias. Negative for joint swelling.   Skin: Negative for rash.   Neurological: Negative for dizziness, weakness, headaches and paresthesias.   Psychiatric/Behavioral: Negative for mood changes. The patient is not nervous/anxious.          OBJECTIVE:   /74 (BP Location: Left arm, Patient Position: Sitting, Cuff  "Size: Adult Large)   Pulse 78   Temp 99.4  F (37.4  C) (Oral)   Resp 16   Ht 1.626 m (5' 4\")   Wt 111.7 kg (246 lb 3.2 oz)   SpO2 98%   Breastfeeding No   BMI 42.26 kg/m   Estimated body mass index is 42.26 kg/m  as calculated from the following:    Height as of this encounter: 1.626 m (5' 4\").    Weight as of this encounter: 111.7 kg (246 lb 3.2 oz).  Physical Exam  GENERAL:  alert and no distress  EYES: Eyes grossly normal to inspection, PERRL and conjunctivae and sclerae normal  HENT: Patient wears hearing aids, nose and mouth without ulcers or lesions  NECK: no adenopathy, no asymmetry, masses, or scars and thyroid normal to palpation  RESP: lungs clear to auscultation - no rales, rhonchi or wheezes  CV: regular rate and rhythm, normal S1 S2, no S3 or S4, no murmur, click or rub, no peripheral edema and peripheral pulses strong  ABDOMEN: soft, nontender, no hepatosplenomegaly, no masses and bowel sounds normal  MS: Status post bilateral knee replacements, no edema  SKIN: no suspicious lesions or rashes  NEURO: Normal strength and tone, mentation intact and speech normal  PSYCH: mentation appears normal, affect normal/bright    Diagnostic Test Results:  Labs reviewed in Epic    ASSESSMENT / PLAN:   Radha was seen today for physical.    Diagnoses and all orders for this visit:    Encounter for Medicare annual wellness exam    Anxiety.  Doing well on sertraline for anxiety as prescribed 4 weeks ago.  Relates that  says that she is much less irritable and overall [place], particularly when dealing with issues of family discord.  She would like to continue sertraline therapy and I refilled it for the next 6 months.    SHANDA (generalized anxiety disorder)  -     sertraline (ZOLOFT) 50 MG tablet; Take 1 tablet (50 mg) by mouth daily        Patient has been advised of split billing requirements and indicates understanding: Yes    Estimated body mass index is 42.26 kg/m  as calculated from the following:   " " Height as of this encounter: 1.626 m (5' 4\").    Weight as of this encounter: 111.7 kg (246 lb 3.2 oz).        She quit smoking 45 years ago    Appropriate preventive services were discussed with this patient, including applicable screening as appropriate for cardiovascular disease, diabetes, osteopenia/osteoporosis, and glaucoma.  As appropriate for age/gender, discussed screening for colorectal cancer, prostate cancer, breast cancer, and cervical cancer. Checklist reviewing preventive services available has been given to the patient.    Reviewed patients plan of care and provided an AVS. The Basic Care Plan (routine screening as documented in Health Maintenance) for Radha meets the Care Plan requirement. This Care Plan has been established and reviewed with the Patient.    Counseling Resources:  ATP IV Guidelines  Pooled Cohorts Equation Calculator  Breast Cancer Risk Calculator  Breast Cancer: Medication to Reduce Risk  FRAX Risk Assessment  ICSI Preventive Guidelines  Dietary Guidelines for Americans, 2010  [employer]'s MyPlate  ASA Prophylaxis  Lung CA Screening    Mohan Moreno MD  [facility]    Identified Health Risks:  "

## 2022-07-21 DIAGNOSIS — H90.3 SENSORINEURAL HEARING LOSS (SNHL) OF BOTH EARS: Primary | ICD-10-CM

## 2022-07-26 ENCOUNTER — OFFICE VISIT (OUTPATIENT)
Dept: AUDIOLOGY | Facility: CLINIC | Age: 67
End: 2022-07-26
Attending: REGISTERED NURSE

## 2022-07-26 ENCOUNTER — OFFICE VISIT (OUTPATIENT)
Dept: OTOLARYNGOLOGY | Facility: CLINIC | Age: 67
End: 2022-07-26
Payer: MEDICARE

## 2022-07-26 VITALS — WEIGHT: 246 LBS | HEIGHT: 64 IN | BODY MASS INDEX: 42 KG/M2

## 2022-07-26 DIAGNOSIS — H90.3 SENSORINEURAL HEARING LOSS (SNHL) OF BOTH EARS: ICD-10-CM

## 2022-07-26 DIAGNOSIS — H90.3 SENSORY HEARING LOSS, BILATERAL: Primary | ICD-10-CM

## 2022-07-26 DIAGNOSIS — H61.22 IMPACTED CERUMEN OF LEFT EAR: ICD-10-CM

## 2022-07-26 DIAGNOSIS — H90.3 SENSORINEURAL HEARING LOSS (SNHL) OF BOTH EARS: Primary | ICD-10-CM

## 2022-07-26 PROCEDURE — 92550 TYMPANOMETRY & REFLEX THRESH: CPT | Performed by: AUDIOLOGIST

## 2022-07-26 PROCEDURE — 99203 OFFICE O/P NEW LOW 30 MIN: CPT | Mod: 25 | Performed by: REGISTERED NURSE

## 2022-07-26 PROCEDURE — 92504 EAR MICROSCOPY EXAMINATION: CPT | Performed by: REGISTERED NURSE

## 2022-07-26 PROCEDURE — 92557 COMPREHENSIVE HEARING TEST: CPT | Performed by: AUDIOLOGIST

## 2022-07-26 RX ORDER — BRIMONIDINE TARTRATE AND TIMOLOL MALEATE 2; 5 MG/ML; MG/ML
SOLUTION OPHTHALMIC
COMMUNITY
Start: 2022-07-25 | End: 2024-03-05

## 2022-07-26 ASSESSMENT — PAIN SCALES - GENERAL: PAINLEVEL: NO PAIN (0)

## 2022-07-26 NOTE — PROGRESS NOTES
Otolaryngology Clinic  July 26, 2022    Chief Complaint:   Bilateral sensorineural hearing loss       History of Present Illness:   Radha Baca is a 66 year old female who presents today for annual follow up for bilateral sensorineural hearing loss. Patient obtained hearing aids last year and has found good benefit with use of hearing aids. Tinnitus has improved with use of hearing aids. Patient denies any otalgia, otorrhea, dizziness, or ear surgeries.     Past Medical History:  Past Medical History:   Diagnosis Date     Abnormal Pap smear      Anxiety      Arthritis      Arthritis of knee 4/4/2013     Arthritis of shoulder region, right 4/18/2014     Back injury      Breast disorder      Chronic constipation      Chronic diarrhea      Depressive disorder      Diabetes (H)      Fecal incontinence      Finger pain 4/2/2015     Fracture broke L 5th pinky finger due to fall  1/2015     Glaucoma (increased eye pressure)      Head injury 8/6/2016     Headache(784.0)     decreased with mouth guard use.     History of blood transfusion 8/2011 & 4/2013     History of diabetes mellitus      Hypercholesteremia      Hypertension      Low back pain      Menarche age 10+    cycles q mo x 4-5 d     Neck injuries      Nonsenile cataract IO implants: L-8/2013; R-1/2011     Pain in knee joint     LEFT     Right bundle branch block     per H/P     Sleep apnea     Info on file     SNHL (sensorineural hearing loss)      Tinnitus     I have reported ringing in ears. not sure of dates     Umbilical hernia without mention of obstruction or gangrene 8/2014     Vision disorder Detached Retina 10/2009     Past Surgical History:  Past Surgical History:   Procedure Laterality Date     ARTHROPLASTY KNEE  4/4/2013    Left Total Knee Arthroplasty;  Surgeon: Lou Byrne MD;  [facility]     ARTHROPLASTY MINIMALLY INVASIVE KNEE  8/22/2011    R knee :CAITLYN JACOBO, Left age 14     COLONOSCOPY  1/14/2015     DENTAL  SURGERY      root canals, wisdom teeth     EXAM UNDER ANESTHESIA, MANIPULATE JOINT (LOCATION)  10/5/2012    Procedure: EXAM UNDER ANESTHESIA, MANIPULATE JOINT (LOCATION);  Right Knee Mini Open Lysis Of Adhesions, Left Knee Steroid Injection  ;  Surgeon: Lou Byrne MD;  [facility]      OR OCULAR DEVICE INTRAOP DETACHED RETINA  2009    R     HC PHAKIC IOL - IMPLANT FROM SURGEON      right     KNEE SURGERY  1969    left     LASER YAG CAPSULOTOMY  12/2016    left     VITRECTOMY PARSPLANA  2010       Medications:  Current Outpatient Medications   Medication Sig Dispense Refill     Acetaminophen (TYLENOL PO) Take by mouth as needed for mild pain or fever       amoxicillin (AMOXIL) 500 MG tablet Take 4 tablets (2000 mg) by mouth 1 hour before dental procedures/cleanings. 4 tablet 4     Ascorbic Acid (VITAMIN C PO) Take 2,000 mg by mouth 2 times daily        aspirin 81 MG tablet 1 tab daily 90 tablet 3     atorvastatin (LIPITOR) 20 MG tablet Take 1 tablet (20 mg) by mouth daily DX E78.5 ID # [id number] 90 tablet 3     B Complex Vitamins (VITAMIN  B COMPLEX) CAPS Take 1 tablet by mouth daily        blood glucose (ONETOUCH VERIO IQ) test strip Use to test blood sugar 4 times daily or as directed DX E11.8 ID # [id number] has meter already 360 strip 1     blood glucose calibration (ONETOUCH VERIO) solution Use to calibrate blood glucose monitor as needed as directed.Level 3 solution 1 each 11     brimonidine-timolol (COMBIGAN) 0.2-0.5 % ophthalmic solution        calcium-vitamin D (CALTRATE) 600-400 MG-UNIT per tablet Take 1 tablet by mouth 2 times daily       cholecalciferol (VITAMIN D) 1000 UNIT tablet Take 1 tablet by mouth daily. 100 tablet 3     cyanocobalamin (VITAMIN B-12) 1000 MCG tablet        cycloSPORINE (RESTASIS) 0.05 % ophthalmic emulsion        HUMALOG KWIKPEN 100 UNIT/ML soln Carb counting with meals approx 70-80 units daily subcutaneously DX E 11.8 ID # [id number] needs refrigeration 75 mL 3      hypromellose-dextran (ARTIFICAL TEARS) 0.1-0.3 % ophthalmic solution        insulin glargine (LANTUS SOLOSTAR) 100 UNIT/ML pen Inject 84 units SQ each am. 90 mL 3     insulin pen needle (B-D U/F) 31G X 8 MM miscellaneous Use  4 daily. ( Request-BD ultra -fine  Short Pen needle 31 G X 8MM) 400 each 1     latanoprost (XALATAN) 0.005 % ophthalmic solution Place 1 drop into both eyes At Bedtime       lisinopril-hydrochlorothiazide (ZESTORETIC) 20-12.5 MG tablet Take 1 tablet by mouth daily DX  I10 ID # [id number] 90 tablet 3     metFORMIN (GLUCOPHAGE) 500 MG tablet Take 2 tablets (1,000 mg) by mouth 2 times daily (with meals) 360 tablet 3     Multiple Vitamin (MULTIVITAMIN  S) CAPS Take 1 daily 90 capsule 3     Multiple Vitamins-Minerals (EYE-ZAKIYA PLUS LUTEIN PO)        Omega-3 Fatty Acids (OMEGA-3 FISH OIL PO) Take 1,000 mg by mouth daily       OneTouch Delica Lancets 33G MISC 4 Box 4 times daily Test  Blood glucose 4 times daily  DX E11.8  ID # [id number] 360 each 3     order for DME Equipment being ordered: Grade 1 (light) compression stockings, below the knee 1 Units 1     polyethylene glycol (MIRALAX) 17 GM/Dose powder Take 1 capful by mouth daily as needed for constipation       semaglutide (OZEMPIC, 0.25 OR 0.5 MG/DOSE,) 2 MG/1.5ML SOPN pen Inject  0.5 mg  Subcutaneous every 7 days 6 mL 3     sertraline (ZOLOFT) 50 MG tablet Take 1 tablet (50 mg) by mouth daily 90 tablet 1     timolol (TIMOPTIC) 0.5 % ophthalmic solution Place 1 drop into both eyes 2 times daily        triamcinolone (ARISTOCORT HP) 0.5 % external cream Apply topically 2 times daily 15 g 4     vitamin C (ASCORBIC ACID) 100 MG tablet Take 2,000 mg by mouth         Allergies:  Allergies   Allergen Reactions     Nkda [No Known Drug Allergies]         Social History:  Social History     Tobacco Use     Smoking status: Former Smoker     Packs/day: 0.00     Years: 0.00     Pack years: 0.00     Smokeless tobacco: Never Used     Tobacco comment: quit mid  "1980s   Vaping Use     Vaping Use: Never used   Substance Use Topics     Alcohol use: No     Drug use: No       ROS: 10 point ROS neg other than the symptoms noted above in the HPI.    Physical Exam:    Ht 1.626 m (5' 4\")   Wt 111.6 kg (246 lb)   BMI 42.23 kg/m       Constitutional:  The patient was unaccompanied, well-groomed, and in no acute distress.     Neurologic: Alert and oriented x 3.     Psychiatric: The patient's affect was calm, cooperative, and appropriate.     Communication:  Normal; communicates verbally, normal voice quality.    Respiratory: Breathing comfortably without stridor or exertion of accessory muscles.    Ears: Pinnae and tragus non-tender.  EAC's and TM's were clear.       Otologic microscope exam:      Left ear was also examined under the microscope.  Normal appearing TM, nicely aerated middle ear space. It was cleaned with suction and alligator forceps.          Audiogram: 7/26/2022 - data independently reviewed  Normal sloping to moderate SNHL bilaterally (stable compared to 7/23/21 exam)    WR right: 100% left: 96% @ 75 dB  Acoustic Reflexes: present in all conditions  Tympanograms: type A bilaterally         Assessment and Plan:  1. Sensorineural hearing loss (SNHL) of both ears  Patient with stable bilateral sensorineural hearing loss.  Reports benefit with bilateral hearing aids.  Tinnitus has improved wearing hearing aids.    Patient finds it helpful to follow-up with annual audiograms to be sure to monitor hearing.  She may return to clinic in 1 year with hearing test for continued surveillance.    2. Impacted cerumen of left ear  Impacted cerumen removed from left ear under microscopy today.  Continue to monitor.    - REMOVE IMPACTED CERUMEN    Patient will follow up in 1 year with audiogram.    Ladan Mancia DNP, APRN, CNP  Otolaryngology  Head & Neck Surgery  [phone]    30 minutes spent on the date of the encounter doing chart review, history and exam, documentation and " further activities per the note

## 2022-07-26 NOTE — NURSING NOTE
"Chief Complaint   Patient presents with     RECHECK     1 year follow up       Height 1.626 m (5' 4\"), weight 111.6 kg (246 lb), not currently breastfeeding.    Cathy Ocampo, EMT    "

## 2022-07-26 NOTE — LETTER
7/26/2022       RE: Radha Baca  [address] [address]     Dear Colleague,    Thank you for referring your patient, Radha Baca, to the [facility] [place] at [facility]. Please see a copy of my visit note below.      Otolaryngology Clinic  July 26, 2022    Chief Complaint:   Bilateral sensorineural hearing loss       History of Present Illness:   Radha Baca is a 66 year old female who presents today for annual follow up for bilateral sensorineural hearing loss. Patient obtained hearing aids last year and has found good benefit with use of hearing aids. Tinnitus has improved with use of hearing aids. Patient denies any otalgia, otorrhea, dizziness, or ear surgeries.     Past Medical History:  Past Medical History:   Diagnosis Date     Abnormal Pap smear      Anxiety      Arthritis      Arthritis of knee 4/4/2013     Arthritis of shoulder region, right 4/18/2014     Back injury      Breast disorder      Chronic constipation      Chronic diarrhea      Depressive disorder      Diabetes (H)      Fecal incontinence      Finger pain 4/2/2015     Fracture broke L 5th pinky finger due to fall  1/2015     Glaucoma (increased eye pressure)      Head injury 8/6/2016     Headache(784.0)     decreased with mouth guard use.     History of blood transfusion 8/2011 & 4/2013     History of diabetes mellitus      Hypercholesteremia      Hypertension      Low back pain      Menarche age 10+    cycles q mo x 4-5 d     Neck injuries      Nonsenile cataract IO implants: L-8/2013; R-1/2011     Pain in knee joint     LEFT     Right bundle branch block     per H/P     Sleep apnea     Info on file     SNHL (sensorineural hearing loss)      Tinnitus     I have reported ringing in ears. not sure of dates     Umbilical hernia without mention of obstruction or gangrene 8/2014     Vision disorder Detached Retina  10/2009     Past Surgical History:  Past Surgical History:   Procedure Laterality Date     ARTHROPLASTY KNEE  4/4/2013    Left Total Knee Arthroplasty;  Surgeon: Lou Byrne MD;  [facility]     ARTHROPLASTY MINIMALLY INVASIVE KNEE  8/22/2011    R knee :CAITLYN JACOBO, Left age 14     COLONOSCOPY  1/14/2015     DENTAL SURGERY      root canals, wisdom teeth     EXAM UNDER ANESTHESIA, MANIPULATE JOINT (LOCATION)  10/5/2012    Procedure: EXAM UNDER ANESTHESIA, MANIPULATE JOINT (LOCATION);  Right Knee Mini Open Lysis Of Adhesions, Left Knee Steroid Injection  ;  Surgeon: Lou Byrne MD;  [facility]     HC OR OCULAR DEVICE INTRAOP DETACHED RETINA  2009    R     HC PHAKIC IOL - IMPLANT FROM SURGEON      right     KNEE SURGERY  1969    left     LASER YAG CAPSULOTOMY  12/2016    left     VITRECTOMY PARSPLANA  2010       Medications:  Current Outpatient Medications   Medication Sig Dispense Refill     Acetaminophen (TYLENOL PO) Take by mouth as needed for mild pain or fever       amoxicillin (AMOXIL) 500 MG tablet Take 4 tablets (2000 mg) by mouth 1 hour before dental procedures/cleanings. 4 tablet 4     Ascorbic Acid (VITAMIN C PO) Take 2,000 mg by mouth 2 times daily        aspirin 81 MG tablet 1 tab daily 90 tablet 3     atorvastatin (LIPITOR) 20 MG tablet Take 1 tablet (20 mg) by mouth daily DX E78.5 ID # [id number] 90 tablet 3     B Complex Vitamins (VITAMIN  B COMPLEX) CAPS Take 1 tablet by mouth daily        blood glucose (ONETOUCH VERIO IQ) test strip Use to test blood sugar 4 times daily or as directed DX E11.8 ID # [id number] has meter already 360 strip 1     blood glucose calibration (ONETOUCH VERIO) solution Use to calibrate blood glucose monitor as needed as directed.Level 3 solution 1 each 11     brimonidine-timolol (COMBIGAN) 0.2-0.5 % ophthalmic solution        calcium-vitamin D (CALTRATE) 600-400 MG-UNIT per tablet Take 1 tablet by mouth 2 times daily       cholecalciferol  (VITAMIN D) 1000 UNIT tablet Take 1 tablet by mouth daily. 100 tablet 3     cyanocobalamin (VITAMIN B-12) 1000 MCG tablet        cycloSPORINE (RESTASIS) 0.05 % ophthalmic emulsion        HUMALOG KWIKPEN 100 UNIT/ML soln Carb counting with meals approx 70-80 units daily subcutaneously DX E 11.8 ID # [id number] needs refrigeration 75 mL 3     hypromellose-dextran (ARTIFICAL TEARS) 0.1-0.3 % ophthalmic solution        insulin glargine (LANTUS SOLOSTAR) 100 UNIT/ML pen Inject 84 units SQ each am. 90 mL 3     insulin pen needle (B-D U/F) 31G X 8 MM miscellaneous Use  4 daily. ( Request-BD ultra -fine  Short Pen needle 31 G X 8MM) 400 each 1     latanoprost (XALATAN) 0.005 % ophthalmic solution Place 1 drop into both eyes At Bedtime       lisinopril-hydrochlorothiazide (ZESTORETIC) 20-12.5 MG tablet Take 1 tablet by mouth daily DX  I10 ID # [id number] 90 tablet 3     metFORMIN (GLUCOPHAGE) 500 MG tablet Take 2 tablets (1,000 mg) by mouth 2 times daily (with meals) 360 tablet 3     Multiple Vitamin (MULTIVITAMIN  S) CAPS Take 1 daily 90 capsule 3     Multiple Vitamins-Minerals (EYE-ZAKIYA PLUS LUTEIN PO)        Omega-3 Fatty Acids (OMEGA-3 FISH OIL PO) Take 1,000 mg by mouth daily       OneTouch Delica Lancets 33G MISC 4 Box 4 times daily Test  Blood glucose 4 times daily  DX E11.8  ID # [id number] 360 each 3     order for DME Equipment being ordered: Grade 1 (light) compression stockings, below the knee 1 Units 1     polyethylene glycol (MIRALAX) 17 GM/Dose powder Take 1 capful by mouth daily as needed for constipation       semaglutide (OZEMPIC, 0.25 OR 0.5 MG/DOSE,) 2 MG/1.5ML SOPN pen Inject  0.5 mg  Subcutaneous every 7 days 6 mL 3     sertraline (ZOLOFT) 50 MG tablet Take 1 tablet (50 mg) by mouth daily 90 tablet 1     timolol (TIMOPTIC) 0.5 % ophthalmic solution Place 1 drop into both eyes 2 times daily        triamcinolone (ARISTOCORT HP) 0.5 % external cream Apply topically 2 times daily 15 g 4     vitamin C (ASCORBIC  "ACID) 100 MG tablet Take 2,000 mg by mouth         Allergies:  Allergies   Allergen Reactions     Nkda [No Known Drug Allergies]         Social History:  Social History     Tobacco Use     Smoking status: Former Smoker     Packs/day: 0.00     Years: 0.00     Pack years: 0.00     Smokeless tobacco: Never Used     Tobacco comment: quit mid 1980s   Vaping Use     Vaping Use: Never used   Substance Use Topics     Alcohol use: No     Drug use: No       ROS: 10 point ROS neg other than the symptoms noted above in the HPI.    Physical Exam:    Ht 1.626 m (5' 4\")   Wt 111.6 kg (246 lb)   BMI 42.23 kg/m       Constitutional:  The patient was unaccompanied, well-groomed, and in no acute distress.     Neurologic: Alert and oriented x 3.     Psychiatric: The patient's affect was calm, cooperative, and appropriate.     Communication:  Normal; communicates verbally, normal voice quality.    Respiratory: Breathing comfortably without stridor or exertion of accessory muscles.    Ears: Pinnae and tragus non-tender.  EAC's and TM's were clear.       Otologic microscope exam:      Left ear was also examined under the microscope.  Normal appearing TM, nicely aerated middle ear space. It was cleaned with suction and alligator forceps.          Audiogram: 7/26/2022 - data independently reviewed  Normal sloping to moderate SNHL bilaterally (stable compared to 7/23/21 exam)    WR right: 100% left: 96% @ 75 dB  Acoustic Reflexes: present in all conditions  Tympanograms: type A bilaterally         Assessment and Plan:  1. Sensorineural hearing loss (SNHL) of both ears  Patient with stable bilateral sensorineural hearing loss.  Reports benefit with bilateral hearing aids.  Tinnitus has improved wearing hearing aids.    Patient finds it helpful to follow-up with annual audiograms to be sure to monitor hearing.  She may return to clinic in 1 year with hearing test for continued surveillance.    2. Impacted cerumen of left ear  Impacted " cerumen removed from left ear under microscopy today.  Continue to monitor.    - REMOVE IMPACTED CERUMEN    Patient will follow up in 1 year with audiogram.    Ladan Mancia DNP, APRN, CNP  Otolaryngology  Head & Neck Surgery  [phone]    30 minutes spent on the date of the encounter doing chart review, history and exam, documentation and further activities per the note        Again, thank you for allowing me to participate in the care of your patient.      Sincerely,    Deepika Mancia, NP

## 2022-07-26 NOTE — PATIENT INSTRUCTIONS
- You were seen in the ENT Clinic today by Ladan Mancia NP.   - Follow up in 1 year with audiogram.  - Please send a [employer] message or call the ENT clinic at [phone] with any questions or concerns.

## 2022-07-26 NOTE — LETTER
7/26/2022      RE: Radha Baca  [address] [facility] [address]         Otolaryngology Clinic  July 26, 2022    Chief Complaint:   Bilateral sensorineural hearing loss       History of Present Illness:   Radha Baca is a 66 year old female who presents today for annual follow up for bilateral sensorineural hearing loss. Patient obtained hearing aids last year and has found good benefit with use of hearing aids. Tinnitus has improved with use of hearing aids. Patient denies any otalgia, otorrhea, dizziness, or ear surgeries.     Past Medical History:  Past Medical History:   Diagnosis Date     Abnormal Pap smear      Anxiety      Arthritis      Arthritis of knee 4/4/2013     Arthritis of shoulder region, right 4/18/2014     Back injury      Breast disorder      Chronic constipation      Chronic diarrhea      Depressive disorder      Diabetes (H)      Fecal incontinence      Finger pain 4/2/2015     Fracture broke L 5th pinky finger due to fall  1/2015     Glaucoma (increased eye pressure)      Head injury 8/6/2016     Headache(784.0)     decreased with mouth guard use.     History of blood transfusion 8/2011 & 4/2013     History of diabetes mellitus      Hypercholesteremia      Hypertension      Low back pain      Menarche age 9+    cycles q mo x 4-5 d     Neck injuries      Nonsenile cataract IO implants: L-8/2013; R-1/2011     Pain in knee joint     LEFT     Right bundle branch block     per H/P     Sleep apnea     Info on file     SNHL (sensorineural hearing loss)      Tinnitus     I have reported ringing in ears. not sure of dates     Umbilical hernia without mention of obstruction or gangrene 8/2014     Vision disorder Detached Retina 10/2009     Past Surgical History:  Past Surgical History:   Procedure Laterality Date     ARTHROPLASTY KNEE  4/4/2013    Left Total Knee Arthroplasty;  Surgeon: Lou Byrne MD;  [facility]     ARTHROPLASTY MINIMALLY INVASIVE KNEE   8/22/2011    R knee :CAITLYN JACOBO, Left age 14     COLONOSCOPY  1/14/2015     DENTAL SURGERY      root canals, wisdom teeth     EXAM UNDER ANESTHESIA, MANIPULATE JOINT (LOCATION)  10/5/2012    Procedure: EXAM UNDER ANESTHESIA, MANIPULATE JOINT (LOCATION);  Right Knee Mini Open Lysis Of Adhesions, Left Knee Steroid Injection  ;  Surgeon: Lou Byrne MD;  Location[facility]     [facility] OR OCULAR DEVICE INTRAOP DETACHED RETINA  2009    R     [facility] PHAKIC IOL - IMPLANT FROM SURGEON      right     KNEE SURGERY  1969    left     LASER YAG CAPSULOTOMY  12/2016    left     VITRECTOMY PARSPLANA  2010       Medications:  Current Outpatient Medications   Medication Sig Dispense Refill     Acetaminophen (TYLENOL PO) Take by mouth as needed for mild pain or fever       amoxicillin (AMOXIL) 500 MG tablet Take 4 tablets (2000 mg) by mouth 1 hour before dental procedures/cleanings. 4 tablet 4     Ascorbic Acid (VITAMIN C PO) Take 2,000 mg by mouth 2 times daily        aspirin 81 MG tablet 1 tab daily 90 tablet 3     atorvastatin (LIPITOR) 20 MG tablet Take 1 tablet (20 mg) by mouth daily DX E78.5 ID # [id number] 90 tablet 3     B Complex Vitamins (VITAMIN  B COMPLEX) CAPS Take 1 tablet by mouth daily        blood glucose (ONETOUCH VERIO IQ) test strip Use to test blood sugar 4 times daily or as directed DX E11.8 ID # [id number] has meter already 360 strip 1     blood glucose calibration (ONETOUCH VERIO) solution Use to calibrate blood glucose monitor as needed as directed.Level 3 solution 1 each 11     brimonidine-timolol (COMBIGAN) 0.2-0.5 % ophthalmic solution        calcium-vitamin D (CALTRATE) 600-400 MG-UNIT per tablet Take 1 tablet by mouth 2 times daily       cholecalciferol (VITAMIN D) 1000 UNIT tablet Take 1 tablet by mouth daily. 100 tablet 3     cyanocobalamin (VITAMIN B-12) 1000 MCG tablet        cycloSPORINE (RESTASIS) 0.05 % ophthalmic emulsion        HUMALOG KWIKPEN 100 UNIT/ML soln Carb counting with meals approx  70-80 units daily subcutaneously DX E 11.8 ID # [id number] needs refrigeration 75 mL 3     hypromellose-dextran (ARTIFICAL TEARS) 0.1-0.3 % ophthalmic solution        insulin glargine (LANTUS SOLOSTAR) 100 UNIT/ML pen Inject 84 units SQ each am. 90 mL 3     insulin pen needle (B-D U/F) 31G X 8 MM miscellaneous Use  4 daily. ( Request-BD ultra -fine  Short Pen needle 31 G X 8MM) 400 each 1     latanoprost (XALATAN) 0.005 % ophthalmic solution Place 1 drop into both eyes At Bedtime       lisinopril-hydrochlorothiazide (ZESTORETIC) 20-12.5 MG tablet Take 1 tablet by mouth daily DX  I10 ID # [id number] 90 tablet 3     metFORMIN (GLUCOPHAGE) 500 MG tablet Take 2 tablets (1,000 mg) by mouth 2 times daily (with meals) 360 tablet 3     Multiple Vitamin (MULTIVITAMIN  S) CAPS Take 1 daily 90 capsule 3     Multiple Vitamins-Minerals (EYE-ZAKIYA PLUS LUTEIN PO)        Omega-3 Fatty Acids (OMEGA-3 FISH OIL PO) Take 1,000 mg by mouth daily       OneTouch Delica Lancets 33G MISC 4 Box 4 times daily Test  Blood glucose 4 times daily  DX E11.8  ID # [id number] 360 each 3     order for DME Equipment being ordered: Grade 1 (light) compression stockings, below the knee 1 Units 1     polyethylene glycol (MIRALAX) 17 GM/Dose powder Take 1 capful by mouth daily as needed for constipation       semaglutide (OZEMPIC, 0.25 OR 0.5 MG/DOSE,) 2 MG/1.5ML SOPN pen Inject  0.5 mg  Subcutaneous every 7 days 6 mL 3     sertraline (ZOLOFT) 50 MG tablet Take 1 tablet (50 mg) by mouth daily 90 tablet 1     timolol (TIMOPTIC) 0.5 % ophthalmic solution Place 1 drop into both eyes 2 times daily        triamcinolone (ARISTOCORT HP) 0.5 % external cream Apply topically 2 times daily 15 g 4     vitamin C (ASCORBIC ACID) 100 MG tablet Take 2,000 mg by mouth         Allergies:  Allergies   Allergen Reactions     Nkda [No Known Drug Allergies]         Social History:  Social History     Tobacco Use     Smoking status: Former Smoker     Packs/day: 0.00     Years: 0.00  "    Pack years: 0.00     Smokeless tobacco: Never Used     Tobacco comment: quit mid 1980s   Vaping Use     Vaping Use: Never used   Substance Use Topics     Alcohol use: No     Drug use: No       ROS: 10 point ROS neg other than the symptoms noted above in the HPI.    Physical Exam:    Ht 1.626 m (5' 4\")   Wt 111.6 kg (246 lb)   BMI 42.23 kg/m       Constitutional:  The patient was unaccompanied, well-groomed, and in no acute distress.     Neurologic: Alert and oriented x 3.     Psychiatric: The patient's affect was calm, cooperative, and appropriate.     Communication:  Normal; communicates verbally, normal voice quality.    Respiratory: Breathing comfortably without stridor or exertion of accessory muscles.    Ears: Pinnae and tragus non-tender.  EAC's and TM's were clear.       Otologic microscope exam:      Left ear was also examined under the microscope.  Normal appearing TM, nicely aerated middle ear space. It was cleaned with suction and alligator forceps.          Audiogram: 7/26/2022 - data independently reviewed  Normal sloping to moderate SNHL bilaterally (stable compared to 7/23/21 exam)    WR right: 100% left: 96% @ 75 dB  Acoustic Reflexes: present in all conditions  Tympanograms: type A bilaterally         Assessment and Plan:  1. Sensorineural hearing loss (SNHL) of both ears  Patient with stable bilateral sensorineural hearing loss.  Reports benefit with bilateral hearing aids.  Tinnitus has improved wearing hearing aids.    Patient finds it helpful to follow-up with annual audiograms to be sure to monitor hearing.  She may return to clinic in 1 year with hearing test for continued surveillance.    2. Impacted cerumen of left ear  Impacted cerumen removed from left ear under microscopy today.  Continue to monitor.    - REMOVE IMPACTED CERUMEN    Patient will follow up in 1 year with audiogram.    Ladan Mancia DNP, APRN, CNP  Otolaryngology  Head & Neck Surgery  [phone]    30 minutes spent on " the date of the encounter doing chart review, history and exam, documentation and further activities per the note

## 2022-07-26 NOTE — PROGRESS NOTES
AUDIOLOGY REPORT    SUMMARY: Audiology visit completed. See audiogram for results.      RECOMMENDATIONS: Follow-up with ENT.        Rupert Azevedo, CCC-A  Licensed Audiologist  [place] #[id number]

## 2022-08-05 ENCOUNTER — DOCUMENTATION ONLY (OUTPATIENT)
Dept: OTHER | Facility: CLINIC | Age: 67
End: 2022-08-05

## 2022-08-07 ENCOUNTER — HEALTH MAINTENANCE LETTER (OUTPATIENT)
Age: 67
End: 2022-08-07

## 2022-08-12 ENCOUNTER — OFFICE VISIT (OUTPATIENT)
Dept: AUDIOLOGY | Facility: CLINIC | Age: 67
End: 2022-08-12
Payer: MEDICARE

## 2022-08-12 DIAGNOSIS — H90.3 SENSORINEURAL HEARING LOSS (SNHL) OF BOTH EARS: Primary | ICD-10-CM

## 2022-08-12 PROCEDURE — 99207 PR ASSESSMENT FOR HEARING AID: CPT | Performed by: AUDIOLOGIST

## 2022-08-12 NOTE — PROGRESS NOTES
AUDIOLOGY REPORT    SUBJECTIVE:Radha Baca is a 66 year old female who was seen in the Audiology Clinic at the [facility] on 8/12/2022  for a follow-up check of their hearing aids. Previous results have revealed normal sloping to moderate sensorineural hearing loss bilaterally.  The patient has been seen previously in this clinic and was fit with bilateral Phonak Audeo P50-R PERICO hearing aids on 10/14/2021.  Radha reports she has no concerna with the hearing aids functions. She reports good volume levels. She does reports she lost her hearing aid bag with supplies and is wondering if that can be replaced.    OBJECTIVE:   Electroacoustic analysis indicates good function bilaterally. Both hearing aids are cleaned and checked. Good sound quality  Note din both hearing aids. A new supply bag with domes, filters, case and cleaning tool is given to patient.     No charge visit today (in warranty hearing aid check).      ASSESSMENT: A follow-up appointment for hearing aid's was completed today. Changes to hearing aid was completed as outlined above.     PLAN:Radha will return for follow-up as needed, or at least every 9-12 months for cleaning and assessment of hearing aid.  . Please call this clinic with any questions regarding today s appointment.        Adriane Azeevdo., [facility]-A  Licensed Audiologist  [place] #[id number]

## 2022-08-18 ENCOUNTER — MYC MEDICAL ADVICE (OUTPATIENT)
Dept: ENDOCRINOLOGY | Facility: CLINIC | Age: 67
End: 2022-08-18

## 2022-08-18 DIAGNOSIS — E11.8 TYPE 2 DIABETES MELLITUS WITH COMPLICATION (H): ICD-10-CM

## 2022-08-18 NOTE — TELEPHONE ENCOUNTER
blood glucose calibration (ONETOUCH VERIO) solution    4/5/2022  [facility] Keturah Jane MD    Endocrinology, Diabetes, and Metabolism     My chart request.

## 2022-08-25 DIAGNOSIS — E11.8 TYPE 2 DIABETES MELLITUS WITH COMPLICATION (H): ICD-10-CM

## 2022-08-26 RX ORDER — INSULIN LISPRO 100 [IU]/ML
INJECTION, SOLUTION INTRAVENOUS; SUBCUTANEOUS
Qty: 75 ML | Refills: 3 | Status: SHIPPED | OUTPATIENT
Start: 2022-08-26 | End: 2023-10-16

## 2022-08-26 NOTE — TELEPHONE ENCOUNTER
HUMALOG KWIKPEN 100 UNIT/ML soln         Last Written Prescription Date:  10/25/21  Last Fill Quantity: 75 ml,   # refills:3  Last Office Visit : 4/5/22  Future Office visit:  11/22/22    Routing refill request to provider for review/approval because:  Insulin - refilled per clinic

## 2022-08-26 NOTE — TELEPHONE ENCOUNTER
Short Acting Insulin Protocol Failed 08/26/2022 03:17 PM   Protocol Details  HgbA1C in past 3 or 6 months        Lab Order in place

## 2022-08-27 ENCOUNTER — NURSE TRIAGE (OUTPATIENT)
Dept: NURSING | Facility: CLINIC | Age: 67
End: 2022-08-27

## 2022-08-27 NOTE — TELEPHONE ENCOUNTER
S:  Covid positive.   B: Home covid test done on  8/20 came back negative.  Rested on 8/21 came back positive.   Has been at home in isolation.  The below symptoms still persist.     Current symptoms are:    Fever    Cough    Headache    Runny nose    Fatigue    Fever (now gone)    Has been vaccinated and received booster shots for covid. Has been staying well hydrated.    A:  Needs to stay at home until symptoms are gone for 24 hours.  Could schedule a virtual visit  If she wants to.  R:  Protocol and care advice reviewed. Patient verbalized understanding, in agreement with plan, and voiced no further questions. Call back with any new or worsening symptoms, concerns, or questions.    Elayne Garcia RN, MA  [facility] Triage Nurse Advisor    Reason for Disposition    [1] PERSISTING SYMPTOMS OF COVID-19 AND [2] symptoms BETTER (improving)    Additional Information    Negative: SEVERE difficulty breathing (e.g., struggling for each breath, speaks in single words)    Negative: [1] SEVERE weakness (e.g., can't stand or can barely walk) AND [2] new-onset or WORSE    Negative: Difficult to awaken or acting confused (e.g., disoriented, slurred speech)    Negative: Bluish (or gray) lips or face now    Negative: Sounds like a life-threatening emergency to the triager    Negative: [1] MODERATE difficulty breathing (e.g., speaks in phrases, SOB even at rest, pulse 100-120) AND [2] new-onset or WORSE    Negative: [1] MODERATE difficulty breathing AND [2] oxygen level (e.g., pulse oximetry) 91 to 94 percent    Negative: MODERATE difficulty breathing (e.g., speaks in phrases, SOB even at rest, pulse 100-120)    Negative: [1] Drinking very little AND [2] dehydration suspected (e.g., no urine > 12 hours, very dry mouth, very lightheaded)    Negative: Patient sounds very sick or weak to the triager    Negative: [1] MILD difficulty breathing (e.g., minimal/no SOB at rest, SOB with walking, pulse <100) AND [2] new-onset     Negative: Oxygen level (e.g., pulse oximetry) 91 to 94 percent    Negative: [1] PERSISTING SYMPTOMS OF COVID-19 AND [2] NEW symptom AND [3] could be serious    Negative: [1] Caller has URGENT question AND [2] triager unable to answer question    Negative: [1] PERSISTING SYMPTOMS OF COVID-19 AND [2] symptoms WORSE    Negative: [1] Caller has NON-URGENT question AND [2] triager unable to answer    Negative: [1] PERSISTING SYMPTOMS OF COVID-19 AND [2] NO medical visit for COVID-19 in past 2 weeks    Negative: [1] PERSISTING SYMPTOMS OF COVID-19 AND [2] symptoms SAME AND [3] medical visit for COVID-19 in past 2 weeks    Commented on: Oxygen level (e.g., pulse oximetry) 90 percent or lower     Does not have a pulse oximeter at home.  Unable to test.    Protocols used: CORONAVIRUS (COVID-19) PERSISTING SYMPTOMS FOLLOW-UP CALL-A- 1.18.2022

## 2022-10-04 DIAGNOSIS — Z96.653 STATUS POST TOTAL BILATERAL KNEE REPLACEMENT: ICD-10-CM

## 2022-10-04 RX ORDER — AMOXICILLIN 500 MG/1
TABLET, FILM COATED ORAL
Qty: 4 TABLET | Refills: 4 | Status: SHIPPED | OUTPATIENT
Start: 2022-10-04 | End: 2023-10-17

## 2022-10-04 NOTE — TELEPHONE ENCOUNTER
Medication refill request for Amoxicillin dental prophylaxis faxed to us from [facility].  Medication refilled per standing orders.    Mayra Pop RN on 10/4/2022 at 12:10 PM

## 2022-10-12 ENCOUNTER — OFFICE VISIT (OUTPATIENT)
Dept: ORTHOPEDICS | Facility: CLINIC | Age: 67
End: 2022-10-12
Payer: MEDICARE

## 2022-10-12 ENCOUNTER — TELEPHONE (OUTPATIENT)
Dept: AUDIOLOGY | Facility: CLINIC | Age: 67
End: 2022-10-12

## 2022-10-12 DIAGNOSIS — B35.1 OM (ONYCHOMYCOSIS): ICD-10-CM

## 2022-10-12 DIAGNOSIS — E11.65 TYPE II OR UNSPECIFIED TYPE DIABETES MELLITUS WITH NEUROLOGICAL MANIFESTATIONS, UNCONTROLLED(250.62) (H): Primary | ICD-10-CM

## 2022-10-12 DIAGNOSIS — E11.49 TYPE II OR UNSPECIFIED TYPE DIABETES MELLITUS WITH NEUROLOGICAL MANIFESTATIONS, UNCONTROLLED(250.62) (H): Primary | ICD-10-CM

## 2022-10-12 PROCEDURE — 99213 OFFICE O/P EST LOW 20 MIN: CPT | Performed by: PODIATRIST

## 2022-10-12 NOTE — LETTER
10/12/2022         RE: Radha Baca  [address] [facility] [address]        Dear Colleague,    Thank you for referring your patient, Radha Baca, to the [facility] [place]. Please see a copy of my visit note below.    Chief Complaint   Patient presents with     Follow Up     3 month follow up.             Allergies   Allergen Reactions     Nkda [No Known Drug Allergies]          Subjective: Radha is a 66 year old female who presents to the clinic today for a diabetic foot exam and management. Relates that she has no new LE complaints today. She does have diabetic shoes.   Her diabetes is being managed by Dr. De Luna.  She last saw them on April 5, 2022     Objective  Hemoglobin A1C   Date Value Ref Range Status   04/04/2022 8.6 (H) 0.0 - 5.6 % Final     Comment:     Normal <5.7%   Prediabetes 5.7-6.4%    Diabetes 6.5% or higher     Note: Adopted from ADA consensus guidelines.   11/01/2021 8.6 (H) 0.0 - 5.6 % Final     Comment:     Normal <5.7%   Prediabetes 5.7-6.4%    Diabetes 6.5% or higher     Note: Adopted from ADA consensus guidelines.   07/12/2021 8.8 (H) 0.0 - 5.6 % Final     Comment:     Normal <5.7%   Prediabetes 5.7-6.4%    Diabetes 6.5% or higher     Note: Adopted from ADA consensus guidelines.   04/12/2021 8.9 (H) 0 - 5.6 % Final     Comment:     Normal <5.7% Prediabetes 5.7-6.4%  Diabetes 6.5% or higher - adopted from ADA   consensus guidelines.     01/18/2021 8.4 (H) 0 - 5.6 % Final     Comment:     Normal <5.7% Prediabetes 5.7-6.4%  Diabetes 6.5% or higher - adopted from ADA   consensus guidelines.     09/28/2020 9.0 (H) 0 - 5.6 % Final     Comment:     Normal <5.7% Prediabetes 5.7-6.4%  Diabetes 6.5% or higher - adopted from ADA   consensus guidelines.       Hemoglobin A1C POCT   Date Value Ref Range Status   01/18/2018 9.6 (A) 4.3 - 6 % Final   10/18/2017 9.5 (A) 4.3 - 6 % Final   07/19/2017 9.7 (A) 4.3 - 6 % Final     PT and DP  pulses are not palpable bilaterally. CRT is 4 seconds. Diminished pedal hair.   Gross sensation is diminished, as well as protective sensation bilaterally.   Equinus is noted bilaterally.   Nails thickened, brittle, discolored, with subungual debris bilaterally. No open lesions are noted. Xerosis noted BL.      Assessment: DM2 with neuropathy.  Onychomycosis.   Xerosis     Plan:  - Pt seen and evaluated.  - Nails debrided x 10.  - See again in 4 months.    Jerrell Austin DPM

## 2022-10-13 NOTE — TELEPHONE ENCOUNTER
Walk-in hearing aid services on 10/13/22: The patient asked to have her hearing aids cleaned and checked.  Both hearing aids were deep cleaned and the  filters and domes were replaced bilaterally.  Both hearing aids were found to be working well and were returned to the patient.

## 2022-10-19 DIAGNOSIS — E11.8 TYPE 2 DIABETES MELLITUS WITH COMPLICATION (H): ICD-10-CM

## 2022-10-23 ENCOUNTER — HEALTH MAINTENANCE LETTER (OUTPATIENT)
Age: 67
End: 2022-10-23

## 2022-10-31 ENCOUNTER — DOCUMENTATION ONLY (OUTPATIENT)
Dept: ENDOCRINOLOGY | Facility: CLINIC | Age: 67
End: 2022-10-31

## 2022-10-31 NOTE — PROGRESS NOTES
Pauline CMN form received for meter strips and control solution. Form emailed to provider for signature.    Pérez Berman [facility]  Adult Endocrinology  [facility]

## 2022-11-02 NOTE — PROGRESS NOTES
Form signed by Dr De Luna and faxed back at [phone].    Pérez Berman [facility]  Adult Endocrinology  [facility]

## 2022-11-03 ENCOUNTER — ALLIED HEALTH/NURSE VISIT (OUTPATIENT)
Dept: FAMILY MEDICINE | Facility: CLINIC | Age: 67
End: 2022-11-03
Payer: MEDICARE

## 2022-11-03 DIAGNOSIS — Z23 NEED FOR PROPHYLACTIC VACCINATION AND INOCULATION AGAINST INFLUENZA: Primary | ICD-10-CM

## 2022-11-03 PROCEDURE — 90662 IIV NO PRSV INCREASED AG IM: CPT

## 2022-11-03 PROCEDURE — G0008 ADMIN INFLUENZA VIRUS VAC: HCPCS

## 2022-11-03 PROCEDURE — 99207 PR NO CHARGE NURSE ONLY: CPT

## 2022-11-07 ENCOUNTER — MEDICAL CORRESPONDENCE (OUTPATIENT)
Dept: HEALTH INFORMATION MANAGEMENT | Facility: CLINIC | Age: 67
End: 2022-11-07

## 2022-11-10 ENCOUNTER — IMMUNIZATION (OUTPATIENT)
Dept: FAMILY MEDICINE | Facility: CLINIC | Age: 67
End: 2022-11-10
Payer: MEDICARE

## 2022-11-10 DIAGNOSIS — Z23 ENCOUNTER FOR IMMUNIZATION: Primary | ICD-10-CM

## 2022-11-10 PROCEDURE — 99207 PR NO CHARGE NURSE ONLY: CPT

## 2022-11-10 PROCEDURE — 0134A COVID-19,PF,MODERNA BIVALENT: CPT

## 2022-11-10 PROCEDURE — 91313 COVID-19,PF,MODERNA BIVALENT: CPT

## 2022-11-10 NOTE — PROGRESS NOTES
Patient here for Moderna diandra, gave in the left deltoid, patient tolerated well.    Recommended that she wait in the lobby for 15 minutes.    Wendy Cortes RN

## 2022-11-14 DIAGNOSIS — E11.8 TYPE 2 DIABETES MELLITUS WITH COMPLICATION (H): Primary | ICD-10-CM

## 2022-11-18 NOTE — PROGRESS NOTES
Radha Baca  is being evaluated via a billable video visit.      How would you like to obtain your AVS? [employer]  For the video visit, send the invitation by: Text to cell phone: [phone]  Will anyone else be joining your video visit? No      Outcome for 11/18/22 3:34 PM :Sent patient [employer] message asking them to upload their BG data   Florence Jacobson  Outcome for 11/21/22 9:15 AM :Glucose sent via Email   Florencesara Jacobson    Video-Visit Details    Type of service:  Video Visit    Video Start Time (time video started): 9:10AM    Video End Time (time video stopped): 9:25am    Originating Location (pt. Location): Home    Location (provider location):  On-site    Mode of Communication:  Video Conference via TAPQUAD      Keturah De Luna MD    Follow up for T2DM    Interval History:  - 7 m follow up for T2DM  - busy with taken care of mother, splits this duty with her sister      1. Type 2 DM:  Diabetes: Has type 2 DM, diagnosed before 2005    HbA1c is mid 8% range, as before    Current Treatment:   - Ozemipc 0.5 mg weekly on Sunday,causing nausea which is tolerable but also constipation which give her a lot of problems  - Lantus 75 units once daily  - Humalog was increased at last visit from 20->22   units with each meal, 2-3 meals/day, CR 5, counts carbs, IS 50  - Metformin 1000mg po BID, she is on this for long time, not sure if this is causing the side effects.    Hypoglycemia  None recently    Diet:   2-3 meals/day.healthy and sometimes unhealthy depending on her stress level    Exercise  Has been unable to exercise as she is taking care of her mom full-time      2. Diabetic Complications:  - Retinopathy: Outside System - mild NPDR and DM macular edema which is mild and recommended observation.  - Nephropathy:  Nl urine microalb, nl creatinine  - Neuropathy: Had tingling .  - feet podiatry does toenails q 3m    3. Cardiovascular risk factors:  - Lipids: Lipitor 20mg,   - HTN:  on lisinopril/HCTZ  well-controlled 20-12.5  - Aspirin - 81mg daily    4. Hypothyroidism:  - TSH - nl 11/21/22      Assessment and Plan:  1. Type 2 diabetes mellitus c/b DM retinopathy, with comorbidity of hypertension obesity and hypothyroidism    Blood glucose control  No recent A1c is patient was supposed to be an in person visit, however her blood glucose reading consistently above goal fasting and after meals.  Would recommend to increase both basal and bolus insulin.  Also reviewed bolus insulin calculations.  Patient is interested in getting a glucose sensor which I strongly support considering patient's frequency of insulin injections 3-4 times a day    Plan:   - increaseHumalog carb ratio: 4, insulin sensitivity: 30 corrected to 120 during the day and 160 at bedtime   - Continue Ozemipc 0.5 mg weekly subcutaneous  injections on Sunday  - increase Lantus to 80 units subcutaneous once daily  - Metformin 1000mg po BID    - Glucose check fasting, before and 2 hours post prandial and bedtime in a rotating fashion    2. Diabetic complications:  - Retinopathy: has diabetic retinopathy   - Nephropathy: nl Creatinine, slightly elevated microalbumin, continue to work on tight blood glucose control, patient is on lisinopril hydrochlorothiazide  - Neuropathy: followed by podiatry,     - Lipids: excellent LDL on Atorvastatin 20 mg  - Blood pressure well controlled on lisinopril hydrochlorothiazide  - Aspirin - 81mg daily      Keturah De Luna MD  Endocrinology and Diabetes  Telephone contact:  [facility] [place] [phone]  [facility] [phone]          ==========================================================================================              Past Medical/Surgical History:   Reviewed in chart  Past Medical History:   Diagnosis Date     Abnormal Pap smear      Anxiety      Arthritis      Arthritis of knee 4/4/2013     Arthritis of shoulder region, right 4/18/2014     Back  injury      Breast disorder      Chronic constipation      Chronic diarrhea      Depressive disorder      Diabetes (H)      Fecal incontinence      Finger pain 4/2/2015     Fracture broke L 5th pinky finger due to fall  1/2015     Glaucoma (increased eye pressure)      Head injury 8/6/2016     Headache(784.0)     decreased with mouth guard use.     History of blood transfusion 8/2011 & 4/2013     History of diabetes mellitus      Hypercholesteremia      Hypertension      Low back pain      Menarche age 9+    cycles q mo x 4-5 d     Neck injuries      Nonsenile cataract IO implants: L-8/2013; R-1/2011     Pain in knee joint     LEFT     Right bundle branch block     per H/P     Sleep apnea     Info on file     SNHL (sensorineural hearing loss)      Tinnitus     I have reported ringing in ears. not sure of dates     Umbilical hernia without mention of obstruction or gangrene 8/2014     Vision disorder Detached Retina 10/2009     Past Surgical History:   Procedure Laterality Date     ARTHROPLASTY KNEE  4/4/2013    Left Total Knee Arthroplasty;  Surgeon: Lou Byrne MD;  [facility]     ARTHROPLASTY MINIMALLY INVASIVE KNEE  8/22/2011    R knee :CAITLYN JACOBO, Left age 14     COLONOSCOPY  1/14/2015     DENTAL SURGERY      root canals, wisdom teeth     EXAM UNDER ANESTHESIA, MANIPULATE JOINT (LOCATION)  10/5/2012    Procedure: EXAM UNDER ANESTHESIA, MANIPULATE JOINT (LOCATION);  Right Knee Mini Open Lysis Of Adhesions, Left Knee Steroid Injection  ;  Surgeon: Lou Byrne MD;  [facility]      OR OCULAR DEVICE INTRAOP DETACHED RETINA  2009    R     HC PHAKIC IOL - IMPLANT FROM SURGEON      right     KNEE SURGERY  1969    left     LASER YAG CAPSULOTOMY  12/2016    left     VITRECTOMY PARSPLANA  2010       Allergies:  Reviewed in chart    Current Medications:   Reviewed in chart    Family History:  Reviewed in chart    Social History:  Reviewed in chart    Physical Examination:  Vital  "signs:                         Estimated body mass index is 42.23 kg/m  as calculated from the following:    Height as of 7/26/22: 1.626 m (5' 4\").    Weight as of 7/26/22: 111.6 kg (246 lb).    Previous Weights:   Wt Readings from Last 3 Encounters:   07/26/22 111.6 kg (246 lb)   07/20/22 111.7 kg (246 lb 3.2 oz)   07/13/22 112 kg (247 lb)                    BMI:  There is no height or weight on file to calculate BMI.     Reported vitals:  There were no vitals taken for this visit.   healthy, alert and no distress  PSYCH: Alert and oriented times 3; coherent speech, normal   rate and volume, able to articulate logical thoughts, able   to abstract reason, no tangential thoughts, no hallucinations   or delusions  Her affect is normal and pleasant  RESP: No cough, no audible wheezing, able to talk in full sentences  Remainder of exam unable to be completed due to telephone visits       Endocrine Labs:  Lab Results   Component Value Date    .7 01/28/2021    CHLORIDE 98.8 01/28/2021    CO2 25.4 01/28/2021     (H) 11/21/2022    CR 0.80 11/21/2022    CR 0.71 04/04/2022    CR 0.71 07/12/2021    CR 0.74 04/12/2021    CR 0.6 01/28/2021    COLIN 9.8 01/28/2021    MAG 2.0 01/18/2021    ALBUMIN 3.7 09/28/2020    ALKPHOS 75.0 01/28/2021    LDL 37 11/21/2022    HDL 47 (L) 11/21/2022    TRIG 155 (H) 11/21/2022    TSH 3.56 11/21/2022    TSH 2.46 04/04/2022    TSH 2.86 04/12/2021     Lab Results   Component Value Date    MICROL 13.6 11/21/2022    MICROL 7 04/12/2021    MICROL 9 01/18/2021    MICROL 10 09/28/2020    MICROL 15 03/02/2020     Lab Results   Component Value Date    A1C 8.6 (H) 04/04/2022    A1C 8.6 (H) 11/01/2021    A1C 8.8 (H) 07/12/2021    A1C 8.9 (H) 04/12/2021    A1C 8.4 (H) 01/18/2021       Lab Results   Component Value Date    HGB 11.8 09/28/2020           HPI:  Radha Baca is a elderly  female with type 2 diabetes diagnosed [id number]. Has PMH for HTN, arthritis, chronic " constipation

## 2022-11-19 ASSESSMENT — ENCOUNTER SYMPTOMS
TREMORS: 0
VOMITING: 0
EYE REDNESS: 0
RECTAL PAIN: 0
EYE PAIN: 0
DISTURBANCES IN COORDINATION: 0
LOSS OF CONSCIOUSNESS: 0
SPEECH CHANGE: 0
BACK PAIN: 1
NECK PAIN: 1
BLOOD IN STOOL: 0
DISTURBANCES IN COORDINATION: 0
DIZZINESS: 1
BOWEL INCONTINENCE: 0
PARALYSIS: 0
EYE REDNESS: 0
EYE WATERING: 0
MEMORY LOSS: 0
ARTHRALGIAS: 1
TINGLING: 1
DIARRHEA: 1
STIFFNESS: 1
ABDOMINAL PAIN: 0
HEADACHES: 1
NAUSEA: 1
DIARRHEA: 1
HEARTBURN: 0
TREMORS: 0
EYE IRRITATION: 0
WEAKNESS: 0
BACK PAIN: 1
JAUNDICE: 0
HEARTBURN: 0
MEMORY LOSS: 0
EYE PAIN: 0
NUMBNESS: 1
WEAKNESS: 0
NECK PAIN: 1
DIZZINESS: 1
BLOOD IN STOOL: 0
EYE WATERING: 0
DOUBLE VISION: 0
EYE IRRITATION: 0
DOUBLE VISION: 0
BOWEL INCONTINENCE: 0
JOINT SWELLING: 0
SPEECH CHANGE: 0
VOMITING: 0
BLOATING: 1
MYALGIAS: 1
MUSCLE WEAKNESS: 1
CONSTIPATION: 0
ABDOMINAL PAIN: 0
MUSCLE CRAMPS: 1
MUSCLE WEAKNESS: 1
PARALYSIS: 0
SEIZURES: 0
BLOATING: 1
NUMBNESS: 1
MYALGIAS: 1
SEIZURES: 0
ARTHRALGIAS: 1
LOSS OF CONSCIOUSNESS: 0
HEADACHES: 1
MUSCLE CRAMPS: 1
CONSTIPATION: 0
STIFFNESS: 1
RECTAL PAIN: 0
JAUNDICE: 0
NAUSEA: 1
JOINT SWELLING: 0
TINGLING: 1

## 2022-11-21 ENCOUNTER — LAB (OUTPATIENT)
Dept: LAB | Facility: CLINIC | Age: 67
End: 2022-11-21
Payer: MEDICARE

## 2022-11-21 ENCOUNTER — APPOINTMENT (OUTPATIENT)
Dept: ENDOCRINOLOGY | Facility: CLINIC | Age: 67
End: 2022-11-21
Payer: MEDICARE

## 2022-11-21 DIAGNOSIS — E11.8 TYPE 2 DIABETES MELLITUS WITH COMPLICATION (H): ICD-10-CM

## 2022-11-21 LAB
BUN SERPL-MCNC: 15.2 MG/DL (ref 8–23)
CHOLEST SERPL-MCNC: 115 MG/DL
CREAT SERPL-MCNC: 0.8 MG/DL (ref 0.51–0.95)
CREAT UR-MCNC: 68.7 MG/DL
FASTING STATUS PATIENT QL REPORTED: YES
GFR SERPL CREATININE-BSD FRML MDRD: 80 ML/MIN/1.73M2
GLUCOSE SERPL-MCNC: 153 MG/DL (ref 70–99)
HDLC SERPL-MCNC: 47 MG/DL
LDLC SERPL CALC-MCNC: 37 MG/DL
MICROALBUMIN UR-MCNC: 13.6 MG/L
MICROALBUMIN/CREAT UR: 19.8 MG/G CR (ref 0–25)
NONHDLC SERPL-MCNC: 68 MG/DL
POTASSIUM SERPL-SCNC: 4.4 MMOL/L (ref 3.4–5.3)
TRIGL SERPL-MCNC: 155 MG/DL
TSH SERPL DL<=0.005 MIU/L-ACNC: 3.56 UIU/ML (ref 0.3–4.2)

## 2022-11-21 PROCEDURE — 84520 ASSAY OF UREA NITROGEN: CPT | Performed by: PATHOLOGY

## 2022-11-21 PROCEDURE — 82043 UR ALBUMIN QUANTITATIVE: CPT | Performed by: INTERNAL MEDICINE

## 2022-11-21 PROCEDURE — 36415 COLL VENOUS BLD VENIPUNCTURE: CPT | Performed by: PATHOLOGY

## 2022-11-21 PROCEDURE — 82565 ASSAY OF CREATININE: CPT | Performed by: PATHOLOGY

## 2022-11-21 PROCEDURE — 80061 LIPID PANEL: CPT | Performed by: PATHOLOGY

## 2022-11-21 PROCEDURE — 84443 ASSAY THYROID STIM HORMONE: CPT | Performed by: PATHOLOGY

## 2022-11-21 PROCEDURE — 82947 ASSAY GLUCOSE BLOOD QUANT: CPT | Performed by: PATHOLOGY

## 2022-11-21 PROCEDURE — 84132 ASSAY OF SERUM POTASSIUM: CPT | Performed by: PATHOLOGY

## 2022-11-21 ASSESSMENT — ENCOUNTER SYMPTOMS
EYE PAIN: 0
ABDOMINAL PAIN: 0
NECK PAIN: 1
MYALGIAS: 1
TREMORS: 0
BLOATING: 1
BACK PAIN: 1
EYE IRRITATION: 0
WEAKNESS: 0
JOINT SWELLING: 0
VOMITING: 0
HEARTBURN: 0
LOSS OF CONSCIOUSNESS: 0
JAUNDICE: 0
PARALYSIS: 0
ARTHRALGIAS: 1
MEMORY LOSS: 0
SPEECH CHANGE: 0
DISTURBANCES IN COORDINATION: 0
RECTAL PAIN: 0
CONSTIPATION: 0
STIFFNESS: 1
MUSCLE CRAMPS: 1
EYE REDNESS: 0
BOWEL INCONTINENCE: 0
NAUSEA: 1
DOUBLE VISION: 0
BLOOD IN STOOL: 0
SEIZURES: 0
HEADACHES: 1
NUMBNESS: 1
DIZZINESS: 1
EYE WATERING: 0
DIARRHEA: 1
TINGLING: 1
MUSCLE WEAKNESS: 1

## 2022-11-22 ENCOUNTER — MEDICAL CORRESPONDENCE (OUTPATIENT)
Dept: HEALTH INFORMATION MANAGEMENT | Facility: CLINIC | Age: 67
End: 2022-11-22

## 2022-11-22 ENCOUNTER — VIRTUAL VISIT (OUTPATIENT)
Dept: ENDOCRINOLOGY | Facility: CLINIC | Age: 67
End: 2022-11-22
Payer: MEDICARE

## 2022-11-22 DIAGNOSIS — E11.8 TYPE 2 DIABETES MELLITUS WITH COMPLICATION (H): Primary | ICD-10-CM

## 2022-11-22 PROCEDURE — 99214 OFFICE O/P EST MOD 30 MIN: CPT | Mod: 95 | Performed by: INTERNAL MEDICINE

## 2022-11-22 NOTE — LETTER
11/22/2022       RE: Radha Baca  [address] [facility] [address]     Dear Colleague,    Thank you for referring your patient, Radha Baca, to the [facility] [place] [facility]. Please see a copy of my visit note below.    Radha Baca  is being evaluated via a billable video visit.      How would you like to obtain your AVS? [employer]  For the video visit, send the invitation by: Text to cell phone: [phone]  Will anyone else be joining your video visit? No      Outcome for 11/18/22 3:34 PM :Sent patient [employer] message asking them to upload their BG data   Florence Jacobson  Outcome for 11/21/22 9:15 AM :Glucose sent via Email   Florence Jacobson    Video-Visit Details    Type of service:  Video Visit    Video Start Time (time video started): 9:10AM    Video End Time (time video stopped): 9:25am    Originating Location (pt. Location): Home    Location (provider location):  On-site    Mode of Communication:  Video Conference via Live Gamer      Keturah De Luna MD    Follow up for T2DM    Interval History:  - 7 m follow up for T2DM  - busy with taken care of mother, splits this duty with her sister      1. Type 2 DM:  Diabetes: Has type 2 DM, diagnosed before 2005    HbA1c is mid 8% range, as before    Current Treatment:   - Ozemipc 0.5 mg weekly on Sunday,causing nausea which is tolerable but also constipation which give her a lot of problems  - Lantus 75 units once daily  - Humalog was increased at last visit from 20->22   units with each meal, 2-3 meals/day, CR 5, counts carbs, IS 50  - Metformin 1000mg po BID, she is on this for long time, not sure if this is causing the side effects.    Hypoglycemia  None recently    Diet:   2-3 meals/day.healthy and sometimes unhealthy depending on her stress level    Exercise  Has been unable to exercise as she is taking care of her mom full-time      2. Diabetic  Complications:  - Retinopathy: Outside System - mild NPDR and DM macular edema which is mild and recommended observation.  - Nephropathy:  Nl urine microalb, nl creatinine  - Neuropathy: Had tingling .  - feet podiatry does toenails q 3m    3. Cardiovascular risk factors:  - Lipids: Lipitor 20mg,   - HTN:  on lisinopril/HCTZ well-controlled 20-12.5  - Aspirin - 81mg daily    4. Hypothyroidism:  - TSH - nl 11/21/22      Assessment and Plan:  1. Type 2 diabetes mellitus c/b DM retinopathy, with comorbidity of hypertension obesity and hypothyroidism    Blood glucose control  No recent A1c is patient was supposed to be an in person visit, however her blood glucose reading consistently above goal fasting and after meals.  Would recommend to increase both basal and bolus insulin.  Also reviewed bolus insulin calculations.  Patient is interested in getting a glucose sensor which I strongly support considering patient's frequency of insulin injections 3-4 times a day    Plan:   - increaseHumalog carb ratio: 4, insulin sensitivity: 30 corrected to 120 during the day and 160 at bedtime   - Continue Ozemipc 0.5 mg weekly subcutaneous  injections on Sunday  - increase Lantus to 80 units subcutaneous once daily  - Metformin 1000mg po BID    - Glucose check fasting, before and 2 hours post prandial and bedtime in a rotating fashion    2. Diabetic complications:  - Retinopathy: has diabetic retinopathy   - Nephropathy: nl Creatinine, slightly elevated microalbumin, continue to work on tight blood glucose control, patient is on lisinopril hydrochlorothiazide  - Neuropathy: followed by podiatry,     - Lipids: excellent LDL on Atorvastatin 20 mg  - Blood pressure well controlled on lisinopril hydrochlorothiazide  - Aspirin - 81mg daily      Keturah De Luna MD  Endocrinology and Diabetes  Telephone contact:  [employer] [facility] [place] [phone]  [facility]  [phone]          ==========================================================================================              Past Medical/Surgical History:   Reviewed in chart  Past Medical History:   Diagnosis Date     Abnormal Pap smear      Anxiety      Arthritis      Arthritis of knee 4/4/2013     Arthritis of shoulder region, right 4/18/2014     Back injury      Breast disorder      Chronic constipation      Chronic diarrhea      Depressive disorder      Diabetes (H)      Fecal incontinence      Finger pain 4/2/2015     Fracture broke L 5th pinky finger due to fall  1/2015     Glaucoma (increased eye pressure)      Head injury 8/6/2016     Headache(784.0)     decreased with mouth guard use.     History of blood transfusion 8/2011 & 4/2013     History of diabetes mellitus      Hypercholesteremia      Hypertension      Low back pain      Menarche age 9+    cycles q mo x 4-5 d     Neck injuries      Nonsenile cataract IO implants: L-8/2013; R-1/2011     Pain in knee joint     LEFT     Right bundle branch block     per H/P     Sleep apnea     Info on file     SNHL (sensorineural hearing loss)      Tinnitus     I have reported ringing in ears. not sure of dates     Umbilical hernia without mention of obstruction or gangrene 8/2014     Vision disorder Detached Retina 10/2009     Past Surgical History:   Procedure Laterality Date     ARTHROPLASTY KNEE  4/4/2013    Left Total Knee Arthroplasty;  Surgeon: Lou Byrne MD;  [facility]     ARTHROPLASTY MINIMALLY INVASIVE KNEE  8/22/2011    R knee :CAITLYN JACOBO, Left age 14     COLONOSCOPY  1/14/2015     DENTAL SURGERY      root canals, wisdom teeth     EXAM UNDER ANESTHESIA, MANIPULATE JOINT (LOCATION)  10/5/2012    Procedure: EXAM UNDER ANESTHESIA, MANIPULATE JOINT (LOCATION);  Right Knee Mini Open Lysis Of Adhesions, Left Knee Steroid Injection  ;  Surgeon: Lou Byrne MD;  [facility]     HC OR OCULAR DEVICE INTRAOP DETACHED RETINA   "2009    R     HC PHAKIC IOL - IMPLANT FROM SURGEON      right     KNEE SURGERY  1969    left     LASER YAG CAPSULOTOMY  12/2016    left     VITRECTOMY PARSPLANA  2010       Allergies:  Reviewed in chart    Current Medications:   Reviewed in chart    Family History:  Reviewed in chart    Social History:  Reviewed in chart    Physical Examination:  Vital signs:                         Estimated body mass index is 42.23 kg/m  as calculated from the following:    Height as of 7/26/22: 1.626 m (5' 4\").    Weight as of 7/26/22: 111.6 kg (246 lb).    Previous Weights:   Wt Readings from Last 3 Encounters:   07/26/22 111.6 kg (246 lb)   07/20/22 111.7 kg (246 lb 3.2 oz)   07/13/22 112 kg (247 lb)                    BMI:  There is no height or weight on file to calculate BMI.     Reported vitals:  There were no vitals taken for this visit.   healthy, alert and no distress  PSYCH: Alert and oriented times 3; coherent speech, normal   rate and volume, able to articulate logical thoughts, able   to abstract reason, no tangential thoughts, no hallucinations   or delusions  Her affect is normal and pleasant  RESP: No cough, no audible wheezing, able to talk in full sentences  Remainder of exam unable to be completed due to telephone visits       Endocrine Labs:  Lab Results   Component Value Date    .7 01/28/2021    CHLORIDE 98.8 01/28/2021    CO2 25.4 01/28/2021     (H) 11/21/2022    CR 0.80 11/21/2022    CR 0.71 04/04/2022    CR 0.71 07/12/2021    CR 0.74 04/12/2021    CR 0.6 01/28/2021    COLIN 9.8 01/28/2021    MAG 2.0 01/18/2021    ALBUMIN 3.7 09/28/2020    ALKPHOS 75.0 01/28/2021    LDL 37 11/21/2022    HDL 47 (L) 11/21/2022    TRIG 155 (H) 11/21/2022    TSH 3.56 11/21/2022    TSH 2.46 04/04/2022    TSH 2.86 04/12/2021     Lab Results   Component Value Date    MICROL 13.6 11/21/2022    MICROL 7 04/12/2021    MICROL 9 01/18/2021    MICROL 10 09/28/2020    MICROL 15 03/02/2020     Lab Results   Component Value Date "    A1C 8.6 (H) 04/04/2022    A1C 8.6 (H) 11/01/2021    A1C 8.8 (H) 07/12/2021    A1C 8.9 (H) 04/12/2021    A1C 8.4 (H) 01/18/2021       Lab Results   Component Value Date    HGB 11.8 09/28/2020       HPI:  Radha Baca is a elderly  female with type 2 diabetes diagnosed [id number]. Has PMH for HTN, arthritis, chronic constipation  Keturah De Luna MD

## 2022-11-22 NOTE — PATIENT INSTRUCTIONS
Increase Lantus to 80[place]is   Humalog:   change carbohydrate ratio from 1 unit for each 5 grams -> 4 grams  change insulin sensitivity for correction from 1 unit for each 50 mg/dl -> 30 mg/dl.  Correct ideally before you eat and then 3 to 4 hours after eating if needed  Correct to 120 mg/dl during the day and 160 mg/dl at bedtime

## 2022-11-28 ENCOUNTER — OFFICE VISIT (OUTPATIENT)
Dept: OBGYN | Facility: CLINIC | Age: 67
End: 2022-11-28
Attending: ADVANCED PRACTICE MIDWIFE
Payer: MEDICARE

## 2022-11-28 ENCOUNTER — ANCILLARY PROCEDURE (OUTPATIENT)
Dept: MAMMOGRAPHY | Facility: CLINIC | Age: 67
End: 2022-11-28
Payer: MEDICARE

## 2022-11-28 VITALS
BODY MASS INDEX: 43.41 KG/M2 | WEIGHT: 245 LBS | HEART RATE: 76 BPM | DIASTOLIC BLOOD PRESSURE: 75 MMHG | SYSTOLIC BLOOD PRESSURE: 132 MMHG | HEIGHT: 63 IN

## 2022-11-28 DIAGNOSIS — Z01.419 ENCOUNTER FOR GYNECOLOGICAL EXAMINATION WITHOUT ABNORMAL FINDING: ICD-10-CM

## 2022-11-28 DIAGNOSIS — Z12.31 VISIT FOR SCREENING MAMMOGRAM: ICD-10-CM

## 2022-11-28 PROCEDURE — G0463 HOSPITAL OUTPT CLINIC VISIT: HCPCS

## 2022-11-28 PROCEDURE — 77063 BREAST TOMOSYNTHESIS BI: CPT | Mod: GC | Performed by: RADIOLOGY

## 2022-11-28 PROCEDURE — 99213 OFFICE O/P EST LOW 20 MIN: CPT | Performed by: ADVANCED PRACTICE MIDWIFE

## 2022-11-28 PROCEDURE — 77067 SCR MAMMO BI INCL CAD: CPT | Mod: GC | Performed by: RADIOLOGY

## 2022-11-28 ASSESSMENT — ENCOUNTER SYMPTOMS
SINUS CONGESTION: 0
HEMATURIA: 0
HEADACHES: 1
SEIZURES: 0
SHORTNESS OF BREATH: 0
FATIGUE: 0
SPEECH CHANGE: 0
BRUISES/BLEEDS EASILY: 0
NECK MASS: 0
CHILLS: 0
NECK PAIN: 1
TREMORS: 0
TROUBLE SWALLOWING: 0
EYE WATERING: 0
DIFFICULTY URINATING: 0
COUGH DISTURBING SLEEP: 0
RECTAL PAIN: 0
POOR WOUND HEALING: 0
HYPOTENSION: 0
WEIGHT LOSS: 0
MYALGIAS: 1
NIGHT SWEATS: 0
ABDOMINAL PAIN: 0
DIZZINESS: 1
HYPERTENSION: 0
DYSPNEA ON EXERTION: 0
SORE THROAT: 0
DOUBLE VISION: 0
HEMOPTYSIS: 0
SPUTUM PRODUCTION: 0
NAUSEA: 1
DECREASED CONCENTRATION: 0
NERVOUS/ANXIOUS: 0
HEARTBURN: 0
SWOLLEN GLANDS: 0
INSOMNIA: 0
DYSURIA: 0
PARALYSIS: 0
LIGHT-HEADEDNESS: 0
DISTURBANCES IN COORDINATION: 0
TACHYCARDIA: 0
FEVER: 0
LEG SWELLING: 0
ORTHOPNEA: 0
HOARSE VOICE: 0
ARTHRALGIAS: 1
WEAKNESS: 0
BREAST PAIN: 0
PALPITATIONS: 0
INCREASED ENERGY: 0
DIARRHEA: 1
CLAUDICATION: 0
POLYPHAGIA: 0
HALLUCINATIONS: 0
POLYDIPSIA: 0
BREAST MASS: 0
ALTERED TEMPERATURE REGULATION: 0
MUSCLE CRAMPS: 1
JAUNDICE: 0
COUGH: 0
PANIC: 0
LOSS OF CONSCIOUSNESS: 0
SLEEP DISTURBANCES DUE TO BREATHING: 0
BLOATING: 1
TASTE DISTURBANCE: 0
CONSTIPATION: 0
SKIN CHANGES: 0
SYNCOPE: 0
DEPRESSION: 0
MUSCLE WEAKNESS: 1
WEIGHT GAIN: 0
JOINT SWELLING: 0
EYE REDNESS: 0
EYE IRRITATION: 0
BLOOD IN STOOL: 0
FLANK PAIN: 0
EXERCISE INTOLERANCE: 0
NAIL CHANGES: 0
NUMBNESS: 1
VOMITING: 0
RESPIRATORY PAIN: 0
SMELL DISTURBANCE: 0
POSTURAL DYSPNEA: 0
BOWEL INCONTINENCE: 0
SNORES LOUDLY: 0
EYE PAIN: 0
WHEEZING: 0
SINUS PAIN: 0
TINGLING: 1
DECREASED LIBIDO: 0
MEMORY LOSS: 0
LEG PAIN: 0
STIFFNESS: 1
HOT FLASHES: 0
DECREASED APPETITE: 0
BACK PAIN: 1

## 2022-11-28 ASSESSMENT — ANXIETY QUESTIONNAIRES
GAD7 TOTAL SCORE: 0
GAD7 TOTAL SCORE: 0
2. NOT BEING ABLE TO STOP OR CONTROL WORRYING: NOT AT ALL
1. FEELING NERVOUS, ANXIOUS, OR ON EDGE: NOT AT ALL
6. BECOMING EASILY ANNOYED OR IRRITABLE: NOT AT ALL
5. BEING SO RESTLESS THAT IT IS HARD TO SIT STILL: NOT AT ALL
3. WORRYING TOO MUCH ABOUT DIFFERENT THINGS: NOT AT ALL
7. FEELING AFRAID AS IF SOMETHING AWFUL MIGHT HAPPEN: NOT AT ALL

## 2022-11-28 ASSESSMENT — PATIENT HEALTH QUESTIONNAIRE - PHQ9
SUM OF ALL RESPONSES TO PHQ QUESTIONS 1-9: 2
5. POOR APPETITE OR OVEREATING: NOT AT ALL

## 2022-11-28 ASSESSMENT — PAIN SCALES - GENERAL: PAINLEVEL: SEVERE PAIN (6)
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
## 2022-12-10 ENCOUNTER — HEALTH MAINTENANCE LETTER (OUTPATIENT)
Age: 67
End: 2022-12-10

## 2022-12-12 ENCOUNTER — TELEPHONE (OUTPATIENT)
Dept: ENDOCRINOLOGY | Facility: CLINIC | Age: 67
End: 2022-12-12

## 2022-12-12 DIAGNOSIS — E55.9 HYPOVITAMINOSIS D: ICD-10-CM

## 2022-12-12 DIAGNOSIS — Z78.0 MENOPAUSE: Primary | ICD-10-CM

## 2022-12-12 NOTE — TELEPHONE ENCOUNTER
Spoke to pt over the phone 12/12/22. Pt states that she will call later today to schedule the DEXA scan. Conformed that pt has correct number for scheduling.

## 2022-12-13 NOTE — TELEPHONE ENCOUNTER
Castillo De Luna,     Pt accepted appt on 1/18/23 and is also scheduled for labs and a dexa scan. Do you want pt to keep appt in May 2023 or would you like this visit to be canceled? Please advise when able.     Thank you,   -Zuly

## 2022-12-19 ENCOUNTER — ANCILLARY PROCEDURE (OUTPATIENT)
Dept: BONE DENSITY | Facility: CLINIC | Age: 67
End: 2022-12-19
Attending: INTERNAL MEDICINE
Payer: MEDICARE

## 2022-12-19 DIAGNOSIS — E11.8 TYPE 2 DIABETES MELLITUS WITH COMPLICATION (H): ICD-10-CM

## 2022-12-19 DIAGNOSIS — Z78.0 MENOPAUSE: ICD-10-CM

## 2022-12-19 PROCEDURE — 77080 DXA BONE DENSITY AXIAL: CPT | Performed by: INTERNAL MEDICINE

## 2022-12-23 DIAGNOSIS — E11.8 TYPE 2 DIABETES MELLITUS WITH COMPLICATION (H): ICD-10-CM

## 2022-12-27 RX ORDER — BLOOD SUGAR DIAGNOSTIC
STRIP MISCELLANEOUS
Qty: 400 STRIP | Refills: 3 | Status: SHIPPED | OUTPATIENT
Start: 2022-12-27 | End: 2022-12-30

## 2022-12-28 ENCOUNTER — OFFICE VISIT (OUTPATIENT)
Dept: FAMILY MEDICINE | Facility: CLINIC | Age: 67
End: 2022-12-28
Payer: MEDICARE

## 2022-12-28 VITALS
OXYGEN SATURATION: 97 % | BODY MASS INDEX: 43.87 KG/M2 | DIASTOLIC BLOOD PRESSURE: 62 MMHG | SYSTOLIC BLOOD PRESSURE: 119 MMHG | TEMPERATURE: 98.6 F | RESPIRATION RATE: 16 BRPM | HEART RATE: 84 BPM | WEIGHT: 247.6 LBS | HEIGHT: 63 IN

## 2022-12-28 DIAGNOSIS — F41.1 GAD (GENERALIZED ANXIETY DISORDER): ICD-10-CM

## 2022-12-28 DIAGNOSIS — I10 ESSENTIAL HYPERTENSION: ICD-10-CM

## 2022-12-28 DIAGNOSIS — E78.5 DYSLIPIDEMIA: ICD-10-CM

## 2022-12-28 DIAGNOSIS — L65.9 ALOPECIA: ICD-10-CM

## 2022-12-28 DIAGNOSIS — G47.33 OSA (OBSTRUCTIVE SLEEP APNEA): ICD-10-CM

## 2022-12-28 DIAGNOSIS — F41.1 GENERALIZED ANXIETY DISORDER: Primary | ICD-10-CM

## 2022-12-28 PROCEDURE — 99214 OFFICE O/P EST MOD 30 MIN: CPT | Performed by: FAMILY MEDICINE

## 2022-12-28 NOTE — PROGRESS NOTES
"Radha was seen today for follow up.    Diagnoses and all orders for this visit:    Generalized anxiety disorder.  She has had a good response to sertraline and we will continue this for another 6 months before reevaluating.  Refilled prescription.    GHISLAINE (obstructive sleep apnea).  Doing well on CPAP and I endorsed its consistent use.    Dyslipidemia.  Stable and she will continue statin therapy.    Essential hypertension.  At goal today, continue antihypertensive therapy.    Alopecia.  She has been evaluated for this in the past.  Thinks that it might be due to all of her medications and/or stress.  Offered her reconsultation with dermatology and she indicated she would like to think about it.    SHANDA (generalized anxiety disorder)  -     sertraline (ZOLOFT) 50 MG tablet; Take 1 tablet (50 mg) by mouth daily              Subjective     Follow Up (Medication follow up )        HPI   She presents for follow-up of anxiety and a desire to discuss her overall health condition and the medications that she is on.  She is now been on sertraline therapy for 6 months and states that her anxiety is noticeably less.  Her  has noticed this.  She feels she is better able to cope with other family members when there is conflict, which is frequent.            Review of Systems         Objective    /62   Pulse 84   Temp 98.6  F (37  C) (Oral)   Resp 16   Ht 1.6 m (5' 3\")   Wt 112.3 kg (247 lb 9.6 oz)   SpO2 97%   BMI 43.86 kg/m    Body mass index is 43.86 kg/m .  Physical Exam  Constitutional:       General: She is not in acute distress.     Appearance: She is obese. She is not ill-appearing.   Cardiovascular:      Rate and Rhythm: Normal rate and regular rhythm.      Heart sounds: Normal heart sounds.   Pulmonary:      Breath sounds: Normal breath sounds.   Skin:     Comments: Mild alopecia   Neurological:      Mental Status: She is alert.   Psychiatric:         Mood and Affect: Mood normal.         Behavior: " Behavior normal.            Lab on 11/21/2022   Component Date Value Ref Range Status     Creatinine 11/21/2022 0.80  0.51 - 0.95 mg/dL Final     GFR Estimate 11/21/2022 80  >60 mL/min/1.73m2 Final    Effective December 21, 2021 eGFRcr in adults is calculated using the 2021 CKD-EPI creatinine equation which includes age and gender (Nicole et al., [facility], DOI: 10.1056/PKXDhx7050629)     Albumin Urine mg/L 11/21/2022 13.6  mg/L Final    The reference ranges have not been established in urine albumin. The results should be integrated into the clinical context for interpretation.     Albumin Urine mg/g Cr 11/21/2022 19.80  0.00 - 25.00 mg/g Cr Final    Microalbuminuria is defined as an albumin:creatinine ratio of 17 to 299 for males and 25 to 299 for females. A ratio of albumin:creatinine of 300 or higher is indicative of overt proteinuria.  Due to biologic variability, positive results should be confirmed by a second, first-morning random or 24-hour timed urine specimen. If there is discrepancy, a third specimen is recommended. When 2 out of 3 results are in the microalbuminuria range, this is evidence for incipient nephropathy and warrants increased efforts at glucose control, blood pressure control, and institution of therapy with an angiotensin-converting-enzyme (ACE) inhibitor (if the patient can tolerate it).       Creatinine Urine mg/dL 11/21/2022 68.7  mg/dL Final    The reference ranges have not been established in urine creatinine. The results should be integrated into the clinical context for interpretation.     Glucose 11/21/2022 153 (H)  70 - 99 mg/dL Final     Patient Fasting > 8hrs? 11/21/2022 Yes   Final     Cholesterol 11/21/2022 115  <200 mg/dL Final     Triglycerides 11/21/2022 155 (H)  <150 mg/dL Final     Direct Measure HDL 11/21/2022 47 (L)  >=50 mg/dL Final     LDL Cholesterol Calculated 11/21/2022 37  <=100 mg/dL Final     Non HDL Cholesterol 11/21/2022 68  <130 mg/dL Final     TSH 11/21/2022 3.56   0.30 - 4.20 uIU/mL Final     Urea Nitrogen 11/21/2022 15.2  8.0 - 23.0 mg/dL Final     Potassium 11/21/2022 4.4  3.4 - 5.3 mmol/L Final

## 2022-12-30 ENCOUNTER — TELEPHONE (OUTPATIENT)
Dept: ENDOCRINOLOGY | Facility: CLINIC | Age: 67
End: 2022-12-30

## 2022-12-30 DIAGNOSIS — E11.8 TYPE 2 DIABETES MELLITUS WITH COMPLICATION (H): ICD-10-CM

## 2022-12-30 RX ORDER — BLOOD SUGAR DIAGNOSTIC
STRIP MISCELLANEOUS
Qty: 300 STRIP | Refills: 1 | Status: SHIPPED | OUTPATIENT
Start: 2022-12-30 | End: 2023-12-31

## 2022-12-30 NOTE — TELEPHONE ENCOUNTER
[facility] Call Center    Phone Message    May a detailed message be left on voicemail: yes     Reason for Call: Medication Question or concern regarding medication   Prescription Clarification  Name of Medication:  blood glucose (ONETOUCH VERIO IQ) test strip     Prescribing Provider: [facility]    What on the order needs clarification?   Per patient, insurance will not cover above script because it was noted somewhere that patient uses the Dexcom even though she currently does not. Patient would like a call back for clarification. Thank you        Action Taken: Message routed to:  Other: endo    Travel Screening: Not Applicable

## 2023-01-11 NOTE — PROGRESS NOTES
Outcome for 01/11/23 2:23 PM: [facility] message sent  CHOCO Moore  Outcome for 01/17/23 9:54 AM: Replied to [facility] message  CHOCO Martin  Outcome for 01/17/23 10:52 AM: Karin emailed to provider  CHOCO Martin      Video-Visit Details    Type of service:  Video Visit    Video Start Time (time video started): 11:40am    Video End Time (time video stopped): 11:58am    Originating Location (pt. 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 Type 2 DM:  Diabetes: Has type 2 DM, diagnosed before 2005    Pt's concern: DXA scan results, Magnesium level, Diabetes and risk of Alzheimer's disease, A1c, concern about longterm use of Metformin    Current Treatment:   - Ozemipc 0.5 mg weekly on Sunday,causing nausea which is tolerable but also constipation which give her a lot of problems  At the last visit  - on Humalog carb ratio: 4, insulin sensitivity: 30 corrected to 120 during the day and 160 at bedtime   - Continue Ozemipc 0.5 mg weekly subcutaneous  injections on Sunday  - increase Lantus to 80 units subcutaneous once daily- Metformin 1000mg po BID, she is on this for long time, not sure if this is causing the side effects.    Pt notes that she does not always check and correct before dinner      SMBGs        Hypoglycemia  None recently    Diet:   2-3 meals/day.healthy and sometimes unhealthy depending on her stress level    Exercise  Has been unable to exercise as she is taking care of her mom full-time      2. Diabetic Complications:  - Retinopathy: Outside System - mild NPDR and DM macular edema which is mild and recommended observation.  - Nephropathy:  Nl urine microalb, nl creatinine  - Neuropathy: Had tingling .  - feet podiatry does toenails q 3m    3.  Cardiovascular risk factors:  - Lipids: Lipitor 20mg,   - HTN:  on lisinopril/HCTZ well-controlled 20-12.5  - Aspirin - 81mg daily    4. Hypothyroidism:  - TSH - nl 11/21/22    5. Low bone density  DXA from 12/19/2022 shows low bone denisty, lwoest T-score -2.2 at the right femur neck  FRAX is 11% for MOF and 1.9% for hip fractures  Bone density decrease at the spine since last measurements in 12/2020, spine now measures T-score 0 (L2-3)        Assessment and Plan:  1. Type 2 diabetes mellitus c/b DM retinopathy, with comorbidity of hypertension, obesity, hypothyroidismm, low bone density    Low bone density  Remains in low bone density range, FRAX is NOT increased, decrease at the spine since last measurement, however spine T-score remains in good range; no medical treatment is indicated, general measures for post menopausal women including Calcium intake of 7610-5036 mg daily   Continue supplements with Vitamin D and Calcium    Blood glucose control  Most recent A1c is increased. Pt notes stress eating. Reviewed to use correction and carb coverage consistently  CR 4 IS 30 goal 120 mg/dl during the day, 140 mg/dl HS  Recommend to cont Metformin, as shown to have blood glucose lowering effects, decreases risk of complications and has beneficial effects beyond diabetes including decrease of malignancies, however is pt nithya, ok to try to hold insulin for a few weeks, reviewed that BGs would likely increase some  Cont Ozempic 0.5 mg q week max tolerated dose    Magnesium  Levels are normal, if desired can use OTC Magnesium supplements, might help with constipation      Plan:   - use Humalog carb ratio: 4, insulin sensitivity: 30 corrected to 120 during the day and 160 at bedtime more consistently also with dinner  - Continue Ozemipc 0.5 mg weekly subcutaneous  injections on Sunday  - increase Lantus to 80 units subcutaneous once daily  - Metformin 1000mg po BID    - Glucose check fasting, before and 2 hours post  prandial and bedtime in a rotating fashion    2. Diabetic complications:  - Retinopathy: has diabetic retinopathy   - Nephropathy: nl Creatinine, nl microalbumin,     - Lipids: excellent LDL on Atorvastatin 20 mg  - Blood pressure controlled on lisinopril hydrochlorothiazide  - Aspirin - 81mg daily      Keturah De Luna MD  Endocrinology and Diabetes  Telephone contact:  [facility] [phone]  [facility] [place] [phone]          ==========================================================================================              Past Medical/Surgical History:   Reviewed in chart  Past Medical History:   Diagnosis Date     Abnormal Pap smear      Anxiety      Arthritis      Arthritis of knee 4/4/2013     Arthritis of shoulder region, right 4/18/2014     Back injury      Breast disorder      Chronic constipation      Chronic diarrhea      Depressive disorder      Diabetes (H)      Fecal incontinence      Finger pain 4/2/2015     Fracture broke L 5th pinky finger due to fall  1/2015     Glaucoma (increased eye pressure)      Head injury 8/6/2016     Headache(784.0)     decreased with mouth guard use.     History of blood transfusion 8/2011 & 4/2013     History of diabetes mellitus      Hypercholesteremia      Hypertension      Low back pain      Menarche age 9+    cycles q mo x 4-5 d     Neck injuries      Nonsenile cataract IO implants: L-8/2013; R-1/2011     Pain in knee joint     LEFT     Right bundle branch block     per H/P     Sleep apnea     Info on file     SNHL (sensorineural hearing loss)      Tinnitus     I have reported ringing in ears. not sure of dates     Umbilical hernia without mention of obstruction or gangrene 8/2014     Vision disorder Detached Retina 10/2009     Past Surgical History:   Procedure Laterality Date     ARTHROPLASTY KNEE  4/4/2013    Left Total Knee Arthroplasty;  Surgeon: Lou Byrne MD;  [facility]      ARTHROPLASTY MINIMALLY INVASIVE KNEE  8/22/2011    R knee :CAITLYN JACOBO, Left age 14     COLONOSCOPY  1/14/2015     DENTAL SURGERY      root canals, wisdom teeth     EXAM UNDER ANESTHESIA, MANIPULATE JOINT (LOCATION)  10/5/2012    Procedure: EXAM UNDER ANESTHESIA, MANIPULATE JOINT (LOCATION);  Right Knee Mini Open Lysis Of Adhesions, Left Knee Steroid Injection  ;  Surgeon: Lou Byrne MD;  Location: [facility]     [facility] OR OCULAR DEVICE INTRAOP DETACHED RETINA  2009    R     [facility] PHAKIC IOL - IMPLANT FROM SURGEON      right     KNEE SURGERY  1969    left     LASER YAG CAPSULOTOMY  12/2016    left     VITRECTOMY PARSPLANA  2010       Allergies:  Reviewed in chart    Current Medications:   Reviewed in chart    Family History:  Reviewed in chart    Social History:  Reviewed in chart    Physical Examination:  Vital signs:  Wt Readings from Last 4 Encounters:   12/28/22 112.3 kg (247 lb 9.6 oz)   11/28/22 111.1 kg (245 lb)   07/26/22 111.6 kg (246 lb)   07/20/22 111.7 kg (246 lb 3.2 oz)   BMI 43     Reported vitals:  There were no vitals taken for this visit.   healthy, alert and no distress  PSYCH: Alert and oriented times 3; coherent speech, normal   rate and volume, able to articulate logical thoughts, able   to abstract reason, no tangential thoughts, no hallucinations   or delusions  Her affect is normal and pleasant  RESP: No cough, no audible wheezing, able to talk in full sentences  Remainder of exam unable to be completed due to telephone visits       Endocrine Labs:  Lab Results   Component Value Date    .7 01/28/2021    CHLORIDE 98.8 01/28/2021    CO2 25.4 01/28/2021     (H) 11/21/2022    CR 0.80 11/21/2022    CR 0.71 04/04/2022    CR 0.71 07/12/2021    CR 0.74 04/12/2021    CR 0.6 01/28/2021    COLIN 9.7 01/16/2023    MAG 1.7 01/16/2023    ALBUMIN 4.1 01/16/2023    ALKPHOS 75.0 01/28/2021    LDL 37 11/21/2022    HDL 47 (L) 11/21/2022    TRIG 155 (H) 11/21/2022    TSH 3.56 11/21/2022    TSH  2.46 04/04/2022    TSH 2.86 04/12/2021     Lab Results   Component Value Date    MICROL 13.6 11/21/2022    MICROL 7 04/12/2021    MICROL 9 01/18/2021    MICROL 10 09/28/2020    MICROL 15 03/02/2020     Lab Results   Component Value Date    A1C 9.4 (H) 01/16/2023    A1C 8.6 (H) 04/04/2022    A1C 8.6 (H) 11/01/2021    A1C 8.8 (H) 07/12/2021    A1C 8.9 (H) 04/12/2021       Lab Results   Component Value Date    HGB 11.8 09/28/2020           HPI:  Radha Baca is a elderly  female with type 2 diabetes diagnosed [id number]. Has PMH for HTN, arthritis, chronic constipation

## 2023-01-12 ASSESSMENT — ENCOUNTER SYMPTOMS
SNORES LOUDLY: 0
JAUNDICE: 0
HALLUCINATIONS: 0
DISTURBANCES IN COORDINATION: 0
FATIGUE: 1
BLOOD IN STOOL: 0
BOWEL INCONTINENCE: 0
EYE WATERING: 1
INSOMNIA: 1
COUGH: 1
MUSCLE CRAMPS: 1
POSTURAL DYSPNEA: 1
SHORTNESS OF BREATH: 1
STIFFNESS: 1
MEMORY LOSS: 0
NUMBNESS: 1
DECREASED CONCENTRATION: 1
MYALGIAS: 1
NECK PAIN: 1
WHEEZING: 0
PANIC: 1
SEIZURES: 0
JOINT SWELLING: 1
SPUTUM PRODUCTION: 0
POLYPHAGIA: 0
MUSCLE WEAKNESS: 1
CONSTIPATION: 1
BLOATING: 1
HEADACHES: 1
NAUSEA: 0
ALTERED TEMPERATURE REGULATION: 0
EYE IRRITATION: 1
DIZZINESS: 1
FEVER: 0
POLYDIPSIA: 0
RECTAL PAIN: 0
NIGHT SWEATS: 0
LOSS OF CONSCIOUSNESS: 0
NERVOUS/ANXIOUS: 1
TREMORS: 0
DEPRESSION: 1
PARALYSIS: 0
DYSPNEA ON EXERTION: 0
ABDOMINAL PAIN: 1
HEMOPTYSIS: 0
DIARRHEA: 1
ARTHRALGIAS: 1
TINGLING: 1
EYE PAIN: 1
VOMITING: 0
BACK PAIN: 1
CHILLS: 0
DOUBLE VISION: 0
SPEECH CHANGE: 0
HEARTBURN: 0
EYE REDNESS: 1
COUGH DISTURBING SLEEP: 1

## 2023-01-16 ENCOUNTER — LAB (OUTPATIENT)
Dept: LAB | Facility: CLINIC | Age: 68
End: 2023-01-16
Payer: MEDICARE

## 2023-01-16 ENCOUNTER — TELEPHONE (OUTPATIENT)
Dept: ENDOCRINOLOGY | Facility: CLINIC | Age: 68
End: 2023-01-16

## 2023-01-16 DIAGNOSIS — E55.9 HYPOVITAMINOSIS D: ICD-10-CM

## 2023-01-16 DIAGNOSIS — Z78.0 MENOPAUSE: ICD-10-CM

## 2023-01-16 DIAGNOSIS — E11.8 TYPE 2 DIABETES MELLITUS WITH COMPLICATION (H): ICD-10-CM

## 2023-01-16 LAB
ALBUMIN SERPL BCG-MCNC: 4.1 G/DL (ref 3.5–5.2)
CALCIUM SERPL-MCNC: 9.7 MG/DL (ref 8.8–10.2)
HBA1C MFR BLD: 9.4 %
MAGNESIUM SERPL-MCNC: 1.7 MG/DL (ref 1.7–2.3)
PHOSPHATE SERPL-MCNC: 3.1 MG/DL (ref 2.5–4.5)
PTH-INTACT SERPL-MCNC: 38 PG/ML (ref 15–65)

## 2023-01-16 PROCEDURE — 82040 ASSAY OF SERUM ALBUMIN: CPT | Performed by: PATHOLOGY

## 2023-01-16 PROCEDURE — 82306 VITAMIN D 25 HYDROXY: CPT | Performed by: INTERNAL MEDICINE

## 2023-01-16 PROCEDURE — 82310 ASSAY OF CALCIUM: CPT | Performed by: PATHOLOGY

## 2023-01-16 PROCEDURE — 83970 ASSAY OF PARATHORMONE: CPT | Performed by: PATHOLOGY

## 2023-01-16 PROCEDURE — 84100 ASSAY OF PHOSPHORUS: CPT | Performed by: PATHOLOGY

## 2023-01-16 PROCEDURE — 83735 ASSAY OF MAGNESIUM: CPT | Performed by: PATHOLOGY

## 2023-01-16 PROCEDURE — 36415 COLL VENOUS BLD VENIPUNCTURE: CPT | Performed by: PATHOLOGY

## 2023-01-16 PROCEDURE — 83036 HEMOGLOBIN GLYCOSYLATED A1C: CPT | Performed by: INTERNAL MEDICINE

## 2023-01-16 NOTE — TELEPHONE ENCOUNTER
[facility] Call Center    Phone Message    May a detailed message be left on voicemail: yes     Reason for Call: Other: Patient calling stating she forgot to stop and have her meter downloaded when she was at lab today 1/16/2023. Patient needs instructions on how to download her meter. thank you.      Action Taken: Message routed to:  [facility] endo    Travel Screening: Not Applicable

## 2023-01-18 ENCOUNTER — VIRTUAL VISIT (OUTPATIENT)
Dept: ENDOCRINOLOGY | Facility: CLINIC | Age: 68
End: 2023-01-18
Payer: MEDICARE

## 2023-01-18 DIAGNOSIS — M85.9 LOW BONE DENSITY: ICD-10-CM

## 2023-01-18 DIAGNOSIS — E66.09 EXOGENOUS OBESITY: Chronic | ICD-10-CM

## 2023-01-18 DIAGNOSIS — E11.8 TYPE 2 DIABETES MELLITUS WITH COMPLICATION (H): Primary | ICD-10-CM

## 2023-01-18 PROCEDURE — 99214 OFFICE O/P EST MOD 30 MIN: CPT | Mod: 95 | Performed by: INTERNAL MEDICINE

## 2023-01-18 NOTE — PROGRESS NOTES
"Video-Visit Details     Type of service:  Video Visit     Video Start Time (time video started): 11:30am     Video End Time (time video stopped): 12:01 PM    Originating Location (pt. 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 Stressors are worsening with family (not immediate family), \"wish it were different\".   Overall, hasn't been going great. Eating stressfully. Haven't been motivated enough to get to the gym.   Test BID, test in the morning and before bed. Doing carb correction but for sliding scale doing in AM once she awakes and before bed (after dinner).     She has multiple concerns today including     Magnesium- we reviewed that 1.7 is normal although lower limit of normal. She could possibly take OTC supplement which may improve her diarrhea but at this time, not concerned with magnesium level    Alzheimer's and Diabetes association - will find resource for patient    Long term SE of metformin- her relatives have had \"bad experiences\" and she fears she is becoming addicted to this medication.    Holistic approach to diabetes     Bone density scan results       1. Type 2 DM:  Diabetes: Has type 2 DM, diagnosed before 2005     Current Treatment:   - Ozemipc 0.5 mg weekly on Sunday - varying constipation/bowel habits, taking miralax every other day, worsened with ozempic  - on Humalog carb ratio: 4 prescribed but does 5, insulin sensitivity: 30 corrected to 120 during the day and 150 at bedtime (only in morning and at night)  - on Lantus to 75 units subcutaneous once qAM  - Metformin 1000mg po BID, she is on this for long time, not sure if this is causing the side effects - thinks this the cause nausea- would like to get off of this    * she sometimes takes her Humalog twice she thinks " because she forgot that she had taken it     SMBGs       Hypoglycemia  None recently - last was 2 months ago down to 85     Diet:   2-3 meals/day.healthy and sometimes unhealthy depending on her stress level     Exercise  Has been unable to exercise as 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 Diabetic Complications:  - Retinopathy: Outside System - mild NPDR and DM macular edema which is mild and recommended observation.  - Nephropathy:  Nl urine microalb, nl creatinine  - Neuropathy: Had tingling .  - feet podiatry does toenails q3m     3. Cardiovascular risk factors:  - Lipids: Lipitor 20mg  - HTN:  on lisinopril/HCTZ well-controlled 20-12.5  - Aspirin - 81mg daily     4. Hypothyroidism:  - TSH - nl 11/21/22     5. Low bone density  DXA from 12/19/2022 shows low bone denisty, lowest T-score -2.2 at the right femur neck  FRAX is 11% for MOF and 1.9% for hip fractures  Bone density 12/2022 with improvement at the spine since last measurements in 12/2020, spine now measures T-score 0 (L2-3), femurs are at -2.2 for right and -2.1 for left        Assessment and Plan:  1. Type 2 diabetes mellitus c/b DM retinopathy, with comorbidity of hypertension, obesity, hypothyroidismm, low bone density     Low bone density  Remains in low bone density range, FRAX is NOT increased, decrease at the spine since last measurement, however spine T-score remains in good range; no medical treatment is indicated, general measures for post menopausal women including Calcium intake of 3169-4954 mg daily   Continue supplements with Vitamin D and Calcium     Blood glucose control  Most recent A1c is increased to 9.4% due to lifestyle factors. Pt is hesitant to make increases in medications and in fact would prefer to stop metformin and consider holistic approach instead  Patient is interested in getting a glucose sensor which I strongly support considering patient's frequency of insulin injections 3-4 times a day     Plan:   -  continue Humalog carb ratio: 4 (patient is currently doing 5 as she forgets to adjust) insulin sensitivity: 30 corrected to 120 during the day and 160 at bedtime   - reviewed dosing and frequency of ideal insulin administration and BG checks including prior to biggest meal of day  - Continue Ozemipc 0.5 mg weekly subcutaneous  injections on Sunday  - continue Lantus to 75 units subcutaneous once daily  - On Metformin 1000mg po BID. Discussed that metformin is well studied and safe medication that we would recommend continuing but ultimately, the decision is up to her  - If she is interested to learn more about holistic medicine, encouraged patient to reach out to holistic provider as we are unable to comment given our current training      - Glucose check fasting, before and 2 hours post prandial and bedtime in a rotating fashion     2. Diabetic complications:  - Retinopathy: has diabetic retinopathy   - Nephropathy: nl Creatinine, slightly elevated microalbumin, continue to work on tight blood glucose control, patient is on lisinopril hydrochlorothiazide  - Neuropathy: followed by podiatry,      - Lipids: excellent LDL on Atorvastatin 20 mg  - Blood pressure well controlled on lisinopril hydrochlorothiazide  - Aspirin - 81mg daily     A1c check in 3-4 months and RTC with Dr. De Luna in 4-6 months.     This patient was seen and discussed with the attending, Dr. De Luna.    Placido Mahoney MD  Internal Medicine Resident    I, Keturah De Luna, personally evaluated this patient. I discussed the patient with the fellow and agree with the assessment and plan of care as documented in the resident's note. I  reviewed vital signs, medications, labs and imaging.    Key Findings: DXA remains in osteopenic range, increase in A1c, reviewed meal time insulin    Keturah De Luna MD  Endocrinology and Diabetes  Telephone contact:  [facility] [phone]  [facility]  [phone]                ==========================================================================================     Past Medical/Surgical History:   Reviewed in chart  Past Medical History        Past Medical History:   Diagnosis Date     Abnormal Pap smear       Anxiety       Arthritis       Arthritis of knee 4/4/2013     Arthritis of shoulder region, right 4/18/2014     Back injury       Breast disorder       Chronic constipation       Chronic diarrhea       Depressive disorder       Diabetes (H)       Fecal incontinence       Finger pain 4/2/2015     Fracture broke L 5th pinky finger due to fall  1/2015     Glaucoma (increased eye pressure)       Head injury 8/6/2016     Headache(784.0)       decreased with mouth guard use.     History of blood transfusion 8/2011 & 4/2013     History of diabetes mellitus       Hypercholesteremia       Hypertension       Low back pain       Menarche age 9+     cycles q mo x 4-5 d     Neck injuries       Nonsenile cataract IO implants: L-8/2013; R-1/2011     Pain in knee joint       LEFT     Right bundle branch block       per H/P     Sleep apnea       Info on file     SNHL (sensorineural hearing loss)       Tinnitus       I have reported ringing in ears. not sure of dates     Umbilical hernia without mention of obstruction or gangrene 8/2014     Vision disorder Detached Retina 10/2009         Past Surgical History         Past Surgical History:   Procedure Laterality Date     ARTHROPLASTY KNEE   4/4/2013     Left Total Knee Arthroplasty;  Surgeon: Lou Byrne MD;  [facility]     ARTHROPLASTY MINIMALLY INVASIVE KNEE   8/22/2011     R knee :CAITLYN JACOBO, Left age 14     COLONOSCOPY   1/14/2015     DENTAL SURGERY         root canals, wisdom teeth     EXAM UNDER ANESTHESIA, MANIPULATE JOINT (LOCATION)   10/5/2012     Procedure: EXAM UNDER ANESTHESIA, MANIPULATE JOINT (LOCATION);  Right Knee Mini Open Lysis Of Adhesions, Left Knee Steroid Injection  ;   Surgeon: Lou Byrne MD;  Location: [facility]     [facility] OR OCULAR DEVICE INTRAOP DETACHED RETINA   2009     R     [facility] PHAKIC IOL - IMPLANT FROM SURGEON         right     KNEE SURGERY   1969     left     LASER YAG CAPSULOTOMY   12/2016     left     VITRECTOMY PARSPLANA   2010            Allergies:  Reviewed in chart     Current Medications:   Reviewed in chart     Family History:  Reviewed in chart     Social History:  Reviewed in chart     Physical Examination:  Vital signs:      Wt Readings from Last 4 Encounters:   12/28/22 112.3 kg (247 lb 9.6 oz)   11/28/22 111.1 kg (245 lb)   07/26/22 111.6 kg (246 lb)   07/20/22 111.7 kg (246 lb 3.2 oz)   BMI 43     General: Alert, cooperative, no distress, well-appearing  Head: Atraumatic, normocephalic, no obvious abnormalities   Respiratory: non-labored conversational breathing  Neuro: AxO x3  Psychiatric: Normal Affect, appropriate mood      Endocrine Labs:        Lab Results   Component Value Date     .7 01/28/2021     CHLORIDE 98.8 01/28/2021     CO2 25.4 01/28/2021      (H) 11/21/2022     CR 0.80 11/21/2022     CR 0.71 04/04/2022     CR 0.71 07/12/2021     CR 0.74 04/12/2021     CR 0.6 01/28/2021     COLIN 9.7 01/16/2023     MAG 1.7 01/16/2023     ALBUMIN 4.1 01/16/2023     ALKPHOS 75.0 01/28/2021     LDL 37 11/21/2022     HDL 47 (L) 11/21/2022     TRIG 155 (H) 11/21/2022     TSH 3.56 11/21/2022     TSH 2.46 04/04/2022     TSH 2.86 04/12/2021            Lab Results   Component Value Date     MICROL 13.6 11/21/2022     MICROL 7 04/12/2021     MICROL 9 01/18/2021     MICROL 10 09/28/2020     MICROL 15 03/02/2020            Lab Results   Component Value Date     A1C 9.4 (H) 01/16/2023     A1C 8.6 (H) 04/04/2022     A1C 8.6 (H) 11/01/2021     A1C 8.8 (H) 07/12/2021     A1C 8.9 (H) 04/12/2021               Lab Results   Component Value Date     HGB 11.8 09/28/2020               HPI:  Radha Baca is a elderly  female with type 2 diabetes diagnosed  [id number]. Has PMH for HTN, arthritis, chronic constipation          Answers for HPI/ROS submitted by the patient on 1/12/2023  General Symptoms: Yes  Skin Symptoms: No  HENT Symptoms: No  EYE SYMPTOMS: Yes  HEART SYMPTOMS: No  LUNG SYMPTOMS: Yes  INTESTINAL SYMPTOMS: Yes  URINARY SYMPTOMS: No  GYNECOLOGIC SYMPTOMS: No  BREAST SYMPTOMS: No  SKELETAL SYMPTOMS: Yes  BLOOD SYMPTOMS: No  NERVOUS SYSTEM SYMPTOMS: Yes  MENTAL HEALTH SYMPTOMS: Yes  Fever: No  Fatigue: Yes  Night sweats: No  Chills: No  Increased stress: Yes  Excessive hunger: No  Excessive thirst: No  Feeling hot or cold when others believe the temperature is normal: No  Loss of height: Yes  Post-operative complications: No  Surgical site pain: No  Hallucinations: No  Confusion: No  Eye pain: Yes  Vision loss: Yes  Dry eyes: Yes  Watery eyes: Yes  Eye bulging: No  Double vision: No  Flashing of lights: Yes  Spots: No  Floaters: Yes  Redness: Yes  Crossed eyes: No  Yellowing of eyes: No  Eye irritation: Yes  Cough: Yes  Sputum or phlegm: No  Coughing up blood: No  Difficulty breating or shortness of breath: Yes  Snoring: No  Wheezing: No  Difficulty breathing on exertion: No  Nighttime Cough: Yes  Difficulty breathing when lying flat: Yes  Heart burn or indigestion: No  Nausea: No  Vomiting: No  Abdominal pain: Yes  Bloating: Yes  Constipation: Yes  Diarrhea: Yes  Blood in stool: No  Black stools: No  Rectal or Anal pain: No  Fecal incontinence: No  Yellowing of skin or eyes: No  Vomit with blood: No  Change in stools: No  Back pain: Yes  Muscle aches: Yes  Neck pain: Yes  Swollen joints: Yes  Joint pain: Yes  Bone pain: Yes  Muscle cramps: Yes  Muscle weakness: Yes  Joint stiffness: Yes  Bone fracture: No  Trouble with coordination: No  Dizziness or trouble with balance: Yes  Fainting or black-out spells: No  Memory loss: No  Headache: Yes  Seizures: No  Speech problems: No  Tingling: Yes  Tremor: No  Difficulty walking: Yes  Paralysis: No  Numbness:  Yes  Nervous or Anxious: Yes  Depression: Yes  Trouble sleeping: Yes  Trouble thinking or concentrating: Yes  Mood changes: Yes  Panic attacks: Yes

## 2023-01-18 NOTE — PROGRESS NOTES
Radha Baca is being evaluated via a billable video visit.        How would you like to obtain your AVS? [facility]  For the video visit, send the invitation by: Send to e-mail at: [email or web address]  Will anyone else be joining your video visit? No

## 2023-01-18 NOTE — LETTER
1/18/2023       RE: Radha Baca  [birth year][address] [facility] [address]     Dear Colleague,    Thank you for referring your patient, Radha Baca, to the [facility] END[facility] [place] at [facility]. Please see a copy of my visit note below.    Outcome for 01/11/23 2:23 PM: [employer] message sent  CHOCO Moore  Outcome for 01/17/23 9:54 AM: Replied to [employer] message  CHOCO Martin  Outcome for 01/17/23 10:52 AM: Karin emailed to provider  CHOCO Martin      Video-Visit Details    Type of service:  Video Visit    Video Start Time (time video started): 11:40am    Video End Time (time video stopped): 11:58am    Originating Location (pt. 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 Type 2 DM:  Diabetes: Has type 2 DM, diagnosed before 2005    Pt's concern: DXA scan results, Magnesium level, Diabetes and risk of Alzheimer's disease, A1c, concern about longterm use of Metformin    Current Treatment:   - Ozemipc 0.5 mg weekly on Sunday,causing nausea which is tolerable but also constipation which give her a lot of problems  At the last visit  - on Humalog carb ratio: 4, insulin sensitivity: 30 corrected to 120 during the day and 160 at bedtime   - Continue Ozemipc 0.5 mg weekly subcutaneous  injections on Sunday  - increase Lantus to 80 units subcutaneous once daily- Metformin 1000mg po BID, she is on this for long time, not sure if this is causing the side effects.    Pt notes that she does not always check and correct before dinner      SMBGs        Hypoglycemia  None recently    Diet:   2-3 meals/day.healthy and sometimes unhealthy depending on her stress  level    Exercise  Has been unable to exercise as she is taking care of her mom full-time      2. Diabetic Complications:  - Retinopathy: Outside System - mild NPDR and DM macular edema which is mild and recommended observation.  - Nephropathy:  Nl urine microalb, nl creatinine  - Neuropathy: Had tingling .  - feet podiatry does toenails q 3m    3. Cardiovascular risk factors:  - Lipids: Lipitor 20mg,   - HTN:  on lisinopril/HCTZ well-controlled 20-12.5  - Aspirin - 81mg daily    4. Hypothyroidism:  - TSH - nl 11/21/22    5. Low bone density  DXA from 12/19/2022 shows low bone denisty, lwoest T-score -2.2 at the right femur neck  FRAX is 11% for MOF and 1.9% for hip fractures  Bone density decrease at the spine since last measurements in 12/2020, spine now measures T-score 0 (L2-3)        Assessment and Plan:  1. Type 2 diabetes mellitus c/b DM retinopathy, with comorbidity of hypertension, obesity, hypothyroidismm, low bone density    Low bone density  Remains in low bone density range, FRAX is NOT increased, decrease at the spine since last measurement, however spine T-score remains in good range; no medical treatment is indicated, general measures for post menopausal women including Calcium intake of 2192-1858 mg daily   Continue supplements with Vitamin D and Calcium    Blood glucose control  Most recent A1c is increased. Pt notes stress eating. Reviewed to use correction and carb coverage consistently  CR 4 IS 30 goal 120 mg/dl during the day, 140 mg/dl HS  Recommend to cont Metformin, as shown to have blood glucose lowering effects, decreases risk of complications and has beneficial effects beyond diabetes including decrease of malignancies, however is pt nithya, ok to try to hold insulin for a few weeks, reviewed that BGs would likely increase some  Cont Ozempic 0.5 mg q week max tolerated dose    Magnesium  Levels are normal, if desired can use OTC Magnesium supplements, might help with  constipation      Plan:   - use Humalog carb ratio: 4, insulin sensitivity: 30 corrected to 120 during the day and 160 at bedtime more consistently also with dinner  - Continue Ozemipc 0.5 mg weekly subcutaneous  injections on Sunday  - increase Lantus to 80 units subcutaneous once daily  - Metformin 1000mg po BID    - Glucose check fasting, before and 2 hours post prandial and bedtime in a rotating fashion    2. Diabetic complications:  - Retinopathy: has diabetic retinopathy   - Nephropathy: nl Creatinine, nl microalbumin,     - Lipids: excellent LDL on Atorvastatin 20 mg  - Blood pressure controlled on lisinopril hydrochlorothiazide  - Aspirin - 81mg daily      Keturah De Luna MD  Endocrinology and Diabetes  Telephone contact:  [facility] [place] [phone]  [facility] [phone]          ==========================================================================================              Past Medical/Surgical History:   Reviewed in chart  Past Medical History:   Diagnosis Date     Abnormal Pap smear      Anxiety      Arthritis      Arthritis of knee 4/4/2013     Arthritis of shoulder region, right 4/18/2014     Back injury      Breast disorder      Chronic constipation      Chronic diarrhea      Depressive disorder      Diabetes (H)      Fecal incontinence      Finger pain 4/2/2015     Fracture broke L 5th pinky finger due to fall  1/2015     Glaucoma (increased eye pressure)      Head injury 8/6/2016     Headache(784.0)     decreased with mouth guard use.     History of blood transfusion 8/2011 & 4/2013     History of diabetes mellitus      Hypercholesteremia      Hypertension      Low back pain      Menarche age 9+    cycles q mo x 4-5 d     Neck injuries      Nonsenile cataract IO implants: L-8/2013; R-1/2011     Pain in knee joint     LEFT     Right bundle branch block     per H/P     Sleep apnea     Info on file     SNHL (sensorineural hearing loss)       Tinnitus     I have reported ringing in ears. not sure of dates     Umbilical hernia without mention of obstruction or gangrene 8/2014     Vision disorder Detached Retina 10/2009     Past Surgical History:   Procedure Laterality Date     ARTHROPLASTY KNEE  4/4/2013    Left Total Knee Arthroplasty;  Surgeon: Lou Byrne MD;  [facility]     ARTHROPLASTY MINIMALLY INVASIVE KNEE  8/22/2011    R knee :CAITLYN JACOBO, Left age 14     COLONOSCOPY  1/14/2015     DENTAL SURGERY      root canals, wisdom teeth     EXAM UNDER ANESTHESIA, MANIPULATE JOINT (LOCATION)  10/5/2012    Procedure: EXAM UNDER ANESTHESIA, MANIPULATE JOINT (LOCATION);  Right Knee Mini Open Lysis Of Adhesions, Left Knee Steroid Injection  ;  Surgeon: Lou Byrne MD;  [facility]     [facility] OR OCULAR DEVICE INTRAOP DETACHED RETINA  2009    R     HC PHAKIC IOL - IMPLANT FROM SURGEON      right     KNEE SURGERY  1969    left     LASER YAG CAPSULOTOMY  12/2016    left     VITRECTOMY PARSPLANA  2010       Allergies:  Reviewed in chart    Current Medications:   Reviewed in chart    Family History:  Reviewed in chart    Social History:  Reviewed in chart    Physical Examination:  Vital signs:  Wt Readings from Last 4 Encounters:   12/28/22 112.3 kg (247 lb 9.6 oz)   11/28/22 111.1 kg (245 lb)   07/26/22 111.6 kg (246 lb)   07/20/22 111.7 kg (246 lb 3.2 oz)   BMI 43     Reported vitals:  There were no vitals taken for this visit.   healthy, alert and no distress  PSYCH: Alert and oriented times 3; coherent speech, normal   rate and volume, able to articulate logical thoughts, able   to abstract reason, no tangential thoughts, no hallucinations   or delusions  Her affect is normal and pleasant  RESP: No cough, no audible wheezing, able to talk in full sentences  Remainder of exam unable to be completed due to telephone visits       Endocrine Labs:  Lab Results   Component Value Date    .7 01/28/2021    CHLORIDE 98.8 01/28/2021  "   CO2 25.4 01/28/2021     (H) 11/21/2022    CR 0.80 11/21/2022    CR 0.71 04/04/2022    CR 0.71 07/12/2021    CR 0.74 04/12/2021    CR 0.6 01/28/2021    COLIN 9.7 01/16/2023    MAG 1.7 01/16/2023    ALBUMIN 4.1 01/16/2023    ALKPHOS 75.0 01/28/2021    LDL 37 11/21/2022    HDL 47 (L) 11/21/2022    TRIG 155 (H) 11/21/2022    TSH 3.56 11/21/2022    TSH 2.46 04/04/2022    TSH 2.86 04/12/2021     Lab Results   Component Value Date    MICROL 13.6 11/21/2022    MICROL 7 04/12/2021    MICROL 9 01/18/2021    MICROL 10 09/28/2020    MICROL 15 03/02/2020     Lab Results   Component Value Date    A1C 9.4 (H) 01/16/2023    A1C 8.6 (H) 04/04/2022    A1C 8.6 (H) 11/01/2021    A1C 8.8 (H) 07/12/2021    A1C 8.9 (H) 04/12/2021       Lab Results   Component Value Date    HGB 11.8 09/28/2020           HPI:  Radha Baca is a elderly  female with type 2 diabetes diagnosed [id number]. Has PMH for HTN, arthritis, chronic constipation                        Video-Visit Details     Type of service:  Video Visit     Video Start Time (time video started): 11:30am     Video End Time (time video stopped): 12:01 PM    Originating Location (pt. 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 Stressors are worsening with family (not immediate family), \"wish it were different\".   Overall, hasn't been going great. Eating stressfully. Haven't been motivated enough to get to the gym.   Test BID, test in the morning and before bed. Doing carb correction but for sliding scale doing in AM once she awakes and before bed (after dinner).     She has multiple concerns today including     Magnesium- we reviewed that 1.7 is normal although lower limit of normal. She could possibly take OTC supplement which may improve her diarrhea but " "at this time, not concerned with magnesium level    Alzheimer's and Diabetes association - will find resource for patient    Long term SE of metformin- her relatives have had \"bad experiences\" and she fears she is becoming addicted to this medication.    Holistic approach to diabetes     Bone density scan results       1. Type 2 DM:  Diabetes: Has type 2 DM, diagnosed before 2005     Current Treatment:   - Ozemipc 0.5 mg weekly on Sunday - varying constipation/bowel habits, taking miralax every other day, worsened with ozempic  - on Humalog carb ratio: 4 prescribed but does 5, insulin sensitivity: 30 corrected to 120 during the day and 150 at bedtime (only in morning and at night)  - on Lantus to 75 units subcutaneous once qAM  - Metformin 1000mg po BID, she is on this for long time, not sure if this is causing the side effects - thinks this the cause nausea- would like to get off of this    * she sometimes takes her Humalog twice she thinks because she forgot that she had taken it     SMBGs       Hypoglycemia  None recently - last was 2 months ago down to 85     Diet:   2-3 meals/day.healthy and sometimes unhealthy depending on her stress level     Exercise  Has been unable to exercise as 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 Diabetic Complications:  - Retinopathy: Outside System - mild NPDR and DM macular edema which is mild and recommended observation.  - Nephropathy:  Nl urine microalb, nl creatinine  - Neuropathy: Had tingling .  - feet podiatry does toenails q3m     3. Cardiovascular risk factors:  - Lipids: Lipitor 20mg  - HTN:  on lisinopril/HCTZ well-controlled 20-12.5  - Aspirin - 81mg daily     4. Hypothyroidism:  - TSH - nl 11/21/22     5. Low bone density  DXA from 12/19/2022 shows low bone denisty, lowest T-score -2.2 at the right femur neck  FRAX is 11% for MOF and 1.9% for hip fractures  Bone density 12/2022 with improvement at the spine since last measurements in 12/2020, spine now " measures T-score 0 (L2-3), femurs are at -2.2 for right and -2.1 for left        Assessment and Plan:  1. Type 2 diabetes mellitus c/b DM retinopathy, with comorbidity of hypertension, obesity, hypothyroidismm, low bone density     Low bone density  Remains in low bone density range, FRAX is NOT increased, decrease at the spine since last measurement, however spine T-score remains in good range; no medical treatment is indicated, general measures for post menopausal women including Calcium intake of 9476-5404 mg daily   Continue supplements with Vitamin D and Calcium     Blood glucose control  Most recent A1c is increased to 9.4% due to lifestyle factors. Pt is hesitant to make increases in medications and in fact would prefer to stop metformin and consider holistic approach instead  Patient is interested in getting a glucose sensor which I strongly support considering patient's frequency of insulin injections 3-4 times a day     Plan:   - continue Humalog carb ratio: 4 (patient is currently doing 5 as she forgets to adjust) insulin sensitivity: 30 corrected to 120 during the day and 160 at bedtime   - reviewed dosing and frequency of ideal insulin administration and BG checks including prior to biggest meal of day  - Continue Ozemipc 0.5 mg weekly subcutaneous  injections on Sunday  - continue Lantus to 75 units subcutaneous once daily  - On Metformin 1000mg po BID. Discussed that metformin is well studied and safe medication that we would recommend continuing but ultimately, the decision is up to her  - If she is interested to learn more about holistic medicine, encouraged patient to reach out to holistic provider as we are unable to comment given our current training      - Glucose check fasting, before and 2 hours post prandial and bedtime in a rotating fashion     2. Diabetic complications:  - Retinopathy: has diabetic retinopathy   - Nephropathy: nl Creatinine, slightly elevated microalbumin, continue to work  on tight blood glucose control, patient is on lisinopril hydrochlorothiazide  - Neuropathy: followed by podiatry,      - Lipids: excellent LDL on Atorvastatin 20 mg  - Blood pressure well controlled on lisinopril hydrochlorothiazide  - Aspirin - 81mg daily     A1c check in 3-4 months and RTC with Dr. De Luna in 4-6 months.     This patient was seen and discussed with the attending, Dr. De Luna.    Placido Mahoney MD  Internal Medicine Resident    I, Keturah De Luna, personally evaluated this patient. I discussed the patient with the fellow and agree with the assessment and plan of care as documented in the resident's note. I  reviewed vital signs, medications, labs and imaging.    Key Findings: DXA remains in osteopenic range, increase in A1c, reviewed meal time insulin    Keturah De Luna MD  Endocrinology and Diabetes  Telephone contact:  [facility] [place] [phone]  [facility] [phone]                ==========================================================================================     Past Medical/Surgical History:   Reviewed in chart  Past Medical History        Past Medical History:   Diagnosis Date     Abnormal Pap smear       Anxiety       Arthritis       Arthritis of knee 4/4/2013     Arthritis of shoulder region, right 4/18/2014     Back injury       Breast disorder       Chronic constipation       Chronic diarrhea       Depressive disorder       Diabetes (H)       Fecal incontinence       Finger pain 4/2/2015     Fracture broke L 5th pinky finger due to fall  1/2015     Glaucoma (increased eye pressure)       Head injury 8/6/2016     Headache(784.0)       decreased with mouth guard use.     History of blood transfusion 8/2011 & 4/2013     History of diabetes mellitus       Hypercholesteremia       Hypertension       Low back pain       Menarche age 9+     cycles q mo x 4-5 d     Neck injuries       Nonsenile cataract IO implants: L-8/2013; R-1/2011      Pain in knee joint       LEFT     Right bundle branch block       per H/P     Sleep apnea       Info on file     SNHL (sensorineural hearing loss)       Tinnitus       I have reported ringing in ears. not sure of dates     Umbilical hernia without mention of obstruction or gangrene 8/2014     Vision disorder Detached Retina 10/2009         Past Surgical History         Past Surgical History:   Procedure Laterality Date     ARTHROPLASTY KNEE   4/4/2013     Left Total Knee Arthroplasty;  Surgeon: Lou Byrne MD;  [facility]     ARTHROPLASTY MINIMALLY INVASIVE KNEE   8/22/2011     R knee :CAITLYN JACOBO, Left age 14     COLONOSCOPY   1/14/2015     DENTAL SURGERY         root canals, wisdom teeth     EXAM UNDER ANESTHESIA, MANIPULATE JOINT (LOCATION)   10/5/2012     Procedure: EXAM UNDER ANESTHESIA, MANIPULATE JOINT (LOCATION);  Right Knee Mini Open Lysis Of Adhesions, Left Knee Steroid Injection  ;  Surgeon: Lou Byrne MD;  [facility]     [facility] OR OCULAR DEVICE INTRAOP DETACHED RETINA   2009     R     HC PHAKIC IOL - IMPLANT FROM SURGEON         right     KNEE SURGERY   1969     left     LASER YAG CAPSULOTOMY   12/2016     left     VITRECTOMY PARSPLANA   2010            Allergies:  Reviewed in chart     Current Medications:   Reviewed in chart     Family History:  Reviewed in chart     Social History:  Reviewed in chart     Physical Examination:  Vital signs:      Wt Readings from Last 4 Encounters:   12/28/22 112.3 kg (247 lb 9.6 oz)   11/28/22 111.1 kg (245 lb)   07/26/22 111.6 kg (246 lb)   07/20/22 111.7 kg (246 lb 3.2 oz)   BMI 43     General: Alert, cooperative, no distress, well-appearing  Head: Atraumatic, normocephalic, no obvious abnormalities   Respiratory: non-labored conversational breathing  Neuro: AxO x3  Psychiatric: Normal Affect, appropriate mood      Endocrine Labs:        Lab Results   Component Value Date     .7 01/28/2021     CHLORIDE 98.8 01/28/2021      CO2 25.4 01/28/2021      (H) 11/21/2022     CR 0.80 11/21/2022     CR 0.71 04/04/2022     CR 0.71 07/12/2021     CR 0.74 04/12/2021     CR 0.6 01/28/2021     COLIN 9.7 01/16/2023     MAG 1.7 01/16/2023     ALBUMIN 4.1 01/16/2023     ALKPHOS 75.0 01/28/2021     LDL 37 11/21/2022     HDL 47 (L) 11/21/2022     TRIG 155 (H) 11/21/2022     TSH 3.56 11/21/2022     TSH 2.46 04/04/2022     TSH 2.86 04/12/2021            Lab Results   Component Value Date     MICROL 13.6 11/21/2022     MICROL 7 04/12/2021     MICROL 9 01/18/2021     MICROL 10 09/28/2020     MICROL 15 03/02/2020            Lab Results   Component Value Date     A1C 9.4 (H) 01/16/2023     A1C 8.6 (H) 04/04/2022     A1C 8.6 (H) 11/01/2021     A1C 8.8 (H) 07/12/2021     A1C 8.9 (H) 04/12/2021               Lab Results   Component Value Date     HGB 11.8 09/28/2020               HPI:  Radha Baca is a elderly  female with type 2 diabetes diagnosed [id number]. Has PMH for HTN, arthritis, chronic constipation          Answers for HPI/ROS submitted by the patient on 1/12/2023  General Symptoms: Yes  Skin Symptoms: No  HENT Symptoms: No  EYE SYMPTOMS: Yes  HEART SYMPTOMS: No  LUNG SYMPTOMS: Yes  INTESTINAL SYMPTOMS: Yes  URINARY SYMPTOMS: No  GYNECOLOGIC SYMPTOMS: No  BREAST SYMPTOMS: No  SKELETAL SYMPTOMS: Yes  BLOOD SYMPTOMS: No  NERVOUS SYSTEM SYMPTOMS: Yes  MENTAL HEALTH SYMPTOMS: Yes  Fever: No  Fatigue: Yes  Night sweats: No  Chills: No  Increased stress: Yes  Excessive hunger: No  Excessive thirst: No  Feeling hot or cold when others believe the temperature is normal: No  Loss of height: Yes  Post-operative complications: No  Surgical site pain: No  Hallucinations: No  Confusion: No  Eye pain: Yes  Vision loss: Yes  Dry eyes: Yes  Watery eyes: Yes  Eye bulging: No  Double vision: No  Flashing of lights: Yes  Spots: No  Floaters: Yes  Redness: Yes  Crossed eyes: No  Yellowing of eyes: No  Eye irritation: Yes  Cough: Yes  Sputum or phlegm:  No  Coughing up blood: No  Difficulty breating or shortness of breath: Yes  Snoring: No  Wheezing: No  Difficulty breathing on exertion: No  Nighttime Cough: Yes  Difficulty breathing when lying flat: Yes  Heart burn or indigestion: No  Nausea: No  Vomiting: No  Abdominal pain: Yes  Bloating: Yes  Constipation: Yes  Diarrhea: Yes  Blood in stool: No  Black stools: No  Rectal or Anal pain: No  Fecal incontinence: No  Yellowing of skin or eyes: No  Vomit with blood: No  Change in stools: No  Back pain: Yes  Muscle aches: Yes  Neck pain: Yes  Swollen joints: Yes  Joint pain: Yes  Bone pain: Yes  Muscle cramps: Yes  Muscle weakness: Yes  Joint stiffness: Yes  Bone fracture: No  Trouble with coordination: No  Dizziness or trouble with balance: Yes  Fainting or black-out spells: No  Memory loss: No  Headache: Yes  Seizures: No  Speech problems: No  Tingling: Yes  Tremor: No  Difficulty walking: Yes  Paralysis: No  Numbness: Yes  Nervous or Anxious: Yes  Depression: Yes  Trouble sleeping: Yes  Trouble thinking or concentrating: Yes  Mood changes: Yes  Panic attacks: Yes        Radha Baca is being evaluated via a billable video visit.        How would you like to obtain your AVS? MyChart  For the video visit, send the invitation by: Send to e-mail at: [email or web address]  Will anyone else be joining your video visit? No

## 2023-01-23 LAB
DEPRECATED CALCIDIOL+CALCIFEROL SERPL-MC: <74 UG/L (ref 20–75)
VITAMIN D2 SERPL-MCNC: <5 UG/L
VITAMIN D3 SERPL-MCNC: 69 UG/L

## 2023-01-25 ENCOUNTER — OFFICE VISIT (OUTPATIENT)
Dept: ORTHOPEDICS | Facility: CLINIC | Age: 68
End: 2023-01-25
Payer: MEDICARE

## 2023-01-25 DIAGNOSIS — E11.65 TYPE II OR UNSPECIFIED TYPE DIABETES MELLITUS WITH NEUROLOGICAL MANIFESTATIONS, UNCONTROLLED(250.62) (H): Primary | ICD-10-CM

## 2023-01-25 DIAGNOSIS — E11.49 TYPE II OR UNSPECIFIED TYPE DIABETES MELLITUS WITH NEUROLOGICAL MANIFESTATIONS, UNCONTROLLED(250.62) (H): Primary | ICD-10-CM

## 2023-01-25 DIAGNOSIS — B35.1 OM (ONYCHOMYCOSIS): ICD-10-CM

## 2023-01-25 PROCEDURE — 99213 OFFICE O/P EST LOW 20 MIN: CPT | Performed by: PODIATRIST

## 2023-01-25 NOTE — LETTER
1/25/2023         RE: Radha Baca  [address] [address]        Dear Colleague,    Thank you for referring your patient, Radha Baca, to the [facility] [place]. Please see a copy of my visit note below.    Chief Complaint   Patient presents with     Follow Up     3-4 month follow up.             Allergies   Allergen Reactions     Nkda [No Known Drug Allergies]          Subjective: Radha is a 67 year old female who presents to the clinic today for a diabetic foot exam and management. Relates that she has no new LE complaints today. She does have diabetic shoes.   Her diabetes is being managed by Dr. De Luna.  She last saw them on 1/18/23.     Objective  Hemoglobin A1C   Date Value Ref Range Status   01/16/2023 9.4 (H) <5.7 % Final     Comment:     Normal <5.7%   Prediabetes 5.7-6.4%    Diabetes 6.5% or higher     Note: Adopted from ADA consensus guidelines.   04/04/2022 8.6 (H) 0.0 - 5.6 % Final     Comment:     Normal <5.7%   Prediabetes 5.7-6.4%    Diabetes 6.5% or higher     Note: Adopted from ADA consensus guidelines.   11/01/2021 8.6 (H) 0.0 - 5.6 % Final     Comment:     Normal <5.7%   Prediabetes 5.7-6.4%    Diabetes 6.5% or higher     Note: Adopted from ADA consensus guidelines.   04/12/2021 8.9 (H) 0 - 5.6 % Final     Comment:     Normal <5.7% Prediabetes 5.7-6.4%  Diabetes 6.5% or higher - adopted from ADA   consensus guidelines.     01/18/2021 8.4 (H) 0 - 5.6 % Final     Comment:     Normal <5.7% Prediabetes 5.7-6.4%  Diabetes 6.5% or higher - adopted from ADA   consensus guidelines.     09/28/2020 9.0 (H) 0 - 5.6 % Final     Comment:     Normal <5.7% Prediabetes 5.7-6.4%  Diabetes 6.5% or higher - adopted from ADA   consensus guidelines.       Hemoglobin A1C POCT   Date Value Ref Range Status   01/18/2018 9.6 (A) 4.3 - 6 % Final   10/18/2017 9.5 (A) 4.3 - 6 % Final   07/19/2017 9.7 (A) 4.3 - 6 % Final     PT and DP pulses are not  palpable bilaterally. CRT is 4 seconds. Diminished pedal hair.   Gross sensation is diminished, as well as protective sensation bilaterally.   Equinus is noted bilaterally.   Nails thickened, brittle, discolored, with subungual debris bilaterally. No open lesions are noted. Xerosis noted BL.      Assessment: DM2 with neuropathy.  Onychomycosis.   Xerosis     Plan:  - Pt seen and evaluated.  - Nails debrided x 10.  - See again in 4 months.      Jerrell Austin DPM

## 2023-01-26 NOTE — PROGRESS NOTES
Chief Complaint   Patient presents with     Follow Up     3-4 month follow up.             Allergies   Allergen Reactions     Nkda [No Known Drug Allergies]          Subjective: Radha is a 67 year old female who presents to the clinic today for a diabetic foot exam and management. Relates that she has no new LE complaints today. She does have diabetic shoes.   Her diabetes is being managed by Dr. De Luna.  She last saw them on 1/18/23.     Objective  Hemoglobin A1C   Date Value Ref Range Status   01/16/2023 9.4 (H) <5.7 % Final     Comment:     Normal <5.7%   Prediabetes 5.7-6.4%    Diabetes 6.5% or higher     Note: Adopted from ADA consensus guidelines.   04/04/2022 8.6 (H) 0.0 - 5.6 % Final     Comment:     Normal <5.7%   Prediabetes 5.7-6.4%    Diabetes 6.5% or higher     Note: Adopted from ADA consensus guidelines.   11/01/2021 8.6 (H) 0.0 - 5.6 % Final     Comment:     Normal <5.7%   Prediabetes 5.7-6.4%    Diabetes 6.5% or higher     Note: Adopted from ADA consensus guidelines.   04/12/2021 8.9 (H) 0 - 5.6 % Final     Comment:     Normal <5.7% Prediabetes 5.7-6.4%  Diabetes 6.5% or higher - adopted from ADA   consensus guidelines.     01/18/2021 8.4 (H) 0 - 5.6 % Final     Comment:     Normal <5.7% Prediabetes 5.7-6.4%  Diabetes 6.5% or higher - adopted from ADA   consensus guidelines.     09/28/2020 9.0 (H) 0 - 5.6 % Final     Comment:     Normal <5.7% Prediabetes 5.7-6.4%  Diabetes 6.5% or higher - adopted from ADA   consensus guidelines.       Hemoglobin A1C POCT   Date Value Ref Range Status   01/18/2018 9.6 (A) 4.3 - 6 % Final   10/18/2017 9.5 (A) 4.3 - 6 % Final   07/19/2017 9.7 (A) 4.3 - 6 % Final         PT and DP pulses are not palpable bilaterally. CRT is 4 seconds. Diminished pedal hair.   Gross sensation is diminished, as well as protective sensation bilaterally.   Equinus is noted bilaterally.   Nails thickened, brittle, discolored, with subungual debris bilaterally. No open lesions are noted.  Xerosis noted BL.      Assessment: DM2 with neuropathy.  Onychomycosis.   Xerosis       Plan:  - Pt seen and evaluated.  - Nails debrided x 10.  - See again in 4 months.

## 2023-01-30 DIAGNOSIS — G47.33 OBSTRUCTIVE SLEEP APNEA (ADULT) (PEDIATRIC): Primary | ICD-10-CM

## 2023-02-20 DIAGNOSIS — E11.9 DIABETES MELLITUS, TYPE 2 (H): ICD-10-CM

## 2023-02-20 DIAGNOSIS — E78.5 DYSLIPIDEMIA: ICD-10-CM

## 2023-02-20 DIAGNOSIS — I10 ESSENTIAL HYPERTENSION: ICD-10-CM

## 2023-02-21 RX ORDER — LISINOPRIL AND HYDROCHLOROTHIAZIDE 12.5; 2 MG/1; MG/1
TABLET ORAL
Qty: 90 TABLET | Refills: 3 | Status: SHIPPED | OUTPATIENT
Start: 2023-02-21 | End: 2024-05-28

## 2023-02-21 RX ORDER — ATORVASTATIN CALCIUM 20 MG/1
TABLET, FILM COATED ORAL
Qty: 90 TABLET | Refills: 3 | Status: SHIPPED | OUTPATIENT
Start: 2023-02-21 | End: 2024-05-28

## 2023-02-25 NOTE — PROGRESS NOTES
Chief Complaint   Patient presents with     Follow Up     3 month follow up.             Allergies   Allergen Reactions     Nkda [No Known Drug Allergies]          Subjective: Radha is a 65 year old female who presents to the clinic today for a diabetic foot exam and management. Relates that she has no new LE complaints today. She does have diabetic shoes.      Objective  Hemoglobin A1C   Date Value Ref Range Status   07/12/2021 8.8 (H) 0.0 - 5.6 % Final     Comment:     Normal <5.7%   Prediabetes 5.7-6.4%    Diabetes 6.5% or higher     Note: Adopted from ADA consensus guidelines.   04/12/2021 8.9 (H) 0 - 5.6 % Final     Comment:     Normal <5.7% Prediabetes 5.7-6.4%  Diabetes 6.5% or higher - adopted from ADA   consensus guidelines.     01/18/2021 8.4 (H) 0 - 5.6 % Final     Comment:     Normal <5.7% Prediabetes 5.7-6.4%  Diabetes 6.5% or higher - adopted from ADA   consensus guidelines.     09/28/2020 9.0 (H) 0 - 5.6 % Final     Comment:     Normal <5.7% Prediabetes 5.7-6.4%  Diabetes 6.5% or higher - adopted from ADA   consensus guidelines.     01/18/2018 9.6 (A) 4.3 - 6 % Final   10/18/2017 9.5 (A) 4.3 - 6 % Final   07/19/2017 9.7 (A) 4.3 - 6 % Final         PT and DP pulses are not palpable bilaterally. CRT is 4 seconds. Diminished pedal hair.   Gross sensation is diminished, as well as protective sensation bilaterally.   Equinus is noted bilaterally.   Nails thickened, brittle, discolored, with subungual debris bilaterally. No open lesions are noted. Xerosis noted BL.      Assessment: DM2 with neuropathy.  Onychomycosis.   Xerosis       Plan:  - Pt seen and evaluated.  - Nails debrided x 10.  - See again in 4 months.  
No

## 2023-04-20 ENCOUNTER — TELEPHONE (OUTPATIENT)
Dept: ENDOCRINOLOGY | Facility: CLINIC | Age: 68
End: 2023-04-20
Payer: MEDICARE

## 2023-04-20 ENCOUNTER — MYC MEDICAL ADVICE (OUTPATIENT)
Dept: ENDOCRINOLOGY | Facility: CLINIC | Age: 68
End: 2023-04-20
Payer: MEDICARE

## 2023-04-20 DIAGNOSIS — E11.9 DIABETES MELLITUS, TYPE 2 (H): ICD-10-CM

## 2023-04-20 DIAGNOSIS — E11.8 TYPE 2 DIABETES MELLITUS WITH COMPLICATION (H): Primary | ICD-10-CM

## 2023-04-20 RX ORDER — PEN NEEDLE, DIABETIC 31 GX5/16"
NEEDLE, DISPOSABLE MISCELLANEOUS
Qty: 400 EACH | Refills: 1 | Status: SHIPPED | OUTPATIENT
Start: 2023-04-20 | End: 2024-02-13

## 2023-04-20 NOTE — TELEPHONE ENCOUNTER
[facility] Call Center    Phone Message    May a detailed message be left on voicemail: yes     Reason for Call: Other: patient needs lab orders for upcoming visit with Dr De Luna, please advise so she can schedule thank you     Action Taken: Other: endo    Travel Screening: Not Applicable

## 2023-04-20 NOTE — TELEPHONE ENCOUNTER
[facility] Call Center    Phone Message    May a detailed message be left on voicemail: yes     Reason for Call: Medication Refill Request    Has the patient contacted the pharmacy for the refill? Yes   Name of medication being requested: insulin pen needle (B-D U/F) 31G X 8 MM miscellaneous  Provider who prescribed the medication: Dr De Luna  Pharmacy: [facility]  Date medication is needed: asap, has none remaining         Action Taken: Other: endo    Travel Screening: Not Applicable

## 2023-04-20 NOTE — TELEPHONE ENCOUNTER
Medication is pended in different encounter. Closing encounter.     Yeimi Retana on 4/20/2023 at 3:12 PM

## 2023-04-28 ENCOUNTER — TELEPHONE (OUTPATIENT)
Dept: ENDOCRINOLOGY | Facility: CLINIC | Age: 68
End: 2023-04-28
Payer: MEDICARE

## 2023-04-28 NOTE — TELEPHONE ENCOUNTER
Spoke with patient in regards to uploading data to [employer]. Patient will try and upload later today.     Wendy Berg LPN 04/28/23 1:02 PM

## 2023-05-08 DIAGNOSIS — E11.65 TYPE 2 DIABETES MELLITUS WITH HYPERGLYCEMIA, WITHOUT LONG-TERM CURRENT USE OF INSULIN (H): ICD-10-CM

## 2023-05-14 RX ORDER — SEMAGLUTIDE 1.34 MG/ML
INJECTION, SOLUTION SUBCUTANEOUS
Qty: 4.5 ML | Refills: 1 | Status: SHIPPED | OUTPATIENT
Start: 2023-05-14 | End: 2023-10-16

## 2023-05-24 ENCOUNTER — TRANSFERRED RECORDS (OUTPATIENT)
Dept: HEALTH INFORMATION MANAGEMENT | Facility: CLINIC | Age: 68
End: 2023-05-24
Payer: MEDICARE

## 2023-05-24 LAB — RETINOPATHY: POSITIVE

## 2023-06-01 ENCOUNTER — HEALTH MAINTENANCE LETTER (OUTPATIENT)
Age: 68
End: 2023-06-01

## 2023-06-19 DIAGNOSIS — E11.8 TYPE 2 DIABETES MELLITUS WITH COMPLICATION (H): ICD-10-CM

## 2023-06-19 DIAGNOSIS — E11.8 TYPE 2 DIABETES MELLITUS WITH COMPLICATION, WITH LONG-TERM CURRENT USE OF INSULIN (H): ICD-10-CM

## 2023-06-19 DIAGNOSIS — Z79.4 TYPE 2 DIABETES MELLITUS WITH COMPLICATION, WITH LONG-TERM CURRENT USE OF INSULIN (H): ICD-10-CM

## 2023-06-20 RX ORDER — INSULIN GLARGINE 100 [IU]/ML
INJECTION, SOLUTION SUBCUTANEOUS
Qty: 90 ML | Refills: 3 | Status: SHIPPED | OUTPATIENT
Start: 2023-06-20 | End: 2023-11-13

## 2023-07-03 ENCOUNTER — TELEPHONE (OUTPATIENT)
Dept: ENDOCRINOLOGY | Facility: CLINIC | Age: 68
End: 2023-07-03
Payer: MEDICARE

## 2023-07-03 DIAGNOSIS — H90.3 SENSORINEURAL HEARING LOSS (SNHL) OF BOTH EARS: Primary | ICD-10-CM

## 2023-07-25 ENCOUNTER — OFFICE VISIT (OUTPATIENT)
Dept: OTOLARYNGOLOGY | Facility: CLINIC | Age: 68
End: 2023-07-25
Payer: MEDICARE

## 2023-07-25 ENCOUNTER — OFFICE VISIT (OUTPATIENT)
Dept: AUDIOLOGY | Facility: CLINIC | Age: 68
End: 2023-07-25
Payer: MEDICARE

## 2023-07-25 VITALS — HEIGHT: 64 IN | BODY MASS INDEX: 41.83 KG/M2 | WEIGHT: 245 LBS

## 2023-07-25 DIAGNOSIS — H90.3 SENSORINEURAL HEARING LOSS (SNHL) OF BOTH EARS: Primary | ICD-10-CM

## 2023-07-25 PROCEDURE — 99207 PR ASSESSMENT FOR HEARING AID: CPT | Performed by: AUDIOLOGIST

## 2023-07-25 PROCEDURE — 92550 TYMPANOMETRY & REFLEX THRESH: CPT | Performed by: AUDIOLOGIST

## 2023-07-25 PROCEDURE — 99213 OFFICE O/P EST LOW 20 MIN: CPT | Performed by: REGISTERED NURSE

## 2023-07-25 PROCEDURE — 92557 COMPREHENSIVE HEARING TEST: CPT | Performed by: AUDIOLOGIST

## 2023-07-25 ASSESSMENT — PAIN SCALES - GENERAL: PAINLEVEL: NO PAIN (0)

## 2023-07-25 NOTE — PROGRESS NOTES
AUDIOLOGY REPORT     SUMMARY: Audiology visit completed. See audiogram for results.       RECOMMENDATIONS: Follow-up with ENT.      UNIQUE Stewart.  Audiology Doctoral Student  [place] #[id number]      I was present with the patient for the entire Audiology appointment including all procedures/testing performed by the AuD student, and agree with the student s assessment and plan as documented.      Rupert Azevedo, [facility]  Licensed Audiologist  [place] #[id number]

## 2023-07-25 NOTE — PROGRESS NOTES
Otolaryngology Clinic  July 25, 2023    HPI:  Radha Baca is here for a recheck of their hearing. Patient has a history of bilateral sensorineural hearing loss. Wears hearing aids bilaterally and finds good benefit with these devices. Last seen in clinic 7/26/22.     Today, patient reports a possible decrease in hearing bilaterally over the past year. Continues to have good benefit from hearing aids. Does report a new right tinnitus last week but reports being under a lot of stress. This has improved. Also reports an episode of clicking from the left hearing aid. Scheduled for a hearing aid check after this appointment.    Patient denies any otalgia, otorrhea or dizziness.     Otologic microscope exam:    Patient's ear pathology required use of the binocular microscope for the purpose of cleaning and improving visualization in order to assure a more accurate diagnostic evaluation.    Right ear was examined under the microscope.  Ear canal is clean and dry, free of cerumen. Normal appearing TM, nicely aerated middle ear space.     Left ear was also examined under the microscope. Small amount of cerumen in inferior canal. It was cleaned with right angle hook and alligator forceps. Once cleaned, TM visualized under microscope. Normal appearing TM, nicely aerated middle ear space.     Audiogram: 7/25/2023  - data independently reviewed  Normal sloping to moderate sensorineural hearing loss bilaterally  WR right: 100% left: 96% at 75 dB  Acoustic Reflexes: Present in all conditions  Tympanograms: type A right, type A left     Assessment and Plan:  1. Sensorineural hearing loss (SNHL) of both ears  The patient presents for recheck of bilateral sensorineural hearing loss. Audiogram is stable today as compared to last year's test. Return in 2 years for repeat audiogram and ear check.  Continue to follow with audiology for hearing aid assessment and management.    Patient will follow up in 2 years with  audiogram.    Ladan Mancia DNP, APRN, CNP  Otolaryngology  Head & Neck Surgery  [phone]    20 minutes spent on the date of the encounter doing chart review, history and exam, documentation and further activities per the note

## 2023-07-25 NOTE — LETTER
7/25/2023       RE: Radha Baca  [address] [facility] [address] [facility] [address]     Dear Colleague,    Thank you for referring your patient, Radha Baca, to the [facility] [place] at [facility]. Please see a copy of my visit note below.      Otolaryngology Clinic  July 25, 2023    HPI:  Radha Baca is here for a recheck of their hearing. Patient has a history of bilateral sensorineural hearing loss. Wears hearing aids bilaterally and finds good benefit with these devices. Last seen in clinic 7/26/22.     Today, patient reports a possible decrease in hearing bilaterally over the past year. Continues to have good benefit from hearing aids. Does report a new right tinnitus last week but reports being under a lot of stress. This has improved. Also reports an episode of clicking from the left hearing aid. Scheduled for a hearing aid check after this appointment.    Patient denies any otalgia, otorrhea or dizziness.     Otologic microscope exam:    Patient's ear pathology required use of the binocular microscope for the purpose of cleaning and improving visualization in order to assure a more accurate diagnostic evaluation.    Right ear was examined under the microscope.  Ear canal is clean and dry, free of cerumen. Normal appearing TM, nicely aerated middle ear space.     Left ear was also examined under the microscope. Small amount of cerumen in inferior canal. It was cleaned with right angle hook and alligator forceps. Once cleaned, TM visualized under microscope. Normal appearing TM, nicely aerated middle ear space.     Audiogram: 7/25/2023  - data independently reviewed  Normal sloping to moderate sensorineural hearing loss bilaterally  WR right: 100% left: 96% at 75 dB  Acoustic Reflexes: Present in all conditions  Tympanograms: type A right, type A left     Assessment and Plan:  1. Sensorineural hearing  loss (SNHL) of both ears  The patient presents for recheck of bilateral sensorineural hearing loss. Audiogram is stable today as compared to last year's test. Return in 2 years for repeat audiogram and ear check.  Continue to follow with audiology for hearing aid assessment and management.    Patient will follow up in 2 years with audiogram.    Ladan Mancia DNP, APRN, CNP  Otolaryngology  Head & Neck Surgery  [phone]    20 minutes spent on the date of the encounter doing chart review, history and exam, documentation and further activities per the note      Again, thank you for allowing me to participate in the care of your patient.      Sincerely,    Deepika Mancia, NP

## 2023-07-26 NOTE — PROGRESS NOTES
AUDIOLOGY REPORT    SUBJECTIVE:  Radha Baca is a 67 year old female who was seen in the Audiology Clinic at the [facility] on 7/25/23 for a follow-up check of their hearing aids. Previous results have revealed normal sloping to moderate sensorineural hearing loss bilaterally.  The patient has been seen previously in this clinic and was fit with bilateral Phonak Audeo P50-R PERICO hearing aids on 10/14/2021.  Radha reports she heard some random chirping in her hearing aids occasionally and some recent tinnitus which she attributes to stress.    OBJECTIVE:   Electroacoustic analysis indicates good function bilaterally however listening check revealed both  wires seem to be intermittent. I changed both receivers which resolved the issue. The hearing aids were cleaned.    No charge visit today (in warranty hearing aid check).      ASSESSMENT:   A follow-up appointment for hearing aid's was completed today. Changes to hearing aid was completed as outlined above.     PLAN:  Radha will return for follow-up as needed, or at least every 9-12 months for cleaning and assessment of hearing aid.  . Please call this clinic with any questions regarding today s appointment.    Adriane Hatch, [facility]  Licensed Audiologist  [place] License #[id number]

## 2023-08-01 DIAGNOSIS — F41.1 GAD (GENERALIZED ANXIETY DISORDER): ICD-10-CM

## 2023-08-01 NOTE — TELEPHONE ENCOUNTER
"Request for medication refill:sertraline (ZOLOFT) 50 MG tablet     LV- 12/28/2022    Providers if patient needs an appointment and you are willing to give a one month supply please refill for one month and  send a letter/MyChart using \".SMILLIMITEDREFILL\" .smillimited and route chart to \"P SMI \" (Giving one month refill in non controlled medications is strongly recommended before denial)    If refill has been denied, meaning absolutely no refills without visit, please complete the smart phrase \".smirxrefuse\" and route it to the \"P SMI MED REFILLS\"  pool to inform the patient and the pharmacy.    Jerardo Espinal CMA      "

## 2023-08-20 ENCOUNTER — APPOINTMENT (OUTPATIENT)
Dept: GENERAL RADIOLOGY | Facility: CLINIC | Age: 68
End: 2023-08-20
Attending: EMERGENCY MEDICINE
Payer: OTHER MISCELLANEOUS

## 2023-08-20 ENCOUNTER — APPOINTMENT (OUTPATIENT)
Dept: GENERAL RADIOLOGY | Facility: CLINIC | Age: 68
End: 2023-08-20
Attending: STUDENT IN AN ORGANIZED HEALTH CARE EDUCATION/TRAINING PROGRAM
Payer: OTHER MISCELLANEOUS

## 2023-08-20 ENCOUNTER — HOSPITAL ENCOUNTER (EMERGENCY)
Facility: CLINIC | Age: 68
Discharge: HOME | End: 2023-08-21
Attending: STUDENT IN AN ORGANIZED HEALTH CARE EDUCATION/TRAINING PROGRAM | Admitting: STUDENT IN AN ORGANIZED HEALTH CARE EDUCATION/TRAINING PROGRAM
Payer: OTHER MISCELLANEOUS

## 2023-08-20 DIAGNOSIS — S92.351A CLOSED FRACTURE OF BASE OF FIFTH METATARSAL BONE OF RIGHT FOOT: ICD-10-CM

## 2023-08-20 DIAGNOSIS — S93.401A SPRAIN OF RIGHT ANKLE, UNSPECIFIED LIGAMENT, INITIAL ENCOUNTER: ICD-10-CM

## 2023-08-20 PROCEDURE — 99284 EMERGENCY DEPT VISIT MOD MDM: CPT | Mod: 57 | Performed by: STUDENT IN AN ORGANIZED HEALTH CARE EDUCATION/TRAINING PROGRAM

## 2023-08-20 PROCEDURE — 250N000013 HC RX MED GY IP 250 OP 250 PS 637: Performed by: STUDENT IN AN ORGANIZED HEALTH CARE EDUCATION/TRAINING PROGRAM

## 2023-08-20 PROCEDURE — 28470 CLTX METATARSAL FX WO MNP EA: CPT | Mod: RT

## 2023-08-20 PROCEDURE — 73610 X-RAY EXAM OF ANKLE: CPT | Mod: RT

## 2023-08-20 PROCEDURE — 99284 EMERGENCY DEPT VISIT MOD MDM: CPT | Mod: 25

## 2023-08-20 PROCEDURE — 73630 X-RAY EXAM OF FOOT: CPT | Mod: RT

## 2023-08-20 PROCEDURE — 28470 CLTX METATARSAL FX WO MNP EA: CPT | Mod: RT | Performed by: STUDENT IN AN ORGANIZED HEALTH CARE EDUCATION/TRAINING PROGRAM

## 2023-08-20 RX ORDER — ACETAMINOPHEN 500 MG
500 TABLET ORAL ONCE
Status: COMPLETED | OUTPATIENT
Start: 2023-08-20 | End: 2023-08-20

## 2023-08-20 RX ORDER — IBUPROFEN 200 MG
400 TABLET ORAL ONCE
Status: COMPLETED | OUTPATIENT
Start: 2023-08-20 | End: 2023-08-20

## 2023-08-20 RX ADMIN — ACETAMINOPHEN 500 MG: 500 TABLET ORAL at 22:52

## 2023-08-20 RX ADMIN — IBUPROFEN 400 MG: 200 TABLET, FILM COATED ORAL at 22:52

## 2023-08-20 ASSESSMENT — ACTIVITIES OF DAILY LIVING (ADL): ADLS_ACUITY_SCORE: 37

## 2023-08-21 VITALS
OXYGEN SATURATION: 95 % | HEART RATE: 90 BPM | SYSTOLIC BLOOD PRESSURE: 128 MMHG | RESPIRATION RATE: 16 BRPM | BODY MASS INDEX: 42.05 KG/M2 | TEMPERATURE: 98.6 F | DIASTOLIC BLOOD PRESSURE: 52 MMHG | WEIGHT: 245 LBS

## 2023-08-21 NOTE — DISCHARGE INSTRUCTIONS
Foot/Ankle Follow-Up: [facility]  will call you to coordinate your care as prescribed by your provider. A representative will call you within 2 business days to help you schedule your appointment, or you may contact the  Representative at: [phone].     Use Tylenol and ibuprofen as discussed for pain control in the interim.  You have been provided an orthopedic boot as well.

## 2023-08-21 NOTE — ED PROVIDER NOTES
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  She states that she lost her balance on a depressed area of grass and edging next to a sidewalk.  She believes her ankle rolled laterally.  She has been able to put weight on this extremity with significant pain and discomfort.  There is a significant mount of swelling around the right lateral ankle.  There is bruising over the right lateral mid foot.  She denies significant pain beyond general soreness in her left extremity knee or hip.  No significant back pain or chest pain.  No head injury or trauma.  Patient took some Tylenol earlier today with incomplete relief.  She has been using a walker which has been helpful.    Past Medical History  Past Medical History:   Diagnosis Date    Abnormal Pap smear     Anxiety     Arthritis     Arthritis of knee 4/4/2013    Arthritis of shoulder region, right 4/18/2014    Back injury     Breast disorder     Chronic constipation     Chronic diarrhea     Depressive disorder     Diabetes (H)     Fecal incontinence     Finger pain 4/2/2015    Fracture broke L 5th pinky finger due to fall  1/2015    Glaucoma (increased eye pressure)     Head injury 8/6/2016    Headache(784.0)     decreased with mouth guard use.    History of blood transfusion 8/2011 & 4/2013    History of diabetes mellitus     Hypercholesteremia     Hypertension     Low back pain     Menarche age 9+    cycles q mo x 4-5 d    Neck injuries     Nonsenile cataract IO implants: L-8/2013; R-1/2011    Pain in knee joint     LEFT    Right bundle branch block     per H/P    Sleep apnea     Info on file    SNHL (sensorineural hearing loss)     Tinnitus     I have reported ringing in ears. not  sure of dates    Umbilical hernia without mention of obstruction or gangrene 8/2014    Vision disorder Detached Retina 10/2009     Past Surgical History:   Procedure Laterality Date    ARTHROPLASTY KNEE  4/4/2013    Left Total Knee Arthroplasty;  Surgeon: Lou Byrne MD;  [facility]    ARTHROPLASTY MINIMALLY INVASIVE KNEE  8/22/2011    R knee :CAITLYN JACOBO, Left age 14    COLONOSCOPY  1/14/2015    DENTAL SURGERY      root canals, wisdom teeth    EXAM UNDER ANESTHESIA, MANIPULATE JOINT (LOCATION)  10/5/2012    Procedure: EXAM UNDER ANESTHESIA, MANIPULATE JOINT (LOCATION);  Right Knee Mini Open Lysis Of Adhesions, Left Knee Steroid Injection  ;  Surgeon: Lou Byrne MD;  [facility]    [facility] OR OCULAR DEVICE INTRAOP DETACHED RETINA  2009    R    [facility] PHAKIC IOL - IMPLANT FROM SURGEON      right    KNEE SURGERY  1969    left    LASER YAG CAPSULOTOMY  12/2016    left    VITRECTOMY PARSPLANA  2010     Acetaminophen (TYLENOL PO)  amoxicillin (AMOXIL) 500 MG tablet  Ascorbic Acid (VITAMIN C PO)  aspirin 81 MG tablet  atorvastatin (LIPITOR) 20 MG tablet  B Complex Vitamins (VITAMIN  B COMPLEX) CAPS  blood glucose (ONETOUCH VERIO IQ) test strip  blood glucose calibration (ONETOUCH VERIO) solution  brimonidine-timolol (COMBIGAN) 0.2-0.5 % ophthalmic solution  calcium-vitamin D (CALTRATE) 600-400 MG-UNIT per tablet  cholecalciferol (VITAMIN D) 1000 UNIT tablet  cyanocobalamin (VITAMIN B-12) 1000 MCG tablet  cycloSPORINE (RESTASIS) 0.05 % ophthalmic emulsion  HUMALOG KWIKPEN 100 UNIT/ML soln  hypromellose-dextran (ARTIFICAL TEARS) 0.1-0.3 % ophthalmic solution  insulin glargine (LANTUS SOLOSTAR) 100 UNIT/ML pen  insulin pen needle (B-D U/F) 31G X 8 MM miscellaneous  latanoprost (XALATAN) 0.005 % ophthalmic solution  lisinopril-hydrochlorothiazide (ZESTORETIC) 20-12.5 MG tablet  metFORMIN (GLUCOPHAGE) 500 MG tablet  Multiple Vitamin (MULTIVITAMIN  S) CAPS  Multiple Vitamins-Minerals (EYE-ZAKIYA PLUS  LUTEIN PO)  Omega-3 Fatty Acids (OMEGA-3 FISH OIL PO)  OneTouch Delica Lancets 33G MISC  order for DME  polyethylene glycol (MIRALAX) 17 GM/Dose powder  semaglutide (OZEMPIC, 0.25 OR 0.5 MG/DOSE,) 2 MG/1.5ML SOPN pen  sertraline (ZOLOFT) 50 MG tablet  timolol (TIMOPTIC) 0.5 % ophthalmic solution  triamcinolone (ARISTOCORT HP) 0.5 % external cream  vitamin C (ASCORBIC ACID) 100 MG tablet      Allergies   Allergen Reactions    Nkda [No Known Drug Allergy]      Family History  Family History   Problem Relation Age of Onset    Diabetes Father 55        DM II    Diabetes Brother 50        xs 2    Hypertension Sister 41        also B and M    Cancer Maternal Aunt         multiple myeloma    Hypertension Mother 79    Osteoporosis Mother     Memory loss Mother     Glaucoma Mother     Diabetes Brother     Hypertension Brother     Heart Murmur Brother     Glaucoma Brother     Hypertension Brother     Breast Cancer Cousin     Thyroid Disease Sister         Graves    Diabetes Sister         Graves    Cerebrovascular Disease Maternal Grandmother     Other - See Comments Sister         16 minths head injury    Other - See Comments Brother         MVA age 36    Cancer - colorectal No family hx of     Prostate Cancer No family hx of     Alcohol/Drug No family hx of     Melanoma No family hx of     Skin Cancer No family hx of      Social History   Social History     Tobacco Use    Smoking status: Former     Packs/day: 0.00     Years: 0.00     Pack years: 0.00     Types: Cigarettes    Smokeless tobacco: Never    Tobacco comments:     quit mid 1980s   Vaping Use    Vaping Use: Never used   Substance Use Topics    Alcohol use: No    Drug use: No      Past medical history, past surgical history, medications, allergies, family history, and social history were reviewed with the patient. No additional pertinent items.      A medically appropriate review of systems was performed with pertinent positives and negatives noted in the HPI, and  all other systems negative.    Physical Exam   BP: 115/68  Pulse: 90  Temp: 99.5  F (37.5  C)  Resp: 16  Weight: 111.1 kg (245 lb)  SpO2: 95 %  Physical Exam  Vital Signs Reviewed  Gen: Well nourished, well developed, resting comfortably, no acute distress  HEENT: NC/AT, PERRL, EOMI, MMM  Neck: Supple, FROM  CV: Regular Rate, no murmur/rub/gallop  Lungs/Chest: Normal Effort, CTAB  Abd: Non-distended, non-tender  MSK/Back: FROM.  Tenderness over the right lateral malleolus with significant edema.  Bruising and tenderness over the fifth metatarsal.  No significant medial malleoli or tenderness.  No tenderness over the midfoot medially.,  Distal perfusion intact.  Neuro: A&Ox3, GCS 15, CN II-XII unremarkable, distal strength and sensation intact  Skin: Warm, Dry, Intact, no visible lesions    ED Course, Procedures, & Data   X-rays of the ankle were obtained from the waiting room, I independently interpreted these and they were negative for acute fracture  Patient was moved back to an ED room where she was assessed after an extended wait due to an arrival surge  Additional foot x-rays obtained after evaluation  Pain medication administered  Ortho boot applied  Referral to orthopedic [facility] for outpatient follow-up  ED Course as of 08/21/23 0259   Mon Aug 21, 2023   0005 I have independently interpreted the foot x-ray and there appears to be a pseudo Morel fracture.  Final radiology read is pending.   0010 Patient does not wish to remain in the department for final radiology read.  I do not think management will change so we will place her in an orthopedic boot and have her follow-up with the foot and ankle clinic.     Procedures             Results for orders placed or performed during the hospital encounter of 08/20/23   Ankle XR, G/E 3 views, right     Status: None    Narrative    EXAM: XR ANKLE RIGHT G/E 3 VIEWS  LOCATION: [facility]  DATE: 8/20/2023    INDICATION:  Right ankle pain after trauma.  COMPARISON: None.      Impression    IMPRESSION: Normal joint spaces and alignment. No fracture. Atherosclerotic calcification.   Foot  XR, G/E 3 views, right     Status: None    Narrative    EXAM: XR FOOT RIGHT G/E 3 VIEWS  LOCATION: [facility]  DATE: 8/21/2023    INDICATION: right foot pain, bruising, tenderness over 5th metatarsal  COMPARISON: 12/08/2014      Impression    IMPRESSION: Nondisplaced transverse fracture through base of fifth metatarsal bone. Remainder of bones normal. Heavy atherosclerotic vascular calcifications.     Medications   acetaminophen (TYLENOL) tablet 500 mg (500 mg Oral $Given 8/20/23 2252)   ibuprofen (ADVIL/MOTRIN) tablet 400 mg (400 mg Oral $Given 8/20/23 2252)     Labs Ordered and Resulted from Time of ED Arrival to Time of ED Departure - No data to display  Foot  XR, G/E 3 views, right   Final Result   IMPRESSION: Nondisplaced transverse fracture through base of fifth metatarsal bone. Remainder of bones normal. Heavy atherosclerotic vascular calcifications.      Ankle XR, G/E 3 views, right   Final Result   IMPRESSION: Normal joint spaces and alignment. No fracture. Atherosclerotic calcification.             Critical care was not performed.     Medical Decision Making  The patient's presentation was of low complexity (an acute and uncomplicated illness or injury).    The patient's evaluation involved:  independent interpretation of testing performed by another health professional (see separate area of note for details)    The patient's management necessitated moderate risk (prescription drug management including medications given in the ED).    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  Vital signs reviewed within normal limits.   Patient is resting comfortably no acute distress.  X-rays were reviewed and shows no obvious fracture of the ankle.  Examination of the foot with concerning tenderness over the metatarsal bones on the lateral aspect. Obtained additional foot imaging.  These showed a fracture at the base of the 5th metatarsal on the right. Suspect pseudo Morel pathology based on appearance.    Tylenol Motrin for pain.  Patient was placed in orthopedic boot.  Has a walker she will use to assist with weightbearing.  We will have her follow-up with outpatient foot and ankle clinic. A referral was sent via the  scheduling service. Patient understands importance of follow-up. RTED precautions provided.     I have reviewed the nursing notes. I have reviewed the findings, diagnosis, plan and need for follow up with the patient.    Discharge Medication List as of 8/21/2023 12:35 AM          Final diagnoses:   Sprain of right ankle, unspecified ligament, initial encounter   Closed fracture of base of fifth metatarsal bone of right foot     Wicho Miller Jr., MD   [facility] EMERGENCY DEPARTMENT  8/20/2023     Wicho Miller MD  08/21/23 0259

## 2023-08-21 NOTE — ED TRIAGE NOTES
Patient presents to ED with c/o ankle pain and swelling. Patient twisted ankle yesterday afternoon at work. Patient having difficulty ambulating.      Triage Assessment       Row Name 08/20/23 2005       Triage Assessment (Adult)    Airway WDL WDL       Respiratory WDL    Respiratory WDL WDL       Skin Circulation/Temperature WDL    Skin Circulation/Temperature WDL WDL       Cardiac WDL    Cardiac WDL WDL       Peripheral/Neurovascular WDL    Peripheral Neurovascular WDL WDL       Cognitive/Neuro/Behavioral WDL    Cognitive/Neuro/Behavioral WDL WDL

## 2023-08-22 ENCOUNTER — TELEPHONE (OUTPATIENT)
Dept: ORTHOPEDICS | Facility: CLINIC | Age: 68
End: 2023-08-22
Payer: MEDICARE

## 2023-08-22 NOTE — TELEPHONE ENCOUNTER
Orthopedic/Sports Medicine Fracture Triage    Incoming call/or message from call center member.    Fracture type: Foot.    The patient is in a   boot .    Date of injury 8/20/23.    Triaged by: Dr. Justin .    Determined to be managed Non operatively.    Needs to be seen in 1-2 weeks.    Additional Comments/information: Encounter forwarded to clinic coordinator to schedule pt with non-op sports.      Raad Hankins, ATC

## 2023-08-22 NOTE — TELEPHONE ENCOUNTER
[facility] Call Center    Phone Message    May a detailed message be left on voicemail: yes     Reason for Call: Other: Sprain of right ankle-Closed fracture of base of fifth metatarsal bone of right foot/Wicho Miller MD/medicare/ortho  scheduled 8/29 with Dr Justin     Action Taken: Message routed to:  [facility] priority    Travel Screening: Not Applicable

## 2023-08-23 NOTE — TELEPHONE ENCOUNTER
Spoke with patient about rescheduling appointment with Dr. Justin to a non op sport medicine provider. She was understanding and accepted an appointment with Dr. Molina on 8/28.  Yvonne Levine ATC

## 2023-08-25 NOTE — TELEPHONE ENCOUNTER
DIAGNOSIS: Sprain of right ankle-Closed fracture of base of fifth metatarsal bone of right foot/Wicho Miller MD/medicare/ortho    APPOINTMENT DATE: 8/28/23   NOTES STATUS DETAILS   OFFICE NOTE from other specialist Internal 1/25/23, 10/12/22, 4/12/22, 12/22/21, 9/29/21, 6/16/21, 3/22/21, 12/01/20, 7/28/20, 1/20/20, 9/16/19, 5/4/19 - Jerrell Austin DPM - Right Ankle Painl R foot pain; diabetic neuropathy; diabetic foot care   DISCHARGE REPORT from the ER Internal 1/23/17 - Mohan Moreno MD - [facility] [place] - R ankle swelling    12/8/14 - James Rivera DPM - [facility] - R Foot    MEDICATION LIST Internal    DEXA (osteoporosis/bone health) Internal Hip/Pelvis/Spine - 12/1922, 12/4/20, 10/23/18, 12/7/15    Wrist/Heel/Ankle - 12/19/22, 12/4/20, 10/23/18    Bone Density - 11/19/04   XRAYS (IMAGES & REPORTS) Internal R Ankle - 8/20/23    R Foot - 8/20/23, 12/08/14

## 2023-08-28 ENCOUNTER — PRE VISIT (OUTPATIENT)
Dept: ORTHOPEDICS | Facility: CLINIC | Age: 68
End: 2023-08-28

## 2023-08-28 ENCOUNTER — OFFICE VISIT (OUTPATIENT)
Dept: ORTHOPEDICS | Facility: CLINIC | Age: 68
End: 2023-08-28
Payer: OTHER MISCELLANEOUS

## 2023-08-28 ENCOUNTER — ANCILLARY PROCEDURE (OUTPATIENT)
Dept: GENERAL RADIOLOGY | Facility: CLINIC | Age: 68
End: 2023-08-28
Attending: FAMILY MEDICINE
Payer: OTHER MISCELLANEOUS

## 2023-08-28 DIAGNOSIS — S92.351A CLOSED FRACTURE OF BASE OF FIFTH METATARSAL BONE OF RIGHT FOOT: ICD-10-CM

## 2023-08-28 DIAGNOSIS — M25.571 PAIN IN JOINT INVOLVING RIGHT ANKLE AND FOOT: Primary | ICD-10-CM

## 2023-08-28 DIAGNOSIS — M25.571 PAIN IN JOINT INVOLVING RIGHT ANKLE AND FOOT: ICD-10-CM

## 2023-08-28 DIAGNOSIS — S93.401A SPRAIN OF RIGHT ANKLE, UNSPECIFIED LIGAMENT, INITIAL ENCOUNTER: ICD-10-CM

## 2023-08-28 PROCEDURE — 99203 OFFICE O/P NEW LOW 30 MIN: CPT | Performed by: FAMILY MEDICINE

## 2023-08-28 PROCEDURE — 73630 X-RAY EXAM OF FOOT: CPT | Mod: RT | Performed by: RADIOLOGY

## 2023-08-28 NOTE — LETTER
8/28/2023      RE: Radha Baca  [address] [id number]     Dear Colleague,    Thank you for referring your patient, Radha Baca, to the [facility] [place]. Please see a copy of my visit note below.    Follow-up ER visit right ankle injury with right fifth metatarsal fracture x one week    Patient was walking at a [employer] festival 8/20/2023, 1 week ago, and lost her balance on the sidewalk and slipped onto the grass. She was evaluated in the emergency room on that date, with right ankle x-rays that revealed no fracture, but a right-sided foot xray revealed a nondisplaced transverse fracture at the base of the fifth metatarsal.  Patient has been using an orthopedic boot, and a walker to assist with ambulation.    She is taking Tylenol for the pain.    Patient has a history of type 2 diabetes mellitus on insulin, and of hypertension.    She has traveled to [place] in the past, a pilgrimage to [facility].        Imaging studies below reviewed by me:    EXAM: XR ANKLE RIGHT G/E 3 VIEWS  LOCATION: [facility]  DATE: 8/20/2023     INDICATION: Right ankle pain after trauma.  COMPARISON: None.         Impression     IMPRESSION: Normal joint spaces and alignment. No fracture. Atherosclerotic calcification.   Foot  XR, G/E 3 views, right     Status: None     Narrative     EXAM: XR FOOT RIGHT G/E 3 VIEWS  LOCATION: [facility]  DATE: 8/21/2023     INDICATION: right foot pain, bruising, tenderness over 5th metatarsal  COMPARISON: 12/08/2014        Impression     IMPRESSION: Nondisplaced transverse fracture through base of fifth metatarsal bone. Remainder of bones normal. Heavy atherosclerotic vascular calcifications.             PMH:  Past Medical History:   Diagnosis Date    Abnormal Pap smear     Anxiety     Arthritis     Arthritis of knee 4/4/2013    Arthritis of  shoulder region, right 4/18/2014    Back injury     Breast disorder     Chronic constipation     Chronic diarrhea     Depressive disorder     Diabetes (H)     Fecal incontinence     Finger pain 4/2/2015    Fracture broke L 5th pinky finger due to fall  1/2015    Glaucoma (increased eye pressure)     Head injury 8/6/2016    Headache(784.0)     decreased with mouth guard use.    History of blood transfusion 8/2011 & 4/2013    History of diabetes mellitus     Hypercholesteremia     Hypertension     Low back pain     Menarche age 9+    cycles q mo x 4-5 d    Neck injuries     Nonsenile cataract IO implants: L-8/2013; R-1/2011    Pain in knee joint     LEFT    Right bundle branch block     per H/P    Sleep apnea     Info on file    SNHL (sensorineural hearing loss)     Tinnitus     I have reported ringing in ears. not sure of dates    Umbilical hernia without mention of obstruction or gangrene 8/2014    Vision disorder Detached Retina 10/2009       Active problem list:  Patient Active Problem List   Diagnosis    Dyslipidemia    BMI >40    SNHL (sensorineural hearing loss)    Glaucoma    Umbilical hernia -- w/o symptoms->expectant mgt    Encounter for routine gynecological examination    Menopause present -- age 39    Health care maintenance    Type 2 diabetes mellitus with complication (H)    Essential hypertension    Venous (peripheral) insufficiency    Chronic bilateral low back pain with right-sided sciatica    Other secondary osteoarthritis of first carpometacarpal joint of right hand    Insulin long-term use (H)    Class 3 severe obesity due to excess calories with serious comorbidity and body mass index (BMI) of 40.0 to 44.9 in adult (H)    Right-sided thoracic back pain    GHISLAINE (obstructive sleep apnea)    Insomnia, unspecified type    Nightmares    Generalized anxiety disorder    Low bone density       FH:  Family History   Problem Relation Age of Onset    Diabetes Father 55        DM II    Diabetes Brother 50         xs 2    Hypertension Sister 41        also B and M    Cancer Maternal Aunt         multiple myeloma    Hypertension Mother 79    Osteoporosis Mother     Memory loss Mother     Glaucoma Mother     Diabetes Brother     Hypertension Brother     Heart Murmur Brother     Glaucoma Brother     Hypertension Brother     Breast Cancer Cousin     Thyroid Disease Sister         Graves    Diabetes Sister         Graves    Cerebrovascular Disease Maternal Grandmother     Other - See Comments Sister         16 minths head injury    Other - See Comments Brother         MVA age 36    Cancer - colorectal No family hx of     Prostate Cancer No family hx of     Alcohol/Drug No family hx of     Melanoma No family hx of     Skin Cancer No family hx of        SH:  Social History     Socioeconomic History    Marital status:      Spouse name: Not on file    Number of children: Not on file    Years of education: Not on file    Highest education level: Not on file   Occupational History    Occupation: Human Resources     Comment: between jobs now   Tobacco Use    Smoking status: Former     Packs/day: 0.00     Years: 0.00     Pack years: 0.00     Types: Cigarettes    Smokeless tobacco: Never    Tobacco comments:     quit mid 1980s   Vaping Use    Vaping Use: Never used   Substance and Sexual Activity    Alcohol use: No    Drug use: No    Sexual activity: Not Currently     Partners: Male     Birth control/protection: Abstinence, Post-menopausal   Other Topics Concern     Service Not Asked    Blood Transfusions Yes     Comment: 2 units 2011    Caffeine Concern No     Comment: 2s    Occupational Exposure No    Hobby Hazards No    Sleep Concern No    Stress Concern No     Comment: rehab for R knee after replacement    Weight Concern Yes     Comment: working on wt loss    Special Diet Yes     Comment: Diabetic    Back Care No    Exercise No     Comment: water aerobics 35-40' 3-4 d & strength 3d/wk    Bike Helmet No    Seat Belt  No    Self-Exams Not Asked    Parent/sibling w/ CABG, MI or angioplasty before 65F 55M? Not Asked   Social History Narrative    How much exercise per week? 3-4x swimming, aerobics    How much calcium per day? Supplement       How much caffeine per day? 2 cups coffee/ 1-2 can of diet soda    How much vitamin D per day? Supplement    Do you/your family wear seatbelts?  Yes    Do you/your family use safety helmets? No    Do you/your family use sunscreen? No    Do you/your family keep firearms in the home? No    Do you/your family have a smoke detector(s)? Yes    Do you feel safe in your home? Yes    Has anyone ever touched you in an unwanted manner? No     Explain     See LEA Berman 11/26/2014    Reviewed Raul DEGROOT MA 11/22/2017     Social Determinants of Health     Financial Resource Strain: Not on file   Food Insecurity: Not on file   Transportation Needs: Not on file   Physical Activity: Not on file   Stress: Not on file   Social Connections: Not on file   Intimate Partner Violence: Not At Risk (7/23/2021)    Humiliation, Afraid, Rape, and Kick questionnaire     Fear of Current or Ex-Partner: No     Emotionally Abused: No     Physically Abused: No     Sexually Abused: No   Housing Stability: Not on file       MEDS:  See EMR, reviewed  ALL:  See EMR, reviewed    REVIEW OF SYSTEMS:  CONSTITUTIONAL:NEGATIVE for fever, chills, change in weight  INTEGUMENTARY/SKIN: NEGATIVE for worrisome rashes, moles or lesions  EYES: NEGATIVE for vision changes or irritation  ENT/MOUTH: NEGATIVE for ear, mouth and throat problems  RESP:NEGATIVE for significant cough or SOB  BREAST: NEGATIVE for masses, tenderness or discharge  CV: NEGATIVE for chest pain, palpitations or peripheral edema  GI: NEGATIVE for nausea, abdominal pain, heartburn, or change in bowel habits  :NEGATIVE for frequency, dysuria, or hematuria  :NEGATIVE for frequency, dysuria, or hematuria  NEURO: NEGATIVE for weakness, dizziness or paresthesias  ENDOCRINE:  NEGATIVE for temperature intolerance, skin/hair changes  HEME/ALLERGY/IMMUNE: NEGATIVE for bleeding problems  PSYCHIATRIC: NEGATIVE for changes in mood or affect      Objective: She has swelling about the lateral ankle, with bruising at the right foot.  Nontender at the Achilles tendon, peroneal or posterior tibial tendon.  Mildly tender at the anterior talofibular, nontender at the calcaneal or posterior talofibular.  Nontender at the deltoid.  Nontender in the area of Lisfranc.  Tender over the proximal fifth metatarsal, nontender over the proximal fourth metatarsal.  She can flex and extend the toes against resistance.  She can flex and extend the ankle against resistance.  No tenderness or swelling in the calf.  Appropriate in conversation and affect.    I personally reviewed the patient's updated x-rays that show the proximal fifth metatarsal fracture, more proximal than the area of a Morel fracture.    Assessment: Right-sided proximal fifth metatarsal fracture x1 week.  Right-sided ankle sprain.    Plan: She has a cam walker boot that she can wear when she is ambulating.  She will use it for the next 3 weeks.  She will take it off for sleep, take it off while sitting in a chair, take it off for range of motion exercises of the ankle.  She was given a compression wrap today to help with swelling.  Ice as needed for pain Tylenol as needed for pain.  Follow-up in 3 weeks for repeat x-ray.                          Again, thank you for allowing me to participate in the care of your patient.      Sincerely,    Chandan Molina MD

## 2023-08-28 NOTE — PROGRESS NOTES
Follow-up ER visit right ankle injury with right fifth metatarsal fracture x one week    Patient was working at a [employer] festival and was walking at a [employer], from [place] [employer] [place], and lost her balance, injuring her right ankle. She was evaluated in the emergency room the following day, on 8/20/23, with right ankle x-rays that revealed no fracture, but a right-sided foot xray revealed a nondisplaced transverse fracture at the base of the fifth metatarsal.  Patient has been using an orthopedic boot, and a walker to assist with ambulation.  Pt indicates this a worker's compensation injury.    She is taking Tylenol for the pain.    Patient has a history of type 2 diabetes mellitus on insulin, and of hypertension.    She has traveled to [place] in the past, [place] [employer] to [facility].            Imaging studies below reviewed by me:    EXAM: XR ANKLE RIGHT G/E 3 VIEWS  LOCATION: [facility]  DATE: 8/20/2023     INDICATION: Right ankle pain after trauma.  COMPARISON: None.         Impression     IMPRESSION: Normal joint spaces and alignment. No fracture. Atherosclerotic calcification.   Foot  XR, G/E 3 views, right     Status: None     Narrative     EXAM: XR FOOT RIGHT G/E 3 VIEWS  LOCATION: [facility]  DATE: 8/21/2023     INDICATION: right foot pain, bruising, tenderness over 5th metatarsal  COMPARISON: 12/08/2014        Impression     IMPRESSION: Nondisplaced transverse fracture through base of fifth metatarsal bone. Remainder of bones normal. Heavy atherosclerotic vascular calcifications.             PMH:  Past Medical History:   Diagnosis Date    Abnormal Pap smear     Anxiety     Arthritis     Arthritis of knee 4/4/2013    Arthritis of shoulder region, right 4/18/2014    Back injury     Breast disorder     Chronic constipation     Chronic diarrhea     Depressive disorder     Diabetes (H)     Fecal incontinence     Finger  pain 4/2/2015    Fracture broke L 5th pinky finger due to fall  1/2015    Glaucoma (increased eye pressure)     Head injury 8/6/2016    Headache(784.0)     decreased with mouth guard use.    History of blood transfusion 8/2011 & 4/2013    History of diabetes mellitus     Hypercholesteremia     Hypertension     Low back pain     Menarche age 9+    cycles q mo x 4-5 d    Neck injuries     Nonsenile cataract IO implants: L-8/2013; R-1/2011    Pain in knee joint     LEFT    Right bundle branch block     per H/P    Sleep apnea     Info on file    SNHL (sensorineural hearing loss)     Tinnitus     I have reported ringing in ears. not sure of dates    Umbilical hernia without mention of obstruction or gangrene 8/2014    Vision disorder Detached Retina 10/2009       Active problem list:  Patient Active Problem List   Diagnosis    Dyslipidemia    BMI >40    SNHL (sensorineural hearing loss)    Glaucoma    Umbilical hernia -- w/o symptoms->expectant mgt    Encounter for routine gynecological examination    Menopause present -- age 39    Health care maintenance    Type 2 diabetes mellitus with complication (H)    Essential hypertension    Venous (peripheral) insufficiency    Chronic bilateral low back pain with right-sided sciatica    Other secondary osteoarthritis of first carpometacarpal joint of right hand    Insulin long-term use (H)    Class 3 severe obesity due to excess calories with serious comorbidity and body mass index (BMI) of 40.0 to 44.9 in adult (H)    Right-sided thoracic back pain    GHISLAINE (obstructive sleep apnea)    Insomnia, unspecified type    Nightmares    Generalized anxiety disorder    Low bone density       FH:  Family History   Problem Relation Age of Onset    Diabetes Father 55        DM II    Diabetes Brother 50        xs 2    Hypertension Sister 41        also B and M    Cancer Maternal Aunt         multiple myeloma    Hypertension Mother 79    Osteoporosis Mother     Memory loss Mother     Glaucoma  Mother     Diabetes Brother     Hypertension Brother     Heart Murmur Brother     Glaucoma Brother     Hypertension Brother     Breast Cancer Cousin     Thyroid Disease Sister         Graves    Diabetes Sister         Graves    Cerebrovascular Disease Maternal Grandmother     Other - See Comments Sister         16 minths head injury    Other - See Comments Brother         MVA age 36    Cancer - colorectal No family hx of     Prostate Cancer No family hx of     Alcohol/Drug No family hx of     Melanoma No family hx of     Skin Cancer No family hx of        SH:  Social History     Socioeconomic History    Marital status:      Spouse name: Not on file    Number of children: Not on file    Years of education: Not on file    Highest education level: Not on file   Occupational History    Occupation: Human Resources     Comment: between jobs now   Tobacco Use    Smoking status: Former     Packs/day: 0.00     Years: 0.00     Pack years: 0.00     Types: Cigarettes    Smokeless tobacco: Never    Tobacco comments:     quit mid 1980s   Vaping Use    Vaping Use: Never used   Substance and Sexual Activity    Alcohol use: No    Drug use: No    Sexual activity: Not Currently     Partners: Male     Birth control/protection: Abstinence, Post-menopausal   Other Topics Concern     Service Not Asked    Blood Transfusions Yes     Comment: 2 units 2011    Caffeine Concern No     Comment: 2s    Occupational Exposure No    Hobby Hazards No    Sleep Concern No    Stress Concern No     Comment: rehab for R knee after replacement    Weight Concern Yes     Comment: working on wt loss    Special Diet Yes     Comment: Diabetic    Back Care No    Exercise No     Comment: water aerobics 35-40' 3-4 d & strength 3d/wk    Bike Helmet No    Seat Belt No    Self-Exams Not Asked    Parent/sibling w/ CABG, MI or angioplasty before 65F 55M? Not Asked   Social History Narrative    How much exercise per week? 3-4x swimming, aerobics    How  much calcium per day? Supplement       How much caffeine per day? 2 cups coffee/ 1-2 can of diet soda    How much vitamin D per day? Supplement    Do you/your family wear seatbelts?  Yes    Do you/your family use safety helmets? No    Do you/your family use sunscreen? No    Do you/your family keep firearms in the home? No    Do you/your family have a smoke detector(s)? Yes    Do you feel safe in your home? Yes    Has anyone ever touched you in an unwanted manner? No     Explain     See LEA Berman 11/26/2014    Reviewed Raul DEGROOT MA 11/22/2017     Social Determinants of Health     Financial Resource Strain: Not on file   Food Insecurity: Not on file   Transportation Needs: Not on file   Physical Activity: Not on file   Stress: Not on file   Social Connections: Not on file   Intimate Partner Violence: Not At Risk (7/23/2021)    Humiliation, Afraid, Rape, and Kick questionnaire     Fear of Current or Ex-Partner: No     Emotionally Abused: No     Physically Abused: No     Sexually Abused: No   Housing Stability: Not on file       MEDS:  See EMR, reviewed  ALL:  See EMR, reviewed    REVIEW OF SYSTEMS:  CONSTITUTIONAL:NEGATIVE for fever, chills, change in weight  INTEGUMENTARY/SKIN: NEGATIVE for worrisome rashes, moles or lesions  EYES: NEGATIVE for vision changes or irritation  ENT/MOUTH: NEGATIVE for ear, mouth and throat problems  RESP:NEGATIVE for significant cough or SOB  BREAST: NEGATIVE for masses, tenderness or discharge  CV: NEGATIVE for chest pain, palpitations or peripheral edema  GI: NEGATIVE for nausea, abdominal pain, heartburn, or change in bowel habits  :NEGATIVE for frequency, dysuria, or hematuria  :NEGATIVE for frequency, dysuria, or hematuria  NEURO: NEGATIVE for weakness, dizziness or paresthesias  ENDOCRINE: NEGATIVE for temperature intolerance, skin/hair changes  HEME/ALLERGY/IMMUNE: NEGATIVE for bleeding problems  PSYCHIATRIC: NEGATIVE for changes in mood or affect      Objective: She has swelling  about the lateral ankle, with bruising at the right foot.  Nontender at the Achilles tendon, peroneal or posterior tibial tendon.  Mildly tender at the anterior talofibular, nontender at the calcaneal or posterior talofibular.  Nontender at the deltoid.  Nontender in the area of Lisfranc.  Tender over the proximal fifth metatarsal, nontender over the proximal fourth metatarsal.  She can flex and extend the toes against resistance.  She can flex and extend the ankle against resistance.  No tenderness or swelling in the calf.  Appropriate in conversation and affect.    I personally reviewed the patient's updated x-rays that show the proximal fifth metatarsal fracture, more proximal than the area of a Morel fracture.    Assessment: Right-sided proximal fifth metatarsal fracture x1 week.  Right-sided ankle sprain.    Plan: She has a cam walker boot that she can wear when she is ambulating.  She will use it for the next 3 weeks.  She will take it off for sleep, take it off while sitting in a chair, take it off for range of motion exercises of the ankle.  She was given a compression wrap today to help with swelling.  Ice as needed for pain Tylenol as needed for pain.  Follow-up in 3 weeks for repeat x-ray.

## 2023-09-02 ENCOUNTER — HEALTH MAINTENANCE LETTER (OUTPATIENT)
Age: 68
End: 2023-09-02

## 2023-09-20 DIAGNOSIS — S92.351A CLOSED FRACTURE OF BASE OF FIFTH METATARSAL BONE OF RIGHT FOOT: Primary | ICD-10-CM

## 2023-09-25 ENCOUNTER — ANCILLARY PROCEDURE (OUTPATIENT)
Dept: GENERAL RADIOLOGY | Facility: CLINIC | Age: 68
End: 2023-09-25
Attending: FAMILY MEDICINE
Payer: MEDICARE

## 2023-09-25 ENCOUNTER — OFFICE VISIT (OUTPATIENT)
Dept: ORTHOPEDICS | Facility: CLINIC | Age: 68
End: 2023-09-25
Payer: OTHER MISCELLANEOUS

## 2023-09-25 DIAGNOSIS — I87.2 VENOUS (PERIPHERAL) INSUFFICIENCY: ICD-10-CM

## 2023-09-25 DIAGNOSIS — S92.351A CLOSED FRACTURE OF BASE OF FIFTH METATARSAL BONE OF RIGHT FOOT: ICD-10-CM

## 2023-09-25 DIAGNOSIS — E11.8 TYPE 2 DIABETES MELLITUS WITH COMPLICATION (H): ICD-10-CM

## 2023-09-25 DIAGNOSIS — I10 ESSENTIAL HYPERTENSION: ICD-10-CM

## 2023-09-25 DIAGNOSIS — S92.351A CLOSED FRACTURE OF BASE OF FIFTH METATARSAL BONE OF RIGHT FOOT: Primary | ICD-10-CM

## 2023-09-25 PROCEDURE — 99213 OFFICE O/P EST LOW 20 MIN: CPT | Performed by: FAMILY MEDICINE

## 2023-09-25 PROCEDURE — 73630 X-RAY EXAM OF FOOT: CPT | Mod: RT | Performed by: RADIOLOGY

## 2023-09-25 NOTE — PROGRESS NOTES
Follow-up nondisplaced fracture of the base of the right fifth metatarsal x4 weeks.    She is noted improvement since the last visit.  She been able to ambulate in a cam walker boot and notices that her pain is significantly improved compared to 4 weeks ago.    DOI:Patient was walking at a [employer] [place], from a [employer] office, and lost her balance, injuring her right ankle. She was evaluated in the emergency room the following day, on 8/20/23, with right ankle x-rays that revealed no fracture, but a right-sided foot xray revealed a nondisplaced transverse fracture at the base of the fifth metatarsal.     Patient has been using an orthopedic boot, and a walker to assist with ambulation.  Pt indicates this a worker's compensation injury.     Patient has a history of type 2 diabetes mellitus on insulin, and of hypertension.           3 views right foot radiographs 8/28/2023 3:39 PM     History: AP/LAT/OBL; Pain in joint involving right ankle and foot     Comparison: 8/21/2023, 12/8/2014     Findings:     Standing AP, oblique, and lateral  views of the right foot were  obtained.      Ongoing healing fifth metatarsal base fracture with increased fracture  line conspicuity. Likely related to resorptive phase of healing.  Alignment similar to prior.     Lisfranc articulation alignment is congruent.     Polyarticular degenerative changes, second tarsometatarsal joint. Mild  degenerative changes of first metatarsophalangeal contour deformity of  the second proximal phalangeal base, likely sequelae of remote trauma  with associated posttraumatic osteoarthritis, similar to recent  comparison. Plantar calcaneal enthesopathy.      Vascular calcifications.                                                                      Impression: Ongoing healing fifth metatarsal base fracture with  increased fracture line conspicuity. Likely related to resorptive  phase of healing. Alignment similar to prior.     WHIT MITCHELL              EXAM: XR ANKLE RIGHT G/E 3 VIEWS  LOCATION: [facility]  DATE: 8/20/2023     INDICATION: Right ankle pain after trauma.  COMPARISON: None.                                                                      IMPRESSION: Normal joint spaces and alignment. No fracture. Atherosclerotic calcification.        PMH:  Past Medical History:   Diagnosis Date    Abnormal Pap smear     Anxiety     Arthritis     Arthritis of knee 4/4/2013    Arthritis of shoulder region, right 4/18/2014    Back injury     Breast disorder     Chronic constipation     Chronic diarrhea     Depressive disorder     Diabetes (H)     Fecal incontinence     Finger pain 4/2/2015    Fracture broke L 5th pinky finger due to fall  1/2015    Glaucoma (increased eye pressure)     Head injury 8/6/2016    Headache(784.0)     decreased with mouth guard use.    History of blood transfusion 8/2011 & 4/2013    History of diabetes mellitus     Hypercholesteremia     Hypertension     Low back pain     Menarche age 9+    cycles q mo x 4-5 d    Neck injuries     Nonsenile cataract IO implants: L-8/2013; R-1/2011    Pain in knee joint     LEFT    Right bundle branch block     per H/P    Sleep apnea     Info on file    SNHL (sensorineural hearing loss)     Tinnitus     I have reported ringing in ears. not sure of dates    Umbilical hernia without mention of obstruction or gangrene 8/2014    Vision disorder Detached Retina 10/2009       Active problem list:  Patient Active Problem List   Diagnosis    Dyslipidemia    BMI >40    SNHL (sensorineural hearing loss)    Glaucoma    Umbilical hernia -- w/o symptoms->expectant mgt    Encounter for routine gynecological examination    Menopause present -- age 39    Health care maintenance    Type 2 diabetes mellitus with complication (H)    Essential hypertension    Venous (peripheral) insufficiency    Chronic bilateral low back pain with right-sided sciatica    Other  secondary osteoarthritis of first carpometacarpal joint of right hand    Insulin long-term use (H)    Class 3 severe obesity due to excess calories with serious comorbidity and body mass index (BMI) of 40.0 to 44.9 in adult (H)    Right-sided thoracic back pain    GHISLAINE (obstructive sleep apnea)    Insomnia, unspecified type    Nightmares    Generalized anxiety disorder    Low bone density       FH:  Family History   Problem Relation Age of Onset    Diabetes Father 55        DM II    Diabetes Brother 50        xs 2    Hypertension Sister 41        also B and M    Cancer Maternal Aunt         multiple myeloma    Hypertension Mother 79    Osteoporosis Mother     Memory loss Mother     Glaucoma Mother     Diabetes Brother     Hypertension Brother     Heart Murmur Brother     Glaucoma Brother     Hypertension Brother     Breast Cancer Cousin     Thyroid Disease Sister         Graves    Diabetes Sister         Graves    Cerebrovascular Disease Maternal Grandmother     Other - See Comments Sister         16 minths head injury    Other - See Comments Brother         MVA age 36    Cancer - colorectal No family hx of     Prostate Cancer No family hx of     Alcohol/Drug No family hx of     Melanoma No family hx of     Skin Cancer No family hx of        SH:  Social History     Socioeconomic History    Marital status:      Spouse name: Not on file    Number of children: Not on file    Years of education: Not on file    Highest education level: Not on file   Occupational History    Occupation: Human Resources     Comment: between jobs now   Tobacco Use    Smoking status: Former     Packs/day: 0.00     Years: 0.00     Pack years: 0.00     Types: Cigarettes    Smokeless tobacco: Never    Tobacco comments:     quit mid 1980s   Vaping Use    Vaping Use: Never used   Substance and Sexual Activity    Alcohol use: No    Drug use: No    Sexual activity: Not Currently     Partners: Male     Birth control/protection: Abstinence,  Post-menopausal   Other Topics Concern     Service Not Asked    Blood Transfusions Yes     Comment: 2 units 2011    Caffeine Concern No     Comment: 2s    Occupational Exposure No    Hobby Hazards No    Sleep Concern No    Stress Concern No     Comment: rehab for R knee after replacement    Weight Concern Yes     Comment: working on wt loss    Special Diet Yes     Comment: Diabetic    Back Care No    Exercise No     Comment: water aerobics 35-40' 3-4 d & strength 3d/wk    Bike Helmet No    Seat Belt No    Self-Exams Not Asked    Parent/sibling w/ CABG, MI or angioplasty before 65F 55M? Not Asked   Social History Narrative    How much exercise per week? 3-4x swimming, aerobics    How much calcium per day? Supplement       How much caffeine per day? 2 cups coffee/ 1-2 can of diet soda    How much vitamin D per day? Supplement    Do you/your family wear seatbelts?  Yes    Do you/your family use safety helmets? No    Do you/your family use sunscreen? No    Do you/your family keep firearms in the home? No    Do you/your family have a smoke detector(s)? Yes    Do you feel safe in your home? Yes    Has anyone ever touched you in an unwanted manner? No     Explain     See LEA Berman 11/26/2014    Reviewed Raul DEGROOT MA 11/22/2017     Social Determinants of Health     Financial Resource Strain: Not on file   Food Insecurity: Not on file   Transportation Needs: Not on file   Physical Activity: Not on file   Stress: Not on file   Social Connections: Not on file   Interpersonal Safety: Not At Risk (7/23/2021)    Humiliation, Afraid, Rape, and Kick questionnaire     Fear of Current or Ex-Partner: No     Emotionally Abused: No     Physically Abused: No     Sexually Abused: No   Housing Stability: Not on file       MEDS:  See EMR, reviewed  ALL:  See EMR, reviewed    REVIEW OF SYSTEMS:  CONSTITUTIONAL:NEGATIVE for fever, chills, change in weight  INTEGUMENTARY/SKIN: NEGATIVE for worrisome rashes, moles or lesions  EYES:  NEGATIVE for vision changes or irritation  ENT/MOUTH: NEGATIVE for ear, mouth and throat problems  RESP:NEGATIVE for significant cough or SOB  BREAST: NEGATIVE for masses, tenderness or discharge  CV: NEGATIVE for chest pain, palpitations or peripheral edema  GI: NEGATIVE for nausea, abdominal pain, heartburn, or change in bowel habits  :NEGATIVE for frequency, dysuria, or hematuria  :NEGATIVE for frequency, dysuria, or hematuria  NEURO: NEGATIVE for weakness, dizziness or paresthesias  ENDOCRINE: NEGATIVE for temperature intolerance, skin/hair changes  HEME/ALLERGY/IMMUNE: NEGATIVE for bleeding problems  PSYCHIATRIC: NEGATIVE for changes in mood or affect          Objective: She has minimal tenderness of the proximal fifth metatarsal on the right.  This is an improvement since previous visits.  Overlying skin is intact.  Sensation is normal.  Appropriate conversation and affect.    I personally reviewed with the patient repeat x-rays of the left foot that show additional new bone formation at the proximal fifth metatarsal fracture site with resorption changes, although the fracture line is still evident.    Assessment: Right-sided foot fracture x4 weeks, healing    Plan: The patient would like a note for work stating that she can return to walking activities, with the use of the boot as needed.  Note was provided.  Follow-up clinic visit in 4 weeks with repeat x-ray.  I discussed with her progression of weaning from the cam walker boot over the next 1 to 2 weeks to a supportive shoe, as tolerated.

## 2023-09-25 NOTE — LETTER
[facility] [address] [id number] [phone]        September 25, 2023    Regarding:  Radha MG Phuong  [address] [address]              To Whom It May Concern;    This patient was seen in clinic today for a 4-week follow-up visit for her right foot fracture.  She can return to her usual walking activities at work, although she may be using a walking boot to protect her foot at work.    She will follow-up in clinic for a new set of x-rays in 4 weeks.          Sincerely,        Chandan Moilna MD

## 2023-09-25 NOTE — LETTER
9/25/2023      RE: Radha Baca  [address] [address]     Dear Colleague,    Thank you for referring your patient, Radha Baca, to the [facility] [place]. Please see a copy of my visit note below.    Follow-up nondisplaced fracture of the base of the right fifth metatarsal x4 weeks.    She is noted improvement since the last visit.  She been able to ambulate in a cam walker boot and notices that her pain is significantly improved compared to 4 weeks ago.    DOI:Patient was walking at a [employer] festival, from a [employer] office, and lost her balance, injuring her right ankle. She was evaluated in the emergency room the following day, on 8/20/23, with right ankle x-rays that revealed no fracture, but a right-sided foot xray revealed a nondisplaced transverse fracture at the base of the fifth metatarsal.     Patient has been using an orthopedic boot, and a walker to assist with ambulation.  Pt indicates this a worker's compensation injury.     Patient has a history of type 2 diabetes mellitus on insulin, and of hypertension.           3 views right foot radiographs 8/28/2023 3:39 PM     History: AP/LAT/OBL; Pain in joint involving right ankle and foot     Comparison: 8/21/2023, 12/8/2014     Findings:     Standing AP, oblique, and lateral  views of the right foot were  obtained.      Ongoing healing fifth metatarsal base fracture with increased fracture  line conspicuity. Likely related to resorptive phase of healing.  Alignment similar to prior.     Lisfranc articulation alignment is congruent.     Polyarticular degenerative changes, second tarsometatarsal joint. Mild  degenerative changes of first metatarsophalangeal contour deformity of  the second proximal phalangeal base, likely sequelae of remote trauma  with associated posttraumatic osteoarthritis, similar to recent  comparison. Plantar calcaneal enthesopathy.      Vascular  calcifications.                                                                      Impression: Ongoing healing fifth metatarsal base fracture with  increased fracture line conspicuity. Likely related to resorptive  phase of healing. Alignment similar to prior.     WHIT MITCHELL             EXAM: XR ANKLE RIGHT G/E 3 VIEWS  LOCATION: [facility]  DATE: 8/20/2023     INDICATION: Right ankle pain after trauma.  COMPARISON: None.                                                                      IMPRESSION: Normal joint spaces and alignment. No fracture. Atherosclerotic calcification.        PMH:  Past Medical History:   Diagnosis Date    Abnormal Pap smear     Anxiety     Arthritis     Arthritis of knee 4/4/2013    Arthritis of shoulder region, right 4/18/2014    Back injury     Breast disorder     Chronic constipation     Chronic diarrhea     Depressive disorder     Diabetes (H)     Fecal incontinence     Finger pain 4/2/2015    Fracture broke L 5th pinky finger due to fall  1/2015    Glaucoma (increased eye pressure)     Head injury 8/6/2016    Headache(784.0)     decreased with mouth guard use.    History of blood transfusion 8/2011 & 4/2013    History of diabetes mellitus     Hypercholesteremia     Hypertension     Low back pain     Menarche age 9+    cycles q mo x 4-5 d    Neck injuries     Nonsenile cataract IO implants: L-8/2013; R-1/2011    Pain in knee joint     LEFT    Right bundle branch block     per H/P    Sleep apnea     Info on file    SNHL (sensorineural hearing loss)     Tinnitus     I have reported ringing in ears. not sure of dates    Umbilical hernia without mention of obstruction or gangrene 8/2014    Vision disorder Detached Retina 10/2009       Active problem list:  Patient Active Problem List   Diagnosis    Dyslipidemia    BMI >40    SNHL (sensorineural hearing loss)    Glaucoma    Umbilical hernia -- w/o symptoms->expectant mgt    Encounter  for routine gynecological examination    Menopause present -- age 39    Health care maintenance    Type 2 diabetes mellitus with complication (H)    Essential hypertension    Venous (peripheral) insufficiency    Chronic bilateral low back pain with right-sided sciatica    Other secondary osteoarthritis of first carpometacarpal joint of right hand    Insulin long-term use (H)    Class 3 severe obesity due to excess calories with serious comorbidity and body mass index (BMI) of 40.0 to 44.9 in adult (H)    Right-sided thoracic back pain    GHISLAINE (obstructive sleep apnea)    Insomnia, unspecified type    Nightmares    Generalized anxiety disorder    Low bone density       FH:  Family History   Problem Relation Age of Onset    Diabetes Father 55        DM II    Diabetes Brother 50        xs 2    Hypertension Sister 41        also B and M    Cancer Maternal Aunt         multiple myeloma    Hypertension Mother 79    Osteoporosis Mother     Memory loss Mother     Glaucoma Mother     Diabetes Brother     Hypertension Brother     Heart Murmur Brother     Glaucoma Brother     Hypertension Brother     Breast Cancer Cousin     Thyroid Disease Sister         Graves    Diabetes Sister         Graves    Cerebrovascular Disease Maternal Grandmother     Other - See Comments Sister         16 minths head injury    Other - See Comments Brother         MVA age 36    Cancer - colorectal No family hx of     Prostate Cancer No family hx of     Alcohol/Drug No family hx of     Melanoma No family hx of     Skin Cancer No family hx of        SH:  Social History     Socioeconomic History    Marital status:      Spouse name: Not on file    Number of children: Not on file    Years of education: Not on file    Highest education level: Not on file   Occupational History    Occupation: Human Resources     Comment: between jobs now   Tobacco Use    Smoking status: Former     Packs/day: 0.00     Years: 0.00     Pack years: 0.00     Types:  Cigarettes    Smokeless tobacco: Never    Tobacco comments:     quit mid 1980s   Vaping Use    Vaping Use: Never used   Substance and Sexual Activity    Alcohol use: No    Drug use: No    Sexual activity: Not Currently     Partners: Male     Birth control/protection: Abstinence, Post-menopausal   Other Topics Concern     Service Not Asked    Blood Transfusions Yes     Comment: 2 units 2011    Caffeine Concern No     Comment: 2s    Occupational Exposure No    Hobby Hazards No    Sleep Concern No    Stress Concern No     Comment: rehab for R knee after replacement    Weight Concern Yes     Comment: working on wt loss    Special Diet Yes     Comment: Diabetic    Back Care No    Exercise No     Comment: water aerobics 35-40' 3-4 d & strength 3d/wk    Bike Helmet No    Seat Belt No    Self-Exams Not Asked    Parent/sibling w/ CABG, MI or angioplasty before 65F 55M? Not Asked   Social History Narrative    How much exercise per week? 3-4x swimming, aerobics    How much calcium per day? Supplement       How much caffeine per day? 2 cups coffee/ 1-2 can of diet soda    How much vitamin D per day? Supplement    Do you/your family wear seatbelts?  Yes    Do you/your family use safety helmets? No    Do you/your family use sunscreen? No    Do you/your family keep firearms in the home? No    Do you/your family have a smoke detector(s)? Yes    Do you feel safe in your home? Yes    Has anyone ever touched you in an unwanted manner? No     Explain     See LEA Berman 11/26/2014    Reviewed Raul DEGROOT MA 11/22/2017     Social Determinants of Health     Financial Resource Strain: Not on file   Food Insecurity: Not on file   Transportation Needs: Not on file   Physical Activity: Not on file   Stress: Not on file   Social Connections: Not on file   Interpersonal Safety: Not At Risk (7/23/2021)    Humiliation, Afraid, Rape, and Kick questionnaire     Fear of Current or Ex-Partner: No     Emotionally Abused: No     Physically Abused:  No     Sexually Abused: No   Housing Stability: Not on file       MEDS:  See EMR, reviewed  ALL:  See EMR, reviewed    REVIEW OF SYSTEMS:  CONSTITUTIONAL:NEGATIVE for fever, chills, change in weight  INTEGUMENTARY/SKIN: NEGATIVE for worrisome rashes, moles or lesions  EYES: NEGATIVE for vision changes or irritation  ENT/MOUTH: NEGATIVE for ear, mouth and throat problems  RESP:NEGATIVE for significant cough or SOB  BREAST: NEGATIVE for masses, tenderness or discharge  CV: NEGATIVE for chest pain, palpitations or peripheral edema  GI: NEGATIVE for nausea, abdominal pain, heartburn, or change in bowel habits  :NEGATIVE for frequency, dysuria, or hematuria  :NEGATIVE for frequency, dysuria, or hematuria  NEURO: NEGATIVE for weakness, dizziness or paresthesias  ENDOCRINE: NEGATIVE for temperature intolerance, skin/hair changes  HEME/ALLERGY/IMMUNE: NEGATIVE for bleeding problems  PSYCHIATRIC: NEGATIVE for changes in mood or affect          Objective: She has minimal tenderness of the proximal fifth metatarsal on the right.  This is an improvement since previous visits.  Overlying skin is intact.  Sensation is normal.  Appropriate conversation and affect.    I personally reviewed with the patient repeat x-rays of the left foot that show additional new bone formation at the proximal fifth metatarsal fracture site with resorption changes, although the fracture line is still evident.    Assessment: Right-sided foot fracture x4 weeks, healing    Plan: The patient would like a note for work stating that she can return to walking activities, with the use of the boot as needed.  Note was provided.  Follow-up clinic visit in 4 weeks with repeat x-ray.  I discussed with her progression of weaning from the cam walker boot over the next 1 to 2 weeks to a supportive shoe, as tolerated.        Again, thank you for allowing me to participate in the care of your patient.      Sincerely,    Chandan Molina MD

## 2023-09-27 ENCOUNTER — VIRTUAL VISIT (OUTPATIENT)
Dept: SLEEP MEDICINE | Facility: CLINIC | Age: 68
End: 2023-09-27
Payer: MEDICARE

## 2023-09-27 VITALS — HEIGHT: 63 IN | WEIGHT: 245 LBS | BODY MASS INDEX: 43.41 KG/M2

## 2023-09-27 DIAGNOSIS — E66.09 EXOGENOUS OBESITY: ICD-10-CM

## 2023-09-27 DIAGNOSIS — G47.33 OBSTRUCTIVE SLEEP APNEA (ADULT) (PEDIATRIC): Primary | ICD-10-CM

## 2023-09-27 DIAGNOSIS — G47.00 INSOMNIA, UNSPECIFIED TYPE: ICD-10-CM

## 2023-09-27 PROCEDURE — 99214 OFFICE O/P EST MOD 30 MIN: CPT | Mod: VID | Performed by: INTERNAL MEDICINE

## 2023-09-27 ASSESSMENT — PAIN SCALES - GENERAL: PAINLEVEL: SEVERE PAIN (6)

## 2023-09-27 ASSESSMENT — SLEEP AND FATIGUE QUESTIONNAIRES
HOW LIKELY ARE YOU TO NOD OFF OR FALL ASLEEP WHILE SITTING AND READING: SLIGHT CHANCE OF DOZING
HOW LIKELY ARE YOU TO NOD OFF OR FALL ASLEEP WHILE WATCHING TV: SLIGHT CHANCE OF DOZING
HOW LIKELY ARE YOU TO NOD OFF OR FALL ASLEEP WHILE SITTING AND TALKING TO SOMEONE: WOULD NEVER DOZE
HOW LIKELY ARE YOU TO NOD OFF OR FALL ASLEEP WHEN YOU ARE A PASSENGER IN A CAR FOR AN HOUR WITHOUT A BREAK: SLIGHT CHANCE OF DOZING
HOW LIKELY ARE YOU TO NOD OFF OR FALL ASLEEP WHILE LYING DOWN TO REST IN THE AFTERNOON WHEN CIRCUMSTANCES PERMIT: WOULD NEVER DOZE
HOW LIKELY ARE YOU TO NOD OFF OR FALL ASLEEP WHILE SITTING INACTIVE IN A PUBLIC PLACE: WOULD NEVER DOZE
HOW LIKELY ARE YOU TO NOD OFF OR FALL ASLEEP IN A CAR, WHILE STOPPED FOR A FEW MINUTES IN TRAFFIC: WOULD NEVER DOZE
HOW LIKELY ARE YOU TO NOD OFF OR FALL ASLEEP WHILE SITTING QUIETLY AFTER LUNCH WITHOUT ALCOHOL: WOULD NEVER DOZE

## 2023-09-27 NOTE — NURSING NOTE
Is the patient currently in the state of [place]? YES    Visit mode:VIDEO    If the visit is dropped, the patient can be reconnected by: VIDEO VISIT: Send to e-mail at: [email or web address]    Will anyone else be joining the visit? NO  (If patient encounters technical issues they should call [phone]    How would you like to obtain your AVS? MyChart    Are changes needed to the allergy or medication list? No    Reason for visit: SANJUANITA PRAJAPATI

## 2023-09-27 NOTE — PATIENT INSTRUCTIONS
For general sleep health questions:   https://www.thensf.org/sleep-health-topics/  http://sleepeducation.org    Medicare and Medicaid patients starting on CPAP or biPAP on or after 5/12/2023  Medicare patients should schedule an IN PERSON CLINIC appointment to provide your CPAP/biPAP usage data to a provider within 90 days of starting CPAP    For new ResMed devices, sign up for device siria to monitor your device for your followup visits  We encourage you to utilize the [employer] siria or website ([employer] web [employer]) to monitor your therapy progress and share the data with your healthcare team when you discuss your sleep apnea.                                                      Know your equipment:  CPAP is continuous positive airway pressure that prevents obstructive sleep apnea by keeping the throat from collapsing while you are sleeping. In most cases, the device is  smart  and can slowly self-adjusts if your throat collapses and keeps a record every day of how well you are treated-this information is available to you and your care team.  BPAP is bilevel positive airway pressure that keeps your throat open and also assists each breath with a pressure boost to maintain adequate breathing.  Special kinds of BPAP are used in patients who have inadequate breathing from lung or heart disease. In most cases, the device is  smart  and can slowly self-adjusts to assist breathing. Like CPAP, the device keeps a record of how well you are treated.  Your mask is your connection to the device. You get to choose what feels most comfortable and the staff will help to make sure if fits. Here: are some examples of the different masks that are available:          Continue PAP therapy every night, for all hours that you are sleeping (including naps.)  As always, try to get at least 8 hours of sleep or more each day, keep a regular sleep schedule, and avoid sleep deprivation. Avoid alcohol.  Reasons that you might need a  change to your pressure therapy would be weight gain or loss, waking having inadvertently removed your PAP overnight, having previously felt refreshed by sleep with CPAP use and now waking un-refreshed, and return of daytime sleepiness. Also, the development of new medical problems  (such as heart failure, stroke, medications such as narcotics) can sometimes affect breathing at night and change your PAP therapy needs.  Please bring PAP with you if you are hospitalized.  If anticipating surgery be sure to discuss with your surgeon that you have sleep apnea and use PAP therapy.    Maintain your equipment as recommended which includes routine cleaning and replacement of supplies.      Call DME for any questions regarding supplies or maintenance.    [facility], [facility] [phone]    Do not drive on engage in potentially dangerous activities if feeling sleepy.    Please follow up in sleep clinic again in 12 months.        Tips for your PAP use-    Mask fitting tips  Mask fitting exercise:    To improve your mask seal and your mobility at night, put mask on and secure in place.  Lie down in bed with full pressure and roll to one side, adjust headgear while in that position to eliminate any leaks. Repeat process rolling to other side.     The mask seal does not have to be perfect:   CPAP machines are designed to make up for small leaks. However, you will not tolerate leaks blowing in your eyes so you will need to adjust.   Any leak should only be near or at the bottom of the mask.  We expect your mask to leak slightly at night.    Do not over-tighten the headgear straps, tighter IS NOT better, we expect minimal leak.    First try re-positioning the mask or headgear before tightening the headgear straps.  Mask leaks are expected due to changing sleeping positions. Try pulling the mask away from your skin allowing the cushion to re-inflate will minimize the leak.  If you  struggle for a good fit, try turning the CPAP off and then readjust the mask by pulling it away from your face and then turning back on the CPAP.        Humidifier tips  Humidifiers can be adjusted to increase or decrease the amount of moisture according to your comfort level. You may need to adjust this frequently at first, but then might only change it with seasonal weather changes.     Try INCREASING the humidity if:  You experience a dry, irritated nasal passage or throat.  You have a runny, drippy nose or sneezing fits after using CPAP.  You experience nasal congestion during or after CPAP use.    Try DECREASING the humidity if:  You have excessive condensation or  rain out  in the tubing or mask.  Otherwise keep the tubing warm during the night by running it underneath the blankets or pillow.      Clinic visit after initial PAP set-up   Bring your equipment with you to your 5-8 week follow up clinic visit.  We will be extracting your data from the machine if not available from the cloud based modem.        Travel  Always take your equipment with you when you travel.  If you fly with your equipment bring it on with you as a carry on.  Medical equipment does not count as a carry on.    If you travel international the machines take 110-240v.  The only adapter needed is the adapter that will fit into the receptacle (outlet).    You may also want to bring an extension cord as many hotel rooms have limited outlets at the bedside.  Do not travel with water in your humidifier chamber.     Cleaning and Maintenance Guidelines    Equipment Frequency Cleaning Method   Mask First Day    Daily      Weekly Soak mask in hot soapy water for 30 minutes, rinse and air dry.  Wipe nasal cushion with a hot soapy (Ivory, baby shampoo) cloth and rinse.  Baby wipes may also be used.  Do not use anti-bacterial soaps,Ainsley  liquid soap, rubbing alcohol, bleach or ammonia.  Wash frame in hot soapy water (Ivory, baby shampoo) rinse and let  air dry   Headgear Biweekly Wash in hot soapy water, rinse and air dry   Reusable Gray Filter Weekly Wash in hot soapy water, rinse, put in towel squeeze moisture out, let air dry   Disposable White Filter Check Weekly Replace when brown or gray in color; at least every 2 to 3 months   Humidifier Chamber Daily    Weekly Empty distilled water from humidifier and let air dry    Hand wash in hot soapy water, rinse and air dry   Tubing Weekly Wash in hot soapy water, rinse and let air dry   Mask, Tubing and Humidifier Chamber As needed Disinfect: Soak in 1 part distilled white vinegar to 3 parts hot water for 30 minutes, rinse well and air dry  Not the material headgear        MASK AND SUPPLY REORDERING and EQUIPMENT NEEDS through your DME and per your insurance  Reminder: Most insurance companies will allow for a new mask, headgear, tubing, and reusable gray filter every six months.  Disposable white ultra-fine filters are covered monthly.      HOME AND SAFETY INSTRUCTIONS  Do not use frayed or cracked electrical cords, multi plug adaptors, or switched receptacles  Do not immerse electrical equipment into water  Assure that electrical cords do not become a tripping hazard

## 2023-09-27 NOTE — PROGRESS NOTES
Virtual Visit Details    Type of service:  Video Visit   Video Start Time: 8:54 AM  Video End Time:9:18 AM    Originating Location (pt. Location): Home    Distant Location (provider location):  Off-site  Platform used for Video Visit: Nadiya    Chief complaint: Follow-up severe obstructive sleep apnea    LOV 7/13/2022    History of Present Illness: 67-year-old female with history of hypertension, diabetes, arthritis, depression and anxiety and severe obstructive sleep apnea.  She is here for routine follow-up.  She struggles with sleep initiation insomnia on occasion.  Often work-related or family related stressors.  She does have a routine before bed including multiple eyedrops for glaucoma and testing her blood sugar sometimes taking snack.  She gets up to an alarm between 6:30 and 7:30 AM depending on work.  She does experience some sleep inertia.  She is also experiencing hearing loss and so it is little harder for her to wake up to the alarm.    Sleep has been a little disrupted by pain from a broken toe and ankle sprain from which she is still recovering.    She uses CPAP nightly.  Continues to report benefit.  Occasionally she has some vivid dreaming.  Most recently on vacation.  She denies any issues with nocturia.  CPAP helps a lot with that.  She is been told she occasionally talks a little bit in her sleep.  No sleepwalking or dream enactment behavior.    She occasionally has some mask leak which she corrects quickly.    Callahan Sleepiness Scale  Total score - Callahan: 3 (9/27/2023  8:36 AM)   (Less than 10 normal)    Insomnia Severity Scale  MIHAELA Total Score: 6  (normal 0-7, mild 8-14, moderate 15-21, severe 22-28)    Past Medical History:   Diagnosis Date    Abnormal Pap smear     Anxiety     Arthritis of knee 04/04/2013    Arthritis of shoulder region, right 04/18/2014    Back injury     Breast disorder     Chronic constipation     Chronic diarrhea     Depressive disorder     Diabetes (H)     Fecal  incontinence     Finger pain 04/02/2015    Fracture broke L 5th pinky finger due to fall  1/2015    Glaucoma (increased eye pressure)     Head injury 08/06/2016    Headache(784.0)     decreased with mouth guard use.    History of blood transfusion 8/2011 & 4/2013    History of diabetes mellitus     Hypercholesteremia     Hypertension     Low back pain     Menarche age 9+    cycles q mo x 4-5 d    Neck injuries     Nonsenile cataract IO implants: L-8/2013; R-1/2011    Pain in knee joint     LEFT    Right bundle branch block     per H/P    Sleep apnea     Info on file    SNHL (sensorineural hearing loss)     Tinnitus     I have reported ringing in ears. not sure of dates    Umbilical hernia without mention of obstruction or gangrene 08/2014    Vision disorder Detached Retina 10/2009       Allergies   Allergen Reactions    Nkda [No Known Drug Allergy]        Current Outpatient Medications   Medication    Acetaminophen (TYLENOL PO)    amoxicillin (AMOXIL) 500 MG tablet    Ascorbic Acid (VITAMIN C PO)    aspirin 81 MG tablet    atorvastatin (LIPITOR) 20 MG tablet    B Complex Vitamins (VITAMIN  B COMPLEX) CAPS    blood glucose (ONETOUCH VERIO IQ) test strip    blood glucose calibration (ONETOUCH VERIO) solution    brimonidine-timolol (COMBIGAN) 0.2-0.5 % ophthalmic solution    calcium-vitamin D (CALTRATE) 600-400 MG-UNIT per tablet    cholecalciferol (VITAMIN D) 1000 UNIT tablet    cyanocobalamin (VITAMIN B-12) 1000 MCG tablet    cycloSPORINE (RESTASIS) 0.05 % ophthalmic emulsion    HUMALOG KWIKPEN 100 UNIT/ML soln    hypromellose-dextran (ARTIFICAL TEARS) 0.1-0.3 % ophthalmic solution    insulin glargine (LANTUS SOLOSTAR) 100 UNIT/ML pen    insulin pen needle (B-D U/F) 31G X 8 MM miscellaneous    latanoprost (XALATAN) 0.005 % ophthalmic solution    lisinopril-hydrochlorothiazide (ZESTORETIC) 20-12.5 MG tablet    metFORMIN (GLUCOPHAGE) 500 MG tablet    Multiple Vitamin (MULTIVITAMIN  S) CAPS    Multiple  Vitamins-Minerals (EYE-ZAKIYA PLUS LUTEIN PO)    Omega-3 Fatty Acids (OMEGA-3 FISH OIL PO)    OneTouch Delica Lancets 33G MISC    order for DME    polyethylene glycol (MIRALAX) 17 GM/Dose powder    semaglutide (OZEMPIC, 0.25 OR 0.5 MG/DOSE,) 2 MG/1.5ML SOPN pen    sertraline (ZOLOFT) 50 MG tablet    timolol (TIMOPTIC) 0.5 % ophthalmic solution    triamcinolone (ARISTOCORT HP) 0.5 % external cream    vitamin C (ASCORBIC ACID) 100 MG tablet     No current facility-administered medications for this visit.       Social History     Socioeconomic History    Marital status:      Spouse name: Not on file    Number of children: Not on file    Years of education: Not on file    Highest education level: Not on file   Occupational History    Occupation: Human Resources     Comment: between jobs now   Tobacco Use    Smoking status: Former     Packs/day: 0.00     Years: 0.00     Pack years: 0.00     Types: Cigarettes    Smokeless tobacco: Never    Tobacco comments:     quit mid 1980s   Vaping Use    Vaping Use: Never used   Substance and Sexual Activity    Alcohol use: No    Drug use: No    Sexual activity: Not Currently     Partners: Male     Birth control/protection: Abstinence, Post-menopausal   Other Topics Concern     Service Not Asked    Blood Transfusions Yes     Comment: 2 units 2011    Caffeine Concern No     Comment: 2s    Occupational Exposure No    Hobby Hazards No    Sleep Concern No    Stress Concern No     Comment: rehab for R knee after replacement    Weight Concern Yes     Comment: working on wt loss    Special Diet Yes     Comment: Diabetic    Back Care No    Exercise No     Comment: water aerobics 35-40' 3-4 d & strength 3d/wk    Bike Helmet No    Seat Belt No    Self-Exams Not Asked    Parent/sibling w/ CABG, MI or angioplasty before 65F 55M? Not Asked   Social History Narrative    How much exercise per week? 3-4x swimming, aerobics    How much calcium per day? Supplement       How much caffeine  "per day? 2 cups coffee/ 1-2 can of diet soda    How much vitamin D per day? Supplement    Do you/your family wear seatbelts?  Yes    Do you/your family use safety helmets? No    Do you/your family use sunscreen? No    Do you/your family keep firearms in the home? No    Do you/your family have a smoke detector(s)? Yes    Do you feel safe in your home? Yes    Has anyone ever touched you in an unwanted manner? No     Explain     See LEA Berman 11/26/2014    Reviewed Raul DEGROOT MA 11/22/2017     Social Determinants of Health     Financial Resource Strain: Not on file   Food Insecurity: Not on file   Transportation Needs: Not on file   Physical Activity: Not on file   Stress: Not on file   Social Connections: Not on file   Interpersonal Safety: Not At Risk (7/23/2021)    Humiliation, Afraid, Rape, and Kick questionnaire     Fear of Current or Ex-Partner: No     Emotionally Abused: No     Physically Abused: No     Sexually Abused: No   Housing Stability: Not on file       Family History   Problem Relation Age of Onset    Diabetes Father 55        DM II    Diabetes Brother 50        xs 2    Hypertension Sister 41        also B and M    Cancer Maternal Aunt         multiple myeloma    Hypertension Mother 79    Osteoporosis Mother     Memory loss Mother     Glaucoma Mother     Diabetes Brother     Hypertension Brother     Heart Murmur Brother     Glaucoma Brother     Hypertension Brother     Breast Cancer Cousin     Thyroid Disease Sister         Graves    Diabetes Sister         Graves    Cerebrovascular Disease Maternal Grandmother     Other - See Comments Sister         16 minths head injury    Other - See Comments Brother         MVA age 36    Cancer - colorectal No family hx of     Prostate Cancer No family hx of     Alcohol/Drug No family hx of     Melanoma No family hx of     Skin Cancer No family hx of            EXAM:  Ht 1.6 m (5' 3\")   Wt 111.1 kg (245 lb)   BMI 43.40 kg/m    GENERAL: Alert and no distress  EYES: " Eyes grossly normal to inspection.  No discharge or erythema, or obvious scleral/conjunctival abnormalities.  RESP: No audible wheeze, cough, or visible cyanosis.  No visible retractions or increased work of breathing.    SKIN: Visible skin clear. No significant rash, abnormal pigmentation or lesions.  NEURO: Cranial nerves grossly intact.  Mentation and speech appropriate for age.  PSYCH: Mentation appears normal, affect normal, judgement and insight intact, normal speech and appearance well-groomed.       Home Sleep Apnea Test 11/11/2019   Weight 248, BMI 43.2  AHI 46.8, with associated hypoxemia    ResMed AirSense 10 auto PAP download from 8/25/2023 to 9/23/2023 reviewed:  Per cent of days used greater than 4 Hours 100% (minimum goal greater than 70%)  Average use on days used: 8 hours 0 min  Settings: Min EPAP 12 cmH2O    Max EPAP 16 cmH2O  Pressures delivered 90/95th percentile for pressure 16 cmH2O  Average AHI 1.3 events per hour (goal less than 5)  Leak acceptable    TSH   Date Value Ref Range Status   11/21/2022 3.56 0.30 - 4.20 uIU/mL Final   04/04/2022 2.46 0.40 - 4.00 mU/L Final   04/12/2021 2.86 0.40 - 4.00 mU/L Final         ASSESSMENT:  67-year-old female with hypertension, diabetes, arthritis, glaucoma, depression and anxiety, severe obstructive sleep apnea.  She has occasional insomnia and sleep talking.  She is getting excellent clinical benefit with use of CPAP and meeting compliance goals, optimal settings.  Ongoing treatment of obstructive sleep apnea is medically indicated.    PLAN:  Orders generated for updated CPAP supplies.  Patient is going to continue to use CPAP nightly.  She is in a discussed alternative alarm clocks with her audiologist as well as her hearing loss.  Urged her to continue to do relaxing activities before bed that are distracting.  Continue efforts at weight management.  Follow-up in 1 year.    31 minutes spent by me on the date of the encounter doing chart review,  history and exam, documentation and further activities per the note    Joanne Pisano M.D.  Pulmonary/Critical Care/Sleep Medicine    [facility]   Floor 1, [address]   [address] [place] [address]. [place] [address]   Appointments: [phone]    The above note was dictated using voice recognition software and may include typographical errors. Please contact the author for any clarifications.

## 2023-10-16 ENCOUNTER — MYC MEDICAL ADVICE (OUTPATIENT)
Dept: ENDOCRINOLOGY | Facility: CLINIC | Age: 68
End: 2023-10-16
Payer: MEDICARE

## 2023-10-16 DIAGNOSIS — E11.65 TYPE 2 DIABETES MELLITUS WITH HYPERGLYCEMIA, WITHOUT LONG-TERM CURRENT USE OF INSULIN (H): ICD-10-CM

## 2023-10-16 DIAGNOSIS — Z96.653 STATUS POST TOTAL BILATERAL KNEE REPLACEMENT: ICD-10-CM

## 2023-10-16 DIAGNOSIS — E11.8 TYPE 2 DIABETES MELLITUS WITH COMPLICATION (H): ICD-10-CM

## 2023-10-16 RX ORDER — LANCETS 33 GAUGE
4 EACH MISCELLANEOUS 4 TIMES DAILY
Qty: 360 EACH | Refills: 3 | Status: SHIPPED | OUTPATIENT
Start: 2023-10-16

## 2023-10-16 NOTE — TELEPHONE ENCOUNTER
HUMALOG KWIKPEN 100 UNIT/ML soln 75 mL 3 8/26/2022         Routing refill request to provider for review/approval because:  Insulin - refilled per clinic      semaglutide (OZEMPIC, 0.25 OR 0.5 MG/DOSE,) 2 MG/1.5ML SOPN pen 4.5 mL 1 5/14/2023     Routing refill request to provider for review/approval because:  A1C overdue  Over 6 months since last clinic visit    Last Office Visit : 1-  Future Office visit:  NONE    Kathleen M Doege RN

## 2023-10-17 RX ORDER — SEMAGLUTIDE 1.34 MG/ML
INJECTION, SOLUTION SUBCUTANEOUS
Qty: 4.5 ML | Refills: 0 | Status: SHIPPED | OUTPATIENT
Start: 2023-10-17 | End: 2024-07-29

## 2023-10-17 RX ORDER — AMOXICILLIN 500 MG/1
TABLET, FILM COATED ORAL
Qty: 4 TABLET | Refills: 4 | Status: SHIPPED | OUTPATIENT
Start: 2023-10-17

## 2023-10-17 RX ORDER — INSULIN LISPRO 100 [IU]/ML
INJECTION, SOLUTION INTRAVENOUS; SUBCUTANEOUS
Qty: 75 ML | Refills: 0 | Status: SHIPPED | OUTPATIENT
Start: 2023-10-17 | End: 2023-11-13

## 2023-10-17 NOTE — TELEPHONE ENCOUNTER
GLP-1 Agonists Protocol Failed 10/16/2023 05:13 PM   Protocol Details  HgbA1C in past 3 or 6 months    Recent (6 mo) or future (30 days) visit within the authorizing provider's specialty     Short Acting Insulin Protocol Failed 10/16/2023 05:13 PM   Protocol Details  HgbA1C in past 3 or 6 months    Recent (6 mo) or future (30 days) visit within the authorizing provider's specialty     Lab orders in place    [facility]V Jan 18 2023    Pt notified to schedule.

## 2023-10-17 NOTE — TELEPHONE ENCOUNTER
Was not a good time for pt to schedule, asked to be called back some other time Sudheer Calabrese on 10/17/2023 at 9:41 AM

## 2023-10-18 DIAGNOSIS — S92.351A CLOSED FRACTURE OF BASE OF FIFTH METATARSAL BONE OF RIGHT FOOT: Primary | ICD-10-CM

## 2023-10-20 ENCOUNTER — TELEPHONE (OUTPATIENT)
Dept: ENDOCRINOLOGY | Facility: CLINIC | Age: 68
End: 2023-10-20
Payer: MEDICARE

## 2023-10-20 NOTE — TELEPHONE ENCOUNTER
Writer contacted pharmacy and was told SWO form needed and to call AR. Writer and pharmacy unsure what these initials mean. Routing to PA team to assist with prior auth.    LEA Ortez  Adult Endocrinology  [facility]

## 2023-10-20 NOTE — TELEPHONE ENCOUNTER
Fax received from [facility] pharmacy regarding One Touch Verio Test.     Plan does not cover this medication. Please call plan at [phone] to initiate prior authorization or call/fax pharmacy to change medication. Patient ID# [id number].    LEA Ortez  Adult Endocrinology  [facility]

## 2023-10-20 NOTE — TELEPHONE ENCOUNTER
Patient is requesting [employer] form be completed for testing supplies Rasheeda Izaguirre RN on 10/20/2023 at 1:36 PM

## 2023-10-23 ENCOUNTER — ANCILLARY PROCEDURE (OUTPATIENT)
Dept: GENERAL RADIOLOGY | Facility: CLINIC | Age: 68
End: 2023-10-23
Attending: FAMILY MEDICINE
Payer: OTHER MISCELLANEOUS

## 2023-10-23 ENCOUNTER — OFFICE VISIT (OUTPATIENT)
Dept: ORTHOPEDICS | Facility: CLINIC | Age: 68
End: 2023-10-23
Payer: OTHER MISCELLANEOUS

## 2023-10-23 DIAGNOSIS — S92.351A CLOSED FRACTURE OF BASE OF FIFTH METATARSAL BONE OF RIGHT FOOT: ICD-10-CM

## 2023-10-23 DIAGNOSIS — S92.351A CLOSED FRACTURE OF BASE OF FIFTH METATARSAL BONE OF RIGHT FOOT: Primary | ICD-10-CM

## 2023-10-23 DIAGNOSIS — E11.8 TYPE 2 DIABETES MELLITUS WITH COMPLICATION (H): ICD-10-CM

## 2023-10-23 PROCEDURE — 99213 OFFICE O/P EST LOW 20 MIN: CPT | Performed by: FAMILY MEDICINE

## 2023-10-23 PROCEDURE — 73630 X-RAY EXAM OF FOOT: CPT | Mod: RT | Performed by: RADIOLOGY

## 2023-10-23 NOTE — TELEPHONE ENCOUNTER
Spoke to [facility] and the pt is in need of an RX for the meter and the RX for meter and strips should be sent to a different pharmacy , because they are out of stock at [facility]. Please send the RX for the meter, lancets, and strips to [place] specialty pharmacy. 10/23/23

## 2023-10-23 NOTE — LETTER
10/23/2023      RE: Radha Baca  [address] [facility] [address]     Dear Colleague,    Thank you for referring your patient, Radha Baca, to the [facility] [place]. Please see a copy of my visit note below.    Follow-up nondisplaced fracture of the base of the right fifth metatarsal x 8 weeks.     8/20/23, right-sided foot xray revealed a nondisplaced transverse fracture at the base of the fifth metatarsal.     Since the last visit the patient indicates that she has tolerated walking in a regular shoe without issue.  Occasional fleeting episodes of discomfort but no persistent weightbearing pain.  Pt indicates this a worker's compensation injury.       Patient has a history of type 2 diabetes mellitus on insulin, and of hypertension.        3 views right foot radiographs 9/25/2023 1:59 PM     History: Closed fracture of base of fifth metatarsal bone of right  foot     Comparison: 8/28/2023, 8/21/2023     Findings:     Standing AP, oblique, and lateral  views of the right foot were  obtained.      Ongoing healing fifth metatarsal base fracture with continued  increased conspicuity of fracture line, may still be related to  resorptive phase of healing. Flexion deformity of second toe with  extension at MTP joint.     Lisfranc articulation alignment is congruent on these non-weight  bearing images.     Moderate to severe degenerative changes at the second and likely third  tarsometatarsal joints. Mild degenerative changes of first  metatarsophalangeal joint.     Plantar calcaneal enthesopathy.      Vascular calcifications.                                                                      Impression:Ongoing healing fifth metatarsal base fracture with  continued increased conspicuity of fracture line. Attention on follow  up imaging.     WHIT MITCHELL           PMH:  Past Medical History:   Diagnosis Date    Abnormal Pap smear     Anxiety      Arthritis of knee 04/04/2013    Arthritis of shoulder region, right 04/18/2014    Back injury     Breast disorder     Chronic constipation     Chronic diarrhea     Depressive disorder     Diabetes (H)     Fecal incontinence     Finger pain 04/02/2015    Fracture broke L 5th pinky finger due to fall  1/2015    Glaucoma (increased eye pressure)     Head injury 08/06/2016    Headache(784.0)     decreased with mouth guard use.    History of blood transfusion 8/2011 & 4/2013    Hypercholesteremia     Hypertension     Low back pain     Menarche age 9+    cycles q mo x 4-5 d    Neck injuries     Nonsenile cataract IO implants: L-8/2013; R-1/2011    Pain in knee joint     LEFT    Right bundle branch block     per H/P    Sleep apnea     Info on file    SNHL (sensorineural hearing loss)     Tinnitus     I have reported ringing in ears. not sure of dates    Umbilical hernia without mention of obstruction or gangrene 08/2014    Vision disorder Detached Retina 10/2009       Active problem list:  Patient Active Problem List   Diagnosis    Dyslipidemia    BMI >40    SNHL (sensorineural hearing loss)    Glaucoma    Umbilical hernia -- w/o symptoms->expectant mgt    Encounter for routine gynecological examination    Menopause present -- age 39    Health care maintenance    Type 2 diabetes mellitus with complication (H)    Essential hypertension    Venous (peripheral) insufficiency    Chronic bilateral low back pain with right-sided sciatica    Other secondary osteoarthritis of first carpometacarpal joint of right hand    Insulin long-term use (H)    Class 3 severe obesity due to excess calories with serious comorbidity and body mass index (BMI) of 40.0 to 44.9 in adult (H)    Right-sided thoracic back pain    GHISLAINE (obstructive sleep apnea)    Insomnia, unspecified type    Nightmares    Generalized anxiety disorder    Low bone density       FH:  Family History   Problem Relation Age of Onset    Diabetes Father 55        DM II     Diabetes Brother 50        xs 2    Hypertension Sister 41        also B and M    Cancer Maternal Aunt         multiple myeloma    Hypertension Mother 79    Osteoporosis Mother     Memory loss Mother     Glaucoma Mother     Diabetes Brother     Hypertension Brother     Heart Murmur Brother     Glaucoma Brother     Hypertension Brother     Breast Cancer Cousin     Thyroid Disease Sister         Graves    Diabetes Sister         Graves    Cerebrovascular Disease Maternal Grandmother     Other - See Comments Sister         16 minths head injury    Other - See Comments Brother         MVA age 36    Cancer - colorectal No family hx of     Prostate Cancer No family hx of     Alcohol/Drug No family hx of     Melanoma No family hx of     Skin Cancer No family hx of        SH:  Social History     Socioeconomic History    Marital status:      Spouse name: Not on file    Number of children: Not on file    Years of education: Not on file    Highest education level: Not on file   Occupational History    Occupation: Human Resources     Comment: between jobs now   Tobacco Use    Smoking status: Former     Packs/day: 0.00     Years: 0.00     Additional pack years: 0.00     Total pack years: 0.00     Types: Cigarettes    Smokeless tobacco: Never    Tobacco comments:     quit mid 1980s   Vaping Use    Vaping Use: Never used   Substance and Sexual Activity    Alcohol use: No    Drug use: No    Sexual activity: Not Currently     Partners: Male     Birth control/protection: Abstinence, Post-menopausal   Other Topics Concern     Service Not Asked    Blood Transfusions Yes     Comment: 2 units 2011    Caffeine Concern No     Comment: 2s    Occupational Exposure No    Hobby Hazards No    Sleep Concern No    Stress Concern No     Comment: rehab for R knee after replacement    Weight Concern Yes     Comment: working on wt loss    Special Diet Yes     Comment: Diabetic    Back Care No    Exercise No     Comment: water aerobics  35-40' 3-4 d & strength 3d/wk    Bike Helmet No    Seat Belt No    Self-Exams Not Asked    Parent/sibling w/ CABG, MI or angioplasty before 65F 55M? Not Asked   Social History Narrative    How much exercise per week? 3-4x swimming, aerobics    How much calcium per day? Supplement       How much caffeine per day? 2 cups coffee/ 1-2 can of diet soda    How much vitamin D per day? Supplement    Do you/your family wear seatbelts?  Yes    Do you/your family use safety helmets? No    Do you/your family use sunscreen? No    Do you/your family keep firearms in the home? No    Do you/your family have a smoke detector(s)? Yes    Do you feel safe in your home? Yes    Has anyone ever touched you in an unwanted manner? No     Explain     See LEA Berman 11/26/2014    Reviewed Raul DEGROOT MA 11/22/2017     Social Determinants of Health     Financial Resource Strain: Not on file   Food Insecurity: Not on file   Transportation Needs: Not on file   Physical Activity: Not on file   Stress: Not on file   Social Connections: Not on file   Interpersonal Safety: Not At Risk (7/23/2021)    Humiliation, Afraid, Rape, and Kick questionnaire     Fear of Current or Ex-Partner: No     Emotionally Abused: No     Physically Abused: No     Sexually Abused: No   Housing Stability: Not on file       MEDS:  See EMR, reviewed  ALL:  See EMR, reviewed    REVIEW OF SYSTEMS:  CONSTITUTIONAL:NEGATIVE for fever, chills, change in weight  INTEGUMENTARY/SKIN: NEGATIVE for worrisome rashes, moles or lesions  EYES: NEGATIVE for vision changes or irritation  ENT/MOUTH: NEGATIVE for ear, mouth and throat problems  RESP:NEGATIVE for significant cough or SOB  BREAST: NEGATIVE for masses, tenderness or discharge  CV: NEGATIVE for chest pain, palpitations or peripheral edema  GI: NEGATIVE for nausea, abdominal pain, heartburn, or change in bowel habits  :NEGATIVE for frequency, dysuria, or hematuria  :NEGATIVE for frequency, dysuria, or hematuria  NEURO: NEGATIVE for  weakness, dizziness or paresthesias  ENDOCRINE: NEGATIVE for temperature intolerance, skin/hair changes  HEME/ALLERGY/IMMUNE: NEGATIVE for bleeding problems  PSYCHIATRIC: NEGATIVE for changes in mood or affect      Imaging study below reviewed by me:  Exam: 3 views of the right foot dated 10/23/2023.     COMPARISON: 9/25/2023.     CLINICAL HISTORY: Fifth metatarsal fracture.     FINDINGS: AP, oblique, and lateral views of the right foot were  obtained. Redemonstration of known fracture involving the base of the  right fifth metatarsal. No significant change in alignment. Fracture  line is still visible. Joint space narrowing at the right second  metatarsophalangeal joint as well as at the tarsometatarsal joints of  the second and third metatarsals.                                                                      IMPRESSION: Redemonstration of known intra-articular fracture  involving the base of the right fifth metatarsal, with the fracture  line still visible.     DOMINICK PATTERSON MD         Objective: Today she has no tenderness at the proximal fifth metatarsal in the area of her fracture.  No tenderness of the fourth metatarsal.  Skin is intact.  Sensation is normal.  Appropriate conversation and affect.    I personally viewed the patient the images of the updated x-ray of her foot that show no significant change in fracture alignment.  Fracture line still visible.    Assessment: Right-sided proximal fifth metatarsal fracture x8 weeks, healing    Plan: Patient feels comfortable continuing with her usual walking at work.  She wears a supportive shoe and finds it comfortable.  We discussed that some fractures go on to heal with fibrocartilage, which may not be evident on xray.  Repeat x-ray in 4 weeks.      Again, thank you for allowing me to participate in the care of your patient.      Sincerely,    Chandan Molina MD

## 2023-10-23 NOTE — PROGRESS NOTES
Follow-up nondisplaced fracture of the base of the right fifth metatarsal x 8 weeks.     8/20/23, right-sided foot xray revealed a nondisplaced transverse fracture at the base of the fifth metatarsal.     Since the last visit the patient indicates that she has tolerated walking in a regular shoe without issue.  Occasional fleeting episodes of discomfort but no persistent weightbearing pain.  Pt indicates this a worker's compensation injury.       Patient has a history of type 2 diabetes mellitus on insulin, and of hypertension.        3 views right foot radiographs 9/25/2023 1:59 PM     History: Closed fracture of base of fifth metatarsal bone of right  foot     Comparison: 8/28/2023, 8/21/2023     Findings:     Standing AP, oblique, and lateral  views of the right foot were  obtained.      Ongoing healing fifth metatarsal base fracture with continued  increased conspicuity of fracture line, may still be related to  resorptive phase of healing. Flexion deformity of second toe with  extension at MTP joint.     Lisfranc articulation alignment is congruent on these non-weight  bearing images.     Moderate to severe degenerative changes at the second and likely third  tarsometatarsal joints. Mild degenerative changes of first  metatarsophalangeal joint.     Plantar calcaneal enthesopathy.      Vascular calcifications.                                                                      Impression:Ongoing healing fifth metatarsal base fracture with  continued increased conspicuity of fracture line. Attention on follow  up imaging.     WHIT MITCHELL           PMH:  Past Medical History:   Diagnosis Date    Abnormal Pap smear     Anxiety     Arthritis of knee 04/04/2013    Arthritis of shoulder region, right 04/18/2014    Back injury     Breast disorder     Chronic constipation     Chronic diarrhea     Depressive disorder     Diabetes (H)     Fecal incontinence     Finger pain 04/02/2015    Fracture broke L 5th pinky  finger due to fall  1/2015    Glaucoma (increased eye pressure)     Head injury 08/06/2016    Headache(784.0)     decreased with mouth guard use.    History of blood transfusion 8/2011 & 4/2013    Hypercholesteremia     Hypertension     Low back pain     Menarche age 9+    cycles q mo x 4-5 d    Neck injuries     Nonsenile cataract IO implants: L-8/2013; R-1/2011    Pain in knee joint     LEFT    Right bundle branch block     per H/P    Sleep apnea     Info on file    SNHL (sensorineural hearing loss)     Tinnitus     I have reported ringing in ears. not sure of dates    Umbilical hernia without mention of obstruction or gangrene 08/2014    Vision disorder Detached Retina 10/2009       Active problem list:  Patient Active Problem List   Diagnosis    Dyslipidemia    BMI >40    SNHL (sensorineural hearing loss)    Glaucoma    Umbilical hernia -- w/o symptoms->expectant mgt    Encounter for routine gynecological examination    Menopause present -- age 39    Health care maintenance    Type 2 diabetes mellitus with complication (H)    Essential hypertension    Venous (peripheral) insufficiency    Chronic bilateral low back pain with right-sided sciatica    Other secondary osteoarthritis of first carpometacarpal joint of right hand    Insulin long-term use (H)    Class 3 severe obesity due to excess calories with serious comorbidity and body mass index (BMI) of 40.0 to 44.9 in adult (H)    Right-sided thoracic back pain    GHISLAINE (obstructive sleep apnea)    Insomnia, unspecified type    Nightmares    Generalized anxiety disorder    Low bone density       FH:  Family History   Problem Relation Age of Onset    Diabetes Father 55        DM II    Diabetes Brother 50        xs 2    Hypertension Sister 41        also B and M    Cancer Maternal Aunt         multiple myeloma    Hypertension Mother 79    Osteoporosis Mother     Memory loss Mother     Glaucoma Mother     Diabetes Brother     Hypertension Brother     Heart Murmur  Brother     Glaucoma Brother     Hypertension Brother     Breast Cancer Cousin     Thyroid Disease Sister         Graves    Diabetes Sister         Graves    Cerebrovascular Disease Maternal Grandmother     Other - See Comments Sister         16 minths head injury    Other - See Comments Brother         MVA age 36    Cancer - colorectal No family hx of     Prostate Cancer No family hx of     Alcohol/Drug No family hx of     Melanoma No family hx of     Skin Cancer No family hx of        SH:  Social History     Socioeconomic History    Marital status:      Spouse name: Not on file    Number of children: Not on file    Years of education: Not on file    Highest education level: Not on file   Occupational History    Occupation: Human Resources     Comment: between jobs now   Tobacco Use    Smoking status: Former     Packs/day: 0.00     Years: 0.00     Additional pack years: 0.00     Total pack years: 0.00     Types: Cigarettes    Smokeless tobacco: Never    Tobacco comments:     quit mid 1980s   Vaping Use    Vaping Use: Never used   Substance and Sexual Activity    Alcohol use: No    Drug use: No    Sexual activity: Not Currently     Partners: Male     Birth control/protection: Abstinence, Post-menopausal   Other Topics Concern     Service Not Asked    Blood Transfusions Yes     Comment: 2 units 2011    Caffeine Concern No     Comment: 2s    Occupational Exposure No    Hobby Hazards No    Sleep Concern No    Stress Concern No     Comment: rehab for R knee after replacement    Weight Concern Yes     Comment: working on wt loss    Special Diet Yes     Comment: Diabetic    Back Care No    Exercise No     Comment: water aerobics 35-40' 3-4 d & strength 3d/wk    Bike Helmet No    Seat Belt No    Self-Exams Not Asked    Parent/sibling w/ CABG, MI or angioplasty before 65F 55M? Not Asked   Social History Narrative    How much exercise per week? 3-4x swimming, aerobics    How much calcium per day? Supplement        How much caffeine per day? 2 cups coffee/ 1-2 can of diet soda    How much vitamin D per day? Supplement    Do you/your family wear seatbelts?  Yes    Do you/your family use safety helmets? No    Do you/your family use sunscreen? No    Do you/your family keep firearms in the home? No    Do you/your family have a smoke detector(s)? Yes    Do you feel safe in your home? Yes    Has anyone ever touched you in an unwanted manner? No     Explain     See LEA Berman 11/26/2014    Reviewed Raul DEGROOT MA 11/22/2017     Social Determinants of Health     Financial Resource Strain: Not on file   Food Insecurity: Not on file   Transportation Needs: Not on file   Physical Activity: Not on file   Stress: Not on file   Social Connections: Not on file   Interpersonal Safety: Not At Risk (7/23/2021)    Humiliation, Afraid, Rape, and Kick questionnaire     Fear of Current or Ex-Partner: No     Emotionally Abused: No     Physically Abused: No     Sexually Abused: No   Housing Stability: Not on file       MEDS:  See EMR, reviewed  ALL:  See EMR, reviewed    REVIEW OF SYSTEMS:  CONSTITUTIONAL:NEGATIVE for fever, chills, change in weight  INTEGUMENTARY/SKIN: NEGATIVE for worrisome rashes, moles or lesions  EYES: NEGATIVE for vision changes or irritation  ENT/MOUTH: NEGATIVE for ear, mouth and throat problems  RESP:NEGATIVE for significant cough or SOB  BREAST: NEGATIVE for masses, tenderness or discharge  CV: NEGATIVE for chest pain, palpitations or peripheral edema  GI: NEGATIVE for nausea, abdominal pain, heartburn, or change in bowel habits  :NEGATIVE for frequency, dysuria, or hematuria  :NEGATIVE for frequency, dysuria, or hematuria  NEURO: NEGATIVE for weakness, dizziness or paresthesias  ENDOCRINE: NEGATIVE for temperature intolerance, skin/hair changes  HEME/ALLERGY/IMMUNE: NEGATIVE for bleeding problems  PSYCHIATRIC: NEGATIVE for changes in mood or affect      Imaging study below reviewed by me:  Exam: 3 views of the right foot  dated 10/23/2023.     COMPARISON: 9/25/2023.     CLINICAL HISTORY: Fifth metatarsal fracture.     FINDINGS: AP, oblique, and lateral views of the right foot were  obtained. Redemonstration of known fracture involving the base of the  right fifth metatarsal. No significant change in alignment. Fracture  line is still visible. Joint space narrowing at the right second  metatarsophalangeal joint as well as at the tarsometatarsal joints of  the second and third metatarsals.                                                                      IMPRESSION: Redemonstration of known intra-articular fracture  involving the base of the right fifth metatarsal, with the fracture  line still visible.     DOMINICK PATTERSON MD         Objective: Today she has no tenderness at the proximal fifth metatarsal in the area of her fracture.  No tenderness of the fourth metatarsal.  Skin is intact.  Sensation is normal.  Appropriate conversation and affect.    I personally viewed the patient the images of the updated x-ray of her foot that show no significant change in fracture alignment.  Fracture line still visible.    Assessment: Right-sided proximal fifth metatarsal fracture x8 weeks, healing    Plan: Patient feels comfortable continuing with her usual walking at work.  She wears a supportive shoe and finds it comfortable.  We discussed that some fractures go on to heal with fibrocartilage, which may not be evident on xray.  Repeat x-ray in 4 weeks.

## 2023-10-23 NOTE — LETTER
[facility] [address] [id number] [phone]        October 23, 2023    Regarding:  Radha MG Phuong  [address] [facility] [address]              To Whom It May Concern;    This patient was seen in clinic today for an 8-week follow-up visit for her right foot fracture.  She can return to her usual walking activities at work, although she may be using a walking boot to protect her foot at work if needed.     She will follow-up in clinic for a new set of x-rays in 4 weeks.          Sincerely,        Chandan Molina MD

## 2023-10-24 DIAGNOSIS — E11.8 TYPE 2 DIABETES MELLITUS WITH COMPLICATION (H): Primary | ICD-10-CM

## 2023-10-24 RX ORDER — LANCETS
EACH MISCELLANEOUS
Qty: 300 EACH | Refills: 1 | Status: SHIPPED | OUTPATIENT
Start: 2023-10-24 | End: 2024-03-05

## 2023-11-12 DIAGNOSIS — E11.8 TYPE 2 DIABETES MELLITUS WITH COMPLICATION (H): ICD-10-CM

## 2023-11-12 DIAGNOSIS — F41.1 GAD (GENERALIZED ANXIETY DISORDER): ICD-10-CM

## 2023-11-13 DIAGNOSIS — E11.8 TYPE 2 DIABETES MELLITUS WITH COMPLICATION, WITH LONG-TERM CURRENT USE OF INSULIN (H): ICD-10-CM

## 2023-11-13 DIAGNOSIS — Z79.4 TYPE 2 DIABETES MELLITUS WITH COMPLICATION, WITH LONG-TERM CURRENT USE OF INSULIN (H): ICD-10-CM

## 2023-11-13 DIAGNOSIS — E11.8 TYPE 2 DIABETES MELLITUS WITH COMPLICATION (H): ICD-10-CM

## 2023-11-13 RX ORDER — INSULIN GLARGINE 100 [IU]/ML
INJECTION, SOLUTION SUBCUTANEOUS
Qty: 90 ML | Refills: 3 | Status: SHIPPED | OUTPATIENT
Start: 2023-11-13

## 2023-11-13 RX ORDER — INSULIN LISPRO 100 [IU]/ML
INJECTION, SOLUTION INTRAVENOUS; SUBCUTANEOUS
Qty: 75 ML | Refills: 0 | Status: SHIPPED | OUTPATIENT
Start: 2023-11-13 | End: 2023-11-13

## 2023-11-13 RX ORDER — INSULIN LISPRO 100 [IU]/ML
INJECTION, SOLUTION INTRAVENOUS; SUBCUTANEOUS
Qty: 75 ML | Refills: 3 | Status: SHIPPED | OUTPATIENT
Start: 2023-11-13

## 2023-11-13 NOTE — TELEPHONE ENCOUNTER
"Request for medication refill:  sertraline (ZOLOFT) 50 MG tablet     Providers if patient needs an appointment and you are willing to give a one month supply please refill for one month and  send a letter/MyChart using \".SMILLIMITEDREFILL\" .smillimited and route chart to \"[facility] \" (Giving one month refill in non controlled medications is strongly recommended before denial)    If refill has been denied, meaning absolutely no refills without visit, please complete the smart phrase \".smirxrefuse\" and route it to the \"[facility] MED REFILLS\"  pool to inform the patient and the pharmacy.    Jane Greenberg, [facility]      "

## 2023-11-13 NOTE — TELEPHONE ENCOUNTER
HUMALOG KWIKPEN 100 UNIT/ML soln   75 mL 0 10/17/2023       1/18/2023  [facility] [place]    Keturah De Luna MD  Endocrinology, Diabetes, and Metabolism    Routed because: endo triage to fill

## 2023-11-13 NOTE — TELEPHONE ENCOUNTER
Short Acting Insulin Protocol Failed 11/13/2023 07:30 AM   Protocol Details  HgbA1C in past 3 or 6 months    Recent (6 mo) or future (30 days) visit within the authorizing provider's specialty        RTC in place MAR 2024 with Dr De Luna  Rx to cover until seen

## 2023-11-17 DIAGNOSIS — S92.351A CLOSED FRACTURE OF BASE OF FIFTH METATARSAL BONE OF RIGHT FOOT: Primary | ICD-10-CM

## 2023-11-20 ENCOUNTER — OFFICE VISIT (OUTPATIENT)
Dept: ORTHOPEDICS | Facility: CLINIC | Age: 68
End: 2023-11-20
Payer: OTHER MISCELLANEOUS

## 2023-11-20 ENCOUNTER — ANCILLARY PROCEDURE (OUTPATIENT)
Dept: GENERAL RADIOLOGY | Facility: CLINIC | Age: 68
End: 2023-11-20
Attending: FAMILY MEDICINE
Payer: OTHER MISCELLANEOUS

## 2023-11-20 DIAGNOSIS — E11.8 TYPE 2 DIABETES MELLITUS WITH COMPLICATION (H): ICD-10-CM

## 2023-11-20 DIAGNOSIS — S92.351A CLOSED FRACTURE OF BASE OF FIFTH METATARSAL BONE OF RIGHT FOOT: ICD-10-CM

## 2023-11-20 DIAGNOSIS — S92.351A CLOSED FRACTURE OF BASE OF FIFTH METATARSAL BONE OF RIGHT FOOT: Primary | ICD-10-CM

## 2023-11-20 PROCEDURE — 73630 X-RAY EXAM OF FOOT: CPT | Mod: RT | Performed by: RADIOLOGY

## 2023-11-20 PROCEDURE — 99213 OFFICE O/P EST LOW 20 MIN: CPT | Performed by: FAMILY MEDICINE

## 2023-11-20 NOTE — LETTER
"  11/20/2023      RE: Radha Baca  [address] [facility] [address]     Dear Colleague,    Thank you for referring your patient, Radha Baca, to the [facility]. Please see a copy of my visit note below.    Follow-up nondisplaced fracture of the base of the right fifth metatarsal x 12 weeks.     Since the last visit the patient indicates that she has tolerated walking in a regular shoe without difficulties.  Per pt: \"No pain, no nothing.\"  Patient feels comfortable continuing with her usual walking at work.  She wears a supportive shoe and finds it comfortable. Pt indicates this a worker's compensation injury.     Patient has a history of type 2 diabetes mellitus on insulin, and of hypertension.       Exam: 3 views of the right foot dated 10/23/2023.     COMPARISON: 9/25/2023.     CLINICAL HISTORY: Fifth metatarsal fracture.     FINDINGS: AP, oblique, and lateral views of the right foot were  obtained. Redemonstration of known fracture involving the base of the  right fifth metatarsal. No significant change in alignment. Fracture  line is still visible. Joint space narrowing at the right second  metatarsophalangeal joint as well as at the tarsometatarsal joints of  the second and third metatarsals.                                                                      IMPRESSION: Redemonstration of known intra-articular fracture  involving the base of the right fifth metatarsal, with the fracture  line still visible.     DOMINICK PATTERSON MD         PM:  Past Medical History:   Diagnosis Date    Abnormal Pap smear     Anxiety     Arthritis of knee 04/04/2013    Arthritis of shoulder region, right 04/18/2014    Back injury     Breast disorder     Chronic constipation     Chronic diarrhea     Depressive disorder     Diabetes (H)     Fecal incontinence     Finger pain 04/02/2015    Fracture broke L 5th pinky finger due to fall  1/2015    Glaucoma " (increased eye pressure)     Head injury 08/06/2016    Headache(784.0)     decreased with mouth guard use.    History of blood transfusion 8/2011 & 4/2013    Hypercholesteremia     Hypertension     Low back pain     Menarche age 9+    cycles q mo x 4-5 d    Neck injuries     Nonsenile cataract IO implants: L-8/2013; R-1/2011    Pain in knee joint     LEFT    Right bundle branch block     per H/P    Sleep apnea     Info on file    SNHL (sensorineural hearing loss)     Tinnitus     I have reported ringing in ears. not sure of dates    Umbilical hernia without mention of obstruction or gangrene 08/2014    Vision disorder Detached Retina 10/2009       Active problem list:  Patient Active Problem List   Diagnosis    Dyslipidemia    BMI >40    SNHL (sensorineural hearing loss)    Glaucoma    Umbilical hernia -- w/o symptoms->expectant mgt    Encounter for routine gynecological examination    Menopause present -- age 39    Health care maintenance    Type 2 diabetes mellitus with complication (H)    Essential hypertension    Venous (peripheral) insufficiency    Chronic bilateral low back pain with right-sided sciatica    Other secondary osteoarthritis of first carpometacarpal joint of right hand    Insulin long-term use (H)    Class 3 severe obesity due to excess calories with serious comorbidity and body mass index (BMI) of 40.0 to 44.9 in adult (H)    Right-sided thoracic back pain    GHISLAINE (obstructive sleep apnea)    Insomnia, unspecified type    Nightmares    Generalized anxiety disorder    Low bone density       FH:  Family History   Problem Relation Age of Onset    Diabetes Father 55        DM II    Diabetes Brother 50        xs 2    Hypertension Sister 41        also B and M    Cancer Maternal Aunt         multiple myeloma    Hypertension Mother 79    Osteoporosis Mother     Memory loss Mother     Glaucoma Mother     Diabetes Brother     Hypertension Brother     Heart Murmur Brother     Glaucoma Brother      Hypertension Brother     Breast Cancer Cousin     Thyroid Disease Sister         Graves    Diabetes Sister         Graves    Cerebrovascular Disease Maternal Grandmother     Other - See Comments Sister         16 minths head injury    Other - See Comments Brother         MVA age 36    Cancer - colorectal No family hx of     Prostate Cancer No family hx of     Alcohol/Drug No family hx of     Melanoma No family hx of     Skin Cancer No family hx of        SH:  Social History     Socioeconomic History    Marital status:      Spouse name: Not on file    Number of children: Not on file    Years of education: Not on file    Highest education level: Not on file   Occupational History    Occupation: Human Resources     Comment: between jobs now   Tobacco Use    Smoking status: Former     Packs/day: 0.00     Years: 0.00     Additional pack years: 0.00     Total pack years: 0.00     Types: Cigarettes    Smokeless tobacco: Never    Tobacco comments:     quit mid 1980s   Vaping Use    Vaping Use: Never used   Substance and Sexual Activity    Alcohol use: No    Drug use: No    Sexual activity: Not Currently     Partners: Male     Birth control/protection: Abstinence, Post-menopausal   Other Topics Concern     Service Not Asked    Blood Transfusions Yes     Comment: 2 units 2011    Caffeine Concern No     Comment: 2s    Occupational Exposure No    Hobby Hazards No    Sleep Concern No    Stress Concern No     Comment: rehab for R knee after replacement    Weight Concern Yes     Comment: working on wt loss    Special Diet Yes     Comment: Diabetic    Back Care No    Exercise No     Comment: water aerobics 35-40' 3-4 d & strength 3d/wk    Bike Helmet No    Seat Belt No    Self-Exams Not Asked    Parent/sibling w/ CABG, MI or angioplasty before 65F 55M? Not Asked   Social History Narrative    How much exercise per week? 3-4x swimming, aerobics    How much calcium per day? Supplement       How much caffeine per day? 2  cups coffee/ 1-2 can of diet soda    How much vitamin D per day? Supplement    Do you/your family wear seatbelts?  Yes    Do you/your family use safety helmets? No    Do you/your family use sunscreen? No    Do you/your family keep firearms in the home? No    Do you/your family have a smoke detector(s)? Yes    Do you feel safe in your home? Yes    Has anyone ever touched you in an unwanted manner? No     Explain     See LEA Berman 11/26/2014    Hanna DEGROOT MA 11/22/2017     Social Determinants of Health     Financial Resource Strain: Not on file   Food Insecurity: Not on file   Transportation Needs: Not on file   Physical Activity: Not on file   Stress: Not on file   Social Connections: Not on file   Interpersonal Safety: Not At Risk (7/23/2021)    Humiliation, Afraid, Rape, and Kick questionnaire     Fear of Current or Ex-Partner: No     Emotionally Abused: No     Physically Abused: No     Sexually Abused: No   Housing Stability: Not on file       MEDS:  See EMR, reviewed  ALL:  See EMR, reviewed    REVIEW OF SYSTEMS:  CONSTITUTIONAL:NEGATIVE for fever, chills, change in weight  INTEGUMENTARY/SKIN: NEGATIVE for worrisome rashes, moles or lesions  EYES: NEGATIVE for vision changes or irritation  ENT/MOUTH: NEGATIVE for ear, mouth and throat problems  RESP:NEGATIVE for significant cough or SOB  BREAST: NEGATIVE for masses, tenderness or discharge  CV: NEGATIVE for chest pain, palpitations or peripheral edema  GI: NEGATIVE for nausea, abdominal pain, heartburn, or change in bowel habits  :NEGATIVE for frequency, dysuria, or hematuria  :NEGATIVE for frequency, dysuria, or hematuria  NEURO: NEGATIVE for weakness, dizziness or paresthesias  ENDOCRINE: NEGATIVE for temperature intolerance, skin/hair changes  HEME/ALLERGY/IMMUNE: NEGATIVE for bleeding problems  PSYCHIATRIC: NEGATIVE for changes in mood or affect      Objective: She has no tenderness over the proximal fifth metatarsal on the right side and the  overlying skin is intact and normal.  She denies pain with walking.      I personally viewed the patient updated x-rays.  Although the fracture line is still evident she continues to show new bone formation on the lateral view, and we discussed that this injury can at times heal with fibrocartilage which is not evident on the x-ray.      Assessment: Fifth metatarsal fracture, healing, x12 weeks.  History of diabetes mellitus    Plan: She has been working without restriction.  She can continue to return to work at this time, without restriction.  We agreed to avoid further x-ray and scheduled clinic visit at this time.  She will follow-up as needed.      Again, thank you for allowing me to participate in the care of your patient.      Sincerely,    Chandan Molina MD

## 2023-11-20 NOTE — PROGRESS NOTES
"Follow-up nondisplaced fracture of the base of the right fifth metatarsal x 12 weeks.     Since the last visit the patient indicates that she has tolerated walking in a regular shoe without difficulties.  Per pt: \"No pain, no nothing.\"  Patient feels comfortable continuing with her usual walking at work.  She wears a supportive shoe and finds it comfortable. Pt indicates this a worker's compensation injury.     Patient has a history of type 2 diabetes mellitus on insulin, and of hypertension.       Exam: 3 views of the right foot dated 10/23/2023.     COMPARISON: 9/25/2023.     CLINICAL HISTORY: Fifth metatarsal fracture.     FINDINGS: AP, oblique, and lateral views of the right foot were  obtained. Redemonstration of known fracture involving the base of the  right fifth metatarsal. No significant change in alignment. Fracture  line is still visible. Joint space narrowing at the right second  metatarsophalangeal joint as well as at the tarsometatarsal joints of  the second and third metatarsals.                                                                      IMPRESSION: Redemonstration of known intra-articular fracture  involving the base of the right fifth metatarsal, with the fracture  line still visible.     DOMINICK PATTERSON MD         PMH:  Past Medical History:   Diagnosis Date    Abnormal Pap smear     Anxiety     Arthritis of knee 04/04/2013    Arthritis of shoulder region, right 04/18/2014    Back injury     Breast disorder     Chronic constipation     Chronic diarrhea     Depressive disorder     Diabetes (H)     Fecal incontinence     Finger pain 04/02/2015    Fracture broke L 5th pinky finger due to fall  1/2015    Glaucoma (increased eye pressure)     Head injury 08/06/2016    Headache(784.0)     decreased with mouth guard use.    History of blood transfusion 8/2011 & 4/2013    Hypercholesteremia     Hypertension     Low back pain     Menarche age 10+    cycles q mo x 4-5 d    Neck injuries     " Nonsenile cataract IO implants: L-8/2013; R-1/2011    Pain in knee joint     LEFT    Right bundle branch block     per H/P    Sleep apnea     Info on file    SNHL (sensorineural hearing loss)     Tinnitus     I have reported ringing in ears. not sure of dates    Umbilical hernia without mention of obstruction or gangrene 08/2014    Vision disorder Detached Retina 10/2009       Active problem list:  Patient Active Problem List   Diagnosis    Dyslipidemia    BMI >40    SNHL (sensorineural hearing loss)    Glaucoma    Umbilical hernia -- w/o symptoms->expectant mgt    Encounter for routine gynecological examination    Menopause present -- age 39    Health care maintenance    Type 2 diabetes mellitus with complication (H)    Essential hypertension    Venous (peripheral) insufficiency    Chronic bilateral low back pain with right-sided sciatica    Other secondary osteoarthritis of first carpometacarpal joint of right hand    Insulin long-term use (H)    Class 3 severe obesity due to excess calories with serious comorbidity and body mass index (BMI) of 40.0 to 44.9 in adult (H)    Right-sided thoracic back pain    GHISLAINE (obstructive sleep apnea)    Insomnia, unspecified type    Nightmares    Generalized anxiety disorder    Low bone density       FH:  Family History   Problem Relation Age of Onset    Diabetes Father 55        DM II    Diabetes Brother 50        xs 2    Hypertension Sister 41        also B and M    Cancer Maternal Aunt         multiple myeloma    Hypertension Mother 79    Osteoporosis Mother     Memory loss Mother     Glaucoma Mother     Diabetes Brother     Hypertension Brother     Heart Murmur Brother     Glaucoma Brother     Hypertension Brother     Breast Cancer Cousin     Thyroid Disease Sister         Graves    Diabetes Sister         Graves    Cerebrovascular Disease Maternal Grandmother     Other - See Comments Sister         16 minths head injury    Other - See Comments Brother         MVA age 36     Cancer - colorectal No family hx of     Prostate Cancer No family hx of     Alcohol/Drug No family hx of     Melanoma No family hx of     Skin Cancer No family hx of        SH:  Social History     Socioeconomic History    Marital status:      Spouse name: Not on file    Number of children: Not on file    Years of education: Not on file    Highest education level: Not on file   Occupational History    Occupation: Human Resources     Comment: between jobs now   Tobacco Use    Smoking status: Former     Packs/day: 0.00     Years: 0.00     Additional pack years: 0.00     Total pack years: 0.00     Types: Cigarettes    Smokeless tobacco: Never    Tobacco comments:     quit mid 1980s   Vaping Use    Vaping Use: Never used   Substance and Sexual Activity    Alcohol use: No    Drug use: No    Sexual activity: Not Currently     Partners: Male     Birth control/protection: Abstinence, Post-menopausal   Other Topics Concern     Service Not Asked    Blood Transfusions Yes     Comment: 2 units 2011    Caffeine Concern No     Comment: 2s    Occupational Exposure No    Hobby Hazards No    Sleep Concern No    Stress Concern No     Comment: rehab for R knee after replacement    Weight Concern Yes     Comment: working on wt loss    Special Diet Yes     Comment: Diabetic    Back Care No    Exercise No     Comment: water aerobics 35-40' 3-4 d & strength 3d/wk    Bike Helmet No    Seat Belt No    Self-Exams Not Asked    Parent/sibling w/ CABG, MI or angioplasty before 65F 55M? Not Asked   Social History Narrative    How much exercise per week? 3-4x swimming, aerobics    How much calcium per day? Supplement       How much caffeine per day? 2 cups coffee/ 1-2 can of diet soda    How much vitamin D per day? Supplement    Do you/your family wear seatbelts?  Yes    Do you/your family use safety helmets? No    Do you/your family use sunscreen? No    Do you/your family keep firearms in the home? No    Do you/your family have a  smoke detector(s)? Yes    Do you feel safe in your home? Yes    Has anyone ever touched you in an unwanted manner? No     Explain     See LEA Berman 11/26/2014    Reviewed Raul DEGROOT MA 11/22/2017     Social Determinants of Health     Financial Resource Strain: Not on file   Food Insecurity: Not on file   Transportation Needs: Not on file   Physical Activity: Not on file   Stress: Not on file   Social Connections: Not on file   Interpersonal Safety: Not At Risk (7/23/2021)    Humiliation, Afraid, Rape, and Kick questionnaire     Fear of Current or Ex-Partner: No     Emotionally Abused: No     Physically Abused: No     Sexually Abused: No   Housing Stability: Not on file       MEDS:  See EMR, reviewed  ALL:  See EMR, reviewed    REVIEW OF SYSTEMS:  CONSTITUTIONAL:NEGATIVE for fever, chills, change in weight  INTEGUMENTARY/SKIN: NEGATIVE for worrisome rashes, moles or lesions  EYES: NEGATIVE for vision changes or irritation  ENT/MOUTH: NEGATIVE for ear, mouth and throat problems  RESP:NEGATIVE for significant cough or SOB  BREAST: NEGATIVE for masses, tenderness or discharge  CV: NEGATIVE for chest pain, palpitations or peripheral edema  GI: NEGATIVE for nausea, abdominal pain, heartburn, or change in bowel habits  :NEGATIVE for frequency, dysuria, or hematuria  :NEGATIVE for frequency, dysuria, or hematuria  NEURO: NEGATIVE for weakness, dizziness or paresthesias  ENDOCRINE: NEGATIVE for temperature intolerance, skin/hair changes  HEME/ALLERGY/IMMUNE: NEGATIVE for bleeding problems  PSYCHIATRIC: NEGATIVE for changes in mood or affect      Objective: She has no tenderness over the proximal fifth metatarsal on the right side and the overlying skin is intact and normal.  She denies pain with walking.      I personally viewed the patient updated x-rays.  Although the fracture line is still evident she continues to show new bone formation on the lateral view, and we discussed that this injury can at times heal with  fibrocartilage which is not evident on the x-ray.      Assessment: Fifth metatarsal fracture, healing, x12 weeks.  History of diabetes mellitus    Plan: She has been working without restriction.  She can continue to return to work at this time, without restriction.  We agreed to avoid further x-ray and scheduled clinic visit at this time.  She will follow-up as needed.

## 2023-11-20 NOTE — LETTER
November 20, 2023    Radha Baca  [address] [facility] [address]              To whom this may concern,      Please allow patient to return to work without restrictions on 11/20/23.       Sincerely,      Chandan Molina MD

## 2023-11-22 ENCOUNTER — TELEPHONE (OUTPATIENT)
Dept: OBGYN | Facility: CLINIC | Age: 68
End: 2023-11-22
Payer: MEDICARE

## 2023-11-22 NOTE — TELEPHONE ENCOUNTER
"I did call and speak with Dionicio to let her know that she does not need her PAP/Pelvic that she is scheduled for on 11-27-23 with Sherri Gomez. Sherri sent us a note asking if we would call Dionicio to let her know that after the age of 64 she does not need a Pap/pelvic exam.    I did go over this with Dionicio and she said we can cancel her appointment, but wanted to know if she would like to have one can she still do that. I did let her know that \"yes\" she can if she would like and to call us and we can help get her scheduled.     All questions were answered.  "

## 2023-11-22 NOTE — TELEPHONE ENCOUNTER
----- Message from EZEQUIEL Gracia CNM sent at 11/22/2023  1:01 AM CST -----  Regarding: Appointment  Please call patient and let her know that pap smear and pelvic exam are no longer needed for a person her age.  Offer to cancel her appointment.  EZEQUIEL Gracia CNM

## 2023-11-30 ENCOUNTER — ANCILLARY PROCEDURE (OUTPATIENT)
Dept: MAMMOGRAPHY | Facility: CLINIC | Age: 68
End: 2023-11-30
Payer: MEDICARE

## 2023-11-30 DIAGNOSIS — Z12.31 VISIT FOR SCREENING MAMMOGRAM: ICD-10-CM

## 2023-11-30 PROCEDURE — 77067 SCR MAMMO BI INCL CAD: CPT | Mod: GC | Performed by: RADIOLOGY

## 2023-11-30 PROCEDURE — 77063 BREAST TOMOSYNTHESIS BI: CPT | Mod: GC | Performed by: RADIOLOGY

## 2023-12-28 ENCOUNTER — TELEPHONE (OUTPATIENT)
Dept: ORTHOPEDICS | Facility: CLINIC | Age: 68
End: 2023-12-28
Payer: MEDICARE

## 2023-12-28 ENCOUNTER — TELEPHONE (OUTPATIENT)
Dept: AUDIOLOGY | Facility: CLINIC | Age: 68
End: 2023-12-28
Payer: MEDICARE

## 2023-12-28 NOTE — TELEPHONE ENCOUNTER
Left Voicemail (1st Attempt) and Sent Mychart (1st Attempt) for the patient to call back and schedule the following:    Appointment type: New knee  Provider: Ortho knee  Return date: next available

## 2023-12-28 NOTE — TELEPHONE ENCOUNTER
"Walk-in hearing aid services on 12/28/23: The patient asked to have both hearing aids cleaned and checked.  She stated they had both fallen out during an \"emergency situation\" and was unsure of the cleanliness of where they landed.  Both hearing aids were cleaned and sanitized and the  filters and domes were replaced.  Listening and electroacoustic checks found them to be working properly and they were returned to the patient.  "

## 2023-12-30 DIAGNOSIS — E11.8 TYPE 2 DIABETES MELLITUS WITH COMPLICATION (H): ICD-10-CM

## 2023-12-31 RX ORDER — BLOOD SUGAR DIAGNOSTIC
STRIP MISCELLANEOUS
Qty: 300 STRIP | Refills: 3 | Status: SHIPPED | OUTPATIENT
Start: 2023-12-31 | End: 2024-08-23

## 2023-12-31 NOTE — TELEPHONE ENCOUNTER
Test strips  300 strip 1 10/24/2023       1/18/2023  [facility] Keturah Jane MD  Endocrinology, Diabetes, and Metabolism    Nv  3/5/24    Glucose check fasting, before and 2 hours post prandial and bedtime in a rotating fashion

## 2024-01-12 DIAGNOSIS — Z96.653 STATUS POST TOTAL BILATERAL KNEE REPLACEMENT: Primary | ICD-10-CM

## 2024-01-12 NOTE — TELEPHONE ENCOUNTER
DIAGNOSIS:  Bilateral knee and hip consult. Knees are primary concern but pain is in hips and low back as well. Pain in lower back as well. Previously saw Dr. Dubois for low back pain, last seen for October 2020 referred by Dr. Byrne. Trouble walking and increasingly painful since end of November 2023.    APPOINTMENT DATE: 1/18/2024   NOTES STATUS DETAILS   OFFICE NOTE from referring provider Internal    OFFICE NOTE from other specialist Internal    MEDICATION LIST Internal    LABS     CBC/DIFF Internal    XRAYS (IMAGES & REPORTS) Internal Xray Pelvis 1/18/2024     Xray Knee 1/18/2024

## 2024-01-16 NOTE — TELEPHONE ENCOUNTER
Received staff message that pt had called ENT with questions regarding tomorrow's hearing aid consultation.    Spoke with patient, she had questions about coverage for hearing aids. Let her know that our insurance person will check benefits before tomorrow's appointment. She stated that someone at her insurance said she had [employer], let her know that if that is indeed her coverage, we are not in-network for that program. She expressed understanding that if she has [employer], she would be self-pay for hearing aids at this clinic. Quoted price of hearing aids. Patient would like to see what the insurance benefit check here reveals before deciding what to do.    She was nervous that if she cancels the appointment tonight or tomorrow morning she would still get billed for the appointment. Let her know that she would only get billed for the appointment if it was completed. She expressed understanding.      Bradley Valdez  Audiologist  [place] License  #[id number]     Left arm;

## 2024-01-17 ENCOUNTER — OFFICE VISIT (OUTPATIENT)
Dept: FAMILY MEDICINE | Facility: CLINIC | Age: 69
End: 2024-01-17
Payer: MEDICARE

## 2024-01-17 VITALS
HEART RATE: 86 BPM | OXYGEN SATURATION: 98 % | RESPIRATION RATE: 18 BRPM | SYSTOLIC BLOOD PRESSURE: 127 MMHG | TEMPERATURE: 98.5 F | WEIGHT: 249.6 LBS | HEIGHT: 63 IN | DIASTOLIC BLOOD PRESSURE: 71 MMHG | BODY MASS INDEX: 44.23 KG/M2

## 2024-01-17 DIAGNOSIS — F41.1 GENERALIZED ANXIETY DISORDER: ICD-10-CM

## 2024-01-17 DIAGNOSIS — I10 ESSENTIAL HYPERTENSION: Primary | ICD-10-CM

## 2024-01-17 DIAGNOSIS — F41.1 GAD (GENERALIZED ANXIETY DISORDER): ICD-10-CM

## 2024-01-17 DIAGNOSIS — E11.8 TYPE 2 DIABETES MELLITUS WITH COMPLICATION (H): ICD-10-CM

## 2024-01-17 DIAGNOSIS — R55 SYNCOPE, UNSPECIFIED SYNCOPE TYPE: ICD-10-CM

## 2024-01-17 PROCEDURE — 99214 OFFICE O/P EST MOD 30 MIN: CPT | Performed by: FAMILY MEDICINE

## 2024-01-18 ENCOUNTER — OFFICE VISIT (OUTPATIENT)
Dept: ORTHOPEDICS | Facility: CLINIC | Age: 69
End: 2024-01-18
Payer: MEDICARE

## 2024-01-18 ENCOUNTER — PRE VISIT (OUTPATIENT)
Dept: ORTHOPEDICS | Facility: CLINIC | Age: 69
End: 2024-01-18

## 2024-01-18 ENCOUNTER — ANCILLARY PROCEDURE (OUTPATIENT)
Dept: GENERAL RADIOLOGY | Facility: CLINIC | Age: 69
End: 2024-01-18
Attending: ORTHOPAEDIC SURGERY
Payer: MEDICARE

## 2024-01-18 DIAGNOSIS — G89.29 CHRONIC LOW BACK PAIN WITH SCIATICA, SCIATICA LATERALITY UNSPECIFIED, UNSPECIFIED BACK PAIN LATERALITY: Primary | ICD-10-CM

## 2024-01-18 DIAGNOSIS — Z96.653 STATUS POST TOTAL BILATERAL KNEE REPLACEMENT: ICD-10-CM

## 2024-01-18 DIAGNOSIS — M54.40 CHRONIC LOW BACK PAIN WITH SCIATICA, SCIATICA LATERALITY UNSPECIFIED, UNSPECIFIED BACK PAIN LATERALITY: Primary | ICD-10-CM

## 2024-01-18 PROCEDURE — 72170 X-RAY EXAM OF PELVIS: CPT | Performed by: RADIOLOGY

## 2024-01-18 PROCEDURE — 73562 X-RAY EXAM OF KNEE 3: CPT | Mod: GC | Performed by: RADIOLOGY

## 2024-01-18 PROCEDURE — 99204 OFFICE O/P NEW MOD 45 MIN: CPT | Performed by: ORTHOPAEDIC SURGERY

## 2024-01-18 ASSESSMENT — HOOS JR
GOING UP OR DOWN STAIRS: MODERATE
HOOS JR TOTAL INTERVAL SCORE: 49.86
BENDING TO THE FLOOR TO PICK UP OBJECT: MODERATE
SITTING: MODERATE
LYING IN BED (TURNING OVER, MAINTAINING HIP POSITION): SEVERE
WALKING ON UNEVEN SURFACE: MODERATE
RISING FROM SITTING: MODERATE

## 2024-01-18 ASSESSMENT — KOOS JR
KOOS JR SCORING: 59.38
TWISING OR PIVOTING ON KNEE: MILD
STRAIGHTENING KNEE FULLY: MILD
HOW SEVERE IS YOUR KNEE STIFFNESS AFTER FIRST WAKING IN MORNING: MODERATE
STANDING UPRIGHT: MILD
GOING UP OR DOWN STAIRS: SEVERE
BENDING TO THE FLOOR TO PICK UP OBJECT: MILD
RISING FROM SITTING: MODERATE

## 2024-01-18 NOTE — PROGRESS NOTES
"Assessment and plan: this is a 67 yo female with radicular symptoms in her lower extremities.  She has been offered lumbar spine epidural steroid epidural steroid injection.  She canceled this in the past.  Is appropriate for her to consider moving forward with this.  She should have repeat radiographs of her knees in 5 years.  Sooner if issues.    Chief Complaint: Consult (Right hip and bilateral knee pain. Low back and buttock pain. Passed out in restaurant bathroom 12/21/23. Low BP and high sugar. Diabetic ketoacidosis. Cdiff.  had to pick her up, was hospitalized for a few days. Left knee was swollen afterward. Right TKA Dr Mckeon. Dr Byrne did a revision. Dr Byrne did Left TKA, no issue with it. Never got the shot with the pain clinic because she chickened out. Current A1c 9.8)      Physician:  No ref. 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 ICU stay with C Diff and diabetic .   Duration of symptoms: about 6 months   Quality of symptoms: aching and sharp  Severity:severe   Alleviating: activity modification  Exacerbating: activities  Previous Treatments: Previous treatments include activity modification, oral pain medication    EDGARD Mc: 49.876  PROMIS Mental:    PROMIS Physical:    PROMIS Total:    [facility] Activity Scale:    MEDICAL HISTORY:   Past Medical History:   Diagnosis Date    Abnormal Pap smear     Anxiety     Arthritis of knee 04/04/2013    Arthritis of shoulder region, right 04/18/2014    Back injury     Breast disorder     Chronic constipation     Chronic diarrhea     Depressive disorder     Diabetes (H)     Fecal incontinence     Finger pain 04/02/2015    Fracture broke L 5th pinky finger due to fall  1/2015    Glaucoma (increased eye pressure)     Head injury " 08/06/2016    Headache(784.0)     decreased with mouth guard use.    History of blood transfusion 8/2011 & 4/2013    Hypercholesteremia     Hypertension     Low back pain     Menarche age 9+    cycles q mo x 4-5 d    Neck injuries     Nonsenile cataract IO implants: L-8/2013; R-1/2011    Pain in knee joint     LEFT    Right bundle branch block     per H/P    Sleep apnea     Info on file    SNHL (sensorineural hearing loss)     Tinnitus     I have reported ringing in ears. not sure of dates    Umbilical hernia without mention of obstruction or gangrene 08/2014    Vision disorder Detached Retina 10/2009   Recent A1C 9.8    Pertinent negatives:  Patient has no history of DVT or PE. Discussed risk factors.    Medications:     Current Outpatient Medications:     Acetaminophen (TYLENOL PO), Take by mouth as needed for mild pain or fever, Disp: , Rfl:     amoxicillin (AMOXIL) 500 MG tablet, TAKE 4 TABLETS BY MOUTH 1 HOUR BEFORE DENTAL PROCEDURES, Disp: 4 tablet, Rfl: 4    Ascorbic Acid (VITAMIN C PO), Take 2,000 mg by mouth 2 times daily , Disp: , Rfl:     aspirin 81 MG tablet, 1 tab daily, Disp: 90 tablet, Rfl: 3    atorvastatin (LIPITOR) 20 MG tablet, TAKE 1 TABLET BY MOUTH  DAILY, Disp: 90 tablet, Rfl: 3    B Complex Vitamins (VITAMIN  B COMPLEX) CAPS, Take 1 tablet by mouth daily , Disp: , Rfl:     blood glucose (NO BRAND SPECIFIED) test strip, Use to test blood sugar 3 times daily as directed. Any covered brand that works with meter., Disp: 300 strip, Rfl: 1    blood glucose (ONETOUCH VERIO IQ) test strip, USE TO TEST BLOOD SUGARS THREE TIMES DAILY, Disp: 300 strip, Rfl: 3    blood glucose calibration (ONETOUCH VERIO) solution, Use to calibrate blood glucose monitor as needed as directed.Level 3 solution, Disp: 1 each, Rfl: 11    blood glucose monitoring (NO BRAND SPECIFIED) meter device kit, Use to test blood sugar 3x times daily as directed. Any covered brand. One touch preferred., Disp: 1 kit, Rfl: 0     brimonidine-timolol (COMBIGAN) 0.2-0.5 % ophthalmic solution, , Disp: , Rfl:     calcium-vitamin D (CALTRATE) 600-400 MG-UNIT per tablet, Take 1 tablet by mouth 2 times daily, Disp: , Rfl:     cholecalciferol (VITAMIN D) 1000 UNIT tablet, Take 1 tablet by mouth daily., Disp: 100 tablet, Rfl: 3    cyanocobalamin (VITAMIN B-12) 1000 MCG tablet, , Disp: , Rfl:     cycloSPORINE (RESTASIS) 0.05 % ophthalmic emulsion, , Disp: , Rfl:     HUMALOG KWIKPEN 100 UNIT/ML soln, USE FOR CARB COUNTING AND MEALS, APPROXIMATELY 70-80 UNITS DAILY. Lab draw needed. Call [employer] () to schedule., Disp: 75 mL, Rfl: 3    hypromellose-dextran (ARTIFICAL TEARS) 0.1-0.3 % ophthalmic solution, , Disp: , Rfl:     insulin glargine (LANTUS SOLOSTAR) 100 UNIT/ML pen, INJECT SUBCUTANEOUSLY 84  UNITS IN THE MORNING, Disp: 90 mL, Rfl: 3    insulin pen needle (B-D U/F) 31G X 8 MM miscellaneous, USE 4x daily for insulin administration, Disp: 400 each, Rfl: 1    latanoprost (XALATAN) 0.005 % ophthalmic solution, Place 1 drop into both eyes At Bedtime, Disp: , Rfl:     lisinopril-hydrochlorothiazide (ZESTORETIC) 20-12.5 MG tablet, TAKE 1 TABLET BY MOUTH  DAILY, Disp: 90 tablet, Rfl: 3    metFORMIN (GLUCOPHAGE) 500 MG tablet, TAKE 2 TABLETS BY MOUTH  TWICE DAILY WITH MEALS, Disp: 360 tablet, Rfl: 3    Multiple Vitamin (MULTIVITAMIN  S) CAPS, Take 1 daily, Disp: 90 capsule, Rfl: 3    Multiple Vitamins-Minerals (EYE-ZAKIYA PLUS LUTEIN PO), , Disp: , Rfl:     Omega-3 Fatty Acids (OMEGA-3 FISH OIL PO), Take 1,000 mg by mouth daily, Disp: , Rfl:     OneTouch Delica Lancets 33G MISC, 4 Box. 4 times daily Test  Blood glucose 4 times daily  DX E11.8  ID # [id number], Disp: 360 each, Rfl: 3    order for DME, Equipment being ordered: Grade 1 (light) compression stockings, below the knee, Disp: 1 Units, Rfl: 1    polyethylene glycol (MIRALAX) 17 GM/Dose powder, Take 1 capful by mouth daily as needed for constipation, Disp: , Rfl:     semaglutide  (OZEMPIC, 0.25 OR 0.5 MG/DOSE,) 2 MG/1.5ML SOPN pen, INJECT 0.5 MG  SUBCUTANEOUSLY EVERY 7 DAYS, Disp: 4.5 mL, Rfl: 0    sertraline (ZOLOFT) 50 MG tablet, Take 1 tablet (50 mg) by mouth daily, Disp: 90 tablet, Rfl: 3    thin (NO BRAND SPECIFIED) lancets, Use with lanceting device 3x daily. Any covered brand that works with lancing device., Disp: 300 each, Rfl: 1    timolol (TIMOPTIC) 0.5 % ophthalmic solution, Place 1 drop into both eyes 2 times daily , Disp: , Rfl:     triamcinolone (ARISTOCORT HP) 0.5 % external cream, Apply topically 2 times daily, Disp: 15 g, Rfl: 4    vitamin C (ASCORBIC ACID) 100 MG tablet, Take 2,000 mg by mouth, Disp: , Rfl:     Allergies: Nkda [no known drug allergy]    SURGICAL HISTORY:   Past Surgical History:   Procedure Laterality Date    ARTHROPLASTY KNEE  4/4/2013    Left Total Knee Arthroplasty;  Surgeon: Lou Byrne MD;  [facility]    ARTHROPLASTY MINIMALLY INVASIVE KNEE  8/22/2011    R knee :CAITLYN JACOBO, Left age 14    COLONOSCOPY  1/14/2015    DENTAL SURGERY      root canals, wisdom teeth    EXAM UNDER ANESTHESIA, MANIPULATE JOINT (LOCATION)  10/5/2012    Procedure: EXAM UNDER ANESTHESIA, MANIPULATE JOINT (LOCATION);  Right Knee Mini Open Lysis Of Adhesions, Left Knee Steroid Injection  ;  Surgeon: Lou Byrne MD;  [facility]     OR OCULAR DEVICE INTRAOP DETACHED RETINA  2009    R    HC PHAKIC IOL - IMPLANT FROM SURGEON      right    KNEE SURGERY  1969    left    LASER YAG CAPSULOTOMY  12/2016    left    VITRECTOMY PARSPLANA  2010       HISTORY:   Family History   Problem Relation Age of Onset    Diabetes Father 55        DM II    Diabetes Brother 50        xs 2    Hypertension Sister 41        also B and M    Cancer Maternal Aunt         multiple myeloma    Hypertension Mother 79    Osteoporosis Mother     Memory loss Mother     Glaucoma Mother     Diabetes Brother     Hypertension Brother     Heart Murmur Brother     Glaucoma Brother      Hypertension Brother     Breast Cancer Cousin     Thyroid Disease Sister         Graves    Diabetes Sister         Graves    Cerebrovascular Disease Maternal Grandmother     Other - See Comments Sister         16 minths head injury    Other - See Comments Brother         MVA age 36    Cancer - colorectal No family hx of     Prostate Cancer No family hx of     Alcohol/Drug No family hx of     Melanoma No family hx of     Skin Cancer No family hx of        SOCIAL HISTORY:   Social History     Tobacco Use    Smoking status: Former     Packs/day: 0.00     Years: 0.00     Additional pack years: 0.00     Total pack years: 0.00     Types: Cigarettes    Smokeless tobacco: Never    Tobacco comments:     quit mid 1980s   Substance Use Topics    Alcohol use: No       REVIEW OF SYSTEMS:  The comprehensive review of systems from the intake form was reviewed with the patient.  No fever, weight change or fatigue. No dry eyes. No oral ulcers, sore throat or voice change. No palpitations, syncope, angina or edema.  No chest pain, excessive sleepiness, shortness of breath or hemoptysis.   No abdominal pain, nausea, vomiting, diarrhea or heartburn.  No skin rash. No focal weakness or numbness. No bleeding or lymphadenopathy. No rhinitis or hives.     Exam:  On physical examination the patient appears the stated age, is in no acute distress, alert and oriented, affect is appropriate, and breathing is non-labored.  Vitals are documented in the EMR and have been reviewed:    There were no vitals taken for this visit.  Data Unavailable  There is no height or weight on file to calculate BMI.    Rises from chair:  Gait:  Trendelenburg test:  Gains the exam table:  Examination of the patient's hips shows symmetric range of motion.  No pain with range of motion.  She has 100 degrees of flexion 10 degrees of internal rotation and 30 degrees of external rotation and flexion.  FADIR is only mildly positive.    Distal the circulatory, motor, and  sensation exam is intact with 5/5 EHL, gastroc-soleus, and tibialis anterior.  Sensation to light touch is intact.  Dorsalis pedis and posterior tibialis pulses are palpable.  There are no sores on the feet, no bruising, and no lymphedema.    Imaging:   Tonnis 2 right hip, advanced  DDD lumbar spince

## 2024-01-18 NOTE — NURSING NOTE
Reason For Visit:   Chief Complaint   Patient presents with    Consult     Right hip and bilateral knee pain. Low back and buttock pain. Passed out in restaurant bathroom 12/21/23. Low BP and high sugar. Diabetic ketoacidosis. Cdiff.  had to pick her up, was hospitalized for a few days. Left knee was swollen afterward. Right TKA Dr Mckeon. Dr Byrne did a revision. Dr Byrne did Left TKA, no issue with it. Never got the shot with the pain clinic because she chickened out. Current A1c 9.8   59.38 KOOS  49.86 HOOS    There were no vitals taken for this visit.         Yvonne Levine, ATC

## 2024-01-18 NOTE — LETTER
"    1/18/2024         RE: Radha Baca  [address] [address]        Dear Colleague,    Thank you for referring your patient, Radha Baca, to the [facility] [place]. Please see a copy of my visit note below.    Assessment and plan: this is a 69 yo female with radicular symptoms in her lower extremities.  She has been offered lumbar spine epidural steroid epidural steroid injection.  She canceled this in the past.  Is appropriate for her to consider moving forward with this.  She should have repeat radiographs of her knees in 5 years.  Sooner if issues.    Chief Complaint: Consult (Right hip and bilateral knee pain. Low back and buttock pain. Passed out in restaurant bathroom 12/21/23. Low BP and high sugar. Diabetic ketoacidosis. Cdiff.  had to pick her up, was hospitalized for a few days. Left knee was swollen afterward. Right TKA Dr Mckeon. Dr Byrne did a revision. Dr Byrne did Left TKA, no issue with it. Never got the shot with the pain clinic because she chickened out. Current A1c 9.8)      Physician:  No ref. 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 ICU stay with C Diff and diabetic .   Duration of symptoms: about 6 months   Quality of symptoms: aching and sharp  Severity:severe   Alleviating: activity modification  Exacerbating: activities  Previous Treatments: Previous treatments include activity modification, oral pain medication    EDGARD Mc: 49.876  PROMIS Mental:    PROMIS Physical:    PROMIS Total:    [facility] Activity Scale:    MEDICAL HISTORY:   Past Medical History:   Diagnosis Date    Abnormal Pap smear     Anxiety     Arthritis of knee 04/04/2013    Arthritis of shoulder region, right 04/18/2014    Back " injury     Breast disorder     Chronic constipation     Chronic diarrhea     Depressive disorder     Diabetes (H)     Fecal incontinence     Finger pain 04/02/2015    Fracture broke L 5th pinky finger due to fall  1/2015    Glaucoma (increased eye pressure)     Head injury 08/06/2016    Headache(784.0)     decreased with mouth guard use.    History of blood transfusion 8/2011 & 4/2013    Hypercholesteremia     Hypertension     Low back pain     Menarche age 9+    cycles q mo x 4-5 d    Neck injuries     Nonsenile cataract IO implants: L-8/2013; R-1/2011    Pain in knee joint     LEFT    Right bundle branch block     per H/P    Sleep apnea     Info on file    SNHL (sensorineural hearing loss)     Tinnitus     I have reported ringing in ears. not sure of dates    Umbilical hernia without mention of obstruction or gangrene 08/2014    Vision disorder Detached Retina 10/2009   Recent A1C 9.8    Pertinent negatives:  Patient has no history of DVT or PE. Discussed risk factors.    Medications:     Current Outpatient Medications:     Acetaminophen (TYLENOL PO), Take by mouth as needed for mild pain or fever, Disp: , Rfl:     amoxicillin (AMOXIL) 500 MG tablet, TAKE 4 TABLETS BY MOUTH 1 HOUR BEFORE DENTAL PROCEDURES, Disp: 4 tablet, Rfl: 4    Ascorbic Acid (VITAMIN C PO), Take 2,000 mg by mouth 2 times daily , Disp: , Rfl:     aspirin 81 MG tablet, 1 tab daily, Disp: 90 tablet, Rfl: 3    atorvastatin (LIPITOR) 20 MG tablet, TAKE 1 TABLET BY MOUTH  DAILY, Disp: 90 tablet, Rfl: 3    B Complex Vitamins (VITAMIN  B COMPLEX) CAPS, Take 1 tablet by mouth daily , Disp: , Rfl:     blood glucose (NO BRAND SPECIFIED) test strip, Use to test blood sugar 3 times daily as directed. Any covered brand that works with meter., Disp: 300 strip, Rfl: 1    blood glucose (ONETOUCH VERIO IQ) test strip, USE TO TEST BLOOD SUGARS THREE TIMES DAILY, Disp: 300 strip, Rfl: 3    blood glucose calibration (ONETOUCH VERIO) solution, Use to calibrate  blood glucose monitor as needed as directed.Level 3 solution, Disp: 1 each, Rfl: 11    blood glucose monitoring (NO BRAND SPECIFIED) meter device kit, Use to test blood sugar 3x times daily as directed. Any covered brand. One touch preferred., Disp: 1 kit, Rfl: 0    brimonidine-timolol (COMBIGAN) 0.2-0.5 % ophthalmic solution, , Disp: , Rfl:     calcium-vitamin D (CALTRATE) 600-400 MG-UNIT per tablet, Take 1 tablet by mouth 2 times daily, Disp: , Rfl:     cholecalciferol (VITAMIN D) 1000 UNIT tablet, Take 1 tablet by mouth daily., Disp: 100 tablet, Rfl: 3    cyanocobalamin (VITAMIN B-12) 1000 MCG tablet, , Disp: , Rfl:     cycloSPORINE (RESTASIS) 0.05 % ophthalmic emulsion, , Disp: , Rfl:     HUMALOG KWIKPEN 100 UNIT/ML soln, USE FOR CARB COUNTING AND MEALS, APPROXIMATELY 70-80 UNITS DAILY. Lab draw needed. Call [employer] () to schedule., Disp: 75 mL, Rfl: 3    hypromellose-dextran (ARTIFICAL TEARS) 0.1-0.3 % ophthalmic solution, , Disp: , Rfl:     insulin glargine (LANTUS SOLOSTAR) 100 UNIT/ML pen, INJECT SUBCUTANEOUSLY 84  UNITS IN THE MORNING, Disp: 90 mL, Rfl: 3    insulin pen needle (B-D U/F) 31G X 8 MM miscellaneous, USE 4x daily for insulin administration, Disp: 400 each, Rfl: 1    latanoprost (XALATAN) 0.005 % ophthalmic solution, Place 1 drop into both eyes At Bedtime, Disp: , Rfl:     lisinopril-hydrochlorothiazide (ZESTORETIC) 20-12.5 MG tablet, TAKE 1 TABLET BY MOUTH  DAILY, Disp: 90 tablet, Rfl: 3    metFORMIN (GLUCOPHAGE) 500 MG tablet, TAKE 2 TABLETS BY MOUTH  TWICE DAILY WITH MEALS, Disp: 360 tablet, Rfl: 3    Multiple Vitamin (MULTIVITAMIN  S) CAPS, Take 1 daily, Disp: 90 capsule, Rfl: 3    Multiple Vitamins-Minerals (EYE-ZAKIYA PLUS LUTEIN PO), , Disp: , Rfl:     Omega-3 Fatty Acids (OMEGA-3 FISH OIL PO), Take 1,000 mg by mouth daily, Disp: , Rfl:     OneTouch Delica Lancets 33G MISC, 4 Box. 4 times daily Test  Blood glucose 4 times daily  DX E11.8  ID # [id number], Disp: 360 each, Rfl:  3    order for DME, Equipment being ordered: Grade 1 (light) compression stockings, below the knee, Disp: 1 Units, Rfl: 1    polyethylene glycol (MIRALAX) 17 GM/Dose powder, Take 1 capful by mouth daily as needed for constipation, Disp: , Rfl:     semaglutide (OZEMPIC, 0.25 OR 0.5 MG/DOSE,) 2 MG/1.5ML SOPN pen, INJECT 0.5 MG  SUBCUTANEOUSLY EVERY 7 DAYS, Disp: 4.5 mL, Rfl: 0    sertraline (ZOLOFT) 50 MG tablet, Take 1 tablet (50 mg) by mouth daily, Disp: 90 tablet, Rfl: 3    thin (NO BRAND SPECIFIED) lancets, Use with lanceting device 3x daily. Any covered brand that works with lancing device., Disp: 300 each, Rfl: 1    timolol (TIMOPTIC) 0.5 % ophthalmic solution, Place 1 drop into both eyes 2 times daily , Disp: , Rfl:     triamcinolone (ARISTOCORT HP) 0.5 % external cream, Apply topically 2 times daily, Disp: 15 g, Rfl: 4    vitamin C (ASCORBIC ACID) 100 MG tablet, Take 2,000 mg by mouth, Disp: , Rfl:     Allergies: Nkda [no known drug allergy]    SURGICAL HISTORY:   Past Surgical History:   Procedure Laterality Date    ARTHROPLASTY KNEE  4/4/2013    Left Total Knee Arthroplasty;  Surgeon: Lou Byrne MD;  [facility]    ARTHROPLASTY MINIMALLY INVASIVE KNEE  8/22/2011    R knee :CAITLYN JACOBO, Left age 14    COLONOSCOPY  1/14/2015    DENTAL SURGERY      root canals, wisdom teeth    EXAM UNDER ANESTHESIA, MANIPULATE JOINT (LOCATION)  10/5/2012    Procedure: EXAM UNDER ANESTHESIA, MANIPULATE JOINT (LOCATION);  Right Knee Mini Open Lysis Of Adhesions, Left Knee Steroid Injection  ;  Surgeon: Lou Byrne MD;  [facility]     OR OCULAR DEVICE INTRAOP DETACHED RETINA  2009    R    HC PHAKIC IOL - IMPLANT FROM SURGEON      right    KNEE SURGERY  1969    left    LASER YAG CAPSULOTOMY  12/2016    left    VITRECTOMY PARSPLANA  2010       HISTORY:   Family History   Problem Relation Age of Onset    Diabetes Father 55        DM II    Diabetes Brother 50        xs 2    Hypertension Sister 41         also B and M    Cancer Maternal Aunt         multiple myeloma    Hypertension Mother 79    Osteoporosis Mother     Memory loss Mother     Glaucoma Mother     Diabetes Brother     Hypertension Brother     Heart Murmur Brother     Glaucoma Brother     Hypertension Brother     Breast Cancer Cousin     Thyroid Disease Sister         Graves    Diabetes Sister         Graves    Cerebrovascular Disease Maternal Grandmother     Other - See Comments Sister         16 minths head injury    Other - See Comments Brother         MVA age 36    Cancer - colorectal No family hx of     Prostate Cancer No family hx of     Alcohol/Drug No family hx of     Melanoma No family hx of     Skin Cancer No family hx of        SOCIAL HISTORY:   Social History     Tobacco Use    Smoking status: Former     Packs/day: 0.00     Years: 0.00     Additional pack years: 0.00     Total pack years: 0.00     Types: Cigarettes    Smokeless tobacco: Never    Tobacco comments:     quit mid 1980s   Substance Use Topics    Alcohol use: No       REVIEW OF SYSTEMS:  The comprehensive review of systems from the intake form was reviewed with the patient.  No fever, weight change or fatigue. No dry eyes. No oral ulcers, sore throat or voice change. No palpitations, syncope, angina or edema.  No chest pain, excessive sleepiness, shortness of breath or hemoptysis.   No abdominal pain, nausea, vomiting, diarrhea or heartburn.  No skin rash. No focal weakness or numbness. No bleeding or lymphadenopathy. No rhinitis or hives.     Exam:  On physical examination the patient appears the stated age, is in no acute distress, alert and oriented, affect is appropriate, and breathing is non-labored.  Vitals are documented in the EMR and have been reviewed:    There were no vitals taken for this visit.  Data Unavailable  There is no height or weight on file to calculate BMI.    Rises from chair:  Gait:  Trendelenburg test:  Gains the exam table:  Examination of the  patient's hips shows symmetric range of motion.  No pain with range of motion.  She has 100 degrees of flexion 10 degrees of internal rotation and 30 degrees of external rotation and flexion.  FADIR is only mildly positive.    Distal the circulatory, motor, and sensation exam is intact with 5/5 EHL, gastroc-soleus, and tibialis anterior.  Sensation to light touch is intact.  Dorsalis pedis and posterior tibialis pulses are palpable.  There are no sores on the feet, no bruising, and no lymphedema.    Imaging:   Tonnis 2 right hip, advanced  DDD lumbar spince          Chandan Dela Cruz MD

## 2024-01-19 ENCOUNTER — MYC MEDICAL ADVICE (OUTPATIENT)
Dept: FAMILY MEDICINE | Facility: CLINIC | Age: 69
End: 2024-01-19
Payer: MEDICARE

## 2024-01-19 DIAGNOSIS — R55 SYNCOPE, UNSPECIFIED SYNCOPE TYPE: Primary | ICD-10-CM

## 2024-01-19 NOTE — PROGRESS NOTES
Dionicio was seen today for follow up and other.    Diagnoses and all orders for this visit:    Essential hypertension.  At goal, continue ACE inhibitor and thiazide.    Type 2 diabetes mellitus with complication (H).  Has not been under good glycemic control.  Diabetes is managed by endocrinology and I emphasized importance of attempts to reduce A1c.    Generalized anxiety disorder.  She has had a tangible positive improvement on low-dose sertraline.  Was concerned about taking it chronically and I emphasized that it was safe to do so as long as it was helping to ameliorate her SHANDA symptoms.  She was amenable to continuing therapy and I provided refills.    SHANDA (generalized anxiety disorder)  -     sertraline (ZOLOFT) 50 MG tablet; Take 1 tablet (50 mg) by mouth daily    Syncope, unspecified syncope type.  She had an episode of syncope in a restaurant and number, precipitating cardiac resuscitation in the field and subsequent ICU stay for DKA.  Through care everywhere I reviewed the discharge summary of this hospitalization.  She has recovered from this but there is still uncertainty about what caused the syncope.  She is followed by cardiology another health system and is due to see them shortly.  There is an outstanding recommendation for a heart monitoring test and I endorsed this, adding that I would be happy to obtain it through the [facility] system if she wished.  She is going to think about it and get back to me on that.              Subjective     Follow Up (Medication check up ) and Other (Discuss incident on December 21)        HPI     She is primarily here for follow-up of generalized anxiety disorder, but several other issues are raised during the visit and addressed.  1 significant 1 is an episode of syncope in December.  She was in the bathroom after eating in a restaurant and apparently passed out, with subsequent 911 being called and paramedics administering CPR to revive her.  She was  "subsequently hospitalized at [facility] in the ICU for diabetic ketoacidosis which was successfully treated, leading to discharge 3 days after admission.    Since admission she has felt okay without any symptoms of chest pain, shortness of breath, or lightheadedness.  Terms of anxiety she admits that her overall level of anxiety has been significantly reduced when taking sertraline.  Says that her  corroborates this.      Review of Systems         Objective    /71   Pulse 86   Temp 98.5  F (36.9  C) (Oral)   Resp 18   Ht 1.6 m (5' 3\")   Wt 113.2 kg (249 lb 9.6 oz)   SpO2 98%   BMI 44.21 kg/m    Body mass index is 44.21 kg/m .  Physical Exam  Constitutional:       General: She is not in acute distress.     Appearance: She is obese. She is not ill-appearing.   Cardiovascular:      Rate and Rhythm: Normal rate and regular rhythm.      Heart sounds: Normal heart sounds.   Pulmonary:      Breath sounds: Normal breath sounds.   Neurological:      Mental Status: She is alert.   Psychiatric:         Mood and Affect: Mood normal.         Behavior: Behavior normal.            Transferred Records on 05/24/2023   Component Date Value Ref Range Status    RETINOPATHY 05/24/2023 POSITIVE (A)   Final               "

## 2024-01-20 ENCOUNTER — HEALTH MAINTENANCE LETTER (OUTPATIENT)
Age: 69
End: 2024-01-20

## 2024-01-22 ENCOUNTER — ORDERS ONLY (AUTO-RELEASED) (OUTPATIENT)
Dept: FAMILY MEDICINE | Facility: CLINIC | Age: 69
End: 2024-01-22
Payer: MEDICARE

## 2024-01-22 DIAGNOSIS — R55 SYNCOPE, UNSPECIFIED SYNCOPE TYPE: ICD-10-CM

## 2024-01-27 PROCEDURE — 93244 EXT ECG>48HR<7D REV&INTERPJ: CPT | Performed by: INTERNAL MEDICINE

## 2024-02-05 ENCOUNTER — TELEPHONE (OUTPATIENT)
Dept: FAMILY MEDICINE | Facility: CLINIC | Age: 69
End: 2024-02-05
Payer: MEDICARE

## 2024-02-05 NOTE — TELEPHONE ENCOUNTER
[facility] phone call message- patient requesting to speak with PCP or provider:    PCP: Mohan Moreno    Additional Comments: Patient seeking update regarding Zio Patch being mailed out,, she still hasn't received it. Was told it would take ~7 days to arrive.    Is a call back needed? Yes    Patient informed that it may take up to 2 business days to hear back from PCP:Yes    OK to leave a message on voice mail? Yes    Primary language: English      needed? No    Call taken on February 5, 2024 at 1:41 PM by Benitez Perez

## 2024-02-06 NOTE — PROGRESS NOTES
Patient seen at the request of Dr. Chandan Dela Cruz for an opinion and evaluation of lumbar radicular pain.      HISTORY OF PRESENT ILLNESS:  Radha Baca is a 68 year old obese female with history of diabetes, bilateral TKA patient has a longstanding history of chronic who presents with a chief complaint of right sided low back pain. She is accompanied today by her .     Patient has a longstanding history of chronic lumbosacral pain.  She has previously been seen and evaluated in the chronic pain management clinic.  Most recently, she had a syncopal episode in December 2023 as result of hypotension and diabetic ketoacidosis.  She was hospitalized for several days.  She followed up with orthopedic clinic due to ongoing back and lower extremity pain and was referred to our [facility] for further evaluation.     The pain is localized to the bilateral lumbosacral junction with pain radiating to the right lateral thigh to the level of the knee; pain in the back is worse on the right comapred to the left (85:15%) and LBP>RLE (85:15%); reports that her leg gives out on occasion due to pain; she reports chronic foot numbness/tingling which she attributes to diabetic neuropathy; described as constant cramping, sharp and throbbing in nature. Pain is reported as 8/10 at rest today and up to 8/10 at worst in the last week. Symptoms are worse with generalized activity ; and improved with relative rest. She does report pain-related sleep disturbance.     Functional limitations: Overall, she has significant functional limitations which are likely multifactorial as result of pain in addition to other health comorbidities.      PRIOR INJURIES/TREATMENT:   Ice/Heat: +  Physical Therapy:   No recent PT for current issue      - Current Pain Medications -   Acetaminophen 500 as needed for pain   OTC lidoderm patch (eg salonpas)   Aspercreme     - Prior/Trialed Pain Medications -   NSAIDs: voltaren, toradol,  naproxen     Prior Procedures:  Date    Procedure   Improvement (%)  None              Prior Related Surgery:   Left TKA  Right TKA s/p revision  Other (acupuncture, OMT, CMM, TENS, DME, etc.):   DME - straight cane, and walker (used after hospitalization)    Specialists Seen - (with most recent, available notes and clinic visits reviewed)   1. Pain Clinic - Dr Wilcox  2. Orthopedics - Dr. Dela Cruz, Dr. Byrne    3. Orthopedic spine surgery - Dr. Dubois     IMAGING - reviewed   01/18/24 - XR pelvis   Impression:  1. Mild degenerative changes of bilateral hips.  2. Degenerative changes of the lumbar spine.    09/24/2020 MRI Lumbar spine  Findings:   When counting from C2, there are 5 lumbar-type vertebrae.  The  vertebral body heights are maintained without evidence of fracture. No  evidence of marrow infiltrative process. There is to 2 mm  anterolisthesis of L4 on L5. Severe disc desiccation and narrowing  present at L5-S1. There is mild narrowing seen at the remaining  levels.     The conus medullaris is normal and terminates at L1. The cauda equina  appears unremarkable.     On a level by level basis:      T11-12: There is a central disc protrusion without significant spinal  canal stenosis. No neural foraminal stenosis. Mild bilateral facet  arthropathy.     T12-L1: Small disc bulge, without significant spinal canal stenosis.  No neuroforaminal stenosis. Mild to moderate bilateral facet  arthropathy. Stable.     L1-2: Mild disc bulge coupled with facet arthropathy results in mild  relative spinal canal stenosis. Mild bilateral neural foraminal  stenosis secondary to disc bulge and facet arthropathy. Stable.     L2-3: Minimal disc bulge without significant spinal canal stenosis.  Minimal bilateral neural foraminal stenosis secondary to facet  arthropathy. Stable.     L3-4: Mild disc bulge, coupled with moderate facet hypertrophy and  ligamentum flavum thickening results in mild spinal canal stenosis.  Disc bulge  abuts the descending L4 nerve roots. Mild bilateral  neuroforaminal stenosis secondary to disc bulge and facet arthropathy.  Stable.     L4-5: There is a central disc extrusion with internal T2 signal  suggesting an annular fissure. There is mild-to-moderate spinal canal  stenosis at this level when the disc bulges coupled with severe left  and moderate right facet arthropathy. Mild bilateral neural foraminal  stenosis secondary to disc bulge and facet arthropathy. Stable.     L5-S1: Disc bulge without significant spinal canal stenosis. Mild  right and moderate left neural foraminal stenosis secondary to facet  arthropathy and disc bulge. Stable.     Paraspinous tissues are within normal limits.                                                                      Impression:      1. Stable mild to moderate spondylosis of the lumbar spine, most  prominent at L3-4 and L4-5. At L3-4 there is a disc bulge which  results in lateral recess narrowing and abuts the descending L4 nerve  roots.  2. Central disc extrusion with annular fissure at the L4-5 level.    Review Of Systems:  I am responding to those symptoms which are directly relevant to the specific indication for my consultation. I recommend that the patient follow up with their primary or referring provider to pursue any other symptoms which may be of concern.       Medical History:  She  has a past medical history of Abnormal Pap smear, Anxiety, Arthritis of knee (04/04/2013), Arthritis of shoulder region, right (04/18/2014), Back injury, Breast disorder, Chronic constipation, Chronic diarrhea, Depressive disorder, Diabetes (H), Fecal incontinence, Finger pain (04/02/2015), Fracture (broke L 5th pinky finger due to fall  1/2015), Glaucoma (increased eye pressure), Head injury (08/06/2016), Headache(784.0), History of blood transfusion (8/2011 & 4/2013), Hypercholesteremia, Hypertension, Low back pain, Menarche (age 9+), Neck injuries, Nonsenile cataract (IO  implants: L-8/2013; R-1/2011), Pain in knee joint, Right bundle branch block, Sleep apnea, SNHL (sensorineural hearing loss), Tinnitus, Umbilical hernia without mention of obstruction or gangrene (08/2014), and Vision disorder (Detached Retina 10/2009).     She  has a past surgical history that includes Dental surgery; zzhc or ocular device intraop detached retina (2009); Arthroplasty minimally invasive knee (8/22/2011); knee surgery (1969); Exam under anesthesia, manipulate joint (location) (10/5/2012); Arthroplasty knee (4/4/2013); Vitrectomy parsplana (2010); INSERT LENS PROSTHESIS ONLY; Laser YAG capsulotomy (12/2016); and colonoscopy (1/14/2015).    Family History  Her family history includes Breast Cancer in her cousin; Cancer in her maternal aunt; Cerebrovascular Disease in her maternal grandmother; Diabetes in her brother and sister; Diabetes (age of onset: 50) in her brother; Diabetes (age of onset: 55) in her father; Glaucoma in her brother and mother; Heart Murmur in her brother; Hypertension in her brother and brother; Hypertension (age of onset: 41) in her sister; Hypertension (age of onset: 79) in her mother; Memory loss in her mother; Osteoporosis in her mother; Other - See Comments in her brother and sister; Thyroid Disease in her sister.     Social History:  Current living situation: lives with    She  reports that she has quit smoking. Her smoking use included cigarettes. She has never used smokeless tobacco. She reports that she does not drink alcohol and does not use drugs.        Current Medications:   She has a current medication list which includes the following prescription(s): acetaminophen, amoxicillin, ascorbic acid, aspirin, atorvastatin, vitamin  b complex, blood glucose, onetouch verio iq, blood glucose calibration, blood glucose monitoring, brimonidine-timolol, calcium carbonate 600 mg-vitamin d 400 units, vitamin d3, cyanocobalamin, cyclosporine, humalog kwikpen,  hypromellose-dextran, lantus solostar, b-d u/f, latanoprost, lisinopril-hydrochlorothiazide, metformin, multivitamin, multiple vitamins-minerals, omega-3 fatty acids, onetouch delica lancets 33g, order for dme, polyethylene glycol, ozempic (0.25 or 0.5 mg/dose), sertraline, thin, timolol maleate, triamcinolone, and vitamin c.     Allergies:    -- Nkda [No Known Drug Allergy]     PHYSICAL EXAMINATION:  /49 (BP Location: Right arm, Patient Position: Sitting, Cuff Size: Adult Large)   Pulse 77   Resp 16   SpO2 95%    General: Pleasant, straightforward, WDWN individual.  Mental Status: Pleasant, direct, appropriate mood and affect  Resp: breathing is unlabored without audible wheeze  Vascular: No visible cyanosis, no venous stasis changes  Heme: No visible ecchymosis or erythema on extremities  Skin: No notable rash    Neurologic:  Strength: All major muscle groups of the bilateral lower extremities have normal and symmetric muscle strength     Sensation: SILT in lower extremities bilaterally L3-S1 subjectively decreased sensation to light touch at the foot and ankle    DTRs: bilateral lower extremity stretch reflexes are equal and symmetric 2+ brisk patellar reflex, trace Achilles reflex    Musculoskeletal:  Lumbar spine: Essentially full lumbar flexion, mild to moderately reduced lumbar extension.  She has gross pelvic obliquity on the right greater than left (leg length measured from ASIS to medial malleolus less than 1 cm difference, however).  She is diffusely tender over her lumbosacral region with some gluteal muscular asymmetry.  She has G max fullness and associated pain.  She is mildly tender over her bilateral greater trochanter.  Hip provocative maneuvers cause typical low back pain.  She has significant muscle tightness with her hip adductor and hip flexors past 90 degrees flexion. Pain with facet loading right.>left         ASSESSMENT:  Radha Baca is a pleasant 68 year old female who  presents with chronic predominantly axial/mechanical low back pain worse on the right compared to the left (85: 15%) and worse in her low back compared to her right radicular pain (85: 15%) which is likely multifactorial:    #.  Lumbar spondylosis  #.  Lumbar facet arthropathy  #.  Lumbar DDD  #.  Significant myofascial pain syndrome  #.  Chronic lumbar radicular pain, right     Complicating comorbidities include:  #.  Poorly controlled diabetes mellitus, working with endocrinology.  I would avoid additional corticosteroid injections at this point until her hemoglobin A1c improves  #. Obesity.  Could consider comprehensive weight management program through her primary care clinic or endocrinology.  May also benefit from GLP mediated medications for weight and diabetes  #.  Physical deconditioning  #.  History of bilateral total knee arthroplasty    PLAN:  -Refer to physical therapy and transition to home exercise program. Stressed the importance of home exercise program when it comes to achieving meaningful, long-lasting pain relief    -Reviewed medications. Patient prefers to avoid additional medication management for pain at this time  -Avoid corticosteroid injections until her glucose control improves.  -The pathway from diagnostic medial branch blocks to radiofrequency denervation was discussed in detail with the patient today.  Risks and benefits were discussed and all questions were answered.  The patient states understanding and wishes to consider.  She will let me know if she would like to pursue this option and we can consider diagnostic RIGHT L4-5, L5-S1 medial branch blocks   -RTC x6-8 weeks after start of PT    Ready to learn, no apparent learning barriers.  Education provided on treatment plan according to patient's preferred learning style.  Patient verbalizes understanding.   __________________________________  Romeo Abbott MD  Physical Medicine & Rehabilitation

## 2024-02-06 NOTE — TELEPHONE ENCOUNTER
Called patient to discuss message, left message and let her know to call [phone] to check in with Zio to see the status of the heart monitor.    If needs anything further to call us back.    Wendy Cortes RN

## 2024-02-07 ENCOUNTER — OFFICE VISIT (OUTPATIENT)
Dept: PHYSICAL MEDICINE AND REHAB | Facility: CLINIC | Age: 69
End: 2024-02-07
Attending: ORTHOPAEDIC SURGERY
Payer: MEDICARE

## 2024-02-07 VITALS
HEART RATE: 77 BPM | DIASTOLIC BLOOD PRESSURE: 49 MMHG | OXYGEN SATURATION: 95 % | RESPIRATION RATE: 16 BRPM | SYSTOLIC BLOOD PRESSURE: 110 MMHG

## 2024-02-07 DIAGNOSIS — M47.816 LUMBAR SPONDYLOSIS: Primary | ICD-10-CM

## 2024-02-07 DIAGNOSIS — M47.816 LUMBAR FACET ARTHROPATHY: ICD-10-CM

## 2024-02-07 DIAGNOSIS — M54.40 CHRONIC LOW BACK PAIN WITH SCIATICA, SCIATICA LATERALITY UNSPECIFIED, UNSPECIFIED BACK PAIN LATERALITY: ICD-10-CM

## 2024-02-07 DIAGNOSIS — G89.29 CHRONIC LOW BACK PAIN WITH SCIATICA, SCIATICA LATERALITY UNSPECIFIED, UNSPECIFIED BACK PAIN LATERALITY: ICD-10-CM

## 2024-02-07 PROCEDURE — 99204 OFFICE O/P NEW MOD 45 MIN: CPT | Performed by: PHYSICAL MEDICINE & REHABILITATION

## 2024-02-07 NOTE — LETTER
2/7/2024       RE: Radha Baca  [address] [facility] [id number]       Dear Colleague,    Thank you for referring your patient, Radha Baca, to the [facility] PHYSICAL MEDICINE AND REHABILITATION CLINIC [place] at [facility]. Please see a copy of my visit note below.    Patient seen at the request of Dr. Chandan Dela Cruz for an opinion and evaluation of lumbar radicular pain.      HISTORY OF PRESENT ILLNESS:  Radha Baca is a 68 year old obese female with history of diabetes, bilateral TKA patient has a longstanding history of chronic who presents with a chief complaint of right sided low back pain. She is accompanied today by her .     Patient has a longstanding history of chronic lumbosacral pain.  She has previously been seen and evaluated in the chronic pain management clinic.  Most recently, she had a syncopal episode in December 2023 as result of hypotension and diabetic ketoacidosis.  She was hospitalized for several days.  She followed up with orthopedic clinic due to ongoing back and lower extremity pain and was referred to our [facility] for further evaluation.     The pain is localized to the bilateral lumbosacral junction with pain radiating to the right lateral thigh to the level of the knee; pain in the back is worse on the right comapred to the left (85:15%) and LBP>RLE (85:15%); reports that her leg gives out on occasion due to pain; she reports chronic foot numbness/tingling which she attributes to diabetic neuropathy; described as constant cramping, sharp and throbbing in nature. Pain is reported as 8/10 at rest today and up to 8/10 at worst in the last week. Symptoms are worse with generalized activity ; and improved with relative rest. She does report pain-related sleep disturbance.     Functional limitations: Overall, she has significant functional limitations which are likely  multifactorial as result of pain in addition to other health comorbidities.      PRIOR INJURIES/TREATMENT:   Ice/Heat: +  Physical Therapy:   No recent PT for current issue      - Current Pain Medications -   Acetaminophen 500 as needed for pain   OTC lidoderm patch (eg salonpas)   Aspercreme     - Prior/Trialed Pain Medications -   NSAIDs: voltaren, toradol, naproxen     Prior Procedures:  Date    Procedure   Improvement (%)  None              Prior Related Surgery:   Left TKA  Right TKA s/p revision  Other (acupuncture, OMT, CMM, TENS, DME, etc.):   DME - straight cane, and walker (used after hospitalization)    Specialists Seen - (with most recent, available notes and clinic visits reviewed)   1. Pain Clinic - Dr Wilcox  2. Orthopedics - Dr. Dela Cruz, Dr. Byrne    3. Orthopedic spine surgery - Dr. Dubois     IMAGING - reviewed   01/18/24 - XR pelvis   Impression:  1. Mild degenerative changes of bilateral hips.  2. Degenerative changes of the lumbar spine.    09/24/2020 MRI Lumbar spine  Findings:   When counting from C2, there are 5 lumbar-type vertebrae.  The  vertebral body heights are maintained without evidence of fracture. No  evidence of marrow infiltrative process. There is to 2 mm  anterolisthesis of L4 on L5. Severe disc desiccation and narrowing  present at L5-S1. There is mild narrowing seen at the remaining  levels.     The conus medullaris is normal and terminates at L1. The cauda equina  appears unremarkable.     On a level by level basis:      T11-12: There is a central disc protrusion without significant spinal  canal stenosis. No neural foraminal stenosis. Mild bilateral facet  arthropathy.     T12-L1: Small disc bulge, without significant spinal canal stenosis.  No neuroforaminal stenosis. Mild to moderate bilateral facet  arthropathy. Stable.     L1-2: Mild disc bulge coupled with facet arthropathy results in mild  relative spinal canal stenosis. Mild bilateral neural foraminal  stenosis  secondary to disc bulge and facet arthropathy. Stable.     L2-3: Minimal disc bulge without significant spinal canal stenosis.  Minimal bilateral neural foraminal stenosis secondary to facet  arthropathy. Stable.     L3-4: Mild disc bulge, coupled with moderate facet hypertrophy and  ligamentum flavum thickening results in mild spinal canal stenosis.  Disc bulge abuts the descending L4 nerve roots. Mild bilateral  neuroforaminal stenosis secondary to disc bulge and facet arthropathy.  Stable.     L4-5: There is a central disc extrusion with internal T2 signal  suggesting an annular fissure. There is mild-to-moderate spinal canal  stenosis at this level when the disc bulges coupled with severe left  and moderate right facet arthropathy. Mild bilateral neural foraminal  stenosis secondary to disc bulge and facet arthropathy. Stable.     L5-S1: Disc bulge without significant spinal canal stenosis. Mild  right and moderate left neural foraminal stenosis secondary to facet  arthropathy and disc bulge. Stable.     Paraspinous tissues are within normal limits.                                                                      Impression:      1. Stable mild to moderate spondylosis of the lumbar spine, most  prominent at L3-4 and L4-5. At L3-4 there is a disc bulge which  results in lateral recess narrowing and abuts the descending L4 nerve  roots.  2. Central disc extrusion with annular fissure at the L4-5 level.    Review Of Systems:  I am responding to those symptoms which are directly relevant to the specific indication for my consultation. I recommend that the patient follow up with their primary or referring provider to pursue any other symptoms which may be of concern.       Medical History:  She  has a past medical history of Abnormal Pap smear, Anxiety, Arthritis of knee (04/04/2013), Arthritis of shoulder region, right (04/18/2014), Back injury, Breast disorder, Chronic constipation, Chronic diarrhea, Depressive  disorder, Diabetes (H), Fecal incontinence, Finger pain (04/02/2015), Fracture (broke L 5th pinky finger due to fall  1/2015), Glaucoma (increased eye pressure), Head injury (08/06/2016), Headache(784.0), History of blood transfusion (8/2011 & 4/2013), Hypercholesteremia, Hypertension, Low back pain, Menarche (age 10+), Neck injuries, Nonsenile cataract (IO implants: L-8/2013; R-1/2011), Pain in knee joint, Right bundle branch block, Sleep apnea, SNHL (sensorineural hearing loss), Tinnitus, Umbilical hernia without mention of obstruction or gangrene (08/2014), and Vision disorder (Detached Retina 10/2009).     She  has a past surgical history that includes Dental surgery; zzhc or ocular device intraop detached retina (2009); Arthroplasty minimally invasive knee (8/22/2011); knee surgery (1969); Exam under anesthesia, manipulate joint (location) (10/5/2012); Arthroplasty knee (4/4/2013); Vitrectomy parsplana (2010); INSERT LENS PROSTHESIS ONLY; Laser YAG capsulotomy (12/2016); and colonoscopy (1/14/2015).    Family History  Her family history includes Breast Cancer in her cousin; Cancer in her maternal aunt; Cerebrovascular Disease in her maternal grandmother; Diabetes in her brother and sister; Diabetes (age of onset: 50) in her brother; Diabetes (age of onset: 55) in her father; Glaucoma in her brother and mother; Heart Murmur in her brother; Hypertension in her brother and brother; Hypertension (age of onset: 41) in her sister; Hypertension (age of onset: 79) in her mother; Memory loss in her mother; Osteoporosis in her mother; Other - See Comments in her brother and sister; Thyroid Disease in her sister.     Social History:  Current living situation: lives with    She  reports that she has quit smoking. Her smoking use included cigarettes. She has never used smokeless tobacco. She reports that she does not drink alcohol and does not use drugs.        Current Medications:   She has a current medication list  which includes the following prescription(s): acetaminophen, amoxicillin, ascorbic acid, aspirin, atorvastatin, vitamin  b complex, blood glucose, onetouch verio iq, blood glucose calibration, blood glucose monitoring, brimonidine-timolol, calcium carbonate 600 mg-vitamin d 400 units, vitamin d3, cyanocobalamin, cyclosporine, humalog kwikpen, hypromellose-dextran, lantus solostar, b-d u/f, latanoprost, lisinopril-hydrochlorothiazide, metformin, multivitamin, multiple vitamins-minerals, omega-3 fatty acids, onetouch delica lancets 33g, order for dme, polyethylene glycol, ozempic (0.25 or 0.5 mg/dose), sertraline, thin, timolol maleate, triamcinolone, and vitamin c.     Allergies:    -- Nkda [No Known Drug Allergy]     PHYSICAL EXAMINATION:  /49 (BP Location: Right arm, Patient Position: Sitting, Cuff Size: Adult Large)   Pulse 77   Resp 16   SpO2 95%    General: Pleasant, straightforward, WDWN individual.  Mental Status: Pleasant, direct, appropriate mood and affect  Resp: breathing is unlabored without audible wheeze  Vascular: No visible cyanosis, no venous stasis changes  Heme: No visible ecchymosis or erythema on extremities  Skin: No notable rash    Neurologic:  Strength: All major muscle groups of the bilateral lower extremities have normal and symmetric muscle strength     Sensation: SILT in lower extremities bilaterally L3-S1 subjectively decreased sensation to light touch at the foot and ankle    DTRs: bilateral lower extremity stretch reflexes are equal and symmetric 2+ brisk patellar reflex, trace Achilles reflex    Musculoskeletal:  Lumbar spine: Essentially full lumbar flexion, mild to moderately reduced lumbar extension.  She has gross pelvic obliquity on the right greater than left (leg length measured from ASIS to medial malleolus less than 1 cm difference, however).  She is diffusely tender over her lumbosacral region with some gluteal muscular asymmetry.  She has G max fullness and  associated pain.  She is mildly tender over her bilateral greater trochanter.  Hip provocative maneuvers cause typical low back pain.  She has significant muscle tightness with her hip adductor and hip flexors past 90 degrees flexion. Pain with facet loading right.>left         ASSESSMENT:  Radha Baca is a pleasant 68 year old female who presents with chronic predominantly axial/mechanical low back pain worse on the right compared to the left (85: 15%) and worse in her low back compared to her right radicular pain (85: 15%) which is likely multifactorial:    #.  Lumbar spondylosis  #.  Lumbar facet arthropathy  #.  Lumbar DDD  #.  Significant myofascial pain syndrome  #.  Chronic lumbar radicular pain, right     Complicating comorbidities include:  #.  Poorly controlled diabetes mellitus, working with endocrinology.  I would avoid additional corticosteroid injections at this point until her hemoglobin A1c improves  #. Obesity.  Could consider comprehensive weight management program through her primary care clinic or endocrinology.  May also benefit from GLP mediated medications for weight and diabetes  #.  Physical deconditioning  #.  History of bilateral total knee arthroplasty    PLAN:  -Refer to physical therapy and transition to home exercise program. Stressed the importance of home exercise program when it comes to achieving meaningful, long-lasting pain relief    -Reviewed medications. Patient prefers to avoid additional medication management for pain at this time  -Avoid corticosteroid injections until her glucose control improves.  -The pathway from diagnostic medial branch blocks to radiofrequency denervation was discussed in detail with the patient today.  Risks and benefits were discussed and all questions were answered.  The patient states understanding and wishes to consider.  She will let me know if she would like to pursue this option and we can consider diagnostic RIGHT L4-5, L5-S1 medial  branch blocks   -RTC x6-8 weeks after start of PT    Ready to learn, no apparent learning barriers.  Education provided on treatment plan according to patient's preferred learning style.  Patient verbalizes understanding.   __________________________________             Again, thank you for allowing me to participate in the care of your patient.      Sincerely,    Romeo Abbott MD

## 2024-02-07 NOTE — PATIENT INSTRUCTIONS
It was a pleasure seeing you today in our [facility]. We discussed the following:    #. Physical Therapy referral    An order for physical therapy was added today. Physical therapy schedulers should call you in the next few days to schedule an appointment. You may also call [phone] to arrange an appointment at any of the [facility] outpatient physical therapy locations.  It will be very important for you to do the physical therapy exercises on a regular basis to decrease your pain and prevent future flares of pain.     #. Tylenol (Acetaminophen) 500 - 1,000mg (1-2 tablets) every 8 hours as needed for pain. Max acetaminophen 3,000mg per day (or 6 of the 500mg tablets).     #. There is additional information regarding the nerve block and ablation outline below.        Medial Branch Blocks and Radiofrequency Ablation (RFA) aka Neurotomy  Back or neck pain may be due to problems with certain nerves near your spine. If so, a medial branch neurotomy can help relieve your pain. Neurotomy destroys a nerve to relieve pain or stop involuntary movements. In some cases, your doctor may give you a nerve block to see how your body will respond to neurotomy. The treatment uses heat, cold, radiofrequency, or chemicals to destroy the nerves near a problem joint. This keeps some pain messages from traveling to your brain, and helps relieve your symptoms.   Medial branch nerves  Each vertebra in your spine has facets (flat surfaces). They touch where the vertebrae fit together. This forms a facet joint. Each facet joint has at least 2 medial branch nerves. They are part of the nerve pathway to and from each facet joint. A facet joint in your back or neck can become inflamed (swollen and irritated). Pain messages may then travel along the nerve pathway from the facet joint to your brain.     Blocking pain messages  Medial branch nerves in each facet joint send and carry messages about back or neck pain.  Destroying a few of these nerves can keep certain pain messages from reaching your brain. This can help bring you relief. The relief typically lasts for months to years.   Risks and complications  Risks and complications are rare, but can include:  Infection  Increased pain, numbness, or weakness  Nerve damage  Bleeding  Failure to relieve pain  Jim last reviewed this educational content on 4/1/2018 2000-2021 [employer]. All rights reserved. This information is not intended as a substitute for professional medical care. Always follow your healthcare professional's instructions.          Preparing for Spine Injection Therapy                 Why Do I Need Spine Injection Therapy?  Your Health Care Provider is recommending spine injection therapy to  help relieve your back and neck pain. This will be in addition to other therapies such as medications and physical therapy. The purpose of these injections is to reduce the amount of inflammation (swelling, pain, heat, redness, loss of body function) around the nerves thus reducing the amount of pain.      The medications you will receive with the injections will include:   Anesthetic - medication to numb the painful area.      To reduce your discomfort during the injection procedure, you will receive a numbing medication injection prior to the placement of the needles. You will be lying on your stomach during the injection procedure. We will use a low-dose x-ray (fluoroscopy) to help guide needle placement.  You must have a  for this procedure. We will need to reschedule your injection if you do not have a  with you.       What are the different types of injections and procedure?  Below, is a brief description of the different types of injections we use to deliver pain medication as close as possible to the nerves in the painful area:     Medial Branch Block injections: This is a test to see if your pain is      coming from a specific  nerve. This injection is similar to a facet joint injection but contains only the numbing medication.  You will keep a pain score diary for the rest of the day and the following morning after receiving the injection.    Radiofrequency ablation: This is a procedure that uses radio waves and numbing medication to block the nerves that feel pain at the joint. The pain relief effects can last for a long time, but are not permanent. The procedure is similar to the Medial Branch Block but requires additional testing to ensure that the needle is near the nerve before numbing and ablating it.  Doctors often order sedation for this procedure.  Please see the sections on sedation below.       What Should I Expect if I Receive Sedation? THIS IS ORDERED BY THE PHYSICIAN  This is conscious sedation.  The medications we give to you will help you relax and reduce your anxiety.  You will still be awake for the procedure so we can ask you questions and hear your answers. You will NOT receive sedation for the diagnostic test blocks in order to better help evaluate your response to the injection.      What Are My Responsibilities With Sedation?  You must stop eating and drinking 8 (eight) hours before your procedure. You can have clear fluids (water, coffee/tea without milk) up to 2 hours prior to the injections. Nothing by mouth for 2 hours prior to injection.  This includes gum, mints, or chew.  Take your morning medications with a small sip of water.    You must have a  who will check-in with you, and stay in the building while the procedure is underway.  If you do not have a  your sedation will be cancelled.  We will monitor you for at least 30 minutes after the procedure before being discharged home.        What Are the Risks and Complications For This Procedure?  Risks and complication are rare, but can still occur.  You should understand, discuss, and accept these risks before agreeing to the procedure. They  include, but are not limited to:  infection  nerve damage   paralysis  injection failure or a need for further injections or additional procedures  continued or worsening of symptoms/pain,   medication reaction,  dural leak (into the hole covering around the spinal cord. This may cause a a spinal headache)  leak of the medication into the spinal canal, nerves, or blood vessel.  death        What do I need to do before the procedure?   If you do not follow these instructions your procedure may be cancelled.  Tell us if you are on major blood thinners such as Coumadin, Xarelto , Plavix, Eliquis , Pradaxa , or others.    Contact the doctor who prescribed your blood thinner to ask for permission to stop taking it before you have the injection.    Schedule your pain injection procedure after your doctor gave their permission.   We will notify you when to stop and re-start your blood thinner.  Tell us if you have any allergies to contrast dye.  If you do, we may give additional medications to take before the procedure.  Tell us if you are pregnant, or possibly pregnant.  If so, you cannot receive steroid medications or be exposed to fluoroscopic X-rays.  Tell us if you have been sick during the 10 days before the procedure. This includes:   colds   gastrointestinal illness or discomfort  dental sores,   skin infection, or any other type of infection.  Tell us if you have taken antibiotics during the 10 days prior to the procedure.  Do not drink alcohol the night before or on the day of the procedure.  You must shower the night before and on the day of your procedure.  Wear comfortable, clean clothing.  If you have an outside MRI (Magnetic Resonance Imaging photo), please bring it with you.     What Will Happen After the Procedure?  If you did not receive sedation we will monitor you for 15 minutes after the procedure. If you received sedation, we will monitor you for at least 30 minutes after the procedure      How soon  can I expect pain relief?  You have received 2 types of medications with your injection:    Anesthetic - numbing medication which only acts for a few hours    Steroid which may take 3-14 days to be effective.   You can expect to feel your normal pain after the anesthetic wears off, until the steroid becomes effective.     How should I care for myself at home?  Get plenty of rest and avoid twisting, bending movements, heavy lifting, or strenuous activity for the first 24 hours. This will help the steroid be more effective. Medial Branch Block injections will have different discharge instructions.  Those will be discussed at that injection appointment.  Resume your pain medications  Apply ice packs (on for 20 minutes at a time), every 2-3 hours to your injection area for the first 2-3 days to help with pain control.    Avoid heat (pads or water bottles), which can cause the veins to open up, making the steroid less effective. You can use heat after 48 hours.  Take showers only for the first 48 hours.  No baths, hot tubs, swimming, or soaking for 48 hours to reduce the risk of infection.      When should I call the doctor?  Call us if you have any of the following:   Fever more than 100.5 degrees Fahrenheit   Signs of infection   Severe headache   Severe back pain   Increased numbness or weakness in your legs or arms   Loss of bladder or bowel control  Nausea   Other concerns

## 2024-02-07 NOTE — NURSING NOTE
Chief Complaint   Patient presents with    New Patient     Here for consult, confirmed with patient     Em Bryant

## 2024-02-08 NOTE — TELEPHONE ENCOUNTER
Patient called back, they said the original message was incorrect. They did receive the Zio Patch, but the issue is that when th order was sent their insurance included a WC company that patient had had a claim with previously.    Patient stated it caused a lot of confusion and frustration that she was called and told she would have to pay out of pocket. Once she explained that it was Medicare as her primary and [employer] as secondary it was cleared up but she does not understand why WC insurance was even given to [place].    I let patient know that I would bring this up with Clinic Manager to see if issue could be resolved.    Wendy Cortes RN

## 2024-02-13 DIAGNOSIS — E11.9 DIABETES MELLITUS, TYPE 2 (H): ICD-10-CM

## 2024-02-15 RX ORDER — PEN NEEDLE, DIABETIC 31 GX5/16"
NEEDLE, DISPOSABLE MISCELLANEOUS
Qty: 400 EACH | Refills: 0 | Status: SHIPPED | OUTPATIENT
Start: 2024-02-15 | End: 2024-06-30

## 2024-02-15 NOTE — TELEPHONE ENCOUNTER
insulin pen needle (B-D U/F) 31G X 8 MM miscellaneous 400 each 1 4/20/2023     Last Office Visit: 1/18/23  Future Office visit:   3/5/24    90 day [place] refill sent to the pharmacy. Overdue appointment per the refill protocol     Kim Bangura RN  P Red Flag Triage/MRT

## 2024-02-23 NOTE — PROGRESS NOTES
Outcome for 02/23/24 7:51 AM: MTPV message sent  Jacquelyn Winkler MA    Patient is showing 4/5 MNCM met. A1c not in range   Wendy Berg LPN

## 2024-02-25 DIAGNOSIS — F41.1 GAD (GENERALIZED ANXIETY DISORDER): ICD-10-CM

## 2024-02-26 NOTE — TELEPHONE ENCOUNTER
"Request for medication refill:  sertraline (ZOLOFT) 50 MG tablet     Providers if patient needs an appointment and you are willing to give a one month supply please refill for one month and  send a letter/MyChart using \".SMILLIMITEDREFILL\" .smillimited and route chart to \"[facility] \" (Giving one month refill in non controlled medications is strongly recommended before denial)    If refill has been denied, meaning absolutely no refills without visit, please complete the smart phrase \".smirxrefuse\" and route it to the \"[facility] MED REFILLS\"  pool to inform the patient and the pharmacy.    Jane Greenberg, [facility]      "

## 2024-03-01 ENCOUNTER — MYC MEDICAL ADVICE (OUTPATIENT)
Dept: ENDOCRINOLOGY | Facility: CLINIC | Age: 69
End: 2024-03-01
Payer: MEDICARE

## 2024-03-04 ENCOUNTER — ALLIED HEALTH/NURSE VISIT (OUTPATIENT)
Dept: ENDOCRINOLOGY | Facility: CLINIC | Age: 69
End: 2024-03-04
Payer: MEDICARE

## 2024-03-04 ENCOUNTER — LAB (OUTPATIENT)
Dept: LAB | Facility: CLINIC | Age: 69
End: 2024-03-04
Payer: MEDICARE

## 2024-03-04 ENCOUNTER — TELEPHONE (OUTPATIENT)
Dept: ENDOCRINOLOGY | Facility: CLINIC | Age: 69
End: 2024-03-04

## 2024-03-04 DIAGNOSIS — E11.9 DIABETES MELLITUS, TYPE 2 (H): Primary | ICD-10-CM

## 2024-03-04 DIAGNOSIS — E11.8 TYPE 2 DIABETES MELLITUS WITH COMPLICATION (H): ICD-10-CM

## 2024-03-04 DIAGNOSIS — E11.65 TYPE 2 DIABETES MELLITUS WITH HYPERGLYCEMIA, WITHOUT LONG-TERM CURRENT USE OF INSULIN (H): ICD-10-CM

## 2024-03-04 LAB
BUN SERPL-MCNC: 13.6 MG/DL (ref 8–23)
CREAT SERPL-MCNC: 0.71 MG/DL (ref 0.51–0.95)
CREAT UR-MCNC: 34.3 MG/DL
EGFRCR SERPLBLD CKD-EPI 2021: >90 ML/MIN/1.73M2
FASTING STATUS PATIENT QL REPORTED: YES
GLUCOSE SERPL-MCNC: 173 MG/DL (ref 70–99)
MICROALBUMIN UR-MCNC: <12 MG/L
MICROALBUMIN/CREAT UR: NORMAL MG/G{CREAT}
POTASSIUM SERPL-SCNC: 4.7 MMOL/L (ref 3.4–5.3)
TSH SERPL DL<=0.005 MIU/L-ACNC: 3.61 UIU/ML (ref 0.3–4.2)

## 2024-03-04 PROCEDURE — 82570 ASSAY OF URINE CREATININE: CPT | Performed by: INTERNAL MEDICINE

## 2024-03-04 PROCEDURE — 99000 SPECIMEN HANDLING OFFICE-LAB: CPT | Performed by: PATHOLOGY

## 2024-03-04 PROCEDURE — 82947 ASSAY GLUCOSE BLOOD QUANT: CPT | Performed by: PATHOLOGY

## 2024-03-04 PROCEDURE — 80061 LIPID PANEL: CPT | Performed by: PATHOLOGY

## 2024-03-04 PROCEDURE — 36415 COLL VENOUS BLD VENIPUNCTURE: CPT | Performed by: PATHOLOGY

## 2024-03-04 PROCEDURE — 82565 ASSAY OF CREATININE: CPT | Performed by: PATHOLOGY

## 2024-03-04 PROCEDURE — 99207 PR NO BILLABLE SERVICE THIS VISIT: CPT | Mod: 25

## 2024-03-04 PROCEDURE — 84443 ASSAY THYROID STIM HORMONE: CPT | Performed by: PATHOLOGY

## 2024-03-04 PROCEDURE — 84520 ASSAY OF UREA NITROGEN: CPT | Performed by: PATHOLOGY

## 2024-03-04 PROCEDURE — 84132 ASSAY OF SERUM POTASSIUM: CPT | Performed by: PATHOLOGY

## 2024-03-05 ENCOUNTER — VIRTUAL VISIT (OUTPATIENT)
Dept: ENDOCRINOLOGY | Facility: CLINIC | Age: 69
End: 2024-03-05
Payer: MEDICARE

## 2024-03-05 DIAGNOSIS — E11.8 TYPE 2 DIABETES MELLITUS WITH COMPLICATION (H): Primary | ICD-10-CM

## 2024-03-05 PROCEDURE — 99215 OFFICE O/P EST HI 40 MIN: CPT | Mod: 95 | Performed by: INTERNAL MEDICINE

## 2024-03-05 ASSESSMENT — PAIN SCALES - GENERAL: PAINLEVEL: EXTREME PAIN (9)

## 2024-03-05 NOTE — LETTER
3/5/2024       RE: Radha Baca  [birth year][address] [address] [address]     Dear Colleague,    Thank you for referring your patient, Radha Baca, to the [facility]OCRINOLOG[facility] [place] at [facility]. Please see a copy of my visit note below.    Outcome for 02/23/24 7:51 AM: [employer] message sent  Jacquelyn Winkler MA    Patient is showing 4/5 MNCM met. A1c not in range   Wendy Berg LPN                        Virtual Visit Details    Type of service:  Video Visit   Video Start Time: 7:03 AM  Video End Time: 7:35am    Originating Location (pt. Location): Home    Distant Location (provider location):  Off-site  Platform used for Video Visit: [facility]    Outcome for 01/11/23 2:23 PM: [employer]okle message sent  CHOCO Moore  Outcome for 01/17/23 9:54 AM: Replied to Vokle message  CHOCO Martin  Outcome for 01/17/23 10:52 AM: Karin emailed to provider  CHOCO Martin          Follow up for T2DM      Assessment and Plan:  1. Type 2 diabetes mellitus c/b DM retinopathy, with comorbidity of hypertension, obesity, hypothyroidismm, low bone density    Low bone density  Remains in low bone density range, FRAX is NOT increased, decrease at the spine since last measurement, however spine T-score remains in good range; no medical treatment is indicated, general measures for post menopausal women including Calcium intake of 0641-9258 mg daily   Continue supplements with Vitamin D and Calcium    Blood glucose control  Most recent A1c is increased. Pt notes stress eating. Reviewed to use correction and carb coverage consistently  CR 4 IS 30 goal 120 mg/dl during the day, 140 mg/dl HS  Recommend to cont Metformin,   We are not restarting Ozempic    Magnesium  Levels are normal, if desired can use OTC Magnesium supplements, might help with constipation      Plan:   - use Humalog carb ratio: 4, insulin sensitivity: 30 corrected to 120  "during the day and 160 at bedtime more consistently also with dinner  - continue Lantus 75 units daily  - Metformin 1000mg po BID    - Glucose check fasting, before and 2 hours post prandial and bedtime in a rotating fashion  - rec to consider glucose sensor, pt would like to check out coverage for Shalom and Dexcom    2. Diabetic complications:  - Retinopathy: has diabetic retinopathy reg check q 30-4 month  - Nephropathy: nl Creatinine, nl microalbumin,     - Lipids: excellent LDL on Atorvastatin 20 mg  - Blood pressure controlled on lisinopril hydrochlorothiazide  - Aspirin - 81mg daily      Keturah De Luna MD  Endocrinology and Diabetes  Telephone contact:  [facility] [place] [phone]  [facility] [phone]      Interval History:  - 11 m follow up for T2DM  - had a lot happen in the last year, her mother passed way, Dionicio was her primary care taker, is in charge of her mom's estate with \"dysfunctional family\"  - had been admitted with dehydration which is thought to be related to Ozempic, was stopped     - she has more pain in her back, is scheduled for injections, is concerned to get relieve from injection and would like to review how to deal with blood glucose effect of potential steroid injection    - pt is wondering whether she had more frequent A1c checks then 1/23 and 12/23 1. Type 2 DM:  Diabetes: Has type 2 DM, diagnosed before 2005      Current Treatment:   - Lantus to 75 units subcutaneous once daily Humalog 20-25 units with meals 2-3 meals per day- Metformin 1000mg po BID, she is on this for long time, not sure if this is causing the side effects.    Pt notes that she does not always check and correct before dinner    Twisted ankle broke foot in 8/23    Hypoglycemia  None recently    Diet:   2-3 meals/day.healthy and sometimes unhealthy depending on her stress level    Exercise  Has been unable to exercise as she is taking care of her mom " full-time      2. Diabetic Complications:  - Retinopathy: Outside System - mild NPDR and DM macular edema which is mild and recommended observation.  - Nephropathy:  Nl urine microalb, nl creatinine  - Neuropathy: Had tingling .  - feet podiatry does toenails q 3m    3. Cardiovascular risk factors:  - Lipids: Lipitor 20mg,   - HTN:  on lisinopril/HCTZ well-controlled 20-12.5  - Aspirin - 81mg daily    4. Hypothyroidism:  - TSH - nl 11/21/22    5. Low bone density  DXA from 12/19/2022 shows low bone denisty, lwoest T-score -2.2 at the right femur neck  FRAX is 11% for MOF and 1.9% for hip fractures  Bone density decrease at the spine since last measurements in 12/2020, spine now measures T-score 0 (L2-3)          ==========================================================================================              Past Medical/Surgical History:   Reviewed in chart  Past Medical History:   Diagnosis Date    Abnormal Pap smear     Anxiety     Arthritis of knee 04/04/2013    Arthritis of shoulder region, right 04/18/2014    Back injury     Breast disorder     Chronic constipation     Chronic diarrhea     Depressive disorder     Diabetes (H)     Fecal incontinence     Finger pain 04/02/2015    Fracture broke L 5th pinky finger due to fall  1/2015    Glaucoma (increased eye pressure)     Head injury 08/06/2016    Headache(784.0)     decreased with mouth guard use.    History of blood transfusion 8/2011 & 4/2013    Hypercholesteremia     Hypertension     Low back pain     Menarche age 9+    cycles q mo x 4-5 d    Neck injuries     Nonsenile cataract IO implants: L-8/2013; R-1/2011    Pain in knee joint     LEFT    Right bundle branch block     per H/P    Sleep apnea     Info on file    SNHL (sensorineural hearing loss)     Tinnitus     I have reported ringing in ears. not sure of dates    Umbilical hernia without mention of obstruction or gangrene 08/2014    Vision disorder Detached Retina 10/2009     Past Surgical  History:   Procedure Laterality Date    ARTHROPLASTY KNEE  4/4/2013    Left Total Knee Arthroplasty;  Surgeon: Lou Byrne MD;  [facility]    ARTHROPLASTY MINIMALLY INVASIVE KNEE  8/22/2011    R knee :CAITLYN JACOBO, Left age 14    COLONOSCOPY  1/14/2015    DENTAL SURGERY      root canals, wisdom teeth    EXAM UNDER ANESTHESIA, MANIPULATE JOINT (LOCATION)  10/5/2012    Procedure: EXAM UNDER ANESTHESIA, MANIPULATE JOINT (LOCATION);  Right Knee Mini Open Lysis Of Adhesions, Left Knee Steroid Injection  ;  Surgeon: Lou Byrne MD;  [facility]    [facility] OR OCULAR DEVICE INTRAOP DETACHED RETINA  2009    R    HC PHAKIC IOL - IMPLANT FROM SURGEON      right    KNEE SURGERY  1969    left    LASER YAG CAPSULOTOMY  12/2016    left    VITRECTOMY PARSPLANA  2010       Allergies:  Reviewed in chart    Current Medications:   Reviewed in chart    Family History:  Reviewed in chart    Social History:  Reviewed in chart    Physical Examination:  Vital signs:  Wt Readings from Last 4 Encounters:   01/17/24 113.2 kg (249 lb 9.6 oz)   09/27/23 111.1 kg (245 lb)   08/20/23 111.1 kg (245 lb)   07/25/23 111.1 kg (245 lb)   BMI 43     Reported vitals:  There were no vitals taken for this visit.   healthy, alert and no distress  PSYCH: Alert and oriented times 3; coherent speech, normal   rate and volume, able to articulate logical thoughts, able   to abstract reason, no tangential thoughts, no hallucinations   or delusions  Her affect is normal and pleasant  RESP: No cough, no audible wheezing, able to talk in full sentences  Remainder of exam unable to be completed due to telephone visits       Endocrine Labs:  Lab Results   Component Value Date    .7 01/28/2021    CHLORIDE 98.8 01/28/2021    CO2 25.4 01/28/2021     (H) 03/04/2024    CR 0.71 03/04/2024    CR 0.80 11/21/2022    CR 0.71 04/04/2022    CR 0.71 07/12/2021    CR 0.74 04/12/2021    COLIN 9.7 01/16/2023    MAG 1.7 01/16/2023    ALBUMIN 4.1  01/16/2023    ALKPHOS 75.0 01/28/2021    LDL 37 11/21/2022    HDL 47 (L) 11/21/2022    TRIG 155 (H) 11/21/2022    TSH 3.61 03/04/2024    TSH 3.56 11/21/2022    TSH 2.46 04/04/2022     Lab Results   Component Value Date    MICROL <12.0 03/04/2024    MICROL 13.6 11/21/2022    MICROL 7 04/12/2021    MICROL 9 01/18/2021    MICROL 10 09/28/2020     Lab Results   Component Value Date    A1C 9.4 (H) 01/16/2023    A1C 8.6 (H) 04/04/2022    A1C 8.6 (H) 11/01/2021    A1C 8.8 (H) 07/12/2021    A1C 8.9 (H) 04/12/2021       Lab Results   Component Value Date    HGB 11.8 09/28/2020       HPI:  Radha Baca is a elderly  female with type 2 diabetes diagnosed [id number]. Has PMH for HTN, arthritis, chronic constipation    Sincerely,    Keturah De Luna MD

## 2024-03-05 NOTE — PATIENT INSTRUCTIONS
Please find out about the glucose sensors DEXCOM or Shalom, let me know and if an option I'm glad to call it in    If you get a back injection with Cortisone/steroids, please increas the LANTUS to 85 units for 5 days after injections,     You can check A1c beginning of April 2024    Follow up in 4 months

## 2024-03-05 NOTE — PROGRESS NOTES
Virtual Visit Details    Type of service:  Video Visit   Video Start Time: 7:03 AM  Video End Time: 7:35am    Originating Location (pt. Location): Home    Distant Location (provider location):  Off-site  Platform used for Video Visit: [employer]    Outcome for 01/11/23 2:23 PM: [employer] message sent  CHOCO Moore  Outcome for 01/17/23 9:54 AM: Replied to [employer] message  CHOCO Martin  Outcome for 01/17/23 10:52 AM: Karin emailed to provider  Jacquelyn Winkler, CHOCO          Follow up for T2DM      Assessment and Plan:  1. Type 2 diabetes mellitus c/b DM retinopathy, with comorbidity of hypertension, obesity, hypothyroidismm, low bone density    Low bone density  Remains in low bone density range, FRAX is NOT increased, decrease at the spine since last measurement, however spine T-score remains in good range; no medical treatment is indicated, general measures for post menopausal women including Calcium intake of 8444-4584 mg daily   Continue supplements with Vitamin D and Calcium    Blood glucose control  Most recent A1c is increased. Pt notes stress eating. Reviewed to use correction and carb coverage consistently  CR 4 IS 30 goal 120 mg/dl during the day, 140 mg/dl HS  Recommend to cont Metformin,   We are not restarting Ozempic    Magnesium  Levels are normal, if desired can use OTC Magnesium supplements, might help with constipation      Plan:   - use Humalog carb ratio: 4, insulin sensitivity: 30 corrected to 120 during the day and 160 at bedtime more consistently also with dinner  - continue Lantus 75 units daily  - Metformin 1000mg po BID    - Glucose check fasting, before and 2 hours post prandial and bedtime in a rotating fashion  - rec to consider glucose sensor, pt would like to check out coverage for Shalom and Dexcom    2. Diabetic complications:  - Retinopathy: has diabetic retinopathy reg check q 30-4 month  - Nephropathy: nl Creatinine, nl microalbumin,     - Lipids: excellent LDL on Atorvastatin  "20 mg  - Blood pressure controlled on lisinopril hydrochlorothiazide  - Aspirin - 81mg daily      Keturah De Luna MD  Endocrinology and Diabetes  Telephone contact:  [facility] [place] [phone]  [facility] [place] [phone]      Interval History:  - 12 m follow up for T2DM  - had a lot happen in the last year, her mother passed way, Dionicio was her primary care taker, is in charge of her mom's estate with \"dysfunctional family\"  - had been admitted with dehydration which is thought to be related to Ozempic, was stopped     - she has more pain in her back, is scheduled for injections, is concerned to get relieve from injection and would like to review how to deal with blood glucose effect of potential steroid injection    - pt is wondering whether she had more frequent A1c checks then 1/23 and 12/23    1. Type 2 DM:  Diabetes: Has type 2 DM, diagnosed before 2005      Current Treatment:   - Lantus to 75 units subcutaneous once daily Humalog 20-25 units with meals 2-3 meals per day- Metformin 1000mg po BID, she is on this for long time, not sure if this is causing the side effects.    Pt notes that she does not always check and correct before dinner    Twisted ankle broke foot in 8/23    Hypoglycemia  None recently    Diet:   2-3 meals/day.healthy and sometimes unhealthy depending on her stress level    Exercise  Has been unable to exercise as she is taking care of her mom full-time      2. Diabetic Complications:  - Retinopathy: Outside System - mild NPDR and DM macular edema which is mild and recommended observation.  - Nephropathy:  Nl urine microalb, nl creatinine  - Neuropathy: Had tingling .  - feet podiatry does toenails q 3m    3. Cardiovascular risk factors:  - Lipids: Lipitor 20mg,   - HTN:  on lisinopril/HCTZ well-controlled 20-12.5  - Aspirin - 81mg daily    4. Hypothyroidism:  - TSH - nl 11/21/22    5. Low bone density  DXA from 12/19/2022 shows low bone " mili johnson T-score -2.2 at the right femur neck  FRAX is 11% for MOF and 1.9% for hip fractures  Bone density decrease at the spine since last measurements in 12/2020, spine now measures T-score 0 (L2-3)          ==========================================================================================              Past Medical/Surgical History:   Reviewed in chart  Past Medical History:   Diagnosis Date    Abnormal Pap smear     Anxiety     Arthritis of knee 04/04/2013    Arthritis of shoulder region, right 04/18/2014    Back injury     Breast disorder     Chronic constipation     Chronic diarrhea     Depressive disorder     Diabetes (H)     Fecal incontinence     Finger pain 04/02/2015    Fracture broke L 5th pinky finger due to fall  1/2015    Glaucoma (increased eye pressure)     Head injury 08/06/2016    Headache(784.0)     decreased with mouth guard use.    History of blood transfusion 8/2011 & 4/2013    Hypercholesteremia     Hypertension     Low back pain     Menarche age 9+    cycles q mo x 4-5 d    Neck injuries     Nonsenile cataract IO implants: L-8/2013; R-1/2011    Pain in knee joint     LEFT    Right bundle branch block     per H/P    Sleep apnea     Info on file    SNHL (sensorineural hearing loss)     Tinnitus     I have reported ringing in ears. not sure of dates    Umbilical hernia without mention of obstruction or gangrene 08/2014    Vision disorder Detached Retina 10/2009     Past Surgical History:   Procedure Laterality Date    ARTHROPLASTY KNEE  4/4/2013    Left Total Knee Arthroplasty;  Surgeon: Lou Byrne MD;  [facility]    ARTHROPLASTY MINIMALLY INVASIVE KNEE  8/22/2011    R knee :CAITLYN JACOBO, Left age 14    COLONOSCOPY  1/14/2015    DENTAL SURGERY      root canals, wisdom teeth    EXAM UNDER ANESTHESIA, MANIPULATE JOINT (LOCATION)  10/5/2012    Procedure: EXAM UNDER ANESTHESIA, MANIPULATE JOINT (LOCATION);  Right Knee Mini Open Lysis Of Adhesions, Left Knee  Steroid Injection  ;  Surgeon: Lou Byrne MD;  [facility]     OR OCULAR DEVICE INTRAOP DETACHED RETINA  2009    R    HC PHAKIC IOL - IMPLANT FROM SURGEON      right    KNEE SURGERY  1969    left    LASER YAG CAPSULOTOMY  12/2016    left    VITRECTOMY PARSPLANA  2010       Allergies:  Reviewed in chart    Current Medications:   Reviewed in chart    Family History:  Reviewed in chart    Social History:  Reviewed in chart    Physical Examination:  Vital signs:  Wt Readings from Last 4 Encounters:   01/17/24 113.2 kg (249 lb 9.6 oz)   09/27/23 111.1 kg (245 lb)   08/20/23 111.1 kg (245 lb)   07/25/23 111.1 kg (245 lb)   BMI 43     Reported vitals:  There were no vitals taken for this visit.   healthy, alert and no distress  PSYCH: Alert and oriented times 3; coherent speech, normal   rate and volume, able to articulate logical thoughts, able   to abstract reason, no tangential thoughts, no hallucinations   or delusions  Her affect is normal and pleasant  RESP: No cough, no audible wheezing, able to talk in full sentences  Remainder of exam unable to be completed due to telephone visits       Endocrine Labs:  Lab Results   Component Value Date    .7 01/28/2021    CHLORIDE 98.8 01/28/2021    CO2 25.4 01/28/2021     (H) 03/04/2024    CR 0.71 03/04/2024    CR 0.80 11/21/2022    CR 0.71 04/04/2022    CR 0.71 07/12/2021    CR 0.74 04/12/2021    COLIN 9.7 01/16/2023    MAG 1.7 01/16/2023    ALBUMIN 4.1 01/16/2023    ALKPHOS 75.0 01/28/2021    LDL 37 11/21/2022    HDL 47 (L) 11/21/2022    TRIG 155 (H) 11/21/2022    TSH 3.61 03/04/2024    TSH 3.56 11/21/2022    TSH 2.46 04/04/2022     Lab Results   Component Value Date    MICROL <12.0 03/04/2024    MICROL 13.6 11/21/2022    MICROL 7 04/12/2021    MICROL 9 01/18/2021    MICROL 10 09/28/2020     Lab Results   Component Value Date    A1C 9.4 (H) 01/16/2023    A1C 8.6 (H) 04/04/2022    A1C 8.6 (H) 11/01/2021    A1C 8.8 (H) 07/12/2021    A1C 8.9 (H)  04/12/2021       Lab Results   Component Value Date    HGB 11.8 09/28/2020       HPI:  Radha Baca is a elderly  female with type 2 diabetes diagnosed [id number]. Has PMH for HTN, arthritis, chronic constipation

## 2024-03-05 NOTE — NURSING NOTE
Is the patient currently in the state of [place]? YES    Visit mode:VIDEO    If the visit is dropped, the patient can be reconnected by: VIDEO VISIT: Send to e-mail at: [email or web address]    Will anyone else be joining the visit? NO  (If patient encounters technical issues they should call [phone])    How would you like to obtain your AVS? MyChart    Are changes needed to the allergy or medication list?  Pt declined review of medications and allergies with VF.    Reason for visit: RECHECK Shelby Kocher VVF

## 2024-03-06 ENCOUNTER — TELEPHONE (OUTPATIENT)
Dept: ENDOCRINOLOGY | Facility: CLINIC | Age: 69
End: 2024-03-06
Payer: MEDICARE

## 2024-03-06 NOTE — TELEPHONE ENCOUNTER
"Spoke with patient to schedule 4 mo follow-up with Dr. De Luna. Writer offered next available/waitlist and said provider would be sent a message to notify. Patient upset and rude to writer. They declined scheduling next available/waitlist said to \"talk to Rasheeda or Hanh, they always work with me.\" Message sent to pool to advise.   "

## 2024-03-07 ENCOUNTER — MYC REFILL (OUTPATIENT)
Dept: ENDOCRINOLOGY | Facility: CLINIC | Age: 69
End: 2024-03-07
Payer: MEDICARE

## 2024-03-07 DIAGNOSIS — E11.9 DIABETES MELLITUS, TYPE 2 (H): ICD-10-CM

## 2024-03-07 NOTE — TELEPHONE ENCOUNTER
She is scheduled with Dr De Luna 7/29/24 do you want her to get labs done before this 4 month follow up Rasheeda Izaguirre RN on 3/7/2024 at 9:16 AM

## 2024-03-08 ENCOUNTER — TELEPHONE (OUTPATIENT)
Dept: ENDOCRINOLOGY | Facility: CLINIC | Age: 69
End: 2024-03-08
Payer: MEDICARE

## 2024-03-08 DIAGNOSIS — E11.65 TYPE 2 DIABETES MELLITUS WITH HYPERGLYCEMIA, WITHOUT LONG-TERM CURRENT USE OF INSULIN (H): Primary | ICD-10-CM

## 2024-03-08 LAB
CHOLEST SERPL-MCNC: 118 MG/DL
FASTING STATUS PATIENT QL REPORTED: YES
HDLC SERPL-MCNC: 40 MG/DL
LDLC SERPL CALC-MCNC: 54 MG/DL
NONHDLC SERPL-MCNC: 78 MG/DL
TRIGL SERPL-MCNC: 118 MG/DL

## 2024-03-08 NOTE — TELEPHONE ENCOUNTER
Labs prior next visit    Keturah De Luna MD  Staff Physician  Division of Endocrinology  [facility]  Pager #[phone]

## 2024-03-14 ENCOUNTER — ANCILLARY PROCEDURE (OUTPATIENT)
Dept: CARDIOLOGY | Facility: CLINIC | Age: 69
End: 2024-03-14
Attending: FAMILY MEDICINE
Payer: MEDICARE

## 2024-03-14 DIAGNOSIS — R55 SYNCOPE, UNSPECIFIED SYNCOPE TYPE: ICD-10-CM

## 2024-03-14 LAB — LVEF ECHO: NORMAL

## 2024-03-14 PROCEDURE — 93306 TTE W/DOPPLER COMPLETE: CPT | Performed by: INTERNAL MEDICINE

## 2024-03-14 RX ADMIN — Medication 5 ML: at 16:24

## 2024-05-07 ENCOUNTER — TRANSFERRED RECORDS (OUTPATIENT)
Dept: HEALTH INFORMATION MANAGEMENT | Facility: CLINIC | Age: 69
End: 2024-05-07
Payer: MEDICARE

## 2024-05-08 DIAGNOSIS — M25.562 LEFT KNEE PAIN: Primary | ICD-10-CM

## 2024-05-08 DIAGNOSIS — M25.512 LEFT SHOULDER PAIN: ICD-10-CM

## 2024-05-09 ENCOUNTER — ANCILLARY PROCEDURE (OUTPATIENT)
Dept: GENERAL RADIOLOGY | Facility: CLINIC | Age: 69
End: 2024-05-09
Attending: FAMILY MEDICINE
Payer: MEDICARE

## 2024-05-09 ENCOUNTER — OFFICE VISIT (OUTPATIENT)
Dept: ORTHOPEDICS | Facility: CLINIC | Age: 69
End: 2024-05-09
Payer: MEDICARE

## 2024-05-09 DIAGNOSIS — Z96.652 H/O TOTAL KNEE REPLACEMENT, LEFT: ICD-10-CM

## 2024-05-09 DIAGNOSIS — S93.492A SPRAIN OF ANTERIOR TALOFIBULAR LIGAMENT OF LEFT ANKLE, INITIAL ENCOUNTER: ICD-10-CM

## 2024-05-09 DIAGNOSIS — E11.8 TYPE 2 DIABETES MELLITUS WITH COMPLICATION (H): ICD-10-CM

## 2024-05-09 DIAGNOSIS — S83.412A SPRAIN OF MEDIAL COLLATERAL LIGAMENT OF LEFT KNEE, INITIAL ENCOUNTER: ICD-10-CM

## 2024-05-09 DIAGNOSIS — S99.922A FOOT INJURY, LEFT, INITIAL ENCOUNTER: Primary | ICD-10-CM

## 2024-05-09 DIAGNOSIS — M25.562 LEFT KNEE PAIN: ICD-10-CM

## 2024-05-09 DIAGNOSIS — S99.922A FOOT INJURY, LEFT, INITIAL ENCOUNTER: ICD-10-CM

## 2024-05-09 PROCEDURE — 99213 OFFICE O/P EST LOW 20 MIN: CPT | Performed by: FAMILY MEDICINE

## 2024-05-09 PROCEDURE — 73562 X-RAY EXAM OF KNEE 3: CPT | Mod: LT | Performed by: RADIOLOGY

## 2024-05-09 PROCEDURE — 73630 X-RAY EXAM OF FOOT: CPT | Mod: LT | Performed by: RADIOLOGY

## 2024-05-09 NOTE — LETTER
5/9/2024      RE: Radha Baca  [address] [facility] [id number]     Dear Colleague,    Thank you for referring your patient, Radha Baca, to the [facility] [place]. Please see a copy of my visit note below.    Patient presents with acute left-sided foot discomfort and left-sided knee pain after recent fall.  She also notes some mild discomfort on the lateral side of her left hand 5th finger which was a place that she indicates had a previous fracture years ago.    Patient has a history of type 2 diabetes mellitus on insulin, and of hypertension.       PMH:  Past Medical History:   Diagnosis Date    Abnormal Pap smear     Anxiety     Arthritis of knee 04/04/2013    Arthritis of shoulder region, right 04/18/2014    Back injury     Breast disorder     Chronic constipation     Chronic diarrhea     Depressive disorder     Diabetes (H)     Fecal incontinence     Finger pain 04/02/2015    Fracture broke L 5th pinky finger due to fall  1/2015    Glaucoma (increased eye pressure)     Head injury 08/06/2016    Headache(784.0)     decreased with mouth guard use.    History of blood transfusion 8/2011 & 4/2013    Hypercholesteremia     Hypertension     Low back pain     Menarche age 9+    cycles q mo x 4-5 d    Neck injuries     Nonsenile cataract IO implants: L-8/2013; R-1/2011    Pain in knee joint     LEFT    Right bundle branch block     per H/P    Sleep apnea     Info on file    SNHL (sensorineural hearing loss)     Tinnitus     I have reported ringing in ears. not sure of dates    Umbilical hernia without mention of obstruction or gangrene 08/2014    Vision disorder Detached Retina 10/2009       Active problem list:  Patient Active Problem List   Diagnosis    Dyslipidemia    BMI >40    SNHL (sensorineural hearing loss)    Glaucoma    Umbilical hernia -- w/o symptoms->expectant mgt    Encounter for routine gynecological examination    Menopause present --  age 39    Health care maintenance    Type 2 diabetes mellitus with complication (H)    Essential hypertension    Venous (peripheral) insufficiency    Chronic bilateral low back pain with right-sided sciatica    Other secondary osteoarthritis of first carpometacarpal joint of right hand    Insulin long-term use (H)    Class 3 severe obesity due to excess calories with serious comorbidity and body mass index (BMI) of 40.0 to 44.9 in adult (H)    Right-sided thoracic back pain    GHISLAINE (obstructive sleep apnea)    Insomnia, unspecified type    Nightmares    Generalized anxiety disorder    Low bone density       FH:  Family History   Problem Relation Age of Onset    Diabetes Father 55        DM II    Diabetes Brother 50        xs 2    Hypertension Sister 41        also B and M    Cancer Maternal Aunt         multiple myeloma    Hypertension Mother 79    Osteoporosis Mother     Memory loss Mother     Glaucoma Mother     Diabetes Brother     Hypertension Brother     Heart Murmur Brother     Glaucoma Brother     Hypertension Brother     Breast Cancer Cousin     Thyroid Disease Sister         Graves    Diabetes Sister         Graves    Cerebrovascular Disease Maternal Grandmother     Other - See Comments Sister         16 minths head injury    Other - See Comments Brother         MVA age 36    Cancer - colorectal No family hx of     Prostate Cancer No family hx of     Alcohol/Drug No family hx of     Melanoma No family hx of     Skin Cancer No family hx of        SH:  Social History     Socioeconomic History    Marital status:      Spouse name: Not on file    Number of children: Not on file    Years of education: Not on file    Highest education level: Not on file   Occupational History    Occupation: Human Resources     Comment: between jobs now   Tobacco Use    Smoking status: Former     Current packs/day: 0.00     Types: Cigarettes    Smokeless tobacco: Never    Tobacco comments:     quit mid 1980s   Vaping Use     Vaping status: Never Used   Substance and Sexual Activity    Alcohol use: No    Drug use: No    Sexual activity: Not Currently     Partners: Male     Birth control/protection: Abstinence, Post-menopausal   Other Topics Concern     Service Not Asked    Blood Transfusions Yes     Comment: 2 units 2011    Caffeine Concern No     Comment: 2s    Occupational Exposure No    Hobby Hazards No    Sleep Concern No    Stress Concern No     Comment: rehab for R knee after replacement    Weight Concern Yes     Comment: working on wt loss    Special Diet Yes     Comment: Diabetic    Back Care No    Exercise No     Comment: water aerobics 35-40' 3-4 d & strength 3d/wk    Bike Helmet No    Seat Belt No    Self-Exams Not Asked    Parent/sibling w/ CABG, MI or angioplasty before 65F 55M? Not Asked   Social History Narrative    How much exercise per week? 3-4x swimming, aerobics    How much calcium per day? Supplement       How much caffeine per day? 2 cups coffee/ 1-2 can of diet soda    How much vitamin D per day? Supplement    Do you/your family wear seatbelts?  Yes    Do you/your family use safety helmets? No    Do you/your family use sunscreen? No    Do you/your family keep firearms in the home? No    Do you/your family have a smoke detector(s)? Yes    Do you feel safe in your home? Yes    Has anyone ever touched you in an unwanted manner? No     Explain     See LEA Berman 11/26/2014    Reviewed Raul DEGROOT MA 11/22/2017     Social Determinants of Health     Financial Resource Strain: Low Risk  (1/16/2024)    Financial Resource Strain     Within the past 12 months, have you or your family members you live with been unable to get utilities (heat, electricity) when it was really needed?: No   Food Insecurity: Low Risk  (1/16/2024)    Food Insecurity     Within the past 12 months, did you worry that your food would run out before you got money to buy more?: No     Within the past 12 months, did the food you bought just not last  and you didn t have money to get more?: Patient declined   Transportation Needs: Low Risk  (1/16/2024)    Transportation Needs     Within the past 12 months, has lack of transportation kept you from medical appointments, getting your medicines, non-medical meetings or appointments, work, or from getting things that you need?: No   Physical Activity: Not on file   Stress: Not on file   Social Connections: Not on file   Interpersonal Safety: Low Risk  (1/17/2024)    Interpersonal Safety     Do you feel physically and emotionally safe where you currently live?: Yes     Within the past 12 months, have you been hit, slapped, kicked or otherwise physically hurt by someone?: No     Within the past 12 months, have you been humiliated or emotionally abused in other ways by your partner or ex-partner?: No   Housing Stability: Low Risk  (1/16/2024)    Housing Stability     Do you have housing? : Yes     Are you worried about losing your housing?: No       MEDS:  See EMR, reviewed  ALL:  See EMR, reviewed    REVIEW OF SYSTEMS:  CONSTITUTIONAL:NEGATIVE for fever, chills, change in weight  INTEGUMENTARY/SKIN: NEGATIVE for worrisome rashes, moles or lesions  EYES: NEGATIVE for vision changes or irritation  ENT/MOUTH: NEGATIVE for ear, mouth and throat problems  RESP:NEGATIVE for significant cough or SOB  BREAST: NEGATIVE for masses, tenderness or discharge  CV: NEGATIVE for chest pain, palpitations or peripheral edema  GI: NEGATIVE for nausea, abdominal pain, heartburn, or change in bowel habits  :NEGATIVE for frequency, dysuria, or hematuria  :NEGATIVE for frequency, dysuria, or hematuria  NEURO: NEGATIVE for weakness, dizziness or paresthesias  ENDOCRINE: NEGATIVE for temperature intolerance, skin/hair changes  HEME/ALLERGY/IMMUNE: NEGATIVE for bleeding problems  PSYCHIATRIC: NEGATIVE for changes in mood or affect        Objective: The left hand reveals no swelling or discoloration.  Skin is intact..  She is non tender along  the course of the fifth metacarpal, proximal fifth phalanx, middle or distal fifth phalanx.  Grasp strength of the fifth digit is strong.  She can make a full fist.  The left knee reveals no effusion.  Skin is intact.  She can do an active straight leg raise in the supine position on the table with no extensor lag.  I can flex the knee from full extension to 90 degrees of flexion.  She is mildly tender along the MCL with stressing of the MCL there is no laxity noted.  Nontender at the medial joint line.  Nontender along the LCL.  LCL stresses are negative.  Anterior and posterior drawer is negative.  Nontender over the bony proximal fibula.  The left ankle reveals swelling and bruising.  She is tender at the anterior talofibular, calcaneofibular and posterior talofibular ligament.  Nontender at the Achilles tendon.  Nontender at the deltoid ligament.  Nontender at the navicular or the area of Lisfranc.  Nontender along the proximal fifth metatarsal.  She can bear weight around the clinic without limping.  She will pivot on her left lower extremity to get on and off the exam table without obvious discomfort.  Appropriate in conversation and affect.      I personally reviewed with the patient an x-ray of the left knee that shows an intact total knee replacement without signs of hardware loosening and no signs of periprosthetic fracture.  I personally viewed the patient an x-ray of the left foot that shows no fracture, and lateral views of the left ankle that show no fracture.      Assessment: Acute left ankle sprain.  Left knee MCL sprain.  Soft tissue injury hand    Plan: Patient would like to follow-up in 3 weeks to make sure that her knee is improving.  She would like to discuss shoulder discomfort involving left shoulder at that visit.  She was offered a lace up ankle brace for the ankle, declined.  She was given a compression wrap to use for the ankle.  She declined the need for a knee brace.  Tylenol as needed  for pain.  Ice as needed for pain.  She was taught range of motion exercises and balance exercises for the ankle.  Follow-up in 3 weeks.          Again, thank you for allowing me to participate in the care of your patient.      Sincerely,    Chandan Molina MD

## 2024-05-09 NOTE — PROGRESS NOTES
Patient presents with acute left-sided foot discomfort and left-sided knee pain after recent fall.  She also notes some mild discomfort on the lateral side of her left hand 5th finger which was a place that she indicates had a previous fracture years ago.    Patient has a history of type 2 diabetes mellitus on insulin, and of hypertension.       PMH:  Past Medical History:   Diagnosis Date    Abnormal Pap smear     Anxiety     Arthritis of knee 04/04/2013    Arthritis of shoulder region, right 04/18/2014    Back injury     Breast disorder     Chronic constipation     Chronic diarrhea     Depressive disorder     Diabetes (H)     Fecal incontinence     Finger pain 04/02/2015    Fracture broke L 5th pinky finger due to fall  1/2015    Glaucoma (increased eye pressure)     Head injury 08/06/2016    Headache(784.0)     decreased with mouth guard use.    History of blood transfusion 8/2011 & 4/2013    Hypercholesteremia     Hypertension     Low back pain     Menarche age 9+    cycles q mo x 4-5 d    Neck injuries     Nonsenile cataract IO implants: L-8/2013; R-1/2011    Pain in knee joint     LEFT    Right bundle branch block     per H/P    Sleep apnea     Info on file    SNHL (sensorineural hearing loss)     Tinnitus     I have reported ringing in ears. not sure of dates    Umbilical hernia without mention of obstruction or gangrene 08/2014    Vision disorder Detached Retina 10/2009       Active problem list:  Patient Active Problem List   Diagnosis    Dyslipidemia    BMI >40    SNHL (sensorineural hearing loss)    Glaucoma    Umbilical hernia -- w/o symptoms->expectant mgt    Encounter for routine gynecological examination    Menopause present -- age 39    Health care maintenance    Type 2 diabetes mellitus with complication (H)    Essential hypertension    Venous (peripheral) insufficiency    Chronic bilateral low back pain with right-sided sciatica    Other secondary osteoarthritis of first carpometacarpal joint of  right hand    Insulin long-term use (H)    Class 3 severe obesity due to excess calories with serious comorbidity and body mass index (BMI) of 40.0 to 44.9 in adult (H)    Right-sided thoracic back pain    GHISLAINE (obstructive sleep apnea)    Insomnia, unspecified type    Nightmares    Generalized anxiety disorder    Low bone density       FH:  Family History   Problem Relation Age of Onset    Diabetes Father 55        DM II    Diabetes Brother 50        xs 2    Hypertension Sister 41        also B and M    Cancer Maternal Aunt         multiple myeloma    Hypertension Mother 79    Osteoporosis Mother     Memory loss Mother     Glaucoma Mother     Diabetes Brother     Hypertension Brother     Heart Murmur Brother     Glaucoma Brother     Hypertension Brother     Breast Cancer Cousin     Thyroid Disease Sister         Graves    Diabetes Sister         Graves    Cerebrovascular Disease Maternal Grandmother     Other - See Comments Sister         16 minths head injury    Other - See Comments Brother         MVA age 36    Cancer - colorectal No family hx of     Prostate Cancer No family hx of     Alcohol/Drug No family hx of     Melanoma No family hx of     Skin Cancer No family hx of        SH:  Social History     Socioeconomic History    Marital status:      Spouse name: Not on file    Number of children: Not on file    Years of education: Not on file    Highest education level: Not on file   Occupational History    Occupation: Human Resources     Comment: between jobs now   Tobacco Use    Smoking status: Former     Current packs/day: 0.00     Types: Cigarettes    Smokeless tobacco: Never    Tobacco comments:     quit mid 1980s   Vaping Use    Vaping status: Never Used   Substance and Sexual Activity    Alcohol use: No    Drug use: No    Sexual activity: Not Currently     Partners: Male     Birth control/protection: Abstinence, Post-menopausal   Other Topics Concern     Service Not Asked    Blood  Transfusions Yes     Comment: 2 units 2011    Caffeine Concern No     Comment: 2s    Occupational Exposure No    Hobby Hazards No    Sleep Concern No    Stress Concern No     Comment: rehab for R knee after replacement    Weight Concern Yes     Comment: working on wt loss    Special Diet Yes     Comment: Diabetic    Back Care No    Exercise No     Comment: water aerobics 35-40' 3-4 d & strength 3d/wk    Bike Helmet No    Seat Belt No    Self-Exams Not Asked    Parent/sibling w/ CABG, MI or angioplasty before 65F 55M? Not Asked   Social History Narrative    How much exercise per week? 3-4x swimming, aerobics    How much calcium per day? Supplement       How much caffeine per day? 2 cups coffee/ 1-2 can of diet soda    How much vitamin D per day? Supplement    Do you/your family wear seatbelts?  Yes    Do you/your family use safety helmets? No    Do you/your family use sunscreen? No    Do you/your family keep firearms in the home? No    Do you/your family have a smoke detector(s)? Yes    Do you feel safe in your home? Yes    Has anyone ever touched you in an unwanted manner? No     Explain     See LEA Berman 11/26/2014    Reviewed Raul DEGROOT MA 11/22/2017     Social Determinants of Health     Financial Resource Strain: Low Risk  (1/16/2024)    Financial Resource Strain     Within the past 12 months, have you or your family members you live with been unable to get utilities (heat, electricity) when it was really needed?: No   Food Insecurity: Low Risk  (1/16/2024)    Food Insecurity     Within the past 12 months, did you worry that your food would run out before you got money to buy more?: No     Within the past 12 months, did the food you bought just not last and you didn t have money to get more?: Patient declined   Transportation Needs: Low Risk  (1/16/2024)    Transportation Needs     Within the past 12 months, has lack of transportation kept you from medical appointments, getting your medicines, non-medical meetings  or appointments, work, or from getting things that you need?: No   Physical Activity: Not on file   Stress: Not on file   Social Connections: Not on file   Interpersonal Safety: Low Risk  (1/17/2024)    Interpersonal Safety     Do you feel physically and emotionally safe where you currently live?: Yes     Within the past 12 months, have you been hit, slapped, kicked or otherwise physically hurt by someone?: No     Within the past 12 months, have you been humiliated or emotionally abused in other ways by your partner or ex-partner?: No   Housing Stability: Low Risk  (1/16/2024)    Housing Stability     Do you have housing? : Yes     Are you worried about losing your housing?: No       MEDS:  See EMR, reviewed  ALL:  See EMR, reviewed    REVIEW OF SYSTEMS:  CONSTITUTIONAL:NEGATIVE for fever, chills, change in weight  INTEGUMENTARY/SKIN: NEGATIVE for worrisome rashes, moles or lesions  EYES: NEGATIVE for vision changes or irritation  ENT/MOUTH: NEGATIVE for ear, mouth and throat problems  RESP:NEGATIVE for significant cough or SOB  BREAST: NEGATIVE for masses, tenderness or discharge  CV: NEGATIVE for chest pain, palpitations or peripheral edema  GI: NEGATIVE for nausea, abdominal pain, heartburn, or change in bowel habits  :NEGATIVE for frequency, dysuria, or hematuria  :NEGATIVE for frequency, dysuria, or hematuria  NEURO: NEGATIVE for weakness, dizziness or paresthesias  ENDOCRINE: NEGATIVE for temperature intolerance, skin/hair changes  HEME/ALLERGY/IMMUNE: NEGATIVE for bleeding problems  PSYCHIATRIC: NEGATIVE for changes in mood or affect        Objective: The left hand reveals no swelling or discoloration.  Skin is intact..  She is non tender along the course of the fifth metacarpal, proximal fifth phalanx, middle or distal fifth phalanx.  Grasp strength of the fifth digit is strong.  She can make a full fist.  The left knee reveals no effusion.  Skin is intact.  She can do an active straight leg raise in the  supine position on the table with no extensor lag.  I can flex the knee from full extension to 90 degrees of flexion.  She is mildly tender along the MCL with stressing of the MCL there is no laxity noted.  Nontender at the medial joint line.  Nontender along the LCL.  LCL stresses are negative.  Anterior and posterior drawer is negative.  Nontender over the bony proximal fibula.  The left ankle reveals swelling and bruising.  She is tender at the anterior talofibular, calcaneofibular and posterior talofibular ligament.  Nontender at the Achilles tendon.  Nontender at the deltoid ligament.  Nontender at the navicular or the area of Lisfranc.  Nontender along the proximal fifth metatarsal.  She can bear weight around the clinic without limping.  She will pivot on her left lower extremity to get on and off the exam table without obvious discomfort.  Appropriate in conversation and affect.      I personally reviewed with the patient an x-ray of the left knee that shows an intact total knee replacement without signs of hardware loosening and no signs of periprosthetic fracture.  I personally viewed the patient an x-ray of the left foot that shows no fracture, and lateral views of the left ankle that show no fracture.      Assessment: Acute left ankle sprain.  Left knee MCL sprain.  Soft tissue injury hand    Plan: Patient would like to follow-up in 3 weeks to make sure that her knee is improving.  She would like to discuss shoulder discomfort involving left shoulder at that visit.  She was offered a lace up ankle brace for the ankle, declined.  She was given a compression wrap to use for the ankle.  She declined the need for a knee brace.  Tylenol as needed for pain.  Ice as needed for pain.  She was taught range of motion exercises and balance exercises for the ankle.  Follow-up in 3 weeks.

## 2024-05-20 ENCOUNTER — MYC MEDICAL ADVICE (OUTPATIENT)
Dept: FAMILY MEDICINE | Facility: CLINIC | Age: 69
End: 2024-05-20
Payer: MEDICARE

## 2024-05-20 DIAGNOSIS — Z86.0100 HISTORY OF COLONIC POLYPS: Primary | ICD-10-CM

## 2024-05-22 ENCOUNTER — OFFICE VISIT (OUTPATIENT)
Dept: ORTHOPEDICS | Facility: CLINIC | Age: 69
End: 2024-05-22
Payer: MEDICARE

## 2024-05-22 ENCOUNTER — ANCILLARY PROCEDURE (OUTPATIENT)
Dept: GENERAL RADIOLOGY | Facility: CLINIC | Age: 69
End: 2024-05-22
Attending: FAMILY MEDICINE
Payer: MEDICARE

## 2024-05-22 DIAGNOSIS — M25.512 LEFT SHOULDER PAIN: Primary | ICD-10-CM

## 2024-05-22 DIAGNOSIS — S83.412D SPRAIN OF MEDIAL COLLATERAL LIGAMENT OF LEFT KNEE, SUBSEQUENT ENCOUNTER: Primary | ICD-10-CM

## 2024-05-22 DIAGNOSIS — Z96.652 H/O TOTAL KNEE REPLACEMENT, LEFT: ICD-10-CM

## 2024-05-22 DIAGNOSIS — M25.512 LEFT SHOULDER PAIN: ICD-10-CM

## 2024-05-22 DIAGNOSIS — M75.102 ROTATOR CUFF SYNDROME OF LEFT SHOULDER: ICD-10-CM

## 2024-05-22 PROCEDURE — 73030 X-RAY EXAM OF SHOULDER: CPT | Mod: LT | Performed by: RADIOLOGY

## 2024-05-22 PROCEDURE — 99213 OFFICE O/P EST LOW 20 MIN: CPT | Performed by: FAMILY MEDICINE

## 2024-05-22 NOTE — LETTER
5/22/2024      RE: Radha Baca  [birth year][address] [facility] [id number]     Dear Colleague,    Thank you for referring your patient, Radha Baca, to the [facility] [place]. Please see a copy of my visit note below.    Left knee pain, left shoulder impingement pain    F/up  left-sided knee pain after recent fall.  Clinic exam 5/9/2024, 2 weeks ago, suggested left-sided MCL sprain.  X-ray showed stable postoperative changes of total knee arthroplasty with no wili- prosthetic fracture, no evidence of hardware loosening.      Today she indicates that the knee is improved since the last visit.  She will still notice some stiffness with flexion of the knee and some discomfort both about the medial and lateral aspect, but it is now easier to bear weight with less pain.  She feels that the knee is improving.    She notes times with outstretched arm or overhead reaching with the left shoulder that she can notice a clicking sensation.  No recent injury.  She feels that the shoulder has improved by the time of today's visit.        Patient has a history of type 2 diabetes mellitus on insulin, and of hypertension.     On 5/9/2024 I personally reviewed with the patient an x-ray of the left knee that shows an intact total knee replacement without signs of hardware loosening and no signs of periprosthetic fracture.  I personally reviewed viewed the patient an x-ray of the left foot that shows no fracture, and lateral views of the left ankle that show no fracture.        PMH:  Past Medical History:   Diagnosis Date     Abnormal Pap smear      Anxiety      Arthritis of knee 04/04/2013     Arthritis of shoulder region, right 04/18/2014     Back injury      Breast disorder      Chronic constipation      Chronic diarrhea      Depressive disorder      Diabetes (H)      Fecal incontinence      Finger pain 04/02/2015     Fracture broke L 5th pinky finger due to fall  1/2015      Glaucoma (increased eye pressure)      Head injury 08/06/2016     Headache(784.0)     decreased with mouth guard use.     History of blood transfusion 8/2011 & 4/2013     Hypercholesteremia      Hypertension      Low back pain      Menarche age 9+    cycles q mo x 4-5 d     Neck injuries      Nonsenile cataract IO implants: L-8/2013; R-1/2011     Pain in knee joint     LEFT     Right bundle branch block     per H/P     Sleep apnea     Info on file     SNHL (sensorineural hearing loss)      Tinnitus     I have reported ringing in ears. not sure of dates     Umbilical hernia without mention of obstruction or gangrene 08/2014     Vision disorder Detached Retina 10/2009       Active problem list:  Patient Active Problem List   Diagnosis     Dyslipidemia     BMI >40     SNHL (sensorineural hearing loss)     Glaucoma     Umbilical hernia -- w/o symptoms->expectant mgt     Encounter for routine gynecological examination     Menopause present -- age 39     Health care maintenance     Type 2 diabetes mellitus with complication (H)     Essential hypertension     Venous (peripheral) insufficiency     Chronic bilateral low back pain with right-sided sciatica     Other secondary osteoarthritis of first carpometacarpal joint of right hand     Insulin long-term use (H)     Class 3 severe obesity due to excess calories with serious comorbidity and body mass index (BMI) of 40.0 to 44.9 in adult (H)     Right-sided thoracic back pain     GHISLAINE (obstructive sleep apnea)     Insomnia, unspecified type     Nightmares     Generalized anxiety disorder     Low bone density       FH:  Family History   Problem Relation Age of Onset     Diabetes Father 55        DM II     Diabetes Brother 50        xs 2     Hypertension Sister 41        also B and M     Cancer Maternal Aunt         multiple myeloma     Hypertension Mother 79     Osteoporosis Mother      Memory loss Mother      Glaucoma Mother      Diabetes Brother      Hypertension Brother       Heart Murmur Brother      Glaucoma Brother      Hypertension Brother      Breast Cancer Cousin      Thyroid Disease Sister         Graves     Diabetes Sister         Graves     Cerebrovascular Disease Maternal Grandmother      Other - See Comments Sister         16 minths head injury     Other - See Comments Brother         MVA age 36     Cancer - colorectal No family hx of      Prostate Cancer No family hx of      Alcohol/Drug No family hx of      Melanoma No family hx of      Skin Cancer No family hx of        SH:  Social History     Socioeconomic History     Marital status:      Spouse name: Not on file     Number of children: Not on file     Years of education: Not on file     Highest education level: Not on file   Occupational History     Occupation: Human Resources     Comment: between jobs now   Tobacco Use     Smoking status: Former     Current packs/day: 0.00     Types: Cigarettes     Smokeless tobacco: Never     Tobacco comments:     quit mid 1980s   Vaping Use     Vaping status: Never Used   Substance and Sexual Activity     Alcohol use: No     Drug use: No     Sexual activity: Not Currently     Partners: Male     Birth control/protection: Abstinence, Post-menopausal   Other Topics Concern      Service Not Asked     Blood Transfusions Yes     Comment: 2 units 2011     Caffeine Concern No     Comment: 2s     Occupational Exposure No     Hobby Hazards No     Sleep Concern No     Stress Concern No     Comment: rehab for R knee after replacement     Weight Concern Yes     Comment: working on wt loss     Special Diet Yes     Comment: Diabetic     Back Care No     Exercise No     Comment: water aerobics 35-40' 3-4 d & strength 3d/wk     Bike Helmet No     Seat Belt No     Self-Exams Not Asked     Parent/sibling w/ CABG, MI or angioplasty before 65F 55M? Not Asked   Social History Narrative    How much exercise per week? 3-4x swimming, aerobics    How much calcium per day? Supplement       How  much caffeine per day? 2 cups coffee/ 1-2 can of diet soda    How much vitamin D per day? Supplement    Do you/your family wear seatbelts?  Yes    Do you/your family use safety helmets? No    Do you/your family use sunscreen? No    Do you/your family keep firearms in the home? No    Do you/your family have a smoke detector(s)? Yes    Do you feel safe in your home? Yes    Has anyone ever touched you in an unwanted manner? No     Explain     See LEA Berman 11/26/2014    Reviewed Raul DEGROOT MA 11/22/2017     Social Determinants of Health     Financial Resource Strain: Low Risk  (1/16/2024)    Financial Resource Strain      Within the past 12 months, have you or your family members you live with been unable to get utilities (heat, electricity) when it was really needed?: No   Food Insecurity: Low Risk  (1/16/2024)    Food Insecurity      Within the past 12 months, did you worry that your food would run out before you got money to buy more?: No      Within the past 12 months, did the food you bought just not last and you didn t have money to get more?: Patient declined   Transportation Needs: Low Risk  (1/16/2024)    Transportation Needs      Within the past 12 months, has lack of transportation kept you from medical appointments, getting your medicines, non-medical meetings or appointments, work, or from getting things that you need?: No   Physical Activity: Not on file   Stress: Not on file   Social Connections: Not on file   Interpersonal Safety: Low Risk  (1/17/2024)    Interpersonal Safety      Do you feel physically and emotionally safe where you currently live?: Yes      Within the past 12 months, have you been hit, slapped, kicked or otherwise physically hurt by someone?: No      Within the past 12 months, have you been humiliated or emotionally abused in other ways by your partner or ex-partner?: No   Housing Stability: Low Risk  (1/16/2024)    Housing Stability      Do you have housing? : Yes      Are you worried  about losing your housing?: No       MEDS:  See EMR, reviewed  ALL:  See EMR, reviewed    REVIEW OF SYSTEMS:  CONSTITUTIONAL:NEGATIVE for fever, chills, change in weight  INTEGUMENTARY/SKIN: NEGATIVE for worrisome rashes, moles or lesions  EYES: NEGATIVE for vision changes or irritation  ENT/MOUTH: NEGATIVE for ear, mouth and throat problems  RESP:NEGATIVE for significant cough or SOB  BREAST: NEGATIVE for masses, tenderness or discharge  CV: NEGATIVE for chest pain, palpitations or peripheral edema  GI: NEGATIVE for nausea, abdominal pain, heartburn, or change in bowel habits  :NEGATIVE for frequency, dysuria, or hematuria  :NEGATIVE for frequency, dysuria, or hematuria  NEURO: NEGATIVE for weakness, dizziness or paresthesias  ENDOCRINE: NEGATIVE for temperature intolerance, skin/hair changes  HEME/ALLERGY/IMMUNE: NEGATIVE for bleeding problems  PSYCHIATRIC: NEGATIVE for changes in mood or affect            3 views left knee radiographs 5/9/2024 10:50 AM     History: Left knee pain      Comparison: Knee radiographs 1/18/2024     Findings:     Standing AP view of bilateral knees, and lateral and patellofemoral  views of the left knee were obtained.      Left:     Postoperative changes of left total knee arthroplasty without hardware  complication. No significant change in the mineralization/ossification  about the knee joint, likely postoperative. Similar cement and native  bone interface lucency along the anterior tibial plateau.     No acute osseous abnormality.  Small to moderate joint effusion.     No patellar tilt or lateral subluxation. Prominent osteophyte along  the lateral patellar facet.      Vascular calcifications.     Right:     Redemonstration right total knee arthroplasty. Vascular  calcifications.                                                                      Impression: Stable postoperative changes of left total knee  arthroplasty without hardware complication.     I have personally  reviewed the examination and initial interpretation  and I agree with the findings.     WHIT MITCHELL         Objective: The left knee reveals no effusion.  Mildly tender at the lateral joint line.  Nontender at the medial joint line.  MCL and LCL stresses against resistance at 0 degrees and 30 degrees are without laxity.  Anterior and posterior drawer is negative.  I can flex and extend the knee fully.  No swelling the popliteal space or tenderness in the calf.  Full range of motion through forward flexion and abduction at the left shoulder.  5 out of 5 strength bilaterally at deltoid, supraspinatus, infraspinatus and subscapularis.  No breakaway weakness with either shoulder.  Mildly tender over the anterior cuff on the left, nontender over the AC joint or biceps tendon.  Tends towards head forward, shoulder forward posture.  No scapular winging.  No shoulder effusion at the left.  Overlying skin is normal.  Appropriate conversation and affect.    I personally viewed with the patient x-rays of the left shoulder that show mild AC joint DJD and mild glenohumeral DJD.  Overall good maintenance of the glenohumeral space.    Assessment: Left shoulder impingement syndrome suspect rotator cuff syndrome.  Left-sided knee collateral ligament sprain, improving.    Plan: Specific physical therapy for the shoulder offered, declined.  Patient is considering returning to pool therapy which she has done approximately 10 years ago in [place] through [facility].  However the patient tells me that she does not want a pool therapy referral now.  She says she will notify me when she wants to do it and I would be glad to put the order in at that time.  Patient has a set of postoperative exercises she has done in the past for her knee.  She has been working on these exercises at home.  She declines the need for additional physical therapy.  She will follow-up as needed.                Again, thank you for allowing me to  participate in the care of your patient.      Sincerely,    Chandan Molina MD

## 2024-05-22 NOTE — PROGRESS NOTES
Left knee pain, left shoulder impingement pain    F/up  left-sided knee pain after recent fall.  Clinic exam 5/9/2024, 2 weeks ago, suggested left-sided MCL sprain.  X-ray showed stable postoperative changes of total knee arthroplasty with no wili- prosthetic fracture, no evidence of hardware loosening.      Today she indicates that the knee is improved since the last visit.  She will still notice some stiffness with flexion of the knee and some discomfort both about the medial and lateral aspect, but it is now easier to bear weight with less pain.  She feels that the knee is improving.    She notes times with outstretched arm or overhead reaching with the left shoulder that she can notice a clicking sensation.  No recent injury.  She feels that the shoulder has improved by the time of today's visit.        Patient has a history of type 2 diabetes mellitus on insulin, and of hypertension.     On 5/9/2024 I personally reviewed with the patient an x-ray of the left knee that shows an intact total knee replacement without signs of hardware loosening and no signs of periprosthetic fracture.  I personally reviewed viewed the patient an x-ray of the left foot that shows no fracture, and lateral views of the left ankle that show no fracture.        PMH:  Past Medical History:   Diagnosis Date    Abnormal Pap smear     Anxiety     Arthritis of knee 04/04/2013    Arthritis of shoulder region, right 04/18/2014    Back injury     Breast disorder     Chronic constipation     Chronic diarrhea     Depressive disorder     Diabetes (H)     Fecal incontinence     Finger pain 04/02/2015    Fracture broke L 5th pinky finger due to fall  1/2015    Glaucoma (increased eye pressure)     Head injury 08/06/2016    Headache(784.0)     decreased with mouth guard use.    History of blood transfusion 8/2011 & 4/2013    Hypercholesteremia     Hypertension     Low back pain     Menarche age 9+    cycles q mo x 4-5 d    Neck injuries      Nonsenile cataract IO implants: L-8/2013; R-1/2011    Pain in knee joint     LEFT    Right bundle branch block     per H/P    Sleep apnea     Info on file    SNHL (sensorineural hearing loss)     Tinnitus     I have reported ringing in ears. not sure of dates    Umbilical hernia without mention of obstruction or gangrene 08/2014    Vision disorder Detached Retina 10/2009       Active problem list:  Patient Active Problem List   Diagnosis    Dyslipidemia    BMI >40    SNHL (sensorineural hearing loss)    Glaucoma    Umbilical hernia -- w/o symptoms->expectant mgt    Encounter for routine gynecological examination    Menopause present -- age 39    Health care maintenance    Type 2 diabetes mellitus with complication (H)    Essential hypertension    Venous (peripheral) insufficiency    Chronic bilateral low back pain with right-sided sciatica    Other secondary osteoarthritis of first carpometacarpal joint of right hand    Insulin long-term use (H)    Class 3 severe obesity due to excess calories with serious comorbidity and body mass index (BMI) of 40.0 to 44.9 in adult (H)    Right-sided thoracic back pain    GHISLAINE (obstructive sleep apnea)    Insomnia, unspecified type    Nightmares    Generalized anxiety disorder    Low bone density       FH:  Family History   Problem Relation Age of Onset    Diabetes Father 55        DM II    Diabetes Brother 50        xs 2    Hypertension Sister 41        also B and M    Cancer Maternal Aunt         multiple myeloma    Hypertension Mother 79    Osteoporosis Mother     Memory loss Mother     Glaucoma Mother     Diabetes Brother     Hypertension Brother     Heart Murmur Brother     Glaucoma Brother     Hypertension Brother     Breast Cancer Cousin     Thyroid Disease Sister         Graves    Diabetes Sister         Graves    Cerebrovascular Disease Maternal Grandmother     Other - See Comments Sister         16 minths head injury    Other - See Comments Brother         MVA age 36     Cancer - colorectal No family hx of     Prostate Cancer No family hx of     Alcohol/Drug No family hx of     Melanoma No family hx of     Skin Cancer No family hx of        SH:  Social History     Socioeconomic History    Marital status:      Spouse name: Not on file    Number of children: Not on file    Years of education: Not on file    Highest education level: Not on file   Occupational History    Occupation: Human Resources     Comment: between jobs now   Tobacco Use    Smoking status: Former     Current packs/day: 0.00     Types: Cigarettes    Smokeless tobacco: Never    Tobacco comments:     quit mid 1980s   Vaping Use    Vaping status: Never Used   Substance and Sexual Activity    Alcohol use: No    Drug use: No    Sexual activity: Not Currently     Partners: Male     Birth control/protection: Abstinence, Post-menopausal   Other Topics Concern     Service Not Asked    Blood Transfusions Yes     Comment: 2 units 2011    Caffeine Concern No     Comment: 2s    Occupational Exposure No    Hobby Hazards No    Sleep Concern No    Stress Concern No     Comment: rehab for R knee after replacement    Weight Concern Yes     Comment: working on wt loss    Special Diet Yes     Comment: Diabetic    Back Care No    Exercise No     Comment: water aerobics 35-40' 3-4 d & strength 3d/wk    Bike Helmet No    Seat Belt No    Self-Exams Not Asked    Parent/sibling w/ CABG, MI or angioplasty before 65F 55M? Not Asked   Social History Narrative    How much exercise per week? 3-4x swimming, aerobics    How much calcium per day? Supplement       How much caffeine per day? 2 cups coffee/ 1-2 can of diet soda    How much vitamin D per day? Supplement    Do you/your family wear seatbelts?  Yes    Do you/your family use safety helmets? No    Do you/your family use sunscreen? No    Do you/your family keep firearms in the home? No    Do you/your family have a smoke detector(s)? Yes    Do you feel safe in your home? Yes     Has anyone ever touched you in an unwanted manner? No     Explain     See LEA Berman 11/26/2014    Reviewed Raul DEGROOT MA 11/22/2017     Social Determinants of Health     Financial Resource Strain: Low Risk  (1/16/2024)    Financial Resource Strain     Within the past 12 months, have you or your family members you live with been unable to get utilities (heat, electricity) when it was really needed?: No   Food Insecurity: Low Risk  (1/16/2024)    Food Insecurity     Within the past 12 months, did you worry that your food would run out before you got money to buy more?: No     Within the past 12 months, did the food you bought just not last and you didn t have money to get more?: Patient declined   Transportation Needs: Low Risk  (1/16/2024)    Transportation Needs     Within the past 12 months, has lack of transportation kept you from medical appointments, getting your medicines, non-medical meetings or appointments, work, or from getting things that you need?: No   Physical Activity: Not on file   Stress: Not on file   Social Connections: Not on file   Interpersonal Safety: Low Risk  (1/17/2024)    Interpersonal Safety     Do you feel physically and emotionally safe where you currently live?: Yes     Within the past 12 months, have you been hit, slapped, kicked or otherwise physically hurt by someone?: No     Within the past 12 months, have you been humiliated or emotionally abused in other ways by your partner or ex-partner?: No   Housing Stability: Low Risk  (1/16/2024)    Housing Stability     Do you have housing? : Yes     Are you worried about losing your housing?: No       MEDS:  See EMR, reviewed  ALL:  See EMR, reviewed    REVIEW OF SYSTEMS:  CONSTITUTIONAL:NEGATIVE for fever, chills, change in weight  INTEGUMENTARY/SKIN: NEGATIVE for worrisome rashes, moles or lesions  EYES: NEGATIVE for vision changes or irritation  ENT/MOUTH: NEGATIVE for ear, mouth and throat problems  RESP:NEGATIVE for significant cough or  SOB  BREAST: NEGATIVE for masses, tenderness or discharge  CV: NEGATIVE for chest pain, palpitations or peripheral edema  GI: NEGATIVE for nausea, abdominal pain, heartburn, or change in bowel habits  :NEGATIVE for frequency, dysuria, or hematuria  :NEGATIVE for frequency, dysuria, or hematuria  NEURO: NEGATIVE for weakness, dizziness or paresthesias  ENDOCRINE: NEGATIVE for temperature intolerance, skin/hair changes  HEME/ALLERGY/IMMUNE: NEGATIVE for bleeding problems  PSYCHIATRIC: NEGATIVE for changes in mood or affect            3 views left knee radiographs 5/9/2024 10:50 AM     History: Left knee pain      Comparison: Knee radiographs 1/18/2024     Findings:     Standing AP view of bilateral knees, and lateral and patellofemoral  views of the left knee were obtained.      Left:     Postoperative changes of left total knee arthroplasty without hardware  complication. No significant change in the mineralization/ossification  about the knee joint, likely postoperative. Similar cement and native  bone interface lucency along the anterior tibial plateau.     No acute osseous abnormality.  Small to moderate joint effusion.     No patellar tilt or lateral subluxation. Prominent osteophyte along  the lateral patellar facet.      Vascular calcifications.     Right:     Redemonstration right total knee arthroplasty. Vascular  calcifications.                                                                      Impression: Stable postoperative changes of left total knee  arthroplasty without hardware complication.     I have personally reviewed the examination and initial interpretation  and I agree with the findings.     WHIT MITCHELL         Objective: The left knee reveals no effusion.  Mildly tender at the lateral joint line.  Nontender at the medial joint line.  MCL and LCL stresses against resistance at 0 degrees and 30 degrees are without laxity.  Anterior and posterior drawer is negative.  I can flex and  extend the knee fully.  No swelling the popliteal space or tenderness in the calf.  Full range of motion through forward flexion and abduction at the left shoulder.  5 out of 5 strength bilaterally at deltoid, supraspinatus, infraspinatus and subscapularis.  No breakaway weakness with either shoulder.  Mildly tender over the anterior cuff on the left, nontender over the AC joint or biceps tendon.  Tends towards head forward, shoulder forward posture.  No scapular winging.  No shoulder effusion at the left.  Overlying skin is normal.  Appropriate conversation and affect.    I personally viewed with the patient x-rays of the left shoulder that show mild AC joint DJD and mild glenohumeral DJD.  Overall good maintenance of the glenohumeral space.    Assessment: Left shoulder impingement syndrome suspect rotator cuff syndrome.  Left-sided knee collateral ligament sprain, improving.    Plan: Specific physical therapy for the shoulder offered, declined.  Patient is considering returning to pool therapy which she has done approximately 10 years ago in [place] through [facility].  However the patient tells me that she does not want a pool therapy referral now.  She says she will notify me when she wants to do it and I would be glad to put the order in at that time.  Patient has a set of postoperative exercises she has done in the past for her knee.  She has been working on these exercises at home.  She declines the need for additional physical therapy.  She will follow-up as needed.

## 2024-05-28 DIAGNOSIS — E78.5 DYSLIPIDEMIA: ICD-10-CM

## 2024-05-28 DIAGNOSIS — I10 ESSENTIAL HYPERTENSION: ICD-10-CM

## 2024-05-29 RX ORDER — ATORVASTATIN CALCIUM 20 MG/1
20 TABLET, FILM COATED ORAL DAILY
Qty: 90 TABLET | Refills: 3 | Status: SHIPPED | OUTPATIENT
Start: 2024-05-29

## 2024-05-29 RX ORDER — LISINOPRIL AND HYDROCHLOROTHIAZIDE 12.5; 2 MG/1; MG/1
1 TABLET ORAL DAILY
Qty: 90 TABLET | Refills: 3 | Status: SHIPPED | OUTPATIENT
Start: 2024-05-29

## 2024-05-29 NOTE — TELEPHONE ENCOUNTER
LVD:  3/5/2024  [facility] Keturah Jane MD  Endocrinology, Diabetes, and Metabolism     LDL Cholesterol Calculated   Date Value Ref Range Status   03/04/2024 54 <=100 mg/dL Final   04/12/2021 21 <100 mg/dL Final     Comment:     Desirable:       <100 mg/dl     Last Comprehensive Metabolic Panel:  Lab Results   Component Value Date    .7 01/28/2021    POTASSIUM 4.7 03/04/2024    CHLORIDE 98.8 01/28/2021    CO2 25.4 01/28/2021    ANIONGAP 6 03/02/2020     (H) 03/04/2024    BUN 13.6 03/04/2024    CR 0.71 03/04/2024    GFRESTIMATED >90 03/04/2024    COLIN 9.7 01/16/2023        Refilled per protocol.

## 2024-06-08 ENCOUNTER — HEALTH MAINTENANCE LETTER (OUTPATIENT)
Age: 69
End: 2024-06-08

## 2024-06-11 ENCOUNTER — MYC MEDICAL ADVICE (OUTPATIENT)
Dept: FAMILY MEDICINE | Facility: CLINIC | Age: 69
End: 2024-06-11
Payer: MEDICARE

## 2024-06-17 ENCOUNTER — TELEPHONE (OUTPATIENT)
Dept: ENDOCRINOLOGY | Facility: CLINIC | Age: 69
End: 2024-06-17
Payer: MEDICARE

## 2024-06-17 NOTE — TELEPHONE ENCOUNTER
[facility] Call [facility]    Phone Message    May a detailed message be left on voicemail: yes     Reason for Call: Other: Pt is wondering if she is needing to fat fr her labs on 6/24/24 for . Please advise.      Action Taken: Other: Endo     Travel Screening: Not Applicable     Date of Service: 6/17/24

## 2024-06-20 ENCOUNTER — TELEPHONE (OUTPATIENT)
Dept: FAMILY MEDICINE | Facility: CLINIC | Age: 69
End: 2024-06-20
Payer: MEDICARE

## 2024-06-20 NOTE — TELEPHONE ENCOUNTER
CC sent colonoscopy referral (placed 5/20/2024) to [facility] at [phone] per patient request. See [employer] messages from 6/11/2024.    Jamison Miner  Care Coordinator- [facility]  [phone]

## 2024-06-24 ENCOUNTER — LAB (OUTPATIENT)
Dept: LAB | Facility: CLINIC | Age: 69
End: 2024-06-24
Payer: MEDICARE

## 2024-06-24 DIAGNOSIS — E11.65 TYPE 2 DIABETES MELLITUS WITH HYPERGLYCEMIA, WITHOUT LONG-TERM CURRENT USE OF INSULIN (H): ICD-10-CM

## 2024-06-24 LAB
ANION GAP SERPL CALCULATED.3IONS-SCNC: 10 MMOL/L (ref 7–15)
BUN SERPL-MCNC: 19.4 MG/DL (ref 8–23)
CALCIUM SERPL-MCNC: 9.9 MG/DL (ref 8.8–10.2)
CHLORIDE SERPL-SCNC: 100 MMOL/L (ref 98–107)
CREAT SERPL-MCNC: 0.79 MG/DL (ref 0.51–0.95)
DEPRECATED HCO3 PLAS-SCNC: 26 MMOL/L (ref 22–29)
EGFRCR SERPLBLD CKD-EPI 2021: 81 ML/MIN/1.73M2
GLUCOSE SERPL-MCNC: 186 MG/DL (ref 70–99)
HBA1C MFR BLD: 11.2 %
POTASSIUM SERPL-SCNC: 4.7 MMOL/L (ref 3.4–5.3)
SODIUM SERPL-SCNC: 136 MMOL/L (ref 135–145)

## 2024-06-24 PROCEDURE — 36415 COLL VENOUS BLD VENIPUNCTURE: CPT | Performed by: PATHOLOGY

## 2024-06-24 PROCEDURE — 99000 SPECIMEN HANDLING OFFICE-LAB: CPT | Performed by: PATHOLOGY

## 2024-06-24 PROCEDURE — 80048 BASIC METABOLIC PNL TOTAL CA: CPT | Performed by: PATHOLOGY

## 2024-06-24 PROCEDURE — 83036 HEMOGLOBIN GLYCOSYLATED A1C: CPT | Performed by: INTERNAL MEDICINE

## 2024-06-24 NOTE — TELEPHONE ENCOUNTER
Left message for the patient that she does not need to fast per Dr. De Luna.      No, she does not need to fast    Keturah Hunt, [facility]  Adult Endocrinology  [facility] [place]

## 2024-06-30 DIAGNOSIS — E11.9 DIABETES MELLITUS, TYPE 2 (H): ICD-10-CM

## 2024-07-08 RX ORDER — PEN NEEDLE, DIABETIC 31 GX5/16"
NEEDLE, DISPOSABLE MISCELLANEOUS
Qty: 400 EACH | Refills: 0 | Status: SHIPPED | OUTPATIENT
Start: 2024-07-08

## 2024-07-08 NOTE — TELEPHONE ENCOUNTER
insulin pen needle (B-D U/F) 31G X 8 MM miscellaneous 400 each 0 2/15/2024 -- No   Sig: USE 4x daily for insulin administration     ----------------------  Last Office Visit : 3/5/2024  [facility] [place]

## 2024-07-29 ENCOUNTER — VIRTUAL VISIT (OUTPATIENT)
Dept: ENDOCRINOLOGY | Facility: CLINIC | Age: 69
End: 2024-07-29
Payer: MEDICARE

## 2024-07-29 DIAGNOSIS — E11.65 TYPE 2 DIABETES MELLITUS WITH HYPERGLYCEMIA, WITHOUT LONG-TERM CURRENT USE OF INSULIN (H): Primary | ICD-10-CM

## 2024-07-29 PROCEDURE — G2211 COMPLEX E/M VISIT ADD ON: HCPCS | Mod: 95 | Performed by: INTERNAL MEDICINE

## 2024-07-29 PROCEDURE — 99214 OFFICE O/P EST MOD 30 MIN: CPT | Mod: 95 | Performed by: INTERNAL MEDICINE

## 2024-07-29 ASSESSMENT — PATIENT HEALTH QUESTIONNAIRE - PHQ9: SUM OF ALL RESPONSES TO PHQ QUESTIONS 1-9: 15

## 2024-07-29 NOTE — NURSING NOTE
Current patient location: [address] [facility] [address]    Is the patient currently in the state of [place]? YES    Visit mode:VIDEO    If the visit is dropped, the patient can be reconnected by: VIDEO VISIT: Send to e-mail at: [email or web address]    Will anyone else be joining the visit? NO  (If patient encounters technical issues they should call [phone]    How would you like to obtain your AVS? MyChart    Are changes needed to the allergy or medication list? Pt stated no med changes    Are refills needed on medications prescribed by this physician? NO    Reason for visit: RECHECK (Return diabetes )    Delfina Yrn VVF    Pt has chronic pain in back from neck all the way down to legs- pt states it seems to get worse when it is hot and humid.    Depression Response    Patient completed the PHQ-9 assessment for depression and scored >9? Yes  Question 9 on the PHQ-9 was positive for suicidality? No  Does patient have current mental health provider? Yes    Is this a virtual visit? Yes   Does patient have suicidal ideation (positive question 9)? No - offer to place Mental Health Referral.  Patient declined referral/not needed    I personally notified the following: visit provider       Pts mother passed away and pt was her caregiver.  Pt is facing problems with her father and his caregiver which is another sister. Pt is oldest of 10.  Father and sister are giving pt problems and it is very stressful. She has an  to help.  He calls the police on her and she is very stressed right now. That has been really impacting her health and her management of her diabetes and her chronic back pain.

## 2024-07-29 NOTE — LETTER
7/29/2024      Radha Baca  [address] [address]      Dear Colleague,    Thank you for referring your patient, Radha Baca, to the [facility]. Please see a copy of my visit note below.    Outcome for 07/29/24 1:20 PM:  Patient comes into clinic for upload and request no calls for data. Data was not uploaded prior to appointment.   Wendy Berg LPN       Virtual Visit Details    Type of service:  Video Visit   Video Start Time: 2:33 PM  Video End Time:2:51 PM    Originating Location (pt. Location): Home    Distant Location (provider location):  Off-site  Platform used for Video Visit: [facility]          Endocrinology and Diabetes Clinic         Follow up for T2DM      Assessment and Plan:  1. Type 2 diabetes mellitus c/b DM retinopathy, with comorbidity of hypertension, obesity, hypothyroidismm, low bone density    Low bone density  Remains in low bone density range, FRAX is NOT increased, decrease at the spine since last measurement, however spine T-score remains in good range; no medical treatment is indicated, general measures for post menopausal women including Calcium intake of 2839-2356 mg daily   Continue supplements with Vitamin D and Calcium    Blood glucose control  Most recent A1c is increased. Pt notes stress eating. Reviewed to use correction and carb coverage consistently  CR 4 IS 30 goal 120 mg/dl during the day, 140 mg/dl HS  Recommend to cont Metformin,   We are not restarting Ozempic    Magnesium  Levels are normal, if desired can use OTC Magnesium supplements, might help with constipation      Plan:   Continue metformin 1000 mg twice daily    Increase Lantus 75 ->85 units daily    Humalog 25 units with dinner    Humalog correction:   Correction Scale - for AM blood glucose and to add to dinner dosage  For  - 159 give 3 units.   For  - 169 give 4 units.   For  - 179 give 5 units.   For  - 189 give 6 units.    For  - 199 give 7 units.   For  - 209 give 8 units.   For  - 219 give 9 units.   For  - 229 give 10 units.   For  - 239 give 11 units.   For  - 249 give 12 units.   For  - 259 give 13 units.   For  - 279 give 14 units.   For  - 289 give 15 units.   For  - 299 give 16 units.   For BG above 300 give 17 units.    Bed time Humalog correction  For -  229 give 2 units.   For  - 239 give 3 units.   For  - 249 give 4 units.   For  - 259 give 5 units.   For  - 269 give 6 units.   For  - 279 give 7 units.   For  - 289 give 8 units.   For  - 299 give 9 units.   For BG above 300 mg/dl give 10 units      - Metformin 1000mg po BID    - Glucose check fasting, before and 2 hours post prandial and bedtime in a rotating fashion  - rec to consider glucose sensor, pt would like to check out coverage for Shalom and Dexcom        2. Diabetic complications:  - Retinopathy: has diabetic retinopathy  - Nephropathy: nl Creatinine, nl microalbumin, follow  - Lipids: excellent LDL on Atorvastatin 20 mg  - Blood pressure controlled per chart review on lisinopril hydrochlorothiazide  - Aspirin - 81mg daily     The Longitudinal plan of care for diabetes condition was addressed during this visit. Due to added complexity of care, we will continue to support Radha , and the subsequent management of this condition(s) and with the ongoing continuity of care of this condition.      Keturah De Luna MD  Endocrinology and Diabetes  Telephone contact:  [facility] [phone]  [facility] [phone]          Interval History:  - 3 m follow up for T2DM  - has fallen several times, has problems picking up her feet davidson left leg, no dizziness, struggling with back pain    - had a lot happen in 2023, her mother passed away, Dionicio was her primary care taker, now continues to struggle with father  who is calling police on her, has been struggling with insomnia  - had been admitted with dehydration which was thought to be related to Ozempic, which was stopped     1. Type 2 DM:  Diabetes: Has type 2 DM, diagnosed before 2005  Check Bgs reports 200-300s up to -400s at bedtime    Current Treatment:   - Lantus 75 units subcutaneous once daily Humalog 20-25 units with dinner, Metformin 1000mg po BID,     Twisted ankle broke foot in 8/23    Hypoglycemia  None recently    Diet:   Brunch fruit and vegetables, occ lunch: sandwich, dinner- largest meal, can feel hungry after dinner and has cravings for savory snacks e.g. popcorn      2. Diabetic Complications:  - Retinopathy: exam 5/7/24 macular edema, mild NPDR left eye>OR, Retina consultanat  - Nephropathy:  Nl urine microalb, nl creatinine  - feet podiatry does toenails q 3m    3. Cardiovascular risk factors:  - Lipids: Atorvastatin 20mg,   - HTN:  on lisinopril/HCTZ well-controlled 20-12.5  - Aspirin - 81mg daily    4. Hypothyroidism:  - TSH - nl 11/21/22    5. Low bone density  DXA from 12/19/2022 shows low bone denisty, lwoest T-score -2.2 at the right femur neck  FRAX is 11% for MOF and 1.9% for hip fractures  Bone density decrease at the spine since last measurements in 12/2020, spine now measures T-score 0 (L2-3)    HPI:  Type 2 diabetes diagnosed prior 2005. Has PMH for HTN, arthritis, chronic constipation irregular    ==========================================================================================  Past Medical/Surgical History:   Reviewed in chart  Past Medical History:   Diagnosis Date     Abnormal Pap smear      Anxiety      Arthritis of knee 04/04/2013     Arthritis of shoulder region, right 04/18/2014     Back injury      Breast disorder      Chronic constipation      Chronic diarrhea      Depressive disorder      Diabetes (H)      Fecal incontinence      Finger pain 04/02/2015     Fracture broke L 5th pinky finger due to fall  1/2015      Glaucoma (increased eye pressure)      Head injury 08/06/2016     Headache(784.0)     decreased with mouth guard use.     History of blood transfusion 8/2011 & 4/2013     Hypercholesteremia      Hypertension      Low back pain      Menarche age 9+    cycles q mo x 4-5 d     Neck injuries      Nonsenile cataract IO implants: L-8/2013; R-1/2011     Pain in knee joint     LEFT     Right bundle branch block     per H/P     Sleep apnea     Info on file     SNHL (sensorineural hearing loss)      Tinnitus     I have reported ringing in ears. not sure of dates     Umbilical hernia without mention of obstruction or gangrene 08/2014     Vision disorder Detached Retina 10/2009     Past Surgical History:   Procedure Laterality Date     ARTHROPLASTY KNEE  4/4/2013    Left Total Knee Arthroplasty;  Surgeon: Lou Byrne MD;  [facility]     ARTHROPLASTY MINIMALLY INVASIVE KNEE  8/22/2011    R knee :ODALIS CAITLYN SILVERMAN, Left age 14     COLONOSCOPY  1/14/2015     DENTAL SURGERY      root canals, wisdom teeth     EXAM UNDER ANESTHESIA, MANIPULATE JOINT (LOCATION)  10/5/2012    Procedure: EXAM UNDER ANESTHESIA, MANIPULATE JOINT (LOCATION);  Right Knee Mini Open Lysis Of Adhesions, Left Knee Steroid Injection  ;  Surgeon: Lou Byrne MD;  [facility]      OR OCULAR DEVICE INTRAOP DETACHED RETINA  2009    R     HC PHAKIC IOL - IMPLANT FROM SURGEON      right     KNEE SURGERY  1969    left     LASER YAG CAPSULOTOMY  12/2016    left     VITRECTOMY PARSPLANA  2010       Allergies:  Reviewed in chart    Current Medications:   Reviewed in chart    Family History:  Reviewed in chart    Social History:  Reviewed in chart    Physical Examination:  Vital signs:  Wt Readings from Last 4 Encounters:   01/17/24 113.2 kg (249 lb 9.6 oz)   09/27/23 111.1 kg (245 lb)   08/20/23 111.1 kg (245 lb)   07/25/23 111.1 kg (245 lb)   BMI 43     Reported vitals:  There were no vitals taken for this visit.   healthy, alert and no  distress  PSYCH: Alert and oriented times 3; coherent speech, normal   rate and volume, able to articulate logical thoughts, able   to abstract reason, no tangential thoughts, no hallucinations   or delusions  Her affect is normal and pleasant  RESP: No cough, no audible wheezing, able to talk in full sentences  Remainder of exam unable to be completed due to telephone visits       Endocrine Labs:  Lab Results   Component Value Date     06/24/2024    CHLORIDE 100 06/24/2024    CO2 26 06/24/2024     (H) 06/24/2024    CR 0.79 06/24/2024    CR 0.71 03/04/2024    CR 0.80 11/21/2022    CR 0.71 04/04/2022    CR 0.71 07/12/2021    COLIN 9.9 06/24/2024    MAG 1.7 01/16/2023    ALBUMIN 4.1 01/16/2023    ALKPHOS 75.0 01/28/2021    LDL 54 03/04/2024    HDL 40 (L) 03/04/2024    TRIG 118 03/04/2024    TSH 3.61 03/04/2024    TSH 3.56 11/21/2022    TSH 2.46 04/04/2022     Lab Results   Component Value Date    MICROL <12.0 03/04/2024    MICROL 13.6 11/21/2022    MICROL 7 04/12/2021    MICROL 9 01/18/2021    MICROL 10 09/28/2020     Lab Results   Component Value Date    A1C 11.2 (H) 06/24/2024    A1C 9.4 (H) 01/16/2023    A1C 8.6 (H) 04/04/2022    A1C 8.6 (H) 11/01/2021    A1C 8.8 (H) 07/12/2021       Lab Results   Component Value Date    HGB 11.8 09/28/2020                         Again, thank you for allowing me to participate in the care of your patient.        Sincerely,        Keturah De Luna MD

## 2024-07-29 NOTE — PROGRESS NOTES
Outcome for 07/29/24 1:20 PM:  Patient comes into clinic for upload and request no calls for data. Data was not uploaded prior to appointment.   Wendy Berg LPN       Virtual Visit Details    Type of service:  Video Visit   Video Start Time: 2:33 PM  Video End Time:2:51 PM    Originating Location (pt. Location): Home    Distant Location (provider location):  Off-site  Platform used for Video Visit: [facility]          Endocrinology and Diabetes Clinic         Follow up for T2DM      Assessment and Plan:  1. Type 2 diabetes mellitus c/b DM retinopathy, with comorbidity of hypertension, obesity, hypothyroidismm, low bone density    Low bone density  Remains in low bone density range, FRAX is NOT increased, decrease at the spine since last measurement, however spine T-score remains in good range; no medical treatment is indicated, general measures for post menopausal women including Calcium intake of 4412-7365 mg daily   Continue supplements with Vitamin D and Calcium    Blood glucose control  Most recent A1c is increased. Pt notes stress eating. Reviewed to use correction and carb coverage consistently  CR 4 IS 30 goal 120 mg/dl during the day, 140 mg/dl HS  Recommend to cont Metformin,   We are not restarting Ozempic    Magnesium  Levels are normal, if desired can use OTC Magnesium supplements, might help with constipation      Plan:   Continue metformin 1000 mg twice daily    Increase Lantus 75 ->85 units daily    Humalog 25 units with dinner    Humalog correction:   Correction Scale - for AM blood glucose and to add to dinner dosage  For  - 159 give 3 units.   For  - 169 give 4 units.   For  - 179 give 5 units.   For  - 189 give 6 units.   For  - 199 give 7 units.   For  - 209 give 8 units.   For  - 219 give 9 units.   For  - 229 give 10 units.   For  - 239 give 11 units.   For  - 249 give 12 units.   For  - 259 give 13 units.   For  - 279 give 14  units.   For  - 289 give 15 units.   For  - 299 give 16 units.   For BG above 300 give 17 units.    Bed time Humalog correction  For -  229 give 2 units.   For  - 239 give 3 units.   For  - 249 give 4 units.   For  - 259 give 5 units.   For  - 269 give 6 units.   For  - 279 give 7 units.   For  - 289 give 8 units.   For  - 299 give 9 units.   For BG above 300 mg/dl give 10 units      - Metformin 1000mg po BID    - Glucose check fasting, before and 2 hours post prandial and bedtime in a rotating fashion  - rec to consider glucose sensor, pt would like to check out coverage for Shalom and Dexcom        2. Diabetic complications:  - Retinopathy: has diabetic retinopathy  - Nephropathy: nl Creatinine, nl microalbumin, follow  - Lipids: excellent LDL on Atorvastatin 20 mg  - Blood pressure controlled per chart review on lisinopril hydrochlorothiazide  - Aspirin - 81mg daily     The Longitudinal plan of care for diabetes condition was addressed during this visit. Due to added complexity of care, we will continue to support Radha , and the subsequent management of this condition(s) and with the ongoing continuity of care of this condition.      Keturah De Luna MD  Endocrinology and Diabetes  Telephone contact:  [facility] [place] [phone]  [facility] [phone]          Interval History:  - 3 m follow up for T2DM  - has fallen several times, has problems picking up her feet davidson left leg, no dizziness, struggling with back pain    - had a lot happen in 2023, her mother passed away, Dionicio was her primary care taker, now continues to struggle with father who is calling police on her, has been struggling with insomnia  - had been admitted with dehydration which was thought to be related to Ozempic, which was stopped     1. Type 2 DM:  Diabetes: Has type 2 DM, diagnosed before 2005  Check Bgs reports 200-300s up to  -400s at bedtime    Current Treatment:   - Lantus 75 units subcutaneous once daily Humalog 20-25 units with dinner, Metformin 1000mg po BID,     Twisted ankle broke foot in 8/23    Hypoglycemia  None recently    Diet:   Brunch fruit and vegetables, occ lunch: sandwich, dinner- largest meal, can feel hungry after dinner and has cravings for savory snacks e.g. popcorn      2. Diabetic Complications:  - Retinopathy: exam 5/7/24 macular edema, mild NPDR left eye>OR, Retina consultanat  - Nephropathy:  Nl urine microalb, nl creatinine  - feet podiatry does toenails q 3m    3. Cardiovascular risk factors:  - Lipids: Atorvastatin 20mg,   - HTN:  on lisinopril/HCTZ well-controlled 20-12.5  - Aspirin - 81mg daily    4. Hypothyroidism:  - TSH - nl 11/21/22    5. Low bone density  DXA from 12/19/2022 shows low bone denisty, lwoest T-score -2.2 at the right femur neck  FRAX is 11% for MOF and 1.9% for hip fractures  Bone density decrease at the spine since last measurements in 12/2020, spine now measures T-score 0 (L2-3)    HPI:  Type 2 diabetes diagnosed prior 2005. Has PMH for HTN, arthritis, chronic constipation irregular    ==========================================================================================  Past Medical/Surgical History:   Reviewed in chart  Past Medical History:   Diagnosis Date    Abnormal Pap smear     Anxiety     Arthritis of knee 04/04/2013    Arthritis of shoulder region, right 04/18/2014    Back injury     Breast disorder     Chronic constipation     Chronic diarrhea     Depressive disorder     Diabetes (H)     Fecal incontinence     Finger pain 04/02/2015    Fracture broke L 5th pinky finger due to fall  1/2015    Glaucoma (increased eye pressure)     Head injury 08/06/2016    Headache(784.0)     decreased with mouth guard use.    History of blood transfusion 8/2011 & 4/2013    Hypercholesteremia     Hypertension     Low back pain     Menarche age 9+    cycles q mo x 4-5 d    Neck injuries      Nonsenile cataract IO implants: L-8/2013; R-1/2011    Pain in knee joint     LEFT    Right bundle branch block     per H/P    Sleep apnea     Info on file    SNHL (sensorineural hearing loss)     Tinnitus     I have reported ringing in ears. not sure of dates    Umbilical hernia without mention of obstruction or gangrene 08/2014    Vision disorder Detached Retina 10/2009     Past Surgical History:   Procedure Laterality Date    ARTHROPLASTY KNEE  4/4/2013    Left Total Knee Arthroplasty;  Surgeon: Lou Byrne MD;  [facility]    ARTHROPLASTY MINIMALLY INVASIVE KNEE  8/22/2011    R knee :CAITLYN JACOBO, Left age 14    COLONOSCOPY  1/14/2015    DENTAL SURGERY      root canals, wisdom teeth    EXAM UNDER ANESTHESIA, MANIPULATE JOINT (LOCATION)  10/5/2012    Procedure: EXAM UNDER ANESTHESIA, MANIPULATE JOINT (LOCATION);  Right Knee Mini Open Lysis Of Adhesions, Left Knee Steroid Injection  ;  Surgeon: Lou Byrne MD;  [facility]     OR OCULAR DEVICE INTRAOP DETACHED RETINA  2009    R    HC PHAKIC IOL - IMPLANT FROM SURGEON      right    KNEE SURGERY  1969    left    LASER YAG CAPSULOTOMY  12/2016    left    VITRECTOMY PARSPLANA  2010       Allergies:  Reviewed in chart    Current Medications:   Reviewed in chart    Family History:  Reviewed in chart    Social History:  Reviewed in chart    Physical Examination:  Vital signs:  Wt Readings from Last 4 Encounters:   01/17/24 113.2 kg (249 lb 9.6 oz)   09/27/23 111.1 kg (245 lb)   08/20/23 111.1 kg (245 lb)   07/25/23 111.1 kg (245 lb)   BMI 43     Reported vitals:  There were no vitals taken for this visit.   healthy, alert and no distress  PSYCH: Alert and oriented times 3; coherent speech, normal   rate and volume, able to articulate logical thoughts, able   to abstract reason, no tangential thoughts, no hallucinations   or delusions  Her affect is normal and pleasant  RESP: No cough, no audible wheezing, able to talk in full  sentences  Remainder of exam unable to be completed due to telephone visits       Endocrine Labs:  Lab Results   Component Value Date     06/24/2024    CHLORIDE 100 06/24/2024    CO2 26 06/24/2024     (H) 06/24/2024    CR 0.79 06/24/2024    CR 0.71 03/04/2024    CR 0.80 11/21/2022    CR 0.71 04/04/2022    CR 0.71 07/12/2021    COLIN 9.9 06/24/2024    MAG 1.7 01/16/2023    ALBUMIN 4.1 01/16/2023    ALKPHOS 75.0 01/28/2021    LDL 54 03/04/2024    HDL 40 (L) 03/04/2024    TRIG 118 03/04/2024    TSH 3.61 03/04/2024    TSH 3.56 11/21/2022    TSH 2.46 04/04/2022     Lab Results   Component Value Date    MICROL <12.0 03/04/2024    MICROL 13.6 11/21/2022    MICROL 7 04/12/2021    MICROL 9 01/18/2021    MICROL 10 09/28/2020     Lab Results   Component Value Date    A1C 11.2 (H) 06/24/2024    A1C 9.4 (H) 01/16/2023    A1C 8.6 (H) 04/04/2022    A1C 8.6 (H) 11/01/2021    A1C 8.8 (H) 07/12/2021       Lab Results   Component Value Date    HGB 11.8 09/28/2020

## 2024-07-29 NOTE — PATIENT INSTRUCTIONS
Continue metformin 1000 mg twice daily    Increase Lantus 75 ->85 units daily    Humalog 25 units with dinner    Humalog correction:   Correction Scale - for AM blood glucose and to add to dinner dosage  For  - 159 give 3 units.   For  - 169 give 4 units.   For  - 179 give 5 units.   For  - 189 give 6 units.   For  - 199 give 7 units.   For  - 209 give 8 units.   For  - 219 give 9 units.   For  - 229 give 10 units.   For  - 239 give 11 units.   For  - 249 give 12 units.   For  - 259 give 13 units.   For  - 279 give 14 units.   For  - 289 give 15 units.   For  - 299 give 16 units.   For BG above 300 give 17 units.    Bed time Humalog correction  For -  229 give 2 units.   For  - 239 give 3 units.   For  - 249 give 4 units.   For  - 259 give 5 units.   For  - 269 give 6 units.   For  - 279 give 7 units.   For  - 289 give 8 units.   For  - 299 give 9 units.   For BG above 300 mg/dl give 10 units    See podiatrist for diabetic foot care    Recheck labs 9/25/24 or later

## 2024-07-30 ENCOUNTER — TELEPHONE (OUTPATIENT)
Dept: ENDOCRINOLOGY | Facility: CLINIC | Age: 69
End: 2024-07-30
Payer: MEDICARE

## 2024-07-30 NOTE — TELEPHONE ENCOUNTER
Left Voicemail (1st Attempt) for the patient to call back and schedule the following:    Appointment type: return  Provider: dr. resendiz  Return date: 10/29/2024  Specialty phone number: [phone]   Additonal Notes: Return in about 3 months (around 10/29/2024).     [facility]   Orthopedics, Podiatry, Sports Medicine, Ent ,Eye , Audiology, Adult Endocrine & Diabetes, Nutrition & Medication Therapy Management Specialties   [facility]

## 2024-08-01 ENCOUNTER — TELEPHONE (OUTPATIENT)
Dept: ENDOCRINOLOGY | Facility: CLINIC | Age: 69
End: 2024-08-01
Payer: MEDICARE

## 2024-08-01 NOTE — TELEPHONE ENCOUNTER
Patient wondering if Shalom 2 is covered. Informed it was received by pharmacy and to contact [facility] to check on out of pocket cost.    Confirmed Android is compatible with sensor.    Patient informed to bring phone and sensor to visit with Peri Salcido.    Iman Hogan RN, [facility]  Diabetes Education Department  [facility], [place]  Phone: [phone]  [phone]

## 2024-08-01 NOTE — TELEPHONE ENCOUNTER
[facility] Call Center    Phone Message    May a detailed message be left on voicemail: yes     Reason for Call: Other: Per pt would like to ask if she can get a call back from the team about the coverages on her CGM and the Diabetes Education clinic? Per pt was told someone on the team will look into it for her and give her an update. Per pt is wondering if there is one. Please call pt back. Thank you!      Action Taken: Message routed to:  [facility] ENDO    Travel Screening: Not Applicable     Date of Service:

## 2024-08-06 NOTE — TELEPHONE ENCOUNTER
[facility]:  Nursing Note  SITUATION                                                      Radha Baca is a 61 year old female who calls with questions about pedicures.    BACKGROUND                                                      Patient had a right TKA in 2011 and a Left TKA in 2012.    Date of last clinic appointment:  May of 2016      ASSESSMENT      Patient is scheduled to have an upcoming pedicure and curious if she should have prophylactic antibiotics prior to this like she does for dental appointments.    PLAN                                                      Nursing Interventions: Patient education: I explained that we do not prescribe antibiotics for pedicures but if she is worried at all about infection she should seek care.  RECOMMENDED DISPOSITION:   Will comply with recommendation: n/a    Patient/family can be reached at: N/A    If further questions/concerns or if symptoms do not improve, worsen or new symptoms develop, patient/family are advised to call [phone], option #3 to speak with a triage nurse, as soon as possible.    Lorena Kirkpatrick     Render In Strict Bullet Format?: No Detail Level: Zone Initiate Treatment: Ketoconazole 2% cream; apply to the AA in the groin and to the feet, from the ankles down and in between the toes QD for 6 weeks then 2-3 days per week for maintenance

## 2024-08-09 ENCOUNTER — MYC MEDICAL ADVICE (OUTPATIENT)
Dept: ORTHOPEDICS | Facility: CLINIC | Age: 69
End: 2024-08-09
Payer: MEDICARE

## 2024-08-09 NOTE — LETTER
[facility] [address] [id number] [phone]        August 12, 2024    Regarding:  Radha Baca  [address] [address]              To Whom It May Concern:    I evaluated Ms. Baca in sports medicine clinic on 5/9/2024 for an acute injury involving her left foot, left ankle, and left knee.  She required x-rays of the left foot, left ankle, and left knee at that visit.    This was not a Worker's Compensation visit, and should not be billed as such.  This patient had a previous Worker's Compensation injury for a different body part, her right ankle, 8/19/2023.      Sincerely,        Chandan Molina MD

## 2024-08-20 ENCOUNTER — ALLIED HEALTH/NURSE VISIT (OUTPATIENT)
Dept: EDUCATION SERVICES | Facility: CLINIC | Age: 69
End: 2024-08-20
Attending: INTERNAL MEDICINE
Payer: MEDICARE

## 2024-08-20 DIAGNOSIS — E11.65 TYPE 2 DIABETES MELLITUS WITH HYPERGLYCEMIA, WITHOUT LONG-TERM CURRENT USE OF INSULIN (H): ICD-10-CM

## 2024-08-20 PROCEDURE — G0108 DIAB MANAGE TRN  PER INDIV: HCPCS | Performed by: REGISTERED NURSE

## 2024-08-21 VITALS — BODY MASS INDEX: 44.36 KG/M2 | WEIGHT: 250.4 LBS

## 2024-08-22 ENCOUNTER — MYC MEDICAL ADVICE (OUTPATIENT)
Dept: ENDOCRINOLOGY | Facility: CLINIC | Age: 69
End: 2024-08-22
Payer: MEDICARE

## 2024-08-22 DIAGNOSIS — E11.8 TYPE 2 DIABETES MELLITUS WITH COMPLICATION (H): ICD-10-CM

## 2024-08-22 NOTE — PROGRESS NOTES
"Diabetes Self-Management Education & Support    Radha Baca presents today for education related to Type 2 diabetes    Patient is being treated with:  MDI Insulin  She is accompanied by self and spouse, Gabriel    Year of diagnosis: She thinks she has had diabetes for about 15-20 years.   Referring provider:  Keturah De Luna MD  Living Situation: Lives with her   Employment: She is a retired .  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      PATIENT CONCERNS/REASON FOR REFERRAL :  Dionicio's A1C in June 2024 was 11.2 which is significantly higher than the previous reading at 9.4%.  She is not sure why this is. Referred to diabetes education for comprehensive education.      ASSESSMENT:      Taking Medication:     Current Diabetes Management per Patient:  Taking diabetes medications:  Lantus insulin:  85 units  Humalog insulin:  25 units with each meal + a correction scale  Metformin 1000 mg BID.    Monitoring    Patient glucose self monitoring as follows: once a week, fasting.  Generally 200-250 mg/dL  BG meter: One Touch.  CGM: none    Patient's most recent   Lab Results   Component Value Date    A1C 11.2 06/24/2024    A1C 8.9 04/12/2021      Patient's A1C goal: <7.0    Activity: She walks with a cane, she is quite limited in what she is able to do physically.  She occasionally does water exercise, but this is not an established habit.      Healthy Eating:   Usually eats 2 meals per day--a late brunch and dinner.  She feels as if she doesn't eat very much, but she does enjoy snacking.  This can be crackers, chips, pretzels, sometimes fruit.  She dislikes artificial sweeteners.  She drinks about 70-80 ounces of water daily, occasionally has soda.   States that she knows how to count carbohydrates \"sort of\".      Problem Solving:      Patient is at risk of hypoglycemia?: YES and Frequency is less than once a month  Hospitalizations for hyper or " hypoglycemia: No    EDUCATION and INSTRUCTION PROVIDED AT THIS VISIT:      Long discussion about the factors that can impact glucose control, and a review of the pathophysiology of type 2 diabetes.    Reviewed Dionicio's insulin regimen.  She states that she doesn't forget to take her insulin, so she is puzzled as to why her glucose levels are so high.   Discussed the value of continuous glucose monitoring--this would allow her and her diabetes team to observe the effects of food and activity on her glucose.    She and Gabriel were going to go to the cabin they rent up [place] following this appointment.  I gave her an additional sensor to wear.     Following an explanation of how the sensor works, we inserted a Shalom 3-plus in her right arm.  Telephone siria explained and her account was linked with our clinic.    She also wanted her  to be able to follow her, so we set that up as well.      Appointment to follow up the results made in one month.  Having a baseline should help us determine next steps.  She wants to wait until that appointment to order the Shalom sensor for personal use.      Patient-stated goal written and given to Radha Baca.  Verbalized and demonstrated understanding of instructions.     PLAN:    See patient instructions  AVS printed and given to patient    FOLLOW-UP:        Time spent with patient at today's visit was 60 minutes.      Any diabetes medication dose changes were made via the CDE Protocol and Collaborative Practice Agreement with Becky and  Robyn.  A copy of this encounter was provided to patient's referring provider.

## 2024-08-23 RX ORDER — BLOOD SUGAR DIAGNOSTIC
STRIP MISCELLANEOUS
Qty: 300 STRIP | Refills: 3 | Status: SHIPPED | OUTPATIENT
Start: 2024-08-23

## 2024-09-09 ENCOUNTER — MYC MEDICAL ADVICE (OUTPATIENT)
Dept: ENDOCRINOLOGY | Facility: CLINIC | Age: 69
End: 2024-09-09
Payer: MEDICARE

## 2024-09-09 DIAGNOSIS — E11.8 TYPE 2 DIABETES MELLITUS WITH COMPLICATION (H): Primary | ICD-10-CM

## 2024-09-09 RX ORDER — BLOOD-GLUCOSE SENSOR
EACH MISCELLANEOUS
Qty: 6 EACH | Refills: 1 | Status: SHIPPED | OUTPATIENT
Start: 2024-09-09 | End: 2024-09-25

## 2024-09-25 ENCOUNTER — ALLIED HEALTH/NURSE VISIT (OUTPATIENT)
Dept: EDUCATION SERVICES | Facility: CLINIC | Age: 69
End: 2024-09-25
Payer: MEDICARE

## 2024-09-25 DIAGNOSIS — Z79.4 TYPE 2 DIABETES MELLITUS WITH COMPLICATION, WITH LONG-TERM CURRENT USE OF INSULIN (H): ICD-10-CM

## 2024-09-25 DIAGNOSIS — E11.8 TYPE 2 DIABETES MELLITUS WITH COMPLICATION (H): ICD-10-CM

## 2024-09-25 DIAGNOSIS — E11.8 TYPE 2 DIABETES MELLITUS WITH COMPLICATION, WITH LONG-TERM CURRENT USE OF INSULIN (H): ICD-10-CM

## 2024-09-25 PROCEDURE — G0108 DIAB MANAGE TRN  PER INDIV: HCPCS | Performed by: REGISTERED NURSE

## 2024-09-25 RX ORDER — INSULIN GLARGINE 100 [IU]/ML
INJECTION, SOLUTION SUBCUTANEOUS
Qty: 90 ML | Refills: 3 | Status: CANCELLED | OUTPATIENT
Start: 2024-09-25

## 2024-09-26 ENCOUNTER — TRANSFERRED RECORDS (OUTPATIENT)
Dept: HEALTH INFORMATION MANAGEMENT | Facility: CLINIC | Age: 69
End: 2024-09-26
Payer: MEDICARE

## 2024-09-26 ENCOUNTER — DOCUMENTATION ONLY (OUTPATIENT)
Dept: ENDOCRINOLOGY | Facility: CLINIC | Age: 69
End: 2024-09-26
Payer: MEDICARE

## 2024-09-26 LAB — RETINOPATHY: POSITIVE

## 2024-09-26 NOTE — PROGRESS NOTES
[facility] requesting last 6 months of clinical notes to support CGM supplies.     Printed the following visits: 3/05/2024 & 07/29/2024  Faxed to [phone]   See image below for Timestamp confirmation of succssful transmission.         Placed in Medicare file.     Adriana Kraus CMA  Adult Endocrinology   [facility]

## 2024-09-27 RX ORDER — METFORMIN HCL 500 MG
1000 TABLET, EXTENDED RELEASE 24 HR ORAL 2 TIMES DAILY WITH MEALS
Qty: 360 TABLET | Refills: 3 | Status: SHIPPED | OUTPATIENT
Start: 2024-09-27

## 2024-09-27 NOTE — PROGRESS NOTES
"Diabetes Self-Management Education & Support    Radha Baca presents today for education related to Type 2 diabetes    Patient is being treated with:  Oral Agents and MDI Insulin  She is accompanied by self    Year of diagnosis: [id number]  Referring provider:  Keturah De Luna MD  Living Situation: Lives with her   Employment: She is a retired .  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      PATIENT CONCERNS/REASON FOR REFERRAL:  She is here for follow up.  We tried a Shalom 3 sensor before to try to gather some more detailed data, but she states that the first one fell off, and her pharmacy told her that there was a \"problem with her insurance\" that prevented her from filling the order.        ASSESSMENT:    Taking Medication:     Current Diabetes Management per Patient:  Taking diabetes medications? yes:     Diabetes Medication(s)       Biguanides       metFORMIN (GLUCOPHAGE) 500 MG tablet Take 2 tablets (1,000 mg) by mouth 2 times daily (with meals)       Insulin       HUMALOG KWIKPEN 100 UNIT/ML soln USE FOR CARB COUNTING AND MEALS, APPROXIMATELY 70-80 UNITS DAILY. Lab draw needed. Call [facility] () to schedule.     insulin glargine (LANTUS SOLOSTAR) 100 UNIT/ML pen INJECT SUBCUTANEOUSLY 84  UNITS IN THE MORNING            Monitoring    Patient glucose self monitoring as follows: She had been using the Shalom 3 for a short while, but now doing blood glucose monitoring.   We were able to retrieve the data from the week or so that she did wear it.                   Patient's most recent   Lab Results   Component Value Date    A1C 11.2 06/24/2024    A1C 8.9 04/12/2021      Patient's A1C goal: <8.0    Activity: States that she has some exercises for her back that she has been doing and also does chair yoga.      Healthy Eating:   States that she counts carbohydrates but we didn't get into the specifics of what she is " consuming.       Problem Solving:      Patient is at risk of hypoglycemia?: YES  Hospitalizations for hyper or hypoglycemia: N    EDUCATION and INSTRUCTION PROVIDED AT THIS VISIT:       Discussed her overall management.  She A1C has risen sharply over the past year, and her glucose is clearly out of control.   Denies missing any insulin doses however she isn't taking medications as prescribed:   Lantus insulin is prescribed with a dose of 84 units but she states she is taking 75 units  Humalog insulin:  States that her insulin to carb ratio is 1 unit per 5 grams CHO, but she struggled a little to remember this, so not sure that she is actively using it.    Correction scale she states is 1 unit for every 50 mg/dL over a target of 100.    Metformin (plain) is prescribed as 1000 mg twice a day, but she states that she is taking half this amount:  500 mg twice a day.  She states that she isn't taking the prescribed amount because of side effect.     We discussed changing a couple of things:  I think she would do better with a more concentrated form of insulin, so suggested we switch her to Tresiba insulin, which would last longer and because it's more concentrated, would form a smaller depot which would make absorption of the insulin more efficient.      Discussed that the extended release form of Metformin would probably be better tolerated, but she did not want to make any changes at this point.  She states that she will consider it.      Instructed in the use of the Dexcom G7.  Prescription will be sent to her local pharmacy.  Will order Tresiba  U-200 insulin as well.     Although she states that she is taking all of her insulin as prescribed, it really appears as if there is some omissions occurring, maybe inadvertantly.  I think she may do better with an insulin pump, however I would like to focus on improving her carb counting and encouraged her to use the data from the CGM to guide her insulin dosing.      We  made an appointment to follow up in about 6 weeks    Patient-stated goal written and given to Radha Baca.  Verbalized and demonstrated understanding of instructions.     PLAN:    See patient instructions  AVS printed and given to patient    FOLLOW-UP:      Appointment made 6 weeks from now.    Time spent with patient at today's visit was 60 minutes.      Any diabetes medication dose changes were made via the CDE Protocol and Collaborative Practice Agreement with [place] and  Robyn.  A copy of this encounter was provided to patient's referring provider.

## 2024-10-07 ENCOUNTER — OFFICE VISIT (OUTPATIENT)
Dept: AUDIOLOGY | Facility: CLINIC | Age: 69
End: 2024-10-07
Payer: MEDICARE

## 2024-10-07 DIAGNOSIS — H90.3 SENSORINEURAL HEARING LOSS (SNHL) OF BOTH EARS: Primary | ICD-10-CM

## 2024-10-07 PROCEDURE — 99207 PR ASSESSMENT FOR HEARING AID: CPT | Performed by: AUDIOLOGIST

## 2024-10-07 NOTE — PROGRESS NOTES
AUDIOLOGY REPORT    SUBJECTIVE:Radha Baca is a 68 year old female who was seen in the Audiology Clinic at the [facility] on 10/7/2024  for a hearing aid check. Previous results have revealed normal sloping to moderate sensorineural hearing loss bilaterally.  The patient has been seen previously in this clinic and was fit with bilateral Phonak Audeo P50-R PERICO hearing aids on 10/14/2021.  Today, Radha reports the batteries on her hearing aids are not lasting as long. She also reports that the devices intermittently go out of stereo, in particular the left. Of note, the patient has an upcoming hearing test and ENT appointment this week.      OBJECTIVE: Otoscopy revealed clear ear canals/minimal cerumen bilaterally. Hearing aids were cleaned and checked. Hearing aids were deep cleaned; microphone ports were vacuumed, wax filters, and domes were changed. Following cleaning, listening check was good, but left device seemed intermittent. Changed  without improvement. Discussed with patient it would be best to send in for repair. At this time, batteries could also be changed. Patient is in agreement and would like to send both devices in for repair as warranty expires next month.     No charge visit today (in warranty hearing aid check).    ASSESSMENT: A hearing aid check was completed today. Changes to hearing aid was completed as outlined above.     PLAN:Radha will be contacted when devices are back from repair. She may want to return for reprogramming, depending on what her updated hearing evaluation shows. Please call this clinic with any questions regarding today s appointment.    UNIQUE Mustafa.   Audiology Doctoral Extern  [place] # [id number]     I was present with the patient for the entire audiology appointment, including all procedures/testing performed by the Au.OMKAR student, and agree with the student's assessment and plan as documented.       Rupert Min, [facility]  Licensed Audiologist  [place] #[id number]

## 2024-10-08 ENCOUNTER — OFFICE VISIT (OUTPATIENT)
Dept: FAMILY MEDICINE | Facility: CLINIC | Age: 69
End: 2024-10-08
Payer: MEDICARE

## 2024-10-08 VITALS
HEIGHT: 63 IN | RESPIRATION RATE: 14 BRPM | BODY MASS INDEX: 44.81 KG/M2 | OXYGEN SATURATION: 97 % | SYSTOLIC BLOOD PRESSURE: 123 MMHG | WEIGHT: 252.9 LBS | DIASTOLIC BLOOD PRESSURE: 73 MMHG | HEART RATE: 67 BPM | TEMPERATURE: 98 F

## 2024-10-08 DIAGNOSIS — Z79.4 TYPE 2 DIABETES MELLITUS WITH PROLIFERATIVE RETINOPATHY WITHOUT MACULAR EDEMA, WITH LONG-TERM CURRENT USE OF INSULIN, UNSPECIFIED LATERALITY (H): ICD-10-CM

## 2024-10-08 DIAGNOSIS — E11.9 DIABETES MELLITUS, TYPE 2 (H): ICD-10-CM

## 2024-10-08 DIAGNOSIS — H90.3 SENSORINEURAL HEARING LOSS (SNHL) OF BOTH EARS: Primary | ICD-10-CM

## 2024-10-08 DIAGNOSIS — E11.3599 TYPE 2 DIABETES MELLITUS WITH PROLIFERATIVE RETINOPATHY WITHOUT MACULAR EDEMA, WITH LONG-TERM CURRENT USE OF INSULIN, UNSPECIFIED LATERALITY (H): ICD-10-CM

## 2024-10-08 DIAGNOSIS — Z23 HIGH PRIORITY FOR 2019-NCOV VACCINE: ICD-10-CM

## 2024-10-08 DIAGNOSIS — Z01.818 PREOP GENERAL PHYSICAL EXAM: Primary | ICD-10-CM

## 2024-10-08 DIAGNOSIS — R55 VASOVAGAL SYNCOPE: ICD-10-CM

## 2024-10-08 PROCEDURE — 93000 ELECTROCARDIOGRAM COMPLETE: CPT | Performed by: FAMILY MEDICINE

## 2024-10-08 PROCEDURE — G0008 ADMIN INFLUENZA VIRUS VAC: HCPCS | Performed by: FAMILY MEDICINE

## 2024-10-08 PROCEDURE — 90480 ADMN SARSCOV2 VAC 1/ONLY CMP: CPT | Performed by: FAMILY MEDICINE

## 2024-10-08 PROCEDURE — 90673 RIV3 VACCINE NO PRESERV IM: CPT | Performed by: FAMILY MEDICINE

## 2024-10-08 PROCEDURE — 91320 SARSCV2 VAC 30MCG TRS-SUC IM: CPT | Performed by: FAMILY MEDICINE

## 2024-10-08 PROCEDURE — 99214 OFFICE O/P EST MOD 30 MIN: CPT | Mod: 25 | Performed by: FAMILY MEDICINE

## 2024-10-08 NOTE — PATIENT INSTRUCTIONS
How to Take Your Medication Before Surgery  Preoperative Medication Instructions   Antiplatelet or Anticoagulation Medication Instructions   - Bleeding risk is low for this procedure (e.g. dental, skin, cataract).   - aspirin: Bleeding risk is low for this procedure and daily aspirin may be continued without modification.     Additional Medication Instructions   - Long acting insulin (e.g. glargine, detemir): Take 80% of the usual evening or morning dose before surgery.     - short acting insulin (e.g. regular, lispro, aspart): DO NOT TAKE on the morning of surgery.        Patient Education   Preparing for Your Surgery  For Adults  Getting started  In most cases, a nurse will call to review your health history and instructions. They will give you an arrival time based on your scheduled surgery time. Please be ready to share:  Your doctor's clinic name and phone number  Your medical, surgical, and anesthesia history  A list of allergies and sensitivities  A list of medicines, including herbal treatments and over-the-counter drugs  Whether the patient has a legal guardian (ask how to send us the papers in advance)  Note: You may not receive a call if you were seen at our PAC (Preoperative Assessment Center).  Please tell us if you're pregnant--or if there's any chance you might be pregnant. Some surgeries may injure a fetus (unborn baby), so they require a pregnancy test. Surgeries that are safe for a fetus don't always need a test, and you can choose whether to have one.   Preparing for surgery  Within 10 to 30 days of surgery: Have a pre-op exam (sometimes called an H&P, or History and Physical). This can be done at a clinic or pre-operative center.  If you're having a , you may not need this exam. Talk to your care team.  At your pre-op exam, talk to your care team about all medicines you take. (This includes CBD oil and any drugs, such as THC, marijuana, and other forms of cannabis.) If you need to stop  any medicine before surgery, ask when to start taking it again.  This is for your safety. Many medicines and drugs can make you bleed too much during surgery. Some change how well surgery (anesthesia) drugs work.  Call your insurance company to let them know you're having surgery. (If you don't have insurance, call [phone].)  Call your clinic if there's any change in your health. This includes a scrape or scratch near the surgery site, or any signs of a cold (sore throat, runny nose, cough, rash, fever).  Eating and drinking guidelines  For your safety: Unless your surgeon tells you otherwise, follow the guidelines below.  Eat and drink as normal until 8 hours before you arrive for surgery. After that, no food or milk. You can spit out gum when you arrive.  Drink clear liquids until 2 hours before you arrive. These are liquids you can see through, like water, Gatorade, and Propel Water. They also include plain black coffee and tea (no cream or milk).  No alcohol for 24 hours before you arrive. The night before surgery, stop any drinks that contain THC.  If your care team tells you to take medicine on the morning of surgery, it's okay to take it with a sip of water. No other medicines or drugs are allowed (including CBD oil)--follow your care team's instructions.  If you have questions the day of surgery, call your hospital or surgery center.   Preventing infection  Shower or bathe the night before and the morning of surgery. Follow the instructions your clinic gave you. (If no instructions, use regular soap.)  Don't shave or clip hair near your surgery site. We'll remove the hair if needed.  Don't smoke or vape the morning of surgery. No chewing tobacco for 6 hours before you arrive. A nicotine patch is okay. You may spit out nicotine gum when you arrive.  For some surgeries, the surgeon will tell you to fully quit smoking and nicotine.  We will make every effort to keep you safe from infection. We will:  Clean  our hands often with soap and water (or an alcohol-based hand rub).  Clean the skin at your surgery site with a special soap that kills germs.  Give you a special gown to keep you warm. (Cold raises the risk of infection.)  Wear hair covers, masks, gowns, and gloves during surgery.  Give antibiotic medicine, if prescribed. Not all surgeries need this medicine.  What to bring on the day of surgery  Photo ID and insurance card  Copy of your health care directive, if you have one  Glasses and hearing aids (bring cases)  You can't wear contacts during surgery  Inhaler and eye drops, if you use them (tell us about these when you arrive)  CPAP machine or breathing device, if you use them  A few personal items, if spending the night  If you have . . .  A pacemaker, ICD (cardiac defibrillator), or other implant: Bring the ID card.  An implanted stimulator: Bring the remote control.  A legal guardian: Bring a copy of the certified (court-stamped) guardianship papers.  Please remove any jewelry, including body piercings. Leave jewelry and other valuables at home.  If you're going home the day of surgery  You must have a responsible adult drive you home. They should stay with you overnight as well.  If you don't have someone to stay with you, and you aren't safe to go home alone, we may keep you overnight. Insurance often won't pay for this.  After surgery  If it's hard to control your pain or you need more pain medicine, please call your surgeon's office.  Questions?   If you have any questions for your care team, list them here:   ____________________________________________________________________________________________________________________________________________________________________________________________________________________________________________________________  For informational purposes only. Not to replace the advice of your health care provider. Copyright   2003, 2019 [facility]. All  rights reserved. Clinically reviewed by Sonny Najera MD. [employer] [id number] - REV 08/24.

## 2024-10-10 ENCOUNTER — TELEPHONE (OUTPATIENT)
Dept: OTOLARYNGOLOGY | Facility: CLINIC | Age: 69
End: 2024-10-10

## 2024-10-10 NOTE — TELEPHONE ENCOUNTER
[facility] Call Center    Phone Message    May a detailed message be left on voicemail: yes     Reason for Call: Other: Pt calling in regards to her appt today to recheck her ears, stating she has been running a fever and would like to know if she can still be seen. Please call. Thanks.     Action Taken: Other: ENT    Travel Screening: Not Applicable     Date of Service:

## 2024-10-11 RX ORDER — PEN NEEDLE, DIABETIC 31 GX5/16"
NEEDLE, DISPOSABLE MISCELLANEOUS
Qty: 400 EACH | Refills: 2 | Status: SHIPPED | OUTPATIENT
Start: 2024-10-11

## 2024-10-11 NOTE — TELEPHONE ENCOUNTER
LVD:  7/29/2024  [facility] Keturah Liang MD  Endocrinology, Diabetes, and Metabolism     Refilled per protocol.

## 2024-10-14 ENCOUNTER — MYC MEDICAL ADVICE (OUTPATIENT)
Dept: EDUCATION SERVICES | Facility: CLINIC | Age: 69
End: 2024-10-14
Payer: MEDICARE

## 2024-10-14 ENCOUNTER — MYC MEDICAL ADVICE (OUTPATIENT)
Dept: SLEEP MEDICINE | Facility: CLINIC | Age: 69
End: 2024-10-14
Payer: MEDICARE

## 2024-10-14 DIAGNOSIS — G47.33 OSA (OBSTRUCTIVE SLEEP APNEA): Primary | ICD-10-CM

## 2024-10-14 DIAGNOSIS — E11.8 TYPE 2 DIABETES MELLITUS WITH COMPLICATION (H): Primary | ICD-10-CM

## 2024-10-15 RX ORDER — ACYCLOVIR 400 MG/1
TABLET ORAL
Qty: 3 EACH | Refills: 5 | Status: SHIPPED | OUTPATIENT
Start: 2024-10-15

## 2024-10-18 ENCOUNTER — TELEPHONE (OUTPATIENT)
Dept: SLEEP MEDICINE | Facility: CLINIC | Age: 69
End: 2024-10-18
Payer: MEDICARE

## 2024-10-18 NOTE — TELEPHONE ENCOUNTER
General Call      Reason for Call:  patient called and scheduled an appt in Feb 2025.    Patient needs cpap supplies in November 2024 before this appt.    Please contact patient.  Thank you.    What are your questions or concerns:  no    Date of last appointment with provider: na    Could we send this information to you in [place] or would you prefer to receive a phone call?:   Patient would prefer a phone call   Okay to leave a detailed message?: Yes at Cell number on file:    Telephone Information:   Mobile [phone]        No-Patient/Caregiver offered and refused free interpretation services.

## 2024-10-24 ENCOUNTER — OFFICE VISIT (OUTPATIENT)
Dept: AUDIOLOGY | Facility: CLINIC | Age: 69
End: 2024-10-24
Payer: MEDICARE

## 2024-10-24 ENCOUNTER — OFFICE VISIT (OUTPATIENT)
Dept: OTOLARYNGOLOGY | Facility: CLINIC | Age: 69
End: 2024-10-24
Payer: MEDICARE

## 2024-10-24 VITALS
DIASTOLIC BLOOD PRESSURE: 64 MMHG | HEART RATE: 78 BPM | HEIGHT: 63 IN | BODY MASS INDEX: 44.3 KG/M2 | SYSTOLIC BLOOD PRESSURE: 102 MMHG | OXYGEN SATURATION: 97 % | WEIGHT: 250 LBS

## 2024-10-24 DIAGNOSIS — H90.3 SENSORINEURAL HEARING LOSS (SNHL) OF BOTH EARS: Primary | ICD-10-CM

## 2024-10-24 DIAGNOSIS — H61.22 IMPACTED CERUMEN OF LEFT EAR: ICD-10-CM

## 2024-10-24 PROCEDURE — 92557 COMPREHENSIVE HEARING TEST: CPT | Performed by: AUDIOLOGIST

## 2024-10-24 PROCEDURE — 99213 OFFICE O/P EST LOW 20 MIN: CPT | Performed by: REGISTERED NURSE

## 2024-10-24 PROCEDURE — 92550 TYMPANOMETRY & REFLEX THRESH: CPT | Performed by: AUDIOLOGIST

## 2024-10-24 NOTE — PROGRESS NOTES
AUDIOLOGY REPORT    SUMMARY: Audiology visit completed. See audiogram for results.      RECOMMENDATIONS: Follow-up with ENT.    Patient also picked up her hearing aids today that returned from repair.    UNIQUE Mustafa.   Audiology Doctoral Extern  [place] # [id number]     I was present with the patient for the entire audiology appointment including all procedures/testing performed by the AuD student, and agree with the student's assessment and plan as documented.         Ashley Flowers, [place]  Audiologist  [place] License  #[id number]

## 2024-10-24 NOTE — PROGRESS NOTES
Otolaryngology Clinic  October 24, 2024    Chief Complaint:   Bilateral sensorineural hearing loss       History of Present Illness:   Radha Baca is a 69 year old female who presents today for follow up to monitor bilateral sensorineural hearing loss.  Currently wears hearing aids bilaterally with good benefit.  Has noted that tinnitus has worsened when patient is not wearing hearing aids.  Also wonders if hearing has worsened as patient is having a harder time with conversation when not wearing hearing aids.  Has not recently been wearing hearing aids as they were out for repair.  Picking up hearing aids today. Patient denies any otalgia, otorrhea, dizziness, tinnitus, hearing loss, facial numbness/weakness, history of frequent ear infections, or ear surgeries.     Past Medical History:  Past Medical History:   Diagnosis Date    Abnormal Pap smear     Anxiety     Arthritis of knee 04/04/2013    Arthritis of shoulder region, right 04/18/2014    Back injury     Breast disorder     Chronic constipation     Chronic diarrhea     Depressive disorder     Diabetes (H)     Fecal incontinence     Finger pain 04/02/2015    Fracture broke L 5th pinky finger due to fall  1/2015    Glaucoma (increased eye pressure)     Head injury 08/06/2016    Headache(784.0)     decreased with mouth guard use.    History of blood transfusion 8/2011 & 4/2013    Hypercholesteremia     Hypertension     Low back pain     Menarche age 9+    cycles q mo x 4-5 d    Neck injuries     Nonsenile cataract IO implants: L-8/2013; R-1/2011    Pain in knee joint     LEFT    Right bundle branch block     per H/P    Sleep apnea     Info on file    SNHL (sensorineural hearing loss)     Tinnitus     I have reported ringing in ears. not sure of dates    Umbilical hernia without mention of obstruction or gangrene 08/2014    Vision disorder Detached Retina 10/2009       Past Surgical History:  Past Surgical History:   Procedure Laterality Date     ARTHROPLASTY KNEE  4/4/2013    Left Total Knee Arthroplasty;  Surgeon: Lou Byrne MD;  [facility]    ARTHROPLASTY MINIMALLY INVASIVE KNEE  8/22/2011    R knee :CAITLYN JACOBO, Left age 14    COLONOSCOPY  1/14/2015    DENTAL SURGERY      root canals, wisdom teeth    EXAM UNDER ANESTHESIA, MANIPULATE JOINT (LOCATION)  10/5/2012    Procedure: EXAM UNDER ANESTHESIA, MANIPULATE JOINT (LOCATION);  Right Knee Mini Open Lysis Of Adhesions, Left Knee Steroid Injection  ;  Surgeon: Lou Byrne MD;  [facility]    [facility] OR OCULAR DEVICE INTRAOP DETACHED RETINA  2009    R    HC PHAKIC IOL - IMPLANT FROM SURGEON      right    KNEE SURGERY  1969    left    LASER YAG CAPSULOTOMY  12/2016    left    VITRECTOMY PARSPLANA  2010       Medications:  Current Outpatient Medications   Medication Sig Dispense Refill    Acetaminophen (TYLENOL PO) Take by mouth as needed for mild pain or fever      amoxicillin (AMOXIL) 500 MG tablet TAKE 4 TABLETS BY MOUTH 1 HOUR BEFORE DENTAL PROCEDURES 4 tablet 4    Ascorbic Acid (VITAMIN C PO) Take 2,000 mg by mouth 2 times daily       aspirin 81 MG tablet 1 tab daily 90 tablet 3    atorvastatin (LIPITOR) 20 MG tablet Take 1 tablet (20 mg) by mouth daily 90 tablet 3    B Complex Vitamins (VITAMIN  B COMPLEX) CAPS Take 1 tablet by mouth daily       blood glucose (ONETOUCH VERIO IQ) test strip USE TO TEST BLOOD SUGARS THREE TIMES DAILY 300 strip 3    blood glucose calibration (ONETOUCH VERIO) solution Use to calibrate blood glucose monitor as needed as directed.Level 3 solution 1 each 11    blood glucose monitoring (NO BRAND SPECIFIED) meter device kit Use to test blood sugar 3x times daily as directed. Any covered brand. One touch preferred. 1 kit 0    calcium-vitamin D (CALTRATE) 600-400 MG-UNIT per tablet Take 1 tablet by mouth daily.      cholecalciferol (VITAMIN D) 1000 UNIT tablet Take 1 tablet by mouth daily. 100 tablet 3    Continuous Glucose Sensor (DEXCOM G7 SENSOR)  MISC Change every 10 days. 3 each 5    cyanocobalamin (VITAMIN B-12) 1000 MCG tablet       cycloSPORINE (RESTASIS) 0.05 % ophthalmic emulsion       HUMALOG KWIKPEN 100 UNIT/ML soln USE FOR CARB COUNTING AND MEALS, APPROXIMATELY 70-80 UNITS DAILY. Lab draw needed. Call [employer] () to schedule. 75 mL 3    hypromellose-dextran (ARTIFICAL TEARS) 0.1-0.3 % ophthalmic solution       insulin glargine (LANTUS SOLOSTAR) 100 UNIT/ML pen INJECT SUBCUTANEOUSLY 84  UNITS IN THE MORNING 90 mL 3    insulin pen needle (B-D U/F) 31G X 8 MM miscellaneous US QIS FOR INSULIN ADMINISTRATION 400 each 2    latanoprost (XALATAN) 0.005 % ophthalmic solution Place 1 drop into both eyes At Bedtime      lisinopril-hydrochlorothiazide (ZESTORETIC) 20-12.5 MG tablet Take 1 tablet by mouth daily 90 tablet 3    metFORMIN (GLUCOPHAGE XR) 500 MG 24 hr tablet Take 2 tablets (1,000 mg) by mouth 2 times daily (with meals). 360 tablet 3    metFORMIN (GLUCOPHAGE) 500 MG tablet Take 2 tablets (1,000 mg) by mouth 2 times daily (with meals) 360 tablet 3    Multiple Vitamin (MULTIVITAMIN  S) CAPS Take 1 daily 90 capsule 3    Multiple Vitamins-Minerals (EYE-ZAKIYA PLUS LUTEIN PO)       Omega-3 Fatty Acids (OMEGA-3 FISH OIL PO) Take 1,000 mg by mouth daily      OneTouch Delica Lancets 33G MISC 4 Box. 4 times daily Test  Blood glucose 4 times daily  DX E11.8  ID # [id number] 360 each 3    polyethylene glycol (MIRALAX) 17 GM/Dose powder Take 1 capful by mouth daily as needed for constipation      sertraline (ZOLOFT) 50 MG tablet TAKE 1 TABLET(50 MG) BY MOUTH DAILY 90 tablet 3    timolol (TIMOPTIC) 0.5 % ophthalmic solution Place 1 drop into both eyes 2 times daily       triamcinolone (ARISTOCORT HP) 0.5 % external cream Apply topically 2 times daily 15 g 4    insulin degludec (TRESIBA) 200 UNIT/ML pen Inject 84 Units subcutaneously daily. Order pen needles too 45 mL 2       Allergies:  Allergies   Allergen Reactions    Nkda [No Known Drug Allergy]   "       Social History:  Social History     Tobacco Use    Smoking status: Former     Current packs/day: 0.00     Types: Cigarettes    Smokeless tobacco: Never    Tobacco comments:     quit mid 1980s   Vaping Use    Vaping status: Never Used   Substance Use Topics    Alcohol use: No    Drug use: No       ROS: 10 point ROS neg other than the symptoms noted above in the HPI.    Physical Exam:    /64 (BP Location: Right arm, Patient Position: Sitting, Cuff Size: Adult Large)   Pulse 78   Ht 1.6 m (5' 3\")   Wt 113.4 kg (250 lb)   SpO2 97%   BMI 44.29 kg/m       Constitutional:  The patient was unaccompanied, well-groomed, and in no acute distress.     Skin: Normal:  warm and pink without rash    Neurologic: Alert and oriented x 3.  CN's III-XII within normal limits.  Voice normal.    Psychiatric: The patient's affect was calm, cooperative, and appropriate.     Communication:  Normal; communicates verbally, normal voice quality.    Respiratory: Breathing comfortably without stridor or exertion of accessory muscles.    Ears: Pinnae and tragus non-tender.        Otologic microscope exam:    Right ear was examined under the microscope.  Normal appearing TM, nicely aerated middle ear space.     Left ear was also examined under the microscope. Cerumen impaction cleaned with right angled hook and alligator forceps. Normal appearing TM, nicely aerated middle ear space.      Audiogram: 10/24/2024 - data independently reviewed  Normal sloping to moderately severe sensorineural hearing loss bilaterally (stable compared to 7/25/2023 audiogram)  % at 85 dB bilaterally  Acoustic Reflexes: Present in all conditions  Tympanograms: type A bilaterally    Assessment and Plan:  1. Sensorineural hearing loss (SNHL) of both ears (Primary)  Patient with bilateral sensorineural hearing loss.  Audiogram today is stable compared to July 2023 audiogram.  Recommend that patient continue to utilize hearing aids for treatment.  " Reassured patient that hearing is stable.  It may be that patient feels that hearing is worse as they are now hearing aids in normal level when wearing hearing aids.  This may also account for patient's new tinnitus when not wearing hearing aids.  Tinnitus does resolve with hearing aid use.  Recommend that patient continue to utilize hearing aids and follow-up in 1 to 2 years with repeat audiogram.    Ladan Mancia DNP, APRN, CNP  Otolaryngology  Head & Neck Surgery  [phone]    20 minutes spent by me on the date of the encounter doing chart review, history and exam, documentation and further activities per the note

## 2024-10-24 NOTE — LETTER
10/24/2024       RE: Radha Baca  [address] [address]     Dear Colleague,    Thank you for referring your patient, Radha Baca, to the [facility] [place] at [facility]. Please see a copy of my visit note below.      Otolaryngology Clinic  October 24, 2024    Chief Complaint:   Bilateral sensorineural hearing loss       History of Present Illness:   Radha Baca is a 69 year old female who presents today for follow up to monitor bilateral sensorineural hearing loss.  Currently wears hearing aids bilaterally with good benefit.  Has noted that tinnitus has worsened when patient is not wearing hearing aids.  Also wonders if hearing has worsened as patient is having a harder time with conversation when not wearing hearing aids.  Has not recently been wearing hearing aids as they were out for repair.  Picking up hearing aids today. Patient denies any otalgia, otorrhea, dizziness, tinnitus, hearing loss, facial numbness/weakness, history of frequent ear infections, or ear surgeries.     Past Medical History:  Past Medical History:   Diagnosis Date     Abnormal Pap smear      Anxiety      Arthritis of knee 04/04/2013     Arthritis of shoulder region, right 04/18/2014     Back injury      Breast disorder      Chronic constipation      Chronic diarrhea      Depressive disorder      Diabetes (H)      Fecal incontinence      Finger pain 04/02/2015     Fracture broke L 5th pinky finger due to fall  1/2015     Glaucoma (increased eye pressure)      Head injury 08/06/2016     Headache(784.0)     decreased with mouth guard use.     History of blood transfusion 8/2011 & 4/2013     Hypercholesteremia      Hypertension      Low back pain      Menarche age 9+    cycles q mo x 4-5 d     Neck injuries      Nonsenile cataract IO implants: L-8/2013; R-1/2011     Pain in knee joint     LEFT     Right  bundle branch block     per H/P     Sleep apnea     Info on file     SNHL (sensorineural hearing loss)      Tinnitus     I have reported ringing in ears. not sure of dates     Umbilical hernia without mention of obstruction or gangrene 08/2014     Vision disorder Detached Retina 10/2009       Past Surgical History:  Past Surgical History:   Procedure Laterality Date     ARTHROPLASTY KNEE  4/4/2013    Left Total Knee Arthroplasty;  Surgeon: Lou Byrne MD;  [facility]     ARTHROPLASTY MINIMALLY INVASIVE KNEE  8/22/2011    R knee :CAITLYN JACOBO, Left age 14     COLONOSCOPY  1/14/2015     DENTAL SURGERY      root canals, wisdom teeth     EXAM UNDER ANESTHESIA, MANIPULATE JOINT (LOCATION)  10/5/2012    Procedure: EXAM UNDER ANESTHESIA, MANIPULATE JOINT (LOCATION);  Right Knee Mini Open Lysis Of Adhesions, Left Knee Steroid Injection  ;  Surgeon: Lou Byrne MD;  [facility]      OR OCULAR DEVICE INTRAOP DETACHED RETINA  2009    R     HC PHAKIC IOL - IMPLANT FROM SURGEON      right     KNEE SURGERY  1969    left     LASER YAG CAPSULOTOMY  12/2016    left     VITRECTOMY PARSPLANA  2010       Medications:  Current Outpatient Medications   Medication Sig Dispense Refill     Acetaminophen (TYLENOL PO) Take by mouth as needed for mild pain or fever       amoxicillin (AMOXIL) 500 MG tablet TAKE 4 TABLETS BY MOUTH 1 HOUR BEFORE DENTAL PROCEDURES 4 tablet 4     Ascorbic Acid (VITAMIN C PO) Take 2,000 mg by mouth 2 times daily        aspirin 81 MG tablet 1 tab daily 90 tablet 3     atorvastatin (LIPITOR) 20 MG tablet Take 1 tablet (20 mg) by mouth daily 90 tablet 3     B Complex Vitamins (VITAMIN  B COMPLEX) CAPS Take 1 tablet by mouth daily        blood glucose (ONETOUCH VERIO IQ) test strip USE TO TEST BLOOD SUGARS THREE TIMES DAILY 300 strip 3     blood glucose calibration (ONETOUCH VERIO) solution Use to calibrate blood glucose monitor as needed as directed.Level 3 solution 1 each 11      blood glucose monitoring (NO BRAND SPECIFIED) meter device kit Use to test blood sugar 3x times daily as directed. Any covered brand. One touch preferred. 1 kit 0     calcium-vitamin D (CALTRATE) 600-400 MG-UNIT per tablet Take 1 tablet by mouth daily.       cholecalciferol (VITAMIN D) 1000 UNIT tablet Take 1 tablet by mouth daily. 100 tablet 3     Continuous Glucose Sensor (DEXCOM G7 SENSOR) MISC Change every 10 days. 3 each 5     cyanocobalamin (VITAMIN B-12) 1000 MCG tablet        cycloSPORINE (RESTASIS) 0.05 % ophthalmic emulsion        HUMALOG KWIKPEN 100 UNIT/ML soln USE FOR CARB COUNTING AND MEALS, APPROXIMATELY 70-80 UNITS DAILY. Lab draw needed. Call [employer] () to schedule. 75 mL 3     hypromellose-dextran (ARTIFICAL TEARS) 0.1-0.3 % ophthalmic solution        insulin glargine (LANTUS SOLOSTAR) 100 UNIT/ML pen INJECT SUBCUTANEOUSLY 84  UNITS IN THE MORNING 90 mL 3     insulin pen needle (B-D U/F) 31G X 8 MM miscellaneous US QIS FOR INSULIN ADMINISTRATION 400 each 2     latanoprost (XALATAN) 0.005 % ophthalmic solution Place 1 drop into both eyes At Bedtime       lisinopril-hydrochlorothiazide (ZESTORETIC) 20-12.5 MG tablet Take 1 tablet by mouth daily 90 tablet 3     metFORMIN (GLUCOPHAGE XR) 500 MG 24 hr tablet Take 2 tablets (1,000 mg) by mouth 2 times daily (with meals). 360 tablet 3     metFORMIN (GLUCOPHAGE) 500 MG tablet Take 2 tablets (1,000 mg) by mouth 2 times daily (with meals) 360 tablet 3     Multiple Vitamin (MULTIVITAMIN  S) CAPS Take 1 daily 90 capsule 3     Multiple Vitamins-Minerals (EYE-ZAKIYA PLUS LUTEIN PO)        Omega-3 Fatty Acids (OMEGA-3 FISH OIL PO) Take 1,000 mg by mouth daily       OneTouch Delica Lancets 33G MISC 4 Box. 4 times daily Test  Blood glucose 4 times daily  DX E11.8  ID # [id number] 360 each 3     polyethylene glycol (MIRALAX) 17 GM/Dose powder Take 1 capful by mouth daily as needed for constipation       sertraline (ZOLOFT) 50 MG tablet TAKE 1 TABLET(50  "MG) BY MOUTH DAILY 90 tablet 3     timolol (TIMOPTIC) 0.5 % ophthalmic solution Place 1 drop into both eyes 2 times daily        triamcinolone (ARISTOCORT HP) 0.5 % external cream Apply topically 2 times daily 15 g 4     insulin degludec (TRESIBA) 200 UNIT/ML pen Inject 84 Units subcutaneously daily. Order pen needles too 45 mL 2       Allergies:  Allergies   Allergen Reactions     Nkda [No Known Drug Allergy]         Social History:  Social History     Tobacco Use     Smoking status: Former     Current packs/day: 0.00     Types: Cigarettes     Smokeless tobacco: Never     Tobacco comments:     quit mid 1980s   Vaping Use     Vaping status: Never Used   Substance Use Topics     Alcohol use: No     Drug use: No       ROS: 10 point ROS neg other than the symptoms noted above in the HPI.    Physical Exam:    /64 (BP Location: Right arm, Patient Position: Sitting, Cuff Size: Adult Large)   Pulse 78   Ht 1.6 m (5' 3\")   Wt 113.4 kg (250 lb)   SpO2 97%   BMI 44.29 kg/m       Constitutional:  The patient was unaccompanied, well-groomed, and in no acute distress.     Skin: Normal:  warm and pink without rash    Neurologic: Alert and oriented x 3.  CN's III-XII within normal limits.  Voice normal.    Psychiatric: The patient's affect was calm, cooperative, and appropriate.     Communication:  Normal; communicates verbally, normal voice quality.    Respiratory: Breathing comfortably without stridor or exertion of accessory muscles.    Ears: Pinnae and tragus non-tender.        Otologic microscope exam:    Right ear was examined under the microscope.  Normal appearing TM, nicely aerated middle ear space.     Left ear was also examined under the microscope. Cerumen impaction cleaned with right angled hook and alligator forceps. Normal appearing TM, nicely aerated middle ear space.      Audiogram: 10/24/2024 - data independently reviewed  Normal sloping to moderately severe sensorineural hearing loss bilaterally " (stable compared to 7/25/2023 audiogram)  % at 85 dB bilaterally  Acoustic Reflexes: Present in all conditions  Tympanograms: type A bilaterally    Assessment and Plan:  1. Sensorineural hearing loss (SNHL) of both ears (Primary)  Patient with bilateral sensorineural hearing loss.  Audiogram today is stable compared to July 2023 audiogram.  Recommend that patient continue to utilize hearing aids for treatment.  Reassured patient that hearing is stable.  It may be that patient feels that hearing is worse as they are now hearing aids in normal level when wearing hearing aids.  This may also account for patient's new tinnitus when not wearing hearing aids.  Tinnitus does resolve with hearing aid use.  Recommend that patient continue to utilize hearing aids and follow-up in 1 to 2 years with repeat audiogram.    Ladan Mancia DNP, APRN, CNP  Otolaryngology  Head & Neck Surgery  [phone]    20 minutes spent by me on the date of the encounter doing chart review, history and exam, documentation and further activities per the note      Again, thank you for allowing me to participate in the care of your patient.      Sincerely,    Deepika Mancia, NP

## 2024-10-26 ENCOUNTER — HEALTH MAINTENANCE LETTER (OUTPATIENT)
Age: 69
End: 2024-10-26

## 2024-10-28 ENCOUNTER — TELEPHONE (OUTPATIENT)
Dept: ENDOCRINOLOGY | Facility: CLINIC | Age: 69
End: 2024-10-28
Payer: MEDICARE

## 2024-10-28 RX ORDER — BLOOD SUGAR DIAGNOSTIC
STRIP MISCELLANEOUS
Qty: 300 STRIP | Refills: 3 | Status: SHIPPED | OUTPATIENT
Start: 2024-10-28

## 2024-10-28 NOTE — TELEPHONE ENCOUNTER
Hello,    The Dexcom G7 needs to be billed to Medicare Part B and does not require a PA. Please have [facility] bill Medicare Part B(which seems to be the ID) they have. If they cannot, please send a prescription to [facility] and our Medicare Team here will look further into billing Dexcom to Medicare Part B.    Thank You!    Tara Berman [facility] Liaison  [facility]  [email or web address]  Phone: [phone]  Fax: [phone]

## 2024-10-28 NOTE — TELEPHONE ENCOUNTER
Prior Authorization Retail Medication Request    Medication/Dose: Dexcom 7    ID - [id number]  Phone number [phone]    Halima Hunt [facility]  Adult Endocrinology  [facility] [place]

## 2024-10-29 NOTE — TELEPHONE ENCOUNTER
Called and left a voicemail for [place] to bill under Medicare part B.     Halima Hunt [facility]  Adult Endocrinology  [facility] [place]

## 2024-11-01 ENCOUNTER — OFFICE VISIT (OUTPATIENT)
Dept: AUDIOLOGY | Facility: CLINIC | Age: 69
End: 2024-11-01
Payer: MEDICARE

## 2024-11-01 ENCOUNTER — TRANSFERRED RECORDS (OUTPATIENT)
Dept: HEALTH INFORMATION MANAGEMENT | Facility: CLINIC | Age: 69
End: 2024-11-01

## 2024-11-01 DIAGNOSIS — H90.3 SENSORINEURAL HEARING LOSS (SNHL) OF BOTH EARS: Primary | ICD-10-CM

## 2024-11-01 PROCEDURE — 99207 PR ASSESSMENT FOR HEARING AID: CPT | Performed by: AUDIOLOGIST

## 2024-11-01 NOTE — PROGRESS NOTES
AUDIOLOGY REPORT    SUBJECTIVE:Radha Baca is a 69 year old female who was seen in the Audiology Clinic at the [facility] on 11/1/2024  for a hearing aid check. Previous results have revealed bilateral normal to moderately- severe SNHL. The patient has been seen previously in this clinic and was fit with bilateral Phonak Audeo P50-R PERICO hearing aids on 10/14/2021.  Radha reports overall continued satisfaction with the hearing aids. Today, she would like to make programming changes to her hearing aids to accommodate her most recent hearing test since she was not able to after her most recent hearing test on 10/24/2024.     OBJECTIVE:   Based on patient report, the following changes were made in the test box using NAL-NL1 and the patient's most recent audiogram; increased overall gain from 1-8kHz by approximately 9 dB. Re-paired hearing aids to the phone and [employer] siria after new programming changes. Discussed using the mute feature in the siria during [employer] services when the organ is playing too loudly. Patient indicated satisfaction with the changes made today.    No charge visit today (in warranty hearing aid check).     ASSESSMENT: A hearing aid check was completed today. Changes to hearing aids were completed as outlined above.     PLAN:Radha will return for follow-up as needed, likely annually per patient preference. Please call this clinic with any questions regarding today s appointment.    UNIQUE Mustafa.   Audiology Doctoral Extern  [place] # [id number]     I was present with the patient for the entire audiology appointment including all procedures/testing performed by the AuD student, and agree with the student's assessment and plan as documented.         Rupert Azevedo, [facility]  Licensed Audiologist  [place] #[id number]

## 2024-11-12 ENCOUNTER — ALLIED HEALTH/NURSE VISIT (OUTPATIENT)
Dept: EDUCATION SERVICES | Facility: CLINIC | Age: 69
End: 2024-11-12
Payer: MEDICARE

## 2024-11-12 DIAGNOSIS — E11.8 TYPE 2 DIABETES MELLITUS WITH COMPLICATION (H): Primary | ICD-10-CM

## 2024-11-13 NOTE — PROGRESS NOTES
Diabetes Self-Management Education & Support    Radha Baca presents today for education related to Type 2 diabetes    Patient is being treated with:  MDI Insulin  She is accompanied by self and her .    Year of diagnosis: [id number]  Referring provider:  Keturah De Luna MD  Living Situation: Lives with her , Gabriel.  Employment: She is a retired .  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       PATIENT CONCERNS/REASON FOR REFERRAL  Ongoing DSME.  She also needs a continuous glucose monitor to help her track her glucose and adjust her insulin dosing.     ASSESSMENT:    Taking Medication:     Current Diabetes Management per Patient:  Taking diabetes medications? yes:     Diabetes Medication(s)       Biguanides       metFORMIN (GLUCOPHAGE XR) 500 MG 24 hr tablet Take 2 tablets (1,000 mg) by mouth 2 times daily (with meals).     metFORMIN (GLUCOPHAGE) 500 MG tablet Take 2 tablets (1,000 mg) by mouth 2 times daily (with meals)       Insulin       HUMALOG KWIKPEN 100 UNIT/ML soln USE FOR CARB COUNTING AND MEALS, APPROXIMATELY 70-80 UNITS DAILY. Lab draw needed. Call [facility] () to schedule.     insulin degludec (TRESIBA) 200 UNIT/ML pen Inject 84 Units subcutaneously daily. Order pen needles too     insulin glargine (LANTUS SOLOSTAR) 100 UNIT/ML pen INJECT SUBCUTANEOUSLY 84  UNITS IN THE MORNING            Monitoring    Patient glucose self monitoring as follows: four times daily  BG meter: One Touch  CGM: none  BG results: She did not bring her meter with her today, so we have no access to her data.     Radha Baca meets the following criteria for a continuous glucose monitoring (CGM) system.      Has a diagnosis of diabetes,: This patient has a diagnosis of Type 2 diabetes requiring multiple daily injections of insulin  Uses a personal blood glucose meter and checks glucose four or more times daily and has  for the past 90 days as documented in glucose reports I have personally reviewed   Treated with insulin with multiple daily injections (MDI)  or a constant subcutaneous infusion (CSI) pump:  This patient is injecting insulin a minimum of three times daily.   Has additional conditions that require closer monitoring than can be obtained by blood glucose testing.  This patient  requires frequent adjustments of the insulin treatment regimen, based on therapeutic CGM test results, has severe excursions of blood glucose , and has day to day variations in work, activity and meal schedules that confound the degree of regimentation needed  to self manage glycemia with blood glucose testing  Has completed comprehensive diabetes education and training specific to the use of a CGM device.            Patient's most recent   Lab Results   Component Value Date    A1C 11.2 06/24/2024    A1C 8.9 04/12/2021      Patient's A1C goal: <8.0    Activity: no regular exercise program    Healthy Eating:   Previously reviewed.  No changes.     Problem Solving:      Patient is at risk of hypoglycemia?: YES and Frequency is one to two times per week  Hospitalizations for hyper or hypoglycemia: No    EDUCATION and INSTRUCTION PROVIDED AT THIS VISIT:       Dionicio discovered that the co-pay for the Dexcom G7 sensor was going to be over $150, which is more than she can afford.  It looks as if her insurance requires that the Dexcom be processed under Medicare part B.  A message apparently was sent to Pauline to process under Medicare part B, which is not something that [facility] typically does.    Discussed options.  I think that the Shalom 3+ might be more affordable, so we started one with her today and gave her another one to tide her over.  Ordered Shalom 3+ from [facility] along with the reader, even though she prefers to use her phone.  The reader is required by Medicare.     No changes made in her insulin dosing today, as we  don't have access to her blood glucose data.  Discussed whether she would benefit from using a GLP-1 agonist again.  She is not interested in this, citing reluctance to add more medications to her regimen.      Dionicio expressed some concern today with some memory issues that she has been having.  This concerns her as one of her parents suffered from dementia.  I encouraged her to discuss this with her primary care provider to determine if a referral for neuro-psych testing is appropriate.      She is also wondering if she should get her labs drawn prior to her next appointment with Dr. De Luna.  There are some already ordered, but Dionicio is wondering if she wants to add any to what is ordered before she goes in to have them drawn.      Made a follow up appointment on December 31 to review.        Patient-stated goal written and given to Radha Baca.  Verbalized and demonstrated understanding of instructions.     PLAN:    See patient instructions  AVS printed and given to patient    FOLLOW-UP:        Time spent with patient at today's visit was 60 minutes.        Any diabetes medication initiation or dose changes were [facility] per the patient's referring provider. A copy of this encounter was shared with the provider.

## 2024-11-14 RX ORDER — KETOROLAC TROMETHAMINE 30 MG/ML
1 INJECTION, SOLUTION INTRAMUSCULAR; INTRAVENOUS CONTINUOUS
Qty: 1 EACH | Refills: 0 | Status: SHIPPED | OUTPATIENT
Start: 2024-11-14

## 2024-11-14 RX ORDER — HYDROCHLOROTHIAZIDE 12.5 MG/1
CAPSULE ORAL
Qty: 2 EACH | Refills: 5 | Status: SHIPPED | OUTPATIENT
Start: 2024-11-14

## 2024-11-18 ENCOUNTER — OFFICE VISIT (OUTPATIENT)
Dept: ORTHOPEDICS | Facility: CLINIC | Age: 69
End: 2024-11-18
Payer: MEDICARE

## 2024-11-18 DIAGNOSIS — E11.49 TYPE II OR UNSPECIFIED TYPE DIABETES MELLITUS WITH NEUROLOGICAL MANIFESTATIONS, UNCONTROLLED(250.62) (H): ICD-10-CM

## 2024-11-18 DIAGNOSIS — M21.41 PES PLANUS OF BOTH FEET: ICD-10-CM

## 2024-11-18 DIAGNOSIS — E11.65 TYPE II OR UNSPECIFIED TYPE DIABETES MELLITUS WITH NEUROLOGICAL MANIFESTATIONS, UNCONTROLLED(250.62) (H): ICD-10-CM

## 2024-11-18 DIAGNOSIS — B35.3 TINEA PEDIS OF BOTH FEET: ICD-10-CM

## 2024-11-18 DIAGNOSIS — M21.42 PES PLANUS OF BOTH FEET: ICD-10-CM

## 2024-11-18 DIAGNOSIS — B35.1 OM (ONYCHOMYCOSIS): Primary | ICD-10-CM

## 2024-11-18 RX ORDER — KETOCONAZOLE 20 MG/G
CREAM TOPICAL DAILY
Qty: 90 G | Refills: 11 | Status: SHIPPED | OUTPATIENT
Start: 2024-11-18

## 2024-11-18 NOTE — PROGRESS NOTES
Chief Complaint   Patient presents with    Follow Up     Diabetic foot care. Toe nail trimming.             Allergies   Allergen Reactions    Nkda [No Known Drug Allergy]          Subjective: Radha is a 69 year old female who presents to the clinic today for a diabetic foot exam and management. Relates that she has no new LE complaints today. She does have diabetic shoes.   Her diabetes is being managed by Dr. De Luna.  She last saw them on 7/29/24.     Objective  Hemoglobin A1C   Date Value Ref Range Status   06/24/2024 11.2 (H) <5.7 % Final     Comment:     Normal <5.7%   Prediabetes 5.7-6.4%    Diabetes 6.5% or higher     Note: Adopted from ADA consensus guidelines.   01/16/2023 9.4 (H) <5.7 % Final     Comment:     Normal <5.7%   Prediabetes 5.7-6.4%    Diabetes 6.5% or higher     Note: Adopted from ADA consensus guidelines.   04/04/2022 8.6 (H) 0.0 - 5.6 % Final     Comment:     Normal <5.7%   Prediabetes 5.7-6.4%    Diabetes 6.5% or higher     Note: Adopted from ADA consensus guidelines.   04/12/2021 8.9 (H) 0 - 5.6 % Final     Comment:     Normal <5.7% Prediabetes 5.7-6.4%  Diabetes 6.5% or higher - adopted from ADA   consensus guidelines.     01/18/2021 8.4 (H) 0 - 5.6 % Final     Comment:     Normal <5.7% Prediabetes 5.7-6.4%  Diabetes 6.5% or higher - adopted from ADA   consensus guidelines.     09/28/2020 9.0 (H) 0 - 5.6 % Final     Comment:     Normal <5.7% Prediabetes 5.7-6.4%  Diabetes 6.5% or higher - adopted from ADA   consensus guidelines.       Hemoglobin A1C POCT   Date Value Ref Range Status   01/18/2018 9.6 (A) 4.3 - 6 % Final   10/18/2017 9.5 (A) 4.3 - 6 % Final   07/19/2017 9.7 (A) 4.3 - 6 % Final         PT and DP pulses are not palpable bilaterally. CRT is 4 seconds. Diminished pedal hair.   Gross sensation is diminished, as well as protective sensation bilaterally.   Equinus is noted bilaterally.   Nails thickened, brittle, discolored, with subungual debris bilaterally. No open lesions are  noted. Vesicular rash on a red base in the distribution of a no-show sock to BL feet.     Assessment: DM2 with neuropathy.  Onychomycosis.   Xerosis       Plan:  - Pt seen and evaluated.  - Nails debrided x 10.  - Rx for tinea pedis - ketoconazole.   - Order for diabetic shoes.   - See again in 4 months.

## 2024-11-18 NOTE — LETTER
11/18/2024      Radha Baca  [address] [address]      Dear Colleague,    Thank you for referring your patient, Radha Baca, to the [facility] [place]. Please see a copy of my visit note below.    Chief Complaint   Patient presents with     Follow Up     Diabetic foot care. Toe nail trimming.             Allergies   Allergen Reactions     Nkda [No Known Drug Allergy]          Subjective: Radha is a 69 year old female who presents to the clinic today for a diabetic foot exam and management. Relates that she has no new LE complaints today. She does have diabetic shoes.   Her diabetes is being managed by Dr. De Luna.  She last saw them on 7/29/24.     Objective  Hemoglobin A1C   Date Value Ref Range Status   06/24/2024 11.2 (H) <5.7 % Final     Comment:     Normal <5.7%   Prediabetes 5.7-6.4%    Diabetes 6.5% or higher     Note: Adopted from ADA consensus guidelines.   01/16/2023 9.4 (H) <5.7 % Final     Comment:     Normal <5.7%   Prediabetes 5.7-6.4%    Diabetes 6.5% or higher     Note: Adopted from ADA consensus guidelines.   04/04/2022 8.6 (H) 0.0 - 5.6 % Final     Comment:     Normal <5.7%   Prediabetes 5.7-6.4%    Diabetes 6.5% or higher     Note: Adopted from ADA consensus guidelines.   04/12/2021 8.9 (H) 0 - 5.6 % Final     Comment:     Normal <5.7% Prediabetes 5.7-6.4%  Diabetes 6.5% or higher - adopted from ADA   consensus guidelines.     01/18/2021 8.4 (H) 0 - 5.6 % Final     Comment:     Normal <5.7% Prediabetes 5.7-6.4%  Diabetes 6.5% or higher - adopted from ADA   consensus guidelines.     09/28/2020 9.0 (H) 0 - 5.6 % Final     Comment:     Normal <5.7% Prediabetes 5.7-6.4%  Diabetes 6.5% or higher - adopted from ADA   consensus guidelines.       Hemoglobin A1C POCT   Date Value Ref Range Status   01/18/2018 9.6 (A) 4.3 - 6 % Final   10/18/2017 9.5 (A) 4.3 - 6 % Final   07/19/2017 9.7 (A) 4.3 - 6 % Final         PT and DP pulses are  not palpable bilaterally. CRT is 4 seconds. Diminished pedal hair.   Gross sensation is diminished, as well as protective sensation bilaterally.   Equinus is noted bilaterally.   Nails thickened, brittle, discolored, with subungual debris bilaterally. No open lesions are noted. Vesicular rash on a red base in the distribution of a no-show sock to BL feet.     Assessment: DM2 with neuropathy.  Onychomycosis.   Xerosis       Plan:  - Pt seen and evaluated.  - Nails debrided x 10.  - Rx for tinea pedis - ketoconazole.   - Order for diabetic shoes.   - See again in 4 months.      Again, thank you for allowing me to participate in the care of your patient.        Sincerely,        Jerrell Austin DPM

## 2024-11-19 ENCOUNTER — TELEPHONE (OUTPATIENT)
Dept: ENDOCRINOLOGY | Facility: CLINIC | Age: 69
End: 2024-11-19
Payer: MEDICARE

## 2024-11-19 DIAGNOSIS — E11.8 TYPE 2 DIABETES MELLITUS WITH COMPLICATION (H): ICD-10-CM

## 2024-11-19 RX ORDER — KETOROLAC TROMETHAMINE 30 MG/ML
1 INJECTION, SOLUTION INTRAMUSCULAR; INTRAVENOUS SEE ADMIN INSTRUCTIONS
Qty: 1 EACH | Refills: 0 | Status: SHIPPED | OUTPATIENT
Start: 2024-11-19

## 2024-11-19 RX ORDER — HYDROCHLOROTHIAZIDE 12.5 MG/1
CAPSULE ORAL
Qty: 6 EACH | Refills: 1 | Status: SHIPPED | OUTPATIENT
Start: 2024-11-19

## 2024-11-19 NOTE — TELEPHONE ENCOUNTER
Updated Freestyle Shalom 3+ sensor Rx and reader for Medicare compliance.     Willian Slaughter, PharmD  [facility]

## 2024-11-20 ENCOUNTER — DOCUMENTATION ONLY (OUTPATIENT)
Dept: FAMILY MEDICINE | Facility: CLINIC | Age: 69
End: 2024-11-20
Payer: MEDICARE

## 2024-11-20 ENCOUNTER — TELEPHONE (OUTPATIENT)
Dept: EDUCATION SERVICES | Facility: CLINIC | Age: 69
End: 2024-11-20
Payer: MEDICARE

## 2024-11-20 NOTE — PROGRESS NOTES
Medicare requires that the MD/DO treating the patient for diabetes answers all of the questions and signs the pended order for the patient to qualify for diabetic shoes. Once the order is completed, please route the encounter back to sender.    Claudia Szymanski

## 2024-11-20 NOTE — TELEPHONE ENCOUNTER
Hello!    This message is to notify you that the Diabetes Care Services team has completed their benefit check for this patient's Freestyle Shalom 3 Sensor PLUS and [place]. We have notified the patient of their approval. If you have any questions, please let us know!    Thank you!    Diabetes Care Services Team  [facility]  [address] [place] [address]  Phone: [phone]  Fax; [phone]

## 2024-11-27 DIAGNOSIS — E11.8 TYPE 2 DIABETES MELLITUS WITH COMPLICATION (H): ICD-10-CM

## 2024-11-27 RX ORDER — INSULIN LISPRO 100 [IU]/ML
INJECTION, SOLUTION INTRAVENOUS; SUBCUTANEOUS
Qty: 75 ML | Refills: 3 | Status: SHIPPED | OUTPATIENT
Start: 2024-11-27

## 2024-11-27 NOTE — TELEPHONE ENCOUNTER
HUMALOG KWIKPEN 100 UNIT/ML soln         Last Written Prescription Date:  11/13/23  Last Fill Quantity: 75 ml,   # refills: 3  Last Office Visit : 7/29/24  Future Office visit:  3/17/25    Routing refill request to provider for review/approval because:  Insulin and insulin pump supplies - refilled per Endocrine clinic.

## 2024-12-05 ENCOUNTER — ANCILLARY PROCEDURE (OUTPATIENT)
Dept: MAMMOGRAPHY | Facility: CLINIC | Age: 69
End: 2024-12-05
Attending: FAMILY MEDICINE
Payer: MEDICARE

## 2024-12-05 DIAGNOSIS — Z12.31 VISIT FOR SCREENING MAMMOGRAM: ICD-10-CM

## 2024-12-05 PROCEDURE — 77067 SCR MAMMO BI INCL CAD: CPT | Performed by: RADIOLOGY

## 2024-12-05 PROCEDURE — 77063 BREAST TOMOSYNTHESIS BI: CPT | Performed by: RADIOLOGY

## 2025-01-08 ENCOUNTER — TELEPHONE (OUTPATIENT)
Dept: ENDOCRINOLOGY | Facility: CLINIC | Age: 70
End: 2025-01-08
Payer: MEDICARE

## 2025-01-08 NOTE — TELEPHONE ENCOUNTER
Hello,     This is [facility] regarding for this patient's Medicare renewal of the Freestyle Shalom 3 Plus.     We are requesting Keturah De Luna to attest to/cosign the diabetes education notes from 11/12/24 per Medicare's guidelines.      If you have any questions please call us at [phone] or send a message to the [employer] pool     Thank you!     [facility]  Diabetes Care Services Team  [address] [place] [address]  Provider Phone: [phone]  Patient Line: [phone]  Fax: [phone]  E-mail: [email or web address]

## 2025-01-22 ENCOUNTER — OFFICE VISIT (OUTPATIENT)
Dept: FAMILY MEDICINE | Facility: CLINIC | Age: 70
End: 2025-01-22
Payer: MEDICARE

## 2025-01-22 VITALS
RESPIRATION RATE: 14 BRPM | SYSTOLIC BLOOD PRESSURE: 132 MMHG | HEIGHT: 63 IN | BODY MASS INDEX: 45.09 KG/M2 | OXYGEN SATURATION: 97 % | WEIGHT: 254.5 LBS | HEART RATE: 80 BPM | TEMPERATURE: 98 F | DIASTOLIC BLOOD PRESSURE: 78 MMHG

## 2025-01-22 DIAGNOSIS — Z86.0100 HISTORY OF COLONIC POLYPS: ICD-10-CM

## 2025-01-22 DIAGNOSIS — Z23 NEED FOR TDAP VACCINATION: ICD-10-CM

## 2025-01-22 DIAGNOSIS — L65.9 ALOPECIA: ICD-10-CM

## 2025-01-22 DIAGNOSIS — I10 ESSENTIAL HYPERTENSION: ICD-10-CM

## 2025-01-22 DIAGNOSIS — K59.00 CONSTIPATION, UNSPECIFIED CONSTIPATION TYPE: ICD-10-CM

## 2025-01-22 DIAGNOSIS — F41.1 GENERALIZED ANXIETY DISORDER: Primary | ICD-10-CM

## 2025-01-23 ENCOUNTER — MYC MEDICAL ADVICE (OUTPATIENT)
Dept: FAMILY MEDICINE | Facility: CLINIC | Age: 70
End: 2025-01-23
Payer: MEDICARE

## 2025-01-23 NOTE — PROGRESS NOTES
"Dionicio was seen today for medication follow-up and results.    Diagnoses and all orders for this visit:    Generalized anxiety disorder.  Stable, continue sertraline.    Need for Tdap vaccination.  Currently out of Tdap vaccine, will address at annual wellness visit which is scheduled for next week.    Essential hypertension.  At goal on lisinopril and hydrochlorothiazide.  Continue.    Alopecia.  She has alopecia areata, which has been present for many years but has been worsening of late.  I am unsure of the possible contribution of some of her medications, all of which are indicated for her other chronic illnesses.  She would like to see a specialist about this and I will refer to dermatology for assessment and recommendations.  -     Adult Dermatology  Referral; Future    History of colonic polyps.  She had many questions about her most recent colonoscopy that was performed in October.  I went over the report of the colonoscopy, which found three 3 mm polyps and removed them.  Recommended that she consider a last colonoscopy between the ages of 76 and 79.    Constipation, unspecified constipation type.  She is concerned about using MiraLAX chronically but has suffered from constipation for many years.  I recommended that she continue it to manage her symptoms.        The longitudinal plan of care for the diagnosis(es)/condition(s) as documented were addressed during this visit. Due to the added complexity in care, I will continue to support Dionicio in the subsequent management and with ongoing continuity of care.        Subjective     Medication Follow-up and Results (10/21/2024 colonoscopy)        HPI     She is here to follow-up on multiple chronic conditions.  Mostly she wants to discuss several ongoing issues of which she is concerned.      Review of Systems         Objective    /78   Pulse 80   Temp 98  F (36.7  C) (Temporal)   Resp 14   Ht 1.6 m (5' 3\")   Wt 115.4 kg (254 lb 8 oz)   SpO2 97%  "  BMI 45.08 kg/m    Body mass index is 45.08 kg/m .  Physical Exam  Constitutional:       General: She is not in acute distress.     Appearance: She is obese. She is not ill-appearing.   HENT:      Head:      Comments: Alopecia  Neurological:      Mental Status: She is alert.   Psychiatric:         Mood and Affect: Mood normal.         Behavior: Behavior normal.            Transferred Records on 09/26/2024   Component Date Value Ref Range Status    RETINOPATHY 09/26/2024 POSITIVE (A)   Final

## 2025-01-26 ENCOUNTER — HEALTH MAINTENANCE LETTER (OUTPATIENT)
Age: 70
End: 2025-01-26

## 2025-02-06 ENCOUNTER — MYC MEDICAL ADVICE (OUTPATIENT)
Dept: EDUCATION SERVICES | Facility: CLINIC | Age: 70
End: 2025-02-06
Payer: MEDICARE

## 2025-02-10 NOTE — TELEPHONE ENCOUNTER
Spoke with patient regarding insurance for hearing aids. She would like time to call her insurance again and speak with them directly as the clinic cannot guarantee coverage.    She was in agreement to cancel today's appointment and switch the 9/4/20 appointment to a hearing aid consultation should she desire to proceed with hearing aids in this clinic.      Bradley Valdez  Audiologist  [place] License  #[id number]     Patient

## 2025-02-11 DIAGNOSIS — E11.9 DIABETES MELLITUS, TYPE 2 (H): ICD-10-CM

## 2025-02-13 NOTE — TELEPHONE ENCOUNTER
Diabetes Education Note:     Called Dionicio to check in.  She is wearing the Shalom 3 and says that she likes wearing it.  It is apparent from her report that her glucoses are all over target with an average glucose of 218 for the past 2 weeks.  She had left a message wanting to get scheduled with me and it looks like we have an appointment on 3/28.  She is trying to get an in-person appointment with Dr. De Luna so she can get a pair of diabetic shoes.  She states that it is a Medicare requirement that she see an endocrinologist (not a podiatrist or a mid-level provider) in person before the shoes will be approved.  Her appointment with Dr. De Luna is on 3/17 but it is a virtual appointment.  She says that it's difficult for her to drive to [place], so she is wondering what to do.  She also has an appointment with Dr. Austin, her podiatrist in- person on 3/17 as well.  She is also wondering if she can get labs drawn prior to her appointment with Dr. De Luna--will message Dr. De Luna with this.  It appears that she will need further intervention to get her glucose in better control.  She current regimen:     Lantus insulin:  84 units daily (would suggest changing to Toujeo U-300 to cut down on the volume and improve absorption.    Novolog insulin: Dosing not clarified.  Uses up to 80 units per day to cover food and correction.   We had our past appointment in November, with a plan to follow up in December, however she cancelled that appointment.

## 2025-02-14 NOTE — TELEPHONE ENCOUNTER
Addendum:  Dionicio has not expressed any interest in using an insulin pump, when I've brought it up with her, but I think it could really help her get in better control.  She has been experiencing a lot of pain, and feeling that much of her higher glucoses are due to that.      Peri Salcido, GIACOMON, RN, [facility]  Certified Diabetes Care and   [facility]   [facility]  Phone [phone]  Hours:  Tuesday:       In Clinic and Virtual Visits  8am to 4pm              Wednesday:  In Clinic and Virtual Visits  8am to 4pm               Thursday:     Virtural  Visits only             8am to 4pm

## 2025-02-22 ASSESSMENT — SLEEP AND FATIGUE QUESTIONNAIRES
HOW LIKELY ARE YOU TO NOD OFF OR FALL ASLEEP WHILE SITTING AND TALKING TO SOMEONE: WOULD NEVER DOZE
HOW LIKELY ARE YOU TO NOD OFF OR FALL ASLEEP WHILE SITTING QUIETLY AFTER LUNCH WITHOUT ALCOHOL: WOULD NEVER DOZE
HOW LIKELY ARE YOU TO NOD OFF OR FALL ASLEEP WHILE LYING DOWN TO REST IN THE AFTERNOON WHEN CIRCUMSTANCES PERMIT: SLIGHT CHANCE OF DOZING
HOW LIKELY ARE YOU TO NOD OFF OR FALL ASLEEP WHEN YOU ARE A PASSENGER IN A CAR FOR AN HOUR WITHOUT A BREAK: MODERATE CHANCE OF DOZING
HOW LIKELY ARE YOU TO NOD OFF OR FALL ASLEEP IN A CAR, WHILE STOPPED FOR A FEW MINUTES IN TRAFFIC: WOULD NEVER DOZE
HOW LIKELY ARE YOU TO NOD OFF OR FALL ASLEEP WHILE WATCHING TV: SLIGHT CHANCE OF DOZING
HOW LIKELY ARE YOU TO NOD OFF OR FALL ASLEEP WHILE SITTING INACTIVE IN A PUBLIC PLACE: SLIGHT CHANCE OF DOZING
HOW LIKELY ARE YOU TO NOD OFF OR FALL ASLEEP WHILE SITTING AND READING: SLIGHT CHANCE OF DOZING

## 2025-02-23 ENCOUNTER — HEALTH MAINTENANCE LETTER (OUTPATIENT)
Age: 70
End: 2025-02-23

## 2025-02-26 ENCOUNTER — VIRTUAL VISIT (OUTPATIENT)
Dept: SLEEP MEDICINE | Facility: CLINIC | Age: 70
End: 2025-02-26
Payer: MEDICARE

## 2025-02-26 DIAGNOSIS — F51.5 NIGHTMARES: ICD-10-CM

## 2025-02-26 DIAGNOSIS — E66.813 CLASS 3 SEVERE OBESITY DUE TO EXCESS CALORIES WITH SERIOUS COMORBIDITY AND BODY MASS INDEX (BMI) OF 40.0 TO 44.9 IN ADULT (H): ICD-10-CM

## 2025-02-26 DIAGNOSIS — E66.01 CLASS 3 SEVERE OBESITY DUE TO EXCESS CALORIES WITH SERIOUS COMORBIDITY AND BODY MASS INDEX (BMI) OF 40.0 TO 44.9 IN ADULT (H): ICD-10-CM

## 2025-02-26 DIAGNOSIS — G47.33 OSA (OBSTRUCTIVE SLEEP APNEA): Primary | ICD-10-CM

## 2025-02-26 PROBLEM — G47.00 INSOMNIA, UNSPECIFIED TYPE: Status: RESOLVED | Noted: 2021-07-08 | Resolved: 2025-02-26

## 2025-02-26 PROCEDURE — 1125F AMNT PAIN NOTED PAIN PRSNT: CPT | Mod: 95 | Performed by: INTERNAL MEDICINE

## 2025-02-26 PROCEDURE — 98006 SYNCH AUDIO-VIDEO EST MOD 30: CPT | Performed by: INTERNAL MEDICINE

## 2025-02-26 ASSESSMENT — PAIN SCALES - GENERAL: PAINLEVEL_OUTOF10: SEVERE PAIN (8)

## 2025-02-26 NOTE — NURSING NOTE
Current patient location: [address] [address]    Is the patient currently in the state of [place]? YES    Visit mode: VIDEO    If the visit is dropped, the patient can be reconnected by:VIDEO VISIT: Send to e-mail at: [email or web address]    Will anyone else be joining the visit? NO  (If patient encounters technical issues they should call [phone]    Are changes needed to the allergy or medication list? No    Are refills needed on medications prescribed by this physician? NO    Rooming Documentation:  Questionnaire(s) completed    Pt reported not taking vitals within 24 hrs    Pt reported pain on left side, maybe from sleeping    Pt refused - said she answered them in e-checkin    Reason for visit: RECHECK    [employer] WILDEF

## 2025-02-26 NOTE — PATIENT INSTRUCTIONS
Your BMI is There is no height or weight on file to calculate BMI.    Being overweight does not necessarily mean you will have health consequences.  Those who have BMI over 30 or over 27 with existing medical conditions carries greater risk.     Weight loss decreases severity of sleep apnea in most people with obesity. For those with mild obesity who have developed snoring with weight gain, even 15-30 pound weight loss can improve and occasionally milder eliminate sleep apnea.  Structured and life-long dietary and health habits are necessary to lose weight and keep healthier weight levels.     The Comprehensive Weight loss program offers all aspects of weight loss strategies including two Non-Surgical Weight Loss Programs: Medical Weight Management and our 24 Week Healthy Lifestyle Program:  Medical Weight Management: You will meet with a Medical Weight Management Provider, as well as a Registered Dietician. The program may include medication therapy, dietary education, recommended exercise and physical therapy programs, monthly support group meetings, and possible psychological counseling. Follow up visits with the provider or dietician are scheduled based on your progress and needs.  24 Week Healthy Lifestyle Program: This unique program is designed to give you the support of weekly appointments and activities thru a 24-week period. It may include all of the components of the basic program (above), with the addition of 11 individual Health  Visits, 24-week access to the [facility] website for over 700 online classes, and monthly support group meetings. This program has an out-of-pocket expense of $499 to cover the items that can not be billed to insurance (health coaches and [facility] access), and is non-refundable/non-transferable (you may be able to use a Health Savings Account; ask your HSA provider). There may be an optional meal replacement plan prescribed as well.     Medication therapy has been approved  for the treatment of sleep apnea: The FDA approved tirzepatide for moderate to severe sleep apnea (apnea-hypopnea index greater than or equal to 15) in patients with BMI of greater than or equal to 30, or greater than or equal to 27 with at least 1 weight-related condition such as hypertension or dyslipidemia.    Surgical management achieves meaningful long-term weight loss and improvement in health risks in most patients with more severe obesity.     For general sleep health questions:   https://www.thensf.org/sleep-health-topics/  http://sleepeducation.org    Medicare and Medicaid patients starting on CPAP or biPAP on or after 5/12/2023  Medicare patients should schedule an IN PERSON CLINIC appointment to provide your CPAP/biPAP usage data to a provider within 90 days of starting CPAP    For new [employer] devices, sign up for device siria to monitor your device for your followup visits  We encourage you to utilize the [employer] siria or website ([employer]) to monitor your therapy progress and share the data with your healthcare team when you discuss your sleep apnea.                                                      Know your equipment:  CPAP is continuous positive airway pressure that prevents obstructive sleep apnea by keeping the throat from collapsing while you are sleeping. In most cases, the device is  smart  and can slowly self-adjusts if your throat collapses and keeps a record every day of how well you are treated-this information is available to you and your care team.  BPAP is bilevel positive airway pressure that keeps your throat open and also assists each breath with a pressure boost to maintain adequate breathing.  Special kinds of BPAP are used in patients who have inadequate breathing from lung or heart disease. In most cases, the device is  smart  and can slowly self-adjusts to assist breathing. Like CPAP, the device keeps a record of how well you are treated.  Your mask is your  connection to the device. You get to choose what feels most comfortable and the staff will help to make sure if fits.     *Masks with magnets:  Updated Contraindications  Masks with magnetic components are contraindicated for use by patients where they, or anyone in close physical contact while using the mask, have the following:   Active medical implants that interact with magnets (i.e., pacemakers, implantable cardioverter defibrillators (ICD), neurostimulators, cerebrospinal fluid (CSF) shunts, insulin/infusion pumps)   Metallic implants/objects containing ferromagnetic material (i.e., aneurysm clips/flow disruption devices, embolic coils, stents, valves, electrodes, implants to restore hearing or balance with implanted magnets, ocular implants, metallic splinters in the eye)  Updated Warning  Keep the mask magnets at a safe distance of at least 6 inches (150 mm) away from implants or medical devices that may be adversely affected by magnetic interference. This warning applies to you or anyone in close physical contact with your mask. The magnets are in the frame and lower headgear clips, with a magnetic field strength of up to 400mT. When worn, they connect to secure the mask but may inadvertently detach while asleep.  Implants/medical devices, including those listed within contraindications, may be adversely affected if they change function under external magnetic fields or contain ferromagnetic materials that attract/repel to magnetic fields (some metallic implants, e.g., contact lenses with metal, dental implants, metallic cranial plates, screws, estephania hole covers, and bone substitute devices). Consult your physician and  of your implant / other medical device for information on the potential adverse effects of magnetic fields.      Continue PAP therapy every night, for all hours that you are sleeping (including naps.)  As always, try to get at least 8 hours of sleep or more each day, keep a  regular sleep schedule, and avoid sleep deprivation. Avoid alcohol.  Reasons that you might need a change to your pressure therapy would be weight gain or loss, waking having inadvertently removed your PAP overnight, having previously felt refreshed by sleep with CPAP use and now waking un-refreshed, and return of daytime sleepiness. Also, the development of new medical problems  (such as heart failure, stroke, medications such as narcotics) can sometimes affect breathing at night and change your PAP therapy needs.  Please bring PAP with you if you are hospitalized.  If anticipating surgery be sure to discuss with your surgeon that you have sleep apnea and use PAP therapy.    Maintain your equipment as recommended which includes routine cleaning and replacement of supplies.      Call DME for any questions regarding supplies or maintenance.    [facility], [facility] [phone]    Do not drive on engage in potentially dangerous activities if feeling sleepy.    Please follow up in sleep clinic again in 12 months.        Tips for your PAP use-    Mask fitting tips  Mask fitting exercise:    To improve your mask seal and your mobility at night, put mask on and secure in place.  Lie down in bed with full pressure and roll to one side, adjust headgear while in that position to eliminate any leaks. Repeat process rolling to other side.     The mask seal does not have to be perfect:   CPAP machines are designed to make up for small leaks. However, you will not tolerate leaks blowing in your eyes so you will need to adjust.   Any leak should only be near or at the bottom of the mask.  We expect your mask to leak slightly at night.    Do not over-tighten the headgear straps, tighter IS NOT better, we expect minimal leak.    First try re-positioning the mask or headgear before tightening the headgear straps.  Mask leaks are expected due to changing sleeping positions. Try pulling  the mask away from your skin allowing the cushion to re-inflate will minimize the leak.  If you struggle for a good fit, try turning the CPAP off and then readjust the mask by pulling it away from your face and then turning back on the CPAP.        Humidifier tips  Humidifiers can be adjusted to increase or decrease the amount of moisture according to your comfort level. You may need to adjust this frequently at first, but then might only change it with seasonal weather changes.     Try INCREASING the humidity if:  You experience a dry, irritated nasal passage or throat.  You have a runny, drippy nose or sneezing fits after using CPAP.  You experience nasal congestion during or after CPAP use.    Try DECREASING the humidity if:  You have excessive condensation or  rain out  in the tubing or mask.  Otherwise keep the tubing warm during the night by running it underneath the blankets or pillow.      Clinic visit after initial PAP set-up   Bring your equipment with you to your 5-8 week follow up clinic visit.  We will be extracting your data from the machine if not available from the cloud based modem.        Travel  Always take your equipment with you when you travel.  If you fly with your equipment bring it on with you as a carry on.  Medical equipment does not count as a carry on.    If you travel international the machines take 110-240v.  The only adapter needed is the adapter that will fit into the receptacle (outlet).    You may also want to bring an extension cord as many hotel rooms have limited outlets at the bedside.  Do not travel with water in your humidifier chamber.     Cleaning and Maintenance Guidelines    Equipment Frequency Cleaning Method   Mask First Day    Daily      Weekly Soak mask in hot soapy water for 30 minutes, rinse and air dry.  Wipe nasal cushion with a hot soapy (Ivory, baby shampoo) cloth and rinse.  Baby wipes may also be used.  Do not use anti-bacterial soaps,Ainsley  liquid soap,  rubbing alcohol, bleach or ammonia.  Wash frame in hot soapy water (Ivory, baby shampoo) rinse and let air dry   Headgear Biweekly Wash in hot soapy water, rinse and air dry   Reusable Gray Filter Weekly Wash in hot soapy water, rinse, put in towel squeeze moisture out, let air dry   Disposable White Filter Check Weekly Replace when brown or gray in color; at least every 2 to 3 months   Humidifier Chamber Daily    Weekly Empty distilled water from humidifier and let air dry    Hand wash in hot soapy water, rinse and air dry   Tubing Weekly Wash in hot soapy water, rinse and let air dry   Mask, Tubing and Humidifier Chamber As needed Disinfect: Soak in 1 part distilled white vinegar to 3 parts hot water for 30 minutes, rinse well and air dry  Not the material headgear        MASK AND SUPPLY REORDERING and EQUIPMENT NEEDS through your DME and per your insurance  Reminder: Most insurance companies will allow for a new mask, headgear, tubing, and reusable gray filter every six months.  Disposable white ultra-fine filters are covered monthly.      HOME AND SAFETY INSTRUCTIONS  Do not use frayed or cracked electrical cords, multi plug adaptors, or switched receptacles  Do not immerse electrical equipment into water  Assure that electrical cords do not become a tripping hazard

## 2025-02-26 NOTE — PROGRESS NOTES
Virtual Visit Details    Type of service:  Video Visit   Video Start Time: 11:29 AM  Video End Time:11:44 AM    Originating Location (pt. Location): Home    Distant Location (provider location):  Off-site  Platform used for Video Visit: Nadiya    Chief complaint: Follow-up obstructive sleep apnea    Last sleep medicine visit 9/27/2023    History of Present Illness: 69-year-old female with history of obesity, hypertension, type 2 diabetes, anxiety disorder, nightmares.  She reports today that she is going to be ending her work as an  or  at the end of this week.  It is no longer worth the stress.  She is typically going to bed around 11 PM but sometimes as late as 1 to 1:30 AM.  Usually takes her about 10 minutes to fall asleep.  She starts her routine with medications as early as 9 PM.  She does admit she spends too much time on her phone playing games including before bed sometimes.  She has had less bad dreams of late.  But she has had more leg cramping.  She also had a some kind of upper respiratory tract infection which made it a little harder to use the CPAP.  She found that by adjusting the head of the bed upwards and using some pillows she was able to tolerate the CPAP well.    She is working with a nutrition educator to work on weight loss.  She did try Ozempic in the past but had a lot of gastrointestinal side effects.    [place] Sleepiness Scale  Total score - [place]: (Patient-Rptd) 6 (2/22/2025  6:43 PM)   (Less than 10 normal)    Insomnia Severity Scale  MIHAELA Total Score: (Patient-Rptd) 6  (normal 0-7, mild 8-14, moderate 15-21, severe 22-28)    Past Medical History:   Diagnosis Date    Abnormal Pap smear     Anxiety     Arthritis of knee 04/04/2013    Arthritis of shoulder region, right 04/18/2014    Back injury     Breast disorder     Chronic constipation     Chronic diarrhea     Depressive disorder     Diabetes (H)     Fecal incontinence     Finger pain 04/02/2015    Fracture broke  L 5th pinky finger due to fall  1/2015    Glaucoma (increased eye pressure)     Head injury 08/06/2016    Headache(784.0)     decreased with mouth guard use.    History of blood transfusion 8/2011 & 4/2013    Hypercholesteremia     Hypertension     Low back pain     Menarche age 9+    cycles q mo x 4-5 d    Neck injuries     Nonsenile cataract IO implants: L-8/2013; R-1/2011    Pain in knee joint     LEFT    Right bundle branch block     per H/P    Sleep apnea     Info on file    SNHL (sensorineural hearing loss)     Tinnitus     I have reported ringing in ears. not sure of dates    Umbilical hernia without mention of obstruction or gangrene 08/2014    Vision disorder Detached Retina 10/2009       Allergies   Allergen Reactions    Nkda [No Known Drug Allergy]        Current Outpatient Medications   Medication Sig Dispense Refill    Acetaminophen (TYLENOL PO) Take by mouth as needed for mild pain or fever      amoxicillin (AMOXIL) 500 MG tablet TAKE 4 TABLETS BY MOUTH 1 HOUR BEFORE DENTAL PROCEDURES 4 tablet 4    Ascorbic Acid (VITAMIN C PO) Take 2,000 mg by mouth 2 times daily       aspirin 81 MG tablet 1 tab daily 90 tablet 3    atorvastatin (LIPITOR) 20 MG tablet Take 1 tablet (20 mg) by mouth daily 90 tablet 3    B Complex Vitamins (VITAMIN  B COMPLEX) CAPS Take 1 tablet by mouth daily       blood glucose (ONETOUCH VERIO IQ) test strip USE TO TEST BLOOD SUGARS UP TO THREE TIMES DAILY IN CASE OF CGM FAILURE 300 strip 3    blood glucose calibration (ONETOUCH VERIO) solution Use to calibrate blood glucose monitor as needed as directed.Level 3 solution 1 each 11    blood glucose monitoring (NO BRAND SPECIFIED) meter device kit Use to test blood sugar 3x times daily as directed. Any covered brand. One touch preferred. 1 kit 0    calcium-vitamin D (CALTRATE) 600-400 MG-UNIT per tablet Take 1 tablet by mouth daily.      cholecalciferol (VITAMIN D) 1000 UNIT tablet Take 1 tablet by mouth daily. 100 tablet 3     Continuous Glucose  (FREESTYLE JANET 3 READER) ANGLE 1 each See Admin Instructions. Use to read blood sugars per 's instructions 1 each 0    Continuous Glucose Sensor (FREESTYLE JANET 3 PLUS SENSOR) MISC Change every 15 days. 6 each 1    cyanocobalamin (VITAMIN B-12) 1000 MCG tablet       cycloSPORINE (RESTASIS) 0.05 % ophthalmic emulsion       HUMALOG KWIKPEN 100 UNIT/ML soln USE FOR CARB COUNTING AND MEALS, APPROXIMATELY 70-80 UNITS DAILY. Lab draw needed. Call [facility] () to schedule. 75 mL 3    hypromellose-dextran (ARTIFICAL TEARS) 0.1-0.3 % ophthalmic solution       insulin glargine (LANTUS SOLOSTAR) 100 UNIT/ML pen INJECT SUBCUTANEOUSLY 84  UNITS IN THE MORNING 90 mL 3    insulin pen needle (B-D U/F) 31G X 8 MM miscellaneous US QIS FOR INSULIN ADMINISTRATION 400 each 2    ketoconazole (NIZORAL) 2 % external cream Apply topically daily. 90 g 11    latanoprost (XALATAN) 0.005 % ophthalmic solution Place 1 drop into both eyes At Bedtime      lisinopril-hydrochlorothiazide (ZESTORETIC) 20-12.5 MG tablet Take 1 tablet by mouth daily 90 tablet 3    metFORMIN (GLUCOPHAGE XR) 500 MG 24 hr tablet Take 2 tablets (1,000 mg) by mouth 2 times daily (with meals). 360 tablet 3    metFORMIN (GLUCOPHAGE) 500 MG tablet Take 2 tablets (1,000 mg) by mouth 2 times daily (with meals). 360 tablet 3    Multiple Vitamin (MULTIVITAMIN  S) CAPS Take 1 daily 90 capsule 3    Multiple Vitamins-Minerals (EYE-ZAKIYA PLUS LUTEIN PO)       Omega-3 Fatty Acids (OMEGA-3 FISH OIL PO) Take 1,000 mg by mouth daily      OneTouch Delica Lancets 33G MISC 4 Box. 4 times daily Test  Blood glucose 4 times daily  DX E11.8  ID # [id number] 360 each 3    polyethylene glycol (MIRALAX) 17 GM/Dose powder Take 1 capful by mouth daily as needed for constipation      sertraline (ZOLOFT) 50 MG tablet TAKE 1 TABLET(50 MG) BY MOUTH DAILY 90 tablet 3    timolol (TIMOPTIC) 0.5 % ophthalmic solution Place 1 drop into both eyes 2 times  daily       triamcinolone (ARISTOCORT HP) 0.5 % external cream Apply topically 2 times daily 15 g 4     Current Facility-Administered Medications   Medication Dose Route Frequency Provider Last Rate Last Admin    COVID-19 mRNA vaccine 12+y (COMIRNATY (PFIZER)) injection 30 mcg  30 mcg Intramuscular Once            Social History     Socioeconomic History    Marital status:      Spouse name: Not on file    Number of children: Not on file    Years of education: Not on file    Highest education level: Not on file   Occupational History    Occupation: Human Resources     Comment: between jobs now   Tobacco Use    Smoking status: Former     Current packs/day: 0.00     Types: Cigarettes     Passive exposure: Past    Smokeless tobacco: Never    Tobacco comments:     quit mid 1980s   Vaping Use    Vaping status: Never Used   Substance and Sexual Activity    Alcohol use: No    Drug use: No    Sexual activity: Not Currently     Partners: Male     Birth control/protection: Abstinence, Post-menopausal   Other Topics Concern     Service Not Asked    Blood Transfusions Yes     Comment: 2 units 2011    Caffeine Concern No     Comment: 2s    Occupational Exposure No    Hobby Hazards No    Sleep Concern No    Stress Concern No     Comment: rehab for R knee after replacement    Weight Concern Yes     Comment: working on wt loss    Special Diet Yes     Comment: Diabetic    Back Care No    Exercise No     Comment: water aerobics 35-40' 3-4 d & strength 3d/wk    Bike Helmet No    Seat Belt No    Self-Exams Not Asked    Parent/sibling w/ CABG, MI or angioplasty before 65F 55M? Not Asked   Social History Narrative    How much exercise per week? 3-4x swimming, aerobics    How much calcium per day? Supplement       How much caffeine per day? 2 cups coffee/ 1-2 can of diet soda    How much vitamin D per day? Supplement    Do you/your family wear seatbelts?  Yes    Do you/your family use safety helmets? No    Do you/your family  use sunscreen? No    Do you/your family keep firearms in the home? No    Do you/your family have a smoke detector(s)? Yes    Do you feel safe in your home? Yes    Has anyone ever touched you in an unwanted manner? No     Explain     See LEA Berman 11/26/2014    Reviewed Raul DEGROOT MA 11/22/2017     Social Drivers of Health     Financial Resource Strain: Low Risk  (1/28/2025)    Financial Resource Strain     Within the past 12 months, have you or your family members you live with been unable to get utilities (heat, electricity) when it was really needed?: No   Food Insecurity: Low Risk  (1/28/2025)    Food Insecurity     Within the past 12 months, did you worry that your food would run out before you got money to buy more?: No     Within the past 12 months, did the food you bought just not last and you didn t have money to get more?: No   Transportation Needs: Low Risk  (1/28/2025)    Transportation Needs     Within the past 12 months, has lack of transportation kept you from medical appointments, getting your medicines, non-medical meetings or appointments, work, or from getting things that you need?: No   Physical Activity: Patient Declined (1/28/2025)    Exercise Vital Sign     Days of Exercise per Week: Patient declined     Minutes of Exercise per Session: Patient declined   Stress: No Stress Concern Present (1/28/2025)    [employer] - Occupational Stress Questionnaire     Feeling of Stress : Only a little   Social Connections: Unknown (1/28/2025)    Social Connection and Isolation Panel [NHANES]     Frequency of Communication with Friends and Family: Not on file     Frequency of Social Gatherings with Friends and Family: Patient declined     Attends Congregation Services: Not on file     Active Member of Clubs or Organizations: Not on file     Attends Club or Organization Meetings: Not on file     Marital Status: Not on file   Interpersonal Safety: Low Risk  (10/8/2024)    Interpersonal  "Safety     Do you feel physically and emotionally safe where you currently live?: Yes     Within the past 12 months, have you been hit, slapped, kicked or otherwise physically hurt by someone?: No     Within the past 12 months, have you been humiliated or emotionally abused in other ways by your partner or ex-partner?: No   Housing Stability: Low Risk  (1/28/2025)    Housing Stability     Do you have housing? : Yes     Are you worried about losing your housing?: No       Family History   Problem Relation Age of Onset    Diabetes Father 55        DM II    Diabetes Brother 50        xs 2    Hypertension Sister 41        also B and M    Cancer Maternal Aunt         multiple myeloma    Hypertension Mother 79    Osteoporosis Mother     Memory loss Mother     Glaucoma Mother     Diabetes Brother     Hypertension Brother     Heart Murmur Brother     Glaucoma Brother     Hypertension Brother     Breast Cancer Cousin     Thyroid Disease Sister         Graves    Diabetes Sister         Graves    Cerebrovascular Disease Maternal Grandmother     Other - See Comments Sister         16 minths head injury    Other - See Comments Brother         MVA age 36    Cancer - colorectal No family hx of     Prostate Cancer No family hx of     Alcohol/Drug No family hx of     Melanoma No family hx of     Skin Cancer No family hx of            EXAM:  BP Readings from Last 1 Encounters:   01/22/25 132/78     Pulse Readings from Last 1 Encounters:   01/22/25 80     Wt Readings from Last 1 Encounters:   01/22/25 115.4 kg (254 lb 8 oz)     Ht Readings from Last 1 Encounters:   01/22/25 1.6 m (5' 3\")     Estimated body mass index is 45.08 kg/m  as calculated from the following:    Height as of 1/22/25: 1.6 m (5' 3\").    Weight as of 1/22/25: 115.4 kg (254 lb 8 oz).    Temp Readings from Last 1 Encounters:   01/22/25 98  F (36.7  C) (Temporal)      There were no vitals taken for this visit.  GENERAL: Alert and no distress  EYES: Eyes grossly normal " to inspection.  No discharge or erythema, or obvious scleral/conjunctival abnormalities.  RESP: No audible wheeze, cough, or visible cyanosis.    SKIN: Visible skin clear. No significant rash, abnormal pigmentation or lesions.  NEURO: Cranial nerves grossly intact.  Mentation and speech appropriate for age.  PSYCH: Appropriate affect, tone, and pace of words       Home Sleep Apnea Test 11/11/2019   Weight 248, BMI 43.2  AHI 46.8, supine AHI 63.6, lowest O2 saturation 62%   Time with oxygen saturation at or below 88% 80 minutes    [employer] AirSense 10 autoPAP download from 1/25/2025 to 2/23/2025 reviewed:  Per cent of days used greater than 4 Hours 100% (minimum goal greater than 70%)  Average use on days used: 7 hours 27 min  Settings: Min EPAP 12 cmH2O    Max EPAP 16 cmH2O  Pressures delivered 90/95th percentile for pressure 15.9 cmH2O  Average AHI 1.7 events per hour (goal less than 5)  Leak acceptable    TSH   Date Value Ref Range Status   03/04/2024 3.61 0.30 - 4.20 uIU/mL Final   04/04/2022 2.46 0.40 - 4.00 mU/L Final   04/12/2021 2.86 0.40 - 4.00 mU/L Final     Ferritin   Date Value Ref Range Status   03/23/2021 66 8 - 252 ng/mL Final      Lab Results   Component Value Date    A1C 11.2 06/24/2024    A1C 9.4 01/16/2023    A1C 8.6 04/04/2022    A1C 8.6 11/01/2021    A1C 8.8 07/12/2021    A1C 8.9 04/12/2021    A1C 8.4 01/18/2021    A1C 9.0 09/28/2020    A1C 10.2 03/02/2020    A1C 10.2 08/06/2019          ASSESSMENT:  69-year-old female with history of severe obstructive sleep apnea, obesity, type 2 diabetes, hypertension.  She is meeting compliance goals and getting good clinical benefit with the use of CPAP.  Ongoing treatment of obstructive sleep apnea is medically and heated.  Nightmares are improving, she is having leg cramps now it is affecting her sleep.    PLAN:  Orders generated for updated CPAP supplies.  No changes to her settings recommended.  Encouraged her to incorporate stretching routine before bed  to help prevent leg cramping.  Agree with adjusting the head of the bed upwards when needed.  Continue to use CPAP all night every night.  Continue efforts at weight management.  Given that she had significant side effects to Ozempic I do not think she would be a good candidate for Zepbound.  But she can discuss this with PCP if desired.  Follow-up in 1 year.    30 minutes spent by me on the date of the encounter doing chart review, history and exam, documentation and further activities per the note  The longitudinal plan of care for the diagnosis(es)/condition(s) as documented were addressed during this visit. Due to the added complexity in care, I will continue to support D[facility] in the subsequent management and with ongoing continuity of care.    Joanne Pisano M.D.  Pulmonary/Critical Care/Sleep Medicine    [facility] [place]   Floor [address], [address]   [address] [place] [address]. [place] [address]   Appointments: [phone]    The above note was dictated using voice recognition software and may include typographical errors. Please contact the author for any clarifications.

## 2025-03-03 DIAGNOSIS — F41.1 GAD (GENERALIZED ANXIETY DISORDER): ICD-10-CM

## 2025-03-04 NOTE — TELEPHONE ENCOUNTER
"Request for medication refill:    Medication Name: sertraline (ZOLOFT) 50 MG tablet     Providers if patient needs an appointment and you are willing to give a one month supply please refill for one month and  send a MyChart using \".SMILLIMITEDREFILL\" .Or route chart to \"[facility] \" . To call patient and inform of limited refill and providers request to make an appointment. (Giving one month refill in non controlled medications is strongly recommended before denial)    If refill has been denied, meaning absolutely no refills without visit, please complete the smart phrase \".SMIRXREFUSE\" and route it to the \"[facility] MED REFILLS\"  pool to inform the patient and the pharmacy.    Jane Greenberg [facility]      "

## 2025-03-07 NOTE — PROGRESS NOTES
Outcome for 03/07/25 4:08 PM: DATA UPLOADED VIA [employer] WEBSITE  Tr Kaplan MA  Outcome for 03/13/25 10:00 AM: Data obtained via [employer] website  Jacquelyn Winkler MA    Patient is showing 4/5 MNCM met. A1c not in range   Jacquelyn Winkler MA                    [facility]         Follow up for T2DM      Assessment and Plan:  1.  69-year-old woman with type 2 diabetes mellitus c/b DM retinopathy, with comorbidity of hypertension, obesity, hypothyroidismm, low bone density    Low bone density  Remains in low bone density range as of 12/20/2024 and low levels.he since has, FRAX is NOT increased, decrease at the spine since last measurement, however spine T-score remains in good range; no medical treatment is indicated, general measures for post menopausal women including Calcium intake of 0728-8289 mg daily   Continue supplements with Vitamin D and Calcium    Blood glucose control  Most recent A1c is increased, however sensor download has a predicted A1c of 9%.  This is quite reassuring.  The patient's is looking at major changes after stopping work just 2 weeks ago.  Patient is planning to focus on diet and start exercising in the pool.  She will follow-up with diabetes educator.  For now would continue on Lantus 18 units daily, Humalog 25 units with meals and sliding scale in the morning and at bedtime  The patient requests and recommendation of the diabetes educator with switching metformin to extended release 1000 mg twice daily     Plan:   Switch to metformin 1000 mg extended release twice daily     Lantus 85 units daily    Humalog 25 units with dinner    Humalog correction:   Correction Scale - for AM blood glucose and to add to dinner dosage  For  - 159 give 3 units.   For  - 169 give 4 units.   For  - 179 give 5 units.   For  - 189 give 6 units.   For  - 199 give 7 units.   For  - 209 give 8 units.   For  - 219 give 9 units.   For  - 229 give 10  units.   For  - 239 give 11 units.   For  - 249 give 12 units.   For  - 259 give 13 units.   For  - 279 give 14 units.   For  - 289 give 15 units.   For  - 299 give 16 units.   For BG above 300 give 17 units.    Bed time Humalog correction  For -  229 give 2 units.   For  - 239 give 3 units.   For  - 249 give 4 units.   For  - 259 give 5 units.   For  - 269 give 6 units.   For  - 279 give 7 units.   For  - 289 give 8 units.   For  - 299 give 9 units.   For BG above 300 mg/dl give 10 units    Follow-up with me in 3 months as scheduled  Follow up with VANESA and diabetes educator    2. Diabetic complications:  - Retinopathy: has diabetic retinopathy  - Nephropathy: nl Creatinine, nl microalbumin, follow  - Lipids: excellent LDL on Atorvastatin 20 mg  - Blood pressure controlled per chart review on lisinopril hydrochlorothiazide  - Aspirin - 81mg daily  - feet- in need for diabetic foot exam    Will obtain 1 mg dexamethasone suppression test to rule out excess cortisol production    Keturah De Luna MD  Endocrinology and Diabetes  Telephone contact:  [employer] [facility] [place] [phone]  [place] [phone]          Interval History:  - follow up for T2DM  - patient has been working with NICHOL Salcido on blood glucose control  During her meeting with diabetes educator the role of cortisol was discussed.  As such does cortisol increases in the morning and thereby increases glucose levels in the morning and this would be physiologic.  However the patient also is questioning whether too much cortisol overall play a role in insulin resistance.    1. Type 2 DM:  Diabetes: Has type 2 DM, diagnosed before 2005  Shalom Sensor download  Over the last 14 days   CGM active 96%  Average glucose 236 GMI 9%  Time in range 30% low 0% very high 42%  Hyerglycemia particularly after meals    Current  Treatment:   - Lantus 80 units subcutaneous once daily Humalog 20-25 units with dinner, Metformin 1000mg po BID, uses insulin sliding scale    Twisted ankle broke foot in 8/23    Hypoglycemia  None recently    Diet:   In the past brunch fruit and vegetables, occ lunch: sandwich, dinner- largest meal, can feel hungry after dinner and has cravings for savory snacks e.g. popcorn  Now is looking at new and improved dietary plan    2. Diabetic Complications:  - Retinopathy: exam 5/7/24 macular edema, mild NPDR left eye>OR, Retina consultanat  - Nephropathy:  Nl urine microalb, nl creatinine  - feet podiatry does toenails q 3m    3. Cardiovascular risk factors:  - Lipids: Atorvastatin 20mg,   - HTN:  on lisinopril/HCTZ well-controlled 20-12.5  - Aspirin - 81mg daily    4. Hypothyroidism:  - TSH - nl 11/21/22    5. Low bone density  DXA from 12/19/2022 shows low bone denisty, lwoest T-score -2.2 at the right femur neck  FRAX is 11% for MOF and 1.9% for hip fractures  Bone density decrease at the spine since last measurements in 12/2020, spine now measures T-score 0 (L2-3)    HPI:  Type 2 diabetes diagnosed prior 2005. Has PMH for HTN, arthritis, chronic constipation irregular    ==========================================================================================  Past Medical/Surgical History:   Reviewed in chart  Past Medical History:   Diagnosis Date    Abnormal Pap smear     Anxiety     Arthritis of knee 04/04/2013    Arthritis of shoulder region, right 04/18/2014    Back injury     Breast disorder     Chronic constipation     Chronic diarrhea     Depressive disorder     Diabetes (H)     Fecal incontinence     Finger pain 04/02/2015    Fracture broke L 5th pinky finger due to fall  1/2015    Glaucoma (increased eye pressure)     Head injury 08/06/2016    Headache(784.0)     decreased with mouth guard use.    History of blood transfusion 8/2011 & 4/2013    Hypercholesteremia     Hypertension     Low back pain      Menarche age 9+    cycles q mo x 4-5 d    Neck injuries     Nonsenile cataract IO implants: L-8/2013; R-1/2011    Pain in knee joint     LEFT    Right bundle branch block     per H/P    Sleep apnea     Info on file    SNHL (sensorineural hearing loss)     Tinnitus     I have reported ringing in ears. not sure of dates    Umbilical hernia without mention of obstruction or gangrene 08/2014    Vision disorder Detached Retina 10/2009     Past Surgical History:   Procedure Laterality Date    ARTHROPLASTY KNEE  4/4/2013    Left Total Knee Arthroplasty;  Surgeon: Lou Byrne MD;  [facility]    ARTHROPLASTY MINIMALLY INVASIVE KNEE  8/22/2011    R knee :CAITLYN JACOBO, Left age 14    COLONOSCOPY  1/14/2015    DENTAL SURGERY      root canals, wisdom teeth    EXAM UNDER ANESTHESIA, MANIPULATE JOINT (LOCATION)  10/5/2012    Procedure: EXAM UNDER ANESTHESIA, MANIPULATE JOINT (LOCATION);  Right Knee Mini Open Lysis Of Adhesions, Left Knee Steroid Injection  ;  Surgeon: Lou Byrne MD;  [facility]     OR OCULAR DEVICE INTRAOP DETACHED RETINA  2009    R    HC PHAKIC IOL - IMPLANT FROM SURGEON      right    KNEE SURGERY  1969    left    LASER YAG CAPSULOTOMY  12/2016    left    VITRECTOMY PARSPLANA  2010       Allergies:  Reviewed in chart    Current Medications:   Reviewed in chart    Family History:  Reviewed in chart    Social History:  Reviewed in chart    Physical Examination:  Vital signs:  Wt Readings from Last 4 Encounters:   01/22/25 115.4 kg (254 lb 8 oz)   10/24/24 113.4 kg (250 lb)   10/08/24 114.7 kg (252 lb 14.4 oz)   08/21/24 113.6 kg (250 lb 6.4 oz)   BMI 43     Reported vitals:  There were no vitals taken for this visit.   healthy, alert and no distress  PSYCH: Alert and oriented times 3; coherent speech, normal   rate and volume, able to articulate logical thoughts, able   to abstract reason, no tangential thoughts, no hallucinations   or delusions  Her affect is normal and  pleasant  RESP: No cough, no audible wheezing, able to talk in full sentences  Remainder of exam unable to be completed due to telephone visits       Endocrine Labs:  Lab Results   Component Value Date     06/24/2024    CHLORIDE 100 06/24/2024    CO2 26 06/24/2024     (H) 03/10/2025    CR 0.81 03/10/2025    CR 0.79 06/24/2024    CR 0.71 03/04/2024    CR 0.80 11/21/2022    CR 0.71 04/04/2022    COLIN 10.3 03/10/2025    MAG 1.7 03/10/2025    ALBUMIN 4.0 03/10/2025    ALKPHOS 75.0 01/28/2021    LDL 54 03/10/2025    HDL 43 (L) 03/10/2025    TRIG 147 03/10/2025    TSH 3.65 03/10/2025    TSH 3.61 03/04/2024    TSH 3.56 11/21/2022     Lab Results   Component Value Date    MICROL 16.8 03/10/2025    MICROL <12.0 03/04/2024    MICROL 13.6 11/21/2022    MICROL 7 04/12/2021    MICROL 9 01/18/2021     Lab Results   Component Value Date    A1C 11.0 (H) 03/10/2025    A1C 11.2 (H) 06/24/2024    A1C 9.4 (H) 01/16/2023    A1C 8.6 (H) 04/04/2022    A1C 8.6 (H) 11/01/2021       Lab Results   Component Value Date    HGB 11.8 09/28/2020

## 2025-03-10 ENCOUNTER — LAB (OUTPATIENT)
Dept: LAB | Facility: CLINIC | Age: 70
End: 2025-03-10
Payer: MEDICARE

## 2025-03-10 DIAGNOSIS — E11.8 TYPE 2 DIABETES MELLITUS WITH COMPLICATION (H): ICD-10-CM

## 2025-03-10 DIAGNOSIS — E55.9 HYPOVITAMINOSIS D: ICD-10-CM

## 2025-03-10 DIAGNOSIS — E11.65 TYPE 2 DIABETES MELLITUS WITH HYPERGLYCEMIA, WITHOUT LONG-TERM CURRENT USE OF INSULIN (H): ICD-10-CM

## 2025-03-10 DIAGNOSIS — Z78.0 MENOPAUSE: ICD-10-CM

## 2025-03-10 LAB
ALBUMIN SERPL BCG-MCNC: 4 G/DL (ref 3.5–5.2)
BUN SERPL-MCNC: 16.7 MG/DL (ref 8–23)
CALCIUM SERPL-MCNC: 10.3 MG/DL (ref 8.8–10.4)
CHOLEST SERPL-MCNC: 126 MG/DL
CREAT SERPL-MCNC: 0.81 MG/DL (ref 0.51–0.95)
CREAT UR-MCNC: 89.4 MG/DL
EGFRCR SERPLBLD CKD-EPI 2021: 78 ML/MIN/1.73M2
EST. AVERAGE GLUCOSE BLD GHB EST-MCNC: 269 MG/DL
FASTING STATUS PATIENT QL REPORTED: ABNORMAL
FASTING STATUS PATIENT QL REPORTED: ABNORMAL
GLUCOSE SERPL-MCNC: 227 MG/DL (ref 70–99)
HBA1C MFR BLD: 11 %
HDLC SERPL-MCNC: 43 MG/DL
LDLC SERPL CALC-MCNC: 54 MG/DL
MAGNESIUM SERPL-MCNC: 1.7 MG/DL (ref 1.7–2.3)
MICROALBUMIN UR-MCNC: 16.8 MG/L
MICROALBUMIN/CREAT UR: 18.79 MG/G CR (ref 0–25)
NONHDLC SERPL-MCNC: 83 MG/DL
PHOSPHATE SERPL-MCNC: 4 MG/DL (ref 2.5–4.5)
POTASSIUM SERPL-SCNC: 4.8 MMOL/L (ref 3.4–5.3)
TRIGL SERPL-MCNC: 147 MG/DL
TSH SERPL DL<=0.005 MIU/L-ACNC: 3.65 UIU/ML (ref 0.3–4.2)

## 2025-03-10 PROCEDURE — 82565 ASSAY OF CREATININE: CPT | Performed by: PATHOLOGY

## 2025-03-10 PROCEDURE — 99000 SPECIMEN HANDLING OFFICE-LAB: CPT | Performed by: PATHOLOGY

## 2025-03-10 PROCEDURE — 84100 ASSAY OF PHOSPHORUS: CPT | Performed by: PATHOLOGY

## 2025-03-10 PROCEDURE — 82947 ASSAY GLUCOSE BLOOD QUANT: CPT | Performed by: PATHOLOGY

## 2025-03-10 PROCEDURE — 82040 ASSAY OF SERUM ALBUMIN: CPT | Performed by: PATHOLOGY

## 2025-03-10 PROCEDURE — 84132 ASSAY OF SERUM POTASSIUM: CPT | Performed by: PATHOLOGY

## 2025-03-10 PROCEDURE — 80061 LIPID PANEL: CPT | Performed by: PATHOLOGY

## 2025-03-10 PROCEDURE — 82310 ASSAY OF CALCIUM: CPT | Performed by: PATHOLOGY

## 2025-03-10 PROCEDURE — 83735 ASSAY OF MAGNESIUM: CPT | Performed by: PATHOLOGY

## 2025-03-10 PROCEDURE — 82043 UR ALBUMIN QUANTITATIVE: CPT | Performed by: INTERNAL MEDICINE

## 2025-03-10 PROCEDURE — 36415 COLL VENOUS BLD VENIPUNCTURE: CPT | Performed by: PATHOLOGY

## 2025-03-10 PROCEDURE — 84443 ASSAY THYROID STIM HORMONE: CPT | Performed by: PATHOLOGY

## 2025-03-10 PROCEDURE — 83036 HEMOGLOBIN GLYCOSYLATED A1C: CPT | Performed by: INTERNAL MEDICINE

## 2025-03-10 PROCEDURE — 84520 ASSAY OF UREA NITROGEN: CPT | Performed by: PATHOLOGY

## 2025-03-16 SDOH — HEALTH STABILITY: PHYSICAL HEALTH: ON AVERAGE, HOW MANY MINUTES DO YOU ENGAGE IN EXERCISE AT THIS LEVEL?: PATIENT DECLINED

## 2025-03-16 SDOH — HEALTH STABILITY: PHYSICAL HEALTH
ON AVERAGE, HOW MANY DAYS PER WEEK DO YOU ENGAGE IN MODERATE TO STRENUOUS EXERCISE (LIKE A BRISK WALK)?: PATIENT DECLINED

## 2025-03-16 ASSESSMENT — SOCIAL DETERMINANTS OF HEALTH (SDOH): HOW OFTEN DO YOU GET TOGETHER WITH FRIENDS OR RELATIVES?: PATIENT DECLINED

## 2025-03-17 ENCOUNTER — VIRTUAL VISIT (OUTPATIENT)
Dept: ENDOCRINOLOGY | Facility: CLINIC | Age: 70
End: 2025-03-17
Payer: MEDICARE

## 2025-03-17 ENCOUNTER — OFFICE VISIT (OUTPATIENT)
Dept: ORTHOPEDICS | Facility: CLINIC | Age: 70
End: 2025-03-17
Payer: MEDICARE

## 2025-03-17 DIAGNOSIS — Z79.4 TYPE 2 DIABETES MELLITUS WITH DIABETIC POLYNEUROPATHY, WITH LONG-TERM CURRENT USE OF INSULIN (H): Primary | ICD-10-CM

## 2025-03-17 DIAGNOSIS — E11.42 TYPE 2 DIABETES MELLITUS WITH DIABETIC POLYNEUROPATHY, WITH LONG-TERM CURRENT USE OF INSULIN (H): Primary | ICD-10-CM

## 2025-03-17 DIAGNOSIS — B35.1 OM (ONYCHOMYCOSIS): Primary | ICD-10-CM

## 2025-03-17 DIAGNOSIS — E11.49 TYPE II OR UNSPECIFIED TYPE DIABETES MELLITUS WITH NEUROLOGICAL MANIFESTATIONS, UNCONTROLLED(250.62) (H): ICD-10-CM

## 2025-03-17 DIAGNOSIS — Z78.0 MENOPAUSE: ICD-10-CM

## 2025-03-17 DIAGNOSIS — E11.65 TYPE II OR UNSPECIFIED TYPE DIABETES MELLITUS WITH NEUROLOGICAL MANIFESTATIONS, UNCONTROLLED(250.62) (H): ICD-10-CM

## 2025-03-17 PROCEDURE — 11721 DEBRIDE NAIL 6 OR MORE: CPT | Mod: Q8 | Performed by: PODIATRIST

## 2025-03-17 PROCEDURE — 98007 SYNCH AUDIO-VIDEO EST HI 40: CPT | Performed by: INTERNAL MEDICINE

## 2025-03-17 PROCEDURE — 1125F AMNT PAIN NOTED PAIN PRSNT: CPT | Mod: 95 | Performed by: INTERNAL MEDICINE

## 2025-03-17 RX ORDER — DEXAMETHASONE 1 MG
TABLET ORAL
Qty: 1 TABLET | Refills: 0 | Status: SHIPPED | OUTPATIENT
Start: 2025-03-17

## 2025-03-17 RX ORDER — METFORMIN HYDROCHLORIDE EXTENDED-RELEASE TABLETS 1000 MG/1
1000 TABLET, FILM COATED, EXTENDED RELEASE ORAL
Qty: 180 TABLET | Refills: 3 | Status: SHIPPED | OUTPATIENT
Start: 2025-03-17

## 2025-03-17 ASSESSMENT — PAIN SCALES - GENERAL: PAINLEVEL_OUTOF10: SEVERE PAIN (7)

## 2025-03-17 NOTE — PROGRESS NOTES
Virtual Visit Details    Type of service:  Video Visit   Video Start Time: 9:28 AM  Video End Time:10:00 AM    Originating Location (pt. Location): Home    Distant Location (provider location):  Off-site  Platform used for Video Visit: Well

## 2025-03-17 NOTE — LETTER
3/17/2025      Radha Baca  [address] [address]      Dear Colleague,    Thank you for referring your patient, Radha Baca, to the [facility]. Please see a copy of my visit note below.    Outcome for 03/07/25 4:08 PM: DATA UPLOADED VIA [employer] WEBSITE  Tr Kaplan MA  Outcome for 03/13/25 10:00 AM: Data obtained via [employer] website  Jacquelyn Winkler MA    Patient is showing 4/5 MNCM met. A1c not in range   Jacquelyn Winkler MA                        [facility]       Follow up for T2DM    Assessment and Plan:  1.  69-year-old woman with type 2 diabetes mellitus c/b DM retinopathy, with comorbidity of hypertension, obesity, hypothyroidismm, low bone density    Low bone density  Remains in low bone density range as of 12/20/2024 and low levels.he since has, FRAX is NOT increased, decrease at the spine since last measurement, however spine T-score remains in good range; no medical treatment is indicated, general measures for post menopausal women including Calcium intake of 3515-2880 mg daily   Continue supplements with Vitamin D and Calcium    Blood glucose control  Most recent A1c is increased, however sensor download has a predicted A1c of 9%.  This is quite reassuring.  The patient's is looking at major changes after stopping work just 2 weeks ago.  Patient is planning to focus on diet and start exercising in the pool.  She will follow-up with diabetes educator.  For now would continue on Lantus 18 units daily, Humalog 25 units with meals and sliding scale in the morning and at bedtime  The patient requests and recommendation of the diabetes educator with switching metformin to extended release 1000 mg twice daily     Plan:   Switch to metformin 1000 mg extended release twice daily     Lantus 85 units daily    Humalog 25 units with dinner    Humalog correction:   Correction Scale - for AM blood glucose and to add to dinner  dosage  For  - 159 give 3 units.   For  - 169 give 4 units.   For  - 179 give 5 units.   For  - 189 give 6 units.   For  - 199 give 7 units.   For  - 209 give 8 units.   For  - 219 give 9 units.   For  - 229 give 10 units.   For  - 239 give 11 units.   For  - 249 give 12 units.   For  - 259 give 13 units.   For  - 279 give 14 units.   For  - 289 give 15 units.   For  - 299 give 16 units.   For BG above 300 give 17 units.    Bed time Humalog correction  For -  229 give 2 units.   For  - 239 give 3 units.   For  - 249 give 4 units.   For  - 259 give 5 units.   For  - 269 give 6 units.   For  - 279 give 7 units.   For  - 289 give 8 units.   For  - 299 give 9 units.   For BG above 300 mg/dl give 10 units    Follow-up with me in 3 months as scheduled  Follow up with VANESA and diabetes educator    2. Diabetic complications:  - Retinopathy: has diabetic retinopathy  - Nephropathy: nl Creatinine, nl microalbumin, follow  - Lipids: excellent LDL on Atorvastatin 20 mg  - Blood pressure controlled per chart review on lisinopril hydrochlorothiazide  - Aspirin - 81mg daily  - feet- in need for diabetic foot exam    Will obtain 1 mg dexamethasone suppression test to rule out excess cortisol production    Keturah De Luna MD  Endocrinology and Diabetes  Telephone contact:  [employer] [facility] [place] [phone]  [facility] [phone]        Interval History:  - follow up for T2DM  - patient has been working with NICHOL Salcido on blood glucose control  During her meeting with diabetes educator the role of cortisol was discussed.  As such does cortisol increases in the morning and thereby increases glucose levels in the morning and this would be physiologic.  However the patient also is questioning whether too much cortisol overall play a role in insulin  resistance.    1. Type 2 DM:  Diabetes: Has type 2 DM, diagnosed before 2005  [employer] Sensor download  Over the last 14 days   CGM active 96%  Average glucose 236 GMI 9%  Time in range 30% low 0% very high 42%  Hyerglycemia particularly after meals    Current Treatment:   - Lantus 80 units subcutaneous once daily Humalog 20-25 units with dinner, Metformin 1000mg po BID, uses insulin sliding scale    Twisted ankle broke foot in 8/23    Hypoglycemia  None recently    Diet:   In the past brunch fruit and vegetables, occ lunch: sandwich, dinner- largest meal, can feel hungry after dinner and has cravings for savory snacks e.g. popcorn  Now is looking at new and improved dietary plan    2. Diabetic Complications:  - Retinopathy: exam 5/7/24 macular edema, mild NPDR left eye>OR, Retina consultanat  - Nephropathy:  Nl urine microalb, nl creatinine  - feet podiatry does toenails q 3m    3. Cardiovascular risk factors:  - Lipids: Atorvastatin 20mg,   - HTN:  on lisinopril/HCTZ well-controlled 20-12.5  - Aspirin - 81mg daily    4. Hypothyroidism:  - TSH - nl 11/21/22    5. Low bone density  DXA from 12/19/2022 shows low bone denisty, lwoest T-score -2.2 at the right femur neck  FRAX is 11% for MOF and 1.9% for hip fractures  Bone density decrease at the spine since last measurements in 12/2020, spine now measures T-score 0 (L2-3)    HPI:  Type 2 diabetes diagnosed prior 2005. Has PMH for HTN, arthritis, chronic constipation irregular    ==========================================================================================  Past Medical/Surgical History:   Reviewed in chart  Past Medical History:   Diagnosis Date    Abnormal Pap smear     Anxiety     Arthritis of knee 04/04/2013    Arthritis of shoulder region, right 04/18/2014    Back injury     Breast disorder     Chronic constipation     Chronic diarrhea     Depressive disorder     Diabetes (H)     Fecal incontinence     Finger pain 04/02/2015    Fracture broke L 5th  pinky finger due to fall  1/2015    Glaucoma (increased eye pressure)     Head injury 08/06/2016    Headache(784.0)     decreased with mouth guard use.    History of blood transfusion 8/2011 & 4/2013    Hypercholesteremia     Hypertension     Low back pain     Menarche age 9+    cycles q mo x 4-5 d    Neck injuries     Nonsenile cataract IO implants: L-8/2013; R-1/2011    Pain in knee joint     LEFT    Right bundle branch block     per H/P    Sleep apnea     Info on file    SNHL (sensorineural hearing loss)     Tinnitus     I have reported ringing in ears. not sure of dates    Umbilical hernia without mention of obstruction or gangrene 08/2014    Vision disorder Detached Retina 10/2009     Past Surgical History:   Procedure Laterality Date    ARTHROPLASTY KNEE  4/4/2013    Left Total Knee Arthroplasty;  Surgeon: Lou Byrne MD;  [facility]    ARTHROPLASTY MINIMALLY INVASIVE KNEE  8/22/2011    R knee :CAITLYN JACOBO, Left age 14    COLONOSCOPY  1/14/2015    DENTAL SURGERY      root canals, wisdom teeth    EXAM UNDER ANESTHESIA, MANIPULATE JOINT (LOCATION)  10/5/2012    Procedure: EXAM UNDER ANESTHESIA, MANIPULATE JOINT (LOCATION);  Right Knee Mini Open Lysis Of Adhesions, Left Knee Steroid Injection  ;  Surgeon: Lou Byrne MD;  [facility]    HC OR OCULAR DEVICE INTRAOP DETACHED RETINA  2009    R    HC PHAKIC IOL - IMPLANT FROM SURGEON      right    KNEE SURGERY  1969    left    LASER YAG CAPSULOTOMY  12/2016    left    VITRECTOMY PARSPLANA  2010     Allergies:  Reviewed in chart    Current Medications:   Reviewed in chart    Family History:  Reviewed in chart    Social History:  Reviewed in chart    Physical Examination:  Vital signs:  Wt Readings from Last 4 Encounters:   01/22/25 115.4 kg (254 lb 8 oz)   10/24/24 113.4 kg (250 lb)   10/08/24 114.7 kg (252 lb 14.4 oz)   08/21/24 113.6 kg (250 lb 6.4 oz)   BMI 43     Reported vitals:  There were no vitals taken for this visit.    healthy, alert and no distress  PSYCH: Alert and oriented times 3; coherent speech, normal   rate and volume, able to articulate logical thoughts, able   to abstract reason, no tangential thoughts, no hallucinations   or delusions  Her affect is normal and pleasant  RESP: No cough, no audible wheezing, able to talk in full sentences  Remainder of exam unable to be completed due to telephone visits     Endocrine Labs:  Lab Results   Component Value Date     06/24/2024    CHLORIDE 100 06/24/2024    CO2 26 06/24/2024     (H) 03/10/2025    CR 0.81 03/10/2025    CR 0.79 06/24/2024    CR 0.71 03/04/2024    CR 0.80 11/21/2022    CR 0.71 04/04/2022    COLIN 10.3 03/10/2025    MAG 1.7 03/10/2025    ALBUMIN 4.0 03/10/2025    ALKPHOS 75.0 01/28/2021    LDL 54 03/10/2025    HDL 43 (L) 03/10/2025    TRIG 147 03/10/2025    TSH 3.65 03/10/2025    TSH 3.61 03/04/2024    TSH 3.56 11/21/2022     Lab Results   Component Value Date    MICROL 16.8 03/10/2025    MICROL <12.0 03/04/2024    MICROL 13.6 11/21/2022    MICROL 7 04/12/2021    MICROL 9 01/18/2021     Lab Results   Component Value Date    A1C 11.0 (H) 03/10/2025    A1C 11.2 (H) 06/24/2024    A1C 9.4 (H) 01/16/2023    A1C 8.6 (H) 04/04/2022    A1C 8.6 (H) 11/01/2021     Lab Results   Component Value Date    HGB 11.8 09/28/2020       Virtual Visit Details    Type of service:  Video Visit   Video Start Time: 9:28 AM  Video End Time:10:00 AM    Originating Location (pt. Location): Home    Distant Location (provider location):  Off-site  Platform used for Video Visit: AmWell      Again, thank you for allowing me to participate in the care of your patient.      Sincerely,    Keturah De Luna MD  Electronically signed

## 2025-03-17 NOTE — NURSING NOTE
Current patient location: [address] [place] [id number]    Is the patient currently in the state of [place]? YES    Visit mode:VIDEO    If the visit is dropped, the patient can be reconnected by: VIDEO VISIT: Send to e-mail at: [email or web address]    Will anyone else be joining the visit? NO  (If patient encounters technical issues they should call [phone]    Are changes needed to the allergy or medication list? Pt stated no changes to allergies and Pt stated no med changes    Are refills needed on medications prescribed by this physician? NO    Reason for visit: RECHECK    Florence PRAJAPATI

## 2025-03-17 NOTE — PATIENT INSTRUCTIONS
For cortisol testing please schedule a visit for lab draw in the morning  Please take dexamethasone 1 mg at 11 PM the evening prior to the lab draw, timing is very important    Please continue on Lantus insulin  Please continue Humalog    Continue to work with Michelle Salcido diabetes educator    Follow-up with me in June as scheduled at which time a short amount of things to do diabetic foot exam      Follow-up in 6 months with a physician assistant for diabetes    Repeat bone density end of December 2026  Follow-up with me in January 2026

## 2025-03-17 NOTE — LETTER
3/17/2025      Radha Baca  [address] [facility] [address]      Dear Colleague,    Thank you for referring your patient, Radha Baca, to the [facility] [place]. Please see a copy of my visit note below.    Chief Complaint   Patient presents with     Follow Up     Foot check/nail trimming.             Allergies   Allergen Reactions     Nkda [No Known Drug Allergy]          Subjective: Radha is a 69 year old female who presents to the clinic today for a diabetic foot exam and management. Relates that she has no new LE complaints today. She does have diabetic shoes.   Her diabetes is being managed by Dr. De Luna.  She last saw them on 7/29/24.     Objective  Hemoglobin A1C   Date Value Ref Range Status   03/10/2025 11.0 (H) <5.7 % Final     Comment:     Normal <5.7%   Prediabetes 5.7-6.4%    Diabetes 6.5% or higher     Note: Adopted from ADA consensus guidelines.   06/24/2024 11.2 (H) <5.7 % Final     Comment:     Normal <5.7%   Prediabetes 5.7-6.4%    Diabetes 6.5% or higher     Note: Adopted from ADA consensus guidelines.   01/16/2023 9.4 (H) <5.7 % Final     Comment:     Normal <5.7%   Prediabetes 5.7-6.4%    Diabetes 6.5% or higher     Note: Adopted from ADA consensus guidelines.   04/12/2021 8.9 (H) 0 - 5.6 % Final     Comment:     Normal <5.7% Prediabetes 5.7-6.4%  Diabetes 6.5% or higher - adopted from ADA   consensus guidelines.     01/18/2021 8.4 (H) 0 - 5.6 % Final     Comment:     Normal <5.7% Prediabetes 5.7-6.4%  Diabetes 6.5% or higher - adopted from ADA   consensus guidelines.     09/28/2020 9.0 (H) 0 - 5.6 % Final     Comment:     Normal <5.7% Prediabetes 5.7-6.4%  Diabetes 6.5% or higher - adopted from ADA   consensus guidelines.       Hemoglobin A1C POCT   Date Value Ref Range Status   01/18/2018 9.6 (A) 4.3 - 6 % Final   10/18/2017 9.5 (A) 4.3 - 6 % Final   07/19/2017 9.7 (A) 4.3 - 6 % Final         PT and DP pulses are not palpable  bilaterally. CRT is 4 seconds. Diminished pedal hair.   Gross sensation is diminished, as well as protective sensation bilaterally.   Equinus is noted bilaterally.   Nails thickened, brittle, discolored, with subungual debris bilaterally. No open lesions are noted. Vesicular rash on a red base in the distribution of a no-show sock to BL feet.     Assessment: DM2 with neuropathy.  Onychomycosis.   Xerosis       Plan:  - Pt seen and evaluated.  - Nails debrided x 10.  - See again in 4 months.      Again, thank you for allowing me to participate in the care of your patient.        Sincerely,        Jerrell Austin DPM    Electronically signed

## 2025-03-18 NOTE — PROGRESS NOTES
Chief Complaint   Patient presents with    Follow Up     Foot check/nail trimming.             Allergies   Allergen Reactions    Nkda [No Known Drug Allergy]          Subjective: Radha is a 69 year old female who presents to the clinic today for a diabetic foot exam and management. Relates that she has no new LE complaints today. She does have diabetic shoes.   Her diabetes is being managed by Dr. De Luna.  She last saw them on 7/29/24.     Objective  Hemoglobin A1C   Date Value Ref Range Status   03/10/2025 11.0 (H) <5.7 % Final     Comment:     Normal <5.7%   Prediabetes 5.7-6.4%    Diabetes 6.5% or higher     Note: Adopted from ADA consensus guidelines.   06/24/2024 11.2 (H) <5.7 % Final     Comment:     Normal <5.7%   Prediabetes 5.7-6.4%    Diabetes 6.5% or higher     Note: Adopted from ADA consensus guidelines.   01/16/2023 9.4 (H) <5.7 % Final     Comment:     Normal <5.7%   Prediabetes 5.7-6.4%    Diabetes 6.5% or higher     Note: Adopted from ADA consensus guidelines.   04/12/2021 8.9 (H) 0 - 5.6 % Final     Comment:     Normal <5.7% Prediabetes 5.7-6.4%  Diabetes 6.5% or higher - adopted from ADA   consensus guidelines.     01/18/2021 8.4 (H) 0 - 5.6 % Final     Comment:     Normal <5.7% Prediabetes 5.7-6.4%  Diabetes 6.5% or higher - adopted from ADA   consensus guidelines.     09/28/2020 9.0 (H) 0 - 5.6 % Final     Comment:     Normal <5.7% Prediabetes 5.7-6.4%  Diabetes 6.5% or higher - adopted from ADA   consensus guidelines.       Hemoglobin A1C POCT   Date Value Ref Range Status   01/18/2018 9.6 (A) 4.3 - 6 % Final   10/18/2017 9.5 (A) 4.3 - 6 % Final   07/19/2017 9.7 (A) 4.3 - 6 % Final         PT and DP pulses are not palpable bilaterally. CRT is 4 seconds. Diminished pedal hair.   Gross sensation is diminished, as well as protective sensation bilaterally.   Equinus is noted bilaterally.   Nails thickened, brittle, discolored, with subungual debris bilaterally. No open lesions are noted. Vesicular  rash on a red base in the distribution of a no-show sock to BL feet.     Assessment: DM2 with neuropathy.  Onychomycosis.   Xerosis       Plan:  - Pt seen and evaluated.  - Nails debrided x 10.  - See again in 4 months.

## 2025-03-19 ENCOUNTER — OFFICE VISIT (OUTPATIENT)
Dept: FAMILY MEDICINE | Facility: CLINIC | Age: 70
End: 2025-03-19
Payer: MEDICARE

## 2025-03-19 VITALS
WEIGHT: 255.6 LBS | BODY MASS INDEX: 45.29 KG/M2 | HEART RATE: 76 BPM | HEIGHT: 63 IN | OXYGEN SATURATION: 96 % | RESPIRATION RATE: 14 BRPM | DIASTOLIC BLOOD PRESSURE: 70 MMHG | TEMPERATURE: 98 F | SYSTOLIC BLOOD PRESSURE: 123 MMHG

## 2025-03-19 DIAGNOSIS — Z23 NEED FOR TDAP VACCINATION: ICD-10-CM

## 2025-03-19 DIAGNOSIS — Z00.00 ENCOUNTER FOR MEDICARE ANNUAL WELLNESS EXAM: Primary | ICD-10-CM

## 2025-03-19 NOTE — PATIENT INSTRUCTIONS
Patient Education   Preventive Care Advice   This is general advice given by our system to help you stay healthy. However, your care team may have specific advice just for you. Please talk to your care team about your preventive care needs.  Nutrition  Eat 5 or more servings of fruits and vegetables each day.  Try wheat bread, brown rice and whole grain pasta (instead of white bread, rice, and pasta).  Get enough calcium and vitamin D. Check the label on foods and aim for 100% of the RDA (recommended daily allowance).  Lifestyle  Exercise at least 150 minutes each week  (30 minutes a day, 5 days a week).  Do muscle strengthening activities 2 days a week. These help control your weight and prevent disease.  No smoking.  Wear sunscreen to prevent skin cancer.  Have a dental exam and cleaning every 6 months.  Yearly exams  See your health care team every year to talk about:  Any changes in your health.  Any medicines your care team has prescribed.  Preventive care, family planning, and ways to prevent chronic diseases.  Shots (vaccines)   HPV shots (up to age 26), if you've never had them before.  Hepatitis B shots (up to age 59), if you've never had them before.  COVID-19 shot: Get this shot when it's due.  Flu shot: Get a flu shot every year.  Tetanus shot: Get a tetanus shot every 10 years.  Pneumococcal, hepatitis A, and RSV shots: Ask your care team if you need these based on your risk.  Shingles shot (for age 50 and up)  General health tests  Diabetes screening:  Starting at age 35, Get screened for diabetes at least every 3 years.  If you are younger than age 35, ask your care team if you should be screened for diabetes.  Cholesterol test: At age 39, start having a cholesterol test every 5 years, or more often if advised.  Bone density scan (DEXA): At age 50, ask your care team if you should have this scan for osteoporosis (brittle bones).  Hepatitis C: Get tested at least once in your life.  STIs (sexually  transmitted infections)  Before age 24: Ask your care team if you should be screened for STIs.  After age 24: Get screened for STIs if you're at risk. You are at risk for STIs (including HIV) if:  You are sexually active with more than one person.  You don't use condoms every time.  You or a partner was diagnosed with a sexually transmitted infection.  If you are at risk for HIV, ask about PrEP medicine to prevent HIV.  Get tested for HIV at least once in your life, whether you are at risk for HIV or not.  Cancer screening tests  Cervical cancer screening: If you have a cervix, begin getting regular cervical cancer screening tests starting at age 21.  Breast cancer scan (mammogram): If you've ever had breasts, begin having regular mammograms starting at age 40. This is a scan to check for breast cancer.  Colon cancer screening: It is important to start screening for colon cancer at age 45.  Have a colonoscopy test every 10 years (or more often if you're at risk) Or, ask your provider about stool tests like a FIT test every year or Cologuard test every 3 years.  To learn more about your testing options, visit:   .  For help making a decision, visit:   https://bit.ly/uq36796.  Prostate cancer screening test: If you have a prostate, ask your care team if a prostate cancer screening test (PSA) at age 55 is right for you.  Lung cancer screening: If you are a current or former smoker ages 50 to 80, ask your care team if ongoing lung cancer screenings are right for you.  For informational purposes only. Not to replace the advice of your health care provider. Copyright   2023 [facility]. All rights reserved. Clinically reviewed by the [facility]. [employer] 869837 - REV 01/24.  Preventing Falls: Care Instructions  Injuries and health problems such as trouble walking or poor eyesight can increase your risk of falling. So can some medicines. But there are things you can do to help  "prevent falls. You can exercise to get stronger. You can also arrange your home to make it safer.    Talk to your doctor about the medicines you take. Ask if any of them increase the risk of falls and whether they can be changed or stopped.   Try to exercise regularly. It can help improve your strength and balance. This can help lower your risk of falling.         Practice fall safety and prevention.   Wear low-heeled shoes that fit well and give your feet good support. Talk to your doctor if you have foot problems that make this hard.  Carry a cellphone or wear a medical alert device that you can use to call for help.  Use stepladders instead of chairs to reach high objects. Don't climb if you're at risk for falls. Ask for help, if needed.  Wear the correct eyeglasses, if you need them.        Make your home safer.   Remove rugs, cords, clutter, and furniture from walkways.  Keep your house well lit. Use night-lights in hallways and bathrooms.  Install and use sturdy handrails on stairways.  Wear nonskid footwear, even inside. Don't walk barefoot or in socks without shoes.        Be safe outside.   Use handrails, curb cuts, and ramps whenever possible.  Keep your hands free by using a shoulder bag or backpack.  Try to walk in well-lit areas. Watch out for uneven ground, changes in pavement, and debris.  Be careful in the winter. Walk on the grass or gravel when sidewalks are slippery. Use de-icer on steps and walkways. Add non-slip devices to shoes.    Put grab bars and nonskid mats in your shower or tub and near the toilet. Try to use a shower chair or bath bench when bathing.   Get into a tub or shower by putting in your weaker leg first. Get out with your strong side first. Have a phone or medical alert device in the bathroom with you.   Where can you learn more?  Go to https://www.[employer].net/patiented  Enter G117 in the search box to learn more about \"Preventing Falls: Care Instructions.\"  Current as of: " July 31, 2024  Content Version: 14.4    2260-1987 [employer].   Care instructions adapted under license by your healthcare professional. If you have questions about a medical condition or this instruction, always ask your healthcare professional. [employer] disclaims any warranty or liability for your use of this information.    Learning About Stress  What is stress?     Stress is your body's response to a hard situation. Your body can have a physical, emotional, or mental response. Stress is a fact of life for most people, and it affects everyone differently. What causes stress for you may not be stressful for someone else.  A lot of things can cause stress. You may feel stress when you go on a job interview, take a test, or run a race. This kind of short-term stress is normal and even useful. It can help you if you need to work hard or react quickly. For example, stress can help you finish an important job on time.  Long-term stress is caused by ongoing stressful situations or events. Examples of long-term stress include long-term health problems, ongoing problems at work, or conflicts in your family. Long-term stress can harm your health.  How does stress affect your health?  When you are stressed, your body responds as though you are in danger. It makes hormones that speed up your heart, make you breathe faster, and give you a burst of energy. This is called the fight-or-flight stress response. If the stress is over quickly, your body goes back to normal and no harm is done.  But if stress happens too often or lasts too long, it can have bad effects. Long-term stress can make you more likely to get sick, and it can make symptoms of some diseases worse. If you tense up when you are stressed, you may develop neck, shoulder, or low back pain. Stress is linked to high blood pressure and heart disease.  Stress also harms your emotional health. It can make you joseph, tense, or depressed. Your  relationships may suffer, and you may not do well at work or school.  What can you do to manage stress?  You can try these things to help manage stress:   Do something active. Exercise or activity can help reduce stress. Walking is a great way to get started. Even everyday activities such as housecleaning or yard work can help.  Try yoga or yamileth chi. These techniques combine exercise and meditation. You may need some training at first to learn them.  Do something you enjoy. For example, listen to music or go to a movie. Practice your hobby or do volunteer work.  Meditate. This can help you relax, because you are not worrying about what happened before or what may happen in the future.  Do guided imagery. Imagine yourself in any setting that helps you feel calm. You can use online videos, books, or a teacher to guide you.  Do breathing exercises. For example:  From a standing position, bend forward from the waist with your knees slightly bent. Let your arms dangle close to the floor.  Breathe in slowly and deeply as you return to a standing position. Roll up slowly and lift your head last.  Hold your breath for just a few seconds in the standing position.  Breathe out slowly and bend forward from the waist.  Let your feelings out. Talk, laugh, cry, and express anger when you need to. Talking with supportive friends or family, a counselor, or a faizan leader about your feelings is a healthy way to relieve stress. Avoid discussing your feelings with people who make you feel worse.  Write. It may help to write about things that are bothering you. This helps you find out how much stress you feel and what is causing it. When you know this, you can find better ways to cope.  What can you do to prevent stress?  You might try some of these things to help prevent stress:  Manage your time. This helps you find time to do the things you want and need to do.  Get enough sleep. Your body recovers from the stresses of the day while  "you are sleeping.  Get support. Your family, friends, and community can make a difference in how you experience stress.  Limit your news feed. Avoid or limit time on social media or news that may make you feel stressed.  Do something active. Exercise or activity can help reduce stress. Walking is a great way to get started.  Where can you learn more?  Go to https://www.[employer].net/patiented  Enter N032 in the search box to learn more about \"Learning About Stress.\"  Current as of: October 24, 2024  Content Version: 14.4    6301-6888 [employer].   Care instructions adapted under license by your healthcare professional. If you have questions about a medical condition or this instruction, always ask your healthcare professional. [employer] disclaims any warranty or liability for your use of this information.       "

## 2025-03-19 NOTE — PROGRESS NOTES
Preventive Care Visit  [facility] MELISSA Moreno MD, Family Medicine  Mar 19, 2025          Marco Greenberg is a 69 year old, presenting for the following:  Wellness Visit        3/19/2025     3:17 PM   Additional Questions   Roomed by Silver         3/19/2025    Information    services provided? No           HPI           Advance Care Planning  Patient has a Health Care Directive on file  Advance care planning document is on file and is current.      3/16/2025   General Health   How would you rate your overall physical health? (!) FAIR   Feel stress (tense, anxious, or unable to sleep) To some extent   (!) STRESS CONCERN      3/16/2025   Nutrition   Diet: Carbohydrate counting    Breakfast skipped       Multiple values from one day are sorted in reverse-chronological order         3/16/2025   Exercise   Days per week of moderate/strenous exercise Patient declined   Average minutes spent exercising at this level Patient declined         3/16/2025   Social Factors   Frequency of gathering with friends or relatives Patient declined   Worry food won't last until get money to buy more No   Food not last or not have enough money for food? No   Do you have housing? (Housing is defined as stable permanent housing and does not include staying ouside in a car, in a tent, in an abandoned building, in an overnight shelter, or couch-surfing.) Yes   Are you worried about losing your housing? No   Lack of transportation? No   Unable to get utilities (heat,electricity)? No         3/19/2025   Fall Risk   Gait Speed Test (Document in seconds) 6.45   Gait Speed Test Interpretation Greater than 5.01 seconds - ABNORMAL          3/16/2025   Activities of Daily Living- Home Safety   Needs help with the following daily activites None of the above   Safety concerns in the home None of the above         3/16/2025   Dental   Dentist two times every year? Yes         3/16/2025   Hearing Screening    Hearing concerns? None of the above         3/16/2025   Driving Risk Screening   Patient/family members have concerns about driving No         3/16/2025   General Alertness/Fatigue Screening   Have you been more tired than usual lately? (!) DECLINE         3/16/2025   Urinary Incontinence Screening   Bothered by leaking urine in past 6 months No           1/28/2025   TB Screening   Were you born outside of the [place]? No                 3/16/2025   Substance Use   Alcohol more than 3/day or more than 7/wk Not Applicable   Do you have a current opioid prescription? No   How severe/bad is pain from 1 to 10? 7/10   Do you use any other substances recreationally? No     Social History     Tobacco Use    Smoking status: Former     Current packs/day: 0.00     Types: Cigarettes     Passive exposure: Past    Smokeless tobacco: Never    Tobacco comments:     quit mid 1980s   Vaping Use    Vaping status: Never Used   Substance Use Topics    Alcohol use: No    Drug use: No           12/5/2024   LAST FHS-7 RESULTS   1st degree relative breast or ovarian cancer No   Any relative bilateral breast cancer No   Any male have breast cancer No   Any ONE woman have BOTH breast AND ovarian cancer No   Any woman with breast cancer before 50yrs No   2 or more relatives with breast AND/OR ovarian cancer No   2 or more relatives with breast AND/OR bowel cancer No              History of abnormal Pap smear:         Latest Ref Rng & Units 11/12/2019    11:18 AM 11/12/2019    10:00 AM 11/26/2014    12:00 AM   PAP / HPV   PAP (Historical)  NIL   NIL    HPV 16 DNA NEG^Negative  Negative     HPV 18 DNA NEG^Negative  Negative     Other HR HPV NEG^Negative  Negative       ASCVD Risk   The ASCVD Risk score (Caren FRASER, et al., 2019) failed to calculate for the following reasons:    The valid total cholesterol range is 130 to 320 mg/dL            Reviewed and updated as needed this visit by Provider   Tobacco     Med Hx  Surg Hx  Fam Hx   Soc Hx Sexual Activity            Current providers sharing in care for this patient include:  Patient Care Team:  Mohan Moreno MD as PCP - General (Family Practice)  Chapin Rosas MD as MD (Family Medicine - Sports Medicine)  Elayne Kramer MD as MD (Orthopedics)  James Rivera DPM (Podiatry)  Mohan Moreno MD as MD (Family Practice)  James Bailey MD as MD (Family Medicine - Sports Medicine)  Lou Byrne MD as MD (Orthopedics)  Galina Villanueva MD as MD (Family Practice)  Demetri Espinal MD as MD (Orthopedics)  Mohan Rosenberg MD as Fellow (Student in organized health care education/training program)  Jerrell Austin DPM as MD (Podiatrist Primary Podiatric Medicine)  Joanne Pisano MD as Assigned Pulmonology Provider  Keturah De Luna MD as Assigned Endocrinology Provider  Keturah De Luna MD as MD (Endocrinology, Diabetes, and Metabolism)  Peri Salcido RN as Diabetes Educator (Diabetes Education)  Mohan Moreno MD as Assigned PCP  Quentin Brunson MD as MD (Gastroenterology)  Deepika Mancia NP as Assigned Surgical Provider  Chandan Molina MD as Assigned Musculoskeletal Provider  Romeo Abbott MD as MD (Physical Medicine and Rehabilitation)  Romeo Abbott MD as Assigned Neuroscience Provider  Ashley Santiago AuD as Audiologist (Audiology)  Mar Fu AuD as Audiologist (Audiology)  Marcela Escobedo AuD as Audiologist (Audiology)  Rafia Rosales PA-C as Physician Assistant (Dermatology)    The following health maintenance items are reviewed in Epic and correct as of today:  Health Maintenance   Topic Date Due    LUNG CANCER SCREENING  12/21/2024    COVID-19 Vaccine (8 - 2024-25 season) 04/08/2025    A1C  06/10/2025    BMP  06/24/2025    EYE EXAM  09/26/2025    DIABETIC FOOT EXAM  11/18/2025    LIPID  03/10/2026    MICROALBUMIN  03/10/2026    MEDICARE ANNUAL WELLNESS VISIT   "03/19/2026    FALL RISK ASSESSMENT  03/19/2026    MAMMO SCREENING  12/05/2026    ADVANCE CARE PLANNING  08/05/2027    COLORECTAL CANCER SCREENING  10/21/2034    DTAP/TDAP/TD IMMUNIZATION (3 - Td or Tdap) 03/19/2035    DEXA  12/19/2037    HEPATITIS C SCREENING  Completed    PHQ-2 (once per calendar year)  Completed    INFLUENZA VACCINE  Completed    Pneumococcal Vaccine: 50+ Years  Completed    ZOSTER IMMUNIZATION  Completed    RSV VACCINE  Completed    HPV IMMUNIZATION  Aged Out    MENINGITIS IMMUNIZATION  Aged Out            Objective    Exam  /70   Pulse 76   Temp 98  F (36.7  C) (Temporal)   Resp 14   Ht 1.6 m (5' 3\")   Wt 115.9 kg (255 lb 9.6 oz)   SpO2 96%   BMI 45.28 kg/m     Estimated body mass index is 45.28 kg/m  as calculated from the following:    Height as of this encounter: 1.6 m (5' 3\").    Weight as of this encounter: 115.9 kg (255 lb 9.6 oz).    Physical Exam  Constitutional:       General: She is not in acute distress.     Appearance: She is not ill-appearing.   HENT:      Right Ear: There is no impacted cerumen.      Left Ear: There is no impacted cerumen.      Ears:      Comments: Wears HAs  Cardiovascular:      Rate and Rhythm: Regular rhythm.      Heart sounds: Normal heart sounds.   Pulmonary:      Breath sounds: Normal breath sounds. No wheezing.   Musculoskeletal:      Cervical back: No rigidity.      Right lower leg: No edema.      Left lower leg: No edema.   Lymphadenopathy:      Cervical: No cervical adenopathy.   Skin:     Findings: No rash.   Neurological:      General: No focal deficit present.      Mental Status: She is alert.   Psychiatric:         Mood and Affect: Mood normal.         Behavior: Behavior normal.               3/19/2025   Mini Cog   Clock Draw Score 2 Normal   3 Item Recall 3 objects recalled   Mini Cog Total Score 5            Dionicio was seen today for wellness visit.    Diagnoses and all orders for this visit:    Encounter for Medicare annual wellness exam. "  With Tdap vaccination, all preventative activities up-to-date.    Need for Tdap vaccination  -     TDAP 10-64Y (ADACEL,BOOSTRIX)          Signed Electronically by: Mohan Moreno MD

## 2025-03-22 LAB
DEPRECATED CALCIDIOL+CALCIFEROL SERPL-MC: <80 UG/L (ref 20–75)
VITAMIN D2 SERPL-MCNC: <5 UG/L
VITAMIN D3 SERPL-MCNC: 75 UG/L

## 2025-03-25 ENCOUNTER — TELEPHONE (OUTPATIENT)
Dept: ENDOCRINOLOGY | Facility: CLINIC | Age: 70
End: 2025-03-25
Payer: MEDICARE

## 2025-03-26 ENCOUNTER — MYC MEDICAL ADVICE (OUTPATIENT)
Dept: ENDOCRINOLOGY | Facility: CLINIC | Age: 70
End: 2025-03-26
Payer: MEDICARE

## 2025-03-26 DIAGNOSIS — E11.42 TYPE 2 DIABETES MELLITUS WITH DIABETIC POLYNEUROPATHY, WITH LONG-TERM CURRENT USE OF INSULIN (H): Primary | ICD-10-CM

## 2025-03-26 DIAGNOSIS — Z79.4 TYPE 2 DIABETES MELLITUS WITH DIABETIC POLYNEUROPATHY, WITH LONG-TERM CURRENT USE OF INSULIN (H): Primary | ICD-10-CM

## 2025-04-05 ENCOUNTER — LAB (OUTPATIENT)
Dept: LAB | Facility: CLINIC | Age: 70
End: 2025-04-05
Payer: MEDICARE

## 2025-04-05 DIAGNOSIS — E11.42 TYPE 2 DIABETES MELLITUS WITH DIABETIC POLYNEUROPATHY, WITH LONG-TERM CURRENT USE OF INSULIN (H): ICD-10-CM

## 2025-04-05 DIAGNOSIS — Z79.4 TYPE 2 DIABETES MELLITUS WITH DIABETIC POLYNEUROPATHY, WITH LONG-TERM CURRENT USE OF INSULIN (H): ICD-10-CM

## 2025-04-05 LAB — CORTIS 8H P 1 MG DEX SERPL-MCNC: 1.2 UG/DL

## 2025-04-05 PROCEDURE — 82533 TOTAL CORTISOL: CPT | Performed by: INTERNAL MEDICINE

## 2025-04-05 PROCEDURE — 82024 ASSAY OF ACTH: CPT | Performed by: INTERNAL MEDICINE

## 2025-04-05 PROCEDURE — 36415 COLL VENOUS BLD VENIPUNCTURE: CPT | Performed by: PATHOLOGY

## 2025-04-05 PROCEDURE — 99000 SPECIMEN HANDLING OFFICE-LAB: CPT | Performed by: PATHOLOGY

## 2025-04-07 LAB — ACTH PLAS-MCNC: <10 PG/ML

## 2025-04-15 ENCOUNTER — TELEPHONE (OUTPATIENT)
Dept: ENDOCRINOLOGY | Facility: CLINIC | Age: 70
End: 2025-04-15
Payer: MEDICARE

## 2025-04-15 DIAGNOSIS — E11.8 TYPE 2 DIABETES MELLITUS WITH COMPLICATION (H): ICD-10-CM

## 2025-04-15 RX ORDER — HYDROCHLOROTHIAZIDE 12.5 MG/1
CAPSULE ORAL
Qty: 6 EACH | Refills: 1 | Status: SHIPPED | OUTPATIENT
Start: 2025-04-15 | End: 2025-04-15

## 2025-04-15 NOTE — TELEPHONE ENCOUNTER
I routed the Freestyle epifanio 3 plus sensor to you to sign off on for her . Rasheeda Izaguirre RN on 4/15/2025 at 3:50 PM

## 2025-04-15 NOTE — TELEPHONE ENCOUNTER
This message is to inform you that we are in need of Medicare compliant prescriptions for Freestyle Shalom 3 Plus Sensors.     Prescription must be written by: Keturah De Luna.  Must include full supply name: Freestyle Shalom 3 Plus Sensor  Quantity: 6  Refills: 1  SIG: Change every 15 days    As a reminder, Medicare requires patients to be seen every 3 months for insulin supplies and every 6 months for CGMs. The provider who sees the patient must be the provider on the prescription, must be written on the day of or after office visit date and office visit must include notes about diabetes and insulin regimen. The Diabetes Care Services team at [facility] may request new prescriptions before renewal dates of the prescription is filled over the allowable amount. Writing the order to match what is above will help ensure we will only need to request every 3 or 6 months.    Thank you!    [facility]  Diabetes Care Services Team  [address] [place] [address]  Provider Phone: [phone]  Patient Line: [phone]  Fax: [phone]  [email or web address]: [email or web address]

## 2025-04-17 RX ORDER — HYDROCHLOROTHIAZIDE 12.5 MG/1
CAPSULE ORAL
Qty: 6 EACH | Refills: 1 | Status: SHIPPED | OUTPATIENT
Start: 2025-04-17

## 2025-05-01 ENCOUNTER — ALLIED HEALTH/NURSE VISIT (OUTPATIENT)
Dept: EDUCATION SERVICES | Facility: CLINIC | Age: 70
End: 2025-05-01
Payer: MEDICARE

## 2025-05-01 DIAGNOSIS — E11.8 TYPE 2 DIABETES MELLITUS WITH COMPLICATION (H): Primary | ICD-10-CM

## 2025-05-01 RX ORDER — BLOOD-GLUCOSE METER
KIT MISCELLANEOUS
Qty: 50 STRIP | Refills: 3 | Status: SHIPPED | OUTPATIENT
Start: 2025-05-01

## 2025-05-01 NOTE — PROGRESS NOTES
"Diabetes Self-Management Education & Support    Radha Baca presents today for education related to Type 2 diabetes    Patient is being treated with:  Oral Agents and MDI Insulin  She is accompanied by self    Year of diagnosis: 9165-2003  Referring provider:  Keturah De Luna MD  Living Situation: Lives with her , Gabriel.  Employment: She is a retired .  She recently quit her part time job working for a local [employer].      PATIENT CONCERNS/REASON FOR REFERRAL:  Looking forward to having more time for self care, and experiencing less stress since she stopped working.  Expresses feeling \"like I'm falling apart.\"   States \"I have to get into a routine.\"    ASSESSMENT:    Taking Medication:     Current Diabetes Management per Patient:  Taking diabetes medications:    Lantus insulin:  75 units (85 units is prescribed but she is taking 75).    Humalog insulin:  she is using an insulin to carb ratio of roughly 1 unit per 5 grams CHO.    Metformin ER:  Taking 1000 mg BID.      GLP-1 agonists have been discussed with Dionicio, but she isn't really interested.    She is not currently on an SGLT-2 inhibitor, however this could be considered.     Monitoring    Patient glucose self monitoring as follows: continuously using a continuous glucose monitor (CGM)  BG meter: Unknown  CGM: Freestyle Shalom 3  BG results: Please see Shalom report below:                  Radha Baca meets the following criteria for a continuous glucose monitoring (CGM) system.      Has a diagnosis of diabetes,: This patient has a diagnosis of Type 2 diabetes requiring multiple daily injections of insulin  Uses a personal continuous glucose monitor and checks glucose four or more times daily and has for the past 90 days as documented in glucose reports I have personally reviewed   Treated with insulin with multiple daily injections (MDI)  or a constant subcutaneous infusion (CSI) pump:  This patient is injecting " insulin a minimum of three times daily.   Has additional conditions that require closer monitoring than can be obtained by blood glucose testing.  This patient  requires frequent adjustments of the insulin treatment regimen, based on therapeutic CGM test results, has severe excursions of blood glucose , and has day to day variations in work, activity and meal schedules that confound the degree of regimentation needed  to self manage glycemia with blood glucose testing  Has completed comprehensive diabetes education and training specific to the use of a CGM device.    Patient's most recent   Lab Results   Component Value Date    A1C 11.0 03/10/2025    A1C 8.9 04/12/2021      Patient's A1C goal: <8.0    Activity: no regular exercise program    Healthy Eating:   She states that she is an emotional eater, and tends to snack more when she is stressed.  States that she will often eat about 12 cups of popcorn in place of a meal.  She is trying to pay attention to eating less ultra-processed foods, and has installed both My Fitness Pal and Calorie Arik on her phone to help her with carb counting and calorie counting.      Problem Solving:      Patient is at risk of hypoglycemia?: YES, Frequency is one to two times per month, and Feels symptoms when glucose is less than 70 mg/dL.    Hospitalizations for hyper or hypoglycemia: Unknown    Healthy Coping and Stress Management:   Sources of stress identified by patient:  Other (please specify):  States that her stress level has decreased since deciding to stop working.   Her brother is having some significant health issues and this causes her quite a bit of concern.     He recently had an extended hospital stay for CABG X 3, followed by some complications and is currently in a long term critical care unit and is receiving dialysis.  He also has diabetes and this whole experience has increased her concern about the development of long term complications of diabetes in herself.  "    Coping mechanisms identified by patient:  Eating too much    EDUCATION and INSTRUCTION PROVIDED AT THIS VISIT:      Dionicio is best described as being in the contemplative phase of behavior change.  She verbalizes understanding that increasing her activity would help her glucose control, and has discussed with her  returning to a gym so they can do some pool exercise, as Dionicio has some mobility issues.  Currently she doesn't have a regular activity routine.  This was discussed again today and she states that she is working up the motivation to return to the pool.      Reviewed her Shalom report.  She is slightly lower over night than on her previous visit, however she is having very large excursions after eating  and she states that she is taking her Novolog prior to eating, although this is not evident on her report.      She is currently using an insulin to carbohydrate ratio of 1 unit per 5 grams of carbohydrate and a correction scale that she thinks is 1 unit per 50 mg/dL over a target of 150 before meals.  I'm not sure how closely she follows this scale.      She states that she thinks that the health situation with her brother has impacted her ability to manage.  She tends to 'graze' in the evening, and this may be related to emotional stress.    Encouraged Dionicio to pay attention to her sensor after meals, and if glucose is not returning to baseline within 3-4 hours, she knows that she needs to add more Novolog to that meal.        Encouraged her to continue accurate carb counting and adminstering her Novolog 10-15 minutes prior to eating.      She expressed some concern about renal complications, given that her brother recently started dialysis.  She states \"it hit me that that could be me.\"  We reviewed chronic kidney disease staging, reviewed how CKD develops and emphasized that prevention of complications depends on controlling not only blood glucose, but blood pressure and lipids as well.  We reviewed " her recent labs and blood pressures,  which were reassuring, however her glucose control needs significant improvement.     Note that she has not been tried on any SGLT-2 inhibitor.  She does not have a history of UTI nor vaginal yeast infections, and is willing to consider it if it will help.  Encouraged her to discuss this with Dr. De Luna at her next visit.       Dionicio would like a prescription for the test strips that are used with the [employer] Freestyle Shalom 3 reader, which she is considering switching to for her CGM readings.      Encouraged her to administer her Novolog with every meal, and to pay attention to her post-meal glucoses.  Encouraged her to move forward with getting back to the gym for some regular exercise.  She has a follow up appointment with Dr. De Luna in June.  We made a follow up appointment in July, one month after this appointment.     Patient-stated goal written and given to Radha Baca.  Verbalized and demonstrated understanding of instructions.     PLAN:    See patient instructions  AVS printed and given to patient    FOLLOW-UP:    We made a follow up appointment in about a month to check in.      Time spent with patient at today's visit was 45 minutes.        Any diabetes medication initiation or dose changes were made via the [facility] Standing Orders per the patient's referring provider. A copy of this encounter was shared with the provider.

## 2025-05-13 DIAGNOSIS — E11.8 TYPE 2 DIABETES MELLITUS WITH COMPLICATION (H): ICD-10-CM

## 2025-05-13 RX ORDER — INSULIN LISPRO 100 [IU]/ML
INJECTION, SOLUTION INTRAVENOUS; SUBCUTANEOUS
Qty: 75 ML | Refills: 2 | Status: SHIPPED | OUTPATIENT
Start: 2025-05-13

## 2025-05-13 NOTE — TELEPHONE ENCOUNTER
HUMALOG KWIKPEN 100 UNIT/ML soln         Last Written Prescription Date:  11/27/24  Last Fill Quantity: 75mL,   # refills: 3  Last Office Visit : 03/17/25  Future Office visit:  06/18/25    Routing refill request to provider for review/approval because:  Insulin and insulin pump supplies - refilled per Endocrine clinic.       Thalidomide Counseling: I discussed with the patient the risks of thalidomide including but not limited to birth defects, anxiety, weakness, chest pain, dizziness, cough and severe allergy. Cimzia Counseling:  I discussed with the patient the risks of Cimzia including but not limited to immunosuppression, allergic reactions and infections.  The patient understands that monitoring is required including a PPD at baseline and must alert us or the primary physician if symptoms of infection or other concerning signs are noted. Tremfya Pregnancy And Lactation Text: The risk during pregnancy and breastfeeding is uncertain with this medication. Sarecycline Counseling: Patient advised regarding possible photosensitivity and discoloration of the teeth, skin, lips, tongue and gums.  Patient instructed to avoid sunlight, if possible.  When exposed to sunlight, patients should wear protective clothing, sunglasses, and sunscreen.  The patient was instructed to call the office immediately if the following severe adverse effects occur:  hearing changes, easy bruising/bleeding, severe headache, or vision changes.  The patient verbalized understanding of the proper use and possible adverse effects of sarecycline.  All of the patient's questions and concerns were addressed. Cyclophosphamide Pregnancy And Lactation Text: This medication is Pregnancy Category D and it isn't considered safe during pregnancy. This medication is excreted in breast milk. Skyrizi Counseling: I discussed with the patient the risks of risankizumab-rzaa including but not limited to immunosuppression, and serious infections.  The patient understands that monitoring is required including a PPD at baseline and must alert us or the primary physician if symptoms of infection or other concerning signs are noted. 5-Fu Counseling: 5-Fluorouracil Counseling:  I discussed with the patient the risks of 5-fluorouracil including but not limited to erythema, scaling, itching, weeping, crusting, and pain. Colchicine Counseling:  Patient counseled regarding adverse effects including but not limited to stomach upset (nausea, vomiting, stomach pain, or diarrhea).  Patient instructed to limit alcohol consumption while taking this medication.  Colchicine may reduce blood counts especially with prolonged use.  The patient understands that monitoring of kidney function and blood counts may be required, especially at baseline. The patient verbalized understanding of the proper use and possible adverse effects of colchicine.  All of the patient's questions and concerns were addressed. Zyclara Counseling:  I discussed with the patient the risks of imiquimod including but not limited to erythema, scaling, itching, weeping, crusting, and pain.  Patient understands that the inflammatory response to imiquimod is variable from person to person and was educated regarded proper titration schedule.  If flu-like symptoms develop, patient knows to discontinue the medication and contact us. Imiquimod Counseling:  I discussed with the patient the risks of imiquimod including but not limited to erythema, scaling, itching, weeping, crusting, and pain.  Patient understands that the inflammatory response to imiquimod is variable from person to person and was educated regarded proper titration schedule.  If flu-like symptoms develop, patient knows to discontinue the medication and contact us. Oral Minoxidil Pregnancy And Lactation Text: This medication should only be used when clearly needed if you are pregnant, attempting to become pregnant or breast feeding. Litfulo Counseling: Litfulo (Ritlecitinib) Counseling: I discussed with the patient the risks of Litfulo therapy including but not limited to headache, upper respiratory infections, nausea, diarrhea, acne, and urticaria. Live vaccines should be avoided.  This medication has been linked to serious infections; higher rate of mortality; malignancy and lymphoproliferative disorders; major adverse cardiovascular events; thrombosis; decreases in lymphocytes and platelets; liver enzyme elevations; and CPK elevations. Itraconazole Pregnancy And Lactation Text: This medication is Pregnancy Category C and it isn't know if it is safe during pregnancy. It is also excreted in breast milk. Erythromycin Pregnancy And Lactation Text: This medication is Pregnancy Category B and is considered safe during pregnancy. It is also excreted in breast milk. Detail Level: Zone Albendazole Counseling:  I discussed with the patient the risks of albendazole including but not limited to cytopenia, kidney damage, nausea/vomiting and severe allergy.  The patient understands that this medication is being used in an off-label manner. Nemluvio Pregnancy And Lactation Text: It is not known if Nemluvio causes fetal harm or is present in breast milk. Please proceed with caution if patients who are pregnant or breastfeeding. Dutasteride Male Counseling: Dustasteride Counseling:  I discussed with the patient the risks of use of dutasteride including but not limited to decreased libido, decreased ejaculate volume, and gynecomastia. Women who can become pregnant should not handle medication.  All of the patient's questions and concerns were addressed. Bactrim Counseling:  I discussed with the patient the risks of sulfa antibiotics including but not limited to GI upset, allergic reaction, drug rash, diarrhea, dizziness, photosensitivity, and yeast infections.  Rarely, more serious reactions can occur including but not limited to aplastic anemia, agranulocytosis, methemoglobinemia, blood dyscrasias, liver or kidney failure, lung infiltrates or desquamative/blistering drug rashes. Hydroxyzine Counseling: Patient advised that the medication is sedating and not to drive a car after taking this medication.  Patient informed of potential adverse effects including but not limited to dry mouth, urinary retention, and blurry vision.  The patient verbalized understanding of the proper use and possible adverse effects of hydroxyzine.  All of the patient's questions and concerns were addressed. Humira Counseling:  I discussed with the patient the risks of adalimumab including but not limited to myelosuppression, immunosuppression, autoimmune hepatitis, demyelinating diseases, lymphoma, and serious infections.  The patient understands that monitoring is required including a PPD at baseline and must alert us or the primary physician if symptoms of infection or other concerning signs are noted. Sotyktu Pregnancy And Lactation Text: There is insufficient data to evaluate whether or not Sotyktu is safe to use during pregnancy.? ?It is not known if Sotyktu passes into breast milk and whether or not it is safe to use when breastfeeding.?? Topical Clindamycin Pregnancy And Lactation Text: This medication is Pregnancy Category B and is considered safe during pregnancy. It is unknown if it is excreted in breast milk. Low Dose Naltrexone Counseling- I discussed with the patient the potential risks and side effects of low dose naltrexone including but not limited to: more vivid dreams, headaches, nausea, vomiting, abdominal pain, fatigue, dizziness, and anxiety. Xolair Counseling:  Patient informed of potential adverse effects including but not limited to fever, muscle aches, rash and allergic reactions.  The patient verbalized understanding of the proper use and possible adverse effects of Xolair.  All of the patient's questions and concerns were addressed. Cimzia Pregnancy And Lactation Text: This medication crosses the placenta but can be considered safe in certain situations. Cimzia may be excreted in breast milk. Rhofade Counseling: Rhofade is a topical medication which can decrease superficial blood flow where applied. Side effects are uncommon and include stinging, redness and allergic reactions. Thalidomide Pregnancy And Lactation Text: This medication is Pregnancy Category X and is absolutely contraindicated during pregnancy. It is unknown if it is excreted in breast milk. Colchicine Pregnancy And Lactation Text: This medication is Pregnancy Category C and isn't considered safe during pregnancy. It is excreted in breast milk. Birth Control Pills Pregnancy And Lactation Text: This medication should be avoided if pregnant and for the first 30 days post-partum. Cyclosporine Counseling:  I discussed with the patient the risks of cyclosporine including but not limited to hypertension, gingival hyperplasia,myelosuppression, immunosuppression, liver damage, kidney damage, neurotoxicity, lymphoma, and serious infections. The patient understands that monitoring is required including baseline blood pressure, CBC, CMP, lipid panel and uric acid, and then 1-2 times monthly CMP and blood pressure. 5-Fu Pregnancy And Lactation Text: This medication is Pregnancy Category X and contraindicated in pregnancy and in women who may become pregnant. It is unknown if this medication is excreted in breast milk. Metronidazole Counseling:  I discussed with the patient the risks of metronidazole including but not limited to seizures, nausea/vomiting, a metallic taste in the mouth, nausea/vomiting and severe allergy. Sarecycline Pregnancy And Lactation Text: This medication is Pregnancy Category D and not consider safe during pregnancy. It is also excreted in breast milk. Azelaic Acid Counseling: Patient counseled that medicine may cause skin irritation and to avoid applying near the eyes.  In the event of skin irritation, the patient was advised to reduce the amount of the drug applied or use it less frequently.   The patient verbalized understanding of the proper use and possible adverse effects of azelaic acid.  All of the patient's questions and concerns were addressed. Ketoconazole Counseling:   Patient counseled regarding improving absorption with orange juice.  Adverse effects include but are not limited to breast enlargement, headache, diarrhea, nausea, upset stomach, liver function test abnormalities, taste disturbance, and stomach pain.  There is a rare possibility of liver failure that can occur when taking ketoconazole. The patient understands that monitoring of LFTs may be required, especially at baseline. The patient verbalized understanding of the proper use and possible adverse effects of ketoconazole.  All of the patient's questions and concerns were addressed. Litfulo Pregnancy And Lactation Text: There is insufficient data to evaluate whether or not Litfulo is safe to use during pregnancy.  Breastfeeding is not recommended during treatment. Zyclara Pregnancy And Lactation Text: This medication is Pregnancy Category C. It is unknown if this medication is excreted in breast milk. Topical Ketoconazole Counseling: Patient counseled that this medication may cause skin irritation or allergic reactions.  In the event of skin irritation, the patient was advised to reduce the amount of the drug applied or use it less frequently.   The patient verbalized understanding of the proper use and possible adverse effects of ketoconazole.  All of the patient's questions and concerns were addressed. Low Dose Naltrexone Pregnancy And Lactation Text: Naltrexone is pregnancy category C.  There have been no adequate and well-controlled studies in pregnant women.  It should be used in pregnancy only if the potential benefit justifies the potential risk to the fetus.   Limited data indicates that naltrexone is minimally excreted into breastmilk. Otezla Counseling: The side effects of Otezla were discussed with the patient, including but not limited to worsening or new depression, weight loss, diarrhea, nausea, upper respiratory tract infection, and headache. Patient instructed to call the office should any adverse effect occur.  The patient verbalized understanding of the proper use and possible adverse effects of Otezla.  All the patient's questions and concerns were addressed. Albendazole Pregnancy And Lactation Text: This medication is Pregnancy Category C and it isn't known if it is safe during pregnancy. It is also excreted in breast milk. Rituxan Counseling:  I discussed with the patient the risks of Rituxan infusions. Side effects can include infusion reactions, severe drug rashes including mucocutaneous reactions, reactivation of latent hepatitis and other infections and rarely progressive multifocal leukoencephalopathy.  All of the patient's questions and concerns were addressed. Bactrim Pregnancy And Lactation Text: This medication is Pregnancy Category D and is known to cause fetal risk.  It is also excreted in breast milk. Drysol Counseling:  I discussed with the patient the risks of drysol/aluminum chloride including but not limited to skin rash, itching, irritation, burning. Humira Pregnancy And Lactation Text: This medication is Pregnancy Category B and is considered safe during pregnancy. It is unknown if this medication is excreted in breast milk. Cosentyx Counseling:  I discussed with the patient the risks of Cosentyx including but not limited to worsening of Crohn's disease, immunosuppression, allergic reactions and infections.  The patient understands that monitoring is required including a PPD at baseline and must alert us or the primary physician if symptoms of infection or other concerning signs are noted. Dutasteride Female Counseling: Dutasteride Counseling:  I discussed with the patient the risks of use of dutasteride including but not limited to decreased libido and sexual dysfunction. Explained the teratogenic nature of the medication and stressed the importance of not getting pregnant during treatment. All of the patient's questions and concerns were addressed. Xolair Pregnancy And Lactation Text: This medication is Pregnancy Category B and is considered safe during pregnancy. This medication is excreted in breast milk. Rhofade Pregnancy And Lactation Text: This medication has not been assigned a Pregnancy Risk Category. It is unknown if the medication is excreted in breast milk. Spironolactone Counseling: Patient advised regarding risks of diarrhea, abdominal pain, hyperkalemia, birth defects (for female patients), liver toxicity and renal toxicity. The patient may need blood work to monitor liver and kidney function and potassium levels while on therapy. The patient verbalized understanding of the proper use and possible adverse effects of spironolactone.  All of the patient's questions and concerns were addressed. Olumiant Counseling: I discussed with the patient the risks of Olumiant therapy including but not limited to upper respiratory tract infections, shingles, cold sores, and nausea. Live vaccines should be avoided.  This medication has been linked to serious infections; higher rate of mortality; malignancy and lymphoproliferative disorders; major adverse cardiovascular events; thrombosis; gastrointestinal perforations; neutropenia; lymphopenia; anemia; liver enzyme elevations; and lipid elevations. Dapsone Counseling: I discussed with the patient the risks of dapsone including but not limited to hemolytic anemia, agranulocytosis, rashes, methemoglobinemia, kidney failure, peripheral neuropathy, headaches, GI upset, and liver toxicity.  Patients who start dapsone require monitoring including baseline LFTs and weekly CBCs for the first month, then every month thereafter.  The patient verbalized understanding of the proper use and possible adverse effects of dapsone.  All of the patient's questions and concerns were addressed. Tranexamic Acid Counseling:  Patient advised of the small risk of bleeding problems with tranexamic acid. They were also instructed to call if they developed any nausea, vomiting or diarrhea. All of the patient's questions and concerns were addressed. Acitretin Counseling:  I discussed with the patient the risks of acitretin including but not limited to hair loss, dry lips/skin/eyes, liver damage, hyperlipidemia, depression/suicidal ideation, photosensitivity.  Serious rare side effects can include but are not limited to pancreatitis, pseudotumor cerebri, bony changes, clot formation/stroke/heart attack.  Patient understands that alcohol is contraindicated since it can result in liver toxicity and significantly prolong the elimination of the drug by many years. Tetracycline Counseling: Patient counseled regarding possible photosensitivity and increased risk for sunburn.  Patient instructed to avoid sunlight, if possible.  When exposed to sunlight, patients should wear protective clothing, sunglasses, and sunscreen.  The patient was instructed to call the office immediately if the following severe adverse effects occur:  hearing changes, easy bruising/bleeding, severe headache, or vision changes.  The patient verbalized understanding of the proper use and possible adverse effects of tetracycline.  All of the patient's questions and concerns were addressed. Patient understands to avoid pregnancy while on therapy due to potential birth defects. Cyclosporine Pregnancy And Lactation Text: This medication is Pregnancy Category C and it isn't know if it is safe during pregnancy. This medication is excreted in breast milk. Azelaic Acid Pregnancy And Lactation Text: This medication is considered safe during pregnancy and breast feeding. Otezla Pregnancy And Lactation Text: This medication is Pregnancy Category C and it isn't known if it is safe during pregnancy. It is unknown if it is excreted in breast milk. Cephalexin Counseling: I counseled the patient regarding use of cephalexin as an antibiotic for prophylactic and/or therapeutic purposes. Cephalexin (commonly prescribed under brand name Keflex) is a cephalosporin antibiotic which is active against numerous classes of bacteria, including most skin bacteria. Side effects may include nausea, diarrhea, gastrointestinal upset, rash, hives, yeast infections, and in rare cases, hepatitis, kidney disease, seizures, fever, confusion, neurologic symptoms, and others. Patients with severe allergies to penicillin medications are cautioned that there is about a 10% incidence of cross-reactivity with cephalosporins. When possible, patients with penicillin allergies should use alternatives to cephalosporins for antibiotic therapy. Ketoconazole Pregnancy And Lactation Text: This medication is Pregnancy Category C and it isn't know if it is safe during pregnancy. It is also excreted in breast milk and breast feeding isn't recommended. Spevigo Counseling: I discussed with the patient the risks of Spevigo including but not limited to fatigue, nasuea, vomiting, headache, pruritus, urinary tract infection, an infusion related reactions.  The patient understands that monitoring is required including screening for tuberculosis at baseline and yearly screening thereafter while continuing Spevigo therapy. They should contact us if symptoms of infection or other concerning signs are noted. Rituxan Pregnancy And Lactation Text: This medication is Pregnancy Category C and it isn't know if it is safe during pregnancy. It is unknown if this medication is excreted in breast milk but similar antibodies are known to be excreted. Metronidazole Pregnancy And Lactation Text: This medication is Pregnancy Category B and considered safe during pregnancy.  It is also excreted in breast milk. Hyrimoz Counseling:  I discussed with the patient the risks of adalimumab including but not limited to myelosuppression, immunosuppression, autoimmune hepatitis, demyelinating diseases, lymphoma, and serious infections.  The patient understands that monitoring is required including a PPD at baseline and must alert us or the primary physician if symptoms of infection or other concerning signs are noted. Spironolactone Pregnancy And Lactation Text: This medication can cause feminization of the male fetus and should be avoided during pregnancy. The active metabolite is also found in breast milk. Niacinamide Counseling: I recommended taking niacin or niacinamide, also know as vitamin B3, twice daily. Recent evidence suggests that taking vitamin B3 (500 mg twice daily) can reduce the risk of actinic keratoses and non-melanoma skin cancers. Side effects of vitamin B3 include flushing and headache. Ivermectin Counseling:  Patient instructed to take medication on an empty stomach with a full glass of water.  Patient informed of potential adverse effects including but not limited to nausea, diarrhea, dizziness, itching, and swelling of the extremities or lymph nodes.  The patient verbalized understanding of the proper use and possible adverse effects of ivermectin.  All of the patient's questions and concerns were addressed. Solaraze Counseling:  I discussed with the patient the risks of Solaraze including but not limited to erythema, scaling, itching, weeping, crusting, and pain. Minoxidil Counseling: Minoxidil is a topical medication which can increase blood flow where it is applied. It is uncertain how this medication increases hair growth. Side effects are uncommon and include stinging and allergic reactions. Opioid Counseling: I discussed with the patient the potential side effects of opioids including but not limited to addiction, altered mental status, and depression. I stressed avoiding alcohol, benzodiazepines, muscle relaxants and sleep aids unless specifically okayed by a physician. The patient verbalized understanding of the proper use and possible adverse effects of opioids. All of the patient's questions and concerns were addressed. They were instructed to flush the remaining pills down the toilet if they did not need them for pain. Dutasteride Pregnancy And Lactation Text: This medication is absolutely contraindicated in women, especially during pregnancy and breast feeding. Feminization of male fetuses is possible if taking while pregnant. Methotrexate Counseling:  Patient counseled regarding adverse effects of methotrexate including but not limited to nausea, vomiting, abnormalities in liver function tests. Patients may develop mouth sores, rash, diarrhea, and abnormalities in blood counts. The patient understands that monitoring is required including LFT's and blood counts.  There is a rare possibility of scarring of the liver and lung problems that can occur when taking methotrexate. Persistent nausea, loss of appetite, pale stools, dark urine, cough, and shortness of breath should be reported immediately. Patient advised to discontinue methotrexate treatment at least three months before attempting to become pregnant.  I discussed the need for folate supplements while taking methotrexate.  These supplements can decrease side effects during methotrexate treatment. The patient verbalized understanding of the proper use and possible adverse effects of methotrexate.  All of the patient's questions and concerns were addressed. Include Pregnancy/Lactation Warning?: No Acitretin Pregnancy And Lactation Text: This medication is Pregnancy Category X and should not be given to women who are pregnant or may become pregnant in the future. This medication is excreted in breast milk. Dapsone Pregnancy And Lactation Text: This medication is Pregnancy Category C and is not considered safe during pregnancy or breast feeding. Olumiant Pregnancy And Lactation Text: Based on animal studies, Olumiant may cause embryo-fetal harm when administered to pregnant women.  The medication should not be used in pregnancy.  Breastfeeding is not recommended during treatment. Oxybutynin Counseling:  I discussed with the patient the risks of oxybutynin including but not limited to skin rash, drowsiness, dry mouth, difficulty urinating, and blurred vision. Terbinafine Counseling: Patient counseling regarding adverse effects of terbinafine including but not limited to headache, diarrhea, rash, upset stomach, liver function test abnormalities, itching, taste/smell disturbance, nausea, abdominal pain, and flatulence.  There is a rare possibility of liver failure that can occur when taking terbinafine.  The patient understands that a baseline LFT and kidney function test may be required. The patient verbalized understanding of the proper use and possible adverse effects of terbinafine.  All of the patient's questions and concerns were addressed. Tranexamic Acid Pregnancy And Lactation Text: It is unknown if this medication is safe during pregnancy or breast feeding. Erivedge Counseling- I discussed with the patient the risks of Erivedge including but not limited to nausea, vomiting, diarrhea, constipation, weight loss, changes in the sense of taste, decreased appetite, muscle spasms, and hair loss.  The patient verbalized understanding of the proper use and possible adverse effects of Erivedge.  All of the patient's questions and concerns were addressed. Benzoyl Peroxide Counseling: Patient counseled that medicine may cause skin irritation and bleach clothing.  In the event of skin irritation, the patient was advised to reduce the amount of the drug applied or use it less frequently.   The patient verbalized understanding of the proper use and possible adverse effects of benzoyl peroxide.  All of the patient's questions and concerns were addressed. Minocycline Counseling: Patient advised regarding possible photosensitivity and discoloration of the teeth, skin, lips, tongue and gums.  Patient instructed to avoid sunlight, if possible.  When exposed to sunlight, patients should wear protective clothing, sunglasses, and sunscreen.  The patient was instructed to call the office immediately if the following severe adverse effects occur:  hearing changes, easy bruising/bleeding, severe headache, or vision changes.  The patient verbalized understanding of the proper use and possible adverse effects of minocycline.  All of the patient's questions and concerns were addressed. Spevigo Pregnancy And Lactation Text: The risk during pregnancy and breastfeeding is uncertain with this medication. This medication does cross the placenta. It is unknown if this medication is found in breast milk. Minoxidil Pregnancy And Lactation Text: This medication has not been assigned a Pregnancy Risk Category but animal studies failed to show danger with the topical medication. It is unknown if the medication is excreted in breast milk. Cephalexin Pregnancy And Lactation Text: This medication is Pregnancy Category B and considered safe during pregnancy.  It is also excreted in breast milk but can be used safely for shorter doses. Finasteride Male Counseling: Finasteride Counseling:  I discussed with the patient the risks of use of finasteride including but not limited to decreased libido, decreased ejaculate volume, gynecomastia, and depression. Women should not handle medication.  All of the patient's questions and concerns were addressed. Siliq Counseling:  I discussed with the patient the risks of Siliq including but not limited to new or worsening depression, suicidal thoughts and behavior, immunosuppression, malignancy, posterior leukoencephalopathy syndrome, and serious infections.  The patient understands that monitoring is required including a PPD at baseline and must alert us or the primary physician if symptoms of infection or other concerning signs are noted. There is also a special program designed to monitor depression which is required with Siliq. Topical Sulfur Applications Counseling: Topical Sulfur Counseling: Patient counseled that this medication may cause skin irritation or allergic reactions.  In the event of skin irritation, the patient was advised to reduce the amount of the drug applied or use it less frequently.   The patient verbalized understanding of the proper use and possible adverse effects of topical sulfur application.  All of the patient's questions and concerns were addressed. Dupixent Counseling: I discussed with the patient the risks of dupilumab including but not limited to eye infection and irritation, cold sores, injection site reactions, worsening of asthma, allergic reactions and increased risk of parasitic infection.  Live vaccines should be avoided while taking dupilumab. Dupilumab will also interact with certain medications such as warfarin and cyclosporine. The patient understands that monitoring is required and they must alert us or the primary physician if symptoms of infection or other concerning signs are noted. Bexarotene Counseling:  I discussed with the patient the risks of bexarotene including but not limited to hair loss, dry lips/skin/eyes, liver abnormalities, hyperlipidemia, pancreatitis, depression/suicidal ideation, photosensitivity, drug rash/allergic reactions, hypothyroidism, anemia, leukopenia, infection, cataracts, and teratogenicity.  Patient understands that they will need regular blood tests to check lipid profile, liver function tests, white blood cell count, thyroid function tests and pregnancy test if applicable. Niacinamide Pregnancy And Lactation Text: These medications are considered safe during pregnancy. Gabapentin Counseling: I discussed with the patient the risks of gabapentin including but not limited to dizziness, somnolence, fatigue and ataxia. Opioid Pregnancy And Lactation Text: These medications can lead to premature delivery and should be avoided during pregnancy. These medications are also present in breast milk in small amounts. Elidel Counseling: Patient may experience a mild burning sensation during topical application. Elidel is not approved in children less than 2 years of age. There have been case reports of hematologic and skin malignancies in patients using topical calcineurin inhibitors although causality is questionable. Valtrex Counseling: I discussed with the patient the risks of valacyclovir including but not limited to kidney damage, nausea, vomiting and severe allergy.  The patient understands that if the infection seems to be worsening or is not improving, they are to call. Benzoyl Peroxide Pregnancy And Lactation Text: This medication is Pregnancy Category C. It is unknown if benzoyl peroxide is excreted in breast milk. Azathioprine Counseling:  I discussed with the patient the risks of azathioprine including but not limited to myelosuppression, immunosuppression, hepatotoxicity, lymphoma, and infections.  The patient understands that monitoring is required including baseline LFTs, Creatinine, possible TPMP genotyping and weekly CBCs for the first month and then every 2 weeks thereafter.  The patient verbalized understanding of the proper use and possible adverse effects of azathioprine.  All of the patient's questions and concerns were addressed. Solaraze Pregnancy And Lactation Text: This medication is Pregnancy Category B and is considered safe. There is some data to suggest avoiding during the third trimester. It is unknown if this medication is excreted in breast milk. Methotrexate Pregnancy And Lactation Text: This medication is Pregnancy Category X and is known to cause fetal harm. This medication is excreted in breast milk. Stelara Counseling:  I discussed with the patient the risks of ustekinumab including but not limited to immunosuppression, malignancy, posterior leukoencephalopathy syndrome, and serious infections.  The patient understands that monitoring is required including a PPD at baseline and must alert us or the primary physician if symptoms of infection or other concerning signs are noted. Fluconazole Counseling:  Patient counseled regarding adverse effects of fluconazole including but not limited to headache, diarrhea, nausea, upset stomach, liver function test abnormalities, taste disturbance, and stomach pain.  There is a rare possibility of liver failure that can occur when taking fluconazole.  The patient understands that monitoring of LFTs and kidney function test may be required, especially at baseline. The patient verbalized understanding of the proper use and possible adverse effects of fluconazole.  All of the patient's questions and concerns were addressed. Rinvoq Counseling: I discussed with the patient the risks of Rinvoq therapy including but not limited to upper respiratory tract infections, shingles, cold sores, bronchitis, nausea, cough, fever, acne, and headache. Live vaccines should be avoided.  This medication has been linked to serious infections; higher rate of mortality; malignancy and lymphoproliferative disorders; major adverse cardiovascular events; thrombosis; thrombocytopenia, anemia, and neutropenia; lipid elevations; liver enzyme elevations; and gastrointestinal perforations. Topical Sulfur Applications Pregnancy And Lactation Text: This medication is considered safe during pregnancy and breast feeding secondary to limited systemic absorption. Terbinafine Pregnancy And Lactation Text: This medication is Pregnancy Category B and is considered safe during pregnancy. It is also excreted in breast milk and breast feeding isn't recommended. Ilumya Counseling: I discussed with the patient the risks of tildrakizumab including but not limited to immunosuppression, malignancy, posterior leukoencephalopathy syndrome, and serious infections.  The patient understands that monitoring is required including a PPD at baseline and must alert us or the primary physician if symptoms of infection or other concerning signs are noted. Clindamycin Counseling: I counseled the patient regarding use of clindamycin as an antibiotic for prophylactic and/or therapeutic purposes. Clindamycin is active against numerous classes of bacteria, including skin bacteria. Side effects may include nausea, diarrhea, gastrointestinal upset, rash, hives, yeast infections, and in rare cases, colitis. Finasteride Female Counseling: Finasteride Counseling:  I discussed with the patient the risks of use of finasteride including but not limited to decreased libido and sexual dysfunction. Explained the teratogenic nature of the medication and stressed the importance of not getting pregnant during treatment. All of the patient's questions and concerns were addressed. Mirvaso Counseling: Mirvaso is a topical medication which can decrease superficial blood flow where applied. Side effects are uncommon and include stinging, redness and allergic reactions. Nsaids Counseling: NSAID Counseling: I discussed with the patient that NSAIDs should be taken with food. Prolonged use of NSAIDs can result in the development of stomach ulcers.  Patient advised to stop taking NSAIDs if abdominal pain occurs.  The patient verbalized understanding of the proper use and possible adverse effects of NSAIDs.  All of the patient's questions and concerns were addressed. Dupixent Pregnancy And Lactation Text: This medication likely crosses the placenta but the risk for the fetus is uncertain. This medication is excreted in breast milk. Topical Retinoid counseling:  Patient advised to apply a pea-sized amount only at bedtime and wait 30 minutes after washing their face before applying.  If too drying, patient may add a non-comedogenic moisturizer. The patient verbalized understanding of the proper use and possible adverse effects of retinoids.  All of the patient's questions and concerns were addressed. Valtrex Pregnancy And Lactation Text: this medication is Pregnancy Category B and is considered safe during pregnancy. This medication is not directly found in breast milk but it's metabolite acyclovir is present. Bexarotene Pregnancy And Lactation Text: This medication is Pregnancy Category X and should not be given to women who are pregnant or may become pregnant. This medication should not be used if you are breast feeding. Prednisone Counseling:  I discussed with the patient the risks of prolonged use of prednisone including but not limited to weight gain, insomnia, osteoporosis, mood changes, diabetes, susceptibility to infection, glaucoma and high blood pressure.  In cases where prednisone use is prolonged, patients should be monitored with blood pressure checks, serum glucose levels and an eye exam.  Additionally, the patient may need to be placed on GI prophylaxis, PCP prophylaxis, and calcium and vitamin D supplementation and/or a bisphosphonate.  The patient verbalized understanding of the proper use and the possible adverse effects of prednisone.  All of the patient's questions and concerns were addressed. Adbry Counseling: I discussed with the patient the risks of tralokinumab including but not limited to eye infection and irritation, cold sores, injection site reactions, worsening of asthma, allergic reactions and increased risk of parasitic infection.  Live vaccines should be avoided while taking tralokinumab. The patient understands that monitoring is required and they must alert us or the primary physician if symptoms of infection or other concerning signs are noted. Azathioprine Pregnancy And Lactation Text: This medication is Pregnancy Category D and isn't considered safe during pregnancy. It is unknown if this medication is excreted in breast milk. Quinolones Counseling:  I discussed with the patient the risks of fluoroquinolones including but not limited to GI upset, allergic reaction, drug rash, diarrhea, dizziness, photosensitivity, yeast infections, liver function test abnormalities, tendonitis/tendon rupture. Carac Counseling:  I discussed with the patient the risks of Carac including but not limited to erythema, scaling, itching, weeping, crusting, and pain. Rinvoq Pregnancy And Lactation Text: Based on animal studies, Rinvoq may cause embryo-fetal harm when administered to pregnant women.  The medication should not be used in pregnancy.  Breastfeeding is not recommended during treatment and for 6 days after the last dose. Arava Counseling:  Patient counseled regarding adverse effects of Arava including but not limited to nausea, vomiting, abnormalities in liver function tests. Patients may develop mouth sores, rash, diarrhea, and abnormalities in blood counts. The patient understands that monitoring is required including LFTs and blood counts.  There is a rare possibility of scarring of the liver and lung problems that can occur when taking methotrexate. Persistent nausea, loss of appetite, pale stools, dark urine, cough, and shortness of breath should be reported immediately. Patient advised to discontinue Arava treatment and consult with a physician prior to attempting conception. The patient will have to undergo a treatment to eliminate Arava from the body prior to conception. Propranolol Counseling:  I discussed with the patient the risks of propranolol including but not limited to low heart rate, low blood pressure, low blood sugar, restlessness and increased cold sensitivity. They should call the office if they experience any of these side effects. Wartpeel Counseling:  I discussed with the patient the risks of Wartpeel including but not limited to erythema, scaling, itching, weeping, crusting, and pain. Nsaids Pregnancy And Lactation Text: These medications are considered safe up to 30 weeks gestation. It is excreted in breast milk. Libtayo Counseling- I discussed with the patient the risks of Libtayo including but not limited to nausea, vomiting, diarrhea, and bone or muscle pain.  The patient verbalized understanding of the proper use and possible adverse effects of Libtayo.  All of the patient's questions and concerns were addressed. Clindamycin Pregnancy And Lactation Text: This medication can be used in pregnancy if certain situations. Clindamycin is also present in breast milk. Simlandi Counseling:  I discussed with the patient the risks of adalimumab including but not limited to myelosuppression, immunosuppression, autoimmune hepatitis, demyelinating diseases, lymphoma, and serious infections.  The patient understands that monitoring is required including a PPD at baseline and must alert us or the primary physician if symptoms of infection or other concerning signs are noted. Eucrisa Counseling: Patient may experience a mild burning sensation during topical application. Eucrisa is not approved in children less than 3 months of age. Cimetidine Counseling:  I discussed with the patient the risks of Cimetidine including but not limited to gynecomastia, headache, diarrhea, nausea, drowsiness, arrhythmias, pancreatitis, skin rashes, psychosis, bone marrow suppression and kidney toxicity. Ebglyss Counseling: I discussed with the patient the risks of lebrikizumab including but not limited to eye inflammation and irritation, cold sores, injection site reactions, allergic reactions and increased risk of parasitic infection. The patient understands that monitoring is required and they must alert us or the primary physician if symptoms of infection or other concerning signs are noted. Finasteride Pregnancy And Lactation Text: This medication is absolutely contraindicated during pregnancy. It is unknown if it is excreted in breast milk. Glycopyrrolate Counseling:  I discussed with the patient the risks of glycopyrrolate including but not limited to skin rash, drowsiness, dry mouth, difficulty urinating, and blurred vision. Taltz Counseling: I discussed with the patient the risks of ixekizumab including but not limited to immunosuppression, serious infections, worsening of inflammatory bowel disease and drug reactions.  The patient understands that monitoring is required including a PPD at baseline and must alert us or the primary physician if symptoms of infection or other concerning signs are noted. Xeljanz Counseling: I discussed with the patient the risks of Xeljanz therapy including increased risk of infection, liver issues, headache, diarrhea, or cold symptoms. Live vaccines should be avoided. They were instructed to call if they have any problems. Adbry Pregnancy And Lactation Text: It is unknown if this medication will adversely affect pregnancy or breast feeding. Isotretinoin Counseling: Patient should get monthly blood tests, not donate blood, not drive at night if vision affected, not share medication, and not undergo elective surgery for 6 months after tx completed. Side effects reviewed, pt to contact office should one occur. Propranolol Pregnancy And Lactation Text: This medication is Pregnancy Category C and it isn't known if it is safe during pregnancy. It is excreted in breast milk. Opzelura Counseling:  I discussed with the patient the risks of Opzelura including but not limited to nasopharngitis, bronchitis, ear infection, eosinophila, hives, diarrhea, folliculitis, tonsillitis, and rhinorrhea.  Taken orally, this medication has been linked to serious infections; higher rate of mortality; malignancy and lymphoproliferative disorders; major adverse cardiovascular events; thrombosis; thrombocytopenia, anemia, and neutropenia; and lipid elevations. Doxycycline Counseling:  Patient counseled regarding possible photosensitivity and increased risk for sunburn.  Patient instructed to avoid sunlight, if possible.  When exposed to sunlight, patients should wear protective clothing, sunglasses, and sunscreen.  The patient was instructed to call the office immediately if the following severe adverse effects occur:  hearing changes, easy bruising/bleeding, severe headache, or vision changes.  The patient verbalized understanding of the proper use and possible adverse effects of doxycycline.  All of the patient's questions and concerns were addressed. Cellcept Counseling:  I discussed with the patient the risks of mycophenolate mofetil including but not limited to infection/immunosuppression, GI upset, hypokalemia, hypercholesterolemia, bone marrow suppression, lymphoproliferative disorders, malignancy, GI ulceration/bleed/perforation, colitis, interstitial lung disease, kidney failure, progressive multifocal leukoencephalopathy, and birth defects.  The patient understands that monitoring is required including a baseline creatinine and regular CBC testing. In addition, patient must alert us immediately if symptoms of infection or other concerning signs are noted. Libtayo Pregnancy And Lactation Text: This medication is contraindicated in pregnancy and when breast feeding. Birth Control Pills Counseling: Birth Control Pill Counseling: I discussed with the patient the potential side effects of OCPs including but not limited to increased risk of stroke, heart attack, thrombophlebitis, deep venous thrombosis, hepatic adenomas, breast changes, GI upset, headaches, and depression.  The patient verbalized understanding of the proper use and possible adverse effects of OCPs. All of the patient's questions and concerns were addressed. Infliximab Counseling:  I discussed with the patient the risks of infliximab including but not limited to myelosuppression, immunosuppression, autoimmune hepatitis, demyelinating diseases, lymphoma, and serious infections.  The patient understands that monitoring is required including a PPD at baseline and must alert us or the primary physician if symptoms of infection or other concerning signs are noted. Olanzapine Counseling- I discussed with the patient the common side effects of olanzapine including but are not limited to: lack of energy, dry mouth, increased appetite, sleepiness, tremor, constipation, dizziness, changes in behavior, or restlessness.  Explained that teenagers are more likely to experience headaches, abdominal pain, pain in the arms or legs, tiredness, and sleepiness.  Serious side effects include but are not limited: increased risk of death in elderly patients who are confused, have memory loss, or dementia-related psychosis; hyperglycemia; increased cholesterol and triglycerides; and weight gain. Tazorac Counseling:  Patient advised that medication is irritating and drying.  Patient may need to apply sparingly and wash off after an hour before eventually leaving it on overnight.  The patient verbalized understanding of the proper use and possible adverse effects of tazorac.  All of the patient's questions and concerns were addressed. Griseofulvin Counseling:  I discussed with the patient the risks of griseofulvin including but not limited to photosensitivity, cytopenia, liver damage, nausea/vomiting and severe allergy.  The patient understands that this medication is best absorbed when taken with a fatty meal (e.g., ice cream or french fries). Calcipotriene Counseling:  I discussed with the patient the risks of calcipotriene including but not limited to erythema, scaling, itching, and irritation. Ebglyss Pregnancy And Lactation Text: This medication likely crosses the placenta but the risk for the fetus is uncertain. It is unknown if this medication is excreted in breast milk. Isotretinoin Pregnancy And Lactation Text: This medication is Pregnancy Category X and is considered extremely dangerous during pregnancy. It is unknown if it is excreted in breast milk. Bimzelx Counseling:  I discussed with the patient the risks of Bimzelx including but not limited to depression, immunosuppression, allergic reactions and infections.  The patient understands that monitoring is required including a PPD at baseline and must alert us or the primary physician if symptoms of infection or other concerning signs are noted. Glycopyrrolate Pregnancy And Lactation Text: This medication is Pregnancy Category B and is considered safe during pregnancy. It is unknown if it is excreted breast milk. Odomzo Counseling- I discussed with the patient the risks of Odomzo including but not limited to nausea, vomiting, diarrhea, constipation, weight loss, changes in the sense of taste, decreased appetite, muscle spasms, and hair loss.  The patient verbalized understanding of the proper use and possible adverse effects of Odomzo.  All of the patient's questions and concerns were addressed. Opzelura Pregnancy And Lactation Text: There is insufficient data to evaluate drug-associated risk for major birth defects, miscarriage, or other adverse maternal or fetal outcomes.  There is a pregnancy registry that monitors pregnancy outcomes in pregnant persons exposed to the medication during pregnancy.  It is unknown if this medication is excreted in breast milk.  Do not breastfeed during treatment and for about 4 weeks after the last dose. Clofazimine Counseling:  I discussed with the patient the risks of clofazimine including but not limited to skin and eye pigmentation, liver damage, nausea/vomiting, gastrointestinal bleeding and allergy. SSKI Counseling:  I discussed with the patient the risks of SSKI including but not limited to thyroid abnormalities, metallic taste, GI upset, fever, headache, acne, arthralgias, paraesthesias, lymphadenopathy, easy bleeding, arrhythmias, and allergic reaction. Xelradhaz Pregnancy And Lactation Text: This medication is Pregnancy Category D and is not considered safe during pregnancy.  The risk during breast feeding is also uncertain. Rifampin Counseling: I discussed with the patient the risks of rifampin including but not limited to liver damage, kidney damage, red-orange body fluids, nausea/vomiting and severe allergy. Simponi Counseling:  I discussed with the patient the risks of golimumab including but not limited to myelosuppression, immunosuppression, autoimmune hepatitis, demyelinating diseases, lymphoma, and serious infections.  The patient understands that monitoring is required including a PPD at baseline and must alert us or the primary physician if symptoms of infection or other concerning signs are noted. Doxycycline Pregnancy And Lactation Text: This medication is Pregnancy Category D and not consider safe during pregnancy. It is also excreted in breast milk but is considered safe for shorter treatment courses. Griseofulvin Pregnancy And Lactation Text: This medication is Pregnancy Category X and is known to cause serious birth defects. It is unknown if this medication is excreted in breast milk but breast feeding should be avoided. Winlevi Counseling:  I discussed with the patient the risks of topical clascoterone including but not limited to erythema, scaling, itching, and stinging. Patient voiced their understanding. Cibinqo Counseling: I discussed with the patient the risks of Cibinqo therapy including but not limited to common cold, nausea, headache, cold sores, increased blood CPK levels, dizziness, UTIs, fatigue, acne, and vomitting. Live vaccines should be avoided.  This medication has been linked to serious infections; higher rate of mortality; malignancy and lymphoproliferative disorders; major adverse cardiovascular events; thrombosis; thrombocytopenia and lymphopenia; lipid elevations; and retinal detachment. Olanzapine Pregnancy And Lactation Text: This medication is pregnancy category C.   There are no adequate and well controlled trials with olanzapine in pregnant females.  Olanzapine should be used during pregnancy only if the potential benefit justifies the potential risk to the fetus.   In a study in lactating healthy women, olanzapine was excreted in breast milk.  It is recommended that women taking olanzapine should not breast feed. Enbrel Counseling:  I discussed with the patient the risks of etanercept including but not limited to myelosuppression, immunosuppression, autoimmune hepatitis, demyelinating diseases, lymphoma, and infections.  The patient understands that monitoring is required including a PPD at baseline and must alert us or the primary physician if symptoms of infection or other concerning signs are noted. High Dose Vitamin A Counseling: Side effects reviewed, pt to contact office should one occur. Tazorac Pregnancy And Lactation Text: This medication is not safe during pregnancy. It is unknown if this medication is excreted in breast milk. Doxepin Counseling:  Patient advised that the medication is sedating and not to drive a car after taking this medication. Patient informed of potential adverse effects including but not limited to dry mouth, urinary retention, and blurry vision.  The patient verbalized understanding of the proper use and possible adverse effects of doxepin.  All of the patient's questions and concerns were addressed. Calcipotriene Pregnancy And Lactation Text: This medication has not been proven safe during pregnancy. It is unknown if this medication is excreted in breast milk. Hydroxychloroquine Counseling:  I discussed with the patient that a baseline ophthalmologic exam is needed at the start of therapy and every year thereafter while on therapy. A CBC may also be warranted for monitoring.  The side effects of this medication were discussed with the patient, including but not limited to agranulocytosis, aplastic anemia, seizures, rashes, retinopathy, and liver toxicity. Patient instructed to call the office should any adverse effect occur.  The patient verbalized understanding of the proper use and possible adverse effects of Plaquenil.  All the patient's questions and concerns were addressed. Azithromycin Counseling:  I discussed with the patient the risks of azithromycin including but not limited to GI upset, allergic reaction, drug rash, diarrhea, and yeast infections. Hydroquinone Counseling:  Patient advised that medication may result in skin irritation, lightening (hypopigmentation), dryness, and burning.  In the event of skin irritation, the patient was advised to reduce the amount of the drug applied or use it less frequently.  Rarely, spots that are treated with hydroquinone can become darker (pseudoochronosis).  Should this occur, patient instructed to stop medication and call the office. The patient verbalized understanding of the proper use and possible adverse effects of hydroquinone.  All of the patient's questions and concerns were addressed. Cyclophosphamide Counseling:  I discussed with the patient the risks of cyclophosphamide including but not limited to hair loss, hormonal abnormalities, decreased fertility, abdominal pain, diarrhea, nausea and vomiting, bone marrow suppression and infection. The patient understands that monitoring is required while taking this medication. Rifampin Pregnancy And Lactation Text: This medication is Pregnancy Category C and it isn't know if it is safe during pregnancy. It is also excreted in breast milk and should not be used if you are breast feeding. Bimzelx Pregnancy And Lactation Text: This medication crosses the placenta and the safety is uncertain during pregnancy. It is unknown if this medication is present in breast milk. Tremfya Counseling: I discussed with the patient the risks of guselkumab including but not limited to immunosuppression, serious infections, and drug reactions.  The patient understands that monitoring is required including a PPD at baseline and must alert us or the primary physician if symptoms of infection or other concerning signs are noted. Protopic Counseling: Patient may experience a mild burning sensation during topical application. Protopic is not approved in children less than 2 years of age. There have been case reports of hematologic and skin malignancies in patients using topical calcineurin inhibitors although causality is questionable. Hydroxyzine Pregnancy And Lactation Text: This medication is not safe during pregnancy and should not be taken. It is also excreted in breast milk and breast feeding isn't recommended. Cibinqo Pregnancy And Lactation Text: It is unknown if this medication will adversely affect pregnancy or breast feeding.  You should not take this medication if you are currently pregnant or planning a pregnancy or while breastfeeding. Itraconazole Counseling:  I discussed with the patient the risks of itraconazole including but not limited to liver damage, nausea/vomiting, neuropathy, and severe allergy.  The patient understands that this medication is best absorbed when taken with acidic beverages such as non-diet cola or ginger ale.  The patient understands that monitoring is required including baseline LFTs and repeat LFTs at intervals.  The patient understands that they are to contact us or the primary physician if concerning signs are noted. Winlevi Pregnancy And Lactation Text: This medication is considered safe during pregnancy and breastfeeding. Oral Minoxidil Counseling- I discussed with the patient the risks of oral minoxidil including but not limited to shortness of breath, swelling of the feet or ankles, dizziness, lightheadedness, unwanted hair growth and allergic reaction.  The patient verbalized understanding of the proper use and possible adverse effects of oral minoxidil.  All of the patient's questions and concerns were addressed. Sski Pregnancy And Lactation Text: This medication is Pregnancy Category D and isn't considered safe during pregnancy. It is excreted in breast milk. Picato Counseling:  I discussed with the patient the risks of Picato including but not limited to erythema, scaling, itching, weeping, crusting, and pain. Azithromycin Pregnancy And Lactation Text: This medication is considered safe during pregnancy and is also secreted in breast milk. Doxepin Pregnancy And Lactation Text: This medication is Pregnancy Category C and it isn't known if it is safe during pregnancy. It is also excreted in breast milk and breast feeding isn't recommended. Nemluvio Counseling: I discussed with the patient the risks of nemolizumab including but not limited to headache, gastrointestinal complaints, nasopharyngitis, musculoskeletal complaints, injection site reactions, and allergic reactions. The patient understands that monitoring is required and they must alert us or the primary physician if any side effects are noted. Erythromycin Counseling:  I discussed with the patient the risks of erythromycin including but not limited to GI upset, allergic reaction, drug rash, diarrhea, increase in liver enzymes, and yeast infections. Sotyktu Counseling:  I discussed the most common side effects of Sotyktu including: common cold, sore throat, sinus infections, cold sores, canker sores, folliculitis, and acne.? I also discussed more serious side effects of Sotyktu including but not limited to: serious allergic reactions; increased risk for infections such as TB; cancers such as lymphomas; rhabdomyolysis and elevated CPK; and elevated triglycerides and liver enzymes.? Topical Clindamycin Counseling: Patient counseled that this medication may cause skin irritation or allergic reactions.  In the event of skin irritation, the patient was advised to reduce the amount of the drug applied or use it less frequently.   The patient verbalized understanding of the proper use and possible adverse effects of clindamycin.  All of the patient's questions and concerns were addressed. Hydroxychloroquine Pregnancy And Lactation Text: This medication has been shown to cause fetal harm but it isn't assigned a Pregnancy Risk Category. There are small amounts excreted in breast milk. Protopic Pregnancy And Lactation Text: This medication is Pregnancy Category C. It is unknown if this medication is excreted in breast milk when applied topically. High Dose Vitamin A Pregnancy And Lactation Text: High dose vitamin A therapy is contraindicated during pregnancy and breast feeding.

## 2025-05-14 ENCOUNTER — TELEPHONE (OUTPATIENT)
Dept: AUDIOLOGY | Facility: CLINIC | Age: 70
End: 2025-05-14
Payer: MEDICARE

## 2025-05-14 NOTE — TELEPHONE ENCOUNTER
Health Call Center    Phone Message    May a detailed message be left on voicemail: yes     Reason for Call: Other: pt would like a call back, she has a question about her hearing aids.   [facility] [place]     Action Taken: Other: AUD    Travel Screening: Not Applicable     Date of Service:            Called patient back. Left message. Encouraged her to call back and leave a detailed message with her concerns.        Rupetr Azevedo, [facility]  Licensed Audiologist  [place] #[id number]

## 2025-05-19 ENCOUNTER — TELEPHONE (OUTPATIENT)
Dept: EDUCATION SERVICES | Facility: CLINIC | Age: 70
End: 2025-05-19
Payer: MEDICARE

## 2025-05-19 NOTE — TELEPHONE ENCOUNTER
Left Voicemail (1st Attempt) and Sent Mychart (1st Attempt) for the patient to call back and schedule the following:    Appointment type: Diabetes Edu  Provider: Peri Salcido, RN  Return date: next available ~1m after visit with Calixto 06/18/25  Specialty phone number: [phone]  Additional appointment(s) needed: N/A  Additonal Notes: Amada GREGORIO    From 05/01/25 visit:  She has a follow up appointment with Dr. De Luna in June. We made a follow up appointment in July, one month after this appointment.     Ira Guevara on 5/19/2025 at 12:01 PM

## 2025-05-29 ENCOUNTER — TELEPHONE (OUTPATIENT)
Dept: OTOLARYNGOLOGY | Facility: CLINIC | Age: 70
End: 2025-05-29
Payer: MEDICARE

## 2025-05-29 NOTE — TELEPHONE ENCOUNTER
Patient confirmed scheduled appointment:     Date: 9/4/2025  Time: 4:00 pm  Appointment Type: Return Ear  Provider: Ladan Mancia NP - [facility]   Location: [facility]  Testing/imaging:  [facility] w/ AUDIO @2:30 PM  Additional Notes:R/S 8/25

## 2025-06-02 NOTE — PROGRESS NOTES
06/02/25 11:42 AM  PATIENT LAB/IMAGING STATUS : [facility] sent to patient to complete standing/future orders   06/10/25 2:51 PM  PATIENT LAB/IMAGING STATUS : Appt scheduled / Day of appt                   [facility]         Follow up for T2DM      Assessment and Plan:  1.  69-year-old woman with type 2 diabetes mellitus c/b DM retinopathy, with comorbidity of hypertension, obesity, hypothyroidismm, low bone density    Low bone density  Remains in low bone density 12/19/2022 T-score -2.2 right fem neck   Repeat DXA prior to next visit.   Cont with Calcium intake of 0934-6023 mg daily   Continue supplements with Vitamin D and Calcium    Blood glucose control  Most recent A1c is increased, however sensor download has a predicted A1c of 9%.  This is quite reassuring.  The patient's is looking at major changes after stopping work just 2 weeks ago.  Patient is planning to focus on diet and start exercising in the pool.  She will follow-up with diabetes educator.  For now would continue on Lantus 75  18 units daily, Humalog 25 units with meals and sliding scale in the morning and at bedtime  The patient requests and recommendation of the diabetes educator with switching metformin to extended release 1000 mg twice daily       2. Diabetic complications:  - Retinopathy: has diabetic retinopathy  - Nephropathy: nl Creatinine, nl microalbumin, follow  - Lipids: excellent LDL on Atorvastatin 20 mg  - Blood pressure controlled per chart review on lisinopril hydrochlorothiazide  - Aspirin - 81mg daily  - feet- in need for diabetic foot exam    Plan:   Lantus 75 units daily    Humalog 25 units with dinner or CR5  Humalog sliding   Recommend to switch to 1 for each 25 over 150    Correction for during the day for example 3 hours after eating:  ISF 30  1 per 30 >/= 140  -169 give 1 units.  -199 give 2 units.  -229 give 3 units.  -259 give 4 units.  -289 give 5 units.  Above 290 give  6 units.      Continue metformin 500 mg daily    In about 6 weeks  Recheck TSH reflex free T4  Repeat vitamin D levels    Repeat DXA scan in December 2025    Follow-up with diabetes educator  Follow-up with [facility]A midlevel provider for diabetes in 3 months  Follow-up with me in 6 months    Keturah De Luna MD  Endocrinology and Diabetes  Telephone contact:  [facility] [place] [phone]  [facility] [place] [phone]      Interval History:  - follow up for T2DM  - patient has been working with NICHOL Salcido on blood glucose control  1 mg dexamethasone suppression test showed appropriate cortisol suppression to below 1.8  -Patient has been more physically active, started walking with her brother who is recovering from open heart surgery    1. Type 2 DM:  Diabetes: Has type 2 DM, diagnosed before 2005  [employer] Sensor download  Over the last 14 days   CGM active 92%  Average glucose 278 GMI 10%  Time in range 19% low 0% very high 62%  Hyerglycemia particularly after meals    Current Treatment:   - Lantus 75 units subcutaneous once daily Humalog 20-25 units with dinner,  use a sliding scale at bedtime with sensitivity 50  - Metformin 500 mg daily     Hypoglycemia  None     Diet:   Might have a yogurt and fruit in the morning, many times not, will eat a small meal in the middle of the day, dinner- largest meal, tries not to eat after dinner, occasionally snacks     2. Diabetic Complications:  - Retinopathy: exam 5/7/24 macular edema, mild NPDR left eye>OR, Retina consultanat  - Nephropathy:  Nl urine microalb, nl creatinine  - feet podiatry does toenails q 3m    3. Cardiovascular risk factors:  - Lipids: Atorvastatin 20mg,   - HTN:  on lisinopril/HCTZ well-controlled 20-12.5  - Aspirin - 81mg daily    4. Hypothyroidism:  - TSH - nl 11/21/22    5. Low bone density  DXA from 12/19/2024 shows low bone denisty, lwoest T-score -2.2 at the right femur neck  FRAX is 11% for MOF and  1.9% for hip fractures  Bone density decrease at the spine since last measurements in 12/2020, spine now measures T-score 0 (L2-3)    HPI:  Type 2 diabetes diagnosed prior 2005. Has PMH for HTN, arthritis, chronic constipation irregular    ==========================================================================================  Past Medical/Surgical History:   Reviewed in chart  Past Medical History:   Diagnosis Date    Abnormal Pap smear     Anxiety     Arthritis of knee 04/04/2013    Arthritis of shoulder region, right 04/18/2014    Back injury     Breast disorder     Chronic constipation     Chronic diarrhea     Depressive disorder     Diabetes (H)     Fecal incontinence     Finger pain 04/02/2015    Fracture broke L 5th pinky finger due to fall  1/2015    Glaucoma (increased eye pressure)     Head injury 08/06/2016    Headache(784.0)     decreased with mouth guard use.    History of blood transfusion 8/2011 & 4/2013    Hypercholesteremia     Hypertension     Low back pain     Menarche age 9+    cycles q mo x 4-5 d    Neck injuries     Nonsenile cataract IO implants: L-8/2013; R-1/2011    Pain in knee joint     LEFT    Right bundle branch block     per H/P    Sleep apnea     Info on file    SNHL (sensorineural hearing loss)     Tinnitus     I have reported ringing in ears. not sure of dates    Umbilical hernia without mention of obstruction or gangrene 08/2014    Vision disorder Detached Retina 10/2009     Past Surgical History:   Procedure Laterality Date    ARTHROPLASTY KNEE  4/4/2013    Left Total Knee Arthroplasty;  Surgeon: Lou Byrne MD;  [facility]    ARTHROPLASTY MINIMALLY INVASIVE KNEE  8/22/2011    R knee :CAITLYN JACOBO, Left age 14    COLONOSCOPY  1/14/2015    DENTAL SURGERY      root canals, wisdom teeth    EXAM UNDER ANESTHESIA, MANIPULATE JOINT (LOCATION)  10/5/2012    Procedure: EXAM UNDER ANESTHESIA, MANIPULATE JOINT (LOCATION);  Right Knee Mini Open Lysis Of Adhesions,  "Left Knee Steroid Injection  ;  Surgeon: Lou Byrne MD;  [facility]    [facility] OR OCULAR DEVICE INTRAOP DETACHED RETINA  2009    R    [facility] PHAKIC IOL - IMPLANT FROM SURGEON      right    KNEE SURGERY  1969    left    LASER YAG CAPSULOTOMY  12/2016    left    VITRECTOMY PARSPLANA  2010       Allergies:  Reviewed in chart    Current Medications:   Reviewed in chart    Family History:  Reviewed in chart    Social History:  Reviewed in chart    Physical Examination:  Vital signs:  /66   Pulse 64   Ht 1.58 m (5' 2.21\")   Wt 115.2 kg (254 lb)   SpO2 95%   BMI 46.15 kg/m      Wt Readings from Last 4 Encounters:   03/19/25 115.9 kg (255 lb 9.6 oz)   01/22/25 115.4 kg (254 lb 8 oz)   10/24/24 113.4 kg (250 lb)   10/08/24 114.7 kg (252 lb 14.4 oz)     General: Well appearing woman in no distress.  Skin: Insulin injection sites: benign, no hypertrophy  Psych: good eye contact, no pressured speech    Diabetic foot exam:  Inspection edematous  Sensation to monofilament intact bilaterally  Sensation to fibration decreased bilaterally  Skin: intact, dry,   Callus formation is not present    Endocrine Labs:  Lab Results   Component Value Date     06/24/2024    CHLORIDE 100 06/24/2024    CO2 26 06/24/2024     (H) 03/10/2025    CR 0.81 03/10/2025    CR 0.79 06/24/2024    CR 0.71 03/04/2024    CR 0.80 11/21/2022    CR 0.71 04/04/2022    COLIN 10.3 03/10/2025    MAG 1.7 03/10/2025    ALBUMIN 4.0 03/10/2025    ALKPHOS 75.0 01/28/2021    LDL 54 03/10/2025    HDL 43 (L) 03/10/2025    TRIG 147 03/10/2025    TSH 4.52 (H) 06/16/2025    TSH 3.65 03/10/2025    TSH 3.61 03/04/2024     Lab Results   Component Value Date    MICROL 16.8 03/10/2025    MICROL <12.0 03/04/2024    MICROL 13.6 11/21/2022    MICROL 7 04/12/2021    MICROL 9 01/18/2021     Lab Results   Component Value Date    A1C 11.0 (H) 03/10/2025    A1C 11.2 (H) 06/24/2024    A1C 9.4 (H) 01/16/2023    A1C 8.6 (H) 04/04/2022    A1C 8.6 (H) 11/01/2021 "       Lab Results   Component Value Date    HGB 11.6 (L) 06/16/2025

## 2025-06-02 NOTE — PROGRESS NOTES
06/02/25 11:42 AM  PATIENT LAB/IMAGING STATUS : [employer] sent to patient to complete standing/future orders   06/10/25 2:51 PM  PATIENT LAB/IMAGING STATUS : Appt scheduled / Day of appt                   [facility]         Follow up for T2DM      Assessment and Plan:  1.  69-year-old woman with type 2 diabetes mellitus c/b DM retinopathy, with comorbidity of hypertension, obesity, hypothyroidismm, low bone density    Low bone density  Remains in low bone density 12/19/2022 T-score -2.2 right fem neck   Repeat DXA prior to next visit.   Cont with Calcium intake of 1957-4409 mg daily   Continue supplements with Vitamin D and Calcium    Blood glucose control  Most recent A1c is increased, however sensor download has a predicted A1c of 9%.  This is quite reassuring.  The patient's is looking at major changes after stopping work just 2 weeks ago.  Patient is planning to focus on diet and start exercising in the pool.  She will follow-up with diabetes educator.  For now would continue on Lantus 75  18 units daily, Humalog 25 units with meals and sliding scale in the morning and at bedtime  The patient requests and recommendation of the diabetes educator with switching metformin to extended release 1000 mg twice daily       2. Diabetic complications:  - Retinopathy: has diabetic retinopathy  - Nephropathy: nl Creatinine, nl microalbumin, follow  - Lipids: excellent LDL on Atorvastatin 20 mg  - Blood pressure controlled per chart review on lisinopril hydrochlorothiazide  - Aspirin - 81mg daily  - feet- in need for diabetic foot exam    Plan:   Lantus 75 units daily    Humalog 25 units with dinner or CR5  Humalog sliding   Recommend to switch to 1 for each 25 over 150    Correction for during the day for example 3 hours after eating:  ISF 30  1 per 30 >/= 140  -169 give 1 units.  -199 give 2 units.  -229 give 3 units.  -259 give 4 units.  -289 give 5 units.  Above 290 give  6 units.      Continue metformin 500 mg daily    In about 6 weeks  Recheck TSH reflex free T4    Hold multivitamin  Repeat vitamin D levels    Repeat DXA scan in December 2025    Follow-up with diabetes educator  Follow-up with VANESA midlevel provider for diabetes in 3 months  Follow-up with me in 6 months    Keturah De Luna MD  Endocrinology and Diabetes  Telephone contact:  [facility] [place] [phone]  [facility] [place] [phone]      Interval History:  - follow up for T2DM  - patient has been working with NICHOL Salcido on blood glucose control  1 mg dexamethasone suppression test showed appropriate cortisol suppression to below 1.8  -Patient has been more physically active, started walking with her brother who is recovering from open heart surgery    1. Type 2 DM:  Diabetes: Has type 2 DM, diagnosed before 2005  Shalom Sensor download  Over the last 14 days   CGM active 92%  Average glucose 278 GMI 10%  Time in range 19% low 0% very high 62%  Hyerglycemia particularly after meals    Current Treatment:   - Lantus 75 units subcutaneous once daily Humalog 20-25 units with dinner,  use a sliding scale at bedtime with sensitivity 50  - Metformin 500 mg daily     Hypoglycemia  None     Diet:   Might have a yogurt and fruit in the morning, many times not, will eat a small meal in the middle of the day, dinner- largest meal, tries not to eat after dinner, occasionally snacks     2. Diabetic Complications:  - Retinopathy: exam 5/7/24 macular edema, mild NPDR left eye>OR, Retina consultanat  - Nephropathy:  Nl urine microalb, nl creatinine  - feet podiatry does toenails q 3m    3. Cardiovascular risk factors:  - Lipids: Atorvastatin 20mg,   - HTN:  on lisinopril/HCTZ well-controlled 20-12.5  - Aspirin - 81mg daily    4. Hypothyroidism:  - TSH - nl 11/21/22    5. Low bone density  DXA from 12/19/2024 shows low bone denisty, lwoest T-score -2.2 at the right femur neck  FRAX  is 11% for MOF and 1.9% for hip fractures  Bone density decrease at the spine since last measurements in 12/2020, spine now measures T-score 0 (L2-3)    HPI:  Type 2 diabetes diagnosed prior 2005. Has PMH for HTN, arthritis, chronic constipation irregular    ==========================================================================================  Past Medical/Surgical History:   Reviewed in chart  Past Medical History:   Diagnosis Date    Abnormal Pap smear     Anxiety     Arthritis of knee 04/04/2013    Arthritis of shoulder region, right 04/18/2014    Back injury     Breast disorder     Chronic constipation     Chronic diarrhea     Depressive disorder     Diabetes (H)     Fecal incontinence     Finger pain 04/02/2015    Fracture broke L 5th pinky finger due to fall  1/2015    Glaucoma (increased eye pressure)     Head injury 08/06/2016    Headache(784.0)     decreased with mouth guard use.    History of blood transfusion 8/2011 & 4/2013    Hypercholesteremia     Hypertension     Low back pain     Menarche age 9+    cycles q mo x 4-5 d    Neck injuries     Nonsenile cataract IO implants: L-8/2013; R-1/2011    Pain in knee joint     LEFT    Right bundle branch block     per H/P    Sleep apnea     Info on file    SNHL (sensorineural hearing loss)     Tinnitus     I have reported ringing in ears. not sure of dates    Umbilical hernia without mention of obstruction or gangrene 08/2014    Vision disorder Detached Retina 10/2009     Past Surgical History:   Procedure Laterality Date    ARTHROPLASTY KNEE  4/4/2013    Left Total Knee Arthroplasty;  Surgeon: Lou Byrne MD;  [facility]    ARTHROPLASTY MINIMALLY INVASIVE KNEE  8/22/2011    R knee :CAITLYN JACOBO, Left age 14    COLONOSCOPY  1/14/2015    DENTAL SURGERY      root canals, wisdom teeth    EXAM UNDER ANESTHESIA, MANIPULATE JOINT (LOCATION)  10/5/2012    Procedure: EXAM UNDER ANESTHESIA, MANIPULATE JOINT (LOCATION);  Right Knee Mini Open  "Lysis Of Adhesions, Left Knee Steroid Injection  ;  Surgeon: Lou Byrne MD;  [facility]    [facility] OR OCULAR DEVICE INTRAOP DETACHED RETINA  2009    R    HC PHAKIC IOL - IMPLANT FROM SURGEON      right    KNEE SURGERY  1969    left    LASER YAG CAPSULOTOMY  12/2016    left    VITRECTOMY PARSPLANA  2010       Allergies:  Reviewed in chart    Current Medications:   Reviewed in chart    Family History:  Reviewed in chart    Social History:  Reviewed in chart    Physical Examination:  Vital signs:  /66   Pulse 64   Ht 1.58 m (5' 2.21\")   Wt 115.2 kg (254 lb)   SpO2 95%   BMI 46.15 kg/m      Wt Readings from Last 4 Encounters:   03/19/25 115.9 kg (255 lb 9.6 oz)   01/22/25 115.4 kg (254 lb 8 oz)   10/24/24 113.4 kg (250 lb)   10/08/24 114.7 kg (252 lb 14.4 oz)     General: Well appearing woman in no distress.  Skin: Insulin injection sites: benign, no hypertrophy  Psych: good eye contact, no pressured speech    Diabetic foot exam:  Inspection edematous  Sensation to monofilament intact bilaterally  Sensation to fibration decreased bilaterally  Skin: intact, dry,   Callus formation is not present    Endocrine Labs:  Lab Results   Component Value Date     06/24/2024    CHLORIDE 100 06/24/2024    CO2 26 06/24/2024     (H) 03/10/2025    CR 0.81 03/10/2025    CR 0.79 06/24/2024    CR 0.71 03/04/2024    CR 0.80 11/21/2022    CR 0.71 04/04/2022    COLIN 10.3 03/10/2025    MAG 1.7 03/10/2025    ALBUMIN 4.0 03/10/2025    ALKPHOS 75.0 01/28/2021    LDL 54 03/10/2025    HDL 43 (L) 03/10/2025    TRIG 147 03/10/2025    TSH 4.52 (H) 06/16/2025    TSH 3.65 03/10/2025    TSH 3.61 03/04/2024     Lab Results   Component Value Date    MICROL 16.8 03/10/2025    MICROL <12.0 03/04/2024    MICROL 13.6 11/21/2022    MICROL 7 04/12/2021    MICROL 9 01/18/2021     Lab Results   Component Value Date    A1C 11.0 (H) 03/10/2025    A1C 11.2 (H) 06/24/2024    A1C 9.4 (H) 01/16/2023    A1C 8.6 (H) 04/04/2022    A1C 8.6 " (H) 11/01/2021       Lab Results   Component Value Date    HGB 11.6 (L) 06/16/2025

## 2025-06-04 ENCOUNTER — TELEPHONE (OUTPATIENT)
Dept: AUDIOLOGY | Facility: CLINIC | Age: 70
End: 2025-06-04
Payer: MEDICARE

## 2025-06-04 NOTE — TELEPHONE ENCOUNTER
Called patient and left message. Asked her to call back and leave a detailed message about her questionsf or me.        Adriane Azevedo., [facility]  Licensed Audiologist  [place] #[id number]

## 2025-06-15 ENCOUNTER — HEALTH MAINTENANCE LETTER (OUTPATIENT)
Age: 70
End: 2025-06-15

## 2025-06-16 ENCOUNTER — LAB (OUTPATIENT)
Dept: LAB | Facility: CLINIC | Age: 70
End: 2025-06-16
Payer: MEDICARE

## 2025-06-16 DIAGNOSIS — E11.42 TYPE 2 DIABETES MELLITUS WITH DIABETIC POLYNEUROPATHY, WITH LONG-TERM CURRENT USE OF INSULIN (H): ICD-10-CM

## 2025-06-16 DIAGNOSIS — Z79.4 TYPE 2 DIABETES MELLITUS WITH DIABETIC POLYNEUROPATHY, WITH LONG-TERM CURRENT USE OF INSULIN (H): ICD-10-CM

## 2025-06-16 LAB
ERYTHROCYTE [DISTWIDTH] IN BLOOD BY AUTOMATED COUNT: 14.1 % (ref 10–15)
HCT VFR BLD AUTO: 35.4 % (ref 35–47)
HGB BLD-MCNC: 11.6 G/DL (ref 11.7–15.7)
MCH RBC QN AUTO: 30.2 PG (ref 26.5–33)
MCHC RBC AUTO-ENTMCNC: 32.8 G/DL (ref 31.5–36.5)
MCV RBC AUTO: 92 FL (ref 78–100)
PLATELET # BLD AUTO: 242 10E3/UL (ref 150–450)
RBC # BLD AUTO: 3.84 10E6/UL (ref 3.8–5.2)
T4 FREE SERPL-MCNC: 0.8 NG/DL (ref 0.9–1.7)
TSH SERPL DL<=0.005 MIU/L-ACNC: 4.52 UIU/ML (ref 0.3–4.2)
VIT D+METAB SERPL-MCNC: 63 NG/ML (ref 20–50)
WBC # BLD AUTO: 10.2 10E3/UL (ref 4–11)

## 2025-06-16 PROCEDURE — 82306 VITAMIN D 25 HYDROXY: CPT | Performed by: INTERNAL MEDICINE

## 2025-06-16 PROCEDURE — 85027 COMPLETE CBC AUTOMATED: CPT | Performed by: PATHOLOGY

## 2025-06-16 PROCEDURE — 99000 SPECIMEN HANDLING OFFICE-LAB: CPT | Performed by: PATHOLOGY

## 2025-06-16 PROCEDURE — 36415 COLL VENOUS BLD VENIPUNCTURE: CPT | Performed by: PATHOLOGY

## 2025-06-16 PROCEDURE — 84443 ASSAY THYROID STIM HORMONE: CPT | Performed by: PATHOLOGY

## 2025-06-16 PROCEDURE — 84439 ASSAY OF FREE THYROXINE: CPT | Performed by: PATHOLOGY

## 2025-06-17 NOTE — PROGRESS NOTES
[facility]     Follow up for T2DM    Assessment and Plan:  1.  69-year-old woman with type 2 diabetes mellitus c/b DM retinopathy, with comorbidity of hypertension, obesity, hypothyroidismm, low bone density    Low bone density  Remains in low bone density range as of 12/20/2024 and low levels.he since has, FRAX is NOT increased, decrease at the spine since last measurement, however spine T-score remains in good range; no medical treatment is indicated, general measures for post menopausal women including Calcium intake of 9112-6418 mg daily   Continue supplements with Vitamin D and Calcium    Blood glucose control  Most recent A1c is increased, however sensor download has a predicted A1c of 9%.  This is quite reassuring. Pt  The patient's is looking at major changes after stopping work just 2 weeks ago.  Patient is planning to focus on diet and start exercising in the pool.  She will follow-up with diabetes educator.  For now would continue on Lantus 18 units daily, Humalog 25 units with meals and sliding scale in the morning and at bedtime  The patient requests and recommendation of the diabetes educator with switching metformin to extended release 1000 mg twice daily     Jardiance was prescribed recently, which was discussed and reviewed repeatedly over the last 6 years. Initially medication was not covered and later on pt was not intersted    Plan:   Switch to metformin 1000 mg extended release twice daily     Lantus 85 units daily    Humalog 25 units with dinner    Humalog correction:   Correction Scale - for AM blood glucose and to add to dinner dosage  For  - 159 give 3 units.   For  - 169 give 4 units.   For  - 179 give 5 units.   For  - 189 give 6 units.   For  - 199 give 7 units.   For  - 209 give 8 units.   For  - 219 give 9 units.   For  - 229 give 10 units.   For  - 239 give 11 units.   For  - 249 give 12 units.    For  - 259 give 13 units.   For  - 279 give 14 units.   For  - 289 give 15 units.   For  - 299 give 16 units.   For BG above 300 give 17 units.    Bed time Humalog correction  For -  229 give 2 units.   For  - 239 give 3 units.   For  - 249 give 4 units.   For  - 259 give 5 units.   For  - 269 give 6 units.   For  - 279 give 7 units.   For  - 289 give 8 units.   For  - 299 give 9 units.   For BG above 300 mg/dl give 10 units    Follow-up with me in 3 months as scheduled  Follow up with VANESA and diabetes educator    2. Diabetic complications:  - Retinopathy: has diabetic retinopathy  - Nephropathy: nl Creatinine, nl microalbumin, follow  - Lipids: excellent LDL on Atorvastatin 20 mg  - Blood pressure controlled per chart review on lisinopril hydrochlorothiazide  - Aspirin - 81mg daily  - feet- in need for diabetic foot exam    Will obtain 1 mg dexamethasone suppression test to rule out excess cortisol production    Keturah De Luna MD  Endocrinology and Diabetes  Telephone contact:  [facility] [place] [phone]  [facility] [phone]          Interval History:  - follow up for T2DM  - patient has been working with NICHOL Salcido on blood glucose control.  Dex suppression for cortisol excess was checked and found to be normal.        1. Type 2 DM:  Diabetes: Has type 2 DM, diagnosed before 2005  Shalom Sensor download  Over the last 14 days   CGM active 96%  Average glucose 236 GMI 9%  Time in range 30% low 0% very high 42%  Hyerglycemia particularly after meals    Current Treatment:   - Lantus 80 units subcutaneous once daily Humalog 20-25 units with dinner, Metformin 1000mg po BID, uses insulin sliding scale    Twisted ankle broke foot in 8/23    Hypoglycemia  None recently    Diet:   In the past brunch fruit and vegetables, occ lunch: sandwich, dinner- largest meal, can feel hungry after  dinner and has cravings for savory snacks e.g. popcorn  Now is looking at new and improved dietary plan    2. Diabetic Complications:  - Retinopathy: exam 5/7/24 macular edema, mild NPDR left eye>OR, Retina consultanat  - Nephropathy:  Nl urine microalb, nl creatinine  - feet podiatry does toenails q 3m    3. Cardiovascular risk factors:  - Lipids: Atorvastatin 20mg,   - HTN:  on lisinopril/HCTZ well-controlled 20-12.5  - Aspirin - 81mg daily    4. Hypothyroidism:  - TSH - nl 11/21/22    5. Low bone density  DXA from 12/19/2022 shows low bone denisty, lwoest T-score -2.2 at the right femur neck  FRAX is 11% for MOF and 1.9% for hip fractures  Bone density decrease at the spine since last measurements in 12/2020, spine now measures T-score 0 (L2-3)    HPI:  Type 2 diabetes diagnosed prior 2005. Has PMH for HTN, arthritis, chronic constipation irregular    ==========================================================================================  Past Medical/Surgical History:   Reviewed in chart  Past Medical History:   Diagnosis Date    Abnormal Pap smear     Anxiety     Arthritis of knee 04/04/2013    Arthritis of shoulder region, right 04/18/2014    Back injury     Breast disorder     Chronic constipation     Chronic diarrhea     Depressive disorder     Diabetes (H)     Fecal incontinence     Finger pain 04/02/2015    Fracture broke L 5th pinky finger due to fall  1/2015    Glaucoma (increased eye pressure)     Head injury 08/06/2016    Headache(784.0)     decreased with mouth guard use.    History of blood transfusion 8/2011 & 4/2013    Hypercholesteremia     Hypertension     Low back pain     Menarche age 9+    cycles q mo x 4-5 d    Neck injuries     Nonsenile cataract IO implants: L-8/2013; R-1/2011    Pain in knee joint     LEFT    Right bundle branch block     per H/P    Sleep apnea     Info on file    SNHL (sensorineural hearing loss)     Tinnitus     I have reported ringing in ears. not sure of dates     Umbilical hernia without mention of obstruction or gangrene 08/2014    Vision disorder Detached Retina 10/2009     Past Surgical History:   Procedure Laterality Date    ARTHROPLASTY KNEE  4/4/2013    Left Total Knee Arthroplasty;  Surgeon: Lou Byrne MD;  [facility]    ARTHROPLASTY MINIMALLY INVASIVE KNEE  8/22/2011    R knee :CAITLYN JACOBO, Left age 14    COLONOSCOPY  1/14/2015    DENTAL SURGERY      root canals, wisdom teeth    EXAM UNDER ANESTHESIA, MANIPULATE JOINT (LOCATION)  10/5/2012    Procedure: EXAM UNDER ANESTHESIA, MANIPULATE JOINT (LOCATION);  Right Knee Mini Open Lysis Of Adhesions, Left Knee Steroid Injection  ;  Surgeon: Lou Byrne MD;  [facility]     OR OCULAR DEVICE INTRAOP DETACHED RETINA  2009    R    HC PHAKIC IOL - IMPLANT FROM SURGEON      right    KNEE SURGERY  1969    left    LASER YAG CAPSULOTOMY  12/2016    left    VITRECTOMY PARSPLANA  2010       Allergies:  Reviewed in chart    Current Medications:   Reviewed in chart    Family History:  Reviewed in chart    Social History:  Reviewed in chart    Physical Examination:  Vital signs:  Wt Readings from Last 4 Encounters:   03/19/25 115.9 kg (255 lb 9.6 oz)   01/22/25 115.4 kg (254 lb 8 oz)   10/24/24 113.4 kg (250 lb)   10/08/24 114.7 kg (252 lb 14.4 oz)   BMI 43     Reported vitals:  There were no vitals taken for this visit.   healthy, alert and no distress  PSYCH: Alert and oriented times 3; coherent speech, normal   rate and volume, able to articulate logical thoughts, able   to abstract reason, no tangential thoughts, no hallucinations   or delusions  Her affect is normal and pleasant  RESP: No cough, no audible wheezing, able to talk in full sentences  Remainder of exam unable to be completed due to telephone visits       Endocrine Labs:  Lab Results   Component Value Date     06/24/2024    CHLORIDE 100 06/24/2024    CO2 26 06/24/2024     (H) 03/10/2025    CR 0.81 03/10/2025    CR 0.79  06/24/2024    CR 0.71 03/04/2024    CR 0.80 11/21/2022    CR 0.71 04/04/2022    COLIN 10.3 03/10/2025    MAG 1.7 03/10/2025    ALBUMIN 4.0 03/10/2025    ALKPHOS 75.0 01/28/2021    LDL 54 03/10/2025    HDL 43 (L) 03/10/2025    TRIG 147 03/10/2025    TSH 4.52 (H) 06/16/2025    TSH 3.65 03/10/2025    TSH 3.61 03/04/2024     Lab Results   Component Value Date    MICROL 16.8 03/10/2025    MICROL <12.0 03/04/2024    MICROL 13.6 11/21/2022    MICROL 7 04/12/2021    MICROL 9 01/18/2021     Lab Results   Component Value Date    A1C 11.0 (H) 03/10/2025    A1C 11.2 (H) 06/24/2024    A1C 9.4 (H) 01/16/2023    A1C 8.6 (H) 04/04/2022    A1C 8.6 (H) 11/01/2021       Lab Results   Component Value Date    HGB 11.6 (L) 06/16/2025

## 2025-06-18 ENCOUNTER — MYC MEDICAL ADVICE (OUTPATIENT)
Dept: ENDOCRINOLOGY | Facility: CLINIC | Age: 70
End: 2025-06-18

## 2025-06-18 ENCOUNTER — OFFICE VISIT (OUTPATIENT)
Dept: ENDOCRINOLOGY | Facility: CLINIC | Age: 70
End: 2025-06-18
Payer: MEDICARE

## 2025-06-18 VITALS
DIASTOLIC BLOOD PRESSURE: 66 MMHG | OXYGEN SATURATION: 95 % | SYSTOLIC BLOOD PRESSURE: 106 MMHG | WEIGHT: 254 LBS | HEART RATE: 64 BPM | HEIGHT: 62 IN | BODY MASS INDEX: 46.74 KG/M2

## 2025-06-18 DIAGNOSIS — Z79.4 TYPE 2 DIABETES MELLITUS WITH DIABETIC POLYNEUROPATHY, WITH LONG-TERM CURRENT USE OF INSULIN (H): ICD-10-CM

## 2025-06-18 DIAGNOSIS — E11.42 TYPE 2 DIABETES MELLITUS WITH DIABETIC POLYNEUROPATHY, WITH LONG-TERM CURRENT USE OF INSULIN (H): ICD-10-CM

## 2025-06-18 LAB
EST. AVERAGE GLUCOSE BLD GHB EST-MCNC: 280 MG/DL
HBA1C MFR BLD: 11.4 %

## 2025-06-18 PROCEDURE — 83036 HEMOGLOBIN GLYCOSYLATED A1C: CPT | Performed by: PATHOLOGY

## 2025-06-18 RX ORDER — METFORMIN HYDROCHLORIDE EXTENDED-RELEASE TABLETS 1000 MG/1
1000 TABLET, FILM COATED, EXTENDED RELEASE ORAL
Qty: 180 TABLET | Refills: 3 | COMMUNITY
Start: 2025-06-18 | End: 2025-06-18

## 2025-06-18 NOTE — LETTER
6/18/2025       RE: Radha Baca  [address] [facility] [address]     Dear Colleague,    Thank you for referring your patient, Radha Baca, to the [facility] ENDOCRINOLOGY CLINIC [place] at [facility]. Please see a copy of my visit note below.    06/02/25 11:42 AM  PATIENT LAB/IMAGING STATUS : MyChart sent to patient to complete standing/future orders   06/10/25 2:51 PM  PATIENT LAB/IMAGING STATUS : Appt scheduled / Day of appt                   Endocrinology and Diabetes Clinic         Follow up for T2DM      Assessment and Plan:  1.  69-year-old woman with type 2 diabetes mellitus c/b DM retinopathy, with comorbidity of hypertension, obesity, hypothyroidismm, low bone density    Low bone density  Remains in low bone density 12/19/2022 T-score -2.2 right fem neck   Repeat DXA prior to next visit.   Cont with Calcium intake of 2742-2154 mg daily   Continue supplements with Vitamin D and Calcium    Blood glucose control  Most recent A1c is increased, however sensor download has a predicted A1c of 9%.  This is quite reassuring.  The patient's is looking at major changes after stopping work just 2 weeks ago.  Patient is planning to focus on diet and start exercising in the pool.  She will follow-up with diabetes educator.  For now would continue on Lantus 75  18 units daily, Humalog 25 units with meals and sliding scale in the morning and at bedtime  The patient requests and recommendation of the diabetes educator with switching metformin to extended release 1000 mg twice daily       2. Diabetic complications:  - Retinopathy: has diabetic retinopathy  - Nephropathy: nl Creatinine, nl microalbumin, follow  - Lipids: excellent LDL on Atorvastatin 20 mg  - Blood pressure controlled per chart review on lisinopril hydrochlorothiazide  - Aspirin - 81mg daily  - feet- in need for diabetic foot exam    Plan:   Lantus 75 units  daily    Humalog 25 units with dinner or CR5  Humalog sliding   Recommend to switch to 1 for each 25 over 150    Correction for during the day for example 3 hours after eating:  ISF 30  1 per 30 >/= 140  -169 give 1 units.  -199 give 2 units.  -229 give 3 units.  -259 give 4 units.  -289 give 5 units.  Above 290 give 6 units.      Continue metformin 500 mg daily    In about 6 weeks  Recheck TSH reflex free T4  Repeat vitamin D levels    Repeat DXA scan in December 2025    Follow-up with diabetes educator  Follow-up with VANESA midlevel provider for diabetes in 3 months  Follow-up with me in 6 months    Keturah De Luna MD  Endocrinology and Diabetes  Telephone contact:  [facility] [place] [phone]  [facility] [phone]      Interval History:  - follow up for T2DM  - patient has been working with NICHOL Salcido on blood glucose control  1 mg dexamethasone suppression test showed appropriate cortisol suppression to below 1.8  -Patient has been more physically active, started walking with her brother who is recovering from open heart surgery    1. Type 2 DM:  Diabetes: Has type 2 DM, diagnosed before 2005  [employer] Sensor download  Over the last 14 days   CGM active 92%  Average glucose 278 GMI 10%  Time in range 19% low 0% very high 62%  Hyerglycemia particularly after meals    Current Treatment:   - Lantus 75 units subcutaneous once daily Humalog 20-25 units with dinner,  use a sliding scale at bedtime with sensitivity 50  - Metformin 500 mg daily     Hypoglycemia  None     Diet:   Might have a yogurt and fruit in the morning, many times not, will eat a small meal in the middle of the day, dinner- largest meal, tries not to eat after dinner, occasionally snacks     2. Diabetic Complications:  - Retinopathy: exam 5/7/24 macular edema, mild NPDR left eye>OR, Retina consultanat  - Nephropathy:  Nl urine microalb, nl creatinine  - feet  podiatry does toenails q 3m    3. Cardiovascular risk factors:  - Lipids: Atorvastatin 20mg,   - HTN:  on lisinopril/HCTZ well-controlled 20-12.5  - Aspirin - 81mg daily    4. Hypothyroidism:  - TSH - nl 11/21/22    5. Low bone density  DXA from 12/19/2024 shows low bone denisty, lwoest T-score -2.2 at the right femur neck  FRAX is 11% for MOF and 1.9% for hip fractures  Bone density decrease at the spine since last measurements in 12/2020, spine now measures T-score 0 (L2-3)    HPI:  Type 2 diabetes diagnosed prior 2005. Has PMH for HTN, arthritis, chronic constipation irregular    ==========================================================================================  Past Medical/Surgical History:   Reviewed in chart  Past Medical History:   Diagnosis Date     Abnormal Pap smear      Anxiety      Arthritis of knee 04/04/2013     Arthritis of shoulder region, right 04/18/2014     Back injury      Breast disorder      Chronic constipation      Chronic diarrhea      Depressive disorder      Diabetes (H)      Fecal incontinence      Finger pain 04/02/2015     Fracture broke L 5th pinky finger due to fall  1/2015     Glaucoma (increased eye pressure)      Head injury 08/06/2016     Headache(784.0)     decreased with mouth guard use.     History of blood transfusion 8/2011 & 4/2013     Hypercholesteremia      Hypertension      Low back pain      Menarche age 9+    cycles q mo x 4-5 d     Neck injuries      Nonsenile cataract IO implants: L-8/2013; R-1/2011     Pain in knee joint     LEFT     Right bundle branch block     per H/P     Sleep apnea     Info on file     SNHL (sensorineural hearing loss)      Tinnitus     I have reported ringing in ears. not sure of dates     Umbilical hernia without mention of obstruction or gangrene 08/2014     Vision disorder Detached Retina 10/2009     Past Surgical History:   Procedure Laterality Date     ARTHROPLASTY KNEE  4/4/2013    Left Total Knee Arthroplasty;  Surgeon: Chavez  "Lou WYATT MD;  [facility]     ARTHROPLASTY MINIMALLY INVASIVE KNEE  8/22/2011    R knee :CAITLYN JACOBO, Left age 14     COLONOSCOPY  1/14/2015     DENTAL SURGERY      root canals, wisdom teeth     EXAM UNDER ANESTHESIA, MANIPULATE JOINT (LOCATION)  10/5/2012    Procedure: EXAM UNDER ANESTHESIA, MANIPULATE JOINT (LOCATION);  Right Knee Mini Open Lysis Of Adhesions, Left Knee Steroid Injection  ;  Surgeon: Lou Byrne MD;  Location: [facility]     [facility] OR OCULAR DEVICE INTRAOP DETACHED RETINA  2009    R     [facility] PHAKIC IOL - IMPLANT FROM SURGEON      right     KNEE SURGERY  1969    left     LASER YAG CAPSULOTOMY  12/2016    left     VITRECTOMY PARSPLANA  2010       Allergies:  Reviewed in chart    Current Medications:   Reviewed in chart    Family History:  Reviewed in chart    Social History:  Reviewed in chart    Physical Examination:  Vital signs:  /66   Pulse 64   Ht 1.58 m (5' 2.21\")   Wt 115.2 kg (254 lb)   SpO2 95%   BMI 46.15 kg/m      Wt Readings from Last 4 Encounters:   03/19/25 115.9 kg (255 lb 9.6 oz)   01/22/25 115.4 kg (254 lb 8 oz)   10/24/24 113.4 kg (250 lb)   10/08/24 114.7 kg (252 lb 14.4 oz)     General: Well appearing woman in no distress.  Skin: Insulin injection sites: benign, no hypertrophy  Psych: good eye contact, no pressured speech    Diabetic foot exam:  Inspection edematous  Sensation to monofilament intact bilaterally  Sensation to fibration decreased bilaterally  Skin: intact, dry,   Callus formation is not present    Endocrine Labs:  Lab Results   Component Value Date     06/24/2024    CHLORIDE 100 06/24/2024    CO2 26 06/24/2024     (H) 03/10/2025    CR 0.81 03/10/2025    CR 0.79 06/24/2024    CR 0.71 03/04/2024    CR 0.80 11/21/2022    CR 0.71 04/04/2022    COLIN 10.3 03/10/2025    MAG 1.7 03/10/2025    ALBUMIN 4.0 03/10/2025    ALKPHOS 75.0 01/28/2021    LDL 54 03/10/2025    HDL 43 (L) 03/10/2025    TRIG 147 03/10/2025    TSH 4.52 (H) 06/16/2025 "    TSH 3.65 03/10/2025    TSH 3.61 03/04/2024     Lab Results   Component Value Date    MICROL 16.8 03/10/2025    MICROL <12.0 03/04/2024    MICROL 13.6 11/21/2022    MICROL 7 04/12/2021    MICROL 9 01/18/2021     Lab Results   Component Value Date    A1C 11.0 (H) 03/10/2025    A1C 11.2 (H) 06/24/2024    A1C 9.4 (H) 01/16/2023    A1C 8.6 (H) 04/04/2022    A1C 8.6 (H) 11/01/2021       Lab Results   Component Value Date    HGB 11.6 (L) 06/16/2025       Again, thank you for allowing me to participate in the care of your patient.      Sincerely,    Keturah eD Luna MD

## 2025-06-18 NOTE — PATIENT INSTRUCTIONS
Lantus 75 units daily    Humalog 25 units with dinner or CR5  Humalog sliding   Recommend to switch to 1 for each 25 over 150    Correction for during the day for example 3 hours after eating:  ISF 30  1 per 30 >/= 140  -169 give 1 units.  -199 give 2 units.  -229 give 3 units.  -259 give 4 units.  -289 give 5 units.  Above 290 give 6 units.

## 2025-06-18 NOTE — NURSING NOTE
"Chief Complaint   Patient presents with    Diabetes     Vital signs:      BP: 106/66 Pulse: 64     SpO2: 95 %     Height: 158 cm (5' 2.21\") Weight: 115.2 kg (254 lb)  Estimated body mass index is 46.15 kg/m  as calculated from the following:    Height as of this encounter: 1.58 m (5' 2.21\").    Weight as of this encounter: 115.2 kg (254 lb).        "

## 2025-06-30 ENCOUNTER — ANCILLARY PROCEDURE (OUTPATIENT)
Dept: GENERAL RADIOLOGY | Facility: CLINIC | Age: 70
End: 2025-06-30
Attending: FAMILY MEDICINE
Payer: MEDICARE

## 2025-06-30 ENCOUNTER — TELEPHONE (OUTPATIENT)
Dept: FAMILY MEDICINE | Facility: CLINIC | Age: 70
End: 2025-06-30

## 2025-06-30 ENCOUNTER — OFFICE VISIT (OUTPATIENT)
Dept: FAMILY MEDICINE | Facility: CLINIC | Age: 70
End: 2025-06-30
Payer: MEDICARE

## 2025-06-30 VITALS
OXYGEN SATURATION: 97 % | RESPIRATION RATE: 18 BRPM | SYSTOLIC BLOOD PRESSURE: 149 MMHG | HEART RATE: 72 BPM | DIASTOLIC BLOOD PRESSURE: 81 MMHG

## 2025-06-30 DIAGNOSIS — S22.080A T12 COMPRESSION FRACTURE, INITIAL ENCOUNTER (H): Primary | ICD-10-CM

## 2025-06-30 DIAGNOSIS — M54.50 ACUTE BILATERAL LOW BACK PAIN WITHOUT SCIATICA: ICD-10-CM

## 2025-06-30 DIAGNOSIS — D72.829 LEUKOCYTOSIS, UNSPECIFIED TYPE: ICD-10-CM

## 2025-06-30 DIAGNOSIS — R11.2 INTRACTABLE NAUSEA AND VOMITING: ICD-10-CM

## 2025-06-30 DIAGNOSIS — E11.65 UNCONTROLLED TYPE 2 DIABETES MELLITUS WITH HYPERGLYCEMIA (H): ICD-10-CM

## 2025-06-30 DIAGNOSIS — R42 VERTIGO: ICD-10-CM

## 2025-06-30 LAB
ALBUMIN UR-MCNC: 30 MG/DL
ANION GAP SERPL CALCULATED.3IONS-SCNC: 15 MMOL/L (ref 7–15)
ANION GAP SERPL CALCULATED.3IONS-SCNC: NORMAL MMOL/L
APPEARANCE UR: ABNORMAL
B-OH-BUTYR SERPL-SCNC: 0.38 MMOL/L
BACTERIA #/AREA URNS HPF: ABNORMAL /HPF
BASOPHILS # BLD AUTO: 0 10E3/UL (ref 0–0.2)
BASOPHILS NFR BLD AUTO: 0 %
BILIRUB UR QL STRIP: NEGATIVE
BUN SERPL-MCNC: 18.9 MG/DL (ref 8–23)
BUN SERPL-MCNC: NORMAL MG/DL
CALCIUM SERPL-MCNC: 10.4 MG/DL (ref 8.8–10.4)
CALCIUM SERPL-MCNC: NORMAL MG/DL
CHLORIDE BLD-SCNC: NORMAL MMOL/L
CHLORIDE SERPL-SCNC: 96 MMOL/L (ref 98–107)
CO2 SERPL-SCNC: NORMAL MMOL/L
COLOR UR AUTO: YELLOW
CREAT SERPL-MCNC: 0.85 MG/DL (ref 0.51–0.95)
CREAT SERPL-MCNC: NORMAL MG/DL
EGFRCR SERPLBLD CKD-EPI 2021: 73 ML/MIN/1.73M2
EGFRCR SERPLBLD CKD-EPI 2021: NORMAL ML/MIN/{1.73_M2}
EOSINOPHIL # BLD AUTO: 0.1 10E3/UL (ref 0–0.7)
EOSINOPHIL NFR BLD AUTO: 1 %
ERYTHROCYTE [DISTWIDTH] IN BLOOD BY AUTOMATED COUNT: 14.2 % (ref 10–15)
EST. AVERAGE GLUCOSE BLD GHB EST-MCNC: 275 MG/DL
GLUCOSE BLD-MCNC: NORMAL MG/DL
GLUCOSE SERPL-MCNC: 179 MG/DL (ref 70–99)
GLUCOSE UR STRIP-MCNC: NEGATIVE MG/DL
HBA1C MFR BLD: 11.2 % (ref 0–5.6)
HCO3 SERPL-SCNC: 25 MMOL/L (ref 22–29)
HCT VFR BLD AUTO: 37.7 % (ref 35–47)
HGB BLD-MCNC: 12.3 G/DL (ref 11.7–15.7)
HGB UR QL STRIP: NEGATIVE
IMM GRANULOCYTES # BLD: 0.1 10E3/UL
IMM GRANULOCYTES NFR BLD: 0 %
KETONES UR STRIP-MCNC: ABNORMAL MG/DL
LEUKOCYTE ESTERASE UR QL STRIP: NEGATIVE
LYMPHOCYTES # BLD AUTO: 2.1 10E3/UL (ref 0.8–5.3)
LYMPHOCYTES NFR BLD AUTO: 14 %
MCH RBC QN AUTO: 30.1 PG (ref 26.5–33)
MCHC RBC AUTO-ENTMCNC: 32.6 G/DL (ref 31.5–36.5)
MCV RBC AUTO: 92 FL (ref 78–100)
MONOCYTES # BLD AUTO: 0.9 10E3/UL (ref 0–1.3)
MONOCYTES NFR BLD AUTO: 6 %
NEUTROPHILS # BLD AUTO: 12.3 10E3/UL (ref 1.6–8.3)
NEUTROPHILS NFR BLD AUTO: 79 %
NITRATE UR QL: NEGATIVE
PH UR STRIP: 6 [PH] (ref 5–8)
PLATELET # BLD AUTO: 252 10E3/UL (ref 150–450)
POTASSIUM BLD-SCNC: NORMAL MMOL/L
POTASSIUM SERPL-SCNC: 4.3 MMOL/L (ref 3.4–5.3)
RADIOLOGIST FLAGS: ABNORMAL
RBC # BLD AUTO: 4.08 10E6/UL (ref 3.8–5.2)
RBC #/AREA URNS AUTO: ABNORMAL /HPF
SODIUM SERPL-SCNC: 136 MMOL/L (ref 135–145)
SODIUM SERPL-SCNC: NORMAL MMOL/L
SP GR UR STRIP: 1.02 (ref 1–1.03)
SQUAMOUS #/AREA URNS AUTO: ABNORMAL /LPF
UROBILINOGEN UR STRIP-ACNC: 0.2 E.U./DL
WBC # BLD AUTO: 15.5 10E3/UL (ref 4–11)
WBC #/AREA URNS AUTO: ABNORMAL /HPF

## 2025-06-30 PROCEDURE — 81001 URINALYSIS AUTO W/SCOPE: CPT

## 2025-06-30 PROCEDURE — 36415 COLL VENOUS BLD VENIPUNCTURE: CPT

## 2025-06-30 PROCEDURE — 3079F DIAST BP 80-89 MM HG: CPT

## 2025-06-30 PROCEDURE — 83036 HEMOGLOBIN GLYCOSYLATED A1C: CPT

## 2025-06-30 PROCEDURE — 3077F SYST BP >= 140 MM HG: CPT

## 2025-06-30 PROCEDURE — 99214 OFFICE O/P EST MOD 30 MIN: CPT | Mod: 25

## 2025-06-30 PROCEDURE — 85025 COMPLETE CBC W/AUTO DIFF WBC: CPT

## 2025-06-30 PROCEDURE — 80048 BASIC METABOLIC PNL TOTAL CA: CPT

## 2025-06-30 PROCEDURE — 82010 KETONE BODYS QUAN: CPT

## 2025-06-30 PROCEDURE — 96372 THER/PROPH/DIAG INJ SC/IM: CPT | Performed by: FAMILY MEDICINE

## 2025-06-30 RX ORDER — TIZANIDINE 2 MG/1
4 TABLET ORAL 3 TIMES DAILY
Qty: 20 TABLET | Refills: 1 | Status: SHIPPED | OUTPATIENT
Start: 2025-06-30

## 2025-06-30 RX ORDER — ONDANSETRON 4 MG/1
4 TABLET, FILM COATED ORAL EVERY 8 HOURS PRN
Qty: 20 TABLET | Refills: 0 | Status: SHIPPED | OUTPATIENT
Start: 2025-06-30

## 2025-06-30 RX ORDER — MECLIZINE HCL 12.5 MG 12.5 MG/1
12.5-25 TABLET ORAL 3 TIMES DAILY PRN
Qty: 25 TABLET | Refills: 0 | Status: SHIPPED | OUTPATIENT
Start: 2025-06-30

## 2025-06-30 RX ORDER — CALCITONIN SALMON 200 [IU]/.09ML
1 SPRAY, METERED NASAL DAILY
Qty: 3.7 ML | Refills: 0 | Status: SHIPPED | OUTPATIENT
Start: 2025-06-30

## 2025-06-30 RX ORDER — KETOROLAC TROMETHAMINE 30 MG/ML
30 INJECTION, SOLUTION INTRAMUSCULAR; INTRAVENOUS ONCE
Status: COMPLETED | OUTPATIENT
Start: 2025-06-30 | End: 2025-06-30

## 2025-06-30 RX ORDER — IBUPROFEN 800 MG/1
800 TABLET, FILM COATED ORAL EVERY 8 HOURS PRN
Qty: 50 TABLET | Refills: 1 | Status: SHIPPED | OUTPATIENT
Start: 2025-06-30 | End: 2025-07-15

## 2025-06-30 RX ADMIN — KETOROLAC TROMETHAMINE 30 MG: 30 INJECTION, SOLUTION INTRAMUSCULAR; INTRAVENOUS at 11:58

## 2025-06-30 NOTE — PROGRESS NOTES
Assessment & Plan     Acute bilateral low back pain without sciatica  T12 compression fracture, initial encounter (H)  Follow-up on ER visit from 6/26 for vertigo episode that precipitated a mechanical fall at ground level, +hit head, no LOC, +significant lower back pain. ER workup: CTH with no acute intracranial hemorrhage, CT Cervical Spine w/ no acute findings, CT C/A/P negative for PE and suspicious bony lesions, CXR wnl, MR Brain w no hemorrhage/mass effect/midline shift/ventriculomegaly. Today, was tearful due to severe low back pain, administered 30mg IM toradol in clinic. Ordered XR LS and sent muscle relaxant, ibuprofen. Continue tylenol, lidocaine patches. After the visit, Xray formal read was significant for T12 compression fracture which is likely pathological due to underlying osteopenia (Dexa, 2022). Per guidelines, conservative management with pain management focus is appropriate for compression fractures. Patient notified via voicemail and sent calcitonin spray, with close follow-up in 2 days scheduled.     - X-ray lumbar spine 2-3 views*; Future  - ketorolac (TORADOL) injection 30 mg  - ibuprofen (ADVIL/MOTRIN) 800 MG tablet; Take 1 tablet (800 mg) by mouth every 8 hours as needed for moderate pain.  - tiZANidine (ZANAFLEX) 2 MG tablet; Take 2 tablets (4 mg) by mouth 3 times daily.  - calcitonin, salmon, (MIACALCIN) 200 UNIT/ACT nasal spray; Spray 1 spray into one nostril alternating nostrils daily. Alternate nostril each day.    Vertigo  Intractable nausea   C/o room spinning and unable to open eyes without getting dizzy.  Rx'd dramamine by ER. Qtc on last EKG wnl. Start meclizine in place of dramamine and zofran 4mg q8h PRN for nausea.     - meclizine (ANTIVERT) 12.5 MG tablet; Take 1-2 tablets (12.5-25 mg) by mouth 3 times daily as needed for dizziness. Increase to 50mg per dose as a maximum. Maximum daily dose 100mg.  - ondansetron (ZOFRAN) 4 MG tablet; Take 1 tablet (4 mg) by mouth every 8  "hours as needed for nausea.    Leukocytosis, unspecified type  Chronic leukocytosis on labs, but recent WBC is further increased to >16. Vitals within normal limits, hemodynamically stable, no suspected underlying infection and not on oral steroids; suspect reactive leukocytosis but unclear source. Trend WBC, ordered repeat CBC today.    - CBC with platelets differential; Future    Uncontrolled type 2 diabetes mellitus with hyperglycemia (H)  ER workup concerning given Hyperglycemic to 445, Leukocytosis to >16, but no AGMA. Labs today: BMP, A1C, Glucose, Ketones, UA to confirm no underlying DKA that may be contributing to dizziness, nausea, dry heaving symptoms.       BMI  Estimated body mass index is 46.15 kg/m  as calculated from the following:    Height as of 6/18/25: 1.58 m (5' 2.21\").    Weight as of 6/18/25: 115.2 kg (254 lb).       Subjective   Dionicio is a 69 year old, presenting for the following health issues:  RECHECK (ER follow up back pain )      6/30/2025    10:06 AM   Additional Questions   Roomed by lauren   Accompanied by  - Gabriel         6/30/2025    10:06 AM   Patient Reported Additional Medications   Patient reports taking the following new medications none         6/30/2025    Information    services provided? No     HPI    - fall while working in garden from squatting to standing position after getting dizzy; no LOC   - ER conducted lengthy workup but no lower back imaging done   - hyperglycemia and leukocytosis but no abnl vitals, no fevers/chills   - feels very nauseous still   - can't keep eyes open d/t dizziness   - back hurting severely   - tearful d/t pain   - taking tylenol, lidocaine patches w/o relief   - DXA scheduled later this year, has history of osteopenia        Objective    BP (!) 149/81   Pulse 72   Resp 18   SpO2 97%   There is no height or weight on file to calculate BMI.  Physical Exam  Vitals reviewed.   Constitutional:       Comments: Tearful, " acutely distressed, in pain    HENT:      Head: Normocephalic and atraumatic.   Cardiovascular:      Rate and Rhythm: Normal rate.   Pulmonary:      Effort: Pulmonary effort is normal.   Musculoskeletal:      Cervical back: Normal range of motion.      Thoracic back: Tenderness present. Decreased range of motion.      Lumbar back: Decreased range of motion.   Neurological:      General: No focal deficit present.      Mental Status: She is alert and oriented to person, place, and time. Mental status is at baseline.   Psychiatric:         Mood and Affect: Mood normal.         Behavior: Behavior normal.         Thought Content: Thought content normal.         Judgment: Judgment normal.          Signed Electronically by: Philly Adair DO  {Email feedback regarding this note to [email or web address]   :[id number]}

## 2025-06-30 NOTE — TELEPHONE ENCOUNTER
Attempted to reach patient twice - on mobile and home phone. Per chart, okay to leave detailed message on mobile phone so left VM with results of XR and general recommendations at this point for pain management. Sent additional calcitonin nasal spray as adjunct pain management with NSAIDs, muscle relaxants, tylenol. Will attempt to call patient again tomorrow and speak with her to further discuss.

## 2025-06-30 NOTE — TELEPHONE ENCOUNTER
Sandra from imaging warm transferred to me by .    They have an urgent finding in the XR of the Lumbar Spine done today 6/30/25    Impression: Compression fracture of T12 without loss of the vertebral body height.    I acknowledged receipt of the finding and relayed to Dr. Adair who was in clinic still seeing patients.    Wendy Cortes RN

## 2025-07-01 RX ORDER — ONDANSETRON 4 MG/1
4 TABLET, FILM COATED ORAL EVERY 8 HOURS PRN
Qty: 20 TABLET | Refills: 0 | Status: SHIPPED | OUTPATIENT
Start: 2025-07-01

## 2025-07-01 NOTE — TELEPHONE ENCOUNTER
Called patient and she had gotten the message from Dr. Adair and is scheduled to see her again tomorrow    Spouse will come and  the medication that is still waiting to be picked up    Wendy Cortes RN

## 2025-07-01 NOTE — TELEPHONE ENCOUNTER
PRIOR AUTHORIZATION DENIED    Medication: ONDANSETRON 4 MG PO TBDP  Insurance Company: [employer] - Phone [phone] Fax [phone]  Denial Date: 6/30/2025  Denial Reason(s):     Appeal Information:     Patient Notified: No

## 2025-07-02 ENCOUNTER — OFFICE VISIT (OUTPATIENT)
Dept: FAMILY MEDICINE | Facility: CLINIC | Age: 70
End: 2025-07-02
Payer: MEDICARE

## 2025-07-02 VITALS
DIASTOLIC BLOOD PRESSURE: 52 MMHG | TEMPERATURE: 98 F | OXYGEN SATURATION: 99 % | SYSTOLIC BLOOD PRESSURE: 83 MMHG | RESPIRATION RATE: 16 BRPM | HEART RATE: 63 BPM

## 2025-07-02 DIAGNOSIS — D72.829 LEUKOCYTOSIS, UNSPECIFIED TYPE: ICD-10-CM

## 2025-07-02 DIAGNOSIS — R42 DIZZINESS: ICD-10-CM

## 2025-07-02 DIAGNOSIS — I95.89 OTHER SPECIFIED HYPOTENSION: ICD-10-CM

## 2025-07-02 DIAGNOSIS — R42 VERTIGO: ICD-10-CM

## 2025-07-02 DIAGNOSIS — S22.080A COMPRESSION FRACTURE OF T12 VERTEBRA, INITIAL ENCOUNTER (H): Primary | ICD-10-CM

## 2025-07-02 RX ORDER — OXYCODONE HYDROCHLORIDE 5 MG/1
5 TABLET ORAL EVERY 6 HOURS PRN
Qty: 15 TABLET | Refills: 0 | Status: SHIPPED | OUTPATIENT
Start: 2025-07-02

## 2025-07-02 RX ORDER — ACETAMINOPHEN 500 MG
1000 TABLET ORAL EVERY 6 HOURS PRN
Qty: 50 TABLET | Refills: 1 | Status: SHIPPED | OUTPATIENT
Start: 2025-07-02

## 2025-07-02 NOTE — PROGRESS NOTES
{PROVIDER CHARTING PREFERENCE:934744}    Subjective   Dionicio is a 69 year old, presenting for the following health issues:  Clinic Care Coordination - Follow-up (Lower back pain, fall. Dizziness, nauseous )      7/2/2025     3:52 PM   Additional Questions   Roomed by Keira   Accompanied by          7/2/2025   Declines Weight   Did patient decline having their weight taken? Yes         7/2/2025    Information    services provided? No     HPI    {Patient is 65+ yrs, please consider 4Ms documentation (Optional):948296}  {MA/LPN/RN Pre-Provider Visit Orders- hCG/UA/Strep (Optional):590245}  {SUPERLIST (Optional):681301}  {additonal problems for provider to add (Optional):193356}    {ROS Picklists (Optional):490251}      Objective    BP (!) 83/52   Pulse 63   Temp 98  F (36.7  C) (Temporal)   Resp 16   SpO2 99%   There is no height or weight on file to calculate BMI.  Physical Exam   {Exam List (Optional):477274}    {Diagnostic Test Results (Optional):950297}        Signed Electronically by: Philly Adair DO  {Email feedback regarding this note to [email or web address]   :[id number]}DME (Durable Medical Equipment) Orders and Documentation  Orders Placed This Encounter   Procedures    Neck/Shoulder/Back/Abdomen Bracing Supplies Order Lumbosacral Brace      The patient was assessed and it was determined the patient is in need of the following listed DME Supplies/Equipment. Please complete supporting documentation below to demonstrate medical necessity.          file to calculate BMI.  Physical Exam  Vitals reviewed.   Constitutional:       General: She is in acute distress.   HENT:      Head: Normocephalic and atraumatic.   Cardiovascular:      Rate and Rhythm: Normal rate.   Pulmonary:      Effort: Pulmonary effort is normal.   Musculoskeletal:      Cervical back: Normal range of motion.      Comments: Walker to help ambulate, with her   Can't move much d/t pain    Neurological:      General: No focal deficit present.      Mental Status: She is alert and oriented to person, place, and time. Mental status is at baseline.   Psychiatric:         Mood and Affect: Mood normal.         Behavior: Behavior normal.         Thought Content: Thought content normal.         Judgment: Judgment normal.                Signed Electronically by: Philly Adair DO  DME (Durable Medical Equipment) Orders and Documentation  Orders Placed This Encounter   Procedures    Neck/Shoulder/Back/Abdomen Bracing Supplies Order Lumbosacral Brace      The patient was assessed and it was determined the patient is in need of the following listed DME Supplies/Equipment. Please complete supporting documentation below to demonstrate medical necessity.

## 2025-07-02 NOTE — PATIENT INSTRUCTIONS
Tylenol     DME order for back brace sent to [facility] at fax: [phone].

## 2025-07-08 ENCOUNTER — OFFICE VISIT (OUTPATIENT)
Dept: FAMILY MEDICINE | Facility: CLINIC | Age: 70
End: 2025-07-08
Payer: MEDICARE

## 2025-07-08 VITALS
SYSTOLIC BLOOD PRESSURE: 160 MMHG | BODY MASS INDEX: 46.14 KG/M2 | OXYGEN SATURATION: 98 % | DIASTOLIC BLOOD PRESSURE: 81 MMHG | TEMPERATURE: 97.7 F | RESPIRATION RATE: 16 BRPM | HEART RATE: 64 BPM | HEIGHT: 62 IN

## 2025-07-08 DIAGNOSIS — S22.080D T12 COMPRESSION FRACTURE, WITH ROUTINE HEALING, SUBSEQUENT ENCOUNTER: Primary | ICD-10-CM

## 2025-07-08 DIAGNOSIS — R42 VERTIGO: ICD-10-CM

## 2025-07-08 RX ORDER — OMEPRAZOLE 20 MG/1
40 CAPSULE, DELAYED RELEASE ORAL DAILY
Qty: 30 CAPSULE | Refills: 0 | Status: ON HOLD | OUTPATIENT
Start: 2025-07-08

## 2025-07-08 RX ORDER — MECLIZINE HCL 12.5 MG 12.5 MG/1
12.5-25 TABLET ORAL 3 TIMES DAILY PRN
Qty: 25 TABLET | Refills: 0 | Status: ON HOLD | OUTPATIENT
Start: 2025-07-08

## 2025-07-08 RX ORDER — OXYCODONE HYDROCHLORIDE 5 MG/1
5 TABLET ORAL EVERY 4 HOURS PRN
Qty: 42 TABLET | Refills: 0 | Status: SHIPPED | OUTPATIENT
Start: 2025-07-08 | End: 2025-07-15

## 2025-07-08 RX ORDER — OXYCODONE HYDROCHLORIDE 5 MG/1
5 TABLET ORAL EVERY 4 HOURS PRN
Qty: 42 TABLET | Refills: 0 | Status: SHIPPED | OUTPATIENT
Start: 2025-07-08 | End: 2025-07-08

## 2025-07-08 RX ORDER — CYCLOBENZAPRINE HCL 10 MG
10 TABLET ORAL 3 TIMES DAILY PRN
Qty: 30 TABLET | Refills: 0 | Status: ON HOLD | OUTPATIENT
Start: 2025-07-08 | End: 2025-07-22

## 2025-07-08 ASSESSMENT — PATIENT HEALTH QUESTIONNAIRE - PHQ9
SUM OF ALL RESPONSES TO PHQ QUESTIONS 1-9: 16
SUM OF ALL RESPONSES TO PHQ QUESTIONS 1-9: 16
10. IF YOU CHECKED OFF ANY PROBLEMS, HOW DIFFICULT HAVE THESE PROBLEMS MADE IT FOR YOU TO DO YOUR WORK, TAKE CARE OF THINGS AT HOME, OR GET ALONG WITH OTHER PEOPLE: EXTREMELY DIFFICULT

## 2025-07-08 NOTE — PATIENT INSTRUCTIONS
Pain regimen:   - Oxycodone 5mg every 4 hours as needed   - Tylenol 1000mg every 6 hours (scheduled)   - Ibuprofen 800mg every 8 hours (scheduled)   - Cyclobenzaprine 10mg three times a day as needed >> Let us know if the tizanidine was better, we can swap them out if this change isn't helpful     Nausea:   - Meclizine    - Omeprazole for GI protection

## 2025-07-08 NOTE — PROGRESS NOTES
"  Assessment & Plan     T12 compression fracture, with routine healing, subsequent encounter  Pain is still not controlled on scheduled tylenol - reiterated max dose regimen 1000mg q6h, ibuprofen 800mg q8h, tizanidine TID, and oxycodone 5mg (currently taking every 6-8hrs). Brace was ill-fitting and exacerbated the pain. Calcitonin did not help. Difficulty with ADLs due to pain, has  support for assistance. In wheelchair today, was formerly using walker, but cannot tolerate now. Trial flexeril instead of tizanidine, continue scheduled tylenol and ibuprofen at max doses, pain patches and heat PRN. Sent Omeprazole for GI protection in setting of heavy NSAID use. Oxycodone 5mg q4h PRN x7 days with plan to RTC for re-evaluation within 1 week.     - cyclobenzaprine (FLEXERIL) 10 MG tablet; Take 1 tablet (10 mg) by mouth 3 times daily as needed for muscle spasms.  - oxycodone: take 1 tablet (5 mg) by mouth every 4 hours as needed for severe pain     Vertigo  Still experiencing dizziness, nausea is better-controlled, needs refills.   - meclizine (ANTIVERT) 12.5 MG tablet; Take 1-2 tablets (12.5-25 mg) by mouth 3 times daily as needed for dizziness. Increase to 50mg per dose as a maximum. Maximum daily dose 100mg.    BMI  Estimated body mass index is 46.14 kg/m  as calculated from the following:    Height as of this encounter: 1.58 m (5' 2.21\").    Weight as of 6/18/25: 115.2 kg (254 lb).     Subjective   Dionicio is a 69 year old, presenting for the following health issues:  No chief complaint on file.    HPI    - accompanied by  at today's visit, with list of meds and timings of scheduled doses   - no improvement, tearful, and in pain   - has been about/almost 2 weeks, was hoping to feel better by now   - unable to perform ADLs   - not eating/drinking well due to pain   - sleeping okay   - feels band-like pain across lower back, starts one side and moves to the other   - severe pain   - unable to wipe while " toileting          Objective    There were no vitals taken for this visit.  There is no height or weight on file to calculate BMI.  Physical Exam  Vitals reviewed.   Constitutional:       General: She is in acute distress.   HENT:      Head: Normocephalic and atraumatic.   Cardiovascular:      Rate and Rhythm: Normal rate.   Pulmonary:      Effort: Pulmonary effort is normal.   Musculoskeletal:         General: Tenderness and signs of injury present.      Cervical back: Normal range of motion.      Thoracic back: Spasms present. Decreased range of motion.   Neurological:      Mental Status: She is alert and oriented to person, place, and time.   Psychiatric:         Mood and Affect: Mood is depressed. Affect is tearful.         Behavior: Behavior is cooperative.         Thought Content: Thought content normal.                Signed Electronically by: Philly Adair DO    Answers submitted by the patient for this visit:  Patient Health Questionnaire (Submitted on 7/8/2025)  If you checked off any problems, how difficult have these problems made it for you to do your work, take care of things at home, or get along with other people?: Extremely difficult  PHQ9 TOTAL SCORE: 16

## 2025-07-14 DIAGNOSIS — S22.080D T12 COMPRESSION FRACTURE, WITH ROUTINE HEALING, SUBSEQUENT ENCOUNTER: ICD-10-CM

## 2025-07-14 RX ORDER — OXYCODONE HYDROCHLORIDE 5 MG/1
5 TABLET ORAL EVERY 4 HOURS PRN
Qty: 42 TABLET | Refills: 0 | Status: CANCELLED | OUTPATIENT
Start: 2025-07-14

## 2025-07-16 ENCOUNTER — OFFICE VISIT (OUTPATIENT)
Dept: FAMILY MEDICINE | Facility: CLINIC | Age: 70
End: 2025-07-16
Payer: MEDICARE

## 2025-07-16 VITALS
SYSTOLIC BLOOD PRESSURE: 154 MMHG | HEART RATE: 80 BPM | RESPIRATION RATE: 16 BRPM | DIASTOLIC BLOOD PRESSURE: 82 MMHG | OXYGEN SATURATION: 97 % | TEMPERATURE: 97.6 F

## 2025-07-16 DIAGNOSIS — S22.080G COMPRESSION FRACTURE OF T12 VERTEBRA, WITH DELAYED HEALING, SUBSEQUENT ENCOUNTER: Primary | ICD-10-CM

## 2025-07-16 DIAGNOSIS — M54.50 ACUTE BILATERAL LOW BACK PAIN WITHOUT SCIATICA: ICD-10-CM

## 2025-07-16 PROCEDURE — 3079F DIAST BP 80-89 MM HG: CPT

## 2025-07-16 PROCEDURE — 99214 OFFICE O/P EST MOD 30 MIN: CPT | Mod: GC

## 2025-07-16 PROCEDURE — 3077F SYST BP >= 140 MM HG: CPT

## 2025-07-16 RX ORDER — OXYCODONE HYDROCHLORIDE 5 MG/1
5-10 TABLET ORAL EVERY 6 HOURS PRN
Qty: 72 TABLET | Refills: 0 | Status: ON HOLD | OUTPATIENT
Start: 2025-07-16 | End: 2025-07-28

## 2025-07-16 RX ORDER — OXYCODONE HCL 10 MG/1
10 TABLET, FILM COATED, EXTENDED RELEASE ORAL EVERY 12 HOURS
Qty: 60 TABLET | Refills: 0 | Status: CANCELLED | OUTPATIENT
Start: 2025-07-16 | End: 2025-08-15

## 2025-07-16 RX ORDER — IBUPROFEN 800 MG/1
800 TABLET, FILM COATED ORAL EVERY 8 HOURS PRN
Qty: 50 TABLET | Refills: 1 | Status: ON HOLD | OUTPATIENT
Start: 2025-07-16 | End: 2025-07-28

## 2025-07-16 RX ORDER — TIZANIDINE 2 MG/1
4 TABLET ORAL 3 TIMES DAILY
Qty: 30 TABLET | Refills: 1 | Status: ON HOLD | OUTPATIENT
Start: 2025-07-16

## 2025-07-16 NOTE — PROGRESS NOTES
"  Assessment & Plan     Compression fracture of T12 vertebra, with delayed healing, subsequent encounter  Pain is still not controlled, feels it is worse, has been taking scheduled pain medications: ibuprofen 800mg q8hr, tylenol 1000mg q6hr, flexeril, oxycodone 5mg q4hr without relief. No red flag symptoms. Repeat imaging discussed, and joint decision making with patient, , myself, and attending to escalate care to spine surgeon and defer re-imaging until then, and increased oxycodone to up to 10mg every 6 hours. Strict ER precautions given.   - Spine  Referral; Future  - oxyCODONE (ROXICODONE) 5 MG tablet; Take 1-2 tablets (5-10 mg) by mouth every 6 hours as needed for pain.    Acute bilateral low back pain without sciatica  Discontinue flexeril and restart tizanidine as patient is complaining of more pain since changing to flexeril.   - tiZANidine (ZANAFLEX) 2 MG tablet; Take 2 tablets (4 mg) by mouth 3 times daily.  - ibuprofen (ADVIL/MOTRIN) 800 MG tablet; Take 1 tablet (800 mg) by mouth every 8 hours as needed for moderate pain.    BMI  Estimated body mass index is 46.14 kg/m  as calculated from the following:    Height as of 7/8/25: 1.58 m (5' 2.21\").    Weight as of 6/18/25: 115.2 kg (254 lb).     Follow-up in 1 week.     Subjective   Dionicio is a 69 year old, presenting for the following health issues:  Clinic Care Coordination - Follow-up (Fractured T12, oxycodone refill )      7/16/2025     8:48 AM   Additional Questions   Roomed by Keira   Accompanied by lang         7/16/2025   Declines Weight   Did patient decline having their weight taken? Yes         7/16/2025    Information    services provided? No     HPI    - pain is not controlled on current regimen of max dose tylenol and ibuprofen and oxy 5mg q4h PRN   - wasn't able to tolerate the back brace, felt this made it worse  - didn't want to xray again at last visit, felt too much pain   - difficult going to the " restroom without assistance  - unable to move around very much   - no comfortable position   - using wheelchair today, not walker   - feels pain is moving from one side of back to bilateral         Objective    BP (!) 154/82   Pulse 80   Temp 97.6  F (36.4  C) (Temporal)   Resp 16   SpO2 97%   There is no height or weight on file to calculate BMI.  Physical Exam  Vitals reviewed.   Constitutional:       General: She is in acute distress.      Comments: Sitting in wheelchair today    Cardiovascular:      Rate and Rhythm: Normal rate.   Pulmonary:      Effort: Pulmonary effort is normal.   Musculoskeletal:      Comments: +increased warmth and tenderness around low thoracic spine               Signed Electronically by: Philly Adair DO

## 2025-07-17 NOTE — TELEPHONE ENCOUNTER
DIAGNOSIS: T12 vertebral compression fracture, failed conservative management x2+ weeks, consult for vertebroplasty   APPOINTMENT DATE: 7/22/25   NOTES STATUS DETAILS   OFFICE NOTE from referring provider Internal  7/16/25, 7/8/25, 7/2/25  Philly [facility]air [facility]    See Additional Encounters   XRAYS (IMAGES & REPORTS) Internal [facility]  6/30/25 XR Lumbar Spine

## 2025-07-19 ENCOUNTER — DOCUMENTATION ONLY (OUTPATIENT)
Dept: ORTHOPEDICS | Facility: CLINIC | Age: 70
End: 2025-07-19

## 2025-07-19 ENCOUNTER — HOSPITAL ENCOUNTER (INPATIENT)
Facility: CLINIC | Age: 70
End: 2025-07-19
Attending: EMERGENCY MEDICINE | Admitting: ORTHOPAEDIC SURGERY
Payer: MEDICARE

## 2025-07-19 ENCOUNTER — APPOINTMENT (OUTPATIENT)
Dept: CT IMAGING | Facility: CLINIC | Age: 70
End: 2025-07-19
Attending: EMERGENCY MEDICINE
Payer: MEDICARE
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
## 2025-07-19 DIAGNOSIS — Z79.4 TYPE 2 DIABETES MELLITUS WITH PROLIFERATIVE RETINOPATHY WITHOUT MACULAR EDEMA, WITH LONG-TERM CURRENT USE OF INSULIN, UNSPECIFIED LATERALITY (H): ICD-10-CM

## 2025-07-19 DIAGNOSIS — Z98.1 S/P LUMBAR FUSION: Primary | ICD-10-CM

## 2025-07-19 DIAGNOSIS — E11.3599 TYPE 2 DIABETES MELLITUS WITH PROLIFERATIVE RETINOPATHY WITHOUT MACULAR EDEMA, WITH LONG-TERM CURRENT USE OF INSULIN, UNSPECIFIED LATERALITY (H): ICD-10-CM

## 2025-07-19 DIAGNOSIS — S22.081A CLOSED STABLE BURST FRACTURE OF ELEVENTH THORACIC VERTEBRA, INITIAL ENCOUNTER (H): ICD-10-CM

## 2025-07-19 DIAGNOSIS — M54.50 ACUTE MIDLINE LOW BACK PAIN WITHOUT SCIATICA: ICD-10-CM

## 2025-07-19 DIAGNOSIS — Z71.89 OTHER SPECIFIED COUNSELING: Chronic | ICD-10-CM

## 2025-07-19 DIAGNOSIS — M54.50 LOW BACK PAIN, UNSPECIFIED BACK PAIN LATERALITY, UNSPECIFIED CHRONICITY, UNSPECIFIED WHETHER SCIATICA PRESENT: ICD-10-CM

## 2025-07-19 DIAGNOSIS — S22.081A T12 BURST FRACTURE (H): ICD-10-CM

## 2025-07-19 LAB
ALBUMIN UR-MCNC: NEGATIVE MG/DL
ANION GAP SERPL CALCULATED.3IONS-SCNC: 11 MMOL/L (ref 7–15)
APPEARANCE UR: CLEAR
BACTERIA #/AREA URNS HPF: ABNORMAL /HPF
BASOPHILS # BLD AUTO: 0 10E3/UL (ref 0–0.2)
BASOPHILS NFR BLD AUTO: 0 %
BILIRUB UR QL STRIP: NEGATIVE
BUN SERPL-MCNC: 21 MG/DL (ref 8–23)
CALCIUM SERPL-MCNC: 10.1 MG/DL (ref 8.8–10.4)
CHLORIDE SERPL-SCNC: 103 MMOL/L (ref 98–107)
COLOR UR AUTO: YELLOW
CREAT SERPL-MCNC: 1 MG/DL (ref 0.51–0.95)
EGFRCR SERPLBLD CKD-EPI 2021: 60 ML/MIN/1.73M2
EOSINOPHIL # BLD AUTO: 0.2 10E3/UL (ref 0–0.7)
EOSINOPHIL NFR BLD AUTO: 1 %
ERYTHROCYTE [DISTWIDTH] IN BLOOD BY AUTOMATED COUNT: 15 % (ref 10–15)
GLUCOSE SERPL-MCNC: 132 MG/DL (ref 70–99)
GLUCOSE UR STRIP-MCNC: NEGATIVE MG/DL
HCO3 SERPL-SCNC: 19 MMOL/L (ref 22–29)
HCT VFR BLD AUTO: 32.9 % (ref 35–47)
HGB BLD-MCNC: 10.8 G/DL (ref 11.7–15.7)
HGB UR QL STRIP: NEGATIVE
HOLD SPECIMEN: NORMAL
IMM GRANULOCYTES # BLD: 0.1 10E3/UL
IMM GRANULOCYTES NFR BLD: 1 %
KETONES UR STRIP-MCNC: ABNORMAL MG/DL
LEUKOCYTE ESTERASE UR QL STRIP: NEGATIVE
LYMPHOCYTES # BLD AUTO: 2.6 10E3/UL (ref 0.8–5.3)
LYMPHOCYTES NFR BLD AUTO: 20 %
MCH RBC QN AUTO: 29.8 PG (ref 26.5–33)
MCHC RBC AUTO-ENTMCNC: 32.8 G/DL (ref 31.5–36.5)
MCV RBC AUTO: 91 FL (ref 78–100)
MONOCYTES # BLD AUTO: 0.9 10E3/UL (ref 0–1.3)
MONOCYTES NFR BLD AUTO: 7 %
MUCOUS THREADS #/AREA URNS LPF: PRESENT /LPF
NEUTROPHILS # BLD AUTO: 9.4 10E3/UL (ref 1.6–8.3)
NEUTROPHILS NFR BLD AUTO: 71 %
NITRATE UR QL: NEGATIVE
NRBC # BLD AUTO: 0 10E3/UL
NRBC BLD AUTO-RTO: 0 /100
PH UR STRIP: 5.5 [PH] (ref 5–7)
PLATELET # BLD AUTO: 341 10E3/UL (ref 150–450)
POTASSIUM SERPL-SCNC: 4.2 MMOL/L (ref 3.4–5.3)
RBC # BLD AUTO: 3.62 10E6/UL (ref 3.8–5.2)
RBC URINE: 0 /HPF
SODIUM SERPL-SCNC: 133 MMOL/L (ref 135–145)
SP GR UR STRIP: 1.02 (ref 1–1.03)
SQUAMOUS EPITHELIAL: 3 /HPF
UROBILINOGEN UR STRIP-MCNC: NORMAL MG/DL
WBC # BLD AUTO: 13.3 10E3/UL (ref 4–11)
WBC URINE: 5 /HPF

## 2025-07-19 PROCEDURE — 72131 CT LUMBAR SPINE W/O DYE: CPT

## 2025-07-19 PROCEDURE — 99285 EMERGENCY DEPT VISIT HI MDM: CPT | Performed by: EMERGENCY MEDICINE

## 2025-07-19 PROCEDURE — 81001 URINALYSIS AUTO W/SCOPE: CPT | Performed by: EMERGENCY MEDICINE

## 2025-07-19 PROCEDURE — 72131 CT LUMBAR SPINE W/O DYE: CPT | Mod: 26 | Performed by: RADIOLOGY

## 2025-07-19 PROCEDURE — 85004 AUTOMATED DIFF WBC COUNT: CPT | Performed by: EMERGENCY MEDICINE

## 2025-07-19 PROCEDURE — G0378 HOSPITAL OBSERVATION PER HR: HCPCS

## 2025-07-19 PROCEDURE — 96376 TX/PRO/DX INJ SAME DRUG ADON: CPT | Performed by: EMERGENCY MEDICINE

## 2025-07-19 PROCEDURE — 80048 BASIC METABOLIC PNL TOTAL CA: CPT | Performed by: EMERGENCY MEDICINE

## 2025-07-19 PROCEDURE — 99285 EMERGENCY DEPT VISIT HI MDM: CPT | Mod: 25 | Performed by: EMERGENCY MEDICINE

## 2025-07-19 PROCEDURE — 82962 GLUCOSE BLOOD TEST: CPT

## 2025-07-19 PROCEDURE — 36415 COLL VENOUS BLD VENIPUNCTURE: CPT | Performed by: EMERGENCY MEDICINE

## 2025-07-19 PROCEDURE — 250N000013 HC RX MED GY IP 250 OP 250 PS 637: Performed by: EMERGENCY MEDICINE

## 2025-07-19 PROCEDURE — 250N000011 HC RX IP 250 OP 636: Performed by: EMERGENCY MEDICINE

## 2025-07-19 PROCEDURE — 99222 1ST HOSP IP/OBS MODERATE 55: CPT | Mod: AI | Performed by: STUDENT IN AN ORGANIZED HEALTH CARE EDUCATION/TRAINING PROGRAM

## 2025-07-19 PROCEDURE — L0456 TLSO FLEX TRNK SJ-SS PRE CST: HCPCS

## 2025-07-19 PROCEDURE — 96374 THER/PROPH/DIAG INJ IV PUSH: CPT | Performed by: EMERGENCY MEDICINE

## 2025-07-19 RX ORDER — SERTRALINE HYDROCHLORIDE 25 MG/1
50 TABLET, FILM COATED ORAL DAILY
Status: DISCONTINUED | OUTPATIENT
Start: 2025-07-20 | End: 2025-07-21

## 2025-07-19 RX ORDER — LISINOPRIL AND HYDROCHLOROTHIAZIDE 12.5; 2 MG/1; MG/1
1 TABLET ORAL DAILY
Status: DISCONTINUED | OUTPATIENT
Start: 2025-07-20 | End: 2025-07-21

## 2025-07-19 RX ORDER — AMOXICILLIN 250 MG
2 CAPSULE ORAL 2 TIMES DAILY
Status: DISCONTINUED | OUTPATIENT
Start: 2025-07-19 | End: 2025-07-21

## 2025-07-19 RX ORDER — CYCLOSPORINE 0.5 MG/ML
1 EMULSION OPHTHALMIC 2 TIMES DAILY
Status: DISCONTINUED | OUTPATIENT
Start: 2025-07-19 | End: 2025-07-21

## 2025-07-19 RX ORDER — TIMOLOL MALEATE 5 MG/ML
1 SOLUTION/ DROPS OPHTHALMIC 2 TIMES DAILY
Status: DISCONTINUED | OUTPATIENT
Start: 2025-07-19 | End: 2025-07-21

## 2025-07-19 RX ORDER — DEXTROSE MONOHYDRATE 25 G/50ML
25-50 INJECTION, SOLUTION INTRAVENOUS
Status: DISCONTINUED | OUTPATIENT
Start: 2025-07-19 | End: 2025-07-21

## 2025-07-19 RX ORDER — HYDROCODONE BITARTRATE AND ACETAMINOPHEN 5; 325 MG/1; MG/1
1 TABLET ORAL ONCE
Refills: 0 | Status: COMPLETED | OUTPATIENT
Start: 2025-07-19 | End: 2025-07-19

## 2025-07-19 RX ORDER — PROCHLORPERAZINE MALEATE 5 MG/1
5 TABLET ORAL EVERY 6 HOURS PRN
Status: DISCONTINUED | OUTPATIENT
Start: 2025-07-19 | End: 2025-07-21

## 2025-07-19 RX ORDER — AMOXICILLIN 250 MG
1 CAPSULE ORAL 2 TIMES DAILY
Status: DISCONTINUED | OUTPATIENT
Start: 2025-07-19 | End: 2025-07-21

## 2025-07-19 RX ORDER — OXYCODONE HYDROCHLORIDE 5 MG/1
5 TABLET ORAL EVERY 4 HOURS PRN
Refills: 0 | Status: DISCONTINUED | OUTPATIENT
Start: 2025-07-19 | End: 2025-07-20

## 2025-07-19 RX ORDER — AMOXICILLIN 250 MG
2 CAPSULE ORAL 2 TIMES DAILY PRN
Status: DISCONTINUED | OUTPATIENT
Start: 2025-07-19 | End: 2025-07-21

## 2025-07-19 RX ORDER — OMEPRAZOLE 20 MG/1
40 CAPSULE, DELAYED RELEASE ORAL DAILY
Status: DISCONTINUED | OUTPATIENT
Start: 2025-07-20 | End: 2025-07-19

## 2025-07-19 RX ORDER — DIAZEPAM 5 MG/1
5 TABLET ORAL ONCE
Status: COMPLETED | OUTPATIENT
Start: 2025-07-19 | End: 2025-07-19

## 2025-07-19 RX ORDER — LATANOPROST 50 UG/ML
1 SOLUTION/ DROPS OPHTHALMIC AT BEDTIME
Status: DISCONTINUED | OUTPATIENT
Start: 2025-07-19 | End: 2025-07-21

## 2025-07-19 RX ORDER — ONDANSETRON 2 MG/ML
4 INJECTION INTRAMUSCULAR; INTRAVENOUS EVERY 6 HOURS PRN
Status: DISCONTINUED | OUTPATIENT
Start: 2025-07-19 | End: 2025-07-21

## 2025-07-19 RX ORDER — AMOXICILLIN 250 MG
1 CAPSULE ORAL 2 TIMES DAILY PRN
Status: DISCONTINUED | OUTPATIENT
Start: 2025-07-19 | End: 2025-07-21

## 2025-07-19 RX ORDER — NICOTINE POLACRILEX 4 MG
15-30 LOZENGE BUCCAL
Status: DISCONTINUED | OUTPATIENT
Start: 2025-07-19 | End: 2025-07-21

## 2025-07-19 RX ORDER — ACETAMINOPHEN 325 MG/1
975 TABLET ORAL 3 TIMES DAILY
Status: DISCONTINUED | OUTPATIENT
Start: 2025-07-19 | End: 2025-07-21

## 2025-07-19 RX ORDER — PANTOPRAZOLE SODIUM 40 MG/1
40 TABLET, DELAYED RELEASE ORAL
Status: DISCONTINUED | OUTPATIENT
Start: 2025-07-20 | End: 2025-07-21

## 2025-07-19 RX ORDER — POLYETHYLENE GLYCOL 3350 17 G/17G
17 POWDER, FOR SOLUTION ORAL DAILY PRN
Status: DISCONTINUED | OUTPATIENT
Start: 2025-07-19 | End: 2025-07-21

## 2025-07-19 RX ORDER — CYCLOBENZAPRINE HCL 10 MG
10 TABLET ORAL 3 TIMES DAILY PRN
Status: DISCONTINUED | OUTPATIENT
Start: 2025-07-19 | End: 2025-07-21

## 2025-07-19 RX ORDER — GABAPENTIN 100 MG/1
100 CAPSULE ORAL 3 TIMES DAILY
Status: DISCONTINUED | OUTPATIENT
Start: 2025-07-20 | End: 2025-07-21

## 2025-07-19 RX ORDER — ONDANSETRON 4 MG/1
4 TABLET, ORALLY DISINTEGRATING ORAL EVERY 6 HOURS PRN
Status: DISCONTINUED | OUTPATIENT
Start: 2025-07-19 | End: 2025-07-21

## 2025-07-19 RX ORDER — ATORVASTATIN CALCIUM 10 MG/1
20 TABLET, FILM COATED ORAL DAILY
Status: DISCONTINUED | OUTPATIENT
Start: 2025-07-20 | End: 2025-07-21

## 2025-07-19 RX ADMIN — HYDROCODONE BITARTRATE AND ACETAMINOPHEN 1 TABLET: 5; 325 TABLET ORAL at 16:01

## 2025-07-19 RX ADMIN — DIAZEPAM 5 MG: 5 TABLET ORAL at 16:01

## 2025-07-19 RX ADMIN — HYDROMORPHONE HYDROCHLORIDE 1 MG: 1 INJECTION, SOLUTION INTRAMUSCULAR; INTRAVENOUS; SUBCUTANEOUS at 20:55

## 2025-07-19 RX ADMIN — HYDROMORPHONE HYDROCHLORIDE 1 MG: 1 INJECTION, SOLUTION INTRAMUSCULAR; INTRAVENOUS; SUBCUTANEOUS at 18:15

## 2025-07-19 ASSESSMENT — ACTIVITIES OF DAILY LIVING (ADL)
ADLS_ACUITY_SCORE: 54

## 2025-07-19 ASSESSMENT — COLUMBIA-SUICIDE SEVERITY RATING SCALE - C-SSRS
6. HAVE YOU EVER DONE ANYTHING, STARTED TO DO ANYTHING, OR PREPARED TO DO ANYTHING TO END YOUR LIFE?: NO
2. HAVE YOU ACTUALLY HAD ANY THOUGHTS OF KILLING YOURSELF IN THE PAST MONTH?: NO
1. IN THE PAST MONTH, HAVE YOU WISHED YOU WERE DEAD OR WISHED YOU COULD GO TO SLEEP AND NOT WAKE UP?: NO

## 2025-07-19 NOTE — LETTER
Transition Communication Hand-off for Care Transitions to Next Level of Care Provider    Name: Radha Baca  : 1955  MRN #: [id number]  Primary Care Provider: Mohan Moreno     Primary Clinic: [address] [address]     Reason for Hospitalization:  T12 burst fracture (H) [S22.081A]  Closed stable burst fracture of eleventh thoracic vertebra, initial encounter (H) [S22.081A]  Acute midline low back pain without sciatica [M54.50]  Admit Date/Time: 2025  3:05 PM  Discharge Date: 25  Payor Source: Payor: MEDICARE / Plan: MEDICARE / Product Type: Medicare /     Discharge Plan: Discharge to  [facility]  Concern for non-adherence with plan of care: No     Discharge Needs Assessment:  Needs      Flowsheet Row Most Recent Value   Anticipated Changes Related to Illness inability to care for someone else   Equipment Currently Used at Home other (see comments), walker, standard, cane, straight, grab bar, tub/shower, shower chair, raised toilet seat  [per pt report shower chair is too big, 4WW is too short, occasionally needed help donning socks/shoes from spouse, owns attachable bed rails but not on bed]   # of Referrals Placed by CM External Care Coordination     Already enrolled in Tele-monitoring program and name of program: Unknown  Follow-up specialty is recommended: Yes, see below      Follow-up plan:    Future Appointments   Date Time Provider Department Center   2025  1:30 PM Marquise Woods, PT URPT [place]   2025  9:30 PM Virginia Herman, OTR UROT [place]   2025  1:00 PM Virginia Herman, OTR UROT [place]   2025  1:20 PM Jerrell Austin DPM UOR [facility]   2025  2:30 PM Bre Shaw AuD PRITI [facility]   2025  4:00 PM Deepika Mancia, KIMMY ENT [facility]   9/10/2025  8:00 AM  LAB UCLABR [facility]   2025 10:30 AM Marcella Maria PA-C [facility]   2025  9:00 AM UCSCDX1 CDEXA [facility]   2025  9:40 AM Mingo Swain MD  SG [facility]   1/7/2026 10:30 AM Keturah De Luna MD UCMDE [facility]     Any outstanding tests or procedures:  N/A      Referrals       Future Labs/Procedures    Primary Care - Care Coordination Referral     Process Instructions:    Services are provided by a Care Coordinator for people with complex needs such as: medical, social, or financial troubles.  The Care Coordinator works with the patient and their Primary Care Provider to determine health goals, obtain resources, achieve outcomes, and develop care plans that help coordinate the patient's care.     Comments:                   GUSTABO Medeiros, LGSW  5 Med Surg   [facility] Acute Care Management   Phone: [phone]  Available on Vocera: 5MS SW    AVS/Discharge Summary is the source of truth; this is a helpful guide for improved communication of patient story
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
## 2025-07-19 NOTE — ED TRIAGE NOTES
[place] [place]. Fall 3 weeks ago, found to have vertebral fractures. Yesterday attempted to get out of chair and felt tearing in back, pain increasing in right leg and back. Last dose of oxy at 1200. 18g Left AC, pt refused pain medication en route, glucose 125, vss. Hx T2dm on insulin, depression.

## 2025-07-20 ENCOUNTER — APPOINTMENT (OUTPATIENT)
Dept: MRI IMAGING | Facility: CLINIC | Age: 70
DRG: 457 | End: 2025-07-20
Payer: MEDICARE

## 2025-07-20 ENCOUNTER — APPOINTMENT (OUTPATIENT)
Dept: CT IMAGING | Facility: CLINIC | Age: 70
End: 2025-07-20
Payer: MEDICARE

## 2025-07-20 LAB
ABO + RH BLD: NORMAL
ANION GAP SERPL CALCULATED.3IONS-SCNC: 11 MMOL/L (ref 7–15)
APTT PPP: 29 SECONDS (ref 22–38)
BLD GP AB SCN SERPL QL: NEGATIVE
BUN SERPL-MCNC: 16.6 MG/DL (ref 8–23)
CALCIUM SERPL-MCNC: 10 MG/DL (ref 8.8–10.4)
CHLORIDE SERPL-SCNC: 104 MMOL/L (ref 98–107)
CREAT SERPL-MCNC: 0.79 MG/DL (ref 0.51–0.95)
EGFRCR SERPLBLD CKD-EPI 2021: 80 ML/MIN/1.73M2
GLUCOSE BLDC GLUCOMTR-MCNC: 140 MG/DL (ref 70–99)
GLUCOSE BLDC GLUCOMTR-MCNC: 144 MG/DL (ref 70–99)
GLUCOSE BLDC GLUCOMTR-MCNC: 149 MG/DL (ref 70–99)
GLUCOSE BLDC GLUCOMTR-MCNC: 155 MG/DL (ref 70–99)
GLUCOSE BLDC GLUCOMTR-MCNC: 182 MG/DL (ref 70–99)
GLUCOSE BLDC GLUCOMTR-MCNC: 94 MG/DL (ref 70–99)
GLUCOSE BLDC GLUCOMTR-MCNC: 99 MG/DL (ref 70–99)
GLUCOSE SERPL-MCNC: 173 MG/DL (ref 70–99)
HCO3 SERPL-SCNC: 21 MMOL/L (ref 22–29)
HOLD SPECIMEN: NORMAL
INR PPP: 1.03 (ref 0.85–1.15)
POTASSIUM SERPL-SCNC: 4 MMOL/L (ref 3.4–5.3)
PROTHROMBIN TIME: 13.9 SECONDS (ref 11.8–14.8)
SODIUM SERPL-SCNC: 136 MMOL/L (ref 135–145)
SPECIMEN EXP DATE BLD: NORMAL

## 2025-07-20 PROCEDURE — 36415 COLL VENOUS BLD VENIPUNCTURE: CPT

## 2025-07-20 PROCEDURE — 250N000009 HC RX 250: Performed by: PHYSICIAN ASSISTANT

## 2025-07-20 PROCEDURE — 93010 ELECTROCARDIOGRAM REPORT: CPT | Performed by: INTERNAL MEDICINE

## 2025-07-20 PROCEDURE — 72146 MRI CHEST SPINE W/O DYE: CPT | Mod: 26 | Performed by: RADIOLOGY

## 2025-07-20 PROCEDURE — 250N000012 HC RX MED GY IP 250 OP 636 PS 637: Performed by: STUDENT IN AN ORGANIZED HEALTH CARE EDUCATION/TRAINING PROGRAM

## 2025-07-20 PROCEDURE — 86900 BLOOD TYPING SEROLOGIC ABO: CPT

## 2025-07-20 PROCEDURE — 250N000013 HC RX MED GY IP 250 OP 250 PS 637: Performed by: STUDENT IN AN ORGANIZED HEALTH CARE EDUCATION/TRAINING PROGRAM

## 2025-07-20 PROCEDURE — 250N000009 HC RX 250: Performed by: STUDENT IN AN ORGANIZED HEALTH CARE EDUCATION/TRAINING PROGRAM

## 2025-07-20 PROCEDURE — 72148 MRI LUMBAR SPINE W/O DYE: CPT | Mod: 26 | Performed by: RADIOLOGY

## 2025-07-20 PROCEDURE — 250N000011 HC RX IP 250 OP 636

## 2025-07-20 PROCEDURE — 72148 MRI LUMBAR SPINE W/O DYE: CPT

## 2025-07-20 PROCEDURE — 250N000013 HC RX MED GY IP 250 OP 250 PS 637: Performed by: PHYSICIAN ASSISTANT

## 2025-07-20 PROCEDURE — 72146 MRI CHEST SPINE W/O DYE: CPT

## 2025-07-20 PROCEDURE — G0378 HOSPITAL OBSERVATION PER HR: HCPCS

## 2025-07-20 PROCEDURE — 82962 GLUCOSE BLOOD TEST: CPT

## 2025-07-20 PROCEDURE — 85730 THROMBOPLASTIN TIME PARTIAL: CPT

## 2025-07-20 PROCEDURE — 72128 CT CHEST SPINE W/O DYE: CPT | Mod: 26 | Performed by: RADIOLOGY

## 2025-07-20 PROCEDURE — 85610 PROTHROMBIN TIME: CPT

## 2025-07-20 PROCEDURE — 250N000012 HC RX MED GY IP 250 OP 636 PS 637: Performed by: PHYSICIAN ASSISTANT

## 2025-07-20 PROCEDURE — 96375 TX/PRO/DX INJ NEW DRUG ADDON: CPT

## 2025-07-20 PROCEDURE — 250N000011 HC RX IP 250 OP 636: Performed by: PHYSICIAN ASSISTANT

## 2025-07-20 PROCEDURE — 80048 BASIC METABOLIC PNL TOTAL CA: CPT | Performed by: STUDENT IN AN ORGANIZED HEALTH CARE EDUCATION/TRAINING PROGRAM

## 2025-07-20 PROCEDURE — 72128 CT CHEST SPINE W/O DYE: CPT

## 2025-07-20 PROCEDURE — 999N000147 HC STATISTIC PT IP EVAL DEFER

## 2025-07-20 PROCEDURE — 120N000002 HC R&B MED SURG/OB UMMC

## 2025-07-20 PROCEDURE — 93005 ELECTROCARDIOGRAM TRACING: CPT

## 2025-07-20 PROCEDURE — 36415 COLL VENOUS BLD VENIPUNCTURE: CPT | Performed by: STUDENT IN AN ORGANIZED HEALTH CARE EDUCATION/TRAINING PROGRAM

## 2025-07-20 PROCEDURE — 96376 TX/PRO/DX INJ SAME DRUG ADON: CPT

## 2025-07-20 PROCEDURE — 99233 SBSQ HOSP IP/OBS HIGH 50: CPT | Performed by: PHYSICIAN ASSISTANT

## 2025-07-20 RX ORDER — NALOXONE HYDROCHLORIDE 0.4 MG/ML
0.2 INJECTION, SOLUTION INTRAMUSCULAR; INTRAVENOUS; SUBCUTANEOUS
Status: DISCONTINUED | OUTPATIENT
Start: 2025-07-20 | End: 2025-07-21

## 2025-07-20 RX ORDER — KETOROLAC TROMETHAMINE 15 MG/ML
15 INJECTION, SOLUTION INTRAMUSCULAR; INTRAVENOUS EVERY 6 HOURS PRN
Status: DISCONTINUED | OUTPATIENT
Start: 2025-07-20 | End: 2025-07-21

## 2025-07-20 RX ORDER — NALOXONE HYDROCHLORIDE 0.4 MG/ML
0.4 INJECTION, SOLUTION INTRAMUSCULAR; INTRAVENOUS; SUBCUTANEOUS
Status: DISCONTINUED | OUTPATIENT
Start: 2025-07-20 | End: 2025-07-21

## 2025-07-20 RX ORDER — HYDROMORPHONE HYDROCHLORIDE 1 MG/ML
0.5 INJECTION, SOLUTION INTRAMUSCULAR; INTRAVENOUS; SUBCUTANEOUS ONCE
Status: COMPLETED | OUTPATIENT
Start: 2025-07-20 | End: 2025-07-20

## 2025-07-20 RX ORDER — BRIMONIDINE TARTRATE 2 MG/ML
1 SOLUTION/ DROPS OPHTHALMIC 2 TIMES DAILY
Status: DISCONTINUED | OUTPATIENT
Start: 2025-07-20 | End: 2025-07-21

## 2025-07-20 RX ORDER — HYDROMORPHONE HYDROCHLORIDE 1 MG/ML
0.5 INJECTION, SOLUTION INTRAMUSCULAR; INTRAVENOUS; SUBCUTANEOUS EVERY 4 HOURS PRN
Refills: 0 | Status: DISCONTINUED | OUTPATIENT
Start: 2025-07-20 | End: 2025-07-21

## 2025-07-20 RX ORDER — OXYCODONE HYDROCHLORIDE 10 MG/1
10 TABLET ORAL EVERY 4 HOURS PRN
Refills: 0 | Status: DISCONTINUED | OUTPATIENT
Start: 2025-07-20 | End: 2025-07-21

## 2025-07-20 RX ORDER — OXYCODONE HYDROCHLORIDE 5 MG/1
5 TABLET ORAL EVERY 4 HOURS PRN
Refills: 0 | Status: DISCONTINUED | OUTPATIENT
Start: 2025-07-20 | End: 2025-07-21

## 2025-07-20 RX ADMIN — GABAPENTIN 100 MG: 100 CAPSULE ORAL at 07:37

## 2025-07-20 RX ADMIN — TIMOLOL MALEATE 1 DROP: 5 SOLUTION/ DROPS OPHTHALMIC at 01:56

## 2025-07-20 RX ADMIN — LATANOPROST 1 DROP: 50 SOLUTION/ DROPS OPHTHALMIC at 21:00

## 2025-07-20 RX ADMIN — SERTRALINE HYDROCHLORIDE 50 MG: 50 TABLET ORAL at 07:37

## 2025-07-20 RX ADMIN — OXYCODONE HYDROCHLORIDE 10 MG: 10 TABLET ORAL at 20:53

## 2025-07-20 RX ADMIN — LATANOPROST 1 DROP: 50 SOLUTION/ DROPS OPHTHALMIC at 01:56

## 2025-07-20 RX ADMIN — CYCLOBENZAPRINE HYDROCHLORIDE 10 MG: 5 TABLET, FILM COATED ORAL at 20:53

## 2025-07-20 RX ADMIN — ACETAMINOPHEN 975 MG: 325 TABLET ORAL at 13:47

## 2025-07-20 RX ADMIN — INSULIN ASPART 1 UNITS: 100 INJECTION, SOLUTION INTRAVENOUS; SUBCUTANEOUS at 17:33

## 2025-07-20 RX ADMIN — SENNOSIDES AND DOCUSATE SODIUM 1 TABLET: 50; 8.6 TABLET ORAL at 07:40

## 2025-07-20 RX ADMIN — KETOROLAC TROMETHAMINE 15 MG: 15 INJECTION, SOLUTION INTRAMUSCULAR; INTRAVENOUS at 13:46

## 2025-07-20 RX ADMIN — BRIMONIDINE TARTRATE 1 DROP: 2 SOLUTION OPHTHALMIC at 20:58

## 2025-07-20 RX ADMIN — INSULIN GLARGINE 35 UNITS: 100 INJECTION, SOLUTION SUBCUTANEOUS at 21:00

## 2025-07-20 RX ADMIN — OXYCODONE HYDROCHLORIDE 5 MG: 5 TABLET ORAL at 00:38

## 2025-07-20 RX ADMIN — GABAPENTIN 100 MG: 100 CAPSULE ORAL at 13:47

## 2025-07-20 RX ADMIN — HYDROMORPHONE HYDROCHLORIDE 0.5 MG: 1 INJECTION, SOLUTION INTRAMUSCULAR; INTRAVENOUS; SUBCUTANEOUS at 02:43

## 2025-07-20 RX ADMIN — CYCLOSPORINE 1 DROP: 0.5 EMULSION OPHTHALMIC at 02:51

## 2025-07-20 RX ADMIN — KETOROLAC TROMETHAMINE 15 MG: 15 INJECTION, SOLUTION INTRAMUSCULAR; INTRAVENOUS at 21:01

## 2025-07-20 RX ADMIN — ACETAMINOPHEN 975 MG: 325 TABLET ORAL at 07:35

## 2025-07-20 RX ADMIN — TIMOLOL MALEATE 1 DROP: 5 SOLUTION/ DROPS OPHTHALMIC at 07:49

## 2025-07-20 RX ADMIN — OXYCODONE HYDROCHLORIDE 5 MG: 5 TABLET ORAL at 05:11

## 2025-07-20 RX ADMIN — HYDROMORPHONE HYDROCHLORIDE 0.5 MG: 1 INJECTION, SOLUTION INTRAMUSCULAR; INTRAVENOUS; SUBCUTANEOUS at 08:29

## 2025-07-20 RX ADMIN — HYDROMORPHONE HYDROCHLORIDE 0.5 MG: 1 INJECTION, SOLUTION INTRAMUSCULAR; INTRAVENOUS; SUBCUTANEOUS at 11:16

## 2025-07-20 RX ADMIN — GABAPENTIN 100 MG: 100 CAPSULE ORAL at 20:53

## 2025-07-20 RX ADMIN — CYCLOSPORINE 1 DROP: 0.5 EMULSION OPHTHALMIC at 20:58

## 2025-07-20 RX ADMIN — TIMOLOL MALEATE 1 DROP: 5 SOLUTION/ DROPS OPHTHALMIC at 20:56

## 2025-07-20 RX ADMIN — HYDROMORPHONE HYDROCHLORIDE 0.5 MG: 1 INJECTION, SOLUTION INTRAMUSCULAR; INTRAVENOUS; SUBCUTANEOUS at 16:28

## 2025-07-20 RX ADMIN — PANTOPRAZOLE SODIUM 40 MG: 40 TABLET, DELAYED RELEASE ORAL at 16:31

## 2025-07-20 RX ADMIN — CYCLOSPORINE 1 DROP: 0.5 EMULSION OPHTHALMIC at 07:49

## 2025-07-20 RX ADMIN — OXYCODONE HYDROCHLORIDE 5 MG: 5 TABLET ORAL at 10:11

## 2025-07-20 RX ADMIN — OXYCODONE HYDROCHLORIDE 10 MG: 10 TABLET ORAL at 14:28

## 2025-07-20 RX ADMIN — CYCLOBENZAPRINE HYDROCHLORIDE 10 MG: 5 TABLET, FILM COATED ORAL at 07:34

## 2025-07-20 RX ADMIN — ATORVASTATIN CALCIUM 20 MG: 20 TABLET, FILM COATED ORAL at 07:37

## 2025-07-20 RX ADMIN — INSULIN ASPART 1 UNITS: 100 INJECTION, SOLUTION INTRAVENOUS; SUBCUTANEOUS at 11:02

## 2025-07-20 RX ADMIN — ACETAMINOPHEN 975 MG: 325 TABLET ORAL at 20:53

## 2025-07-20 RX ADMIN — PANTOPRAZOLE SODIUM 40 MG: 40 TABLET, DELAYED RELEASE ORAL at 07:37

## 2025-07-20 RX ADMIN — ACETAMINOPHEN 975 MG: 325 TABLET ORAL at 00:38

## 2025-07-20 RX ADMIN — CYCLOBENZAPRINE HYDROCHLORIDE 10 MG: 5 TABLET, FILM COATED ORAL at 00:38

## 2025-07-20 ASSESSMENT — ACTIVITIES OF DAILY LIVING (ADL)
ADLS_ACUITY_SCORE: 67
ADLS_ACUITY_SCORE: 67
ADLS_ACUITY_SCORE: 53
ADLS_ACUITY_SCORE: 54
ADLS_ACUITY_SCORE: 54
ADLS_ACUITY_SCORE: 67
ADLS_ACUITY_SCORE: 53
ADLS_ACUITY_SCORE: 67
ADLS_ACUITY_SCORE: 54
ADLS_ACUITY_SCORE: 67
ADLS_ACUITY_SCORE: 54
ADLS_ACUITY_SCORE: 67
ADLS_ACUITY_SCORE: 54
ADLS_ACUITY_SCORE: 53
ADLS_ACUITY_SCORE: 67
ADLS_ACUITY_SCORE: 54
ADLS_ACUITY_SCORE: 53
ADLS_ACUITY_SCORE: 54
ADLS_ACUITY_SCORE: 54
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
## 2025-07-20 NOTE — PROGRESS NOTES
Patient at MRI   [FreeTextEntry4] : Assessment: Patient is a 44 yo female with h/o depression, anxiety, and PTSD seen today for medication management. Patient is compliant with the medications, tolerating it well without any side effects. I-STOP was checked without any problems.  I-STOP: Patient Demographic Information (PDI)     PDI	First Name	Last Name	Birth Date	Gender	Street Address	[facility]	Zip Code [place]	02/18/1980	Female	[address] [facility]	[id number]  Prescription Information    PDI Filter:   PDI	My Rx	Current Rx	Drug Type	Rx Written	Rx Dispensed	Drug	Quantity	Days Supply	Prescriber Name	Prescriber XUAN #	Payment Method	Dispenser A	N	N	O	09/22/2023	09/22/2023	oxycodone-acetaminophen 5-325 mg tab	21	7	Caterina Holliday	[id number]	Insurance	[id number] A	N	N	O	08/24/2023	08/25/2023	oxycodone-acetaminophen 5-325 mg tab	21	7	Caterina Holliday	[id number]	Insurance	[id number] A	N	N	O	08/16/2023	08/16/2023	oxycodone-acetaminophen 5-325 mg tab	21	7	Caterina Holldiay	[id number]	Insurance	[id number] A	N	N	O	07/27/2023	07/28/2023	oxycodone-acetaminophen 5-325 mg tab	21	7	Caterina Holliday	[id number]	Insurance	[id number] A	N	N	O	07/06/2023	07/06/2023	oxycodone-acetaminophen 5-325 mg tab	45	15	Deejay Sofia	[id number]	Insurance	[id number] A	N	N		07/03/2023	07/03/2023	pregabalin 50 mg capsule	60	30	Deejay Sofia	[id number]	Insurance	[id number] A	N	N	O	06/26/2023	06/26/2023	oxycodone-acetaminophen 5-325 mg tab	21	7	Diamond Sims	[id number]	Insurance	[id number] A	N	N	O	06/21/2023	06/22/2023	buprenorphine 10 mcg/hr patch	4	28	Andrez Tobar MD	[id number]	Insurance	[id number]  PLAN: Start Ramelteon 8 mg PO QHS Continue Rexulti 2 mg PO QD for mood disorder Continue Trintellix 20 mg PO QD for depression and anxiety Continue Klonopin 0.75 mg PO PRN for anxiety #45 D/C Doxepin 10 mg PO QHS for insomnia D/C Zoloft 75 D/C Pristiq PO QD for depression and anxiety D/C Hydroxyzine 10 mg PO QHS for anxiety and insomnia D/C Mirtazapine 7.5 mg PO QHS for insomnia D/C Cymbalta 120 mg PO QHS for depression, anxiety and neuropathic pain. D/C Vistaril 25 mg PO TID PRN for anxiety, insomnia and allergic reaction. D/C Zoloft 25 mg x 5 days and then increase it to 50 mg PO QD for depression and anxiety D/C Lexapro 10 mg PO QAM for depression and anxiety D/C Trazodone 50 mg PO QHS for insomnia - Discussed risks and benefits of medications including side effects of GI and sexual with SSRI. Alternative strategies including no intervention discussed with patient. Patient consents to current medications as prescribed. - Discussed with patient regarding importance of abstinence and sobriety from alcohol and drugs. Educated about relationship between worsening mood/anxiety symptoms and drug use and improvement of symptoms with abstinence. - Discussed about unpredictable effects including cardiorespiratory collapse from the combination of illicit drugs and prescribed medications. Patient verbalized understanding. - Patient understands to contact clinic prn with concerns and agrees to call 911 or go to nearest ER if symptoms worsen. - Next appointment was made by the patient in 1 month Patient was not in any distress.

## 2025-07-20 NOTE — PROGRESS NOTES
Patient report given to Nurse [place] 5 med surg, [facility].  Patient sent via transport with belongings and inpatient medications.   will meet patient at new unit.

## 2025-07-20 NOTE — PLAN OF CARE
Observation goals  PRIOR TO DISCHARGE       Comments:   -diagnostic tests and consults completed and resulted: Not Met  -vital signs normal or at patient baseline: Met  -adequate pain control on oral analgesics: Not Met  Nurse to notify provider when observation goals have been met and patient is ready for discharge.

## 2025-07-20 NOTE — PROVIDER NOTIFICATION
Provider text paged Re: Patient is in excruciating pain. Current regimen is not working. Please advise.

## 2025-07-20 NOTE — PLAN OF CARE
Goal Outcome Evaluation:  -diagnostic tests and consults completed and resulted: Not met   -vital signs normal or at patient baseline: Metr   -adequate pain control on oral analgesics: Not met

## 2025-07-20 NOTE — PROGRESS NOTES
Overnight CC    Held night 50U Lantus with BG of 94 and continuing MSSI, can give half dose in am if BG is significantly elevated despite the correction     Anthony Hull PA-C on 7/20/2025 at 4:50 AM

## 2025-07-20 NOTE — PROGRESS NOTES
PRIMARY DIAGNOSIS: ACUTE PAIN  OUTPATIENT/OBSERVATION GOALS TO BE MET BEFORE DISCHARGE:  1. Pain Status: No improvement noted. Consider adjustment in pain regimen. Patient is rating pain 10/10; provider notified and one time IV dilaudid administered. Patient had difficulty getting out of bed to bedside commode; she reported pain was unbearable, was teary and reported current pain regime not effective. Provider notified for medication adjustment.      2. Return to near baseline physical activity:No; required two assist to bedside commode    3. Cleared for discharge by consultants (if involved): No    Discharge Planner Nurse   Safe discharge environment identified: No  Barriers to discharge: Yes; uncontrolled pain       Entered by: Paradise Pollard RN 07/20/2025 5:31 AM     Please review provider order for any additional goals.   Nurse to notify provider when observation goals have been met and patient is ready for discharge.

## 2025-07-20 NOTE — PROGRESS NOTES
Preoperative Evaluation Risk Assessment:     Cardiovascular: does not have underlying cardiac disease. At baseline, patient is able to walk around a grocery store, perform household chores. Currently, denies any chest pain or palpitations.      Pulmonary: does not have underlying lung disease. Currently, denies any cough or SOB.      Prior Anesthesia: Yes, without complications.   History of DVT/PE: No     GHISLAINE: Yes - uses CPAP     History of stroke - No, seizure - No, kidney disease - No, liver disease - No, thyroid disease - No, diabetes - Yes    CARDIAC RISK:  - Active Cardiac Conditions: No active cardiac conditions  - Perioperative Cardiac Risk Modifiers: No significant cardiac risk modifiers  - Perioperative Major Acute Coronary Event (MACE) risk: RCRI Score of 0/1, suggestive of low perioperative MACE risk   - Functional capacity/Exercise tolerance: Patient's exercise tolerance by functional assessment is >4 METs, suggestive of adequate functional capacity   - Patient is low risk for perioperative MACE risk. According to ACC/AHA guidelines, for the type of procedure and functional status, can proceed with surgery.  PULMONARY RISK:  -ARISCAT/Canet score is < 26 suggestive of low risk for postoperative pulmonary complications.    Hx of GHISLAINE on CPAP. Will need to be monitored in the immediate post op setting and CPAP if hypoxic  HEPATIC RISK:  - No hepatic issues    Patient is medically optimized to proceed with surgery. Remainder per progress note today. Internal Medicine will follow for co-management on [facility].    Melissa Leung PA-C  Internal Medicine [facility] Hospitalist  [facility]

## 2025-07-20 NOTE — ED NOTES
Writer called [facility] [employer] for supply of TLSO brace.Answering person stated that they will call writer back.
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
## 2025-07-21 ENCOUNTER — ANESTHESIA (OUTPATIENT)
Dept: SURGERY | Facility: CLINIC | Age: 70
End: 2025-07-21
Payer: MEDICARE

## 2025-07-21 ENCOUNTER — APPOINTMENT (OUTPATIENT)
Dept: GENERAL RADIOLOGY | Facility: CLINIC | Age: 70
DRG: 457 | End: 2025-07-21
Attending: ORTHOPAEDIC SURGERY
Payer: MEDICARE

## 2025-07-21 ENCOUNTER — ANESTHESIA EVENT (OUTPATIENT)
Dept: SURGERY | Facility: CLINIC | Age: 70
End: 2025-07-21
Payer: MEDICARE

## 2025-07-21 LAB
ABO + RH BLD: NORMAL
ALBUMIN SERPL BCG-MCNC: 3.5 G/DL (ref 3.5–5.2)
ALP SERPL-CCNC: 146 U/L (ref 40–150)
ALT SERPL W P-5'-P-CCNC: 16 U/L (ref 0–50)
ANION GAP SERPL CALCULATED.3IONS-SCNC: 11 MMOL/L (ref 7–15)
AST SERPL W P-5'-P-CCNC: 23 U/L (ref 0–45)
BILIRUB SERPL-MCNC: 0.3 MG/DL
BLD GP AB SCN SERPL QL: NEGATIVE
BUN SERPL-MCNC: 17.5 MG/DL (ref 8–23)
CALCIUM SERPL-MCNC: 9.5 MG/DL (ref 8.8–10.4)
CHLORIDE SERPL-SCNC: 104 MMOL/L (ref 98–107)
CREAT SERPL-MCNC: 0.81 MG/DL (ref 0.51–0.95)
EGFRCR SERPLBLD CKD-EPI 2021: 78 ML/MIN/1.73M2
ERYTHROCYTE [DISTWIDTH] IN BLOOD BY AUTOMATED COUNT: 15.2 % (ref 10–15)
GLUCOSE BLDC GLUCOMTR-MCNC: 111 MG/DL (ref 70–99)
GLUCOSE BLDC GLUCOMTR-MCNC: 138 MG/DL (ref 70–99)
GLUCOSE BLDC GLUCOMTR-MCNC: 206 MG/DL (ref 70–99)
GLUCOSE BLDC GLUCOMTR-MCNC: 220 MG/DL (ref 70–99)
GLUCOSE SERPL-MCNC: 135 MG/DL (ref 70–99)
HCO3 SERPL-SCNC: 21 MMOL/L (ref 22–29)
HCT VFR BLD AUTO: 34.9 % (ref 35–47)
HGB BLD-MCNC: 11.5 G/DL (ref 11.7–15.7)
MCH RBC QN AUTO: 30 PG (ref 26.5–33)
MCHC RBC AUTO-ENTMCNC: 33 G/DL (ref 31.5–36.5)
MCV RBC AUTO: 91 FL (ref 78–100)
PLATELET # BLD AUTO: 347 10E3/UL (ref 150–450)
POTASSIUM SERPL-SCNC: 4.7 MMOL/L (ref 3.4–5.3)
PROT SERPL-MCNC: 7.1 G/DL (ref 6.4–8.3)
RBC # BLD AUTO: 3.83 10E6/UL (ref 3.8–5.2)
SODIUM SERPL-SCNC: 136 MMOL/L (ref 135–145)
SPECIMEN EXP DATE BLD: NORMAL
WBC # BLD AUTO: 11.5 10E3/UL (ref 4–11)

## 2025-07-21 PROCEDURE — 0PU40JZ SUPPLEMENT THORACIC VERTEBRA WITH SYNTHETIC SUBSTITUTE, OPEN APPROACH: ICD-10-PCS | Performed by: ORTHOPAEDIC SURGERY

## 2025-07-21 PROCEDURE — 22842 INSERT SPINE FIXATION DEVICE: CPT | Mod: GC | Performed by: ORTHOPAEDIC SURGERY

## 2025-07-21 PROCEDURE — 258N000003 HC RX IP 258 OP 636: Performed by: NURSE ANESTHETIST, CERTIFIED REGISTERED

## 2025-07-21 PROCEDURE — 250N000011 HC RX IP 250 OP 636: Performed by: NURSE ANESTHETIST, CERTIFIED REGISTERED

## 2025-07-21 PROCEDURE — 36415 COLL VENOUS BLD VENIPUNCTURE: CPT | Performed by: PHYSICIAN ASSISTANT

## 2025-07-21 PROCEDURE — L8699 PROSTHETIC IMPLANT NOS: HCPCS | Performed by: ORTHOPAEDIC SURGERY

## 2025-07-21 PROCEDURE — 250N000013 HC RX MED GY IP 250 OP 250 PS 637: Performed by: STUDENT IN AN ORGANIZED HEALTH CARE EDUCATION/TRAINING PROGRAM

## 2025-07-21 PROCEDURE — 22612 ARTHRD PST TQ 1NTRSPC LUMBAR: CPT | Mod: GC | Performed by: ORTHOPAEDIC SURGERY

## 2025-07-21 PROCEDURE — 250N000011 HC RX IP 250 OP 636: Mod: JZ | Performed by: STUDENT IN AN ORGANIZED HEALTH CARE EDUCATION/TRAINING PROGRAM

## 2025-07-21 PROCEDURE — 01N80ZZ RELEASE THORACIC NERVE, OPEN APPROACH: ICD-10-PCS | Performed by: ORTHOPAEDIC SURGERY

## 2025-07-21 PROCEDURE — 0RG7071 FUSION OF 2 TO 7 THORACIC VERTEBRAL JOINTS WITH AUTOLOGOUS TISSUE SUBSTITUTE, POSTERIOR APPROACH, POSTERIOR COLUMN, OPEN APPROACH: ICD-10-PCS | Performed by: ORTHOPAEDIC SURGERY

## 2025-07-21 PROCEDURE — 36415 COLL VENOUS BLD VENIPUNCTURE: CPT | Performed by: STUDENT IN AN ORGANIZED HEALTH CARE EDUCATION/TRAINING PROGRAM

## 2025-07-21 PROCEDURE — 22328 TREAT EACH ADD SPINE FX: CPT | Mod: GC | Performed by: ORTHOPAEDIC SURGERY

## 2025-07-21 PROCEDURE — 360N000086 HC SURGERY LEVEL 6 W/ FLUORO, PER MIN: Performed by: ORTHOPAEDIC SURGERY

## 2025-07-21 PROCEDURE — 22327 TREAT THORAX SPINE FRACTURE: CPT | Mod: GC | Performed by: ORTHOPAEDIC SURGERY

## 2025-07-21 PROCEDURE — 22216 INCIS ADDL SPINE SEGMENT: CPT | Mod: GC | Performed by: ORTHOPAEDIC SURGERY

## 2025-07-21 PROCEDURE — 250N000011 HC RX IP 250 OP 636: Performed by: ORTHOPAEDIC SURGERY

## 2025-07-21 PROCEDURE — 258N000003 HC RX IP 258 OP 636

## 2025-07-21 PROCEDURE — 250N000009 HC RX 250: Performed by: NURSE ANESTHETIST, CERTIFIED REGISTERED

## 2025-07-21 PROCEDURE — 85014 HEMATOCRIT: CPT | Performed by: PHYSICIAN ASSISTANT

## 2025-07-21 PROCEDURE — 999N000182 XR SURGERY OARM

## 2025-07-21 PROCEDURE — 250N000009 HC RX 250: Performed by: INTERNAL MEDICINE

## 2025-07-21 PROCEDURE — C1762 CONN TISS, HUMAN(INC FASCIA): HCPCS | Performed by: ORTHOPAEDIC SURGERY

## 2025-07-21 PROCEDURE — 20936 SP BONE AGRFT LOCAL ADD-ON: CPT | Mod: GC | Performed by: ORTHOPAEDIC SURGERY

## 2025-07-21 PROCEDURE — 250N000009 HC RX 250: Performed by: STUDENT IN AN ORGANIZED HEALTH CARE EDUCATION/TRAINING PROGRAM

## 2025-07-21 PROCEDURE — 120N000002 HC R&B MED SURG/OB UMMC

## 2025-07-21 PROCEDURE — 250N000009 HC RX 250

## 2025-07-21 PROCEDURE — 999N000141 HC STATISTIC PRE-PROCEDURE NURSING ASSESSMENT: Performed by: ORTHOPAEDIC SURGERY

## 2025-07-21 PROCEDURE — 710N000010 HC RECOVERY PHASE 1, LEVEL 2, PER MIN: Performed by: ORTHOPAEDIC SURGERY

## 2025-07-21 PROCEDURE — 370N000017 HC ANESTHESIA TECHNICAL FEE, PER MIN: Performed by: ORTHOPAEDIC SURGERY

## 2025-07-21 PROCEDURE — 250N000011 HC RX IP 250 OP 636

## 2025-07-21 PROCEDURE — 0SG0071 FUSION OF LUMBAR VERTEBRAL JOINT WITH AUTOLOGOUS TISSUE SUBSTITUTE, POSTERIOR APPROACH, POSTERIOR COLUMN, OPEN APPROACH: ICD-10-PCS | Performed by: ORTHOPAEDIC SURGERY

## 2025-07-21 PROCEDURE — 0RGA071 FUSION OF THORACOLUMBAR VERTEBRAL JOINT WITH AUTOLOGOUS TISSUE SUBSTITUTE, POSTERIOR APPROACH, POSTERIOR COLUMN, OPEN APPROACH: ICD-10-PCS | Performed by: ORTHOPAEDIC SURGERY

## 2025-07-21 PROCEDURE — 86900 BLOOD TYPING SEROLOGIC ABO: CPT | Performed by: STUDENT IN AN ORGANIZED HEALTH CARE EDUCATION/TRAINING PROGRAM

## 2025-07-21 PROCEDURE — 8E0WXBZ COMPUTER ASSISTED PROCEDURE OF TRUNK REGION: ICD-10-PCS | Performed by: ORTHOPAEDIC SURGERY

## 2025-07-21 PROCEDURE — 250N000011 HC RX IP 250 OP 636: Performed by: STUDENT IN AN ORGANIZED HEALTH CARE EDUCATION/TRAINING PROGRAM

## 2025-07-21 PROCEDURE — 01NB0ZZ RELEASE LUMBAR NERVE, OPEN APPROACH: ICD-10-PCS | Performed by: ORTHOPAEDIC SURGERY

## 2025-07-21 PROCEDURE — 61783 SCAN PROC SPINAL: CPT | Mod: GC | Performed by: ORTHOPAEDIC SURGERY

## 2025-07-21 PROCEDURE — 250N000013 HC RX MED GY IP 250 OP 250 PS 637: Performed by: PHYSICIAN ASSISTANT

## 2025-07-21 PROCEDURE — 250N000025 HC SEVOFLURANE, PER MIN: Performed by: ORTHOPAEDIC SURGERY

## 2025-07-21 PROCEDURE — 272N000001 HC OR GENERAL SUPPLY STERILE: Performed by: ORTHOPAEDIC SURGERY

## 2025-07-21 PROCEDURE — 22214 INCIS 1 VERTEBRAL SEG LUMBAR: CPT | Mod: GC | Performed by: ORTHOPAEDIC SURGERY

## 2025-07-21 PROCEDURE — 20930 SP BONE ALGRFT MORSEL ADD-ON: CPT | Mod: GC | Performed by: ORTHOPAEDIC SURGERY

## 2025-07-21 PROCEDURE — 258N000003 HC RX IP 258 OP 636: Performed by: STUDENT IN AN ORGANIZED HEALTH CARE EDUCATION/TRAINING PROGRAM

## 2025-07-21 PROCEDURE — 84155 ASSAY OF PROTEIN SERUM: CPT | Performed by: PHYSICIAN ASSISTANT

## 2025-07-21 PROCEDURE — 22614 ARTHRD PST TQ 1NTRSPC EA ADD: CPT | Mod: GC | Performed by: ORTHOPAEDIC SURGERY

## 2025-07-21 PROCEDURE — C1713 ANCHOR/SCREW BN/BN,TIS/BN: HCPCS | Performed by: ORTHOPAEDIC SURGERY

## 2025-07-21 PROCEDURE — 99233 SBSQ HOSP IP/OBS HIGH 50: CPT | Performed by: INTERNAL MEDICINE

## 2025-07-21 DEVICE — IMPLANTABLE DEVICE: Type: IMPLANTABLE DEVICE | Site: SPINE THORACIC | Status: FUNCTIONAL

## 2025-07-21 DEVICE — GRAFT BONE INFUSE BMP LG 7510600: Type: IMPLANTABLE DEVICE | Site: SPINE THORACIC | Status: FUNCTIONAL

## 2025-07-21 DEVICE — IMP SCR SET MEDT SOLERA BREAK OFF 5.5MM TI 5540030: Type: IMPLANTABLE DEVICE | Site: SPINE THORACIC | Status: FUNCTIONAL

## 2025-07-21 DEVICE — IMP ROD MEDT SOLERA TIAL STR LINED 5.5X500MM 1554200500: Type: IMPLANTABLE DEVICE | Site: SPINE THORACIC | Status: FUNCTIONAL

## 2025-07-21 DEVICE — IMP SCREW BN MEDT SPINE 40X6.5MM 5.5/6MM ROD: Type: IMPLANTABLE DEVICE | Site: SPINE THORACIC | Status: FUNCTIONAL

## 2025-07-21 DEVICE — IMP SCR MEDT 5.5/6.0MM SOLERA 7.5X35MM MA 55840007535: Type: IMPLANTABLE DEVICE | Site: SPINE THORACIC | Status: FUNCTIONAL

## 2025-07-21 DEVICE — IMP SCR MEDT 5.5/6.0MM SOLERA 7.5X50MM MA 55840007550: Type: IMPLANTABLE DEVICE | Site: SPINE THORACIC | Status: FUNCTIONAL

## 2025-07-21 DEVICE — GRAFT BN CANC 30CC CRSH 1-10MM 800104: Type: IMPLANTABLE DEVICE | Site: SPINE THORACIC | Status: FUNCTIONAL

## 2025-07-21 DEVICE — VERTEBROPLASTY CEMENT W/BONE MIXER C01B: Type: IMPLANTABLE DEVICE | Site: SPINE THORACIC | Status: FUNCTIONAL

## 2025-07-21 DEVICE — IMP SCR MEDT 5.5/6.0MM SOLERA 7.5X40MM MA 55840007540: Type: IMPLANTABLE DEVICE | Site: SPINE THORACIC | Status: FUNCTIONAL

## 2025-07-21 DEVICE — BONE CEMENT KYPHX HV-R C01A: Type: IMPLANTABLE DEVICE | Site: SPINE THORACIC | Status: FUNCTIONAL

## 2025-07-21 DEVICE — IMP SCREW BN MEDT SPINE 50X7.5MM 5.5/6MM ROD: Type: IMPLANTABLE DEVICE | Site: SPINE THORACIC | Status: FUNCTIONAL

## 2025-07-21 RX ORDER — AMOXICILLIN 250 MG
2 CAPSULE ORAL 2 TIMES DAILY
Status: DISCONTINUED | OUTPATIENT
Start: 2025-07-21 | End: 2025-08-01 | Stop reason: HOSPADM

## 2025-07-21 RX ORDER — HYDROMORPHONE HYDROCHLORIDE 1 MG/ML
0.2 INJECTION, SOLUTION INTRAMUSCULAR; INTRAVENOUS; SUBCUTANEOUS EVERY 10 MIN PRN
Status: DISCONTINUED | OUTPATIENT
Start: 2025-07-21 | End: 2025-07-21

## 2025-07-21 RX ORDER — METHOCARBAMOL 100 MG/ML
1000 INJECTION, SOLUTION INTRAMUSCULAR; INTRAVENOUS ONCE
Status: COMPLETED | OUTPATIENT
Start: 2025-07-21 | End: 2025-07-21

## 2025-07-21 RX ORDER — SODIUM CHLORIDE, SODIUM LACTATE, POTASSIUM CHLORIDE, CALCIUM CHLORIDE 600; 310; 30; 20 MG/100ML; MG/100ML; MG/100ML; MG/100ML
INJECTION, SOLUTION INTRAVENOUS CONTINUOUS
Status: DISCONTINUED | OUTPATIENT
Start: 2025-07-21 | End: 2025-07-21 | Stop reason: HOSPADM

## 2025-07-21 RX ORDER — CEFAZOLIN SODIUM/WATER 2 G/20 ML
2 SYRINGE (ML) INTRAVENOUS SEE ADMIN INSTRUCTIONS
Status: DISCONTINUED | OUTPATIENT
Start: 2025-07-21 | End: 2025-07-21 | Stop reason: HOSPADM

## 2025-07-21 RX ORDER — DEXTROSE MONOHYDRATE 25 G/50ML
25-50 INJECTION, SOLUTION INTRAVENOUS
Status: DISCONTINUED | OUTPATIENT
Start: 2025-07-21 | End: 2025-07-21 | Stop reason: HOSPADM

## 2025-07-21 RX ORDER — SODIUM CHLORIDE, SODIUM LACTATE, POTASSIUM CHLORIDE, CALCIUM CHLORIDE 600; 310; 30; 20 MG/100ML; MG/100ML; MG/100ML; MG/100ML
INJECTION, SOLUTION INTRAVENOUS CONTINUOUS PRN
Status: DISCONTINUED | OUTPATIENT
Start: 2025-07-21 | End: 2025-07-21

## 2025-07-21 RX ORDER — NALOXONE HYDROCHLORIDE 0.4 MG/ML
0.1 INJECTION, SOLUTION INTRAMUSCULAR; INTRAVENOUS; SUBCUTANEOUS
Status: DISCONTINUED | OUTPATIENT
Start: 2025-07-21 | End: 2025-07-21

## 2025-07-21 RX ORDER — LABETALOL HYDROCHLORIDE 5 MG/ML
10 INJECTION, SOLUTION INTRAVENOUS
Status: DISCONTINUED | OUTPATIENT
Start: 2025-07-21 | End: 2025-07-21

## 2025-07-21 RX ORDER — OXYCODONE HYDROCHLORIDE 5 MG/1
5 TABLET ORAL EVERY 4 HOURS PRN
Refills: 0 | Status: DISCONTINUED | OUTPATIENT
Start: 2025-07-21 | End: 2025-07-21

## 2025-07-21 RX ORDER — PHENYLEPHRINE HCL IN 0.9% NACL 50MG/250ML
0-1 PLASTIC BAG, INJECTION (ML) INTRAVENOUS CONTINUOUS PRN
Status: DISCONTINUED | OUTPATIENT
Start: 2025-07-21 | End: 2025-07-21

## 2025-07-21 RX ORDER — DEXTROSE MONOHYDRATE 25 G/50ML
25-50 INJECTION, SOLUTION INTRAVENOUS
Status: DISCONTINUED | OUTPATIENT
Start: 2025-07-21 | End: 2025-07-22

## 2025-07-21 RX ORDER — AMOXICILLIN 250 MG
1 CAPSULE ORAL 2 TIMES DAILY
Status: DISCONTINUED | OUTPATIENT
Start: 2025-07-21 | End: 2025-07-21

## 2025-07-21 RX ORDER — ONDANSETRON 2 MG/ML
4 INJECTION INTRAMUSCULAR; INTRAVENOUS EVERY 30 MIN PRN
Status: DISCONTINUED | OUTPATIENT
Start: 2025-07-21 | End: 2025-07-21

## 2025-07-21 RX ORDER — HALOPERIDOL 5 MG/ML
1 INJECTION INTRAMUSCULAR
Status: DISCONTINUED | OUTPATIENT
Start: 2025-07-21 | End: 2025-07-21

## 2025-07-21 RX ORDER — DEXAMETHASONE SODIUM PHOSPHATE 4 MG/ML
INJECTION, SOLUTION INTRA-ARTICULAR; INTRALESIONAL; INTRAMUSCULAR; INTRAVENOUS; SOFT TISSUE PRN
Status: DISCONTINUED | OUTPATIENT
Start: 2025-07-21 | End: 2025-07-21

## 2025-07-21 RX ORDER — SODIUM CHLORIDE, SODIUM LACTATE, POTASSIUM CHLORIDE, CALCIUM CHLORIDE 600; 310; 30; 20 MG/100ML; MG/100ML; MG/100ML; MG/100ML
INJECTION, SOLUTION INTRAVENOUS CONTINUOUS
Status: DISCONTINUED | OUTPATIENT
Start: 2025-07-21 | End: 2025-07-21

## 2025-07-21 RX ORDER — LIDOCAINE HYDROCHLORIDE ANHYDROUS AND DEXTROSE MONOHYDRATE .8; 5 G/100ML; G/100ML
.5-1 INJECTION, SOLUTION INTRAVENOUS CONTINUOUS
Status: DISPENSED | OUTPATIENT
Start: 2025-07-21 | End: 2025-07-23

## 2025-07-21 RX ORDER — VANCOMYCIN HYDROCHLORIDE 1 G/20ML
INJECTION, POWDER, LYOPHILIZED, FOR SOLUTION INTRAVENOUS PRN
Status: DISCONTINUED | OUTPATIENT
Start: 2025-07-21 | End: 2025-07-21 | Stop reason: HOSPADM

## 2025-07-21 RX ORDER — HYDROMORPHONE HYDROCHLORIDE 1 MG/ML
0.3 INJECTION, SOLUTION INTRAMUSCULAR; INTRAVENOUS; SUBCUTANEOUS
Status: DISCONTINUED | OUTPATIENT
Start: 2025-07-21 | End: 2025-07-28

## 2025-07-21 RX ORDER — OXYCODONE HYDROCHLORIDE 5 MG/1
5 TABLET ORAL EVERY 4 HOURS PRN
Status: DISCONTINUED | OUTPATIENT
Start: 2025-07-21 | End: 2025-07-22

## 2025-07-21 RX ORDER — ONDANSETRON 4 MG/1
4 TABLET, ORALLY DISINTEGRATING ORAL EVERY 30 MIN PRN
Status: DISCONTINUED | OUTPATIENT
Start: 2025-07-21 | End: 2025-07-21

## 2025-07-21 RX ORDER — DEXTROSE MONOHYDRATE 100 MG/ML
INJECTION, SOLUTION INTRAVENOUS CONTINUOUS PRN
Status: DISCONTINUED | OUTPATIENT
Start: 2025-07-21 | End: 2025-07-21 | Stop reason: HOSPADM

## 2025-07-21 RX ORDER — LIDOCAINE HYDROCHLORIDE 20 MG/ML
INJECTION, SOLUTION INFILTRATION; PERINEURAL PRN
Status: DISCONTINUED | OUTPATIENT
Start: 2025-07-21 | End: 2025-07-21

## 2025-07-21 RX ORDER — PROCHLORPERAZINE MALEATE 5 MG/1
5 TABLET ORAL EVERY 6 HOURS PRN
Status: DISCONTINUED | OUTPATIENT
Start: 2025-07-21 | End: 2025-08-01 | Stop reason: HOSPADM

## 2025-07-21 RX ORDER — PROPOFOL 10 MG/ML
INJECTION, EMULSION INTRAVENOUS PRN
Status: DISCONTINUED | OUTPATIENT
Start: 2025-07-21 | End: 2025-07-21

## 2025-07-21 RX ORDER — LIDOCAINE 40 MG/G
CREAM TOPICAL
Status: DISCONTINUED | OUTPATIENT
Start: 2025-07-21 | End: 2025-08-01 | Stop reason: HOSPADM

## 2025-07-21 RX ORDER — ONDANSETRON 2 MG/ML
4 INJECTION INTRAMUSCULAR; INTRAVENOUS EVERY 6 HOURS PRN
Status: DISCONTINUED | OUTPATIENT
Start: 2025-07-21 | End: 2025-08-01 | Stop reason: HOSPADM

## 2025-07-21 RX ORDER — NICOTINE POLACRILEX 4 MG
15-30 LOZENGE BUCCAL
Status: DISCONTINUED | OUTPATIENT
Start: 2025-07-21 | End: 2025-07-21 | Stop reason: HOSPADM

## 2025-07-21 RX ORDER — METHOCARBAMOL 750 MG/1
750 TABLET, FILM COATED ORAL 4 TIMES DAILY PRN
Status: DISCONTINUED | OUTPATIENT
Start: 2025-07-21 | End: 2025-07-23

## 2025-07-21 RX ORDER — FAMOTIDINE 20 MG/1
20 TABLET, FILM COATED ORAL 2 TIMES DAILY
Status: DISCONTINUED | OUTPATIENT
Start: 2025-07-21 | End: 2025-08-01 | Stop reason: HOSPADM

## 2025-07-21 RX ORDER — ONDANSETRON 2 MG/ML
INJECTION INTRAMUSCULAR; INTRAVENOUS PRN
Status: DISCONTINUED | OUTPATIENT
Start: 2025-07-21 | End: 2025-07-21

## 2025-07-21 RX ORDER — HYDROMORPHONE HYDROCHLORIDE 1 MG/ML
0.3 INJECTION, SOLUTION INTRAMUSCULAR; INTRAVENOUS; SUBCUTANEOUS ONCE
Status: COMPLETED | OUTPATIENT
Start: 2025-07-21 | End: 2025-07-21

## 2025-07-21 RX ORDER — LIDOCAINE HYDROCHLORIDE ANHYDROUS AND DEXTROSE MONOHYDRATE .8; 5 G/100ML; G/100ML
1 INJECTION, SOLUTION INTRAVENOUS CONTINUOUS
Status: DISCONTINUED | OUTPATIENT
Start: 2025-07-21 | End: 2025-07-21 | Stop reason: HOSPADM

## 2025-07-21 RX ORDER — LATANOPROST 50 UG/ML
1 SOLUTION/ DROPS OPHTHALMIC AT BEDTIME
Status: DISCONTINUED | OUTPATIENT
Start: 2025-07-22 | End: 2025-08-01 | Stop reason: HOSPADM

## 2025-07-21 RX ORDER — METHADONE HYDROCHLORIDE 10 MG/ML
13 INJECTION, SOLUTION INTRAMUSCULAR; INTRAVENOUS; SUBCUTANEOUS ONCE
Refills: 0 | Status: COMPLETED | OUTPATIENT
Start: 2025-07-21 | End: 2025-07-21

## 2025-07-21 RX ORDER — NALOXONE HYDROCHLORIDE 0.4 MG/ML
0.2 INJECTION, SOLUTION INTRAMUSCULAR; INTRAVENOUS; SUBCUTANEOUS
Status: DISCONTINUED | OUTPATIENT
Start: 2025-07-21 | End: 2025-08-01 | Stop reason: HOSPADM

## 2025-07-21 RX ORDER — ACETAMINOPHEN 325 MG/1
975 TABLET ORAL EVERY 8 HOURS
Status: DISCONTINUED | OUTPATIENT
Start: 2025-07-21 | End: 2025-08-01 | Stop reason: HOSPADM

## 2025-07-21 RX ORDER — TIMOLOL MALEATE 5 MG/ML
1 SOLUTION/ DROPS OPHTHALMIC 2 TIMES DAILY
Status: DISCONTINUED | OUTPATIENT
Start: 2025-07-22 | End: 2025-07-21

## 2025-07-21 RX ORDER — SCOPOLAMINE 1 MG/3D
1 PATCH, EXTENDED RELEASE TRANSDERMAL ONCE
Status: DISCONTINUED | OUTPATIENT
Start: 2025-07-21 | End: 2025-07-21

## 2025-07-21 RX ORDER — TOBRAMYCIN 1.2 G/30ML
INJECTION, POWDER, LYOPHILIZED, FOR SOLUTION INTRAVENOUS PRN
Status: DISCONTINUED | OUTPATIENT
Start: 2025-07-21 | End: 2025-07-21 | Stop reason: HOSPADM

## 2025-07-21 RX ORDER — FENTANYL CITRATE 50 UG/ML
25 INJECTION, SOLUTION INTRAMUSCULAR; INTRAVENOUS EVERY 5 MIN PRN
Status: DISCONTINUED | OUTPATIENT
Start: 2025-07-21 | End: 2025-07-21

## 2025-07-21 RX ORDER — PROPOFOL 10 MG/ML
INJECTION, EMULSION INTRAVENOUS CONTINUOUS PRN
Status: DISCONTINUED | OUTPATIENT
Start: 2025-07-21 | End: 2025-07-21

## 2025-07-21 RX ORDER — POLYETHYLENE GLYCOL 3350 17 G/17G
17 POWDER, FOR SOLUTION ORAL DAILY
Status: DISCONTINUED | OUTPATIENT
Start: 2025-07-22 | End: 2025-07-24

## 2025-07-21 RX ORDER — ACETAMINOPHEN 325 MG/1
975 TABLET ORAL ONCE
Status: DISCONTINUED | OUTPATIENT
Start: 2025-07-21 | End: 2025-07-21 | Stop reason: HOSPADM

## 2025-07-21 RX ORDER — AMOXICILLIN 250 MG
1 CAPSULE ORAL 2 TIMES DAILY
Status: DISCONTINUED | OUTPATIENT
Start: 2025-07-21 | End: 2025-07-25

## 2025-07-21 RX ORDER — LIDOCAINE HYDROCHLORIDE ANHYDROUS AND DEXTROSE MONOHYDRATE .8; 5 G/100ML; G/100ML
2 INJECTION, SOLUTION INTRAVENOUS CONTINUOUS
Status: DISCONTINUED | OUTPATIENT
Start: 2025-07-21 | End: 2025-07-21

## 2025-07-21 RX ORDER — NICOTINE POLACRILEX 4 MG
15-30 LOZENGE BUCCAL
Status: DISCONTINUED | OUTPATIENT
Start: 2025-07-21 | End: 2025-07-22

## 2025-07-21 RX ORDER — NALOXONE HYDROCHLORIDE 0.4 MG/ML
0.4 INJECTION, SOLUTION INTRAMUSCULAR; INTRAVENOUS; SUBCUTANEOUS
Status: DISCONTINUED | OUTPATIENT
Start: 2025-07-21 | End: 2025-08-01 | Stop reason: HOSPADM

## 2025-07-21 RX ORDER — LIDOCAINE 40 MG/G
CREAM TOPICAL
Status: DISCONTINUED | OUTPATIENT
Start: 2025-07-21 | End: 2025-07-21 | Stop reason: HOSPADM

## 2025-07-21 RX ORDER — POLYETHYLENE GLYCOL 3350 17 G/17G
17 POWDER, FOR SOLUTION ORAL DAILY
Status: DISCONTINUED | OUTPATIENT
Start: 2025-07-22 | End: 2025-07-21

## 2025-07-21 RX ORDER — LATANOPROST 50 UG/ML
1 SOLUTION/ DROPS OPHTHALMIC AT BEDTIME
Status: DISCONTINUED | OUTPATIENT
Start: 2025-07-22 | End: 2025-07-21

## 2025-07-21 RX ORDER — FENTANYL CITRATE 50 UG/ML
INJECTION, SOLUTION INTRAMUSCULAR; INTRAVENOUS PRN
Status: DISCONTINUED | OUTPATIENT
Start: 2025-07-21 | End: 2025-07-21

## 2025-07-21 RX ORDER — SODIUM CHLORIDE 9 MG/ML
INJECTION, SOLUTION INTRAVENOUS CONTINUOUS
Status: DISCONTINUED | OUTPATIENT
Start: 2025-07-21 | End: 2025-07-31

## 2025-07-21 RX ORDER — ACETAMINOPHEN 325 MG/1
975 TABLET ORAL ONCE
Status: DISCONTINUED | OUTPATIENT
Start: 2025-07-21 | End: 2025-07-21

## 2025-07-21 RX ORDER — CEFAZOLIN SODIUM/WATER 2 G/20 ML
2 SYRINGE (ML) INTRAVENOUS
Status: COMPLETED | OUTPATIENT
Start: 2025-07-21 | End: 2025-07-21

## 2025-07-21 RX ORDER — CEFAZOLIN SODIUM 2 G/50ML
2 SOLUTION INTRAVENOUS EVERY 8 HOURS
Status: DISCONTINUED | OUTPATIENT
Start: 2025-07-21 | End: 2025-07-27

## 2025-07-21 RX ORDER — HYDROMORPHONE HYDROCHLORIDE 1 MG/ML
0.6 INJECTION, SOLUTION INTRAMUSCULAR; INTRAVENOUS; SUBCUTANEOUS
Status: DISCONTINUED | OUTPATIENT
Start: 2025-07-21 | End: 2025-07-28

## 2025-07-21 RX ORDER — BISACODYL 10 MG
10 SUPPOSITORY, RECTAL RECTAL DAILY PRN
Status: DISCONTINUED | OUTPATIENT
Start: 2025-07-24 | End: 2025-07-29

## 2025-07-21 RX ORDER — ONDANSETRON 4 MG/1
4 TABLET, ORALLY DISINTEGRATING ORAL EVERY 6 HOURS PRN
Status: DISCONTINUED | OUTPATIENT
Start: 2025-07-21 | End: 2025-08-01 | Stop reason: HOSPADM

## 2025-07-21 RX ORDER — TIMOLOL MALEATE 5 MG/ML
1 SOLUTION/ DROPS OPHTHALMIC 2 TIMES DAILY
Status: DISCONTINUED | OUTPATIENT
Start: 2025-07-21 | End: 2025-08-01 | Stop reason: HOSPADM

## 2025-07-21 RX ORDER — DIAZEPAM 10 MG/2ML
2.5 INJECTION, SOLUTION INTRAMUSCULAR; INTRAVENOUS
Status: DISCONTINUED | OUTPATIENT
Start: 2025-07-21 | End: 2025-07-21

## 2025-07-21 RX ORDER — MAGNESIUM SULFATE HEPTAHYDRATE 40 MG/ML
4 INJECTION, SOLUTION INTRAVENOUS ONCE
Status: COMPLETED | OUTPATIENT
Start: 2025-07-21 | End: 2025-07-21

## 2025-07-21 RX ORDER — OXYCODONE HYDROCHLORIDE 10 MG/1
10 TABLET ORAL EVERY 4 HOURS PRN
Status: DISCONTINUED | OUTPATIENT
Start: 2025-07-21 | End: 2025-07-22

## 2025-07-21 RX ADMIN — MAGNESIUM SULFATE HEPTAHYDRATE 2 G: 40 INJECTION, SOLUTION INTRAVENOUS at 13:50

## 2025-07-21 RX ADMIN — PHENYLEPHRINE HYDROCHLORIDE 100 MCG: 10 INJECTION INTRAVENOUS at 15:46

## 2025-07-21 RX ADMIN — PHENYLEPHRINE HYDROCHLORIDE 200 MCG: 10 INJECTION INTRAVENOUS at 14:03

## 2025-07-21 RX ADMIN — LIDOCAINE HYDROCHLORIDE 100 MG: 20 INJECTION, SOLUTION INFILTRATION; PERINEURAL at 13:21

## 2025-07-21 RX ADMIN — Medication 100 MG: at 16:39

## 2025-07-21 RX ADMIN — DOCUSATE SODIUM AND SENNOSIDES 1 TABLET: 8.6; 5 TABLET, FILM COATED ORAL at 22:15

## 2025-07-21 RX ADMIN — SODIUM CHLORIDE: 0.9 INJECTION, SOLUTION INTRAVENOUS at 22:34

## 2025-07-21 RX ADMIN — TIMOLOL MALEATE 1 DROP: 5 SOLUTION OPHTHALMIC at 23:26

## 2025-07-21 RX ADMIN — OXYCODONE HYDROCHLORIDE 10 MG: 10 TABLET ORAL at 02:14

## 2025-07-21 RX ADMIN — Medication 20 MG: at 14:24

## 2025-07-21 RX ADMIN — GABAPENTIN 100 MG: 100 CAPSULE ORAL at 09:37

## 2025-07-21 RX ADMIN — PHENYLEPHRINE HYDROCHLORIDE 200 MCG: 10 INJECTION INTRAVENOUS at 13:53

## 2025-07-21 RX ADMIN — OXYCODONE HYDROCHLORIDE 10 MG: 10 TABLET ORAL at 22:15

## 2025-07-21 RX ADMIN — PHENYLEPHRINE HYDROCHLORIDE 0.2 MCG/KG/MIN: 10 INJECTION INTRAVENOUS at 13:58

## 2025-07-21 RX ADMIN — SODIUM CHLORIDE, SODIUM LACTATE, POTASSIUM CHLORIDE, AND CALCIUM CHLORIDE: .6; .31; .03; .02 INJECTION, SOLUTION INTRAVENOUS at 13:08

## 2025-07-21 RX ADMIN — HYDROMORPHONE HYDROCHLORIDE 0.3 MG: 1 INJECTION, SOLUTION INTRAMUSCULAR; INTRAVENOUS; SUBCUTANEOUS at 12:34

## 2025-07-21 RX ADMIN — PROPOFOL 20 MG: 10 INJECTION, EMULSION INTRAVENOUS at 17:32

## 2025-07-21 RX ADMIN — METHADONE HYDROCHLORIDE 13 MG: 10 INJECTION, SOLUTION INTRAMUSCULAR; INTRAVENOUS; SUBCUTANEOUS at 13:50

## 2025-07-21 RX ADMIN — CEFAZOLIN SODIUM 2 G: 2 SOLUTION INTRAVENOUS at 22:14

## 2025-07-21 RX ADMIN — Medication 2 G: at 13:32

## 2025-07-21 RX ADMIN — BRIMONIDINE TARTRATE 1 DROP: 2 SOLUTION OPHTHALMIC at 09:43

## 2025-07-21 RX ADMIN — PROPOFOL 10 MG: 10 INJECTION, EMULSION INTRAVENOUS at 17:24

## 2025-07-21 RX ADMIN — DEXMEDETOMIDINE HYDROCHLORIDE 8 MCG: 100 INJECTION, SOLUTION INTRAVENOUS at 16:15

## 2025-07-21 RX ADMIN — HYDROMORPHONE HYDROCHLORIDE 0.2 MG: 1 INJECTION, SOLUTION INTRAMUSCULAR; INTRAVENOUS; SUBCUTANEOUS at 19:56

## 2025-07-21 RX ADMIN — PHENYLEPHRINE HYDROCHLORIDE 100 MCG: 10 INJECTION INTRAVENOUS at 14:49

## 2025-07-21 RX ADMIN — ACETAMINOPHEN 975 MG: 325 TABLET ORAL at 09:37

## 2025-07-21 RX ADMIN — PHENYLEPHRINE HYDROCHLORIDE 100 MCG: 10 INJECTION INTRAVENOUS at 17:25

## 2025-07-21 RX ADMIN — PROPOFOL 10 MG: 10 INJECTION, EMULSION INTRAVENOUS at 16:46

## 2025-07-21 RX ADMIN — PROPOFOL 10 MG: 10 INJECTION, EMULSION INTRAVENOUS at 17:29

## 2025-07-21 RX ADMIN — HYDROMORPHONE HYDROCHLORIDE 0.2 MG: 1 INJECTION, SOLUTION INTRAMUSCULAR; INTRAVENOUS; SUBCUTANEOUS at 16:19

## 2025-07-21 RX ADMIN — PROPOFOL 10 MG: 10 INJECTION, EMULSION INTRAVENOUS at 16:49

## 2025-07-21 RX ADMIN — FENTANYL CITRATE 50 MCG: 50 INJECTION INTRAMUSCULAR; INTRAVENOUS at 14:20

## 2025-07-21 RX ADMIN — FAMOTIDINE 20 MG: 20 TABLET, FILM COATED ORAL at 22:15

## 2025-07-21 RX ADMIN — LIDOCAINE HYDROCHLORIDE 1 MG/KG/HR: 8 INJECTION, SOLUTION INTRAVENOUS at 13:50

## 2025-07-21 RX ADMIN — PHENYLEPHRINE HYDROCHLORIDE 100 MCG: 10 INJECTION INTRAVENOUS at 14:35

## 2025-07-21 RX ADMIN — PROPOFOL 50 MCG/KG/MIN: 10 INJECTION, EMULSION INTRAVENOUS at 15:16

## 2025-07-21 RX ADMIN — PHENYLEPHRINE HYDROCHLORIDE 100 MCG: 10 INJECTION INTRAVENOUS at 13:23

## 2025-07-21 RX ADMIN — ONDANSETRON 4 MG: 2 INJECTION INTRAMUSCULAR; INTRAVENOUS at 16:10

## 2025-07-21 RX ADMIN — FENTANYL CITRATE 100 MCG: 50 INJECTION INTRAMUSCULAR; INTRAVENOUS at 13:15

## 2025-07-21 RX ADMIN — PROPOFOL 20 MG: 10 INJECTION, EMULSION INTRAVENOUS at 16:57

## 2025-07-21 RX ADMIN — LIDOCAINE HYDROCHLORIDE 1 MG/KG/HR: 8 INJECTION, SOLUTION INTRAVENOUS at 19:54

## 2025-07-21 RX ADMIN — PHENYLEPHRINE HYDROCHLORIDE 200 MCG: 10 INJECTION INTRAVENOUS at 13:49

## 2025-07-21 RX ADMIN — DEXAMETHASONE SODIUM PHOSPHATE 6 MG: 4 INJECTION, SOLUTION INTRAMUSCULAR; INTRAVENOUS at 14:21

## 2025-07-21 RX ADMIN — SCOPOLAMINE 1 PATCH: 1.5 PATCH, EXTENDED RELEASE TRANSDERMAL at 12:34

## 2025-07-21 RX ADMIN — PROPOFOL 20 MG: 10 INJECTION, EMULSION INTRAVENOUS at 16:43

## 2025-07-21 RX ADMIN — OXYCODONE HYDROCHLORIDE 10 MG: 10 TABLET ORAL at 09:47

## 2025-07-21 RX ADMIN — HYDROMORPHONE HYDROCHLORIDE 0.3 MG: 1 INJECTION, SOLUTION INTRAMUSCULAR; INTRAVENOUS; SUBCUTANEOUS at 16:27

## 2025-07-21 RX ADMIN — SENNOSIDES AND DOCUSATE SODIUM 1 TABLET: 50; 8.6 TABLET ORAL at 09:37

## 2025-07-21 RX ADMIN — SODIUM CHLORIDE, SODIUM LACTATE, POTASSIUM CHLORIDE, AND CALCIUM CHLORIDE: .6; .31; .03; .02 INJECTION, SOLUTION INTRAVENOUS at 13:35

## 2025-07-21 RX ADMIN — PROPOFOL 10 MG: 10 INJECTION, EMULSION INTRAVENOUS at 17:23

## 2025-07-21 RX ADMIN — PHENYLEPHRINE HYDROCHLORIDE 100 MCG: 10 INJECTION INTRAVENOUS at 17:20

## 2025-07-21 RX ADMIN — PHENYLEPHRINE HYDROCHLORIDE 200 MCG: 10 INJECTION INTRAVENOUS at 17:04

## 2025-07-21 RX ADMIN — TIMOLOL MALEATE 1 DROP: 5 SOLUTION/ DROPS OPHTHALMIC at 09:39

## 2025-07-21 RX ADMIN — DEXMEDETOMIDINE HYDROCHLORIDE 12 MCG: 100 INJECTION, SOLUTION INTRAVENOUS at 16:42

## 2025-07-21 RX ADMIN — PHENYLEPHRINE HYDROCHLORIDE 100 MCG: 10 INJECTION INTRAVENOUS at 17:31

## 2025-07-21 RX ADMIN — PANTOPRAZOLE SODIUM 40 MG: 40 TABLET, DELAYED RELEASE ORAL at 09:37

## 2025-07-21 RX ADMIN — PHENYLEPHRINE HYDROCHLORIDE 100 MCG: 10 INJECTION INTRAVENOUS at 16:53

## 2025-07-21 RX ADMIN — ATORVASTATIN CALCIUM 20 MG: 20 TABLET, FILM COATED ORAL at 09:37

## 2025-07-21 RX ADMIN — PHENYLEPHRINE HYDROCHLORIDE 200 MCG: 10 INJECTION INTRAVENOUS at 13:58

## 2025-07-21 RX ADMIN — HYDROMORPHONE HYDROCHLORIDE 0.6 MG: 1 INJECTION, SOLUTION INTRAMUSCULAR; INTRAVENOUS; SUBCUTANEOUS at 23:32

## 2025-07-21 RX ADMIN — METHOCARBAMOL 1000 MG: 100 INJECTION, SOLUTION INTRAMUSCULAR; INTRAVENOUS at 18:07

## 2025-07-21 RX ADMIN — HYDROMORPHONE HYDROCHLORIDE 0.25 MG: 1 INJECTION, SOLUTION INTRAMUSCULAR; INTRAVENOUS; SUBCUTANEOUS at 16:09

## 2025-07-21 RX ADMIN — PHENYLEPHRINE HYDROCHLORIDE 100 MCG: 10 INJECTION INTRAVENOUS at 16:04

## 2025-07-21 RX ADMIN — HYDROMORPHONE HYDROCHLORIDE 0.25 MG: 1 INJECTION, SOLUTION INTRAMUSCULAR; INTRAVENOUS; SUBCUTANEOUS at 16:02

## 2025-07-21 RX ADMIN — SERTRALINE HYDROCHLORIDE 50 MG: 50 TABLET ORAL at 09:37

## 2025-07-21 RX ADMIN — CYCLOSPORINE 1 DROP: 0.5 EMULSION OPHTHALMIC at 09:37

## 2025-07-21 RX ADMIN — OXYCODONE HYDROCHLORIDE 10 MG: 10 TABLET ORAL at 06:29

## 2025-07-21 RX ADMIN — HYDROMORPHONE HYDROCHLORIDE 0.2 MG: 1 INJECTION, SOLUTION INTRAMUSCULAR; INTRAVENOUS; SUBCUTANEOUS at 18:48

## 2025-07-21 RX ADMIN — PROPOFOL 140 MG: 10 INJECTION, EMULSION INTRAVENOUS at 13:23

## 2025-07-21 RX ADMIN — PHENYLEPHRINE HYDROCHLORIDE 200 MCG: 10 INJECTION INTRAVENOUS at 17:01

## 2025-07-21 RX ADMIN — Medication 50 MG: at 13:24

## 2025-07-21 RX ADMIN — Medication 10 MG: at 15:24

## 2025-07-21 RX ADMIN — PROPOFOL 10 MG: 10 INJECTION, EMULSION INTRAVENOUS at 17:27

## 2025-07-21 RX ADMIN — PROPOFOL 20 MG: 10 INJECTION, EMULSION INTRAVENOUS at 17:21

## 2025-07-21 RX ADMIN — PROPOFOL 20 MG: 10 INJECTION, EMULSION INTRAVENOUS at 17:01

## 2025-07-21 ASSESSMENT — ACTIVITIES OF DAILY LIVING (ADL)
ADLS_ACUITY_SCORE: 46
ADLS_ACUITY_SCORE: 48
ADLS_ACUITY_SCORE: 46
ADLS_ACUITY_SCORE: 48
ADLS_ACUITY_SCORE: 46
ADLS_ACUITY_SCORE: 46
ADLS_ACUITY_SCORE: 48
ADLS_ACUITY_SCORE: 46
ADLS_ACUITY_SCORE: 50
ADLS_ACUITY_SCORE: 53
ADLS_ACUITY_SCORE: 46
ADLS_ACUITY_SCORE: 46
ADLS_ACUITY_SCORE: 48
ADLS_ACUITY_SCORE: 46
ADLS_ACUITY_SCORE: 46

## 2025-07-21 ASSESSMENT — LIFESTYLE VARIABLES: TOBACCO_USE: 1

## 2025-07-21 NOTE — ANESTHESIA CARE TRANSFER NOTE
Patient: Radha Baca    Procedure: Procedure(s):  T9-L2 posterior spinal fusion with cement augmentation with stealth guidance, possible additional level.       Diagnosis: Closed T11 spinal fracture (H) [S22.643K]  Diagnosis Additional Information: No value filed.    Anesthesia Type:   General     Note:    Oropharynx: oropharynx clear of all foreign objects and spontaneously breathing  Level of Consciousness: drowsy  Oxygen Supplementation: face mask  Level of Supplemental Oxygen (L/min / FiO2): 6  Independent Airway: airway patency satisfactory and stable  Dentition: dentition unchanged  Vital Signs Stable: post-procedure vital signs reviewed and stable  Report to RN Given: handoff report given  Patient transferred to: PACU    Handoff Report: Identifed the Patient, Identified the Reponsible Provider, Reviewed the pertinent medical history, Discussed the surgical course, Reviewed Intra-OP anesthesia mangement and issues during anesthesia, Set expectations for post-procedure period and Allowed opportunity for questions and acknowledgement of understanding      Vitals:  Vitals Value Taken Time   /64 07/21/25 17:46   Temp 36.9    Pulse 108 07/21/25 17:58   Resp 16 07/21/25 17:58   SpO2 99 % 07/21/25 17:58   Vitals shown include unfiled device data.    Electronically Signed By: EZEQUIEL DICKEY CRNA  July 21, 2025  5:59 PM

## 2025-07-21 NOTE — OP NOTE
DATE OF SURGERY: 7/21/2025    PREOPERATIVE DIAGNOSIS:   Closed T11 and T12 spinal fracture (H) [S22.089A]  Osteoporosis  Pathologic Fracture of T11 and T12 with kyphosis due to osteoporosis               POSTOPERATIVE DIAGNOSIS:   Same    PROCEDURES:  1. Grade 1 dorsal facetectomies T9-10, T10-11, T11-12, T12-L1, L1-2  2. T9 through L2 Posterior Spinal Fusion.  3. T9 through L2 Posterior Spinal Instrumentation, Medtronic Solera.  4. Open treatment and reduction of T11 fracture  5. PMMA Cement augmentation of screws  6. [place] and use of Local Autograft.  7. Use of O-Arm navigational guidance.  8. Use of Allograft and bone morphogenic protein.    PRIMARY SURGEON: Juancarlos Dubois MD    FIRST ASSISTANT: Ankit Morrison Fellow Surgeon, The assistant was necessary for all phases of the operation, including discectomy, instrumentation, decompression,  placement of the interbody devices, and all bone work and also for visualization, and closure.  There was no qualified resident available.      ANESTHESIA: General Endotracheal    COMPLICATIONS:  None.    SPECIMENS: None.    ESTIMATED BLOOD LOSS: 300cc    INDICATIONS:                          Radha Baca is a 69 year old female who elected surgical treatment, and understood the indications for this surgery, as well as its risks, benefits, and alternatives as documented in the pre-operative H&P.  Specifically, we reviewed the risks and benefits of the surgery in detail. The risks include, but are not limited to, the general risks associated with anesthesia, including death, pulmonary embolism, DVT, stroke, myocardial infarction, pneumonia, and urinary tract infection. Additional risks specific to the surgery include the risk of infection, failure to heal (non-union), dural tear with resultant CSF leak which might necessitate placement of a drain or revision surgery or could result in headaches, nerve injury resulting in weakness or paralysis, risk of adjacent  segment disease, the risks of vascular injury, need for revision surgery in the future due to one of the above issues, or risk of incomplete symptom relief. Radha Baca understands the risks of the surgery and wishes to proceed. No guarantees were given.    DESCRIPTION OF PROCEDURE:           Radha Baca was taken to the operating  room, where the Anesthesiology Service induced satisfactory general anesthesia.  Ancef was given IV.  Venous thromboembolic prophylaxis was performed with sequential devices.  A Parker catheter was placed under standard sterile techniques.  The patient was placed prone on an open OSI frame with the abdomen hanging free and all bony prominences well padded.  The low back was then prepped and draped in its entirety in the usual sterile fashion. We then held a multidisciplinary time out in which we verified the patient, procedure, antibiotics, and operative plan.  All team members were in agreement.    The incision was carried out from T9 to L2 with subperiosteal dissection out to the tips of the transverse processes. Intraoperative radiographic localization of the levels was again confirmed using fluoroscopy.      We then turned our attention to the placement of pedicle screws.  The O-Arm was brought in and draped.  The reference frame was attached to the spinous process.  We then performed our O-Arm spin.  After appropriate verification, the screws were placed at each level bilaterally from T9 through L2 in the following fashion: We used the clawfoot to identify the starting point.  A high-speed bur was used to penetrate the dorsal cortex.  We then used the gold handle pedicle probe to cannulate the pedicle while referencing the navigation screen for guidance.  The navigation system was used to measure the pedicle diameter and length, and an appropriate Medtronic Solera screw size was chosen.  We under tapped by 1mm.  The screw was then placed under manual power.  This was  done bilaterally at each of the instrumented levels.      The O-Arm was brought back in and we performed a verification spin.  The resultant CT showed the instrumentation to be completely intra-osseous with no obvious breaches or malposition.  This also verified that the correct level had been operated upon.     We had used fenestrated screws at T9, T10, L1, and L2.  We then mixed cement and injected 1cc of PMMA through each screw to provide supplemental cement augmentation in her osteoporotic bone.      We then turned our attention to the dorsal facetectomies at T9-10, T10-11, T11-12, T12-L1, L1-2. We removed the spinous processes.  We then used an osteotome to remove the inferior articular process bilaterally.  We thinned the ligamentum flavum and this completed the dorsal release and allowed for joint mobilization and correction of her kyphotic deformity at the end of the case.      The rods and set caps were then provisionally placed.  I used a combination of nate compression, motorized operative table extension, and local maneuvers as needed to complete the deformity correction and complete the open reduction and treatment of the T12 and T11 osteoporotic fractures.  I dialed in the lordosis correction to the appropriate amount and this completed our regional deformity correction.  The set caps were then tightened down.  I took our final images and I was satisfied with the alignment.    I then decorticated the remaining midline laminar bone between T9 and L2 levels.  This is packed with the remaining local autograft as well as crushed cancellous allograft, and a large kit of BMP as a bone extender, to complete the posterior fusion from T9 and L2 levels.    We then achieved hemostasis.  A hemovac was placed deep and a hemovac drain was placed above the fascia and 1 gram of vancomycin powder was placed into the wound.  We closed the wound in multiple layers of interrupted Vicryl, with monocryl and dermabond for the  skin.  Upon closure, the patient was then transferred to a hospital bed and taken to recovery after extubation in stable condition.    I was present and scrubbed for the critical portions of the procedure, including patient positioning, the neurologic decompression, placement of instrumentation, performance of fusion and grafting, and verification of the instrumentation.    Juancarlos Dubois MD

## 2025-07-21 NOTE — PLAN OF CARE
[id number]    Goal Outcome Evaluation:      Plan of Care Reviewed With: patient    Overall Patient Progress: no changeOverall Patient Progress: no change    Outcome Evaluation: No acute changes this shift    Pt is Aox4. Not oob. Pt able to shift weight in bed. VSS on home CPAP overnight. RA while awake. Denies sob, n/v. Reports numbness to BLE. States back pain is primarily on the left lower side and radiates down to glutes. Managed with prn oxy, IV toradol, flexeril, and scheduled meds. Blood sugar stable overnight. NPO since midnight for spinal surgery with Dr. Dubois 7/21. The case does not have a scheduled time yet. States last ate at 2030 7/20. L PIV SL. Continue POC.

## 2025-07-21 NOTE — PROGRESS NOTES
ADMISSION to [facility] UNIT:    Radha Baca was admitted from  for surgery/back .  2 RN skin assessment: completed by Theodore  Result of skin assessment and interventions/actions: no concerns     Pain 9/10   Personal CPAP at night   A&Ox4  Not oob this evening   Using purewick/bedpan per pt request

## 2025-07-21 NOTE — PROGRESS NOTES
"[facility]    Medicine Progress Note - Hospitalist Service, GOLD TEAM 17    Date of Admission:  7/19/2025    Assessment & Plan      Radha Baca is a 69 year old female admitted on 7/19/2025. She has a past medical history significant for HTN, DM2 with insulin dependence, glaucoma, diabetic retinopathy, CAD, HLD, vertigo and recently noted suprasellar lesion. Presented to the ED with worsening back pain in setting of recent fall and known T12 fracture. Imaging with T11 and T12 burst fractures.   Patient  was transferred form [place] to [facility] for surgery with orthopedic surgery         7/21  - for surgery today    - adjust insulin once taking in po     T11, T12 burst fractures   Moderate lumbar spondylosis with moderate spinal canal stenosis L4-L5, mild narrowing L3-L4  Multilevel foraminal narrowing   Had a fall a few weeks prior. XR was obtained per PCP after noting new low back pain which showed compression fracutre of T12. Presented with worsening low back pain radiating to her groin with worsening neuropathy symptoms. CT lumbar spine with burst fracture involving T12 vertebral body with new central vertebral body height loss and new coronally oriented fracture extending from posterior aspect of vertebral body, mild retropulsioin of posterosuperio margin of T12 as well as more extensive fracturing of inferior aspect of T11 with new height loss along anteroinferior aspect of T11, new fractures involing posterior arch and spinous process of T11. Ortho consulted with plans to pursue operative management, plans for T9-L2 fusion.  - Orthopedics consulted and will take over as primary upon transfer to [facility]. Medicine will continue to co-manage  - 7/20  MRI T-spine and L-spine \"Similar appearance of the T12 burst fracture and T11  fracture compared with same day CT. Associated T2 hyperintense edema.Mild retropulsion of the T12 fracture with mild spinal " "canal stenosis.No abnormal spinal cord signal.\"  - Pre-op eval completed   - Bedrest, spinal precautions, logroll only  - No AC or ASA for now  - TLSO brace ordered and fitted   - Pain regimen:              ::Tylenol  975 mg TID               ::Gabapentin 100 mg TID               ::Flexeril 10 mg TID PRN                ::IV toradol 15 mg q6h PRN (verified with Ortho okay to give)              ::Oxycodone 5 mg q4h for moderate pain or 10 mg q4h PRN for severe pain              ::IV dilaudid 0.5 mg q4h PRN for severe pain   - Bowel regimen      DM2 with insulin dependence   Follows with Endocrinology. A1c 11.2. PTA on lantus 75 units at bedtime, humalog 25 units TID with meals and sliding scale as well as metformin 500 mg daily. Has had minimal intake in setting of pain.   - HgA1c 11.2 6/30   - Holding PTA metformin 500 mg daily while inpatient  - Reduced PTA lantus 35 units at bedtime  - Holding fixed humalog  - Carb coverage with 1 unit per 8g CHO  - Medium intensity sliding scale  - BG checks, hypoglycemia protocol   - If intraoperative steroids needed, suspect she may required insulin drip for BG control given high A1c     Mild to moderate CAD  Hx of RBBB, bifascicular block  Prolonged qtc   Underwent CTA chest/abdomen in setting of admission for vasovagal event in 12/2023. Noted to have mild to modeate coronary artery calcification most marked in LAD. Had been seen by Cardiology at  in 1/2024 who felt given no ischemic symptoms there was no further functional testing needed. Echo from 3/2024 with EF 60-65%, normal RV function. Denies any chest pain with activity.  - avoid AVN blocking agents   - Continue PTA atorvastatin 20 mg daily    - Holding PTA ASA 81 mg daily in perioperative period  - qtc was 470 on EKG 7/20/25   - last echo 3/2024 with EF 60-65%      HTN  Prescribed lisinopril-hydrochlorothiazide 20-12.5 mg daily but reports this was stopped a few weeks ago. BP labile here in setting of pain.  - Holding " "PTA lisinopril-hydrochlorothiazide 20-12.5 mg daily in perioperative setting; consider resuming if needed after out of operative period      KAVON - resolved  Mild hyponatremia - resolved  NAGMA  Cr 1.00 with CO2 19 on admission. Na 133. Baseline Cr  appears 0.7-0.8. Cr and Na improved back to baseline. No known underlying CKD.   - Monitor     Vertigo  Lead to recent fall. Ongoing issue where she will wake up with room spinning.   - tells me initial fall was as she slipped, then she stats was getting light headed, reached back to chair but was rocking chairs fell  back, denies any loss of copiousness, had no chest pain  prior   - Consider PT for vestibular eval after procedure   - was seen at [facility] 6/26   with dizziness and had brain MRI as bellow     Suprasellar lesion  - had CT head at [facility] 6/26/25 for fall, showed hyperdensity along pituitary infundibulum  in sellar/supracellar region and had MRI  \"5 x 10 mm indeterminate suprasellar lesion. Differential diagnosis includes Rathke cleft cyst or craniopharyngioma. No mass effect on the optic chiasm. Recommend 1 year follow-up imaging with contrast with pituitary protocol to evaluate for stability. \"  - consider neurology to see if this could be explaining her vertigo   - Follow up with PCP     Glaucoma  Diabetic retinopathy   - Continue PTA latanoprost drops at bedtime, brimonidine drops BID, cyclosporine drops BID, timolol drops BID      GHISLAINE  - Home CPAP         GERD  Sounds like protonix may have also been added a GI protection in setting of ibuprofen use for fracture.   - Continue PTA protonix 40 mg BID       Chronic constipation  - bowel regime     Mood disorder  - Continue PTA sertraline 50 mg daily                 Diet: NPO for Procedure/Surgery per Anesthesia Guidelines Except for: Meds; Clear liquids before procedure/surgery: NO clear liquids before procedure/surgery    DVT Prophylaxis: Defer to primary service  Parker Catheter: Not present  Lines: None   " "  Cardiac Monitoring: None  Code Status: Full Code      Clinically Significant Risk Factors Present on Admission         # Hyponatremia: Lowest Na = 133 mmol/L in last 2 days, will monitor as appropriate         # Drug Induced Platelet Defect: home medication list includes an antiplatelet medication   # Hypertension: Noted on problem list          # DMII: A1C = 11.2 % (Ref range: 0.0 - 5.6 %) within past 6 months    # Morbid Obesity: Estimated body mass index is 45.73 kg/m  as calculated from the following:    Height as of this encounter: 1.575 m (5' 2\").    Weight as of this encounter: 113.4 kg (250 lb).              Social Drivers of Health    Depression: At risk (7/8/2025)    PHQ-2     PHQ-2 Score: 3   Tobacco Use: Medium Risk (7/8/2025)    Patient History     Smoking Tobacco Use: Former     Smokeless Tobacco Use: Never     Passive Exposure: Past   Stress: Stress Concern Present (3/16/2025)    [employer] [place] [employer] - Occupational Stress Questionnaire     Feeling of Stress : To some extent   Social Connections: Unknown (3/16/2025)    Social Connection and Isolation Panel [NHANES]     Frequency of Social Gatherings with Friends and Family: Patient declined          Disposition Plan     Medically Ready for Discharge: Anticipated in 5+ Days         Lore Mathews MD  Hospitalist Service, GOLD TEAM [address] [place] [address]  Securely message with [employer] (more info)  Text page via [employer] Paging/Directory   See signed in provider for up to date coverage information  ______________________________________________________________________    Interval History   Saw her pre op. Having back pain , ankle weakness. Denies any chest pain  no shortness of breath  no nausea vomiting , denies dizziness     Physical Exam   Vital Signs: Temp: 97.5  F (36.4  C) Temp src: Oral BP: 127/60 Pulse: 74   Resp: 18 SpO2: 95 % O2 Device: BiPAP/CPAP    Weight: 250 lbs 0 oz  General " appearence: awake alert  in  no apparent distress    RESPIRATORY: lungs clear   CARDIOVASCULAR:S1 S2 regular rate and rhythm, no rubs gallops or murmurs appreciated  GASTROINTESTINAL:soft, non-distended , non-tender , + bowel sounds,lt   SKIN: warm and dry, no mottling noted   NEUROLOGIC; awake alert and oriented  EXTREMITIES: no clubbing, cyanosis or edema         Data     I have personally reviewed the following data over the past 24 hrs:    INR:  1.03 PTT:  29   D-dimer:  N/A Fibrinogen:  N/A       Imaging results reviewed over the past 24 hrs:   Recent Results (from the past 24 hours)   CT Thoracic Spine w/o Contrast    Narrative    CT THORACIC SPINE W/O CONTRAST 7/20/2025 9:43 AM    Provided History: Known thoracic fracture   ICD-10:    Comparison: CT lumbar spine dated 7/19/2025     Technique: Using multidetector thin collimation helical acquisition  technique, axial, sagittal and coronal 3 mm thickness CT  reconstructions were obtained through the thoracic spine without  intravenous contrast.  Images were viewed in bone and soft tissue  windows.    Findings:  There is diffuse osteopenia with cortical thinning and large anterior  bridging osteophytes at multiple levels with diffuse vertebral body  fusion. Facetal arthropathy noted at the T11 and T12 levels.    Similar burst fracture involving the superior aspect of T12 vertebral  body with fracture plane extending into the posterior aspect with  minimal retropulsion of the posterior superior aspect of the vertebral  body without canal stenosis. Unchanged fracture at midsegment of the  rudimentary right-sided rib at the T12 level.    Similar fracture extending through the inferior aspect of T11  vertebral body with height loss in the anterior inferior aspect  without retropulsion.  Another fracture line is seen extending across the superior endplate,  body and inferior endplate of the T11 vertebral body along the right  lateral aspect with extension into the  anterior bridging osteophyte at  T11-T12 level with minimal superior deflection of the anterosuperior  aspect of the T11 vertebral body.  Undisplaced fracture through the right lateral aspect of T11 spinous  process appears unchanged. There is also a fracture line through the  bilateral inferior articular facets at T11 and slightly through the  right superior articular facet at T12 that are nondisplaced. There is  a nondisplaced fracture of the right T11 rib near the costovertebral  junction.    There is mild anterior paravertebral soft tissue edema with fat  stranding noted at T11-T12 level.    Break in the cortex of large anterior bridging osteophyte at T5-T6  level appears chronic in nature.      Impression    Impression:     1.Burst fracture involving the superior aspect of T12 vertebral body  with fracture plane extending into the posterior aspect with minimal  retropulsion of the posterior superior aspect of the vertebral body  without canal stenosis.  2. Fracture through the inferior aspect of T11 vertebral body with  height loss in the anterior inferior aspect without retropulsion with  another fracture line extending across the superior endplate, body and  inferior endplate of the T11 vertebral body along the right lateral  aspect with extension into the anterior bridging osteophyte at T11-T12  level with minimal superior deflection of the anterosuperior aspect of  the T11 vertebral body. Nondisplaced fractures through the inferior  articular facet at T11 and minimally at the right superior articular  facet of T12. Nondisplaced right-sided rib fractures that T11 and T12.  3.Mild anterior paravertebral soft tissue edema with fat stranding  noted at T11-T12 level.    Findings were conveyed to COURTNEY MORA via [email or web address] page  at 10:40am    I have personally reviewed the examination and initial interpretation  and I agree with the findings.    JUAN CARLOS HACKETT MD         SYSTEM ID:  [id number]   MR  Thoracic Spine w/o Contrast    Narrative    Thoracic and Lumbar spine MRI without contrast    History: Known thoracic fracture.    Comparison: CT thoracic spine 7/19/2025 and 7/20/2025    Technique:  Axial T2-weighted, and sagittal T1, STIR, and T2-weighted  images of the lumbar spine without intravenous contrast. Sagittal T1,  STIR, and T2-weighted images of the thoracic spine without contrast  were obtained, with axial T2-weighted images through levels of  interest in the thoracic spine.    Findings:  Thoracic Spine:  The external marker is at the level of T5.    There is no definite abnormal signal in the thoracic spinal cord at  any level.     Similar alignment of the thoracic spine compared with CTs. Burst  fracture involving the T12 vertebral body with minimal retropulsion of  the superior aspect of the fracture causing mild spinal canal  stenosis. T2 hyperintensity, suggestive of edema secondary to  compression. T11 vertebral body fracture with vertical and horizontal  oriented fracture lines, anterior inferior T2 hyperintensity of this  fracture, suggestive of edema.    Trace T12-L1 anterior intervertebral disc T2 hyperintensity.     T10 1.4 cm heterogenous lesion T1 and T2 bright focus.    Remainder of bone marrow signal intensity on noncontrast images  appears unremarkable.    Lumbar Spine:  There are 5 type lumbar vertebra, used for the purposes of this  dictation.  The tip of the conus is at approximately L1. Grade 1  anterolisthesis of L4 and L5. As far as the bone marrow signal  intensity, no abnormality is noted.    On a level by level basis, the findings are as follows:    L1-2: Facet arthropathy and minimal disc bulge without spinal canal  stenosis. Mild foraminal narrowing bilaterally.    L2-3: Mild disc bulge with facet arthropathy but no spinal canal  stenosis. The neural foramen are patent bilaterally.    L3-4: Mild disc bulge with facet arthropathy but no spinal canal  stenosis or neural  foraminal narrowing.    L4-5: Central disc protrusion causing mild spinal canal stenosis. Mild  bilateral foraminal narrowing secondary to facet arthropathy.    L5-S1: Bilateral facet arthropathy without spinal canal stenosis.  There is mild bilateral neural foraminal narrowing.    The visualized paraspinous tissues anteriorly are unremarkable.      Impression    Impression: Similar appearance of the T12 burst fracture and T11  fracture compared with same day CT. Associated T2 hyperintense edema.  Mild retropulsion of the T12 fracture with mild spinal canal stenosis.  No abnormal spinal cord signal.    I have personally reviewed the examination and initial interpretation  and I agree with the findings.    JUAN CARLOS HACKETT MD         SYSTEM ID:  [id number]   MR Lumbar Spine w/o Contrast    Narrative    Thoracic and Lumbar spine MRI without contrast    History: Known thoracic fracture.    Comparison: CT thoracic spine 7/19/2025 and 7/20/2025    Technique:  Axial T2-weighted, and sagittal T1, STIR, and T2-weighted  images of the lumbar spine without intravenous contrast. Sagittal T1,  STIR, and T2-weighted images of the thoracic spine without contrast  were obtained, with axial T2-weighted images through levels of  interest in the thoracic spine.    Findings:  Thoracic Spine:  The external marker is at the level of T5.    There is no definite abnormal signal in the thoracic spinal cord at  any level.     Similar alignment of the thoracic spine compared with CTs. Burst  fracture involving the T12 vertebral body with minimal retropulsion of  the superior aspect of the fracture causing mild spinal canal  stenosis. T2 hyperintensity, suggestive of edema secondary to  compression. T11 vertebral body fracture with vertical and horizontal  oriented fracture lines, anterior inferior T2 hyperintensity of this  fracture, suggestive of edema.    Trace T12-L1 anterior intervertebral disc T2 hyperintensity.     T10 1.4 cm heterogenous  lesion T1 and T2 bright focus.    Remainder of bone marrow signal intensity on noncontrast images  appears unremarkable.    Lumbar Spine:  There are 5 type lumbar vertebra, used for the purposes of this  dictation.  The tip of the conus is at approximately L1. Grade 1  anterolisthesis of L4 and L5. As far as the bone marrow signal  intensity, no abnormality is noted.    On a level by level basis, the findings are as follows:    L1-2: Facet arthropathy and minimal disc bulge without spinal canal  stenosis. Mild foraminal narrowing bilaterally.    L2-3: Mild disc bulge with facet arthropathy but no spinal canal  stenosis. The neural foramen are patent bilaterally.    L3-4: Mild disc bulge with facet arthropathy but no spinal canal  stenosis or neural foraminal narrowing.    L4-5: Central disc protrusion causing mild spinal canal stenosis. Mild  bilateral foraminal narrowing secondary to facet arthropathy.    L5-S1: Bilateral facet arthropathy without spinal canal stenosis.  There is mild bilateral neural foraminal narrowing.    The visualized paraspinous tissues anteriorly are unremarkable.      Impression    Impression: Similar appearance of the T12 burst fracture and T11  fracture compared with same day CT. Associated T2 hyperintense edema.  Mild retropulsion of the T12 fracture with mild spinal canal stenosis.  No abnormal spinal cord signal.    I have personally reviewed the examination and initial interpretation  and I agree with the findings.    JUAN CARLOS HACKETT MD         SYSTEM ID:  [id number]

## 2025-07-21 NOTE — ANESTHESIA POSTPROCEDURE EVALUATION
Patient: Radha Baca    Procedure: Procedure(s):  T9-L2 posterior spinal fusion with cement augmentation with stealth guidance, possible additional level.       Anesthesia Type:  General    Note:  Disposition: Inpatient   Postop Pain Control: Uneventful            Sign Out: Well controlled pain   PONV: No   Neuro/Psych: Uneventful            Sign Out: Acceptable/Baseline neuro status (sleepy but responsive. reports she is sleepy)   Airway/Respiratory: Uneventful            Sign Out: Acceptable/Baseline resp. status; O2 supplementation   CV/Hemodynamics: Uneventful            Sign Out: Acceptable CV status; No obvious hypovolemia; No obvious fluid overload   Other NRE:    DID A NON-ROUTINE EVENT OCCUR? YES    Event details/Postop Comments:  During case, when took over care, noted that L eye tape had come loose and eye slightly open. Retaped eyelid closed.     During case, noted no urine in gonzalez. Thought was positional at the time, when supine still no urine. RN examined and likely misplaced in vagina. Gonzalez replaced with urine flow.     Dionicio reports she is sleepy. She wakes to voice. Halved lidocaine.            Last vitals:  Vitals Value Taken Time   /52 07/21/25 18:30   Temp 36.9  C (98.4  F) 07/21/25 17:47   Pulse 101 07/21/25 18:44   Resp 14 07/21/25 18:44   SpO2 98 % 07/21/25 18:44   Vitals shown include unfiled device data.    Electronically Signed By: Génesis Valdes MD  July 21, 2025  6:45 PM

## 2025-07-21 NOTE — ANESTHESIA PROCEDURE NOTES
Airway       Patient location during procedure: OR       Procedure Start/Stop Times: 7/21/2025 1:32 PM  Staff -        CRNA: Wilbur Muhammad APRN CRNA       Performed By: CRNA  Consent for Airway        Urgency: elective  Indications and Patient Condition       Indications for airway management: wili-procedural       Induction type:intravenous       Mask difficulty assessment: 2 - vent by mask + OA or adjuvant +/- NMBA    Final Airway Details       Final airway type: endotracheal airway       Successful airway: ETT - single  Endotracheal Airway Details        ETT size (mm): 7.0       Cuffed: yes       Cuff volume (mL): 10       Successful intubation technique: video laryngoscopy       VL Blade Size: MAC 3       Grade View of Cords: 1       Adjucts: stylet       Position: Right       Measured from: lips       Secured at (cm): 23       Bite block used: None    Post intubation assessment        Placement verified by: capnometry, equal breath sounds and chest rise        Number of attempts at approach: 1       Number of other approaches attempted: 0       Secured with: tape       Ease of procedure: easy       Dentition: Intact and Unchanged    Medication(s) Administered   Medication Administration Time: 7/21/2025 1:32 PM

## 2025-07-21 NOTE — PROGRESS NOTES
"Orthopaedic Surgery Progress Note:       Subjective:   No acute events overnight. Still complaining of back pain. No new symptoms. Is NPO.    Objective:   /60 (BP Location: Left arm)   Pulse 74   Temp 97.5  F (36.4  C) (Oral)   Resp 18   Ht 1.575 m (5' 2\")   Wt 113.4 kg (250 lb)   SpO2 95%   BMI 45.73 kg/m    No intake/output data recorded.  Musculoskeletal, Neurologic, and Spine:   Cervical spine:                          Appearance -no gross step-offs, kyphosis.                          Motor -                           C5: Deltoids R 5/5 and L 5/5 strength                          C6: Biceps R 5/5 and L 5/5 strength                           C7: Triceps R 5/5 and L 5/5 strength                           C8:  R 5/5 and L 5/5 strength                           T1: Dorsal interossei R 4/5 and L 4/5 strength                               Sensation: intact to light touch in C5-T1                 Lumbar Spine:                          Appearance - No gross stepoffs or deformities                          Motor -                           L2-3: Hip flexion 5/5 R and 5/5 L strength                           L3/4:  Knee extension R 5/5 and L 5/5 strength                          L4/5:  Foot dorsiflexion R 4+/5 L 4+/5 and                                       EHL dorsiflexion R 4+/5 L 4/5 strength                          S1:  Plantarflexion/Peroneal Muscles  R 5/5 and L 5/5 strength                          Sensation: intact to light touch L3-S1 distribution BLE    Labs:  Lab Results   Component Value Date    WBC 13.3 (H) 07/19/2025    HGB 10.8 (L) 07/19/2025     07/19/2025    INR 1.03 07/20/2025        Assessment & Plan:   Assessment and Plan:     A4 fracture T12, A1 at T11, Modifiers: N0, M2; segmental T11-T12 kyphosis 4 degrees, no significant retropulsion into the spinal canal,  10/10 back pain, failed conservative treatment, worsening T12 height loss (now around 40%). Last dose of ASA 7/18. " DM, Osteoporosis +     Planned for OR today T9-L2 w/ cement augmentation, possible additional levels.   Consent completed. NPO. Preop-antibiotics ordered.    Ankit Morrison MD  Spine Fellow

## 2025-07-21 NOTE — PROGRESS NOTES
" JOHN Garcia is an 79 yof that presents today at the [facility] for a fitting of a TLSO.    A- I had measured the patients waist to be at 53\" of circumference. I have fit the patient with the [employer] [id number] TLSO by adding extension panels and adjusting the belt of the brace to an XXLarge The TLSO was fit onto the patient and the patient felt satisfaction and comfort. Patient was instructed on wear and care of the brace. Fit and function appear adequate at this time.    P-Patient will wear the orthosis whenever she is sitting or standing. The orthosis is not needed as much while lying down. The patient will contact us with any question or concerns. Follow up as needed.    Electronically signed by: Chapin Reaves CPO  "

## 2025-07-21 NOTE — SIGNIFICANT EVENT
I met with the patient and her  in the preoperative area.  I know that she has an unstable fracture of T12 with significant edema and bone loss extending through all 3 columns as well as DISH above and below contributing to the instability.  I think this is the main reason she has been having difficulty standing and walking in the progressive findings on her imaging compared to 1 month prior indicate that it is an unstable fracture necessitating fixation.  Our plan today is to do an open reduction internal fixation of this fracture with posterior spinal instrumentation.    Separate from this she also has a lumbar MRI showing an L4-5 disc herniation and a grade 1 spondylolisthesis with lateral recess stenosis.  Her BMI is 46.  The lower lumbar wound is much deeper than the upper lumbar and thoracic wound.  She is having some leg paresthesias.  She does not have any gross weakness from this.  I counseled her and the  that some of the leg numbness and tingling may be from the stenosis at L4-5.  Not planning to address that today in part because of her obesity and because of the increased wound risk of entering that part of the spine.  If this continues to be symptomatic for her in the future I explained we would need to do nonoperative cares and potentially address it at a later date.    Juancarlos Dubois MD

## 2025-07-21 NOTE — ANESTHESIA PREPROCEDURE EVALUATION
Anesthesia Pre-Procedure Evaluation    Patient: Radha Baca   MRN: [id number] : 1955          Procedure : Procedure(s):  T9-L2 posterior spinal fusion with cement augmentation with stealth guidance, possible additional level.         Past Medical History:   Diagnosis Date     Abnormal Pap smear      Anxiety      Arthritis of knee 2013     Arthritis of shoulder region, right 2014     Back injury      Breast disorder      Chronic constipation      Chronic diarrhea      Depressive disorder      Diabetes (H)      Fecal incontinence      Finger pain 2015     Fracture broke L 5th pinky finger due to fall  2015     Glaucoma (increased eye pressure)      Head injury 2016     Headache(784.0)     decreased with mouth guard use.     History of blood transfusion 2011 & 2013     Hypercholesteremia      Hypertension      Low back pain      Menarche age 9+    cycles q mo x 4-5 d     Neck injuries      Nonsenile cataract IO implants: L-2013; R-2011     Pain in knee joint     LEFT     Right bundle branch block     per H/P     Sleep apnea     Info on file     SNHL (sensorineural hearing loss)      Tinnitus     I have reported ringing in ears. not sure of dates     Umbilical hernia without mention of obstruction or gangrene 2014     Vision disorder Detached Retina 10/2009      Past Surgical History:   Procedure Laterality Date     ARTHROPLASTY KNEE  2013    Left Total Knee Arthroplasty;  Surgeon: Lou Byrne MD;  [facility]     ARTHROPLASTY MINIMALLY INVASIVE KNEE  2011    R knee :ODALIS CAITLYN ANDRA, Left age 14     COLONOSCOPY  2015     DENTAL SURGERY      root canals, wisdom teeth     EXAM UNDER ANESTHESIA, MANIPULATE JOINT (LOCATION)  10/5/2012    Procedure: EXAM UNDER ANESTHESIA, MANIPULATE JOINT (LOCATION);  Right Knee Mini Open Lysis Of Adhesions, Left Knee Steroid Injection  ;  Surgeon: Lou Byrne MD;  Location: [facility]     [facility] OR OCULAR  "DEVICE INTRAOP DETACHED RETINA  2009    R     [facility] PHAKIC IOL - IMPLANT FROM SURGEON      right     KNEE SURGERY  1969    left     LASER YAG CAPSULOTOMY  12/2016    left     VITRECTOMY PARSPLANA  2010      Allergies   Allergen Reactions     Nkda [No Known Drug Allergy]       Social History     Tobacco Use     Smoking status: Former     Current packs/day: 0.00     Types: Cigarettes     Passive exposure: Past     Smokeless tobacco: Never     Tobacco comments:     quit mid 1980s   Substance Use Topics     Alcohol use: No      Wt Readings from Last 1 Encounters:   07/19/25 113.4 kg (250 lb)        Anesthesia Evaluation            ROS/MED HX  ENT/Pulmonary: Comment:   # Sensorineural hearing loss  # Vertigo, Lead to recent fall.   -- Ongoing issue where she will wake up with room spinning.     (+) sleep apnea, uses CPAP,              tobacco use, Past use,                       Neurologic: Comment: Vertigo and recently noted suprasellar lesion.  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  MRI  reported \"5 x 10 mm indeterminate suprasellar lesion. Differential diagnosis includes Rathke cleft cyst or craniopharyngioma. No mass effect on the optic chiasm. Recommend 1 year follow-up imaging with contrast with pituitary protocol to evaluate for stability. \"      Cardiovascular: Comment:   # HTN, HLD  -- Current meds: Lisinopril-hydrochlorothiazide, statin, ASA 81/d  # Hx of RBBB, bifascicular block  # Prolonged qtc   # Mild to moderate CAD  -- 12/2023 incidental finding of coronary artery calcifications, most marked in LAD (per CTA chest/abdomen, done in eval of vasovagal event)   -- Echo 3/2024 with EF 60-65%, normal RV function. Denies any chest pain with activity.    (+) Dyslipidemia - -   -  - -                                 Previous cardiac testing   Echo: Date: 3/2024 Results:  Technically difficult study. Extremely poor acoustic windows.  LV size, wall motion and " function are normal. LVEF 60-65%.  RV function, chamber size, wall motion, and thickness are normal.  No significant valvular abnormalities present.  Stress Test:  Date: Results:    ECG Reviewed:  Date: 7/2025 Results:  # EKG 7/2025: Sinus rhythm, Left axis deviation. Right bundle branch block   # Zio patch 2/2024: normal.  No significant arrhythmias were seen  Cath:  Date: Results:      METS/Exercise Tolerance:     Hematologic: Comments: CBC 7/21/2025 Hgb 11.5, hematocrit 34.9, Plt 347K    (+)      anemia,          Musculoskeletal: Comment: # Arthritis with knee and shoulder pain  -- s/p TKA (R 2011, L 2013)  # Hx/o finger fracture left hand (2015)  # Back pain, including Low back pain, now presenting T11 and T12 burst fractures after fall  -- 7/21/2025: here for T9-L2 posterior fusion with cement augmentation   -- Takes Tylenol, Ibuprofen, Tizanidine  (+)  arthritis,             GI/Hepatic: Comment:   # 12/2023 CT incidental finding of cholelithiasis with no evidence for cholecystitis, and Moderate sized fatty umbilical hernia with no associated inflammation or bowel.   # GERD, on PTA protonix 40 mg BID      Renal/Genitourinary: Comment: BMP 7/21/2025 Creat 0.81, eGFR 78   (-) renal disease   Endo: Comment: # Poorly controlled T2DM, insulin requiring   -- A1C 11.2% on 6/30/2025  -- On Metformin and Insulin   -- Diabetic retinopathy   # Obesity, BMI 46 (Calculated from 157cm, 113.4Kg)    (+)  type II DM, Last HgA1c: 11.2, date: 6/2025, Using insulin, - not using insulin pump.         Obesity,       Psychiatric/Substance Use: Comment: On Sertraline     (+) psychiatric history anxiety and depression       Infectious Disease:       Malignancy:       Other: Comment: Cataracts, s/o sx 2011 & 2013  Retina detachment, s/p vitrectomy   Glaucoma, on Timolol              Physical Exam  Airway  Cardiovascular - normal exam Comments:   # HTN, HLD  -- Current meds: Lisinopril-hydrochlorothiazide, statin, ASA 81/d  # Hx of RBBB,  bifascicular block  # Prolonged qtc   # Mild to moderate CAD  -- 12/2023 incidental finding of coronary artery calcifications, most marked in LAD (per CTA chest/abdomen, done in eval of vasovagal event)   -- Echo 3/2024 with EF 60-65%, normal RV function. Denies any chest pain with activity.  Dental     Pulmonary - normal exam      Neurological - normal exam  She appears awake, alert and oriented x3.    Other Findings       OUTSIDE LABS:  CBC:   Lab Results   Component Value Date    WBC 11.5 (H) 07/21/2025    WBC 13.3 (H) 07/19/2025    HGB 11.5 (L) 07/21/2025    HGB 10.8 (L) 07/19/2025    HCT 34.9 (L) 07/21/2025    HCT 32.9 (L) 07/19/2025     07/21/2025     07/19/2025     BMP:   Lab Results   Component Value Date     07/21/2025     07/20/2025    POTASSIUM 4.7 07/21/2025    POTASSIUM 4.0 07/20/2025    CHLORIDE 104 07/21/2025    CHLORIDE 104 07/20/2025    CO2 21 (L) 07/21/2025    CO2 21 (L) 07/20/2025    BUN 17.5 07/21/2025    BUN 16.6 07/20/2025    CR 0.81 07/21/2025    CR 0.79 07/20/2025     (H) 07/21/2025     (H) 07/21/2025     COAGS:   Lab Results   Component Value Date    PTT 29 07/20/2025    INR 1.03 07/20/2025     POC:   Lab Results   Component Value Date     (H) 05/02/2018     HEPATIC:   Lab Results   Component Value Date    ALBUMIN 3.5 07/21/2025    PROTTOTAL 7.1 07/21/2025    ALT 16 07/21/2025    AST 23 07/21/2025    ALKPHOS 146 07/21/2025    BILITOTAL 0.3 07/21/2025     OTHER:   Lab Results   Component Value Date    A1C 11.2 (H) 06/30/2025    COLIN 9.5 07/21/2025    PHOS 4.0 03/10/2025    MAG 1.7 03/10/2025    TSH 4.52 (H) 06/16/2025    T4 0.80 (L) 06/16/2025    CRP 76.5 (H) 04/15/2013       Anesthesia Plan    ASA Status:  3      NPO Status: NPO Appropriate   Anesthesia Type: General.  Airway: oral.  Induction: intravenous.  Maintenance: Balanced.   Techniques and Equipment:       - Monitoring Plan: standard ASA monitoring, train of four monitoring, processed EEG  "monitor     Consents    Anesthesia Plan(s) and associated risks, benefits, and realistic alternatives discussed. Questions answered and patient/representative(s) expressed understanding.     - Discussed: anesthesiologist, surgeon, fellow     - Discussed with:  Patient        - Pt is DNR/DNI Status: no DNR     Blood Consent:      - Discussed with: patient.     - Consented: consented to blood products     Postoperative Care    Pain management: multimodal analgesia.     Comments:                 Miranda Wu MD    I have reviewed the pertinent notes and labs in the chart from the past 30 days and (re)examined the patient.  Any updates or changes from those notes are reflected in this note.    Clinically Significant Risk Factors Present on Admission         # Hyponatremia: Lowest Na = 133 mmol/L in last 2 days, will monitor as appropriate         # Drug Induced Platelet Defect: home medication list includes an antiplatelet medication   # Hypertension: Noted on problem list          # DMII: A1C = 11.2 % (Ref range: 0.0 - 5.6 %) within past 6 months    # Morbid Obesity: Estimated body mass index is 45.73 kg/m  as calculated from the following:    Height as of this encounter: 1.575 m (5' 2\").    Weight as of this encounter: 113.4 kg (250 lb).                    "

## 2025-07-21 NOTE — BRIEF OP NOTE
[facility]    Brief Operative Note    Pre-operative diagnosis: Closed T11 spinal fracture (H) [S22.041E]  Post-operative diagnosis Same as pre-operative diagnosis    Procedure: T9-L2 posterior spinal fusion with cement augmentation with stealth guidance, possible additional level., N/A - Spine    Surgeon: Surgeons and Role:     * Juancarlos Dubois MD - Primary     * Ankit Morrison MD - Fellow - Assisting  Anesthesia: General   Estimated Blood Loss: 300 ml    Drains: Hemovac x2  Specimens: * No specimens in log *  Findings:   T9-L2 fusion with Schwab I osteotomies at each level.  Complications: None.  Implants:   Implant Name Type Inv. Item Serial No.  Lot No. LRB No. Used Action   BONE CEMENT KYPHX HV-R C01A - [id number] Cement, Bone BONE CEMENT KYPHX HV-R C01A  KYPHON [id number] N/A 1 Implanted   GRAFT BONE INFUSE BMP LG [id number] - [id number] Bone/Tissue Synthetic GRAFT BONE INFUSE BMP LG [id number]  [employer]-DANEK  N/A 1 Implanted   GRAFT BN CAN 30CC CRSH 1-10MM [id number] - [id number] Bone/Tissue/Biologic GRAFT BN CAN 30CC CRSH 1-10MM [id number] [employer]  N/A 1 Implanted   GRAFT BN CANC 30CC CRSH 1-10MM [id number] - [id number] Bone/Tissue/Biologic GRAFT BN CANC 30CC CRSH 1-10MM [id number] [employer]  N/A 1 Implanted   VERTEBROPLASTY CEMENT W/BONE MIXER C01B - [id number] Cement, Bone VERTEBROPLASTY CEMENT W/BONE MIXER C01B  KYPHON [id number] N/A 1 Implanted   IMP SCR SET MEDT SOLERA BREAK OFF 5.5MM TI [id number] - [id number] Metallic Hardware/[place] IMP SCR SET MEDT SOLERA BREAK OFF 5.5MM TI [id number]  [employer]  N/A 12 Implanted   IMP SCR SET MEDT SOLERA BREAK OFF 5.5MM TI [id number] - [id number] Metallic Hardware/[place] IMP SCR SET MEDT SOLERA BREAK OFF 5.5MM TI [id number]  [employer]  N/A 1 Wasted   IMP SCR MEDT SOLERA  7.5X55MM DUAL YOSELIN [id number] - [id number] Metallic Hardware/[place] IMP [facility] SOLERA  7.5X55MM DUAL YOSELIN  [id number]  [employer]  N/A 2 Implanted   IMP SCREW BN MEDT SPINE 50X7.5MM 5.5/6MM YOSELIN - [id number] Metallic Hardware/[place] IMP SCREW BN MEDT SPINE 50X7.5MM 5.5/6MM YOSELIN  [employer]  N/A 2 Implanted   IMP SCR MEDT 5.5/6.0MM SOLERA 7.5X35MM MA [id number] - [id number] Metallic Hardware/[place] IMP SCR MEDT 5.5/6.0MM SOLERA 7.5X35MM MA [id number]  [employer]  N/A 2 Implanted   IMP SCR MEDT 5.5/6.0MM SOLERA 7.5X50MM MA [id number] - [id number] Metallic Hardware/[place] IMP SCR MEDT 5.5/6.0MM SOLERA 7.5X50MM MA [id number]  [employer]  N/A 1 Implanted   IMP SCREW BN MEDT SPINE 40X7.5MM 5.5/6MM YOSELIN - [id number] Metallic Hardware/[place] IMP SCREW BN MEDT SPINE 40X7.5MM 5.5/6MM YOSELIN  [employer]  N/A 1 Implanted   IMP SCREW BN MEDT SPINE 40X6.5MM 5.5/6MM YOSELIN - [id number] Metallic Hardware/[place] IMP SCREW BN MEDT SPINE 40X6.5MM 5.5/6MM YOSELIN  [employer]  N/A 1 Implanted   IMP SCR MEDT 5.5/6.0MM SOLERA 7.5X40MM MA [id number] - [id number] Metallic Hardware/[place] IMP SCR MEDT 5.5/6.0MM SOLERA 7.5X40MM MA [id number]  [employer]  N/A 1 Implanted   IMP YOSELIN MEDT SOLERA TIAL STR LINED 5.1W811ST [id number] - [id number] Metallic Hardware/[place] IMP YOSELIN MEDT SOLERA TIAL STR LINED 5.1T140AB [id number]  [employer]  N/A 1 Implanted

## 2025-07-22 ENCOUNTER — APPOINTMENT (OUTPATIENT)
Dept: PHYSICAL THERAPY | Facility: CLINIC | Age: 70
DRG: 457 | End: 2025-07-22
Attending: STUDENT IN AN ORGANIZED HEALTH CARE EDUCATION/TRAINING PROGRAM
Payer: MEDICARE

## 2025-07-22 ENCOUNTER — PRE VISIT (OUTPATIENT)
Dept: ORTHOPEDICS | Facility: CLINIC | Age: 70
End: 2025-07-22

## 2025-07-22 LAB
ATRIAL RATE - MUSE: 84 BPM
DIASTOLIC BLOOD PRESSURE - MUSE: NORMAL MMHG
GLUCOSE BLDC GLUCOMTR-MCNC: 163 MG/DL (ref 70–99)
GLUCOSE BLDC GLUCOMTR-MCNC: 175 MG/DL (ref 70–99)
GLUCOSE BLDC GLUCOMTR-MCNC: 204 MG/DL (ref 70–99)
GLUCOSE BLDC GLUCOMTR-MCNC: 209 MG/DL (ref 70–99)
GLUCOSE BLDC GLUCOMTR-MCNC: 212 MG/DL (ref 70–99)
GLUCOSE BLDC GLUCOMTR-MCNC: 222 MG/DL (ref 70–99)
GLUCOSE BLDC GLUCOMTR-MCNC: 229 MG/DL (ref 70–99)
HGB BLD-MCNC: 8.9 G/DL (ref 11.7–15.7)
INTERPRETATION ECG - MUSE: NORMAL
MCV RBC AUTO: 93 FL (ref 78–100)
P AXIS - MUSE: 53 DEGREES
POTASSIUM SERPL-SCNC: 4.9 MMOL/L (ref 3.4–5.3)
PR INTERVAL - MUSE: 160 MS
QRS DURATION - MUSE: 152 MS
QT - MUSE: 398 MS
QTC - MUSE: 470 MS
R AXIS - MUSE: -60 DEGREES
SYSTOLIC BLOOD PRESSURE - MUSE: NORMAL MMHG
T AXIS - MUSE: -19 DEGREES
VENTRICULAR RATE- MUSE: 84 BPM

## 2025-07-22 PROCEDURE — 97530 THERAPEUTIC ACTIVITIES: CPT | Mod: GP | Performed by: PHYSICAL THERAPIST

## 2025-07-22 PROCEDURE — 250N000011 HC RX IP 250 OP 636: Performed by: STUDENT IN AN ORGANIZED HEALTH CARE EDUCATION/TRAINING PROGRAM

## 2025-07-22 PROCEDURE — 99223 1ST HOSP IP/OBS HIGH 75: CPT | Performed by: PHYSICIAN ASSISTANT

## 2025-07-22 PROCEDURE — 250N000013 HC RX MED GY IP 250 OP 250 PS 637

## 2025-07-22 PROCEDURE — 250N000011 HC RX IP 250 OP 636: Mod: JW

## 2025-07-22 PROCEDURE — 36415 COLL VENOUS BLD VENIPUNCTURE: CPT | Performed by: STUDENT IN AN ORGANIZED HEALTH CARE EDUCATION/TRAINING PROGRAM

## 2025-07-22 PROCEDURE — 99233 SBSQ HOSP IP/OBS HIGH 50: CPT | Performed by: STUDENT IN AN ORGANIZED HEALTH CARE EDUCATION/TRAINING PROGRAM

## 2025-07-22 PROCEDURE — 85018 HEMOGLOBIN: CPT | Performed by: STUDENT IN AN ORGANIZED HEALTH CARE EDUCATION/TRAINING PROGRAM

## 2025-07-22 PROCEDURE — 250N000013 HC RX MED GY IP 250 OP 250 PS 637: Performed by: STUDENT IN AN ORGANIZED HEALTH CARE EDUCATION/TRAINING PROGRAM

## 2025-07-22 PROCEDURE — 250N000013 HC RX MED GY IP 250 OP 250 PS 637: Performed by: INTERNAL MEDICINE

## 2025-07-22 PROCEDURE — 250N000009 HC RX 250

## 2025-07-22 PROCEDURE — 250N000013 HC RX MED GY IP 250 OP 250 PS 637: Performed by: NURSE PRACTITIONER

## 2025-07-22 PROCEDURE — 97161 PT EVAL LOW COMPLEX 20 MIN: CPT | Mod: GP | Performed by: PHYSICAL THERAPIST

## 2025-07-22 PROCEDURE — 120N000002 HC R&B MED SURG/OB UMMC

## 2025-07-22 PROCEDURE — 84132 ASSAY OF SERUM POTASSIUM: CPT | Performed by: STUDENT IN AN ORGANIZED HEALTH CARE EDUCATION/TRAINING PROGRAM

## 2025-07-22 RX ORDER — DEXTROSE MONOHYDRATE 25 G/50ML
25-50 INJECTION, SOLUTION INTRAVENOUS
Status: DISCONTINUED | OUTPATIENT
Start: 2025-07-22 | End: 2025-08-01 | Stop reason: HOSPADM

## 2025-07-22 RX ORDER — SERTRALINE HYDROCHLORIDE 25 MG/1
50 TABLET, FILM COATED ORAL DAILY
Status: DISCONTINUED | OUTPATIENT
Start: 2025-07-22 | End: 2025-08-01 | Stop reason: HOSPADM

## 2025-07-22 RX ORDER — NICOTINE POLACRILEX 4 MG
15-30 LOZENGE BUCCAL
Status: DISCONTINUED | OUTPATIENT
Start: 2025-07-22 | End: 2025-08-01 | Stop reason: HOSPADM

## 2025-07-22 RX ORDER — TRIAMCINOLONE ACETONIDE 5 MG/G
CREAM TOPICAL 2 TIMES DAILY PRN
COMMUNITY

## 2025-07-22 RX ORDER — OXYCODONE HYDROCHLORIDE 5 MG/1
5 TABLET ORAL
Refills: 0 | Status: DISCONTINUED | OUTPATIENT
Start: 2025-07-22 | End: 2025-07-22

## 2025-07-22 RX ORDER — OXYCODONE HYDROCHLORIDE 10 MG/1
10 TABLET ORAL
Refills: 0 | Status: DISCONTINUED | OUTPATIENT
Start: 2025-07-22 | End: 2025-07-22

## 2025-07-22 RX ORDER — CYCLOSPORINE 0.5 MG/ML
1 EMULSION OPHTHALMIC 2 TIMES DAILY
Status: DISCONTINUED | OUTPATIENT
Start: 2025-07-22 | End: 2025-08-01 | Stop reason: HOSPADM

## 2025-07-22 RX ORDER — HYDROMORPHONE HYDROCHLORIDE 2 MG/1
2-4 TABLET ORAL
Refills: 0 | Status: DISCONTINUED | OUTPATIENT
Start: 2025-07-22 | End: 2025-07-23

## 2025-07-22 RX ORDER — BRIMONIDINE TARTRATE AND TIMOLOL MALEATE 2; 5 MG/ML; MG/ML
1 SOLUTION OPHTHALMIC 2 TIMES DAILY
COMMUNITY
Start: 2025-07-17

## 2025-07-22 RX ORDER — GLIPIZIDE 10 MG/1
2 TABLET ORAL 3 TIMES DAILY PRN
Status: DISCONTINUED | OUTPATIENT
Start: 2025-07-22 | End: 2025-08-01 | Stop reason: HOSPADM

## 2025-07-22 RX ORDER — ATORVASTATIN CALCIUM 10 MG/1
20 TABLET, FILM COATED ORAL DAILY
Status: DISCONTINUED | OUTPATIENT
Start: 2025-07-23 | End: 2025-08-01 | Stop reason: HOSPADM

## 2025-07-22 RX ORDER — BRIMONIDINE TARTRATE 2 MG/ML
1 SOLUTION/ DROPS OPHTHALMIC 2 TIMES DAILY
Status: DISCONTINUED | OUTPATIENT
Start: 2025-07-22 | End: 2025-08-01 | Stop reason: HOSPADM

## 2025-07-22 RX ORDER — PANTOPRAZOLE SODIUM 40 MG/1
40 TABLET, DELAYED RELEASE ORAL
Status: DISCONTINUED | OUTPATIENT
Start: 2025-07-22 | End: 2025-08-01 | Stop reason: HOSPADM

## 2025-07-22 RX ADMIN — SERTRALINE HYDROCHLORIDE 50 MG: 25 TABLET ORAL at 18:27

## 2025-07-22 RX ADMIN — HYDROMORPHONE HYDROCHLORIDE 0.6 MG: 1 INJECTION, SOLUTION INTRAMUSCULAR; INTRAVENOUS; SUBCUTANEOUS at 04:09

## 2025-07-22 RX ADMIN — OXYCODONE HYDROCHLORIDE 10 MG: 10 TABLET ORAL at 10:50

## 2025-07-22 RX ADMIN — ACETAMINOPHEN 975 MG: 325 TABLET ORAL at 10:14

## 2025-07-22 RX ADMIN — HYDROMORPHONE HYDROCHLORIDE 0.6 MG: 1 INJECTION, SOLUTION INTRAMUSCULAR; INTRAVENOUS; SUBCUTANEOUS at 20:03

## 2025-07-22 RX ADMIN — HYDROMORPHONE HYDROCHLORIDE 0.6 MG: 1 INJECTION, SOLUTION INTRAMUSCULAR; INTRAVENOUS; SUBCUTANEOUS at 12:03

## 2025-07-22 RX ADMIN — METHOCARBAMOL 750 MG: 750 TABLET ORAL at 20:45

## 2025-07-22 RX ADMIN — POLYETHYLENE GLYCOL 3350 17 G: 17 POWDER, FOR SOLUTION ORAL at 08:12

## 2025-07-22 RX ADMIN — ACETAMINOPHEN 975 MG: 325 TABLET ORAL at 03:20

## 2025-07-22 RX ADMIN — OXYCODONE HYDROCHLORIDE 10 MG: 10 TABLET ORAL at 06:12

## 2025-07-22 RX ADMIN — HYDROMORPHONE HYDROCHLORIDE 4 MG: 2 TABLET ORAL at 21:44

## 2025-07-22 RX ADMIN — PANTOPRAZOLE SODIUM 40 MG: 40 TABLET, DELAYED RELEASE ORAL at 18:27

## 2025-07-22 RX ADMIN — ACETAMINOPHEN 975 MG: 325 TABLET ORAL at 18:27

## 2025-07-22 RX ADMIN — METHOCARBAMOL 750 MG: 750 TABLET ORAL at 10:51

## 2025-07-22 RX ADMIN — CYCLOSPORINE 1 DROP: 0.5 EMULSION OPHTHALMIC at 20:08

## 2025-07-22 RX ADMIN — HYDROMORPHONE HYDROCHLORIDE 0.6 MG: 1 INJECTION, SOLUTION INTRAMUSCULAR; INTRAVENOUS; SUBCUTANEOUS at 08:30

## 2025-07-22 RX ADMIN — CEFAZOLIN SODIUM 2 G: 2 SOLUTION INTRAVENOUS at 12:03

## 2025-07-22 RX ADMIN — CEFAZOLIN SODIUM 2 G: 2 SOLUTION INTRAVENOUS at 04:10

## 2025-07-22 RX ADMIN — DOCUSATE SODIUM AND SENNOSIDES 1 TABLET: 8.6; 5 TABLET, FILM COATED ORAL at 08:12

## 2025-07-22 RX ADMIN — OXYCODONE HYDROCHLORIDE 10 MG: 10 TABLET ORAL at 17:07

## 2025-07-22 RX ADMIN — FAMOTIDINE 20 MG: 20 TABLET, FILM COATED ORAL at 20:08

## 2025-07-22 RX ADMIN — FAMOTIDINE 20 MG: 20 TABLET, FILM COATED ORAL at 08:12

## 2025-07-22 RX ADMIN — CEFAZOLIN SODIUM 2 G: 2 SOLUTION INTRAVENOUS at 20:08

## 2025-07-22 RX ADMIN — INSULIN ASPART 12 UNITS: 100 INJECTION, SOLUTION INTRAVENOUS; SUBCUTANEOUS at 10:09

## 2025-07-22 RX ADMIN — LATANOPROST 1 DROP: 50 SOLUTION/ DROPS OPHTHALMIC at 22:37

## 2025-07-22 RX ADMIN — TIMOLOL MALEATE 1 DROP: 5 SOLUTION OPHTHALMIC at 20:26

## 2025-07-22 RX ADMIN — TIMOLOL MALEATE 1 DROP: 5 SOLUTION OPHTHALMIC at 08:26

## 2025-07-22 RX ADMIN — DOCUSATE SODIUM AND SENNOSIDES 1 TABLET: 8.6; 5 TABLET, FILM COATED ORAL at 20:08

## 2025-07-22 RX ADMIN — BRIMONIDINE TARTRATE 1 DROP: 2 SOLUTION/ DROPS OPHTHALMIC at 20:28

## 2025-07-22 RX ADMIN — OXYCODONE HYDROCHLORIDE 10 MG: 10 TABLET ORAL at 01:57

## 2025-07-22 ASSESSMENT — ACTIVITIES OF DAILY LIVING (ADL)
ADLS_ACUITY_SCORE: 51
ADLS_ACUITY_SCORE: 51
ADLS_ACUITY_SCORE: 50
ADLS_ACUITY_SCORE: 51
ADLS_ACUITY_SCORE: 51
ADLS_ACUITY_SCORE: 50
ADLS_ACUITY_SCORE: 51
ADLS_ACUITY_SCORE: 50
ADLS_ACUITY_SCORE: 51
ADLS_ACUITY_SCORE: 50
ADLS_ACUITY_SCORE: 51
ADLS_ACUITY_SCORE: 50
ADLS_ACUITY_SCORE: 50
ADLS_ACUITY_SCORE: 51
ADLS_ACUITY_SCORE: 50

## 2025-07-22 NOTE — PROGRESS NOTES
"Orthopaedic Surgery Progress Note:       Subjective:   No acute events overnight. Patient reports doing well. Pain well controlled on current regimen. Tolerating clears. No N/V. Denies CP/SOB/F. No BM yet. Parker in place. Reports of no new symptoms. Asked for how long will she need to lay on her side in bed.    Objective:   BP (!) 102/39 (BP Location: Left arm, Patient Position: Right side, Cuff Size: Adult Large)   Pulse 98   Temp 98.8  F (37.1  C) (Oral)   Resp 18   Ht 1.575 m (5' 2\")   Wt 113.4 kg (250 lb)   SpO2 96%   BMI 45.73 kg/m    No intake/output data recorded.  Gen: NAD. Resting comfortably in bed  Resp: Breathing comfortably on RA  : Parker in place    Dressings clean and dry  Drain in place and maintaining suction     Cervical spine:    Appearance -no gross step-offs, kyphosis.    Motor -     C5: Deltoids R 5/5 and L 5/5 strengths    C6: Biceps R 5/5 and L 5/5 strength     C7: Triceps R 5/5 and L 5/5 strength     C8:  R 5/5 and L 5/5 strength     T1: Dorsal interossei R 5/5 and L 5/5 strength        Sensation: intact to light touch in C5-T1       Lumbar Spine:    Appearance - No gross stepoffs or deformities    Motor -     L2-3: Hip flexion 5/5 R and 5/5 L strength          L3/4:  Knee extension R 5/5 and L 5/5 strength         L4/5:  Foot dorsiflexion R 5/5 L 4+/5 and       EHL dorsiflexion R 5/5 L 4/5 strength         S1:  Plantarflexion/Peroneal Muscles  R 5/5 and L 5/5 strength    Sensation: intact to light touch L3-S1 distribution BLE        Labs:  Lab Results   Component Value Date    WBC 11.5 (H) 07/21/2025    HGB 11.5 (L) 07/21/2025     07/21/2025    INR 1.03 07/20/2025        Assessment & Plan:   Assessment and Plan: Radha Baca is a 69 year old female with PMH including DM, T12 burst fracture now s/p T9-L2 posterior spinal fusion on 7/21/2025 with Dr. Dubois.     Ortho Primary  Activity:   - Up with assist until independent. No excessive bending or twisting. No " lifting >10 lbs x 6 weeks.   - No use of Lakhwinder lift.   - Will need to be sideways in bed  Weight bearing status: WBAT.  Pain management:   - IV lidocaine: discontinue at 8am on POD#2 or earlier if complications arise  - Transition from PCA to PO narcotics as tolerated. No NSAIDs x 3 months.   Antibiotics: Ancef x until the drain is in  Diet: Begin with clear fluids and progress diet as tolerated. Sliding scale insulin to continue.  DVT prophylaxis: SCDs only. No chemical DVT ppx needed.  Imaging: XR Upright Thoraco/lumbar  Labs: Hgb POD#1  Bracing/Splinting: None  Dressings: Keep Aquacel c/d/i x 7 days.  Drains: Document output per shift, will be discontinued at Orthopedic Surgery discretion.  Parker catheter: Remove POD#1.  Physical Therapy/Occupational Therapy: Eval and treat.  Cultures: None.    Consults: Hospitalist.  Follow-up: Clinic with Dr. Dubois in 6 weeks with repeat x-rays.   Disposition: Pending progress with therapies, pain control on orals, and medical stability, anticipate discharge to ARU/home in next 4-5 days    Ankit Morrison MD  Spine Fellow

## 2025-07-22 NOTE — PLAN OF CARE
Goal Outcome Evaluation:      Plan of Care Reviewed With: patient    Overall Patient Progress: no changeOverall Patient Progress: no change     Pt alert and oriented x4, hard of hearing, type 2 diabetic on BG checks, on lido drip, denied nausea, tolerated applesauce. Parker in place. Weaned off 02, CPAP overnight. Pain managed with IV dilaudid and PO oxycodone and ice packs. Pt A2-3 with repositioning. 2 hemovacs 1 deep, 1 superficial. Baseline numbness and tingling BLE. Please wake overnight for pain management. Continue POC    @IPHNDFIELD(1)@
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
## 2025-07-22 NOTE — PROGRESS NOTES
[facility]    Medicine Progress Note - Hospitalist Service, GOLD TEAM 17    Date of Admission:  7/19/2025    Assessment & Plan   Radha Baca is a 69 year old female admitted on 7/19/2025. She has a past medical history significant for HTN, DM2 with insulin dependence, glaucoma, diabetic retinopathy, CAD, HLD, vertigo and recently noted suprasellar lesion. Presented to the ED with worsening back pain in setting of recent fall and known T12 fracture. Imaging with T11 and T12 burst fractures. Patient was transferred from [place] to [facility] for surgery with orthopedic surgery.    7/22:   - POD1, pain improved from night prior   - remains on lidocaine gtt    T11, T12 burst fractures   Moderate lumbar spondylosis with moderate spinal canal stenosis L4-L5, mild narrowing L3-L4  Multilevel foraminal narrowing   Had a fall a few weeks prior. XR was obtained per PCP after noting new low back pain which showed compression fracutre of T12. Presented with worsening low back pain radiating to her groin with worsening neuropathy symptoms. CT lumbar spine with burst fracture involving T12 vertebral body with new central vertebral body height loss and new coronally oriented fracture extending from posterior aspect of vertebral body, mild retropulsioin of posterosuperio margin of T12 as well as more extensive fracturing of inferior aspect of T11 with new height loss along anteroinferior aspect of T11, new fractures involing posterior arch and spinous process of T11.   - ortho primary, appreciate mgmt  Activity:   - Up with assist until independent. No excessive bending or twisting. No lifting >10 lbs x 6 weeks.   - No use of Lakhwinder lift.   - Will need to be sideways in bed  Weight bearing status: WBAT.  Pain management:   - IV lidocaine: discontinue at 8am on POD#2 or earlier if complications arise  - Transition from PCA to PO narcotics as tolerated. No NSAIDs x 3 months.    Antibiotics: Ancef x until the drain is in  Diet: Begin with clear fluids and progress diet as tolerated. Sliding scale insulin to continue.  DVT prophylaxis: SCDs only. No chemical DVT ppx needed.  Imaging: XR Upright Thoraco/lumbar  Dressings: Keep Aquacel c/d/i x 7 days.  Drains: Document output per shift, will be discontinued at Orthopedic Surgery discretion.  Follow-up: Clinic with Dr. Dubois in 6 weeks with repeat x-rays.   Disposition: Pending progress with therapies, pain control on orals, and medical stability, anticipate discharge to ARU/home in next 4-5 days     DM2 with insulin dependence   Follows with Endocrinology. A1c 11.2. PTA on lantus 75 units at bedtime, humalog 25 units TID with meals and sliding scale as well as metformin 500 mg daily. Has had minimal intake in setting of pain. CBGs at goal currently.  - HgA1c 11.2 6/30   - Holding PTA metformin 500 mg daily while inpatient  - Reduced PTA lantus 35 units at bedtime (75 U at bedtime PTA)  - Holding fixed humalog  - Carb coverage with 1 unit per 8g CHO  - Medium intensity sliding scale  - BG checks, hypoglycemia protocol     Mild to moderate CAD  Hx of RBBB, bifascicular block  Prolonged qtc   Underwent CTA chest/abdomen in setting of admission for vasovagal event in 12/2023. Noted to have mild to modeate coronary artery calcification most marked in LAD. Had been seen by Cardiology at  in 1/2024 who felt given no ischemic symptoms there was no further functional testing needed. Echo from 3/2024 with EF 60-65%, normal RV function. Denies any chest pain with activity.  - avoid AVN blocking agents   - Continue PTA atorvastatin 20 mg daily    - Holding PTA ASA 81 mg daily in perioperative period, resume when cleared from ortho perspective  - qtc was 470 on EKG 7/20/25   - last echo 3/2024 with EF 60-65%      HTN  Prescribed lisinopril-hydrochlorothiazide 20-12.5 mg daily but reports this was stopped a few weeks ago. BP labile here in setting of  "pain.  - Holding PTA lisinopril-hydrochlorothiazide 20-12.5 mg daily in perioperative setting; consider resuming if needed after out of operative period      KAVON - resolved  Mild hyponatremia - resolved  NAGMA  Cr 1.00 with CO2 19 on admission. Na 133. Baseline Cr  appears 0.7-0.8. Cr and Na improved back to baseline. No known underlying CKD.   - Monitor     Vertigo  Lead to recent fall. Ongoing issue where she will wake up with room spinning.   - tells me initial fall was as she slipped, then she stats was getting light headed, reached back to chair but was rocking chairs fell  back, denies any loss of copiousness, had no chest pain  prior   - Consider PT for vestibular eval after procedure   - was seen at [facility] 6/26   with dizziness and had brain MRI as bellow      Suprasellar lesion  - had CT head at [facility] 6/26/25 for fall, showed hyperdensity along pituitary infundibulum  in sellar/supracellar region and had MRI  \"5 x 10 mm indeterminate suprasellar lesion. Differential diagnosis includes Rathke cleft cyst or craniopharyngioma. No mass effect on the optic chiasm. Recommend 1 year follow-up imaging with contrast with pituitary protocol to evaluate for stability. \"  - consider neurology to see if this could be explaining her vertigo   - Follow up with PCP      Glaucoma  Diabetic retinopathy   - Continue PTA latanoprost drops at bedtime, brimonidine drops BID, cyclosporine drops BID, timolol drops BID      GHISLAINE  - Home CPAP     GERD  - Continue PTA protonix 40 mg BID     Chronic constipation  - continue bowel regimen     Mood disorder  - Continue PTA sertraline 50 mg daily           Diet: Advance Diet as Tolerated: Regular Diet Adult  Snacks/Supplements Adult: Ron; With Meals    DVT Prophylaxis: Defer to primary service  Parker Catheter: PRESENT, indication: Surgical procedure  Lines: None     Cardiac Monitoring: None  Code Status: Full Code      Clinically Significant Risk Factors                   # Hypertension: " "Noted on problem list           # DMII: A1C = 11.2 % (Ref range: 0.0 - 5.6 %) within past 6 months, PRESENT ON ADMISSION  # Morbid Obesity: Estimated body mass index is 45.73 kg/m  as calculated from the following:    Height as of this encounter: 1.575 m (5' 2\").    Weight as of this encounter: 113.4 kg (250 lb)., PRESENT ON ADMISSION            Social Drivers of Health    Depression: At risk (7/8/2025)    PHQ-2     PHQ-2 Score: 3   Tobacco Use: Medium Risk (7/21/2025)    Patient History     Smoking Tobacco Use: Former     Smokeless Tobacco Use: Never     Passive Exposure: Past   Stress: Stress Concern Present (3/16/2025)    [place] [employer] - Occupational Stress Questionnaire     Feeling of Stress : To some extent   Social Connections: Unknown (3/16/2025)    Social Connection and Isolation Panel [NHANES]     Frequency of Social Gatherings with Friends and Family: Patient declined          Disposition Plan     Medically Ready for Discharge: Anticipated in 5+ Days             Pedro Luis Avila MD  Hospitalist Service, GOLD TEAM 17  [facility]  Securely message with [employer] (more info)  Text page via [employer] Paging/Directory   See signed in provider for up to date coverage information  ______________________________________________________________________    Interval History   Surgery yesterday  Pain ongoing but improved this morning    Physical Exam   Vital Signs: Temp: 98.8  F (37.1  C) Temp src: Oral BP: (!) 102/39 Pulse: 98   Resp: 18 SpO2: 96 % O2 Device: None (Room air) Oxygen Delivery: 1 LPM  Weight: 250 lbs 0 oz    General: c/o pain at surgical site, laying in bed on side  Resp: , no w/r/r, no increased WOB  CV: RRR, no m/r/g  GI: soft, NTND   MSK: moving all extremities spontaneously, pain in back 6/10  Ext: no BLE edema  Skin: no rash, no lesions      Medical Decision Making       55 MINUTES SPENT BY ME on the date of service doing chart " review, history, exam, documentation & further activities per the note.      Data   ------------------------- PAST 24 HR DATA REVIEWED -----------------------------------------------        Imaging results reviewed over the past 24 hrs:   Recent Results (from the past 24 hours)   XR Surgery OARM    Narrative    This exam was marked as non-reportable because it will not be read by a   radiologist or a [place] non-radiologist provider.

## 2025-07-22 NOTE — PROGRESS NOTES
"[facility] MEDICINE - Consulting  BRIEF PROGRESS NOTE    SUBJECTIVE  Patient transferred care from hospitalist team to [facility]. Per patient, feels like teams have been rushing her to discharge and she is still in a lot of pain post-operatively. She just received a dilaudid injection prior to the visit. She does not have an appetite at this time, has eaten a little for breakfast before PT. Reports that she is worried about constipation.        OBJECTIVE:  /44 (BP Location: Right arm, Patient Position: Left side, Cuff Size: Adult Large)   Pulse 86   Temp 98.7  F (37.1  C) (Oral)   Resp 20   Ht 1.575 m (5' 2\")   Wt 113.4 kg (250 lb)   SpO2 93%   BMI 45.73 kg/m      Exam:   General: Alert and oriented, in no acute distress.  Skin: Warm and dry, no abnormalities noted.  Eyes: Extra-ocular muscles intact, pupils equal and reactive.  ENT: Speech intact, nasal passages open, no hearing impairment noted.  CV: No cyanosis or pallor, warm and well perfused.  Respiratory: No respiratory distress, no accessory muscle use.  Neuro: Gait and station normal, comprehension intact. Gross and fine motor skills intact.   Psychiatric: Mood and affect appear normal; tearful and anxious  Extremities: Warm, able to move all four extremities at will.      ASSESSMENT/PLAN:    # T11, T12 burst fractures   # Moderate lumbar spondylosis with moderate spinal canal stenosis L4-L5, mild narrowing L3-L4  # Multilevel foraminal narrowing  # s/p T9-L2 posterior spinal fusion on 7/21/2025   Transferred from hospitalist service; s/p T9-L2 spinal fusion with orthopedic surgery. Continue with pain management per ortho recommendations.   - Post-operative Hgb    Ortho Primary, per their AM rounding note:  Activity:   - Up with assist until independent. No excessive bending or twisting. No lifting >10 lbs x 6 weeks.   - No use of Lakhwinder lift.   - Will need to be sideways in bed  Weight bearing status: WBAT.  Pain management:   - IV " lidocaine: discontinue at 8am on POD#2 or earlier if complications arise  - Transition from PCA to PO narcotics as tolerated. No NSAIDs x 3 months.   Antibiotics: Ancef x until the drain is in  Diet: Begin with clear fluids and progress diet as tolerated. Sliding scale insulin to continue.  DVT prophylaxis: SCDs only. No chemical DVT ppx needed.  Imaging: XR Upright Thoraco/lumbar  Labs: Hgb POD#1  Bracing/Splinting: None  Dressings: Keep Aquacel c/d/i x 7 days.  Drains: Document output per shift, will be discontinued at Orthopedic Surgery discretion.  Parker catheter: Remove POD#1.  Physical Therapy/Occupational Therapy: Eval and treat.  Cultures: None.    Consults: Hospitalist.  Follow-up: Clinic with Dr. Dubois in 6 weeks with repeat x-rays.   Disposition: Pending progress with therapies, pain control on orals, and medical stability, anticipate discharge to ARU/home in next 4-5 days    # TDM2 with insulin dependence   Follows with Endocrinology, last A1c 11.2. PTA on lantus 75 units at bedtime, humalog 25 units TID with meals and sliding scale as well as metformin 500 mg daily. CBGs at goal currently in the setting of minimal PO intake.   - HgA1c 11.2 6/30   - Holding PTA metformin 500 mg daily while inpatient  - Reduced PTA lantus 35 units at bedtime (75 U at bedtime PTA)  - Holding fixed humalog  - Carb coverage with 1 unit per 8g CHO  - Medium intensity sliding scale  - BG checks, hypoglycemia protocol       Please see daily rounding note for full A/P.  Bre Galloway MD  12:31 PM       ADDENDUM 7/22 5045    Contacted by the pharmacist that multiple home medications were not continued post-operatively. Ordered based on PTA schedule. Orthopedics is primary service for this patient, family medicine is consulting to manage DM and home medications.    Plan:  Added PTA atorvastatin 20mg daily  Added PTA pantoprazole 40mg BID  Added PTA sertraline 50mg daily  Added PTA cyclosporine eye drops BID  Added PTA brimoidine  eye drops BID

## 2025-07-22 NOTE — PLAN OF CARE
Goal Outcome Evaluation:      Plan of Care Reviewed With: patient    Overall Patient Progress: no change    Outcome Evaluation: A & O x4. VSS, CPAP overnight. L PIV running Lidocaine drip, LR, and abx. BG checks. Parker in place. 2 drains to spine- 1 superficial, 1 deep.  Not OOB yet, side lying for 24 hrs. Ax2. Ice packs to spine consistently. Baseline numbness and tingling to lower extremities. Pain semi-managed with PRNs per the MAR. Able to make needs known.

## 2025-07-22 NOTE — OR NURSING
PACU to Inpatient Nursing Handoff    Patient Radha Baca is a 69 year old female who speaks English.   Procedure Procedure(s):  T9-L2 posterior spinal fusion with cement augmentation with stealth guidance, possible additional level.   Surgeon(s) Primary: Juancarlos Dubois MD  Fellow - Assisting: Ankit Morrison MD     Allergies   Allergen Reactions    Nkda [No Known Drug Allergy]        Isolation  No active isolations     Past Medical History   has a past medical history of Abnormal Pap smear, Anxiety, Arthritis of knee (04/04/2013), Arthritis of shoulder region, right (04/18/2014), Back injury, Breast disorder, Chronic constipation, Chronic diarrhea, Depressive disorder, Diabetes (H), Fecal incontinence, Finger pain (04/02/2015), Fracture (broke L 5th pinky finger due to fall  1/2015), Glaucoma (increased eye pressure), Head injury (08/06/2016), Headache(784.0), History of blood transfusion (8/2011 & 4/2013), Hypercholesteremia, Hypertension, Low back pain, Menarche (age 9+), Neck injuries, Nonsenile cataract (IO implants: L-8/2013; R-1/2011), Pain in knee joint, Right bundle branch block, Sleep apnea, SNHL (sensorineural hearing loss), Tinnitus, Umbilical hernia without mention of obstruction or gangrene (08/2014), and Vision disorder (Detached Retina 10/2009).    Anesthesia General   Dermatome Level     Preop Meds Not applicable   Nerve block Not applicable   Intraop Meds dexamethasone (Decadron)  dexmedetomidine (Precedex): 20 mcg total  fentanyl (Sublimaze): 150 mcg total  hydromorphone (Dilaudid): 1 mg total  ondansetron (Zofran): last given at 1610  2G Mag Sulfate (for pain control) at 1350, 13mg Methadone at 1350   Local Meds No   Antibiotics cefazolin (Ancef) - last given at 1332     Pain Patient Currently in Pain: denies   PACU meds  hydromorphone (Dilaudid): 0.2 mg (total dose) last given at 1848   1000mg IV robaxin at 1806, Lidocaine gtt    PCA / epidural Yes. PCA - Ordered, not yet  started in PACU not using    Capnography Respiratory Monitoring (EtCO2): 28 mmHg   Telemetry ECG Rhythm: Sinus tachycardia, Bundle branch block (BBB)   Inpatient Telemetry Monitor Ordered? No        Labs Glucose Lab Results   Component Value Date     07/21/2025    GLC  06/30/2025      Comment:      Send to reference lab per. procedure  This is a corrected result. Previous result was 183 mg/dL on 6/30/2025 at 11:25 AM CDT     04/12/2021       Hgb Lab Results   Component Value Date    HGB 11.5 07/21/2025    HGB 11.8 09/28/2020       INR Lab Results   Component Value Date    INR 1.03 07/20/2025    INR 2.28 04/15/2013      PACU Imaging Series to be complete when patient able to ambulate      Wound/Incision Wound 04/07/13 Inner Knee Blister (2 areas) (Active)   Number of days: 4488       Incision/Surgical Site 08/22/11 Right Knee (Active)   Number of days: 5082       Incision/Surgical Site 04/04/13 Anterior;Left Knee (Active)   Number of days: 4491       Incision/Surgical Site 07/21/25 Mid;Posterior Thoracic spine (Active)   Incision Assessment UTV 07/21/25 1922   Incision Drainage Amount None 07/21/25 1922   Dressing Intervention Clean, dry, intact 07/21/25 1922   Number of days: 0      CMS        Equipment ice pack   Other LDA       IV Access Peripheral IV 07/19/25 Left Antecubital fossa (Active)   Site Assessment WDL 07/21/25 1747   Line Status Saline locked 07/21/25 1747   Dressing Transparent 07/21/25 1747   Dressing Status intact 07/21/25 1747   Line Intervention Flushed 07/21/25 1747   Line Necessity Yes, meets criteria 07/21/25 0300   Phlebitis Scale 0-->no symptoms 07/21/25 1747   Infiltration? no 07/21/25 0300   Number of days: 2       Peripheral IV 07/21/25 Right Lower forearm (Active)   Site Assessment WDL 07/21/25 1747   Line Status Infusing 07/21/25 1747   Dressing Transparent 07/21/25 1747   Dressing Status intact 07/21/25 1747   Line Necessity Yes, meets criteria 07/21/25 1747   Phlebitis  Scale 0-->no symptoms 07/21/25 1747   Number of days: 0      Blood Products Not applicable  mL   Intake/Output Date 07/21/25 0700 - 07/22/25 0659   Shift 4773-8868 2496-8889 8313-5731 24 Hour Total   INTAKE   I.V.  1900  1900   Shift Total(mL/kg)  1900(16.76)  1900(16.76)   OUTPUT   Urine  150  150   Blood  300  300   Shift Total(mL/kg)  450(3.97)  450(3.97)   Weight (kg) 113.4 113.4 113.4 113.4      Drains / Parker Drain Closed/Suction 1 Left;Midline Back Bulb 10 Macedonian (Active)   Site Description [facility] 07/21/25 1922   Status To bulb suction 07/21/25 1922   Number of days: 0       Drain Closed/Suction 1 Right;Midline Back Bulb 10 Macedonian (Active)   Site Description [facility] 07/21/25 1922   Status To bulb suction 07/21/25 1922   Number of days: 0       Urinary Drain 07/21/25 Urethral Catheter Latex;Double-lumen;Silver coated;Straight-tip 16 fr (Active)   Collection Container Standard 07/21/25 1922   Securement Method Commercial securement device 07/21/25 1922   Rationale for Continued Use Surgical procedure 07/21/25 1922   Urine Output 150 mL 07/21/25 1922   Number of days: 0      Time of void PreOp Time of Void Prior to Procedure: 1054 (07/21/25 1054)    PostOp Voided (mL): 300 mL (07/19/25 1539)    Diapered? No   Bladder Scan     PO    tolerating sips and water     Vitals    B/P: 120/59  T: 98.4  F (36.9  C)    Temp src: Axillary  P:  Pulse: 101 (07/21/25 1845)          R: 16  O2:  SpO2: 99 %    O2 Device: Nasal cannula (07/21/25 1845)    Oxygen Delivery: 2 LPM (07/21/25 1845)         Family/support present significant other -  Dougles   Patient belongings     Patient transported on bed and air mat   DC meds/scripts (obs/outpt) Not applicable   Inpatient Pain Meds Released? Yes       Special needs/considerations None   Tasks needing completion Waiting on insulin to be delivered from pharmacy. Needing correction for BG of 206       Aline Beltre RN  Aisha

## 2025-07-22 NOTE — PROGRESS NOTES
Orthopaedic Post-op Check    S:  Doing well post-operatively. Reports pain is under good control, has had some cramping in her left hamstrings. No nausea/vomiting.  No new numbness/tingling.    O:  Drains in place, 180 mL out of the deep drain since the OR              Lumbar Spine:                          Appearance - No gross stepoffs or deformities                          Motor -                           L2-3: Hip flexion 5/5 R and 5/5 L strength                           L3/4:  Knee extension R 5/5 and L 5/5 strength                          L4/5:  Foot dorsiflexion R 4+/5 L 4+/5 and                                       EHL dorsiflexion R 5/5 L 4+/5 strength                          S1:  Plantarflexion/Peroneal Muscles  R 5/5 and L 5/5 strength                          Sensation: intact to light touch L3-S1 distribution BLE      A/P:  Radha Baca is a 69 year old female who is POD#0 status-post T9-L2 posterior instrumented spinal fusion with cement augmentation and Stealth guidance with Dr. Dubois.  Doing well in the immediate post-op period.    Continue cares as ordered.    Discussed with Dr. Dubois.    --  Heriberto Thomas MD, PhD  Orthopaedic Surgery Resident  [facility]  07/21/25    I am currently the night resident. Please page the on-call resident directly with any questions/concerns to ensure a timely response.

## 2025-07-22 NOTE — PROGRESS NOTES
07/22/25 0900   Appointment Info   Signing Clinician's Name / Credentials (PT) DARYN Peña   Rehab Comments (PT) spine px, no brace   Living Environment   People in Home spouse   Current Living Arrangements house   Home Accessibility stairs to enter home;stairs within home   Number of Stairs, Main Entrance 5   Stair Railings, Main Entrance other (see comments);railings on both sides of stairs  (rails too far apart to hold both, pt prefers holding R rail)   Number of Stairs, Within Home, Primary greater than 10 stairs   Stair Railings, Within Home, Primary   (does not need to use inside stairs)   Transportation Anticipated family or friend will provide   Living Environment Comments all needs met on main level   Self-Care   Usual Activity Tolerance good   Current Activity Tolerance poor   Regular Exercise No   Equipment Currently Used at Home other (see comments);walker, standard;cane, straight;grab bar, tub/shower;shower chair;raised toilet seat  (per pt report shower chair is too big, 4WW is too short, occasionally needed help donning socks/shoes from spouse, owns attachable bed rails but not on bed)   Activity/Exercise/Self-Care Comment IND (I)ADLS, drives, 24/7 A available upon DC from spouse   General Information   Onset of Illness/Injury or Date of Surgery 07/21/25   Referring Physician Ankit Morrison MD   Patient/Family Therapy Goals Statement (PT) Return to baseline activity w/o AD need   Pertinent History of Current Problem (include personal factors and/or comorbidities that impact the POC) See PMH for details. T9-L2 fusion   Existing Precautions/Restrictions spinal   Weight-Bearing Status - LUE partial weight-bearing (% in comments)  (no lifting >10lbs)   Weight-Bearing Status - RUE partial weight-bearing (% in comments)  (no lifting >10lbs)   Weight-Bearing Status - LLE weight-bearing as tolerated   Weight-Bearing Status - RLE weight-bearing as tolerated   General Observations Pt presenting  with significant pain limiting her functional mobility. Speed of all transfers reduced d/t significant pain   Cognition   Affect/Mental Status (Cognition) WNL   Orientation Status (Cognition) oriented x 4   Follows Commands (Cognition) WNL   Integumentary/Edema   Integumentary/Edema no deficits were identifed   Integumentary/Edema Comments not formally assessed, no complaints from pt   Posture    Posture Forward head position;Protracted shoulders   Range of Motion (ROM)   Range of Motion ROM deficits secondary to surgical procedure   Strength (Manual Muscle Testing)   Strength (Manual Muscle Testing) Deficits observed during functional mobility   Strength Comments D/t pain from surgery   Bed Mobility   Bed Mobility rolling left   Rolling Left [place] (Bed Mobility) minimum assist (75% patient effort)   Bed Mobility Limitations other (see comments)  (A for LE placement)   Impairments Contributing to Impaired Bed Mobility pain;decreased ROM   Assistive Device (Bed Mobility) bed rails;other (see comments)  (HOB elevated approx 30 degrees)   Transfers   Transfers sit-stand transfer   Maintains Weight-bearing Status (Transfers) able to maintain   Impairments Contributing to Impaired Transfers pain;decreased ROM   Sit-Stand Transfer   Sit-Stand [place] (Transfers) contact guard   Assistive Device (Sit-Stand Transfers) walker, front-wheeled   Gait/Stairs (Locomotion)   [place] Level (Gait) contact guard  (Close CGA x1)   Assistive Device (Gait) walker, front-wheeled   Distance in Feet (Gait) 1   Pattern (Gait) step-through   Deviations/Abnormal Patterns (Gait) ataxic;jie decreased;stride length decreased   Maintains Weight-bearing Status (Gait) able to maintain   Balance   Balance no deficits were identified   Balance Comments not formally assessed. Imapred d/t pain   Clinical Impression   Criteria for Skilled Therapeutic Intervention Yes, treatment indicated   PT Diagnosis (PT) Decreased functonal  mobility   Influenced by the following impairments pain, generalized weakness, ROM deficits d/t spine px   Functional limitations due to impairments Decreased IND with and functional completion of gait, transfers, bed mobility   Clinical Presentation (PT Evaluation Complexity) stable   Clinical Presentation Rationale Pt presenting with all VSS and no c/o lightheadedness/dizziness. Mobility reduced d/t pain   Clinical Decision Making (Complexity) low complexity   Planned Therapy Interventions (PT) bed mobility training;gait training;home exercise program;patient/family education;ROM (range of motion);stair training;strengthening;progressive activity/exercise;home program guidelines   Risk & Benefits of therapy have been explained evaluation/treatment results reviewed;care plan/treatment goals reviewed;risks/benefits reviewed;current/potential barriers reviewed;participants voiced agreement with care plan;participants included;patient   PT Total Evaluation Time   PT Eval, Low Complexity Minutes (16770) 12   Physical Therapy Goals   PT Frequency Daily   PT Predicted Duration/Target Date for Goal Attainment 07/29/25   PT Goals Bed Mobility;Transfers;Gait;Stairs   PT: Bed Mobility Supervision/stand-by assist;Supine to/from sit;Rolling;Within precautions   PT: Transfers Supervision/stand-by assist;Sit to/from stand;Assistive device;Within precautions   PT: Gait Greater than 200 feet;Supervision/stand-by assist;Rolling walker;Within precautions   PT: Stairs Modified independent;5 stairs;Rail on right;Assistive device   Interventions   Interventions Quick Adds Therapeutic Activity   Therapeutic Activity   Therapeutic Activities: dynamic activities to improve functional performance Minutes (29929) 46   Symptoms Noted During/After Treatment Increased pain   Treatment Detail/Skilled Intervention Pt greeted in supine and agreeable to PT. After completion of eval questions, time taken for room setup and gatherign equipment.  Session progressed with education abut spinal px and log rolling technique. Pt verbalized understanding and agreement using teachback. Continued with eval of bed mobility with VC and TC provided for UE and LE placement. Min A x1 required for LE placement and frequent VC for px adherence. Session continued with 2 min rest/recover break at EOB with guidance through deep breathing for pain management. Session progressed with STS eval with consistent VC and TC required to minimize spine flexion. D/t pain, pt experienced difficulty completing transer within px without consistent TC. Session continued with eval of 1 foot amb, which was not performed WFL (i.e. decreased speed, reduced toe clearnce, small step length d/t pain). Supportive listening and motivational interviewing performed to provide pt support. Session continued with CGA x1 STS to recliner, followed by mod IND scooting using armrests for positioning. Session ended with all needs met and questions ansered. Of note, extensive time taken throughout session between transfers d/t pain. Pt demonstrated no LOB or physical A for any mobiliuty completion but found all mobility very painful.   PT Discharge Planning   PT Plan Increase IND with bed mobility and transfers using elevated HOB and FWW, increase amb distance   PT Discharge Recommendation (DC Rec) home with assist;home with home care physical therapy   PT Rationale for DC Rec Pt mobility significantly below baseline d/t pain and difficulty adhering to spine px without VC. Pt would benefit from continued IP PT and home care PT to progress activity tolerance and eval home environment to facilitate safe mobility at home. Pt has 24/7 A available at home from spouse upon DC who can complete all cares once pt navigates stairs to enter home. Anticipate that pt will return to IND/mod IND with all mobility once pain management achieved.   PT Brief overview of current status min A bed mobility, close CGA x1 transfers  and short distance amb/pivot to bedside recliner using FWW   PT Total Distance Amb During Session (feet) 1   PT Equipment Needed at Discharge   (TBD per pt progress)   Physical Therapy Time and Intention   Timed Code Treatment Minutes 46   Total Session Time (sum of timed and untimed services) 58

## 2025-07-22 NOTE — PROGRESS NOTES
"CLINICAL NUTRITION SERVICES     Reason for Assessment: Provider Order - specify Nutrition Education - Patient is Post op Complex Spine - Patient needs high-protein education and ordering of preferred supplements at 1.5 g protein per kg body weight    Reason for admission: s/p T9-L2 posterior instrumented spinal fusion with cement augmentation and Stealth guidance   PMH: HTN, DM2 with insulin dependence, glaucoma, diabetic retinopathy, CAD, HLD, vertigo and recently noted suprasellar lesion.     Per pt visit: Pt reports difficulty managing DM diet, no other concerns.     157.5 cm 5' 2\"  Body mass index is 45.73 kg/m .  Wt Readings from Last 10 Encounters:   07/19/25 113.4 kg (250 lb)   06/18/25 115.2 kg (254 lb)   03/19/25 115.9 kg (255 lb 9.6 oz)   01/22/25 115.4 kg (254 lb 8 oz)   10/24/24 113.4 kg (250 lb)   10/08/24 114.7 kg (252 lb 14.4 oz)   08/21/24 113.6 kg (250 lb 6.4 oz)   01/17/24 113.2 kg (249 lb 9.6 oz)   09/27/23 111.1 kg (245 lb)   08/20/23 111.1 kg (245 lb)       Nutrition Diagnosis: Predicted food- and nutrition-related knowledge deficit r/t therapeutic recommendations    Interventions: Provided instruction on adequate protein intakes for healing, ordered Ensure Max TID and Ron BID  Provided High-protein foods list and list of foods on the hospital menu with their estimated protein content to assist pt with ordering protein-rich meals.  Recommended 170 grams/day protein    Goals: Patient will verbalize understanding of therapeutic recommendations and accept supplement - met.    Follow-up: Will monitor per LOS policy unless otherwise consulted.      Andreina PANDEY  Clinical Dietitian  [facility]   Available by Aisha, Teams, desk [phone]  Weekend/Holiday Aisha: Weekend Holiday Clinical Dietitian [Multi Site Groups]    "

## 2025-07-22 NOTE — OR NURSING
Dr Valdes notified of blood glucose level in PACU. VORB to use floor correction Novolog orders. OR pharmacy contacted to send medication. Will continue to monitor.

## 2025-07-23 ENCOUNTER — APPOINTMENT (OUTPATIENT)
Dept: PHYSICAL THERAPY | Facility: CLINIC | Age: 70
DRG: 457 | End: 2025-07-23
Payer: MEDICARE

## 2025-07-23 LAB
ALBUMIN SERPL BCG-MCNC: 3.1 G/DL (ref 3.5–5.2)
ALP SERPL-CCNC: 152 U/L (ref 40–150)
ALT SERPL W P-5'-P-CCNC: 16 U/L (ref 0–50)
ANION GAP SERPL CALCULATED.3IONS-SCNC: 11 MMOL/L (ref 7–15)
AST SERPL W P-5'-P-CCNC: 24 U/L (ref 0–45)
BILIRUB SERPL-MCNC: 0.2 MG/DL
BUN SERPL-MCNC: 19 MG/DL (ref 8–23)
CALCIUM SERPL-MCNC: 8.8 MG/DL (ref 8.8–10.4)
CHLORIDE SERPL-SCNC: 99 MMOL/L (ref 98–107)
CREAT SERPL-MCNC: 0.73 MG/DL (ref 0.51–0.95)
EGFRCR SERPLBLD CKD-EPI 2021: 88 ML/MIN/1.73M2
ERYTHROCYTE [DISTWIDTH] IN BLOOD BY AUTOMATED COUNT: 14.9 % (ref 10–15)
GLUCOSE BLDC GLUCOMTR-MCNC: 192 MG/DL (ref 70–99)
GLUCOSE BLDC GLUCOMTR-MCNC: 192 MG/DL (ref 70–99)
GLUCOSE BLDC GLUCOMTR-MCNC: 206 MG/DL (ref 70–99)
GLUCOSE BLDC GLUCOMTR-MCNC: 279 MG/DL (ref 70–99)
GLUCOSE BLDC GLUCOMTR-MCNC: 291 MG/DL (ref 70–99)
GLUCOSE SERPL-MCNC: 245 MG/DL (ref 70–99)
HCO3 SERPL-SCNC: 21 MMOL/L (ref 22–29)
HCT VFR BLD AUTO: 25.4 % (ref 35–47)
HGB BLD-MCNC: 8.3 G/DL (ref 11.7–15.7)
MCH RBC QN AUTO: 30.2 PG (ref 26.5–33)
MCHC RBC AUTO-ENTMCNC: 32.7 G/DL (ref 31.5–36.5)
MCV RBC AUTO: 92 FL (ref 78–100)
PLATELET # BLD AUTO: 298 10E3/UL (ref 150–450)
POTASSIUM SERPL-SCNC: 4.2 MMOL/L (ref 3.4–5.3)
PROT SERPL-MCNC: 6 G/DL (ref 6.4–8.3)
RBC # BLD AUTO: 2.75 10E6/UL (ref 3.8–5.2)
SODIUM SERPL-SCNC: 131 MMOL/L (ref 135–145)
WBC # BLD AUTO: 14.1 10E3/UL (ref 4–11)

## 2025-07-23 PROCEDURE — 250N000013 HC RX MED GY IP 250 OP 250 PS 637: Performed by: NURSE PRACTITIONER

## 2025-07-23 PROCEDURE — 99232 SBSQ HOSP IP/OBS MODERATE 35: CPT | Performed by: PHYSICIAN ASSISTANT

## 2025-07-23 PROCEDURE — 250N000013 HC RX MED GY IP 250 OP 250 PS 637

## 2025-07-23 PROCEDURE — 80053 COMPREHEN METABOLIC PANEL: CPT

## 2025-07-23 PROCEDURE — 120N000002 HC R&B MED SURG/OB UMMC

## 2025-07-23 PROCEDURE — 250N000013 HC RX MED GY IP 250 OP 250 PS 637: Performed by: STUDENT IN AN ORGANIZED HEALTH CARE EDUCATION/TRAINING PROGRAM

## 2025-07-23 PROCEDURE — 85014 HEMATOCRIT: CPT

## 2025-07-23 PROCEDURE — 97530 THERAPEUTIC ACTIVITIES: CPT | Mod: GP

## 2025-07-23 PROCEDURE — 99232 SBSQ HOSP IP/OBS MODERATE 35: CPT | Mod: GC

## 2025-07-23 PROCEDURE — 250N000011 HC RX IP 250 OP 636: Performed by: STUDENT IN AN ORGANIZED HEALTH CARE EDUCATION/TRAINING PROGRAM

## 2025-07-23 PROCEDURE — 250N000011 HC RX IP 250 OP 636: Mod: JW

## 2025-07-23 PROCEDURE — 250N000011 HC RX IP 250 OP 636: Mod: JZ

## 2025-07-23 PROCEDURE — 36415 COLL VENOUS BLD VENIPUNCTURE: CPT

## 2025-07-23 RX ORDER — HYDROMORPHONE HYDROCHLORIDE 2 MG/1
2 TABLET ORAL
Refills: 0 | Status: DISCONTINUED | OUTPATIENT
Start: 2025-07-23 | End: 2025-07-27

## 2025-07-23 RX ORDER — LIDOCAINE 4 G/G
3 PATCH TOPICAL
Status: DISCONTINUED | OUTPATIENT
Start: 2025-07-24 | End: 2025-08-01 | Stop reason: HOSPADM

## 2025-07-23 RX ORDER — METHOCARBAMOL 500 MG/1
500 TABLET, FILM COATED ORAL EVERY 6 HOURS
Status: DISCONTINUED | OUTPATIENT
Start: 2025-07-23 | End: 2025-08-01 | Stop reason: HOSPADM

## 2025-07-23 RX ORDER — LIDOCAINE HYDROCHLORIDE ANHYDROUS AND DEXTROSE MONOHYDRATE .8; 5 G/100ML; G/100ML
1 INJECTION, SOLUTION INTRAVENOUS CONTINUOUS
Status: DISCONTINUED | OUTPATIENT
Start: 2025-07-23 | End: 2025-07-24

## 2025-07-23 RX ORDER — HYDROMORPHONE HYDROCHLORIDE 4 MG/1
4 TABLET ORAL
Refills: 0 | Status: DISCONTINUED | OUTPATIENT
Start: 2025-07-23 | End: 2025-07-27

## 2025-07-23 RX ADMIN — HYDROMORPHONE HYDROCHLORIDE 4 MG: 2 TABLET ORAL at 12:38

## 2025-07-23 RX ADMIN — HYDROMORPHONE HYDROCHLORIDE 0.6 MG: 1 INJECTION, SOLUTION INTRAMUSCULAR; INTRAVENOUS; SUBCUTANEOUS at 10:41

## 2025-07-23 RX ADMIN — CEFAZOLIN SODIUM 2 G: 2 SOLUTION INTRAVENOUS at 19:59

## 2025-07-23 RX ADMIN — CEFAZOLIN SODIUM 2 G: 2 SOLUTION INTRAVENOUS at 12:54

## 2025-07-23 RX ADMIN — TIMOLOL MALEATE 1 DROP: 5 SOLUTION OPHTHALMIC at 20:00

## 2025-07-23 RX ADMIN — BRIMONIDINE TARTRATE 1 DROP: 2 SOLUTION/ DROPS OPHTHALMIC at 08:28

## 2025-07-23 RX ADMIN — METHOCARBAMOL 500 MG: 500 TABLET ORAL at 23:11

## 2025-07-23 RX ADMIN — HYDROMORPHONE HYDROCHLORIDE 4 MG: 2 TABLET ORAL at 05:17

## 2025-07-23 RX ADMIN — PANTOPRAZOLE SODIUM 40 MG: 40 TABLET, DELAYED RELEASE ORAL at 16:36

## 2025-07-23 RX ADMIN — ACETAMINOPHEN 975 MG: 325 TABLET ORAL at 02:15

## 2025-07-23 RX ADMIN — SENNOSIDES AND DOCUSATE SODIUM 2 TABLET: 50; 8.6 TABLET ORAL at 08:26

## 2025-07-23 RX ADMIN — MAGNESIUM HYDROXIDE 30 ML: 400 SUSPENSION ORAL at 16:36

## 2025-07-23 RX ADMIN — POLYETHYLENE GLYCOL 3350 17 G: 17 POWDER, FOR SOLUTION ORAL at 08:26

## 2025-07-23 RX ADMIN — INSULIN ASPART 2 UNITS: 100 INJECTION, SOLUTION INTRAVENOUS; SUBCUTANEOUS at 14:46

## 2025-07-23 RX ADMIN — ATORVASTATIN CALCIUM 20 MG: 10 TABLET, FILM COATED ORAL at 08:27

## 2025-07-23 RX ADMIN — HYDROMORPHONE HYDROCHLORIDE 4 MG: 4 TABLET ORAL at 23:11

## 2025-07-23 RX ADMIN — HYDROMORPHONE HYDROCHLORIDE 4 MG: 4 TABLET ORAL at 17:33

## 2025-07-23 RX ADMIN — ACETAMINOPHEN 975 MG: 325 TABLET ORAL at 17:32

## 2025-07-23 RX ADMIN — PANTOPRAZOLE SODIUM 40 MG: 40 TABLET, DELAYED RELEASE ORAL at 08:26

## 2025-07-23 RX ADMIN — CEFAZOLIN SODIUM 2 G: 2 SOLUTION INTRAVENOUS at 04:05

## 2025-07-23 RX ADMIN — HYDROMORPHONE HYDROCHLORIDE 4 MG: 2 TABLET ORAL at 02:17

## 2025-07-23 RX ADMIN — METHOCARBAMOL 500 MG: 500 TABLET ORAL at 16:36

## 2025-07-23 RX ADMIN — LIDOCAINE HYDROCHLORIDE 1 MG/KG/HR: 8 INJECTION, SOLUTION INTRAVENOUS at 21:40

## 2025-07-23 RX ADMIN — TIMOLOL MALEATE 1 DROP: 5 SOLUTION OPHTHALMIC at 08:34

## 2025-07-23 RX ADMIN — FAMOTIDINE 20 MG: 20 TABLET, FILM COATED ORAL at 08:26

## 2025-07-23 RX ADMIN — HYDROMORPHONE HYDROCHLORIDE 0.6 MG: 1 INJECTION, SOLUTION INTRAMUSCULAR; INTRAVENOUS; SUBCUTANEOUS at 00:47

## 2025-07-23 RX ADMIN — SERTRALINE HYDROCHLORIDE 50 MG: 25 TABLET ORAL at 08:26

## 2025-07-23 RX ADMIN — SENNOSIDES AND DOCUSATE SODIUM 2 TABLET: 50; 8.6 TABLET ORAL at 20:00

## 2025-07-23 RX ADMIN — CYCLOSPORINE 1 DROP: 0.5 EMULSION OPHTHALMIC at 20:00

## 2025-07-23 RX ADMIN — FAMOTIDINE 20 MG: 20 TABLET, FILM COATED ORAL at 20:00

## 2025-07-23 RX ADMIN — CYCLOSPORINE 1 DROP: 0.5 EMULSION OPHTHALMIC at 08:27

## 2025-07-23 RX ADMIN — BRIMONIDINE TARTRATE 1 DROP: 2 SOLUTION/ DROPS OPHTHALMIC at 20:00

## 2025-07-23 RX ADMIN — LATANOPROST 1 DROP: 50 SOLUTION/ DROPS OPHTHALMIC at 21:58

## 2025-07-23 RX ADMIN — ACETAMINOPHEN 975 MG: 325 TABLET ORAL at 10:41

## 2025-07-23 RX ADMIN — HYDROMORPHONE HYDROCHLORIDE 0.6 MG: 1 INJECTION, SOLUTION INTRAMUSCULAR; INTRAVENOUS; SUBCUTANEOUS at 20:14

## 2025-07-23 RX ADMIN — METHOCARBAMOL 750 MG: 750 TABLET ORAL at 11:30

## 2025-07-23 RX ADMIN — HYDROMORPHONE HYDROCHLORIDE 4 MG: 2 TABLET ORAL at 08:42

## 2025-07-23 ASSESSMENT — ACTIVITIES OF DAILY LIVING (ADL)
ADLS_ACUITY_SCORE: 51
ADLS_ACUITY_SCORE: 51
ADLS_ACUITY_SCORE: 49
ADLS_ACUITY_SCORE: 49
ADLS_ACUITY_SCORE: 51
ADLS_ACUITY_SCORE: 49
DEPENDENT_IADLS:: COOKING;CLEANING
ADLS_ACUITY_SCORE: 49
ADLS_ACUITY_SCORE: 49
ADLS_ACUITY_SCORE: 51
ADLS_ACUITY_SCORE: 49
ADLS_ACUITY_SCORE: 51
ADLS_ACUITY_SCORE: 49
ADLS_ACUITY_SCORE: 49

## 2025-07-23 NOTE — PROGRESS NOTES
[facility]    Medicine Progress Note - [facility] Medicine Service    Date of Admission:  7/19/2025    Assessment & Plan   Radha Baca is a 69 year old female admitted on 7/19/2025. She has a past medical history significant for HTN, DM2 with insulin dependence, glaucoma, diabetic retinopathy, CAD, HLD, vertigo and recently noted suprasellar lesion. Presented to the ED with worsening back pain in setting of recent fall and known T12 fracture and was transferred from [place] to [facility] [place]opedic surgery.     # T11, T12 burst fractures   # Moderate lumbar spondylosis with moderate spinal canal stenosis L4-L5, mild narrowing L3-L4  # Multilevel foraminal narrowing  # s/p T9-L2 posterior spinal fusion on 7/21/2025   Recent fall several weeks ago and outpatient imaging revealed compression fracture of T12 2/2 osteoporosis. She presented to the ED with worsening low back pain with radiculopathy and neuropathy sxs. 7/19 CT lumbar spine with burst fracture involving T12 vertebral body with central vertebral body height loss and coronally oriented fracture, as well as fracture of the posterior arch, spinous process, and interior aspect of T11. She underwent facetectomies and posterior spinal fusion of T9-L2. Ortho is primary, however, will continue with pain management as highlighted below. Parker catheter was removed yesterday evening, will follow-up on PVR to assess need.     Ortho note recs from 7/23:  Activity:   - Up with assist until independent. No excessive bending or twisting. No lifting >10 lbs x 6 weeks.   - No use of Lakhwinder lift.   - Will need to be sideways in bed  Weight bearing status: WBAT.  Pain management:   - IV lidocaine: discontinue at 8am on POD#2 or earlier if complications arise  - Transition from PCA to PO narcotics as tolerated. No NSAIDs x 3 months.   Antibiotics: Ancef x until the drain is in  Diet: Begin with clear fluids and  progress diet as tolerated. Sliding scale insulin to continue.  DVT prophylaxis: SCDs only. No chemical DVT ppx needed.  Imaging: XR Upright Thoraco/lumbar  Labs: Hgb POD#3  Bracing/Splinting: None  Dressings: Keep Aquacel c/d/i x 7 days.  Drains: Document output per shift, will be discontinued at Orthopedic Surgery discretion.  Parker catheter: Remove POD#1.  Physical Therapy/Occupational Therapy: Eval and treat.  Cultures: None.    Consults: Hospitalist.  Follow-up: Clinic with Dr. Dubois in 6 weeks with repeat x-rays.   Disposition: Pending progress with therapies, pain control on orals, and medical stability, anticipate discharge to ARU/home in next 4-5 days     # TDM2 with insulin dependence   Follows with Endocrinology, last A1c 11.2. PTA on lantus 75 units at bedtime, humalog 25 units TID with meals and sliding scale as well as metformin 500 mg daily. Lab glucose 245 this am and POC glucose ranging from 192-220s, still uncontrolled. Per Endo recs, increased Lantus dose with plan to gradually escalate back to home regimen and change carb ratio as highlighted below:   - Holding PTA metformin 500 mg daily while inpatient  - Increased Lantus from 35U to 42U  - High intensity sliding scale   - Holding fixed humalog  - Changed carb coverage with 1 unit per 6g CHO  - BG checks, hypoglycemia protocol         Diet: Advance Diet as Tolerated: Regular Diet Adult  Snacks/Supplements Adult: Other; Fruit Punch Ron B/D, Ensure Max chocolate BLD; With Meals    DVT Prophylaxis: Pneumatic Compression Devices  Parker Catheter: Not present  Lines: None     Cardiac Monitoring: None  Code Status: Full Code      Clinically Significant Risk Factors         # Hyponatremia: Lowest Na = 131 mmol/L in last 2 days, will monitor as appropriate       # Hypoalbuminemia: Lowest albumin = 3.1 g/dL at 7/23/2025  6:01 AM, will monitor as appropriate     # Hypertension: Noted on problem list           # DMII: A1C = 11.2 % (Ref range: 0.0 - 5.6  "%) within past 6 months, PRESENT ON ADMISSION  # Morbid Obesity: Estimated body mass index is 45.73 kg/m  as calculated from the following:    Height as of this encounter: 1.575 m (5' 2\").    Weight as of this encounter: 113.4 kg (250 lb)., PRESENT ON ADMISSION            Social Drivers of Health    Depression: At risk (7/8/2025)    PHQ-2     PHQ-2 Score: 3   Tobacco Use: Medium Risk (7/21/2025)    Patient History     Smoking Tobacco Use: Former     Smokeless Tobacco Use: Never     Passive Exposure: Past   Stress: Stress Concern Present (3/16/2025)    [employer] - Occupational Stress Questionnaire     Feeling of Stress : To some extent   Social Connections: Unknown (3/16/2025)    Social Connection and Isolation Panel [NHANES]     Frequency of Social Gatherings with Friends and Family: Patient declined          Disposition Plan   Medically Ready for Discharge: Anticipated in 2-4 Days         The patient's care was discussed with the Attending Physician, DO. Bertha Vu DO [facility]  [facility]  Securely message with [employer] (more info)  Text page via [facility] Paging/Directory   See signed in provider for up to date coverage information  ______________________________________________________________________    Interval History   Patient was writhing in pain during her PT session. While being transferred to sitting in a chair, she had worse pain with leaning forward.     Physical Exam   Vital Signs: Temp: 98.8  F (37.1  C) Temp src: Oral BP: 128/54 Pulse: 94   Resp: 16 SpO2: 93 % O2 Device: None (Room air)    Weight: 250 lbs 0 oz    Physical Exam  Constitutional:       Appearance: She is not toxic-appearing.   HENT:      Head: Normocephalic and atraumatic.   Eyes:      Extraocular Movements: Extraocular movements intact.      Conjunctiva/sclera: Conjunctivae normal.      Pupils: Pupils are " equal, round, and reactive to light.   Pulmonary:      Effort: Pulmonary effort is normal. No respiratory distress.   Skin:     Comments: Bandage on surgical site with wound drains, no surrounding erythema   Neurological:      General: No focal deficit present.      Mental Status: She is alert and oriented to person, place, and time.   Psychiatric:         Mood and Affect: Mood normal.         Behavior: Behavior normal.         Medical Decision Making   See A&P for MDM        Data     I have personally reviewed the following data over the past 24 hrs:    14.1 (H)  \   8.3 (L)   / 298     131 (L) 99 19.0 /  279 (H)   4.2 21 (L) 0.73 \     ALT: 16 AST: 24 AP: 152 (H) TBILI: 0.2   ALB: 3.1 (L) TOT PROTEIN: 6.0 (L) LIPASE: N/A       Imaging results reviewed over the past 24 hrs:   No results found for this or any previous visit (from the past 24 hours).

## 2025-07-23 NOTE — PLAN OF CARE
Goal Outcome Evaluation:      Plan of Care Reviewed With: patient    Overall Patient Progress: no changeOverall Patient Progress: no change     Pt alert and oriented x4, pain control still problematic, paged ortho to have oxy switched to PO dilaudid. Pt declined menthol patches. Repositioned on her side and ice packs applied. Had IV dilaudid x1 for breakthrough pain. Continues on lido drip until tomorrow AM. Parker removed tonight. Has not had urge to void yet, purewick in place. Poor appetite, protein rec is 175 g a day. Pt given ice for Ensure but declined to drink 1. Had 1 Ron tonight. 1 piece of toast at HS. No nausea or emesis. Able to make needs known. Cont POC, wake pt up for pain mgmt overnight. BG covered with sliding scale and resumed Lantus 35 units tonight. Pt uses home CPAP at night.    @HNDFSelect Medical Specialty Hospital - [facility], Inc(1)@

## 2025-07-23 NOTE — PLAN OF CARE
"Goal Outcome Evaluation:    Overall Patient Progress: no changeOverall Patient Progress: no change      VS: /55 (BP Location: Left arm)   Pulse 88   Temp 98.6  F (37  C) (Oral)   Resp 20   Ht 1.575 m (5' 2\")   Wt 113.4 kg (250 lb)   SpO2 94%   BMI 45.73 kg/m      O2: O2 sats remained >90% on RA; utilizes CPAP at night   Output: Due to void, purewick in place   Last BM: 7/20, worried about possibly being constipated   Activity: Ax1-2 with FWW and gaitbelt   Skin: Intact; surgical incision on mid-back w/ two accordion drains   Pain: 6/10 back pain managed with IV/PO dilaudid and scheduled tylenol   CMS: A&Ox4   Dressing: Dressing on mid-back CDI   Diet: Regular diet, thin liquids, takes pills whole with water   LDA: R PIV infusing lidocaine gtt; two accordion drains on mid-back   Equipment: Personal belongings, IV pole, FWW, CPAP   Plan: Continue POC   Additional Info: Pt is due to void, gonzalez removed on evening shift 7/22. Purewick is in place but patient unable to void. Random bladder scan for 360 mL with no urge, no discomfort. MD notified with no new orders obtained. Handoff to oncoming RN.              "

## 2025-07-23 NOTE — PROGRESS NOTES
CARE MANAGEMENT      7/23:CHW sent referrals requested by the patient.    PENDING:    [place], [place]  [phone]    [place], [place]  [phone]    [place], [place]  [phone]    [place] [place]  [phone]            Oleg Rosas  In-Patient CHW  5[facility]6 [facility]  [phone]

## 2025-07-23 NOTE — PLAN OF CARE
Goal Outcome Evaluation:      Plan of Care Reviewed With: patient, spouse          Outcome Evaluation: Plan is for DC to TCU when medically stable  ist choice is [facility]    @IPHNDFIELD(1)@

## 2025-07-23 NOTE — PROGRESS NOTES
"Orthopaedic Surgery Progress Note:       Subjective:   No acute events overnight. Patient reports doing well. Reports soreness in back near the incision site. Walked in room yesterday.     Objective:   /47 (BP Location: Left arm)   Pulse 88   Temp 97.6  F (36.4  C) (Oral)   Resp 20   Ht 1.575 m (5' 2\")   Wt 113.4 kg (250 lb)   SpO2 94%   BMI 45.73 kg/m    No intake/output data recorded.  Gen: NAD. Resting comfortably in bed  Resp: Breathing comfortably on RA  : Parker in place    Dressings clean and dry  Drain in place and maintaining suction     Cervical spine:    Appearance -no gross step-offs, kyphosis.    Motor -     C5: Deltoids R 5/5 and L 5/5 strengths    C6: Biceps R 5/5 and L 5/5 strength     C7: Triceps R 5/5 and L 5/5 strength     C8:  R 5/5 and L 5/5 strength     T1: Dorsal interossei R 5/5 and L 5/5 strength        Sensation: intact to light touch in C5-T1       Lumbar Spine:    Appearance - No gross stepoffs or deformities    Motor -     L2-3: Hip flexion 4/5 R and 5/5 L strength          L3/4:  Knee extension R 5/5 and L 5/5 strength         L4/5:  Foot dorsiflexion R 5/5 L 4+/5 and       EHL dorsiflexion R 5/5 L 4/5 strength         S1:  Plantarflexion/Peroneal Muscles  R 5/5 and L 5/5 strength    Sensation: intact to light touch L3-S1 distribution BLE        Labs:  Lab Results   Component Value Date    WBC 11.5 (H) 07/21/2025    HGB 8.9 (L) 07/22/2025     07/21/2025    INR 1.03 07/20/2025        Assessment & Plan:   Assessment and Plan: Radha Baca is a 69 year old female with PMH including DM, T12 burst fracture now s/p T9-L2 posterior spinal fusion on 7/21/2025 with Dr. Dubois.     Today:  - High BG despite high resistance sliding scale insulin protocol and lantus- will consult endocrinology today    Ortho Primary  Activity:   - Up with assist until independent. No excessive bending or twisting. No lifting >10 lbs x 6 weeks.   - No use of Lakhwinder lift.   - Will need " to be sideways in bed  Weight bearing status: WBAT.  Pain management:   - IV lidocaine: discontinue at 8am on POD#2 or earlier if complications arise  - Transition from PCA to PO narcotics as tolerated. No NSAIDs x 3 months.   Antibiotics: Ancef x until the drain is in  Diet: Begin with clear fluids and progress diet as tolerated. Sliding scale insulin to continue.  DVT prophylaxis: SCDs only. No chemical DVT ppx needed.  Imaging: XR Upright Thoraco/lumbar  Labs: Hgb POD#3  Bracing/Splinting: None  Dressings: Keep Aquacel c/d/i x 7 days.  Drains: Document output per shift, will be discontinued at Orthopedic Surgery discretion.  Parker catheter: Remove POD#1.  Physical Therapy/Occupational Therapy: Eval and treat.  Cultures: None.    Consults: Hospitalist.  Follow-up: Clinic with Dr. Dubois in 6 weeks with repeat x-rays.   Disposition: Pending progress with therapies, pain control on orals, and medical stability, anticipate discharge to ARU/home in next 4-5 days    Ankit Morrison MD  Spine Fellow

## 2025-07-23 NOTE — CONSULTS
Care Management Initial Consult    General Information  Assessment completed with: Patient, Spouse or significant other, Bud  Type of CM/SW Visit: Initial Assessment    Primary Care Provider verified and updated as needed: Yes   Readmission within the last 30 days: no previous admission in last 30 days      Reason for Consult: discharge planning  Advance Care Planning: Advance Care Planning Reviewed: present on chart, no concerns identified          Communication Assessment  Patient's communication style: spoken language (English or Bilingual)    Hearing Difficulty or Deaf: yes   Wear Glasses or Blind: yes    Cognitive  Cognitive/Neuro/Behavioral: WDL  Level of Consciousness: lethargic  Arousal Level: opens eyes spontaneously  Orientation: oriented x 4  Mood/Behavior: cooperative, behavior appropriate to situation  Best Language: 0 - No aphasia  Speech: clear, spontaneous, logical    Living Environment:   People in home: spouse     Current living Arrangements:        Able to return to prior arrangements: no       Family/Social Support:  Care provided by: self, spouse/significant other  Provides care for: no one  Marital Status:   Support system: , Sibling(s)  Bud       Description of Support System: Supportive, Involved    Support Assessment: Adequate family and caregiver support, Adequate social supports    Current Resources:   Patient receiving home care services: No        Community Resources: None  Equipment currently used at home: other (see comments), walker, standard, cane, straight, grab bar, tub/shower, shower chair, raised toilet seat (per pt report shower chair is too big, 4WW is too short, occasionally needed help donning socks/shoes from spouse, owns attachable bed rails but not on bed)  Supplies currently used at home: None    Employment/Financial:  Employment Status: retired        Financial Concerns:             Does the patient's insurance plan have a 3 day qualifying hospital  stay waiver?  No    Lifestyle & Psychosocial Needs:  Social Drivers of Health     Food Insecurity: Low Risk  (7/21/2025)    Food Insecurity     Within the past 12 months, did you worry that your food would run out before you got money to buy more?: No     Within the past 12 months, did the food you bought just not last and you didn t have money to get more?: No   Depression: At risk (7/8/2025)    PHQ-2     PHQ-2 Score: 3   Housing Stability: Low Risk  (7/21/2025)    Housing Stability     Do you have housing? : Yes     Are you worried about losing your housing?: No   Tobacco Use: Medium Risk (7/21/2025)    Patient History     Smoking Tobacco Use: Former     Smokeless Tobacco Use: Never     Passive Exposure: Past   Financial Resource Strain: Low Risk  (7/21/2025)    Financial Resource Strain     Within the past 12 months, have you or your family members you live with been unable to get utilities (heat, electricity) when it was really needed?: No   Alcohol Use: Not on file   Transportation Needs: Low Risk  (7/21/2025)    Transportation Needs     Within the past 12 months, has lack of transportation kept you from medical appointments, getting your medicines, non-medical meetings or appointments, work, or from getting things that you need?: No   Physical Activity: Patient Declined (3/16/2025)    Exercise Vital Sign     Days of Exercise per Week: Patient declined     Minutes of Exercise per Session: Patient declined   Interpersonal Safety: Low Risk  (7/21/2025)    Interpersonal Safety     Do you feel physically and emotionally safe where you currently live?: Yes     Within the past 12 months, have you been hit, slapped, kicked or otherwise physically hurt by someone?: No     Within the past 12 months, have you been humiliated or emotionally abused in other ways by your partner or ex-partner?: No   Stress: Stress Concern Present (3/16/2025)    [employer] - Occupational Stress Questionnaire      Feeling of Stress : To some extent   Social Connections: Unknown (3/16/2025)    Social Connection and Isolation Panel [NHANES]     Frequency of Communication with Friends and Family: Not on file     Frequency of Social Gatherings with Friends and Family: Patient declined     Attends Rastafari Services: Not on file     Active Member of Clubs or Organizations: Not on file     Attends Club or Organization Meetings: Not on file     Marital Status: Not on file   Health Literacy: Not on file       Functional Status:  Prior to admission patient needed assistance:   Dependent ADLs:: Ambulation-cane, Independent  Dependent IADLs:: Cooking, Cleaning  Assesssment of Functional Status: Not at baseline with ADL Functioning, Not at baseline with mobility    Mental Health Status:  Mental Health Status: No Current Concerns       Chemical Dependency Status:  Chemical Dependency Status: No Current Concerns             Values/Beliefs:  Spiritual, Cultural Beliefs, Rastafari Practices, Values that affect care: no       Cultural/Rastafari Practices Patient Routinely Participates In: prayer  Values/Beliefs Comment: Hoahaoism    Discussed  Partnership in Safe Discharge Planning  document with patient/family: Yes:     Additional Information:  Per chart Review:  Radha Baca is a 69 year old female admitted on 7/19/2025. She has a past medical history significant for HTN, DM2 with insulin dependence, glaucoma, diabetic retinopathy, CAD, HLD, vertigo and recently noted suprasellar lesion. Presented to the ED with worsening back pain in setting of recent fall and known T12 fracture. Imaging with T11 and T12 burst fractures. Patient was transferred from [place] to [facility] for surgery with orthopedic surgery.     This RNCC met with patient and spouse at bedside, introduced role of RNCC and completed initial assessment. Patient lives in a house with her spouse and states she was independent prior to recent fall in June of 2025 where  she suffered T12 fracture. Patient states that her spouse does  the cooking and most of the cleaning and they share laundry duty. Patient and spouse are open to TCU and [facility] would [facility] their first choice as patient states she was there after she had knee surgery. Verified current address, PCP and insurance in [facility] as correct and current.    [facility] notified of patient interest in TCU  CHW will provide list of TCU's in patients geographical area    Next Steps:   IMM/IHO/PAS  Follow up on TCU referrals    Comfort GOODENN RN CCM  5MS Nurse Coordinator  Phone: [phone]  Available on Vocera: 5 Med Surg RNCC  Vocera

## 2025-07-23 NOTE — PHARMACY-ADMISSION MEDICATION HISTORY
Pharmacist Admission Medication History    Admission medication history is complete. The information provided in this note is only as accurate as the sources available at the time of the update.    Information Source(s): Patient, Family member, and CareEverywhere/SureScripts via in-person and phone    Pertinent Information:   Patient wasn't confident on all of the medication dosing so asked if I can call her spouse, Gabriel. Confirmed medication/dosing with Liane via phone.   Eye drops:   patient was getting brimonidine 0.2% and timolol 0.5% eye drops separately, most recently got prescribed the combination eye drop 7/17 so updated medication list to reflect that. Gabriel confirmed he has the combination eye drop at home.   Insulin:   Confirmed Lantus 75 units at bedtime and Humalog; up to 85 units per day and up to ~25 units per meal  Metformin: was prescribed 2 tablets twice daily in December. Spouse reports that patient weaned herself down to 1 tablet daily.  Of note, patient has been taking pantoprazole as prophylaxis while taking ibuprofen 800 mg    Changes made to PTA medication list:  Added: brimonidine-timolol eye drops (per spouse, Surescripts)   Deleted:   timolol eye drops (most recently prescribed combination product)   Cyclobenzaprine (per spouse, tried earlier this month but changed to Tizanidine)   Duplicate oxycodone, ondansetron, acetaminophen  Changed:   Ascorbic acid: 2,000 mg twice daily --> 500 mg daily   Added directions to vitamin B12, artificial tears, and cyclosporine eye drops   Changed ketoconazole and triamcinolone to PRN   Miralax 17 g daily as needed --> 17 g daily     Allergies reviewed with patient and updates made in EHR: yes    Medication History Completed By: Dimple Smith RPH 7/22/2025 8:42 PM    PTA Med List   Medication Sig Last Dose/Taking    acetaminophen (TYLENOL) 500 MG tablet Take 2 tablets (1,000 mg) by mouth every 6 hours as needed for mild pain. Past Week    amoxicillin  (AMOXIL) 500 MG tablet TAKE 4 TABLETS BY MOUTH 1 HOUR BEFORE DENTAL PROCEDURES Past Month    Ascorbic Acid (VITAMIN C PO) Take 500 mg by mouth daily. Taking    aspirin 81 MG tablet 1 tab daily Past Week    atorvastatin (LIPITOR) 20 MG tablet Take 1 tablet (20 mg) by mouth daily Taking    B Complex Vitamins (VITAMIN  B COMPLEX) CAPS Take 1 tablet by mouth daily  Taking    brimonidine-timolol (COMBIGAN) 0.2-0.5 % ophthalmic solution Place 1 drop into both eyes 2 times daily. Taking    calcitonin, salmon, (MIACALCIN) 200 UNIT/ACT nasal spray Spray 1 spray into one nostril alternating nostrils daily. Alternate nostril each day. Taking    calcium-vitamin D (CALTRATE) 600-400 MG-UNIT per tablet Take 1 tablet by mouth daily. Taking    cyanocobalamin (VITAMIN B-12) 1000 MCG tablet Take 1,000 mcg by mouth daily. Taking    cycloSPORINE (RESTASIS) 0.05 % ophthalmic emulsion Place 1 drop into both eyes 2 times daily. 7/21/2025    HUMALOG KWIKPEN 100 UNIT/ML soln USE FOR CARB COUNTING AND MEALS, APPROXIMATELY 85 UNITS DAILY. Past Week    hypromellose-dextran (ARTIFICAL TEARS) 0.1-0.3 % ophthalmic solution Place 1 drop into both eyes daily as needed for dry eyes. 7/21/2025    ibuprofen (ADVIL/MOTRIN) 800 MG tablet Take 1 tablet (800 mg) by mouth every 8 hours as needed for moderate pain. 7/20/2025 Bedtime    insulin glargine (LANTUS PEN) 100 UNIT/ML pen Inject 75 Units subcutaneously at bedtime. 7/20/2025 Bedtime    ketoconazole (NIZORAL) 2 % external cream Apply topically daily. (Patient taking differently: Apply topically daily as needed for itching or irritation.) Taking Differently    latanoprost (XALATAN) 0.005 % ophthalmic solution Place 1 drop into both eyes At Bedtime Taking    lisinopril-hydrochlorothiazide (ZESTORETIC) 20-12.5 MG tablet Take 1 tablet by mouth daily Taking    meclizine (ANTIVERT) 12.5 MG tablet Take 1-2 tablets (12.5-25 mg) by mouth 3 times daily as needed for dizziness. Increase to 50mg per dose as a  maximum. Maximum daily dose 100mg. Taking As Needed    metFORMIN (GLUCOPHAGE) 500 MG tablet Take 500 mg by mouth daily. Past Week    Multiple Vitamins-Minerals (EYE-ZAKIYA PLUS LUTEIN PO) Take 1 tablet by mouth daily. Taking    Omega-3 Fatty Acids (OMEGA-3 FISH OIL PO) Take 1,000 mg by mouth daily Taking    omeprazole (PRILOSEC) 20 MG DR capsule Take 2 capsules (40 mg) by mouth daily. Taking    ondansetron (ZOFRAN) 4 MG tablet Take 1 tablet (4 mg) by mouth every 8 hours as needed for nausea. Taking As Needed    oxyCODONE (ROXICODONE) 5 MG tablet Take 1-2 tablets (5-10 mg) by mouth every 6 hours as needed for pain. Taking As Needed    polyethylene glycol (MIRALAX) 17 GM/Dose powder Take 1 capful. by mouth daily. Taking    sertraline (ZOLOFT) 50 MG tablet Take 1 tablet (50 mg) by mouth daily. Taking    tiZANidine (ZANAFLEX) 2 MG tablet Take 2 tablets (4 mg) by mouth 3 times daily. Past Week    triamcinolone (ARISTOCORT HP) 0.5 % external cream Apply topically 2 times daily as needed for irritation. Taking As Needed

## 2025-07-23 NOTE — PROGRESS NOTES
"VS: /44 (BP Location: Right arm, Patient Position: Left side, Cuff Size: Adult Large)   Pulse 86   Temp 98.7  F (37.1  C) (Oral)   Resp 20   Ht 1.575 m (5' 2\")   Wt 113.4 kg (250 lb)   SpO2 93%   BMI 45.73 kg/m       O2: Sating >90% on RA.    Output: Voids spontaneously and adequately. Parker in place    Last BM: LBM 7/20 Bowel sounds active x4. Passing flatus.    Activity: Up with Ax1-2 assist, FWW, and gait belt.    Skin: Surgical incision to the back  and Hemovac    Pain: Pain was managed with IV dilaudid, PRN oxy, and scheduled Tylenol     CMS: A&Ox4.    Dressing: Dressing to back C/D/I.   Diet: Regular. Appetite was poor. BG checks with meals and carb coverage .    LDA: PIV to R  is infusing Lidocaine gtt  2x Hemovac in place    Equipment: IV pole, FWW, gait belt, and personal belongings. Call light within reach and uses appropriately.   Plan:  TBD    Additional Info: During shift pt. Attempted to get x-rays completed was unsuccessful due to pain.         "

## 2025-07-23 NOTE — PLAN OF CARE
End of shift Summary: See flowsheet for VS and detail assessments.     Changes this Shift:     Neuro/CMS: A&Ox4. Intermittently forgetful. Calm and cooperative. Able to make needs known. Denies dizziness, headaches and N/V. Pt endorses N/T on BLE.      Pulmonary: Stable on RA, saturating above 93%. Utilizes CPAP at night     Output: Continent of bowel/bladder. Pt voided twice today; first PVR 42ml, second PVR 70ml.  Prefer to use purewick/bedpan. Pt c/o constipation; PRN PO MOM given.      Activity: Up A2 with surinder steady. Reposition side to side only. Worked with PT today     Skin: Spinal incision     Pain: Rates pain 9-10/10; managed with PRN PO dilaudid, IV dilaudid and scheduled meds.      Dressings/Drains: Dressing on spinal incision C/D/I; dressing reinforced with tegaderm. Hemovac x2 intact and patent     IV: R PIV SL and patent with intermittent ABX.     Additional info: Sliding scale and carb coverage insulin given. Potassium is WDL today.     1708: Spine team contacted this RN to raise concern regarding elevated blood glucose levels. MD stated he will consult endocrinology to determine need for insulin drip. At 1904, spine team updated this nurse that endocrinology recommended continued BG monitoring and to check BG prior to administration of lantus. Handoff report given to oncoming RN     Plan: Pending placement to TCU. Care plan ongoing.

## 2025-07-23 NOTE — PROGRESS NOTES
"Pain Service Progress Note  [facility]  Date: 07/23/2025       Patient Name: Radha Baca  MRN: [id number]  Age: 69 year old  Sex: female      Assessment:    jim Baca is a 69 year old female who presents who has PMH of PMH of Hypertension, Diabetes mellitus type 2, Glaucoma and bilateral TKA. She presents to ED on 7/19/25 with worsening back pain after recent diagnosis of T12 burst fracture.  She  is now s/p  T9-L2 posterior spinal fusion with cement augmentation  on 7/21/25 with Dr. Dubois.     PTA, she was taking oxycodone 5mg PO Q 4 hours PRN, tylenol and ibuprofen.    Seen with spouse at the bedside. Reports lower back pain left buttock, leg pain. Pain level is 9-10/10. Feels IV dialudid helpful for rescue.  PO Dilaudid is helpful for pain.  Would rather avoid ketamine infusion due to PTSD.    Plan/Recommendations:  Continue lidocaine infusion 1mg/kg/hour.  Continue Dilaudid 2-4mg PO Q 3 hours PRN-changed from oxycodone today    Continue muscle relaxant  Continue acetaminophen  Menthol patches  Bowel regimen    Pain Service will continue to follow.        Natalie Medeiros PA-C  07/23/2025         Diet: Advance Diet as Tolerated: Regular Diet Adult  Snacks/Supplements Adult: Other; Fruit Punch Ron B/D, Ensure Max chocolate BLD; With Meals    Relevant Labs:  Recent Labs   Lab Test 07/23/25  0601 07/21/25  0822 07/20/25  1801   PROTIME  --   --  13.9   INR  --   --  1.03      < >  --    PTT  --   --  29   BUN 19.0   < >  --     < > = values in this interval not displayed.       Physical Exam:  Vitals: /65 (BP Location: Left arm)   Pulse 91   Temp 99.3  F (37.4  C) (Oral)   Resp 16   Ht 1.575 m (5' 2\")   Wt 113.4 kg (250 lb)   SpO2 92%   BMI 45.73 kg/m      CONSTITUTIONAL/GENERAL APPEARANCE: Conversant.  EYES: EOMI, sclerae clear  ENT/NECK: neck is supple  RESPIRATORY:non labored breathing, on room air  CARDIOVASCULAR: HR within normal " "limits  MUSCULOSKELETAL/BACK/SPINE/EXTREMITIES: Moving UE and LE independently.     NEURO:  AAOx3.   SKIN/VASCULAR EXAM:  Dry and warm.  PSYCHIATRIC/BEHAVIORAL/OBSERVATIONS:  No objective signs of pain observed during our interview.   Judgment/Insight -fair   Orientation - x3   Memory -fair   Mood and affect - calm,         Relevant Medications:  Current Pain Medications:  Medications related to Pain Management (From now, onward)      Start     Dose/Rate Route Frequency Ordered Stop    07/24/25 0800  Lidocaine (LIDOCARE) 4 % Patch 3 patch         3 patch  over 12 Hours Transdermal EVERY 24 HOURS 0800 07/23/25 1412      07/24/25 0000  bisacodyl (DULCOLAX) suppository 10 mg         10 mg Rectal DAILY PRN 07/21/25 2017 07/23/25 1700  methocarbamol (ROBAXIN) tablet 500 mg         500 mg Oral EVERY 6 HOURS 07/23/25 1407      07/23/25 1530  HYDROmorphone (DILAUDID) tablet 2 mg        Placed in \"Or\" Linked Group    2 mg Oral EVERY 3 HOURS PRN 07/23/25 1405 07/23/25 1530  HYDROmorphone (DILAUDID) tablet 4 mg        Placed in \"Or\" Linked Group    4 mg Oral EVERY 3 HOURS PRN 07/23/25 1405      07/23/25 0000  magnesium hydroxide (MILK OF MAGNESIA) suspension 30 mL         30 mL Oral DAILY PRN 07/21/25 2017 07/22/25 0800  polyethylene glycol (MIRALAX) Packet 17 g         17 g Oral DAILY 07/21/25 2017 07/21/25 2253  HYDROmorphone (PF) (DILAUDID) injection 0.3 mg        Placed in \"Or\" Linked Group    0.3 mg Intravenous EVERY 3 HOURS PRN 07/21/25 2253 07/21/25 2253  HYDROmorphone (PF) (DILAUDID) injection 0.6 mg        Placed in \"Or\" Linked Group    0.6 mg Intravenous EVERY 3 HOURS PRN 07/21/25 2253 07/21/25 2030  senna-docusate (SENOKOT-S/PERICOLACE) 8.6-50 MG per tablet 1 tablet        Placed in \"Or\" Linked Group    1 tablet Oral 2 TIMES DAILY 07/21/25 2017 07/21/25 2030  senna-docusate (SENOKOT-S/PERICOLACE) 8.6-50 MG per tablet 2 tablet        Placed in \"Or\" Linked Group    2 tablet Oral 2 " "TIMES DAILY 07/21/25 2017 07/21/25 2015  lidocaine 1 % 0.1-1 mL         0.1-1 mL Other EVERY 1 HOUR PRN 07/21/25 2015 07/21/25 2015  lidocaine (LMX4) cream          Topical EVERY 1 HOUR PRN 07/21/25 2015 07/21/25 1830  acetaminophen (TYLENOL) tablet 975 mg         975 mg Oral EVERY 8 HOURS 07/21/25 1805              Primary Service Contacted with Recommendations? Yes            Acute Inpatient Pain Service [facility]  Hours of pain coverage 24/7   Please Page via Mengcao  - Link to Mengcao Here - Search Pain  Page via Amcom- Please Page the Pain Team Via Amcom: \"PAIN MANAGEMENT ACUTE INPATIENT/ [facility]\"            "

## 2025-07-23 NOTE — SIGNIFICANT EVENT
SPIRITUAL HEALTH SERVICES Significant Event  [employer] of ANOINTING  [facility] [place] [place]    Pt anointed by Father Hanna Joaquin   Pager [phone]

## 2025-07-24 VITALS
BODY MASS INDEX: 46.01 KG/M2 | WEIGHT: 250 LBS | OXYGEN SATURATION: 96 % | TEMPERATURE: 99.1 F | HEART RATE: 96 BPM | DIASTOLIC BLOOD PRESSURE: 58 MMHG | RESPIRATION RATE: 16 BRPM | SYSTOLIC BLOOD PRESSURE: 127 MMHG | HEIGHT: 62 IN

## 2025-07-24 LAB
GLUCOSE BLDC GLUCOMTR-MCNC: 171 MG/DL (ref 70–99)
GLUCOSE BLDC GLUCOMTR-MCNC: 177 MG/DL (ref 70–99)
GLUCOSE BLDC GLUCOMTR-MCNC: 198 MG/DL (ref 70–99)
GLUCOSE BLDC GLUCOMTR-MCNC: 257 MG/DL (ref 70–99)
GLUCOSE BLDC GLUCOMTR-MCNC: 258 MG/DL (ref 70–99)
POTASSIUM SERPL-SCNC: 4.3 MMOL/L (ref 3.4–5.3)

## 2025-07-24 PROCEDURE — 84132 ASSAY OF SERUM POTASSIUM: CPT

## 2025-07-24 PROCEDURE — 250N000013 HC RX MED GY IP 250 OP 250 PS 637: Performed by: STUDENT IN AN ORGANIZED HEALTH CARE EDUCATION/TRAINING PROGRAM

## 2025-07-24 PROCEDURE — 250N000011 HC RX IP 250 OP 636

## 2025-07-24 PROCEDURE — 250N000013 HC RX MED GY IP 250 OP 250 PS 637

## 2025-07-24 PROCEDURE — 250N000013 HC RX MED GY IP 250 OP 250 PS 637: Performed by: NURSE PRACTITIONER

## 2025-07-24 PROCEDURE — 120N000002 HC R&B MED SURG/OB UMMC

## 2025-07-24 PROCEDURE — 999N000127 HC STATISTIC PERIPHERAL IV START W US GUIDANCE

## 2025-07-24 PROCEDURE — 99232 SBSQ HOSP IP/OBS MODERATE 35: CPT | Mod: GC

## 2025-07-24 PROCEDURE — 999N000285 HC STATISTIC VASC ACCESS LAB DRAW WITH PIV START

## 2025-07-24 PROCEDURE — 36415 COLL VENOUS BLD VENIPUNCTURE: CPT

## 2025-07-24 PROCEDURE — 99207 PR SC NO CHARGE VISIT/PATIENT NOT SEEN: CPT | Performed by: NURSE PRACTITIONER

## 2025-07-24 PROCEDURE — 99232 SBSQ HOSP IP/OBS MODERATE 35: CPT | Performed by: PHYSICIAN ASSISTANT

## 2025-07-24 PROCEDURE — 250N000011 HC RX IP 250 OP 636: Performed by: STUDENT IN AN ORGANIZED HEALTH CARE EDUCATION/TRAINING PROGRAM

## 2025-07-24 RX ORDER — GABAPENTIN 100 MG/1
100 CAPSULE ORAL 3 TIMES DAILY
Status: DISCONTINUED | OUTPATIENT
Start: 2025-07-24 | End: 2025-07-24

## 2025-07-24 RX ORDER — GABAPENTIN 100 MG/1
100 CAPSULE ORAL 3 TIMES DAILY
Status: DISCONTINUED | OUTPATIENT
Start: 2025-07-24 | End: 2025-07-27

## 2025-07-24 RX ORDER — POLYETHYLENE GLYCOL 3350 17 G/17G
17 POWDER, FOR SOLUTION ORAL 2 TIMES DAILY
Status: DISCONTINUED | OUTPATIENT
Start: 2025-07-24 | End: 2025-08-01 | Stop reason: HOSPADM

## 2025-07-24 RX ORDER — BISACODYL 10 MG
10 SUPPOSITORY, RECTAL RECTAL 2 TIMES DAILY
Status: DISCONTINUED | OUTPATIENT
Start: 2025-07-24 | End: 2025-07-25

## 2025-07-24 RX ADMIN — BRIMONIDINE TARTRATE 1 DROP: 2 SOLUTION/ DROPS OPHTHALMIC at 20:39

## 2025-07-24 RX ADMIN — ATORVASTATIN CALCIUM 20 MG: 10 TABLET, FILM COATED ORAL at 08:31

## 2025-07-24 RX ADMIN — ACETAMINOPHEN 975 MG: 325 TABLET ORAL at 10:04

## 2025-07-24 RX ADMIN — SENNOSIDES AND DOCUSATE SODIUM 2 TABLET: 50; 8.6 TABLET ORAL at 20:38

## 2025-07-24 RX ADMIN — GABAPENTIN 100 MG: 100 CAPSULE ORAL at 20:39

## 2025-07-24 RX ADMIN — PANTOPRAZOLE SODIUM 40 MG: 40 TABLET, DELAYED RELEASE ORAL at 16:24

## 2025-07-24 RX ADMIN — TIMOLOL MALEATE 1 DROP: 5 SOLUTION OPHTHALMIC at 20:38

## 2025-07-24 RX ADMIN — METHOCARBAMOL 500 MG: 500 TABLET ORAL at 04:10

## 2025-07-24 RX ADMIN — INSULIN ASPART 13 UNITS: 100 INJECTION, SOLUTION INTRAVENOUS; SUBCUTANEOUS at 13:26

## 2025-07-24 RX ADMIN — METHOCARBAMOL 500 MG: 500 TABLET ORAL at 16:24

## 2025-07-24 RX ADMIN — ACETAMINOPHEN 975 MG: 325 TABLET ORAL at 02:34

## 2025-07-24 RX ADMIN — METHOCARBAMOL 500 MG: 500 TABLET ORAL at 10:04

## 2025-07-24 RX ADMIN — CYCLOSPORINE 1 DROP: 0.5 EMULSION OPHTHALMIC at 20:39

## 2025-07-24 RX ADMIN — HYDROMORPHONE HYDROCHLORIDE 4 MG: 4 TABLET ORAL at 17:34

## 2025-07-24 RX ADMIN — PANTOPRAZOLE SODIUM 40 MG: 40 TABLET, DELAYED RELEASE ORAL at 08:31

## 2025-07-24 RX ADMIN — HYDROMORPHONE HYDROCHLORIDE 4 MG: 4 TABLET ORAL at 02:34

## 2025-07-24 RX ADMIN — FAMOTIDINE 20 MG: 20 TABLET, FILM COATED ORAL at 08:31

## 2025-07-24 RX ADMIN — CYCLOSPORINE 1 DROP: 0.5 EMULSION OPHTHALMIC at 08:31

## 2025-07-24 RX ADMIN — HYDROMORPHONE HYDROCHLORIDE 4 MG: 4 TABLET ORAL at 12:23

## 2025-07-24 RX ADMIN — LATANOPROST 1 DROP: 50 SOLUTION/ DROPS OPHTHALMIC at 21:34

## 2025-07-24 RX ADMIN — LIDOCAINE 3 PATCH: 4 PATCH TOPICAL at 08:35

## 2025-07-24 RX ADMIN — HYDROMORPHONE HYDROCHLORIDE 0.3 MG: 1 INJECTION, SOLUTION INTRAMUSCULAR; INTRAVENOUS; SUBCUTANEOUS at 00:15

## 2025-07-24 RX ADMIN — SENNOSIDES AND DOCUSATE SODIUM 2 TABLET: 50; 8.6 TABLET ORAL at 08:31

## 2025-07-24 RX ADMIN — CEFAZOLIN SODIUM 2 G: 2 SOLUTION INTRAVENOUS at 04:10

## 2025-07-24 RX ADMIN — SERTRALINE HYDROCHLORIDE 50 MG: 25 TABLET ORAL at 08:31

## 2025-07-24 RX ADMIN — TIMOLOL MALEATE 1 DROP: 5 SOLUTION OPHTHALMIC at 08:32

## 2025-07-24 RX ADMIN — GABAPENTIN 100 MG: 100 CAPSULE ORAL at 11:03

## 2025-07-24 RX ADMIN — BRIMONIDINE TARTRATE 1 DROP: 2 SOLUTION/ DROPS OPHTHALMIC at 08:31

## 2025-07-24 RX ADMIN — CEFAZOLIN SODIUM 2 G: 2 SOLUTION INTRAVENOUS at 12:23

## 2025-07-24 RX ADMIN — HYDROMORPHONE HYDROCHLORIDE 4 MG: 4 TABLET ORAL at 21:34

## 2025-07-24 RX ADMIN — POLYETHYLENE GLYCOL 3350 17 G: 17 POWDER, FOR SOLUTION ORAL at 20:38

## 2025-07-24 RX ADMIN — FAMOTIDINE 20 MG: 20 TABLET, FILM COATED ORAL at 20:39

## 2025-07-24 RX ADMIN — GABAPENTIN 100 MG: 100 CAPSULE ORAL at 14:38

## 2025-07-24 RX ADMIN — INSULIN ASPART 5 UNITS: 100 INJECTION, SOLUTION INTRAVENOUS; SUBCUTANEOUS at 19:31

## 2025-07-24 RX ADMIN — HYDROMORPHONE HYDROCHLORIDE 0.3 MG: 1 INJECTION, SOLUTION INTRAMUSCULAR; INTRAVENOUS; SUBCUTANEOUS at 04:14

## 2025-07-24 RX ADMIN — BISACODYL 10 MG: 10 SUPPOSITORY RECTAL at 20:39

## 2025-07-24 RX ADMIN — HYDROMORPHONE HYDROCHLORIDE 4 MG: 4 TABLET ORAL at 09:19

## 2025-07-24 RX ADMIN — POLYETHYLENE GLYCOL 3350 17 G: 17 POWDER, FOR SOLUTION ORAL at 08:31

## 2025-07-24 RX ADMIN — BISACODYL 10 MG: 10 SUPPOSITORY RECTAL at 08:32

## 2025-07-24 RX ADMIN — CEFAZOLIN SODIUM 2 G: 2 SOLUTION INTRAVENOUS at 20:39

## 2025-07-24 RX ADMIN — ACETAMINOPHEN 975 MG: 325 TABLET ORAL at 17:34

## 2025-07-24 ASSESSMENT — ACTIVITIES OF DAILY LIVING (ADL)
ADLS_ACUITY_SCORE: 49

## 2025-07-24 NOTE — PLAN OF CARE
Pt is A/O x 3-4, disoriented to time this evening. Pt remains vitally stable, on CPAP noc. Pt continues to deny SOB, chest pain, N/V. Pt not OOB this shift but remains assist x2 with sera steady & gait belt. Pt continues to report severe back pain managed per MAR. Spinal & hemovac drain x2 dressing(s) remains C/D/I. R PIV remains C/D/I infusing lidocaine in NS. Regular diet, takes pills whole in thin liquid. Continent of B/B, using purewick r/t pain w/ ambulation, pt didn't void overnight, straight cathed at 0650 for 600 mL. Continue to monitor potassium & urine output. Pt uses call light appropriately and able to make needs known. No acute changes overnight.     Goal Outcome Evaluation:    Plan of Care Reviewed With: patient    Overall Patient Progress: improving

## 2025-07-24 NOTE — PLAN OF CARE
End of shift Summary: See flowsheet for VS and detail assessments.     Changes this Shift:     Neuro/CMS: A&Ox4. Intermittently forgetful and [place]. Calm and cooperative. Able to make needs known. Denies dizziness, headaches and N/V. Pt endorses N/T on BLE.      Pulmonary: Stable on RA, saturating above 93%. Utilizes CPAP at night and during daytime naps. Denies chest pain and SOB.      Output: Continent of bowel/bladder. Purewick in place. Pt voided x3; PVR of 172ml and 219ml. Pt c/o constipation; suppository give, had BMx2 today.      Activity: Pt did not ambulated OOB today. Up A2 with surinder steady. Reposition side to side only.      Skin: Spinal incision     Pain: Rates pain 8-10/10; managed with PRN PO dilaudid and scheduled meds.      Dressings/Drains: Dressing on spinal incision C/D/I. Superficial hemovac removed today. Deep hemovac intact and patent.     IV: R and L PIV SL and patent.     Additional info: Sliding scale and carb coverage insulin given per order. Potassium is WDL today.     1840: Pt c/o L rib pain and expressed concern about possible rib fracture related to fall.Notified ortho resident to assess need for x-ray. Resident stated she will order a chest x-ray for tomorrow     Plan: Pending placement to TCU. Continue with POC.

## 2025-07-24 NOTE — PROGRESS NOTES
"Pain Service Progress Note  [facility]  Date: 07/24/2025       Patient Name: Radha Baca  MRN: [id number]  Age: 69 year old  Sex: female      Assessment:  jim Baca is a 69 year old female who presents who has  PMH of Hypertension, Diabetes mellitus type 2, Glaucoma, hernia, and bilateral TKAs. She presents to ED on 7/19/25 with worsening back pain after recent diagnosis of T12 burst fracture from fall.  She  is now s/p  T9-L2 posterior spinal fusion with cement augmentation  on 7/21/25 with Dr. Dubois.     PTA, she was taking oxycodone 5mg PO Q 4 hours PRN during the day and 10mg at bedtime from midnight to 6 am, tylenol and ibuprofen.    Radha is seen lying in bed.  She is AAOX3.  Pain is \"terrible\" today. Reports pain is 10/10.  Still having pain on the left lower back/flank-PTA, the area of injury when she fell PTA.   Also reports of abdominal discomfort-likely constipation.  She has h/o severe constipation.  Is using suppository, is passing gas.  Pain recommendations as below and she is agreeable.     Plan/Recommendations:  Discontinue lidocaine infusion. Typically is used for 48 hours post operatively.   Continue Dilaudid 2-4mg PO Q 3 hours PRN  -discussed with Radha the concerns with increasing opioid dosing given her current constipation and chronic constipation-reports requiring digital disimpaction in past.     -discussed trying to balance pain relief and side effects. Ideally, use less opioids to minimize side effects.    IV Dilaudid 0.3-0.6 mg IV Q 3 hours PRN.  Start gabapentin 100mg PO TID.     -discussed gabapentin can be used indefinitely if helpful or be used short term post operatively and will taper off.  Continue muscle relaxant  Continue acetaminophen  Menthol patches  Bowel regimen    Pain Service will continue to follow.    Discussed with attending anesthesiologist- the context of our conversation was pain relief and side effect of " "constipation    Natalie Medeiros PA-C  07/24/2025         Diet: Advance Diet as Tolerated: Regular Diet Adult  Snacks/Supplements Adult: Other; Fruit Punch Ron B/D, Ensure Max chocolate BLD; With Meals    Relevant Labs:  Recent Labs   Lab Test 07/23/25  0601 07/21/25  0822 07/20/25  1801   PROTIME  --   --  13.9   INR  --   --  1.03      < >  --    PTT  --   --  29   BUN 19.0   < >  --     < > = values in this interval not displayed.       Physical Exam:  Vitals: /50 (BP Location: Right arm)   Pulse 81   Temp 98.6  F (37  C) (Oral)   Resp 16   Ht 1.575 m (5' 2\")   Wt 113.4 kg (250 lb)   SpO2 97%   BMI 45.73 kg/m      Physical Exam:   CONSTITUTIONAL/GENERAL APPEARANCE: Conversant.  EYES: EOMI, sclerae clear  ENT/NECK: neck is supple  RESPIRATORY:non labored breathing, on room air  CARDIOVASCULAR: HR within normal limits  GI:round, soft  MUSCULOSKELETAL/BACK/SPINE/EXTREMITIES: Moving UE and LE independently.     NEURO:  AAOx3.   SKIN/VASCULAR EXAM:  Dry and warm.  PSYCHIATRIC/BEHAVIORAL/OBSERVATIONS:  No objective signs of pain observed during our interview.   Judgment/Insight -fair   Orientation - x3   Memory -fair   Mood and affect - calm, cooperative         Relevant Medications:  Current Pain Medications:  Medications related to Pain Management (From now, onward)      Start     Dose/Rate Route Frequency Ordered Stop    07/24/25 2000  polyethylene glycol (MIRALAX) Packet 17 g         17 g Oral 2 TIMES DAILY 07/24/25 0912      07/24/25 1100  gabapentin (NEURONTIN) capsule 100 mg         100 mg Oral 3 TIMES DAILY 07/24/25 1033      07/24/25 0800  Lidocaine (LIDOCARE) 4 % Patch 3 patch         3 patch  over 12 Hours Transdermal EVERY 24 HOURS 0800 07/23/25 1412      07/24/25 0800  bisacodyl (DULCOLAX) suppository 10 mg         10 mg Rectal 2 TIMES DAILY 07/24/25 0648      07/24/25 0000  bisacodyl (DULCOLAX) suppository 10 mg         10 mg Rectal DAILY PRN 07/21/25 2017 07/23/25 1700  " "methocarbamol (ROBAXIN) tablet 500 mg         500 mg Oral EVERY 6 HOURS 07/23/25 1407      07/23/25 1530  HYDROmorphone (DILAUDID) tablet 2 mg        Placed in \"Or\" Linked Group    2 mg Oral EVERY 3 HOURS PRN 07/23/25 1405      07/23/25 1530  HYDROmorphone (DILAUDID) tablet 4 mg        Placed in \"Or\" Linked Group    4 mg Oral EVERY 3 HOURS PRN 07/23/25 1405 07/23/25 0000  magnesium hydroxide (MILK OF MAGNESIA) suspension 30 mL         30 mL Oral DAILY PRN 07/21/25 2017 07/21/25 2253  HYDROmorphone (PF) (DILAUDID) injection 0.3 mg        Placed in \"Or\" Linked Group    0.3 mg Intravenous EVERY 3 HOURS PRN 07/21/25 2253 07/21/25 2253  HYDROmorphone (PF) (DILAUDID) injection 0.6 mg        Placed in \"Or\" Linked Group    0.6 mg Intravenous EVERY 3 HOURS PRN 07/21/25 2253 07/21/25 2030  senna-docusate (SENOKOT-S/PERICOLACE) 8.6-50 MG per tablet 1 tablet        Placed in \"Or\" Linked Group    1 tablet Oral 2 TIMES DAILY 07/21/25 2017 07/21/25 2030  senna-docusate (SENOKOT-S/PERICOLACE) 8.6-50 MG per tablet 2 tablet        Placed in \"Or\" Linked Group    2 tablet Oral 2 TIMES DAILY 07/21/25 2017 07/21/25 2015  lidocaine 1 % 0.1-1 mL         0.1-1 mL Other EVERY 1 HOUR PRN 07/21/25 2015 07/21/25 2015  lidocaine (LMX4) cream          Topical EVERY 1 HOUR PRN 07/21/25 2015 07/21/25 1830  acetaminophen (TYLENOL) tablet 975 mg         975 mg Oral EVERY 8 HOURS 07/21/25 1805              Primary Service Contacted with Recommendations? Yes            Acute Inpatient Pain Service [facility]  Hours of pain coverage 24/7   Please Page via Vocera  - Link to Vocera Here - Search Pain  Page via Amcom- Please Page the Pain Team Via Amcom: \"PAIN MANAGEMENT ACUTE INPATIENT/ [facility]\"            "

## 2025-07-24 NOTE — PROGRESS NOTES
Care Management Follow Up    Length of Stay (days): 4    Expected Discharge Date: 07/25/2025     Concerns to be Addressed: Discharge planning     Patient plan of care discussed at interdisciplinary rounds: Yes    Anticipated Discharge Disposition: Transitional Care  Anticipated Discharge Services: None  Anticipated Discharge DME: None    Patient/family educated on Medicare website which has current facility and service quality ratings: Yes  Education Provided on the Discharge Plan: Yes  Patient/Family in Agreement with the Plan: Yes    Referrals Placed by CM/SW: External Care Coordination  Private pay costs discussed: Not applicable    Discussed  Partnership in Safe Discharge Planning  document with patient/family: No     Handoff Completed: No, handoff not indicated or clinically appropriate    Additional Information:  Per IDT rounds, patient will likely not be medically cleared for another 2-3 days due to pain management. SW reached out to admissions @ [facility] to update them. Left a voicemail for Janene in admissions.     Addendum 1:35 PM:  Received a call back from admissions who reported they should have a bed available Monday if discharge this weekend does not work out due to pain management. Admissions reported to call [phone] on the weekend or Monday with updates.     Care management continues to follow.     Accepted:  [facility]  [address] [place]  [address]  P: [phone]  F: [phone]  *Call [phone] during weekend or Monday with updates for discharge     Pending:  [facility]  [address] [place]  [address]  P: [phone]  F: [phone]    [facility])  [address] [place]  [address]  P: [phone]  F: [phone]    [facility]  [address] [place]  [address]  P: [phone]  F: [phone]    Next Steps:   -Keep [facility] updated on discharge plans  -PAS needed (complete  Friday)  -IMM needed    Selina Maki, GUSTABO, LGSW  5 Med Surg   [facility] Acute Care Management   Phone: [phone]  Available on ioBridge: 5MS SW

## 2025-07-24 NOTE — PROGRESS NOTES
[facility]    Medicine Progress Note - [facility] Medicine Service    Date of Admission:  7/19/2025    Assessment & Plan   Radha Baca is a 69 year old female with PMHx of HTN, DM2 with insulin dependence, glaucoma, diabetic retinopathy, CAD, HLD, vertigo and recently noted suprasellar lesion. She was admitted on 7/19/2025 for worsening back pain 2/2 T12 fracture and was transferred from [place] to [facility] for orthopedic surgery and pain management.     Major Plans Today:  - Discontinued lidocaine drip  - Ortho consulted Endo for DM management     # T11, T12 burst fractures   # Moderate lumbar spondylosis with moderate spinal canal stenosis L4-L5, mild narrowing L3-L4  # Multilevel foraminal narrowing  # s/p T9-L2 posterior spinal fusion on 7/21/2025   Recent fall several weeks ago and outpatient imaging revealed compression fracture of T12 2/2 osteoporosis. She presented to the ED with worsening low back pain with radiculopathy and neuropathy sxs. 7/19 CT lumbar spine with burst fracture involving T12 vertebral body with central vertebral body height loss and coronally oriented fracture, as well as fracture of the posterior arch, spinous process, and interior aspect of T11. She underwent facetectomies and posterior spinal fusion of T9-L2. Ortho is primary, however, our team is consulted for DM and pain management. Patient was straight cathed earlier this morning with 600mL urine.     Ortho note recs from 7/24:  Ortho Primary  Activity:   - Up with assist until independent. No excessive bending or twisting. No lifting >10 lbs x 6 weeks.   - No use of Lakhwinder lift.   - Will need to be sideways in bed  Weight bearing status: WBAT.  Pain management:   - IV lidocaine: discontinue at 8am on POD#2 or earlier if complications arise  - Transition from PCA to PO narcotics as tolerated. No NSAIDs x 3 months.   Antibiotics: Ancef x until the drain is in  Diet: Begin  with clear fluids and progress diet as tolerated. Sliding scale insulin to continue.  DVT prophylaxis: SCDs only. No chemical DVT ppx needed.  Imaging: XR Upright Thoraco/lumbar  Labs: Hgb POD#3  Bracing/Splinting: None  Dressings: Keep Aquacel c/d/i x 7 days.  Drains: Document output per shift, will be discontinued at Orthopedic Surgery discretion.  Parker catheter: Removed  Physical Therapy/Occupational Therapy: Eval and treat.  Cultures: None.    Consults: Hospitalist.  Follow-up: Clinic with Dr. Dubois in 6 weeks with repeat x-rays.   Disposition: Pending progress with therapies, pain control on orals, and medical stability, anticipate discharge to ARU/home in next 4-5 days     # TDM2 with insulin dependence   Follows with Dr Keturah De Luna with Endocrinology, her last A1c checked was 11.2. PTA on lantus 75U at bedtime, humalog 25U TID with meals and sliding scale as well as metformin 500 mg daily. Escalated Lantus to 42U yesterday as well as carb ratio from 1:8 to 1:6 CHO. POC glucose levels this am are 170-190s, relatively more close to BG goal. Ortho formally consulted Endocrinology for co-management of DM. DM Endo team recommended increasing Lantus to 45U while maintaining CHO coverage.     Endo recs per note on 7/24:    - Increase Lantus to 45 unit(s) at HS [PTA dose is 75 units], continue to titrate based on fasting BG in AM.    - Novolog 1 unit(s) per 6 g cho TID AC and PRN snacks. Cover all intake.    - Novolog High Resistance Correction Scale (ISF: 25) TID AC / HS / 0200    - BG checks: TID AC / HS / 0200   - Ok to wear CGM. Nursing must continue to do POC BG monitoring as ordered, per hospital policy. Glucose sensor values are for patient information, but not FDA approved for treatment decisions in acute care.    - HOLD PTA: Metformin while inpatient   - Hypoglycemia protocol   - Recommend cho consistent diet     # Constipation  Worsened after recent surgery. Patient reports no BM in 1 week. Abdomen soft  "without guarding, although tender in lower quadrants and a known moderately sized umbilical hernia. Today, increased MiraLax to BID. RN administered suppository and if ineffective, may need enema.    - MiraLax BID   - Senna 1-2 tablets BID        Diet: Advance Diet as Tolerated: Regular Diet Adult  Snacks/Supplements Adult: Other; Fruit Punch Ron B/D, Ensure Max chocolate BLD; With Meals    DVT Prophylaxis: Pneumatic Compression Devices  Parker Catheter: Not present  Lines: None     Cardiac Monitoring: None  Code Status: Full Code      Clinically Significant Risk Factors         # Hyponatremia: Lowest Na = 131 mmol/L in last 2 days, will monitor as appropriate       # Hypoalbuminemia: Lowest albumin = 3.1 g/dL at 7/23/2025  6:01 AM, will monitor as appropriate     # Hypertension: Noted on problem list           # DMII: A1C = 11.2 % (Ref range: 0.0 - 5.6 %) within past 6 months, PRESENT ON ADMISSION  # Morbid Obesity: Estimated body mass index is 45.73 kg/m  as calculated from the following:    Height as of this encounter: 1.575 m (5' 2\").    Weight as of this encounter: 113.4 kg (250 lb)., PRESENT ON ADMISSION            Social Drivers of Health    Depression: At risk (7/8/2025)    PHQ-2     PHQ-2 Score: 3   Tobacco Use: Medium Risk (7/21/2025)    Patient History     Smoking Tobacco Use: Former     Smokeless Tobacco Use: Never     Passive Exposure: Past   Stress: Stress Concern Present (3/16/2025)    [employer] - Occupational Stress Questionnaire     Feeling of Stress : To some extent   Social Connections: Unknown (3/16/2025)    Social Connection and Isolation Panel [NHANES]     Frequency of Social Gatherings with Friends and Family: Patient declined          Disposition Plan   Medically Ready for Discharge: Anticipated in 2-4 Days         The patient's care was discussed with the Attending Physician, Dr. Sonam Iverson DO.    [facility]  M " "[facility]  Securely message with [employer] (more info)  Text page via [employer] Paging/Directory   See signed in provider for up to date coverage information  ______________________________________________________________________    Interval History   Significant pain overnight in the back. Doesn't think the lidocaine drip is helpful. Hasn't had a bowel movement in 1 week. Passing gas spontaneously. Denies chest pain or SOB. Voided spontaneously, \"filled up the whole bag\". Endorses numbness on the lateral aspect of her R legs more than L.     Her endocrinologist is Dr. Keturah De [facility] at the [facility]     Physical Exam   Vital Signs: Temp: 98.6  F (37  C) Temp src: Oral BP: 109/50 Pulse: 81   Resp: 16 SpO2: 97 % O2 Device: None (Room air)    Weight: 250 lbs 0 oz    Physical Exam  Vitals reviewed.   Constitutional:       General: She is not in acute distress.     Appearance: Normal appearance. She is not toxic-appearing.   HENT:      Head: Normocephalic.   Eyes:      Extraocular Movements: Extraocular movements intact.      Conjunctiva/sclera: Conjunctivae normal.      Pupils: Pupils are equal, round, and reactive to light.   Pulmonary:      Effort: Pulmonary effort is normal. No respiratory distress.   Abdominal:      Tenderness: There is no guarding.      Comments: Moderately sized umbilical hernia, nontender. Tenderness to palpation of LLQ and RLQ   Neurological:      General: No focal deficit present.      Mental Status: She is alert and oriented to person, place, and time.   Psychiatric:         Mood and Affect: Mood normal.         Behavior: Behavior normal.       Medical Decision Making   See A&P for MDM        Data     I have personally reviewed the following data over the past 24 hrs:    N/A  \   N/A   / N/A     N/A N/A N/A /  177 (H)   4.3 N/A N/A \       Imaging results reviewed over the past 24 hrs:   No results found for this or any previous visit (from the past 24 " hours).

## 2025-07-24 NOTE — PROGRESS NOTES
Brief Continuity Note     Visited patient this evening. Still endorsing pain 10/10, tearful and anxious. RN at bedside, asked about lidocaine gtt which was discontinued per ortho's original orders but is still recommended in pain management's note from today. Patient c/o constipation for few days, but is scared of straining and exacerbating the pain. She used a purewick earlier, is agreeable to use it again instead of bedpan or getting up to go use restroom. Working with PT as tolerated but is limited by pain. She will continue working on being optimistic about her recovery. Motivated to go to TCU to continue rehab after pain is better controlled. Feels she's being cared for well here.     Plan:   - Restarted IV lidocaine gtt 1mg/kg/hr per pain management recommendations     Philly Adair,   PGY-3, [facility] FMRP

## 2025-07-24 NOTE — PROGRESS NOTES
"Orthopaedic Surgery Progress Note:       Subjective:   Increased upper back pain overnight. Lidocaine gtt was restarted by the medicine team. Feels a little better. Worries about being unable to void and being constipated.     Note: Spine team had a discussion with endocrine team overnight. No insulin gtt for now. Lantus increased yesterday due to high blood sugars.     Objective:   /43 (BP Location: Right arm)   Pulse 88   Temp 99  F (37.2  C) (Oral)   Resp 16   Ht 1.575 m (5' 2\")   Wt 113.4 kg (250 lb)   SpO2 96%   BMI 45.73 kg/m    No intake/output data recorded.  Gen: NAD. Resting comfortably in bed  Resp: Breathing comfortably on RA  : Removed    Dressings clean and dry  Drain in place and maintaining suction     Cervical spine:    Appearance -no gross step-offs, kyphosis.    Motor -     C5: Deltoids R 5/5 and L 5/5 strengths    C6: Biceps R 5/5 and L 5/5 strength     C7: Triceps R 5/5 and L 5/5 strength     C8:  R 5/5 and L 5/5 strength     T1: Dorsal interossei R 5/5 and L 5/5 strength        Sensation: intact to light touch in C5-T1       Lumbar Spine:    Appearance - No gross stepoffs or deformities    Motor -     L2-3: Hip flexion 4/5 R and 5/5 L strength          L3/4:  Knee extension R 5/5 and L 5/5 strength         L4/5:  Foot dorsiflexion R 5/5 L 4+/5 and       EHL dorsiflexion R 5/5 L 4/5 strength         S1:  Plantarflexion/Peroneal Muscles  R 5/5 and L 5/5 strength    Sensation: intact to light touch L3-S1 distribution BLE        Labs:  Lab Results   Component Value Date    WBC 14.1 (H) 07/23/2025    HGB 8.3 (L) 07/23/2025     07/23/2025    INR 1.03 07/20/2025        Assessment & Plan:   Assessment and Plan: Radha Baca is a 69 year old female with PMH including DM, T12 burst fracture now s/p T9-L2 posterior spinal fusion on 7/21/2025 with Dr. Dubois.     Today:  - Ongoing discussions with Medicine and Endocrine re: high blood glucose. Spine team has a low " threshold to start insulin gtt. Worried about getting wound infection given uncontrolled DM.  - Discussions with Medicine and Pain team re: pain management. Okay to stop lidocaine gtt to prevent any toxicity.    Ortho Primary  Activity:   - Up with assist until independent. No excessive bending or twisting. No lifting >10 lbs x 6 weeks.   - No use of Lakhwinder lift.   - Will need to be sideways in bed  Weight bearing status: WBAT.  Pain management:   - IV lidocaine: discontinue at 8am on POD#2 or earlier if complications arise  - Transition from PCA to PO narcotics as tolerated. No NSAIDs x 3 months.   Antibiotics: Ancef x until the drain is in  Diet: Begin with clear fluids and progress diet as tolerated. Sliding scale insulin to continue.  DVT prophylaxis: SCDs only. No chemical DVT ppx needed.  Imaging: XR Upright Thoraco/lumbar  Labs: Hgb POD#3  Bracing/Splinting: None  Dressings: Keep Aquacel c/d/i x 7 days.  Drains: Document output per shift, will be discontinued at Orthopedic Surgery discretion.  Parker catheter: Removed  Physical Therapy/Occupational Therapy: Eval and treat.  Cultures: None.    Consults: Hospitalist.  Follow-up: Clinic with Dr. Dubois in 6 weeks with repeat x-rays.   Disposition: Pending progress with therapies, pain control on orals, and medical stability, anticipate discharge to ARU/home in next 4-5 days    Ankit Morrison MD  Spine Fellow

## 2025-07-24 NOTE — CONSULTS
Brief Diabetes Note:     Diabetes service has reviewed salient aspects of care, BG trend and insulin plan today.   All insulin orders adjusted and are confirmed and are accurate.     These recommendations are not intended to take the place of the care team's clinical judgement, which should always be utilized to provide the most appropriate care to meet the unique needs of each patient.       HPI: Radha Baca is a 69 year old female admitted on 7/19/2025. She has a past medical history significant for HTN, DM2 with insulin dependence, glaucoma, diabetic retinopathy, CAD, HLD, vertigo and recently noted suprasellar lesion. Presented to the ED with worsening back pain in setting of recent fall and known T12 fracture. Imaging with T11 and T12 burst fractures. Patient was transferred from [place] to [facility] for surgery with orthopedic surgery.  Case discussed with Dr. Vicente, hospital team with [facility] who have been consulted by Ortho and who is comfortable managing diabetes aspects of this patients care.  Patient does manage her DM at home.      BG relatively stable, ICR was added by team 7/23, did recommend a 10% increase in basal to 45 unit(s)  in lantus for this evening, dose adjusted.     For reference: PTA dosing of lantus is 75 unit(s) at HS  Humalog 25 unit(s) per meal (fixed dose)  Metformin 500 mg daily    Patient did received Dex 6 mg on 7/21 [would have been metabolized in 36 hours - 7/23 at 0230 approx]        Lab Results   Component Value Date    A1C 11.2 06/30/2025    A1C 11.4 06/18/2025    A1C 11.0 03/10/2025    A1C 11.2 06/24/2024    A1C 9.4 01/16/2023    A1C 8.6 04/04/2022    A1C 8.9 04/12/2021    A1C 8.4 01/18/2021    A1C 9.0 09/28/2020    A1C 10.2 03/02/2020    A1C 10.2 08/06/2019          BG trend:           Assessment:     T2DM c/b peripheral neuropathy, diabetic retinopathy, ? gastroparesis, and hyperglycemia - improving.  A1c 11.2% [no anemia at time of A1c].   T12 burst fracture  now s/p T9-L2 posterior spinal fusion on 7/21/2025 with Dr. Dubois.    Anemia    BMI: 43      Plan/Recommendations:       - Increase Lantus to 45 unit(s) at HS [PTA dose is 75 units], continue to titrate based on fasting BG in AM.    - Novolog 1 unit(s) per 6 g cho TID AC and PRN snacks. Cover all intake.    - Novolog High Resistance Correction Scale (ISF: 25) TID AC / HS / 0200    - BG checks: TID AC / HS / 0200   - Ok to wear CGM. Nursing must continue to do POC BG monitoring as ordered, per hospital policy. Glucose sensor values are for patient information, but not FDA approved for treatment decisions in acute care.    - HOLD PTA: Metformin while inpatient   - Hypoglycemia protocol   - Recommend cho consistent diet      - Should needs change, please contact IDS - full consult will not be completed at this time. Signing of this note resolves this consult request.  Case discussed with hospital team who are in agreement.       Pili Carrero, EZEQUIEL CNP, BC-ADM  Inpatient Diabetes Management Service  Available on [employer]      To contact Endocrine Diabetes service:   From 7AM-5PM: page inpatient diabetes provider that is following the patient that day (see filed or incomplete progress notes/consult notes).  If uncertain of provider assignment: page job code [phone].  For questions or updates from 5PM-7AM: page the diabetes job code for on call fellow: [phone]     No charge, no face-to-face

## 2025-07-24 NOTE — CONSULTS
"Consult received for Vascular Access Team.  See LDA for details. For additional needs place \"Consult for Inpatient Vascular Access Care\"  [id number] order in EPIC.  "

## 2025-07-25 ENCOUNTER — APPOINTMENT (OUTPATIENT)
Dept: PHYSICAL THERAPY | Facility: CLINIC | Age: 70
DRG: 457 | End: 2025-07-25
Payer: MEDICARE

## 2025-07-25 ENCOUNTER — APPOINTMENT (OUTPATIENT)
Dept: GENERAL RADIOLOGY | Facility: CLINIC | Age: 70
End: 2025-07-25
Attending: STUDENT IN AN ORGANIZED HEALTH CARE EDUCATION/TRAINING PROGRAM
Payer: MEDICARE

## 2025-07-25 ENCOUNTER — APPOINTMENT (OUTPATIENT)
Dept: OCCUPATIONAL THERAPY | Facility: CLINIC | Age: 70
DRG: 457 | End: 2025-07-25
Attending: STUDENT IN AN ORGANIZED HEALTH CARE EDUCATION/TRAINING PROGRAM
Payer: MEDICARE

## 2025-07-25 LAB
GLUCOSE BLDC GLUCOMTR-MCNC: 159 MG/DL (ref 70–99)
GLUCOSE BLDC GLUCOMTR-MCNC: 178 MG/DL (ref 70–99)
GLUCOSE BLDC GLUCOMTR-MCNC: 183 MG/DL (ref 70–99)
GLUCOSE BLDC GLUCOMTR-MCNC: 188 MG/DL (ref 70–99)
GLUCOSE BLDC GLUCOMTR-MCNC: 190 MG/DL (ref 70–99)
POTASSIUM SERPL-SCNC: 4.3 MMOL/L (ref 3.4–5.3)

## 2025-07-25 PROCEDURE — 97530 THERAPEUTIC ACTIVITIES: CPT | Mod: GP

## 2025-07-25 PROCEDURE — 250N000013 HC RX MED GY IP 250 OP 250 PS 637

## 2025-07-25 PROCEDURE — 250N000013 HC RX MED GY IP 250 OP 250 PS 637: Performed by: STUDENT IN AN ORGANIZED HEALTH CARE EDUCATION/TRAINING PROGRAM

## 2025-07-25 PROCEDURE — 36415 COLL VENOUS BLD VENIPUNCTURE: CPT

## 2025-07-25 PROCEDURE — 99232 SBSQ HOSP IP/OBS MODERATE 35: CPT | Mod: 24

## 2025-07-25 PROCEDURE — 71045 X-RAY EXAM CHEST 1 VIEW: CPT | Mod: 26 | Performed by: RADIOLOGY

## 2025-07-25 PROCEDURE — 250N000013 HC RX MED GY IP 250 OP 250 PS 637: Performed by: NURSE PRACTITIONER

## 2025-07-25 PROCEDURE — 71045 X-RAY EXAM CHEST 1 VIEW: CPT

## 2025-07-25 PROCEDURE — 120N000002 HC R&B MED SURG/OB UMMC

## 2025-07-25 PROCEDURE — 250N000011 HC RX IP 250 OP 636

## 2025-07-25 PROCEDURE — 250N000011 HC RX IP 250 OP 636: Performed by: STUDENT IN AN ORGANIZED HEALTH CARE EDUCATION/TRAINING PROGRAM

## 2025-07-25 PROCEDURE — 99232 SBSQ HOSP IP/OBS MODERATE 35: CPT | Performed by: PHYSICIAN ASSISTANT

## 2025-07-25 PROCEDURE — 84132 ASSAY OF SERUM POTASSIUM: CPT

## 2025-07-25 PROCEDURE — 97165 OT EVAL LOW COMPLEX 30 MIN: CPT | Mod: GO | Performed by: OCCUPATIONAL THERAPIST

## 2025-07-25 PROCEDURE — 97535 SELF CARE MNGMENT TRAINING: CPT | Mod: GO | Performed by: OCCUPATIONAL THERAPIST

## 2025-07-25 RX ORDER — BISACODYL 10 MG
10 SUPPOSITORY, RECTAL RECTAL DAILY PRN
Status: DISCONTINUED | OUTPATIENT
Start: 2025-07-25 | End: 2025-07-29

## 2025-07-25 RX ADMIN — POLYETHYLENE GLYCOL 3350 17 G: 17 POWDER, FOR SOLUTION ORAL at 21:18

## 2025-07-25 RX ADMIN — HYDROMORPHONE HYDROCHLORIDE 4 MG: 4 TABLET ORAL at 15:11

## 2025-07-25 RX ADMIN — HYDROMORPHONE HYDROCHLORIDE 4 MG: 4 TABLET ORAL at 22:09

## 2025-07-25 RX ADMIN — METHOCARBAMOL 500 MG: 500 TABLET ORAL at 10:14

## 2025-07-25 RX ADMIN — BRIMONIDINE TARTRATE 1 DROP: 2 SOLUTION/ DROPS OPHTHALMIC at 09:00

## 2025-07-25 RX ADMIN — HYDROMORPHONE HYDROCHLORIDE 0.3 MG: 1 INJECTION, SOLUTION INTRAMUSCULAR; INTRAVENOUS; SUBCUTANEOUS at 19:05

## 2025-07-25 RX ADMIN — INSULIN ASPART 10 UNITS: 100 INJECTION, SOLUTION INTRAVENOUS; SUBCUTANEOUS at 13:26

## 2025-07-25 RX ADMIN — HYDROMORPHONE HYDROCHLORIDE 4 MG: 4 TABLET ORAL at 04:32

## 2025-07-25 RX ADMIN — TIMOLOL MALEATE 1 DROP: 5 SOLUTION OPHTHALMIC at 21:29

## 2025-07-25 RX ADMIN — LATANOPROST 1 DROP: 50 SOLUTION/ DROPS OPHTHALMIC at 22:04

## 2025-07-25 RX ADMIN — GABAPENTIN 100 MG: 100 CAPSULE ORAL at 15:11

## 2025-07-25 RX ADMIN — HYDROMORPHONE HYDROCHLORIDE 4 MG: 4 TABLET ORAL at 08:59

## 2025-07-25 RX ADMIN — FAMOTIDINE 20 MG: 20 TABLET, FILM COATED ORAL at 08:59

## 2025-07-25 RX ADMIN — HYDROMORPHONE HYDROCHLORIDE 4 MG: 4 TABLET ORAL at 00:32

## 2025-07-25 RX ADMIN — METHOCARBAMOL 500 MG: 500 TABLET ORAL at 04:32

## 2025-07-25 RX ADMIN — SENNOSIDES AND DOCUSATE SODIUM 2 TABLET: 50; 8.6 TABLET ORAL at 08:59

## 2025-07-25 RX ADMIN — INSULIN ASPART: 100 INJECTION, SOLUTION INTRAVENOUS; SUBCUTANEOUS at 22:06

## 2025-07-25 RX ADMIN — SERTRALINE HYDROCHLORIDE 50 MG: 25 TABLET ORAL at 08:59

## 2025-07-25 RX ADMIN — METHOCARBAMOL 500 MG: 500 TABLET ORAL at 00:32

## 2025-07-25 RX ADMIN — METHOCARBAMOL 500 MG: 500 TABLET ORAL at 22:06

## 2025-07-25 RX ADMIN — PANTOPRAZOLE SODIUM 40 MG: 40 TABLET, DELAYED RELEASE ORAL at 17:23

## 2025-07-25 RX ADMIN — ACETAMINOPHEN 975 MG: 325 TABLET ORAL at 10:13

## 2025-07-25 RX ADMIN — BRIMONIDINE TARTRATE 1 DROP: 2 SOLUTION/ DROPS OPHTHALMIC at 21:29

## 2025-07-25 RX ADMIN — ACETAMINOPHEN 975 MG: 325 TABLET ORAL at 19:08

## 2025-07-25 RX ADMIN — GABAPENTIN 100 MG: 100 CAPSULE ORAL at 08:59

## 2025-07-25 RX ADMIN — ATORVASTATIN CALCIUM 20 MG: 10 TABLET, FILM COATED ORAL at 08:59

## 2025-07-25 RX ADMIN — SENNOSIDES AND DOCUSATE SODIUM 2 TABLET: 50; 8.6 TABLET ORAL at 21:18

## 2025-07-25 RX ADMIN — CYCLOSPORINE 1 DROP: 0.5 EMULSION OPHTHALMIC at 09:05

## 2025-07-25 RX ADMIN — CYCLOSPORINE 1 DROP: 0.5 EMULSION OPHTHALMIC at 21:19

## 2025-07-25 RX ADMIN — CEFAZOLIN SODIUM 2 G: 2 SOLUTION INTRAVENOUS at 12:10

## 2025-07-25 RX ADMIN — CEFAZOLIN SODIUM 2 G: 2 SOLUTION INTRAVENOUS at 21:19

## 2025-07-25 RX ADMIN — PANTOPRAZOLE SODIUM 40 MG: 40 TABLET, DELAYED RELEASE ORAL at 08:59

## 2025-07-25 RX ADMIN — CEFAZOLIN SODIUM 2 G: 2 SOLUTION INTRAVENOUS at 04:32

## 2025-07-25 RX ADMIN — FAMOTIDINE 20 MG: 20 TABLET, FILM COATED ORAL at 21:18

## 2025-07-25 RX ADMIN — METHOCARBAMOL 500 MG: 500 TABLET ORAL at 17:23

## 2025-07-25 RX ADMIN — ACETAMINOPHEN 975 MG: 325 TABLET ORAL at 02:15

## 2025-07-25 RX ADMIN — TIMOLOL MALEATE 1 DROP: 5 SOLUTION OPHTHALMIC at 09:05

## 2025-07-25 RX ADMIN — HYDROMORPHONE HYDROCHLORIDE 4 MG: 4 TABLET ORAL at 12:23

## 2025-07-25 RX ADMIN — GABAPENTIN 100 MG: 100 CAPSULE ORAL at 21:27

## 2025-07-25 ASSESSMENT — ACTIVITIES OF DAILY LIVING (ADL)
ADLS_ACUITY_SCORE: 49

## 2025-07-25 NOTE — PROGRESS NOTES
"Orthopaedic Surgery Progress Note:       Subjective:   Had a better night. Reports improvement in upper back pain. No new neurological symptoms. Continued LLE heaviness and fullness/ thickness sensation. Complains of soreness in left loin region. Eating well.     Still needing straight cath, but seems that her pattern of urinary retention is related to time of day (retaining more in am, more complete emptying in PM.) She reports pre-op she would urinate, stand and wash hands, then feel the sensation of needing to void, and can void again, so the retention is not distinctly new.     Also complained of vertigo with PT/OT combo today (room spinning). Resolved while in bed.     Objective:   /48 (BP Location: Right arm, Patient Position: Left side, Cuff Size: Adult Large)   Pulse 78   Temp 98  F (36.7  C) (Oral)   Resp 16   Ht 1.575 m (5' 2\")   Wt 113.4 kg (250 lb)   SpO2 96%   BMI 45.73 kg/m    No intake/output data recorded.  Gen: NAD. Resting comfortably in bed  Resp: Breathing comfortably on RA  : Removed gonzalez.     Dressings clean and dry  Drain in place and maintaining suction     Cervical spine:    Appearance -no gross step-offs, kyphosis.    Motor -     C5: Deltoids R 5/5 and L 5/5 strengths    C6: Biceps R 5/5 and L 5/5 strength     C7: Triceps R 5/5 and L 5/5 strength     C8:  R 5/5 and L 5/5 strength     T1: Dorsal interossei R 5/5 and L 5/5 strength        Sensation: intact to light touch in C5-T1       Lumbar Spine:    Appearance - No gross stepoffs or deformities    Motor -     L2-3: Hip flexion 4/5 R and 5/5 L strength          L3/4:  Knee extension R 5/5 and L 5/5 strength         L4/5:  Foot dorsiflexion R 5/5 L 4+/5 and       EHL dorsiflexion R 5/5 L 4/5 strength         S1:  Plantarflexion/Peroneal Muscles  R 5/5 and L 5/5 strength    Sensation: intact to light touch L3-S1 distribution BLE        Labs:  Lab Results   Component Value Date    WBC 14.1 (H) 07/23/2025    HGB 8.3 (L) " 07/23/2025     07/23/2025    INR 1.03 07/20/2025        Assessment & Plan:   Assessment and Plan: Radha Baca is a 69 year old female with PMH including DM, T12 burst fracture now s/p T9-L2 posterior spinal fusion on 7/21/2025 with Dr. Dubois.     Patient has had a BM and reports that her pain is generally well controlled. Regarding her urinary retention, we discussed that several of her mediations, the anesthesia, and constipation can all impact urinary retention. I have suspicion for underlying/ baseline prolapse or other structural problem leading to her functional incontinence, and lower suspicion for spinal surgical direct correlation (as she is able to initiate urination, more likely lack of complete evacuation.)     Vertigo: monitor, as it is resolved in bed, and consider additional causes. Defer vertigo & blood glucose management.     Today:  - Added consistent bladder scans, discussed consideration of urology consult with [facility] internal medicine rounding team's Dr. Vicente.   - Continue to work with medicine, pain and endocrine teams.  - XR today, encouraged patient to get standing thoracolumbar XR as CXR does not show distinct source of flank pain.     Ortho Primary  Activity:   - Up with assist until independent. No excessive bending or twisting. No lifting >10 lbs x 6 weeks.   - No use of Lakhwinder lift.   - Will need to be sideways in bed  Weight bearing status: WBAT.  Pain management:   - Transition to PO narcotics as tolerated. No NSAIDs x 3 months.   Antibiotics: Ancef x until the drains are in  Diet: Begin with clear fluids and progress diet as tolerated. Sliding scale insulin to continue.  DVT prophylaxis: SCDs only. No chemical DVT ppx needed.  Imaging: XR Upright Thoraco/lumbar  Labs: Per medicine team  Bracing/Splinting: None  Dressings: Keep Aquacel c/d/i x 7 days.  Drains: Document output per shift, will be discontinued at Orthopedic Surgery discretion.  Parker catheter:  Removed  Physical Therapy/Occupational Therapy: Eval and treat.  Cultures: None.    Consults: Hospitalist.  Follow-up: Clinic with Dr. Dubois in 6 weeks with repeat x-rays.   Disposition: Pending progress with therapies, pain control on orals, and medical stability, anticipate discharge to TCU in next 4-5 days    Ankit Morrison MD  Spine Fellow    Addendum by JANNETH Briones

## 2025-07-25 NOTE — PROGRESS NOTES
Care Management Follow Up     Length of Stay (days): 5     Expected Discharge Date: 7/28/2025     Concerns to be Addressed: Discharge planning     Patient plan of care discussed at interdisciplinary rounds: Yes     Anticipated Discharge Disposition: Transitional Care  Anticipated Discharge Services: None  Anticipated Discharge DME: None     Patient/family educated on Medicare website which has current facility and service quality ratings: Yes  Education Provided on the Discharge Plan: Yes  Patient/Family in Agreement with the Plan: Yes     Referrals Placed by CM/SW: External Care Coordination  Private pay costs discussed: Not applicable     Discussed  Partnership in Safe Discharge Planning  document with patient/family: No      Handoff Completed: No, handoff not indicated or clinically appropriate     Additional Information:  Social work reached out to [facility] to update admissions that patient continues to not be ready. Anticipate discharge this weekend vs Monday. Molly in admissions reports they will have a bed for patient Monday if needed. Attempted to update patient at bedside that she's been accepted to [facility] but when SW entered the room, patient was in the bathroom with staff and yelling out in pain. KALI will attempt to visit later in the day or ask weekend staff to follow up with her and coordinate discharge plans.     Addendum 10:48 AM:  PAS completed by KALI ([id number]). Care management continues to follow.      Accepted:  [facility]  [address] [place]  [address]  P: [phone]  F: [phone]  *Call [phone] (Cristofer) during weekend or Monday with updates for discharge       Next Steps:   -Keep [facility] updated on discharge plans  -IMM      Selina Maki, GUSTABO, LGSW  5 Med Surg   [facility] Acute Care Management   Phone: [phone]  Available on Vocera: 5MS KALI

## 2025-07-25 NOTE — PLAN OF CARE
Pt is A/O x 3-4, disoriented to time this evening. Pt remains vitally stable, on CPAP noc. Pt continues to deny SOB, chest pain, N/V. Pt not OOB this shift but remains assist x2 with sera steady & gait belt. Pt continues to report severe back pain managed per MAR. Spinal & hemovac dressings remain C/D/I. R PIV remains C/D/I SL. L PIV remains C/D/I SL. Regular diet, takes pills whole in thin liquid. Continent of B/B, using purewick r/t pain w/ ambulation. Pt straight cathed at 0600 for 775 mL r/t retention. Continue to monitor potassium & urine output. Pt uses call light appropriately and able to make needs known. No acute changes overnight.     Goal Outcome Evaluation:    Plan of Care Reviewed With: patient    Overall Patient Progress: no change

## 2025-07-25 NOTE — PROGRESS NOTES
Pain Service Progress Note  [facility]  Date: 07/25/2025       Patient Name: Radha Baca  MRN: [id number]  Age: 69 year old  Sex: female      Assessment:  Radha Baca is a 69 year old female who presents who has  PMH of Hypertension, Diabetes mellitus type 2, Glaucoma, hernia, and bilateral TKAs. She presents to ED on 7/19/25 with worsening back pain after recent diagnosis of T12 burst fracture from fall.  She  is now s/p  T9-L2 posterior spinal fusion with cement augmentation  on 7/21/25 with Dr. Dubois.     PTA, she was taking oxycodone 5mg PO Q 4 hours PRN during the day and 10mg at bedtime from midnight to 6 am, tylenol and ibuprofen.    Radha is lying on the right side.  States pain is on the left lower back/flank-landed on left side when she had her fall PTA.  States pain medication is providing some relief.  Able to sleep last night-only awake because of cares.  Had 2 BMs yesterday-unclear if pain is improved as a result.  She will need to have routine xray done-helpful to evaluate that left side pain.    For now, no change to pain medication regimen.     Plan/Recommendations:    Continue Dilaudid 2-4mg PO Q 3 hours PRN. (Yesterday used 4mg x 6)  -discussed with Radha the concerns with increasing opioid dosing given her current constipation and chronic constipation-reports requiring digital disimpaction in past.                -discussed trying to balance pain relief and side effects. Ideally, use less opioids to minimize side effects.          IV Dilaudid 0.3-0.6 mg IV Q 3 hours PRN.  gabapentin 100mg PO TID.                -discussed gabapentin can be used indefinitely if helpful or be used short term post operatively and will taper off.  Continue muscle relaxant  Continue acetaminophen  Menthol patches  Bowel regimen      Pain Service will continue to follow.    Discussed with attending anesthesiologist- the context of our conversation was regarding  "needing imaging study, presence of abdominal hernia    Natalie Medeiros PA-C  07/25/2025         Diet: Snacks/Supplements Adult: Other; Fruit Punch Ron B/D, Ensure Max chocolate BLD; With Meals  Consistent Carbohydrate Diet Moderate Consistent Carb (60 g CHO per Meal) Diet    Relevant Labs:  Recent Labs   Lab Test 07/23/25  0601 07/21/25  0822 07/20/25  1801   PROTIME  --   --  13.9   INR  --   --  1.03      < >  --    PTT  --   --  29   BUN 19.0   < >  --     < > = values in this interval not displayed.       Physical Exam:  Vitals: /48 (BP Location: Right arm)   Pulse 78   Temp 98  F (36.7  C) (Oral)   Resp 18   Ht 1.575 m (5' 2\")   Wt 113.4 kg (250 lb)   SpO2 96%   BMI 45.73 kg/m      Physical Exam:   CONSTITUTIONAL/GENERAL APPEARANCE: Conversant.  EYES: EOMI, sclerae clear  RESPIRATORY:non labored breathing, on room air  CARDIOVASCULAR: HR within normal limits  GI:round, soft, known abdominal hernia- bulging -reports site of pain  MUSCULOSKELETAL/BACK/SPINE/EXTREMITIES: Moving UE and LE independently. Lying on right side.     NEURO:  AAOx3.   SKIN/VASCULAR EXAM:  Dry and warm.  PSYCHIATRIC/BEHAVIORAL/OBSERVATIONS:  verbalizes pain   Judgment/Insight -fair   Orientation - x3   Memory -fair   Mood and affect - calm, pleasant, cooperative         Relevant Medications:  Current Pain Medications:  Medications related to Pain Management (From now, onward)      Start     Dose/Rate Route Frequency Ordered Stop    07/24/25 2000  polyethylene glycol (MIRALAX) Packet 17 g         17 g Oral 2 TIMES DAILY 07/24/25 0912      07/24/25 1100  gabapentin (NEURONTIN) capsule 100 mg         100 mg Oral 3 TIMES DAILY 07/24/25 1033      07/24/25 0800  Lidocaine (LIDOCARE) 4 % Patch 3 patch         3 patch  over 12 Hours Transdermal EVERY 24 HOURS 0800 07/23/25 1412      07/24/25 0800  bisacodyl (DULCOLAX) suppository 10 mg         10 mg Rectal 2 TIMES DAILY 07/24/25 0648      07/24/25 0000  bisacodyl (DULCOLAX) " "suppository 10 mg         10 mg Rectal DAILY PRN 07/21/25 2017 07/23/25 1700  methocarbamol (ROBAXIN) tablet 500 mg         500 mg Oral EVERY 6 HOURS 07/23/25 1407      07/23/25 1530  HYDROmorphone (DILAUDID) tablet 2 mg        Placed in \"Or\" Linked Group    2 mg Oral EVERY 3 HOURS PRN 07/23/25 1405      07/23/25 1530  HYDROmorphone (DILAUDID) tablet 4 mg        Placed in \"Or\" Linked Group    4 mg Oral EVERY 3 HOURS PRN 07/23/25 1405      07/23/25 0000  magnesium hydroxide (MILK OF MAGNESIA) suspension 30 mL         30 mL Oral DAILY PRN 07/21/25 2017 07/21/25 2253  HYDROmorphone (PF) (DILAUDID) injection 0.3 mg        Placed in \"Or\" Linked Group    0.3 mg Intravenous EVERY 3 HOURS PRN 07/21/25 2253 07/21/25 2253  HYDROmorphone (PF) (DILAUDID) injection 0.6 mg        Placed in \"Or\" Linked Group    0.6 mg Intravenous EVERY 3 HOURS PRN 07/21/25 2253 07/21/25 2030  senna-docusate (SENOKOT-S/PERICOLACE) 8.6-50 MG per tablet 2 tablet        Placed in \"Or\" Linked Group    2 tablet Oral 2 TIMES DAILY 07/21/25 2017 07/21/25 2015  lidocaine 1 % 0.1-1 mL         0.1-1 mL Other EVERY 1 HOUR PRN 07/21/25 2015 07/21/25 2015  lidocaine (LMX4) cream          Topical EVERY 1 HOUR PRN 07/21/25 2015 07/21/25 1830  acetaminophen (TYLENOL) tablet 975 mg         975 mg Oral EVERY 8 HOURS 07/21/25 1805              Primary Service Contacted with Recommendations? Yes            Acute Inpatient Pain Service [facility]  Hours of pain coverage 24/7   Please Page via Vocera  - Link to Vocera Here - Search Pain  Page via Amcom- Please Page the Pain Team Via Amcom: \"PAIN MANAGEMENT ACUTE INPATIENT/ [facility]\"            "

## 2025-07-25 NOTE — PROGRESS NOTES
[facility]    Medicine Progress Note - [facility] Service    Date of Admission:  7/19/2025    Assessment & Plan   Radha Baca is a 69 year old female with PMHx of HTN, DM2 with insulin dependence, glaucoma, diabetic retinopathy, CAD, HLD, vertigo and recently noted suprasellar lesion. She was admitted on 7/19/2025 for worsening back pain 2/2 T12 fracture and was transferred from [place] to [facility] for orthopedic surgery and pain management.     Major Plans Today:  - Increased Lantus from 56 to 60u   - Parker placement   - UA   - Continue pain regimen per pain     # T11, T12 burst fractures   # Moderate lumbar spondylosis with moderate spinal canal stenosis L4-L5, mild narrowing L3-L4  # Multilevel foraminal narrowing  # s/p T9-L2 posterior spinal fusion on 7/21/2025   Recent fall several weeks ago and outpatient imaging revealed compression fracture of T12 2/2 osteoporosis. She presented to the ED with worsening low back pain with radiculopathy and neuropathy sxs. 7/19 CT lumbar spine with burst fracture involving T12 vertebral body with central vertebral body height loss and coronally oriented fracture, as well as fracture of the posterior arch, spinous process, and interior aspect of T11. She underwent facetectomies and posterior spinal fusion of T9-L2. Ortho is primary, however, our team is consulted for DM and pain management.     Ortho Primary  Activity:   - Up with assist until independent. No excessive bending or twisting. No lifting >10 lbs x 6 weeks.   - No use of Lakhwinder lift.   - Will need to be sideways in bed  Weight bearing status: WBAT.  Pain management:   - Transition to PO narcotics as tolerated. No NSAIDs x 3 months.   Antibiotics: Ancef x until the drains are in  Diet: Begin with clear fluids and progress diet as tolerated. Sliding scale insulin to continue.  DVT prophylaxis: SCDs only. No chemical DVT ppx needed.  Imaging: XR  Upright Thoraco/lumbar  Labs: Per medicine team  Bracing/Splinting: None  Dressings: Keep Aquacel c/d/i x 7 days.  Drains: Document output per shift, will be discontinued at Orthopedic Surgery discretion.  Gonzalez catheter: Removed  Physical Therapy/Occupational Therapy: Eval and treat.  Cultures: None.    Consults: Hospitalist.  Follow-up: Clinic with Dr. Dubois in 6 weeks with repeat x-rays.   Disposition: Pending progress with therapies, pain control on orals, and medical stability, anticipate discharge to TCU in next 4-5 days     # TDM2 with insulin dependence   Follows with Dr Keturah De Luna with Endocrinology, her last A1c checked was 11.2. PTA on lantus 75U at bedtime, humalog 25U TID with meals and sliding scale as well as metformin 500 mg daily. Initially started on Lantus to 42U but ranges have been in the 180s-250s. Will continue to adjust insulin regimen to meet blood sugar goals < 180.   - Lantus 56 --> 60 U  - Novolog 1 unit(s) per 6 g cho TID AC and PRN snacks. Cover all intake.   - Novolog High Resistance Correction Scale (ISF: 25) TID AC / HS / 0200   - BG checks: TID AC / HS / 0200   - HOLD PTA: Metformin while inpatient   - Hypoglycemia protocol   - CHO consistent diet     #Urinary retention   Reports of urinary retention after procedure. Patient states she has only been able to urinate very minimal amounts. Discussed that several of her mediations, the anesthesia, and constipation can all impact urinary retention. Straight cath overnight record 900 ml and 750 ml. Suspicious that symptoms is mostly multifactorial but will collect UA and replace gonzalez. If symptoms continues to worsen will consult Urology for further evaluation.       # Constipation, resolved   Worsened after recent surgery. Multiple Bms on 7/24. Continue to optimized bowel regimen.    - MiraLax BID   - Senna 1-2 tablets BID        Diet: Snacks/Supplements Adult: Other; Fruit Punch Ron B/D, Ensure Max chocolate BLD; With  "Meals  Consistent Carbohydrate Diet Moderate Consistent Carb (60 g CHO per Meal) Diet    DVT Prophylaxis: Pneumatic Compression Devices  Parker Catheter: PRESENT, indication: Surgical procedure  Lines: None     Cardiac Monitoring: None  Code Status: Full Code      Clinically Significant Risk Factors               # Hypoalbuminemia: Lowest albumin = 3.1 g/dL at 7/23/2025  6:01 AM, will monitor as appropriate     # Hypertension: Noted on problem list           # DMII: A1C = 11.2 % (Ref range: 0.0 - 5.6 %) within past 6 months   # Morbid Obesity: Estimated body mass index is 45.73 kg/m  as calculated from the following:    Height as of this encounter: 1.575 m (5' 2\").    Weight as of this encounter: 113.4 kg (250 lb).             Social Drivers of Health    Depression: At risk (7/8/2025)    PHQ-2     PHQ-2 Score: 3   Tobacco Use: Medium Risk (7/21/2025)    Patient History     Smoking Tobacco Use: Former     Smokeless Tobacco Use: Never     Passive Exposure: Past   Stress: Stress Concern Present (3/16/2025)    [employer] - Occupational Stress Questionnaire     Feeling of Stress : To some extent   Social Connections: Unknown (3/16/2025)    Social Connection and Isolation Panel [NHANES]     Frequency of Social Gatherings with Friends and Family: Patient declined          Disposition Plan   Medically Ready for Discharge: Anticipated in 2-4 Days         The patient's care was discussed with the Attending Physician, Dr. Sonam Iverson.    Alvin Holt MD  [facility]  [facility]  Securely message with [employer] (more info)  Text page via [email or web address] Paging/Directory   See signed in provider for up to date coverage information  ______________________________________________________________________    Interval History   NAEO. Still having back pain but much better than before. Still having urinary retention but notes able to urinate " some. No burning pain, abdominal pain or pressure. No discharge. Otherwise, no concerns.     Physical Exam   Vital Signs: Temp: 98.5  F (36.9  C) Temp src: Oral BP: 136/50 Pulse: 80   Resp: 17 SpO2: 95 % O2 Device: None (Room air)    Weight: 250 lbs 0 oz    Physical Exam  Vitals reviewed.   Constitutional:       General: She is not in acute distress.     Appearance: Normal appearance. She is not toxic-appearing.   HENT:      Head: Normocephalic.   Eyes:      Extraocular Movements: Extraocular movements intact.      Conjunctiva/sclera: Conjunctivae normal.      Pupils: Pupils are equal, round, and reactive to light.   Pulmonary:      Effort: Pulmonary effort is normal. No respiratory distress.   Abdominal:      Tenderness: There is no abdominal tenderness.   Neurological:      General: No focal deficit present.      Mental Status: She is alert and oriented to person, place, and time.   Psychiatric:         Mood and Affect: Mood normal.         Behavior: Behavior normal.       Medical Decision Making   See A&P for MDM        Data     I have personally reviewed the following data over the past 24 hrs:    N/A  \   N/A   / N/A     N/A N/A N/A /  185 (H)   4.6 N/A N/A \       Imaging results reviewed over the past 24 hrs:   Recent Results (from the past 24 hours)   XR Chest Port 1 View    Narrative    Exam: XR CHEST PORT 1 VIEW, 7/25/2025 2:05 PM    Indication: Rib pain on L side  Additional history: Postop T9-L2 posterior spinal fusion.    Comparison: Chest x-ray dated May 2, 2018    Findings:   Postsurgical changes of T9-L2 spinal fusion. No abnormalities in the  visualized upper abdomen. Sharp costophrenic angles bilaterally.  Trachea is midline. No focal contour abnormality to the mediastinum.  Stable heart size. No focal airspace opacification. No visualized  acute osseous abnormalities. No visualized rib fractures on this  limited exam.      Impression    Impression: No radiographic explanation of patient's rib  pain,  consider dedicated rib x-rays if clinically indicated.    I have personally reviewed the examination and initial interpretation  and I agree with the findings.    TELLY VERGARA MD         SYSTEM ID:  [id number]

## 2025-07-25 NOTE — PROGRESS NOTES
"   07/25/25 1211   Appointment Info   Signing Clinician's Name / Credentials (OT) Angélica Thompson OTR/L   Rehab Comments (OT) spine   Living Environment   People in Home spouse   Current Living Arrangements house   Home Accessibility stairs to enter home;stairs within home   Number of Stairs, Main Entrance 5   Stair Railings, Main Entrance other (see comments);railings on both sides of stairs  (railings too far apart to hold both)   Number of Stairs, Within Home, Primary greater than 10 stairs   Stair Railings, Within Home, Primary   (does not need to use)   Transportation Anticipated family or friend will provide   Living Environment Comments all needs met on main level   Self-Care   Usual Activity Tolerance good   Current Activity Tolerance fair   Regular Exercise No   Equipment Currently Used at Home other (see comments);walker, standard;cane, straight;grab bar, tub/shower;shower chair;raised toilet seat  (per pt report shower chair is too big, 4WW is too short, occasionally needed help donning socks/shoes from spouse, owns attachable bed rails but not on bed)   Fall history within last six months yes   Number of times patient has fallen within last six months 2   Activity/Exercise/Self-Care Comment IND (I)ADLS, drives, 24/7 A available upon DC from spouse   Instrumental Activities of Daily Living (IADL)   IADL Comments can have A with IADLs prn   General Information   Onset of Illness/Injury or Date of Surgery 07/19/25   Referring Physician Ankit Morrison MD   Patient/Family Therapy Goal Statement (OT) \"to get home without all this pain\"   Additional Occupational Profile Info/Pertinent History of Current Problem Per chart pt. is a \"69 year old female with PMH including DM, T12 burst fracture now s/p T9-L2 posterior spinal fusion on 7/21/2025 with Dr. Dubois.\"   Existing Precautions/Restrictions fall;spinal   General Observations and Info act. order; up with A   Cognitive Status Examination   Orientation Status " orientation to person, place and time   Visual Perception   Visual Impairment/Limitations WFL   Range of Motion Comprehensive   General Range of Motion bilateral upper extremity ROM WFL   Strength Comprehensive (MMT)   Comment, General Manual Muscle Testing (MMT) Assessment BUE str. appears WFL general post op weakness/deconditioning, str. not formally tested 2/2 post op pain/prec.   Bathing Assessment/Intervention   [place] Level (Bathing) maximum assist (25% patient effort)   Comment, (Bathing) per clinical judgement   Upper Body Dressing Assessment/Training   [place] Level (Upper Body Dressing) maximum assist (25% patient effort)   Lower Body Dressing Assessment/Training   [place] Level (Lower Body Dressing) maximum assist (25% patient effort)   Grooming Assessment/Training   [place] Level (Grooming) minimum assist (75% patient effort)   Toileting   [place] Level (Toileting) maximum assist (25% patient effort)   Clinical Impression   Criteria for Skilled Therapeutic Interventions Met (OT) Yes, treatment indicated   OT Diagnosis impaired ADLs/mobiltiy   Influenced by the following impairments post op pain/prec.   OT Problem List-Impairments impacting ADL activity tolerance impaired;strength;post-surgical precautions;pain   ADL comments/analysis below baseline with ADLs/mobility   Assessment of Occupational Performance 3-5 Performance Deficits   Identified Performance Deficits dressing, toileting, bathing, home mgmt.   Planned Therapy Interventions (OT) ADL retraining;strengthening;transfer training;home program guidelines;progressive activity/exercise;risk factor education   Clinical Decision Making Complexity (OT) problem focused assessment/low complexity   Risk & Benefits of therapy have been explained evaluation/treatment results reviewed;care plan/treatment goals reviewed;risks/benefits reviewed;current/potential barriers reviewed;participants voiced agreement with care  plan;participants included;patient   OT Total Evaluation Time   OT Eval, Low Complexity Minutes (37603) 8   OT Goals   Therapy Frequency (OT) 3 times/week   OT Predicted Duration/Target Date for Goal Attainment 08/15/25   OT Goals Hygiene/Grooming;Lower Body Dressing;Upper Body Dressing;Toilet Transfer/Toileting   OT: Hygiene/Grooming independent;modified independent;within precautions   OT: Upper Body Dressing Independent;Modified independent   OT: Lower Body Dressing Minimal assist;within precautions   OT: Toilet Transfer/Toileting Minimal assist;Moderate assist;toilet transfer;cleaning and garment management   OT Discharge Planning   OT Plan OT PLAN: EOB/chair ADLs, standing jessi., toilet/commode transf.   OT Discharge Recommendation (DC Rec) Transitional Care Facility   OT Rationale for DC Rec Pt. very limited by pain, dizziness at time of session requiring A X 2 with use of surinder stedy for transf.s   OT Brief overview of current status A X 2 surinder stedy transf.s   Total Session Time   Total Session Time (sum of timed and untimed services) 8

## 2025-07-26 LAB
ALBUMIN UR-MCNC: NEGATIVE MG/DL
APPEARANCE UR: CLEAR
BILIRUB UR QL STRIP: NEGATIVE
COLOR UR AUTO: ABNORMAL
GLUCOSE BLDC GLUCOMTR-MCNC: 185 MG/DL (ref 70–99)
GLUCOSE BLDC GLUCOMTR-MCNC: 201 MG/DL (ref 70–99)
GLUCOSE BLDC GLUCOMTR-MCNC: 202 MG/DL (ref 70–99)
GLUCOSE BLDC GLUCOMTR-MCNC: 215 MG/DL (ref 70–99)
GLUCOSE BLDC GLUCOMTR-MCNC: 242 MG/DL (ref 70–99)
GLUCOSE UR STRIP-MCNC: 70 MG/DL
HGB UR QL STRIP: NEGATIVE
HOLD SPECIMEN: NORMAL
KETONES UR STRIP-MCNC: NEGATIVE MG/DL
LEUKOCYTE ESTERASE UR QL STRIP: NEGATIVE
MUCOUS THREADS #/AREA URNS LPF: PRESENT /LPF
NITRATE UR QL: NEGATIVE
PH UR STRIP: 5.5 [PH] (ref 5–7)
POTASSIUM SERPL-SCNC: 4.6 MMOL/L (ref 3.4–5.3)
RBC URINE: 1 /HPF
SP GR UR STRIP: 1.02 (ref 1–1.03)
SQUAMOUS EPITHELIAL: <1 /HPF
UROBILINOGEN UR STRIP-MCNC: NORMAL MG/DL
WBC URINE: 1 /HPF

## 2025-07-26 PROCEDURE — 81003 URINALYSIS AUTO W/O SCOPE: CPT

## 2025-07-26 PROCEDURE — 250N000013 HC RX MED GY IP 250 OP 250 PS 637: Performed by: STUDENT IN AN ORGANIZED HEALTH CARE EDUCATION/TRAINING PROGRAM

## 2025-07-26 PROCEDURE — 99232 SBSQ HOSP IP/OBS MODERATE 35: CPT | Mod: GC | Performed by: ANESTHESIOLOGY

## 2025-07-26 PROCEDURE — 250N000011 HC RX IP 250 OP 636: Performed by: STUDENT IN AN ORGANIZED HEALTH CARE EDUCATION/TRAINING PROGRAM

## 2025-07-26 PROCEDURE — 250N000013 HC RX MED GY IP 250 OP 250 PS 637

## 2025-07-26 PROCEDURE — 36415 COLL VENOUS BLD VENIPUNCTURE: CPT

## 2025-07-26 PROCEDURE — 84132 ASSAY OF SERUM POTASSIUM: CPT

## 2025-07-26 PROCEDURE — 250N000013 HC RX MED GY IP 250 OP 250 PS 637: Performed by: NURSE PRACTITIONER

## 2025-07-26 PROCEDURE — 120N000002 HC R&B MED SURG/OB UMMC

## 2025-07-26 PROCEDURE — 250N000011 HC RX IP 250 OP 636: Mod: JW

## 2025-07-26 RX ADMIN — HYDROMORPHONE HYDROCHLORIDE 0.6 MG: 1 INJECTION, SOLUTION INTRAMUSCULAR; INTRAVENOUS; SUBCUTANEOUS at 02:15

## 2025-07-26 RX ADMIN — HYDROMORPHONE HYDROCHLORIDE 4 MG: 4 TABLET ORAL at 09:12

## 2025-07-26 RX ADMIN — GABAPENTIN 100 MG: 100 CAPSULE ORAL at 13:33

## 2025-07-26 RX ADMIN — SENNOSIDES AND DOCUSATE SODIUM 2 TABLET: 50; 8.6 TABLET ORAL at 09:11

## 2025-07-26 RX ADMIN — FAMOTIDINE 20 MG: 20 TABLET, FILM COATED ORAL at 09:11

## 2025-07-26 RX ADMIN — PANTOPRAZOLE SODIUM 40 MG: 40 TABLET, DELAYED RELEASE ORAL at 09:12

## 2025-07-26 RX ADMIN — TIMOLOL MALEATE 1 DROP: 5 SOLUTION OPHTHALMIC at 21:31

## 2025-07-26 RX ADMIN — ACETAMINOPHEN 975 MG: 325 TABLET ORAL at 02:11

## 2025-07-26 RX ADMIN — SENNOSIDES AND DOCUSATE SODIUM 2 TABLET: 50; 8.6 TABLET ORAL at 21:21

## 2025-07-26 RX ADMIN — GABAPENTIN 100 MG: 100 CAPSULE ORAL at 21:21

## 2025-07-26 RX ADMIN — GABAPENTIN 100 MG: 100 CAPSULE ORAL at 09:11

## 2025-07-26 RX ADMIN — HYDROMORPHONE HYDROCHLORIDE 0.3 MG: 1 INJECTION, SOLUTION INTRAMUSCULAR; INTRAVENOUS; SUBCUTANEOUS at 15:25

## 2025-07-26 RX ADMIN — ACETAMINOPHEN 975 MG: 325 TABLET ORAL at 18:27

## 2025-07-26 RX ADMIN — POLYETHYLENE GLYCOL 3350 17 G: 17 POWDER, FOR SOLUTION ORAL at 21:21

## 2025-07-26 RX ADMIN — HYDROMORPHONE HYDROCHLORIDE 4 MG: 4 TABLET ORAL at 18:27

## 2025-07-26 RX ADMIN — METHOCARBAMOL 500 MG: 500 TABLET ORAL at 18:27

## 2025-07-26 RX ADMIN — INSULIN ASPART 8 UNITS: 100 INJECTION, SOLUTION INTRAVENOUS; SUBCUTANEOUS at 11:19

## 2025-07-26 RX ADMIN — POLYETHYLENE GLYCOL 3350 17 G: 17 POWDER, FOR SOLUTION ORAL at 09:12

## 2025-07-26 RX ADMIN — HYDROMORPHONE HYDROCHLORIDE 4 MG: 4 TABLET ORAL at 21:41

## 2025-07-26 RX ADMIN — METHOCARBAMOL 500 MG: 500 TABLET ORAL at 11:19

## 2025-07-26 RX ADMIN — ATORVASTATIN CALCIUM 20 MG: 10 TABLET, FILM COATED ORAL at 09:12

## 2025-07-26 RX ADMIN — METHOCARBAMOL 500 MG: 500 TABLET ORAL at 04:27

## 2025-07-26 RX ADMIN — CYCLOSPORINE 1 DROP: 0.5 EMULSION OPHTHALMIC at 09:13

## 2025-07-26 RX ADMIN — INSULIN ASPART 7 UNITS: 100 INJECTION, SOLUTION INTRAVENOUS; SUBCUTANEOUS at 18:28

## 2025-07-26 RX ADMIN — TIMOLOL MALEATE 1 DROP: 5 SOLUTION OPHTHALMIC at 09:13

## 2025-07-26 RX ADMIN — HYDROMORPHONE HYDROCHLORIDE 4 MG: 4 TABLET ORAL at 13:34

## 2025-07-26 RX ADMIN — BRIMONIDINE TARTRATE 1 DROP: 2 SOLUTION/ DROPS OPHTHALMIC at 09:13

## 2025-07-26 RX ADMIN — FAMOTIDINE 20 MG: 20 TABLET, FILM COATED ORAL at 21:21

## 2025-07-26 RX ADMIN — CEFAZOLIN SODIUM 2 G: 2 SOLUTION INTRAVENOUS at 12:50

## 2025-07-26 RX ADMIN — METHOCARBAMOL 500 MG: 500 TABLET ORAL at 22:42

## 2025-07-26 RX ADMIN — BRIMONIDINE TARTRATE 1 DROP: 2 SOLUTION/ DROPS OPHTHALMIC at 21:30

## 2025-07-26 RX ADMIN — SERTRALINE HYDROCHLORIDE 50 MG: 25 TABLET ORAL at 09:11

## 2025-07-26 RX ADMIN — CYCLOSPORINE 1 DROP: 0.5 EMULSION OPHTHALMIC at 21:20

## 2025-07-26 RX ADMIN — CEFAZOLIN SODIUM 2 G: 2 SOLUTION INTRAVENOUS at 21:31

## 2025-07-26 RX ADMIN — CEFAZOLIN SODIUM 2 G: 2 SOLUTION INTRAVENOUS at 04:27

## 2025-07-26 RX ADMIN — PANTOPRAZOLE SODIUM 40 MG: 40 TABLET, DELAYED RELEASE ORAL at 18:27

## 2025-07-26 RX ADMIN — ACETAMINOPHEN 975 MG: 325 TABLET ORAL at 11:19

## 2025-07-26 ASSESSMENT — ACTIVITIES OF DAILY LIVING (ADL)
ADLS_ACUITY_SCORE: 51
ADLS_ACUITY_SCORE: 49
ADLS_ACUITY_SCORE: 51
ADLS_ACUITY_SCORE: 49
ADLS_ACUITY_SCORE: 51
ADLS_ACUITY_SCORE: 49
ADLS_ACUITY_SCORE: 51
ADLS_ACUITY_SCORE: 49
ADLS_ACUITY_SCORE: 49

## 2025-07-26 NOTE — PROGRESS NOTES
Pain Service Progress Note  [facility]  Date: 07/26/2025       Patient Name: Radha Baca  MRN: [id number]  Age: 69 year old  Sex: female      Assessment:  Radha Baca is a 69 year old female who presents who has  PMH of Hypertension, Diabetes mellitus type 2, Glaucoma, hernia, and bilateral TKAs. She presents to ED on 7/19/25 with worsening back pain after recent diagnosis of T12 burst fracture from fall.  She  is now s/p  T9-L2 posterior spinal fusion with cement augmentation  on 7/21/25 with Dr. Dubois.     PTA, she was taking oxycodone 5mg PO Q 4 hours PRN during the day and 10mg at bedtime from midnight to 6 am, tylenol and ibuprofen.    Radha is lying on the left side.  States pain is on the left lower back/flank-landed on left side when she had her fall PTA.  States pain medication is providing some relief.  Had some difficulty sleeping.  Had 1 BM yesterday-unclear if pain is improved as a result.  She will need to have routine xray done-helpful to evaluate that left side pain. She does note some dizziness with position changes, but she believes this started prior to the addition of the gabapentin.     For now, no change to pain medication regimen.     Plan/Recommendations:    Continue Dilaudid 2-4mg PO Q 3 hours PRN. (Yesterday used 4mg x 6)  -discussed with aRdha the concerns with increasing opioid dosing given her current constipation and chronic constipation-reports requiring digital disimpaction in past.                -discussed trying to balance pain relief and side effects. Ideally, use less opioids to minimize side effects.          IV Dilaudid 0.3-0.6 mg IV Q 3 hours PRN.  gabapentin 100mg PO TID.                -discussed gabapentin can be used indefinitely if helpful or be used short term post operatively and will taper off.  Continue muscle relaxant  Continue acetaminophen  Menthol patches  Bowel regimen      Pain Service will continue to  "follow.    Discussed with attending anesthesiologist.    Karen Crook,   Regional Anesthesiology & Acute Pain Fellow  07/26/2025         Diet: Snacks/Supplements Adult: Other; Fruit Punch Ron B/D, Ensure Max chocolate BLD; With Meals  Consistent Carbohydrate Diet Moderate Consistent Carb (60 g CHO per Meal) Diet    Relevant Labs:  Recent Labs   Lab Test 07/23/25  0601 07/21/25  0822 07/20/25  1801   PROTIME  --   --  13.9   INR  --   --  1.03      < >  --    PTT  --   --  29   BUN 19.0   < >  --     < > = values in this interval not displayed.       Physical Exam:  Vitals: /50 (BP Location: Right arm)   Pulse 80   Temp 36.9  C (98.5  F) (Oral)   Resp 17   Ht 1.575 m (5' 2\")   Wt 113.4 kg (250 lb)   SpO2 95%   BMI 45.73 kg/m      Physical Exam:   CONSTITUTIONAL/GENERAL APPEARANCE: Conversant.  EYES: EOMI, sclerae clear  RESPIRATORY:non labored breathing, on room air  CARDIOVASCULAR: HR within normal limits  GI:round, soft, known abdominal hernia- bulging -reports site of pain  MUSCULOSKELETAL/BACK/SPINE/EXTREMITIES: Moving UE and LE independently. Lying on right side.     NEURO:  AAOx3.   SKIN/VASCULAR EXAM:  Dry and warm.  PSYCHIATRIC/BEHAVIORAL/OBSERVATIONS:  verbalizes pain   Judgment/Insight -fair   Orientation - x3   Memory -fair   Mood and affect - calm, pleasant, cooperative         Relevant Medications:  Current Pain Medications:  Medications related to Pain Management (From now, onward)      Start     Dose/Rate Route Frequency Ordered Stop    07/24/25 2000  polyethylene glycol (MIRALAX) Packet 17 g         17 g Oral 2 TIMES DAILY 07/24/25 0912      07/24/25 1100  gabapentin (NEURONTIN) capsule 100 mg         100 mg Oral 3 TIMES DAILY 07/24/25 1033      07/24/25 0800  Lidocaine (LIDOCARE) 4 % Patch 3 patch         3 patch  over 12 Hours Transdermal EVERY 24 HOURS 0800 07/23/25 1412      07/24/25 0800  bisacodyl (DULCOLAX) suppository 10 mg         10 mg Rectal 2 TIMES DAILY 07/24/25 " "0648      07/24/25 0000  bisacodyl (DULCOLAX) suppository 10 mg         10 mg Rectal DAILY PRN 07/21/25 2017 07/23/25 1700  methocarbamol (ROBAXIN) tablet 500 mg         500 mg Oral EVERY 6 HOURS 07/23/25 1407      07/23/25 1530  HYDROmorphone (DILAUDID) tablet 2 mg        Placed in \"Or\" Linked Group    2 mg Oral EVERY 3 HOURS PRN 07/23/25 1405 07/23/25 1530  HYDROmorphone (DILAUDID) tablet 4 mg        Placed in \"Or\" Linked Group    4 mg Oral EVERY 3 HOURS PRN 07/23/25 1405      07/23/25 0000  magnesium hydroxide (MILK OF MAGNESIA) suspension 30 mL         30 mL Oral DAILY PRN 07/21/25 2017 07/21/25 2253  HYDROmorphone (PF) (DILAUDID) injection 0.3 mg        Placed in \"Or\" Linked Group    0.3 mg Intravenous EVERY 3 HOURS PRN 07/21/25 2253 07/21/25 2253  HYDROmorphone (PF) (DILAUDID) injection 0.6 mg        Placed in \"Or\" Linked Group    0.6 mg Intravenous EVERY 3 HOURS PRN 07/21/25 2253 07/21/25 2030  senna-docusate (SENOKOT-S/PERICOLACE) 8.6-50 MG per tablet 2 tablet        Placed in \"Or\" Linked Group    2 tablet Oral 2 TIMES DAILY 07/21/25 2017 07/21/25 2015  lidocaine 1 % 0.1-1 mL         0.1-1 mL Other EVERY 1 HOUR PRN 07/21/25 2015 07/21/25 2015  lidocaine (LMX4) cream          Topical EVERY 1 HOUR PRN 07/21/25 2015 07/21/25 1830  acetaminophen (TYLENOL) tablet 975 mg         975 mg Oral EVERY 8 HOURS 07/21/25 1805              Primary Service Contacted with Recommendations? Yes            Acute Inpatient Pain Service [facility]  Hours of pain coverage 24/7   Please Page via Vocera  - Link to Vocera Here - Search Pain  Page via Am[employer]- Please Page the Pain Team Via Amcom: \"PAIN MANAGEMENT ACUTE INPATIENT/ [facility]\"            "

## 2025-07-26 NOTE — PLAN OF CARE
Goal Outcome Evaluation: 1900-0700      Plan of Care Reviewed With: patient    Overall Patient Progress: no changeOverall Patient Progress: no change    Outcome Evaluation: No new changes overnight. Pt remained A&Ox4, pt on RA with home CPAP overnight.Pt continues to have Numbness on bilat. feet, new from surgery. Pt had pain rating 8-10/10 managed with PRN. See MAR. Pt is retaining urine, straight cathed x2, provider notified per orders.

## 2025-07-26 NOTE — PLAN OF CARE
Output: Patient due to urinate by end of shift. Attempted to void and have BM several times during shift, with no results. Post void residual bladder scanning orders placed by NP.      Activity: Worked with PT/OT in AM. Attempted to sit at edge of bed during shift. Remained side lying and was repositioned throughout day.       Pain: Complained of 8-10/10 pain throughout day but declined IV medication for breakthrough pain until evening. PO PRN pain medication given as ordered.     Additional info: Patient stated she will attempt standing x-rays tomorrow.      Plan:   Problem: Pain Acute  Goal: Optimal Pain Control and Function  Outcome: Progressing  Intervention: Prevent or Manage Pain  Recent Flowsheet Documentation  Taken 7/25/2025 0900 by Gisela Alcantara, RN  Medication Review/Management: medications reviewed

## 2025-07-26 NOTE — PLAN OF CARE
Patient continued to complain of uncontrolled pain in both back and abdomen. Patient was tearful and visibly uncomfortable/restless in afternoon. 1 x IV dilaudid for breakthrough pain given.    Per order from hospitalist indwelling gonzalez catheter placed due to 2x straight cath overnight and inability to urinate this morning. BM x1 using bed pan.     Deep drain removed without complication in afternoon.     Problem: Adult Inpatient Plan of Care  Goal: Absence of Hospital-Acquired Illness or Injury  Intervention: Identify and Manage Fall Risk  Recent Flowsheet Documentation  Taken 7/26/2025 0915 by Gisela Alcantara, RN  Safety Promotion/Fall Prevention: safety round/check completed      22-Feb-2025 18:39

## 2025-07-26 NOTE — PROGRESS NOTES
"Orthopaedic Surgery Progress Note:       Subjective:   Pain well controlled. No new neurological symptoms.  Complains of soreness in left loin region. Eating well.     Still needing straight cath. Continues to have vertigo when OOB, resolves within 1 minute when standing. Last BM 2 days ago.       Objective:   /50 (BP Location: Right arm)   Pulse 80   Temp 98.5  F (36.9  C) (Oral)   Resp 17   Ht 1.575 m (5' 2\")   Wt 113.4 kg (250 lb)   SpO2 95%   BMI 45.73 kg/m    No intake/output data recorded.  Gen: NAD. Resting comfortably in bed  Resp: Breathing comfortably on RA  : Removed gonzalez.     Dressings clean and dry  Drain in place and maintaining suction        Lumbar Spine:    Appearance - No gross stepoffs or deformities    Motor -     L2-3: Hip flexion 4/5 R and 5/5 L strength          L3/4:  Knee extension R 5/5 and L 5/5 strength         L4/5:  Foot dorsiflexion R 5/5 L 5/5 and       EHL dorsiflexion R 5/5 L 4/5 strength         S1:  Plantarflexion/Peroneal Muscles  R 5/5 and L 5/5 strength    Sensation: intact to light touch L3-S1 distribution BLE        Labs:  Lab Results   Component Value Date    WBC 14.1 (H) 07/23/2025    HGB 8.3 (L) 07/23/2025     07/23/2025    INR 1.03 07/20/2025        Assessment & Plan:   Assessment and Plan: Radha Baca is a 69 year old female with PMH including DM, T12 burst fracture now s/p T9-L2 posterior spinal fusion on 7/21/2025 with Dr. Dubois.     Patient has had a BM and reports that her pain is generally well controlled. Regarding her urinary retention, we discussed that several of her mediations, the anesthesia, and constipation can all impact urinary retention. I have suspicion for underlying/ baseline prolapse or other structural problem leading to her functional incontinence, and lower suspicion for spinal surgical direct correlation (as she is able to initiate urination, more likely lack of complete evacuation.)     Vertigo: monitor, as it is " resolved in bed, and consider additional causes such as orthostatic hypotension. Defer vertigo & blood glucose management.       Today:  - Added consistent bladder scans, discussed consideration of urology consult with Resident internal medicine rounding team's Dr. Vicente.   - Continue to work with medicine, pain and endocrine teams.  - Remove drain today    Ortho Primary  Activity:   - Up with assist until independent. No excessive bending or twisting. No lifting >10 lbs x 6 weeks.   - No use of Lakhwinder lift.   - Will need to be sideways in bed  Weight bearing status: WBAT.  Pain management:   - Transition to PO narcotics as tolerated. No NSAIDs x 3 months.   Antibiotics: Ancef x until the drains are in  Diet: Begin with clear fluids and progress diet as tolerated. Sliding scale insulin to continue.  DVT prophylaxis: SCDs only. No chemical DVT ppx needed.  Imaging: XR Upright Thoraco/lumbar  Labs: Per medicine team  Bracing/Splinting: None  Dressings: Keep Aquacel c/d/i x 7 days.  Drains: Document output per shift, will be discontinued at Orthopedic Surgery discretion.  Parker catheter: Removed  Physical Therapy/Occupational Therapy: Eval and treat.  Cultures: None.    Consults: Hospitalist.  Follow-up: Clinic with Dr. Dubois in 6 weeks with repeat x-rays.   Disposition: Pending progress with therapies, pain control on orals, and medical stability, anticipate discharge to TCU in next 4-5 days    Génesis Jim MD PGY3  Orthopedic Surgery

## 2025-07-27 LAB
ANION GAP SERPL CALCULATED.3IONS-SCNC: 13 MMOL/L (ref 7–15)
BUN SERPL-MCNC: 11.5 MG/DL (ref 8–23)
CALCIUM SERPL-MCNC: 9.2 MG/DL (ref 8.8–10.4)
CHLORIDE SERPL-SCNC: 96 MMOL/L (ref 98–107)
CREAT SERPL-MCNC: 0.65 MG/DL (ref 0.51–0.95)
EGFRCR SERPLBLD CKD-EPI 2021: >90 ML/MIN/1.73M2
ERYTHROCYTE [DISTWIDTH] IN BLOOD BY AUTOMATED COUNT: 15.2 % (ref 10–15)
GLUCOSE BLDC GLUCOMTR-MCNC: 117 MG/DL (ref 70–99)
GLUCOSE BLDC GLUCOMTR-MCNC: 135 MG/DL (ref 70–99)
GLUCOSE BLDC GLUCOMTR-MCNC: 163 MG/DL (ref 70–99)
GLUCOSE BLDC GLUCOMTR-MCNC: 170 MG/DL (ref 70–99)
GLUCOSE BLDC GLUCOMTR-MCNC: 194 MG/DL (ref 70–99)
GLUCOSE SERPL-MCNC: 179 MG/DL (ref 70–99)
HCO3 SERPL-SCNC: 24 MMOL/L (ref 22–29)
HCT VFR BLD AUTO: 24.4 % (ref 35–47)
HGB BLD-MCNC: 8 G/DL (ref 11.7–15.7)
MCH RBC QN AUTO: 29.9 PG (ref 26.5–33)
MCHC RBC AUTO-ENTMCNC: 32.8 G/DL (ref 31.5–36.5)
MCV RBC AUTO: 91 FL (ref 78–100)
PLATELET # BLD AUTO: 403 10E3/UL (ref 150–450)
POTASSIUM BLD-SCNC: 4 MMOL/L (ref 3.4–5.3)
POTASSIUM SERPL-SCNC: 4.2 MMOL/L (ref 3.4–5.3)
RBC # BLD AUTO: 2.68 10E6/UL (ref 3.8–5.2)
SODIUM SERPL-SCNC: 133 MMOL/L (ref 135–145)
WBC # BLD AUTO: 12.9 10E3/UL (ref 4–11)

## 2025-07-27 PROCEDURE — 120N000002 HC R&B MED SURG/OB UMMC

## 2025-07-27 PROCEDURE — 85027 COMPLETE CBC AUTOMATED: CPT

## 2025-07-27 PROCEDURE — 36415 COLL VENOUS BLD VENIPUNCTURE: CPT

## 2025-07-27 PROCEDURE — 250N000013 HC RX MED GY IP 250 OP 250 PS 637: Performed by: STUDENT IN AN ORGANIZED HEALTH CARE EDUCATION/TRAINING PROGRAM

## 2025-07-27 PROCEDURE — 250N000011 HC RX IP 250 OP 636: Mod: JW

## 2025-07-27 PROCEDURE — 250N000013 HC RX MED GY IP 250 OP 250 PS 637

## 2025-07-27 PROCEDURE — 250N000013 HC RX MED GY IP 250 OP 250 PS 637: Performed by: INTERNAL MEDICINE

## 2025-07-27 PROCEDURE — 250N000013 HC RX MED GY IP 250 OP 250 PS 637: Performed by: NURSE PRACTITIONER

## 2025-07-27 PROCEDURE — 99232 SBSQ HOSP IP/OBS MODERATE 35: CPT | Mod: GC | Performed by: ANESTHESIOLOGY

## 2025-07-27 PROCEDURE — 99232 SBSQ HOSP IP/OBS MODERATE 35: CPT | Mod: 24

## 2025-07-27 PROCEDURE — 80048 BASIC METABOLIC PNL TOTAL CA: CPT

## 2025-07-27 PROCEDURE — 84132 ASSAY OF SERUM POTASSIUM: CPT

## 2025-07-27 PROCEDURE — 250N000012 HC RX MED GY IP 250 OP 636 PS 637

## 2025-07-27 RX ORDER — GABAPENTIN 300 MG/1
300 CAPSULE ORAL 3 TIMES DAILY
Status: DISCONTINUED | OUTPATIENT
Start: 2025-07-27 | End: 2025-08-01 | Stop reason: HOSPADM

## 2025-07-27 RX ORDER — HYDROMORPHONE HYDROCHLORIDE 2 MG/1
2 TABLET ORAL EVERY 4 HOURS PRN
Status: DISCONTINUED | OUTPATIENT
Start: 2025-07-27 | End: 2025-07-27

## 2025-07-27 RX ORDER — HYDROMORPHONE HYDROCHLORIDE 4 MG/1
4 TABLET ORAL EVERY 4 HOURS PRN
Refills: 0 | Status: DISCONTINUED | OUTPATIENT
Start: 2025-07-27 | End: 2025-07-28

## 2025-07-27 RX ADMIN — LATANOPROST 1 DROP: 50 SOLUTION/ DROPS OPHTHALMIC at 02:23

## 2025-07-27 RX ADMIN — HYDROMORPHONE HYDROCHLORIDE 4 MG: 4 TABLET ORAL at 10:52

## 2025-07-27 RX ADMIN — METHOCARBAMOL 500 MG: 500 TABLET ORAL at 17:24

## 2025-07-27 RX ADMIN — CYCLOSPORINE 1 DROP: 0.5 EMULSION OPHTHALMIC at 20:39

## 2025-07-27 RX ADMIN — HYDROMORPHONE HYDROCHLORIDE 4 MG: 4 TABLET ORAL at 14:14

## 2025-07-27 RX ADMIN — FAMOTIDINE 20 MG: 20 TABLET, FILM COATED ORAL at 08:04

## 2025-07-27 RX ADMIN — POLYETHYLENE GLYCOL 3350 17 G: 17 POWDER, FOR SOLUTION ORAL at 20:39

## 2025-07-27 RX ADMIN — METHOCARBAMOL 500 MG: 500 TABLET ORAL at 10:52

## 2025-07-27 RX ADMIN — SENNOSIDES AND DOCUSATE SODIUM 2 TABLET: 50; 8.6 TABLET ORAL at 08:05

## 2025-07-27 RX ADMIN — ACETAMINOPHEN 975 MG: 325 TABLET ORAL at 18:13

## 2025-07-27 RX ADMIN — INSULIN GLARGINE 68 UNITS: 100 INJECTION, SOLUTION SUBCUTANEOUS at 23:15

## 2025-07-27 RX ADMIN — LATANOPROST 1 DROP: 50 SOLUTION/ DROPS OPHTHALMIC at 23:15

## 2025-07-27 RX ADMIN — TIMOLOL MALEATE 1 DROP: 5 SOLUTION OPHTHALMIC at 20:40

## 2025-07-27 RX ADMIN — FAMOTIDINE 20 MG: 20 TABLET, FILM COATED ORAL at 20:38

## 2025-07-27 RX ADMIN — ACETAMINOPHEN 975 MG: 325 TABLET ORAL at 10:51

## 2025-07-27 RX ADMIN — GABAPENTIN 300 MG: 300 CAPSULE ORAL at 20:38

## 2025-07-27 RX ADMIN — METHOCARBAMOL 500 MG: 500 TABLET ORAL at 05:13

## 2025-07-27 RX ADMIN — HYDROMORPHONE HYDROCHLORIDE 4 MG: 4 TABLET ORAL at 20:38

## 2025-07-27 RX ADMIN — BRIMONIDINE TARTRATE 1 DROP: 2 SOLUTION/ DROPS OPHTHALMIC at 10:54

## 2025-07-27 RX ADMIN — SENNOSIDES AND DOCUSATE SODIUM 2 TABLET: 50; 8.6 TABLET ORAL at 20:38

## 2025-07-27 RX ADMIN — HYDROMORPHONE HYDROCHLORIDE 4 MG: 4 TABLET ORAL at 17:24

## 2025-07-27 RX ADMIN — HYDROMORPHONE HYDROCHLORIDE 0.6 MG: 1 INJECTION, SOLUTION INTRAMUSCULAR; INTRAVENOUS; SUBCUTANEOUS at 02:07

## 2025-07-27 RX ADMIN — HYDROMORPHONE HYDROCHLORIDE 6 MG: 4 TABLET ORAL at 23:16

## 2025-07-27 RX ADMIN — GABAPENTIN 100 MG: 100 CAPSULE ORAL at 08:05

## 2025-07-27 RX ADMIN — HYDROMORPHONE HYDROCHLORIDE 4 MG: 4 TABLET ORAL at 08:04

## 2025-07-27 RX ADMIN — HYDROMORPHONE HYDROCHLORIDE 4 MG: 4 TABLET ORAL at 02:23

## 2025-07-27 RX ADMIN — BRIMONIDINE TARTRATE 1 DROP: 2 SOLUTION/ DROPS OPHTHALMIC at 20:40

## 2025-07-27 RX ADMIN — POLYETHYLENE GLYCOL 3350 17 G: 17 POWDER, FOR SOLUTION ORAL at 10:54

## 2025-07-27 RX ADMIN — GABAPENTIN 300 MG: 300 CAPSULE ORAL at 14:14

## 2025-07-27 RX ADMIN — METHOCARBAMOL 500 MG: 500 TABLET ORAL at 23:16

## 2025-07-27 RX ADMIN — HYDROMORPHONE HYDROCHLORIDE 4 MG: 4 TABLET ORAL at 05:13

## 2025-07-27 RX ADMIN — INSULIN ASPART 6 UNITS: 100 INJECTION, SOLUTION INTRAVENOUS; SUBCUTANEOUS at 18:14

## 2025-07-27 RX ADMIN — PANTOPRAZOLE SODIUM 40 MG: 40 TABLET, DELAYED RELEASE ORAL at 17:24

## 2025-07-27 RX ADMIN — ATORVASTATIN CALCIUM 20 MG: 10 TABLET, FILM COATED ORAL at 08:05

## 2025-07-27 RX ADMIN — CYCLOSPORINE 1 DROP: 0.5 EMULSION OPHTHALMIC at 10:54

## 2025-07-27 RX ADMIN — SERTRALINE HYDROCHLORIDE 50 MG: 25 TABLET ORAL at 08:05

## 2025-07-27 RX ADMIN — TIMOLOL MALEATE 1 DROP: 5 SOLUTION OPHTHALMIC at 10:55

## 2025-07-27 RX ADMIN — ACETAMINOPHEN 975 MG: 325 TABLET ORAL at 02:23

## 2025-07-27 RX ADMIN — PANTOPRAZOLE SODIUM 40 MG: 40 TABLET, DELAYED RELEASE ORAL at 08:05

## 2025-07-27 ASSESSMENT — ACTIVITIES OF DAILY LIVING (ADL)
ADLS_ACUITY_SCORE: 46

## 2025-07-27 NOTE — PLAN OF CARE
Goal Outcome Evaluation:    Alert,orientated x4. [place].  Satting well on room air when awake.  Nasal CPAP at night.  Back drsgs,CDI.  Numbness/tingling on BLE.   Pain managed with dilaudid oral,robaxin,tylenol and IV dilaudid for breakthrough pain.  BGs 215 and 170.  PIV lines patent.  Turned and repositioned with 2 assists.  PCDs on.  Plan:  Still needs x-ray done.Pt is aware. TCU likely for discharge.

## 2025-07-27 NOTE — PROGRESS NOTES
[facility]    Medicine Progress Note - [facility] Service    Date of Admission:  7/19/2025    Assessment & Plan   Radha Baca is a 69 year old female with PMHx of HTN, DM2 with insulin dependence, glaucoma, diabetic retinopathy, CAD, HLD, vertigo and recently noted suprasellar lesion. She was admitted on 7/19/2025 for worsening back pain 2/2 T12 fracture and [place] [facility] [place].     Major Plans Today:  - Increased Lantus from 60U to 68U  - Parker in place  - Continue pain regimen per pain   - Post-op X-rays today per Ortho    # T11, T12 burst fractures   # Moderate lumbar spondylosis with moderate spinal canal stenosis L4-L5, mild narrowing L3-L4  # Multilevel foraminal narrowing  # s/p T9-L2 posterior spinal fusion on 7/21/2025   Recent fall several weeks ago and outpatient imaging revealed compression fracture of T12 2/2 osteoporosis. She presented to the ED with worsening low back pain with radiculopathy and neuropathy sxs. 7/19 CT lumbar spine with burst fracture involving T12 vertebral body with central vertebral body height loss and coronally oriented fracture, as well as fracture of the posterior arch, spinous process, and interior aspect of T11. She underwent facetectomies and posterior spinal fusion of T9-L2. Ortho is primary, our team is consulted for DM management.     Ortho Primary (copied from note on 7/27)  Activity:   - Up with assist until independent. No excessive bending or twisting. No lifting >10 lbs x 6 weeks.   - No use of Lakhwinder lift.   - Will need to be sideways in bed  Weight bearing status: WBAT.  Pain management:   - Transition to PO narcotics as tolerated. No NSAIDs x 3 months.   Antibiotics: Ancef x until the drains are in  Diet: Begin with clear fluids and progress diet as tolerated. Sliding scale insulin to continue.  DVT prophylaxis: SCDs only. No  chemical DVT ppx needed.  Imaging: XR Upright Thoraco/lumbar  Labs: Per medicine team  Bracing/Splinting: None  Dressings: Keep Aquacel c/d/i x 7 days.  Drains: Document output per shift, will be discontinued at Orthopedic Surgery discretion.  Gonzalez catheter: Removed  Physical Therapy/Occupational Therapy: Eval and treat.  Cultures: None.    Consults: Hospitalist.  Follow-up: Clinic with Dr. Dubois in 6 weeks with repeat x-rays.   Disposition: Pending progress with therapies, pain control on orals, and medical stability, anticipate discharge to TCU in next 4-5 days     # TDM2 with insulin dependence   Follows with Dr Keturah De Luna with Endocrinology, her last A1c checked was 11.2. PTA on lantus 75U at bedtime, humalog 25U TID with meals and sliding scale as well as metformin 500 mg daily. Recent glucose trends have been 180s-200s with sequential increase to her Lantus. Will continue to adjust insulin regimen to meet blood sugar goals < 180.   - Lantus 60 --> 68U  - Novolog 1 unit(s) per 6 g cho TID AC and PRN snacks. Cover all intake.   - Novolog High Resistance Correction Scale (ISF: 25) TID AC / HS / 0200   - BG checks: TID AC / HS / 0200   - HOLD PTA: Metformin while inpatient   - Hypoglycemia protocol   - CHO consistent diet     # Urinary retention   Per discussion with Ortho and Pain, urinary retention sounds chronic and most c/w prolapse as she is able to initiate urination but cannot empty completely. Suspect that worsening retention is multifactorial from recent surgery, medications, and constipation. Straight cathing have resulted in 700-900cc of urine in recent days. She ultimately had a gonzalez catheter placed. Will consider consulting Urology on Monday when team is available. Otherwise, she may need outpatient work-up as this is a chronic concern.    - Gonzalez cath in place     # Constipation - improving   Worsened after recent surgery. Had several BMs recently. Has a known umbilical hernia. Continue to  "optimize bowel regimen.    - MiraLax BID   - Senna 1-2 tablets BID        Diet: Snacks/Supplements Adult: Other; Fruit Punch Ron B/D, Ensure Max chocolate BLD; With Meals  Consistent Carbohydrate Diet Moderate Consistent Carb (60 g CHO per Meal) Diet    DVT Prophylaxis: Pneumatic Compression Devices  Parker Catheter: PRESENT, indication: Surgical procedure, Acute retention or obstruction  Lines: None     Cardiac Monitoring: None  Code Status: Full Code      Clinically Significant Risk Factors    # Hyponatremia: Lowest Na = 133 mmol/L in last 2 days, will monitor as appropriate  # Hypochloremia: Lowest Cl = 96 mmol/L in last 2 days, will monitor as appropriate      # Hypoalbuminemia: Lowest albumin = 3.1 g/dL at 7/23/2025  6:01 AM, will monitor as appropriate     # Hypertension: Noted on problem list           # DMII: A1C = 11.2 % (Ref range: 0.0 - 5.6 %) within past 6 months   # Morbid Obesity: Estimated body mass index is 46.13 kg/m  as calculated from the following:    Height as of this encounter: 1.575 m (5' 2.01\").    Weight as of this encounter: 114.4 kg (252 lb 4.8 oz).             Social Drivers of Health    Depression: At risk (7/8/2025)    PHQ-2     PHQ-2 Score: 3   Tobacco Use: Medium Risk (7/21/2025)    Patient History     Smoking Tobacco Use: Former     Smokeless Tobacco Use: Never     Passive Exposure: Past   Stress: Stress Concern Present (3/16/2025)    [employer] [place] - Occupational Stress Questionnaire     Feeling of Stress : To some extent   Social Connections: Unknown (3/16/2025)    Social Connection and Isolation Panel [NHANES]     Frequency of Social Gatherings with Friends and Family: Patient declined          Disposition Plan   Medically Ready for Discharge: Anticipated in 2-4 Days       The patient's care was discussed with the Attending Physician, Dr. Sonam Iverson.    DO [facility]  [place] [facility] " [facility]  Securely message with [employer] (more info)  Text page via [facility] Paging/Directory   See signed in provider for up to date coverage information  ______________________________________________________________________    Interval History   NAEO. Continues to have some flank pain which was present before her surgery. She was getting worked up for this in the outpatient setting. Had X-rays done on her ribs, but there were no fractures.     Endorses intermittent vertigo when she gets up from bed. However, this dizziness was present before her surgery and is the reason why she had the mechanical fall in the first place. No new headaches, vision or neuro changes.     Physical Exam   Vital Signs: Temp: 98.4  F (36.9  C) Temp src: Oral BP: 127/56 Pulse: 79   Resp: 16 SpO2: 92 % O2 Device: None (Room air)    Weight: 252 lbs 4.8 oz    Physical Exam  Vitals reviewed.   Constitutional:       Appearance: Normal appearance.   Eyes:      Extraocular Movements: Extraocular movements intact.      Conjunctiva/sclera: Conjunctivae normal.      Pupils: Pupils are equal, round, and reactive to light.   Cardiovascular:      Rate and Rhythm: Normal rate and regular rhythm.   Pulmonary:      Effort: Pulmonary effort is normal. No respiratory distress.      Breath sounds: No wheezing or rales.   Neurological:      General: No focal deficit present.      Mental Status: She is alert and oriented to person, place, and time.   Psychiatric:         Mood and Affect: Mood normal.         Behavior: Behavior normal.     Medical Decision Making   See A&P for MDM        Data     I have personally reviewed the following data over the past 24 hrs:    12.9 (H)  \   8.0 (L)   / 403     133 (L) 96 (L) 11.5 /  179 (H)   4.2 24 0.65 \       Imaging results reviewed over the past 24 hrs:   No results found for this or any previous visit (from the past 24 hours).

## 2025-07-27 NOTE — PROGRESS NOTES
"Care Management Follow Up    Length of Stay (days): 7    Expected Discharge Date: TBD     Concerns to be Addressed: Discharge planning  Patient plan of care discussed at interdisciplinary rounds: Yes    Anticipated Discharge Disposition: [facility] TCU vs [facility]       Anticipated Discharge Services: Post acute therapies  Anticipated Discharge DME: None    Patient/family educated on Medicare website which has current facility and service quality ratings: yes  Education Provided on the Discharge Plan: Yes  Patient/Family in Agreement with the Plan: yes    Referrals Placed by CM/SW: Post Acute Facilities (see previous  notes for comprehensive list of where TCU referrals were sent)    ACCEPTED  [facility]  [address] [place]  [address]  PH: [phone]  Fax: [phone]  PAS Code: [id number]   *Call [phone] (Cristofer) during weekend or Monday with updates for discharge     [facility]  [address]  [address] [place] [address]  Admissions: [phone]  RN Station: [phone]  ARU SW: [phone]   7/27: Jennifer Anthony, reached out to , reported that they had been \"following for possible ARC, as she looks to meet criteria and has intensive therapy as well as medical management needs. It looks like she may have an accepting TCU facility, most recently therapy rec TCU, and therapy does not have her scheduled this weekend, but just throwing it out there for ARC consideration!\"  7/28:  did not get a chance to discuss [facility] with pt on Sunday.    Private pay costs discussed: Not at this time.    Discussed  Partnership in Safe Discharge Planning  document with patient/family: No     Handoff Completed: No, handoff not indicated or clinically appropriate    Additional Information:  On Saturday (07/26/25) during IDT rounds, MD stated pt will be here thru the weekend for potential urinary retention. Jennifer Anthony, reached out to  to report that pt meets criteria " for ARC (intensive therapies and medical management needs). KALI did not have time to discuss [facility] with pt on Saturday.    On Sunday (07/28/25) during IDT rounds, it was reported that pt currently has a Parker catheter, but MD unsure if pt will need to discharge with it. CHRIS unclear at this time. Per chart review, pt is receiving IV dilaudid for breakthrough pain - last dose given on 07/27/25 at 0207. KALI attempted to meet with pt x2 to discuss TCU vs ARU option for discharge disposition, but pt was sound asleep.    SW did not reach out to [facility] over the weekend, given that CHRIS is unclear and pt has an option to go to [facility].        Next Steps:   - Discuss [facility] option with pt  - Keep [facility] updated of discharge plan  - IMM needed if pt goes to TCU.          GIACOMO WanW, LSW   Weekend   [facility] Acute Care Management  PHONE: [phone]  Searchable on Vocera: search 5 Med Surg SW    Or click on link below:  5 Med Surg Vocera     Weekday 5 MS  PH: [phone]

## 2025-07-27 NOTE — PROGRESS NOTES
"VS: /56 (BP Location: Right arm)   Pulse 79   Temp 98.4  F (36.9  C) (Oral)   Resp 16   Ht 1.575 m (5' 2.01\")   Wt 114.4 kg (252 lb 4.8 oz)   SpO2 92%   BMI 46.13 kg/m     O2: SpO2 stable on RA. LS clear and equal bilaterally. Denies chest pain and SOB.    Output: Parker catheter patent.    Last BM: 7/26, denies abdominal discomfort. BS active / passing flatus.    Activity: Not oob. Assist of two with bed repositioning.    Skin: Back incision.    Pain: Pain managed with PRN Dilaudid and scheduled pain medications.    CMS: Intact, AOx4. [place]. Numbness and tingling baseline in lower extremities.   Dressing: CDI   Diet: Consistent carb.  and .    LDA: Right and left lower forearms PIV SL.    Equipment: IV pole and personal belongings at bedside.    Plan: Continue with plan of care. Call light within reach, pt able to make needs known.    Additional Info: Maintain side lying position. RN managed potassium did not need replacement.        "

## 2025-07-27 NOTE — PROGRESS NOTES
Pain Service Progress Note  [facility]  Date: 07/27/2025       Patient Name: Radha Baca  MRN: [id number]  Age: 69 year old  Sex: female      Assessment:  Radha Baca is a 69 year old female who presents who has  PMH of Hypertension, Diabetes mellitus type 2, Glaucoma, hernia, and bilateral TKAs. She presents to ED on 7/19/25 with worsening back pain after recent diagnosis of T12 burst fracture from fall.  She  is now s/p  T9-L2 posterior spinal fusion with cement augmentation  on 7/21/25 with Dr. Dubois.     PTA, she was taking oxycodone 5mg PO Q 4 hours PRN during the day and 10mg at bedtime from midnight to 6 am, tylenol and ibuprofen.    Radha is lying on the left side. She reports that her pain is 10/10 today. It is a burning sensation that wraps from her back around to her abdomen. She reports this has been there since her fall and may have gotten worse since she is not used to laying on her side.     Plan/Recommendations:  Continue Dilaudid 2-4mg PO Q 3 hours PRN.                -discussed trying to balance pain relief and side effects. Ideally, use less opioids to minimize side effects and talked about increasing non-opioid medication     IV Dilaudid 0.3-0.6 mg IV Q 3 hours PRN. Use for breakthrough pain and to help with therapies.  Increase gabapentin to 300 TID  Continue muscle relaxant  Continue acetaminophen  Menthol patches  Bowel regimen      Pain Service will continue to follow.    Discussed with attending anesthesiologist.    Vladimir Lynch MD  [facility]  07/27/2025         Diet: Snacks/Supplements Adult: Other; Fruit Punch Ron B/D, Ensure Max chocolate BLD; With Meals  Consistent Carbohydrate Diet Moderate Consistent Carb (60 g CHO per Meal) Diet    Relevant Labs:  Recent Labs   Lab Test 07/23/25  0601 07/21/25  0822 07/20/25  1801   PROTIME  --   --  13.9   INR  --   --  1.03      < >  --    PTT  --   --  29   BUN 19.0   < >   "--     < > = values in this interval not displayed.       Physical Exam:  Vitals: /56 (BP Location: Right arm)   Pulse 79   Temp 36.9  C (98.4  F) (Oral)   Resp 16   Ht 1.575 m (5' 2.01\")   Wt 114.4 kg (252 lb 4.8 oz)   SpO2 92%   BMI 46.13 kg/m      Physical Exam:   CONSTITUTIONAL/GENERAL APPEARANCE: Conversant.  EYES: EOMI, sclerae clear  RESPIRATORY:non labored breathing, on room air  CARDIOVASCULAR: HR within normal limits  GI:round, soft, known abdominal hernia- bulging -reports site of pain  MUSCULOSKELETAL/BACK/SPINE/EXTREMITIES: Moving UE and LE independently. Lying on right side.     NEURO:  AAOx3.   SKIN/VASCULAR EXAM:  Dry and warm.  PSYCHIATRIC/BEHAVIORAL/OBSERVATIONS:  verbalizes pain   Judgment/Insight -fair   Orientation - x3   Memory -fair   Mood and affect - calm, pleasant, cooperative         Relevant Medications:  Current Pain Medications:  Medications related to Pain Management (From now, onward)      Start     Dose/Rate Route Frequency Ordered Stop    07/24/25 2000  polyethylene glycol (MIRALAX) Packet 17 g         17 g Oral 2 TIMES DAILY 07/24/25 0912      07/24/25 1100  gabapentin (NEURONTIN) capsule 100 mg         100 mg Oral 3 TIMES DAILY 07/24/25 1033      07/24/25 0800  Lidocaine (LIDOCARE) 4 % Patch 3 patch         3 patch  over 12 Hours Transdermal EVERY 24 HOURS 0800 07/23/25 1412      07/24/25 0800  bisacodyl (DULCOLAX) suppository 10 mg         10 mg Rectal 2 TIMES DAILY 07/24/25 0648      07/24/25 0000  bisacodyl (DULCOLAX) suppository 10 mg         10 mg Rectal DAILY PRN 07/21/25 2017 07/23/25 1700  methocarbamol (ROBAXIN) tablet 500 mg         500 mg Oral EVERY 6 HOURS 07/23/25 1407      07/23/25 1530  HYDROmorphone (DILAUDID) tablet 2 mg        Placed in \"Or\" Linked Group    2 mg Oral EVERY 3 HOURS PRN 07/23/25 1405      07/23/25 1530  HYDROmorphone (DILAUDID) tablet 4 mg        Placed in \"Or\" Linked Group    4 mg Oral EVERY 3 HOURS PRN 07/23/25 1405      " "07/23/25 0000  magnesium hydroxide (MILK OF MAGNESIA) suspension 30 mL         30 mL Oral DAILY PRN 07/21/25 2017 07/21/25 2253  HYDROmorphone (PF) (DILAUDID) injection 0.3 mg        Placed in \"Or\" Linked Group    0.3 mg Intravenous EVERY 3 HOURS PRN 07/21/25 2253 07/21/25 2253  HYDROmorphone (PF) (DILAUDID) injection 0.6 mg        Placed in \"Or\" Linked Group    0.6 mg Intravenous EVERY 3 HOURS PRN 07/21/25 2253 07/21/25 2030  senna-docusate (SENOKOT-S/PERICOLACE) 8.6-50 MG per tablet 2 tablet        Placed in \"Or\" Linked Group    2 tablet Oral 2 TIMES DAILY 07/21/25 2017 07/21/25 2015  lidocaine 1 % 0.1-1 mL         0.1-1 mL Other EVERY 1 HOUR PRN 07/21/25 2015 07/21/25 2015  lidocaine (LMX4) cream          Topical EVERY 1 HOUR PRN 07/21/25 2015 07/21/25 1830  acetaminophen (TYLENOL) tablet 975 mg         975 mg Oral EVERY 8 HOURS 07/21/25 1805              Primary Service Contacted with Recommendations? Yes            Acute Inpatient Pain Service [facility]  Hours of pain coverage 24/7   Please Page via PISTIS Consult  - Link to PISTIS Consult Here - Search Pain  Page via Amcom- Please Page the Pain Team Via Amcom: \"PAIN MANAGEMENT ACUTE INPATIENT/ [facility]\"            "

## 2025-07-27 NOTE — PROGRESS NOTES
"Orthopaedic Surgery Progress Note:       Subjective:   Pain well controlled. No new neurological symptoms.  Gonzalez placed yesterday for urinary retention requiring straight cath.  Pain is well-controlled.  Requiring IV Dilaudid overnight.  Had a bowel movement yesterday.  Drain was removed yesterday.    Objective:   /64 (BP Location: Left arm)   Pulse 79   Temp 98.4  F (36.9  C) (Axillary)   Resp 16   Ht 1.575 m (5' 2\")   Wt 113.4 kg (250 lb)   SpO2 94%   BMI 45.73 kg/m    I/O this shift:  In: -   Out: 525 [Urine:525]  Gen: NAD. Resting comfortably in bed  Resp: Breathing comfortably on RA  : gonzalez.     Dressings clean and dry  Drain in place and maintaining suction        Lumbar Spine:    Appearance - No gross stepoffs or deformities    Motor -     L2-3: Hip flexion 5/5 R and 5/5 L strength          L3/4:  Knee extension R 5/5 and L 5/5 strength         L4/5:  Foot dorsiflexion R 5/5 L 5/5 and       EHL dorsiflexion R 5/5 L 5/5 strength         S1:  Plantarflexion/Peroneal Muscles  R 5/5 and L 5/5 strength    Sensation: intact to light touch L3-S1 distribution BLE        Labs:  Lab Results   Component Value Date    WBC 14.1 (H) 07/23/2025    HGB 8.3 (L) 07/23/2025     07/23/2025    INR 1.03 07/20/2025        Assessment & Plan:   Assessment and Plan: Radha Baca is a 69 year old female with PMH including DM, T12 burst fracture now s/p T9-L2 posterior spinal fusion on 7/21/2025 with Dr. Dubois.     Patient has had a BM and reports that her pain is generally well controlled. Regarding her urinary retention, we discussed that several of her mediations, the anesthesia, and constipation can all impact urinary retention. I have suspicion for underlying/ baseline prolapse or other structural problem leading to her functional incontinence, and lower suspicion for spinal surgical direct correlation (as she is able to initiate urination, more likely lack of complete evacuation.)     Vertigo: " monitor, as it is resolved in bed, and consider additional causes such as orthostatic hypotension. Defer vertigo & blood glucose management.       Today:  - Parker management per medicine team  - Work with PT today  - Continue to work with medicine, pain and endocrine teams.    Ortho Primary  Activity:   - Up with assist until independent. No excessive bending or twisting. No lifting >10 lbs x 6 weeks.   - No use of Lakhwinder lift.   - Will need to be sideways in bed  Weight bearing status: WBAT.  Pain management:   - Transition to PO narcotics as tolerated. No NSAIDs x 3 months.   Antibiotics: Ancef x until the drains are in  Diet: Begin with clear fluids and progress diet as tolerated. Sliding scale insulin to continue.  DVT prophylaxis: SCDs only. No chemical DVT ppx needed.  Imaging: XR Upright Thoraco/lumbar  Labs: Per medicine team  Bracing/Splinting: None  Dressings: Keep Aquacel c/d/i x 7 days.  Drains: Document output per shift, will be discontinued at Orthopedic Surgery discretion.  Parker catheter: Removed  Physical Therapy/Occupational Therapy: Eval and treat.  Cultures: None.    Consults: Hospitalist.  Follow-up: Clinic with Dr. Dubois in 6 weeks with repeat x-rays.   Disposition: Pending progress with therapies, pain control on orals, and medical stability, anticipate discharge to TCU in next 4-5 days    Discussed with Dr. Silvino Jim MD PGY3  Orthopedic Surgery

## 2025-07-27 NOTE — PROGRESS NOTES
Assumed patient care around [id number].    Patient alert and oriented x4. Able to make needs known, call light within reach. Patient denies SOB, chest pain, and nausea. Rating pain 8/10, managed with scheduled robaxin, and PRN dilaudid x1. Parker in place, patent, with adequate output. Spinal incision dressing- CDI.  Per patient request, patient has a 0845 therapy section on 7/28, please plan to give IV dilaudid before therapy. Patient reposition in left side lying. Patient resting comfortably in bed.      Continue with plan of care

## 2025-07-28 ENCOUNTER — APPOINTMENT (OUTPATIENT)
Dept: CT IMAGING | Facility: CLINIC | Age: 70
End: 2025-07-28
Attending: PHYSICIAN ASSISTANT
Payer: MEDICARE

## 2025-07-28 ENCOUNTER — APPOINTMENT (OUTPATIENT)
Dept: CT IMAGING | Facility: CLINIC | Age: 70
DRG: 457 | End: 2025-07-28
Attending: PHYSICIAN ASSISTANT
Payer: MEDICARE

## 2025-07-28 LAB
GLUCOSE BLDC GLUCOMTR-MCNC: 142 MG/DL (ref 70–99)
GLUCOSE BLDC GLUCOMTR-MCNC: 148 MG/DL (ref 70–99)
GLUCOSE BLDC GLUCOMTR-MCNC: 185 MG/DL (ref 70–99)
GLUCOSE BLDC GLUCOMTR-MCNC: 92 MG/DL (ref 70–99)
HOLD SPECIMEN: NORMAL
POTASSIUM SERPL-SCNC: 4 MMOL/L (ref 3.4–5.3)

## 2025-07-28 PROCEDURE — 99233 SBSQ HOSP IP/OBS HIGH 50: CPT

## 2025-07-28 PROCEDURE — 250N000013 HC RX MED GY IP 250 OP 250 PS 637

## 2025-07-28 PROCEDURE — 36415 COLL VENOUS BLD VENIPUNCTURE: CPT | Performed by: ORTHOPAEDIC SURGERY

## 2025-07-28 PROCEDURE — 250N000013 HC RX MED GY IP 250 OP 250 PS 637: Performed by: NURSE PRACTITIONER

## 2025-07-28 PROCEDURE — 120N000002 HC R&B MED SURG/OB UMMC

## 2025-07-28 PROCEDURE — 72128 CT CHEST SPINE W/O DYE: CPT

## 2025-07-28 PROCEDURE — 99232 SBSQ HOSP IP/OBS MODERATE 35: CPT | Mod: 24

## 2025-07-28 PROCEDURE — 72131 CT LUMBAR SPINE W/O DYE: CPT | Mod: 26 | Performed by: RADIOLOGY

## 2025-07-28 PROCEDURE — 72131 CT LUMBAR SPINE W/O DYE: CPT

## 2025-07-28 PROCEDURE — 72128 CT CHEST SPINE W/O DYE: CPT | Mod: 26 | Performed by: RADIOLOGY

## 2025-07-28 PROCEDURE — 99418 PROLNG IP/OBS E/M EA 15 MIN: CPT

## 2025-07-28 PROCEDURE — 84132 ASSAY OF SERUM POTASSIUM: CPT | Performed by: ORTHOPAEDIC SURGERY

## 2025-07-28 PROCEDURE — 250N000013 HC RX MED GY IP 250 OP 250 PS 637: Performed by: STUDENT IN AN ORGANIZED HEALTH CARE EDUCATION/TRAINING PROGRAM

## 2025-07-28 PROCEDURE — 250N000013 HC RX MED GY IP 250 OP 250 PS 637: Performed by: PHYSICIAN ASSISTANT

## 2025-07-28 RX ORDER — AMOXICILLIN 250 MG
2 CAPSULE ORAL 2 TIMES DAILY
DISCHARGE
Start: 2025-07-28

## 2025-07-28 RX ORDER — OXYCODONE HYDROCHLORIDE 15 MG/1
15 TABLET ORAL
Refills: 0 | Status: DISCONTINUED | OUTPATIENT
Start: 2025-07-28 | End: 2025-08-01 | Stop reason: HOSPADM

## 2025-07-28 RX ORDER — HYDROMORPHONE HCL IN WATER/PF 6 MG/30 ML
0.2 PATIENT CONTROLLED ANALGESIA SYRINGE INTRAVENOUS EVERY 6 HOURS PRN
Status: DISCONTINUED | OUTPATIENT
Start: 2025-07-28 | End: 2025-07-30

## 2025-07-28 RX ORDER — ONDANSETRON 4 MG/1
4 TABLET, ORALLY DISINTEGRATING ORAL EVERY 6 HOURS PRN
Status: ON HOLD | DISCHARGE
Start: 2025-07-28 | End: 2025-08-15

## 2025-07-28 RX ORDER — HYDROMORPHONE HYDROCHLORIDE 4 MG/1
4-6 TABLET ORAL EVERY 4 HOURS PRN
Qty: 35 TABLET | Refills: 0 | Status: ON HOLD | OUTPATIENT
Start: 2025-07-28 | End: 2025-08-15

## 2025-07-28 RX ORDER — OXYCODONE HYDROCHLORIDE 10 MG/1
10 TABLET ORAL
Refills: 0 | Status: DISCONTINUED | OUTPATIENT
Start: 2025-07-28 | End: 2025-08-01 | Stop reason: HOSPADM

## 2025-07-28 RX ORDER — GABAPENTIN 300 MG/1
300 CAPSULE ORAL 3 TIMES DAILY
Qty: 90 CAPSULE | Refills: 0 | Status: ON HOLD | OUTPATIENT
Start: 2025-07-28 | End: 2025-08-15

## 2025-07-28 RX ORDER — ACETAMINOPHEN 325 MG/1
975 TABLET ORAL EVERY 8 HOURS
Status: ON HOLD | DISCHARGE
Start: 2025-07-28 | End: 2025-08-15

## 2025-07-28 RX ORDER — METHOCARBAMOL 500 MG/1
500 TABLET, FILM COATED ORAL EVERY 6 HOURS
Status: ON HOLD | DISCHARGE
Start: 2025-07-28 | End: 2025-08-15

## 2025-07-28 RX ORDER — LIDOCAINE 4 G/G
3 PATCH TOPICAL EVERY 24 HOURS
Status: ON HOLD | DISCHARGE
Start: 2025-07-28 | End: 2025-08-15

## 2025-07-28 RX ADMIN — FAMOTIDINE 20 MG: 20 TABLET, FILM COATED ORAL at 08:36

## 2025-07-28 RX ADMIN — GABAPENTIN 300 MG: 300 CAPSULE ORAL at 14:13

## 2025-07-28 RX ADMIN — PANTOPRAZOLE SODIUM 40 MG: 40 TABLET, DELAYED RELEASE ORAL at 16:49

## 2025-07-28 RX ADMIN — CYCLOSPORINE 1 DROP: 0.5 EMULSION OPHTHALMIC at 20:03

## 2025-07-28 RX ADMIN — OXYCODONE HYDROCHLORIDE 15 MG: 15 TABLET ORAL at 23:45

## 2025-07-28 RX ADMIN — TIMOLOL MALEATE 1 DROP: 5 SOLUTION OPHTHALMIC at 08:37

## 2025-07-28 RX ADMIN — SENNOSIDES AND DOCUSATE SODIUM 2 TABLET: 50; 8.6 TABLET ORAL at 08:36

## 2025-07-28 RX ADMIN — GABAPENTIN 300 MG: 300 CAPSULE ORAL at 08:36

## 2025-07-28 RX ADMIN — METHOCARBAMOL 500 MG: 500 TABLET ORAL at 18:29

## 2025-07-28 RX ADMIN — OXYCODONE HYDROCHLORIDE 15 MG: 15 TABLET ORAL at 16:49

## 2025-07-28 RX ADMIN — POLYETHYLENE GLYCOL 3350 17 G: 17 POWDER, FOR SOLUTION ORAL at 20:03

## 2025-07-28 RX ADMIN — LATANOPROST 1 DROP: 50 SOLUTION/ DROPS OPHTHALMIC at 22:25

## 2025-07-28 RX ADMIN — HYDROMORPHONE HYDROCHLORIDE 6 MG: 4 TABLET ORAL at 08:35

## 2025-07-28 RX ADMIN — FAMOTIDINE 20 MG: 20 TABLET, FILM COATED ORAL at 20:03

## 2025-07-28 RX ADMIN — INSULIN ASPART 12 UNITS: 100 INJECTION, SOLUTION INTRAVENOUS; SUBCUTANEOUS at 17:53

## 2025-07-28 RX ADMIN — BRIMONIDINE TARTRATE 1 DROP: 2 SOLUTION/ DROPS OPHTHALMIC at 20:04

## 2025-07-28 RX ADMIN — OXYCODONE HYDROCHLORIDE 15 MG: 15 TABLET ORAL at 20:14

## 2025-07-28 RX ADMIN — INSULIN ASPART 10 UNITS: 100 INJECTION, SOLUTION INTRAVENOUS; SUBCUTANEOUS at 14:09

## 2025-07-28 RX ADMIN — PANTOPRAZOLE SODIUM 40 MG: 40 TABLET, DELAYED RELEASE ORAL at 08:36

## 2025-07-28 RX ADMIN — TIMOLOL MALEATE 1 DROP: 5 SOLUTION OPHTHALMIC at 20:04

## 2025-07-28 RX ADMIN — CYCLOSPORINE 1 DROP: 0.5 EMULSION OPHTHALMIC at 08:37

## 2025-07-28 RX ADMIN — METHOCARBAMOL 500 MG: 500 TABLET ORAL at 11:39

## 2025-07-28 RX ADMIN — MAGNESIUM HYDROXIDE 30 ML: 400 SUSPENSION ORAL at 12:57

## 2025-07-28 RX ADMIN — ATORVASTATIN CALCIUM 20 MG: 10 TABLET, FILM COATED ORAL at 08:36

## 2025-07-28 RX ADMIN — GABAPENTIN 300 MG: 300 CAPSULE ORAL at 20:03

## 2025-07-28 RX ADMIN — INSULIN GLARGINE 68 UNITS: 100 INJECTION, SOLUTION SUBCUTANEOUS at 22:25

## 2025-07-28 RX ADMIN — POLYETHYLENE GLYCOL 3350 17 G: 17 POWDER, FOR SOLUTION ORAL at 08:36

## 2025-07-28 RX ADMIN — INSULIN ASPART 9 UNITS: 100 INJECTION, SOLUTION INTRAVENOUS; SUBCUTANEOUS at 11:42

## 2025-07-28 RX ADMIN — METHOCARBAMOL 500 MG: 500 TABLET ORAL at 05:25

## 2025-07-28 RX ADMIN — ACETAMINOPHEN 975 MG: 325 TABLET ORAL at 03:39

## 2025-07-28 RX ADMIN — SENNOSIDES AND DOCUSATE SODIUM 2 TABLET: 50; 8.6 TABLET ORAL at 20:03

## 2025-07-28 RX ADMIN — ACETAMINOPHEN 975 MG: 325 TABLET ORAL at 11:38

## 2025-07-28 RX ADMIN — BRIMONIDINE TARTRATE 1 DROP: 2 SOLUTION/ DROPS OPHTHALMIC at 08:38

## 2025-07-28 RX ADMIN — SERTRALINE HYDROCHLORIDE 50 MG: 25 TABLET ORAL at 08:36

## 2025-07-28 RX ADMIN — METHOCARBAMOL 500 MG: 500 TABLET ORAL at 23:45

## 2025-07-28 RX ADMIN — HYDROMORPHONE HYDROCHLORIDE 6 MG: 4 TABLET ORAL at 12:49

## 2025-07-28 RX ADMIN — ACETAMINOPHEN 975 MG: 325 TABLET ORAL at 18:29

## 2025-07-28 RX ADMIN — HYDROMORPHONE HYDROCHLORIDE 6 MG: 4 TABLET ORAL at 03:39

## 2025-07-28 ASSESSMENT — ACTIVITIES OF DAILY LIVING (ADL)
ADLS_ACUITY_SCORE: 47

## 2025-07-28 NOTE — PROGRESS NOTES
Care Management Follow Up    Length of Stay (days): 8    Expected Discharge Date: 07/28/2025     Concerns to be Addressed: Discharge planning     Patient plan of care discussed at interdisciplinary rounds: Yes    Anticipated Discharge Disposition: Transitional Care  Anticipated Discharge Services: None  Anticipated Discharge DME: None    Patient/family educated on Medicare website which has current facility and service quality ratings: Yes  Education Provided on the Discharge Plan: Yes  Patient/Family in Agreement with the Plan: Yes    Referrals Placed by CM/SW: External Care Coordination  Private pay costs discussed: Not applicable    Discussed  Partnership in Safe Discharge Planning  document with patient/family: No     Handoff Completed: No, handoff not indicated or clinically appropriate    Additional Information:  Social work informed patient that medically cleared for discharge today 7/28/25. Reached out to [facility] to update them. They reported they could take patient. Met with patient at bedside to discuss. Informed patient she was accepted to [facility] for TCU and that she was appropriate for discharge today. Patient expressed some frustration that no one had spoken to her previously about discharge planning. SW apologized and informed patient we tried to see her x3 but she was either busy or sleeping. Patient stated she did not feel appropriate for discharge today and reported she's still be on IV pain medications. SW was not aware of this. During our conversation, ortho provider entered room and joined the conversation. Social work left shortly after to have provider and patient talk. Awaiting to discuss discharge plans with ortho when they are able.     Received calls x2 from [facility] stating that they had read the notes in Epic and do not feel patient is appropriate for discharge today. They reported they are still interested in taking her for TCU placement but  that they felt pain needed to be better managed prior to leaving the hospital. Informed admissions @ [facility] that I would follow up once I had a more clear plan and told them to move forward with another admit today. Lastly, over the weekend,  ARU reached ou to weekend SW and informed them that they felt patient was a good candidate for ARU. They continue to follow and would like to see how patient continues to do with therapies.     Care management continues to follow as well.     Pending:  [facility]  [address] [place]  [address]  P: [phone]  F: [phone]    [facility]  [address]  [place]  [address]  P: [phone]  F: [phone]    Next Steps:   -Continue to assist with discharge planning  -Update Senior Linkage Line once patient discharges (PAS has been completed for [facility])    GUSTABO Medeiros, LGSW  5 Med Surg   [facility] Acute Care Management   Phone: [phone]  Available on Vocera: 5MS SW

## 2025-07-28 NOTE — PLAN OF CARE
Goal Outcome Evaluation:    Alert,orientated 3.Forgetful.  On room air when awake.Nasal CPAP when asleep.  Satts well.  Back drsg,CDI.Baseline numbness/tingling sensation.  Pain is the main barrier for mobilities including bed repositioning.Ortho notified as pt have been asking pain meds even when not available. Oral dilaudid increased with dosage and with robaxin,tylenol and gabapentin, appears to be managing pain at the moment. Have not required IV dilaudid but may likely need IV dilaudid prior to working the therapy.  Elena remains patent.Is passing gas but no BM this shift.  PCDs are on.  Sleeping in between cares.  Still need x-ray done.

## 2025-07-28 NOTE — PROGRESS NOTES
[facility]    Medicine Progress Note - [facility] Medicine Service    Date of Admission:  7/19/2025    Assessment & Plan   Radha Baca is a 69 year old female with PMHx of HTN, DM2 with insulin dependence, glaucoma, diabetic retinopathy, CAD, HLD, vertigo and recently noted suprasellar lesion. She was admitted on 7/19/2025 for worsening back pain 2/2 T12 fracture and was transferred from [place] to [facility] for orthopedic surgery and pain management.     Major Plans Today:  - Continue Lantus 68U   - Parker in place  - Continue pain regimen per pain   - Post-op imaging per Ortho     # T11, T12 burst fractures   # Moderate lumbar spondylosis with moderate spinal canal stenosis L4-L5, mild narrowing L3-L4  # Multilevel foraminal narrowing  # s/p T9-L2 posterior spinal fusion on 7/21/2025   Recent fall several weeks ago and outpatient imaging revealed compression fracture of T12 2/2 osteoporosis. She presented to the ED with worsening low back pain with radiculopathy and neuropathy sxs. 7/19 CT lumbar spine with burst fracture involving T12 vertebral body with central vertebral body height loss and coronally oriented fracture, as well as fracture of the posterior arch, spinous process, and interior aspect of T11. She underwent facetectomies and posterior spinal fusion of T9-L2. Ortho is primary, our team is consulted for DM management. She has been requiring IV dilaudid before her PT sessions, will plan to discontinue this in the coming days to assess her pain needs with oral. Pain team evaluation pending today.     Ortho Primary (copied from note on 7/27)  Activity:   - Up with assist until independent. No excessive bending or twisting. No lifting >10 lbs x 6 weeks.   - No use of Lakhwinder lift.   - Will need to be sideways in bed  Weight bearing status: WBAT.  Pain management:   - Transition to PO narcotics as tolerated. No NSAIDs x 3 months.   Antibiotics:  Ancef x until the drains are in  Diet: Begin with clear fluids and progress diet as tolerated. Sliding scale insulin to continue.  DVT prophylaxis: SCDs only. No chemical DVT ppx needed.  Imaging: XR Upright Thoraco/lumbar  Labs: Per medicine team  Bracing/Splinting: None  Dressings: Keep Aquacel c/d/i x 7 days.  Drains: Document output per shift, will be discontinued at Orthopedic Surgery discretion.  Gonzalez catheter: Removed  Physical Therapy/Occupational Therapy: Eval and treat.  Cultures: None.    Consults: Hospitalist.  Follow-up: Clinic with Dr. Dubois in 6 weeks with repeat x-rays.   Disposition: Pending progress with therapies, pain control on orals, and medical stability, anticipate discharge to TCU in next 4-5 days     # TDM2 with insulin dependence - stable   Follows with Dr Keturah De Luna with Endocrinology, her last A1c checked was 11.2. PTA on lantus 75U at bedtime, humalog 25U TID with meals and sliding scale as well as metformin 500 mg daily. Recent glucose trends have been 90s-140s while on the Lantus 68U. She has required 1U of sliding scale yesterday. Will continue with this current regimen    - Continue Lantus 68U  - Novolog 1 unit(s) per 6 g cho TID AC and PRN snacks. Cover all intake.   - Novolog High Resistance Correction Scale (ISF: 25) TID AC / HS / 0200   - BG checks: TID AC / HS / 0200   - HOLD PTA: Metformin while inpatient   - Hypoglycemia protocol   - CHO consistent diet     # Urinary retention - chronic   Per discussion with Ortho and Pain, urinary retention sounds chronic and most c/w prolapse as she is able to initiate urination but cannot empty completely. Suspect that worsening retention is multifactorial from recent surgery, medications, and constipation. Straight cathing have resulted in 700-900cc of urine in recent days. She ultimately had a gonzalez catheter placed. Otherwise, she may need outpatient work-up as this is a chronic concern.    - Gonzalez cath in place     # Constipation -  "improving   Worsened after recent surgery. Had several BMs recently. Has a known umbilical hernia. Continue to optimize bowel regimen.    - MiraLax BID   - Senna 1-2 tablets BID        Diet: Snacks/Supplements Adult: Other; Fruit Punch Ron B/D, Ensure Max chocolate BLD; With Meals  Consistent Carbohydrate Diet Moderate Consistent Carb (60 g CHO per Meal) Diet    DVT Prophylaxis: Pneumatic Compression Devices  Parker Catheter: PRESENT, indication: Surgical procedure, Acute retention or obstruction  Lines: None     Cardiac Monitoring: None  Code Status: Full Code      Clinically Significant Risk Factors      # Hyponatremia: Lowest Na = 133 mmol/L in last 2 days, will monitor as appropriate  # Hypochloremia: Lowest Cl = 96 mmol/L in last 2 days, will monitor as appropriate      # Hypoalbuminemia: Lowest albumin = 3.1 g/dL at 7/23/2025  6:01 AM, will monitor as appropriate     # Hypertension: Noted on problem list           # DMII: A1C = 11.2 % (Ref range: 0.0 - 5.6 %) within past 6 months   # Morbid Obesity: Estimated body mass index is 46.13 kg/m  as calculated from the following:    Height as of this encounter: 1.575 m (5' 2.01\").    Weight as of this encounter: 114.4 kg (252 lb 4.8 oz).             Social Drivers of Health    Depression: At risk (7/8/2025)    PHQ-2     PHQ-2 Score: 3   Tobacco Use: Medium Risk (7/21/2025)    Patient History     Smoking Tobacco Use: Former     Smokeless Tobacco Use: Never     Passive Exposure: Past   Stress: Stress Concern Present (3/16/2025)    [employer] - Occupational Stress Questionnaire     Feeling of Stress : To some extent   Social Connections: Unknown (3/16/2025)    Social Connection and Isolation Panel [NHANES]     Frequency of Social Gatherings with Friends and Family: Patient declined          Disposition Plan   Medically Ready for Discharge: Anticipated in 2-4 Days         The patient's care was discussed with the Attending Physician,  " Sonam Iverson.    DO [facility]  [facility]  Securely message with [employer] (more info)  Text page via [employer] Paging/Directory   See signed in provider for up to date coverage information  ______________________________________________________________________    Interval History   NAEO.  is present in the room. They are requesting a care coordination conference with all the specialist team and PT to better understand dispo plans and length of stay. Discussed that this may be difficult given the different schedules of all the teams. However, offered to coordinate with Ortho and Pain and PT about dispo plans.     She's been requiring IV dilaudid before her PT sessions, otherwise, has been trying to maximize her oral medications. Pain is somewhat stable today.     Physical Exam   Vital Signs: Temp: 98.6  F (37  C) Temp src: Axillary BP: 129/58 Pulse: 83   Resp: 17 SpO2: (!) 91 % O2 Device: BiPAP/CPAP    Weight: 252 lbs 4.8 oz    Physical Exam  Vitals reviewed.   Constitutional:       General: She is not in acute distress.     Appearance: Normal appearance. She is not toxic-appearing.   HENT:      Head: Normocephalic and atraumatic.   Eyes:      Extraocular Movements: Extraocular movements intact.      Conjunctiva/sclera: Conjunctivae normal.      Pupils: Pupils are equal, round, and reactive to light.   Pulmonary:      Effort: Pulmonary effort is normal. No respiratory distress.   Neurological:      General: No focal deficit present.      Mental Status: She is alert and oriented to person, place, and time.   Psychiatric:         Mood and Affect: Mood normal.         Behavior: Behavior normal.         Thought Content: Thought content normal.         Judgment: Judgment normal.       Medical Decision Making   See A&P for MDM        Data     I have personally reviewed the following data over the past 24 hrs:    12.9 (H)  \   8.0  (L)   / 403     133 (L) 96 (L) 11.5 /  142 (H)   4.2 24 0.65 \       Imaging results reviewed over the past 24 hrs:   No results found for this or any previous visit (from the past 24 hours).

## 2025-07-28 NOTE — PROGRESS NOTES
"Orthopaedic Surgery Progress Note 07/28/2025    S: No acute events overnight. Patient initially tearful during encounter today, upset that was told she was ready for discharge today. Says she is still \"on a drip\" despite not having had any IV pain medications in the past 24 hours. Patient's  initially on phone and very angry about \"miscommunication\" regarding discharge. Patient saying she hasn't worked with therapies almost at all this whole time (reports only 2 sessions); reports \"screaming\" in pain with trying to sit up or use surinder-steady. Pain localized to back and wrapping around into hips. Reports prior to admission she walked with a cane. Denies numbness or tingling in the affected extremity. Reports full strength in LE.  Parker in place    Revisited with patient and  after  arrived at hospital. They were much calmer at our 2nd visit; accompanied by Génesis NIEVES CNP  with the pain team. We had a long discussion with patient and  about goals for discharge, pain goals, etc. Patient still reporting pain has been limiting her from being able to stand and participate with therapies.     O:  Temp: 98.8  F (37.1  C) Temp src: Oral BP: 114/46 Pulse: 82   Resp: 16 SpO2: (!) 91 % O2 Device: BiPAP/CPAP      Exam:  Gen: No acute distress, resting comfortably in bed. Tearful and anxious about being told to discharge today  Resp: Non-labored breathing  CV: wwp  MSK:  Spine:  - Dressings c/d/I   - DP pulses 2+ bilaterally, feet wwp bilaterally              Lumbar Spine:                          Appearance - No gross stepoffs or deformities                          Motor -                           L2-3: Hip flexion 5/5 R and 5/5 L strength                           L3/4:  Knee extension R 5/5 and L 5/5 strength                          L4/5:  Foot dorsiflexion R 5/5 L 5/5 and                                       EHL dorsiflexion R 5/5 L 5/5 strength                          S1:  " "Plantarflexion/Peroneal Muscles  R 5/5 and L 5/5 strength                          Sensation: intact to light touch L3-S1 distribution BLE       Drain output: drain removed.  Parker output (1225cc yesterday)    Recent Labs   Lab 07/27/25  1121 07/23/25  0601 07/22/25  1548   WBC 12.9* 14.1*  --    HGB 8.0* 8.3* 8.9*    298  --      No results found for: \"SED\"      Assessment: Assessment and Plan: Radha Baca is a 69 year old female with PMH including DM, T12 burst fracture now s/p T9-L2 posterior spinal fusion on 7/21/2025 with Dr. Dubois.     Long discussion with patient and her  today, partially in conjunction with Génesis Perez CNP from pain team. We discussed pain management strategies; overall goal is to wean off IV dilaudid (has not used in past 24hr). She is reporting severe pain with trying to sit upright or stand with SaraSteady; concerning for fracture/ hardware failure w osteoporosis history - plan on STAT CT to evaluate .    Today:  - Parker management per medicine team   - STAT CT thoracic/lumbar w/o contrast  - pain management recommendations: will transition from oral dilaudid to oxycodone. Will reserve IV dilaudid for before PT, try to wean down/ off in next 1-2 days. Please see their note for more details.   - Continue to work with medicine, pain and endocrine teams. Medicine anticipates ready in 1-2 days  - upright seated full spine XR PTDC    Ortho Primary  Activity:   - Up with assist until independent. No excessive bending or twisting. No lifting >10 lbs x 6 weeks.   - No use of Lakhwinder lift.   - Will need to be sideways in bed  Weight bearing status: WBAT.  Pain management:   - Transition to PO narcotics as tolerated. No NSAIDs x 3 months.   Antibiotics: Ancef x until the drains are in  Diet: Begin with clear fluids and progress diet as tolerated. Sliding scale insulin to continue.  DVT prophylaxis: SCDs only. No chemical DVT ppx needed.  Imaging: XR Upright " Thoraco/lumbar  Labs: Per medicine team  Bracing/Splinting: None  Dressings: Keep Aquacel c/d/i x 7 days.  Drains: discontinued  Parker catheter: Removed  Physical Therapy/Occupational Therapy: Eval and treat.  Cultures: None.    Consults: Hospitalist.  Follow-up: Clinic with Dr. Dubois in 6 weeks with repeat x-rays.   Disposition: Pending progress with therapies, pain control on orals, and medical stability, anticipate discharge to TCU in next 4-5 days    Future Appointments   Date Time Provider Department Center   7/28/2025  8:00 PM Vicky Ortiz, PT URPT [place]   7/28/2025  9:30 PM Virginia Herman, OTR UROT [place]   8/1/2025 10:30 AM Peri Salcido RN [facility]   8/5/2025  1:20 PM Jerrell Austin, DPM UOR [facility]   9/4/2025  2:30 PM Bre Shaw, [place] AUD [facility]   9/4/2025  4:00 PM Deepika Mancia, NP ENT [facility]   9/10/2025  8:00 AM  LAB [facility]   9/18/2025 10:30 AM Marcella Maria PA-C [facility]   12/17/2025  9:00 [email or web address]   12/23/2025  9:40 AM Mingo Swain MD [facility]   1/7/2026 10:30 AM Keturah De Luna MD [facility]       Patient discussed with Dr. Dubois.    Anabell Yang PA-C  Orthopaedic Spine Surgery    Thank you for allowing me to participate in this patient's care. Please page me directly any questions/concerns.   Securely message with the Vocera Web Console (learn more here)  Text page via [employer] Paging/Directory    If there is no response, if it is a weekend, or if it is during evening hours, please page the orthopaedic surgery resident on call via [employer] Paging/Directory

## 2025-07-28 NOTE — PROGRESS NOTES
Pain Service Progress Note  [facility]  Date: 07/28/2025       Patient Name: Radha Baca  MRN: [id number]  Age: 69 year old  Sex: female      Assessment:  Radha Baca is a 69 year old female who presents who has  PMH of Hypertension, Diabetes mellitus type 2, Glaucoma, hernia, and bilateral TKAs. She presents to ED on 7/19/25 with worsening back pain after recent diagnosis of T12 burst fracture from fall.  She  is now s/p  T9-L2 posterior spinal fusion with cement augmentation  on 7/21/25 with Dr. Dubois.     PTA, she was taking oxycodone 5mg PO Q 4 hours PRN during the day and 10mg at bedtime from midnight to 6 am, tylenol and ibuprofen.     Today, Dionicio is seen sitting up in bed, along with Spine Team. Her  Ashish who is present at the bedside. They requested care conference with specialists and PT earlier today after receiving mixed messages about anticipated discharge to TCU (2-3 days per medicine this morning, today per Spine/PT). Unfortunately, this was not feasible to coordinate between multiple teams, though they appreciative pain and spine teams were able to round at the same time.     We discussed goals for discharge with regards to pain regimen, specifically that IV Dilaudid cannot be continued at TCU, and we need to consider options with oral medications. Additionally, she takes oral Dilaudid about Q3H consistently (7-8 doses/day) and last use of IV Dilaudid >24 hours ago (7/27 at 0207). We discussed plan to rotate oral opioids from Dilaudid to oxycodone, which may provide long duration of benefit in between doses, and will continue IV Dilaudid PRN for PT only x 1-2 days then discontinue IV and consider additional PRN dose of oral opioid before PT prior to discharge to TCU. Patient and  verbalized agreement with this plan.      Plan/Recommendations:   Recommend oral opioid rotation from Dilaudid to oxycodone 10-15 mg Q3H PRN. This may provide  "longer duration of benefit in between doses.   May rotate back to oral Dilaudid, if patient does not find oxycodone as effective.   IV Dilaudid 0.3-0.6 mg IV PRN - to be used prior to PT only x 1-2 days.   Stop IV Dilaudid prior to discharge. Consider additional PRN dose of oral opioid prior to PT instead of IV 1-2 days before discharge to TCU.   Continue gabapentin to 300 TID. Recommend caution with further dose escalation and careful monitoring for CNS side effects.  Continue muscle relaxant  Continue acetaminophen  Menthol patches    Pain Service will continue to follow.    Discussed with attending anesthesiologist- the context of our conversation was treatment plan, changes to oral/IV opioids, discharge plan/goals.     Génesis Perez DNP APRN CNP  07/28/2025       Overnight Events: NAEO    Subjective:  I was told this morning expected discharge was 2-3 days, then was told today. It felt like no one was communicating with each other. I want to be able to work with PT so I can get up to the bathroom with assistance or by myself.     Pain Location:  Back, postsurgical     Pain Intensity:    Pain at Rest: 7-8/10   Pain with Activity: 10/10  Satisfied with your level of pain control: No    Diet: Snacks/Supplements Adult: Other; Fruit Punch Ron B/D, Ensure Max chocolate BLD; With Meals  Consistent Carbohydrate Diet Moderate Consistent Carb (60 g CHO per Meal) Diet  Diet    Relevant Labs:  Recent Labs   Lab Test 07/27/25  1121 07/21/25  0822 07/20/25  1801   PROTIME  --   --  13.9   INR  --   --  1.03      < >  --    PTT  --   --  29   BUN 11.5   < >  --     < > = values in this interval not displayed.       Physical Exam:  Vitals: /46 (BP Location: Right arm)   Pulse 82   Temp 98.8  F (37.1  C) (Oral)   Resp 16   Ht 1.575 m (5' 2.01\")   Wt 114.4 kg (252 lb 4.8 oz)   SpO2 (!) 91%   BMI 46.13 kg/m      Physical Exam:   Orientation:  Alert, oriented, and in no acute distress: Yes  Sedation: " "No    Motor Examination:  Able to move all extremities independently        Relevant Medications:  Current Pain Medications:  Medications related to Pain Management (From now, onward)      Start     Dose/Rate Route Frequency Ordered Stop    07/28/25 1243  HYDROmorphone (DILAUDID) injection 0.2 mg         0.2 mg Intravenous EVERY 6 HOURS PRN 07/28/25 1243      07/28/25 0000  acetaminophen (TYLENOL) 325 MG tablet         975 mg Oral EVERY 8 HOURS 07/28/25 0949      07/28/25 0000  gabapentin (NEURONTIN) 300 MG capsule         300 mg Oral 3 TIMES DAILY 07/28/25 0949      07/28/25 0000  HYDROmorphone (DILAUDID) 4 MG tablet         4-6 mg Oral EVERY 4 HOURS PRN 07/28/25 0949      07/28/25 0000  Lidocaine (LIDOCARE) 4 % Patch         3 patch  over 12 Hours Transdermal EVERY 24 HOURS 07/28/25 0949      07/28/25 0000  methocarbamol (ROBAXIN) 500 MG tablet         500 mg Oral EVERY 6 HOURS 07/28/25 0949      07/28/25 0000  senna-docusate (SENOKOT-S/PERICOLACE) 8.6-50 MG tablet         2 tablet Oral 2 TIMES DAILY 07/28/25 0949      07/27/25 2133  HYDROmorphone (DILAUDID) tablet 4 mg        Placed in \"Or\" Linked Group    4 mg Oral EVERY 4 HOURS PRN 07/27/25 2133      07/27/25 2133  HYDROmorphone (DILAUDID) tablet 6 mg        Placed in \"Or\" Linked Group    6 mg Oral EVERY 4 HOURS PRN 07/27/25 2133      07/27/25 1400  gabapentin (NEURONTIN) capsule 300 mg         300 mg Oral 3 TIMES DAILY 07/27/25 1338      07/25/25 1500  bisacodyl (DULCOLAX) suppository 10 mg         10 mg Rectal DAILY PRN 07/25/25 1452      07/24/25 2000  polyethylene glycol (MIRALAX) Packet 17 g         17 g Oral 2 TIMES DAILY 07/24/25 0912      07/24/25 0800  Lidocaine (LIDOCARE) 4 % Patch 3 patch         3 patch  over 12 Hours Transdermal EVERY 24 HOURS 0800 07/23/25 1412      07/24/25 0000  bisacodyl (DULCOLAX) suppository 10 mg         10 mg Rectal DAILY PRN 07/21/25 2017 07/23/25 1700  methocarbamol (ROBAXIN) tablet 500 mg         500 mg Oral EVERY 6 " "HOURS 07/23/25 1407      07/23/25 0000  magnesium hydroxide (MILK OF MAGNESIA) suspension 30 mL         30 mL Oral DAILY PRN 07/21/25 2017 07/21/25 2030  senna-docusate (SENOKOT-S/PERICOLACE) 8.6-50 MG per tablet 2 tablet        Placed in \"Or\" Linked Group    2 tablet Oral 2 TIMES DAILY 07/21/25 2017 07/21/25 2015  lidocaine 1 % 0.1-1 mL         0.1-1 mL Other EVERY 1 HOUR PRN 07/21/25 2015 07/21/25 2015  lidocaine (LMX4) cream          Topical EVERY 1 HOUR PRN 07/21/25 2015 07/21/25 1830  acetaminophen (TYLENOL) tablet 975 mg         975 mg Oral EVERY 8 HOURS 07/21/25 1805              Primary Service Contacted with Recommendations? Yes - communication with ortho spine VANESA and medicine team       75 MINUTES SPENT BY ME on the date of service doing chart review, history, exam, documentation & further activities per the note.      Acute Inpatient Pain Service [facility]  Hours of pain coverage 24/7   Please Page via W4  - Link to W4 Here - Search Pain  Page via Am[employer]- Please Page the Pain Team Via Amcom: \"PAIN MANAGEMENT ACUTE INPATIENT/ [facility] [place]\"            "

## 2025-07-29 ENCOUNTER — APPOINTMENT (OUTPATIENT)
Dept: PHYSICAL THERAPY | Facility: CLINIC | Age: 70
DRG: 457 | End: 2025-07-29
Payer: MEDICARE

## 2025-07-29 LAB
GLUCOSE BLDC GLUCOMTR-MCNC: 137 MG/DL (ref 70–99)
GLUCOSE BLDC GLUCOMTR-MCNC: 151 MG/DL (ref 70–99)
GLUCOSE BLDC GLUCOMTR-MCNC: 162 MG/DL (ref 70–99)
GLUCOSE BLDC GLUCOMTR-MCNC: 166 MG/DL (ref 70–99)
GLUCOSE BLDC GLUCOMTR-MCNC: 168 MG/DL (ref 70–99)
GLUCOSE BLDC GLUCOMTR-MCNC: 209 MG/DL (ref 70–99)
POTASSIUM SERPL-SCNC: 4.4 MMOL/L (ref 3.4–5.3)

## 2025-07-29 PROCEDURE — 250N000013 HC RX MED GY IP 250 OP 250 PS 637

## 2025-07-29 PROCEDURE — 120N000002 HC R&B MED SURG/OB UMMC

## 2025-07-29 PROCEDURE — 250N000013 HC RX MED GY IP 250 OP 250 PS 637: Performed by: STUDENT IN AN ORGANIZED HEALTH CARE EDUCATION/TRAINING PROGRAM

## 2025-07-29 PROCEDURE — 97530 THERAPEUTIC ACTIVITIES: CPT | Mod: GP

## 2025-07-29 PROCEDURE — 36415 COLL VENOUS BLD VENIPUNCTURE: CPT

## 2025-07-29 PROCEDURE — 250N000013 HC RX MED GY IP 250 OP 250 PS 637: Performed by: PHYSICIAN ASSISTANT

## 2025-07-29 PROCEDURE — 99232 SBSQ HOSP IP/OBS MODERATE 35: CPT | Performed by: PHYSICIAN ASSISTANT

## 2025-07-29 PROCEDURE — 84132 ASSAY OF SERUM POTASSIUM: CPT

## 2025-07-29 PROCEDURE — 250N000013 HC RX MED GY IP 250 OP 250 PS 637: Performed by: NURSE PRACTITIONER

## 2025-07-29 RX ORDER — BISACODYL 10 MG
10 SUPPOSITORY, RECTAL RECTAL DAILY
Status: DISCONTINUED | OUTPATIENT
Start: 2025-07-29 | End: 2025-08-01 | Stop reason: HOSPADM

## 2025-07-29 RX ORDER — HYDROXYZINE HYDROCHLORIDE 10 MG/1
10 TABLET, FILM COATED ORAL 3 TIMES DAILY PRN
Status: DISCONTINUED | OUTPATIENT
Start: 2025-07-29 | End: 2025-08-01 | Stop reason: HOSPADM

## 2025-07-29 RX ADMIN — BRIMONIDINE TARTRATE 1 DROP: 2 SOLUTION/ DROPS OPHTHALMIC at 08:38

## 2025-07-29 RX ADMIN — FAMOTIDINE 20 MG: 20 TABLET, FILM COATED ORAL at 20:37

## 2025-07-29 RX ADMIN — GABAPENTIN 300 MG: 300 CAPSULE ORAL at 08:36

## 2025-07-29 RX ADMIN — OXYCODONE HYDROCHLORIDE 15 MG: 15 TABLET ORAL at 03:11

## 2025-07-29 RX ADMIN — CYCLOSPORINE 1 DROP: 0.5 EMULSION OPHTHALMIC at 20:38

## 2025-07-29 RX ADMIN — METHOCARBAMOL 500 MG: 500 TABLET ORAL at 22:24

## 2025-07-29 RX ADMIN — INSULIN ASPART 12 UNITS: 100 INJECTION, SOLUTION INTRAVENOUS; SUBCUTANEOUS at 20:34

## 2025-07-29 RX ADMIN — BISACODYL 10 MG: 10 SUPPOSITORY RECTAL at 17:17

## 2025-07-29 RX ADMIN — BRIMONIDINE TARTRATE 1 DROP: 2 SOLUTION/ DROPS OPHTHALMIC at 20:38

## 2025-07-29 RX ADMIN — PANTOPRAZOLE SODIUM 40 MG: 40 TABLET, DELAYED RELEASE ORAL at 16:34

## 2025-07-29 RX ADMIN — SENNOSIDES AND DOCUSATE SODIUM 2 TABLET: 50; 8.6 TABLET ORAL at 20:38

## 2025-07-29 RX ADMIN — SERTRALINE HYDROCHLORIDE 50 MG: 25 TABLET ORAL at 08:37

## 2025-07-29 RX ADMIN — INSULIN GLARGINE 68 UNITS: 100 INJECTION, SOLUTION SUBCUTANEOUS at 22:28

## 2025-07-29 RX ADMIN — ACETAMINOPHEN 975 MG: 325 TABLET ORAL at 11:27

## 2025-07-29 RX ADMIN — FAMOTIDINE 20 MG: 20 TABLET, FILM COATED ORAL at 08:36

## 2025-07-29 RX ADMIN — PANTOPRAZOLE SODIUM 40 MG: 40 TABLET, DELAYED RELEASE ORAL at 08:35

## 2025-07-29 RX ADMIN — LATANOPROST 1 DROP: 50 SOLUTION/ DROPS OPHTHALMIC at 22:24

## 2025-07-29 RX ADMIN — HYDROXYZINE HYDROCHLORIDE 10 MG: 10 TABLET, FILM COATED ORAL at 16:34

## 2025-07-29 RX ADMIN — POLYETHYLENE GLYCOL 3350 17 G: 17 POWDER, FOR SOLUTION ORAL at 08:35

## 2025-07-29 RX ADMIN — TIMOLOL MALEATE 1 DROP: 5 SOLUTION OPHTHALMIC at 20:39

## 2025-07-29 RX ADMIN — CYCLOSPORINE 1 DROP: 0.5 EMULSION OPHTHALMIC at 08:36

## 2025-07-29 RX ADMIN — GABAPENTIN 300 MG: 300 CAPSULE ORAL at 20:37

## 2025-07-29 RX ADMIN — METHOCARBAMOL 500 MG: 500 TABLET ORAL at 11:27

## 2025-07-29 RX ADMIN — OXYCODONE HYDROCHLORIDE 15 MG: 15 TABLET ORAL at 05:52

## 2025-07-29 RX ADMIN — INSULIN ASPART 13 UNITS: 100 INJECTION, SOLUTION INTRAVENOUS; SUBCUTANEOUS at 11:28

## 2025-07-29 RX ADMIN — GABAPENTIN 300 MG: 300 CAPSULE ORAL at 13:14

## 2025-07-29 RX ADMIN — ACETAMINOPHEN 975 MG: 325 TABLET ORAL at 02:09

## 2025-07-29 RX ADMIN — METHOCARBAMOL 500 MG: 500 TABLET ORAL at 16:34

## 2025-07-29 RX ADMIN — OXYCODONE HYDROCHLORIDE 15 MG: 15 TABLET ORAL at 23:14

## 2025-07-29 RX ADMIN — POLYETHYLENE GLYCOL 3350 17 G: 17 POWDER, FOR SOLUTION ORAL at 20:37

## 2025-07-29 RX ADMIN — ACETAMINOPHEN 975 MG: 325 TABLET ORAL at 18:22

## 2025-07-29 RX ADMIN — TIMOLOL MALEATE 1 DROP: 5 SOLUTION OPHTHALMIC at 08:42

## 2025-07-29 RX ADMIN — ATORVASTATIN CALCIUM 20 MG: 10 TABLET, FILM COATED ORAL at 08:36

## 2025-07-29 RX ADMIN — SENNOSIDES AND DOCUSATE SODIUM 2 TABLET: 50; 8.6 TABLET ORAL at 08:36

## 2025-07-29 RX ADMIN — METHOCARBAMOL 500 MG: 500 TABLET ORAL at 05:52

## 2025-07-29 RX ADMIN — OXYCODONE HYDROCHLORIDE 15 MG: 15 TABLET ORAL at 13:11

## 2025-07-29 ASSESSMENT — ACTIVITIES OF DAILY LIVING (ADL)
ADLS_ACUITY_SCORE: 47
ADLS_ACUITY_SCORE: 49
ADLS_ACUITY_SCORE: 47
ADLS_ACUITY_SCORE: 49
ADLS_ACUITY_SCORE: 47

## 2025-07-29 NOTE — PROGRESS NOTES
[facility]    Medicine Progress Note - [facility] Medicine Service    Date of Admission:  7/19/2025    Assessment & Plan   Radha Baca is a 69 year old female with PMHx of HTN, DM2 with insulin dependence, glaucoma, diabetic retinopathy, CAD, HLD, vertigo and recently noted suprasellar lesion. She was admitted on 7/19/2025 for worsening back pain 2/2 T12 fracture and was transferred from [place] to [facility] for orthopedic surgery and pain management. Our team is consulted for DM management.     Major Plans Today:  - Continue Lantus 68U   - Parker in place  - Continue pain regimen per pain     # T11, T12 burst fractures   # Moderate lumbar spondylosis with moderate spinal canal stenosis L4-L5, mild narrowing L3-L4  # Multilevel foraminal narrowing  # s/p T9-L2 posterior spinal fusion on 7/21/2025   Recent fall several weeks ago and outpatient imaging revealed compression fracture of T12 2/2 osteoporosis. She presented to the ED with worsening low back pain with radiculopathy and neuropathy sxs. 7/19 CT lumbar spine with burst fracture involving T12 vertebral body with central vertebral body height loss and coronally oriented fracture, as well as fracture of the posterior arch, spinous process, and interior aspect of T11. She underwent facetectomies and posterior spinal fusion of T9-L2. Ortho is primary, our team is consulted for DM management. Ortho has consulted Pain Management. She has been requiring IV dilaudid before her PT sessions, will plan to discontinue this in the coming days to assess her pain needs with oral.     Ortho Primary (copied from note on 7/29)  Activity:   - Up with assist until independent. No excessive bending or twisting. No lifting >10 lbs x 6 weeks.   - No use of Lakhwinder lift.   - Will need to be sideways in bed  Weight bearing status: WBAT.  Pain management:   - Transition to PO narcotics as tolerated. No NSAIDs x 3 months.    Antibiotics: Ancef x until the drains are in  Diet: Begin with clear fluids and progress diet as tolerated. Sliding scale insulin to continue.  DVT prophylaxis: SCDs only. No chemical DVT ppx needed.  Imaging: XR Upright Thoraco/lumbar  Labs: Per medicine team  Bracing/Splinting: None  Dressings: Keep Aquacel c/d/i x 7 days.  Drains: discontinued  Parker catheter: Removed  Physical Therapy/Occupational Therapy: Eval and treat.  Cultures: None.    Consults: Hospitalist.  Follow-up: Clinic with Dr. Dubois in 6 weeks with repeat x-rays.   Disposition: Pending progress with therapies, pain control on orals, and medical stability, anticipate discharge to TCU in next 4-5 days    Pain Service (copied from note on 7/29)   oxycodone 10-15 mg Q3H PRN.  (Note previously oxycodone 10mg, then to dilaudid 4-6mg, and now oxycodone 10-15mg). She did not notice change to pain level from yesterday to today.   Yesterday used 15mg x5.  -would recommend 15mg Q 4 hours PRN x 1-4 days and then 10mg Q 4 hours PRN x 1-4 days then return to baseline oxycodone 5mg PO Q 4 hours PRN + 10mg at bedtime as prescribed by [facility]  Stop IV Dilaudid 0.3-0.6 mg IV PRN -not using in 48 hours.  Continue gabapentin to 300 TID. Recommend caution with further dose escalation and careful monitoring for CNS side effects.  Continue muscle relaxant  Continue acetaminophen  Menthol patches  Bowel regimen-passing gas     # TDM2 with insulin dependence - stable   Follows with Dr Keturah De Luna with Endocrinology, her last A1c checked was 11.2. PTA on lantus 75U at bedtime, humalog 25U TID with meals and sliding scale as well as metformin 500 mg daily. Recent glucose trends have been 90s-140s while on the Lantus 68U. Several elevated blood sugar levels above goal likely in the setting of untimed insulin administration. Communicated plan with RN team.    - Continue Lantus 68U  - Novolog 1 unit(s) per 6 g cho TID AC and PRN snacks. Cover all intake.   -  "Novolog High Resistance Correction Scale (ISF: 25) TID AC / HS / 0200   - BG checks: TID AC / HS / 0200   - HOLD PTA: Metformin while inpatient   - Hypoglycemia protocol   - CHO consistent diet     # Urinary retention - chronic   Per discussion with Ortho and Pain, urinary retention sounds chronic and most c/w prolapse as she is able to initiate urination but cannot empty completely. Suspect that worsening retention is multifactorial from recent surgery, medications, and constipation. Straight cathing have resulted in 700-900cc of urine in recent days. She ultimately had a gonzalez catheter placed. Otherwise, she may need outpatient work-up as this is a chronic concern.    - Gonzalez cath in place         Diet: Consistent Carbohydrate Diet Moderate Consistent Carb (60 g CHO per Meal) Diet  Diet  Snacks/Supplements Adult: Other; Fruit Punch Ron B/D, Ensure Max chocolate B; With Meals    DVT Prophylaxis: Pneumatic Compression Devices  Gonzalez Catheter: PRESENT, indication: Surgical procedure, Acute retention or obstruction  Lines: None     Cardiac Monitoring: None  Code Status: Full Code      Clinically Significant Risk Factors            # Hypoalbuminemia: Lowest albumin = 3.1 g/dL at 7/23/2025  6:01 AM, will monitor as appropriate     # Hypertension: Noted on problem list           # DMII: A1C = 11.2 % (Ref range: 0.0 - 5.6 %) within past 6 months   # Morbid Obesity: Estimated body mass index is 46.13 kg/m  as calculated from the following:    Height as of this encounter: 1.575 m (5' 2.01\").    Weight as of this encounter: 114.4 kg (252 lb 4.8 oz).             Social Drivers of Health    Depression: At risk (7/8/2025)    PHQ-2     PHQ-2 Score: 3   Tobacco Use: Medium Risk (7/21/2025)    Patient History     Smoking Tobacco Use: Former     Smokeless Tobacco Use: Never     Passive Exposure: Past   Stress: Stress Concern Present (3/16/2025)    [employer] - Occupational Stress Questionnaire     " Feeling of Stress : To some extent   Social Connections: Unknown (3/16/2025)    Social Connection and Isolation Panel [NHANES]     Frequency of Social Gatherings with Friends and Family: Patient declined          Disposition Plan   Medically Ready for Discharge: Anticipated in 2-4 Days         The patient's care was discussed with the Attending Physician, Dr. Kemi Stephenson MD.    DO [facility]  [facility]  Securely message with [employer] (more info)  Text page via [employer] Paging/Directory   See signed in provider for up to date coverage information  ______________________________________________________________________    Interval History   NAEO. Sugar level increased to 200 this morning. She thinks nursing team hasn't been giving her insulin timed with her meals. This morning, got her insulin 35 minutes after eating. Otherwise, not eating more than usual. Still hasn't had a BM. Denies chest pain, SOB, cough, and sore throat.     Physical Exam   Vital Signs: Temp: 98  F (36.7  C) (b/p=115/51) Temp src: Oral BP: 121/58 Pulse: 69   Resp: 18 SpO2: 98 % O2 Device: None (Room air) Oxygen Delivery: 1 LPM  Weight: 252 lbs 4.8 oz    Physical Exam  Vitals reviewed.   Constitutional:       General: She is not in acute distress.     Appearance: Normal appearance. She is not toxic-appearing.   HENT:      Head: Normocephalic and atraumatic.   Eyes:      Extraocular Movements: Extraocular movements intact.      Conjunctiva/sclera: Conjunctivae normal.      Pupils: Pupils are equal, round, and reactive to light.   Pulmonary:      Effort: Pulmonary effort is normal. No respiratory distress.   Neurological:      General: No focal deficit present.      Mental Status: She is alert and oriented to person, place, and time.   Psychiatric:         Mood and Affect: Mood normal.         Behavior: Behavior normal.         Thought Content: Thought content normal.          Judgment: Judgment normal.       Medical Decision Making   See A&P for MDM        Data     I have personally reviewed the following data over the past 24 hrs:    N/A  \   N/A   / N/A     N/A N/A N/A /  166 (H)   4.4 N/A N/A \       Imaging results reviewed over the past 24 hrs:   Recent Results (from the past 24 hours)   CT Lumbar Spine w/o Contrast    Narrative    EXAM: CT THORACIC SPINE W/O CONTRAST, CT LUMBAR SPINE W/O CONTRAST   7/28/2025 3:05 PM     HISTORY:  s/p thoracolumbar fusion , hx osteoporotic compression  fracture       COMPARISON:  7/20/2025 MRI and 7/19/2025 CT.     TECHNIQUE: Using multidetector thin collimation helical acquisition  technique, axial, sagittal and coronal 3 mm thickness CT  reconstructions were obtained through the thoracic spine without  intravenous contrast.  Images were viewed in bone and soft tissue  windows.    FINDINGS:  There are 12 thoracic and 5 lumbar type vertebrae, used for the  purposes of this dictation.     THORACIC:  Postsurgical changes of T9-L2 posterior instrumented spinal fusion  with stable alignment and no definite evidence of hardware  complication. Unchanged acute T12 burst type fracture with  approximately 25% loss of vertebral body height and minimal  retropulsion of the posterosuperior aspect of the vertebral body with  no significant associated spinal canal stenosis. Unchanged T11  fracture with mild compression of approximately 10% height loss  including a fracture line traversing to the left bridging  syndesmophyte between T10-T11.    Unchanged fracture at the midsegment of the rudimentary right-sided  rib at T12. Additional nondisplaced fracture of the proximal right rib  at T11. Postsurgical changes of the spinous processes at T11 and T12,  previously demonstrating fractures here. Mild prevertebral soft tissue  edema adjacent fat stranding most pronounced from T10-L1 consistent  with overlying fractures in this region. Chronic appearing  fractured  osteophyte at T5-T6 ventral bridging osteophyte.    No severe spinal canal stenosis. Moderate bilateral neural foraminal  narrowing at T1-2. Diffuse osteopenic appearance of the thoracic  spine.    Small left pleural effusion.     Atrophic thyroid gland.    LUMBAR:  There are 5 lumbar type vertebrae, used for the purposes of this  dictation. Postsurgical changes of T9-L2 posterior instrumented spinal  fusion. Trace retrolisthesis of L1 on L2. No acute fracture. Mild to  moderate multilevel space narrowing most pronounced at L5-S1. Moderate  to severe multilevel facet joint hypertrophy with degenerative  endplate spurring. Diffuse osteopenic appearance of the lumbar spine.    Findings on a level by level basis are as follows:    L1-L2: Trace retrolisthesis and disc bulge. Mild spinal canal  stenosis. Mild bilateral neural foraminal narrowing.    L2-L3: Mild disc bulge and bilateral facet hypertrophy. Mild spinal  canal narrowing. No neural foraminal stenosis.    L3-L4: Asymmetric right disc osteophyte complex and right greater than  left facet hypertrophy. Mild spinal canal narrowing. Mild right neural  foraminal narrowing. No left neural foraminal stenosis.    L4-L5: Disc bulge with superimposed central disc protrusion. Left  greater than right facet hypertrophy. Moderate spinal canal stenosis.  Mild to moderate bilateral neural foraminal narrowing.    L5-S1: Disc osteophyte complex and bilateral facet hypertrophy.  Moderate bilateral neural foraminal narrowing. No spinal canal  stenosis.      Impression    IMPRESSION:  1. New changes of T9-L2 posterior fusion instrumentation with stable  alignment of the thoracolumbar spine. No evidence of hardware  complication.  2. No change in the T11 and T12 fracture deformities when compared to  7/19/2025 CT with approximately 25% loss of vertebral body height at  T12 and 10% loss at T11.  3. Previously described spinous process fractures now  demonstrate  postsurgical change and facet joint fractures are now overlaid with  changes of facetectomies from T9-L2.  4. Stable appearance of right T11 and T12 rib fractures.  5. Multilevel thoracolumbar spondylosis with no severe spinal canal  stenosis. Most pronounced associated changes include moderate spinal  canal narrowing at L4-L5 and moderate bilateral neural foraminal  narrowing at L5-S1.    I have personally reviewed the examination and initial interpretation  and I agree with the findings.    LUIS FELIPE BARTON MD         SYSTEM ID:  [id number]   CT Thoracic Spine w/o Contrast    Narrative    EXAM: CT THORACIC SPINE W/O CONTRAST, CT LUMBAR SPINE W/O CONTRAST   7/28/2025 3:05 PM     HISTORY:  s/p thoracolumbar fusion , hx osteoporotic compression  fracture       COMPARISON:  7/20/2025 MRI and 7/19/2025 CT.     TECHNIQUE: Using multidetector thin collimation helical acquisition  technique, axial, sagittal and coronal 3 mm thickness CT  reconstructions were obtained through the thoracic spine without  intravenous contrast.  Images were viewed in bone and soft tissue  windows.    FINDINGS:  There are 12 thoracic and 5 lumbar type vertebrae, used for the  purposes of this dictation.     THORACIC:  Postsurgical changes of T9-L2 posterior instrumented spinal fusion  with stable alignment and no definite evidence of hardware  complication. Unchanged acute T12 burst type fracture with  approximately 25% loss of vertebral body height and minimal  retropulsion of the posterosuperior aspect of the vertebral body with  no significant associated spinal canal stenosis. Unchanged T11  fracture with mild compression of approximately 10% height loss  including a fracture line traversing to the left bridging  syndesmophyte between T10-T11.    Unchanged fracture at the midsegment of the rudimentary right-sided  rib at T12. Additional nondisplaced fracture of the proximal right rib  at T11. Postsurgical changes of the spinous  processes at T11 and T12,  previously demonstrating fractures here. Mild prevertebral soft tissue  edema adjacent fat stranding most pronounced from T10-L1 consistent  with overlying fractures in this region. Chronic appearing fractured  osteophyte at T5-T6 ventral bridging osteophyte.    No severe spinal canal stenosis. Moderate bilateral neural foraminal  narrowing at T1-2. Diffuse osteopenic appearance of the thoracic  spine.    Small left pleural effusion.     Atrophic thyroid gland.    LUMBAR:  There are 5 lumbar type vertebrae, used for the purposes of this  dictation. Postsurgical changes of T9-L2 posterior instrumented spinal  fusion. Trace retrolisthesis of L1 on L2. No acute fracture. Mild to  moderate multilevel space narrowing most pronounced at L5-S1. Moderate  to severe multilevel facet joint hypertrophy with degenerative  endplate spurring. Diffuse osteopenic appearance of the lumbar spine.    Findings on a level by level basis are as follows:    L1-L2: Trace retrolisthesis and disc bulge. Mild spinal canal  stenosis. Mild bilateral neural foraminal narrowing.    L2-L3: Mild disc bulge and bilateral facet hypertrophy. Mild spinal  canal narrowing. No neural foraminal stenosis.    L3-L4: Asymmetric right disc osteophyte complex and right greater than  left facet hypertrophy. Mild spinal canal narrowing. Mild right neural  foraminal narrowing. No left neural foraminal stenosis.    L4-L5: Disc bulge with superimposed central disc protrusion. Left  greater than right facet hypertrophy. Moderate spinal canal stenosis.  Mild to moderate bilateral neural foraminal narrowing.    L5-S1: Disc osteophyte complex and bilateral facet hypertrophy.  Moderate bilateral neural foraminal narrowing. No spinal canal  stenosis.      Impression    IMPRESSION:  1. New changes of T9-L2 posterior fusion instrumentation with stable  alignment of the thoracolumbar spine. No evidence of hardware  complication.  2. No change in  the T11 and T12 fracture deformities when compared to  7/19/2025 CT with approximately 25% loss of vertebral body height at  T12 and 10% loss at T11.  3. Previously described spinous process fractures now demonstrate  postsurgical change and facet joint fractures are now overlaid with  changes of facetectomies from T9-L2.  4. Stable appearance of right T11 and T12 rib fractures.  5. Multilevel thoracolumbar spondylosis with no severe spinal canal  stenosis. Most pronounced associated changes include moderate spinal  canal narrowing at L4-L5 and moderate bilateral neural foraminal  narrowing at L5-S1.    I have personally reviewed the examination and initial interpretation  and I agree with the findings.    LUIS FELIPE BARTON MD         SYSTEM ID:  [id number]

## 2025-07-29 NOTE — PROGRESS NOTES
"CLINICAL NUTRITION SERVICES - ASSESSMENT NOTE    RECOMMENDATIONS FOR MDs/PROVIDERS TO ORDER:  None at this time    Malnutrition Status:    Patient does not meet two of the established criteria necessary for diagnosing malnutrition    Registered Dietitian Interventions:  Ensure Max Daily Chocolate and Ron BID Fruit punch    Future/Additional Recommendations:  Monitor labs, stooling, weight trends, intakes, supplement acceptance     REASON FOR ASSESSMENT  LOS    REASON FOR ADMISSION: worsening back pain 2/2 T12 fracture and was transferred from [place] to [facility] for orthopedic surgery and pain management     PMH: HTN, DM2 with insulin dependence, glaucoma, diabetic retinopathy, CAD, HLD, vertigo and recently noted suprasellar lesion.     Respiratory CPAP at night    SUBJECTIVE INFORMATION  Assessed patient in room.    NUTRITION HISTORY  Pt reports she is a stress eater and when stressed she chooses carbohydrates.  Pt reports difficulty managing DM diet, no other concerns   Pt is now on a carbohydrate-controlled diet. Recommended pt observe serving sizes on her meal trays and try to mimic the servings at home.    CURRENT NUTRITION ORDERS  Diet: Moderate Consistent Carbohydrate (60 g CHO per meal)    CURRENT INTAKE/TOLERANCE  Intake/Tolerance: Moderate 50%-75% per I/Os Pt estimates consuming 75% of meals  Per [facility] meal order review: Pt has been ordering 2-3 full meals per day  6-day average daily meal delivery: 1587 kcals/65 grams protein. Supplements add 640 kcals/95 grams protein  Pt reports to tolerate well Ron supplements. Pt is not drinking Ensure Max TID as ordered and requested to decrease delivery to daily.    LABS  Nutrition-relevant labs: Reviewed lab panel 7/27  Slight hyponatremia (133)  Elevated WBC    MEDICATIONS  Nutrition-relevant medications: Reviewed  Scheduled: Lantus, Miralax, Novolog Meals/Bedtime, Pepcid, Senna Docusate     ANTHROPOMETRICS  Height: 157.5 cm (5' 2.008\")  Admission " Weight: 113.4 kg (250 lb) (07/19/25 1457)   Most Recent Weight: 114.4 kg (252 lb 4.8 oz) (07/27/25 0740)  IBW: 50 kg  % IBW: 229%  Body mass index is 46.13 kg/m .  BMI (kg/m ): Obesity Class III BMI > 40  Weight History: No significant weight loss noted.  Wt Readings from Last 10 Encounters:   07/27/25 114.4 kg (252 lb 4.8 oz)   06/18/25 115.2 kg (254 lb)   03/19/25 115.9 kg (255 lb 9.6 oz)   01/22/25 115.4 kg (254 lb 8 oz)   10/24/24 113.4 kg (250 lb)   10/08/24 114.7 kg (252 lb 14.4 oz)   08/21/24 113.6 kg (250 lb 6.4 oz)   01/17/24 113.2 kg (249 lb 9.6 oz)   09/27/23 111.1 kg (245 lb)   08/20/23 111.1 kg (245 lb)     Dosing Weight: 114.4 kg, based on Actual Body Weight    ASSESSED NUTRITION NEEDS  Estimated Energy Needs: 1941 kcals/day ([place] Jeor) little to no exercise  Justification: Obese, non vented  Estimated Protein Needs: 172 grams protein/day (1.5 grams or pro/kg)  Justification: Increased needs and Post-op  Estimated Fluid Needs: 1940 mL/day (1 mL/kcal)  Justification: Maintenance    SYSTEM FINDINGS    Skin/wounds: Surgical wound(s) present  GI symptoms: Constipation - per ortho note- suppository today  Last BM: 7/26  Bowel regimen: Scheduled last given 7/28    RENAL  GFR >90 and No acute concerns    MALNUTRITION  % Intake: Decreased intake does not meet criteria  % Weight Loss: Weight loss does not meet criteria   Subcutaneous Fat Loss: None observed  Muscle Loss: None observed  Fluid Accumulation/Edema: Mild, 1+  Malnutrition Diagnosis: Patient does not meet two of the established criteria necessary for diagnosing malnutrition  Malnutrition Present on Admission: No    NUTRITION DIAGNOSIS  Increased nutrient needs protein related to increased needs postop as evidenced by s/p T9-L2 posterior spinal fusion on 7/21/2025     INTERVENTIONS  Medical food supplement therapy  Nutrition education content    Goals  Patient to consume % of nutritionally adequate meal trays TID, or the equivalent with  supplements/snacks.     Monitoring/Evaluation  Progress toward goals will be monitored and evaluated per policy.    Andreina PANDEY  Clinical Dietitian  [facility] 5B/10A ICU Vocera, Teams, or desk [phone]  Weekend/Holiday Vocera: Weekend Holiday Clinical Dietitian [Multi Site Groups]

## 2025-07-29 NOTE — PLAN OF CARE
Goal Outcome Evaluation:   Pt A&Ox4  Assist of one with bed turns and repositioning side to side  Not OOB today due to pain   CT scan completed of lumbar due to pain with movement   Milk of mag given today, LBM: 7/26. Passing gas but unable to void discussed potential for suppository tomorrow   Dressing on back is CDI, ice applied to pain   CPAP on overnight                     @[facility])@

## 2025-07-29 NOTE — PROGRESS NOTES
Pain Service Progress Note  [facility]  Date: 07/29/2025       Patient Name: Radha Baca  MRN: [id number]  Age: 69 year old  Sex: female      Assessment:  Radha Baca is a 69 year old female who presents who has PMH of PMH of Hypertension, Diabetes mellitus type 2, Glaucoma and bilateral TKA. She presents to ED on 7/19/25 with worsening back pain after recent diagnosis of T12 burst fracture.  She  is now s/p  T9-L2 posterior spinal fusion with cement augmentation  on 7/21/25 with Dr. Dubois.     PTA, she was taking oxycodone 5mg PO Q 4 hours PRN + 10mg at bedtime as prescribed by [facility], tylenol and ibuprofen.    Dionicio is lying in bed. States pain level is 7-8/10, improved from earlier in admission of 10/10.  Is awaiting for placement at TCU-states she was not aware. Encourage movements.     Plan/Recommendations:     oxycodone 10-15 mg Q3H PRN.  (Note previously oxycodone 10mg, then to dilaudid 4-6mg, and now oxycodone 10-15mg). She did not notice change to pain level from yesterday to today.   Yesterday used 15mg x5.  -would recommend 15mg Q 4 hours PRN x 1-4 days and then 10mg Q 4 hours PRN x 1-4 days then return to baseline oxycodone 5mg PO Q 4 hours PRN + 10mg at bedtime as prescribed by [facility]  Stop IV Dilaudid 0.3-0.6 mg IV PRN -not using in 48 hours.    Continue gabapentin to 300 TID. Recommend caution with further dose escalation and careful monitoring for CNS side effects.  Continue muscle relaxant  Continue acetaminophen  Menthol patches  Bowel regimen-passing gas       Pain Service will sign off.    Discussed with attending anesthesiologist- the context of our conversation was pain recommendations as above    Natalie Medeiros PA-C  07/29/2025         Diet: Consistent Carbohydrate Diet Moderate Consistent Carb (60 g CHO per Meal) Diet  Diet  Snacks/Supplements Adult: Other; Fruit Punch Ron B/D, Ensure Max chocolate B; With Meals    Relevant  "Labs:  Recent Labs   Lab Test 07/27/25  1121 07/21/25  0822 07/20/25  1801   PROTIME  --   --  13.9   INR  --   --  1.03      < >  --    PTT  --   --  29   BUN 11.5   < >  --     < > = values in this interval not displayed.       Physical Exam:  Vitals: /58 (BP Location: Left arm, Patient Position: Right side, Cuff Size: Adult Large)   Pulse 69   Temp 98  F (36.7  C) (Oral)   Resp 18   Ht 1.575 m (5' 2.01\")   Wt 114.4 kg (252 lb 4.8 oz)   SpO2 98%   BMI 46.13 kg/m      Physical Exam:   CONSTITUTIONAL/GENERAL APPEARANCE: Conversant.  EYES: EOMI, sclerae clear    RESPIRATORY:non labored breathing, on room air  CARDIOVASCULAR: HR within normal limits  GI:round, distended, hernia present  MUSCULOSKELETAL/BACK/SPINE/EXTREMITIES: Moving UE and LE independently.     NEURO:  AAOx3.   SKIN/VASCULAR EXAM:  Dry and warm.  PSYCHIATRIC/BEHAVIORAL/OBSERVATIONS:  No objective signs of pain observed during our interview.   Judgment/Insight -fair   Orientation - x3   Memory -fair   Mood and affect - calm, pleasant, cooperative         Relevant Medications:  Current Pain Medications:  Medications related to Pain Management (From now, onward)      Start     Dose/Rate Route Frequency Ordered Stop    07/29/25 1125  hydrOXYzine HCl (ATARAX) tablet 10 mg         10 mg Oral 3 TIMES DAILY PRN 07/29/25 1125      07/28/25 1421  oxyCODONE IR (ROXICODONE) tablet 10 mg        Placed in \"Or\" Linked Group    10 mg Oral EVERY 3 HOURS PRN 07/28/25 1422      07/28/25 1421  oxyCODONE IR (ROXICODONE) tablet 15 mg        Placed in \"Or\" Linked Group    15 mg Oral EVERY 3 HOURS PRN 07/28/25 1422      07/28/25 1243  HYDROmorphone (DILAUDID) injection 0.2 mg         0.2 mg Intravenous EVERY 6 HOURS PRN 07/28/25 1243      07/28/25 0000  acetaminophen (TYLENOL) 325 MG tablet         975 mg Oral EVERY 8 HOURS 07/28/25 0949      07/28/25 0000  gabapentin (NEURONTIN) 300 MG capsule         300 mg Oral 3 TIMES DAILY 07/28/25 0949      07/28/25 " "0000  HYDROmorphone (DILAUDID) 4 MG tablet         4-6 mg Oral EVERY 4 HOURS PRN 07/28/25 0949      07/28/25 0000  Lidocaine (LIDOCARE) 4 % Patch         3 patch  over 12 Hours Transdermal EVERY 24 HOURS 07/28/25 0949      07/28/25 0000  methocarbamol (ROBAXIN) 500 MG tablet         500 mg Oral EVERY 6 HOURS 07/28/25 0949      07/28/25 0000  senna-docusate (SENOKOT-S/PERICOLACE) 8.6-50 MG tablet         2 tablet Oral 2 TIMES DAILY 07/28/25 0949      07/27/25 1400  gabapentin (NEURONTIN) capsule 300 mg         300 mg Oral 3 TIMES DAILY 07/27/25 1338      07/25/25 1500  bisacodyl (DULCOLAX) suppository 10 mg         10 mg Rectal DAILY PRN 07/25/25 1452      07/24/25 2000  polyethylene glycol (MIRALAX) Packet 17 g         17 g Oral 2 TIMES DAILY 07/24/25 0912      07/24/25 0800  Lidocaine (LIDOCARE) 4 % Patch 3 patch         3 patch  over 12 Hours Transdermal EVERY 24 HOURS 0800 07/23/25 1412      07/24/25 0000  bisacodyl (DULCOLAX) suppository 10 mg         10 mg Rectal DAILY PRN 07/21/25 2017 07/23/25 1700  methocarbamol (ROBAXIN) tablet 500 mg         500 mg Oral EVERY 6 HOURS 07/23/25 1407      07/23/25 0000  magnesium hydroxide (MILK OF MAGNESIA) suspension 30 mL         30 mL Oral DAILY PRN 07/21/25 2017 07/21/25 2030  senna-docusate (SENOKOT-S/PERICOLACE) 8.6-50 MG per tablet 2 tablet        Placed in \"Or\" Linked Group    2 tablet Oral 2 TIMES DAILY 07/21/25 2017 07/21/25 2015  lidocaine 1 % 0.1-1 mL         0.1-1 mL Other EVERY 1 HOUR PRN 07/21/25 2015 07/21/25 2015  lidocaine (LMX4) cream          Topical EVERY 1 HOUR PRN 07/21/25 2015 07/21/25 1830  acetaminophen (TYLENOL) tablet 975 mg         975 mg Oral EVERY 8 HOURS 07/21/25 1805              Primary Service Contacted with Recommendations? Yes            Acute Inpatient Pain Service [facility]  Hours of pain coverage 24/7   Please Page via Vocera  - Link to MePlease Here - Search Pain  Page via Amcom- Please Page the Pain Team Via Amcom: " "\"PAIN MANAGEMENT ACUTE INPATIENT/ [facility]\"            "

## 2025-07-29 NOTE — PLAN OF CARE
"Goal Outcome Evaluation:      Alert and oriented X4.    Vitally stable, /58 (BP Location: Left arm, Patient Position: Right side, Cuff Size: Adult Large)   Pulse 86   Temp 98.8  F (37.1  C) (Oral)   Resp 16   Ht 1.575 m (5' 2.01\")   Wt 114.4 kg (252 lb 4.8 oz)   SpO2 94%   BMI 46.13 kg/m      Pain managed with oxycodone, robaxin, tylenol.     Right and left PIV saline locked. Catheter in place. Catheter wipes complete.     Repositioned every 2 hours. Patient tried to move herself .     Regular diet. Takes pills whole with water.     Blood sugar checks.     Uses CPAP at night.     Continue plan of care                     "

## 2025-07-29 NOTE — PLAN OF CARE
"Goal Outcome Evaluation:      Plan of Care Reviewed With: patient    Overall Patient Progress: no changeOverall Patient Progress: no change    Shift: [id number]  VS:/58 (BP Location: Left arm, Patient Position: Right side, Cuff Size: Adult Large)   Pulse 65   Temp 98.2  F (36.8  C) (Oral)   Resp 18   Ht 1.575 m (5' 2.01\")   Wt 114.4 kg (252 lb 4.8 oz)   SpO2 98%   BMI 46.13 kg/m      Pain: Pain managed with scheduled Neurontin,Tylenol, Robaxin, prn Oxycodone and prn Atarax  Neuro: A&Ox4.  Cardiac: Denied chest pain  Respiratory: Denied SOB, on RA. Wears a cpap when sleeping.  Diet/Appetite: Tolerating regular diet.   /GI: Continent of BM. LBM 7/26. Prn Ducolax supp. Given at 1700, no BM at this time.  Parker catheter in place, cath cares completed.  LDA's: Land R PIV SL  Skin: No new skin issues noted.  Activity: A-2 Karie steady  Plan: Precautions maintained / Continue with plan of care. Call light within reach.Able to make needs known.   1700 sliding scale and carb coverage insulin has not been administered, pt has food tray in room but she is not eating yet. Will call RN when she is eating to administer insulin. Oncoming RN updated.      "

## 2025-07-30 ENCOUNTER — APPOINTMENT (OUTPATIENT)
Dept: OCCUPATIONAL THERAPY | Facility: CLINIC | Age: 70
DRG: 457 | End: 2025-07-30
Payer: MEDICARE

## 2025-07-30 ENCOUNTER — APPOINTMENT (OUTPATIENT)
Dept: PHYSICAL THERAPY | Facility: CLINIC | Age: 70
DRG: 457 | End: 2025-07-30
Payer: MEDICARE

## 2025-07-30 LAB
GLUCOSE BLDC GLUCOMTR-MCNC: 104 MG/DL (ref 70–99)
GLUCOSE BLDC GLUCOMTR-MCNC: 121 MG/DL (ref 70–99)
GLUCOSE BLDC GLUCOMTR-MCNC: 139 MG/DL (ref 70–99)
GLUCOSE BLDC GLUCOMTR-MCNC: 151 MG/DL (ref 70–99)
GLUCOSE BLDC GLUCOMTR-MCNC: 177 MG/DL (ref 70–99)
GLUCOSE BLDC GLUCOMTR-MCNC: 244 MG/DL (ref 70–99)
GLUCOSE BLDC GLUCOMTR-MCNC: 26 MG/DL (ref 70–99)
GLUCOSE BLDC GLUCOMTR-MCNC: 50 MG/DL (ref 70–99)
GLUCOSE BLDC GLUCOMTR-MCNC: 88 MG/DL (ref 70–99)
HOLD SPECIMEN: NORMAL
POTASSIUM SERPL-SCNC: 4.2 MMOL/L (ref 3.4–5.3)

## 2025-07-30 PROCEDURE — 97535 SELF CARE MNGMENT TRAINING: CPT | Mod: GO

## 2025-07-30 PROCEDURE — 250N000013 HC RX MED GY IP 250 OP 250 PS 637: Performed by: STUDENT IN AN ORGANIZED HEALTH CARE EDUCATION/TRAINING PROGRAM

## 2025-07-30 PROCEDURE — 97110 THERAPEUTIC EXERCISES: CPT | Mod: GP | Performed by: PHYSICAL THERAPIST

## 2025-07-30 PROCEDURE — 36415 COLL VENOUS BLD VENIPUNCTURE: CPT

## 2025-07-30 PROCEDURE — 84132 ASSAY OF SERUM POTASSIUM: CPT

## 2025-07-30 PROCEDURE — 99232 SBSQ HOSP IP/OBS MODERATE 35: CPT | Mod: 24

## 2025-07-30 PROCEDURE — 258N000001 HC RX 258: Performed by: STUDENT IN AN ORGANIZED HEALTH CARE EDUCATION/TRAINING PROGRAM

## 2025-07-30 PROCEDURE — 120N000002 HC R&B MED SURG/OB UMMC

## 2025-07-30 PROCEDURE — 250N000013 HC RX MED GY IP 250 OP 250 PS 637: Performed by: NURSE PRACTITIONER

## 2025-07-30 PROCEDURE — 97116 GAIT TRAINING THERAPY: CPT | Mod: GP | Performed by: PHYSICAL THERAPIST

## 2025-07-30 PROCEDURE — 250N000013 HC RX MED GY IP 250 OP 250 PS 637: Performed by: PHYSICIAN ASSISTANT

## 2025-07-30 PROCEDURE — 250N000013 HC RX MED GY IP 250 OP 250 PS 637

## 2025-07-30 PROCEDURE — 97530 THERAPEUTIC ACTIVITIES: CPT | Mod: GP | Performed by: PHYSICAL THERAPIST

## 2025-07-30 RX ADMIN — FAMOTIDINE 20 MG: 20 TABLET, FILM COATED ORAL at 19:50

## 2025-07-30 RX ADMIN — INSULIN GLARGINE 58 UNITS: 100 INJECTION, SOLUTION SUBCUTANEOUS at 22:08

## 2025-07-30 RX ADMIN — FAMOTIDINE 20 MG: 20 TABLET, FILM COATED ORAL at 08:42

## 2025-07-30 RX ADMIN — POLYETHYLENE GLYCOL 3350 17 G: 17 POWDER, FOR SOLUTION ORAL at 08:44

## 2025-07-30 RX ADMIN — LATANOPROST 1 DROP: 50 SOLUTION/ DROPS OPHTHALMIC at 22:08

## 2025-07-30 RX ADMIN — HYDROXYZINE HYDROCHLORIDE 10 MG: 10 TABLET, FILM COATED ORAL at 08:41

## 2025-07-30 RX ADMIN — DEXTROSE MONOHYDRATE 50 ML: 25 INJECTION, SOLUTION INTRAVENOUS at 09:04

## 2025-07-30 RX ADMIN — OXYCODONE HYDROCHLORIDE 15 MG: 15 TABLET ORAL at 02:17

## 2025-07-30 RX ADMIN — SERTRALINE HYDROCHLORIDE 50 MG: 25 TABLET ORAL at 08:43

## 2025-07-30 RX ADMIN — PANTOPRAZOLE SODIUM 40 MG: 40 TABLET, DELAYED RELEASE ORAL at 16:02

## 2025-07-30 RX ADMIN — ACETAMINOPHEN 975 MG: 325 TABLET ORAL at 18:23

## 2025-07-30 RX ADMIN — DEXTROSE 30 G: 15 GEL ORAL at 09:12

## 2025-07-30 RX ADMIN — BRIMONIDINE TARTRATE 1 DROP: 2 SOLUTION/ DROPS OPHTHALMIC at 09:18

## 2025-07-30 RX ADMIN — BRIMONIDINE TARTRATE 1 DROP: 2 SOLUTION/ DROPS OPHTHALMIC at 20:01

## 2025-07-30 RX ADMIN — DEXTROSE 30 G: 15 GEL ORAL at 08:57

## 2025-07-30 RX ADMIN — METHOCARBAMOL 500 MG: 500 TABLET ORAL at 22:08

## 2025-07-30 RX ADMIN — POLYETHYLENE GLYCOL 3350 17 G: 17 POWDER, FOR SOLUTION ORAL at 19:50

## 2025-07-30 RX ADMIN — CYCLOSPORINE 1 DROP: 0.5 EMULSION OPHTHALMIC at 08:44

## 2025-07-30 RX ADMIN — GABAPENTIN 300 MG: 300 CAPSULE ORAL at 19:50

## 2025-07-30 RX ADMIN — INSULIN ASPART 6 UNITS: 100 INJECTION, SOLUTION INTRAVENOUS; SUBCUTANEOUS at 18:26

## 2025-07-30 RX ADMIN — SENNOSIDES AND DOCUSATE SODIUM 2 TABLET: 50; 8.6 TABLET ORAL at 19:50

## 2025-07-30 RX ADMIN — ACETAMINOPHEN 975 MG: 325 TABLET ORAL at 02:17

## 2025-07-30 RX ADMIN — OXYCODONE HYDROCHLORIDE 15 MG: 15 TABLET ORAL at 08:42

## 2025-07-30 RX ADMIN — TIMOLOL MALEATE 1 DROP: 5 SOLUTION OPHTHALMIC at 20:01

## 2025-07-30 RX ADMIN — GABAPENTIN 300 MG: 300 CAPSULE ORAL at 14:31

## 2025-07-30 RX ADMIN — HYDROXYZINE HYDROCHLORIDE 10 MG: 10 TABLET, FILM COATED ORAL at 19:56

## 2025-07-30 RX ADMIN — METHOCARBAMOL 500 MG: 500 TABLET ORAL at 16:02

## 2025-07-30 RX ADMIN — OXYCODONE HYDROCHLORIDE 10 MG: 10 TABLET ORAL at 05:46

## 2025-07-30 RX ADMIN — OXYCODONE HYDROCHLORIDE 15 MG: 15 TABLET ORAL at 16:02

## 2025-07-30 RX ADMIN — PANTOPRAZOLE SODIUM 40 MG: 40 TABLET, DELAYED RELEASE ORAL at 08:42

## 2025-07-30 RX ADMIN — GABAPENTIN 300 MG: 300 CAPSULE ORAL at 08:42

## 2025-07-30 RX ADMIN — OXYCODONE HYDROCHLORIDE 15 MG: 15 TABLET ORAL at 21:23

## 2025-07-30 RX ADMIN — OXYCODONE HYDROCHLORIDE 15 MG: 15 TABLET ORAL at 18:23

## 2025-07-30 RX ADMIN — TIMOLOL MALEATE 1 DROP: 5 SOLUTION OPHTHALMIC at 09:17

## 2025-07-30 RX ADMIN — ACETAMINOPHEN 975 MG: 325 TABLET ORAL at 10:13

## 2025-07-30 RX ADMIN — OXYCODONE HYDROCHLORIDE 15 MG: 15 TABLET ORAL at 12:25

## 2025-07-30 RX ADMIN — CYCLOSPORINE 1 DROP: 0.5 EMULSION OPHTHALMIC at 19:50

## 2025-07-30 RX ADMIN — METHOCARBAMOL 500 MG: 500 TABLET ORAL at 11:29

## 2025-07-30 RX ADMIN — METHOCARBAMOL 500 MG: 500 TABLET ORAL at 05:46

## 2025-07-30 ASSESSMENT — ACTIVITIES OF DAILY LIVING (ADL)
ADLS_ACUITY_SCORE: 49

## 2025-07-30 NOTE — PROGRESS NOTES
[facility]    Medicine Progress Note - [facility] Medicine Service    Date of Admission:  7/19/2025    Assessment & Plan   Radha Baca is a 69 year old female with PMHx of HTN, DM2 with insulin dependence, glaucoma, diabetic retinopathy, CAD, HLD, vertigo and recently noted suprasellar lesion. She was admitted on 7/19/2025 for worsening back pain 2/2 T12 fracture and was transferred from [place] to [facility] for orthopedic surgery and pain management. Our team is consulted for DM management.     Major Plans Today:  - Decrease Lantus from 68U to 58U secondary to am hypoglycemia of 20  - Remove gonzalez   - Continue pain regimen per pain     # T11, T12 burst fractures   # Moderate lumbar spondylosis with moderate spinal canal stenosis L4-L5, mild narrowing L3-L4  # Multilevel foraminal narrowing  # s/p T9-L2 posterior spinal fusion on 7/21/2025   Recent fall several weeks ago and outpatient imaging revealed compression fracture of T12 2/2 osteoporosis. She presented to the ED with worsening low back pain with radiculopathy and neuropathy sxs. 7/19 CT lumbar spine with burst fracture involving T12 vertebral body with central vertebral body height loss and coronally oriented fracture, as well as fracture of the posterior arch, spinous process, and interior aspect of T11. She is s/p facetectomies and posterior spinal fusion of T9-L2. Ortho is primary and has consulted our team for DM management. Pain Service is following.     Ortho Primary (copied from note on 7/30)  Activity:   - Up with assist until independent. No excessive bending or twisting. No lifting >10 lbs x 6 weeks.   - No use of Lakhwinder lift.   - Will need to be sideways in bed  Weight bearing status: WBAT.  Pain management:   - Transition to PO narcotics as tolerated. No NSAIDs x 3 months.   Antibiotics: Ancef x until the drains are in  Diet: Begin with clear fluids and progress diet as tolerated.  Sliding scale insulin to continue.  DVT prophylaxis: SCDs only. No chemical DVT ppx needed.  Imaging: XR Upright Thoraco/lumbar  Labs: Per medicine team  Bracing/Splinting: None  Dressings: Keep Aquacel c/d/i x 7 days.  Drains: discontinued  Parker catheter: Removed  Physical Therapy/Occupational Therapy: Eval and treat.  Cultures: None.    Consults: Hospitalist.  Follow-up: Clinic with Dr. Dubois in 6 weeks with repeat x-rays.   Disposition: Pending progress with therapies, pain control on orals, and medical stability, anticipate discharge to TCU in next 4-5 days    Pain Service (copied from note on 7/29)   oxycodone 10-15 mg Q3H PRN.  (Note previously oxycodone 10mg, then to dilaudid 4-6mg, and now oxycodone 10-15mg). She did not notice change to pain level from yesterday to today.   Yesterday used 15mg x5.  -would recommend 15mg Q 4 hours PRN x 1-4 days and then 10mg Q 4 hours PRN x 1-4 days then return to baseline oxycodone 5mg PO Q 4 hours PRN + 10mg at bedtime as prescribed by [facility]  Stop IV Dilaudid 0.3-0.6 mg IV PRN -not using in 48 hours.  Continue gabapentin to 300 TID. Recommend caution with further dose escalation and careful monitoring for CNS side effects.  Continue muscle relaxant  Continue acetaminophen  Menthol patches  Bowel regimen-passing gas     # TDM2 with insulin dependence - stable   Follows with Endocrinologist Dr Keturah De Luna, her last A1c checked was 11.2. PTA on lantus 75U at bedtime, humalog 25U TID with meals and sliding scale as well as metformin 500 mg daily. She was on 68U for at least 2 days and glucose levels were at goal from 140s-160s. On the morning on 7/30, glucose levels were 88, 26, and 50 and during this time, she was asymptomatic. She received IV glucagon, 20g oral glucagon x 3, and apple juice. Repeat sugar levels were 102-121. Patient reports eating consistent meals. It is possible that her baseline glucose levels have changed with reduced physiologic stress on  "the body and therefore, her insulin requirements will need to be decreased. Continue to monitor.    - DECREASE Lantus 68U --> 58U  - Novolog 1 unit(s) per 6 g cho TID AC and PRN snacks. Cover all intake.   - Novolog High Resistance Correction Scale (ISF: 25) TID AC / HS / 0200   - BG checks: TID AC / HS / 0200   - HOLD PTA: Metformin while inpatient   - Hypoglycemia protocol   - CHO consistent diet     # Mixed urinary incontinence, chronic   Per discussion with Ortho and Pain, urinary retention sounds most c/w prolapse as she is able to initiate urination but cannot empty completely. Suspect that worsening retention is multifactorial from recent surgery, medications, and constipation. Gonzalez catheter was placed after PVRs were in the 700s-900s. Now with improved constipation and movement with PT, patient agreed to trial removal of gonzalez catheter.    - REMOVE gonzalez catheter 7/30 and follow PVRs          Diet: Consistent Carbohydrate Diet Moderate Consistent Carb (60 g CHO per Meal) Diet  Diet  Snacks/Supplements Adult: Other; Fruit Punch Ron B/D, Ensure Max chocolate B; With Meals    DVT Prophylaxis: Pneumatic Compression Devices  Gonzalez Catheter: PRESENT, indication: Surgical procedure, Surgical procedure  Lines: None     Cardiac Monitoring: None  Code Status: Full Code      Clinically Significant Risk Factors            # Hypoalbuminemia: Lowest albumin = 3.1 g/dL at 7/23/2025  6:01 AM, will monitor as appropriate     # Hypertension: Noted on problem list           # DMII: A1C = 11.2 % (Ref range: 0.0 - 5.6 %) within past 6 months   # Morbid Obesity: Estimated body mass index is 46.13 kg/m  as calculated from the following:    Height as of this encounter: 1.575 m (5' 2.01\").    Weight as of this encounter: 114.4 kg (252 lb 4.8 oz).             Social Drivers of Health    Depression: At risk (7/8/2025)    PHQ-2     PHQ-2 Score: 3   Tobacco Use: Medium Risk (7/21/2025)    Patient History     Smoking Tobacco Use: " "Former     Smokeless Tobacco Use: Never     Passive Exposure: Past   Stress: Stress Concern Present (3/16/2025)    [employer] [place] [employer] - Occupational Stress Questionnaire     Feeling of Stress : To some extent   Social Connections: Unknown (3/16/2025)    Social Connection and Isolation Panel [NHANES]     Frequency of Social Gatherings with Friends and Family: Patient declined          Disposition Plan   Medically Ready for Discharge: Anticipated in 2-4 Days         The patient's care was discussed with the Attending Physician, Dr. Krysat Stephenson MD.    DO [facility]  [facility]  Securely message with Mgv (more info)  Text page via [facility] Paging/Directory   See signed in provider for up to date coverage information  ______________________________________________________________________    Interval History   NAEO. Dionicio feels much better today. She had PT session yesterday. \"I was so winded after it, but it was a good thing. Like a good workout\". She also had a large bowel movement yesterday.     She's never had difficulty with urinating, but doesn't feel like she can't empty her bladder well. \"I'll go the bathroom, think I'm done, stand up and then suddenly have to pee again\". This has preceded the surgery, however, thinks the constipation may have played in a role. She is open to trying gonzalez removal now that she's moving more and had a BM.     Addendum:  Was notified patient had a glucose level of 26 and 50. She was asymptomatic during this time. She has been eating meals consistently: typically late breakfast/lunch and dinner around 4-5pm with snacks in between. Her last dinner was at 2000. When her sugar levels are low, she feels very sweaty and jittery but even with a glucose of 26, didn't feel anything.     Physical Exam   Vital Signs: Temp: 99.1  F (37.3  C) Temp src: Oral BP: 124/52 Pulse: 74   Resp: " 18 SpO2: 92 % O2 Device: None (Room air)    Weight: 252 lbs 4.8 oz    Physical Exam  Vitals reviewed.   Constitutional:       Appearance: Normal appearance.   Eyes:      Extraocular Movements: Extraocular movements intact.      Conjunctiva/sclera: Conjunctivae normal.      Pupils: Pupils are equal, round, and reactive to light.   Pulmonary:      Effort: Pulmonary effort is normal. No respiratory distress.   Skin:     Findings: No erythema, lesion or rash.   Neurological:      General: No focal deficit present.      Mental Status: She is alert and oriented to person, place, and time. Mental status is at baseline.   Psychiatric:         Mood and Affect: Mood normal.         Behavior: Behavior normal.         Thought Content: Thought content normal.         Judgment: Judgment normal.       Medical Decision Making   See A&P for MDM        Data     I have personally reviewed the following data over the past 24 hrs:    N/A  \   N/A   / N/A     N/A N/A N/A /  177 (H)   4.2 N/A N/A \       Imaging results reviewed over the past 24 hrs:   No results found for this or any previous visit (from the past 24 hours).

## 2025-07-30 NOTE — PROGRESS NOTES
Orthopaedic Surgery Progress Note 07/30/2025    Subjective  No acute events overnight.  Pain much better controlled. Denies new numbness, tingling, or weakness.  Tolerating diet without nausea or vomiting.  Voiding spontaneously.  Had a BM after suppository.   Denies fevers, chills, chest pain, SOB.  Was able to get out of bed, continuing to work with PT/OT. Encouraged by her progress. Low BG (to 26) but asymptomatic today.     Objective  Temp: 99.1  F (37.3  C) Temp src: Oral BP: 124/52 Pulse: 74   Resp: 18 SpO2: 92 % O2 Device: None (Room air)      Exam:  Gen: No acute distress, resting comfortably in bed.   Resp: Non-labored breathing  CV: wwp  MSK:  Spine:  - Dressings c/d/I   - DP pulses 2+ bilaterally, feet wwp bilaterally              Lumbar Spine:                          Appearance - No gross stepoffs or deformities                          Motor -                           L2-3: Hip flexion 5/5 R and 5/5 L strength                           L3/4:  Knee extension R 5/5 and L 5/5 strength                          L4/5:  Foot dorsiflexion R 5/5 L 5/5 and                                       EHL dorsiflexion R 5/5 L 5/5 strength                          S1:  Plantarflexion/Peroneal Muscles  R 5/5 and L 4/5 strength                          Sensation: intact to light touch L3-S1 distribution BLE      Recent Labs   Lab 07/27/25  1121   WBC 12.9*   HGB 8.0*          Assessment: Radha Baca is a 69 year old female with PMH including DM, T12 burst fracture now s/p T9-L2 posterior spinal fusion on 7/21/2025 with Dr. Dubois. Recommend further encouragement for out of bed with PT.       Today:  - Decrease baseline Lantus dosing per Medicine team   - Parker management per medicine team- plan for removal today  - Continue to work with medicine, and endocrine teams. Medicine anticipates ready in 1-2 days  - upright seated full spine XR PTDC    Per Pain:   oxycodone 10-15 mg Q3H PRN.  (Note previously  oxycodone 10mg, then to dilaudid 4-6mg, and now oxycodone 10-15mg). She did not notice change to pain level from yesterday to today.   Yesterday used 15mg x5.  -would recommend 15mg Q 4 hours PRN x 1-4 days and then 10mg Q 4 hours PRN x 1-4 days then return to baseline oxycodone 5mg PO Q 4 hours PRN + 10mg at bedtime as prescribed by [facility]  Stop IV Dilaudid 0.3-0.6 mg IV PRN -not using in 48 hours.  Continue gabapentin to 300 TID. Recommend caution with further dose escalation and careful monitoring for CNS side effects.  Continue muscle relaxant  Continue acetaminophen  Menthol patches  Bowel regimen, two bowel movements.      Ortho Primary  Activity:   - Up with assist until independent. No excessive bending or twisting. No lifting >10 lbs x 6 weeks.   - No use of Lakhwinder lift.   - Will need to be sideways in bed  Weight bearing status: WBAT.  Pain management:   - Transition to PO narcotics as tolerated. No NSAIDs x 3 months.   Antibiotics: Ancef x until the drains are in  Diet: Begin with clear fluids and progress diet as tolerated. Sliding scale insulin to continue.  DVT prophylaxis: SCDs only. No chemical DVT ppx needed.  Imaging: XR Upright Thoraco/lumbar  Labs: Per medicine team  Bracing/Splinting: None  Dressings: Keep Aquacel c/d/i x 7 days.  Drains: discontinued  Parker catheter: Removed  Physical Therapy/Occupational Therapy: Eval and treat.  Cultures: None.    Consults: Hospitalist.  Follow-up: Clinic with Dr. Dubois in 6 weeks with repeat x-rays.   Disposition: Pending progress with therapies, pain control on orals, and medical stability, anticipate discharge to ARU/ TCU in next 4-5 days      Kim Soriano MD  Orthopaedic Surgery Resident  [facility]  [phone]    Please page me at [phone] with any questions/concerns. If there is no response, if it is a weekend, or if it is during normal workday hours, then please page the orthopaedic surgery resident on call.     Future  Appointments   Date Time Provider Department Center   7/30/2025  1:00 PM Marquise Woods, PT URPT [place]   7/30/2025  3:30 PM Lakesha Connolly, OT UROT [place]   8/1/2025 10:30 AM Peri Salcido, RN [facility]   8/5/2025  1:20 PM Jerrell Austin, DPM UOR [facility]   9/4/2025  2:30 PM Bre Shaw, AuD AUD [facility]   9/4/2025  4:00 PM Deepika Mancia, NP ENT [facility]   9/10/2025  8:00 AM  LAB UCLABR [facility]   9/18/2025 10:30 AM Marcella Maria PA-C [facility]   12/17/2025  9:00 AM [id number] CDEXA [facility]   12/23/2025  9:40 AM Mingo Swain MD [facility]   1/7/2026 10:30 AM Keturah De Luna MD [facility]     Addendum by Paul Davis, APRN

## 2025-07-30 NOTE — PLAN OF CARE
Goal Outcome Evaluation:      Patient alert/oriented x4  Room air  VSS  Forgetful at times  Assist 2 with walker and gait belt  Good appetite  Bedside commode  Parker removed  Up to chair with PT today  Self care encouraged. Patient is unmotivated to self reposition.       Blood sugar of 66-72--[phone]. IV glucagon, 30 grams oral glucagon x3 and apple juice given. Provider notified via phone call. Provider came to bedside for assessment. Patient had no symptoms of hypoglycemia. Bedtime Lantus reduced from 68 to 58.

## 2025-07-30 NOTE — PROGRESS NOTES
Orthopaedic Surgery Progress Note 07/30/2025    Subjective  No acute events overnight.  Pain much better controlled. Denies new numbness, tingling, or weakness.  Tolerating diet without nausea or vomiting.  Voiding spontaneously.  Had a BM after suppository.   Denies fevers, chills, chest pain, SOB.  Was able to get out of bed, continuing to work with PT/OT. Encouraged by her progress.    Objective  Temp: 99.1  F (37.3  C) Temp src: Oral BP: 124/52 Pulse: 74   Resp: 18 SpO2: 92 % O2 Device: None (Room air)      Exam:  Gen: No acute distress, resting comfortably in bed. Tearful and anxious about being told to discharge today  Resp: Non-labored breathing  CV: wwp  MSK:  Spine:  - Dressings c/d/I   - DP pulses 2+ bilaterally, feet wwp bilaterally              Lumbar Spine:                          Appearance - No gross stepoffs or deformities                          Motor -                           L2-3: Hip flexion 5/5 R and 5/5 L strength                           L3/4:  Knee extension R 5/5 and L 5/5 strength                          L4/5:  Foot dorsiflexion R 5/5 L 5/5 and                                       EHL dorsiflexion R 5/5 L 5/5 strength                          S1:  Plantarflexion/Peroneal Muscles  R 5/5 and L 4/5 strength                          Sensation: intact to light touch L3-S1 distribution BLE      Recent Labs   Lab 07/27/25  1121   WBC 12.9*   HGB 8.0*          Assessment: Radha Baca is a 69 year old female with PMH including DM, T12 burst fracture now s/p T9-L2 posterior spinal fusion on 7/21/2025 with Dr. Dubois. Recommend further encouragement for out of bed with PT.       Today:  - Parker management per medicine team   - Follow pain management recommendations  - Continue to work with medicine, pain and endocrine teams. Medicine anticipates ready in 1-2 days  - upright seated full spine XR PTDC    Per Pain:   oxycodone 10-15 mg Q3H PRN.  (Note previously oxycodone 10mg,  then to dilaudid 4-6mg, and now oxycodone 10-15mg). She did not notice change to pain level from yesterday to today.   Yesterday used 15mg x5.  -would recommend 15mg Q 4 hours PRN x 1-4 days and then 10mg Q 4 hours PRN x 1-4 days then return to baseline oxycodone 5mg PO Q 4 hours PRN + 10mg at bedtime as prescribed by [facility]  Stop IV Dilaudid 0.3-0.6 mg IV PRN -not using in 48 hours.  Continue gabapentin to 300 TID. Recommend caution with further dose escalation and careful monitoring for CNS side effects.  Continue muscle relaxant  Continue acetaminophen  Menthol patches  Bowel regimen-passing gas     Ortho Primary  Activity:   - Up with assist until independent. No excessive bending or twisting. No lifting >10 lbs x 6 weeks.   - No use of Lakhwinder lift.   - Will need to be sideways in bed  Weight bearing status: WBAT.  Pain management:   - Transition to PO narcotics as tolerated. No NSAIDs x 3 months.   Antibiotics: Ancef x until the drains are in  Diet: Begin with clear fluids and progress diet as tolerated. Sliding scale insulin to continue.  DVT prophylaxis: SCDs only. No chemical DVT ppx needed.  Imaging: XR Upright Thoraco/lumbar  Labs: Per medicine team  Bracing/Splinting: None  Dressings: Keep Aquacel c/d/i x 7 days.  Drains: discontinued  Parker catheter: Removed  Physical Therapy/Occupational Therapy: Eval and treat.  Cultures: None.    Consults: Hospitalist.  Follow-up: Clinic with Dr. Dubois in 6 weeks with repeat x-rays.   Disposition: Pending progress with therapies, pain control on orals, and medical stability, anticipate discharge to TCU in next 4-5 days        Kim Soriano MD  Orthopaedic Surgery Resident  [facility]  [phone]    Please page me at [phone] with any questions/concerns. If there is no response, if it is a weekend, or if it is during normal workday hours, then please page the orthopaedic surgery resident on call.     Future Appointments   Date Time  Provider Department [place]   7/30/2025  1:00 PM Marquise Woods, PT URPT [place]   7/30/2025  3:30 PM Lakesha Connolly, OT UROT [place]   8/1/2025 10:30 AM Peri Salcido, RN [facility]   8/5/2025  1:20 PM Jerrell Austin, DPM UOR [facility]   9/4/2025  2:30 PM Bre Shaw, AuD AUD [facility]   9/4/2025  4:00 PM Deepika Mancia, KIMMY [facility]   9/10/2025  8:00 AM  LAB UCLABR [facility]   9/18/2025 10:30 AM Marcella Maria PA-C [facility]   12/17/2025  9:00 AM [email or web address] CDEXA [facility]   12/23/2025  9:40 AM Mingo Swain MD [facility]   1/7/2026 10:30 AM Keturah De Luna MD [facility]

## 2025-07-30 NOTE — PROGRESS NOTES
Care Management Follow Up    Length of Stay (days): 10    Expected Discharge Date: 07/31/2025     Concerns to be Addressed: discharge planning     Patient plan of care discussed at interdisciplinary rounds: Yes    Anticipated Discharge Disposition: Transitional Care              Anticipated Discharge Services: None  Anticipated Discharge DME: None    Patient/family educated on Medicare website which has current facility and service quality ratings: yes  Education Provided on the Discharge Plan: Yes  Patient/Family in Agreement with the Plan: yes    Referrals Placed by CM/SW: External Care Coordination    Pending:  [facility]  [address] [place]  [address]  P: [phone]  F: [phone]     [facility]  [address]  [place]  [address]  P: [phone]  F: [phone]    Private pay costs discussed: Not applicable    Discussed  Partnership in Safe Discharge Planning  document with patient/family: No     Handoff Completed: No, handoff not indicated or clinically appropriate    Additional Information:  Per provider in rounds pt is anticipated to be medically ready in 2-3 days. [facility], KALI provided an update to [facility] on [facility] via in basket.    KALI received a message from  [facility] liaison. Pt declined OT yesterday. Liaison reported they will need to see consistent participation with two therapies per day to consider pt. Per provider in rounds, she will discuss continuing participation in therapies with pt.     OT updated KALI that pt did well in session today and thinks pt will do really well at [facility]. [facility]    Next Steps:   -Continue to assist with discharge planning  -Update Senior Linkage Line once patient discharges (PAS has been completed for [facility])  -Keep [facility] updated    GUSTABO Chowdhury, LGSW  Float   [facility] Acute Care Management  Available on 3yy game platform

## 2025-07-30 NOTE — PLAN OF CARE
Goal Outcome Evaluation:      Plan of Care Reviewed With: patient    Overall Patient Progress: no changeOverall Patient Progress: no change     A&O x4, on RA, cpap overnight.  Pain rated 6-7/10, managed with PRN.  Denies Chest pain, SOB, N/V.  No acute events overnight.  Able to make needs known.  Hard of hearing.  L and R PIV SL.  Side lying at all times.  Slept through the night.  BM twice overnight.  Parker in place and patent.   POC continues.

## 2025-07-31 ENCOUNTER — APPOINTMENT (OUTPATIENT)
Dept: PHYSICAL THERAPY | Facility: CLINIC | Age: 70
DRG: 457 | End: 2025-07-31
Payer: MEDICARE

## 2025-07-31 ENCOUNTER — APPOINTMENT (OUTPATIENT)
Dept: OCCUPATIONAL THERAPY | Facility: CLINIC | Age: 70
DRG: 457 | End: 2025-07-31
Payer: MEDICARE

## 2025-07-31 VITALS
HEART RATE: 71 BPM | DIASTOLIC BLOOD PRESSURE: 47 MMHG | RESPIRATION RATE: 16 BRPM | TEMPERATURE: 98.6 F | WEIGHT: 252.3 LBS | BODY MASS INDEX: 46.43 KG/M2 | OXYGEN SATURATION: 94 % | HEIGHT: 62 IN | SYSTOLIC BLOOD PRESSURE: 104 MMHG

## 2025-07-31 LAB
GLUCOSE BLDC GLUCOMTR-MCNC: 114 MG/DL (ref 70–99)
GLUCOSE BLDC GLUCOMTR-MCNC: 124 MG/DL (ref 70–99)
GLUCOSE BLDC GLUCOMTR-MCNC: 130 MG/DL (ref 70–99)
GLUCOSE BLDC GLUCOMTR-MCNC: 152 MG/DL (ref 70–99)
GLUCOSE BLDC GLUCOMTR-MCNC: 193 MG/DL (ref 70–99)
HOLD SPECIMEN: NORMAL

## 2025-07-31 PROCEDURE — 250N000013 HC RX MED GY IP 250 OP 250 PS 637: Performed by: PHYSICIAN ASSISTANT

## 2025-07-31 PROCEDURE — 97535 SELF CARE MNGMENT TRAINING: CPT | Mod: GO

## 2025-07-31 PROCEDURE — 250N000012 HC RX MED GY IP 250 OP 636 PS 637

## 2025-07-31 PROCEDURE — 99232 SBSQ HOSP IP/OBS MODERATE 35: CPT | Mod: 24

## 2025-07-31 PROCEDURE — 250N000013 HC RX MED GY IP 250 OP 250 PS 637

## 2025-07-31 PROCEDURE — 250N000013 HC RX MED GY IP 250 OP 250 PS 637: Performed by: STUDENT IN AN ORGANIZED HEALTH CARE EDUCATION/TRAINING PROGRAM

## 2025-07-31 PROCEDURE — 250N000013 HC RX MED GY IP 250 OP 250 PS 637: Performed by: NURSE PRACTITIONER

## 2025-07-31 PROCEDURE — 97116 GAIT TRAINING THERAPY: CPT | Mod: GP | Performed by: PHYSICAL THERAPIST

## 2025-07-31 PROCEDURE — 120N000002 HC R&B MED SURG/OB UMMC

## 2025-07-31 PROCEDURE — 97530 THERAPEUTIC ACTIVITIES: CPT | Mod: GP | Performed by: PHYSICAL THERAPIST

## 2025-07-31 RX ORDER — HYDROXYZINE HYDROCHLORIDE 10 MG/1
10 TABLET, FILM COATED ORAL 3 TIMES DAILY PRN
Status: ON HOLD | DISCHARGE
Start: 2025-07-31 | End: 2025-08-15

## 2025-07-31 RX ORDER — BISACODYL 10 MG
10 SUPPOSITORY, RECTAL RECTAL DAILY PRN
DISCHARGE
Start: 2025-07-31

## 2025-07-31 RX ORDER — ASPIRIN 81 MG/1
81 TABLET ORAL DAILY
Status: DISCONTINUED | OUTPATIENT
Start: 2025-07-31 | End: 2025-08-01 | Stop reason: HOSPADM

## 2025-07-31 RX ORDER — OXYCODONE HYDROCHLORIDE 10 MG/1
10-15 TABLET ORAL
Status: ON HOLD | DISCHARGE
Start: 2025-07-31 | End: 2025-08-15

## 2025-07-31 RX ADMIN — CYCLOSPORINE 1 DROP: 0.5 EMULSION OPHTHALMIC at 08:48

## 2025-07-31 RX ADMIN — OXYCODONE HYDROCHLORIDE 15 MG: 15 TABLET ORAL at 03:52

## 2025-07-31 RX ADMIN — GABAPENTIN 300 MG: 300 CAPSULE ORAL at 13:03

## 2025-07-31 RX ADMIN — FAMOTIDINE 20 MG: 20 TABLET, FILM COATED ORAL at 21:15

## 2025-07-31 RX ADMIN — PANTOPRAZOLE SODIUM 40 MG: 40 TABLET, DELAYED RELEASE ORAL at 16:25

## 2025-07-31 RX ADMIN — INSULIN ASPART 9 UNITS: 100 INJECTION, SOLUTION INTRAVENOUS; SUBCUTANEOUS at 19:04

## 2025-07-31 RX ADMIN — METHOCARBAMOL 500 MG: 500 TABLET ORAL at 05:52

## 2025-07-31 RX ADMIN — OXYCODONE HYDROCHLORIDE 15 MG: 15 TABLET ORAL at 09:13

## 2025-07-31 RX ADMIN — INSULIN ASPART 11 UNITS: 100 INJECTION, SOLUTION INTRAVENOUS; SUBCUTANEOUS at 10:07

## 2025-07-31 RX ADMIN — HYDROXYZINE HYDROCHLORIDE 10 MG: 10 TABLET, FILM COATED ORAL at 21:14

## 2025-07-31 RX ADMIN — ASPIRIN 81 MG: 81 TABLET, COATED ORAL at 21:15

## 2025-07-31 RX ADMIN — GABAPENTIN 300 MG: 300 CAPSULE ORAL at 21:15

## 2025-07-31 RX ADMIN — BISACODYL 10 MG: 10 SUPPOSITORY RECTAL at 08:48

## 2025-07-31 RX ADMIN — METHOCARBAMOL 500 MG: 500 TABLET ORAL at 22:25

## 2025-07-31 RX ADMIN — OXYCODONE HYDROCHLORIDE 15 MG: 15 TABLET ORAL at 00:31

## 2025-07-31 RX ADMIN — LATANOPROST 1 DROP: 50 SOLUTION/ DROPS OPHTHALMIC at 21:16

## 2025-07-31 RX ADMIN — OXYCODONE HYDROCHLORIDE 15 MG: 15 TABLET ORAL at 21:15

## 2025-07-31 RX ADMIN — POLYETHYLENE GLYCOL 3350 17 G: 17 POWDER, FOR SOLUTION ORAL at 08:49

## 2025-07-31 RX ADMIN — SENNOSIDES AND DOCUSATE SODIUM 2 TABLET: 50; 8.6 TABLET ORAL at 08:48

## 2025-07-31 RX ADMIN — SENNOSIDES AND DOCUSATE SODIUM 2 TABLET: 50; 8.6 TABLET ORAL at 21:15

## 2025-07-31 RX ADMIN — TIMOLOL MALEATE 1 DROP: 5 SOLUTION OPHTHALMIC at 21:13

## 2025-07-31 RX ADMIN — METHOCARBAMOL 500 MG: 500 TABLET ORAL at 10:07

## 2025-07-31 RX ADMIN — ACETAMINOPHEN 975 MG: 325 TABLET ORAL at 03:48

## 2025-07-31 RX ADMIN — OXYCODONE HYDROCHLORIDE 15 MG: 15 TABLET ORAL at 16:25

## 2025-07-31 RX ADMIN — POLYETHYLENE GLYCOL 3350 17 G: 17 POWDER, FOR SOLUTION ORAL at 21:15

## 2025-07-31 RX ADMIN — BRIMONIDINE TARTRATE 1 DROP: 2 SOLUTION/ DROPS OPHTHALMIC at 08:53

## 2025-07-31 RX ADMIN — ACETAMINOPHEN 975 MG: 325 TABLET ORAL at 17:34

## 2025-07-31 RX ADMIN — GABAPENTIN 300 MG: 300 CAPSULE ORAL at 08:48

## 2025-07-31 RX ADMIN — SERTRALINE HYDROCHLORIDE 50 MG: 25 TABLET ORAL at 08:48

## 2025-07-31 RX ADMIN — PANTOPRAZOLE SODIUM 40 MG: 40 TABLET, DELAYED RELEASE ORAL at 08:48

## 2025-07-31 RX ADMIN — ACETAMINOPHEN 975 MG: 325 TABLET ORAL at 10:07

## 2025-07-31 RX ADMIN — CYCLOSPORINE 1 DROP: 0.5 EMULSION OPHTHALMIC at 21:14

## 2025-07-31 RX ADMIN — INSULIN GLARGINE 58 UNITS: 100 INJECTION, SOLUTION SUBCUTANEOUS at 22:55

## 2025-07-31 RX ADMIN — OXYCODONE HYDROCHLORIDE 15 MG: 15 TABLET ORAL at 13:03

## 2025-07-31 RX ADMIN — METHOCARBAMOL 500 MG: 500 TABLET ORAL at 17:34

## 2025-07-31 RX ADMIN — FAMOTIDINE 20 MG: 20 TABLET, FILM COATED ORAL at 08:48

## 2025-07-31 RX ADMIN — BRIMONIDINE TARTRATE 1 DROP: 2 SOLUTION/ DROPS OPHTHALMIC at 21:12

## 2025-07-31 RX ADMIN — ATORVASTATIN CALCIUM 20 MG: 10 TABLET, FILM COATED ORAL at 08:48

## 2025-07-31 RX ADMIN — TIMOLOL MALEATE 1 DROP: 5 SOLUTION OPHTHALMIC at 08:55

## 2025-07-31 ASSESSMENT — ACTIVITIES OF DAILY LIVING (ADL)
ADLS_ACUITY_SCORE: 49

## 2025-07-31 NOTE — PROGRESS NOTES
Orthopaedic Surgery Progress Note 07/31/2025    Subjective  No acute events overnight.  Pain much better controlled. Parker catheter was removed yesterday, working towards voiding by herself. Denies new numbness, tingling, or weakness. Denies fevers, chills, chest pain, SOB.  Was able to get out of bed, continuing to work with PT/OT. Motivated to get x rays today.    Objective  Temp: 98.7  F (37.1  C) Temp src: Oral BP: 115/53 Pulse: 72   Resp: 19 SpO2: 94 % O2 Device: None (Room air)      Exam:  Gen: No acute distress, resting comfortably in bed. Tearful and anxious about being told to discharge today  Resp: Non-labored breathing  CV: wwp  MSK:  Spine:  - Dressings c/d/I   - DP pulses 2+ bilaterally, feet wwp bilaterally              Lumbar Spine:                          Appearance - No gross stepoffs or deformities                          Motor -                           L2-3: Hip flexion 5/5 R and 5/5 L strength                           L3/4:  Knee extension R 5/5 and L 5/5 strength                          L4/5:  Foot dorsiflexion R 5/5 L 5/5 and                                       EHL dorsiflexion R 5/5 L 5/5 strength                          S1:  Plantarflexion/Peroneal Muscles  R 5/5 and L 4/5 strength                          Sensation: intact to light touch L3-S1 distribution BLE      Recent Labs   Lab 07/27/25  1121   WBC 12.9*   HGB 8.0*          Assessment: Radha Baca is a 69 year old female with PMH including DM, T12 burst fracture now s/p T9-L2 posterior spinal fusion on 7/21/2025 with Dr. Dubois. Recommend further encouragement for out of bed with PT.       Today:  - Follow pain management recommendations  - Continue to work with medicine, pain and endocrine teams.   - upright seated full spine XR PTDC    Per Pain:   oxycodone 10-15 mg Q3H PRN. Plan for weaning mentioned in detail in note from yesterday.  Stop IV Dilaudid 0.3-0.6 mg IV PRN -not using in 48 hours.  Continue  gabapentin to 300 TID. Recommend caution with further dose escalation and careful monitoring for CNS side effects.  Continue muscle relaxant  Continue acetaminophen  Menthol patches  Bowel regimen-passing gas    Anticipating discharge in 1-2 days, pending availability of ARU bed.     Ortho Primary  Activity:   - Up with assist until independent. No excessive bending or twisting. No lifting >10 lbs x 6 weeks.   - No use of Lakhwinder lift.   - Will need to be sideways in bed  Weight bearing status: WBAT.  Pain management:   - Transition to PO narcotics as tolerated. No NSAIDs x 3 months.   Antibiotics: Ancef x until the drains are in  Diet: Begin with clear fluids and progress diet as tolerated. Sliding scale insulin to continue.  DVT prophylaxis: SCDs only. No chemical DVT ppx needed.  Imaging: XR Upright Thoraco/lumbar  Labs: Per medicine team  Bracing/Splinting: None  Dressings: Keep Aquacel c/d/i x 7 days.  Drains: discontinued  Parker catheter: Removed  Physical Therapy/Occupational Therapy: Eval and treat.  Cultures: None.    Consults: Hospitalist.  Follow-up: Clinic with Dr. Dubois in 6 weeks with repeat x-rays.   Disposition: Pending progress with therapies, pain control on orals, and medical stability, anticipate discharge to TCU in next 4-5 days    Ankit Morrison MD  Spine Fellow     Future Appointments   Date Time Provider Department Center   7/30/2025  1:00 PM Marquise Woods, PT PT [place]   7/30/2025  3:30 PM Lakesha Connolly, OT UROT [place]   8/1/2025 10:30 AM [facility]   8/5/2025  1:20 PM Jerrell Austin, DPM UOR [facility]   9/4/2025  2:30 PM Bre Shaw AuD AUD [facility]   9/4/2025  4:00 PM Deepika Mancia, KIMMY ENT [facility]   9/10/2025  8:00 AM  LAB UCLABR [facility]   9/18/2025 10:30 AM Marcella Maria PA-C MDE [facility]   12/17/2025  9:00 AM UCSCDX1 CDEXA [facility]   12/23/2025  9:40 AM Mingo Swain MD [facility]   1/7/2026 10:30 AM Keturah De Luna MD  [facility]

## 2025-07-31 NOTE — PLAN OF CARE
[id number]    VSS. A&Ox4. Calm and cooperative. Pt is [place]. Able to make needs known.Stable on RA, uses CPAP at night.  FBO=909 at 1600. A2 with surinder steady. Reposition q2h when in bed. Rates pain 10/10 after OT; managed with PRN PO oxycodone and scheduled meds. L and R PIV SL and patent. Prandial and carb coverage insulin given per order. RN managed potassium replacement discontinued today. No acute changes this shift. Pending placement to TCU. Continue with POC.

## 2025-07-31 NOTE — PROGRESS NOTES
[facility]    Medicine Progress Note - [facility] Medicine Service    Date of Admission:  7/19/2025    Assessment & Plan   Radha Baca is a 69 year old female with PMHx of HTN, DM2 with insulin dependence, glaucoma, diabetic retinopathy, CAD, HLD, vertigo and recently noted suprasellar lesion. She was admitted on 7/19/2025 for worsening back pain 2/2 T12 fracture and was transferred from [place] to [facility] for orthopedic surgery and pain management. Ortho is primary and our team is consulted for DM management.     Major Plans Today:  - Keep the Lantus at 58U  - Continue with spontaneous voiding, patient opted for straight cath PRN  - Continue pain regimen per pain     # T11, T12 burst fractures   # Moderate lumbar spondylosis with moderate spinal canal stenosis L4-L5, mild narrowing L3-L4  # Multilevel foraminal narrowing  # s/p T9-L2 posterior spinal fusion on 7/21/2025   Recent fall several weeks ago and outpatient imaging revealed compression fracture of T12 2/2 osteoporosis. She presented to the ED with worsening low back pain with radiculopathy and neuropathy sxs. 7/19 CT lumbar spine with burst fracture involving T12 vertebral body with central vertebral body height loss and coronally oriented fracture, as well as fracture of the posterior arch, spinous process, and interior aspect of T11. She is s/p facetectomies and posterior spinal fusion of T9-L2. Ortho is primary and has consulted our team for DM management. Pain Service is following.     Ortho Primary (copied from note on 7/31)  Activity:   - Up with assist until independent. No excessive bending or twisting. No lifting >10 lbs x 6 weeks.   - No use of Lakhwinder lift.   - Will need to be sideways in bed  Weight bearing status: WBAT.  Pain management:   - Transition to PO narcotics as tolerated. No NSAIDs x 3 months.   Antibiotics: Ancef x until the drains are in  Diet: Begin with clear fluids  and progress diet as tolerated. Sliding scale insulin to continue.  DVT prophylaxis: SCDs only. No chemical DVT ppx needed.  Imaging: XR Upright Thoraco/lumbar  Labs: Per medicine team  Bracing/Splinting: None  Dressings: Keep Aquacel c/d/i x 7 days.  Drains: discontinued  Parker catheter: Removed  Physical Therapy/Occupational Therapy: Eval and treat.  Cultures: None.    Consults: Hospitalist.  Follow-up: Clinic with Dr. Dubois in 6 weeks with repeat x-rays.   Disposition: Pending progress with therapies, pain control on orals, and medical stability, anticipate discharge to TCU in next 4-5 days    Pain Service (copied from note on 7/29)   oxycodone 10-15 mg Q3H PRN.  (Note previously oxycodone 10mg, then to dilaudid 4-6mg, and now oxycodone 10-15mg). She did not notice change to pain level from yesterday to today.   Yesterday used 15mg x5.  -would recommend 15mg Q 4 hours PRN x 1-4 days and then 10mg Q 4 hours PRN x 1-4 days then return to baseline oxycodone 5mg PO Q 4 hours PRN + 10mg at bedtime as prescribed by [facility]  Stop IV Dilaudid 0.3-0.6 mg IV PRN -not using in 48 hours.  Continue gabapentin to 300 TID. Recommend caution with further dose escalation and careful monitoring for CNS side effects.  Continue muscle relaxant  Continue acetaminophen  Menthol patches  Bowel regimen-passing gas     # TDM2 with insulin dependence - stable   Follows with Endocrinologist Dr Keturah De Luna, her last A1c checked was 11.2. PTA on lantus 75U at bedtime, humalog 25U TID with meals and sliding scale as well as metformin 500 mg daily. With the suspicion that her baseline glucose levels had changed due to reduced physiology stress, after one episode of asymptomatic hypoglycemia, Lantus was decreased from 68 to 58U on 7/30. Continue to monitor.    - Continue Lantus 58U  - Novolog 1 unit(s) per 6 g cho TID AC and PRN snacks. Cover all intake.   - Novolog High Resistance Correction Scale (ISF: 25) TID AC / HS / 0200   - BG  "checks: TID  /  / 0200   - HOLD PTA: Metformin while inpatient   - Hypoglycemia protocol   - CHO consistent diet     # Mixed urinary incontinence, chronic   Per discussion with Ortho and Pain, urinary retention sounds most c/w prolapse as she is able to initiate urination but cannot empty completely. Suspect that worsening retention is multifactorial from recent surgery, medications, and constipation. Parker catheter was placed after PVRs were in the 700s-900s. Parker removed on 7/30 after patient had passed a BM and was more active with PT, however, her PVR on 7/31 was 800. She opted to continue trying spontaneous void with straight cath as needed.    - Follow PVRs   - Straight cath PRN per patient's preference         Diet: Consistent Carbohydrate Diet Moderate Consistent Carb (60 g CHO per Meal) Diet  Diet  Snacks/Supplements Adult: Other; Fruit Punch Ron B/D, Ensure Max chocolate B; With Meals    DVT Prophylaxis: Pneumatic Compression Devices  Parker Catheter: Not present  Lines: None     Cardiac Monitoring: None  Code Status: Full Code      Clinically Significant Risk Factors            # Hypoalbuminemia: Lowest albumin = 3.1 g/dL at 7/23/2025  6:01 AM, will monitor as appropriate     # Hypertension: Noted on problem list           # DMII: A1C = 11.2 % (Ref range: 0.0 - 5.6 %) within past 6 months   # Morbid Obesity: Estimated body mass index is 46.13 kg/m  as calculated from the following:    Height as of this encounter: 1.575 m (5' 2.01\").    Weight as of this encounter: 114.4 kg (252 lb 4.8 oz).             Social Drivers of Health    Depression: At risk (7/8/2025)    PHQ-2     PHQ-2 Score: 3   Tobacco Use: Medium Risk (7/21/2025)    Patient History     Smoking Tobacco Use: Former     Smokeless Tobacco Use: Never     Passive Exposure: Past   Stress: Stress Concern Present (3/16/2025)    [employer] [place] [employer] - Occupational Stress Questionnaire     Feeling of Stress : To some extent "   Social Connections: Unknown (3/16/2025)    Social Connection and Isolation Panel [NHANES]     Frequency of Social Gatherings with Friends and Family: Patient declined          Disposition Plan   Medically Ready for Discharge: Anticipated in 2-4 Days         The patient's care was discussed with the Attending Physician, Dr. Krysta Stephenson.    DO [facility]  [facility]  Securely message with [employer] (more info)  Text page via [employer] Paging/Directory   See signed in provider for up to date coverage information  ______________________________________________________________________    Interval History   NAEO. Feels better today, more strength. Trying to do legs and arm exercises. Wants to sit upright at bedside more. Still hasn't had a BM, last one was about 2 days ago, she feels a bit frustrated about this. She hasn't been able to urinate well since the gonzalez was removed. She wants another suppository to help pass BM, which she thinks may help with the urination. Dionicio is eager to meet with PT team     Physical Exam   Vital Signs: Temp: 98.7  F (37.1  C) Temp src: Oral BP: 115/53 Pulse: 72   Resp: 19 SpO2: 94 % O2 Device: None (Room air)    Weight: 252 lbs 4.8 oz    Physical Exam  Vitals reviewed.   Constitutional:       General: She is not in acute distress.     Appearance: Normal appearance. She is not toxic-appearing.   HENT:      Head: Normocephalic and atraumatic.   Eyes:      Extraocular Movements: Extraocular movements intact.      Conjunctiva/sclera: Conjunctivae normal.      Pupils: Pupils are equal, round, and reactive to light.   Cardiovascular:      Rate and Rhythm: Normal rate and regular rhythm.      Heart sounds: No murmur heard.     No gallop.   Pulmonary:      Effort: Pulmonary effort is normal. No respiratory distress.      Breath sounds: No wheezing or rales.   Abdominal:      General: Abdomen is flat. Bowel sounds  are normal. There is no distension.      Tenderness: There is no abdominal tenderness.   Neurological:      General: No focal deficit present.      Mental Status: She is alert and oriented to person, place, and time. Mental status is at baseline.   Psychiatric:         Mood and Affect: Mood normal.         Behavior: Behavior normal.         Thought Content: Thought content normal.         Judgment: Judgment normal.       Medical Decision Making   See A&P for MDM        Data         Imaging results reviewed over the past 24 hrs:   No results found for this or any previous visit (from the past 24 hours).

## 2025-07-31 NOTE — PLAN OF CARE
Goal Outcome Evaluation:    Pt is alert and oriented x4, on CPAP  Complained of pain, Given Oxycodone, Tylenol, Atarax and Robaxin.  Denies N/V, SOB and chest discomfort.  Baseline numbness on b/l lower extremities.  Due meds given.  Blood glucose checked, on sliding scale.  Continent of B/B.  Instructed to use call light for assistance.  No acute changes on this shift

## 2025-07-31 NOTE — INTERIM SUMMARY
"[facility] Pre-Admission Screen    Referral Source:  formerly [facility] MED SURG -47  Admit date to referring facility: 7/19/2025    Physical Medicine and Rehab Consult Completed: No    Rehab Diagnosis:    Neurologic Conditions 03.9 Other Neurologic: T12 burst fracture w/ radiculopathy and neuropathy symptoms, now s/p facetectomies and posterior spinal fusion of T9-L2    Justification for Acute Inpatient Rehabilitation  Radha \"Dionicio\" Phuong is a 69 year old female w/ PMH of hypertension, diabetes mellitus type 2, glaucoma, diabetic retinopathy, and bilateral TKA, who presents to ED with worsening back pain w/ radiculopathy and neuropathy symptoms after recent diagnosis of T12 burst fracture 2/2 osteoporosis.  7/19 CT lumbar spine with burst fracture involving T12 vertebral body with central vertebral body height loss and coronally oriented fracture, as well as fracture of the posterior arch, spinous process, and interior aspect of T11. She is s/p facetectomies and posterior spinal fusion of T9-L2 on 7/21/2025. Her post-op course has been complicated by severe pain, urinary retention, uncontrolled diabetes including both hypoglycemia w/ BG of 26 and hyperglycemia in the past 48 hours. She has required endocrinology, pain, and medicine consults while hospitalized. The patient requires transfer to [facility] for intensive therapies not available in a lesser level of care including PT/OT, ongoing medical management at least 3 days per week, and rehabilitative nursing care.  She is now medically stable to discharge to acute inpatient rehab.   At baseline, Dionicio is modified independent for her mobility and ADLs while living w/ her spouse in a home w/ 5 stairs to enter.  Currently she is requiring mod Ax1-2  for sit > stand transfers and is ambulating 8 ft w/ WW w/ mod A d/t loss of balance.  With nursing, she is mobilizing w/ use of Karie Stedy. She is requiring min A for ADL completion. The " patient requires an intensive inpatient rehab program to address the following acute impairments:impaired activity tolerance, impaired balance, impaired strength, impaired weight shifting, pain, dizziness, and post-surgical spine precautions. These deficits are impacting her safety and functional independence w/ bed mobility, transfers, gait, stairs, and ADLs. She requires the interdisciplinary coordinated care on acute rehab to maximize her medical and functional recovery, while mitigating risk for clinical complications in setting of uncontrolled diabetes, urinary retention, and pain.     Current Active Medical Management Needs/Risks for Clinical Complications  The patient requires the high level of rehabilitation physician supervision that accompanies the provision of intensive rehabilitation therapy.  The patient needs the services of the rehabilitation physician to assess the patient medically and functionally and to modify the course of treatment as needed to maximize the patient's capacity to benefit from the rehabilitation process.  The patient requires physician oversight at least 3x/wk to medically manage and assess:  Musculoskeletal: Pt w/ osteoporosis, now s/p facetectomies and posterior spinal fusion T9-L2 - instruct pt in post-surgical spine precautions and implications for mobility. No lifting > 10# x6 weeks, no excessive bending or twisting; no use of john paul lift. Assess surgical incision for signs of healing vs infection.  Neurologic: assess neurologic status in pt w/ radiculopathy and neuropathy symptoms and new LLE weakness, as well as     Uncontrolled diabetes: Hgb A1c 11.2 on 6/30/2025. Endocrinology consulted to assist w/ BG mgmt. BG ranging between  in past 48 hours. Pt w/ both severe hypoglycemia as well as hyperglycemia in past 48 hours. Management of her diabetes is further complicated by  asymptomatic hypoglycemia when BG 26 on 7/30. Pt at risk for clinical complications w/  uncontrolled diabetes including dizziness, paresthesia, impaired cognition, seizure, coma, falls, delayed wound healing, and post-operative infections. Pt requires further assessment and management of her diabetes and titration of diabetic medications as indicated. Currently on Lantus 58U, Novolog 1 unit(s) per 6 g cho TID AC and PRN snacks. Cover all intake. Novolog High Resistance Correction Scale (ISF: 25) TID AC / HS / 0200. BG checks: TID AC / HS / 0200. Provide ongoing patient education on diabetes mgmt and changes in her home regimen while on ARC to facilitate safe BG mgmt upon discharge.   Pain: Patient will need ongoing assessment and adjustment of pain medications for optimal participation in therapies.  Will need to coordinate pain medication administration ahead of rehab therapies to optimize pain control and participation in therapies.  Pain consult team advising - 15mg Q 4 hours PRN x 1-4 days and then 10mg Q 4 hours PRN x 1-4 days then return to baseline oxycodone 5mg PO Q 4 hours PRN + 10mg at bedtime; Gabapentin 300 TID (caution w/ further dose escalation and careful monitoring for CNS side effects), Robaxin 500 mg Q6H, Acetaminophen, Mentol patches.  No NSAIDs x3 months.  Pt is at risk for clinical complications related to pain medications including opioid induced constipation, muscle cramps, weakness, fatigue, falls, confusion, dizziness and respiratory depression.   Urinary retention: Pt w/ worsening urinary retention during hospitalization which is attributed to recent surgery, medications, constipation. Assist w/ bladder programming needs in pt requiring straight cath. Urinary retention places pt at risk for falls, UTI, and altered skin integrity.   Bowels: assist w/ bowel regimen in pt at risk for opioid induced constipation.   Morbid obesity w/ BMI 46.13: Increased body habitus places the patient at increased risk for complications and mortality. Obesity is clinically significant d/t  increased RN cares, use of resources, and specialty equipment.   The patient is at risk for DVT in setting of impaired mobility and recent surgery.  The patient is at risk for falls w/ injury in setting of osteoporosis, impaired strength, impaired balance, pain, impaired mobility, gait instability, and polypharmacy.     Past Medical/Surgical History  Surgery in the past 100 days: Yes  Additional relevant past medical history: HTN, DM2 with insulin dependence, glaucoma, diabetic retinopathy, CAD, HLD, vertigo, recently noted suprasellar lesion, B TKA    Level of Functioning Prior to Admission:  LIVING ENVIRONMENT  People in Home: spouse  Current Living Arrangements: house  Home Accessibility: stairs to enter home, stairs within home  Number of Stairs, Main Entrance: 5  Stair Railings, Main Entrance:  railings on both sides of stairs (railings too far apart to hold both)  Number of Stairs, Within Home, Primary: greater than 10 stairs - however, pt does not need to use  Transportation Anticipated: family or friend will provide  Living Environment Comments: all needs met on main level    SELF-CARE  Usual Activity Tolerance: good  Regular Exercise: No  Equipment Currently Used at Home: walker, standard, cane, straight, grab bar, tub/shower, shower chair, raised toilet seat (per pt report shower chair is too big, 4WW is too short, occasionally needed help donning socks/shoes from spouse, owns attachable bed rails but not on bed)  Activity/Exercise/Self-Care Comment: IND (I)ADLS, drives, 24/7 A available upon DC from spouse    Level of Function: GG Scale (Section GG Functional Ability and Goals; CMS's GONCALVES Version 3.0 Manual effective 10.1.2019):  PT Current Function Goals for Rehab   Bed Rolling 4 Supervision or touching assistance 6 Independent   Supine to Sit 4 Supervision or touching assistance 6 Independent   Sit to Stand 1 Dependent 6 Independent   Transfer 1 Dependent 6 Independent   Ambulation 3 Partial/moderate  "assistance 6 Independent   Stairs 88 Not attempted due to safety 4 Supervision or touching assistance     OT Current Function Goals for Rehab   Feeding 5 Setup or clean-up assistance 6 Independent   Grooming {GG Scale:091992::\"6 Independent\"} {Goals for Rehab:977899}   Bathing 3 Partial/moderate assistance 6 Independent   Upper Body Dressing 2 Substantial/maximal assistance 6 Independent   Lower Body Dressing 2 Substantial/maximal assistance 6 Independent   Toileting 1 Dependent 6 Independent   Toilet Transfer 1 Dependent 6 Independent   Tub/Shower Transfer 1 Dependent 4 Supervision or touching assistance   Cognition Not Assessed Independent     SLP Current Function Goals for Rehab   Swallow Not Impaired Not applicable   Communication Not Impaired Not applicable       Current Diet:  0-Thin, 7-Regular, and Diabetic    Summary Statement:  Dionicio Baca is a 69 year old female who is now s/p s/p facetectomies and posterior spinal fusion of T9-L2 in setting of T12 burst fracture w/ radiculopathy and neuropathy symptoms. She has had a complicated medical course and is at risk for decompensation d/t uncontrolled diabetes w/ asymptomatic hypoglycemia, hyperglycemia, urinary retention and significant pain. She needs the coordinated interdisciplinary care provided by PMR, rehab nursing, PT/OT, on acute rehab to optimize her medical status, as well as to facilitate adequate pain control to aid her in ability to maximally participate in intensive rehab therapies. She is currently requiring mod A x1-2 for sit > stand transfers to WW, or using Karie Stedy w/ OT and nursing. She initiated gait for the first time on 7/31/2025 - ambulating 8 ft w/ WW w/ mod A d/t gait instability and loss of balance, requiring assistance to prevent pt from falling. She requires cues for improved gait mechanics, posture, and proper positioning within walker when ambulating. For ADLs, she is requiring use of Karie Stedy for toilet and shower transfers. " She performs bathing tasks w/ mod A and dressing tasks w/ max A. Dionicio's rehab needs are further complicated by her morbid obesity. In order to safely address Dionicio's complicated medical needs and appropriately advance her mobility, she requires acute inpt rehabilitation. Given her medical complexity and high risk for clinical complications and decompensation, her needs cannot be safely met at a lower level of care.     Expected Therapies and Services Required During Inpatient Rehab Admission  Intensity of Therapy: Patient requires intensive therapies not available in a lesser level of care. Patient is motivated, making gains, and can tolerate 3 hours of therapy a day.  Physical Therapy: 90 minutes per day, 6 days a week for 12 days  Occupational Therapy: 90 minutes per day, 6 days a week for 12 days  Speech and Language Therapy: No SLP needs anticipated.   Rehabilitation Nursing Needs: Patient requires 24 hour Rehab Nursing to manage bowel program in pt at risk for opioid induced constipation, bladder program in pt w/ urinary retention requiring straight cathing, vitals, medication education, positioning, carryover of new rehab techniques, care coordination, skin integrity, blood sugar management in pt w/ hyperglycemia and asymptomatic hypoglycemia, diabetes education d/t uncontrolled diabetes now w/ multiple changes to diabetic medications, assess neurologic status, pain management, post-surgical incision care to promote healing and prevent infection, and provide safe environment for patient at falls risk.    Precautions/restrictions/special needs:  Precautions: fall precautions and spinal precautions  Restrictions: No lifting > 10# x6 weeks, no excessive bending or twisting; no use of john paul lift.  Special Needs: Karie Danielle    Expected Level of Improvement: Anticipate w/ intensive rehab therapies, skilled rehab nursing cares, and PMR oversight of the patient's medical and rehab needs, the pt will achieve a level of  mod IND for bed mobility, transfers, gait, and ADLs, with supervision A needed for stairs and bathing.   Expected Length of time to achieve: 12 days    Anticipated Discharge Needs:  Anticipated Discharge Destination: Home  Anticipated Discharge Support: {ARC Discharge Support:192792}  24/7 support available : {Yes-No:141440}  Identified caregiver(s):  ***  Anticipated Discharge Needs: Home with homecare and Home with outpatient therapy    Identified challenges/barriers: pain, uncontrolled diabetes w/ asymptomatic hypoglycemia    Payor source: Medicare    Liaison signature/date/time: ***    Physician statement of review and agreement:  I have reviewed and am in agreement of the need for IRF stay to address above functional and medical needs. In addition to above statements address, Patient requires intensive active and ongoing therapeutic intervention and multiple therapies; Patient requires medical supervision; Expected to actively participate in the intensive rehab program; Sufficiently stable to actively participate; Expectation for measurable improvement in functional capacity or adaption to impairments.    MD signature/date/time:

## 2025-07-31 NOTE — PROGRESS NOTES
"Care Management Follow Up     Length of Stay (days): 11     Expected Discharge Date: 8/1/25     Concerns to be Addressed: Discharge planning     Patient plan of care discussed at interdisciplinary rounds: Yes     Anticipated Discharge Disposition: Transitional Care  Anticipated Discharge Services: None  Anticipated Discharge DME: None     Patient/family educated on Medicare website which has current facility and service quality ratings: Yes  Education Provided on the Discharge Plan: Yes  Patient/Family in Agreement with the Plan: Yes     Referrals Placed by CM/SW: External Care Coordination  Private pay costs discussed: Not applicable     Discussed  Partnership in Safe Discharge Planning  document with patient/family: No      Handoff Completed: No, handoff not indicated or clinically appropriate     Additional Information:  Message sent to admissions @  [facility] inquiring about placement. Per IDT rounds, patient is approaching medical readiness. SW note from yesterday suggests patient did well with OT and they felt she would do well at acute rehab. Awaiting follow up from admissions. Will plan to speak with patient today and if [facility] is deemed appropriate, will request that someone from admissions come meet with patient.     Addendum 1:36 PM:  Verified with [facility] and ortho that patient is cleared for discharge. Met with patient at bedside to discuss this. Patient reported that she's not feeling medically ready for discharge and that she wants her bladder issues to be managed first. SW explained that I spoke with ortho who was agreeable to discharge and that [facility] felt she was appropriate, too. Patient stated she \"doesn't want to be rushed\" and \"doesn't care if theres a bed for her\". SW explained that acute rehab is a higher level of care than TCU and therefore, theres more oversight from providers. Ultimately, I felt that a provider needed to [place] back with patient to explain why she's appropriate for discharge. " Spoke with both bushra and ortho. Ortho provider will be meeting with patient and spouse to discuss. Additionally, [facility] admissions will meet with patient at bedside tomorrow morning and answer any questions patient has. Care management continues to follow.      Accepted:  [facility]  [address]  [place]  [address]  P: [phone]  F: [phone]    Pending:  [facility]  [address] [place]  [address]  P: [phone]  F: [phone]     Next Steps:   -Continue to assist with discharge planning  -Update Senior Linkage Line once patient discharges (PAS has been completed for [facility])  -Keep [facility] updated      Selina Maki, GUSTABO, LGSW  5 Med Surg   [facility] Acute Care Management   Phone: [phone]  Available on Vocera: 5MS SW

## 2025-07-31 NOTE — PLAN OF CARE
Pt is alert and oriented x4. Able to make needs known to staff. Continent of bowel, had a large BM this shift. Pt had a shower with OT. Scheduled suppository given per order. FSX=448; straight cath =850.   PRN oxy given before PT and OT. L and R PIV's dressing CDI with SL. Assist of two with surinder malcolm. No acute issues this shift.

## 2025-08-01 ENCOUNTER — APPOINTMENT (OUTPATIENT)
Dept: OCCUPATIONAL THERAPY | Facility: CLINIC | Age: 70
DRG: 457 | End: 2025-08-01
Payer: MEDICARE

## 2025-08-01 ENCOUNTER — HOSPITAL ENCOUNTER (INPATIENT)
Facility: CLINIC | Age: 70
DRG: 560 | End: 2025-08-01
Attending: PHYSICAL MEDICINE & REHABILITATION | Admitting: PHYSICAL MEDICINE & REHABILITATION
Payer: MEDICARE

## 2025-08-01 ENCOUNTER — APPOINTMENT (OUTPATIENT)
Dept: GENERAL RADIOLOGY | Facility: CLINIC | Age: 70
DRG: 457 | End: 2025-08-01
Attending: NURSE PRACTITIONER
Payer: MEDICARE

## 2025-08-01 VITALS
DIASTOLIC BLOOD PRESSURE: 46 MMHG | BODY MASS INDEX: 46.43 KG/M2 | HEART RATE: 73 BPM | TEMPERATURE: 98.5 F | OXYGEN SATURATION: 94 % | RESPIRATION RATE: 17 BRPM | WEIGHT: 252.3 LBS | SYSTOLIC BLOOD PRESSURE: 103 MMHG | HEIGHT: 62 IN

## 2025-08-01 PROBLEM — Z98.1 H/O SPINAL FUSION: Status: ACTIVE | Noted: 2025-08-01

## 2025-08-01 LAB
ANION GAP SERPL CALCULATED.3IONS-SCNC: 13 MMOL/L (ref 7–15)
BUN SERPL-MCNC: 15.1 MG/DL (ref 8–23)
CALCIUM SERPL-MCNC: 9 MG/DL (ref 8.8–10.4)
CHLORIDE SERPL-SCNC: 96 MMOL/L (ref 98–107)
CREAT SERPL-MCNC: 0.72 MG/DL (ref 0.51–0.95)
EGFRCR SERPLBLD CKD-EPI 2021: 89 ML/MIN/1.73M2
ERYTHROCYTE [DISTWIDTH] IN BLOOD BY AUTOMATED COUNT: 15.2 % (ref 10–15)
GLUCOSE BLDC GLUCOMTR-MCNC: 116 MG/DL (ref 70–99)
GLUCOSE BLDC GLUCOMTR-MCNC: 161 MG/DL (ref 70–99)
GLUCOSE BLDC GLUCOMTR-MCNC: 163 MG/DL (ref 70–99)
GLUCOSE BLDC GLUCOMTR-MCNC: 164 MG/DL (ref 70–99)
GLUCOSE BLDC GLUCOMTR-MCNC: 167 MG/DL (ref 70–99)
GLUCOSE BLDC GLUCOMTR-MCNC: 171 MG/DL (ref 70–99)
GLUCOSE SERPL-MCNC: 145 MG/DL (ref 70–99)
HCO3 SERPL-SCNC: 25 MMOL/L (ref 22–29)
HCT VFR BLD AUTO: 26.8 % (ref 35–47)
HGB BLD-MCNC: 8.7 G/DL (ref 11.7–15.7)
MCH RBC QN AUTO: 30.9 PG (ref 26.5–33)
MCHC RBC AUTO-ENTMCNC: 32.5 G/DL (ref 31.5–36.5)
MCV RBC AUTO: 95 FL (ref 78–100)
PLATELET # BLD AUTO: 368 10E3/UL (ref 150–450)
POTASSIUM SERPL-SCNC: 4.5 MMOL/L (ref 3.4–5.3)
RBC # BLD AUTO: 2.82 10E6/UL (ref 3.8–5.2)
SODIUM SERPL-SCNC: 134 MMOL/L (ref 135–145)
WBC # BLD AUTO: 10.9 10E3/UL (ref 4–11)

## 2025-08-01 PROCEDURE — 250N000013 HC RX MED GY IP 250 OP 250 PS 637: Performed by: PHYSICIAN ASSISTANT

## 2025-08-01 PROCEDURE — 250N000009 HC RX 250: Performed by: PHYSICIAN ASSISTANT

## 2025-08-01 PROCEDURE — 99231 SBSQ HOSP IP/OBS SF/LOW 25: CPT | Mod: 24

## 2025-08-01 PROCEDURE — 128N000003 HC R&B REHAB

## 2025-08-01 PROCEDURE — 97535 SELF CARE MNGMENT TRAINING: CPT | Mod: GO

## 2025-08-01 PROCEDURE — 250N000012 HC RX MED GY IP 250 OP 636 PS 637: Performed by: PHYSICIAN ASSISTANT

## 2025-08-01 PROCEDURE — 250N000013 HC RX MED GY IP 250 OP 250 PS 637: Performed by: STUDENT IN AN ORGANIZED HEALTH CARE EDUCATION/TRAINING PROGRAM

## 2025-08-01 PROCEDURE — 250N000013 HC RX MED GY IP 250 OP 250 PS 637

## 2025-08-01 PROCEDURE — 85027 COMPLETE CBC AUTOMATED: CPT

## 2025-08-01 PROCEDURE — 999N000065 XR THORACIC LUMBAR STANDING 2 VIEWS

## 2025-08-01 PROCEDURE — 36415 COLL VENOUS BLD VENIPUNCTURE: CPT

## 2025-08-01 PROCEDURE — 5A09357 ASSISTANCE WITH RESPIRATORY VENTILATION, LESS THAN 24 CONSECUTIVE HOURS, CONTINUOUS POSITIVE AIRWAY PRESSURE: ICD-10-PCS | Performed by: PHYSICIAN ASSISTANT

## 2025-08-01 PROCEDURE — 99207 PR NO CHARGE LOS: CPT | Performed by: PHYSICIAN ASSISTANT

## 2025-08-01 PROCEDURE — 250N000013 HC RX MED GY IP 250 OP 250 PS 637: Performed by: NURSE PRACTITIONER

## 2025-08-01 PROCEDURE — 80048 BASIC METABOLIC PNL TOTAL CA: CPT

## 2025-08-01 PROCEDURE — 250N000013 HC RX MED GY IP 250 OP 250 PS 637: Performed by: PHYSICAL MEDICINE & REHABILITATION

## 2025-08-01 RX ORDER — ONDANSETRON 4 MG/1
4 TABLET, FILM COATED ORAL EVERY 8 HOURS PRN
Status: ACTIVE | OUTPATIENT
Start: 2025-08-01

## 2025-08-01 RX ORDER — NALOXONE HYDROCHLORIDE 0.4 MG/ML
0.4 INJECTION, SOLUTION INTRAMUSCULAR; INTRAVENOUS; SUBCUTANEOUS
Status: ACTIVE | OUTPATIENT
Start: 2025-08-01

## 2025-08-01 RX ORDER — OMEPRAZOLE 20 MG/1
40 CAPSULE, DELAYED RELEASE ORAL
Status: DISCONTINUED | OUTPATIENT
Start: 2025-08-02 | End: 2025-08-01

## 2025-08-01 RX ORDER — POLYETHYLENE GLYCOL 3350 17 G/17G
17 POWDER, FOR SOLUTION ORAL 2 TIMES DAILY
Status: DISCONTINUED | OUTPATIENT
Start: 2025-08-01 | End: 2025-08-03

## 2025-08-01 RX ORDER — NALOXONE HYDROCHLORIDE 0.4 MG/ML
0.2 INJECTION, SOLUTION INTRAMUSCULAR; INTRAVENOUS; SUBCUTANEOUS
Status: ACTIVE | OUTPATIENT
Start: 2025-08-01

## 2025-08-01 RX ORDER — NICOTINE POLACRILEX 4 MG
15-30 LOZENGE BUCCAL
Status: ACTIVE | OUTPATIENT
Start: 2025-08-01

## 2025-08-01 RX ORDER — OXYCODONE HYDROCHLORIDE 15 MG/1
15 TABLET ORAL
Status: DISCONTINUED | OUTPATIENT
Start: 2025-08-01 | End: 2025-08-08

## 2025-08-01 RX ORDER — MULTIVITAMIN WITH IRON
1000 TABLET ORAL DAILY
Status: DISPENSED | OUTPATIENT
Start: 2025-08-02

## 2025-08-01 RX ORDER — CARBOXYMETHYLCELLULOSE SODIUM 5 MG/ML
1 SOLUTION/ DROPS OPHTHALMIC DAILY PRN
Status: ACTIVE | OUTPATIENT
Start: 2025-08-01

## 2025-08-01 RX ORDER — HYDROXYZINE HYDROCHLORIDE 10 MG/1
10 TABLET, FILM COATED ORAL 3 TIMES DAILY PRN
Status: DISPENSED | OUTPATIENT
Start: 2025-08-01

## 2025-08-01 RX ORDER — TIMOLOL MALEATE 5 MG/ML
1 SOLUTION/ DROPS OPHTHALMIC 2 TIMES DAILY
Status: DISCONTINUED | OUTPATIENT
Start: 2025-08-01 | End: 2025-08-01

## 2025-08-01 RX ORDER — DEXTROSE MONOHYDRATE 25 G/50ML
25-50 INJECTION, SOLUTION INTRAVENOUS
Status: ACTIVE | OUTPATIENT
Start: 2025-08-01

## 2025-08-01 RX ORDER — GABAPENTIN 400 MG/1
400 CAPSULE ORAL 3 TIMES DAILY
Status: DISCONTINUED | OUTPATIENT
Start: 2025-08-01 | End: 2025-08-04

## 2025-08-01 RX ORDER — METHOCARBAMOL 500 MG/1
500 TABLET, FILM COATED ORAL DAILY PRN
Status: DISPENSED | OUTPATIENT
Start: 2025-08-01

## 2025-08-01 RX ORDER — OXYCODONE HYDROCHLORIDE 5 MG/1
10 TABLET ORAL
Status: DISCONTINUED | OUTPATIENT
Start: 2025-08-01 | End: 2025-08-08

## 2025-08-01 RX ORDER — MENTHOL AND METHYL SALICYLATE 7.6; 29 G/100G; G/100G
OINTMENT TOPICAL EVERY 6 HOURS PRN
Status: DISCONTINUED | OUTPATIENT
Start: 2025-08-01 | End: 2025-08-01

## 2025-08-01 RX ORDER — CYCLOSPORINE 0.5 MG/ML
1 EMULSION OPHTHALMIC 2 TIMES DAILY
Status: DISPENSED | OUTPATIENT
Start: 2025-08-01

## 2025-08-01 RX ORDER — GABAPENTIN 300 MG/1
300 CAPSULE ORAL 3 TIMES DAILY
Status: DISCONTINUED | OUTPATIENT
Start: 2025-08-01 | End: 2025-08-01

## 2025-08-01 RX ORDER — ACETAMINOPHEN 500 MG
1000 TABLET ORAL 3 TIMES DAILY
Status: DISPENSED | OUTPATIENT
Start: 2025-08-01

## 2025-08-01 RX ORDER — CALCIUM CARBONATE/VITAMIN D3 600 MG-10
1 TABLET ORAL 2 TIMES DAILY WITH MEALS
Status: DISPENSED | OUTPATIENT
Start: 2025-08-01

## 2025-08-01 RX ORDER — LATANOPROST 50 UG/ML
1 SOLUTION/ DROPS OPHTHALMIC AT BEDTIME
Status: DISPENSED | OUTPATIENT
Start: 2025-08-01

## 2025-08-01 RX ORDER — ASPIRIN 81 MG/1
81 TABLET, CHEWABLE ORAL DAILY
Status: DISPENSED | OUTPATIENT
Start: 2025-08-01

## 2025-08-01 RX ORDER — TIMOLOL MALEATE 5 MG/ML
1 SOLUTION/ DROPS OPHTHALMIC 2 TIMES DAILY
Qty: 5 ML | Refills: 0 | Status: SHIPPED | OUTPATIENT
Start: 2025-08-01

## 2025-08-01 RX ORDER — METHOCARBAMOL 500 MG/1
500 TABLET, FILM COATED ORAL 3 TIMES DAILY
Status: DISPENSED | OUTPATIENT
Start: 2025-08-01

## 2025-08-01 RX ORDER — BRIMONIDINE TARTRATE AND TIMOLOL MALEATE 2; 5 MG/ML; MG/ML
1 SOLUTION OPHTHALMIC 2 TIMES DAILY
Status: DISPENSED | OUTPATIENT
Start: 2025-08-01

## 2025-08-01 RX ORDER — ATORVASTATIN CALCIUM 10 MG/1
20 TABLET, FILM COATED ORAL DAILY
Status: DISPENSED | OUTPATIENT
Start: 2025-08-02

## 2025-08-01 RX ORDER — BISACODYL 10 MG
10 SUPPOSITORY, RECTAL RECTAL DAILY PRN
Status: ACTIVE | OUTPATIENT
Start: 2025-08-01

## 2025-08-01 RX ORDER — PANTOPRAZOLE SODIUM 40 MG/1
40 TABLET, DELAYED RELEASE ORAL
Status: DISPENSED | OUTPATIENT
Start: 2025-08-02

## 2025-08-01 RX ORDER — AMOXICILLIN 250 MG
2 CAPSULE ORAL 2 TIMES DAILY
Status: DISPENSED | OUTPATIENT
Start: 2025-08-01

## 2025-08-01 RX ADMIN — POLYETHYLENE GLYCOL 3350 17 G: 17 POWDER, FOR SOLUTION ORAL at 21:08

## 2025-08-01 RX ADMIN — ACETAMINOPHEN 975 MG: 325 TABLET ORAL at 11:25

## 2025-08-01 RX ADMIN — OXYCODONE HYDROCHLORIDE 15 MG: 15 TABLET ORAL at 00:21

## 2025-08-01 RX ADMIN — SENNOSIDES AND DOCUSATE SODIUM 2 TABLET: 50; 8.6 TABLET ORAL at 21:08

## 2025-08-01 RX ADMIN — GABAPENTIN 300 MG: 300 CAPSULE ORAL at 14:29

## 2025-08-01 RX ADMIN — METHOCARBAMOL 500 MG: 500 TABLET ORAL at 11:25

## 2025-08-01 RX ADMIN — METHOCARBAMOL 500 MG: 500 TABLET ORAL at 21:07

## 2025-08-01 RX ADMIN — GABAPENTIN 400 MG: 400 CAPSULE ORAL at 19:39

## 2025-08-01 RX ADMIN — POLYETHYLENE GLYCOL 3350 17 G: 17 POWDER, FOR SOLUTION ORAL at 09:07

## 2025-08-01 RX ADMIN — FAMOTIDINE 20 MG: 20 TABLET, FILM COATED ORAL at 09:05

## 2025-08-01 RX ADMIN — OXYCODONE HYDROCHLORIDE 10 MG: 5 TABLET ORAL at 21:58

## 2025-08-01 RX ADMIN — INSULIN GLARGINE 52 UNITS: 100 INJECTION, SOLUTION SUBCUTANEOUS at 21:17

## 2025-08-01 RX ADMIN — OXYCODONE HYDROCHLORIDE 15 MG: 15 TABLET ORAL at 03:23

## 2025-08-01 RX ADMIN — CALCIUM CARBONATE 600 MG (1,500 MG)-VITAMIN D3 400 UNIT TABLET 1 TABLET: at 17:26

## 2025-08-01 RX ADMIN — INSULIN ASPART 3 UNITS: 100 INJECTION, SOLUTION INTRAVENOUS; SUBCUTANEOUS at 10:38

## 2025-08-01 RX ADMIN — METFORMIN HYDROCHLORIDE 500 MG: 500 TABLET, FILM COATED ORAL at 17:26

## 2025-08-01 RX ADMIN — OXYCODONE HYDROCHLORIDE 15 MG: 15 TABLET ORAL at 09:06

## 2025-08-01 RX ADMIN — CYCLOSPORINE 1 DROP: 0.5 EMULSION OPHTHALMIC at 09:06

## 2025-08-01 RX ADMIN — METHOCARBAMOL 500 MG: 500 TABLET ORAL at 05:12

## 2025-08-01 RX ADMIN — INSULIN ASPART 1 UNITS: 100 INJECTION, SOLUTION INTRAVENOUS; SUBCUTANEOUS at 14:48

## 2025-08-01 RX ADMIN — INSULIN ASPART 1 UNITS: 100 INJECTION, SOLUTION INTRAVENOUS; SUBCUTANEOUS at 17:27

## 2025-08-01 RX ADMIN — INSULIN ASPART 9 UNITS: 100 INJECTION, SOLUTION INTRAVENOUS; SUBCUTANEOUS at 17:28

## 2025-08-01 RX ADMIN — BRIMONIDINE TARTRATE AND TIMOLOL MALEATE 1 DROP: 2; 5 SOLUTION/ DROPS OPHTHALMIC at 21:15

## 2025-08-01 RX ADMIN — ACETAMINOPHEN 975 MG: 325 TABLET ORAL at 03:23

## 2025-08-01 RX ADMIN — SENNOSIDES AND DOCUSATE SODIUM 2 TABLET: 50; 8.6 TABLET ORAL at 09:05

## 2025-08-01 RX ADMIN — ATORVASTATIN CALCIUM 20 MG: 10 TABLET, FILM COATED ORAL at 09:05

## 2025-08-01 RX ADMIN — ACETAMINOPHEN 1000 MG: 500 TABLET ORAL at 19:39

## 2025-08-01 RX ADMIN — BRIMONIDINE TARTRATE 1 DROP: 2 SOLUTION/ DROPS OPHTHALMIC at 12:05

## 2025-08-01 RX ADMIN — SERTRALINE HYDROCHLORIDE 50 MG: 25 TABLET ORAL at 09:08

## 2025-08-01 RX ADMIN — OXYCODONE HYDROCHLORIDE 15 MG: 15 TABLET ORAL at 06:25

## 2025-08-01 RX ADMIN — GABAPENTIN 300 MG: 300 CAPSULE ORAL at 09:05

## 2025-08-01 RX ADMIN — TIMOLOL MALEATE 1 DROP: 5 SOLUTION OPHTHALMIC at 11:27

## 2025-08-01 RX ADMIN — LATANOPROST 1 DROP: 50 SOLUTION OPHTHALMIC at 21:21

## 2025-08-01 RX ADMIN — CYCLOSPORINE 1 DROP: 0.5 EMULSION OPHTHALMIC at 21:07

## 2025-08-01 RX ADMIN — ASPIRIN 81 MG CHEWABLE TABLET 81 MG: 81 TABLET CHEWABLE at 14:29

## 2025-08-01 RX ADMIN — PANTOPRAZOLE SODIUM 40 MG: 40 TABLET, DELAYED RELEASE ORAL at 09:05

## 2025-08-01 ASSESSMENT — ACTIVITIES OF DAILY LIVING (ADL)
ADLS_ACUITY_SCORE: 49
ADLS_ACUITY_SCORE: 47
ADLS_ACUITY_SCORE: 49
ADLS_ACUITY_SCORE: 47
ADLS_ACUITY_SCORE: 44
ADLS_ACUITY_SCORE: 47
ADLS_ACUITY_SCORE: 44
ADLS_ACUITY_SCORE: 47
ADLS_ACUITY_SCORE: 38
ADLS_ACUITY_SCORE: 47
ADLS_ACUITY_SCORE: 47
ADLS_ACUITY_SCORE: 49
ADLS_ACUITY_SCORE: 47
ADLS_ACUITY_SCORE: 55
ADLS_ACUITY_SCORE: 47
ADLS_ACUITY_SCORE: 55
ADLS_ACUITY_SCORE: 38
ADLS_ACUITY_SCORE: 44
ADLS_ACUITY_SCORE: 47

## 2025-08-01 NOTE — PLAN OF CARE
Occupational Therapy Discharge Summary    Reason for therapy discharge:    Discharged to acute rehabilitation facility.    Progress towards therapy goal(s). See goals on Care Plan in [facility] electronic health record for goal details.  Goals not met.  Barriers to achieving goals:   discharge from facility.    Therapy recommendation(s):    Continued therapy is recommended.  Rationale/Recommendations:  to increase ADL ind, act tolerance and safety.

## 2025-08-01 NOTE — PROGRESS NOTES
[facility]    Medicine Progress Note - [facility] Medicine Service    Date of Admission:  7/19/2025    Assessment & Plan   Radha Baca is a 69 year old female with PMHx of HTN, DM2 with insulin dependence, glaucoma, diabetic retinopathy, CAD, HLD, vertigo and recently noted suprasellar lesion. She was admitted on 7/19/2025 for worsening back pain 2/2 T12 fracture and was transferred [facility] for orthopedic surgery and pain management. Ortho is primary and our team is consulted for DM management.     Major Plans Today:  - Lumbar X-ray for post-op hardware per Ortho  - Discharge to ARU today     # T11, T12 burst fractures   # Moderate lumbar spondylosis with moderate spinal canal stenosis L4-L5, mild narrowing L3-L4  # Multilevel foraminal narrowing  # s/p T9-L2 posterior spinal fusion on 7/21/2025   Recent fall several weeks ago and outpatient imaging revealed compression fracture of T12 2/2 osteoporosis. She presented to the ED with worsening low back pain with radiculopathy and neuropathy sxs. 7/19 CT lumbar spine with burst fracture involving T12 vertebral body with central vertebral body height loss and coronally oriented fracture, as well as fracture of the posterior arch, spinous process, and interior aspect of T11. She is s/p facetectomies and posterior spinal fusion of T9-L2. Ortho is primary and has consulted our team for DM management. Pain Service is following.     Ortho Primary (copied from note on 8/1)  Activity:   - Up with assist until independent. No excessive bending or twisting. No lifting >10 lbs x 6 weeks.   - No use of Lakhwinder lift.   - Will need to be sideways in bed  Weight bearing status: WBAT.  Pain management:   - Transition to PO narcotics as tolerated. No NSAIDs x 3 months.   Antibiotics: Ancef x until the drains are in  Diet: Begin with clear fluids and progress diet as tolerated. Sliding scale insulin to  continue.  DVT prophylaxis: SCDs only. No chemical DVT ppx needed.  Imaging: XR Upright Thoraco/lumbar  Labs: Per medicine team  Bracing/Splinting: None  Dressings: Keep Aquacel c/d/i x 7 days.  Drains: discontinued  Parker catheter: Removed  Physical Therapy/Occupational Therapy: Eval and treat.  Cultures: None.    Consults: Hospitalist.  Follow-up: Clinic with Dr. Dubois in 6 weeks with repeat x-rays.   Disposition: Pending progress with therapies, pain control on orals, and medical stability, anticipate discharge to facility in 1-2 days, pending acceptance to a facility.    Pain Service plan:   oxycodone 10-15 mg Q3H PRN. Plan for weaning mentioned in detail in note from yesterday.  Stop IV Dilaudid 0.3-0.6 mg IV PRN -not using in 48 hours.  Continue gabapentin to 300 TID. Recommend caution with further dose escalation and careful monitoring for CNS side effects.  Continue muscle relaxant  Continue acetaminophen  Menthol patches  Bowel regimen-passing gas     # TDM2 with insulin dependence - stable   Follows with Endocrinologist Dr Keturah De Luna, her last A1c checked was 11.2. PTA on lantus 75U at bedtime, humalog 25U TID with meals and sliding scale as well as metformin 500 mg daily. With the suspicion that her baseline glucose levels had changed due to reduced physiologic stress, after one episode of asymptomatic hypoglycemia, Lantus was decreased from 68 to 58U on 7/30. Fasting sugar levels have been 110s.   - Continue Lantus 58U  - Novolog 1 unit(s) per 6 g cho TID AC and PRN snacks. Cover all intake.   - Novolog High Resistance Correction Scale (ISF: 25) TID AC / HS / 0200   - BG checks: TID AC / HS / 0200  - HOLD PTA: Metformin while inpatient  - Hypoglycemia protocol  - CHO consistent diet     # Mixed urinary incontinence, chronic   Per discussion with Ortho and Pain, urinary retention sounds most c/w prolapse as she is able to initiate urination but cannot empty completely. Suspect that worsening retention  "is multifactorial from recent surgery, medications, and constipation. Parker catheter was placed after PVRs were in the 700s-900s. Parker removed on 7/30 after patient had passed a BM and was more active with PT, however, her PVRs continue intermittently high. She opted to continue trying spontaneous void with straight cath as needed. Anticipate with continued tapering of her opioids, that this will improve. Recommend outpatient urology consult with urodynamic studies for further evaluation.    - Follow PVRs   - Straight cath PRN elevated PVRs           Diet: Consistent Carbohydrate Diet Moderate Consistent Carb (60 g CHO per Meal) Diet  Diet  Snacks/Supplements Adult: Other; Fruit Punch Ron B/D, Ensure Max chocolate B; With Meals    DVT Prophylaxis: Pneumatic Compression Devices  Parker Catheter: Not present  Lines: None     Cardiac Monitoring: None  Code Status: Full Code      Clinically Significant Risk Factors            # Hypoalbuminemia: Lowest albumin = 3.1 g/dL at 7/23/2025  6:01 AM, will monitor as appropriate     # Hypertension: Noted on problem list           # DMII: A1C = 11.2 % (Ref range: 0.0 - 5.6 %) within past 6 months   # Morbid Obesity: Estimated body mass index is 46.13 kg/m  as calculated from the following:    Height as of this encounter: 1.575 m (5' 2.01\").    Weight as of this encounter: 114.4 kg (252 lb 4.8 oz).             Social Drivers of Health    Depression: At risk (7/8/2025)    PHQ-2     PHQ-2 Score: 3   Tobacco Use: Medium Risk (7/21/2025)    Patient History     Smoking Tobacco Use: Former     Smokeless Tobacco Use: Never     Passive Exposure: Past   Stress: Stress Concern Present (3/16/2025)    [employer] - Occupational Stress Questionnaire     Feeling of Stress : To some extent   Social Connections: Unknown (3/16/2025)    Social Connection and Isolation Panel [NHANES]     Frequency of Social Gatherings with Friends and Family: Patient declined        " "  Disposition Plan   Medically Ready for Discharge: Ready Now         The patient's care was discussed with the Attending Physician, Dr. Krysta Stephenson.    DO [facility]  [facility]  Securely message with [employer] (more info)  Text page via [facility] Paging/Directory   See signed in provider for up to date coverage information  ______________________________________________________________________    Interval History   NAEO. Yesterday we had a discussion about discharge plans and her being medically ready for transfer to ARU. She was concerned about timing of staff's response to her needing assistance with using the commode or waiting for her pain pills to be administered before her OT. She becomes tearful and apologizes for coming off as demanding with her questions about dispo plans. \"There are many moving pieces and I try not to get frustrated, it ruins communication with the team\". She feels more ready to be discharged to the ARU now. Denies new pain, no other acute concerns.     Physical Exam   Vital Signs: Temp: 98.5  F (36.9  C) Temp src: Oral BP: 103/46 Pulse: 73   Resp: 17 SpO2: 94 % O2 Device: None (Room air)    Weight: 252 lbs 4.8 oz    Physical Exam  Vitals reviewed.   Constitutional:       General: She is not in acute distress.     Appearance: Normal appearance.   Eyes:      Extraocular Movements: Extraocular movements intact.      Conjunctiva/sclera: Conjunctivae normal.      Pupils: Pupils are equal, round, and reactive to light.   Pulmonary:      Effort: Pulmonary effort is normal. No respiratory distress.   Skin:     Findings: No bruising, erythema, lesion or rash.   Neurological:      Mental Status: She is alert and oriented to person, place, and time. Mental status is at baseline.   Psychiatric:         Mood and Affect: Mood normal.         Behavior: Behavior normal.         Thought Content: Thought content normal.    "      Judgment: Judgment normal.       Medical Decision Making   See A&P for MDM        Data         Imaging results reviewed over the past 24 hrs:   No results found for this or any previous visit (from the past 24 hours).

## 2025-08-01 NOTE — PROGRESS NOTES
Care Management Discharge Note    Discharge Date: 08/01/2025     Discharge Disposition: Transitional Care    [facility]  [address]  [place]  [address]  P: [phone]  F: [phone]    Discharge Services: None    Discharge DME: None    Discharge Transportation: Roll over     Private pay costs discussed: Not applicable    Does the patient's insurance plan have a 3 day qualifying hospital stay waiver?  No    PAS Confirmation Code: Not needed for acute rehab  Patient/family educated on Medicare website which has current facility and service quality ratings: Yes    Education Provided on the Discharge Plan: Yes  Persons Notified of Discharge Plans: Yes  Patient/Family in Agreement with the Plan: Yes    Handoff Referral Completed: Yes, non-[facility] PCP: External handoff communication completed    Additional Information:  Alonso from admissions @  ARU met with patient at bedside to answers questions regarding acute rehab. Alonso states ARU can accept patient at 12pm today. Relayed this information to bedside RN, charge RN, hospitalist and ortho provider. Social work reached out to [facility] to update them that patient is going to be discharging to  acute rehab instead. Call out to [facility] to update them that patient will be discharging to ARU instead of TCU and to disregard the PAS that was completed previously.     Addendum 11:50 AM:  IMM given by LISSETTE Watters. Patient signed. Faxed to HIM and placed in hard chart. EHO completed. No additional care management needs.     GUSTABO Medeiros, LGSW  5 Med Surg   [facility] Acute Care Management   Phone: [phone]  Available on Vocera: 5MS SW

## 2025-08-01 NOTE — PLAN OF CARE
Pt is A/O x 4. Pt remains vitally stable, on CPAP noc. Pt continues to deny SOB, chest pain, N/V. Pt not OOB this shift but remains assist x2 with sera steady & gait belt. Pt continues to report severe back pain managed per MAR. Spinal dressing remains C/D/I. R PIV remains C/D/I SL. L PIV removed due to leaking & bleeding. Regular diet, takes pills whole in thin liquid. Continent of B/B, pt continues to retain urine. Pt straight cathed at 0530 for 600 mL r/t retention. Continue to monitor urine output. Pt uses call light appropriately and able to make needs known. No acute changes overnight.     Goal Outcome Evaluation:    Plan of Care Reviewed With: patient    Overall Patient Progress: improving

## 2025-08-01 NOTE — PLAN OF CARE
Physical Therapy Discharge Summary    Reason for therapy discharge:    Discharged to acute rehabilitation facility.    Progress towards therapy goal(s). [facility] for goal details.  Goals partially met.  Barriers to achieving goals:   discharge from facility.    Therapy recommendation(s):    Continued therapy is recommended.  Rationale/Recommendations:  per ARU recommendations .

## 2025-08-01 NOTE — PROGRESS NOTES
Orthopaedic Surgery Progress Note 08/01/2025    Subjective  No acute events overnight.  Pain much better controlled. Parker catheter was removed yesterday, working towards voiding by herself. Denies new numbness, tingling, or weakness. Denies fevers, chills, chest pain, SOB.  Was able to get out of bed, continuing to work with PT/OT. Motivated to get x rays today.    Objective  Temp: 98.5  F (36.9  C) Temp src: Oral BP: 103/46 Pulse: 73   Resp: 17 SpO2: 94 % O2 Device: None (Room air)      Exam:  Gen: No acute distress, resting comfortably in bed. Tearful and anxious about being told to discharge today  Resp: Non-labored breathing  CV: wwp  MSK:  Spine:  - Dressings c/d/I   - DP pulses 2+ bilaterally, feet wwp bilaterally              Lumbar Spine:                          Appearance - No gross stepoffs or deformities                          Motor -                           L2-3: Hip flexion 5/5 R and 5/5 L strength                           L3/4:  Knee extension R 5/5 and L 5/5 strength                          L4/5:  Foot dorsiflexion R 5/5 L 5/5 and                                       EHL dorsiflexion R 5/5 L 5/5 strength                          S1:  Plantarflexion/Peroneal Muscles  R 5/5 and L 4/5 strength                          Sensation: intact to light touch L3-S1 distribution BLE      Recent Labs   Lab 07/27/25  1121   WBC 12.9*   HGB 8.0*          Assessment: Radha Baca is a 69 year old female with PMH including DM, T12 burst fracture now s/p T9-L2 posterior spinal fusion on 7/21/2025 with Dr. Dubois. Recommend further encouragement for out of bed with PT.       Today:  - Follow pain management recommendations  - Continue to work with medicine, pain and endocrine teams.   - upright seated full spine XR PTDC    Per Pain:   oxycodone 10-15 mg Q3H PRN. Plan for weaning mentioned in detail in note from yesterday.  Stop IV Dilaudid 0.3-0.6 mg IV PRN -not using in 48 hours.  Continue  gabapentin to 300 TID. Recommend caution with further dose escalation and careful monitoring for CNS side effects.  Continue muscle relaxant  Continue acetaminophen  Menthol patches  Bowel regimen-passing gas    Anticipating discharge in 1-2 days, pending availability of ARU bed.     Ortho Primary  Activity:   - Up with assist until independent. No excessive bending or twisting. No lifting >10 lbs x 6 weeks.   - No use of Lakhwinder lift.   - Will need to be sideways in bed  Weight bearing status: WBAT.  Pain management:   - Transition to PO narcotics as tolerated. No NSAIDs x 3 months.   Antibiotics: Ancef x until the drains are in  Diet: Begin with clear fluids and progress diet as tolerated. Sliding scale insulin to continue.  DVT prophylaxis: SCDs only. No chemical DVT ppx needed.  Imaging: XR Upright Thoraco/lumbar  Labs: Per medicine team  Bracing/Splinting: None  Dressings: Keep Aquacel c/d/i x 7 days.  Drains: discontinued  Parker catheter: Removed  Physical Therapy/Occupational Therapy: Eval and treat.  Cultures: None.    Consults: Hospitalist.  Follow-up: Clinic with Dr. Dubois in 6 weeks with repeat x-rays.   Disposition: Pending progress with therapies, pain control on orals, and medical stability, anticipate discharge to facility in 1-2 days, pending acceptance to a facility.    Ankit Morrison MD  Spine Fellow     Future Appointments   Date Time Provider Department Center   7/30/2025  1:00 PM Marquise Woods, PT [facility]   7/30/2025  3:30 PM Lakesha Connolly, JOAO UROT [facility]   8/1/2025 10:30 AM [facility]   8/5/2025  1:20 PM Jerrell Austin, MARCELA UOR [facility]   9/4/2025  2:30 PM Bre Shaw AuD AUD [facility]   9/4/2025  4:00 PM Deepika Mancia, KIMMY ENT [facility]   9/10/2025  8:00 AM  LAB UCLABR [facility]   9/18/2025 10:30 AM Marcella Maria PA-C MDE [facility]   12/17/2025  9:00 [id number]   12/23/2025  9:40 AM Mingo Swain MD [facility]    1/7/2026 10:30 AM Keturah De Luna MD [facility]

## 2025-08-01 NOTE — PLAN OF CARE
Goal Outcome Evaluation:        Patient alert and oriented x4  Room air  VSS  Pain controlled with Robaxin, Oxy, Tylenol.    Verbal report given to TCU.     Pt. discharged at 1245 via pt transport to [facility].  Pt. was accompanied by , and left with personal belongings.  Prior to discharge, PIV was removed. Pt. was given times of last dose for all discharge medications in writing on discharge medication sheets.  Discharge teaching included  medication, pain management, activity restrictions, dressing changes, and signs and symptoms of infection.  Pt. had no further questions at the time of discharge and no unmet needs were identified.

## 2025-08-01 NOTE — DISCHARGE SUMMARY
[facility] Orthopedic Spine Discharge Summary and Instructions    Radha Baca MRN# [id number]   Age: 69 year old YOB: 1955     Date of Admission:  7/19/2025  Date of Discharge::  8/1/2025 12:46 PM  Admitting Physician:  Ivan Kerr MD  Discharge Physician:  Juancarlos Dubois MD          Admission Diagnoses:   T12 burst fracture (H) [S22.081A]  Closed stable burst fracture of eleventh thoracic vertebra, initial encounter (H) [S22.081A]  Acute midline low back pain without sciatica [M54.50]          Discharge Diagnosis:     T12 burst fracture (H) [S22.081A]  Closed stable burst fracture of eleventh thoracic vertebra, initial encounter (H) [S22.081A]  Acute midline low back pain without sciatica [M54.50]          Procedures:   7/21/2025  1. Grade 1 dorsal facetectomies T9-10, T10-11, T11-12, T12-L1, L1-2  2. T9 through L2 Posterior Spinal Fusion.  3. T9 through L2 Posterior Spinal Instrumentation, Medtronic Solera.  4. Open treatment and reduction of T11 and T12 fracture  5. PMMA Cement augmentation of screws  6. [place] and use of Local Autograft.  7. Use of O-Arm navigational guidance.  8. Use of Allograft and bone morphogenic protein.           Brief History of Illness:   Radha Baca is a 69-year-old female with history of hypertension, diabetes mellitus type 2, glaucoma, bilateral TKA, chronic back pain, fibromyalgia who presented to the ED for evaluation of severe back pain.  Of note, patient had a known T12 burst fracture after ground-level fall.  Now she presents with worsening of the fracture.  At the time of consult to the orthopedic spine team the fracture details were as follows (AO spine classification): A4 fracture at T12, L1 and T11, modifiers: N0, M2; segmental T11-T12 kyphosis 4 degrees, no significant retropulsion into spinal canal, 10 out of 10 back pain, failed conservative treatment, worsening T12 height loss-now around 40%.  Last dose of aspirin was  on 7/18/2025.  History of osteoporosis present.  Given the above-mentioned symptoms, clinical findings and imaging results, the information procedure was offered.  All the risk and benefit were discussed with the patient and her  in detail.  All questions were answered.  Patient decided to go ahead with the above-mentioned procedure.           Hospital Course:   Patient underwent above-mentioned procedure on 7/21/2025. The operation was uncomplicated and she was admitted to the [facility] unit for routine post operative cares.  During the course of hospital stay, pain was adequately managed with analgesics and gradually her pain significantly improved.  Pain team was also consulted.  Medicine team and endocrinology team were consulted for assistance with management given her comorbidities.  During the initial.  In the postoperative period, the blood sugars were not adequately controlled and her Lantus dose was increased.  Gradually the blood sugars were controlled adequately.  She worked with therapies during her stay.  After postoperative day 4, she was adequately passing flatus and having bowel movements.  Her aspirin was started after 1 week.  At the time of discharge, she was doing well, ambulating, voiding without a Parker catheter, eating a regular diet, pain was well-controlled.  She was discharged to acute rehabilitation unit.      Examination at discharge:    Cervical spine:                          Appearance -no gross step-offs, kyphosis.                          Motor -                           C5: Deltoids R 5/5 and L 5/5 strengths                          C6: Biceps R 5/5 and L 5/5 strength                           C7: Triceps R 5/5 and L 5/5 strength                           C8:  R 5/5 and L 5/5 strength                           T1: Dorsal interossei R 5/5 and L 5/5 strength                               Sensation: intact to light touch in C5-T1                                        Lumbar  Spine:                          Appearance - No gross stepoffs or deformities                          Motor -                           L2-3: Hip flexion 4+/5 R and 5/5 L strength                           L3/4:  Knee extension R 5/5 and L 5/5 strength                          L4/5:  Foot dorsiflexion R 5/5 L 4+/5 and                                       EHL dorsiflexion R 5/5 L 4/5 strength                          S1:  Plantarflexion/Peroneal Muscles  R 5/5 and L 5/5 strength                          Sensation: intact to light touch L3-S1 distribution BLE    Incision: Clean, dry, intact         Discharge Medications:     Discharge Medication List as of 8/1/2025 12:50 PM        START taking these medications    Details   bisacodyl (DULCOLAX) 10 MG suppository Place 1 suppository (10 mg) rectally daily as needed for constipation., Transitional      gabapentin (NEURONTIN) 300 MG capsule Take 1 capsule (300 mg) by mouth 3 times daily., Disp-90 capsule, R-0, Local Print      HYDROmorphone (DILAUDID) 4 MG tablet Take 1-1.5 tablets (4-6 mg) by mouth every 4 hours as needed for moderate to severe pain (4mg for pain rated 4-6/10; 6mg for pain rated 7-10/10)., Disp-35 tablet, R-0, Local Print      hydrOXYzine HCl (ATARAX) 10 MG tablet Take 1 tablet (10 mg) by mouth 3 times daily as needed for anxiety, itching or other (sleep)., Transitional      !! insulin aspart (NOVOLOG PEN) 100 UNIT/ML pen Subcutaneous, 3 TIMES DAILY WITH MEALS, 1U per 6 grams of carbohydrates, Disp-15 mL, R-0, E-Prescribe      !! insulin aspart (NOVOLOG PEN) 100 UNIT/ML pen Subcutaneous, WITH SNACKS OR SUPPLEMENTS, 1 unit per 6 grams of carbohydrates, Disp-15 mL, R-0, E-Prescribe      !! insulin aspart (NOVOLOG PEN) 100 UNIT/ML pen 1-10 Units, Subcutaneous, 3 TIMES DAILY BEFORE MEAL Correction Scale - HIGH INSULIN RESISTANCE DOSING Do Not give Correction Insulin if Pre-Meal BG less than 140. For Pre-Meal  - 164 give 1 unit. For Pre-Meal  -  189 give 2 units. For Pre-Meal   - 214 give 3 units. For Pre-Meal  - 239 give 4 units. For Pre-Meal  - 264 give 5 units. For Pre-Meal  - 289 give 6 units. For Pre-Meal  - 314 give 7 units. For Pre-Meal  - 339 give 8 units. For Pre-Meal  - 364  give 9 units. For Pre-Meal BG greater than or equal to 365 give 10 units., Disp-15 mL, R-0, E-Prescribe      Lidocaine (LIDOCARE) 4 % Patch Place 3 patches over 12 hours onto the skin every 24 hours. To prevent lidocaine toxicity, patient should be patch free for 12 hrs daily.Transitional      methocarbamol (ROBAXIN) 500 MG tablet Take 1 tablet (500 mg) by mouth every 6 hours., Transitional      ondansetron (ZOFRAN ODT) 4 MG ODT tab Take 1 tablet (4 mg) by mouth every 6 hours as needed for nausea or vomiting., Transitional      senna-docusate (SENOKOT-S/PERICOLACE) 8.6-50 MG tablet Take 2 tablets by mouth 2 times daily., Transitional       !! - Potential duplicate medications found. Please discuss with provider.        CONTINUE these medications which have CHANGED    Details   acetaminophen (TYLENOL) 325 MG tablet Take 3 tablets (975 mg) by mouth every 8 hours., Transitional      insulin glargine (LANTUS PEN) 100 UNIT/ML pen Inject 58 Units subcutaneously at bedtime., Disp-15 mL, R-0, E-PrescribeIf Lantus is not covered by insurance, may substitute Basaglar or Semglee or other insulin glargine product per insurance preference at same dose and frequency.        oxyCODONE IR (ROXICODONE) 10 MG tablet Take 1-1.5 tablets (10-15 mg) by mouth every 3 hours as needed for moderate to severe pain or breakthrough pain., Transitional      timolol maleate (TIMOPTIC) 0.5 % ophthalmic solution Place 1 drop into both eyes 2 times daily., Disp-5 mL, R-0, E-Prescribe           CONTINUE these medications which have NOT CHANGED    Details   amoxicillin (AMOXIL) 500 MG tablet TAKE 4 TABLETS BY MOUTH 1 HOUR BEFORE DENTAL PROCEDURES, Disp-4 tablet, R-4,  E-Prescribe      Ascorbic Acid (VITAMIN C PO) Take 500 mg by mouth daily., Historical      aspirin 81 MG tablet 1 tab daily, Disp-90 tablet, R-3, E-Prescribe      atorvastatin (LIPITOR) 20 MG tablet Take 1 tablet (20 mg) by mouth daily, Disp-90 tablet, R-3, E-Prescribe      B Complex Vitamins (VITAMIN  B COMPLEX) CAPS Take 1 tablet by mouth daily , Historical      brimonidine-timolol (COMBIGAN) 0.2-0.5 % ophthalmic solution Place 1 drop into both eyes 2 times daily., Historical      calcitonin, salmon, (MIACALCIN) 200 UNIT/ACT nasal spray Spray 1 spray into one nostril alternating nostrils daily. Alternate nostril each day., Disp-3.7 mL, R-0, E-Prescribe      calcium-vitamin D (CALTRATE) 600-400 MG-UNIT per tablet Take 1 tablet by mouth daily., Historical      cyanocobalamin (VITAMIN B-12) 1000 MCG tablet Take 1,000 mcg by mouth daily., Historical      cycloSPORINE (RESTASIS) 0.05 % ophthalmic emulsion Place 1 drop into both eyes 2 times daily., Historical      hypromellose-dextran (ARTIFICAL TEARS) 0.1-0.3 % ophthalmic solution Place 1 drop into both eyes daily as needed for dry eyes., Historical      ketoconazole (NIZORAL) 2 % external cream Apply topically daily.Disp-90 g, [id number]      latanoprost (XALATAN) 0.005 % ophthalmic solution Place 1 drop into both eyes At Bedtime, Historical      lisinopril-hydrochlorothiazide (ZESTORETIC) 20-12.5 MG tablet Take 1 tablet by mouth daily, Disp-90 tablet, R-3, E-Prescribe      meclizine (ANTIVERT) 12.5 MG tablet Take 1-2 tablets (12.5-25 mg) by mouth 3 times daily as needed for dizziness. Increase to 50mg per dose as a maximum. Maximum daily dose 100mg., Disp-25 tablet, R-0, E-Prescribe      metFORMIN (GLUCOPHAGE) 500 MG tablet Take 500 mg by mouth daily., Historical      Multiple Vitamins-Minerals (EYE-ZAKIYA PLUS LUTEIN PO) Take 1 tablet by mouth daily., Historical      Omega-3 Fatty Acids (OMEGA-3 FISH OIL PO) Take 1,000 mg by mouth daily, Historical       omeprazole (PRILOSEC) 20 MG DR capsule Take 2 capsules (40 mg) by mouth daily., Disp-30 capsule, R-0, E-Prescribe      ondansetron (ZOFRAN) 4 MG tablet Take 1 tablet (4 mg) by mouth every 8 hours as needed for nausea., Disp-20 tablet, R-0, E-Prescribe      polyethylene glycol (MIRALAX) 17 GM/Dose powder Take 1 capful. by mouth daily., Historical      sertraline (ZOLOFT) 50 MG tablet Take 1 tablet (50 mg) by mouth daily., Disp-90 tablet, R-3, E-Prescribe      tiZANidine (ZANAFLEX) 2 MG tablet Take 2 tablets (4 mg) by mouth 3 times daily., Disp-30 tablet, R-1, E-Prescribe      triamcinolone (ARISTOCORT HP) 0.5 % external cream Apply topically 2 times daily as needed for irritation.Historical      blood glucose monitoring (NO BRAND SPECIFIED) meter device kit Use to test blood sugar 3x times daily as directed. Any covered brand. One touch preferred.Disp-1 kit, [id number]      Continuous Glucose  (FREESTYLE JANET 3 READER) ANGLE 1 each See Admin Instructions. Use to read blood sugars per 's instructions, Disp-1 each, R-0, E-Prescribe      Continuous Glucose Sensor (FREESTYLE JANET 3 PLUS SENSOR) MISC Change every 15 days., Disp-6 each, R-1, E-Prescribe      insulin pen needle (B-D U/F) 31G X 8 MM miscellaneous US QIS FOR INSULIN ADMINISTRATIONDisp-400 each, [id number]           STOP taking these medications       HUMALOG KWIKPEN 100 UNIT/ML soln Comments:   Reason for Stopping:         ibuprofen (ADVIL/MOTRIN) 800 MG tablet Comments:   Reason for Stopping:                       Discharge Instructions and Follow-Up:     Discharge diet: Regular   Discharge activity: You may advance activity as tolerated. No strenuous exercise or heay lifting greater than 10 lbs for 4 weeks or until seen and cleared in clinic.   Discharge follow-up: Follow-up with Dr. Juancarlos Dubois MD in 6 weeks   Wound care: Ok to shower,however no scrubbing of the wound and no soaking of the wound, meaning no  bathtubs or swimming pools. Pat dry only. Leave wound open to air.  Sutures are absorbable and need not to be removed in 2 weeks.     Please call if you have:  1. increased pain, redness, drainage, swelling at your incision  2. fevers > 101.5 F degrees  3. with any questions or concerns.  You may reach the orthopedic spine clinic during regular work hours. ER at [phone].    and ask for the orthopedic surgery resident on call at [phone], during off hours or weekends.         Discharge Disposition:     Discharged to rehabilitation facility        Ankit Morrison MD  Spine fellow

## 2025-08-02 ENCOUNTER — APPOINTMENT (OUTPATIENT)
Dept: OCCUPATIONAL THERAPY | Facility: CLINIC | Age: 70
DRG: 560 | End: 2025-08-02
Attending: PHYSICAL MEDICINE & REHABILITATION
Payer: MEDICARE

## 2025-08-02 ENCOUNTER — APPOINTMENT (OUTPATIENT)
Dept: PHYSICAL THERAPY | Facility: CLINIC | Age: 70
DRG: 560 | End: 2025-08-02
Attending: PHYSICAL MEDICINE & REHABILITATION
Payer: MEDICARE

## 2025-08-02 LAB
GLUCOSE BLDC GLUCOMTR-MCNC: 113 MG/DL (ref 70–99)
GLUCOSE BLDC GLUCOMTR-MCNC: 145 MG/DL (ref 70–99)
GLUCOSE BLDC GLUCOMTR-MCNC: 159 MG/DL (ref 70–99)
GLUCOSE BLDC GLUCOMTR-MCNC: 176 MG/DL (ref 70–99)
GLUCOSE BLDC GLUCOMTR-MCNC: 183 MG/DL (ref 70–99)

## 2025-08-02 PROCEDURE — 99222 1ST HOSP IP/OBS MODERATE 55: CPT | Mod: AI | Performed by: PHYSICIAN ASSISTANT

## 2025-08-02 PROCEDURE — 97530 THERAPEUTIC ACTIVITIES: CPT | Mod: GO

## 2025-08-02 PROCEDURE — 250N000013 HC RX MED GY IP 250 OP 250 PS 637: Performed by: PHYSICAL MEDICINE & REHABILITATION

## 2025-08-02 PROCEDURE — 128N000003 HC R&B REHAB

## 2025-08-02 PROCEDURE — 97162 PT EVAL MOD COMPLEX 30 MIN: CPT | Mod: GP

## 2025-08-02 PROCEDURE — 250N000013 HC RX MED GY IP 250 OP 250 PS 637: Performed by: PHYSICIAN ASSISTANT

## 2025-08-02 PROCEDURE — 97165 OT EVAL LOW COMPLEX 30 MIN: CPT | Mod: GO

## 2025-08-02 PROCEDURE — 97535 SELF CARE MNGMENT TRAINING: CPT | Mod: GO

## 2025-08-02 PROCEDURE — 97530 THERAPEUTIC ACTIVITIES: CPT | Mod: GP

## 2025-08-02 RX ADMIN — LATANOPROST 1 DROP: 50 SOLUTION OPHTHALMIC at 21:17

## 2025-08-02 RX ADMIN — INSULIN ASPART 8 UNITS: 100 INJECTION, SOLUTION INTRAVENOUS; SUBCUTANEOUS at 13:27

## 2025-08-02 RX ADMIN — CYCLOSPORINE 1 DROP: 0.5 EMULSION OPHTHALMIC at 08:33

## 2025-08-02 RX ADMIN — GABAPENTIN 400 MG: 400 CAPSULE ORAL at 08:28

## 2025-08-02 RX ADMIN — INSULIN ASPART 8 UNITS: 100 INJECTION, SOLUTION INTRAVENOUS; SUBCUTANEOUS at 08:34

## 2025-08-02 RX ADMIN — CYANOCOBALAMIN TAB 500 MCG 1000 MCG: 500 TAB at 08:28

## 2025-08-02 RX ADMIN — POLYETHYLENE GLYCOL 3350 17 G: 17 POWDER, FOR SOLUTION ORAL at 20:40

## 2025-08-02 RX ADMIN — SERTRALINE HYDROCHLORIDE 50 MG: 50 TABLET ORAL at 08:29

## 2025-08-02 RX ADMIN — CALCIUM CARBONATE 600 MG (1,500 MG)-VITAMIN D3 400 UNIT TABLET 1 TABLET: at 18:34

## 2025-08-02 RX ADMIN — ACETAMINOPHEN 1000 MG: 500 TABLET ORAL at 13:23

## 2025-08-02 RX ADMIN — ASPIRIN 81 MG CHEWABLE TABLET 81 MG: 81 TABLET CHEWABLE at 08:28

## 2025-08-02 RX ADMIN — SENNOSIDES AND DOCUSATE SODIUM 2 TABLET: 50; 8.6 TABLET ORAL at 08:29

## 2025-08-02 RX ADMIN — GABAPENTIN 400 MG: 400 CAPSULE ORAL at 20:36

## 2025-08-02 RX ADMIN — POLYETHYLENE GLYCOL 3350 17 G: 17 POWDER, FOR SOLUTION ORAL at 08:27

## 2025-08-02 RX ADMIN — INSULIN ASPART 1 UNITS: 100 INJECTION, SOLUTION INTRAVENOUS; SUBCUTANEOUS at 13:26

## 2025-08-02 RX ADMIN — OXYCODONE HYDROCHLORIDE 15 MG: 15 TABLET ORAL at 09:39

## 2025-08-02 RX ADMIN — BRIMONIDINE TARTRATE AND TIMOLOL MALEATE 1 DROP: 2; 5 SOLUTION/ DROPS OPHTHALMIC at 08:33

## 2025-08-02 RX ADMIN — INSULIN ASPART 2 UNITS: 100 INJECTION, SOLUTION INTRAVENOUS; SUBCUTANEOUS at 18:35

## 2025-08-02 RX ADMIN — ACETAMINOPHEN 1000 MG: 500 TABLET ORAL at 08:28

## 2025-08-02 RX ADMIN — ACETAMINOPHEN 1000 MG: 500 TABLET ORAL at 20:36

## 2025-08-02 RX ADMIN — INSULIN ASPART 10 UNITS: 100 INJECTION, SOLUTION INTRAVENOUS; SUBCUTANEOUS at 18:35

## 2025-08-02 RX ADMIN — PANTOPRAZOLE SODIUM 40 MG: 40 TABLET, DELAYED RELEASE ORAL at 06:07

## 2025-08-02 RX ADMIN — METHOCARBAMOL 500 MG: 500 TABLET ORAL at 08:46

## 2025-08-02 RX ADMIN — INSULIN GLARGINE 52 UNITS: 100 INJECTION, SOLUTION SUBCUTANEOUS at 21:17

## 2025-08-02 RX ADMIN — METHOCARBAMOL 500 MG: 500 TABLET ORAL at 16:28

## 2025-08-02 RX ADMIN — OXYCODONE HYDROCHLORIDE 15 MG: 15 TABLET ORAL at 16:28

## 2025-08-02 RX ADMIN — ATORVASTATIN CALCIUM 20 MG: 10 TABLET, FILM COATED ORAL at 08:28

## 2025-08-02 RX ADMIN — CYCLOSPORINE 1 DROP: 0.5 EMULSION OPHTHALMIC at 20:39

## 2025-08-02 RX ADMIN — METFORMIN HYDROCHLORIDE 500 MG: 500 TABLET, FILM COATED ORAL at 18:34

## 2025-08-02 RX ADMIN — SENNOSIDES AND DOCUSATE SODIUM 2 TABLET: 50; 8.6 TABLET ORAL at 20:36

## 2025-08-02 RX ADMIN — OXYCODONE HYDROCHLORIDE 10 MG: 5 TABLET ORAL at 21:21

## 2025-08-02 RX ADMIN — CALCIUM CARBONATE 600 MG (1,500 MG)-VITAMIN D3 400 UNIT TABLET 1 TABLET: at 08:29

## 2025-08-02 RX ADMIN — GABAPENTIN 400 MG: 400 CAPSULE ORAL at 13:23

## 2025-08-02 RX ADMIN — BRIMONIDINE TARTRATE AND TIMOLOL MALEATE 1 DROP: 2; 5 SOLUTION/ DROPS OPHTHALMIC at 20:45

## 2025-08-02 RX ADMIN — METHOCARBAMOL 500 MG: 500 TABLET ORAL at 21:17

## 2025-08-02 ASSESSMENT — ACTIVITIES OF DAILY LIVING (ADL)
ADLS_ACUITY_SCORE: 48
ADLS_ACUITY_SCORE: 52
BADLS,_PREVIOUS_FUNCTIONAL_LEVEL: USES DEVICE OR EQUIPMENT
ADLS_ACUITY_SCORE: 49
ADLS_ACUITY_SCORE: 48
ADLS_ACUITY_SCORE: 52
ADLS_ACUITY_SCORE: 52
ADLS_ACUITY_SCORE: 50
ADLS_ACUITY_SCORE: 48
ADLS_ACUITY_SCORE: 52
ADLS_ACUITY_SCORE: 52
IADLS,_PREVIOUS_FUNCTIONAL_LEVEL: USES DEVICE OR EQUIPMENT
ADLS_ACUITY_SCORE: 52
ADLS_ACUITY_SCORE: 49
ADLS_ACUITY_SCORE: 52
ADLS_ACUITY_SCORE: 48
ADLS_ACUITY_SCORE: 52
IADLS,_PREVIOUS_FUNCTIONAL_LEVEL: USES DEVICE OR EQUIPMENT
ADLS_ACUITY_SCORE: 52
PREVIOUS_RESPONSIBILITIES: MEAL PREP;HOUSEKEEPING;LAUNDRY;SHOPPING;YARDWORK;MEDICATION MANAGEMENT;FINANCES;DRIVING
ADLS_ACUITY_SCORE: 52
BADLS,_PREVIOUS_FUNCTIONAL_LEVEL: USES DEVICE OR EQUIPMENT
ADLS_ACUITY_SCORE: 50

## 2025-08-03 ENCOUNTER — APPOINTMENT (OUTPATIENT)
Dept: OCCUPATIONAL THERAPY | Facility: CLINIC | Age: 70
DRG: 560 | End: 2025-08-03
Attending: PHYSICAL MEDICINE & REHABILITATION
Payer: MEDICARE

## 2025-08-03 ENCOUNTER — APPOINTMENT (OUTPATIENT)
Dept: PHYSICAL THERAPY | Facility: CLINIC | Age: 70
DRG: 560 | End: 2025-08-03
Attending: PHYSICAL MEDICINE & REHABILITATION
Payer: MEDICARE

## 2025-08-03 LAB
GLUCOSE BLDC GLUCOMTR-MCNC: 118 MG/DL (ref 70–99)
GLUCOSE BLDC GLUCOMTR-MCNC: 132 MG/DL (ref 70–99)
GLUCOSE BLDC GLUCOMTR-MCNC: 146 MG/DL (ref 70–99)
GLUCOSE BLDC GLUCOMTR-MCNC: 156 MG/DL (ref 70–99)
GLUCOSE BLDC GLUCOMTR-MCNC: 214 MG/DL (ref 70–99)

## 2025-08-03 PROCEDURE — 250N000013 HC RX MED GY IP 250 OP 250 PS 637: Performed by: PHYSICAL MEDICINE & REHABILITATION

## 2025-08-03 PROCEDURE — 97535 SELF CARE MNGMENT TRAINING: CPT | Mod: GO

## 2025-08-03 PROCEDURE — 128N000003 HC R&B REHAB

## 2025-08-03 PROCEDURE — 97110 THERAPEUTIC EXERCISES: CPT | Mod: GP

## 2025-08-03 PROCEDURE — 250N000013 HC RX MED GY IP 250 OP 250 PS 637: Performed by: PHYSICIAN ASSISTANT

## 2025-08-03 PROCEDURE — 97530 THERAPEUTIC ACTIVITIES: CPT | Mod: GP

## 2025-08-03 RX ORDER — POLYETHYLENE GLYCOL 3350 17 G/17G
17 POWDER, FOR SOLUTION ORAL DAILY
Status: DISPENSED | OUTPATIENT
Start: 2025-08-04

## 2025-08-03 RX ORDER — POLYETHYLENE GLYCOL 3350 17 G/17G
17 POWDER, FOR SOLUTION ORAL DAILY PRN
Status: ACTIVE | OUTPATIENT
Start: 2025-08-03

## 2025-08-03 RX ORDER — OXYCODONE HYDROCHLORIDE 15 MG/1
15 TABLET ORAL 2 TIMES DAILY
Refills: 0 | Status: DISCONTINUED | OUTPATIENT
Start: 2025-08-03 | End: 2025-08-08

## 2025-08-03 RX ADMIN — GABAPENTIN 400 MG: 400 CAPSULE ORAL at 13:52

## 2025-08-03 RX ADMIN — PANTOPRAZOLE SODIUM 40 MG: 40 TABLET, DELAYED RELEASE ORAL at 08:23

## 2025-08-03 RX ADMIN — OXYCODONE HYDROCHLORIDE 10 MG: 5 TABLET ORAL at 01:39

## 2025-08-03 RX ADMIN — METHOCARBAMOL 500 MG: 500 TABLET ORAL at 21:23

## 2025-08-03 RX ADMIN — METFORMIN HYDROCHLORIDE 500 MG: 500 TABLET, FILM COATED ORAL at 17:18

## 2025-08-03 RX ADMIN — OXYCODONE HYDROCHLORIDE 15 MG: 15 TABLET ORAL at 13:53

## 2025-08-03 RX ADMIN — METHOCARBAMOL 500 MG: 500 TABLET ORAL at 09:27

## 2025-08-03 RX ADMIN — CALCIUM CARBONATE 600 MG (1,500 MG)-VITAMIN D3 400 UNIT TABLET 1 TABLET: at 17:19

## 2025-08-03 RX ADMIN — INSULIN ASPART 11 UNITS: 100 INJECTION, SOLUTION INTRAVENOUS; SUBCUTANEOUS at 17:19

## 2025-08-03 RX ADMIN — CYCLOSPORINE 1 DROP: 0.5 EMULSION OPHTHALMIC at 21:24

## 2025-08-03 RX ADMIN — ATORVASTATIN CALCIUM 20 MG: 10 TABLET, FILM COATED ORAL at 08:23

## 2025-08-03 RX ADMIN — GABAPENTIN 400 MG: 400 CAPSULE ORAL at 08:23

## 2025-08-03 RX ADMIN — OXYCODONE HYDROCHLORIDE 10 MG: 5 TABLET ORAL at 23:46

## 2025-08-03 RX ADMIN — OXYCODONE HYDROCHLORIDE 15 MG: 15 TABLET ORAL at 09:57

## 2025-08-03 RX ADMIN — ACETAMINOPHEN 1000 MG: 500 TABLET ORAL at 13:52

## 2025-08-03 RX ADMIN — POLYETHYLENE GLYCOL 3350 17 G: 17 POWDER, FOR SOLUTION ORAL at 08:23

## 2025-08-03 RX ADMIN — ASPIRIN 81 MG CHEWABLE TABLET 81 MG: 81 TABLET CHEWABLE at 08:23

## 2025-08-03 RX ADMIN — CALCIUM CARBONATE 600 MG (1,500 MG)-VITAMIN D3 400 UNIT TABLET 1 TABLET: at 08:23

## 2025-08-03 RX ADMIN — SENNOSIDES AND DOCUSATE SODIUM 2 TABLET: 50; 8.6 TABLET ORAL at 08:23

## 2025-08-03 RX ADMIN — ACETAMINOPHEN 1000 MG: 500 TABLET ORAL at 19:50

## 2025-08-03 RX ADMIN — CYANOCOBALAMIN TAB 500 MCG 1000 MCG: 500 TAB at 08:23

## 2025-08-03 RX ADMIN — INSULIN ASPART 10 UNITS: 100 INJECTION, SOLUTION INTRAVENOUS; SUBCUTANEOUS at 08:59

## 2025-08-03 RX ADMIN — METHOCARBAMOL 500 MG: 500 TABLET ORAL at 17:18

## 2025-08-03 RX ADMIN — INSULIN ASPART 1 UNITS: 100 INJECTION, SOLUTION INTRAVENOUS; SUBCUTANEOUS at 08:27

## 2025-08-03 RX ADMIN — LATANOPROST 1 DROP: 50 SOLUTION OPHTHALMIC at 21:24

## 2025-08-03 RX ADMIN — INSULIN GLARGINE 52 UNITS: 100 INJECTION, SOLUTION SUBCUTANEOUS at 21:24

## 2025-08-03 RX ADMIN — INSULIN ASPART 10 UNITS: 100 INJECTION, SOLUTION INTRAVENOUS; SUBCUTANEOUS at 12:57

## 2025-08-03 RX ADMIN — BRIMONIDINE TARTRATE AND TIMOLOL MALEATE 1 DROP: 2; 5 SOLUTION/ DROPS OPHTHALMIC at 21:24

## 2025-08-03 RX ADMIN — OXYCODONE HYDROCHLORIDE 10 MG: 5 TABLET ORAL at 19:50

## 2025-08-03 RX ADMIN — BRIMONIDINE TARTRATE AND TIMOLOL MALEATE 1 DROP: 2; 5 SOLUTION/ DROPS OPHTHALMIC at 08:24

## 2025-08-03 RX ADMIN — INSULIN ASPART 3 UNITS: 100 INJECTION, SOLUTION INTRAVENOUS; SUBCUTANEOUS at 12:56

## 2025-08-03 RX ADMIN — SERTRALINE HYDROCHLORIDE 50 MG: 50 TABLET ORAL at 08:23

## 2025-08-03 RX ADMIN — CYCLOSPORINE 1 DROP: 0.5 EMULSION OPHTHALMIC at 08:24

## 2025-08-03 RX ADMIN — SENNOSIDES AND DOCUSATE SODIUM 2 TABLET: 50; 8.6 TABLET ORAL at 21:23

## 2025-08-03 RX ADMIN — GABAPENTIN 400 MG: 400 CAPSULE ORAL at 19:50

## 2025-08-03 RX ADMIN — ACETAMINOPHEN 1000 MG: 500 TABLET ORAL at 08:23

## 2025-08-03 ASSESSMENT — ACTIVITIES OF DAILY LIVING (ADL)
ADLS_ACUITY_SCORE: 52
ADLS_ACUITY_SCORE: 52
ADLS_ACUITY_SCORE: 48
ADLS_ACUITY_SCORE: 48
ADLS_ACUITY_SCORE: 52
ADLS_ACUITY_SCORE: 48
ADLS_ACUITY_SCORE: 52
ADLS_ACUITY_SCORE: 48
ADLS_ACUITY_SCORE: 52
ADLS_ACUITY_SCORE: 48
ADLS_ACUITY_SCORE: 52
ADLS_ACUITY_SCORE: 48

## 2025-08-04 ENCOUNTER — APPOINTMENT (OUTPATIENT)
Dept: OCCUPATIONAL THERAPY | Facility: CLINIC | Age: 70
DRG: 560 | End: 2025-08-04
Attending: PHYSICAL MEDICINE & REHABILITATION
Payer: MEDICARE

## 2025-08-04 ENCOUNTER — APPOINTMENT (OUTPATIENT)
Dept: PHYSICAL THERAPY | Facility: CLINIC | Age: 70
DRG: 560 | End: 2025-08-04
Attending: PHYSICAL MEDICINE & REHABILITATION
Payer: MEDICARE

## 2025-08-04 LAB
ANION GAP SERPL CALCULATED.3IONS-SCNC: 9 MMOL/L (ref 7–15)
BUN SERPL-MCNC: 9.9 MG/DL (ref 8–23)
CALCIUM SERPL-MCNC: 8.7 MG/DL (ref 8.8–10.4)
CHLORIDE SERPL-SCNC: 100 MMOL/L (ref 98–107)
CREAT SERPL-MCNC: 0.78 MG/DL (ref 0.51–0.95)
CRP SERPL-MCNC: 82.14 MG/L
EGFRCR SERPLBLD CKD-EPI 2021: 81 ML/MIN/1.73M2
ERYTHROCYTE [DISTWIDTH] IN BLOOD BY AUTOMATED COUNT: 15.6 % (ref 10–15)
GLUCOSE BLDC GLUCOMTR-MCNC: 119 MG/DL (ref 70–99)
GLUCOSE BLDC GLUCOMTR-MCNC: 131 MG/DL (ref 70–99)
GLUCOSE BLDC GLUCOMTR-MCNC: 149 MG/DL (ref 70–99)
GLUCOSE BLDC GLUCOMTR-MCNC: 182 MG/DL (ref 70–99)
GLUCOSE BLDC GLUCOMTR-MCNC: 232 MG/DL (ref 70–99)
GLUCOSE SERPL-MCNC: 128 MG/DL (ref 70–99)
HCO3 SERPL-SCNC: 26 MMOL/L (ref 22–29)
HCT VFR BLD AUTO: 24 % (ref 35–47)
HGB BLD-MCNC: 7.7 G/DL (ref 11.7–15.7)
MCH RBC QN AUTO: 30.6 PG (ref 26.5–33)
MCHC RBC AUTO-ENTMCNC: 32.1 G/DL (ref 31.5–36.5)
MCV RBC AUTO: 95 FL (ref 78–100)
PLATELET # BLD AUTO: 428 10E3/UL (ref 150–450)
POTASSIUM SERPL-SCNC: 4.2 MMOL/L (ref 3.4–5.3)
PROCALCITONIN SERPL IA-MCNC: 0.11 NG/ML
RBC # BLD AUTO: 2.52 10E6/UL (ref 3.8–5.2)
SODIUM SERPL-SCNC: 135 MMOL/L (ref 135–145)
WBC # BLD AUTO: 14.1 10E3/UL (ref 4–11)

## 2025-08-04 PROCEDURE — 97530 THERAPEUTIC ACTIVITIES: CPT | Mod: GP | Performed by: REHABILITATION PRACTITIONER

## 2025-08-04 PROCEDURE — 250N000013 HC RX MED GY IP 250 OP 250 PS 637: Performed by: PHYSICAL MEDICINE & REHABILITATION

## 2025-08-04 PROCEDURE — 99231 SBSQ HOSP IP/OBS SF/LOW 25: CPT | Performed by: PHYSICIAN ASSISTANT

## 2025-08-04 PROCEDURE — 86140 C-REACTIVE PROTEIN: CPT | Performed by: PHYSICIAN ASSISTANT

## 2025-08-04 PROCEDURE — 97110 THERAPEUTIC EXERCISES: CPT | Mod: GP | Performed by: REHABILITATION PRACTITIONER

## 2025-08-04 PROCEDURE — 128N000003 HC R&B REHAB

## 2025-08-04 PROCEDURE — 36415 COLL VENOUS BLD VENIPUNCTURE: CPT | Performed by: PHYSICIAN ASSISTANT

## 2025-08-04 PROCEDURE — 97535 SELF CARE MNGMENT TRAINING: CPT | Mod: GO | Performed by: STUDENT IN AN ORGANIZED HEALTH CARE EDUCATION/TRAINING PROGRAM

## 2025-08-04 PROCEDURE — 250N000013 HC RX MED GY IP 250 OP 250 PS 637: Performed by: PHYSICIAN ASSISTANT

## 2025-08-04 PROCEDURE — 250N000013 HC RX MED GY IP 250 OP 250 PS 637

## 2025-08-04 PROCEDURE — 84145 PROCALCITONIN (PCT): CPT | Performed by: PHYSICIAN ASSISTANT

## 2025-08-04 PROCEDURE — 85014 HEMATOCRIT: CPT | Performed by: PHYSICIAN ASSISTANT

## 2025-08-04 PROCEDURE — 82310 ASSAY OF CALCIUM: CPT | Performed by: PHYSICIAN ASSISTANT

## 2025-08-04 RX ORDER — GABAPENTIN 300 MG/1
600 CAPSULE ORAL 3 TIMES DAILY
Status: DISPENSED | OUTPATIENT
Start: 2025-08-04

## 2025-08-04 RX ADMIN — PANTOPRAZOLE SODIUM 40 MG: 40 TABLET, DELAYED RELEASE ORAL at 08:32

## 2025-08-04 RX ADMIN — OXYCODONE HYDROCHLORIDE 10 MG: 5 TABLET ORAL at 18:35

## 2025-08-04 RX ADMIN — OXYCODONE HYDROCHLORIDE 15 MG: 15 TABLET ORAL at 08:32

## 2025-08-04 RX ADMIN — BRIMONIDINE TARTRATE AND TIMOLOL MALEATE 1 DROP: 2; 5 SOLUTION/ DROPS OPHTHALMIC at 08:49

## 2025-08-04 RX ADMIN — INSULIN ASPART 8 UNITS: 100 INJECTION, SOLUTION INTRAVENOUS; SUBCUTANEOUS at 13:16

## 2025-08-04 RX ADMIN — ACETAMINOPHEN 1000 MG: 500 TABLET ORAL at 19:35

## 2025-08-04 RX ADMIN — POLYETHYLENE GLYCOL 3350 17 G: 17 POWDER, FOR SOLUTION ORAL at 08:33

## 2025-08-04 RX ADMIN — CALCIUM CARBONATE 600 MG (1,500 MG)-VITAMIN D3 400 UNIT TABLET 1 TABLET: at 08:31

## 2025-08-04 RX ADMIN — SENNOSIDES AND DOCUSATE SODIUM 2 TABLET: 50; 8.6 TABLET ORAL at 08:31

## 2025-08-04 RX ADMIN — ACETAMINOPHEN 1000 MG: 500 TABLET ORAL at 08:31

## 2025-08-04 RX ADMIN — CALCIUM CARBONATE 600 MG (1,500 MG)-VITAMIN D3 400 UNIT TABLET 1 TABLET: at 18:35

## 2025-08-04 RX ADMIN — INSULIN GLARGINE 52 UNITS: 100 INJECTION, SOLUTION SUBCUTANEOUS at 21:39

## 2025-08-04 RX ADMIN — CYCLOSPORINE 1 DROP: 0.5 EMULSION OPHTHALMIC at 08:32

## 2025-08-04 RX ADMIN — OXYCODONE HYDROCHLORIDE 15 MG: 15 TABLET ORAL at 13:21

## 2025-08-04 RX ADMIN — METFORMIN HYDROCHLORIDE 500 MG: 500 TABLET, FILM COATED ORAL at 18:35

## 2025-08-04 RX ADMIN — GABAPENTIN 600 MG: 300 CAPSULE ORAL at 19:35

## 2025-08-04 RX ADMIN — SENNOSIDES AND DOCUSATE SODIUM 2 TABLET: 50; 8.6 TABLET ORAL at 21:05

## 2025-08-04 RX ADMIN — GABAPENTIN 400 MG: 400 CAPSULE ORAL at 08:31

## 2025-08-04 RX ADMIN — CYCLOSPORINE 1 DROP: 0.5 EMULSION OPHTHALMIC at 21:04

## 2025-08-04 RX ADMIN — ATORVASTATIN CALCIUM 20 MG: 10 TABLET, FILM COATED ORAL at 08:32

## 2025-08-04 RX ADMIN — METHOCARBAMOL 500 MG: 500 TABLET ORAL at 17:19

## 2025-08-04 RX ADMIN — SERTRALINE HYDROCHLORIDE 50 MG: 50 TABLET ORAL at 08:32

## 2025-08-04 RX ADMIN — CYANOCOBALAMIN TAB 500 MCG 1000 MCG: 500 TAB at 08:32

## 2025-08-04 RX ADMIN — INSULIN ASPART 7 UNITS: 100 INJECTION, SOLUTION INTRAVENOUS; SUBCUTANEOUS at 19:31

## 2025-08-04 RX ADMIN — ASPIRIN 81 MG CHEWABLE TABLET 81 MG: 81 TABLET CHEWABLE at 08:31

## 2025-08-04 RX ADMIN — INSULIN ASPART 1 UNITS: 100 INJECTION, SOLUTION INTRAVENOUS; SUBCUTANEOUS at 18:32

## 2025-08-04 RX ADMIN — BRIMONIDINE TARTRATE AND TIMOLOL MALEATE 1 DROP: 2; 5 SOLUTION/ DROPS OPHTHALMIC at 21:38

## 2025-08-04 RX ADMIN — METHOCARBAMOL 500 MG: 500 TABLET ORAL at 21:43

## 2025-08-04 RX ADMIN — LATANOPROST 1 DROP: 50 SOLUTION OPHTHALMIC at 21:43

## 2025-08-04 RX ADMIN — INSULIN ASPART 10 UNITS: 100 INJECTION, SOLUTION INTRAVENOUS; SUBCUTANEOUS at 09:19

## 2025-08-04 RX ADMIN — ACETAMINOPHEN 1000 MG: 500 TABLET ORAL at 13:21

## 2025-08-04 RX ADMIN — METHOCARBAMOL 500 MG: 500 TABLET ORAL at 09:19

## 2025-08-04 RX ADMIN — GABAPENTIN 400 MG: 400 CAPSULE ORAL at 13:21

## 2025-08-04 RX ADMIN — INSULIN ASPART 2 UNITS: 100 INJECTION, SOLUTION INTRAVENOUS; SUBCUTANEOUS at 13:16

## 2025-08-04 ASSESSMENT — ACTIVITIES OF DAILY LIVING (ADL)
ADLS_ACUITY_SCORE: 48

## 2025-08-05 ENCOUNTER — APPOINTMENT (OUTPATIENT)
Dept: PHYSICAL THERAPY | Facility: CLINIC | Age: 70
DRG: 560 | End: 2025-08-05
Attending: PHYSICAL MEDICINE & REHABILITATION
Payer: MEDICARE

## 2025-08-05 ENCOUNTER — APPOINTMENT (OUTPATIENT)
Dept: OCCUPATIONAL THERAPY | Facility: CLINIC | Age: 70
DRG: 560 | End: 2025-08-05
Attending: PHYSICAL MEDICINE & REHABILITATION
Payer: MEDICARE

## 2025-08-05 LAB
BASOPHILS # BLD AUTO: 0 10E3/UL (ref 0–0.2)
BASOPHILS NFR BLD AUTO: 0 %
EOSINOPHIL # BLD AUTO: 0.2 10E3/UL (ref 0–0.7)
EOSINOPHIL NFR BLD AUTO: 2 %
ERYTHROCYTE [DISTWIDTH] IN BLOOD BY AUTOMATED COUNT: 15.7 % (ref 10–15)
GLUCOSE BLDC GLUCOMTR-MCNC: 123 MG/DL (ref 70–99)
GLUCOSE BLDC GLUCOMTR-MCNC: 135 MG/DL (ref 70–99)
GLUCOSE BLDC GLUCOMTR-MCNC: 148 MG/DL (ref 70–99)
GLUCOSE BLDC GLUCOMTR-MCNC: 166 MG/DL (ref 70–99)
GLUCOSE BLDC GLUCOMTR-MCNC: 192 MG/DL (ref 70–99)
HCT VFR BLD AUTO: 24.8 % (ref 35–47)
HGB BLD-MCNC: 8 G/DL (ref 11.7–15.7)
HOLD SPECIMEN: NORMAL
IMM GRANULOCYTES # BLD: 0.1 10E3/UL
IMM GRANULOCYTES NFR BLD: 1 %
LYMPHOCYTES # BLD AUTO: 2.8 10E3/UL (ref 0.8–5.3)
LYMPHOCYTES NFR BLD AUTO: 25 %
MCH RBC QN AUTO: 30.5 PG (ref 26.5–33)
MCHC RBC AUTO-ENTMCNC: 32.3 G/DL (ref 31.5–36.5)
MCV RBC AUTO: 95 FL (ref 78–100)
MONOCYTES # BLD AUTO: 1 10E3/UL (ref 0–1.3)
MONOCYTES NFR BLD AUTO: 9 %
NEUTROPHILS # BLD AUTO: 7.2 10E3/UL (ref 1.6–8.3)
NEUTROPHILS NFR BLD AUTO: 64 %
NRBC # BLD AUTO: 0 10E3/UL
NRBC BLD AUTO-RTO: 0 /100
PLATELET # BLD AUTO: 444 10E3/UL (ref 150–450)
RBC # BLD AUTO: 2.62 10E6/UL (ref 3.8–5.2)
WBC # BLD AUTO: 11.3 10E3/UL (ref 4–11)

## 2025-08-05 PROCEDURE — 97535 SELF CARE MNGMENT TRAINING: CPT | Mod: GO | Performed by: STUDENT IN AN ORGANIZED HEALTH CARE EDUCATION/TRAINING PROGRAM

## 2025-08-05 PROCEDURE — 36415 COLL VENOUS BLD VENIPUNCTURE: CPT | Performed by: PHYSICIAN ASSISTANT

## 2025-08-05 PROCEDURE — 97530 THERAPEUTIC ACTIVITIES: CPT | Mod: GP

## 2025-08-05 PROCEDURE — 250N000013 HC RX MED GY IP 250 OP 250 PS 637

## 2025-08-05 PROCEDURE — 85004 AUTOMATED DIFF WBC COUNT: CPT | Performed by: PHYSICIAN ASSISTANT

## 2025-08-05 PROCEDURE — 250N000013 HC RX MED GY IP 250 OP 250 PS 637: Performed by: PHYSICIAN ASSISTANT

## 2025-08-05 PROCEDURE — 99232 SBSQ HOSP IP/OBS MODERATE 35: CPT | Performed by: PHYSICAL MEDICINE & REHABILITATION

## 2025-08-05 PROCEDURE — 250N000013 HC RX MED GY IP 250 OP 250 PS 637: Performed by: PHYSICAL MEDICINE & REHABILITATION

## 2025-08-05 PROCEDURE — 128N000003 HC R&B REHAB

## 2025-08-05 PROCEDURE — 97530 THERAPEUTIC ACTIVITIES: CPT | Mod: GO | Performed by: OCCUPATIONAL THERAPIST

## 2025-08-05 PROCEDURE — 97110 THERAPEUTIC EXERCISES: CPT | Mod: GP

## 2025-08-05 RX ADMIN — OXYCODONE HYDROCHLORIDE 10 MG: 5 TABLET ORAL at 03:44

## 2025-08-05 RX ADMIN — BRIMONIDINE TARTRATE AND TIMOLOL MALEATE 1 DROP: 2; 5 SOLUTION/ DROPS OPHTHALMIC at 08:34

## 2025-08-05 RX ADMIN — INSULIN ASPART 3 UNITS: 100 INJECTION, SOLUTION INTRAVENOUS; SUBCUTANEOUS at 13:11

## 2025-08-05 RX ADMIN — GABAPENTIN 600 MG: 300 CAPSULE ORAL at 20:40

## 2025-08-05 RX ADMIN — ACETAMINOPHEN 1000 MG: 500 TABLET ORAL at 20:41

## 2025-08-05 RX ADMIN — METHOCARBAMOL 500 MG: 500 TABLET ORAL at 03:45

## 2025-08-05 RX ADMIN — INSULIN ASPART 9 UNITS: 100 INJECTION, SOLUTION INTRAVENOUS; SUBCUTANEOUS at 18:23

## 2025-08-05 RX ADMIN — SENNOSIDES AND DOCUSATE SODIUM 2 TABLET: 50; 8.6 TABLET ORAL at 20:43

## 2025-08-05 RX ADMIN — POLYETHYLENE GLYCOL 3350 17 G: 17 POWDER, FOR SOLUTION ORAL at 08:29

## 2025-08-05 RX ADMIN — METHOCARBAMOL 500 MG: 500 TABLET ORAL at 20:40

## 2025-08-05 RX ADMIN — OXYCODONE HYDROCHLORIDE 10 MG: 5 TABLET ORAL at 20:57

## 2025-08-05 RX ADMIN — LATANOPROST 1 DROP: 50 SOLUTION OPHTHALMIC at 20:42

## 2025-08-05 RX ADMIN — BRIMONIDINE TARTRATE AND TIMOLOL MALEATE 1 DROP: 2; 5 SOLUTION/ DROPS OPHTHALMIC at 20:42

## 2025-08-05 RX ADMIN — SENNOSIDES AND DOCUSATE SODIUM 2 TABLET: 50; 8.6 TABLET ORAL at 08:30

## 2025-08-05 RX ADMIN — ATORVASTATIN CALCIUM 20 MG: 10 TABLET, FILM COATED ORAL at 08:29

## 2025-08-05 RX ADMIN — CYCLOSPORINE 1 DROP: 0.5 EMULSION OPHTHALMIC at 20:43

## 2025-08-05 RX ADMIN — PANTOPRAZOLE SODIUM 40 MG: 40 TABLET, DELAYED RELEASE ORAL at 08:29

## 2025-08-05 RX ADMIN — OXYCODONE HYDROCHLORIDE 10 MG: 5 TABLET ORAL at 00:32

## 2025-08-05 RX ADMIN — ACETAMINOPHEN 1000 MG: 500 TABLET ORAL at 08:29

## 2025-08-05 RX ADMIN — OXYCODONE HYDROCHLORIDE 15 MG: 15 TABLET ORAL at 13:10

## 2025-08-05 RX ADMIN — ACETAMINOPHEN 1000 MG: 500 TABLET ORAL at 13:11

## 2025-08-05 RX ADMIN — CYCLOSPORINE 1 DROP: 0.5 EMULSION OPHTHALMIC at 08:30

## 2025-08-05 RX ADMIN — CALCIUM CARBONATE 600 MG (1,500 MG)-VITAMIN D3 400 UNIT TABLET 1 TABLET: at 18:06

## 2025-08-05 RX ADMIN — GABAPENTIN 600 MG: 300 CAPSULE ORAL at 08:29

## 2025-08-05 RX ADMIN — CYANOCOBALAMIN TAB 500 MCG 1000 MCG: 500 TAB at 08:29

## 2025-08-05 RX ADMIN — INSULIN ASPART 6 UNITS: 100 INJECTION, SOLUTION INTRAVENOUS; SUBCUTANEOUS at 13:12

## 2025-08-05 RX ADMIN — CALCIUM CARBONATE 600 MG (1,500 MG)-VITAMIN D3 400 UNIT TABLET 1 TABLET: at 08:29

## 2025-08-05 RX ADMIN — METHOCARBAMOL 500 MG: 500 TABLET ORAL at 15:47

## 2025-08-05 RX ADMIN — GABAPENTIN 600 MG: 300 CAPSULE ORAL at 13:10

## 2025-08-05 RX ADMIN — OXYCODONE HYDROCHLORIDE 15 MG: 15 TABLET ORAL at 09:42

## 2025-08-05 RX ADMIN — ASPIRIN 81 MG CHEWABLE TABLET 81 MG: 81 TABLET CHEWABLE at 08:34

## 2025-08-05 RX ADMIN — INSULIN GLARGINE 52 UNITS: 100 INJECTION, SOLUTION SUBCUTANEOUS at 21:00

## 2025-08-05 RX ADMIN — METHOCARBAMOL 500 MG: 500 TABLET ORAL at 09:45

## 2025-08-05 RX ADMIN — INSULIN ASPART 1 UNITS: 100 INJECTION, SOLUTION INTRAVENOUS; SUBCUTANEOUS at 18:06

## 2025-08-05 RX ADMIN — INSULIN ASPART 6 UNITS: 100 INJECTION, SOLUTION INTRAVENOUS; SUBCUTANEOUS at 09:42

## 2025-08-05 RX ADMIN — METFORMIN HYDROCHLORIDE 500 MG: 500 TABLET, FILM COATED ORAL at 18:06

## 2025-08-05 RX ADMIN — SERTRALINE HYDROCHLORIDE 50 MG: 50 TABLET ORAL at 08:30

## 2025-08-05 ASSESSMENT — ACTIVITIES OF DAILY LIVING (ADL)
ADLS_ACUITY_SCORE: 47

## 2025-08-06 ENCOUNTER — APPOINTMENT (OUTPATIENT)
Dept: PHYSICAL THERAPY | Facility: CLINIC | Age: 70
DRG: 560 | End: 2025-08-06
Attending: PHYSICAL MEDICINE & REHABILITATION
Payer: MEDICARE

## 2025-08-06 ENCOUNTER — APPOINTMENT (OUTPATIENT)
Dept: OCCUPATIONAL THERAPY | Facility: CLINIC | Age: 70
DRG: 560 | End: 2025-08-06
Attending: PHYSICAL MEDICINE & REHABILITATION
Payer: MEDICARE

## 2025-08-06 LAB
GLUCOSE BLDC GLUCOMTR-MCNC: 118 MG/DL (ref 70–99)
GLUCOSE BLDC GLUCOMTR-MCNC: 132 MG/DL (ref 70–99)
GLUCOSE BLDC GLUCOMTR-MCNC: 152 MG/DL (ref 70–99)
GLUCOSE BLDC GLUCOMTR-MCNC: 183 MG/DL (ref 70–99)
GLUCOSE BLDC GLUCOMTR-MCNC: 201 MG/DL (ref 70–99)

## 2025-08-06 PROCEDURE — 250N000013 HC RX MED GY IP 250 OP 250 PS 637: Performed by: PHYSICAL MEDICINE & REHABILITATION

## 2025-08-06 PROCEDURE — 97530 THERAPEUTIC ACTIVITIES: CPT | Mod: GP

## 2025-08-06 PROCEDURE — 97535 SELF CARE MNGMENT TRAINING: CPT | Mod: GO | Performed by: OCCUPATIONAL THERAPIST

## 2025-08-06 PROCEDURE — 99232 SBSQ HOSP IP/OBS MODERATE 35: CPT | Performed by: PHYSICIAN ASSISTANT

## 2025-08-06 PROCEDURE — 250N000013 HC RX MED GY IP 250 OP 250 PS 637: Performed by: PHYSICIAN ASSISTANT

## 2025-08-06 PROCEDURE — 97110 THERAPEUTIC EXERCISES: CPT | Mod: GP

## 2025-08-06 PROCEDURE — 128N000003 HC R&B REHAB

## 2025-08-06 PROCEDURE — 97530 THERAPEUTIC ACTIVITIES: CPT | Mod: GO | Performed by: OCCUPATIONAL THERAPIST

## 2025-08-06 RX ADMIN — ACETAMINOPHEN 1000 MG: 500 TABLET ORAL at 09:24

## 2025-08-06 RX ADMIN — OXYCODONE HYDROCHLORIDE 15 MG: 15 TABLET ORAL at 09:23

## 2025-08-06 RX ADMIN — ATORVASTATIN CALCIUM 20 MG: 10 TABLET, FILM COATED ORAL at 09:27

## 2025-08-06 RX ADMIN — METHOCARBAMOL 500 MG: 500 TABLET ORAL at 21:13

## 2025-08-06 RX ADMIN — SENNOSIDES AND DOCUSATE SODIUM 2 TABLET: 50; 8.6 TABLET ORAL at 09:28

## 2025-08-06 RX ADMIN — ACETAMINOPHEN 1000 MG: 500 TABLET ORAL at 20:45

## 2025-08-06 RX ADMIN — OXYCODONE HYDROCHLORIDE 10 MG: 5 TABLET ORAL at 20:45

## 2025-08-06 RX ADMIN — ACETAMINOPHEN 1000 MG: 500 TABLET ORAL at 13:29

## 2025-08-06 RX ADMIN — CALCIUM CARBONATE 600 MG (1,500 MG)-VITAMIN D3 400 UNIT TABLET 1 TABLET: at 09:25

## 2025-08-06 RX ADMIN — METFORMIN HYDROCHLORIDE 500 MG: 500 TABLET, FILM COATED ORAL at 18:48

## 2025-08-06 RX ADMIN — METHOCARBAMOL 500 MG: 500 TABLET ORAL at 09:24

## 2025-08-06 RX ADMIN — ASPIRIN 81 MG CHEWABLE TABLET 81 MG: 81 TABLET CHEWABLE at 09:26

## 2025-08-06 RX ADMIN — GABAPENTIN 600 MG: 300 CAPSULE ORAL at 09:29

## 2025-08-06 RX ADMIN — SENNOSIDES AND DOCUSATE SODIUM 2 TABLET: 50; 8.6 TABLET ORAL at 20:46

## 2025-08-06 RX ADMIN — PANTOPRAZOLE SODIUM 40 MG: 40 TABLET, DELAYED RELEASE ORAL at 09:27

## 2025-08-06 RX ADMIN — BRIMONIDINE TARTRATE AND TIMOLOL MALEATE 1 DROP: 2; 5 SOLUTION/ DROPS OPHTHALMIC at 21:14

## 2025-08-06 RX ADMIN — GABAPENTIN 600 MG: 300 CAPSULE ORAL at 20:45

## 2025-08-06 RX ADMIN — GABAPENTIN 600 MG: 300 CAPSULE ORAL at 13:29

## 2025-08-06 RX ADMIN — SERTRALINE HYDROCHLORIDE 50 MG: 50 TABLET ORAL at 09:26

## 2025-08-06 RX ADMIN — INSULIN ASPART 10 UNITS: 100 INJECTION, SOLUTION INTRAVENOUS; SUBCUTANEOUS at 18:49

## 2025-08-06 RX ADMIN — METHOCARBAMOL 500 MG: 500 TABLET ORAL at 15:34

## 2025-08-06 RX ADMIN — INSULIN GLARGINE 52 UNITS: 100 INJECTION, SOLUTION SUBCUTANEOUS at 21:16

## 2025-08-06 RX ADMIN — CYCLOSPORINE 1 DROP: 0.5 EMULSION OPHTHALMIC at 09:32

## 2025-08-06 RX ADMIN — CYCLOSPORINE 1 DROP: 0.5 EMULSION OPHTHALMIC at 20:44

## 2025-08-06 RX ADMIN — HYDROXYZINE HYDROCHLORIDE 10 MG: 10 TABLET, FILM COATED ORAL at 09:30

## 2025-08-06 RX ADMIN — INSULIN ASPART 2 UNITS: 100 INJECTION, SOLUTION INTRAVENOUS; SUBCUTANEOUS at 13:27

## 2025-08-06 RX ADMIN — CALCIUM CARBONATE 600 MG (1,500 MG)-VITAMIN D3 400 UNIT TABLET 1 TABLET: at 18:48

## 2025-08-06 RX ADMIN — INSULIN ASPART 9 UNITS: 100 INJECTION, SOLUTION INTRAVENOUS; SUBCUTANEOUS at 13:28

## 2025-08-06 RX ADMIN — LATANOPROST 1 DROP: 50 SOLUTION OPHTHALMIC at 21:14

## 2025-08-06 RX ADMIN — CYANOCOBALAMIN TAB 500 MCG 1000 MCG: 500 TAB at 09:26

## 2025-08-06 RX ADMIN — INSULIN ASPART 1 UNITS: 100 INJECTION, SOLUTION INTRAVENOUS; SUBCUTANEOUS at 18:48

## 2025-08-06 RX ADMIN — OXYCODONE HYDROCHLORIDE 15 MG: 15 TABLET ORAL at 13:29

## 2025-08-06 RX ADMIN — BRIMONIDINE TARTRATE AND TIMOLOL MALEATE 1 DROP: 2; 5 SOLUTION/ DROPS OPHTHALMIC at 09:32

## 2025-08-06 ASSESSMENT — ACTIVITIES OF DAILY LIVING (ADL)
ADLS_ACUITY_SCORE: 47
ADLS_ACUITY_SCORE: 47
ADLS_ACUITY_SCORE: 52
ADLS_ACUITY_SCORE: 52
ADLS_ACUITY_SCORE: 47
ADLS_ACUITY_SCORE: 47
ADLS_ACUITY_SCORE: 52
ADLS_ACUITY_SCORE: 47
ADLS_ACUITY_SCORE: 52
ADLS_ACUITY_SCORE: 52
ADLS_ACUITY_SCORE: 47
ADLS_ACUITY_SCORE: 52
ADLS_ACUITY_SCORE: 47
ADLS_ACUITY_SCORE: 52
ADLS_ACUITY_SCORE: 47
ADLS_ACUITY_SCORE: 47
ADLS_ACUITY_SCORE: 52
ADLS_ACUITY_SCORE: 47
ADLS_ACUITY_SCORE: 52
ADLS_ACUITY_SCORE: 47
ADLS_ACUITY_SCORE: 52

## 2025-08-07 ENCOUNTER — APPOINTMENT (OUTPATIENT)
Dept: PHYSICAL THERAPY | Facility: CLINIC | Age: 70
DRG: 560 | End: 2025-08-07
Attending: PHYSICAL MEDICINE & REHABILITATION
Payer: MEDICARE

## 2025-08-07 ENCOUNTER — APPOINTMENT (OUTPATIENT)
Dept: OCCUPATIONAL THERAPY | Facility: CLINIC | Age: 70
DRG: 560 | End: 2025-08-07
Attending: PHYSICAL MEDICINE & REHABILITATION
Payer: MEDICARE

## 2025-08-07 VITALS
TEMPERATURE: 98.9 F | SYSTOLIC BLOOD PRESSURE: 133 MMHG | DIASTOLIC BLOOD PRESSURE: 49 MMHG | WEIGHT: 246.91 LBS | HEIGHT: 62 IN | BODY MASS INDEX: 45.44 KG/M2 | RESPIRATION RATE: 18 BRPM | HEART RATE: 76 BPM | OXYGEN SATURATION: 95 %

## 2025-08-07 LAB
ANION GAP SERPL CALCULATED.3IONS-SCNC: 10 MMOL/L (ref 7–15)
BASOPHILS # BLD AUTO: 0 10E3/UL (ref 0–0.2)
BASOPHILS NFR BLD AUTO: 0 %
BUN SERPL-MCNC: 12.8 MG/DL (ref 8–23)
CALCIUM SERPL-MCNC: 8.7 MG/DL (ref 8.8–10.4)
CHLORIDE SERPL-SCNC: 102 MMOL/L (ref 98–107)
CREAT SERPL-MCNC: 0.72 MG/DL (ref 0.51–0.95)
CRP SERPL-MCNC: 38.9 MG/L
EGFRCR SERPLBLD CKD-EPI 2021: 89 ML/MIN/1.73M2
EOSINOPHIL # BLD AUTO: 0.2 10E3/UL (ref 0–0.7)
EOSINOPHIL NFR BLD AUTO: 2 %
ERYTHROCYTE [DISTWIDTH] IN BLOOD BY AUTOMATED COUNT: 15.6 % (ref 10–15)
GLUCOSE BLDC GLUCOMTR-MCNC: 109 MG/DL (ref 70–99)
GLUCOSE BLDC GLUCOMTR-MCNC: 151 MG/DL (ref 70–99)
GLUCOSE BLDC GLUCOMTR-MCNC: 170 MG/DL (ref 70–99)
GLUCOSE BLDC GLUCOMTR-MCNC: 172 MG/DL (ref 70–99)
GLUCOSE BLDC GLUCOMTR-MCNC: 233 MG/DL (ref 70–99)
GLUCOSE SERPL-MCNC: 113 MG/DL (ref 70–99)
HCO3 SERPL-SCNC: 26 MMOL/L (ref 22–29)
HCT VFR BLD AUTO: 24.2 % (ref 35–47)
HGB BLD-MCNC: 7.7 G/DL (ref 11.7–15.7)
IMM GRANULOCYTES # BLD: 0.1 10E3/UL
IMM GRANULOCYTES NFR BLD: 1 %
LYMPHOCYTES # BLD AUTO: 3.1 10E3/UL (ref 0.8–5.3)
LYMPHOCYTES NFR BLD AUTO: 36 %
MCH RBC QN AUTO: 29.7 PG (ref 26.5–33)
MCHC RBC AUTO-ENTMCNC: 31.8 G/DL (ref 31.5–36.5)
MCV RBC AUTO: 93 FL (ref 78–100)
MONOCYTES # BLD AUTO: 0.9 10E3/UL (ref 0–1.3)
MONOCYTES NFR BLD AUTO: 10 %
NEUTROPHILS # BLD AUTO: 4.3 10E3/UL (ref 1.6–8.3)
NEUTROPHILS NFR BLD AUTO: 51 %
NRBC # BLD AUTO: 0 10E3/UL
NRBC BLD AUTO-RTO: 0 /100
PLATELET # BLD AUTO: 418 10E3/UL (ref 150–450)
POTASSIUM SERPL-SCNC: 4 MMOL/L (ref 3.4–5.3)
RBC # BLD AUTO: 2.59 10E6/UL (ref 3.8–5.2)
SODIUM SERPL-SCNC: 138 MMOL/L (ref 135–145)
WBC # BLD AUTO: 8.6 10E3/UL (ref 4–11)

## 2025-08-07 PROCEDURE — 97535 SELF CARE MNGMENT TRAINING: CPT | Mod: GO | Performed by: STUDENT IN AN ORGANIZED HEALTH CARE EDUCATION/TRAINING PROGRAM

## 2025-08-07 PROCEDURE — 80048 BASIC METABOLIC PNL TOTAL CA: CPT | Performed by: PHYSICAL MEDICINE & REHABILITATION

## 2025-08-07 PROCEDURE — 85025 COMPLETE CBC W/AUTO DIFF WBC: CPT | Performed by: PHYSICAL MEDICINE & REHABILITATION

## 2025-08-07 PROCEDURE — 97530 THERAPEUTIC ACTIVITIES: CPT | Mod: GP

## 2025-08-07 PROCEDURE — 128N000003 HC R&B REHAB

## 2025-08-07 PROCEDURE — 250N000013 HC RX MED GY IP 250 OP 250 PS 637: Performed by: PHYSICAL MEDICINE & REHABILITATION

## 2025-08-07 PROCEDURE — 36415 COLL VENOUS BLD VENIPUNCTURE: CPT | Performed by: PHYSICAL MEDICINE & REHABILITATION

## 2025-08-07 PROCEDURE — 250N000013 HC RX MED GY IP 250 OP 250 PS 637

## 2025-08-07 PROCEDURE — 250N000013 HC RX MED GY IP 250 OP 250 PS 637: Performed by: PHYSICIAN ASSISTANT

## 2025-08-07 PROCEDURE — 86140 C-REACTIVE PROTEIN: CPT | Performed by: PHYSICAL MEDICINE & REHABILITATION

## 2025-08-07 PROCEDURE — 99233 SBSQ HOSP IP/OBS HIGH 50: CPT | Performed by: PHYSICAL MEDICINE & REHABILITATION

## 2025-08-07 PROCEDURE — 250N000012 HC RX MED GY IP 250 OP 636 PS 637: Performed by: PHYSICIAN ASSISTANT

## 2025-08-07 RX ADMIN — METHOCARBAMOL 500 MG: 500 TABLET ORAL at 21:12

## 2025-08-07 RX ADMIN — METFORMIN HYDROCHLORIDE 500 MG: 500 TABLET, FILM COATED ORAL at 18:17

## 2025-08-07 RX ADMIN — OXYCODONE HYDROCHLORIDE 10 MG: 5 TABLET ORAL at 20:33

## 2025-08-07 RX ADMIN — INSULIN ASPART 10 UNITS: 100 INJECTION, SOLUTION INTRAVENOUS; SUBCUTANEOUS at 12:56

## 2025-08-07 RX ADMIN — INSULIN ASPART 10 UNITS: 100 INJECTION, SOLUTION INTRAVENOUS; SUBCUTANEOUS at 09:38

## 2025-08-07 RX ADMIN — GABAPENTIN 600 MG: 300 CAPSULE ORAL at 13:21

## 2025-08-07 RX ADMIN — PANTOPRAZOLE SODIUM 40 MG: 40 TABLET, DELAYED RELEASE ORAL at 08:56

## 2025-08-07 RX ADMIN — ACETAMINOPHEN 1000 MG: 500 TABLET ORAL at 13:23

## 2025-08-07 RX ADMIN — METHOCARBAMOL 500 MG: 500 TABLET ORAL at 09:37

## 2025-08-07 RX ADMIN — INSULIN ASPART 1 UNITS: 100 INJECTION, SOLUTION INTRAVENOUS; SUBCUTANEOUS at 18:17

## 2025-08-07 RX ADMIN — CYCLOSPORINE 1 DROP: 0.5 EMULSION OPHTHALMIC at 08:57

## 2025-08-07 RX ADMIN — METHOCARBAMOL 500 MG: 500 TABLET ORAL at 16:17

## 2025-08-07 RX ADMIN — SENNOSIDES AND DOCUSATE SODIUM 2 TABLET: 50; 8.6 TABLET ORAL at 20:33

## 2025-08-07 RX ADMIN — BRIMONIDINE TARTRATE AND TIMOLOL MALEATE 1 DROP: 2; 5 SOLUTION/ DROPS OPHTHALMIC at 20:34

## 2025-08-07 RX ADMIN — ATORVASTATIN CALCIUM 20 MG: 10 TABLET, FILM COATED ORAL at 08:56

## 2025-08-07 RX ADMIN — SENNOSIDES AND DOCUSATE SODIUM 2 TABLET: 50; 8.6 TABLET ORAL at 08:56

## 2025-08-07 RX ADMIN — ACETAMINOPHEN 1000 MG: 500 TABLET ORAL at 08:56

## 2025-08-07 RX ADMIN — POLYETHYLENE GLYCOL 3350 17 G: 17 POWDER, FOR SOLUTION ORAL at 08:57

## 2025-08-07 RX ADMIN — GABAPENTIN 600 MG: 300 CAPSULE ORAL at 08:55

## 2025-08-07 RX ADMIN — CALCIUM CARBONATE 600 MG (1,500 MG)-VITAMIN D3 400 UNIT TABLET 1 TABLET: at 08:55

## 2025-08-07 RX ADMIN — ASPIRIN 81 MG CHEWABLE TABLET 81 MG: 81 TABLET CHEWABLE at 08:55

## 2025-08-07 RX ADMIN — OXYCODONE HYDROCHLORIDE 15 MG: 15 TABLET ORAL at 09:37

## 2025-08-07 RX ADMIN — INSULIN ASPART 10 UNITS: 100 INJECTION, SOLUTION INTRAVENOUS; SUBCUTANEOUS at 18:18

## 2025-08-07 RX ADMIN — ACETAMINOPHEN 1000 MG: 500 TABLET ORAL at 20:33

## 2025-08-07 RX ADMIN — SERTRALINE HYDROCHLORIDE 50 MG: 50 TABLET ORAL at 08:57

## 2025-08-07 RX ADMIN — CALCIUM CARBONATE 600 MG (1,500 MG)-VITAMIN D3 400 UNIT TABLET 1 TABLET: at 18:17

## 2025-08-07 RX ADMIN — LATANOPROST 1 DROP: 50 SOLUTION OPHTHALMIC at 21:13

## 2025-08-07 RX ADMIN — CYANOCOBALAMIN TAB 500 MCG 1000 MCG: 500 TAB at 08:55

## 2025-08-07 RX ADMIN — BRIMONIDINE TARTRATE AND TIMOLOL MALEATE 1 DROP: 2; 5 SOLUTION/ DROPS OPHTHALMIC at 09:00

## 2025-08-07 RX ADMIN — INSULIN GLARGINE 52 UNITS: 100 INJECTION, SOLUTION SUBCUTANEOUS at 21:13

## 2025-08-07 RX ADMIN — CYCLOSPORINE 1 DROP: 0.5 EMULSION OPHTHALMIC at 20:34

## 2025-08-07 RX ADMIN — INSULIN ASPART 2 UNITS: 100 INJECTION, SOLUTION INTRAVENOUS; SUBCUTANEOUS at 12:56

## 2025-08-07 RX ADMIN — OXYCODONE HYDROCHLORIDE 15 MG: 15 TABLET ORAL at 13:21

## 2025-08-07 RX ADMIN — GABAPENTIN 600 MG: 300 CAPSULE ORAL at 20:33

## 2025-08-07 ASSESSMENT — ACTIVITIES OF DAILY LIVING (ADL)
ADLS_ACUITY_SCORE: 52
ADLS_ACUITY_SCORE: 52
ADLS_ACUITY_SCORE: 48
ADLS_ACUITY_SCORE: 52
ADLS_ACUITY_SCORE: 48
ADLS_ACUITY_SCORE: 48
ADLS_ACUITY_SCORE: 52
ADLS_ACUITY_SCORE: 48
ADLS_ACUITY_SCORE: 48
ADLS_ACUITY_SCORE: 52
ADLS_ACUITY_SCORE: 52
ADLS_ACUITY_SCORE: 48
ADLS_ACUITY_SCORE: 52
ADLS_ACUITY_SCORE: 52
ADLS_ACUITY_SCORE: 48
ADLS_ACUITY_SCORE: 52
ADLS_ACUITY_SCORE: 48
ADLS_ACUITY_SCORE: 48
ADLS_ACUITY_SCORE: 52
ADLS_ACUITY_SCORE: 48
ADLS_ACUITY_SCORE: 48

## 2025-08-08 ENCOUNTER — APPOINTMENT (OUTPATIENT)
Dept: PHYSICAL THERAPY | Facility: CLINIC | Age: 70
DRG: 560 | End: 2025-08-08
Attending: PHYSICAL MEDICINE & REHABILITATION
Payer: MEDICARE

## 2025-08-08 ENCOUNTER — APPOINTMENT (OUTPATIENT)
Dept: OCCUPATIONAL THERAPY | Facility: CLINIC | Age: 70
DRG: 560 | End: 2025-08-08
Attending: PHYSICAL MEDICINE & REHABILITATION
Payer: MEDICARE

## 2025-08-08 LAB
GLUCOSE BLDC GLUCOMTR-MCNC: 119 MG/DL (ref 70–99)
GLUCOSE BLDC GLUCOMTR-MCNC: 156 MG/DL (ref 70–99)
GLUCOSE BLDC GLUCOMTR-MCNC: 185 MG/DL (ref 70–99)
GLUCOSE BLDC GLUCOMTR-MCNC: 190 MG/DL (ref 70–99)
GLUCOSE BLDC GLUCOMTR-MCNC: 192 MG/DL (ref 70–99)

## 2025-08-08 PROCEDURE — 250N000013 HC RX MED GY IP 250 OP 250 PS 637: Performed by: PHYSICIAN ASSISTANT

## 2025-08-08 PROCEDURE — 97530 THERAPEUTIC ACTIVITIES: CPT | Mod: GP

## 2025-08-08 PROCEDURE — 250N000013 HC RX MED GY IP 250 OP 250 PS 637

## 2025-08-08 PROCEDURE — 250N000013 HC RX MED GY IP 250 OP 250 PS 637: Performed by: PHYSICAL MEDICINE & REHABILITATION

## 2025-08-08 PROCEDURE — 97535 SELF CARE MNGMENT TRAINING: CPT | Mod: GO

## 2025-08-08 PROCEDURE — 128N000003 HC R&B REHAB

## 2025-08-08 PROCEDURE — 97530 THERAPEUTIC ACTIVITIES: CPT | Mod: GO

## 2025-08-08 RX ORDER — OXYCODONE HYDROCHLORIDE 5 MG/1
10 TABLET ORAL 2 TIMES DAILY
Refills: 0 | Status: DISPENSED | OUTPATIENT
Start: 2025-08-08

## 2025-08-08 RX ORDER — OXYCODONE HYDROCHLORIDE 5 MG/1
10 TABLET ORAL 3 TIMES DAILY PRN
Refills: 0 | Status: DISPENSED | OUTPATIENT
Start: 2025-08-08

## 2025-08-08 RX ORDER — OXYCODONE HYDROCHLORIDE 5 MG/1
10 TABLET ORAL 2 TIMES DAILY
Refills: 0 | Status: DISCONTINUED | OUTPATIENT
Start: 2025-08-08 | End: 2025-08-08

## 2025-08-08 RX ORDER — OXYCODONE HYDROCHLORIDE 5 MG/1
5 TABLET ORAL 3 TIMES DAILY PRN
Refills: 0 | Status: DISPENSED | OUTPATIENT
Start: 2025-08-08

## 2025-08-08 RX ORDER — CALCIUM CARBONATE 500 MG/1
500 TABLET, CHEWABLE ORAL 2 TIMES DAILY PRN
Status: DISPENSED | OUTPATIENT
Start: 2025-08-08

## 2025-08-08 RX ADMIN — GABAPENTIN 600 MG: 300 CAPSULE ORAL at 13:44

## 2025-08-08 RX ADMIN — CALCIUM CARBONATE 600 MG (1,500 MG)-VITAMIN D3 400 UNIT TABLET 1 TABLET: at 19:24

## 2025-08-08 RX ADMIN — SENNOSIDES AND DOCUSATE SODIUM 2 TABLET: 50; 8.6 TABLET ORAL at 09:40

## 2025-08-08 RX ADMIN — PANTOPRAZOLE SODIUM 40 MG: 40 TABLET, DELAYED RELEASE ORAL at 07:51

## 2025-08-08 RX ADMIN — ACETAMINOPHEN 1000 MG: 500 TABLET ORAL at 07:50

## 2025-08-08 RX ADMIN — OXYCODONE HYDROCHLORIDE 15 MG: 15 TABLET ORAL at 09:15

## 2025-08-08 RX ADMIN — ATORVASTATIN CALCIUM 20 MG: 10 TABLET, FILM COATED ORAL at 07:50

## 2025-08-08 RX ADMIN — ASPIRIN 81 MG CHEWABLE TABLET 81 MG: 81 TABLET CHEWABLE at 07:50

## 2025-08-08 RX ADMIN — OXYCODONE HYDROCHLORIDE 10 MG: 5 TABLET ORAL at 21:19

## 2025-08-08 RX ADMIN — INSULIN ASPART 10 UNITS: 100 INJECTION, SOLUTION INTRAVENOUS; SUBCUTANEOUS at 19:22

## 2025-08-08 RX ADMIN — INSULIN ASPART 10 UNITS: 100 INJECTION, SOLUTION INTRAVENOUS; SUBCUTANEOUS at 13:06

## 2025-08-08 RX ADMIN — SERTRALINE HYDROCHLORIDE 50 MG: 50 TABLET ORAL at 07:50

## 2025-08-08 RX ADMIN — ACETAMINOPHEN 1000 MG: 500 TABLET ORAL at 21:03

## 2025-08-08 RX ADMIN — GABAPENTIN 600 MG: 300 CAPSULE ORAL at 07:50

## 2025-08-08 RX ADMIN — BRIMONIDINE TARTRATE AND TIMOLOL MALEATE 1 DROP: 2; 5 SOLUTION/ DROPS OPHTHALMIC at 21:02

## 2025-08-08 RX ADMIN — CYANOCOBALAMIN TAB 500 MCG 1000 MCG: 500 TAB at 07:51

## 2025-08-08 RX ADMIN — BRIMONIDINE TARTRATE AND TIMOLOL MALEATE 1 DROP: 2; 5 SOLUTION/ DROPS OPHTHALMIC at 08:01

## 2025-08-08 RX ADMIN — INSULIN ASPART 2 UNITS: 100 INJECTION, SOLUTION INTRAVENOUS; SUBCUTANEOUS at 07:55

## 2025-08-08 RX ADMIN — INSULIN ASPART 10 UNITS: 100 INJECTION, SOLUTION INTRAVENOUS; SUBCUTANEOUS at 09:40

## 2025-08-08 RX ADMIN — SENNOSIDES AND DOCUSATE SODIUM 2 TABLET: 50; 8.6 TABLET ORAL at 21:03

## 2025-08-08 RX ADMIN — OXYCODONE HYDROCHLORIDE 10 MG: 5 TABLET ORAL at 13:44

## 2025-08-08 RX ADMIN — POLYETHYLENE GLYCOL 3350 17 G: 17 POWDER, FOR SOLUTION ORAL at 07:56

## 2025-08-08 RX ADMIN — METHOCARBAMOL 500 MG: 500 TABLET ORAL at 16:08

## 2025-08-08 RX ADMIN — INSULIN GLARGINE 52 UNITS: 100 INJECTION, SOLUTION SUBCUTANEOUS at 21:25

## 2025-08-08 RX ADMIN — METHOCARBAMOL 500 MG: 500 TABLET ORAL at 21:04

## 2025-08-08 RX ADMIN — LATANOPROST 1 DROP: 50 SOLUTION OPHTHALMIC at 21:02

## 2025-08-08 RX ADMIN — CALCIUM CARBONATE 600 MG (1,500 MG)-VITAMIN D3 400 UNIT TABLET 1 TABLET: at 07:51

## 2025-08-08 RX ADMIN — CYCLOSPORINE 1 DROP: 0.5 EMULSION OPHTHALMIC at 09:16

## 2025-08-08 RX ADMIN — METFORMIN HYDROCHLORIDE 500 MG: 500 TABLET, FILM COATED ORAL at 19:24

## 2025-08-08 RX ADMIN — INSULIN ASPART 3 UNITS: 100 INJECTION, SOLUTION INTRAVENOUS; SUBCUTANEOUS at 13:05

## 2025-08-08 RX ADMIN — OXYCODONE HYDROCHLORIDE 10 MG: 5 TABLET ORAL at 02:36

## 2025-08-08 RX ADMIN — ACETAMINOPHEN 1000 MG: 500 TABLET ORAL at 13:44

## 2025-08-08 RX ADMIN — GABAPENTIN 600 MG: 300 CAPSULE ORAL at 21:04

## 2025-08-08 RX ADMIN — CYCLOSPORINE 1 DROP: 0.5 EMULSION OPHTHALMIC at 21:03

## 2025-08-08 ASSESSMENT — ACTIVITIES OF DAILY LIVING (ADL)
ADLS_ACUITY_SCORE: 48

## 2025-08-09 ENCOUNTER — APPOINTMENT (OUTPATIENT)
Dept: PHYSICAL THERAPY | Facility: CLINIC | Age: 70
DRG: 560 | End: 2025-08-09
Attending: PHYSICAL MEDICINE & REHABILITATION
Payer: MEDICARE

## 2025-08-09 ENCOUNTER — APPOINTMENT (OUTPATIENT)
Dept: OCCUPATIONAL THERAPY | Facility: CLINIC | Age: 70
DRG: 560 | End: 2025-08-09
Attending: PHYSICAL MEDICINE & REHABILITATION
Payer: MEDICARE

## 2025-08-09 LAB
GLUCOSE BLDC GLUCOMTR-MCNC: 116 MG/DL (ref 70–99)
GLUCOSE BLDC GLUCOMTR-MCNC: 128 MG/DL (ref 70–99)
GLUCOSE BLDC GLUCOMTR-MCNC: 138 MG/DL (ref 70–99)
GLUCOSE BLDC GLUCOMTR-MCNC: 145 MG/DL (ref 70–99)

## 2025-08-09 PROCEDURE — 250N000013 HC RX MED GY IP 250 OP 250 PS 637: Performed by: PHYSICAL MEDICINE & REHABILITATION

## 2025-08-09 PROCEDURE — 250N000013 HC RX MED GY IP 250 OP 250 PS 637: Performed by: PHYSICIAN ASSISTANT

## 2025-08-09 PROCEDURE — 128N000003 HC R&B REHAB

## 2025-08-09 PROCEDURE — 97530 THERAPEUTIC ACTIVITIES: CPT | Mod: GP

## 2025-08-09 PROCEDURE — 250N000013 HC RX MED GY IP 250 OP 250 PS 637

## 2025-08-09 PROCEDURE — 97535 SELF CARE MNGMENT TRAINING: CPT | Mod: GO | Performed by: STUDENT IN AN ORGANIZED HEALTH CARE EDUCATION/TRAINING PROGRAM

## 2025-08-09 RX ADMIN — POLYETHYLENE GLYCOL 3350 17 G: 17 POWDER, FOR SOLUTION ORAL at 09:20

## 2025-08-09 RX ADMIN — ACETAMINOPHEN 1000 MG: 500 TABLET ORAL at 13:19

## 2025-08-09 RX ADMIN — SENNOSIDES AND DOCUSATE SODIUM 2 TABLET: 50; 8.6 TABLET ORAL at 21:38

## 2025-08-09 RX ADMIN — METHOCARBAMOL 500 MG: 500 TABLET ORAL at 16:39

## 2025-08-09 RX ADMIN — LATANOPROST 1 DROP: 50 SOLUTION OPHTHALMIC at 21:39

## 2025-08-09 RX ADMIN — BRIMONIDINE TARTRATE AND TIMOLOL MALEATE 1 DROP: 2; 5 SOLUTION/ DROPS OPHTHALMIC at 21:39

## 2025-08-09 RX ADMIN — GABAPENTIN 600 MG: 300 CAPSULE ORAL at 13:19

## 2025-08-09 RX ADMIN — OXYCODONE HYDROCHLORIDE 10 MG: 5 TABLET ORAL at 10:20

## 2025-08-09 RX ADMIN — ACETAMINOPHEN 1000 MG: 500 TABLET ORAL at 08:59

## 2025-08-09 RX ADMIN — ASPIRIN 81 MG CHEWABLE TABLET 81 MG: 81 TABLET CHEWABLE at 09:00

## 2025-08-09 RX ADMIN — GABAPENTIN 600 MG: 300 CAPSULE ORAL at 21:35

## 2025-08-09 RX ADMIN — OXYCODONE HYDROCHLORIDE 10 MG: 5 TABLET ORAL at 13:19

## 2025-08-09 RX ADMIN — SERTRALINE HYDROCHLORIDE 50 MG: 50 TABLET ORAL at 09:02

## 2025-08-09 RX ADMIN — METFORMIN HYDROCHLORIDE 500 MG: 500 TABLET, FILM COATED ORAL at 09:00

## 2025-08-09 RX ADMIN — PANTOPRAZOLE SODIUM 40 MG: 40 TABLET, DELAYED RELEASE ORAL at 09:02

## 2025-08-09 RX ADMIN — CALCIUM CARBONATE 600 MG (1,500 MG)-VITAMIN D3 400 UNIT TABLET 1 TABLET: at 18:04

## 2025-08-09 RX ADMIN — ATORVASTATIN CALCIUM 20 MG: 10 TABLET, FILM COATED ORAL at 08:59

## 2025-08-09 RX ADMIN — BRIMONIDINE TARTRATE AND TIMOLOL MALEATE 1 DROP: 2; 5 SOLUTION/ DROPS OPHTHALMIC at 09:21

## 2025-08-09 RX ADMIN — METHOCARBAMOL 500 MG: 500 TABLET ORAL at 09:03

## 2025-08-09 RX ADMIN — SENNOSIDES AND DOCUSATE SODIUM 2 TABLET: 50; 8.6 TABLET ORAL at 09:00

## 2025-08-09 RX ADMIN — METFORMIN HYDROCHLORIDE 500 MG: 500 TABLET, FILM COATED ORAL at 18:04

## 2025-08-09 RX ADMIN — CYCLOSPORINE 1 DROP: 0.5 EMULSION OPHTHALMIC at 09:03

## 2025-08-09 RX ADMIN — CALCIUM CARBONATE 600 MG (1,500 MG)-VITAMIN D3 400 UNIT TABLET 1 TABLET: at 09:00

## 2025-08-09 RX ADMIN — CYCLOSPORINE 1 DROP: 0.5 EMULSION OPHTHALMIC at 21:35

## 2025-08-09 RX ADMIN — CYANOCOBALAMIN TAB 500 MCG 1000 MCG: 500 TAB at 09:00

## 2025-08-09 RX ADMIN — GABAPENTIN 600 MG: 300 CAPSULE ORAL at 08:59

## 2025-08-09 RX ADMIN — INSULIN GLARGINE 52 UNITS: 100 INJECTION, SOLUTION SUBCUTANEOUS at 21:43

## 2025-08-09 RX ADMIN — METHOCARBAMOL 500 MG: 500 TABLET ORAL at 21:35

## 2025-08-09 RX ADMIN — ACETAMINOPHEN 1000 MG: 500 TABLET ORAL at 21:35

## 2025-08-09 RX ADMIN — INSULIN ASPART 10 UNITS: 100 INJECTION, SOLUTION INTRAVENOUS; SUBCUTANEOUS at 10:58

## 2025-08-09 RX ADMIN — INSULIN ASPART 10 UNITS: 100 INJECTION, SOLUTION INTRAVENOUS; SUBCUTANEOUS at 18:08

## 2025-08-09 ASSESSMENT — ACTIVITIES OF DAILY LIVING (ADL)
ADLS_ACUITY_SCORE: 48

## 2025-08-10 ENCOUNTER — APPOINTMENT (OUTPATIENT)
Dept: PHYSICAL THERAPY | Facility: CLINIC | Age: 70
DRG: 560 | End: 2025-08-10
Attending: PHYSICAL MEDICINE & REHABILITATION
Payer: MEDICARE

## 2025-08-10 LAB
ANION GAP SERPL CALCULATED.3IONS-SCNC: 12 MMOL/L (ref 7–15)
BUN SERPL-MCNC: 11.7 MG/DL (ref 8–23)
CALCIUM SERPL-MCNC: 9 MG/DL (ref 8.8–10.4)
CHLORIDE SERPL-SCNC: 100 MMOL/L (ref 98–107)
CREAT SERPL-MCNC: 0.69 MG/DL (ref 0.51–0.95)
EGFRCR SERPLBLD CKD-EPI 2021: >90 ML/MIN/1.73M2
GLUCOSE BLDC GLUCOMTR-MCNC: 117 MG/DL (ref 70–99)
GLUCOSE BLDC GLUCOMTR-MCNC: 118 MG/DL (ref 70–99)
GLUCOSE BLDC GLUCOMTR-MCNC: 135 MG/DL (ref 70–99)
GLUCOSE BLDC GLUCOMTR-MCNC: 140 MG/DL (ref 70–99)
GLUCOSE SERPL-MCNC: 136 MG/DL (ref 70–99)
HCO3 SERPL-SCNC: 25 MMOL/L (ref 22–29)
HOLD SPECIMEN: NORMAL
MAGNESIUM SERPL-MCNC: 1.8 MG/DL (ref 1.7–2.3)
POTASSIUM SERPL-SCNC: 4 MMOL/L (ref 3.4–5.3)
SODIUM SERPL-SCNC: 137 MMOL/L (ref 135–145)

## 2025-08-10 PROCEDURE — 250N000012 HC RX MED GY IP 250 OP 636 PS 637: Performed by: PHYSICIAN ASSISTANT

## 2025-08-10 PROCEDURE — 250N000013 HC RX MED GY IP 250 OP 250 PS 637: Performed by: PHYSICAL MEDICINE & REHABILITATION

## 2025-08-10 PROCEDURE — 97530 THERAPEUTIC ACTIVITIES: CPT | Mod: GP

## 2025-08-10 PROCEDURE — 250N000013 HC RX MED GY IP 250 OP 250 PS 637

## 2025-08-10 PROCEDURE — 128N000003 HC R&B REHAB

## 2025-08-10 PROCEDURE — 250N000013 HC RX MED GY IP 250 OP 250 PS 637: Performed by: PHYSICIAN ASSISTANT

## 2025-08-10 PROCEDURE — 82565 ASSAY OF CREATININE: CPT

## 2025-08-10 PROCEDURE — 36415 COLL VENOUS BLD VENIPUNCTURE: CPT

## 2025-08-10 PROCEDURE — 83735 ASSAY OF MAGNESIUM: CPT

## 2025-08-10 PROCEDURE — 97530 THERAPEUTIC ACTIVITIES: CPT | Mod: GP | Performed by: REHABILITATION PRACTITIONER

## 2025-08-10 RX ADMIN — PANTOPRAZOLE SODIUM 40 MG: 40 TABLET, DELAYED RELEASE ORAL at 08:49

## 2025-08-10 RX ADMIN — OXYCODONE HYDROCHLORIDE 10 MG: 5 TABLET ORAL at 14:00

## 2025-08-10 RX ADMIN — CYCLOSPORINE 1 DROP: 0.5 EMULSION OPHTHALMIC at 08:50

## 2025-08-10 RX ADMIN — CALCIUM CARBONATE 600 MG (1,500 MG)-VITAMIN D3 400 UNIT TABLET 1 TABLET: at 17:53

## 2025-08-10 RX ADMIN — METFORMIN HYDROCHLORIDE 500 MG: 500 TABLET, FILM COATED ORAL at 08:49

## 2025-08-10 RX ADMIN — SERTRALINE HYDROCHLORIDE 50 MG: 50 TABLET ORAL at 08:46

## 2025-08-10 RX ADMIN — METHOCARBAMOL 500 MG: 500 TABLET ORAL at 21:16

## 2025-08-10 RX ADMIN — LATANOPROST 1 DROP: 50 SOLUTION OPHTHALMIC at 21:20

## 2025-08-10 RX ADMIN — CYCLOSPORINE 1 DROP: 0.5 EMULSION OPHTHALMIC at 21:20

## 2025-08-10 RX ADMIN — INSULIN GLARGINE 52 UNITS: 100 INJECTION, SOLUTION SUBCUTANEOUS at 21:20

## 2025-08-10 RX ADMIN — BRIMONIDINE TARTRATE AND TIMOLOL MALEATE 1 DROP: 2; 5 SOLUTION/ DROPS OPHTHALMIC at 21:20

## 2025-08-10 RX ADMIN — INSULIN ASPART 9 UNITS: 100 INJECTION, SOLUTION INTRAVENOUS; SUBCUTANEOUS at 17:54

## 2025-08-10 RX ADMIN — METFORMIN HYDROCHLORIDE 500 MG: 500 TABLET, FILM COATED ORAL at 17:53

## 2025-08-10 RX ADMIN — METHOCARBAMOL 500 MG: 500 TABLET ORAL at 15:54

## 2025-08-10 RX ADMIN — BRIMONIDINE TARTRATE AND TIMOLOL MALEATE 1 DROP: 2; 5 SOLUTION/ DROPS OPHTHALMIC at 08:50

## 2025-08-10 RX ADMIN — POLYETHYLENE GLYCOL 3350 17 G: 17 POWDER, FOR SOLUTION ORAL at 08:45

## 2025-08-10 RX ADMIN — ASPIRIN 81 MG CHEWABLE TABLET 81 MG: 81 TABLET CHEWABLE at 08:49

## 2025-08-10 RX ADMIN — GABAPENTIN 600 MG: 300 CAPSULE ORAL at 08:49

## 2025-08-10 RX ADMIN — CALCIUM CARBONATE 600 MG (1,500 MG)-VITAMIN D3 400 UNIT TABLET 1 TABLET: at 08:49

## 2025-08-10 RX ADMIN — CYANOCOBALAMIN TAB 500 MCG 1000 MCG: 500 TAB at 08:46

## 2025-08-10 RX ADMIN — OXYCODONE HYDROCHLORIDE 10 MG: 5 TABLET ORAL at 21:20

## 2025-08-10 RX ADMIN — GABAPENTIN 600 MG: 300 CAPSULE ORAL at 21:15

## 2025-08-10 RX ADMIN — METHOCARBAMOL 500 MG: 500 TABLET ORAL at 09:38

## 2025-08-10 RX ADMIN — OXYCODONE HYDROCHLORIDE 5 MG: 5 TABLET ORAL at 00:43

## 2025-08-10 RX ADMIN — SENNOSIDES AND DOCUSATE SODIUM 2 TABLET: 50; 8.6 TABLET ORAL at 08:49

## 2025-08-10 RX ADMIN — ACETAMINOPHEN 1000 MG: 500 TABLET ORAL at 14:00

## 2025-08-10 RX ADMIN — ATORVASTATIN CALCIUM 20 MG: 10 TABLET, FILM COATED ORAL at 08:49

## 2025-08-10 RX ADMIN — INSULIN ASPART 2 UNITS: 100 INJECTION, SOLUTION INTRAVENOUS; SUBCUTANEOUS at 18:10

## 2025-08-10 RX ADMIN — GABAPENTIN 600 MG: 300 CAPSULE ORAL at 14:00

## 2025-08-10 RX ADMIN — ACETAMINOPHEN 1000 MG: 500 TABLET ORAL at 08:49

## 2025-08-10 RX ADMIN — ACETAMINOPHEN 1000 MG: 500 TABLET ORAL at 21:16

## 2025-08-10 RX ADMIN — INSULIN ASPART 9 UNITS: 100 INJECTION, SOLUTION INTRAVENOUS; SUBCUTANEOUS at 12:02

## 2025-08-10 RX ADMIN — SENNOSIDES AND DOCUSATE SODIUM 2 TABLET: 50; 8.6 TABLET ORAL at 21:15

## 2025-08-10 RX ADMIN — OXYCODONE HYDROCHLORIDE 10 MG: 5 TABLET ORAL at 09:38

## 2025-08-10 ASSESSMENT — ACTIVITIES OF DAILY LIVING (ADL)
ADLS_ACUITY_SCORE: 48

## 2025-08-11 ENCOUNTER — APPOINTMENT (OUTPATIENT)
Dept: PHYSICAL THERAPY | Facility: CLINIC | Age: 70
DRG: 560 | End: 2025-08-11
Attending: PHYSICAL MEDICINE & REHABILITATION
Payer: MEDICARE

## 2025-08-11 ENCOUNTER — APPOINTMENT (OUTPATIENT)
Dept: OCCUPATIONAL THERAPY | Facility: CLINIC | Age: 70
DRG: 560 | End: 2025-08-11
Attending: PHYSICAL MEDICINE & REHABILITATION
Payer: MEDICARE

## 2025-08-11 LAB
ANION GAP SERPL CALCULATED.3IONS-SCNC: 10 MMOL/L (ref 7–15)
BUN SERPL-MCNC: 12.6 MG/DL (ref 8–23)
CALCIUM SERPL-MCNC: 9.1 MG/DL (ref 8.8–10.4)
CHLORIDE SERPL-SCNC: 103 MMOL/L (ref 98–107)
CREAT SERPL-MCNC: 0.72 MG/DL (ref 0.51–0.95)
EGFRCR SERPLBLD CKD-EPI 2021: 89 ML/MIN/1.73M2
ERYTHROCYTE [DISTWIDTH] IN BLOOD BY AUTOMATED COUNT: 15.6 % (ref 10–15)
GLUCOSE BLDC GLUCOMTR-MCNC: 111 MG/DL (ref 70–99)
GLUCOSE BLDC GLUCOMTR-MCNC: 138 MG/DL (ref 70–99)
GLUCOSE BLDC GLUCOMTR-MCNC: 142 MG/DL (ref 70–99)
GLUCOSE BLDC GLUCOMTR-MCNC: 146 MG/DL (ref 70–99)
GLUCOSE BLDC GLUCOMTR-MCNC: 97 MG/DL (ref 70–99)
GLUCOSE SERPL-MCNC: 104 MG/DL (ref 70–99)
HCO3 SERPL-SCNC: 24 MMOL/L (ref 22–29)
HCT VFR BLD AUTO: 29.1 % (ref 35–47)
HGB BLD-MCNC: 9 G/DL (ref 11.7–15.7)
MCH RBC QN AUTO: 29.9 PG (ref 26.5–33)
MCHC RBC AUTO-ENTMCNC: 30.9 G/DL (ref 31.5–36.5)
MCV RBC AUTO: 97 FL (ref 78–100)
PLATELET # BLD AUTO: 429 10E3/UL (ref 150–450)
POTASSIUM SERPL-SCNC: 4.2 MMOL/L (ref 3.4–5.3)
RBC # BLD AUTO: 3.01 10E6/UL (ref 3.8–5.2)
SODIUM SERPL-SCNC: 137 MMOL/L (ref 135–145)
WBC # BLD AUTO: 9.4 10E3/UL (ref 4–11)

## 2025-08-11 PROCEDURE — 250N000013 HC RX MED GY IP 250 OP 250 PS 637

## 2025-08-11 PROCEDURE — 97530 THERAPEUTIC ACTIVITIES: CPT | Mod: GP

## 2025-08-11 PROCEDURE — 82947 ASSAY GLUCOSE BLOOD QUANT: CPT | Performed by: PHYSICIAN ASSISTANT

## 2025-08-11 PROCEDURE — 250N000013 HC RX MED GY IP 250 OP 250 PS 637: Performed by: PHYSICAL MEDICINE & REHABILITATION

## 2025-08-11 PROCEDURE — 36415 COLL VENOUS BLD VENIPUNCTURE: CPT | Performed by: PHYSICIAN ASSISTANT

## 2025-08-11 PROCEDURE — 97110 THERAPEUTIC EXERCISES: CPT | Mod: GP

## 2025-08-11 PROCEDURE — 128N000003 HC R&B REHAB

## 2025-08-11 PROCEDURE — 250N000013 HC RX MED GY IP 250 OP 250 PS 637: Performed by: PHYSICIAN ASSISTANT

## 2025-08-11 PROCEDURE — 85027 COMPLETE CBC AUTOMATED: CPT | Performed by: PHYSICIAN ASSISTANT

## 2025-08-11 PROCEDURE — 250N000012 HC RX MED GY IP 250 OP 636 PS 637: Performed by: PHYSICIAN ASSISTANT

## 2025-08-11 PROCEDURE — 97116 GAIT TRAINING THERAPY: CPT | Mod: GP

## 2025-08-11 PROCEDURE — 97535 SELF CARE MNGMENT TRAINING: CPT | Mod: GO | Performed by: STUDENT IN AN ORGANIZED HEALTH CARE EDUCATION/TRAINING PROGRAM

## 2025-08-11 RX ADMIN — CYCLOSPORINE 1 DROP: 0.5 EMULSION OPHTHALMIC at 21:09

## 2025-08-11 RX ADMIN — INSULIN ASPART 10 UNITS: 100 INJECTION, SOLUTION INTRAVENOUS; SUBCUTANEOUS at 10:24

## 2025-08-11 RX ADMIN — ASPIRIN 81 MG CHEWABLE TABLET 81 MG: 81 TABLET CHEWABLE at 08:36

## 2025-08-11 RX ADMIN — ACETAMINOPHEN 1000 MG: 500 TABLET ORAL at 21:09

## 2025-08-11 RX ADMIN — ACETAMINOPHEN 1000 MG: 500 TABLET ORAL at 13:19

## 2025-08-11 RX ADMIN — OXYCODONE HYDROCHLORIDE 10 MG: 5 TABLET ORAL at 13:19

## 2025-08-11 RX ADMIN — SENNOSIDES AND DOCUSATE SODIUM 2 TABLET: 50; 8.6 TABLET ORAL at 08:36

## 2025-08-11 RX ADMIN — METHOCARBAMOL 500 MG: 500 TABLET ORAL at 09:19

## 2025-08-11 RX ADMIN — CYANOCOBALAMIN TAB 500 MCG 1000 MCG: 500 TAB at 08:36

## 2025-08-11 RX ADMIN — METFORMIN HYDROCHLORIDE 500 MG: 500 TABLET, FILM COATED ORAL at 08:36

## 2025-08-11 RX ADMIN — METFORMIN HYDROCHLORIDE 500 MG: 500 TABLET, FILM COATED ORAL at 17:52

## 2025-08-11 RX ADMIN — CALCIUM CARBONATE 600 MG (1,500 MG)-VITAMIN D3 400 UNIT TABLET 1 TABLET: at 08:36

## 2025-08-11 RX ADMIN — INSULIN GLARGINE 52 UNITS: 100 INJECTION, SOLUTION SUBCUTANEOUS at 21:32

## 2025-08-11 RX ADMIN — ACETAMINOPHEN 1000 MG: 500 TABLET ORAL at 08:36

## 2025-08-11 RX ADMIN — SERTRALINE HYDROCHLORIDE 50 MG: 50 TABLET ORAL at 08:36

## 2025-08-11 RX ADMIN — METHOCARBAMOL 500 MG: 500 TABLET ORAL at 15:57

## 2025-08-11 RX ADMIN — POLYETHYLENE GLYCOL 3350 17 G: 17 POWDER, FOR SOLUTION ORAL at 08:36

## 2025-08-11 RX ADMIN — ATORVASTATIN CALCIUM 20 MG: 10 TABLET, FILM COATED ORAL at 08:36

## 2025-08-11 RX ADMIN — INSULIN ASPART 1 UNITS: 100 INJECTION, SOLUTION INTRAVENOUS; SUBCUTANEOUS at 12:14

## 2025-08-11 RX ADMIN — METHOCARBAMOL 500 MG: 500 TABLET ORAL at 21:09

## 2025-08-11 RX ADMIN — INSULIN ASPART 8 UNITS: 100 INJECTION, SOLUTION INTRAVENOUS; SUBCUTANEOUS at 17:53

## 2025-08-11 RX ADMIN — PANTOPRAZOLE SODIUM 40 MG: 40 TABLET, DELAYED RELEASE ORAL at 08:36

## 2025-08-11 RX ADMIN — SENNOSIDES AND DOCUSATE SODIUM 2 TABLET: 50; 8.6 TABLET ORAL at 21:09

## 2025-08-11 RX ADMIN — GABAPENTIN 600 MG: 300 CAPSULE ORAL at 08:36

## 2025-08-11 RX ADMIN — GABAPENTIN 600 MG: 300 CAPSULE ORAL at 13:19

## 2025-08-11 RX ADMIN — OXYCODONE HYDROCHLORIDE 10 MG: 5 TABLET ORAL at 09:20

## 2025-08-11 RX ADMIN — BRIMONIDINE TARTRATE AND TIMOLOL MALEATE 1 DROP: 2; 5 SOLUTION/ DROPS OPHTHALMIC at 08:35

## 2025-08-11 RX ADMIN — INSULIN ASPART 8 UNITS: 100 INJECTION, SOLUTION INTRAVENOUS; SUBCUTANEOUS at 12:15

## 2025-08-11 RX ADMIN — CYCLOSPORINE 1 DROP: 0.5 EMULSION OPHTHALMIC at 08:35

## 2025-08-11 RX ADMIN — OXYCODONE HYDROCHLORIDE 10 MG: 5 TABLET ORAL at 22:33

## 2025-08-11 RX ADMIN — GABAPENTIN 600 MG: 300 CAPSULE ORAL at 21:09

## 2025-08-11 RX ADMIN — BRIMONIDINE TARTRATE AND TIMOLOL MALEATE 1 DROP: 2; 5 SOLUTION/ DROPS OPHTHALMIC at 21:09

## 2025-08-11 RX ADMIN — CALCIUM CARBONATE 600 MG (1,500 MG)-VITAMIN D3 400 UNIT TABLET 1 TABLET: at 17:52

## 2025-08-11 RX ADMIN — LATANOPROST 1 DROP: 50 SOLUTION OPHTHALMIC at 21:09

## 2025-08-11 ASSESSMENT — ACTIVITIES OF DAILY LIVING (ADL)
ADLS_ACUITY_SCORE: 52
ADLS_ACUITY_SCORE: 48
ADLS_ACUITY_SCORE: 48
ADLS_ACUITY_SCORE: 52
ADLS_ACUITY_SCORE: 48
ADLS_ACUITY_SCORE: 52
ADLS_ACUITY_SCORE: 52
ADLS_ACUITY_SCORE: 48
ADLS_ACUITY_SCORE: 48
ADLS_ACUITY_SCORE: 52
ADLS_ACUITY_SCORE: 52
ADLS_ACUITY_SCORE: 48
ADLS_ACUITY_SCORE: 52
ADLS_ACUITY_SCORE: 52

## 2025-08-12 ENCOUNTER — APPOINTMENT (OUTPATIENT)
Dept: PHYSICAL THERAPY | Facility: CLINIC | Age: 70
DRG: 560 | End: 2025-08-12
Attending: PHYSICAL MEDICINE & REHABILITATION
Payer: MEDICARE

## 2025-08-12 ENCOUNTER — APPOINTMENT (OUTPATIENT)
Dept: OCCUPATIONAL THERAPY | Facility: CLINIC | Age: 70
DRG: 560 | End: 2025-08-12
Attending: PHYSICAL MEDICINE & REHABILITATION
Payer: MEDICARE

## 2025-08-12 LAB
GLUCOSE BLDC GLUCOMTR-MCNC: 159 MG/DL (ref 70–99)
GLUCOSE BLDC GLUCOMTR-MCNC: 167 MG/DL (ref 70–99)
GLUCOSE BLDC GLUCOMTR-MCNC: 173 MG/DL (ref 70–99)
GLUCOSE BLDC GLUCOMTR-MCNC: 81 MG/DL (ref 70–99)
GLUCOSE BLDC GLUCOMTR-MCNC: 89 MG/DL (ref 70–99)

## 2025-08-12 PROCEDURE — 97535 SELF CARE MNGMENT TRAINING: CPT | Mod: GO | Performed by: STUDENT IN AN ORGANIZED HEALTH CARE EDUCATION/TRAINING PROGRAM

## 2025-08-12 PROCEDURE — 250N000013 HC RX MED GY IP 250 OP 250 PS 637

## 2025-08-12 PROCEDURE — 97530 THERAPEUTIC ACTIVITIES: CPT | Mod: GP

## 2025-08-12 PROCEDURE — 250N000012 HC RX MED GY IP 250 OP 636 PS 637: Performed by: PHYSICIAN ASSISTANT

## 2025-08-12 PROCEDURE — 97110 THERAPEUTIC EXERCISES: CPT | Mod: GP

## 2025-08-12 PROCEDURE — 128N000003 HC R&B REHAB

## 2025-08-12 PROCEDURE — 250N000013 HC RX MED GY IP 250 OP 250 PS 637: Performed by: PHYSICAL MEDICINE & REHABILITATION

## 2025-08-12 PROCEDURE — 250N000013 HC RX MED GY IP 250 OP 250 PS 637: Performed by: PHYSICIAN ASSISTANT

## 2025-08-12 RX ADMIN — ACETAMINOPHEN 1000 MG: 500 TABLET ORAL at 13:24

## 2025-08-12 RX ADMIN — CALCIUM CARBONATE 600 MG (1,500 MG)-VITAMIN D3 400 UNIT TABLET 1 TABLET: at 09:35

## 2025-08-12 RX ADMIN — OXYCODONE HYDROCHLORIDE 10 MG: 5 TABLET ORAL at 22:03

## 2025-08-12 RX ADMIN — INSULIN ASPART 2 UNITS: 100 INJECTION, SOLUTION INTRAVENOUS; SUBCUTANEOUS at 19:37

## 2025-08-12 RX ADMIN — GABAPENTIN 600 MG: 300 CAPSULE ORAL at 13:22

## 2025-08-12 RX ADMIN — INSULIN ASPART: 100 INJECTION, SOLUTION INTRAVENOUS; SUBCUTANEOUS at 09:24

## 2025-08-12 RX ADMIN — BRIMONIDINE TARTRATE AND TIMOLOL MALEATE 1 DROP: 2; 5 SOLUTION/ DROPS OPHTHALMIC at 22:04

## 2025-08-12 RX ADMIN — OXYCODONE HYDROCHLORIDE 10 MG: 5 TABLET ORAL at 09:10

## 2025-08-12 RX ADMIN — INSULIN GLARGINE 47 UNITS: 100 INJECTION, SOLUTION SUBCUTANEOUS at 22:02

## 2025-08-12 RX ADMIN — SENNOSIDES AND DOCUSATE SODIUM 2 TABLET: 50; 8.6 TABLET ORAL at 22:04

## 2025-08-12 RX ADMIN — PANTOPRAZOLE SODIUM 40 MG: 40 TABLET, DELAYED RELEASE ORAL at 09:11

## 2025-08-12 RX ADMIN — METFORMIN HYDROCHLORIDE 500 MG: 500 TABLET, FILM COATED ORAL at 09:23

## 2025-08-12 RX ADMIN — OXYCODONE HYDROCHLORIDE 10 MG: 5 TABLET ORAL at 13:23

## 2025-08-12 RX ADMIN — ACETAMINOPHEN 1000 MG: 500 TABLET ORAL at 09:12

## 2025-08-12 RX ADMIN — BRIMONIDINE TARTRATE AND TIMOLOL MALEATE 1 DROP: 2; 5 SOLUTION/ DROPS OPHTHALMIC at 09:21

## 2025-08-12 RX ADMIN — SENNOSIDES AND DOCUSATE SODIUM 2 TABLET: 50; 8.6 TABLET ORAL at 09:11

## 2025-08-12 RX ADMIN — GABAPENTIN 600 MG: 300 CAPSULE ORAL at 09:11

## 2025-08-12 RX ADMIN — CYCLOSPORINE 1 DROP: 0.5 EMULSION OPHTHALMIC at 22:05

## 2025-08-12 RX ADMIN — CYCLOSPORINE 1 DROP: 0.5 EMULSION OPHTHALMIC at 09:11

## 2025-08-12 RX ADMIN — METHOCARBAMOL 500 MG: 500 TABLET ORAL at 17:01

## 2025-08-12 RX ADMIN — SERTRALINE HYDROCHLORIDE 50 MG: 50 TABLET ORAL at 09:21

## 2025-08-12 RX ADMIN — METFORMIN HYDROCHLORIDE 500 MG: 500 TABLET, FILM COATED ORAL at 17:01

## 2025-08-12 RX ADMIN — ATORVASTATIN CALCIUM 20 MG: 10 TABLET, FILM COATED ORAL at 09:11

## 2025-08-12 RX ADMIN — CALCIUM CARBONATE 600 MG (1,500 MG)-VITAMIN D3 400 UNIT TABLET 1 TABLET: at 17:01

## 2025-08-12 RX ADMIN — INSULIN ASPART 1 UNITS: 100 INJECTION, SOLUTION INTRAVENOUS; SUBCUTANEOUS at 13:24

## 2025-08-12 RX ADMIN — INSULIN ASPART 8 UNITS: 100 INJECTION, SOLUTION INTRAVENOUS; SUBCUTANEOUS at 19:38

## 2025-08-12 RX ADMIN — METHOCARBAMOL 500 MG: 500 TABLET ORAL at 22:04

## 2025-08-12 RX ADMIN — POLYETHYLENE GLYCOL 3350 17 G: 17 POWDER, FOR SOLUTION ORAL at 09:33

## 2025-08-12 RX ADMIN — INSULIN ASPART 8 UNITS: 100 INJECTION, SOLUTION INTRAVENOUS; SUBCUTANEOUS at 13:25

## 2025-08-12 RX ADMIN — ASPIRIN 81 MG CHEWABLE TABLET 81 MG: 81 TABLET CHEWABLE at 09:12

## 2025-08-12 RX ADMIN — CYANOCOBALAMIN TAB 500 MCG 1000 MCG: 500 TAB at 09:11

## 2025-08-12 RX ADMIN — METHOCARBAMOL 500 MG: 500 TABLET ORAL at 09:11

## 2025-08-12 RX ADMIN — LATANOPROST 1 DROP: 50 SOLUTION OPHTHALMIC at 22:04

## 2025-08-12 RX ADMIN — ACETAMINOPHEN 1000 MG: 500 TABLET ORAL at 19:39

## 2025-08-12 RX ADMIN — GABAPENTIN 600 MG: 300 CAPSULE ORAL at 19:39

## 2025-08-12 ASSESSMENT — ACTIVITIES OF DAILY LIVING (ADL)
ADLS_ACUITY_SCORE: 52

## 2025-08-13 ENCOUNTER — APPOINTMENT (OUTPATIENT)
Dept: PHYSICAL THERAPY | Facility: CLINIC | Age: 70
DRG: 560 | End: 2025-08-13
Attending: PHYSICAL MEDICINE & REHABILITATION
Payer: MEDICARE

## 2025-08-13 ENCOUNTER — APPOINTMENT (OUTPATIENT)
Dept: OCCUPATIONAL THERAPY | Facility: CLINIC | Age: 70
DRG: 560 | End: 2025-08-13
Attending: PHYSICAL MEDICINE & REHABILITATION
Payer: MEDICARE

## 2025-08-13 LAB
GLUCOSE BLDC GLUCOMTR-MCNC: 103 MG/DL (ref 70–99)
GLUCOSE BLDC GLUCOMTR-MCNC: 109 MG/DL (ref 70–99)
GLUCOSE BLDC GLUCOMTR-MCNC: 117 MG/DL (ref 70–99)
GLUCOSE BLDC GLUCOMTR-MCNC: 121 MG/DL (ref 70–99)
GLUCOSE BLDC GLUCOMTR-MCNC: 213 MG/DL (ref 70–99)
GLUCOSE BLDC GLUCOMTR-MCNC: 59 MG/DL (ref 70–99)
GLUCOSE BLDC GLUCOMTR-MCNC: 66 MG/DL (ref 70–99)

## 2025-08-13 PROCEDURE — 250N000013 HC RX MED GY IP 250 OP 250 PS 637: Performed by: PHYSICIAN ASSISTANT

## 2025-08-13 PROCEDURE — 250N000009 HC RX 250: Performed by: PHYSICIAN ASSISTANT

## 2025-08-13 PROCEDURE — 97535 SELF CARE MNGMENT TRAINING: CPT | Mod: GO

## 2025-08-13 PROCEDURE — 250N000013 HC RX MED GY IP 250 OP 250 PS 637

## 2025-08-13 PROCEDURE — 128N000003 HC R&B REHAB

## 2025-08-13 PROCEDURE — 250N000013 HC RX MED GY IP 250 OP 250 PS 637: Performed by: PHYSICAL MEDICINE & REHABILITATION

## 2025-08-13 PROCEDURE — 97530 THERAPEUTIC ACTIVITIES: CPT | Mod: GP

## 2025-08-13 RX ADMIN — METFORMIN HYDROCHLORIDE 500 MG: 500 TABLET, FILM COATED ORAL at 17:03

## 2025-08-13 RX ADMIN — OXYCODONE HYDROCHLORIDE 10 MG: 5 TABLET ORAL at 09:21

## 2025-08-13 RX ADMIN — GABAPENTIN 600 MG: 300 CAPSULE ORAL at 20:30

## 2025-08-13 RX ADMIN — INSULIN GLARGINE 47 UNITS: 100 INJECTION, SOLUTION SUBCUTANEOUS at 21:33

## 2025-08-13 RX ADMIN — LATANOPROST 1 DROP: 50 SOLUTION OPHTHALMIC at 21:32

## 2025-08-13 RX ADMIN — PANTOPRAZOLE SODIUM 40 MG: 40 TABLET, DELAYED RELEASE ORAL at 08:27

## 2025-08-13 RX ADMIN — ACETAMINOPHEN 1000 MG: 500 TABLET ORAL at 20:30

## 2025-08-13 RX ADMIN — CYCLOSPORINE 1 DROP: 0.5 EMULSION OPHTHALMIC at 08:27

## 2025-08-13 RX ADMIN — BRIMONIDINE TARTRATE AND TIMOLOL MALEATE 1 DROP: 2; 5 SOLUTION/ DROPS OPHTHALMIC at 21:32

## 2025-08-13 RX ADMIN — ACETAMINOPHEN 1000 MG: 500 TABLET ORAL at 13:05

## 2025-08-13 RX ADMIN — METFORMIN HYDROCHLORIDE 500 MG: 500 TABLET, FILM COATED ORAL at 08:27

## 2025-08-13 RX ADMIN — GABAPENTIN 600 MG: 300 CAPSULE ORAL at 13:05

## 2025-08-13 RX ADMIN — CYANOCOBALAMIN TAB 500 MCG 1000 MCG: 500 TAB at 08:26

## 2025-08-13 RX ADMIN — CALCIUM CARBONATE 600 MG (1,500 MG)-VITAMIN D3 400 UNIT TABLET 1 TABLET: at 08:26

## 2025-08-13 RX ADMIN — SERTRALINE HYDROCHLORIDE 50 MG: 50 TABLET ORAL at 08:27

## 2025-08-13 RX ADMIN — SENNOSIDES AND DOCUSATE SODIUM 2 TABLET: 50; 8.6 TABLET ORAL at 08:26

## 2025-08-13 RX ADMIN — METHOCARBAMOL 500 MG: 500 TABLET ORAL at 09:21

## 2025-08-13 RX ADMIN — BRIMONIDINE TARTRATE AND TIMOLOL MALEATE 1 DROP: 2; 5 SOLUTION/ DROPS OPHTHALMIC at 09:21

## 2025-08-13 RX ADMIN — CALCIUM CARBONATE 600 MG (1,500 MG)-VITAMIN D3 400 UNIT TABLET 1 TABLET: at 17:03

## 2025-08-13 RX ADMIN — OXYCODONE HYDROCHLORIDE 10 MG: 5 TABLET ORAL at 13:52

## 2025-08-13 RX ADMIN — INSULIN ASPART: 100 INJECTION, SOLUTION INTRAVENOUS; SUBCUTANEOUS at 09:40

## 2025-08-13 RX ADMIN — METHOCARBAMOL 500 MG: 500 TABLET ORAL at 17:03

## 2025-08-13 RX ADMIN — POLYETHYLENE GLYCOL 3350 17 G: 17 POWDER, FOR SOLUTION ORAL at 08:26

## 2025-08-13 RX ADMIN — METHOCARBAMOL 500 MG: 500 TABLET ORAL at 21:32

## 2025-08-13 RX ADMIN — INSULIN ASPART: 100 INJECTION, SOLUTION INTRAVENOUS; SUBCUTANEOUS at 13:09

## 2025-08-13 RX ADMIN — ACETAMINOPHEN 1000 MG: 500 TABLET ORAL at 08:26

## 2025-08-13 RX ADMIN — ATORVASTATIN CALCIUM 20 MG: 10 TABLET, FILM COATED ORAL at 08:27

## 2025-08-13 RX ADMIN — GABAPENTIN 600 MG: 300 CAPSULE ORAL at 08:26

## 2025-08-13 RX ADMIN — ASPIRIN 81 MG CHEWABLE TABLET 81 MG: 81 TABLET CHEWABLE at 08:27

## 2025-08-13 RX ADMIN — OXYCODONE HYDROCHLORIDE 10 MG: 5 TABLET ORAL at 21:34

## 2025-08-13 RX ADMIN — CYCLOSPORINE 1 DROP: 0.5 EMULSION OPHTHALMIC at 20:29

## 2025-08-13 ASSESSMENT — ACTIVITIES OF DAILY LIVING (ADL)
ADLS_ACUITY_SCORE: 52
ADLS_ACUITY_SCORE: 44
ADLS_ACUITY_SCORE: 52
ADLS_ACUITY_SCORE: 44
ADLS_ACUITY_SCORE: 52
ADLS_ACUITY_SCORE: 44
ADLS_ACUITY_SCORE: 52
ADLS_ACUITY_SCORE: 44
ADLS_ACUITY_SCORE: 52
ADLS_ACUITY_SCORE: 52
ADLS_ACUITY_SCORE: 47
ADLS_ACUITY_SCORE: 52

## 2025-08-14 ENCOUNTER — APPOINTMENT (OUTPATIENT)
Dept: PHYSICAL THERAPY | Facility: CLINIC | Age: 70
DRG: 560 | End: 2025-08-14
Attending: PHYSICAL MEDICINE & REHABILITATION
Payer: MEDICARE

## 2025-08-14 ENCOUNTER — APPOINTMENT (OUTPATIENT)
Dept: OCCUPATIONAL THERAPY | Facility: CLINIC | Age: 70
DRG: 560 | End: 2025-08-14
Attending: PHYSICAL MEDICINE & REHABILITATION
Payer: MEDICARE

## 2025-08-14 VITALS
OXYGEN SATURATION: 97 % | SYSTOLIC BLOOD PRESSURE: 120 MMHG | WEIGHT: 235.89 LBS | HEIGHT: 62 IN | BODY MASS INDEX: 43.41 KG/M2 | RESPIRATION RATE: 16 BRPM | DIASTOLIC BLOOD PRESSURE: 44 MMHG | HEART RATE: 74 BPM | TEMPERATURE: 99 F

## 2025-08-14 LAB
GLUCOSE BLDC GLUCOMTR-MCNC: 107 MG/DL (ref 70–99)
GLUCOSE BLDC GLUCOMTR-MCNC: 132 MG/DL (ref 70–99)
GLUCOSE BLDC GLUCOMTR-MCNC: 145 MG/DL (ref 70–99)
GLUCOSE BLDC GLUCOMTR-MCNC: 178 MG/DL (ref 70–99)
GLUCOSE BLDC GLUCOMTR-MCNC: 201 MG/DL (ref 70–99)

## 2025-08-14 PROCEDURE — 97535 SELF CARE MNGMENT TRAINING: CPT | Mod: GO | Performed by: OCCUPATIONAL THERAPIST

## 2025-08-14 PROCEDURE — 250N000013 HC RX MED GY IP 250 OP 250 PS 637: Performed by: PHYSICIAN ASSISTANT

## 2025-08-14 PROCEDURE — 97750 PHYSICAL PERFORMANCE TEST: CPT | Mod: GP

## 2025-08-14 PROCEDURE — 97530 THERAPEUTIC ACTIVITIES: CPT | Mod: GP

## 2025-08-14 PROCEDURE — 97530 THERAPEUTIC ACTIVITIES: CPT | Mod: GO

## 2025-08-14 PROCEDURE — 97116 GAIT TRAINING THERAPY: CPT | Mod: GP

## 2025-08-14 PROCEDURE — 250N000013 HC RX MED GY IP 250 OP 250 PS 637: Performed by: PHYSICAL MEDICINE & REHABILITATION

## 2025-08-14 PROCEDURE — 128N000003 HC R&B REHAB

## 2025-08-14 RX ADMIN — GABAPENTIN 600 MG: 300 CAPSULE ORAL at 14:01

## 2025-08-14 RX ADMIN — CYCLOSPORINE 1 DROP: 0.5 EMULSION OPHTHALMIC at 21:24

## 2025-08-14 RX ADMIN — OXYCODONE HYDROCHLORIDE 10 MG: 5 TABLET ORAL at 14:01

## 2025-08-14 RX ADMIN — BRIMONIDINE TARTRATE AND TIMOLOL MALEATE 1 DROP: 2; 5 SOLUTION/ DROPS OPHTHALMIC at 21:18

## 2025-08-14 RX ADMIN — ASPIRIN 81 MG CHEWABLE TABLET 81 MG: 81 TABLET CHEWABLE at 09:01

## 2025-08-14 RX ADMIN — METHOCARBAMOL 500 MG: 500 TABLET ORAL at 17:30

## 2025-08-14 RX ADMIN — PANTOPRAZOLE SODIUM 40 MG: 40 TABLET, DELAYED RELEASE ORAL at 09:00

## 2025-08-14 RX ADMIN — METHOCARBAMOL 500 MG: 500 TABLET ORAL at 12:13

## 2025-08-14 RX ADMIN — ACETAMINOPHEN 1000 MG: 500 TABLET ORAL at 09:00

## 2025-08-14 RX ADMIN — METHOCARBAMOL 500 MG: 500 TABLET ORAL at 21:08

## 2025-08-14 RX ADMIN — OXYCODONE HYDROCHLORIDE 10 MG: 5 TABLET ORAL at 09:40

## 2025-08-14 RX ADMIN — ACETAMINOPHEN 1000 MG: 500 TABLET ORAL at 20:59

## 2025-08-14 RX ADMIN — SERTRALINE HYDROCHLORIDE 50 MG: 50 TABLET ORAL at 09:01

## 2025-08-14 RX ADMIN — INSULIN ASPART: 100 INJECTION, SOLUTION INTRAVENOUS; SUBCUTANEOUS at 09:07

## 2025-08-14 RX ADMIN — BRIMONIDINE TARTRATE AND TIMOLOL MALEATE 1 DROP: 2; 5 SOLUTION/ DROPS OPHTHALMIC at 09:06

## 2025-08-14 RX ADMIN — ATORVASTATIN CALCIUM 20 MG: 10 TABLET, FILM COATED ORAL at 09:00

## 2025-08-14 RX ADMIN — OXYCODONE HYDROCHLORIDE 10 MG: 5 TABLET ORAL at 21:07

## 2025-08-14 RX ADMIN — SENNOSIDES AND DOCUSATE SODIUM 2 TABLET: 50; 8.6 TABLET ORAL at 09:05

## 2025-08-14 RX ADMIN — CYANOCOBALAMIN TAB 500 MCG 1000 MCG: 500 TAB at 09:01

## 2025-08-14 RX ADMIN — INSULIN ASPART: 100 INJECTION, SOLUTION INTRAVENOUS; SUBCUTANEOUS at 13:10

## 2025-08-14 RX ADMIN — CALCIUM CARBONATE 600 MG (1,500 MG)-VITAMIN D3 400 UNIT TABLET 1 TABLET: at 17:29

## 2025-08-14 RX ADMIN — INSULIN ASPART 1 UNITS: 100 INJECTION, SOLUTION INTRAVENOUS; SUBCUTANEOUS at 17:30

## 2025-08-14 RX ADMIN — GABAPENTIN 600 MG: 300 CAPSULE ORAL at 21:00

## 2025-08-14 RX ADMIN — CALCIUM CARBONATE 600 MG (1,500 MG)-VITAMIN D3 400 UNIT TABLET 1 TABLET: at 09:01

## 2025-08-14 RX ADMIN — SENNOSIDES AND DOCUSATE SODIUM 2 TABLET: 50; 8.6 TABLET ORAL at 21:00

## 2025-08-14 RX ADMIN — METFORMIN HYDROCHLORIDE 500 MG: 500 TABLET, FILM COATED ORAL at 17:29

## 2025-08-14 RX ADMIN — INSULIN ASPART 1 UNITS: 100 INJECTION, SOLUTION INTRAVENOUS; SUBCUTANEOUS at 13:09

## 2025-08-14 RX ADMIN — METFORMIN HYDROCHLORIDE 500 MG: 500 TABLET, FILM COATED ORAL at 09:00

## 2025-08-14 RX ADMIN — ACETAMINOPHEN 1000 MG: 500 TABLET ORAL at 14:00

## 2025-08-14 RX ADMIN — LATANOPROST 1 DROP: 50 SOLUTION OPHTHALMIC at 21:15

## 2025-08-14 RX ADMIN — INSULIN ASPART 6 UNITS: 100 INJECTION, SOLUTION INTRAVENOUS; SUBCUTANEOUS at 17:31

## 2025-08-14 RX ADMIN — GABAPENTIN 600 MG: 300 CAPSULE ORAL at 09:00

## 2025-08-14 RX ADMIN — CYCLOSPORINE 1 DROP: 0.5 EMULSION OPHTHALMIC at 12:14

## 2025-08-14 ASSESSMENT — ACTIVITIES OF DAILY LIVING (ADL)
ADLS_ACUITY_SCORE: 47

## 2025-08-15 ENCOUNTER — APPOINTMENT (OUTPATIENT)
Dept: OCCUPATIONAL THERAPY | Facility: CLINIC | Age: 70
DRG: 560 | End: 2025-08-15
Attending: PHYSICAL MEDICINE & REHABILITATION
Payer: MEDICARE

## 2025-08-15 ENCOUNTER — APPOINTMENT (OUTPATIENT)
Dept: PHYSICAL THERAPY | Facility: CLINIC | Age: 70
DRG: 560 | End: 2025-08-15
Attending: PHYSICAL MEDICINE & REHABILITATION
Payer: MEDICARE

## 2025-08-16 ENCOUNTER — APPOINTMENT (OUTPATIENT)
Dept: PHYSICAL THERAPY | Facility: CLINIC | Age: 70
DRG: 560 | End: 2025-08-16
Attending: PHYSICAL MEDICINE & REHABILITATION
Payer: MEDICARE

## 2025-08-16 ENCOUNTER — APPOINTMENT (OUTPATIENT)
Dept: OCCUPATIONAL THERAPY | Facility: CLINIC | Age: 70
DRG: 560 | End: 2025-08-16
Attending: PHYSICAL MEDICINE & REHABILITATION
Payer: MEDICARE

## 2025-08-16 LAB
GLUCOSE BLDC GLUCOMTR-MCNC: 133 MG/DL (ref 70–99)
GLUCOSE BLDC GLUCOMTR-MCNC: 148 MG/DL (ref 70–99)
GLUCOSE BLDC GLUCOMTR-MCNC: 156 MG/DL (ref 70–99)
GLUCOSE BLDC GLUCOMTR-MCNC: 183 MG/DL (ref 70–99)
GLUCOSE BLDC GLUCOMTR-MCNC: 242 MG/DL (ref 70–99)

## 2025-08-17 ENCOUNTER — APPOINTMENT (OUTPATIENT)
Dept: PHYSICAL THERAPY | Facility: CLINIC | Age: 70
DRG: 560 | End: 2025-08-17
Attending: PHYSICAL MEDICINE & REHABILITATION
Payer: MEDICARE

## 2025-08-17 ENCOUNTER — APPOINTMENT (OUTPATIENT)
Dept: OCCUPATIONAL THERAPY | Facility: CLINIC | Age: 70
DRG: 560 | End: 2025-08-17
Attending: PHYSICAL MEDICINE & REHABILITATION
Payer: MEDICARE

## 2025-08-17 LAB
GLUCOSE BLDC GLUCOMTR-MCNC: 114 MG/DL (ref 70–99)
GLUCOSE BLDC GLUCOMTR-MCNC: 136 MG/DL (ref 70–99)
GLUCOSE BLDC GLUCOMTR-MCNC: 156 MG/DL (ref 70–99)
GLUCOSE BLDC GLUCOMTR-MCNC: 171 MG/DL (ref 70–99)
GLUCOSE BLDC GLUCOMTR-MCNC: 175 MG/DL (ref 70–99)

## 2025-08-18 LAB
ANION GAP SERPL CALCULATED.3IONS-SCNC: 10 MMOL/L (ref 7–15)
BUN SERPL-MCNC: 10.7 MG/DL (ref 8–23)
CALCIUM SERPL-MCNC: 9.2 MG/DL (ref 8.8–10.4)
CHLORIDE SERPL-SCNC: 104 MMOL/L (ref 98–107)
CREAT SERPL-MCNC: 0.66 MG/DL (ref 0.51–0.95)
EGFRCR SERPLBLD CKD-EPI 2021: >90 ML/MIN/1.73M2
ERYTHROCYTE [DISTWIDTH] IN BLOOD BY AUTOMATED COUNT: 15.4 % (ref 10–15)
GLUCOSE BLDC GLUCOMTR-MCNC: 102 MG/DL (ref 70–99)
GLUCOSE BLDC GLUCOMTR-MCNC: 124 MG/DL (ref 70–99)
GLUCOSE SERPL-MCNC: 111 MG/DL (ref 70–99)
HCO3 SERPL-SCNC: 24 MMOL/L (ref 22–29)
HCT VFR BLD AUTO: 28.4 % (ref 35–47)
HGB BLD-MCNC: 8.9 G/DL (ref 11.7–15.7)
MCH RBC QN AUTO: 29.2 PG (ref 26.5–33)
MCHC RBC AUTO-ENTMCNC: 31.3 G/DL (ref 31.5–36.5)
MCV RBC AUTO: 93.1 FL (ref 78–100)
PLATELET # BLD AUTO: 337 10E3/UL (ref 150–450)
POTASSIUM SERPL-SCNC: 3.9 MMOL/L (ref 3.4–5.3)
RBC # BLD AUTO: 3.05 10E6/UL (ref 3.8–5.2)
SODIUM SERPL-SCNC: 138 MMOL/L (ref 135–145)
WBC # BLD AUTO: 8.42 10E3/UL (ref 4–11)

## 2025-08-19 ENCOUNTER — APPOINTMENT (OUTPATIENT)
Dept: GENERAL RADIOLOGY | Facility: CLINIC | Age: 70
End: 2025-08-19
Attending: EMERGENCY MEDICINE
Payer: MEDICARE

## 2025-08-19 ENCOUNTER — APPOINTMENT (OUTPATIENT)
Dept: CT IMAGING | Facility: CLINIC | Age: 70
End: 2025-08-19
Attending: EMERGENCY MEDICINE
Payer: MEDICARE

## 2025-08-19 ENCOUNTER — HOSPITAL ENCOUNTER (INPATIENT)
Facility: CLINIC | Age: 70
End: 2025-08-19
Attending: EMERGENCY MEDICINE | Admitting: INTERNAL MEDICINE
Payer: MEDICARE

## 2025-08-19 DIAGNOSIS — R55 SYNCOPE, UNSPECIFIED SYNCOPE TYPE: Primary | ICD-10-CM

## 2025-08-19 DIAGNOSIS — Z71.89 OTHER SPECIFIED COUNSELING: Chronic | ICD-10-CM

## 2025-08-19 DIAGNOSIS — Z98.1 H/O SPINAL FUSION: ICD-10-CM

## 2025-08-19 LAB
ALBUMIN SERPL BCG-MCNC: 3.3 G/DL (ref 3.5–5.2)
ALBUMIN UR-MCNC: 10 MG/DL
ALP SERPL-CCNC: 201 U/L (ref 40–150)
ALT SERPL W P-5'-P-CCNC: 10 U/L (ref 0–50)
ANION GAP SERPL CALCULATED.3IONS-SCNC: 9 MMOL/L (ref 7–15)
APPEARANCE UR: CLEAR
AST SERPL W P-5'-P-CCNC: 23 U/L (ref 0–45)
BACTERIA #/AREA URNS HPF: ABNORMAL /HPF
BASOPHILS # BLD AUTO: <0.03 10E3/UL (ref 0–0.2)
BASOPHILS NFR BLD AUTO: 0.1 %
BILIRUB SERPL-MCNC: 0.2 MG/DL
BILIRUB UR QL STRIP: NEGATIVE
BUN SERPL-MCNC: 13.6 MG/DL (ref 8–23)
CALCIUM SERPL-MCNC: 9.3 MG/DL (ref 8.8–10.4)
CHLORIDE SERPL-SCNC: 105 MMOL/L (ref 98–107)
COLOR UR AUTO: ABNORMAL
CREAT SERPL-MCNC: 0.71 MG/DL (ref 0.51–0.95)
EGFRCR SERPLBLD CKD-EPI 2021: >90 ML/MIN/1.73M2
EOSINOPHIL # BLD AUTO: 0.03 10E3/UL (ref 0–0.7)
EOSINOPHIL NFR BLD AUTO: 0.3 %
ERYTHROCYTE [DISTWIDTH] IN BLOOD BY AUTOMATED COUNT: 15.4 % (ref 10–15)
GLUCOSE BLDC GLUCOMTR-MCNC: 218 MG/DL (ref 70–99)
GLUCOSE BLDC GLUCOMTR-MCNC: 259 MG/DL (ref 70–99)
GLUCOSE BLDC GLUCOMTR-MCNC: 302 MG/DL (ref 70–99)
GLUCOSE SERPL-MCNC: 186 MG/DL (ref 70–99)
GLUCOSE UR STRIP-MCNC: 50 MG/DL
HCO3 SERPL-SCNC: 24 MMOL/L (ref 22–29)
HCT VFR BLD AUTO: 31.7 % (ref 35–47)
HGB BLD-MCNC: 10 G/DL (ref 11.7–15.7)
HGB UR QL STRIP: NEGATIVE
HOLD SPECIMEN: NORMAL
IMM GRANULOCYTES # BLD: 0.12 10E3/UL
IMM GRANULOCYTES NFR BLD: 1 %
KETONES UR STRIP-MCNC: ABNORMAL MG/DL
LEUKOCYTE ESTERASE UR QL STRIP: NEGATIVE
LYMPHOCYTES # BLD AUTO: 1.35 10E3/UL (ref 0.8–5.3)
LYMPHOCYTES NFR BLD AUTO: 11.4 %
MAGNESIUM SERPL-MCNC: 1.8 MG/DL (ref 1.7–2.3)
MCH RBC QN AUTO: 29.9 PG (ref 26.5–33)
MCHC RBC AUTO-ENTMCNC: 31.5 G/DL (ref 31.5–36.5)
MCV RBC AUTO: 94.6 FL (ref 78–100)
MONOCYTES # BLD AUTO: 0.63 10E3/UL (ref 0–1.3)
MONOCYTES NFR BLD AUTO: 5.3 %
MUCOUS THREADS #/AREA URNS LPF: PRESENT /LPF
NEUTROPHILS # BLD AUTO: 9.74 10E3/UL (ref 1.6–8.3)
NEUTROPHILS NFR BLD AUTO: 81.9 %
NITRATE UR QL: NEGATIVE
NRBC # BLD AUTO: <0.03 10E3/UL
NRBC BLD AUTO-RTO: 0 /100
PH UR STRIP: 6 [PH] (ref 5–7)
PLATELET # BLD AUTO: 350 10E3/UL (ref 150–450)
POTASSIUM SERPL-SCNC: 4 MMOL/L (ref 3.4–5.3)
PROT SERPL-MCNC: 6.2 G/DL (ref 6.4–8.3)
RBC # BLD AUTO: 3.35 10E6/UL (ref 3.8–5.2)
RBC URINE: <1 /HPF
SODIUM SERPL-SCNC: 138 MMOL/L (ref 135–145)
SP GR UR STRIP: 1.01 (ref 1–1.03)
SQUAMOUS EPITHELIAL: <1 /HPF
TROPONIN T SERPL HS-MCNC: 41 NG/L
TROPONIN T SERPL HS-MCNC: 46 NG/L
UROBILINOGEN UR STRIP-MCNC: NORMAL MG/DL
WBC # BLD AUTO: 11.88 10E3/UL (ref 4–11)
WBC URINE: 1 /HPF

## 2025-08-19 PROCEDURE — 81001 URINALYSIS AUTO W/SCOPE: CPT | Performed by: EMERGENCY MEDICINE

## 2025-08-19 PROCEDURE — 99223 1ST HOSP IP/OBS HIGH 75: CPT | Mod: AI | Performed by: INTERNAL MEDICINE

## 2025-08-19 PROCEDURE — 84484 ASSAY OF TROPONIN QUANT: CPT | Performed by: EMERGENCY MEDICINE

## 2025-08-19 PROCEDURE — 99285 EMERGENCY DEPT VISIT HI MDM: CPT | Mod: 25 | Performed by: EMERGENCY MEDICINE

## 2025-08-19 PROCEDURE — 120N000002 HC R&B MED SURG/OB UMMC

## 2025-08-19 PROCEDURE — 258N000003 HC RX IP 258 OP 636: Performed by: EMERGENCY MEDICINE

## 2025-08-19 PROCEDURE — 71046 X-RAY EXAM CHEST 2 VIEWS: CPT | Mod: 26 | Performed by: RADIOLOGY

## 2025-08-19 PROCEDURE — 250N000013 HC RX MED GY IP 250 OP 250 PS 637

## 2025-08-19 PROCEDURE — 250N000009 HC RX 250

## 2025-08-19 PROCEDURE — 85004 AUTOMATED DIFF WBC COUNT: CPT | Performed by: EMERGENCY MEDICINE

## 2025-08-19 PROCEDURE — 71046 X-RAY EXAM CHEST 2 VIEWS: CPT

## 2025-08-19 PROCEDURE — 82962 GLUCOSE BLOOD TEST: CPT

## 2025-08-19 PROCEDURE — 36415 COLL VENOUS BLD VENIPUNCTURE: CPT | Performed by: EMERGENCY MEDICINE

## 2025-08-19 PROCEDURE — 250N000012 HC RX MED GY IP 250 OP 636 PS 637

## 2025-08-19 PROCEDURE — 83735 ASSAY OF MAGNESIUM: CPT | Performed by: EMERGENCY MEDICINE

## 2025-08-19 PROCEDURE — 82310 ASSAY OF CALCIUM: CPT | Performed by: EMERGENCY MEDICINE

## 2025-08-19 RX ORDER — CALCIUM CARBONATE 500 MG/1
500 TABLET, CHEWABLE ORAL 2 TIMES DAILY PRN
Status: ACTIVE | OUTPATIENT
Start: 2025-08-19

## 2025-08-19 RX ORDER — ONDANSETRON 2 MG/ML
4 INJECTION INTRAMUSCULAR; INTRAVENOUS EVERY 6 HOURS PRN
Status: ACTIVE | OUTPATIENT
Start: 2025-08-19

## 2025-08-19 RX ORDER — CALCITONIN SALMON 200 [IU]/.09ML
1 SPRAY, METERED NASAL DAILY
Status: DISPENSED | OUTPATIENT
Start: 2025-08-19

## 2025-08-19 RX ORDER — MULTIVITAMIN WITH IRON
1000 TABLET ORAL DAILY
Status: DISPENSED | OUTPATIENT
Start: 2025-08-19

## 2025-08-19 RX ORDER — HYDROXYZINE HYDROCHLORIDE 10 MG/1
10 TABLET, FILM COATED ORAL 3 TIMES DAILY PRN
Status: ACTIVE | OUTPATIENT
Start: 2025-08-19

## 2025-08-19 RX ORDER — BRIMONIDINE TARTRATE AND TIMOLOL MALEATE 2; 5 MG/ML; MG/ML
1 SOLUTION OPHTHALMIC 2 TIMES DAILY
Status: DISPENSED | OUTPATIENT
Start: 2025-08-19

## 2025-08-19 RX ORDER — PANTOPRAZOLE SODIUM 40 MG/1
40 TABLET, DELAYED RELEASE ORAL 2 TIMES DAILY
Status: DISPENSED | OUTPATIENT
Start: 2025-08-19

## 2025-08-19 RX ORDER — LIDOCAINE 40 MG/G
CREAM TOPICAL
Status: ACTIVE | OUTPATIENT
Start: 2025-08-19

## 2025-08-19 RX ORDER — NICOTINE POLACRILEX 4 MG
15-30 LOZENGE BUCCAL
Status: ACTIVE | OUTPATIENT
Start: 2025-08-19

## 2025-08-19 RX ORDER — OXYCODONE HYDROCHLORIDE 5 MG/1
5-10 TABLET ORAL 3 TIMES DAILY PRN
Status: DISCONTINUED | OUTPATIENT
Start: 2025-08-19 | End: 2025-08-20

## 2025-08-19 RX ORDER — ONDANSETRON 4 MG/1
4 TABLET, ORALLY DISINTEGRATING ORAL EVERY 6 HOURS PRN
Status: ACTIVE | OUTPATIENT
Start: 2025-08-19

## 2025-08-19 RX ORDER — GABAPENTIN 300 MG/1
600 CAPSULE ORAL 3 TIMES DAILY
Status: DISPENSED | OUTPATIENT
Start: 2025-08-19

## 2025-08-19 RX ORDER — ENOXAPARIN SODIUM 100 MG/ML
40 INJECTION SUBCUTANEOUS EVERY 12 HOURS
Status: DISPENSED | OUTPATIENT
Start: 2025-08-19

## 2025-08-19 RX ORDER — ATORVASTATIN CALCIUM 20 MG/1
20 TABLET, FILM COATED ORAL DAILY
Status: DISPENSED | OUTPATIENT
Start: 2025-08-19

## 2025-08-19 RX ORDER — PROCHLORPERAZINE MALEATE 5 MG/1
5 TABLET ORAL EVERY 6 HOURS PRN
Status: ACTIVE | OUTPATIENT
Start: 2025-08-19

## 2025-08-19 RX ORDER — LATANOPROST 50 UG/ML
1 SOLUTION/ DROPS OPHTHALMIC AT BEDTIME
Status: DISPENSED | OUTPATIENT
Start: 2025-08-19

## 2025-08-19 RX ORDER — CALCITONIN SALMON 200 [IU]/.09ML
1 SPRAY, METERED NASAL DAILY
Status: CANCELLED | OUTPATIENT
Start: 2025-08-19

## 2025-08-19 RX ORDER — ACETAMINOPHEN 500 MG
1000 TABLET ORAL 3 TIMES DAILY
Status: DISPENSED | OUTPATIENT
Start: 2025-08-19

## 2025-08-19 RX ORDER — DEXTROSE MONOHYDRATE 25 G/50ML
25-50 INJECTION, SOLUTION INTRAVENOUS
Status: ACTIVE | OUTPATIENT
Start: 2025-08-19

## 2025-08-19 RX ORDER — CYCLOSPORINE 0.5 MG/ML
1 EMULSION OPHTHALMIC 2 TIMES DAILY
Status: DISPENSED | OUTPATIENT
Start: 2025-08-19

## 2025-08-19 RX ORDER — POLYETHYLENE GLYCOL 3350 17 G/17G
17 POWDER, FOR SOLUTION ORAL DAILY
Status: DISPENSED | OUTPATIENT
Start: 2025-08-19

## 2025-08-19 RX ORDER — BISACODYL 10 MG
10 SUPPOSITORY, RECTAL RECTAL DAILY PRN
Status: ACTIVE | OUTPATIENT
Start: 2025-08-19

## 2025-08-19 RX ORDER — TIZANIDINE 2 MG/1
2 TABLET ORAL 3 TIMES DAILY PRN
Status: ACTIVE | OUTPATIENT
Start: 2025-08-19

## 2025-08-19 RX ORDER — AMOXICILLIN 250 MG
2 CAPSULE ORAL 2 TIMES DAILY
Status: DISPENSED | OUTPATIENT
Start: 2025-08-19

## 2025-08-19 RX ADMIN — CYCLOSPORINE 1 DROP: 0.5 EMULSION OPHTHALMIC at 20:44

## 2025-08-19 RX ADMIN — ATORVASTATIN CALCIUM 20 MG: 20 TABLET, FILM COATED ORAL at 20:49

## 2025-08-19 RX ADMIN — ACETAMINOPHEN 1000 MG: 500 TABLET ORAL at 20:49

## 2025-08-19 RX ADMIN — PANTOPRAZOLE SODIUM 40 MG: 40 TABLET, DELAYED RELEASE ORAL at 20:49

## 2025-08-19 RX ADMIN — INSULIN ASPART 5 UNITS: 100 INJECTION, SOLUTION INTRAVENOUS; SUBCUTANEOUS at 17:36

## 2025-08-19 RX ADMIN — BRIMONIDINE TARTRATE AND TIMOLOL MALEATE 1 DROP: 2; 5 SOLUTION/ DROPS OPHTHALMIC at 20:43

## 2025-08-19 RX ADMIN — OXYCODONE HYDROCHLORIDE 10 MG: 5 TABLET ORAL at 22:29

## 2025-08-19 RX ADMIN — SERTRALINE HYDROCHLORIDE 50 MG: 50 TABLET ORAL at 20:49

## 2025-08-19 RX ADMIN — OXYCODONE HYDROCHLORIDE 10 MG: 5 TABLET ORAL at 18:03

## 2025-08-19 RX ADMIN — INSULIN GLARGINE 35 UNITS: 100 INJECTION, SOLUTION SUBCUTANEOUS at 22:31

## 2025-08-19 RX ADMIN — METFORMIN HYDROCHLORIDE 500 MG: 500 TABLET, FILM COATED ORAL at 20:49

## 2025-08-19 RX ADMIN — CYANOCOBALAMIN TAB 500 MCG 1000 MCG: 500 TAB at 20:50

## 2025-08-19 RX ADMIN — SENNOSIDES AND DOCUSATE SODIUM 2 TABLET: 8.6; 5 TABLET ORAL at 20:49

## 2025-08-19 RX ADMIN — SODIUM CHLORIDE 1000 ML: 0.9 INJECTION, SOLUTION INTRAVENOUS at 11:34

## 2025-08-19 RX ADMIN — ACETAMINOPHEN 1000 MG: 500 TABLET ORAL at 17:33

## 2025-08-19 RX ADMIN — CALCITONIN SALMON 1 SPRAY: 200 SPRAY, METERED NASAL at 20:44

## 2025-08-19 ASSESSMENT — ACTIVITIES OF DAILY LIVING (ADL)
ADLS_ACUITY_SCORE: 58

## 2025-08-20 LAB
ATRIAL RATE - MUSE: 77 BPM
DIASTOLIC BLOOD PRESSURE - MUSE: NORMAL MMHG
GLUCOSE BLDC GLUCOMTR-MCNC: 138 MG/DL (ref 70–99)
GLUCOSE BLDC GLUCOMTR-MCNC: 146 MG/DL (ref 70–99)
GLUCOSE BLDC GLUCOMTR-MCNC: 160 MG/DL (ref 70–99)
GLUCOSE BLDC GLUCOMTR-MCNC: 166 MG/DL (ref 70–99)
GLUCOSE BLDC GLUCOMTR-MCNC: 174 MG/DL (ref 70–99)
INTERPRETATION ECG - MUSE: NORMAL
P AXIS - MUSE: 39 DEGREES
PR INTERVAL - MUSE: 132 MS
QRS DURATION - MUSE: 142 MS
QT - MUSE: 430 MS
QTC - MUSE: 486 MS
R AXIS - MUSE: -58 DEGREES
SYSTOLIC BLOOD PRESSURE - MUSE: NORMAL MMHG
T AXIS - MUSE: -42 DEGREES
VENTRICULAR RATE- MUSE: 77 BPM

## 2025-08-20 PROCEDURE — 999N000248 HC STATISTIC IV INSERT WITH US BY RN

## 2025-08-20 PROCEDURE — 250N000011 HC RX IP 250 OP 636

## 2025-08-20 PROCEDURE — 120N000002 HC R&B MED SURG/OB UMMC

## 2025-08-20 PROCEDURE — 250N000013 HC RX MED GY IP 250 OP 250 PS 637: Performed by: PHYSICIAN ASSISTANT

## 2025-08-20 PROCEDURE — 250N000013 HC RX MED GY IP 250 OP 250 PS 637

## 2025-08-20 PROCEDURE — 250N000013 HC RX MED GY IP 250 OP 250 PS 637: Performed by: NURSE PRACTITIONER

## 2025-08-20 PROCEDURE — 82962 GLUCOSE BLOOD TEST: CPT

## 2025-08-20 PROCEDURE — 99232 SBSQ HOSP IP/OBS MODERATE 35: CPT | Mod: FS | Performed by: INTERNAL MEDICINE

## 2025-08-20 RX ORDER — NALOXONE HYDROCHLORIDE 0.4 MG/ML
0.2 INJECTION, SOLUTION INTRAMUSCULAR; INTRAVENOUS; SUBCUTANEOUS
Status: ACTIVE | OUTPATIENT
Start: 2025-08-20

## 2025-08-20 RX ORDER — OXYCODONE HYDROCHLORIDE 5 MG/1
5-10 TABLET ORAL EVERY 6 HOURS PRN
Refills: 0 | Status: DISPENSED | OUTPATIENT
Start: 2025-08-20

## 2025-08-20 RX ORDER — LIDOCAINE 4 G/G
1 PATCH TOPICAL
Status: DISPENSED | OUTPATIENT
Start: 2025-08-20

## 2025-08-20 RX ORDER — NALOXONE HYDROCHLORIDE 0.4 MG/ML
0.4 INJECTION, SOLUTION INTRAMUSCULAR; INTRAVENOUS; SUBCUTANEOUS
Status: ACTIVE | OUTPATIENT
Start: 2025-08-20

## 2025-08-20 RX ADMIN — ACETAMINOPHEN 1000 MG: 500 TABLET ORAL at 19:55

## 2025-08-20 RX ADMIN — CALCITONIN SALMON 1 SPRAY: 200 SPRAY, METERED NASAL at 13:57

## 2025-08-20 RX ADMIN — INSULIN ASPART 1 UNITS: 100 INJECTION, SOLUTION INTRAVENOUS; SUBCUTANEOUS at 18:23

## 2025-08-20 RX ADMIN — INSULIN GLARGINE 35 UNITS: 100 INJECTION, SOLUTION SUBCUTANEOUS at 22:08

## 2025-08-20 RX ADMIN — SENNOSIDES AND DOCUSATE SODIUM 2 TABLET: 8.6; 5 TABLET ORAL at 19:56

## 2025-08-20 RX ADMIN — INSULIN ASPART 2 UNITS: 100 INJECTION, SOLUTION INTRAVENOUS; SUBCUTANEOUS at 13:53

## 2025-08-20 RX ADMIN — OXYCODONE HYDROCHLORIDE 10 MG: 5 TABLET ORAL at 17:51

## 2025-08-20 RX ADMIN — ACETAMINOPHEN 1000 MG: 500 TABLET ORAL at 15:01

## 2025-08-20 RX ADMIN — CYCLOSPORINE 1 DROP: 0.5 EMULSION OPHTHALMIC at 11:49

## 2025-08-20 RX ADMIN — ACETAMINOPHEN 1000 MG: 500 TABLET ORAL at 09:38

## 2025-08-20 RX ADMIN — OXYCODONE HYDROCHLORIDE 5 MG: 5 TABLET ORAL at 09:38

## 2025-08-20 RX ADMIN — CYANOCOBALAMIN TAB 500 MCG 1000 MCG: 500 TAB at 09:38

## 2025-08-20 RX ADMIN — CYCLOSPORINE 1 DROP: 0.5 EMULSION OPHTHALMIC at 21:56

## 2025-08-20 RX ADMIN — POLYETHYLENE GLYCOL 3350 17 G: 17 POWDER, FOR SOLUTION ORAL at 09:36

## 2025-08-20 RX ADMIN — ATORVASTATIN CALCIUM 20 MG: 20 TABLET, FILM COATED ORAL at 09:38

## 2025-08-20 RX ADMIN — OXYCODONE HYDROCHLORIDE 10 MG: 5 TABLET ORAL at 23:49

## 2025-08-20 RX ADMIN — LATANOPROST 1 DROP: 50 SOLUTION/ DROPS OPHTHALMIC at 21:59

## 2025-08-20 RX ADMIN — METFORMIN HYDROCHLORIDE 500 MG: 500 TABLET, FILM COATED ORAL at 17:52

## 2025-08-20 RX ADMIN — PANTOPRAZOLE SODIUM 40 MG: 40 TABLET, DELAYED RELEASE ORAL at 19:58

## 2025-08-20 RX ADMIN — OXYCODONE HYDROCHLORIDE 5 MG: 5 TABLET ORAL at 03:29

## 2025-08-20 RX ADMIN — ENOXAPARIN SODIUM 40 MG: 40 INJECTION SUBCUTANEOUS at 03:29

## 2025-08-20 RX ADMIN — METFORMIN HYDROCHLORIDE 500 MG: 500 TABLET, FILM COATED ORAL at 11:49

## 2025-08-20 RX ADMIN — SERTRALINE HYDROCHLORIDE 50 MG: 50 TABLET ORAL at 09:38

## 2025-08-20 RX ADMIN — ENOXAPARIN SODIUM 40 MG: 40 INJECTION SUBCUTANEOUS at 16:37

## 2025-08-20 RX ADMIN — INSULIN ASPART 2 UNITS: 100 INJECTION, SOLUTION INTRAVENOUS; SUBCUTANEOUS at 09:43

## 2025-08-20 RX ADMIN — PANTOPRAZOLE SODIUM 40 MG: 40 TABLET, DELAYED RELEASE ORAL at 09:38

## 2025-08-20 RX ADMIN — LIDOCAINE 4% 1 PATCH: 40 PATCH TOPICAL at 19:59

## 2025-08-20 RX ADMIN — SENNOSIDES AND DOCUSATE SODIUM 2 TABLET: 8.6; 5 TABLET ORAL at 09:37

## 2025-08-20 RX ADMIN — BRIMONIDINE TARTRATE AND TIMOLOL MALEATE 1 DROP: 2; 5 SOLUTION/ DROPS OPHTHALMIC at 19:54

## 2025-08-20 RX ADMIN — BRIMONIDINE TARTRATE AND TIMOLOL MALEATE 1 DROP: 2; 5 SOLUTION/ DROPS OPHTHALMIC at 13:57

## 2025-08-20 ASSESSMENT — ACTIVITIES OF DAILY LIVING (ADL)
ADLS_ACUITY_SCORE: 60
ADLS_ACUITY_SCORE: 71
ADLS_ACUITY_SCORE: 60
ADLS_ACUITY_SCORE: 71
ADLS_ACUITY_SCORE: 60
ADLS_ACUITY_SCORE: 70
ADLS_ACUITY_SCORE: 70
ADLS_ACUITY_SCORE: 71
ADLS_ACUITY_SCORE: 71
ADLS_ACUITY_SCORE: 60
ADLS_ACUITY_SCORE: 70
ADLS_ACUITY_SCORE: 71
ADLS_ACUITY_SCORE: 60
ADLS_ACUITY_SCORE: 70
ADLS_ACUITY_SCORE: 60
ADLS_ACUITY_SCORE: 70
ADLS_ACUITY_SCORE: 71
ADLS_ACUITY_SCORE: 60
ADLS_ACUITY_SCORE: 60
ADLS_ACUITY_SCORE: 71
ADLS_ACUITY_SCORE: 60

## 2025-08-21 ENCOUNTER — APPOINTMENT (OUTPATIENT)
Dept: OCCUPATIONAL THERAPY | Facility: CLINIC | Age: 70
End: 2025-08-21
Payer: MEDICARE

## 2025-08-21 ENCOUNTER — APPOINTMENT (OUTPATIENT)
Dept: PHYSICAL THERAPY | Facility: CLINIC | Age: 70
End: 2025-08-21
Payer: MEDICARE

## 2025-08-21 VITALS
HEART RATE: 71 BPM | TEMPERATURE: 98.2 F | SYSTOLIC BLOOD PRESSURE: 113 MMHG | RESPIRATION RATE: 16 BRPM | BODY MASS INDEX: 42.22 KG/M2 | WEIGHT: 230.82 LBS | DIASTOLIC BLOOD PRESSURE: 49 MMHG | OXYGEN SATURATION: 94 %

## 2025-08-21 LAB
ANION GAP SERPL CALCULATED.3IONS-SCNC: 12 MMOL/L (ref 7–15)
BUN SERPL-MCNC: 8.1 MG/DL (ref 8–23)
CALCIUM SERPL-MCNC: 9.1 MG/DL (ref 8.8–10.4)
CHLORIDE SERPL-SCNC: 101 MMOL/L (ref 98–107)
CREAT SERPL-MCNC: 0.64 MG/DL (ref 0.51–0.95)
EGFRCR SERPLBLD CKD-EPI 2021: >90 ML/MIN/1.73M2
ERYTHROCYTE [DISTWIDTH] IN BLOOD BY AUTOMATED COUNT: 15 % (ref 10–15)
GLUCOSE BLDC GLUCOMTR-MCNC: 123 MG/DL (ref 70–99)
GLUCOSE BLDC GLUCOMTR-MCNC: 142 MG/DL (ref 70–99)
GLUCOSE BLDC GLUCOMTR-MCNC: 149 MG/DL (ref 70–99)
GLUCOSE BLDC GLUCOMTR-MCNC: 151 MG/DL (ref 70–99)
GLUCOSE BLDC GLUCOMTR-MCNC: 189 MG/DL (ref 70–99)
GLUCOSE BLDC GLUCOMTR-MCNC: 204 MG/DL (ref 70–99)
GLUCOSE SERPL-MCNC: 144 MG/DL (ref 70–99)
HCO3 SERPL-SCNC: 26 MMOL/L (ref 22–29)
HCT VFR BLD AUTO: 28.5 % (ref 35–47)
HGB BLD-MCNC: 9 G/DL (ref 11.7–15.7)
MCH RBC QN AUTO: 29.6 PG (ref 26.5–33)
MCHC RBC AUTO-ENTMCNC: 31.6 G/DL (ref 31.5–36.5)
MCV RBC AUTO: 93.8 FL (ref 78–100)
PLATELET # BLD AUTO: 322 10E3/UL (ref 150–450)
POTASSIUM SERPL-SCNC: 3.7 MMOL/L (ref 3.4–5.3)
RBC # BLD AUTO: 3.04 10E6/UL (ref 3.8–5.2)
SODIUM SERPL-SCNC: 139 MMOL/L (ref 135–145)
WBC # BLD AUTO: 8.39 10E3/UL (ref 4–11)

## 2025-08-21 PROCEDURE — 80048 BASIC METABOLIC PNL TOTAL CA: CPT | Performed by: PHYSICIAN ASSISTANT

## 2025-08-21 PROCEDURE — 97530 THERAPEUTIC ACTIVITIES: CPT | Mod: GO

## 2025-08-21 PROCEDURE — 250N000013 HC RX MED GY IP 250 OP 250 PS 637

## 2025-08-21 PROCEDURE — 250N000013 HC RX MED GY IP 250 OP 250 PS 637: Performed by: PHYSICIAN ASSISTANT

## 2025-08-21 PROCEDURE — 250N000013 HC RX MED GY IP 250 OP 250 PS 637: Performed by: NURSE PRACTITIONER

## 2025-08-21 PROCEDURE — 97165 OT EVAL LOW COMPLEX 30 MIN: CPT | Mod: GO

## 2025-08-21 PROCEDURE — 36415 COLL VENOUS BLD VENIPUNCTURE: CPT | Performed by: PHYSICIAN ASSISTANT

## 2025-08-21 PROCEDURE — 85027 COMPLETE CBC AUTOMATED: CPT | Performed by: PHYSICIAN ASSISTANT

## 2025-08-21 PROCEDURE — 250N000013 HC RX MED GY IP 250 OP 250 PS 637: Performed by: STUDENT IN AN ORGANIZED HEALTH CARE EDUCATION/TRAINING PROGRAM

## 2025-08-21 PROCEDURE — 250N000011 HC RX IP 250 OP 636

## 2025-08-21 PROCEDURE — 97161 PT EVAL LOW COMPLEX 20 MIN: CPT | Mod: GP | Performed by: PHYSICAL THERAPIST

## 2025-08-21 PROCEDURE — 120N000002 HC R&B MED SURG/OB UMMC

## 2025-08-21 PROCEDURE — 97535 SELF CARE MNGMENT TRAINING: CPT | Mod: GO

## 2025-08-21 PROCEDURE — 97530 THERAPEUTIC ACTIVITIES: CPT | Mod: GP | Performed by: PHYSICAL THERAPIST

## 2025-08-21 RX ADMIN — ACETAMINOPHEN 1000 MG: 500 TABLET ORAL at 21:55

## 2025-08-21 RX ADMIN — GABAPENTIN 600 MG: 300 CAPSULE ORAL at 14:47

## 2025-08-21 RX ADMIN — SENNOSIDES AND DOCUSATE SODIUM 2 TABLET: 8.6; 5 TABLET ORAL at 21:49

## 2025-08-21 RX ADMIN — CYCLOSPORINE 1 DROP: 0.5 EMULSION OPHTHALMIC at 08:08

## 2025-08-21 RX ADMIN — ACETAMINOPHEN 1000 MG: 500 TABLET ORAL at 14:48

## 2025-08-21 RX ADMIN — ENOXAPARIN SODIUM 40 MG: 40 INJECTION SUBCUTANEOUS at 17:31

## 2025-08-21 RX ADMIN — PANTOPRAZOLE SODIUM 40 MG: 40 TABLET, DELAYED RELEASE ORAL at 21:53

## 2025-08-21 RX ADMIN — INSULIN ASPART 1 UNITS: 100 INJECTION, SOLUTION INTRAVENOUS; SUBCUTANEOUS at 10:27

## 2025-08-21 RX ADMIN — ENOXAPARIN SODIUM 40 MG: 40 INJECTION SUBCUTANEOUS at 05:08

## 2025-08-21 RX ADMIN — LATANOPROST 1 DROP: 50 SOLUTION/ DROPS OPHTHALMIC at 21:54

## 2025-08-21 RX ADMIN — POLYETHYLENE GLYCOL 3350 17 G: 17 POWDER, FOR SOLUTION ORAL at 08:11

## 2025-08-21 RX ADMIN — INSULIN ASPART 3 UNITS: 100 INJECTION, SOLUTION INTRAVENOUS; SUBCUTANEOUS at 14:50

## 2025-08-21 RX ADMIN — CALCITONIN SALMON 1 SPRAY: 200 SPRAY, METERED NASAL at 08:08

## 2025-08-21 RX ADMIN — BRIMONIDINE TARTRATE AND TIMOLOL MALEATE 1 DROP: 2; 5 SOLUTION/ DROPS OPHTHALMIC at 08:10

## 2025-08-21 RX ADMIN — BRIMONIDINE TARTRATE AND TIMOLOL MALEATE 1 DROP: 2; 5 SOLUTION/ DROPS OPHTHALMIC at 21:54

## 2025-08-21 RX ADMIN — GABAPENTIN 600 MG: 300 CAPSULE ORAL at 21:48

## 2025-08-21 RX ADMIN — PANTOPRAZOLE SODIUM 40 MG: 40 TABLET, DELAYED RELEASE ORAL at 08:08

## 2025-08-21 RX ADMIN — CYCLOSPORINE 1 DROP: 0.5 EMULSION OPHTHALMIC at 21:50

## 2025-08-21 RX ADMIN — LIDOCAINE 4% 1 PATCH: 40 PATCH TOPICAL at 21:46

## 2025-08-21 RX ADMIN — METFORMIN HYDROCHLORIDE 500 MG: 500 TABLET, FILM COATED ORAL at 17:32

## 2025-08-21 RX ADMIN — ATORVASTATIN CALCIUM 20 MG: 20 TABLET, FILM COATED ORAL at 08:11

## 2025-08-21 RX ADMIN — METFORMIN HYDROCHLORIDE 500 MG: 500 TABLET, FILM COATED ORAL at 08:07

## 2025-08-21 RX ADMIN — SERTRALINE HYDROCHLORIDE 50 MG: 50 TABLET ORAL at 08:08

## 2025-08-21 RX ADMIN — ACETAMINOPHEN 1000 MG: 500 TABLET ORAL at 08:07

## 2025-08-21 RX ADMIN — SENNOSIDES AND DOCUSATE SODIUM 2 TABLET: 8.6; 5 TABLET ORAL at 08:11

## 2025-08-21 RX ADMIN — CYANOCOBALAMIN TAB 500 MCG 1000 MCG: 500 TAB at 08:07

## 2025-08-21 RX ADMIN — OXYCODONE HYDROCHLORIDE 10 MG: 5 TABLET ORAL at 05:08

## 2025-08-21 RX ADMIN — GABAPENTIN 600 MG: 300 CAPSULE ORAL at 08:10

## 2025-08-21 RX ADMIN — INSULIN GLARGINE 35 UNITS: 100 INJECTION, SOLUTION SUBCUTANEOUS at 21:56

## 2025-08-21 ASSESSMENT — ACTIVITIES OF DAILY LIVING (ADL)
ADLS_ACUITY_SCORE: 70
PREVIOUS_RESPONSIBILITIES: MEAL PREP;HOUSEKEEPING;SHOPPING;LAUNDRY;MEDICATION MANAGEMENT;FINANCES;DRIVING
ADLS_ACUITY_SCORE: 70
ADLS_ACUITY_SCORE: 67
ADLS_ACUITY_SCORE: 67
DEPENDENT_IADLS:: COOKING;CLEANING
ADLS_ACUITY_SCORE: 70
ADLS_ACUITY_SCORE: 67
ADLS_ACUITY_SCORE: 70
ADLS_ACUITY_SCORE: 67

## 2025-08-22 ENCOUNTER — APPOINTMENT (OUTPATIENT)
Dept: OCCUPATIONAL THERAPY | Facility: CLINIC | Age: 70
End: 2025-08-22
Payer: MEDICARE

## 2025-08-25 ENCOUNTER — TELEPHONE (OUTPATIENT)
Dept: FAMILY MEDICINE | Facility: CLINIC | Age: 70
End: 2025-08-25

## 2025-08-25 LAB — GLUCOSE BLDC GLUCOMTR-MCNC: 221 MG/DL (ref 70–99)

## 2025-08-26 ENCOUNTER — VIRTUAL VISIT (OUTPATIENT)
Facility: CLINIC | Age: 70
End: 2025-08-26
Attending: STUDENT IN AN ORGANIZED HEALTH CARE EDUCATION/TRAINING PROGRAM
Payer: MEDICARE

## 2025-08-26 ENCOUNTER — OFFICE VISIT (OUTPATIENT)
Dept: FAMILY MEDICINE | Facility: CLINIC | Age: 70
End: 2025-08-26
Payer: MEDICARE

## 2025-08-26 VITALS
DIASTOLIC BLOOD PRESSURE: 57 MMHG | SYSTOLIC BLOOD PRESSURE: 131 MMHG | TEMPERATURE: 97.7 F | OXYGEN SATURATION: 95 % | RESPIRATION RATE: 19 BRPM | WEIGHT: 235.4 LBS | BODY MASS INDEX: 43.32 KG/M2 | HEIGHT: 62 IN | HEART RATE: 75 BPM

## 2025-08-26 DIAGNOSIS — Z79.4 TYPE 2 DIABETES MELLITUS WITH PROLIFERATIVE RETINOPATHY WITHOUT MACULAR EDEMA, WITH LONG-TERM CURRENT USE OF INSULIN, UNSPECIFIED LATERALITY (H): ICD-10-CM

## 2025-08-26 DIAGNOSIS — Z98.1 H/O SPINAL FUSION: ICD-10-CM

## 2025-08-26 DIAGNOSIS — R55 SYNCOPE, UNSPECIFIED SYNCOPE TYPE: ICD-10-CM

## 2025-08-26 DIAGNOSIS — Z98.1 S/P SPINAL FUSION: ICD-10-CM

## 2025-08-26 DIAGNOSIS — S22.082S: ICD-10-CM

## 2025-08-26 DIAGNOSIS — Z09 HOSPITAL DISCHARGE FOLLOW-UP: Primary | ICD-10-CM

## 2025-08-26 DIAGNOSIS — E11.3599 TYPE 2 DIABETES MELLITUS WITH PROLIFERATIVE RETINOPATHY WITHOUT MACULAR EDEMA, WITH LONG-TERM CURRENT USE OF INSULIN, UNSPECIFIED LATERALITY (H): ICD-10-CM

## 2025-08-26 PROCEDURE — 3075F SYST BP GE 130 - 139MM HG: CPT

## 2025-08-26 PROCEDURE — 90832 PSYTX W PT 30 MINUTES: CPT | Mod: 95

## 2025-08-26 PROCEDURE — 99214 OFFICE O/P EST MOD 30 MIN: CPT | Mod: GC

## 2025-08-26 PROCEDURE — 3078F DIAST BP <80 MM HG: CPT

## 2025-08-26 PROCEDURE — 1125F AMNT PAIN NOTED PAIN PRSNT: CPT

## 2025-08-26 ASSESSMENT — PAIN SCALES - GENERAL: PAINLEVEL_OUTOF10: SEVERE PAIN (9)

## 2025-08-26 ASSESSMENT — PATIENT HEALTH QUESTIONNAIRE - PHQ9
10. IF YOU CHECKED OFF ANY PROBLEMS, HOW DIFFICULT HAVE THESE PROBLEMS MADE IT FOR YOU TO DO YOUR WORK, TAKE CARE OF THINGS AT HOME, OR GET ALONG WITH OTHER PEOPLE: SOMEWHAT DIFFICULT
SUM OF ALL RESPONSES TO PHQ QUESTIONS 1-9: 6
SUM OF ALL RESPONSES TO PHQ QUESTIONS 1-9: 6

## 2025-08-29 DIAGNOSIS — Z09 HOSPITAL DISCHARGE FOLLOW-UP: Primary | ICD-10-CM

## 2025-08-29 DIAGNOSIS — S22.080G COMPRESSION FRACTURE OF T12 VERTEBRA, WITH DELAYED HEALING, SUBSEQUENT ENCOUNTER: ICD-10-CM

## 2025-09-03 DIAGNOSIS — S22.089A T11 VERTEBRAL FRACTURE (H): Primary | ICD-10-CM

## 2025-09-03 DIAGNOSIS — S22.089A T12 VERTEBRAL FRACTURE (H): ICD-10-CM

## 2025-09-04 ENCOUNTER — DOCUMENTATION ONLY (OUTPATIENT)
Dept: FAMILY MEDICINE | Facility: CLINIC | Age: 70
End: 2025-09-04

## 2025-09-04 ENCOUNTER — OFFICE VISIT (OUTPATIENT)
Dept: AUDIOLOGY | Facility: CLINIC | Age: 70
End: 2025-09-04
Payer: MEDICARE

## 2025-09-04 DIAGNOSIS — H90.3 SENSORINEURAL HEARING LOSS (SNHL) OF BOTH EARS: Primary | ICD-10-CM

## (undated) DEVICE — SUTURE MONOCRYL 3-0 18 PS2 UND MCP497G

## (undated) DEVICE — LINEN TOWEL PACK X30 5481

## (undated) DEVICE — DRSG TEGADERM 4X4 3/4" 1626W

## (undated) DEVICE — SOLUTION IRRIGATION 0.9% NACL 1000ML BOTTLE R5200-01

## (undated) DEVICE — RX SURGIFLO HEMOSTATIC MATRIX W/THROMBIN 8ML 2994

## (undated) DEVICE — SHAFT DRVR ADPR QC 7480741T

## (undated) DEVICE — NDL ANGIOCATH 14GA 1.25" 4048

## (undated) DEVICE — SUTURE VICRYL+ 1 CT-1 CR 8X18" VIO VCP741D

## (undated) DEVICE — DRAPE MAYO STAND 23X54 8337

## (undated) DEVICE — GOWN IMPERVIOUS SPECIALTY XLG/XLONG 32474

## (undated) DEVICE — SYSTEM KYPHX BONE CEMENT FILLER DEVICE 3  F04B

## (undated) DEVICE — DRAPE O ARM TUBE 9732722

## (undated) DEVICE — SUTURE VICRYL+ 2-0 CT-2 CR 8X18" VCP726D

## (undated) DEVICE — PREP CHLORAPREP 26ML TINTED HI-LITE ORANGE 930815

## (undated) DEVICE — Device

## (undated) DEVICE — STPL SKIN 35W ROTATING HEAD PRW35

## (undated) DEVICE — MARKER SPHERES PASSIVE MEDT PACK 5 8801075

## (undated) DEVICE — BLADE BONE MILL STRK MED 5420-MED-000

## (undated) DEVICE — SUCTION MINISQUAIR SMOKE EVAC CAPTURE DEVICE SQ20012-01

## (undated) DEVICE — TOOL DISSECT MIDAS MR8 14CM MATCH HEAD 3MM MR8-14MH30

## (undated) DEVICE — SYR BULB IRRIG DOVER 60 ML LATEX FREE 67000

## (undated) DEVICE — SOLUTION WATER 1000ML BOTTLE R5000-01

## (undated) DEVICE — KIT PATIENT CARE PROAXIS SYSTEM 6988-PV-ACP

## (undated) DEVICE — SUCTION MANIFOLD NEPTUNE 2 SYS 4 PORT 0702-020-000

## (undated) DEVICE — SPONGE COTTONOID NEURO 1/2"X1/2" 30-054

## (undated) DEVICE — SU ETHILON 3-0 PS-1 18" 1663H

## (undated) DEVICE — TUBING SUCTION MEDI-VAC SOFT 3/16"X20' N520A

## (undated) DEVICE — POSITIONER ARMBOARD FOAM CONVOLUTE CP-501

## (undated) DEVICE — DEVICE BONE FILLER MEDT KYPHON 7480703T

## (undated) DEVICE — LINEN BACK PACK 5440

## (undated) DEVICE — DRAPE LAP W/ARMBOARD 29410

## (undated) DEVICE — NEEDLE SPINAL DISP 18GA X 3.5" QUINCKE 333350

## (undated) DEVICE — GLOVE PROTEXIS BLUE W/NEU-THERA 8.0  2D73EB80

## (undated) RX ORDER — HYDROMORPHONE HYDROCHLORIDE 1 MG/ML
INJECTION, SOLUTION INTRAMUSCULAR; INTRAVENOUS; SUBCUTANEOUS
Status: DISPENSED
Start: 2025-07-21

## (undated) RX ORDER — FENTANYL CITRATE-0.9 % NACL/PF 10 MCG/ML
PLASTIC BAG, INJECTION (ML) INTRAVENOUS
Status: DISPENSED
Start: 2025-07-21

## (undated) RX ORDER — PROPOFOL 10 MG/ML
INJECTION, EMULSION INTRAVENOUS
Status: DISPENSED
Start: 2025-07-21

## (undated) RX ORDER — VANCOMYCIN HYDROCHLORIDE 1 G/20ML
INJECTION, POWDER, LYOPHILIZED, FOR SOLUTION INTRAVENOUS
Status: DISPENSED
Start: 2025-07-21

## (undated) RX ORDER — FENTANYL CITRATE 50 UG/ML
INJECTION, SOLUTION INTRAMUSCULAR; INTRAVENOUS
Status: DISPENSED
Start: 2025-07-21

## (undated) RX ORDER — CEFAZOLIN SODIUM/WATER 2 G/20 ML
SYRINGE (ML) INTRAVENOUS
Status: DISPENSED
Start: 2025-07-21

## (undated) RX ORDER — SCOPOLAMINE 1 MG/3D
PATCH, EXTENDED RELEASE TRANSDERMAL
Status: DISPENSED
Start: 2025-07-21

## (undated) RX ORDER — TOBRAMYCIN 1.2 G/30ML
INJECTION, POWDER, LYOPHILIZED, FOR SOLUTION INTRAVENOUS
Status: DISPENSED
Start: 2025-07-21